# Patient Record
Sex: FEMALE | Race: WHITE | NOT HISPANIC OR LATINO | Employment: OTHER | URBAN - METROPOLITAN AREA
[De-identification: names, ages, dates, MRNs, and addresses within clinical notes are randomized per-mention and may not be internally consistent; named-entity substitution may affect disease eponyms.]

---

## 2017-01-06 DIAGNOSIS — I10 ESSENTIAL HYPERTENSION: ICD-10-CM

## 2017-01-06 RX ORDER — NEBIVOLOL HYDROCHLORIDE 5 MG/1
TABLET ORAL
Qty: 90 TABLET | Refills: 0 | Status: SHIPPED | OUTPATIENT
Start: 2017-01-06 | End: 2017-03-01 | Stop reason: SDUPTHER

## 2017-01-06 RX ORDER — ATORVASTATIN CALCIUM 20 MG/1
TABLET, FILM COATED ORAL
Qty: 90 TABLET | Refills: 0 | Status: SHIPPED | OUTPATIENT
Start: 2017-01-06 | End: 2017-05-03 | Stop reason: SDUPTHER

## 2017-01-16 RX ORDER — MELOXICAM 15 MG/1
TABLET ORAL
Qty: 30 TABLET | Refills: 0 | Status: CANCELLED | OUTPATIENT
Start: 2017-01-16

## 2017-01-17 DIAGNOSIS — M15.9 PRIMARY OSTEOARTHRITIS INVOLVING MULTIPLE JOINTS: Primary | ICD-10-CM

## 2017-01-17 RX ORDER — MELOXICAM 15 MG/1
15 TABLET ORAL DAILY
Qty: 30 TABLET | Refills: 1 | Status: SHIPPED | OUTPATIENT
Start: 2017-01-17 | End: 2017-08-26 | Stop reason: SDUPTHER

## 2017-01-19 RX ORDER — MELOXICAM 15 MG/1
TABLET ORAL
Qty: 30 TABLET | Refills: 0 | OUTPATIENT
Start: 2017-01-19

## 2017-02-02 ENCOUNTER — OFFICE VISIT (OUTPATIENT)
Dept: INTERNAL MEDICINE | Age: 77
End: 2017-02-02

## 2017-02-02 VITALS
HEIGHT: 56 IN | SYSTOLIC BLOOD PRESSURE: 132 MMHG | TEMPERATURE: 98.4 F | OXYGEN SATURATION: 98 % | DIASTOLIC BLOOD PRESSURE: 92 MMHG | HEART RATE: 72 BPM | WEIGHT: 115 LBS | BODY MASS INDEX: 25.87 KG/M2

## 2017-02-02 DIAGNOSIS — R31.9 URINARY TRACT INFECTION WITH HEMATURIA, SITE UNSPECIFIED: ICD-10-CM

## 2017-02-02 DIAGNOSIS — N39.0 URINARY TRACT INFECTION WITH HEMATURIA, SITE UNSPECIFIED: ICD-10-CM

## 2017-02-02 DIAGNOSIS — R35.0 URINARY FREQUENCY: Primary | ICD-10-CM

## 2017-02-02 LAB
BILIRUB BLD-MCNC: NEGATIVE MG/DL
CLARITY, POC: CLEAR
COLOR UR: ABNORMAL
GLUCOSE UR STRIP-MCNC: NEGATIVE MG/DL
KETONES UR QL: NEGATIVE
LEUKOCYTE EST, POC: ABNORMAL
NITRITE UR-MCNC: POSITIVE MG/ML
PH UR: 5 [PH] (ref 5–8)
PROT UR STRIP-MCNC: NEGATIVE MG/DL
RBC # UR STRIP: ABNORMAL /UL
SP GR UR: 1.03 (ref 1–1.03)
UROBILINOGEN UR QL: NORMAL

## 2017-02-02 PROCEDURE — 99214 OFFICE O/P EST MOD 30 MIN: CPT | Performed by: NURSE PRACTITIONER

## 2017-02-02 PROCEDURE — 81003 URINALYSIS AUTO W/O SCOPE: CPT | Performed by: NURSE PRACTITIONER

## 2017-02-02 RX ORDER — SULFAMETHOXAZOLE AND TRIMETHOPRIM 800; 160 MG/1; MG/1
1 TABLET ORAL 2 TIMES DAILY
Qty: 20 TABLET | Refills: 0 | Status: SHIPPED | OUTPATIENT
Start: 2017-02-02 | End: 2017-03-03

## 2017-02-02 NOTE — PROGRESS NOTES
Grace GONZALEZ Beauchamp / 76 y.o. / female  02/02/2017      CC:  Main reason(s) for today's visit: Urinary Frequency and Urinary Tract Infection (history of UTI's buring and pressure / chronic probs)      HPI:   Patient presents to the office with C/O urinary frequency, and pressure. States that she wakes up at night and has to urinate 4 times a night at times. Also noticed a odor with urine. States that she has a H/O frequent UTI's. Denies any blood in the urine, denies any bladder spasms. No fever reported.     The following portions of the patient's history were reviewed and updated as appropriate: past medical history and past surgical history.    ASSESSMENT & PLAN:    Problem List Items Addressed This Visit     None      Visit Diagnoses     Urinary frequency    -  Primary    Relevant Orders    POC Urinalysis Dipstick, Automated    Urinalysis With / Microscopic If Indicated        Orders Placed This Encounter   Procedures   • Urinalysis With / Microscopic If Indicated   • POC Urinalysis Dipstick, Automated       · Summary/Discussion:     Urinary frequency: Patient with history of frequent UTIs presents to the clinic with symptoms of urinary frequency, urinary pressure, and foul-smelling urine.  Due to these complaints urinalysis was ordered with results being positive for a UTI. Patient will be prescribed an antibiotic to be used as directed. She was advised to increase her oral fluids and contact the office if symptoms do not improve. Since this patient has chronic UTI's, we may consider referral to urology if symptoms do not improve despite antibiotics.   Follow-up: No Follow-up on file.     Future Appointments  Date Time Provider Department Center   4/13/2017 9:00 AM [facility] 3  [facility]   5/2/2017 1:00 PM Tres De Los Santos MD [facility] None     ____________________________________________________________________    REVIEW OF SYSTEMS    Review of Systems   Constitutional: Negative for activity change, appetite  "change, chills, diaphoresis, fatigue and fever.   HENT: Negative for congestion, dental problem, ear discharge, facial swelling, hearing loss, mouth sores, sore throat, trouble swallowing and voice change.    Eyes: Negative.    Respiratory: Negative for apnea, cough, choking, chest tightness, shortness of breath, wheezing and stridor.    Cardiovascular: Negative for chest pain, palpitations and leg swelling.   Gastrointestinal: Negative for abdominal distention, abdominal pain, anal bleeding, blood in stool, constipation, diarrhea, nausea, rectal pain and vomiting.   Endocrine: Negative for cold intolerance and heat intolerance.   Genitourinary: Positive for dysuria, frequency and urgency. Negative for decreased urine volume, difficulty urinating, enuresis, flank pain, genital sores and hematuria.        Pressure with urination and foul smelling urine.    Musculoskeletal: Negative for arthralgias, back pain, gait problem, joint swelling, myalgias, neck pain and neck stiffness.   Skin: Negative for color change, pallor, rash and wound.   Allergic/Immunologic: Negative for environmental allergies, food allergies and immunocompromised state.   Neurological: Negative for dizziness, tremors, seizures, syncope, facial asymmetry, speech difficulty, weakness, light-headedness, numbness and headaches.   Hematological: Negative for adenopathy. Does not bruise/bleed easily.   Psychiatric/Behavioral: Negative for agitation, confusion, decreased concentration, self-injury, sleep disturbance and suicidal ideas. The patient is not nervous/anxious and is not hyperactive.    All other systems reviewed and are negative.    Other: As noted per HPI      VITALS    Visit Vitals   • /92   • Pulse 72   • Temp 98.4 °F (36.9 °C)   • Ht 56\" (142.2 cm)   • Wt 115 lb (52.2 kg)   • SpO2 98%   • BMI 25.78 kg/m2     BP Readings from Last 3 Encounters:   02/02/17 132/92   11/01/16 120/78   10/26/16 122/80     Wt Readings from Last 3 " Encounters:   02/02/17 115 lb (52.2 kg)   11/01/16 109 lb (49.4 kg)   10/26/16 110 lb (49.9 kg)      Body mass index is 25.78 kg/(m^2).    PHYSICAL EXAMINATION    Physical Exam   Constitutional: She is oriented to person, place, and time. She appears well-developed and well-nourished.   HENT:   Head: Normocephalic.   Eyes: Conjunctivae are normal. Pupils are equal, round, and reactive to light.   Neck: Normal range of motion.   Cardiovascular: Normal rate and regular rhythm.    Pulmonary/Chest: Effort normal and breath sounds normal.   Musculoskeletal: Normal range of motion.   Neurological: She is alert and oriented to person, place, and time.   Skin: Skin is warm and dry.   Psychiatric: She has a normal mood and affect. Her behavior is normal.   Vitals reviewed.        REVIEWED DATA:    Labs:   Lab Results   Component Value Date     11/01/2016    K 4.2 11/01/2016    AST 13 06/28/2016    ALT 10 06/28/2016    BUN 15 11/01/2016    CREATININE 0.71 11/01/2016    CREATININE 0.90 09/30/2016    CREATININE 0.79 06/28/2016    EGFRIFNONA 83 11/01/2016    EGFRIFAFRI 96 11/01/2016       Lab Results   Component Value Date     (H) 11/01/2016    HGBA1C 6.9 (H) 12/08/2015       No results found for: LDL, HDL, TRIG, CHOLHDLRATIO    No results found for: TSH, FREET4       Lab Results   Component Value Date    WBC 9.97 06/28/2016    HGB 14.1 06/28/2016     06/28/2016        Imaging:        Medical Tests:        Summary of old records / correspondence / consultant report:        Request outside records:          ALLERGIES:    Ramipril; Morphine; and Morphine and related    MEDICATIONS:  Current Outpatient Prescriptions   Medication Sig Dispense Refill   • acetaminophen (TYLENOL) 500 MG tablet Take 1 tablet by mouth every 8 (eight) hours as needed.     • aspirin 81 MG EC tablet Take 81 mg by mouth daily.     • atorvastatin (LIPITOR) 20 MG tablet TAKE ONE TABLET BY MOUTH EVERY NIGHT AT BEDTIME 90 tablet 0   • Biotin  1000 MCG tablet Take 1,000 mcg by mouth daily. Take as directed     • BYSTOLIC 5 MG tablet TAKE ONE TABLET BY MOUTH DAILY 90 tablet 0   • Cholecalciferol (VITAMIN D) 2000 UNITS tablet Take 1 tablet by mouth nightly.     • cilostazol (PLETAL) 100 MG tablet Take 1 tablet by mouth 2 (two) times a day.     • diclofenac (VOLTAREN) 1 % gel gel Place on the skin. 4 GM of Gel to affected to area 4 times daily. Do not apply more than 16 Gm daily to any one affected joint     • meloxicam (MOBIC) 15 MG tablet Take 1 tablet by mouth Daily. 30 tablet 1   • pramipexole (MIRAPEX) 0.125 MG tablet Take 1 tablet by mouth every night. 90 tablet 1   • PROAIR  (90 BASE) MCG/ACT inhaler INHALE 2 PUFFS INTO LUNGS EVERY 6 HOURS AS NEEDED FOR WHEEZING 1 inhaler 4   • tiotropium (SPIRIVA) 18 MCG per inhalation capsule daily. Inhale contents of capsule once a day     • traMADol (ULTRAM) 50 MG tablet TAKE ONE TABLET BY MOUTH EVERY 6 HOURS AS NEEDED FOR MODERATE PAIN 90 tablet 1     No current facility-administered medications for this visit.        [facility]    The following portions of the patient's history were reviewed and updated as appropriate: Allergies / Current Medications / Past Medical History / Surgical History / Social History / Family History    PROBLEM LIST:    Patient Active Problem List   Diagnosis   • Aneurysm of thoracic aorta   • Malignant neoplasm of breast   • Malignant neoplasm of subglottis   • Stenosis of carotid artery   • Chronic obstructive pulmonary disease   • Closed fracture of multiple ribs   • Cognitive disorder   • Depression   • Erythromelalgia   • Hyperlipidemia   • Hypertension   • Osteoarthritis   • Osteoporosis   • Restless legs syndrome   • Cerebral aneurysm   • Temporary cerebral vascular dysfunction   • S/P CABG x 1   • Diabetes mellitus   • Sternal pain       PAST MEDICAL HX:    Past Medical History   Diagnosis Date   • Aneurysm      saccular   • Aneurysm of aorta      abdominal aorta and arch,  descending aorta   • Cancer      breast,subglottis,   • Cognitive disorder    • COPD (chronic obstructive pulmonary disease)    • Depression    • Diabetes mellitus    • Hyperlipidemia    • Hypertension    • Low back pain    • Osteoarthritis    • Osteoporosis    • Prediabetes    • RLS (restless legs syndrome)    • Stenosis of artery      carotid   • Stroke      Perioperatively with left hip replacement   • TIA (transient ischemic attack)    • Venous insufficiency        PAST SURGICAL HX:    Past Surgical History   Procedure Laterality Date   • Breast surgery       Rt mastecomy   • Cholecystectomy     • Hip surgery Left      open reduction internal fixation   • Other surgical history  02/23/2016     ascending aortic replacement with 24 mm graft  Dr Ontiveros   • Coronary artery bypass graft  02/23/2016     X 1 Dr Ontiveros   • Other surgical history  02/23/2016     Total Arch Replacement using a 24 mm Vascutek graft Dr Otniveros   • Cardiac surgery  02/2016       SOCIAL HX:    Social History     Social History   • Marital status: Unknown     Spouse name: N/A   • Number of children: N/A   • Years of education: N/A     Social History Main Topics   • Smoking status: Former Smoker   • Smokeless tobacco: Never Used   • Alcohol use No   • Drug use: No   • Sexual activity: Defer     Other Topics Concern   • None     Social History Narrative       FAMILY HX:    Family History   Problem Relation Age of Onset   • Alzheimer's disease Mother    • Cancer Maternal Aunt    • Heart disease Father    • Heart failure Father    • No Known Problems Sister    • Heart failure Brother    • No Known Problems Maternal Grandmother    • No Known Problems Maternal Grandfather    • No Known Problems Paternal Grandmother    • No Known Problems Paternal Grandfather    • Diabetes Brother    • Brain cancer Brother

## 2017-02-03 ENCOUNTER — TELEPHONE (OUTPATIENT)
Dept: INTERNAL MEDICINE | Age: 77
End: 2017-02-03

## 2017-02-03 DIAGNOSIS — N39.0 URINARY TRACT INFECTION WITHOUT HEMATURIA, SITE UNSPECIFIED: ICD-10-CM

## 2017-02-03 DIAGNOSIS — R19.5 YEAST IN STOOL: Primary | ICD-10-CM

## 2017-02-03 DIAGNOSIS — B37.31 VAGINAL YEAST INFECTION: ICD-10-CM

## 2017-02-03 RX ORDER — CIPROFLOXACIN 500 MG/1
500 TABLET, FILM COATED ORAL 2 TIMES DAILY
Qty: 6 TABLET | Refills: 0 | Status: SHIPPED | OUTPATIENT
Start: 2017-02-03 | End: 2017-03-03

## 2017-02-06 ENCOUNTER — TELEPHONE (OUTPATIENT)
Dept: INTERNAL MEDICINE | Age: 77
End: 2017-02-06

## 2017-02-06 NOTE — TELEPHONE ENCOUNTER
I spoke to Mrs. Beauchamp and advised her to continue taking nystatin oral swish as previously ordered. I also advised her to take benadryl and if she has any symptoms of tongue swelling, lip swelling or difficulty swallowing, go to the ER immediately. Patient verbalized understanding.

## 2017-03-01 DIAGNOSIS — M54.50 BILATERAL LOW BACK PAIN WITHOUT SCIATICA, UNSPECIFIED CHRONICITY: ICD-10-CM

## 2017-03-01 DIAGNOSIS — I10 ESSENTIAL HYPERTENSION: ICD-10-CM

## 2017-03-01 DIAGNOSIS — M54.50 BILATERAL LOW BACK PAIN WITHOUT SCIATICA, UNSPECIFIED CHRONICITY: Primary | ICD-10-CM

## 2017-03-01 RX ORDER — NEBIVOLOL 5 MG/1
5 TABLET ORAL DAILY
Qty: 90 TABLET | Refills: 0 | Status: SHIPPED | OUTPATIENT
Start: 2017-03-01 | End: 2017-05-03 | Stop reason: SDUPTHER

## 2017-03-02 RX ORDER — TRAMADOL HYDROCHLORIDE 50 MG/1
TABLET ORAL
Qty: 90 TABLET | Refills: 1 | OUTPATIENT
Start: 2017-03-02 | End: 2017-05-16 | Stop reason: SDUPTHER

## 2017-03-02 NOTE — TELEPHONE ENCOUNTER
Last OV 2/2/2017  Next OV 3/3/2017  Last RF 12/1/2016 #90 +1  Last KSP On File  UDS Not On File (orders are in chart, needs co-sign)  Contract Not On File    KD

## 2017-03-03 ENCOUNTER — TELEPHONE (OUTPATIENT)
Dept: CARDIAC SURGERY | Facility: CLINIC | Age: 77
End: 2017-03-03

## 2017-03-03 ENCOUNTER — OFFICE VISIT (OUTPATIENT)
Dept: INTERNAL MEDICINE | Age: 77
End: 2017-03-03

## 2017-03-03 VITALS
HEART RATE: 70 BPM | TEMPERATURE: 97.8 F | HEIGHT: 56 IN | BODY MASS INDEX: 25.64 KG/M2 | OXYGEN SATURATION: 97 % | DIASTOLIC BLOOD PRESSURE: 80 MMHG | SYSTOLIC BLOOD PRESSURE: 134 MMHG | WEIGHT: 114 LBS

## 2017-03-03 DIAGNOSIS — E78.2 MIXED HYPERLIPIDEMIA: Chronic | ICD-10-CM

## 2017-03-03 DIAGNOSIS — M15.9 PRIMARY OSTEOARTHRITIS INVOLVING MULTIPLE JOINTS: Chronic | ICD-10-CM

## 2017-03-03 DIAGNOSIS — J43.9 PULMONARY EMPHYSEMA, UNSPECIFIED EMPHYSEMA TYPE (HCC): Chronic | ICD-10-CM

## 2017-03-03 DIAGNOSIS — I10 ESSENTIAL HYPERTENSION: Primary | Chronic | ICD-10-CM

## 2017-03-03 DIAGNOSIS — F32.A DEPRESSION, UNSPECIFIED DEPRESSION TYPE: Chronic | ICD-10-CM

## 2017-03-03 DIAGNOSIS — E11.9 TYPE 2 DIABETES MELLITUS WITHOUT COMPLICATION, WITHOUT LONG-TERM CURRENT USE OF INSULIN (HCC): Chronic | ICD-10-CM

## 2017-03-03 PROCEDURE — 99214 OFFICE O/P EST MOD 30 MIN: CPT | Performed by: INTERNAL MEDICINE

## 2017-03-03 RX ORDER — ESCITALOPRAM OXALATE 10 MG/1
10 TABLET ORAL EVERY MORNING
Qty: 30 TABLET | Refills: 1 | Status: SHIPPED | OUTPATIENT
Start: 2017-03-03 | End: 2017-05-03 | Stop reason: SDUPTHER

## 2017-03-03 NOTE — TELEPHONE ENCOUNTER
Ms Beauchamp a patient of Dr Ontiveros's called today and had questions concerning an area directly beneath her surgical incision site. She has asked numerous physicians that she sees and has had several different opinions. The last of which was Dr Serrato who viewed it this week and feels it is a hernia. She would like Dr Ontiveros's opinion. I told her I would check with Dr Ontiveros and see if he wanted her to have any type of testing done before she comes in for an office visit. I will speak with Dr Ontiveros and call her back in the next few days. She does verbalize that this area is slightly uncomfortable but not painful

## 2017-03-03 NOTE — PROGRESS NOTES
"Grace GONZALEZ Beauchamp / 76 y.o. / female  03/03/2017    VITALS    Visit Vitals   • /80   • Pulse 70   • Temp 97.8 °F (36.6 °C)   • Ht 56\" (142.2 cm)   • Wt 114 lb (51.7 kg)   • SpO2 97%   • BMI 25.56 kg/m2     BP Readings from Last 3 Encounters:   03/03/17 134/80   02/02/17 132/92   11/01/16 120/78     Wt Readings from Last 3 Encounters:   03/03/17 114 lb (51.7 kg)   02/02/17 115 lb (52.2 kg)   11/01/16 109 lb (49.4 kg)      Body mass index is 25.56 kg/(m^2).    CC:  Main reason(s) for today's visit: bulge on abdomen (poss hernia) and Med Refill (discuss tramadol refill)      HPI:     Chronic type 2 diabetes     Home blood sugar levels: does not check  Current therapy: lifestyle modification without medication    Most recent relevant labs:   Lab Results   Component Value Date    HGBA1C 6.9 (H) 12/08/2015    CREATININE 0.71 11/01/2016     Chronic essential hypertension  Home BP readings: DOES NOT CHECK BP AT HOME. Compliant with medication(s).  Denies significant problems or side effects. Most recent in-office blood pressure readings:   BP Readings from Last 3 Encounters:   03/03/17 134/80   02/02/17 132/92   11/01/16 120/78     Chronic hyperlipidemia  Current therapy include atorvastatin.  Denies significant problems with medication(s).  Most recent labs: No results found for: LDL, HDL, TRIG, CHOLHDLRATIO    COPD    She has quit smoking several years ago.  She denies change in breathing symptoms.  Current therapy: Spiriva    S/p thoracic aortic aneurysm repair. She thinks she has a hernia.     C/o recurrent depression, w/o SI. Previously stopped medication on own.     Generalized OA on meloxicam and tramadol 1-2 x daily. Helps w/ pain. Denies problems w/ med.    ____________________________________________________________________    ASSESSMENT & PLAN:    Problem List Items Addressed This Visit        High    Hypertension - Primary (Chronic)    Current Assessment & Plan     Stable. Continue Bystolic 5 mg qd.       "    Relevant Medications    nebivolol (BYSTOLIC) 5 MG tablet       Medium    Depression (Chronic)    Current Assessment & Plan     H/o recurrent depression; start escitalopram; f/u in 2 months.          Relevant Medications    escitalopram (LEXAPRO) 10 MG tablet    Primary osteoarthritis involving multiple joints (Chronic)    Overview     Impression: 07/15/2014 - Hips, spine;          Current Assessment & Plan     Continue meloxicam and tramadol as needed.   Consent / agreement signed. Marquis requested. UDS today.          Relevant Medications    meloxicam (MOBIC) 15 MG tablet    Type 2 diabetes mellitus without complication, without long-term current use of insulin (Chronic)    Relevant Orders    Ambulatory Referral to Ophthalmology    Hemoglobin A1c    Microalbumin / Creatinine Urine Ratio       Low    Chronic obstructive pulmonary disease (Chronic)    Current Assessment & Plan     Continue Spiriva daily.          Relevant Medications    tiotropium (SPIRIVA) 18 MCG per inhalation capsule    PROAIR  (90 BASE) MCG/ACT inhaler    Hyperlipidemia (Chronic)    Current Assessment & Plan     Stable. Continue atorvastatin.          Relevant Medications    atorvastatin (LIPITOR) 20 MG tablet    Other Relevant Orders    Lipid Panel With / Chol / HDL Ratio        Orders Placed This Encounter   Procedures   • Hemoglobin A1c   • Lipid Panel With / Chol / HDL Ratio   • Microalbumin / Creatinine Urine Ratio   • Ambulatory Referral to Ophthalmology       Summary/Discussion:     ·       Return in about 2 months (around 5/3/2017) for Depression; AWV with Pernell in 2 weeks (40 min).    Future Appointments  Date Time Provider Department Center   3/17/2017 1:20 PM [facility] None   4/13/2017 9:00 AM YUNG CT 3 [facility]   5/2/2017 1:00 PM Tres De Los Santos MD [facility] None   5/3/2017 2:20 PM Miller Castañeda MD [facility] None        ____________________________________________________________________    REVIEW OF SYSTEMS    Review of Systems  As noted per HPI  Constitutional neg   Resp neg   CV neg for chest pain, driscoll, pnd/orthopnea  Gi no pain  Psych depressed    Other: As noted per HPI      PHYSICAL EXAMINATION    Physical Exam  Constitutional  No distress   Cardiovascular Rate  normal . Rhythm: regular . Heart sounds:  normal    Pulmonary/Chest  Effort normal. Breath sounds:  normal   Abdomen: hernia upper abdomen w/o incarceration or pain  Psychiatric  Alert. Judgment and thought content normal. Mood depressed; no SI    REVIEWED DATA:    Labs:   Lab Results   Component Value Date     11/01/2016    K 4.2 11/01/2016    AST 13 06/28/2016    ALT 10 06/28/2016    BUN 15 11/01/2016    CREATININE 0.71 11/01/2016    CREATININE 0.90 09/30/2016    CREATININE 0.79 06/28/2016    EGFRIFNONA 83 11/01/2016    EGFRIFAFRI 96 11/01/2016       Lab Results   Component Value Date     (H) 11/01/2016    HGBA1C 6.9 (H) 12/08/2015       No results found for: LDL, HDL, TRIG, CHOLHDLRATIO    No results found for: TSH, FREET4       Lab Results   Component Value Date    WBC 9.97 06/28/2016    HGB 14.1 06/28/2016    HGB 11.5 (L) 02/29/2016    HGB 10.5 (L) 02/26/2016     06/28/2016        Imaging:        Medical Tests:        Summary of old records / correspondence / consultant report:        Request outside records:          ALLERGIES:    Ramipril; Bactrim [sulfamethoxazole-trimethoprim]; Morphine; and Morphine and related    MEDICATIONS:  Outpatient Medications Prior to Visit   Medication Sig Dispense Refill   • acetaminophen (TYLENOL) 500 MG tablet Take 1 tablet by mouth every 8 (eight) hours as needed.     • aspirin 81 MG EC tablet Take 81 mg by mouth daily.     • atorvastatin (LIPITOR) 20 MG tablet TAKE ONE TABLET BY MOUTH EVERY NIGHT AT BEDTIME 90 tablet 0   • Biotin 1000 MCG tablet Take 1,000 mcg by mouth daily. Take as directed     •  Cholecalciferol (VITAMIN D) 2000 UNITS tablet Take 1 tablet by mouth nightly.     • cilostazol (PLETAL) 100 MG tablet Take 1 tablet by mouth 2 (two) times a day.     • diclofenac (VOLTAREN) 1 % gel gel Place on the skin. 4 GM of Gel to affected to area 4 times daily. Do not apply more than 16 Gm daily to any one affected joint     • meloxicam (MOBIC) 15 MG tablet Take 1 tablet by mouth Daily. 30 tablet 1   • nebivolol (BYSTOLIC) 5 MG tablet Take 1 tablet by mouth Daily. 90 tablet 0   • pramipexole (MIRAPEX) 0.125 MG tablet Take 1 tablet by mouth every night. 90 tablet 1   • PROAIR  (90 BASE) MCG/ACT inhaler INHALE 2 PUFFS INTO LUNGS EVERY 6 HOURS AS NEEDED FOR WHEEZING 1 inhaler 4   • tiotropium (SPIRIVA) 18 MCG per inhalation capsule daily. Inhale contents of capsule once a day     • traMADol (ULTRAM) 50 MG tablet TAKE ONE TABLET BY MOUTH EVERY 6 HOURS AS NEEDED FOR MODERATE PAIN 90 tablet 1   • ciprofloxacin (CIPRO) 500 MG tablet Take 1 tablet by mouth 2 (Two) Times a Day. 6 tablet 0   • sulfamethoxazole-trimethoprim (BACTRIM DS,SEPTRA DS) 800-160 MG per tablet Take 1 tablet by mouth 2 (Two) Times a Day. 20 tablet 0     No facility-administered medications prior to visit.        [facility]    The following portions of the patient's history were reviewed and updated as appropriate: Allergies / Current Medications / Past Medical History / Surgical History / Social History / Family History    PROBLEM LIST:    Patient Active Problem List   Diagnosis   • History of breast cancer   • Stenosis of carotid artery   • Chronic obstructive pulmonary disease   • Closed fracture of multiple ribs   • Cognitive disorder   • Depression   • Erythromelalgia   • Hyperlipidemia   • Hypertension   • Primary osteoarthritis involving multiple joints   • Osteoporosis   • Restless legs syndrome   • Cerebral aneurysm   • Temporary cerebral vascular dysfunction   • S/P CABG x 1   • Type 2 diabetes mellitus without complication, without  long-term current use of insulin   • Sternal pain       PAST MEDICAL HX:    Past Medical History   Diagnosis Date   • Aneurysm      saccular   • Aneurysm of aorta      abdominal aorta and arch, descending aorta   • Cancer      breast,subglottis,   • Cognitive disorder    • COPD (chronic obstructive pulmonary disease)    • Depression    • Diabetes mellitus    • Hyperlipidemia    • Hypertension    • Low back pain    • Osteoarthritis    • Osteoporosis    • Prediabetes    • RLS (restless legs syndrome)    • Stenosis of artery      carotid   • Stroke      Perioperatively with left hip replacement   • TIA (transient ischemic attack)    • Venous insufficiency        PAST SURGICAL HX:    Past Surgical History   Procedure Laterality Date   • Breast surgery       Rt mastecomy   • Cholecystectomy     • Hip surgery Left      open reduction internal fixation   • Other surgical history  02/23/2016     ascending aortic replacement with 24 mm graft  Dr Ontiveros   • Coronary artery bypass graft  02/23/2016     X 1 Dr Ontiveros   • Other surgical history  02/23/2016     Total Arch Replacement using a 24 mm Vascutek graft Dr Ontiveros   • Cardiac surgery  02/2016       SOCIAL HX:    Social History     Social History   • Marital status: Unknown     Spouse name: N/A   • Number of children: N/A   • Years of education: N/A     Social History Main Topics   • Smoking status: Former Smoker   • Smokeless tobacco: Never Used   • Alcohol use No   • Drug use: No   • Sexual activity: Defer     Other Topics Concern   • None     Social History Narrative       FAMILY HX:    Family History   Problem Relation Age of Onset   • Alzheimer's disease Mother    • Cancer Maternal Aunt    • Heart disease Father    • Heart failure Father    • No Known Problems Sister    • Heart failure Brother    • No Known Problems Maternal Grandmother    • No Known Problems Maternal Grandfather    • No Known Problems Paternal Grandmother    • No Known Problems Paternal Grandfather    •  Diabetes Brother    • Brain cancer Brother          **Tru Disclaimer:   Much of this encounter note is an electronic transcription/translation of spoken language to printed text. The electronic translation of spoken language may permit erroneous, or at times, nonsensical words or phrases to be inadvertently transcribed. Although I have reviewed the note for such errors, some may still exist.

## 2017-03-03 NOTE — ASSESSMENT & PLAN NOTE
Continue meloxicam and tramadol as needed.   Consent / agreement signed. Marquis requested. UDS today.

## 2017-03-09 ENCOUNTER — TELEPHONE (OUTPATIENT)
Dept: CARDIAC SURGERY | Facility: CLINIC | Age: 77
End: 2017-03-09

## 2017-03-09 LAB
ALBUMIN/CREAT UR: 25.2 MG/G CREAT (ref 0–30)
CHOLEST SERPL-MCNC: 235 MG/DL (ref 0–200)
CHOLEST/HDLC SERPL: 3.51 {RATIO}
CREAT UR-MCNC: 275 MG/DL
DRUGS UR: NORMAL
HBA1C MFR BLD: 6.29 % (ref 4.8–5.6)
HDLC SERPL-MCNC: 67 MG/DL (ref 40–60)
LDLC SERPL CALC-MCNC: 146 MG/DL (ref 0–100)
Lab: NORMAL
MICROALBUMIN UR-MCNC: 69.2 UG/ML
TRIGL SERPL-MCNC: 111 MG/DL (ref 0–150)
VLDLC SERPL CALC-MCNC: 22.2 MG/DL (ref 5–40)

## 2017-03-09 NOTE — TELEPHONE ENCOUNTER
Dr Ontiveros reviewed Ms Beauchamp's chart and asked that I call her and let her know he would like her to be seen and evaluated by Dr Robert Bolden. This was agreeable to her. We will schedule this appointment and call her back with this information as soon as possible

## 2017-03-10 ENCOUNTER — TELEPHONE (OUTPATIENT)
Dept: INTERNAL MEDICINE | Age: 77
End: 2017-03-10

## 2017-03-10 NOTE — TELEPHONE ENCOUNTER
----- Message from Miller Castañeda MD sent at 3/10/2017  7:46 AM EST -----  Call patient with her test result(s) and mail the results to her if MyChart is NOT active.    Cholesterol level is high. Taking atorvastatin consistently? If so, increase to 40 mg. (remember to keep association dx).  Will need to check fasting labs on next visit.

## 2017-03-10 NOTE — PROGRESS NOTES
Call patient with her test result(s) and mail the results to her if MyChart is NOT active.    Cholesterol level is high. Taking atorvastatin consistently? If so, increase to 40 mg. (remember to keep association dx).  Will need to check fasting labs on next visit.

## 2017-03-13 ENCOUNTER — TELEPHONE (OUTPATIENT)
Dept: INTERNAL MEDICINE | Age: 77
End: 2017-03-13

## 2017-03-13 NOTE — TELEPHONE ENCOUNTER
Pt was not taking her atorvastatin consistently. She states that it was causing her leg cramps so she had quit taking for a couple of weeks prior to her labs, but had since started back. Told her to Keep taking her  20 mg unless instructed otherwise or unless you wanted her to change to 40 mg.

## 2017-03-15 ENCOUNTER — OFFICE VISIT (OUTPATIENT)
Dept: SURGERY | Facility: CLINIC | Age: 77
End: 2017-03-15

## 2017-03-15 VITALS — WEIGHT: 112 LBS | OXYGEN SATURATION: 93 % | BODY MASS INDEX: 25.19 KG/M2 | HEIGHT: 56 IN | HEART RATE: 74 BPM

## 2017-03-15 DIAGNOSIS — K43.2 INCISIONAL HERNIA, WITHOUT OBSTRUCTION OR GANGRENE: Primary | ICD-10-CM

## 2017-03-15 PROCEDURE — 99204 OFFICE O/P NEW MOD 45 MIN: CPT | Performed by: SURGERY

## 2017-03-15 RX ORDER — OMEPRAZOLE 20 MG/1
20 CAPSULE, DELAYED RELEASE ORAL DAILY
Qty: 30 CAPSULE | Refills: 1 | Status: SHIPPED | OUTPATIENT
Start: 2017-03-15 | End: 2018-03-15

## 2017-03-15 NOTE — PROGRESS NOTES
Date: March 15, 2017   Patient Name: Grace Beauchamp   Medical Record #: [id number]   : 1940  Age: 76 y.o.  Sex: female      Referring MD:  No referring provider defined for this encounter.    Reason for Visit  Chief Complaint   Patient presents with   • Hernia     NP, Ventral Hernia, Mid Abdominal Area   • Nausea        History of Present Illness:     Grace Beauchamp is a 76 y.o. female who presents for evaluation of a with a painful abdominal mass.  The patient underwent ascending aortic replacement and coronary bypass on .  3 months after the surgery she started noticing some discomfort and bulging at the inferior aspect of her midline sternotomy over the upper abdomen. The mass has always been reducible.  It is not tender. The discomfort is  almost always centered in the area of the buldge.The patient has not had obstructive symptoms.  The buldge has been slowly growing over time.  This was associated with nausea they usually happens in the morning.  This gets usually better with time.  Reports poor appetite but no pain.  No acid reflux or heartburn.  Symptoms improve with TUMS.  She has been taking meloxicam for arthritis, has not been taking any antiacid for gastric protection.    Past Medical History    Patient Active Problem List   Diagnosis   • History of breast cancer   • Stenosis of carotid artery   • Chronic obstructive pulmonary disease   • Closed fracture of multiple ribs   • Cognitive disorder   • Depression   • Erythromelalgia   • Hyperlipidemia   • Hypertension   • Primary osteoarthritis involving multiple joints   • Osteoporosis   • Restless legs syndrome   • Cerebral aneurysm   • Temporary cerebral vascular dysfunction   • S/P CABG x 1   • Type 2 diabetes mellitus without complication, without long-term current use of insulin   • Sternal pain         Past Surgical History    Past Surgical History   Procedure Laterality Date   • Coronary artery bypass graft  02/23/2016     X 1   Rama   • Cardiac surgery  02/2016     Dr. Ontiveros/Dr. De Los Santos   • Aortic valve repair/replacement N/A 02/23/2016     ascending aortic replacement with 24 mm graft, Dr. Ontiveros   • Cyst removal Right 01/25/2013     right palm excision of retinacular cyst, Dr. Karla Fish   • Orif hip fracture Left 03/27/2005     w/ AMBI compression screw, Dr. Victor M Cabral   • Laparoscopic cholecystectomy N/A 08/04/2004     Dr. JOEY Sheth   • Cardiac catheterization N/A 01/06/2016     Dr. Tres De Los Santos   • Mastectomy Right 09/28/2012   • Appendectomy  1977   • Breast biopsy Right 09/16/2012     vacuum assisted core bx of the right breast   • Rectovaginal fistula repair  1965   • Tubal abdominal ligation     • Colonoscopy N/A 10/2002     Dr. Negro   • Cataract extraction Bilateral      [facility]       Allergies    Allergies   Allergen Reactions   • Ramipril Other (See Comments)     Ace I  cough   • Morphine Itching   • Morphine And Related Itching   • Bactrim [Sulfamethoxazole-Trimethoprim] Itching and Rash       Medications  Current Outpatient Prescriptions   Medication Sig Dispense Refill   • aspirin 81 MG EC tablet Take 81 mg by mouth daily.     • atorvastatin (LIPITOR) 20 MG tablet TAKE ONE TABLET BY MOUTH EVERY NIGHT AT BEDTIME 90 tablet 0   • Biotin 1000 MCG tablet Take 1,000 mcg by mouth daily. Take as directed     • Cholecalciferol (VITAMIN D) 2000 UNITS tablet Take 1 tablet by mouth nightly.     • cilostazol (PLETAL) 100 MG tablet Take 1 tablet by mouth 2 (two) times a day.     • escitalopram (LEXAPRO) 10 MG tablet Take 1 tablet by mouth Every Morning. 30 tablet 1   • meloxicam (MOBIC) 15 MG tablet Take 1 tablet by mouth Daily. 30 tablet 1   • nebivolol (BYSTOLIC) 5 MG tablet Take 1 tablet by mouth Daily. 90 tablet 0   • pramipexole (MIRAPEX) 0.125 MG tablet Take 1 tablet by mouth every night. 90 tablet 1   • PROAIR  (90 BASE) MCG/ACT inhaler INHALE 2 PUFFS INTO LUNGS EVERY 6 HOURS AS NEEDED FOR  WHEEZING 1 inhaler 4   • Probiotic Product (PROBIOTIC PO) Take 1 capsule by mouth Daily.     • tiotropium (SPIRIVA) 18 MCG per inhalation capsule daily. Inhale contents of capsule once a day     • traMADol (ULTRAM) 50 MG tablet TAKE ONE TABLET BY MOUTH EVERY 6 HOURS AS NEEDED FOR MODERATE PAIN 90 tablet 1   • Unable to find Take 1 each by mouth Daily As Needed. Med Name: Zirmed for allergies     • acetaminophen (TYLENOL) 500 MG tablet Take 1 tablet by mouth every 8 (eight) hours as needed.     • diclofenac (VOLTAREN) 1 % gel gel Place on the skin. 4 GM of Gel to affected to area 4 times daily. Do not apply more than 16 Gm daily to any one affected joint     • omeprazole (PRILOSEC) 20 MG capsule Take 1 capsule by mouth Daily. 30 capsule 1     No current facility-administered medications for this visit.          Social History  Social History     Social History   • Marital status: Unknown     Spouse name: N/A   • Number of children: N/A   • Years of education: N/A     Social History Main Topics   • Smoking status: Former Smoker     Quit date: 12/2012   • Smokeless tobacco: Never Used   • Alcohol use No   • Drug use: No   • Sexual activity: Defer     Other Topics Concern   • None     Social History Narrative       Family History  Family History   Problem Relation Age of Onset   • Alzheimer's disease Mother    • Cancer Maternal Aunt    • Heart disease Father    • Heart failure Father    • No Known Problems Sister    • Heart failure Brother    • No Known Problems Maternal Grandmother    • No Known Problems Maternal Grandfather    • No Known Problems Paternal Grandmother    • No Known Problems Paternal Grandfather    • Diabetes Brother    • Brain cancer Brother          Review of Systems      REVIEW OF SYSTEMS    CONSTITUTIONAL: Denies fevers, chills, unintentional weight loss or weight gain  RESPIRATORY: Denies chronic cough or sob.  HEENT: Ports postnasal drip, runny nose, sinus pressure and sneezing   CARDIAC: Denies  "chest pain, palpitations, edema  GI: Denies dyspepsia, reflux, heartburn, nausea, vomiting, diarrhea or constipation   : Denies dysuria or hematuria.  Reports urinary frequency and urgency  MUSCULOSKELETAL: Reports joint and back pain, denies muscle weakness  NEURO: Denies chronic headaches.  ENDOCRINE: Denies significant heat or cold intolerance or history of thyroid problems.   DERM: no rashes,lesions or discharge.  PSYCH: Reports decreased concentration, anxiety and depression     Physical Exam  Patient is a 76 y.o. female who appeared well, not in acute distress.   Visit Vitals   • Pulse 74   • Ht 56\" (142.2 cm)   • Wt 112 lb (50.8 kg)   • SpO2 93%   • BMI 25.11 kg/m2     Body mass index is 25.11 kg/(m^2).  General: AOx3, NAD  HEENT: normochephalic, atraumatic, no scleral icterus. Moist oral mucosa.   Lungs: clear to auscultation and percussion, no chest deformities noted. Midline sternotomy well healed.   Cardiovascular:  Regular rate and rhythm  Extremities: No clubbing cyanosis or edema  Skin: No rashes, moist and warm.   Neuro: alert and oriented, normal speech, cranial nerves 2-12 grossly intact, no focal deficits  Abdominal exam: soft, nontender, nondistended, no masses or organomegaly. At the inferior aspect of the midline sternotomy wound at the superior epigastric area there is a easy reducible incisional hernia that measures approximately 3 x 3 cm.  The superior aspect of the defect is at the level of the xiphoid process.  There is no muscle or fashion the superior aspect of it.  The hernia is slightly tender to touch.  Well-healed paraumbilical incision, no hernias    Medical Decision Making  Test Results:  Results for orders placed or performed in visit on 03/03/17   Hemoglobin A1c   Result Value Ref Range    Hemoglobin A1C 6.29 (H) 4.80 - 5.60 %   Lipid Panel With / Chol / HDL Ratio   Result Value Ref Range    Total Cholesterol 235 (H) 0 - 200 mg/dL    Triglycerides 111 0 - 150 mg/dL    HDL " Cholesterol 67 (H) 40 - 60 mg/dL    VLDL Cholesterol 22.2 5 - 40 mg/dL    LDL Cholesterol  146 (H) 0 - 100 mg/dL    Chol/HDL Ratio 3.51    Microalbumin / Creatinine Urine Ratio   Result Value Ref Range    Creatinine, Urine 275.0 Not Estab. mg/dL    Microalbumin, Urine 69.2 Not Estab. ug/mL    Microalbumin/Creatinine Ratio Urine 25.2 0.0 - 30.0 mg/g creat   Compliance Drug Analysis, Ur   Result Value Ref Range    Report Summary FINAL     PDF Image .        Impression:  This is a 76 y.o. female patient with a chief complaint of an abdominal bulge that was found to be an incisional hernia.  The hernia is easy reducible and there is no signs of incarceration or strangulation.  I did review the CT scan performed on September of last year and there is a small defect in the fascia at the level of the epigastric area but without any incarcerated contents. It is unlikely that she will have incarceration or strangulation of bowel at the location due to the falciform ligament.  It seems that the hernia has grown since then.    I do not think that her nausea is caused by a hernia but likely from gastric irritation from meloxicam and aspirin use without any gastric protective agent.    I discussed with her about treatment options. Risks and benefits of conservative, laparoscopic, an open approach have been discussed in length and at detail with the patient. After carefully considering those risks and benefits, being given an opportunity to further ask questions, and voicing understanding,  the patient has chosen to undergo an laparoscopic incisional hernia repair with mesh insertion. At this time, she wants to wait until she gets her next CT scan of the abdomen to follow-up for her abdominal aneurysm.  She is concerned she may need to have another aneurysm repair and does not want to go elective surgery before this decision is made by Dr. Smith.  She does not want to have open incisional hernia repair since she is afraid of  having a large incision.    Plan:  - Follow-up with Dr. Smith with CT scan of the abdomen that has been already ordered  - If no need for aneurysm repair she will get back to me and discuss about surgical planning for incisional hernia repair  - Start omeprazole 20 mg daily on am, should take while taking any NSAIDs    Risk of incarceration and strangulation of hernia contents in the meantime were explained to her and advised her to come to the emergency room he started having worsening pain at the area of the hernia with nausea and vomiting    Robert Bolden MD  General, Minimally Invasive and Endoscopic Surgery  [facility]    [address], [address]  [place], [place], [address]  P: [phone]  F: [phone]

## 2017-03-24 ENCOUNTER — OFFICE VISIT (OUTPATIENT)
Dept: INTERNAL MEDICINE | Age: 77
End: 2017-03-24

## 2017-03-24 VITALS
SYSTOLIC BLOOD PRESSURE: 100 MMHG | BODY MASS INDEX: 25.19 KG/M2 | OXYGEN SATURATION: 95 % | TEMPERATURE: 98 F | DIASTOLIC BLOOD PRESSURE: 64 MMHG | WEIGHT: 112 LBS | HEIGHT: 56 IN | HEART RATE: 70 BPM

## 2017-03-24 DIAGNOSIS — Z00.00 MEDICARE ANNUAL WELLNESS VISIT, INITIAL: Primary | ICD-10-CM

## 2017-03-24 PROCEDURE — G0438 PPPS, INITIAL VISIT: HCPCS | Performed by: NURSE PRACTITIONER

## 2017-03-24 RX ORDER — CETIRIZINE HYDROCHLORIDE 10 MG/1
10 TABLET ORAL AS NEEDED
Status: ON HOLD | COMMUNITY
End: 2018-12-02

## 2017-03-24 NOTE — PATIENT INSTRUCTIONS
Medicare Wellness  Personal Prevention Plan of Service     Date of Office Visit:  2017  Encounter Provider:  FREDRICK Cardona  Place of Service:  [facility]  Patient Name: Grace Beauchamp  :  1940    As part of the Medicare Wellness portion of your visit today, we are providing you with this personalized preventive plan of services (PPPS). This plan is based upon recommendations of the [place] Preventive Services Task Force (USPSTF) and the Advisory Committee on Immunization Practices (ACIP).    This lists the preventive care services that should be considered, and provides dates of when you are due. Items listed as completed are up-to-date and do not require any further intervention.    Health Maintenance   Topic Date Due   • COLONOSCOPY  2016   • DIABETIC FOOT EXAM  2016   • DIABETIC EYE EXAM  2016   • HEMOGLOBIN A1C  2017   • LIPID PANEL  2018   • URINE MICROALBUMIN  2018   • MEDICARE ANNUAL WELLNESS  2018   • DXA SCAN  07/15/2018   • MAMMOGRAM  2018   • TDAP/TD VACCINES (2 - Td) 2027   • INFLUENZA VACCINE  Completed   • PNEUMOCOCCAL VACCINES (65+ LOW/MEDIUM RISK)  Completed   • ZOSTER VACCINE  Completed

## 2017-03-24 NOTE — PROGRESS NOTES
"03/24/2017    MEDICARE ANNUAL WELLNESS VISIT    Grace Beauchamp is a 76 y.o. female who presents for INITIAL AWV    Patient's general assessment of her health since a year ago:     - Compared to one year ago, she feels her physical health is about the same without significant change. Arthritis is worse.     - Compared to one year ago, she feels her mental health is slightly better.    * The required components of Health Risk Assessment (HRA) that were completed by the patient and/or my staff are contained within this note and in the scanned documents titled \"Health Risk Assessment\" within the media section of the patient's chart in [facility].       HISTORY    Recent Hospitalizations:    Recent hospitalization? : Yes    If YES, location, date, and diagnoses:     · Location: [facility]  · Date: February 2016  · Principle Discharge Dx: Aneurysm of aorta, and CABG   · Secondary Dx: Aneurysm of aorta      Patient Care Team:    Patient Care Team:  Miller Castañeda MD as PCP - General (Internal Medicine)  Mart Ontiveros MD as Surgeon (Cardiothoracic Surgery)  Tres De Los Santos MD as Consulting Physician (Cardiology)  Graham Mcconnell MD as Consulting Physician (Hematology and Oncology)  Payam Byrnes MD as Consulting Physician (Otolaryngology)  Jonathan Smith MD, Vascular  Joselo Huang MD, OB/GYN  Preston Abrams MD, Gastroenterology  Samantha Steen    Allergies:    Ramipril; Morphine; Morphine and related; and Bactrim [sulfamethoxazole-trimethoprim]    Medications:    Outpatient Medications Prior to Visit   Medication Sig Dispense Refill   • aspirin 81 MG EC tablet Take 81 mg by mouth daily.     • atorvastatin (LIPITOR) 20 MG tablet TAKE ONE TABLET BY MOUTH EVERY NIGHT AT BEDTIME 90 tablet 0   • Biotin 1000 MCG tablet Take 1,000 mcg by mouth daily. Take as directed     • Cholecalciferol (VITAMIN D) 2000 UNITS tablet Take 1 tablet by mouth nightly.     • cilostazol (PLETAL) 100 MG tablet Take 1 " tablet by mouth 2 (two) times a day.     • diclofenac (VOLTAREN) 1 % gel gel Place on the skin. 4 GM of Gel to affected to area 4 times daily. Do not apply more than 16 Gm daily to any one affected joint     • escitalopram (LEXAPRO) 10 MG tablet Take 1 tablet by mouth Every Morning. 30 tablet 1   • meloxicam (MOBIC) 15 MG tablet Take 1 tablet by mouth Daily. 30 tablet 1   • nebivolol (BYSTOLIC) 5 MG tablet Take 1 tablet by mouth Daily. 90 tablet 0   • omeprazole (PRILOSEC) 20 MG capsule Take 1 capsule by mouth Daily. 30 capsule 1   • pramipexole (MIRAPEX) 0.125 MG tablet Take 1 tablet by mouth every night. 90 tablet 1   • PROAIR  (90 BASE) MCG/ACT inhaler INHALE 2 PUFFS INTO LUNGS EVERY 6 HOURS AS NEEDED FOR WHEEZING 1 inhaler 4   • Probiotic Product (PROBIOTIC PO) Take 1 capsule by mouth Daily.     • tiotropium (SPIRIVA) 18 MCG per inhalation capsule daily. Inhale contents of capsule once a day     • traMADol (ULTRAM) 50 MG tablet TAKE ONE TABLET BY MOUTH EVERY 6 HOURS AS NEEDED FOR MODERATE PAIN 90 tablet 1   • acetaminophen (TYLENOL) 500 MG tablet Take 1 tablet by mouth every 8 (eight) hours as needed.     • Unable to find Take 1 each by mouth Daily As Needed. Med Name: Zirmed for allergies       No facility-administered medications prior to visit.        PFSH:     The following portions of the patient's history were reviewed and updated as appropriate: Allergies / Current Medications / Past Medical History / Surgical History / Social History / Family History    Problem List:    Patient Active Problem List   Diagnosis   • History of breast cancer   • Stenosis of carotid artery   • Chronic obstructive pulmonary disease   • Closed fracture of multiple ribs   • Cognitive disorder   • Depression   • Erythromelalgia   • Hyperlipidemia   • Hypertension   • Primary osteoarthritis involving multiple joints   • Osteoporosis   • Restless legs syndrome   • Cerebral aneurysm   • Temporary cerebral vascular dysfunction    • S/P CABG x 1   • Type 2 diabetes mellitus without complication, without long-term current use of insulin   • Sternal pain       Past Medical History:    Past Medical History:   Diagnosis Date   • Aneurysm     saccular   • Aneurysm of aorta     abdominal aorta and arch, descending aorta   • Breast cancer     right breast iL3opPk (snm) pMX ER/MN pos, Her-2/neg, Ki-67, 31%, oncotype recurrence score 19, invasive lobular carcinoma   • Cognitive disorder    • COPD (chronic obstructive pulmonary disease)    • Depression    • Diabetes mellitus    • Hyperlipidemia    • Hypertension    • Low back pain    • Osteoarthritis    • Osteoporosis    • Prediabetes    • RLS (restless legs syndrome)    • Stenosis of artery     carotid   • Stroke     Perioperatively with left hip replacement   • TIA (transient ischemic attack)    • Venous insufficiency        Past Surgical History:    Past Surgical History:   Procedure Laterality Date   • AORTIC VALVE REPAIR/REPLACEMENT N/A 02/23/2016    ascending aortic replacement with 24 mm graft, Dr. Ontiveros   • APPENDECTOMY  1977   • BREAST BIOPSY Right 09/16/2012    vacuum assisted core bx of the right breast   • CARDIAC CATHETERIZATION N/A 01/06/2016    Dr. Tres De Los Santos   • CARDIAC SURGERY  02/2016    Dr. Ontiveros/Dr. De Los Santos   • CATARACT EXTRACTION Bilateral     [facility]   • COLONOSCOPY N/A 10/2002    Dr. Negro   • CORONARY ARTERY BYPASS GRAFT  02/23/2016    X 1 Dr Ontiveros   • CYST REMOVAL Right 01/25/2013    right palm excision of retinacular cyst, Dr. Karla Fish   • LAPAROSCOPIC CHOLECYSTECTOMY N/A 08/04/2004    Dr. JOEY Sheth   • MASTECTOMY Right 09/28/2012   • ORIF HIP FRACTURE Left 03/27/2005    w/ AMBI compression screw, Dr. Victor M Cabral   • RECTOVAGINAL FISTULA REPAIR  1965   • TUBAL ABDOMINAL LIGATION         Social History:    Social History     Social History   • Marital status: Unknown     Spouse name: N/A   • Number of children: N/A   • Years of education:  "N/A     Social History Main Topics   • Smoking status: Former Smoker     Quit date: 12/2012   • Smokeless tobacco: Never Used   • Alcohol use No   • Drug use: No   • Sexual activity: Defer     Other Topics Concern   • None     Social History Narrative       Family History:    Family History   Problem Relation Age of Onset   • Alzheimer's disease Mother    • Cancer Maternal Aunt    • Heart disease Father    • Heart failure Father    • No Known Problems Sister    • Heart failure Brother    • No Known Problems Maternal Grandmother    • No Known Problems Maternal Grandfather    • No Known Problems Paternal Grandmother    • No Known Problems Paternal Grandfather    • Diabetes Brother    • Brain cancer Brother          PATIENT ASSESSMENT    Vitals:  /64  Pulse 70  Temp 98 °F (36.7 °C)  Ht 56\" (142.2 cm)  Wt 112 lb (50.8 kg)  SpO2 95%  BMI 25.11 kg/m2  BP Readings from Last 3 Encounters:   03/24/17 100/64   03/03/17 134/80   02/02/17 132/92     Wt Readings from Last 3 Encounters:   03/24/17 112 lb (50.8 kg)   03/15/17 112 lb (50.8 kg)   03/03/17 114 lb (51.7 kg)      Body mass index is 25.11 kg/(m^2).    Pain Score    03/24/17 1316   PainSc: 8  Comment: lower back / hips   PainLoc: Back         Recent Lab Results:    Lab Results   Component Value Date     11/01/2016    K 4.2 11/01/2016    AST 13 06/28/2016    ALT 10 06/28/2016    BUN 15 11/01/2016    CREATININE 0.71 11/01/2016    CREATININE 0.90 09/30/2016    CREATININE 0.79 06/28/2016    EGFRIFNONA 83 11/01/2016    EGFRIFAFRI 96 11/01/2016       Lab Results   Component Value Date     (H) 11/01/2016    HGBA1C 6.29 (H) 03/03/2017    HGBA1C 6.9 (H) 12/08/2015       Lab Results   Component Value Date     (H) 03/03/2017    HDL 67 (H) 03/03/2017    TRIG 111 03/03/2017    CHOLHDLRATIO 3.51 03/03/2017       No results found for: TSH, FREET4       Lab Results   Component Value Date    WBC 9.97 06/28/2016    HGB 14.1 06/28/2016    HGB 11.5 (L) " 02/29/2016    HGB 10.5 (L) 02/26/2016     06/28/2016          Screening for Glaucoma:  Previous screening for glaucoma?: Yes      Hearing Loss Screen:  Finger Rub Hearing Test (right ear): passed  Finger Rub Hearing Test (left ear): passed      Urinary Incontinence Screen:  Episodes of urinary incontinence? : Yes      Depression Screen:  PHQ-9 Depression Screening 3/24/2017   Little interest or pleasure in doing things 1   Feeling down, depressed, or hopeless 1   PHQ-9 Total Score 2   If you checked off any problems, how difficult have these problems made it for you to do your work, take care of things at home, or get along with other people? Somewhat difficult        PHQ-2: 4 (Proceed to PHQ-9)    PHQ-9: 1-4 (Minimal Depression)       FUNCTIONAL, FALL RISK, & COGNITIVE SCREENING (Components below):    DATA:    Functional & Cognitive Status 3/24/2017   Do you have difficulty preparing food and eating? No   Do you have difficulty bathing yourself? No   Do you have difficulty getting dressed? No   Do you have difficulty using the toilet? No   Do you have difficulty moving around from place to place? No   In the past year have you fallen or experienced a near fall? No   Do you need help using the phone?  No   Are you deaf or do you have serious difficulty hearing?  No   Do you need help with transportation? No   Do you need help shopping? Yes   Do you need help preparing meals?  No   Do you need help with housework?  No   Do you need help with laundry? No   Do you need help taking your medications? No   Do you need help managing money? No   Do you have difficulty concentrating, remembering or making decisions? Yes         A) Assessment of Functional Ability:  (Assessment of ability to perform ADL's (showering/bathing, using toilet, dressing, feeding self, moving self around) and IADL's (use telephone, shop, prepare food, housekeep, do laundry, transport independently, take medications independently, and handle  finances)    Degree of functional impairment: MILD (based on assessment noted above)      B) Assessment of Fall Risk:     - Fall within the last year? : Yes   - Feel unsteady when standing or walking? : No   - Worry about falling? : No     Fall Risk Assessment  Fallen in past 6 months: 5--> Yes  Mental Status: 0--> no mental status change  Elimination: 2--> Frequent toileting  Mobility: 0--> No mobility issues  Medications: 1--> Narcotics  Nurses' Clinical Judgement: 4  Total Fall Risk Score: 14         Need for further evaluation of gait, strength, and balance? : Yes    Timed Up and Go (TUG): JK SL TIME SECONDS: 14 seconds  (>= 12 seconds indicates high risk for falling)    Observable abnormalities included: slow tentative pace       C. Assessment of Cognitive Function:    Mini-Cog Test:     1) Registration (3 objects): Yes   2) Clock Draw: Passed? : Yes   3) Number of objects recalled: 2    Further evaluation required? : Yes      COUNSELING    A. Identification of Health Risk Factors:    Risk factors include: cardiovascular risk factors, polypharmacy, increased fall risk, depression / other psychiatric problems and incontinence      B. Age-Appropriate Screening Schedule:  (Refer to the list below for future screening recommendations based on patient's age, sex and/or medical conditions. Orders for these recommended tests are listed in the plan section. The patient has been provided with a written plan)    Health Maintenance Topics  Health Maintenance   Topic Date Due   • COLONOSCOPY  01/29/2016   • DIABETIC FOOT EXAM  04/27/2016   • DIABETIC EYE EXAM  04/27/2016   • HEMOGLOBIN A1C  09/03/2017   • LIPID PANEL  03/03/2018   • URINE MICROALBUMIN  03/03/2018   • DXA SCAN  07/15/2018   • MAMMOGRAM  11/22/2018   • TDAP/TD VACCINES (2 - Td) 03/24/2027   • INFLUENZA VACCINE  Completed   • PNEUMOCOCCAL VACCINES (65+ LOW/MEDIUM RISK)  Completed   • ZOSTER VACCINE  Completed       Health Maintenance Topics Due or  Over-Due  Health Maintenance Due   Topic Date Due   • COLONOSCOPY  01/29/2016   • DIABETIC FOOT EXAM  04/27/2016   • DIABETIC EYE EXAM  04/27/2016         C. Advanced Care Planning:    power of  for healthcare AND advanced directive are BOTH on file      D. Patient Self-Management and Personalized Health Advice:    She has been provided with personalized counseling/information (including brochures/handouts) about:     -- improving exercise / conditioning, reducing risk for cardiovascular disease (heart, stroke, vascular), reducing risk for cardiac/vascular events with pre-existing disease, fall prevention, mental health concerns (depression/anxiety), managing depression/anxiety and polypharmacy concerns, side effects of medications    She has been recommended for the following preventative services which has been performed today, will be ordered today or ordered/performed on upcoming follow-up visit:     -- nutrition counseling provided, exercise counseling provided, counseling for cardiovascular disease risk reduction, fall risk assessment / plan of care completed, urinary incontinence assessment done, screening for breast cancer (mammogram ordered and annual clinical breast exam and monthly self exam recommended), vaccination for influenza administered, vaccination for Prevnar-13 administered, vaccination for Pneumovax administered, vaccination for Tdap administered, vaccination for Zostavax recommended at pharmacy      E. Miscellaneous Items:    -Aspirin use counseling: Taking ASA appropriately as indicated    -Discussed BMI with her. The BMI is above average; BMI management plan is completed (discussed plans for weight loss)    -Reviewed use of high risk medication in the elderly: YES    -Reviewed for potential of harmful drug interactions in the elderly: YES      IV. WRAP-UP    Assessment & Plan:    1. Medicare annual wellness visit, initial        No orders of the defined types were placed in this  encounter.        Medications as of TODAY:    Current Outpatient Prescriptions   Medication Sig Dispense Refill   • aspirin 81 MG EC tablet Take 81 mg by mouth daily.     • atorvastatin (LIPITOR) 20 MG tablet TAKE ONE TABLET BY MOUTH EVERY NIGHT AT BEDTIME 90 tablet 0   • Biotin 1000 MCG tablet Take 1,000 mcg by mouth daily. Take as directed     • cetirizine (zyrTEC) 10 MG tablet Take 10 mg by mouth Daily.     • Cholecalciferol (VITAMIN D) 2000 UNITS tablet Take 1 tablet by mouth nightly.     • cilostazol (PLETAL) 100 MG tablet Take 1 tablet by mouth 2 (two) times a day.     • diclofenac (VOLTAREN) 1 % gel gel Place on the skin. 4 GM of Gel to affected to area 4 times daily. Do not apply more than 16 Gm daily to any one affected joint     • escitalopram (LEXAPRO) 10 MG tablet Take 1 tablet by mouth Every Morning. 30 tablet 1   • meloxicam (MOBIC) 15 MG tablet Take 1 tablet by mouth Daily. 30 tablet 1   • nebivolol (BYSTOLIC) 5 MG tablet Take 1 tablet by mouth Daily. 90 tablet 0   • omeprazole (PRILOSEC) 20 MG capsule Take 1 capsule by mouth Daily. 30 capsule 1   • pramipexole (MIRAPEX) 0.125 MG tablet Take 1 tablet by mouth every night. 90 tablet 1   • PROAIR  (90 BASE) MCG/ACT inhaler INHALE 2 PUFFS INTO LUNGS EVERY 6 HOURS AS NEEDED FOR WHEEZING 1 inhaler 4   • Probiotic Product (PROBIOTIC PO) Take 1 capsule by mouth Daily.     • tiotropium (SPIRIVA) 18 MCG per inhalation capsule daily. Inhale contents of capsule once a day     • traMADol (ULTRAM) 50 MG tablet TAKE ONE TABLET BY MOUTH EVERY 6 HOURS AS NEEDED FOR MODERATE PAIN 90 tablet 1   • acetaminophen (TYLENOL) 500 MG tablet Take 1 tablet by mouth every 8 (eight) hours as needed.       No current facility-administered medications for this visit.          FOLLOW-UP:    No Follow-up on file.    Future Appointments  Date Time Provider Department Center   4/13/2017 9:00 AM [facility] CT 3  [facility]   5/2/2017 1:00 PM Tres De Los Santos MD [facility] None    5/3/2017 2:20 PM Miller Castañeda MD [facility] None         ______________________________________________________________________      A printed After Visit Summary (AVS) including the Personalized Prevention  Plan Services (PPPS) was given to the patient at check-out today.         **Dragon Disclaimer:   Much of this encounter note is an electronic transcription/translation of spoken language to printed text. The electronic translation of spoken language may permit erroneous, or at times, nonsensical words or phrases to be inadvertently transcribed. Although I have reviewed the note for such errors, some may still exist.

## 2017-04-13 ENCOUNTER — HOSPITAL ENCOUNTER (OUTPATIENT)
Dept: CT IMAGING | Facility: HOSPITAL | Age: 77
Discharge: HOME | End: 2017-04-13
Attending: SURGERY | Admitting: SURGERY

## 2017-04-13 DIAGNOSIS — I71.60 THORACOABDOMINAL AORTIC ANEURYSM, WITHOUT RUPTURE (HCC): ICD-10-CM

## 2017-04-13 PROCEDURE — 71250 CT THORAX DX C-: CPT

## 2017-04-13 PROCEDURE — 74150 CT ABDOMEN W/O CONTRAST: CPT

## 2017-04-18 ENCOUNTER — OFFICE VISIT (OUTPATIENT)
Dept: CARDIAC SURGERY | Facility: CLINIC | Age: 77
End: 2017-04-18

## 2017-04-18 VITALS
HEART RATE: 68 BPM | DIASTOLIC BLOOD PRESSURE: 78 MMHG | BODY MASS INDEX: 25.11 KG/M2 | SYSTOLIC BLOOD PRESSURE: 156 MMHG | TEMPERATURE: 98.1 F | WEIGHT: 112 LBS | RESPIRATION RATE: 16 BRPM | OXYGEN SATURATION: 93 %

## 2017-04-18 DIAGNOSIS — Z95.1 S/P CABG X 1: Primary | ICD-10-CM

## 2017-04-18 DIAGNOSIS — I10 ESSENTIAL HYPERTENSION: Chronic | ICD-10-CM

## 2017-04-18 DIAGNOSIS — I71.23 DESCENDING THORACIC AORTIC ANEURYSM (HCC): ICD-10-CM

## 2017-04-18 DIAGNOSIS — I67.1 CEREBRAL ANEURYSM: ICD-10-CM

## 2017-04-18 DIAGNOSIS — I71.20 THORACIC AORTIC ANEURYSM, WITHOUT RUPTURE: Primary | ICD-10-CM

## 2017-04-18 DIAGNOSIS — R07.89 STERNAL PAIN: ICD-10-CM

## 2017-04-18 DIAGNOSIS — Z86.79 S/P ASCENDING AORTIC ANEURYSM REPAIR: ICD-10-CM

## 2017-04-18 DIAGNOSIS — Z98.890 S/P ASCENDING AORTIC ANEURYSM REPAIR: ICD-10-CM

## 2017-04-18 DIAGNOSIS — I71.22 AORTIC ARCH ANEURYSM (HCC): ICD-10-CM

## 2017-04-18 DIAGNOSIS — E11.9 TYPE 2 DIABETES MELLITUS WITHOUT COMPLICATION, WITHOUT LONG-TERM CURRENT USE OF INSULIN (HCC): Chronic | ICD-10-CM

## 2017-04-18 DIAGNOSIS — J43.9 PULMONARY EMPHYSEMA, UNSPECIFIED EMPHYSEMA TYPE (HCC): Chronic | ICD-10-CM

## 2017-04-18 PROCEDURE — 99214 OFFICE O/P EST MOD 30 MIN: CPT | Performed by: THORACIC SURGERY (CARDIOTHORACIC VASCULAR SURGERY)

## 2017-04-20 ENCOUNTER — HOSPITAL ENCOUNTER (OUTPATIENT)
Dept: CT IMAGING | Facility: HOSPITAL | Age: 77
Discharge: HOME | End: 2017-04-20
Admitting: NURSE PRACTITIONER

## 2017-04-20 DIAGNOSIS — I71.20 THORACIC AORTIC ANEURYSM, WITHOUT RUPTURE: ICD-10-CM

## 2017-04-20 LAB — CREAT BLDA-MCNC: 0.8 MG/DL (ref 0.6–1.3)

## 2017-04-20 PROCEDURE — 71275 CT ANGIOGRAPHY CHEST: CPT

## 2017-04-20 PROCEDURE — 0 IOPAMIDOL PER 1 ML: Performed by: NURSE PRACTITIONER

## 2017-04-20 PROCEDURE — 82565 ASSAY OF CREATININE: CPT

## 2017-04-20 PROCEDURE — 74174 CTA ABD&PLVS W/CONTRAST: CPT

## 2017-04-20 RX ADMIN — IOPAMIDOL 100 ML: 755 INJECTION, SOLUTION INTRAVENOUS at 13:56

## 2017-04-22 PROBLEM — Z86.79 S/P ASCENDING AORTIC ANEURYSM REPAIR: Status: ACTIVE | Noted: 2017-04-22

## 2017-04-22 PROBLEM — I71.23 DESCENDING THORACIC AORTIC ANEURYSM (HCC): Status: ACTIVE | Noted: 2017-04-22

## 2017-04-22 PROBLEM — Z98.890 S/P ASCENDING AORTIC ANEURYSM REPAIR: Status: ACTIVE | Noted: 2017-04-22

## 2017-04-22 PROBLEM — I71.22 AORTIC ARCH ANEURYSM (HCC): Status: ACTIVE | Noted: 2017-04-22

## 2017-04-22 NOTE — PROGRESS NOTES
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 She is a 76 y.o. female with a history of a large aortic arch aneurysm who underwent extensive arch replacement and CABG by me in 2015. She did very well except for some hoarsness and dysphagia, much better now . She denies CP, flank pain, hematuria or LE embolic signs. Her last chest CT showed her descending and upper abdominal aortic aneurysm stable at 4.7- 4.8 cm. The arch repair looks stable. She is here for follow  Up. She also has a subxyphoid hernia.    Patient Active Problem List   Diagnosis   • History of breast cancer   • Stenosis of carotid artery   • Chronic obstructive pulmonary disease   • Closed fracture of multiple ribs   • Cognitive disorder   • Depression   • Erythromelalgia   • Hyperlipidemia   • Hypertension   • Primary osteoarthritis involving multiple joints   • Osteoporosis   • Restless legs syndrome   • Cerebral aneurysm   • Temporary cerebral vascular dysfunction   • S/P CABG x 1   • Type 2 diabetes mellitus without complication, without long-term current use of insulin   • Sternal pain   • S/P ascending aortic aneurysm repair   • Aortic arch aneurysm   • Descending thoracic aortic aneurysm       Past Medical History:   Diagnosis Date   • AAA (abdominal aortic aneurysm)    • Aneurysm     saccular   • Aneurysm of aorta     abdominal aorta and arch, descending aorta   • Aortic arch aneurysm    • Breast cancer     right breast [id number] (snm) pMX ER/AZ pos, Her-2/neg, Ki-67, 31%, oncotype recurrence score 19, invasive lobular carcinoma   • Cancer of subglottis    • Carotid artery stenosis    • Closed fracture of three ribs of right side    • Cognitive disorder    • COPD (chronic obstructive pulmonary disease)    • Depression    •  Descending aortic arch aneurysm    • Diabetes mellitus    • Erythermalgia    • Hyperlipidemia    • Hypertension    • Low back pain    • Osteoarthritis    • Osteoporosis    • Prediabetes    • RLS (restless legs syndrome)    • Saccular aneurysm    • Stenosis of artery     carotid   • Stroke     Perioperatively with left hip replacement   • TIA (transient ischemic attack)    • Venous insufficiency        Past Surgical History:   Procedure Laterality Date   • AORTIC VALVE REPAIR/REPLACEMENT N/A 02/23/2016    ascending aortic replacement with 24 mm graft, Dr. Ontiveros   • APPENDECTOMY  1977   • BREAST BIOPSY Right 09/16/2012    vacuum assisted core bx of the right breast   • CARDIAC CATHETERIZATION N/A 01/06/2016    Dr. Tres De Los Santos   • CARDIAC SURGERY  02/2016    Dr. Ontiveros/Dr. De Los Santos   • CATARACT EXTRACTION Bilateral     [facility]   • COLONOSCOPY N/A 10/2002    Dr. Negro   • CORONARY ARTERY BYPASS GRAFT  02/23/2016    X 1 Dr Ontiveros   • CYST REMOVAL Right 01/25/2013    right palm excision of retinacular cyst, Dr. Karla Fish   • LAPAROSCOPIC CHOLECYSTECTOMY N/A 08/04/2004    Dr. JOEY Sheth   • MASTECTOMY Right 09/28/2012   • ORIF HIP FRACTURE Left 03/27/2005    w/ AMBI compression screw, Dr. Victor M Cabral   • RECTOVAGINAL FISTULA REPAIR  1965   • TUBAL ABDOMINAL LIGATION         Allergies   Allergen Reactions   • Ramipril Other (See Comments)     Ace I  cough   • Morphine Itching   • Morphine And Related Itching   • Bactrim [Sulfamethoxazole-Trimethoprim] Itching and Rash         Current Outpatient Prescriptions:   •  acetaminophen (TYLENOL) 500 MG tablet, Take 1 tablet by mouth every 8 (eight) hours as needed., Disp: , Rfl:   •  aspirin 81 MG EC tablet, Take 81 mg by mouth daily., Disp: , Rfl:   •  atorvastatin (LIPITOR) 20 MG tablet, TAKE ONE TABLET BY MOUTH EVERY NIGHT AT BEDTIME, Disp: 90 tablet, Rfl: 0  •  Biotin 1000 MCG tablet, Take 1,000 mcg by mouth daily. Take as directed, Disp: , Rfl:    •  cetirizine (zyrTEC) 10 MG tablet, Take 10 mg by mouth Daily., Disp: , Rfl:   •  Cholecalciferol (VITAMIN D) 2000 UNITS tablet, Take 1 tablet by mouth nightly., Disp: , Rfl:   •  cilostazol (PLETAL) 100 MG tablet, Take 1 tablet by mouth 2 (two) times a day., Disp: , Rfl:   •  escitalopram (LEXAPRO) 10 MG tablet, Take 1 tablet by mouth Every Morning., Disp: 30 tablet, Rfl: 1  •  meloxicam (MOBIC) 15 MG tablet, Take 1 tablet by mouth Daily., Disp: 30 tablet, Rfl: 1  •  nebivolol (BYSTOLIC) 5 MG tablet, Take 1 tablet by mouth Daily., Disp: 90 tablet, Rfl: 0  •  omeprazole (PRILOSEC) 20 MG capsule, Take 1 capsule by mouth Daily., Disp: 30 capsule, Rfl: 1  •  pramipexole (MIRAPEX) 0.125 MG tablet, Take 1 tablet by mouth every night., Disp: 90 tablet, Rfl: 1  •  PROAIR  (90 BASE) MCG/ACT inhaler, INHALE 2 PUFFS INTO LUNGS EVERY 6 HOURS AS NEEDED FOR WHEEZING, Disp: 1 inhaler, Rfl: 4  •  Probiotic Product (PROBIOTIC PO), Take 1 capsule by mouth Daily., Disp: , Rfl:   •  tiotropium (SPIRIVA) 18 MCG per inhalation capsule, daily. Inhale contents of capsule once a day, Disp: , Rfl:   •  diclofenac (VOLTAREN) 1 % gel gel, Place on the skin. 4 GM of Gel to affected to area 4 times daily. Do not apply more than 16 Gm daily to any one affected joint, Disp: , Rfl:   •  traMADol (ULTRAM) 50 MG tablet, TAKE ONE TABLET BY MOUTH EVERY 6 HOURS AS NEEDED FOR MODERATE PAIN, Disp: 90 tablet, Rfl: 1    Social History     Social History   • Marital status:      Spouse name: N/A   • Number of children: N/A   • Years of education: N/A     Occupational History   • Not on file.     Social History Main Topics   • Smoking status: Former Smoker     Quit date: 12/2012   • Smokeless tobacco: Never Used      Comment: caffeine use   • Alcohol use No   • Drug use: No   • Sexual activity: Defer     Other Topics Concern   • Not on file     Social History Narrative       Family History   Problem Relation Age of Onset   • Alzheimer's  disease Mother    • Cancer Maternal Aunt    • Heart disease Father    • Heart failure Father    • No Known Problems Sister    • Heart failure Brother    • No Known Problems Maternal Grandmother    • No Known Problems Maternal Grandfather    • No Known Problems Paternal Grandmother    • No Known Problems Paternal Grandfather    • Diabetes Brother    • Brain cancer Brother          Review of Systems:  Review of Systems   Constitutional: Positive for fatigue.   Respiratory: Positive for shortness of breath. Negative for chest tightness.    Cardiovascular: Negative for chest pain and leg swelling.   Neurological: Negative for weakness.       Physical Exam:    Vital Signs:  Weight: 112 lb (50.8 kg)   Body mass index is 25.11 kg/(m^2).  Temp: 98.1 °F (36.7 °C)   Heart Rate: 68   BP: 156/78     Physical Exam   Constitutional: She is oriented to person, place, and time. She appears well-developed and well-nourished.   HENT:   Head: Normocephalic and atraumatic.   Nose: Nose normal.   Mouth/Throat: Oropharynx is clear and moist.   Eyes: Conjunctivae are normal. Pupils are equal, round, and reactive to light.   Neck: Normal range of motion. Neck supple. No JVD present. No thyromegaly present.   Cardiovascular: Normal rate, regular rhythm, normal heart sounds and intact distal pulses.  Exam reveals no gallop and no friction rub.    No murmur heard.  Pulmonary/Chest: Effort normal and breath sounds normal. She has no rales.   Abdominal: Soft. Bowel sounds are normal. She exhibits no distension and no mass. There is no tenderness.   Musculoskeletal: Normal range of motion. She exhibits no tenderness or deformity.   Lymphadenopathy:     She has no cervical adenopathy.   Neurological: She is alert and oriented to person, place, and time. She has normal reflexes.   Skin: Skin is warm and dry. No rash noted. No erythema.   Psychiatric: She has a normal mood and affect. Her behavior is normal.   palpable pulsatile mass in  epigastrium.  Distinct incisional subxyphoid hernia.  Sternal incision well healed.     Assessment:     Problem List Items Addressed This Visit        Cardiovascular and Mediastinum    Hypertension (Chronic)    Cerebral aneurysm    Aortic arch aneurysm    Descending thoracic aortic aneurysm       Respiratory    Chronic obstructive pulmonary disease (Chronic)       Endocrine    Type 2 diabetes mellitus without complication, without long-term current use of insulin (Chronic)       Nervous and Auditory    Sternal pain       Other    S/P CABG x 1 - Primary    S/P ascending aortic aneurysm repair        4.8-5 cm TAAA type III or IV. Asymptomatic  Ventral hernia    Recommendation/Plan:     I don't think she needs surgery at this point, unless symptoms develop.  I will favor a chest and abdomen CTA in one year and an office visit. Dr. Bolden's recommendation for the hernia to follow.    Thank you for allowing me to participate in her care.    Regards,    Mart Ontiveros M.D.

## 2017-04-27 ENCOUNTER — TELEPHONE (OUTPATIENT)
Dept: CARDIAC SURGERY | Facility: CLINIC | Age: 77
End: 2017-04-27

## 2017-05-02 ENCOUNTER — OFFICE VISIT (OUTPATIENT)
Dept: CARDIOLOGY | Facility: CLINIC | Age: 77
End: 2017-05-02

## 2017-05-02 VITALS
DIASTOLIC BLOOD PRESSURE: 112 MMHG | HEIGHT: 56 IN | HEART RATE: 72 BPM | WEIGHT: 109.2 LBS | BODY MASS INDEX: 24.56 KG/M2 | SYSTOLIC BLOOD PRESSURE: 178 MMHG

## 2017-05-02 DIAGNOSIS — I71.23 DESCENDING THORACIC AORTIC ANEURYSM (HCC): ICD-10-CM

## 2017-05-02 DIAGNOSIS — E11.9 TYPE 2 DIABETES MELLITUS WITHOUT COMPLICATION, WITHOUT LONG-TERM CURRENT USE OF INSULIN (HCC): Chronic | ICD-10-CM

## 2017-05-02 DIAGNOSIS — Z98.890 S/P ASCENDING AORTIC ANEURYSM REPAIR: ICD-10-CM

## 2017-05-02 DIAGNOSIS — Z95.1 S/P CABG X 1: ICD-10-CM

## 2017-05-02 DIAGNOSIS — J43.9 PULMONARY EMPHYSEMA, UNSPECIFIED EMPHYSEMA TYPE (HCC): Chronic | ICD-10-CM

## 2017-05-02 DIAGNOSIS — E78.49 OTHER HYPERLIPIDEMIA: Primary | Chronic | ICD-10-CM

## 2017-05-02 DIAGNOSIS — I71.22 AORTIC ARCH ANEURYSM (HCC): ICD-10-CM

## 2017-05-02 DIAGNOSIS — I10 ESSENTIAL HYPERTENSION: Chronic | ICD-10-CM

## 2017-05-02 DIAGNOSIS — Z86.79 S/P ASCENDING AORTIC ANEURYSM REPAIR: ICD-10-CM

## 2017-05-02 PROCEDURE — 99214 OFFICE O/P EST MOD 30 MIN: CPT | Performed by: INTERNAL MEDICINE

## 2017-05-02 PROCEDURE — 93000 ELECTROCARDIOGRAM COMPLETE: CPT | Performed by: INTERNAL MEDICINE

## 2017-05-03 ENCOUNTER — OFFICE VISIT (OUTPATIENT)
Dept: INTERNAL MEDICINE | Age: 77
End: 2017-05-03

## 2017-05-03 ENCOUNTER — TELEPHONE (OUTPATIENT)
Dept: CARDIAC SURGERY | Facility: CLINIC | Age: 77
End: 2017-05-03

## 2017-05-03 VITALS
TEMPERATURE: 96.9 F | WEIGHT: 110 LBS | BODY MASS INDEX: 24.75 KG/M2 | HEART RATE: 79 BPM | OXYGEN SATURATION: 93 % | SYSTOLIC BLOOD PRESSURE: 132 MMHG | HEIGHT: 56 IN | DIASTOLIC BLOOD PRESSURE: 82 MMHG

## 2017-05-03 DIAGNOSIS — I10 ESSENTIAL HYPERTENSION: ICD-10-CM

## 2017-05-03 DIAGNOSIS — F32.A DEPRESSION, UNSPECIFIED DEPRESSION TYPE: Primary | Chronic | ICD-10-CM

## 2017-05-03 DIAGNOSIS — E78.2 MIXED HYPERLIPIDEMIA: Chronic | ICD-10-CM

## 2017-05-03 DIAGNOSIS — J43.9 PULMONARY EMPHYSEMA, UNSPECIFIED EMPHYSEMA TYPE (HCC): Chronic | ICD-10-CM

## 2017-05-03 PROCEDURE — 99214 OFFICE O/P EST MOD 30 MIN: CPT | Performed by: INTERNAL MEDICINE

## 2017-05-03 RX ORDER — ATORVASTATIN CALCIUM 20 MG/1
20 TABLET, FILM COATED ORAL NIGHTLY
Qty: 90 TABLET | Refills: 0 | COMMUNITY
Start: 2017-05-03 | End: 2017-06-16 | Stop reason: SDUPTHER

## 2017-05-03 RX ORDER — ESCITALOPRAM OXALATE 10 MG/1
10 TABLET ORAL EVERY MORNING
Qty: 90 TABLET | Refills: 1 | Status: SHIPPED | OUTPATIENT
Start: 2017-05-03 | End: 2017-05-05 | Stop reason: SDUPTHER

## 2017-05-03 RX ORDER — NEBIVOLOL 10 MG/1
10 TABLET ORAL DAILY
Qty: 30 TABLET | Refills: 5 | Status: SHIPPED | OUTPATIENT
Start: 2017-05-03 | End: 2017-11-16 | Stop reason: SDUPTHER

## 2017-05-05 ENCOUNTER — TELEPHONE (OUTPATIENT)
Dept: INTERNAL MEDICINE | Age: 77
End: 2017-05-05

## 2017-05-05 DIAGNOSIS — F32.A DEPRESSION, UNSPECIFIED DEPRESSION TYPE: Chronic | ICD-10-CM

## 2017-05-05 RX ORDER — ESCITALOPRAM OXALATE 20 MG/1
20 TABLET ORAL EVERY MORNING
Qty: 90 TABLET | Refills: 1 | Status: SHIPPED | OUTPATIENT
Start: 2017-05-05 | End: 2017-11-22 | Stop reason: SDUPTHER

## 2017-05-16 DIAGNOSIS — M54.50 BILATERAL LOW BACK PAIN WITHOUT SCIATICA, UNSPECIFIED CHRONICITY: ICD-10-CM

## 2017-05-16 RX ORDER — TRAMADOL HYDROCHLORIDE 50 MG/1
50 TABLET ORAL EVERY 6 HOURS PRN
Qty: 90 TABLET | Refills: 2 | OUTPATIENT
Start: 2017-05-16 | End: 2017-10-31 | Stop reason: SDUPTHER

## 2017-05-18 ENCOUNTER — TELEPHONE (OUTPATIENT)
Dept: ORTHOPEDIC SURGERY | Facility: CLINIC | Age: 77
End: 2017-05-18

## 2017-05-31 ENCOUNTER — OFFICE VISIT (OUTPATIENT)
Dept: ORTHOPEDIC SURGERY | Facility: CLINIC | Age: 77
End: 2017-05-31

## 2017-05-31 VITALS — TEMPERATURE: 97.9 F | HEIGHT: 56 IN | BODY MASS INDEX: 23.62 KG/M2 | WEIGHT: 105 LBS

## 2017-05-31 DIAGNOSIS — M54.50 LUMBAR SPINE PAIN: Primary | ICD-10-CM

## 2017-05-31 DIAGNOSIS — M25.551 HIP PAIN, RIGHT: ICD-10-CM

## 2017-05-31 PROCEDURE — 73502 X-RAY EXAM HIP UNI 2-3 VIEWS: CPT | Performed by: ORTHOPAEDIC SURGERY

## 2017-05-31 PROCEDURE — 72100 X-RAY EXAM L-S SPINE 2/3 VWS: CPT | Performed by: ORTHOPAEDIC SURGERY

## 2017-05-31 PROCEDURE — 99204 OFFICE O/P NEW MOD 45 MIN: CPT | Performed by: ORTHOPAEDIC SURGERY

## 2017-06-05 RX ORDER — ATORVASTATIN CALCIUM 20 MG/1
TABLET, FILM COATED ORAL
Qty: 90 TABLET | Refills: 0 | Status: SHIPPED | OUTPATIENT
Start: 2017-06-05 | End: 2017-09-26 | Stop reason: SDUPTHER

## 2017-06-16 ENCOUNTER — ANESTHESIA EVENT (OUTPATIENT)
Dept: PAIN MEDICINE | Facility: HOSPITAL | Age: 77
End: 2017-06-16

## 2017-06-16 ENCOUNTER — HOSPITAL ENCOUNTER (OUTPATIENT)
Dept: GENERAL RADIOLOGY | Facility: HOSPITAL | Age: 77
Discharge: HOME | End: 2017-06-16

## 2017-06-16 ENCOUNTER — ANESTHESIA (OUTPATIENT)
Dept: PAIN MEDICINE | Facility: HOSPITAL | Age: 77
End: 2017-06-16

## 2017-06-16 ENCOUNTER — HOSPITAL ENCOUNTER (OUTPATIENT)
Dept: PAIN MEDICINE | Facility: HOSPITAL | Age: 77
Discharge: HOME | End: 2017-06-16
Attending: ORTHOPAEDIC SURGERY | Admitting: ORTHOPAEDIC SURGERY

## 2017-06-16 VITALS
SYSTOLIC BLOOD PRESSURE: 152 MMHG | HEART RATE: 66 BPM | OXYGEN SATURATION: 95 % | TEMPERATURE: 97.5 F | HEIGHT: 56 IN | BODY MASS INDEX: 23.62 KG/M2 | RESPIRATION RATE: 16 BRPM | WEIGHT: 105 LBS | DIASTOLIC BLOOD PRESSURE: 77 MMHG

## 2017-06-16 DIAGNOSIS — M54.50 LUMBAR SPINE PAIN: ICD-10-CM

## 2017-06-16 DIAGNOSIS — R52 PAIN: ICD-10-CM

## 2017-06-16 PROCEDURE — 25010000002 MIDAZOLAM PER 1 MG: Performed by: ANESTHESIOLOGY

## 2017-06-16 PROCEDURE — C1755 CATHETER, INTRASPINAL: HCPCS

## 2017-06-16 PROCEDURE — 77003 FLUOROGUIDE FOR SPINE INJECT: CPT

## 2017-06-16 PROCEDURE — 25010000002 METHYLPREDNISOLONE PER 80 MG: Performed by: ANESTHESIOLOGY

## 2017-06-16 PROCEDURE — 0 IOPAMIDOL 41 % SOLUTION: Performed by: ANESTHESIOLOGY

## 2017-06-16 RX ORDER — SODIUM CHLORIDE 0.9 % (FLUSH) 0.9 %
1-10 SYRINGE (ML) INJECTION AS NEEDED
Status: DISCONTINUED | OUTPATIENT
Start: 2017-06-16 | End: 2017-06-17 | Stop reason: HOSPADM

## 2017-06-16 RX ORDER — MIDAZOLAM HYDROCHLORIDE 1 MG/ML
1 INJECTION INTRAMUSCULAR; INTRAVENOUS
Status: DISCONTINUED | OUTPATIENT
Start: 2017-06-16 | End: 2017-06-17 | Stop reason: HOSPADM

## 2017-06-16 RX ORDER — LIDOCAINE HYDROCHLORIDE 10 MG/ML
0.5 INJECTION, SOLUTION INFILTRATION; PERINEURAL ONCE AS NEEDED
Status: CANCELLED | OUTPATIENT
Start: 2017-06-16

## 2017-06-16 RX ORDER — LIDOCAINE HYDROCHLORIDE 10 MG/ML
1 INJECTION, SOLUTION INFILTRATION; PERINEURAL ONCE
Status: DISCONTINUED | OUTPATIENT
Start: 2017-06-16 | End: 2017-06-17 | Stop reason: HOSPADM

## 2017-06-16 RX ORDER — FENTANYL CITRATE 50 UG/ML
50 INJECTION, SOLUTION INTRAMUSCULAR; INTRAVENOUS
Status: DISCONTINUED | OUTPATIENT
Start: 2017-06-16 | End: 2017-06-17 | Stop reason: HOSPADM

## 2017-06-16 RX ORDER — METHYLPREDNISOLONE ACETATE 80 MG/ML
80 INJECTION, SUSPENSION INTRA-ARTICULAR; INTRALESIONAL; INTRAMUSCULAR; SOFT TISSUE ONCE
Status: COMPLETED | OUTPATIENT
Start: 2017-06-16 | End: 2017-06-16

## 2017-06-16 RX ADMIN — MIDAZOLAM 1 MG: 1 INJECTION INTRAMUSCULAR; INTRAVENOUS at 12:24

## 2017-06-16 RX ADMIN — METHYLPREDNISOLONE ACETATE 80 MG: 80 INJECTION, SUSPENSION INTRA-ARTICULAR; INTRALESIONAL; INTRAMUSCULAR; SOFT TISSUE at 12:27

## 2017-06-16 RX ADMIN — IOPAMIDOL 10 ML: 408 INJECTION, SOLUTION INTRATHECAL at 12:27

## 2017-06-16 NOTE — ANESTHESIA PROCEDURE NOTES
PAIN Epidural block    Patient location during procedure: pain clinic  Start Time: 6/16/2017 12:21 PM  Stop Time: 6/16/2017 12:28 PM  Indication:procedure for pain  Performed By  Anesthesiologist: JAVON FERRARA  Preanesthetic Checklist  Completed: patient identified, site marked, surgical consent, pre-op evaluation, timeout performed, IV checked, risks and benefits discussed and monitors and equipment checked  Additional Notes  + fluoro  Dx: lumbago, lumbar degen disc dz  Epidural Block Prep:  Pt Position:prone  Sterile Tech:cap, gloves, mask and sterile barrier  Prep:chlorhexidine gluconate and isopropyl alcohol  Monitoring:blood pressure monitoring, continuous pulse oximetry and EKG  Epidural Block Procedure:  Approach:midline  Guidance: fluoroscopy  Location:lumbar  Level:5-6  Needle Type:Tuohy  Needle Gauge:20  Aspiration:negative  Medications:  Depomedrol:80  Preservative Free Saline:3mL  Isovue:3mL  Comments:B/l spread  Post Assessment:  Dressing:occlusive dressing applied  Pt Tolerance:patient tolerated the procedure well with no apparent complications  Complications:no

## 2017-06-16 NOTE — H&P
Psychiatric    History and Physical    Patient Name: Grace Beauchamp  :  1940  MRN:  [id number]  Date of Admission: 2017    Subjective     Patient is a 76 y.o. female presents with chief complaint of chronic, moderate low back and b/l groin  pain.  Onset of symptoms was gradual starting several years ago.  Symptoms are associated/aggravated by activity. Symptoms improve with nothing     From Dr. Cabral's note:   XRAY: Plain film x-rays of lumbar spine show multilevel spondylosis and slight scoliosis well-balanced. No real change from 2013 x-rays when she was last seen here. Hip films were actually taken of the right side which shows moderate DJD and an AP of the left shows a good position of the implants there may be a crack in the cement mantle that the overall component positions are good in minimal evidence of polyethylene wear. I have no comparison views of the hip. MRI scan from [facility] last year demonstrated multilevel foraminal stenosis of moderate extent. No significant central stenosis is noted.    The following portions of the patients history were reviewed and updated as appropriate: current medications, allergies, past medical history, past surgical history, past family history, past social history and problem list                Objective     Past Medical History:   Past Medical History:   Diagnosis Date   • AAA (abdominal aortic aneurysm)    • Aneurysm     saccular   • Aneurysm of aorta     abdominal aorta and arch, descending aorta   • Aortic arch aneurysm    • Arthritis    • Breast cancer     right breast [id number] (snm) pMX ER/OK pos, Her-2/neg, Ki-67, 31%, oncotype recurrence score 19, invasive lobular carcinoma   • Cancer of subglottis    • Carotid artery stenosis    • Closed fracture of three ribs of right side    • Cognitive disorder    • COPD (chronic obstructive pulmonary disease)    • Coronary artery disease    • Depression    • Descending aortic arch aneurysm    • Diabetes  mellitus    • Erythermalgia    • GERD (gastroesophageal reflux disease)    • Hyperlipidemia    • Hypertension    • Low back pain    • Osteoarthritis    • Osteoporosis    • Prediabetes    • RLS (restless legs syndrome)    • Saccular aneurysm    • Stenosis of artery     carotid   • Stroke     Perioperatively with left hip replacement   • TIA (transient ischemic attack)    • Venous insufficiency      Past Surgical History:   Past Surgical History:   Procedure Laterality Date   • AORTIC VALVE REPAIR/REPLACEMENT N/A 02/23/2016    ascending aortic replacement with 24 mm graft, Dr. Ontiveros   • APPENDECTOMY  1977   • BREAST BIOPSY Right 09/16/2012    vacuum assisted core bx of the right breast   • CARDIAC CATHETERIZATION N/A 01/06/2016    Dr. Tres De Los Santos   • CARDIAC SURGERY  02/2016    Dr. Ontiveros/Dr. De Los Santos   • CATARACT EXTRACTION Bilateral     [facility]   • COLONOSCOPY N/A 10/2002    Dr. Negro   • CORONARY ARTERY BYPASS GRAFT  02/23/2016    X 1 Dr Ontiveros   • CYST REMOVAL Right 01/25/2013    right palm excision of retinacular cyst, Dr. Karla Fish   • LAPAROSCOPIC CHOLECYSTECTOMY N/A 08/04/2004    Dr. JOEY Sheth   • MASTECTOMY Right 09/28/2012   • ORIF HIP FRACTURE Left 03/27/2005    w/ AMBI compression screw, Dr. Victor M Cabral   • RECTOVAGINAL FISTULA REPAIR  1965   • TUBAL ABDOMINAL LIGATION       Family History:   Family History   Problem Relation Age of Onset   • Alzheimer's disease Mother    • Cancer Maternal Aunt    • Heart disease Father    • Heart failure Father    • No Known Problems Sister    • Heart failure Brother    • No Known Problems Maternal Grandmother    • No Known Problems Maternal Grandfather    • No Known Problems Paternal Grandmother    • No Known Problems Paternal Grandfather    • Diabetes Brother    • Brain cancer Brother      Social History:   Social History   Substance Use Topics   • Smoking status: Former Smoker     Quit date: 12/2012   • Smokeless tobacco: Never Used       Comment: caffeine use   • Alcohol use No       Vital Signs Range for the last 24 hours  Temperature:     Temp Source:     BP:     Pulse:     Respirations:     SPO2:     O2 Amount (l/min):     O2 Devices     Weight:           --------------------------------------------------------------------------------    Current Outpatient Prescriptions   Medication Sig Dispense Refill   • atorvastatin (LIPITOR) 20 MG tablet TAKE ONE TABLET BY MOUTH EVERY NIGHT AT BEDTIME 90 tablet 0   • escitalopram (LEXAPRO) 20 MG tablet Take 1 tablet by mouth Every Morning. 90 tablet 1   • Glycopyrrolate-Formoterol (BEVESPI AEROSPHERE) 9-4.8 MCG/ACT aerosol Inhale 2 puffs 2 (Two) Times a Day. 1 inhaler 0   • meloxicam (MOBIC) 15 MG tablet Take 1 tablet by mouth Daily. 30 tablet 1   • nebivolol (BYSTOLIC) 10 MG tablet Take 1 tablet by mouth Daily. 30 tablet 5   • omeprazole (PRILOSEC) 20 MG capsule Take 1 capsule by mouth Daily. 30 capsule 1   • pramipexole (MIRAPEX) 0.125 MG tablet Take 1 tablet by mouth every night. 90 tablet 1   • PROAIR  (90 BASE) MCG/ACT inhaler INHALE 2 PUFFS INTO LUNGS EVERY 6 HOURS AS NEEDED FOR WHEEZING 1 inhaler 4   • Probiotic Product (PROBIOTIC PO) Take 1 capsule by mouth Daily.     • traMADol (ULTRAM) 50 MG tablet Take 1 tablet by mouth Every 6 (Six) Hours As Needed for Moderate Pain (4-6). 90 tablet 2   • aspirin 81 MG EC tablet Take 81 mg by mouth daily.     • Biotin 1000 MCG tablet Take 1,000 mcg by mouth daily. Take as directed     • cetirizine (zyrTEC) 10 MG tablet Take 10 mg by mouth Daily.     • Cholecalciferol (VITAMIN D) 2000 UNITS tablet Take 1 tablet by mouth nightly.     • cilostazol (PLETAL) 100 MG tablet Take 1 tablet by mouth 2 (two) times a day.     • diclofenac (VOLTAREN) 1 % gel gel Place on the skin. 4 GM of Gel to affected to area 4 times daily. Do not apply more than 16 Gm daily to any one affected joint       Current Facility-Administered Medications   Medication Dose Route Frequency  Provider Last Rate Last Dose   • sodium chloride 0.9 % flush 1-10 mL  1-10 mL Intravenous PRN Tiffanie Kwok MD           --------------------------------------------------------------------------------  Assessment/Plan    Dx:    Plan:  1. Lumbar Epidural with fluoroscopy +/- epidurogram (if needed)  2. Physical therapy exercises at home as prescribed by physical therapy or from the pain clinic handout (given to the patient). Continuation of these exercises every day, or multiple times per week, even when the patient has good pain relief, was stressed to the patient as a preventative measure to decrease the frequency and severity of future pain episodes.  3. Continue pain medicines as already prescribed. If patient not currently taking any, it is recommended to begin Acetaminophen 1000 mg po q 8 hours. If other medicines containing Acetaminophen are currently prescribed, maintain daily dose at 3000mg.   4. If they can tolerate NSAIDS, it is recommended to take Ibuprofen 600 mg po q 6 hours for 7 days during pain exacerbations. Alternatively, they may substitute an NSAID of their choice (e.g. Aleve)  5. Heat and ice to the affected area as tolerated for pain control. It was discussed that heating pads can cause burns.  6. Low impact exercise such as walking or water exercise was recommended to maintain overall health and aid in weight control.   7. Follow up as needed for subsequent injections.  8. Patient was counseled to abstain from tobacco products.          Anesthesia Evaluation     Patient summary reviewed and Nursing notes reviewed          Airway   Mallampati: II  TM distance: >3 FB  Dental - normal exam     Pulmonary - normal exam   (+) COPD,   Cardiovascular - normal exam    (+) hypertension, CAD, CABG, PVD, hyperlipidemia      Neuro/Psych- neuro exam normal  (+) TIA, CVA,    GI/Hepatic/Renal/Endo    (+)  GERD, diabetes mellitus,     Musculoskeletal (-) normal exam    (+) back pain,   Abdominal   - normal exam   Substance History - negative use     OB/GYN negative ob/gyn ROS         Other   (+) arthritis   history of cancer                               Diagnosis and Plan    Treatment Plan  ASA 3      Procedures: Lumbar Epidural Steroid Injection(LESI), With fluoroscopy,       Anesthetic plan and risks discussed with patient.          * No Diagnosis Codes entered *

## 2017-07-11 ENCOUNTER — TELEPHONE (OUTPATIENT)
Dept: CARDIAC SURGERY | Facility: CLINIC | Age: 77
End: 2017-07-11

## 2017-07-11 DIAGNOSIS — G25.81 RESTLESS LEGS SYNDROME (RLS): Primary | ICD-10-CM

## 2017-07-11 NOTE — TELEPHONE ENCOUNTER
I returned Mrs Beauchamp's call and she was concerned when her next visit with Dr Ontiveros needed to be scheduled. She was last seen in office in April and Dr Ontiveros wanted to see her back in office in 6 months. We will schedule her testing and an office visit in October which was agreeable to her.

## 2017-07-12 RX ORDER — PRAMIPEXOLE DIHYDROCHLORIDE 0.12 MG/1
TABLET ORAL
Qty: 90 TABLET | Refills: 0 | Status: SHIPPED | OUTPATIENT
Start: 2017-07-12 | End: 2017-10-19 | Stop reason: SDUPTHER

## 2017-07-19 ENCOUNTER — HOSPITAL ENCOUNTER (OUTPATIENT)
Dept: PAIN MEDICINE | Facility: HOSPITAL | Age: 77
Discharge: HOME | End: 2017-07-19
Admitting: ORTHOPAEDIC SURGERY

## 2017-07-19 ENCOUNTER — ANESTHESIA (OUTPATIENT)
Dept: PAIN MEDICINE | Facility: HOSPITAL | Age: 77
End: 2017-07-19

## 2017-07-19 ENCOUNTER — ANESTHESIA EVENT (OUTPATIENT)
Dept: PAIN MEDICINE | Facility: HOSPITAL | Age: 77
End: 2017-07-19

## 2017-07-19 ENCOUNTER — HOSPITAL ENCOUNTER (OUTPATIENT)
Dept: GENERAL RADIOLOGY | Facility: HOSPITAL | Age: 77
Discharge: HOME | End: 2017-07-19

## 2017-07-19 VITALS
OXYGEN SATURATION: 98 % | SYSTOLIC BLOOD PRESSURE: 138 MMHG | WEIGHT: 110 LBS | TEMPERATURE: 98 F | HEIGHT: 56 IN | BODY MASS INDEX: 24.75 KG/M2 | DIASTOLIC BLOOD PRESSURE: 84 MMHG | HEART RATE: 61 BPM | RESPIRATION RATE: 16 BRPM

## 2017-07-19 DIAGNOSIS — R52 PAIN: ICD-10-CM

## 2017-07-19 PROCEDURE — 77003 FLUOROGUIDE FOR SPINE INJECT: CPT

## 2017-07-19 PROCEDURE — 25010000002 METHYLPREDNISOLONE PER 80 MG: Performed by: ANESTHESIOLOGY

## 2017-07-19 PROCEDURE — C1755 CATHETER, INTRASPINAL: HCPCS

## 2017-07-19 PROCEDURE — 0 IOPAMIDOL 41 % SOLUTION: Performed by: ANESTHESIOLOGY

## 2017-07-19 RX ORDER — MIDAZOLAM HYDROCHLORIDE 1 MG/ML
1 INJECTION INTRAMUSCULAR; INTRAVENOUS
Status: DISCONTINUED | OUTPATIENT
Start: 2017-07-19 | End: 2017-07-20 | Stop reason: HOSPADM

## 2017-07-19 RX ORDER — METHYLPREDNISOLONE ACETATE 80 MG/ML
80 INJECTION, SUSPENSION INTRA-ARTICULAR; INTRALESIONAL; INTRAMUSCULAR; SOFT TISSUE ONCE
Status: COMPLETED | OUTPATIENT
Start: 2017-07-19 | End: 2017-07-19

## 2017-07-19 RX ORDER — LIDOCAINE HYDROCHLORIDE 10 MG/ML
1 INJECTION, SOLUTION INFILTRATION; PERINEURAL ONCE
Status: DISCONTINUED | OUTPATIENT
Start: 2017-07-19 | End: 2017-07-20 | Stop reason: HOSPADM

## 2017-07-19 RX ORDER — FENTANYL CITRATE 50 UG/ML
50 INJECTION, SOLUTION INTRAMUSCULAR; INTRAVENOUS
Status: DISCONTINUED | OUTPATIENT
Start: 2017-07-19 | End: 2017-07-20 | Stop reason: HOSPADM

## 2017-07-19 RX ORDER — SODIUM CHLORIDE 0.9 % (FLUSH) 0.9 %
1-10 SYRINGE (ML) INJECTION AS NEEDED
Status: DISCONTINUED | OUTPATIENT
Start: 2017-07-19 | End: 2017-07-20 | Stop reason: HOSPADM

## 2017-07-19 RX ADMIN — METHYLPREDNISOLONE ACETATE 80 MG: 80 INJECTION, SUSPENSION INTRA-ARTICULAR; INTRALESIONAL; INTRAMUSCULAR; SOFT TISSUE at 14:34

## 2017-07-19 RX ADMIN — IOPAMIDOL 10 ML: 408 INJECTION, SOLUTION INTRATHECAL at 14:33

## 2017-07-19 NOTE — ANESTHESIA PROCEDURE NOTES
PAIN Epidural block    Patient location during procedure: pain clinic  Indication:procedure for pain  Performed By  Anesthesiologist: MO GOLDEN  Preanesthetic Checklist  Completed: patient identified, surgical consent, pre-op evaluation, timeout performed, IV checked, risks and benefits discussed and monitors and equipment checked  Additional Notes  Diagnosis:  Post-Op Diagnosis Codes:     * Lumbar degenerative disc disease (M51.36)     * Lumbago (M54.5)      Prep:  Pt Position:prone  Sterile Tech:gloves, mask and sterile barrier  Prep:chlorhexidine gluconate and isopropyl alcohol  Monitoring:blood pressure monitoring, continuous pulse oximetry and EKG  Procedure:  Approach:midline  Guidance: fluoroscopy  Location:lumbar  Interspace: L5-S1.  Needle Type:Tuohy  Needle Gauge:20  Aspiration:negative  Test Dose:negative  Medications:  Depomedrol:80 mg  Preservative Free Saline:3mL  Isovue:3mL    Post Assessment:  Dressing:occlusive dressing applied  Pt Tolerance:patient tolerated the procedure well with no apparent complications  Complications:no

## 2017-07-19 NOTE — H&P
INTERVAL HISTORY:    The patient returns for another Lumbar epidural steroid injection today.  They have received 80% improvement since their last injection with a pain level of 5/10 at its worst recently.    Conservative measures tried in the interim     Exam:  Airway Mallampatti 2  Alert and oriented      Diagnosis:  Lumbar degenerative disc disease [M51.36]         Lumbago [M54.5]         Plan:  Lumbar epidural steroid injection under fluoroscopic guidance    I have encouraged them to continue:  1.  Physical therapy exercises at home as prescribed by physical therapy or from the pain clinic handout (given to the patient).  Continuation of these exercises every day, or multiple times per week, even when the patient has good pain relief, was stressed to the patient as a preventative measure to decrease the frequency and severity of future pain episodes.  2.  Continue pain medicines as already prescribed.  If patient not currently taking any, it is recommended to begin Acetaminophen 1000 mg po q 8 hours.  If other medicines containing Acetaminophen are currently prescribed, maintain daily dose at 3000mg.    3.  If they can tolerate NSAIDS, it is recommended to take Ibuprofen 600 mg po q 6 hours for 7 days during pain exacerbations.   Alternatively, they may substitute an NSAID of their choice (e.g. Aleve)  4.  Heat and ice to the affected area as tolerated for pain control.  It was discussed that heating pads can cause burns.  5.  Low impact exercise such as walking or water exercise was recommended to maintain overall health and aid in weight control.   6.  Follow up as needed for subsequent injections.  7.  Patient was counseled to abstain from tobacco products.

## 2017-07-21 ENCOUNTER — CONSULT (OUTPATIENT)
Dept: ORTHOPEDIC SURGERY | Facility: CLINIC | Age: 77
End: 2017-07-21

## 2017-07-21 VITALS — WEIGHT: 110 LBS | BODY MASS INDEX: 24.75 KG/M2 | TEMPERATURE: 98.4 F | HEIGHT: 56 IN

## 2017-07-21 DIAGNOSIS — Z96.642 HISTORY OF TOTAL HIP ARTHROPLASTY, LEFT: Primary | ICD-10-CM

## 2017-07-21 DIAGNOSIS — M25.551 HIP PAIN, RIGHT: ICD-10-CM

## 2017-07-21 PROCEDURE — 73521 X-RAY EXAM HIPS BI 2 VIEWS: CPT | Performed by: ORTHOPAEDIC SURGERY

## 2017-07-21 PROCEDURE — 20610 DRAIN/INJ JOINT/BURSA W/O US: CPT | Performed by: ORTHOPAEDIC SURGERY

## 2017-07-21 PROCEDURE — 99214 OFFICE O/P EST MOD 30 MIN: CPT | Performed by: ORTHOPAEDIC SURGERY

## 2017-07-21 RX ADMIN — LIDOCAINE HYDROCHLORIDE 2 ML: 10 INJECTION, SOLUTION INFILTRATION; PERINEURAL at 15:35

## 2017-07-21 RX ADMIN — BUPIVACAINE HYDROCHLORIDE 2 ML: 5 INJECTION, SOLUTION PERINEURAL at 15:35

## 2017-07-21 RX ADMIN — METHYLPREDNISOLONE ACETATE 80 MG: 80 INJECTION, SUSPENSION INTRA-ARTICULAR; INTRALESIONAL; INTRAMUSCULAR; SOFT TISSUE at 15:35

## 2017-07-21 NOTE — PROGRESS NOTES
Patient: Grace Beauchamp  YOB: 1940 76 y.o. female  Medical Record Number: [id number]    Chief Complaints:   Chief Complaint   Patient presents with   • Left Hip - Pain       History of Present Illness:Grace Beauchamp is a 76 y.o. female who presents with  C/o left hip aching - lateral. Worse with activity or laying on side. Also has some LB pain.     Allergies:   Allergies   Allergen Reactions   • Ramipril Other (See Comments)     Ace I  cough   • Morphine Itching   • Morphine And Related Itching   • Bactrim [Sulfamethoxazole-Trimethoprim] Itching and Rash       Medications:   Current Outpatient Prescriptions   Medication Sig Dispense Refill   • aspirin 81 MG EC tablet Take 81 mg by mouth daily.     • atorvastatin (LIPITOR) 20 MG tablet TAKE ONE TABLET BY MOUTH EVERY NIGHT AT BEDTIME 90 tablet 0   • Biotin 1000 MCG tablet Take 1,000 mcg by mouth daily. Take as directed     • cetirizine (zyrTEC) 10 MG tablet Take 10 mg by mouth Daily.     • Cholecalciferol (VITAMIN D) 2000 UNITS tablet Take 1 tablet by mouth nightly.     • cilostazol (PLETAL) 100 MG tablet Take 1 tablet by mouth 2 (two) times a day.     • diclofenac (VOLTAREN) 1 % gel gel Place on the skin. 4 GM of Gel to affected to area 4 times daily. Do not apply more than 16 Gm daily to any one affected joint     • escitalopram (LEXAPRO) 20 MG tablet Take 1 tablet by mouth Every Morning. 90 tablet 1   • Glycopyrrolate-Formoterol (BEVESPI AEROSPHERE) 9-4.8 MCG/ACT aerosol Inhale 2 puffs 2 (Two) Times a Day. 1 inhaler 0   • meloxicam (MOBIC) 15 MG tablet Take 1 tablet by mouth Daily. 30 tablet 1   • nebivolol (BYSTOLIC) 10 MG tablet Take 1 tablet by mouth Daily. 30 tablet 5   • omeprazole (PRILOSEC) 20 MG capsule Take 1 capsule by mouth Daily. 30 capsule 1   • pramipexole (MIRAPEX) 0.125 MG tablet Take 1 tablet by mouth every night. 90 tablet 1   • pramipexole (MIRAPEX) 0.125 MG tablet TAKE ONE TABLET BY MOUTH EVERY NIGHT AT BEDTIME 90 tablet  "0   • PROAIR  (90 BASE) MCG/ACT inhaler INHALE 2 PUFFS INTO LUNGS EVERY 6 HOURS AS NEEDED FOR WHEEZING 1 inhaler 4   • Probiotic Product (PROBIOTIC PO) Take 1 capsule by mouth Daily.     • traMADol (ULTRAM) 50 MG tablet Take 1 tablet by mouth Every 6 (Six) Hours As Needed for Moderate Pain (4-6). 90 tablet 2     No current facility-administered medications for this visit.          The following portions of the patient's history were reviewed and updated as appropriate: allergies, current medications, past family history, past medical history, past social history, past surgical history and problem list.    Review of Systems:   A 14 point review of systems was performed. All systems negative except pertinent positives/negative listed in HPI above    Physical Exam:   Vitals:    07/21/17 1514   Temp: 98.4 °F (36.9 °C)   Weight: 110 lb (49.9 kg)   Height: 56\" (142.2 cm)       General: A and O x 3, ASA, NAD    SCLERA:    Normal    DENTITION:   Normal  Hip:  left    LEG ALIGNMENT:     Neutral   ,    equal leg lengths    GAIT:     Nonantalgic    SKIN:     No abnormality    RANGE OF MOTION:      Full without joint irritability    STRENGTH:     5 / 5    hip flexion and abduction    DISTAL PULSES:    Paplable    DISTAL SENSATION :   Intact    LYMPHATICS:     No   lymphadenopathy    OTHER:          - Negative Stinchfeld test      - Negative log roll      +Tenderness to palpation trochanteric bursa        Radiology:  Xrays 2views both hips (AP bilateral hips, and lateral of the hip) ordered and reviewed for evaluation of hip pain  Demonstrating A well-positioned cemented left total hip replacement showing no signs of wear loosening or ostial lysis.  In comparison with previous films from May there unchanged.  The right hip shows advanced end-stage osteoarthritis and in comparison with previous films there unchanged    Assessment/Plan: LEft hip bursitis .  Injected as below. If not better may point to lumbar source  Large " Joint Arthrocentesis  Date/Time: 7/21/2017 3:35 PM  Consent given by: patient  Site marked: site marked  Timeout: Immediately prior to procedure a time out was called to verify the correct patient, procedure, equipment, support staff and site/side marked as required   Supporting Documentation  Indications: pain   Procedure Details  Location: hip - L greater trochanteric bursa  Needle size: 18 G  Approach: lateral  Medications administered: 2 mL lidocaine 1 %; 80 mg methylPREDNISolone acetate 80 MG/ML; 2 mL bupivacaine  Patient tolerance: patient tolerated the procedure well with no immediate complications            Marcus Ye MD  7/21/2017

## 2017-07-22 RX ORDER — LIDOCAINE HYDROCHLORIDE 10 MG/ML
2 INJECTION, SOLUTION INFILTRATION; PERINEURAL
Status: COMPLETED | OUTPATIENT
Start: 2017-07-21 | End: 2017-07-21

## 2017-07-22 RX ORDER — METHYLPREDNISOLONE ACETATE 80 MG/ML
80 INJECTION, SUSPENSION INTRA-ARTICULAR; INTRALESIONAL; INTRAMUSCULAR; SOFT TISSUE
Status: COMPLETED | OUTPATIENT
Start: 2017-07-21 | End: 2017-07-21

## 2017-07-22 RX ORDER — BUPIVACAINE HYDROCHLORIDE 5 MG/ML
2 INJECTION, SOLUTION PERINEURAL
Status: COMPLETED | OUTPATIENT
Start: 2017-07-21 | End: 2017-07-21

## 2017-08-22 ENCOUNTER — OFFICE VISIT (OUTPATIENT)
Dept: INTERNAL MEDICINE | Age: 77
End: 2017-08-22

## 2017-08-22 VITALS
TEMPERATURE: 97.9 F | HEART RATE: 81 BPM | OXYGEN SATURATION: 94 % | BODY MASS INDEX: 24.3 KG/M2 | DIASTOLIC BLOOD PRESSURE: 70 MMHG | SYSTOLIC BLOOD PRESSURE: 110 MMHG | HEIGHT: 56 IN | WEIGHT: 108 LBS

## 2017-08-22 DIAGNOSIS — M15.9 PRIMARY OSTEOARTHRITIS INVOLVING MULTIPLE JOINTS: Chronic | ICD-10-CM

## 2017-08-22 DIAGNOSIS — J43.9 PULMONARY EMPHYSEMA, UNSPECIFIED EMPHYSEMA TYPE (HCC): Chronic | ICD-10-CM

## 2017-08-22 DIAGNOSIS — I10 ESSENTIAL HYPERTENSION: Primary | Chronic | ICD-10-CM

## 2017-08-22 DIAGNOSIS — F32.A DEPRESSION, UNSPECIFIED DEPRESSION TYPE: Chronic | ICD-10-CM

## 2017-08-22 PROCEDURE — 99214 OFFICE O/P EST MOD 30 MIN: CPT | Performed by: INTERNAL MEDICINE

## 2017-08-22 NOTE — PROGRESS NOTES
"[facility]  ANETA COLLINS M.D.      Grace GONZALEZ Beauchamp / 77 y.o. / female  08/22/2017    VITALS:    /70  Pulse 81  Temp 97.9 °F (36.6 °C)  Ht 56\" (142.2 cm)  Wt 108 lb (49 kg)  SpO2 94%  BMI 24.21 kg/m2  BP Readings from Last 3 Encounters:   08/22/17 110/70   07/19/17 138/84   06/16/17 152/77     Wt Readings from Last 3 Encounters:   08/22/17 108 lb (49 kg)   07/21/17 110 lb (49.9 kg)   07/19/17 110 lb (49.9 kg)      Body mass index is 24.21 kg/(m^2).  ____________________________________________________________________    ASSESSMENT & PLAN:      Problem List Items Addressed This Visit     Hypertension - Primary (Chronic)    Overview     Stable. Continue Bystolic 10 mg qd.          Relevant Medications    nebivolol (BYSTOLIC) 10 MG tablet    Chronic obstructive pulmonary disease (Chronic)    Overview     Use Bevespi inhaler daily.          Relevant Medications    PROAIR  (90 BASE) MCG/ACT inhaler    cetirizine (zyrTEC) 10 MG tablet    Glycopyrrolate-Formoterol (BEVESPI AEROSPHERE) 9-4.8 MCG/ACT aerosol    Depression (Chronic)    Overview     Stable. Continue citalopram 20 mg qd.          Relevant Medications    escitalopram (LEXAPRO) 20 MG tablet    Primary osteoarthritis involving multiple joints (Chronic)    Overview     Lumbar spine, hips, knees.  Continue tramadol 50 mg as needed for moderate/severe pain.          Relevant Medications    meloxicam (MOBIC) 15 MG tablet        No orders of the defined types were placed in this encounter.      Summary/Discussion:     ·       Return in about 5 months (around 1/22/2018) for F/U chronic medical problems.    Future Appointments  Date Time Provider Department Center   9/21/2017 2:30 PM MD PERFECTO [facility] None   1/23/2018 3:00 PM MD [facility] None   5/2/2018 2:30 PM GREG Ocampo [facility] None       ____________________________________________________________________    CC:  Main reason(s) for today's visit: " Chronic medical      HPI:     Chronic essential hypertension:  Previously increased Bystolic to 10 mg. Home BP readings: compliant with medication(s), denies significant problems with medication(s) and does not check blood pressure at home.  Most recent in-office blood pressure readings:   BP Readings from Last 3 Encounters:   08/22/17 110/70   07/19/17 138/84   06/16/17 152/77     COPD:  She is a former smoker. She denies change in breathing symptoms. Current therapy: Bivespi but not using regularly    Depression:  Has been compliant with citalopram and this was increased last time to 20 mg and is doing better..    She is being treated for chronic pain involving multiple joints, both hip(s), both knee(s) and lower back due to DJD. Current medications taken for pain include: meloxicam and tramadol 50 mg taken two tablets a day as needed. Denies significant problems with medication(s) and takes prescriptions appropriately as instructed.      Patient Care Team:  Miller Castañeda MD as PCP - General (Internal Medicine)  Miller Castañeda MD as PCP - Claims Attributed  Mart Ontiveros MD as Surgeon (Cardiothoracic Surgery)  Tres De Los Santos MD as Consulting Physician (Cardiology)  Graham Mcconnell MD as Consulting Physician (Hematology and Oncology)  Payam Byrnes MD as Consulting Physician (Otolaryngology)    REVIEW OF SYSTEMS    Review of Systems  As noted per HPI  Constitutional neg   Resp neg   CV neg ; aortic aneurysm (no chest/abd pain)  Gi neg  Psych neg for SI  Other: As noted per HPI      PHYSICAL EXAMINATION    Physical Exam  Constitutional  No distress   Cardiovascular Rate  normal . Rhythm: regular . Heart sounds:  Normal.     Pulmonary/Chest  Effort normal. Breath sounds:  Normal.   Psychiatric  Alert. Judgment and thought content normal. Mood normal      REVIEWED DATA:    Labs:     Lab Results   Component Value Date     11/01/2016    K 4.2 11/01/2016    AST 13 06/28/2016    ALT 10 06/28/2016    BUN 15  11/01/2016    CREATININE 0.80 04/20/2017    CREATININE 0.71 11/01/2016    CREATININE 0.90 09/30/2016    EGFRIFNONA 83 11/01/2016    EGFRIFAFRI 96 11/01/2016       Lab Results   Component Value Date    HGBA1C 6.29 (H) 03/03/2017    HGBA1C 6.9 (H) 12/08/2015     (H) 11/01/2016    MICROALBUR 69.2 03/03/2017       Lab Results   Component Value Date     (H) 03/03/2017    HDL 67 (H) 03/03/2017    TRIG 111 03/03/2017    CHOLHDLRATIO 3.51 03/03/2017       No results found for: TSH, FREET4    Lab Results   Component Value Date    WBC 9.97 06/28/2016    HGB 14.1 06/28/2016    HGB 11.5 (L) 02/29/2016    HGB 10.5 (L) 02/26/2016     06/28/2016       Imaging:   CT ANGIOGRAM OF CHEST ABDOMEN AND PELVIS WITH CONTRAST (AORTIC PROTOCOL)      HISTORY: Female who is 76 years-old, with a history of thoracic and  abdominal aortic aneurysm, follow-up      PROTOCOL: Rapid sequence multislice imaging was obtained through the  chest, abdomen and pelvis during rapid IV injection of contrast  optimized for aortic opacification and reconstructed with 2 mm axial  images and supplemented with computer generated 3D angiographic  reconstructions. Cardiac gating was utilized for this examination.      COMPARISON: 4/13/2017, 9/30/2016 and 02/10/2016.      FINDINGS:  1. Aortic root measures 28 mm which is normal and stable.  2. 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  3. There is no evidence of cardiomegaly. There is coronary artery  calcification previous median sternotomy for CABG but the great vessels  off the arch are otherwise normal. (Diminutive right vertebral versus  dominant left vertebral arteries noted)  4. At the level of the diaphragm the thoracic abdominal aorta measures  up to 56 x 46 mm on image #112 previously measuring 46 x 43 mm image #80  02/10/2016 and up to 46 x 43 mm image #80.  5.  Prominent  luminal irregularity at the origin of the left renal  artery with aneurysmal dilatation at the origin, no significant change.  Occlusion of a secondary superior left renal artery image #53 coronal  plane, stable.  6. Abdominal aortic aneurysm measures 53 x 73 mm image #134 previously  measuring 50 x 37 mm image #45 on 09/30/2016.  7. No other significant change in appearance of the visualized arteries       NONVASCULAR FINDINGS:  CHEST:  1. Prominent emphysema scarring at the right lung base no active  pulmonary parenchymal process.  2. No mediastinal, hilar no other thoracic adenopathy, no significant  thyroid abnormality.  3. Degenerative changes of thoracic spine with stable compression  deformity of T11 and.  4. Multiple old left rib fractures.      ABDOMEN AND PELVIS:  1. Surgical absent gallbladder with mild biliary ductal dilatation  stable. The liver, biliary system, spleen and adrenal glands show no  change, no change in benign bilateral renal cysts.  2. There is no obstruction, free air nor dilatation of bowel. Prosthetic  left hip causes artifact in the pelvis but I see no free fluid nor  adnexal mass.  3. Degenerative changes lumbar spine with multilevel degenerative disc  disease and vacuum phenomena and and progressive compression deformity  of L1 since 09/30/2016.      IMPRESSION:  1. Progressive size of thoracic and abdominal aortic aneurysm.  2. Progressive irregularity at the origin of the left renal artery.  3. Progressive compression fracture L1.  4. Prominent pulmonary emphysema previous new sternotomy with CABG.      This report was finalized on 4/24/2017      Medical Tests:        Summary of old records / correspondence / consultant report:        Request outside records:        Allergies   Allergen Reactions   • Ramipril Other (See Comments)     Ace I  cough   • Morphine Itching   • Morphine And Related Itching   • Bactrim [Sulfamethoxazole-Trimethoprim] Itching and Rash        Current  Outpatient Prescriptions on File Prior to Visit   Medication Sig Dispense Refill   • aspirin 81 MG EC tablet Take 81 mg by mouth daily.     • atorvastatin (LIPITOR) 20 MG tablet TAKE ONE TABLET BY MOUTH EVERY NIGHT AT BEDTIME 90 tablet 0   • Biotin 1000 MCG tablet Take 1,000 mcg by mouth daily. Take as directed     • cetirizine (zyrTEC) 10 MG tablet Take 10 mg by mouth Daily.     • Cholecalciferol (VITAMIN D) 2000 UNITS tablet Take 1 tablet by mouth nightly.     • cilostazol (PLETAL) 100 MG tablet Take 1 tablet by mouth 2 (two) times a day.     • diclofenac (VOLTAREN) 1 % gel gel Place on the skin. 4 GM of Gel to affected to area 4 times daily. Do not apply more than 16 Gm daily to any one affected joint     • escitalopram (LEXAPRO) 20 MG tablet Take 1 tablet by mouth Every Morning. 90 tablet 1   • Glycopyrrolate-Formoterol (BEVESPI AEROSPHERE) 9-4.8 MCG/ACT aerosol Inhale 2 puffs 2 (Two) Times a Day. 1 inhaler 0   • meloxicam (MOBIC) 15 MG tablet Take 1 tablet by mouth Daily. 30 tablet 1   • nebivolol (BYSTOLIC) 10 MG tablet Take 1 tablet by mouth Daily. 30 tablet 5   • omeprazole (PRILOSEC) 20 MG capsule Take 1 capsule by mouth Daily. 30 capsule 1   • pramipexole (MIRAPEX) 0.125 MG tablet Take 1 tablet by mouth every night. 90 tablet 1   • PROAIR  (90 BASE) MCG/ACT inhaler INHALE 2 PUFFS INTO LUNGS EVERY 6 HOURS AS NEEDED FOR WHEEZING 1 inhaler 4   • Probiotic Product (PROBIOTIC PO) Take 1 capsule by mouth Daily.     • traMADol (ULTRAM) 50 MG tablet Take 1 tablet by mouth Every 6 (Six) Hours As Needed for Moderate Pain (4-6). 90 tablet 2   • pramipexole (MIRAPEX) 0.125 MG tablet TAKE ONE TABLET BY MOUTH EVERY NIGHT AT BEDTIME 90 tablet 0     No current facility-administered medications on file prior to visit.         PFSH:     The following portions of the patient's history were reviewed and updated as appropriate: Allergies / Current Medications / Past Medical History / Surgical History / Social History /  Family History    Patient Active Problem List   Diagnosis   • History of breast cancer   • Stenosis of carotid artery   • Chronic obstructive pulmonary disease   • Closed fracture of multiple ribs   • Cognitive disorder   • Depression   • Erythromelalgia   • Hyperlipidemia   • Hypertension   • Primary osteoarthritis involving multiple joints   • Osteoporosis   • Restless legs syndrome   • Cerebral aneurysm   • Temporary cerebral vascular dysfunction   • S/P CABG x 1   • Type 2 diabetes mellitus without complication, without long-term current use of insulin   • S/P ascending aortic aneurysm repair   • Aortic arch aneurysm   • Descending thoracic aortic aneurysm       Past Medical History:   Diagnosis Date   • AAA (abdominal aortic aneurysm)    • Aneurysm     saccular   • Aneurysm of aorta     abdominal aorta and arch, descending aorta   • Aortic arch aneurysm    • Arthritis    • Breast cancer     right breast [id number] (snm) pMX ER/MO pos, Her-2/neg, Ki-67, 31%, oncotype recurrence score 19, invasive lobular carcinoma   • Cancer of subglottis    • Carotid artery stenosis    • Closed fracture of three ribs of right side    • Cognitive disorder    • COPD (chronic obstructive pulmonary disease)    • Coronary artery disease    • Depression    • Descending aortic arch aneurysm    • Diabetes mellitus    • Erythermalgia    • GERD (gastroesophageal reflux disease)    • Hyperlipidemia    • Hypertension    • Low back pain    • Osteoarthritis    • Osteoporosis    • Prediabetes    • RLS (restless legs syndrome)    • Saccular aneurysm    • Stenosis of artery     carotid   • Stroke     Perioperatively with left hip replacement   • TIA (transient ischemic attack)    • Venous insufficiency        Past Surgical History:   Procedure Laterality Date   • AORTIC VALVE REPAIR/REPLACEMENT N/A 02/23/2016    ascending aortic replacement with 24 mm graft, Dr. Ontiveros   • APPENDECTOMY  1977   • BREAST BIOPSY Right 09/16/2012    vacuum assisted  core bx of the right breast   • CARDIAC CATHETERIZATION N/A 01/06/2016    Dr. Tres De Los Santos   • CARDIAC SURGERY  02/2016    Dr. Ontiveros/Dr. De Los Santos   • CATARACT EXTRACTION Bilateral     [facility]   • COLONOSCOPY N/A 10/2002    Dr. Negro   • CORONARY ARTERY BYPASS GRAFT  02/23/2016    X 1 Dr Ontiveros   • CYST REMOVAL Right 01/25/2013    right palm excision of retinacular cyst, Dr. Karla Fish   • EPIDURAL BLOCK     • LAPAROSCOPIC CHOLECYSTECTOMY N/A 08/04/2004    Dr. JOEY Sheht   • MASTECTOMY Right 09/28/2012   • ORIF HIP FRACTURE Left 03/27/2005    w/ AMBI compression screw, Dr. Victor M Cabral   • RECTOVAGINAL FISTULA REPAIR  1965   • TUBAL ABDOMINAL LIGATION         Social History     Social History   • Marital status:      Spouse name: N/A   • Number of children: N/A   • Years of education: N/A     Social History Main Topics   • Smoking status: Former Smoker     Quit date: 12/2012   • Smokeless tobacco: Never Used      Comment: caffeine use   • Alcohol use No   • Drug use: No   • Sexual activity: Defer     Other Topics Concern   • None     Social History Narrative       Family History   Problem Relation Age of Onset   • Alzheimer's disease Mother    • Cancer Maternal Aunt    • Heart disease Father    • Heart failure Father    • No Known Problems Sister    • Heart failure Brother    • No Known Problems Maternal Grandmother    • No Known Problems Maternal Grandfather    • No Known Problems Paternal Grandmother    • No Known Problems Paternal Grandfather    • Diabetes Brother    • Brain cancer Brother          **Dragon Disclaimer:   Much of this encounter note is an electronic transcription/translation of spoken language to printed text. The electronic translation of spoken language may permit erroneous, or at times, nonsensical words or phrases to be inadvertently transcribed. Although I have reviewed the note for such errors, some may still exist.

## 2017-08-26 DIAGNOSIS — M15.9 PRIMARY OSTEOARTHRITIS INVOLVING MULTIPLE JOINTS: ICD-10-CM

## 2017-08-28 RX ORDER — MELOXICAM 15 MG/1
TABLET ORAL
Qty: 30 TABLET | Refills: 0 | Status: SHIPPED | OUTPATIENT
Start: 2017-08-28 | End: 2018-12-13

## 2017-09-09 DIAGNOSIS — J44.9 CHRONIC OBSTRUCTIVE PULMONARY DISEASE, UNSPECIFIED COPD TYPE (HCC): ICD-10-CM

## 2017-09-21 ENCOUNTER — CONSULT (OUTPATIENT)
Dept: ORTHOPEDIC SURGERY | Facility: CLINIC | Age: 77
End: 2017-09-21

## 2017-09-21 VITALS — HEIGHT: 56 IN | BODY MASS INDEX: 24.3 KG/M2 | WEIGHT: 108 LBS | TEMPERATURE: 98.1 F

## 2017-09-21 DIAGNOSIS — Z96.642 S/P HIP REPLACEMENT, LEFT: Primary | ICD-10-CM

## 2017-09-21 PROCEDURE — 99213 OFFICE O/P EST LOW 20 MIN: CPT | Performed by: ORTHOPAEDIC SURGERY

## 2017-09-21 NOTE — PROGRESS NOTES
Patient: Grace Beauchamp  YOB: 1940 77 y.o. female  Medical Record Number: [id number]    Chief Complaint:   Chief Complaint   Patient presents with   • Left Hip - Follow-up, Pain       History of Present Illness:Grace Beauchamp is a 77 y.o. female who presents for follow-up of  Left hip clicking.  Not much pain.  She's had a hip replacement by Dr. Dylon washington which involve removal of the plate and screws for an intertrochanteric fracture.  She still having some clicking.  I injected her hip bursa and that really didn't help much.    Allergies:   Allergies   Allergen Reactions   • Ramipril Other (See Comments)     Ace I  cough   • Morphine Itching   • Morphine And Related Itching   • Bactrim [Sulfamethoxazole-Trimethoprim] Itching and Rash       Medications:   Current Outpatient Prescriptions   Medication Sig Dispense Refill   • aspirin 81 MG EC tablet Take 81 mg by mouth daily.     • atorvastatin (LIPITOR) 20 MG tablet TAKE ONE TABLET BY MOUTH EVERY NIGHT AT BEDTIME 90 tablet 0   • Biotin 1000 MCG tablet Take 1,000 mcg by mouth daily. Take as directed     • cetirizine (zyrTEC) 10 MG tablet Take 10 mg by mouth Daily.     • Cholecalciferol (VITAMIN D) 2000 UNITS tablet Take 1 tablet by mouth nightly.     • cilostazol (PLETAL) 100 MG tablet Take 1 tablet by mouth 2 (two) times a day.     • diclofenac (VOLTAREN) 1 % gel gel Place on the skin. 4 GM of Gel to affected to area 4 times daily. Do not apply more than 16 Gm daily to any one affected joint     • escitalopram (LEXAPRO) 20 MG tablet Take 1 tablet by mouth Every Morning. 90 tablet 1   • Glycopyrrolate-Formoterol (BEVESPI AEROSPHERE) 9-4.8 MCG/ACT aerosol Inhale 2 sprays 2 (Two) Times a Day. 1 inhaler 5   • meloxicam (MOBIC) 15 MG tablet TAKE ONE TABLET BY MOUTH DAILY 30 tablet 0   • nebivolol (BYSTOLIC) 10 MG tablet Take 1 tablet by mouth Daily. 30 tablet 5   • omeprazole (PRILOSEC) 20 MG capsule Take 1 capsule by mouth Daily. 30 capsule 1  "  • pramipexole (MIRAPEX) 0.125 MG tablet TAKE ONE TABLET BY MOUTH EVERY NIGHT AT BEDTIME 90 tablet 0   • PROAIR  (90 Base) MCG/ACT inhaler INHALE 2 PUFFS INTO THE LUNGS EVERY 6 HOURS AS NEEDED FOR WHEEZING 8.5 g 2   • Probiotic Product (PROBIOTIC PO) Take 1 capsule by mouth Daily.     • traMADol (ULTRAM) 50 MG tablet Take 1 tablet by mouth Every 6 (Six) Hours As Needed for Moderate Pain (4-6). 90 tablet 2   • pramipexole (MIRAPEX) 0.125 MG tablet Take 1 tablet by mouth every night. 90 tablet 1     No current facility-administered medications for this visit.          The following portions of the patient's history were reviewed and updated as appropriate: allergies, current medications, past family history, past medical history, past social history, past surgical history and problem list.    Review of Systems:   A 14 point review of systems was performed. All systems negative except pertinent positives/negative listed in HPI above    Physical Exam:   Vitals:    09/21/17 1502   Temp: 98.1 °F (36.7 °C)   TempSrc: Temporal Artery    Weight: 108 lb (49 kg)   Height: 56\" (142.2 cm)       General: A and O x 3, ASA, NAD    SCLERA:    Normal    DENTITION:   Normal  Hip:  left    LEG ALIGNMENT:     Neutral   ,    equal leg lengths    GAIT:     Nonantalgic    SKIN:     No abnormality    RANGE OF MOTION:      Full without joint irritability    STRENGTH:     5 / 5    hip flexion and abduction    DISTAL PULSES:    Paplable    DISTAL SENSATION :   Intact    LYMPHATICS:     No   lymphadenopathy    OTHER:          - Negative Stinchfeld test      - Negative log roll      - No Tenderness to palpation trochanteric bursa -  There is a palpable click with ROM      - Neg FADIR      - Neg TRAVIS      - No SI tenderness     Radiology:    Xrays 2views left hip (AP bilateral hips and lateral hip) taken previously demonstrating a well positioned cemented total hip without evidence of wear, loosening, or " osteoarthritis    Assessment/Plan:  Left total hip replacement appears soft tissue in nature were IT band is clicking I don't think there is a surgical solution that should be entertained.  I then she needs to continue with therapy I will see her back on a when necessary basis.      Marcus Ye MD  9/21/2017

## 2017-09-26 DIAGNOSIS — E78.5 HYPERLIPIDEMIA, UNSPECIFIED HYPERLIPIDEMIA TYPE: Primary | ICD-10-CM

## 2017-09-27 RX ORDER — ATORVASTATIN CALCIUM 20 MG/1
TABLET, FILM COATED ORAL
Qty: 90 TABLET | Refills: 0 | Status: SHIPPED | OUTPATIENT
Start: 2017-09-27 | End: 2018-01-26 | Stop reason: SDUPTHER

## 2017-10-05 DIAGNOSIS — I71.22 AORTIC ARCH ANEURYSM (HCC): Primary | ICD-10-CM

## 2017-10-19 DIAGNOSIS — G25.81 RESTLESS LEGS SYNDROME (RLS): ICD-10-CM

## 2017-10-20 RX ORDER — PRAMIPEXOLE DIHYDROCHLORIDE 0.12 MG/1
TABLET ORAL
Qty: 90 TABLET | Refills: 0 | Status: SHIPPED | OUTPATIENT
Start: 2017-10-20 | End: 2018-01-26 | Stop reason: SDUPTHER

## 2017-10-26 ENCOUNTER — TELEPHONE (OUTPATIENT)
Dept: CARDIAC SURGERY | Facility: CLINIC | Age: 77
End: 2017-10-26

## 2017-10-26 NOTE — TELEPHONE ENCOUNTER
After speaking with Dr Ontiveros I called and spoke with Ms Beauchamp to let her know he did not think more testing was needed at this time. He would prefer that she follow closer to one year from her last CTA. We will schedule her for more testing in April for a May office visit. This was agreeable to her

## 2017-10-31 ENCOUNTER — APPOINTMENT (OUTPATIENT)
Dept: CT IMAGING | Facility: HOSPITAL | Age: 77
End: 2017-10-31

## 2017-10-31 DIAGNOSIS — M54.50 BILATERAL LOW BACK PAIN WITHOUT SCIATICA, UNSPECIFIED CHRONICITY: ICD-10-CM

## 2017-10-31 RX ORDER — TRAMADOL HYDROCHLORIDE 50 MG/1
50 TABLET ORAL EVERY 6 HOURS PRN
Qty: 90 TABLET | Refills: 2 | OUTPATIENT
Start: 2017-10-31 | End: 2018-03-25 | Stop reason: SDUPTHER

## 2017-11-03 ENCOUNTER — APPOINTMENT (OUTPATIENT)
Dept: CT IMAGING | Facility: HOSPITAL | Age: 77
End: 2017-11-03

## 2017-11-16 DIAGNOSIS — I10 ESSENTIAL HYPERTENSION: ICD-10-CM

## 2017-11-16 RX ORDER — NEBIVOLOL HYDROCHLORIDE 10 MG/1
TABLET ORAL
Qty: 30 TABLET | Refills: 4 | Status: SHIPPED | OUTPATIENT
Start: 2017-11-16 | End: 2018-04-27 | Stop reason: SDUPTHER

## 2017-11-22 DIAGNOSIS — F32.A DEPRESSION, UNSPECIFIED DEPRESSION TYPE: Chronic | ICD-10-CM

## 2017-11-22 RX ORDER — ESCITALOPRAM OXALATE 20 MG/1
TABLET ORAL
Qty: 90 TABLET | Refills: 0 | Status: SHIPPED | OUTPATIENT
Start: 2017-11-22 | End: 2018-05-14

## 2018-01-23 ENCOUNTER — TELEPHONE (OUTPATIENT)
Dept: INTERNAL MEDICINE | Age: 78
End: 2018-01-23

## 2018-01-23 NOTE — TELEPHONE ENCOUNTER
Spoke w/ Dr. Castañeda. Dr. Castañeda advised that pt should be evaluated for symptoms, either in office or at [facility].     Spoke w/ pt re: symptoms. Pt was advised to either keep appt this afternoon or to be seen at [facility] for evaluation. Pt stated she just doesn't feel well, and she believes she will be better in a few days. Pt stated she would like to reschedule appt. Appt was rescheduled for next week.     Pt was advised to monitor fluid intake, and rest. Pt was also advised to monitor symptoms, and seek immediate medical assistance if symptoms worsen. Pt demonstrated understanding.    KD

## 2018-01-23 NOTE — TELEPHONE ENCOUNTER
Pt called complaining of diarrhea and nausea so can't make it in to her appt today @ 3p. Denied any fever. Pt also stated she is dealing with dizziness and loss of balance.  She wanted to reschedule her appt asap.   Wasn't sure what Dr Castañeda wants to do with her issues going on.  Pt's # [phone]  Thanks sp

## 2018-01-26 DIAGNOSIS — G25.81 RESTLESS LEGS SYNDROME (RLS): ICD-10-CM

## 2018-01-26 DIAGNOSIS — E78.5 HYPERLIPIDEMIA, UNSPECIFIED HYPERLIPIDEMIA TYPE: ICD-10-CM

## 2018-01-26 RX ORDER — ATORVASTATIN CALCIUM 20 MG/1
TABLET, FILM COATED ORAL
Qty: 90 TABLET | Refills: 0 | Status: SHIPPED | OUTPATIENT
Start: 2018-01-26 | End: 2018-05-08 | Stop reason: SINTOL

## 2018-01-26 RX ORDER — PRAMIPEXOLE DIHYDROCHLORIDE 0.12 MG/1
TABLET ORAL
Qty: 90 TABLET | Refills: 0 | Status: SHIPPED | OUTPATIENT
Start: 2018-01-26 | End: 2018-05-18 | Stop reason: SDUPTHER

## 2018-01-31 ENCOUNTER — OFFICE VISIT (OUTPATIENT)
Dept: INTERNAL MEDICINE | Age: 78
End: 2018-01-31

## 2018-01-31 VITALS
BODY MASS INDEX: 23.62 KG/M2 | SYSTOLIC BLOOD PRESSURE: 106 MMHG | HEART RATE: 64 BPM | HEIGHT: 56 IN | TEMPERATURE: 98.2 F | DIASTOLIC BLOOD PRESSURE: 68 MMHG | WEIGHT: 105 LBS | OXYGEN SATURATION: 95 %

## 2018-01-31 DIAGNOSIS — I10 ESSENTIAL HYPERTENSION: Chronic | ICD-10-CM

## 2018-01-31 DIAGNOSIS — J43.9 PULMONARY EMPHYSEMA, UNSPECIFIED EMPHYSEMA TYPE (HCC): Chronic | ICD-10-CM

## 2018-01-31 DIAGNOSIS — R30.0 DYSURIA: Primary | ICD-10-CM

## 2018-01-31 DIAGNOSIS — I73.9 CLAUDICATION OF BOTH LOWER EXTREMITIES (HCC): ICD-10-CM

## 2018-01-31 PROCEDURE — 99214 OFFICE O/P EST MOD 30 MIN: CPT | Performed by: INTERNAL MEDICINE

## 2018-01-31 NOTE — PROGRESS NOTES
"[facility] [place] [facility]  ANETA COLLINS M.D.      Grace GONZALEZ Beauchamp / 77 y.o. / female  01/31/2018      MEDICATIONS  Current Outpatient Prescriptions   Medication Sig Dispense Refill   • aspirin 81 MG EC tablet Take 81 mg by mouth daily.     • atorvastatin (LIPITOR) 20 MG tablet TAKE ONE TABLET BY MOUTH EVERY NIGHT AT BEDTIME 90 tablet 0   • Biotin 1000 MCG tablet Take 1,000 mcg by mouth daily. Take as directed     • BYSTOLIC 10 MG tablet TAKE ONE TABLET BY MOUTH DAILY 30 tablet 4   • cetirizine (zyrTEC) 10 MG tablet Take 10 mg by mouth Daily.     • Cholecalciferol (VITAMIN D) 2000 UNITS tablet Take 1 tablet by mouth nightly.     • cilostazol (PLETAL) 100 MG tablet Take 1 tablet by mouth 2 (two) times a day.     • escitalopram (LEXAPRO) 20 MG tablet TAKE ONE TABLET BY MOUTH EVERY MORNING 90 tablet 0   • Glycopyrrolate-Formoterol (BEVESPI AEROSPHERE) 9-4.8 MCG/ACT aerosol Inhale 2 sprays 2 (Two) Times a Day. 1 inhaler 5   • meloxicam (MOBIC) 15 MG tablet TAKE ONE TABLET BY MOUTH DAILY 30 tablet 0   • omeprazole (PRILOSEC) 20 MG capsule Take 1 capsule by mouth Daily. 30 capsule 1   • pramipexole (MIRAPEX) 0.125 MG tablet TAKE ONE TABLET BY MOUTH EVERY NIGHT AT BEDTIME 90 tablet 0   • PROAIR  (90 Base) MCG/ACT inhaler INHALE 2 PUFFS INTO THE LUNGS EVERY 6 HOURS AS NEEDED FOR WHEEZING 8.5 g 2   • Probiotic Product (PROBIOTIC PO) Take 1 capsule by mouth Daily.     • traMADol (ULTRAM) 50 MG tablet Take 1 tablet by mouth Every 6 (Six) Hours As Needed for Moderate Pain . 90 tablet 2   • diclofenac (VOLTAREN) 1 % gel gel Place on the skin. 4 GM of Gel to affected to area 4 times daily. Do not apply more than 16 Gm daily to any one affected joint       No current facility-administered medications for this visit.          VITALS    Visit Vitals   • /68   • Pulse 64   • Temp 98.2 °F (36.8 °C)   • Ht 142.2 cm (55.98\")   • Wt 47.6 kg (105 lb)   • SpO2 95%   • BMI 23.55 kg/m2       BP Readings from Last 3 Encounters: "   01/31/18 106/68   08/22/17 110/70   07/19/17 138/84     Wt Readings from Last 3 Encounters:   01/31/18 47.6 kg (105 lb)   09/21/17 49 kg (108 lb)   08/22/17 49 kg (108 lb)      Body mass index is 23.55 kg/(m^2).      CC:  Main reason(s) for today's visit: Hypertension and Hyperlipidemia      HPI:     C/o balance problems, even after having gone thru extensive PT.  Will be seeing Dr. Smith re: claudication pains of her legs. She also has spinal problems for which she has seen dr fuentes.     Chronic essential hypertension:  Since prior visit: compliant with medication(s), does not check blood pressure at home and denies significant problems with medication(s).  Most recent in-office blood pressure readings:   BP Readings from Last 3 Encounters:   01/31/18 106/68   08/22/17 110/70   07/19/17 138/84     COPD:  She is a former smoker. She denies change in breathing symptoms. Current therapy: Bevespi     Depression:  Has been compliant with escitalopram and without significant problems or side effects.     C/o dysuria, frequency, odor.     Patient Care Team:  Miller Castañeda MD as PCP - General (Internal Medicine)  Miller Castañeda MD as PCP - Claims Attributed  Mart Ontiveros MD as Surgeon (Cardiothoracic Surgery)  Tres De Los Santos MD as Consulting Physician (Cardiology)  Graham Mcconnell MD as Consulting Physician (Hematology and Oncology)  Payam Byrnes MD as Consulting Physician (Otolaryngology)  ____________________________________________________________________    ASSESSMENT & PLAN:    Problem List Items Addressed This Visit        High    Hypertension (Chronic)    Overview     Stable. Continue Bystolic 10 mg qd.          Relevant Medications    BYSTOLIC 10 MG tablet       Medium    Chronic obstructive pulmonary disease (Chronic)    Overview     Use Bevespi inhaler daily.          Relevant Medications    cetirizine (zyrTEC) 10 MG tablet    Glycopyrrolate-Formoterol (BEVESPI AEROSPHERE) 9-4.8 MCG/ACT aerosol     PROAIR  (90 Base) MCG/ACT inhaler    Claudication of both lower extremities    Current Assessment & Plan     To see Dr. Smith. She may also be experiencing spinal claudication sx's as well.            Other Visit Diagnoses     Dysuria    -  Primary    Relevant Orders    Urinalysis With / Culture If Indicated - Urine, Clean Catch           Summary/Discussion:  ·     Return in about 4 months (around 5/31/2018) for Reassess today's problem(s).    Future Appointments  Date Time Provider Department Center   5/2/2018 2:30 PM GREG Ocampo [facility] None   5/30/2018 2:00 PM Miller Castañeda MD [facility] None     ____________________________________________________________________    REVIEW OF SYSTEMS    Review of Systems  Constitutional neg  Resp neg for worsening cough or sob  CV neg for angina  Leg claudications  Balance difficulties    PHYSICAL EXAMINATION    Physical Exam  Constitutional  No distress  Cardiovascular Rate  normal . Rhythm: regular . Heart sounds:  normal  Pulmonary/Chest  Effort normal. Breath sounds:  Diminished BS w/o wheezing or rales  Psychiatric  Alert. Judgment and thought content normal. Mood normal   Gait slow, cautious, no shuffling    REVIEWED DATA:    Labs:       Imaging:        Medical Tests:        Summary of old records / correspondence / consultant report:        Request outside records:       ALLERGIES  Allergies   Allergen Reactions   • Ramipril Other (See Comments)     Ace I  cough   • Morphine Itching   • Morphine And Related Itching   • Bactrim [Sulfamethoxazole-Trimethoprim] Itching and Rash        PFSH:     The following portions of the patient's history were reviewed and updated as appropriate: Allergies / Current Medications / Past Medical History / Surgical History / Social History / Family History    PROBLEM LIST   Patient Active Problem List   Diagnosis   • History of breast cancer   • Stenosis of carotid artery   • Chronic obstructive pulmonary disease   • Closed  fracture of multiple ribs   • Cognitive disorder   • Depression   • Erythromelalgia   • Hyperlipidemia   • Hypertension   • Primary osteoarthritis involving multiple joints   • Osteoporosis   • Restless legs syndrome   • Cerebral aneurysm   • Temporary cerebral vascular dysfunction   • S/P CABG x 1   • Type 2 diabetes mellitus without complication, without long-term current use of insulin   • S/P ascending aortic aneurysm repair   • Aortic arch aneurysm   • Descending thoracic aortic aneurysm   • Claudication of both lower extremities       PAST MEDICAL HISTORY  Past Medical History:   Diagnosis Date   • AAA (abdominal aortic aneurysm)    • Aneurysm     saccular   • Aneurysm of aorta     abdominal aorta and arch, descending aorta   • Aortic arch aneurysm    • Arthritis    • Breast cancer     right breast [id number] (snm) pMX ER/WI pos, Her-2/neg, Ki-67, 31%, oncotype recurrence score 19, invasive lobular carcinoma   • Cancer of subglottis    • Carotid artery stenosis    • Closed fracture of three ribs of right side    • Cognitive disorder    • COPD (chronic obstructive pulmonary disease)    • Coronary artery disease    • Depression    • Descending aortic arch aneurysm    • Diabetes mellitus    • Erythermalgia    • GERD (gastroesophageal reflux disease)    • Hyperlipidemia    • Hypertension    • Low back pain    • Osteoarthritis    • Osteoporosis    • Prediabetes    • RLS (restless legs syndrome)    • Saccular aneurysm    • Stenosis of artery     carotid   • Stroke     Perioperatively with left hip replacement   • TIA (transient ischemic attack)    • Venous insufficiency        SURGICAL HISTORY  Past Surgical History:   Procedure Laterality Date   • AORTIC VALVE REPAIR/REPLACEMENT N/A 02/23/2016    ascending aortic replacement with 24 mm graft, Dr. Ontiveros   • APPENDECTOMY  1977   • BREAST BIOPSY Right 09/16/2012    vacuum assisted core bx of the right breast   • CARDIAC CATHETERIZATION N/A 01/06/2016    Dr. Joshua  Filiberto   • CARDIAC SURGERY  02/2016    Dr. Ontiveros/Dr. De Los Santos   • CATARACT EXTRACTION Bilateral     [facility] [place]   • COLONOSCOPY N/A 10/2002    Dr. Negro   • CORONARY ARTERY BYPASS GRAFT  02/23/2016    X 1 Dr Ontiveros   • CYST REMOVAL Right 01/25/2013    right palm excision of retinacular cyst, Dr. Karla Fish   • EPIDURAL BLOCK     • LAPAROSCOPIC CHOLECYSTECTOMY N/A 08/04/2004    Dr. JOEY Sheth   • MASTECTOMY Right 09/28/2012   • ORIF HIP FRACTURE Left 03/27/2005    w/ AMBI compression screw, Dr. Victor M Cabral   • RECTOVAGINAL FISTULA REPAIR  1965   • TUBAL ABDOMINAL LIGATION         SOCIAL HISTORY  Social History     Social History   • Marital status:      Spouse name: N/A   • Number of children: N/A   • Years of education: N/A     Social History Main Topics   • Smoking status: Former Smoker     Quit date: 12/2012   • Smokeless tobacco: Never Used      Comment: caffeine use   • Alcohol use No   • Drug use: No   • Sexual activity: Defer     Other Topics Concern   • None     Social History Narrative       FAMILY HISTORY  Family History   Problem Relation Age of Onset   • Alzheimer's disease Mother    • Cancer Maternal Aunt    • Heart disease Father    • Heart failure Father    • No Known Problems Sister    • Heart failure Brother    • No Known Problems Maternal Grandmother    • No Known Problems Maternal Grandfather    • No Known Problems Paternal Grandmother    • No Known Problems Paternal Grandfather    • Diabetes Brother    • Brain cancer Brother          **Tru Disclaimer:   Much of this encounter note is an electronic transcription/translation of spoken language to printed text. The electronic translation of spoken language may permit erroneous, or at times, nonsensical words or phrases to be inadvertently transcribed. Although I have reviewed the note for such errors, some may still exist.

## 2018-02-01 ENCOUNTER — APPOINTMENT (OUTPATIENT)
Dept: WOMENS IMAGING | Facility: HOSPITAL | Age: 78
End: 2018-02-01

## 2018-02-01 PROCEDURE — 77067 SCR MAMMO BI INCL CAD: CPT | Performed by: RADIOLOGY

## 2018-02-01 PROCEDURE — 77063 BREAST TOMOSYNTHESIS BI: CPT | Performed by: RADIOLOGY

## 2018-02-01 PROCEDURE — MDREVIEWSP: Performed by: RADIOLOGY

## 2018-02-02 ENCOUNTER — TELEPHONE (OUTPATIENT)
Dept: INTERNAL MEDICINE | Age: 78
End: 2018-02-02

## 2018-02-02 DIAGNOSIS — N30.01 ACUTE CYSTITIS WITH HEMATURIA: Primary | ICD-10-CM

## 2018-02-02 LAB
APPEARANCE UR: ABNORMAL
BACTERIA #/AREA URNS HPF: ABNORMAL /[HPF]
BACTERIA UR CULT: ABNORMAL
BACTERIA UR CULT: ABNORMAL
BILIRUB UR QL STRIP: NEGATIVE
CASTS URNS MICRO: ABNORMAL
CASTS URNS QL MICRO: PRESENT /LPF
COLOR UR: YELLOW
CRYSTALS URNS MICRO: ABNORMAL
EPI CELLS #/AREA URNS HPF: ABNORMAL /HPF
GLUCOSE UR QL: NEGATIVE
HGB UR QL STRIP: ABNORMAL
KETONES UR QL STRIP: ABNORMAL
LEUKOCYTE ESTERASE UR QL STRIP: ABNORMAL
MICRO URNS: ABNORMAL
MUCOUS THREADS URNS QL MICRO: PRESENT
NITRITE UR QL STRIP: NEGATIVE
OTHER ANTIBIOTIC SUSC ISLT: ABNORMAL
PH UR STRIP: 5 [PH] (ref 5–7.5)
PROT UR QL STRIP: ABNORMAL
RBC #/AREA URNS HPF: ABNORMAL /HPF
SP GR UR: >=1.03 (ref 1–1.03)
UNIDENT CRYS URNS QL MICRO: PRESENT
URINALYSIS REFLEX: ABNORMAL
UROBILINOGEN UR STRIP-MCNC: 1 MG/DL (ref 0.2–1)
WBC #/AREA URNS HPF: ABNORMAL /HPF
YEAST #/AREA URNS HPF: PRESENT /[HPF]

## 2018-02-02 RX ORDER — CEPHALEXIN 250 MG/1
250 TABLET ORAL 2 TIMES DAILY
Qty: 6 TABLET | Refills: 0 | Status: SHIPPED | OUTPATIENT
Start: 2018-02-02 | End: 2018-02-05

## 2018-02-02 NOTE — TELEPHONE ENCOUNTER
LMOM re: +urine cx. Start keflex 250 mg bid x 3 days. Instructed to call if not improving/worse. I sent the Rx to the [facility]

## 2018-02-06 ENCOUNTER — TELEPHONE (OUTPATIENT)
Dept: INTERNAL MEDICINE | Age: 78
End: 2018-02-06

## 2018-02-06 NOTE — TELEPHONE ENCOUNTER
Pt said she was able to pull her urine results on My Chart but doesn't understand the results and would like for you to call her to explain them to her.  Pt's # [phone]  Thanks SP

## 2018-03-06 ENCOUNTER — OFFICE VISIT (OUTPATIENT)
Dept: ORTHOPEDIC SURGERY | Facility: CLINIC | Age: 78
End: 2018-03-06

## 2018-03-06 VITALS — TEMPERATURE: 98 F | WEIGHT: 105 LBS | HEIGHT: 58 IN | BODY MASS INDEX: 22.04 KG/M2

## 2018-03-06 DIAGNOSIS — M48.061 SPINAL STENOSIS OF LUMBAR REGION, UNSPECIFIED WHETHER NEUROGENIC CLAUDICATION PRESENT: Primary | ICD-10-CM

## 2018-03-06 PROCEDURE — 99213 OFFICE O/P EST LOW 20 MIN: CPT | Performed by: ORTHOPAEDIC SURGERY

## 2018-03-06 NOTE — PROGRESS NOTES
She has a scattering of complaints including left iliac groin buttock and lower extremity and back pain.  He at least some of the pain I believe is intrinsic to the areas in question.  Her exam is consistent with weakness in the right  EHL and otherwise generalized but not focalized weakness in the legs bilaterally.  There may be a balance issue as well.  I reviewed the MRI from [facility] and multilevel foraminal stenosis but nothing in way of central stenosis.  She has a 4.6 cm aortic aneurysm for which she surgery is being contemplated, and for which repair of may be necessary before any elective procedures are performed.  That being the case him going to get her into see Mando Quintana for repeat epidural injections and discuss pain pump or spinal stimulator placement.  I'll see her back as needed

## 2018-03-25 DIAGNOSIS — M54.50 BILATERAL LOW BACK PAIN WITHOUT SCIATICA, UNSPECIFIED CHRONICITY: ICD-10-CM

## 2018-03-26 RX ORDER — TRAMADOL HYDROCHLORIDE 50 MG/1
TABLET ORAL
Qty: 90 TABLET | Refills: 2 | OUTPATIENT
Start: 2018-03-26 | End: 2018-07-04 | Stop reason: SDUPTHER

## 2018-03-26 NOTE — TELEPHONE ENCOUNTER
Last OV 1/31/18  Next OV 5/30/18  Last RF 10/31/17 #90 +2  Last KSP On File  UDS On File 3/3/17  Contract On File    KD

## 2018-04-26 ENCOUNTER — HOSPITAL ENCOUNTER (OUTPATIENT)
Dept: CT IMAGING | Facility: HOSPITAL | Age: 78
Discharge: HOME | End: 2018-04-26
Attending: THORACIC SURGERY (CARDIOTHORACIC VASCULAR SURGERY) | Admitting: THORACIC SURGERY (CARDIOTHORACIC VASCULAR SURGERY)

## 2018-04-26 DIAGNOSIS — I71.22 AORTIC ARCH ANEURYSM (HCC): ICD-10-CM

## 2018-04-26 DIAGNOSIS — I71.23 DESCENDING THORACIC AORTIC ANEURYSM (HCC): ICD-10-CM

## 2018-04-26 LAB — CREAT BLDA-MCNC: 0.8 MG/DL (ref 0.6–1.3)

## 2018-04-26 PROCEDURE — 82565 ASSAY OF CREATININE: CPT

## 2018-04-26 PROCEDURE — 0 IOPAMIDOL PER 1 ML: Performed by: THORACIC SURGERY (CARDIOTHORACIC VASCULAR SURGERY)

## 2018-04-26 PROCEDURE — 71275 CT ANGIOGRAPHY CHEST: CPT

## 2018-04-26 PROCEDURE — 74174 CTA ABD&PLVS W/CONTRAST: CPT

## 2018-04-26 RX ADMIN — IOPAMIDOL 95 ML: 755 INJECTION, SOLUTION INTRAVENOUS at 15:40

## 2018-04-27 DIAGNOSIS — I10 ESSENTIAL HYPERTENSION: ICD-10-CM

## 2018-04-27 RX ORDER — NEBIVOLOL HYDROCHLORIDE 10 MG/1
TABLET ORAL
Qty: 30 TABLET | Refills: 3 | Status: SHIPPED | OUTPATIENT
Start: 2018-04-27 | End: 2018-09-03 | Stop reason: SDUPTHER

## 2018-05-07 ENCOUNTER — TELEPHONE (OUTPATIENT)
Dept: ORTHOPEDIC SURGERY | Facility: CLINIC | Age: 78
End: 2018-05-07

## 2018-05-07 DIAGNOSIS — M48.061 SPINAL STENOSIS OF LUMBAR REGION, UNSPECIFIED WHETHER NEUROGENIC CLAUDICATION PRESENT: Primary | ICD-10-CM

## 2018-05-08 ENCOUNTER — OFFICE VISIT (OUTPATIENT)
Dept: CARDIAC SURGERY | Facility: CLINIC | Age: 78
End: 2018-05-08

## 2018-05-08 ENCOUNTER — TELEPHONE (OUTPATIENT)
Dept: INTERNAL MEDICINE | Age: 78
End: 2018-05-08

## 2018-05-08 ENCOUNTER — OFFICE VISIT (OUTPATIENT)
Dept: INTERNAL MEDICINE | Age: 78
End: 2018-05-08

## 2018-05-08 VITALS
RESPIRATION RATE: 20 BRPM | WEIGHT: 100 LBS | BODY MASS INDEX: 22.5 KG/M2 | OXYGEN SATURATION: 95 % | DIASTOLIC BLOOD PRESSURE: 84 MMHG | HEART RATE: 69 BPM | TEMPERATURE: 97.4 F | SYSTOLIC BLOOD PRESSURE: 132 MMHG | HEIGHT: 56 IN

## 2018-05-08 VITALS
HEIGHT: 58 IN | SYSTOLIC BLOOD PRESSURE: 132 MMHG | DIASTOLIC BLOOD PRESSURE: 76 MMHG | HEART RATE: 72 BPM | WEIGHT: 100.8 LBS | TEMPERATURE: 98.1 F | OXYGEN SATURATION: 95 % | BODY MASS INDEX: 21.16 KG/M2

## 2018-05-08 DIAGNOSIS — Z95.1 S/P CABG X 1: ICD-10-CM

## 2018-05-08 DIAGNOSIS — I71.23 DESCENDING THORACIC AORTIC ANEURYSM (HCC): Primary | ICD-10-CM

## 2018-05-08 DIAGNOSIS — I71.60 THORACOABDOMINAL AORTIC ANEURYSM (TAAA) WITHOUT RUPTURE (HCC): ICD-10-CM

## 2018-05-08 DIAGNOSIS — J43.9 PULMONARY EMPHYSEMA, UNSPECIFIED EMPHYSEMA TYPE (HCC): Chronic | ICD-10-CM

## 2018-05-08 DIAGNOSIS — I73.9 CLAUDICATION OF BOTH LOWER EXTREMITIES (HCC): ICD-10-CM

## 2018-05-08 DIAGNOSIS — I73.81 ERYTHROMELALGIA (HCC): ICD-10-CM

## 2018-05-08 DIAGNOSIS — I10 ESSENTIAL HYPERTENSION: Chronic | ICD-10-CM

## 2018-05-08 DIAGNOSIS — E78.2 MIXED HYPERLIPIDEMIA: Chronic | ICD-10-CM

## 2018-05-08 DIAGNOSIS — Z86.79 S/P ASCENDING AORTIC ANEURYSM REPAIR: ICD-10-CM

## 2018-05-08 DIAGNOSIS — Z98.890 S/P ASCENDING AORTIC ANEURYSM REPAIR: ICD-10-CM

## 2018-05-08 DIAGNOSIS — I71.22 AORTIC ARCH ANEURYSM (HCC): ICD-10-CM

## 2018-05-08 DIAGNOSIS — I10 ESSENTIAL HYPERTENSION: Primary | Chronic | ICD-10-CM

## 2018-05-08 PROCEDURE — 99214 OFFICE O/P EST MOD 30 MIN: CPT | Performed by: THORACIC SURGERY (CARDIOTHORACIC VASCULAR SURGERY)

## 2018-05-08 PROCEDURE — 99214 OFFICE O/P EST MOD 30 MIN: CPT | Performed by: INTERNAL MEDICINE

## 2018-05-08 NOTE — ASSESSMENT & PLAN NOTE
Due to urinary retention symptoms may try using Bevespi 1 puff BID. Let me know if this does not help.

## 2018-05-08 NOTE — ASSESSMENT & PLAN NOTE
Due to complaints of leg muscle pains, will change atorvastatin to Livalo 2 mg qHS. Recheck labs in 3 months. Call if problem persists/worsens.

## 2018-05-08 NOTE — PROGRESS NOTES
[facility] [place] [facility]  ANETA COLLINS M.D.      Grace GONZALEZ Beauchamp / 77 y.o. / female  05/08/2018      ASSESSMENT & PLAN:    Problem List Items Addressed This Visit        High    Hypertension - Primary (Chronic)    Overview     Stable. Continue Bystolic 10 mg qd.          Relevant Medications    BYSTOLIC 10 MG tablet       Medium    Chronic obstructive pulmonary disease (Chronic)    Overview     Use Bevespi inhaler daily.          Current Assessment & Plan     Due to urinary retention symptoms may try using Bevespi 1 puff BID. Let me know if this does not help.          Relevant Medications    cetirizine (zyrTEC) 10 MG tablet    Glycopyrrolate-Formoterol (BEVESPI AEROSPHERE) 9-4.8 MCG/ACT aerosol    PROAIR  (90 Base) MCG/ACT inhaler    Hyperlipidemia (Chronic)    Current Assessment & Plan     Due to complaints of leg muscle pains, will change atorvastatin to Livalo 2 mg qHS. Recheck labs in 3 months. Call if problem persists/worsens.          Relevant Medications    pitavastatin calcium (LIVALO) 2 MG tablet tablet      Other Visit Diagnoses    None.       No orders of the defined types were placed in this encounter.      Summary/Discussion:      Return for change next fu to 3 months for hyperlipidemia.    ____________________________________________________________________    MEDICATIONS  Current Outpatient Prescriptions   Medication Sig Dispense Refill   • aspirin 81 MG EC tablet Take 81 mg by mouth daily.     • Biotin 1000 MCG tablet Take 1,000 mcg by mouth daily. Take as directed     • BYSTOLIC 10 MG tablet TAKE ONE TABLET BY MOUTH DAILY 30 tablet 3   • cetirizine (zyrTEC) 10 MG tablet Take 10 mg by mouth Daily.     • cilostazol (PLETAL) 100 MG tablet Take 1 tablet by mouth 2 (two) times a day.     • diclofenac (VOLTAREN) 1 % gel gel Place on the skin. 4 GM of Gel to affected to area 4 times daily. Do not apply more than 16 Gm daily to any one affected joint     • Glycopyrrolate-Formoterol (BEVESPI  "AEROSPHERE) 9-4.8 MCG/ACT aerosol Inhale 2 sprays 2 (Two) Times a Day. 1 inhaler 5   • meloxicam (MOBIC) 15 MG tablet TAKE ONE TABLET BY MOUTH DAILY 30 tablet 0   • pramipexole (MIRAPEX) 0.125 MG tablet TAKE ONE TABLET BY MOUTH EVERY NIGHT AT BEDTIME 90 tablet 0   • PROAIR  (90 Base) MCG/ACT inhaler INHALE 2 PUFFS INTO THE LUNGS EVERY 6 HOURS AS NEEDED FOR WHEEZING 8.5 g 2   • Probiotic Product (PROBIOTIC PO) Take 1 capsule by mouth Daily.     • traMADol (ULTRAM) 50 MG tablet TAKE ONE TABLET BY MOUTH EVERY 6 HOURS AS NEEDED FOR MODERATE PAIN 90 tablet 2   • Cholecalciferol (VITAMIN D) 2000 UNITS tablet Take 1 tablet by mouth nightly.     • escitalopram (LEXAPRO) 20 MG tablet TAKE ONE TABLET BY MOUTH EVERY MORNING 90 tablet 0   Atorvastatin daily    VITALS:    Visit Vitals  /76   Pulse 72   Temp 98.1 °F (36.7 °C)   Ht 147.3 cm (57.99\")   Wt 45.7 kg (100 lb 12.8 oz)   SpO2 95%   BMI 21.07 kg/m²       BP Readings from Last 3 Encounters:   05/08/18 132/84   05/08/18 132/76   01/31/18 106/68     Wt Readings from Last 3 Encounters:   05/08/18 45.4 kg (100 lb)   05/08/18 45.7 kg (100 lb 12.8 oz)   03/06/18 47.6 kg (105 lb)      Body mass index is 21.07 kg/m².    CC:  Main reason(s) for today's visit: Hypertension (discuss medication); COPD (discuss meds); and fever blister (x 3 days)      HPI:     Chronic hyperlipidemia:  Current therapy include atorvastatin, c/o persistent leg pains. Previously held atorvastatin which did not make a difference..    Most recent labs:   Lab Results   Component Value Date     (H) 03/03/2017    HDL 67 (H) 03/03/2017    TRIG 111 03/03/2017    CHOLHDLRATIO 3.51 03/03/2017     COPD:  She is a former smoker, quit 2012. She complains of increased  no respiratory symptoms and shortness of breath. Current therapy: Bevespi but stopped taking due to increased urinating difficulty. Noticed change in breathing.     Chronic essential hypertension:  Since prior visit: compliant with " medication(s), denies significant problems with medication(s) and c/o cost of Bystolic.  Most recent in-office blood pressure readings:   BP Readings from Last 3 Encounters:   05/08/18 132/84   05/08/18 132/76   01/31/18 106/68        Patient Care Team:  Miller Castañeda MD as PCP - General (Internal Medicine)  Miller Castañeda MD as PCP - Claims Attributed  Mart Ontiveros MD as Surgeon (Cardiothoracic Surgery)  Tres De Los Santos MD as Consulting Physician (Cardiology)  Graham Mcconnell MD as Consulting Physician (Hematology and Oncology)  Payam Byrnes MD as Consulting Physician (Otolaryngology)    ____________________________________________________________________    REVIEW OF SYSTEMS    Review of Systems  Constitutional neg  Resp increased dyspnea since stopping Bevespi  CV neg  Leg muscle pains      PHYSICAL EXAMINATION    Physical Exam  Constitutional  No distress  Cardiovascular Rate  normal . Rhythm: regular . Heart sounds:  Normal  Pulmonary/Chest  Effort normal. Breath sounds:  Coarse BS with right side scant exp wheezing  Psychiatric  Alert. Judgment and thought content normal. Mood normal    REVIEWED DATA:    Labs:     Lab Results   Component Value Date     11/01/2016    K 4.2 11/01/2016    AST 13 06/28/2016    ALT 10 06/28/2016    BUN 15 11/01/2016    CREATININE 0.80 04/26/2018    CREATININE 0.80 04/20/2017    CREATININE 0.71 11/01/2016    EGFRIFNONA 83 11/01/2016    EGFRIFAFRI 96 11/01/2016       Lab Results   Component Value Date    HGBA1C 6.29 (H) 03/03/2017    HGBA1C 6.9 (H) 12/08/2015    GLUCOSE 154 (H) 06/28/2016    GLUCOSE 128 (H) 03/02/2016    GLUCOSE 133 (H) 03/01/2016    MICROALBUR 69.2 03/03/2017       Lab Results   Component Value Date     (H) 03/03/2017    HDL 67 (H) 03/03/2017    TRIG 111 03/03/2017    CHOLHDLRATIO 3.51 03/03/2017       No results found for: TSH, FREET4    Lab Results   Component Value Date    WBC 9.97 06/28/2016    HGB 14.1 06/28/2016    HGB 11.5 (L)  02/29/2016    HGB 10.5 (L) 02/26/2016     06/28/2016       Imaging:         Medical Tests:         Summary of old records / correspondence / consultant report:         Request outside records:         ALLERGIES  Allergies   Allergen Reactions   • Ramipril Other (See Comments)     Ace I  cough   • Morphine Itching   • Morphine And Related Itching   • Bactrim [Sulfamethoxazole-Trimethoprim] Itching and Rash        PFSH:     The following portions of the patient's history were reviewed and updated as appropriate: Allergies / Current Medications / Past Medical History / Surgical History / Social History / Family History    PROBLEM LIST   Patient Active Problem List   Diagnosis   • History of breast cancer   • Stenosis of carotid artery   • Chronic obstructive pulmonary disease   • Closed fracture of multiple ribs   • Cognitive disorder   • Depression   • Erythromelalgia   • Hyperlipidemia   • Hypertension   • Primary osteoarthritis involving multiple joints   • Osteoporosis   • Restless legs syndrome   • Cerebral aneurysm   • Temporary cerebral vascular dysfunction   • S/P CABG x 1   • Type 2 diabetes mellitus without complication, without long-term current use of insulin   • S/P ascending aortic aneurysm repair   • Aortic arch aneurysm   • Descending thoracic aortic aneurysm   • Claudication of both lower extremities       PAST MEDICAL HISTORY  Past Medical History:   Diagnosis Date   • AAA (abdominal aortic aneurysm)    • Aneurysm     saccular   • Aneurysm of aorta     abdominal aorta and arch, descending aorta   • Aortic arch aneurysm    • Arthritis    • Breast cancer     right breast [id number] (snm) pMX ER/GA pos, Her-2/neg, Ki-67, 31%, oncotype recurrence score 19, invasive lobular carcinoma   • Cancer of subglottis    • Carotid artery stenosis    • Closed fracture of three ribs of right side    • Cognitive disorder    • COPD (chronic obstructive pulmonary disease)    • Coronary artery disease    • Depression     • Descending aortic arch aneurysm    • Diabetes mellitus    • Erythermalgia    • GERD (gastroesophageal reflux disease)    • Hyperlipidemia    • Hypertension    • Low back pain    • Osteoarthritis    • Osteoporosis    • Prediabetes    • RLS (restless legs syndrome)    • Saccular aneurysm    • Stenosis of artery     carotid   • Stroke     Perioperatively with left hip replacement   • TIA (transient ischemic attack)    • Venous insufficiency        SURGICAL HISTORY  Past Surgical History:   Procedure Laterality Date   • AORTIC VALVE REPAIR/REPLACEMENT N/A 02/23/2016    ascending aortic replacement with 24 mm graft, Dr. Ontiveros   • APPENDECTOMY  1977   • BREAST BIOPSY Right 09/16/2012    vacuum assisted core bx of the right breast   • CARDIAC CATHETERIZATION N/A 01/06/2016    Dr. Tres De Los Santos   • CARDIAC SURGERY  02/2016    Dr. Ontiveros/Dr. De Los Santos   • CATARACT EXTRACTION Bilateral     [facility] [place]   • COLONOSCOPY N/A 10/2002    Dr. Negro   • CORONARY ARTERY BYPASS GRAFT  02/23/2016    X 1 Dr Ontiveros   • CYST REMOVAL Right 01/25/2013    right palm excision of retinacular cyst, Dr. Karla Fish   • EPIDURAL BLOCK     • LAPAROSCOPIC CHOLECYSTECTOMY N/A 08/04/2004    Dr. JOEY Sheth   • MASTECTOMY Right 09/28/2012   • ORIF HIP FRACTURE Left 03/27/2005    w/ AMBI compression screw, Dr. Victor M Cabral   • RECTOVAGINAL FISTULA REPAIR  1965   • TUBAL ABDOMINAL LIGATION         SOCIAL HISTORY  Social History     Social History   • Marital status:      Social History Main Topics   • Smoking status: Former Smoker     Quit date: 12/2012   • Smokeless tobacco: Never Used      Comment: caffeine use   • Alcohol use No   • Drug use: Unknown   • Sexual activity: Defer     Other Topics Concern   • Not on file       FAMILY HISTORY  Family History   Problem Relation Age of Onset   • Alzheimer's disease Mother    • Cancer Maternal Aunt    • Heart disease Father    • Heart failure Father    • No Known Problems  Sister    • Heart failure Brother    • No Known Problems Maternal Grandmother    • No Known Problems Maternal Grandfather    • No Known Problems Paternal Grandmother    • No Known Problems Paternal Grandfather    • Diabetes Brother    • Brain cancer Brother          **Tru Disclaimer:   Much of this encounter note is an electronic transcription/translation of spoken language to printed text. The electronic translation of spoken language may permit erroneous, or at times, nonsensical words or phrases to be inadvertently transcribed. Although I have reviewed the note for such errors, some may still exist.

## 2018-05-09 PROBLEM — K43.9 VENTRAL HERNIA WITHOUT OBSTRUCTION OR GANGRENE: Status: ACTIVE | Noted: 2018-05-09

## 2018-05-09 PROBLEM — I71.60 THORACOABDOMINAL AORTIC ANEURYSM: Status: ACTIVE | Noted: 2018-05-09

## 2018-05-10 NOTE — PROGRESS NOTES
5/9/2018    Seen on 5/8/18    Chief Complaint:     Follow up evaluation of TAAA    History of Present Illness:       Dear Dr. Castañeda and Colleagues,  It was nice to see Grace Beauchamp in follow up evaluation for her TAAA. She is a 77 y.o. 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 She denies chest pain, but has chronic back pain, syncope or LE edema. She has LE claudication and dyspnea on effort. Her last chest CT showed enlargement of the TAA from 4.8 cm to 5.1-5.3 cm in the para-visceral area and hiatus. There is no dissection, leak or areas concerning impending rupture. The report point on a 6 cm arch aneurysm, but it is really a razia graft hematoma, no extravasation.    Patient Active Problem List   Diagnosis   • History of breast cancer   • Stenosis of carotid artery   • Chronic obstructive pulmonary disease   • Closed fracture of multiple ribs   • Cognitive disorder   • Depression   • Erythromelalgia   • Hyperlipidemia   • Hypertension   • Primary osteoarthritis involving multiple joints   • Osteoporosis   • Restless legs syndrome   • Cerebral aneurysm   • Temporary cerebral vascular dysfunction   • S/P CABG x 1   • Type 2 diabetes mellitus without complication, without long-term current use of insulin   • S/P ascending aortic aneurysm repair   • Aortic arch aneurysm   • Descending thoracic aortic aneurysm   • Claudication of both lower extremities   • Thoracoabdominal aortic aneurysm   • Ventral hernia without obstruction or gangrene       Past Medical History:   Diagnosis Date   • AAA (abdominal aortic aneurysm)    • Aneurysm     saccular   • Aneurysm of aorta     abdominal aorta and arch, descending aorta   • Aortic arch aneurysm    • Arthritis    • Breast cancer     right breast [id number] (snm) pMX ER/KS pos, Her-2/neg, Ki-67, 31%, oncotype recurrence score 19, invasive lobular carcinoma    • Cancer of subglottis    • Carotid artery stenosis    • Closed fracture of three ribs of right side    • Cognitive disorder    • COPD (chronic obstructive pulmonary disease)    • Coronary artery disease    • Depression    • Descending aortic arch aneurysm    • Diabetes mellitus    • Erythermalgia    • GERD (gastroesophageal reflux disease)    • Hyperlipidemia    • Hypertension    • Low back pain    • Osteoarthritis    • Osteoporosis    • Prediabetes    • RLS (restless legs syndrome)    • Saccular aneurysm    • Stenosis of artery     carotid   • Stroke     Perioperatively with left hip replacement   • TIA (transient ischemic attack)    • Venous insufficiency        Past Surgical History:   Procedure Laterality Date   • AORTIC VALVE REPAIR/REPLACEMENT N/A 02/23/2016    ascending aortic replacement with 24 mm graft, Dr. Ontiveros   • APPENDECTOMY  1977   • BREAST BIOPSY Right 09/16/2012    vacuum assisted core bx of the right breast   • CARDIAC CATHETERIZATION N/A 01/06/2016    Dr. Tres De Los Santos   • CARDIAC SURGERY  02/2016    Dr. Ontiveros/Dr. De Los Santos   • CATARACT EXTRACTION Bilateral     [facility]   • COLONOSCOPY N/A 10/2002    Dr. Negro   • CORONARY ARTERY BYPASS GRAFT  02/23/2016    X 1 Dr Ontiveros   • CYST REMOVAL Right 01/25/2013    right palm excision of retinacular cyst, Dr. Karla Fish   • EPIDURAL BLOCK     • LAPAROSCOPIC CHOLECYSTECTOMY N/A 08/04/2004    Dr. JOEY Sheth   • MASTECTOMY Right 09/28/2012   • ORIF HIP FRACTURE Left 03/27/2005    w/ AMBI compression screw, Dr. Victor M Cabral   • RECTOVAGINAL FISTULA REPAIR  1965   • TUBAL ABDOMINAL LIGATION         Allergies   Allergen Reactions   • Ramipril Other (See Comments)     Ace I  cough   • Morphine Itching   • Morphine And Related Itching   • Bactrim [Sulfamethoxazole-Trimethoprim] Itching and Rash         Current Outpatient Prescriptions:   •  aspirin 81 MG EC tablet, Take 81 mg by mouth daily., Disp: , Rfl:   •  Biotin 1000 MCG tablet,  Take 1,000 mcg by mouth daily. Take as directed, Disp: , Rfl:   •  BYSTOLIC 10 MG tablet, TAKE ONE TABLET BY MOUTH DAILY, Disp: 30 tablet, Rfl: 3  •  cetirizine (zyrTEC) 10 MG tablet, Take 10 mg by mouth Daily., Disp: , Rfl:   •  Cholecalciferol (VITAMIN D) 2000 UNITS tablet, Take 1 tablet by mouth nightly., Disp: , Rfl:   •  cilostazol (PLETAL) 100 MG tablet, Take 1 tablet by mouth 2 (two) times a day., Disp: , Rfl:   •  diclofenac (VOLTAREN) 1 % gel gel, Place on the skin. 4 GM of Gel to affected to area 4 times daily. Do not apply more than 16 Gm daily to any one affected joint, Disp: , Rfl:   •  escitalopram (LEXAPRO) 20 MG tablet, TAKE ONE TABLET BY MOUTH EVERY MORNING, Disp: 90 tablet, Rfl: 0  •  Glycopyrrolate-Formoterol (BEVESPI AEROSPHERE) 9-4.8 MCG/ACT aerosol, Inhale 2 sprays 2 (Two) Times a Day., Disp: 1 inhaler, Rfl: 5  •  meloxicam (MOBIC) 15 MG tablet, TAKE ONE TABLET BY MOUTH DAILY, Disp: 30 tablet, Rfl: 0  •  pitavastatin calcium (LIVALO) 2 MG tablet tablet, Take 1 tablet by mouth Every Night., Disp: 30 tablet, Rfl: 3  •  pramipexole (MIRAPEX) 0.125 MG tablet, TAKE ONE TABLET BY MOUTH EVERY NIGHT AT BEDTIME, Disp: 90 tablet, Rfl: 0  •  PROAIR  (90 Base) MCG/ACT inhaler, INHALE 2 PUFFS INTO THE LUNGS EVERY 6 HOURS AS NEEDED FOR WHEEZING, Disp: 8.5 g, Rfl: 2  •  Probiotic Product (PROBIOTIC PO), Take 1 capsule by mouth Daily., Disp: , Rfl:   •  traMADol (ULTRAM) 50 MG tablet, TAKE ONE TABLET BY MOUTH EVERY 6 HOURS AS NEEDED FOR MODERATE PAIN, Disp: 90 tablet, Rfl: 2    Social History     Social History   • Marital status:      Spouse name: N/A   • Number of children: N/A   • Years of education: N/A     Occupational History   • Not on file.     Social History Main Topics   • Smoking status: Former Smoker     Quit date: 12/2012   • Smokeless tobacco: Never Used      Comment: caffeine use   • Alcohol use No   • Drug use: Unknown   • Sexual activity: Defer     Other Topics Concern   • Not  on file     Social History Narrative   • No narrative on file       Family History   Problem Relation Age of Onset   • Alzheimer's disease Mother    • Cancer Maternal Aunt    • Heart disease Father    • Heart failure Father    • No Known Problems Sister    • Heart failure Brother    • No Known Problems Maternal Grandmother    • No Known Problems Maternal Grandfather    • No Known Problems Paternal Grandmother    • No Known Problems Paternal Grandfather    • Diabetes Brother    • Brain cancer Brother      Review of Systems   Constitutional: Positive for fatigue.   Respiratory: Positive for shortness of breath and wheezing. Negative for chest tightness.    Cardiovascular: Negative for chest pain, palpitations and leg swelling.   Genitourinary: Positive for flank pain. Negative for dysuria.   Musculoskeletal: Positive for gait problem.   Neurological: Positive for weakness. Negative for dizziness, syncope and light-headedness.   All other systems reviewed and are negative.      Physical Exam:    Vital Signs:  Weight: 45.4 kg (100 lb)   Body mass index is 22.42 kg/m².  Temp: 97.4 °F (36.3 °C)   Heart Rate: 69   BP: 132/84     Physical Exam   Constitutional: She is oriented to person, place, and time. She appears well-developed and well-nourished.   HENT:   Head: Normocephalic and atraumatic.   Nose: Nose normal.   Mouth/Throat: Oropharynx is clear and moist.   Eyes: Conjunctivae are normal. Pupils are equal, round, and reactive to light.   Neck: Normal range of motion. Neck supple. No JVD present. No thyromegaly present.   Cardiovascular: Normal rate, regular rhythm, normal heart sounds and intact distal pulses.  PMI is not displaced.  Exam reveals no gallop and no friction rub.    No murmur heard.  Pulses:       Radial pulses are 2+ on the right side, and 2+ on the left side.        Femoral pulses are 1+ on the right side, and 1+ on the left side.       Dorsalis pedis pulses are 0 on the right side, and 0 on the left  side.        Posterior tibial pulses are 0 on the right side, and 0 on the left side.   Pulmonary/Chest: Effort normal and breath sounds normal. She has no rales.   Abdominal: Soft. Bowel sounds are normal. She exhibits no distension and no mass. There is no splenomegaly or hepatomegaly. There is no tenderness. A hernia is present. Hernia confirmed positive in the ventral area.       Musculoskeletal: Normal range of motion. She exhibits no tenderness or deformity.   Lymphadenopathy:     She has no cervical adenopathy.   Neurological: She is alert and oriented to person, place, and time. She has normal reflexes.   Skin: Skin is warm and dry. No rash noted. No erythema.   Psychiatric: She has a normal mood and affect. Her behavior is normal.   palpable pulsatile fullness in epigastrium     Assessment:     Problem List Items Addressed This Visit        Cardiovascular and Mediastinum    Hypertension (Chronic)    Erythromelalgia    Descending thoracic aortic aneurysm - Primary    Thoracoabdominal aortic aneurysm       Respiratory    Chronic obstructive pulmonary disease (Chronic)       Nervous and Auditory    Claudication of both lower extremities       Other    S/P CABG x 1    S/P ascending aortic aneurysm repair    Aortic arch aneurysm      Other Visit Diagnoses    None.       Enlarging type III TAA, 5.3 cm  COPD  S/P ascending and arch aneurysm repair  Frailty  Partial immobility due to balance issues  Aorto-iliac occlusive disease and claudication per report  Chronic spinal problems, unclear whether an operation can improve pain and gait    Recommendation/Plan:     I recommend a consideration for TAAA repair, although it will be a very high risk, and maybe prohibitive. I will discuss with dr. Cabral whether spine surgery is an option, because that will eventually improve her mobility and allow recovery subsequently in a major aortic operation. Pulmonary work up will be also determinant in terms of operability. I will  see her in office in 3 months and we will decide options.    Thank you for allowing me to participate in her care.    Regards,    Mart Ontiveros M.D.

## 2018-05-14 ENCOUNTER — OFFICE VISIT (OUTPATIENT)
Dept: CARDIOLOGY | Facility: CLINIC | Age: 78
End: 2018-05-14

## 2018-05-14 VITALS
DIASTOLIC BLOOD PRESSURE: 82 MMHG | HEIGHT: 56 IN | WEIGHT: 98 LBS | OXYGEN SATURATION: 99 % | HEART RATE: 69 BPM | SYSTOLIC BLOOD PRESSURE: 114 MMHG | BODY MASS INDEX: 22.04 KG/M2

## 2018-05-14 DIAGNOSIS — Z95.1 S/P CABG X 1: ICD-10-CM

## 2018-05-14 DIAGNOSIS — Z98.890 S/P ASCENDING AORTIC ANEURYSM REPAIR: Primary | ICD-10-CM

## 2018-05-14 DIAGNOSIS — Z86.79 S/P ASCENDING AORTIC ANEURYSM REPAIR: Primary | ICD-10-CM

## 2018-05-14 PROBLEM — R26.89 IMBALANCE: Status: ACTIVE | Noted: 2017-12-18

## 2018-05-14 PROBLEM — R53.1 RIGHT SIDED WEAKNESS: Status: ACTIVE | Noted: 2017-12-18

## 2018-05-14 PROCEDURE — 93000 ELECTROCARDIOGRAM COMPLETE: CPT | Performed by: PHYSICIAN ASSISTANT

## 2018-05-14 PROCEDURE — 99213 OFFICE O/P EST LOW 20 MIN: CPT | Performed by: PHYSICIAN ASSISTANT

## 2018-05-14 NOTE — PROGRESS NOTES
Date of Office Visit: 2018  Encounter Provider: GREG Ocampo  Place of Service: [facility] CARDIOLOGY  Patient Name: Grace Beauchamp  :1940    Chief Complaint   Patient presents with   • Coronary Artery Disease     1 year follow up   :     HPI: Grace Beauchamp is a 77 y.o. female who presents today for Follow-up.  Old records have been obtained and reviewed by me.  She is status post ascending aortic replacement with 24mm graft, total arch replacement with a 24mm Vascutek graft with reimplantation of all its vessels, CABG x 1 with SVG to lateral marginal branch and a chimney (end to side) 8 mm dacron graft to right subclavian artery.  She has residual aneurysms in her aorta that are being followed by both Dr. Ontiveros as well as Dr. Smith.  She was last in our office to see Dr. De Los Santos on 2017.  At that visit, she was doing well from a cardiac standpoint.  Dr. De Los Santos encouraged her to follow-up with Dr. Ontiveros about her residual aneurysms.  No changes were made to her medical regimen, and she's here today for yearly visit.  She did have a CT angiogram of the chest and abdomen on 2018.  This showed a stable aneurysms without evidence of dissection or changes since her prior CTA on 2017.  However, she did see Dr. Ontiveros on 2018 and according to his note he feels that her aneurysm has actually expanded from 4.8 cm to 5.1 cm.   Over the past year she's been doing well from a cardiac standpoint.  She has some back pain and some issues with ambulation because of that.  She does see Dr. Farley.  She denies any chest pain, palpitations, edema, dizziness, or syncope.  She does have some chronic shortness of breath, however she relates this to her COPD.  Her blood pressure is well-controlled.       Past Medical History:   Diagnosis Date   • AAA (abdominal aortic aneurysm)    • Aneurysm     saccular   • Aneurysm of aorta     abdominal aorta and arch,  descending aorta   • Aortic arch aneurysm    • Arthritis    • Breast cancer     right breast [id number] (snm) pMX ER/MD pos, Her-2/neg, Ki-67, 31%, oncotype recurrence score 19, invasive lobular carcinoma   • Cancer of subglottis    • Carotid artery stenosis    • Closed fracture of three ribs of right side    • Cognitive disorder    • COPD (chronic obstructive pulmonary disease)    • Coronary artery disease    • Depression    • Descending aortic arch aneurysm    • Diabetes mellitus    • Erythermalgia    • GERD (gastroesophageal reflux disease)    • Hyperlipidemia    • Hypertension    • Low back pain    • Osteoarthritis    • Osteoporosis    • Prediabetes    • RLS (restless legs syndrome)    • Saccular aneurysm    • Stenosis of artery     carotid   • Stroke     Perioperatively with left hip replacement   • TIA (transient ischemic attack)    • Venous insufficiency        Past Surgical History:   Procedure Laterality Date   • AORTIC VALVE REPAIR/REPLACEMENT N/A 02/23/2016    ascending aortic replacement with 24 mm graft, Dr. Ontiveros   • APPENDECTOMY  1977   • BREAST BIOPSY Right 09/16/2012    vacuum assisted core bx of the right breast   • CARDIAC CATHETERIZATION N/A 01/06/2016    Dr. Tres De Los Santos   • CARDIAC SURGERY  02/2016    Dr. Ontiveros/Dr. De Los Santos   • CATARACT EXTRACTION Bilateral     [facility]   • COLONOSCOPY N/A 10/2002    Dr. Negro   • CORONARY ARTERY BYPASS GRAFT  02/23/2016    X 1 Dr Ontiveros   • CYST REMOVAL Right 01/25/2013    right palm excision of retinacular cyst, Dr. Karla Fish   • EPIDURAL BLOCK     • LAPAROSCOPIC CHOLECYSTECTOMY N/A 08/04/2004    Dr. JOEY Sheth   • MASTECTOMY Right 09/28/2012   • ORIF HIP FRACTURE Left 03/27/2005    w/ AMBI compression screw, Dr. Victor M Cabral   • RECTOVAGINAL FISTULA REPAIR  1965   • TUBAL ABDOMINAL LIGATION         Social History     Social History   • Marital status:      Spouse name: N/A   • Number of children: N/A   • Years of education:  N/A     Occupational History   • Not on file.     Social History Main Topics   • Smoking status: Former Smoker     Quit date: 12/2012   • Smokeless tobacco: Never Used      Comment: caffeine use   • Alcohol use No   • Drug use: Unknown   • Sexual activity: Defer     Other Topics Concern   • Not on file     Social History Narrative   • No narrative on file       Family History   Problem Relation Age of Onset   • Alzheimer's disease Mother    • Cancer Maternal Aunt    • Heart disease Father    • Heart failure Father    • No Known Problems Sister    • Heart failure Brother    • No Known Problems Maternal Grandmother    • No Known Problems Maternal Grandfather    • No Known Problems Paternal Grandmother    • No Known Problems Paternal Grandfather    • Diabetes Brother    • Brain cancer Brother        Review of Systems   Constitution: Negative for chills, fever and malaise/fatigue.   Cardiovascular: Positive for dyspnea on exertion. Negative for chest pain, leg swelling, near-syncope, orthopnea, palpitations, paroxysmal nocturnal dyspnea and syncope.   Respiratory: Negative for cough and shortness of breath.    Musculoskeletal: Negative for joint pain, joint swelling and myalgias.   Gastrointestinal: Negative for abdominal pain, diarrhea, melena, nausea and vomiting.   Genitourinary: Negative for frequency and hematuria.   Neurological: Negative for light-headedness, numbness, paresthesias and seizures.   Allergic/Immunologic: Negative.    All other systems reviewed and are negative.      Allergies   Allergen Reactions   • Ramipril Other (See Comments)     Ace I  cough   • Morphine Itching   • Morphine And Related Itching   • Bactrim [Sulfamethoxazole-Trimethoprim] Itching and Rash         Current Outpatient Prescriptions:   •  aspirin 81 MG EC tablet, Take 81 mg by mouth daily., Disp: , Rfl:   •  Biotin 1000 MCG tablet, Take 1,000 mcg by mouth daily. Take as directed, Disp: , Rfl:   •  BYSTOLIC 10 MG tablet, TAKE ONE  "TABLET BY MOUTH DAILY, Disp: 30 tablet, Rfl: 3  •  cetirizine (zyrTEC) 10 MG tablet, Take 10 mg by mouth As Needed., Disp: , Rfl:   •  cilostazol (PLETAL) 100 MG tablet, Take 1 tablet by mouth 2 (two) times a day., Disp: , Rfl:   •  diclofenac (VOLTAREN) 1 % gel gel, Place on the skin. 4 GM of Gel to affected to area 4 times daily. Do not apply more than 16 Gm daily to any one affected joint, Disp: , Rfl:   •  Glycopyrrolate-Formoterol (BEVESPI AEROSPHERE) 9-4.8 MCG/ACT aerosol, Inhale 2 sprays 2 (Two) Times a Day., Disp: 1 inhaler, Rfl: 5  •  meloxicam (MOBIC) 15 MG tablet, TAKE ONE TABLET BY MOUTH DAILY, Disp: 30 tablet, Rfl: 0  •  pitavastatin calcium (LIVALO) 2 MG tablet tablet, Take 1 tablet by mouth Every Night., Disp: 30 tablet, Rfl: 3  •  pramipexole (MIRAPEX) 0.125 MG tablet, TAKE ONE TABLET BY MOUTH EVERY NIGHT AT BEDTIME, Disp: 90 tablet, Rfl: 0  •  PROAIR  (90 Base) MCG/ACT inhaler, INHALE 2 PUFFS INTO THE LUNGS EVERY 6 HOURS AS NEEDED FOR WHEEZING, Disp: 8.5 g, Rfl: 2  •  Probiotic Product (PROBIOTIC PO), Take 1 capsule by mouth Daily., Disp: , Rfl:   •  traMADol (ULTRAM) 50 MG tablet, TAKE ONE TABLET BY MOUTH EVERY 6 HOURS AS NEEDED FOR MODERATE PAIN, Disp: 90 tablet, Rfl: 2      Objective:     Vitals:    05/14/18 1431   BP: 114/82   BP Location: Left arm   Pulse: 69   SpO2: 99%   Weight: 44.5 kg (98 lb)   Height: 142.2 cm (56\")     Body mass index is 21.97 kg/m².    PHYSICAL EXAM:    Physical Exam   Constitutional: She is oriented to person, place, and time. She appears well-developed and well-nourished. No distress.   HENT:   Head: Normocephalic and atraumatic.   Eyes: Pupils are equal, round, and reactive to light.   Neck: No JVD present. No thyromegaly present.   Cardiovascular: Normal rate, regular rhythm, normal heart sounds and intact distal pulses.    No murmur heard.  Pulmonary/Chest: Effort normal and breath sounds normal. No respiratory distress.   Abdominal: Soft. Bowel sounds are " normal. She exhibits no distension. There is no splenomegaly or hepatomegaly. There is no tenderness.   Musculoskeletal: Normal range of motion. She exhibits no edema.   Neurological: She is alert and oriented to person, place, and time.   Skin: Skin is warm and dry. She is not diaphoretic. No erythema.   Psychiatric: She has a normal mood and affect. Her behavior is normal. Judgment normal.         ECG 12 Lead  Date/Time: 5/14/2018 2:48 PM  Performed by: VERÓNICA IRBY  Authorized by: VERÓNICA IRBY   Comparison: compared with previous ECG from 5/2/2017  Similar to previous ECG  Rhythm: sinus rhythm  BPM: 69  Q waves: V1 and V2  Clinical impression: abnormal ECG  Comments: Condition: Coronary disease, aortic aneurysm repair.              Assessment:       Diagnosis Plan   1. S/P ascending aortic aneurysm repair  ECG 12 Lead   2. S/P CABG x 1  ECG 12 Lead     Orders Placed This Encounter   Procedures   • ECG 12 Lead     This order was created via procedure documentation          Plan:       1.  Coronary Artery Disease  Assessment  • The patient has no angina    Plan  • Lifestyle modifications discussed include regular monitoring of cholesterol and blood pressure    Subjective - Objective  • There is a history of previous coronary artery bypass graft  • Current antiplatelet therapy includes aspirin 81 mg  • Overall she is doing well.  She has no complaint of angina.  Continue current medical regimen.    2.  Aortic aneurysm.  She is status post ascending aortic aneurysm repair, and has a thoracic as well as an abdominal aortic aneurysm.  According to Dr. Ontiveros, her thoracic aortic aneurysm is enlarging.  I did discuss this with Dr. Ontiveros today.  He would prefer that she be more ambulatory before he would consider taking her for an extensive thoracoabdominal repair.  He is going to discuss surgical possibilities for her back with Dr. Cabral.  She had a really tough time getting over her first surgery, and a  thoracoabdominal repair is a much bigger deal.  I would favor conservative management in this patient.  Her blood pressure is been very well-controlled.      I'm not going to make any changes to her medical regimen, and she will follow-up with Dr. De Los Santos in 1 year or sooner if needed.  As always, it has been a pleasure to participate in your patient's care.      Sincerely,         Veronika Flannery PA-C

## 2018-05-15 ENCOUNTER — TELEPHONE (OUTPATIENT)
Dept: CARDIOLOGY | Facility: CLINIC | Age: 78
End: 2018-05-15

## 2018-05-15 NOTE — TELEPHONE ENCOUNTER
I spoke with the patient and she said she was returning your call about your conversation about what you found out from her other Dr's. She can be reached at [phone].         Thanks  Wander

## 2018-05-18 DIAGNOSIS — G25.81 RESTLESS LEGS SYNDROME (RLS): ICD-10-CM

## 2018-05-18 RX ORDER — PRAMIPEXOLE DIHYDROCHLORIDE 0.12 MG/1
TABLET ORAL
Qty: 90 TABLET | Refills: 1 | Status: SHIPPED | OUTPATIENT
Start: 2018-05-18 | End: 2018-11-04 | Stop reason: SDUPTHER

## 2018-07-04 DIAGNOSIS — M54.50 BILATERAL LOW BACK PAIN WITHOUT SCIATICA, UNSPECIFIED CHRONICITY: ICD-10-CM

## 2018-07-05 NOTE — TELEPHONE ENCOUNTER
LOV 05.08.18  NOV 08.10.18  CONTRACT 03.03.18  UDS 03.03.18  GIULIA 07.05.18  LAST RF 03.26.18 #90 2 RF

## 2018-07-09 DIAGNOSIS — M54.50 BILATERAL LOW BACK PAIN WITHOUT SCIATICA, UNSPECIFIED CHRONICITY: ICD-10-CM

## 2018-07-09 RX ORDER — TRAMADOL HYDROCHLORIDE 50 MG/1
TABLET ORAL
Qty: 90 TABLET | Refills: 1 | OUTPATIENT
Start: 2018-07-09 | End: 2018-10-19 | Stop reason: SDUPTHER

## 2018-07-10 RX ORDER — TRAMADOL HYDROCHLORIDE 50 MG/1
TABLET ORAL
Qty: 90 TABLET | Refills: 1 | OUTPATIENT
Start: 2018-07-10

## 2018-08-07 DIAGNOSIS — E78.2 MIXED HYPERLIPIDEMIA: Chronic | ICD-10-CM

## 2018-08-07 RX ORDER — FLUTICASONE PROPIONATE 0.05 %
CREAM (GRAM) TOPICAL
Status: ON HOLD | COMMUNITY
Start: 2018-05-08 | End: 2018-12-02

## 2018-08-14 ENCOUNTER — OFFICE VISIT (OUTPATIENT)
Dept: CARDIAC SURGERY | Facility: CLINIC | Age: 78
End: 2018-08-14

## 2018-08-14 VITALS
DIASTOLIC BLOOD PRESSURE: 83 MMHG | HEART RATE: 66 BPM | TEMPERATURE: 98 F | RESPIRATION RATE: 20 BRPM | SYSTOLIC BLOOD PRESSURE: 137 MMHG | BODY MASS INDEX: 23.17 KG/M2 | WEIGHT: 103 LBS | HEIGHT: 56 IN | OXYGEN SATURATION: 94 %

## 2018-08-14 DIAGNOSIS — E11.9 TYPE 2 DIABETES MELLITUS WITHOUT COMPLICATION, WITHOUT LONG-TERM CURRENT USE OF INSULIN (HCC): Chronic | ICD-10-CM

## 2018-08-14 DIAGNOSIS — Z17.0 MALIGNANT NEOPLASM OF BREAST, STAGE 1, ESTROGEN RECEPTOR POSITIVE, UNSPECIFIED LATERALITY (HCC): ICD-10-CM

## 2018-08-14 DIAGNOSIS — Z98.890 S/P ASCENDING AORTIC ANEURYSM REPAIR: ICD-10-CM

## 2018-08-14 DIAGNOSIS — K43.9 VENTRAL HERNIA WITHOUT OBSTRUCTION OR GANGRENE: ICD-10-CM

## 2018-08-14 DIAGNOSIS — I71.60 THORACOABDOMINAL AORTIC ANEURYSM (TAAA) WITHOUT RUPTURE (HCC): Primary | ICD-10-CM

## 2018-08-14 DIAGNOSIS — C50.919 MALIGNANT NEOPLASM OF BREAST, STAGE 1, ESTROGEN RECEPTOR POSITIVE, UNSPECIFIED LATERALITY (HCC): ICD-10-CM

## 2018-08-14 DIAGNOSIS — J43.9 PULMONARY EMPHYSEMA, UNSPECIFIED EMPHYSEMA TYPE (HCC): Chronic | ICD-10-CM

## 2018-08-14 DIAGNOSIS — Z95.1 S/P CABG X 1: ICD-10-CM

## 2018-08-14 DIAGNOSIS — I65.29 STENOSIS OF CAROTID ARTERY, UNSPECIFIED LATERALITY: ICD-10-CM

## 2018-08-14 DIAGNOSIS — I67.82 TEMPORARY CEREBRAL VASCULAR DYSFUNCTION: ICD-10-CM

## 2018-08-14 DIAGNOSIS — I71.22 AORTIC ARCH ANEURYSM (HCC): ICD-10-CM

## 2018-08-14 DIAGNOSIS — I10 ESSENTIAL HYPERTENSION: Chronic | ICD-10-CM

## 2018-08-14 DIAGNOSIS — F09 COGNITIVE DISORDER: ICD-10-CM

## 2018-08-14 DIAGNOSIS — I73.9 CLAUDICATION OF BOTH LOWER EXTREMITIES (HCC): ICD-10-CM

## 2018-08-14 DIAGNOSIS — Z86.79 S/P ASCENDING AORTIC ANEURYSM REPAIR: ICD-10-CM

## 2018-08-14 PROCEDURE — 99214 OFFICE O/P EST MOD 30 MIN: CPT | Performed by: THORACIC SURGERY (CARDIOTHORACIC VASCULAR SURGERY)

## 2018-08-15 ENCOUNTER — OFFICE VISIT (OUTPATIENT)
Dept: INTERNAL MEDICINE | Age: 78
End: 2018-08-15

## 2018-08-15 VITALS
BODY MASS INDEX: 22.95 KG/M2 | SYSTOLIC BLOOD PRESSURE: 130 MMHG | DIASTOLIC BLOOD PRESSURE: 80 MMHG | OXYGEN SATURATION: 95 % | HEART RATE: 70 BPM | HEIGHT: 56 IN | TEMPERATURE: 97.8 F | WEIGHT: 102 LBS

## 2018-08-15 DIAGNOSIS — M15.9 PRIMARY OSTEOARTHRITIS INVOLVING MULTIPLE JOINTS: Chronic | ICD-10-CM

## 2018-08-15 DIAGNOSIS — E78.2 MIXED HYPERLIPIDEMIA: Chronic | ICD-10-CM

## 2018-08-15 DIAGNOSIS — F32.A DEPRESSION, UNSPECIFIED DEPRESSION TYPE: Chronic | ICD-10-CM

## 2018-08-15 DIAGNOSIS — Z51.81 THERAPEUTIC DRUG MONITORING: Primary | ICD-10-CM

## 2018-08-15 DIAGNOSIS — J43.9 PULMONARY EMPHYSEMA, UNSPECIFIED EMPHYSEMA TYPE (HCC): Chronic | ICD-10-CM

## 2018-08-15 DIAGNOSIS — I10 ESSENTIAL HYPERTENSION: Primary | Chronic | ICD-10-CM

## 2018-08-15 DIAGNOSIS — E11.9 TYPE 2 DIABETES MELLITUS WITHOUT COMPLICATION, WITHOUT LONG-TERM CURRENT USE OF INSULIN (HCC): Chronic | ICD-10-CM

## 2018-08-15 DIAGNOSIS — I71.23 DESCENDING THORACIC AORTIC ANEURYSM (HCC): Chronic | ICD-10-CM

## 2018-08-15 PROCEDURE — 99214 OFFICE O/P EST MOD 30 MIN: CPT | Performed by: INTERNAL MEDICINE

## 2018-08-15 NOTE — PROGRESS NOTES
[facility] – [place] INTERNAL MEDICINE  ANETA COLLINS M.D.      Grace GONZALEZ Beauchamp / 78 y.o. / female  08/15/2018      ASSESSMENT & PLAN:    Problem List Items Addressed This Visit        High    Hypertension - Primary (Chronic)    Overview     Stable. Continue Bystolic 10 mg qd.          Relevant Medications    BYSTOLIC 10 MG tablet    Descending thoracic aortic aneurysm (CMS/HCC) (Chronic)    Overview     *Margyni         Current Assessment & Plan     To repeat CT in 6 months.             Medium    Chronic obstructive pulmonary disease (CMS/HCC) (Chronic)    Overview     Use Bevespi inhaler daily.          Relevant Medications    cetirizine (zyrTEC) 10 MG tablet    Glycopyrrolate-Formoterol (BEVESPI AEROSPHERE) 9-4.8 MCG/ACT aerosol    PROAIR  (90 Base) MCG/ACT inhaler    Hyperlipidemia (Chronic)    Current Assessment & Plan     Tolerating Livalo. Check labs.          Relevant Medications    pitavastatin calcium (LIVALO) 2 MG tablet tablet    Other Relevant Orders    Lipid Panel With / Chol / HDL Ratio    Comprehensive Metabolic Panel    Primary osteoarthritis involving multiple joints (Chronic)    Overview     Lumbar spine, hips, knees.  Continue tramadol 50 mg as needed for moderate/severe pain.          Current Assessment & Plan     Renewed consent/agreement for tramadol.          Relevant Medications    meloxicam (MOBIC) 15 MG tablet    Type 2 diabetes mellitus without complication, without long-term current use of insulin (CMS/HCC) (Chronic)    Overview     Diet controlled.          Relevant Orders    Hemoglobin A1c       Unprioritized    RESOLVED: Depression (Chronic)    Overview     Patient stopped citalopram around April and is doing okay.              Orders Placed This Encounter   Procedures   • Lipid Panel With / Chol / HDL Ratio   • Comprehensive Metabolic Panel   • Hemoglobin A1c     No orders of the defined types were placed in this encounter.      Summary/Discussion:      Return in about 4 months (around  "12/15/2018) for Reassess today's problem(s), Schedule AWV with Laura within 1 month.    ____________________________________________________________________    MEDICATIONS  Current Outpatient Prescriptions   Medication Sig Dispense Refill   • aspirin 81 MG EC tablet Take 81 mg by mouth daily.     • Biotin 1000 MCG tablet Take 1,000 mcg by mouth daily. Take as directed     • BYSTOLIC 10 MG tablet TAKE ONE TABLET BY MOUTH DAILY 30 tablet 3   • cetirizine (zyrTEC) 10 MG tablet Take 10 mg by mouth As Needed.     • cilostazol (PLETAL) 100 MG tablet Take 1 tablet by mouth 2 (two) times a day.     • fluticasone (CUTIVATE) 0.05 % cream      • Glycopyrrolate-Formoterol (BEVESPI AEROSPHERE) 9-4.8 MCG/ACT aerosol Inhale 2 sprays 2 (Two) Times a Day. 1 inhaler 5   • meloxicam (MOBIC) 15 MG tablet TAKE ONE TABLET BY MOUTH DAILY 30 tablet 0   • pitavastatin calcium (LIVALO) 2 MG tablet tablet Take 1 tablet by mouth Every Night. 30 tablet 1   • pramipexole (MIRAPEX) 0.125 MG tablet TAKE ONE TABLET BY MOUTH EVERY NIGHT AT BEDTIME 90 tablet 1   • PROAIR  (90 Base) MCG/ACT inhaler INHALE 2 PUFFS INTO THE LUNGS EVERY 6 HOURS AS NEEDED FOR WHEEZING 8.5 g 2   • Probiotic Product (PROBIOTIC PO) Take 1 capsule by mouth Daily.     • traMADol (ULTRAM) 50 MG tablet TAKE ONE TABLET BY MOUTH EVERY 6 HOURS AS NEEDED FOR MODERATE PAIN 90 tablet 1     No current facility-administered medications for this visit.          VITALS:    Visit Vitals  /80   Pulse 70   Temp 97.8 °F (36.6 °C)   Ht 142.2 cm (55.98\")   Wt 46.3 kg (102 lb)   SpO2 95%   BMI 22.88 kg/m²       BP Readings from Last 3 Encounters:   08/15/18 130/80   08/14/18 137/83   05/14/18 114/82     Wt Readings from Last 3 Encounters:   08/15/18 46.3 kg (102 lb)   08/14/18 46.7 kg (103 lb)   05/14/18 44.5 kg (98 lb)      Body mass index is 22.88 kg/m².    CC:  Main reason(s) for today's visit: Hyperlipidemia      HPI:     Recent CT angiogram showed possible enlargement of the " descending thoracic aneurysm. To have repeat CT in 6 months. She has deferred surgery for now. Denies chest/back pain that is unusual.     She stopped the depression medication > 4 months ago. Not depressed but worried about her aneurysm.     Chronic essential hypertension:  Since prior visit: compliant with medication(s) and denies significant problems with medication(s).  Most recent in-office blood pressure readings:   BP Readings from Last 3 Encounters:   08/15/18 130/80   08/14/18 137/83   05/14/18 114/82     COPD:  She is a former smoker. She denies change in breathing symptoms. Current therapy: Bivespi    Chronic hyperlipidemia:  Current therapy include Livalo, denies problems with medication.    Most recent labs:   Lab Results   Component Value Date     (H) 03/03/2017    HDL 67 (H) 03/03/2017    TRIG 111 03/03/2017    CHOLHDLRATIO 3.51 03/03/2017      OA of spine, hips, knees on meloxicam and tramadol without problems.     DM 2 is diet controlled.   Lab Results   Component Value Date    HGBA1C 6.29 (H) 03/03/2017        Patient Care Team:  Miller Castañeda MD as PCP - General (Internal Medicine)  Miller Castañeda MD as PCP - Claims Attributed  Mart Ontiveros MD as Surgeon (Cardiothoracic Surgery)  Tres De Los Santos MD as Consulting Physician (Cardiology)  Graham Mcconnell MD as Consulting Physician (Hematology and Oncology)  Payam Byrnes MD as Consulting Physician (Otolaryngology)    ____________________________________________________________________    REVIEW OF SYSTEMS    Review of Systems  Constitutional neg  Resp neg  CV neg  GI neg  Psych neg for worsening mood    PHYSICAL EXAMINATION    Physical Exam  Constitutional  No distress  Cardiovascular Rate  normal . Rhythm: regular . Heart sounds:  Normal  Pulmonary/Chest  Effort normal. Breath sounds:  Normal  Psychiatric  Alert. Judgment and thought content normal. Mood normal    REVIEWED DATA:    Labs:     Lab Results   Component Value Date      11/01/2016    K 4.2 11/01/2016    AST 13 06/28/2016    ALT 10 06/28/2016    BUN 15 11/01/2016    CREATININE 0.80 04/26/2018    CREATININE 0.80 04/20/2017    CREATININE 0.71 11/01/2016    EGFRIFNONA 83 11/01/2016    EGFRIFAFRI 96 11/01/2016       Lab Results   Component Value Date    HGBA1C 6.29 (H) 03/03/2017    HGBA1C 6.9 (H) 12/08/2015    GLUCOSE 154 (H) 06/28/2016    GLUCOSE 128 (H) 03/02/2016    GLUCOSE 133 (H) 03/01/2016    MICROALBUR 69.2 03/03/2017       Lab Results   Component Value Date     (H) 03/03/2017    HDL 67 (H) 03/03/2017    TRIG 111 03/03/2017    CHOLHDLRATIO 3.51 03/03/2017       No results found for: TSH, FREET4    Lab Results   Component Value Date    WBC 9.97 06/28/2016    HGB 14.1 06/28/2016    HGB 11.5 (L) 02/29/2016    HGB 10.5 (L) 02/26/2016     06/28/2016       Imaging:     CT ANGIOGRAM CHEST W CONTRAST-, CT ANGIOGRAM ABDOMEN PELVIS W WO  CONTRAST-     CLINICAL HISTORY: Follow-up aneurysmal dilatation of the thoracic and  abdominal aorta.     TECHNIQUE: Spiral CT images were obtained through the chest abdomen and  pelvis during rapid IV injection of contrast and were reconstructed in 2  mm thick axial slices. Multiple coronal and sagittal and 3-D  reconstructions were obtained.     Radiation dose reduction techniques were utilized, including automated  exposure control and exposure modulation based on body size.     COMPARISON: CT of the chest abdomen and pelvis dated 4/20/2017.     FINDINGS: A short segment graft is in place within the ascending  thoracic aorta remains widely patent and normal in caliber. There is  normal branching of the great vessels from the aortic arch that show no  NASCET significant stenosis. The aortic root is not dilated. There is  saccular aneurysmal dilatation of the proximal aspect of the aortic arch  that appears unchanged. It measures approximately 6.0 x 4.2 cm in  diameter. A large amount of mural thrombus remains within the  aneurysm  without change. There is diffuse dilatation and tortuosity of the  descending thoracic aorta. The aorta is maximally dilated distally the  level of the aortic hiatus measures up to 5.1 cm in diameter. This is  unchanged. A large amount of mural thrombus is present within the  aneurysm at this level. It tapers to normal caliber more inferiorly  within the abdomen. This celiac and superior mesenteric arteries and  single bilateral main renal arteries remain widely patent. There is no  evidence of dissection. Calcified atherosclerotic plaque within the  iliac vessels results in no significant stenoses     There is no mediastinal or hilar or axillary lymphadenopathy. The  patient is status post right mastectomy without breast reconstruction.  The chest wall is otherwise unremarkable. Lung window images show mild  emphysematous changes in the upper lung zones, and are otherwise  unremarkable. There are no pleural effusions. The solid viscera were not  optimally evaluated due to the angiographic phase of postcontrast  imaging. The liver and spleen and pancreas are unremarkable. A small  simple cyst is noted in the right kidney. There is no bowel distention.  Artifact from a left hip arthroplasty is noted.     IMPRESSION:  Aneurysmal dilatation of the aortic arch and also the distal  descending thoracic aorta at the level of the aortic hiatus showing no  significant change since the preceding CTA dated 4/20/2017. There is no  evidence of dissection.         Medical Tests:         Summary of old records / correspondence / consultant report:         Request outside records:         ALLERGIES  Allergies   Allergen Reactions   • Ramipril Other (See Comments)     Ace I  cough   • Morphine Itching   • Morphine And Related Itching   • Bactrim [Sulfamethoxazole-Trimethoprim] Itching and Rash        PFSH:     The following portions of the patient's history were reviewed and updated as appropriate: Allergies / Current  Medications / Past Medical History / Surgical History / Social History / Family History    PROBLEM LIST   Patient Active Problem List   Diagnosis   • History of breast cancer   • Stenosis of carotid artery   • Chronic obstructive pulmonary disease (CMS/HCC)   • Closed fracture of multiple ribs   • Cognitive disorder   • Erythromelalgia (CMS/HCC)   • Hyperlipidemia   • Hypertension   • Primary osteoarthritis involving multiple joints   • Osteoporosis   • Restless legs syndrome   • Cerebral aneurysm   • Temporary cerebral vascular dysfunction   • S/P CABG x 1   • Type 2 diabetes mellitus without complication, without long-term current use of insulin (CMS/HCC)   • S/P ascending aortic aneurysm repair   • 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HCC)   • Descending thoracic aortic aneurysm (CMS/HCC)   • Claudication of both lower extremities (CMS/HCC)   • 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HCC)   • Ventral hernia without obstruction or gangrene   • Breast cancer, stage 1, estrogen receptor positive (CMS/HCC)   • Imbalance       PAST MEDICAL HISTORY  Past Medical History:   Diagnosis Date   • AAA (abdominal aortic aneurysm) (CMS/HCC)    • Aneurysm (CMS/HCC)     saccular   • Aneurysm of aorta (CMS/HCC)     abdominal aorta and arch, descending aorta   • 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HCC)    • Arthritis    • Breast cancer (CMS/HCC)     right breast nC6glNs (snm) pMX ER/TX pos, Her-2/neg, Ki-67, 31%, oncotype recurrence score 19, invasive lobular carcinoma   • 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HCC)    • Carotid artery stenosis    • Closed fracture of three ribs of right side    • Cognitive disorder    • COPD (chronic obstructive pulmonary disease) (CMS/HCC)    • Coronary artery disease    • Depression    • Descending aortic arch aneurysm (CMS/HCC)    • Diabetes mellitus (CMS/HCC)    • Erythermalgia (CMS/HCC)    • GERD (gastroesophageal reflux disease)    • Hyperlipidemia    • Hypertension    • Low back pain    • Osteoarthritis    • Osteoporosis     • Prediabetes    • RLS (restless legs syndrome)    • Saccular aneurysm    • Stenosis of artery (CMS/HCC)     carotid   • Stroke (CMS/HCC)     Perioperatively with left hip replacement   • TIA (transient ischemic attack)    • Venous insufficiency        SURGICAL HISTORY  Past Surgical History:   Procedure Laterality Date   • AORTIC VALVE REPAIR/REPLACEMENT N/A 02/23/2016    ascending aortic replacement with 24 mm graft, Dr. Ontiveros   • APPENDECTOMY  1977   • BREAST BIOPSY Right 09/16/2012    vacuum assisted core bx of the right breast   • CARDIAC CATHETERIZATION N/A 01/06/2016    Dr. Tres De Los Santos   • CARDIAC SURGERY  02/2016    Dr. Ontiveros/Dr. De Los Santos   • CATARACT EXTRACTION Bilateral     [facility]   • COLONOSCOPY N/A 10/2002    Dr. Negro   • CORONARY ARTERY BYPASS GRAFT  02/23/2016    X 1 Dr Ontiveros   • CYST REMOVAL Right 01/25/2013    right palm excision of retinacular cyst, Dr. Karla Fish   • EPIDURAL BLOCK     • LAPAROSCOPIC CHOLECYSTECTOMY N/A 08/04/2004    Dr. JOEY Sheth   • MASTECTOMY Right 09/28/2012   • ORIF HIP FRACTURE Left 03/27/2005    w/ AMBI compression screw, Dr. Victor M Cabral   • RECTOVAGINAL FISTULA REPAIR  1965   • TUBAL ABDOMINAL LIGATION         SOCIAL HISTORY  Social History     Social History   • Marital status:      Social History Main Topics   • Smoking status: Former Smoker     Quit date: 12/2012   • Smokeless tobacco: Never Used      Comment: caffeine use   • Alcohol use No   • Drug use: Unknown   • Sexual activity: Defer     Other Topics Concern   • Not on file       FAMILY HISTORY  Family History   Problem Relation Age of Onset   • Alzheimer's disease Mother    • Cancer Maternal Aunt    • Heart disease Father    • Heart failure Father    • No Known Problems Sister    • Heart failure Brother    • No Known Problems Maternal Grandmother    • No Known Problems Maternal Grandfather    • No Known Problems Paternal Grandmother    • No Known Problems Paternal  Grandfather    • Diabetes Brother    • Brain cancer Brother          **Tru Disclaimer:   Much of this encounter note is an electronic transcription/translation of spoken language to printed text. The electronic translation of spoken language may permit erroneous, or at times, nonsensical words or phrases to be inadvertently transcribed. Although I have reviewed the note for such errors, some may still exist.

## 2018-08-19 LAB
ALBUMIN SERPL-MCNC: 3.8 G/DL (ref 3.5–4.8)
ALBUMIN/GLOB SERPL: 1.5 {RATIO} (ref 1.2–2.2)
ALP SERPL-CCNC: 79 IU/L (ref 39–117)
ALT SERPL-CCNC: 15 IU/L (ref 0–32)
AST SERPL-CCNC: 23 IU/L (ref 0–40)
BILIRUB SERPL-MCNC: 0.9 MG/DL (ref 0–1.2)
BUN SERPL-MCNC: 22 MG/DL (ref 8–27)
BUN/CREAT SERPL: 26 (ref 12–28)
CALCIUM SERPL-MCNC: 9.4 MG/DL (ref 8.7–10.3)
CHLORIDE SERPL-SCNC: 101 MMOL/L (ref 96–106)
CHOLEST SERPL-MCNC: 144 MG/DL (ref 100–199)
CHOLEST/HDLC SERPL: 2.1 RATIO (ref 0–4.4)
CO2 SERPL-SCNC: 26 MMOL/L (ref 20–29)
CREAT SERPL-MCNC: 0.85 MG/DL (ref 0.57–1)
DRUGS UR: NORMAL
GLOBULIN SER CALC-MCNC: 2.6 G/DL (ref 1.5–4.5)
GLUCOSE SERPL-MCNC: 112 MG/DL (ref 65–99)
HBA1C MFR BLD: 6.8 % (ref 4.8–5.6)
HDLC SERPL-MCNC: 70 MG/DL
LDLC SERPL CALC-MCNC: 54 MG/DL (ref 0–99)
POTASSIUM SERPL-SCNC: 4.7 MMOL/L (ref 3.5–5.2)
PROT SERPL-MCNC: 6.4 G/DL (ref 6–8.5)
SODIUM SERPL-SCNC: 141 MMOL/L (ref 134–144)
TRIGL SERPL-MCNC: 99 MG/DL (ref 0–149)
VLDLC SERPL CALC-MCNC: 20 MG/DL (ref 5–40)

## 2018-08-19 NOTE — PROGRESS NOTES
8/19/2018    Seen on 8/14/18    Chief Complaint:     Follow up evaluation of TAAA aneurysm/flank discomfort    History of Present Illness:       Dear Dr. Castañeda and Colleagues,  It was nice to see Grace Beauchamp in follow up evaluation for her enlarging TAAA. She is a 78 y.o. 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 She denies new or worsening symptoms in the interval, but she is still has flank and epigastric discomfort and spinal stenosis related pain. Her last chest CT showed a 6 cm in greater diameter in the para visceral area and 4.5 cm in the mid DTA. It also showed a distal arch para graft hematoma , but called as an aneurysm in the report. Her ventral hernia gives her discomfort, but aneurysm related pain can not be excluded. She seems more active and able to ambulate better than in the last visit.    Patient Active Problem List   Diagnosis   • History of breast cancer   • Stenosis of carotid artery   • Chronic obstructive pulmonary disease (CMS/HCC)   • Closed fracture of multiple ribs   • Cognitive disorder   • Erythromelalgia (CMS/HCC)   • Hyperlipidemia   • Hypertension   • Primary osteoarthritis involving multiple joints   • Osteoporosis   • Restless legs syndrome   • Cerebral aneurysm   • Temporary cerebral vascular dysfunction   • S/P CABG x 1   • Type 2 diabetes mellitus without complication, without long-term current use of insulin (CMS/HCC)   • S/P ascending aortic aneurysm repair   • 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HCC)   • Descending thoracic aortic aneurysm (CMS/HCC)   • Claudication of both lower extremities (CMS/HCC)   • 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HCC)   • Ventral hernia without obstruction or gangrene   • Breast cancer, stage 1, estrogen receptor positive (CMS/HCC)   • Imbalance       Past Medical History:   Diagnosis Date   • AAA (abdominal  aortic aneurysm) (CMS/HCC)    • Aneurysm (CMS/HCC)     saccular   • Aneurysm of aorta (CMS/HCC)     abdominal aorta and arch, descending aorta   • 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HCC)    • Arthritis    • Breast cancer (CMS/HCC)     right breast [id number] (snm) pMX ER/WV pos, Her-2/neg, Ki-67, 31%, oncotype recurrence score 19, invasive lobular carcinoma   • 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HCC)    • Carotid artery stenosis    • Closed fracture of three ribs of right side    • Cognitive disorder    • COPD (chronic obstructive pulmonary disease) (CMS/HCC)    • Coronary artery disease    • Depression    • Descending aortic arch aneurysm (CMS/HCC)    • Diabetes mellitus (CMS/HCC)    • Erythermalgia (CMS/HCC)    • GERD (gastroesophageal reflux disease)    • Hyperlipidemia    • Hypertension    • Low back pain    • Osteoarthritis    • Osteoporosis    • Prediabetes    • RLS (restless legs syndrome)    • Saccular aneurysm    • Stenosis of artery (CMS/HCC)     carotid   • Stroke (CMS/HCC)     Perioperatively with left hip replacement   • TIA (transient ischemic attack)    • Venous insufficiency        Past Surgical History:   Procedure Laterality Date   • AORTIC VALVE REPAIR/REPLACEMENT N/A 02/23/2016    ascending aortic replacement with 24 mm graft, Dr. Ontiveros   • APPENDECTOMY  1977   • BREAST BIOPSY Right 09/16/2012    vacuum assisted core bx of the right breast   • CARDIAC CATHETERIZATION N/A 01/06/2016    Dr. Tres De Los Santos   • CARDIAC SURGERY  02/2016    Dr. Ontiveros/Dr. De Los Santos   • CATARACT EXTRACTION Bilateral     [facility]   • COLONOSCOPY N/A 10/2002    Dr. Negro   • CORONARY ARTERY BYPASS GRAFT  02/23/2016    X 1 Dr Ontiveros   • CYST REMOVAL Right 01/25/2013    right palm excision of retinacular cyst, Dr. Karla iFsh   • EPIDURAL BLOCK     • LAPAROSCOPIC CHOLECYSTECTOMY N/A 08/04/2004    Dr. JOEY Sheth   • MASTECTOMY Right 09/28/2012   • ORIF HIP FRACTURE Left 03/27/2005    w/ AMBI compression screw, Dr. Dow  Misha   • RECTOVAGINAL FISTULA REPAIR  1965   • TUBAL ABDOMINAL LIGATION         Allergies   Allergen Reactions   • Ramipril Other (See Comments)     Ace I  cough   • Morphine Itching   • Morphine And Related Itching   • Bactrim [Sulfamethoxazole-Trimethoprim] Itching and Rash         Current Outpatient Prescriptions:   •  aspirin 81 MG EC tablet, Take 81 mg by mouth daily., Disp: , Rfl:   •  Biotin 1000 MCG tablet, Take 1,000 mcg by mouth daily. Take as directed, Disp: , Rfl:   •  BYSTOLIC 10 MG tablet, TAKE ONE TABLET BY MOUTH DAILY, Disp: 30 tablet, Rfl: 3  •  cetirizine (zyrTEC) 10 MG tablet, Take 10 mg by mouth As Needed., Disp: , Rfl:   •  cilostazol (PLETAL) 100 MG tablet, Take 1 tablet by mouth 2 (two) times a day., Disp: , Rfl:   •  fluticasone (CUTIVATE) 0.05 % cream, , Disp: , Rfl:   •  Glycopyrrolate-Formoterol (BEVESPI AEROSPHERE) 9-4.8 MCG/ACT aerosol, Inhale 2 sprays 2 (Two) Times a Day., Disp: 1 inhaler, Rfl: 5  •  meloxicam (MOBIC) 15 MG tablet, TAKE ONE TABLET BY MOUTH DAILY, Disp: 30 tablet, Rfl: 0  •  pitavastatin calcium (LIVALO) 2 MG tablet tablet, Take 1 tablet by mouth Every Night., Disp: 30 tablet, Rfl: 1  •  pramipexole (MIRAPEX) 0.125 MG tablet, TAKE ONE TABLET BY MOUTH EVERY NIGHT AT BEDTIME, Disp: 90 tablet, Rfl: 1  •  PROAIR  (90 Base) MCG/ACT inhaler, INHALE 2 PUFFS INTO THE LUNGS EVERY 6 HOURS AS NEEDED FOR WHEEZING, Disp: 8.5 g, Rfl: 2  •  Probiotic Product (PROBIOTIC PO), Take 1 capsule by mouth Daily., Disp: , Rfl:   •  traMADol (ULTRAM) 50 MG tablet, TAKE ONE TABLET BY MOUTH EVERY 6 HOURS AS NEEDED FOR MODERATE PAIN, Disp: 90 tablet, Rfl: 1    Social History     Social History   • Marital status:      Spouse name: N/A   • Number of children: N/A   • Years of education: N/A     Occupational History   • Not on file.     Social History Main Topics   • Smoking status: Former Smoker     Quit date: 12/2012   • Smokeless tobacco: Never Used      Comment: caffeine use   •  Alcohol use No   • Drug use: Unknown   • Sexual activity: Defer     Other Topics Concern   • Not on file     Social History Narrative   • No narrative on file       Family History   Problem Relation Age of Onset   • Alzheimer's disease Mother    • Cancer Maternal Aunt    • Heart disease Father    • Heart failure Father    • No Known Problems Sister    • Heart failure Brother    • No Known Problems Maternal Grandmother    • No Known Problems Maternal Grandfather    • No Known Problems Paternal Grandmother    • No Known Problems Paternal Grandfather    • Diabetes Brother    • Brain cancer Brother      Review of Systems   Constitutional: Positive for fatigue.   Respiratory: Positive for shortness of breath.    Cardiovascular: Negative for chest pain and palpitations.   Genitourinary: Positive for flank pain. Negative for dysuria.   Musculoskeletal: Positive for back pain and joint swelling.   Neurological: Positive for weakness. Negative for dizziness, syncope and headaches.   All other systems reviewed and are negative.      Physical Exam:    Vital Signs:  Weight: 46.7 kg (103 lb)   Body mass index is 23.09 kg/m².  Temp: 98 °F (36.7 °C)   Heart Rate: 66   BP: 137/83     Physical Exam   Constitutional: She is oriented to person, place, and time. She appears well-developed and well-nourished.   HENT:   Head: Normocephalic and atraumatic.   Nose: Nose normal.   Mouth/Throat: Oropharynx is clear and moist.   Eyes: Pupils are equal, round, and reactive to light. Conjunctivae are normal.   Neck: Normal range of motion. Neck supple. No JVD present. No thyromegaly present.   Cardiovascular: Normal rate, regular rhythm, normal heart sounds and intact distal pulses.  PMI is displaced.  Exam reveals decreased pulses. Exam reveals no gallop and no friction rub.    No murmur heard.  Pulses:       Radial pulses are 2+ on the right side, and 2+ on the left side.        Femoral pulses are 2+ on the right side, and 2+ on the left  side.       Popliteal pulses are 1+ on the right side.        Posterior tibial pulses are 0 on the right side, and 0 on the left side.   Pulmonary/Chest: Effort normal and breath sounds normal. She has no rales.   Abdominal: Soft. Bowel sounds are normal. She exhibits mass. She exhibits no ascites. There is no tenderness.       Musculoskeletal: Normal range of motion. She exhibits no tenderness or deformity.   Lymphadenopathy:     She has no cervical adenopathy.   Neurological: She is alert and oriented to person, place, and time. She has normal reflexes.   Skin: Skin is warm and dry. No rash noted. No erythema.   Psychiatric: She has a normal mood and affect. Her behavior is normal.   Sternal incision well healed     Assessment:     Problem List Items Addressed This Visit        Cardiovascular and Mediastinum    Hypertension (Chronic)    Stenosis of carotid artery    Temporary cerebral vascular dysfunction    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HCC) - Primary       Respiratory    Chronic obstructive pulmonary disease (CMS/HCC) (Chronic)       Endocrine    Type 2 diabetes mellitus without complication, without long-term current use of insulin (CMS/HCC) (Chronic)       Nervous and Auditory    Cognitive disorder    Claudication of both lower extremities (CMS/HCC)       Other    S/P CABG x 1    S/P ascending aortic aneurysm repair    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HCC)    Ventral hernia without obstruction or gangrene    Breast cancer, stage 1, estrogen receptor positive (CMS/HCC)          1. Enlarging large TAAA aneurysm, flank discomfort most likely related  2. S/P proximal complex aortic replacement  3. Severe COPD  4. Spinal stenosis and chronic back pain  5. Ventral hernia, reducible  6. Frailty    Recommendation/Plan:     I discussed with the patient and daughter the concern about her symptoms and the enlarging aneurysm. The risk of rupture is not small and I am concerned about that. She had a previous aneurysm and  she has severe COPD and HTN. She gets around, but her mobility is compromised and she uses a cane for her balance issues. I recommended surgery, although it will be high risk from the pulmonary and neurologic standpoint. I offered TAAA repair with left heart bypass most likely. She is still reluctant, because she feels weak and hesitant to live with complications. She wishes to wait 6 months and repeat a chest and abdomen CTA. I dont see a point to repair her ventral hernia at this point. It can be repaired if TAAA surgery is decided in 6 months. If not, then Dr. Bolden can repair the hernia to avoid complications related.      Thank you for allowing me to participate in her care.    Regards,    Mart Ontiveros M.D.

## 2018-09-03 DIAGNOSIS — I10 ESSENTIAL HYPERTENSION: ICD-10-CM

## 2018-09-05 RX ORDER — NEBIVOLOL HYDROCHLORIDE 10 MG/1
TABLET ORAL
Qty: 30 TABLET | Refills: 5 | Status: SHIPPED | OUTPATIENT
Start: 2018-09-05 | End: 2019-03-30 | Stop reason: SDUPTHER

## 2018-09-19 ENCOUNTER — OFFICE VISIT (OUTPATIENT)
Dept: ORTHOPEDIC SURGERY | Facility: CLINIC | Age: 78
End: 2018-09-19

## 2018-09-19 VITALS — WEIGHT: 105.8 LBS | BODY MASS INDEX: 23.8 KG/M2 | TEMPERATURE: 99.7 F | HEIGHT: 56 IN

## 2018-09-19 DIAGNOSIS — M48.062 SPINAL STENOSIS OF LUMBAR REGION WITH NEUROGENIC CLAUDICATION: ICD-10-CM

## 2018-09-19 DIAGNOSIS — M16.11 PRIMARY OSTEOARTHRITIS OF RIGHT HIP: ICD-10-CM

## 2018-09-19 DIAGNOSIS — Z98.1 STATUS POST LAMINECTOMY WITH SPINAL FUSION: Primary | ICD-10-CM

## 2018-09-19 PROCEDURE — 72100 X-RAY EXAM L-S SPINE 2/3 VWS: CPT | Performed by: ORTHOPAEDIC SURGERY

## 2018-09-19 PROCEDURE — 99213 OFFICE O/P EST LOW 20 MIN: CPT | Performed by: ORTHOPAEDIC SURGERY

## 2018-09-19 NOTE — PROGRESS NOTES
She has primary complaint of leg pain and difficulty walking.  It's probably multifactorial but she definitely has multilevel foraminal stenosis.  She has some right hip DJD that's fairly significant, and some right knee pain as well.  She has had Synvisc injections in the knee with excellent relief.  She has a history of venous insufficiency in the lower extremities but doesn't have dramatic swelling.  Finally she has a 5 cm thoracic aneurysm upon which repair is being templated by Dr. Jadyn jacobs.  On exam she exhibits good strength in the lower extremity has some rubor bilaterally the feet are not cold however and pulses are good.  MRI shows multilevel disc degeneration and foraminal stenosis in the lumbar spine.  She has right hip DJD on x-ray and a left total hip replacement.  She might be a candidate for multilevel decompression and fusion but I am thinking that may be the last thing we want to do in order of the her problems.  She may be a candidate for a right hip replacement which might be the least complicated intervention, and perhaps a relatively easy rehabilitation and this could take care of some of her knee pain.  Almost be inclined to try this before the lumbar intervention.  She'll talk to Dr. Jadyn jacobs and decide if she wants to do anything at all.

## 2018-10-19 DIAGNOSIS — M54.50 BILATERAL LOW BACK PAIN WITHOUT SCIATICA, UNSPECIFIED CHRONICITY: ICD-10-CM

## 2018-10-22 RX ORDER — TRAMADOL HYDROCHLORIDE 50 MG/1
TABLET ORAL
Qty: 90 TABLET | Refills: 1 | OUTPATIENT
Start: 2018-10-22 | End: 2019-01-14 | Stop reason: SDUPTHER

## 2018-10-22 NOTE — TELEPHONE ENCOUNTER
LOV 08.15.18  NOV 12.19.18  CONTRACT 03.03.18  GIULIA 10.22.18  UDS 08.15.18  LAST RF 07.09.18 #90 1 RF

## 2018-11-04 DIAGNOSIS — G25.81 RESTLESS LEGS SYNDROME (RLS): ICD-10-CM

## 2018-11-04 DIAGNOSIS — J44.9 CHRONIC OBSTRUCTIVE PULMONARY DISEASE, UNSPECIFIED COPD TYPE (HCC): ICD-10-CM

## 2018-11-05 RX ORDER — PRAMIPEXOLE DIHYDROCHLORIDE 0.12 MG/1
TABLET ORAL
Qty: 90 TABLET | Refills: 0 | Status: SHIPPED | OUTPATIENT
Start: 2018-11-05 | End: 2019-02-25 | Stop reason: SDUPTHER

## 2018-11-06 ENCOUNTER — OFFICE VISIT (OUTPATIENT)
Dept: ORTHOPEDIC SURGERY | Facility: CLINIC | Age: 78
End: 2018-11-06

## 2018-11-06 VITALS — BODY MASS INDEX: 24.07 KG/M2 | WEIGHT: 107 LBS | HEIGHT: 56 IN | TEMPERATURE: 98.4 F

## 2018-11-06 DIAGNOSIS — M51.36 DEGENERATIVE DISC DISEASE, LUMBAR: ICD-10-CM

## 2018-11-06 DIAGNOSIS — M11.20 CHONDROCALCINOSIS: ICD-10-CM

## 2018-11-06 DIAGNOSIS — M17.11 ARTHRITIS OF RIGHT KNEE: ICD-10-CM

## 2018-11-06 DIAGNOSIS — M16.11 ARTHRITIS OF RIGHT HIP: ICD-10-CM

## 2018-11-06 DIAGNOSIS — G89.29 CHRONIC PAIN OF RIGHT KNEE: Primary | ICD-10-CM

## 2018-11-06 DIAGNOSIS — M25.561 CHRONIC PAIN OF RIGHT KNEE: Primary | ICD-10-CM

## 2018-11-06 PROCEDURE — 20610 DRAIN/INJ JOINT/BURSA W/O US: CPT | Performed by: ORTHOPAEDIC SURGERY

## 2018-11-06 PROCEDURE — 73562 X-RAY EXAM OF KNEE 3: CPT | Performed by: ORTHOPAEDIC SURGERY

## 2018-11-06 PROCEDURE — 99214 OFFICE O/P EST MOD 30 MIN: CPT | Performed by: ORTHOPAEDIC SURGERY

## 2018-11-06 RX ORDER — METHYLPREDNISOLONE ACETATE 80 MG/ML
80 INJECTION, SUSPENSION INTRA-ARTICULAR; INTRALESIONAL; INTRAMUSCULAR; SOFT TISSUE
Status: COMPLETED | OUTPATIENT
Start: 2018-11-06 | End: 2018-11-06

## 2018-11-06 RX ORDER — LIDOCAINE HYDROCHLORIDE 20 MG/ML
4 INJECTION, SOLUTION EPIDURAL; INFILTRATION; INTRACAUDAL; PERINEURAL
Status: COMPLETED | OUTPATIENT
Start: 2018-11-06 | End: 2018-11-06

## 2018-11-06 RX ADMIN — METHYLPREDNISOLONE ACETATE 80 MG: 80 INJECTION, SUSPENSION INTRA-ARTICULAR; INTRALESIONAL; INTRAMUSCULAR; SOFT TISSUE at 13:49

## 2018-11-06 RX ADMIN — LIDOCAINE HYDROCHLORIDE 4 ML: 20 INJECTION, SOLUTION EPIDURAL; INFILTRATION; INTRACAUDAL; PERINEURAL at 13:49

## 2018-11-06 NOTE — PROGRESS NOTES
Patient Name: Grace Beauchamp   YOB: 1940  Referring Primary Care Physician: Miller Castañeda MD  BMI: Body mass index is 23.99 kg/m².    Chief Complaint:    Chief Complaint   Patient presents with   • Right Knee - Establish Care, Pain    right hip. Low back pain    Subjective:    HPI:   Grace Beauchamp is a pleasant 78 y.o. year old who presents today for evaluation of   Chief Complaint   Patient presents with   • Right Knee - Establish Care, Pain    right knee and hip pain.  Leg is stiff.  No falls.  Had left hip fx with comp screw then minoo by dr MACARIO in the past.  It is getting harder to walk.  Had ascending aortic aneurysm and now has abdominal that has increased and is looking at surgery in Jan. Saw JGW who noted that she had severe hip disease and sent.  Previous visco inj in the knees that did well in the past    This problem is new to this examiner.     Medications:   Home Medications:  Current Outpatient Prescriptions on File Prior to Visit   Medication Sig   • aspirin 81 MG EC tablet Take 81 mg by mouth daily.   • Biotin 1000 MCG tablet Take 1,000 mcg by mouth daily. Take as directed   • BYSTOLIC 10 MG tablet TAKE ONE TABLET BY MOUTH DAILY   • cetirizine (zyrTEC) 10 MG tablet Take 10 mg by mouth As Needed.   • cilostazol (PLETAL) 100 MG tablet Take 1 tablet by mouth 2 (two) times a day.   • fluticasone (CUTIVATE) 0.05 % cream    • Glycopyrrolate-Formoterol (BEVESPI AEROSPHERE) 9-4.8 MCG/ACT aerosol Inhale 2 sprays 2 (Two) Times a Day.   • meloxicam (MOBIC) 15 MG tablet TAKE ONE TABLET BY MOUTH DAILY   • pitavastatin calcium (LIVALO) 2 MG tablet tablet Take 1 tablet by mouth Every Night.   • pramipexole (MIRAPEX) 0.125 MG tablet TAKE ONE TABLET BY MOUTH EVERY NIGHT AT BEDTIME   • PROAIR  (90 Base) MCG/ACT inhaler INHALE TWO PUFFS BY MOUTH EVERY 6 HOURS AS NEEDED FOR WHEEZING   • Probiotic Product (PROBIOTIC PO) Take 1 capsule by mouth Daily.   • traMADol (ULTRAM) 50 MG tablet TAKE ONE  TABLET BY MOUTH EVERY 6 HOURS AS NEEDED FOR MODERATE PAIN     No current facility-administered medications on file prior to visit.      Current Medications:  Scheduled Meds:  Continuous Infusions:  No current facility-administered medications for this visit.   PRN Meds:.    I have reviewed the patient's medical history in detail and updated the computerized patient record.  Review and summarization of old records includes:    Past Medical History:   Diagnosis Date   • AAA (abdominal aortic aneurysm) (CMS/HCC)    • Aneurysm (CMS/HCC)     saccular   • Aneurysm of aorta (CMS/HCC)     abdominal aorta and arch, descending aorta   • 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HCC)    • Arthritis    • Breast cancer (CMS/HCC)     right breast [id number] (snm) pMX ER/[place] pos, Her-2/neg, Ki-67, 31%, oncotype recurrence score 19, invasive lobular carcinoma   • 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HCC)    • Carotid artery stenosis    • Closed fracture of three ribs of right side    • Cognitive disorder    • COPD (chronic obstructive pulmonary disease) (CMS/HCC)    • Coronary artery disease    • Depression    • Descending aortic arch aneurysm (CMS/HCC)    • Diabetes mellitus (CMS/HCC)    • Erythermalgia (CMS/HCC)    • GERD (gastroesophageal reflux disease)    • Hyperlipidemia    • Hypertension    • Low back pain    • Osteoarthritis    • Osteoporosis    • Prediabetes    • RLS (restless legs syndrome)    • Saccular aneurysm    • Stenosis of artery (CMS/HCC)     carotid   • Stroke (CMS/HCC)     Perioperatively with left hip replacement   • TIA (transient ischemic attack)    • Venous insufficiency         Past Surgical History:   Procedure Laterality Date   • AORTIC VALVE REPAIR/REPLACEMENT N/A 02/23/2016    ascending aortic replacement with 24 mm graft, Dr. Ontiveros   • APPENDECTOMY  1977   • BREAST BIOPSY Right 09/16/2012    vacuum assisted core bx of the right breast   • CARDIAC CATHETERIZATION N/A 01/06/2016    Dr. Tres De Los Santos   • CARDIAC SURGERY  02/2016     Dr. Ontiveros/Dr. De Los Santos   • CATARACT EXTRACTION Bilateral     [facility]   • COLONOSCOPY N/A 10/2002    Dr. Negro   • CORONARY ARTERY BYPASS GRAFT  02/23/2016    X 1 Dr Ontiveros   • CYST REMOVAL Right 01/25/2013    right palm excision of retinacular cyst, Dr. Karla Fish   • EPIDURAL BLOCK     • LAPAROSCOPIC CHOLECYSTECTOMY N/A 08/04/2004    Dr. JOEY Sheth   • MASTECTOMY Right 09/28/2012   • ORIF HIP FRACTURE Left 03/27/2005    w/ AMBI compression screw, Dr. Victor M Cabral   • RECTOVAGINAL FISTULA REPAIR  1965   • TUBAL ABDOMINAL LIGATION          Social History     Occupational History   • Not on file.     Social History Main Topics   • Smoking status: Former Smoker     Quit date: 12/2012   • Smokeless tobacco: Never Used      Comment: caffeine use   • Alcohol use No   • Drug use: Unknown   • Sexual activity: Defer    Social History     Social History Narrative   • No narrative on file        Family History   Problem Relation Age of Onset   • Alzheimer's disease Mother    • Cancer Maternal Aunt    • Heart disease Father    • Heart failure Father    • Hypertension Father    • Diabetes Father    • Liver disease Sister    • Heart failure Brother    • Hypertension Brother    • Alcohol abuse Brother    • No Known Problems Maternal Grandmother    • No Known Problems Maternal Grandfather    • No Known Problems Paternal Grandmother    • No Known Problems Paternal Grandfather    • Diabetes Brother    • Brain cancer Brother    • Heart disease Brother        ROS: 14 point review of systems was performed and all other systems were reviewed and are negative except for documented findings in HPI and today's encounter.     Allergies:   Allergies   Allergen Reactions   • Ramipril Other (See Comments)     Ace I  cough   • Morphine Itching   • Morphine And Related Itching   • Bactrim [Sulfamethoxazole-Trimethoprim] Itching and Rash     Constitutional:  Denies fever, shaking or chills   Eyes:  Denies change in  "visual acuity   HENT:  Denies nasal congestion or sore throat   Respiratory:  Denies cough or shortness of breath   Cardiovascular:  Denies chest pain or severe LE edema   GI:  Denies abdominal pain, nausea, vomiting, bloody stools or diarrhea   Musculoskeletal:  Numbness, tingling, pain, or loss of motor function only as noted above in history of present illness.  : Denies painful urination or hematuria  Integument:  Denies rash, lesion or ulceration   Neurologic:  Denies headache or focal weakness  Endocrine:  Denies lymphadenopathy  Psych:  Denies confusion or change in mental status   Hem:  Denies active bleeding    Subjective     Objective:    Physical Exam: 78 y.o. female  Wt Readings from Last 3 Encounters:   11/06/18 48.5 kg (107 lb)   09/19/18 48 kg (105 lb 12.8 oz)   08/15/18 46.3 kg (102 lb)     Ht Readings from Last 3 Encounters:   11/06/18 142.2 cm (56\")   09/19/18 142.2 cm (56\")   08/15/18 142.2 cm (55.98\")     Body mass index is 23.99 kg/m².    Vitals:    11/06/18 1327   Temp: 98.4 °F (36.9 °C)       Vital signs reviewed.   General Appearance:    Alert, cooperative, in no acute distress                  Eyes: conjunctiva clear  ENT: external ears and nose atraumatic  CV: no peripheral edema  Resp: normal respiratory effort  Skin: no rashes or wounds; normal turgor  Psych: mood and affect appropriate  Lymph: no nodes appreciated  Neuro: gross sensation intact  Vascular:  Palpable peripheral pulse in noted extremity  Musculoskeletal Extremities: KNEE Exam: medial joint line tenderness with crepitation, synovitis, swelling, and joint effusion right knee. and HIP Exam: antalgic and stiff-legged gait with assistive device right hip, Stinchfield postitive, pain with internal rotation and IR contracture of about 10 degrees 2+ pedal pulses and brisk capillary refill Pedal edema 1+ diffuse low back tenderness and deformity      Radiology:   Imaging done today and discussed at length with the " patient:    Indication: pain related symptoms,  Views: 2V AP&LAT right knee(s)   Findings: advanced arthritis, with chondrocalcinosis  Comparison views: viewed last xray done in the office. , Additional Views: right hip(s) and lumbar spine Findings: severe end-stage arthritis (bone on bone, subchondral sclerosis/cysts, osteophytes), and degenerative lumbar changes Comparison views: viewed last xray done in the office.       Assessment:     ICD-10-CM ICD-9-CM   1. Chronic pain of right knee M25.561 719.46    G89.29 338.29   2. Arthritis of right hip M16.11 716.95   3. Arthritis of right knee M17.11 716.96   4. Chondrocalcinosis M11.20 275.49     712.30        Large Joint Arthrocentesis  Date/Time: 11/6/2018 1:49 PM  Consent given by: patient  Site marked: site marked  Timeout: Immediately prior to procedure a time out was called to verify the correct patient, procedure, equipment, support staff and site/side marked as required   Supporting Documentation  Indications: pain and joint swelling   Procedure Details  Location: knee - R knee  Preparation: Patient was prepped and draped in the usual sterile fashion  Needle size: 22 G  Approach: anterolateral  Medications administered: 80 mg methylPREDNISolone acetate 80 MG/ML; 4 mL lidocaine PF 2% 2 %  Patient tolerance: patient tolerated the procedure well with no immediate complications             Plan:  27 min spent face to face with patient 23 min spent counseling about natural history and expected course of assessed complaint and reviewed treatment options that have been tried and not tried and those currently available. Questions answered.    Biomechanics of pertinent body area discussed.  Risks, benefits, alternatives, comparisons, and complications of accepted medicines, injections, recommendations, surgical procedures, and therapies explained and education provided in laymen's terms. The patient was given the opportunity to ask questions and they were answerved to  "their satisfaction.   Natural history and expected course of this patient's diagnosis discussed along with evaluation of therapies. Questions answered.  Dx and thx injections in the right knee and then the right hip.  Went over the things to look for.  Appears that a lot of the \"knee pain\" is related to her hip disease.  Not a lot of numbing with the injection of the knee.  F/u when needed- upcoming aneurysm surgery.     11/6/2018  "

## 2018-12-02 ENCOUNTER — HOSPITAL ENCOUNTER (INPATIENT)
Facility: HOSPITAL | Age: 78
LOS: 2 days | Discharge: HOME | End: 2018-12-04
Attending: EMERGENCY MEDICINE | Admitting: INTERNAL MEDICINE

## 2018-12-02 ENCOUNTER — APPOINTMENT (OUTPATIENT)
Dept: GENERAL RADIOLOGY | Facility: HOSPITAL | Age: 78
End: 2018-12-02

## 2018-12-02 DIAGNOSIS — J43.9 PULMONARY EMPHYSEMA, UNSPECIFIED EMPHYSEMA TYPE (HCC): Chronic | ICD-10-CM

## 2018-12-02 DIAGNOSIS — J44.1 COPD EXACERBATION (HCC): Primary | ICD-10-CM

## 2018-12-02 LAB
ALBUMIN SERPL-MCNC: 4.4 G/DL (ref 3.5–5.2)
ALBUMIN/GLOB SERPL: 1.3 G/DL
ALP SERPL-CCNC: 89 U/L (ref 39–117)
ALT SERPL W P-5'-P-CCNC: 16 U/L (ref 1–33)
ANION GAP SERPL CALCULATED.3IONS-SCNC: 16.2 MMOL/L
AST SERPL-CCNC: 19 U/L (ref 1–32)
B PERT DNA SPEC QL NAA+PROBE: NOT DETECTED
BASOPHILS # BLD AUTO: 0.01 10*3/MM3 (ref 0–0.2)
BASOPHILS NFR BLD AUTO: 0.1 % (ref 0–1.5)
BILIRUB SERPL-MCNC: 0.6 MG/DL (ref 0.1–1.2)
BUN BLD-MCNC: 20 MG/DL (ref 8–23)
BUN/CREAT SERPL: 25.3 (ref 7–25)
C PNEUM DNA NPH QL NAA+NON-PROBE: NOT DETECTED
CALCIUM SPEC-SCNC: 9.9 MG/DL (ref 8.6–10.5)
CHLORIDE SERPL-SCNC: 99 MMOL/L (ref 98–107)
CO2 SERPL-SCNC: 26.8 MMOL/L (ref 22–29)
CREAT BLD-MCNC: 0.79 MG/DL (ref 0.57–1)
D-LACTATE SERPL-SCNC: 2.1 MMOL/L (ref 0.5–2)
D-LACTATE SERPL-SCNC: 2.6 MMOL/L (ref 0.5–2)
DEPRECATED RDW RBC AUTO: 44.1 FL (ref 37–54)
EOSINOPHIL # BLD AUTO: 0 10*3/MM3 (ref 0–0.7)
EOSINOPHIL NFR BLD AUTO: 0 % (ref 0.3–6.2)
ERYTHROCYTE [DISTWIDTH] IN BLOOD BY AUTOMATED COUNT: 12.7 % (ref 11.7–13)
FLUAV H1 2009 PAND RNA NPH QL NAA+PROBE: NOT DETECTED
FLUAV H1 HA GENE NPH QL NAA+PROBE: NOT DETECTED
FLUAV H3 RNA NPH QL NAA+PROBE: NOT DETECTED
FLUAV SUBTYP SPEC NAA+PROBE: NOT DETECTED
FLUBV RNA ISLT QL NAA+PROBE: NOT DETECTED
GFR SERPL CREATININE-BSD FRML MDRD: 70 ML/MIN/1.73
GLOBULIN UR ELPH-MCNC: 3.4 GM/DL
GLUCOSE BLD-MCNC: 155 MG/DL (ref 65–99)
HADV DNA SPEC NAA+PROBE: NOT DETECTED
HCOV 229E RNA SPEC QL NAA+PROBE: NOT DETECTED
HCOV HKU1 RNA SPEC QL NAA+PROBE: NOT DETECTED
HCOV NL63 RNA SPEC QL NAA+PROBE: NOT DETECTED
HCOV OC43 RNA SPEC QL NAA+PROBE: NOT DETECTED
HCT VFR BLD AUTO: 48.7 % (ref 35.6–45.5)
HGB BLD-MCNC: 16.5 G/DL (ref 11.9–15.5)
HMPV RNA NPH QL NAA+NON-PROBE: NOT DETECTED
HOLD SPECIMEN: NORMAL
HOLD SPECIMEN: NORMAL
HPIV1 RNA SPEC QL NAA+PROBE: NOT DETECTED
HPIV2 RNA SPEC QL NAA+PROBE: NOT DETECTED
HPIV3 RNA NPH QL NAA+PROBE: NOT DETECTED
HPIV4 P GENE NPH QL NAA+PROBE: NOT DETECTED
IMM GRANULOCYTES # BLD: 0.02 10*3/MM3 (ref 0–0.03)
IMM GRANULOCYTES NFR BLD: 0.2 % (ref 0–0.5)
LYMPHOCYTES # BLD AUTO: 0.6 10*3/MM3 (ref 0.9–4.8)
LYMPHOCYTES NFR BLD AUTO: 6.8 % (ref 19.6–45.3)
M PNEUMO IGG SER IA-ACNC: NOT DETECTED
MCH RBC QN AUTO: 32.4 PG (ref 26.9–32)
MCHC RBC AUTO-ENTMCNC: 33.9 G/DL (ref 32.4–36.3)
MCV RBC AUTO: 95.7 FL (ref 80.5–98.2)
MONOCYTES # BLD AUTO: 0.42 10*3/MM3 (ref 0.2–1.2)
MONOCYTES NFR BLD AUTO: 4.8 % (ref 5–12)
NEUTROPHILS # BLD AUTO: 7.79 10*3/MM3 (ref 1.9–8.1)
NEUTROPHILS NFR BLD AUTO: 88.3 % (ref 42.7–76)
NT-PROBNP SERPL-MCNC: 1085 PG/ML (ref 0–1800)
PLATELET # BLD AUTO: 280 10*3/MM3 (ref 140–500)
PMV BLD AUTO: 9.1 FL (ref 6–12)
POTASSIUM BLD-SCNC: 3.9 MMOL/L (ref 3.5–5.2)
PROCALCITONIN SERPL-MCNC: 0.06 NG/ML (ref 0.1–0.25)
PROT SERPL-MCNC: 7.8 G/DL (ref 6–8.5)
RBC # BLD AUTO: 5.09 10*6/MM3 (ref 3.9–5.2)
RHINOVIRUS RNA SPEC NAA+PROBE: NOT DETECTED
RSV RNA NPH QL NAA+NON-PROBE: DETECTED
SODIUM BLD-SCNC: 142 MMOL/L (ref 136–145)
TROPONIN T SERPL-MCNC: <0.01 NG/ML (ref 0–0.03)
WBC NRBC COR # BLD: 8.82 10*3/MM3 (ref 4.5–10.7)
WHOLE BLOOD HOLD SPECIMEN: NORMAL

## 2018-12-02 PROCEDURE — 87581 M.PNEUMON DNA AMP PROBE: CPT | Performed by: EMERGENCY MEDICINE

## 2018-12-02 PROCEDURE — 25010000002 METHYLPREDNISOLONE PER 40 MG: Performed by: INTERNAL MEDICINE

## 2018-12-02 PROCEDURE — 25010000002 METHYLPREDNISOLONE PER 125 MG: Performed by: EMERGENCY MEDICINE

## 2018-12-02 PROCEDURE — 87633 RESP VIRUS 12-25 TARGETS: CPT | Performed by: EMERGENCY MEDICINE

## 2018-12-02 PROCEDURE — 93005 ELECTROCARDIOGRAM TRACING: CPT | Performed by: EMERGENCY MEDICINE

## 2018-12-02 PROCEDURE — 87798 DETECT AGENT NOS DNA AMP: CPT | Performed by: EMERGENCY MEDICINE

## 2018-12-02 PROCEDURE — 93010 ELECTROCARDIOGRAM REPORT: CPT | Performed by: INTERNAL MEDICINE

## 2018-12-02 PROCEDURE — 85025 COMPLETE CBC W/AUTO DIFF WBC: CPT | Performed by: EMERGENCY MEDICINE

## 2018-12-02 PROCEDURE — 83605 ASSAY OF LACTIC ACID: CPT | Performed by: EMERGENCY MEDICINE

## 2018-12-02 PROCEDURE — 25010000002 ENOXAPARIN PER 10 MG: Performed by: INTERNAL MEDICINE

## 2018-12-02 PROCEDURE — 94799 UNLISTED PULMONARY SVC/PX: CPT

## 2018-12-02 PROCEDURE — 83880 ASSAY OF NATRIURETIC PEPTIDE: CPT | Performed by: EMERGENCY MEDICINE

## 2018-12-02 PROCEDURE — 80053 COMPREHEN METABOLIC PANEL: CPT | Performed by: EMERGENCY MEDICINE

## 2018-12-02 PROCEDURE — 94640 AIRWAY INHALATION TREATMENT: CPT

## 2018-12-02 PROCEDURE — 84145 PROCALCITONIN (PCT): CPT | Performed by: EMERGENCY MEDICINE

## 2018-12-02 PROCEDURE — 87040 BLOOD CULTURE FOR BACTERIA: CPT | Performed by: EMERGENCY MEDICINE

## 2018-12-02 PROCEDURE — 36415 COLL VENOUS BLD VENIPUNCTURE: CPT

## 2018-12-02 PROCEDURE — 99285 EMERGENCY DEPT VISIT HI MDM: CPT

## 2018-12-02 PROCEDURE — 87486 CHLMYD PNEUM DNA AMP PROBE: CPT | Performed by: EMERGENCY MEDICINE

## 2018-12-02 PROCEDURE — 71046 X-RAY EXAM CHEST 2 VIEWS: CPT

## 2018-12-02 PROCEDURE — 84484 ASSAY OF TROPONIN QUANT: CPT | Performed by: EMERGENCY MEDICINE

## 2018-12-02 RX ORDER — PRAMIPEXOLE DIHYDROCHLORIDE 0.25 MG/1
0.12 TABLET ORAL NIGHTLY
Status: DISCONTINUED | OUTPATIENT
Start: 2018-12-02 | End: 2018-12-04 | Stop reason: HOSPADM

## 2018-12-02 RX ORDER — SODIUM CHLORIDE 0.9 % (FLUSH) 0.9 %
3-10 SYRINGE (ML) INJECTION AS NEEDED
Status: DISCONTINUED | OUTPATIENT
Start: 2018-12-02 | End: 2018-12-04 | Stop reason: HOSPADM

## 2018-12-02 RX ORDER — ALBUTEROL SULFATE 2.5 MG/3ML
2.5 SOLUTION RESPIRATORY (INHALATION)
Status: COMPLETED | OUTPATIENT
Start: 2018-12-02 | End: 2018-12-02

## 2018-12-02 RX ORDER — ASPIRIN 81 MG/1
81 TABLET ORAL DAILY
Status: DISCONTINUED | OUTPATIENT
Start: 2018-12-02 | End: 2018-12-04 | Stop reason: HOSPADM

## 2018-12-02 RX ORDER — IPRATROPIUM BROMIDE AND ALBUTEROL SULFATE 2.5; .5 MG/3ML; MG/3ML
3 SOLUTION RESPIRATORY (INHALATION)
Status: DISCONTINUED | OUTPATIENT
Start: 2018-12-02 | End: 2018-12-03

## 2018-12-02 RX ORDER — ACETAMINOPHEN 500 MG
1000 TABLET ORAL ONCE
Status: COMPLETED | OUTPATIENT
Start: 2018-12-02 | End: 2018-12-02

## 2018-12-02 RX ORDER — CILOSTAZOL 100 MG/1
100 TABLET ORAL 2 TIMES DAILY
Status: DISCONTINUED | OUTPATIENT
Start: 2018-12-02 | End: 2018-12-04 | Stop reason: HOSPADM

## 2018-12-02 RX ORDER — NEBIVOLOL 10 MG/1
10 TABLET ORAL DAILY
Status: DISCONTINUED | OUTPATIENT
Start: 2018-12-02 | End: 2018-12-04 | Stop reason: HOSPADM

## 2018-12-02 RX ORDER — TRAMADOL HYDROCHLORIDE 50 MG/1
50 TABLET ORAL EVERY 6 HOURS PRN
Status: DISCONTINUED | OUTPATIENT
Start: 2018-12-02 | End: 2018-12-04 | Stop reason: HOSPADM

## 2018-12-02 RX ORDER — ACETAMINOPHEN 325 MG/1
650 TABLET ORAL EVERY 4 HOURS PRN
Status: DISCONTINUED | OUTPATIENT
Start: 2018-12-02 | End: 2018-12-04 | Stop reason: HOSPADM

## 2018-12-02 RX ORDER — CHOLECALCIFEROL (VITAMIN D3) 125 MCG
10 CAPSULE ORAL NIGHTLY PRN
Status: DISCONTINUED | OUTPATIENT
Start: 2018-12-02 | End: 2018-12-04 | Stop reason: HOSPADM

## 2018-12-02 RX ORDER — BISACODYL 10 MG
10 SUPPOSITORY, RECTAL RECTAL DAILY PRN
Status: DISCONTINUED | OUTPATIENT
Start: 2018-12-02 | End: 2018-12-04 | Stop reason: HOSPADM

## 2018-12-02 RX ORDER — BISACODYL 5 MG/1
5 TABLET, DELAYED RELEASE ORAL DAILY PRN
Status: DISCONTINUED | OUTPATIENT
Start: 2018-12-02 | End: 2018-12-04 | Stop reason: HOSPADM

## 2018-12-02 RX ORDER — METHYLPREDNISOLONE SODIUM SUCCINATE 40 MG/ML
40 INJECTION, POWDER, LYOPHILIZED, FOR SOLUTION INTRAMUSCULAR; INTRAVENOUS EVERY 8 HOURS
Status: DISCONTINUED | OUTPATIENT
Start: 2018-12-02 | End: 2018-12-03

## 2018-12-02 RX ORDER — SODIUM CHLORIDE 0.9 % (FLUSH) 0.9 %
3 SYRINGE (ML) INJECTION EVERY 12 HOURS SCHEDULED
Status: DISCONTINUED | OUTPATIENT
Start: 2018-12-02 | End: 2018-12-04 | Stop reason: HOSPADM

## 2018-12-02 RX ORDER — METHYLPREDNISOLONE SODIUM SUCCINATE 125 MG/2ML
125 INJECTION, POWDER, LYOPHILIZED, FOR SOLUTION INTRAMUSCULAR; INTRAVENOUS ONCE
Status: COMPLETED | OUTPATIENT
Start: 2018-12-02 | End: 2018-12-02

## 2018-12-02 RX ORDER — CETIRIZINE HYDROCHLORIDE 10 MG/1
10 TABLET ORAL AS NEEDED
Status: DISCONTINUED | OUTPATIENT
Start: 2018-12-02 | End: 2018-12-04 | Stop reason: HOSPADM

## 2018-12-02 RX ORDER — SODIUM CHLORIDE 0.9 % (FLUSH) 0.9 %
10 SYRINGE (ML) INJECTION AS NEEDED
Status: DISCONTINUED | OUTPATIENT
Start: 2018-12-02 | End: 2018-12-04 | Stop reason: HOSPADM

## 2018-12-02 RX ADMIN — ALBUTEROL SULFATE 2.5 MG: 2.5 SOLUTION RESPIRATORY (INHALATION) at 08:43

## 2018-12-02 RX ADMIN — PRAMIPEXOLE DIHYDROCHLORIDE 0.12 MG: 0.25 TABLET ORAL at 20:44

## 2018-12-02 RX ADMIN — SODIUM CHLORIDE, PRESERVATIVE FREE 3 ML: 5 INJECTION INTRAVENOUS at 13:28

## 2018-12-02 RX ADMIN — Medication 10 MG: at 23:24

## 2018-12-02 RX ADMIN — CILOSTAZOL 100 MG: 100 TABLET ORAL at 13:27

## 2018-12-02 RX ADMIN — METHYLPREDNISOLONE SODIUM SUCCINATE 40 MG: 40 INJECTION, POWDER, FOR SOLUTION INTRAMUSCULAR; INTRAVENOUS at 20:45

## 2018-12-02 RX ADMIN — IPRATROPIUM BROMIDE AND ALBUTEROL SULFATE 3 ML: 2.5; .5 SOLUTION RESPIRATORY (INHALATION) at 13:53

## 2018-12-02 RX ADMIN — ENOXAPARIN SODIUM 40 MG: 40 INJECTION SUBCUTANEOUS at 13:28

## 2018-12-02 RX ADMIN — METHYLPREDNISOLONE SODIUM SUCCINATE 40 MG: 40 INJECTION, POWDER, FOR SOLUTION INTRAMUSCULAR; INTRAVENOUS at 13:28

## 2018-12-02 RX ADMIN — IPRATROPIUM BROMIDE AND ALBUTEROL SULFATE 3 ML: 2.5; .5 SOLUTION RESPIRATORY (INHALATION) at 19:17

## 2018-12-02 RX ADMIN — METHYLPREDNISOLONE SODIUM SUCCINATE 125 MG: 125 INJECTION, POWDER, FOR SOLUTION INTRAMUSCULAR; INTRAVENOUS at 08:37

## 2018-12-02 RX ADMIN — CILOSTAZOL 100 MG: 100 TABLET ORAL at 20:45

## 2018-12-02 RX ADMIN — SODIUM CHLORIDE, PRESERVATIVE FREE 3 ML: 5 INJECTION INTRAVENOUS at 20:45

## 2018-12-02 RX ADMIN — ACETAMINOPHEN 1000 MG: 500 TABLET, FILM COATED ORAL at 09:39

## 2018-12-02 RX ADMIN — ALBUTEROL SULFATE 2.5 MG: 2.5 SOLUTION RESPIRATORY (INHALATION) at 09:15

## 2018-12-02 NOTE — ED NOTES
Nursing report ED to floor  Grace Beauchamp  78 y.o.  female    HPI (triage note):   Chief Complaint   Patient presents with   • Shortness of Breath       Admitting doctor:   Deny Ayala MD    Admitting diagnosis:   The encounter diagnosis was COPD exacerbation (CMS/[facility]).    Code status:   Current Code Status     This patient does not have a recorded code status. Please follow your organizational policy for patients in this situation.          Allergies:   Ramipril; Morphine; Morphine and related; and Bactrim [sulfamethoxazole-trimethoprim]    Weight:       12/02/18  0835   Weight: 47.2 kg (104 lb)       Most recent vitals:   Vitals:    12/02/18 0849 12/02/18 0915 12/02/18 0919 12/02/18 0941   BP:    128/62   Pulse: 86  93 91   Resp: 16 20 22 20   Temp:       SpO2: 96%  96% 92%   Weight:       Height:           Active LDAs/IV Access:   Lines, Drains & Airways    Active LDAs     Name:   Placement date:   Placement time:   Site:   Days:    Peripheral IV 12/02/18 0827 Left Antecubital   12/02/18 0827    Antecubital   less than 1                Labs (abnormal labs have a star):   Labs Reviewed   COMPREHENSIVE METABOLIC PANEL - Abnormal; Notable for the following components:       Result Value    Glucose 155 (*)     BUN/Creatinine Ratio 25.3 (*)     All other components within normal limits    Narrative:     The MDRD GFR formula is only valid for adults with stable renal function between ages 18 and 70.   LACTIC ACID, PLASMA - Abnormal; Notable for the following components:    Lactate 2.1 (*)     All other components within normal limits   PROCALCITONIN - Abnormal; Notable for the following components:    Procalcitonin 0.06 (*)     All other components within normal limits    Narrative:     As a Marker for Sepsis (Non-Neonates):   1. <0.5 ng/mL represents a low risk of severe sepsis and/or septic shock.  1. >2 ng/mL represents a high risk of severe sepsis and/or septic shock.    As a Marker for Lower  "Respiratory Tract Infections that require antibiotic therapy:  PCT on Admission     Antibiotic Therapy             6-12 Hrs later  > 0.5                Strongly Recommended            >0.25 - <0.5         Recommended  0.1 - 0.25           Discouraged                   Remeasure/reassess PCT  <0.1                 Strongly Discouraged          Remeasure/reassess PCT      As 28 day mortality risk marker: \"Change in Procalcitonin Result\" (> 80 % or <=80 %) if Day 0 (or Day 1) and Day 4 values are available. Refer to [email or web address]   Change in PCT <=80 %   A decrease of PCT levels below or equal to 80 % defines a positive change in PCT test result representing a higher risk for 28-day all-cause mortality of patients diagnosed with severe sepsis or septic shock.  Change in PCT > 80 %   A decrease of PCT levels of more than 80 % defines a negative change in PCT result representing a lower risk for 28-day all-cause mortality of patients diagnosed with severe sepsis or septic shock.               CBC WITH AUTO DIFFERENTIAL - Abnormal; Notable for the following components:    Hemoglobin 16.5 (*)     Hematocrit 48.7 (*)     MCH 32.4 (*)     Neutrophil % 88.3 (*)     Lymphocyte % 6.8 (*)     Monocyte % 4.8 (*)     Eosinophil % 0.0 (*)     Lymphocytes, Absolute 0.60 (*)     All other components within normal limits   BNP (IN-HOUSE) - Normal    Narrative:     Among patients with dyspnea, NT-proBNP is highly sensitive for the detection of acute congestive heart failure. In addition NT-proBNP of <300 pg/ml effectively rules out acute congestive heart failure with 99% negative predictive value.   TROPONIN (IN-HOUSE) - Normal    Narrative:     Troponin T Reference Ranges:  Less than 0.03 ng/mL:    Negative for AMI  0.03 to 0.09 ng/mL:      Indeterminant for AMI  Greater than 0.09 ng/mL: Positive for AMI   BLOOD CULTURE   BLOOD CULTURE   RESPIRATORY PANEL, PCR   RAINBOW DRAW    Narrative:     The following " orders were created for panel order [place] Draw.  Procedure                               Abnormality         Status                     ---------                               -----------         ------                     Light Blue Top[[id number]]                                   Final result               Gold \[facility] [place] [facility]\"" - [facility][[id number]]                                   Final result                 Please view results for these tests on the individual orders.   LACTIC ACID REFLEX TIMER   CBC AND DIFFERENTIAL    Narrative:     The following orders were created for panel order CBC & Differential.  Procedure                               Abnormality         Status                     ---------                               -----------         ------                     CBC Auto Differential[[id number]]        Abnormal            Final result                 Please view results for these tests on the individual orders.   LIGHT BLUE TOP   GOLD TOP - SST       EKG:   ECG 12 Lead             Meds given in ED:   Medications   sodium chloride 0.9 % flush 10 mL (not administered)   methylPREDNISolone sodium succinate (SOLU-Medrol) injection 125 mg (125 mg Intravenous Given 12/2/18 0837)   albuterol (PROVENTIL) nebulizer solution 0.083% 2.5 mg/3mL (2.5 mg Nebulization Given 12/2/18 0915)   acetaminophen (TYLENOL) tablet 1,000 mg (1,000 mg Oral Given 12/2/18 0939)       Imaging results:  No radiology results for the last day    Ambulatory status:   - up with assist    Social issues:   Social History     Socioeconomic History   • Marital status:      Spouse name: Not on file   • Number of children: Not on file   • Years of education: Not on file   • Highest education level: Not on file   Social Needs   • Financial resource strain: Not on file   • Food insecurity - worry: Not on file   • Food insecurity - inability: Not on file   • Transportation needs - medical: Not on file   • Transportation needs - non-medical: Not on  file   Occupational History   • Not on file   Tobacco Use   • Smoking status: Former Smoker     Last attempt to quit: 2012     Years since quittin.0   • Smokeless tobacco: Never Used   • Tobacco comment: caffeine use   Substance and Sexual Activity   • Alcohol use: No   • Drug use: Defer   • Sexual activity: Defer   Other Topics Concern   • Not on file   Social History Narrative   • Not on file          Agnes Funk RN  [phone]

## 2018-12-02 NOTE — PLAN OF CARE
Problem: Patient Care Overview  Goal: Plan of Care Review  Outcome: Ongoing (interventions implemented as appropriate)   12/02/18 5872   Coping/Psychosocial   Plan of Care Reviewed With patient   Plan of Care Review   Progress improving   OTHER   Outcome Summary pt admitted from ER with copd. RSV positve. will give steroids and cont to monitor.

## 2018-12-02 NOTE — H&P
[facility]    CC: shortness of breath    HPI:  Mrs. Beauchamp is a 77yo WF with a history of COPD.  She presented to the ER today with a 4 day history of gradual onset, increasing shortness of breath, moderate in nature.  She was worse with exertion, bert reliefe with rest and nebulizer.  She was started on a low dose of prednisone a few days ago, but has failed to improved.      She used to get frequent acute exacerbations back when she smoked but since she quit she really hasn't had many.  She did have a lot of contact with children over thanksgiving.      Past Medical History:   Diagnosis Date   • AAA (abdominal aortic aneurysm) (CMS/HCC)    • Aneurysm (CMS/HCC)     saccular   • Aneurysm of aorta (CMS/HCC)     abdominal aorta and arch, descending aorta   • 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HCC)    • Arthritis    • Breast cancer (CMS/HCC)     right breast [id number] (snm) pMX ER/MA pos, Her-2/neg, Ki-67, 31%, oncotype recurrence score 19, invasive lobular carcinoma   • 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HCC)    • Carotid artery stenosis    • Closed fracture of three ribs of right side    • Cognitive disorder    • COPD (chronic obstructive pulmonary disease) (CMS/HCC)    • Coronary artery disease    • Depression    • Descending aortic arch aneurysm (CMS/HCC)    • Diabetes mellitus (CMS/HCC)    • Erythermalgia (CMS/HCC)    • GERD (gastroesophageal reflux disease)    • Hyperlipidemia    • Hypertension    • Low back pain    • Osteoarthritis    • Osteoporosis    • Prediabetes    • RLS (restless legs syndrome)    • Saccular aneurysm    • Stenosis of artery (CMS/HCC)     carotid   • Stroke (CMS/HCC)     Perioperatively with left hip replacement   • TIA (transient ischemic attack)    • Venous insufficiency      Social History     Socioeconomic History   • Marital status:      Spouse name: Not on file   • Number of children: Not on file   • Years of education: Not on file   • Highest education level: Not on file   Tobacco  Use   • Smoking status: Former Smoker     Last attempt to quit: 2012     Years since quittin.0   • Smokeless tobacco: Never Used   • Tobacco comment: caffeine use   Substance and Sexual Activity   • Alcohol use: No   • Drug use: Defer   • Sexual activity: Defer     Family History   Problem Relation Age of Onset   • Alzheimer's disease Mother    • Cancer Maternal Aunt    • Heart disease Father    • Heart failure Father    • Hypertension Father    • Diabetes Father    • Liver disease Sister    • Heart failure Brother    • Hypertension Brother    • Alcohol abuse Brother    • No Known Problems Maternal Grandmother    • No Known Problems Maternal Grandfather    • No Known Problems Paternal Grandmother    • No Known Problems Paternal Grandfather    • Diabetes Brother    • Brain cancer Brother    • Heart disease Brother      Meds: reviewed  ALL: list of 4 as per chart  ROS:   Constitutional: Negative for fever.   HENT: Positive for congestion and rhinorrhea. Negative for sore throat.    Eyes: Negative.    Respiratory: Positive for cough (productive with yellow sputum) and shortness of breath.    Cardiovascular: Negative for chest pain.   Gastrointestinal: Negative for abdominal pain, diarrhea and vomiting.   Genitourinary: Negative for dysuria.   Musculoskeletal: Negative for neck pain.   Skin: Negative for rash.   Allergic/Immunologic: Negative.    Neurological: Negative for weakness, numbness and headaches.   Hematological: Negative.    Psychiatric/Behavioral: Negative.    All other systems reviewed and are negative.    Vital Sign Min/Max for last 24 hours  Temp  Min: 97.9 °F (36.6 °C)  Max: 98.6 °F (37 °C)   BP  Min: 117/64  Max: 200/98   Pulse  Min: 84  Max: 106   Resp  Min: 16  Max: 24   SpO2  Min: 92 %  Max: 98 %   No Data Recorded   Weight  Min: 46 kg (101 lb 6.4 oz)  Max: 47.2 kg (104 lb)     GEN:   appears ill, , AxOx3  HEENT: PERRL, EOMI, no icterus, mmm, no jvd, trachea midline, neck supple  CHEST:  decrased, coarse bilat, + wheezes, no crackles, no use of accessory muscles  CV: tachy, regualr, no m/g/r  ABD: soft, nt, nd +bs, no hepatosplenomegaly  EXT: no c/c/e  SKIN: no rashes, no xanthomas, nl turgor  LYMPH: no palpable cervical or supraclavicular lymphadenopathy  NEURO: CN 2-12 intact and symmetric bilaterally  PSYCH: nl affect, nl orientation, nl judgement, nl mood  MSK: no kyphoscoliosis, 5/5 strength ue and le bilateraly    Labs:  Lactate 2.6  rsv +  Cr 0.79  bnp 1085  procal 0.06  Wbc 8.82  hgb 16.5  plts 280    CXR: copd but no acute disease    A/P:  1. Copd with ae  2. rsv bronchitis  3. htn -- continue home meds  4. Sepsis -- 2/2 rsv, will give gentle hydration, no need for excessive fluids or  Continued lactic acid monitoring.

## 2018-12-02 NOTE — ED PROVIDER NOTES
EMERGENCY DEPARTMENT ENCOUNTER    CHIEF COMPLAINT  Chief Complaint: SOA  History given by: Pt  History limited by: Nothing  Room Number: 02/02  PMD: Miller Castañeda MD      HPI:  Pt is a 78 y.o. female who presents complaining of SOA for the last 4 days. She reports she initially had nasal drainage and congestion that has since worsened to chest congestion and SOA. Pt also c/o productive cough with yellow sputum. Daughter reports that the pt was seen at [facility] [place] 2 days ago and had a negative CXR and started on prednisone 10 mg bid. Pt reports she is not on oxygen at home, but has been using a nebulizer for the last few days. She has a hx of COPD, and a prior hx of smoking. Pt notes she had one breathing treatment en route by EMS.     Duration:  4 days  Onset: gradual  Timing: constant  Quality: SOA  Intensity/Severity: moderate  Progression: worsening  Associated Symptoms: nasal drainage, congestion, productive cough  Aggravating Factors: none  Alleviating Factors: none  Previous Episodes: Pt has a hx of COPD and a prior hx of smoking  Treatment before arrival: Pt reports she was seen at [facility] [place] two days ago and had a negative CXR and started on prednisone 10 mg bid    PAST MEDICAL HISTORY  Active Ambulatory Problems     Diagnosis Date Noted   • History of breast cancer 01/29/2016   • Stenosis of carotid artery 01/29/2016   • Chronic obstructive pulmonary disease (CMS/[facility]) 01/29/2016   • Closed fracture of multiple ribs 01/29/2016   • Cognitive disorder 01/29/2016   • Erythromelalgia (CMS/[facility]) 01/29/2016   • Hyperlipidemia 01/29/2016   • Hypertension 01/29/2016   • Primary osteoarthritis involving multiple joints 01/29/2016   • Osteoporosis 01/29/2016   • Restless legs syndrome 01/29/2016   • Cerebral aneurysm 01/29/2016   • Temporary cerebral vascular dysfunction 01/29/2016   • S/P CABG x 1 04/22/2016   • Type 2 diabetes mellitus without complication, without long-term current use of insulin (CMS/[facility]) 04/27/2016   • S/P  ascending aortic aneurysm repair 04/22/2017   • 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   • 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   • Claudication of both lower extremities (CMS/[facility]) 01/31/2018   • 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   • Ventral hernia without obstruction or gangrene 05/09/2018   • Breast cancer, stage 1, estrogen receptor positive (CMS/[facility]) 10/17/2012   • Imbalance 12/18/2017   • Arthritis of right hip 11/06/2018   • Arthritis of right knee 11/06/2018   • Chondrocalcinosis 11/06/2018   • Chronic pain of right knee 11/06/2018   • Degenerative disc disease, lumbar 11/06/2018     Resolved Ambulatory Problems     Diagnosis Date Noted   • Abdominal aortic aneurysm (CMS/[facility]) 01/29/2016   • Aneurysm of thoracic aorta (CMS/[facility]) 01/29/2016   • Depression 01/29/2016   • Urinary tract infection 01/29/2016     Past Medical History:   Diagnosis Date   • AAA (abdominal aortic aneurysm) (CMS/[facility])    • Aneurysm (CMS/[facility])    • Aneurysm of aorta (CMS/[facility])    • 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[facility])    • Arthritis    • Breast cancer (CMS/[facility])    • 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[facility])    • Carotid artery stenosis    • Closed fracture of three ribs of right side    • Cognitive disorder    • COPD (chronic obstructive pulmonary disease) (CMS/[facility])    • Coronary artery disease    • Depression    • Descending aortic arch aneurysm (CMS/[facility])    • Diabetes mellitus (CMS/[facility])    • Erythermalgia (CMS/[facility])    • GERD (gastroesophageal reflux disease)    • Hyperlipidemia    • Hypertension    • Low back pain    • Osteoarthritis    • Osteoporosis    • Prediabetes    • RLS (restless legs syndrome)    • Saccular aneurysm    • Stenosis of artery (CMS/[facility])    • Stroke (CMS/[facility])    • TIA (transient ischemic attack)    • Venous insufficiency        PAST SURGICAL HISTORY  Past Surgical History:   Procedure Laterality Date   • AORTIC VALVE REPAIR/REPLACEMENT N/A 02/23/2016    ascending aortic replacement with 24 mm  graft, Dr. Ontiveros   • APPENDECTOMY  1977   • BREAST BIOPSY Right 09/16/2012    vacuum assisted core bx of the right breast   • CARDIAC CATHETERIZATION N/A 01/06/2016    Dr. Tres De Los Santos   • CARDIAC SURGERY  02/2016    Dr. Ontiveros/Dr. De Los Santos   • CATARACT EXTRACTION Bilateral     [facility]   • COLONOSCOPY N/A 10/2002    Dr. Negro   • CORONARY ARTERY BYPASS GRAFT  02/23/2016    X 1 Dr Ontiveros   • CYST REMOVAL Right 01/25/2013    right palm excision of retinacular cyst, Dr. Karla Fish   • EPIDURAL BLOCK     • LAPAROSCOPIC CHOLECYSTECTOMY N/A 08/04/2004    Dr. JOEY Sheth   • MASTECTOMY Right 09/28/2012   • ORIF HIP FRACTURE Left 03/27/2005    w/ AMBI compression screw, Dr. Victor M Cabral   • RECTOVAGINAL FISTULA REPAIR  1965   • TUBAL ABDOMINAL LIGATION         FAMILY HISTORY  Family History   Problem Relation Age of Onset   • Alzheimer's disease Mother    • Cancer Maternal Aunt    • Heart disease Father    • Heart failure Father    • Hypertension Father    • Diabetes Father    • Liver disease Sister    • Heart failure Brother    • Hypertension Brother    • Alcohol abuse Brother    • No Known Problems Maternal Grandmother    • No Known Problems Maternal Grandfather    • No Known Problems Paternal Grandmother    • No Known Problems Paternal Grandfather    • Diabetes Brother    • Brain cancer Brother    • Heart disease Brother        SOCIAL HISTORY  Social History     Socioeconomic History   • Marital status:      Spouse name: Not on file   • Number of children: Not on file   • Years of education: Not on file   • Highest education level: Not on file   Social Needs   • Financial resource strain: Not on file   • Food insecurity - worry: Not on file   • Food insecurity - inability: Not on file   • Transportation needs - medical: Not on file   • Transportation needs - non-medical: Not on file   Occupational History   • Not on file   Tobacco Use   • Smoking status: Former Smoker     Last attempt  to quit: 2012     Years since quittin.0   • Smokeless tobacco: Never Used   • Tobacco comment: caffeine use   Substance and Sexual Activity   • Alcohol use: No   • Drug use: Defer   • Sexual activity: Defer   Other Topics Concern   • Not on file   Social History Narrative   • Not on file       ALLERGIES  Ramipril; Morphine; Morphine and related; and Bactrim [sulfamethoxazole-trimethoprim]    REVIEW OF SYSTEMS  Review of Systems   Constitutional: Negative for fever.   HENT: Positive for congestion and rhinorrhea. Negative for sore throat.    Eyes: Negative.    Respiratory: Positive for cough (productive with yellow sputum) and shortness of breath.    Cardiovascular: Negative for chest pain.   Gastrointestinal: Negative for abdominal pain, diarrhea and vomiting.   Genitourinary: Negative for dysuria.   Musculoskeletal: Negative for neck pain.   Skin: Negative for rash.   Allergic/Immunologic: Negative.    Neurological: Negative for weakness, numbness and headaches.   Hematological: Negative.    Psychiatric/Behavioral: Negative.    All other systems reviewed and are negative.      PHYSICAL EXAM  ED Triage Vitals [18 0809]   Temp Heart Rate Resp BP SpO2   98.6 °F (37 °C) 106 19 (!) 200/98 93 %      Temp src Heart Rate Source Patient Position BP Location FiO2 (%)   -- -- -- -- --       Physical Exam   Constitutional: She is oriented to person, place, and time. No distress.   HENT:   Head: Normocephalic and atraumatic.   Eyes: EOM are normal. Pupils are equal, round, and reactive to light.   Neck: Normal range of motion. Neck supple.   Cardiovascular: Normal rate, regular rhythm and normal heart sounds.   Pulmonary/Chest: Effort normal. No respiratory distress. She has decreased breath sounds. She has wheezes (occasional). She has rhonchi (diffuse). She has no rales.   Abdominal: Soft. There is no tenderness. There is no rebound and no guarding.   Musculoskeletal: Normal range of motion. She exhibits no  edema.   Neurological: She is alert and oriented to person, place, and time. She has normal sensation and normal strength.   Skin: Skin is warm and dry. No rash noted.   Psychiatric: Mood and affect normal.   Nursing note and vitals reviewed.      LAB RESULTS  Lab Results (last 24 hours)     Procedure Component Value Units Date/Time    CBC & Differential [[id number]] Collected:  12/02/18 0836    Specimen:  Blood Updated:  12/02/18 0853    Narrative:       The following orders were created for panel order CBC & Differential.  Procedure                               Abnormality         Status                     ---------                               -----------         ------                     CBC Auto Differential[[id number]]        Abnormal            Final result                 Please view results for these tests on the individual orders.    Comprehensive Metabolic Panel [[id number]]  (Abnormal) Collected:  12/02/18 0836    Specimen:  Blood Updated:  12/02/18 0914     Glucose 155 mg/dL      BUN 20 mg/dL      Creatinine 0.79 mg/dL      Sodium 142 mmol/L      Potassium 3.9 mmol/L      Chloride 99 mmol/L      CO2 26.8 mmol/L      Calcium 9.9 mg/dL      Total Protein 7.8 g/dL      Albumin 4.40 g/dL      ALT (SGPT) 16 U/L      AST (SGOT) 19 U/L      Alkaline Phosphatase 89 U/L      Total Bilirubin 0.6 mg/dL      eGFR Non African Amer 70 mL/min/1.73      Globulin 3.4 gm/dL      A/G Ratio 1.3 g/dL      BUN/Creatinine Ratio 25.3     Anion Gap 16.2 mmol/L     Narrative:       The MDRD GFR formula is only valid for adults with stable renal function between ages 18 and 70.    BNP [[id number]]  (Normal) Collected:  12/02/18 0836    Specimen:  Blood Updated:  12/02/18 0913     proBNP 1,085.0 pg/mL     Narrative:       Among patients with dyspnea, NT-proBNP is highly sensitive for the detection of acute congestive heart failure. In addition NT-proBNP of <300 pg/ml effectively rules out acute congestive heart failure with 99%  "negative predictive value.    Troponin [[id number]]  (Normal) Collected:  12/02/18 0836    Specimen:  Blood Updated:  12/02/18 0914     Troponin T <0.010 ng/mL     Narrative:       Troponin T Reference Ranges:  Less than 0.03 ng/mL:    Negative for AMI  0.03 to 0.09 ng/mL:      Indeterminant for AMI  Greater than 0.09 ng/mL: Positive for AMI    Blood Culture - Blood, Arm, Left [[id number]] Collected:  12/02/18 0836    Specimen:  Blood from Arm, Left Updated:  12/02/18 0844    Lactic Acid, Plasma [[id number]]  (Abnormal) Collected:  12/02/18 0836    Specimen:  Blood Updated:  12/02/18 0905     Lactate 2.1 mmol/L     Procalcitonin [[id number]]  (Abnormal) Collected:  12/02/18 0836    Specimen:  Blood Updated:  12/02/18 0920     Procalcitonin 0.06 ng/mL     Narrative:       As a Marker for Sepsis (Non-Neonates):   1. <0.5 ng/mL represents a low risk of severe sepsis and/or septic shock.  1. >2 ng/mL represents a high risk of severe sepsis and/or septic shock.    As a Marker for Lower Respiratory Tract Infections that require antibiotic therapy:  PCT on Admission     Antibiotic Therapy             6-12 Hrs later  > 0.5                Strongly Recommended            >0.25 - <0.5         Recommended  0.1 - 0.25           Discouraged                   Remeasure/reassess PCT  <0.1                 Strongly Discouraged          Remeasure/reassess PCT      As 28 day mortality risk marker: \"Change in Procalcitonin Result\" (> 80 % or <=80 %) if Day 0 (or Day 1) and Day 4 values are available. Refer to http://www.[employer]-pct-calculator.com/   Change in PCT <=80 %   A decrease of PCT levels below or equal to 80 % defines a positive change in PCT test result representing a higher risk for 28-day all-cause mortality of patients diagnosed with severe sepsis or septic shock.  Change in PCT > 80 %   A decrease of PCT levels of more than 80 % defines a negative change in PCT result representing a lower risk for 28-day all-cause mortality of " patients diagnosed with severe sepsis or septic shock.                CBC Auto Differential [[id number]]  (Abnormal) Collected:  12/02/18 0836    Specimen:  Blood Updated:  12/02/18 0853     WBC 8.82 10*3/mm3      RBC 5.09 10*6/mm3      Hemoglobin 16.5 g/dL      Hematocrit 48.7 %      MCV 95.7 fL      MCH 32.4 pg      MCHC 33.9 g/dL      RDW 12.7 %      RDW-SD 44.1 fl      MPV 9.1 fL      Platelets 280 10*3/mm3      Neutrophil % 88.3 %      Lymphocyte % 6.8 %      Monocyte % 4.8 %      Eosinophil % 0.0 %      Basophil % 0.1 %      Immature Grans % 0.2 %      Neutrophils, Absolute 7.79 10*3/mm3      Lymphocytes, Absolute 0.60 10*3/mm3      Monocytes, Absolute 0.42 10*3/mm3      Eosinophils, Absolute 0.00 10*3/mm3      Basophils, Absolute 0.01 10*3/mm3      Immature Grans, Absolute 0.02 10*3/mm3     Lactic Acid, Reflex Timer (This will reflex a repeat order 3-3:15 hours after ordered.) [[id number]] Collected:  12/02/18 0836    Specimen:  Blood Updated:  12/02/18 0905    Respiratory Panel, PCR - Swab, Nasopharynx [[id number]] Collected:  12/02/18 0941    Specimen:  Swab from Nasopharynx Updated:  12/02/18 0944    Blood Culture - Blood, Arm, Left [[id number]] Collected:  12/02/18 0946    Specimen:  Blood from Arm, Left Updated:  12/02/18 0951          I ordered the above labs and reviewed the results    RADIOLOGY  XR Chest 2 View      FINDINGS: There is severe COPD with marked hyperinflation and there is  some fibrotic scarring at the bases unchanged from 06/24/2016. The heart  remains mildly enlarged with sternal wires from previous cardiac  surgery. No acute abnormality is seen.        I ordered the above noted radiological studies. Interpreted by radiologist. Reviewed by me in PACS.       PROCEDURES  Procedures    EKG          EKG time: 0831  Rhythm/Rate: nsr, 90  P waves and OR: LAE  QRS, axis: normal   ST and T waves: nonspecific ST and T wave changes     Interpreted Contemporaneously by me, independently  viewed  unchanged compared to prior 3/2/16      PROGRESS AND CONSULTS     0825 - Solu-medrol and albuterol ordered. Lab work, CXR, and EKG ordered for further evaluation.     0927 - Tylenol ordered.     0943 - Rechecked pt. HR 94, O2 sats 92% on RA. Informed pt of the results of her lab work and her CXR which shows COPD changes. Informed pt of the need for admission. Pt understands and agrees with plan. All questions answered.     0946 - Call placed with pulmonology.     1000 - Discussed pt care with Dr. Ayala (Pulmonology) who agrees to admit the pt.       MEDICAL DECISION MAKING  Results were reviewed/discussed with the patient and they were also made aware of online access. Pt also made aware that some labs, such as cultures, will not be resulted during ER visit and follow up with PMD is necessary.     MDM  Number of Diagnoses or Management Options  COPD exacerbation (CMS/HCC):      Amount and/or Complexity of Data Reviewed  Clinical lab tests: ordered and reviewed (Lactate - 2.1  Troponin - negative  WBC - 8.82)  Tests in the radiology section of CPT®: ordered and reviewed (CXR - COPD, otherwise negative acute)  Tests in the medicine section of CPT®: reviewed and ordered (See EKG procedure note.)  Decide to obtain previous medical records or to obtain history from someone other than the patient: yes  Discuss the patient with other providers: yes (Dr. Ayala (Pulmonology))           DIAGNOSIS  Final diagnoses:   COPD exacerbation (CMS/HCC)       DISPOSITION  ADMISSION    Discussed treatment plan and reason for admission with pt/family and admitting physician.  Pt/family voiced understanding of the plan for admission for further testing/treatment as needed.     Latest Documented Vital Signs:  As of 10:00 AM  BP- 128/62 HR- 91 Temp- 98.6 °F (37 °C) O2 sat- 92%    --  Documentation assistance provided by shanel Ye for Dr. Bullock.  Information recorded by the shanel was done at my direction and has  been verified and validated by me.     Jhon Ye  12/02/18 1001       Carlos Enrique Bullock MD  12/02/18 5735

## 2018-12-02 NOTE — ED NOTES
Pt c/o SOA x several days. Pt reports she was seen at PCP 1 week ago, and diagnosed with URI and congestion. Pt c/o increasing weakness since then.      Kristin Diaz, RN  12/02/18 0650

## 2018-12-03 PROCEDURE — 25010000002 ENOXAPARIN PER 10 MG: Performed by: INTERNAL MEDICINE

## 2018-12-03 PROCEDURE — 94799 UNLISTED PULMONARY SVC/PX: CPT

## 2018-12-03 PROCEDURE — 25010000002 METHYLPREDNISOLONE PER 40 MG: Performed by: INTERNAL MEDICINE

## 2018-12-03 RX ORDER — IPRATROPIUM BROMIDE AND ALBUTEROL SULFATE 2.5; .5 MG/3ML; MG/3ML
3 SOLUTION RESPIRATORY (INHALATION)
Status: DISCONTINUED | OUTPATIENT
Start: 2018-12-03 | End: 2018-12-04 | Stop reason: HOSPADM

## 2018-12-03 RX ORDER — ECHINACEA PURPUREA EXTRACT 125 MG
2 TABLET ORAL AS NEEDED
Status: DISCONTINUED | OUTPATIENT
Start: 2018-12-03 | End: 2018-12-04 | Stop reason: HOSPADM

## 2018-12-03 RX ORDER — GLYCOPYRROLATE AND FORMOTEROL FUMARATE 9; 4.8 UG/1; UG/1
AEROSOL, METERED RESPIRATORY (INHALATION)
Qty: 10.7 G | Refills: 3 | OUTPATIENT
Start: 2018-12-03 | End: 2018-12-04 | Stop reason: HOSPADM

## 2018-12-03 RX ORDER — PREDNISONE 20 MG/1
40 TABLET ORAL
Status: DISCONTINUED | OUTPATIENT
Start: 2018-12-04 | End: 2018-12-04 | Stop reason: HOSPADM

## 2018-12-03 RX ADMIN — NEBIVOLOL HYDROCHLORIDE 10 MG: 10 TABLET ORAL at 09:44

## 2018-12-03 RX ADMIN — ASPIRIN 81 MG: 81 TABLET, DELAYED RELEASE ORAL at 09:44

## 2018-12-03 RX ADMIN — IPRATROPIUM BROMIDE AND ALBUTEROL SULFATE 3 ML: 2.5; .5 SOLUTION RESPIRATORY (INHALATION) at 07:36

## 2018-12-03 RX ADMIN — ENOXAPARIN SODIUM 40 MG: 40 INJECTION SUBCUTANEOUS at 11:26

## 2018-12-03 RX ADMIN — IPRATROPIUM BROMIDE AND ALBUTEROL SULFATE 3 ML: 2.5; .5 SOLUTION RESPIRATORY (INHALATION) at 21:15

## 2018-12-03 RX ADMIN — METHYLPREDNISOLONE SODIUM SUCCINATE 40 MG: 40 INJECTION, POWDER, FOR SOLUTION INTRAMUSCULAR; INTRAVENOUS at 11:27

## 2018-12-03 RX ADMIN — SODIUM CHLORIDE, PRESERVATIVE FREE 3 ML: 5 INJECTION INTRAVENOUS at 09:45

## 2018-12-03 RX ADMIN — CETIRIZINE HYDROCHLORIDE 10 MG: 10 TABLET, FILM COATED ORAL at 09:58

## 2018-12-03 RX ADMIN — TRAMADOL HYDROCHLORIDE 50 MG: 50 TABLET, FILM COATED ORAL at 09:44

## 2018-12-03 RX ADMIN — Medication 10 MG: at 23:11

## 2018-12-03 RX ADMIN — CILOSTAZOL 100 MG: 100 TABLET ORAL at 09:44

## 2018-12-03 RX ADMIN — IPRATROPIUM BROMIDE AND ALBUTEROL SULFATE 3 ML: 2.5; .5 SOLUTION RESPIRATORY (INHALATION) at 11:12

## 2018-12-03 RX ADMIN — SODIUM CHLORIDE, PRESERVATIVE FREE 3 ML: 5 INJECTION INTRAVENOUS at 23:12

## 2018-12-03 RX ADMIN — CILOSTAZOL 100 MG: 100 TABLET ORAL at 20:39

## 2018-12-03 RX ADMIN — PRAMIPEXOLE DIHYDROCHLORIDE 0.12 MG: 0.25 TABLET ORAL at 20:39

## 2018-12-03 RX ADMIN — METHYLPREDNISOLONE SODIUM SUCCINATE 40 MG: 40 INJECTION, POWDER, FOR SOLUTION INTRAMUSCULAR; INTRAVENOUS at 02:31

## 2018-12-03 RX ADMIN — METHYLPREDNISOLONE SODIUM SUCCINATE 40 MG: 40 INJECTION, POWDER, FOR SOLUTION INTRAMUSCULAR; INTRAVENOUS at 19:19

## 2018-12-03 NOTE — PLAN OF CARE
Problem: Patient Care Overview  Goal: Plan of Care Review   12/03/18 0103   OTHER   Outcome Summary Pt has no complaints of pain. Pt positive for RSV and contact isolation started. Steroids and breathing tx started. VSS. Continue to monitor. Safety maintained.

## 2018-12-03 NOTE — PROGRESS NOTES
Discharge Planning Assessment  [facility]     Patient Name: Grace Beauchamp  MRN: [id number]  Today's Date: 12/3/2018    Admit Date: 12/2/2018    Discharge Needs Assessment     Row Name 12/03/18 1150       Living Environment    Lives With  alone    Current Living Arrangements  home/apartment/condo    Primary Care Provided by  self;child(evelio)    Provides Primary Care For  no one    Family Caregiver if Needed  child(evelio), adult    Quality of Family Relationships  supportive       Resource/Environmental Concerns    Resource/Environmental Concerns  none       Transition Planning    Patient/Family Anticipates Transition to  home    Patient/Family Anticipated Services at Transition  none    Transportation Anticipated  family or friend will provide       Discharge Needs Assessment    Equipment Currently Used at Home  cane, quad;walker, rolling    Anticipated Changes Related to Illness  none        Discharge Plan     Row Name 12/03/18 1159       Plan    Plan  Home with no needs    Patient/Family in Agreement with Plan  yes    Plan Comments  IMM  12/03  CCP spoke to patient at bedside.    CCP role explained and discharge planning discussed.  IMM  noted.  Face sheet verified.  Pt's emergency contact is her daughter Maddy, [phone]. Her PCP is Dr Miller Castañeda.  Pt lives in a one story condominium alone.  She is independent with ADL's.  She uses a cane or rolling walker to ambulate.   Pt  obtains her medication [facility] [employer] pharmacy on [address] [facility] [facility].  Pt has no history of Home Health .  She has  been to rehab. Plan is home   No other needs voiced.  CCP following         Destination      No service coordination in this encounter.      Durable Medical Equipment      No service coordination in this encounter.      Dialysis/Infusion      No service coordination in this encounter.      Home Medical Care      No service coordination in this encounter.      Community Resources      No service coordination in this encounter.           Demographic Summary    No documentation.       Functional Status     Row Name 12/03/18 1150       Mental Status    General Appearance WDL  WDL       Mental Status Summary    Recent Changes in Mental Status/Cognitive Functioning  no changes       Employment/    Employment Status  retired        Psychosocial    No documentation.       Abuse/Neglect    No documentation.       Legal    No documentation.       Substance Abuse    No documentation.       Patient Forms    No documentation.           Samantha Mary RN

## 2018-12-03 NOTE — PLAN OF CARE
Problem: Patient Care Overview  Goal: Plan of Care Review  Outcome: Ongoing (interventions implemented as appropriate)      Problem: Chronic Obstructive Pulmonary Disease (Adult)  Goal: Signs and Symptoms of Listed Potential Problems Will be Absent, Minimized or Managed (Chronic Obstructive Pulmonary Disease)  Outcome: Ongoing (interventions implemented as appropriate)

## 2018-12-04 VITALS
TEMPERATURE: 96.5 F | WEIGHT: 101.4 LBS | DIASTOLIC BLOOD PRESSURE: 98 MMHG | BODY MASS INDEX: 22.81 KG/M2 | RESPIRATION RATE: 16 BRPM | HEART RATE: 82 BPM | HEIGHT: 56 IN | OXYGEN SATURATION: 94 % | SYSTOLIC BLOOD PRESSURE: 161 MMHG

## 2018-12-04 PROBLEM — J44.1 COPD EXACERBATION (HCC): Status: RESOLVED | Noted: 2018-12-02 | Resolved: 2018-12-04

## 2018-12-04 PROCEDURE — 25010000002 ENOXAPARIN PER 10 MG: Performed by: INTERNAL MEDICINE

## 2018-12-04 PROCEDURE — 94799 UNLISTED PULMONARY SVC/PX: CPT

## 2018-12-04 PROCEDURE — 63710000001 PREDNISONE PER 1 MG: Performed by: INTERNAL MEDICINE

## 2018-12-04 RX ORDER — CETIRIZINE HYDROCHLORIDE 10 MG/1
10 TABLET ORAL AS NEEDED
Qty: 30 TABLET | Refills: 0 | Status: SHIPPED | OUTPATIENT
Start: 2018-12-04

## 2018-12-04 RX ORDER — ALBUTEROL SULFATE 2.5 MG/3ML
2.5 SOLUTION RESPIRATORY (INHALATION) 4 TIMES DAILY PRN
Qty: 125 VIAL | Refills: 11 | Status: SHIPPED | OUTPATIENT
Start: 2018-12-04 | End: 2019-04-22 | Stop reason: HOSPADM

## 2018-12-04 RX ORDER — CHOLECALCIFEROL (VITAMIN D3) 125 MCG
10 CAPSULE ORAL NIGHTLY PRN
Qty: 30 TABLET | Refills: 0 | Status: SHIPPED | OUTPATIENT
Start: 2018-12-04 | End: 2020-10-09 | Stop reason: HOSPADM

## 2018-12-04 RX ORDER — PREDNISONE 20 MG/1
20 TABLET ORAL
Qty: 3 TABLET | Refills: 0 | Status: SHIPPED | OUTPATIENT
Start: 2018-12-05 | End: 2018-12-13

## 2018-12-04 RX ADMIN — CILOSTAZOL 100 MG: 100 TABLET ORAL at 09:43

## 2018-12-04 RX ADMIN — NEBIVOLOL HYDROCHLORIDE 10 MG: 10 TABLET ORAL at 09:42

## 2018-12-04 RX ADMIN — IPRATROPIUM BROMIDE AND ALBUTEROL SULFATE 3 ML: 2.5; .5 SOLUTION RESPIRATORY (INHALATION) at 07:53

## 2018-12-04 RX ADMIN — ASPIRIN 81 MG: 81 TABLET, DELAYED RELEASE ORAL at 09:43

## 2018-12-04 RX ADMIN — ENOXAPARIN SODIUM 40 MG: 40 INJECTION SUBCUTANEOUS at 09:57

## 2018-12-04 RX ADMIN — SODIUM CHLORIDE, PRESERVATIVE FREE 3 ML: 5 INJECTION INTRAVENOUS at 09:44

## 2018-12-04 RX ADMIN — PREDNISONE 40 MG: 20 TABLET ORAL at 09:43

## 2018-12-04 NOTE — DISCHARGE SUMMARY
[facility]    Admit date: 12/2/2018  Discharge date: 12/4/2018    Admission/discharge diagnosis:  1. Copd with ae  2. RSV bronchitis  3. HTN  4. Sepsis 2/2 viral syndrome  5. Upper respiratory infection  6. Sinus congestion  7. rhinorhea    HPI:  Mrs. Beauchamp is a 79yo WF with a history of COPD.  She presented to the ER today with a 4 day history of gradual onset, increasing shortness of breath, moderate in nature.  She was worse with exertion, bert reliefe with rest and nebulizer.  She was started on a low dose of prednisone a few days ago, but has failed to improved.       She used to get frequent acute exacerbations back when she smoked but since she quit she really hasn't had many.  She did have a lot of contact with children over thanksgiving.         Hospital course:  Mrs. Beauchamp improved uneventfully    discharge medications:  Please see med rec    Activity:  Pre admission    Diet:  Pre admission    Follow up:  With me in office 2 weeks with pft's    Dispo:  Home    Greater than 30 mins spent in discharging patient

## 2018-12-04 NOTE — PLAN OF CARE
Problem: Patient Care Overview  Goal: Plan of Care Review  Outcome: Ongoing (interventions implemented as appropriate)   12/03/18 4465   Coping/Psychosocial   Plan of Care Reviewed With patient   Plan of Care Review   Progress improving   OTHER   Outcome Summary pt states pain is better this evening- zyrtec and ultram helped, breathing easier, sats in 90's on room air, vss, safety maintained, continue to monitor

## 2018-12-04 NOTE — PROGRESS NOTES
[facility]     Mar/chart reviewed  F/u copd with ae.  Still with some cough    Vital Sign Min/Max for last 24 hours  Temp  Min: 96.5 °F (35.8 °C)  Max: 98.1 °F (36.7 °C)   BP  Min: 116/64  Max: 139/78   Pulse  Min: 90  Max: 112   Resp  Min: 18  Max: 18   SpO2  Min: 91 %  Max: 99 %   No Data Recorded   No Data Recorded     Nad, axox3,   perrl, eomi, no icterus,  mmm, no jvd, trachea midline, neck supple,  chest decreased bilaterally, no crackles, + wheezes,   tachy  soft, nt, nd +bs,  no c/c/ e    Labs:  Nothing new    A/P:  1. Copd with ae -- steroid,s bronchotdilators  2. rsv bronchitis --pulm toilet  3. htn -- continue home meds  4. Sepsis -- 2/2 rsv, will give gentle hydration, no need for excessive fluids or  Continued lactic acid monitoring    Probably can go home tomorrow.

## 2018-12-05 ENCOUNTER — READMISSION MANAGEMENT (OUTPATIENT)
Dept: CALL CENTER | Facility: HOSPITAL | Age: 78
End: 2018-12-05

## 2018-12-05 NOTE — PROGRESS NOTES
Case Management Discharge Note    Final Note: Home no needs    Destination      No service has been selected for the patient.      Durable Medical Equipment      No service has been selected for the patient.      Dialysis/Infusion      No service has been selected for the patient.      Home Medical Care      No service has been selected for the patient.      Community Resources      No service has been selected for the patient.             Final Discharge Disposition Code: 01 - home or self-care

## 2018-12-06 ENCOUNTER — READMISSION MANAGEMENT (OUTPATIENT)
Dept: CALL CENTER | Facility: HOSPITAL | Age: 78
End: 2018-12-06

## 2018-12-06 NOTE — OUTREACH NOTE
Prep Survey      Responses   Facility patient discharged from?  [facility]   Is patient eligible?  Yes   Discharge diagnosis  Copd with ae   Does the patient have one of the following disease processes/diagnoses(primary or secondary)?  COPD/Pneumonia   Does the patient have Home health ordered?  No   Is there a DME ordered?  No   Prep survey completed?  Yes          Nalini Iglesias RN

## 2018-12-06 NOTE — OUTREACH NOTE
COPD/PN Week 1 Survey      Responses   Facility patient discharged from?  [facility]   Does the patient have one of the following disease processes/diagnoses(primary or secondary)?  COPD/Pneumonia   Is there a successful TCM telephone encounter documented?  No   Was the primary reason for admission:  COPD exacerbation   Week 1 attempt successful?  Yes   Call start time  1457   Call end time  1502   Discharge diagnosis  Copd with ae   Meds reviewed with patient/caregiver?  Yes   Is the patient having any side effects they believe may be caused by any medication additions or changes?  No   Does the patient have all medications ordered at discharge?  Yes   Is the patient taking all medications as directed (includes completed medication regime)?  Yes   Does the patient have a primary care provider?   Yes   Does the patient have an appointment with their PCP or pulmonologist within 7 days of discharge?  Yes   Has the patient kept scheduled appointments due by today?  N/A   Has home health visited the patient within 72 hours of discharge?  N/A   Psychosocial issues?  No   Did the patient receive a copy of their discharge instructions?  Yes   Nursing interventions  Reviewed instructions with patient, Educated on MyChart   What is the patient's perception of their health status since discharge?  Improving   Nursing Interventions  Nurse provided patient education   Is the patient/caregiver able to teach back the hierarchy of who to call/visit for symptoms/problems? PCP, Specialist, Home health nurse, Urgent Care, ED, 911  Yes   Is the patient able to teach back COPD zones?  Yes   Patient reports what zone on this call?  Green Zone   Week 1 call completed?  Yes          Va [place], BASILIO

## 2018-12-07 LAB
BACTERIA SPEC AEROBE CULT: NORMAL
BACTERIA SPEC AEROBE CULT: NORMAL

## 2018-12-13 ENCOUNTER — READMISSION MANAGEMENT (OUTPATIENT)
Dept: CALL CENTER | Facility: HOSPITAL | Age: 78
End: 2018-12-13

## 2018-12-13 ENCOUNTER — OFFICE VISIT (OUTPATIENT)
Dept: INTERNAL MEDICINE | Age: 78
End: 2018-12-13

## 2018-12-13 VITALS
DIASTOLIC BLOOD PRESSURE: 72 MMHG | SYSTOLIC BLOOD PRESSURE: 132 MMHG | HEART RATE: 86 BPM | OXYGEN SATURATION: 92 % | HEIGHT: 56 IN | BODY MASS INDEX: 23.71 KG/M2 | TEMPERATURE: 98.1 F | WEIGHT: 105.4 LBS

## 2018-12-13 DIAGNOSIS — J43.9 PULMONARY EMPHYSEMA, UNSPECIFIED EMPHYSEMA TYPE (HCC): Chronic | ICD-10-CM

## 2018-12-13 DIAGNOSIS — Z00.00 MEDICARE ANNUAL WELLNESS VISIT, SUBSEQUENT: Primary | ICD-10-CM

## 2018-12-13 DIAGNOSIS — M81.0 OSTEOPOROSIS, UNSPECIFIED OSTEOPOROSIS TYPE, UNSPECIFIED PATHOLOGICAL FRACTURE PRESENCE: ICD-10-CM

## 2018-12-13 PROCEDURE — G0439 PPPS, SUBSEQ VISIT: HCPCS | Performed by: NURSE PRACTITIONER

## 2018-12-13 NOTE — OUTREACH NOTE
COPD/PN Week 2 Survey      Responses   Facility patient discharged from?  [facility]   Does the patient have one of the following disease processes/diagnoses(primary or secondary)?  COPD/Pneumonia   Was the primary reason for admission:  COPD exacerbation   Week 2 attempt successful?  Yes   Call start time  1700   Call end time  1705   Discharge diagnosis  Copd with ae   Meds reviewed with patient/caregiver?  Yes   Is the patient having any side effects they believe may be caused by any medication additions or changes?  No   Does the patient have all medications ordered at discharge?  Yes   Is the patient taking all medications as directed (includes completed medication regime)?  Yes   Does the patient have a primary care provider?   Yes   Does the patient have an appointment with their PCP or pulmonologist within 7 days of discharge?  Yes   Has the patient kept scheduled appointments due by today?  Yes   Has home health visited the patient within 72 hours of discharge?  N/A   Psychosocial issues?  No   Did the patient receive a copy of their discharge instructions?  Yes   Nursing interventions  Reviewed instructions with patient   What is the patient's perception of their health status since discharge?  Improving   Nursing Interventions  Nurse provided patient education   Is the patient/caregiver able to teach back the hierarchy of who to call/visit for symptoms/problems? PCP, Specialist, Home health nurse, Urgent Care, ED, 911  Yes   Is the patient able to teach back COPD zones?  Yes   Nursing interventions  Education provided on various zones   Patient reports what zone on this call?  Green Zone   Green Zone  Reports doing well, Breathing without shortness of breath, Usual activity and exercise level, Usual amount of phlegm/mucus without difficulty coughing up, Sleeping well   Green Zone interventions:  Take daily medications, Continue regular exercise/diet plan, Do not smoke, Avoid indoor/outdoor triggers   Week  2 call completed?  Yes          Ladonna Vasquez RN

## 2018-12-13 NOTE — PATIENT INSTRUCTIONS
Medicare Wellness  Personal Prevention Plan of Service     Date of Office Visit:  2018  Encounter Provider:  FREDRICK Calixto  Place of Service:  [facility]  Patient Name: Grace Beauchamp  :  1940    As part of the Medicare Wellness portion of your visit today, we are providing you with this personalized preventive plan of services (PPPS). This plan is based upon recommendations of the [place] Preventive Services Task Force (USPSTF) and the Advisory Committee on Immunization Practices (ACIP).    This lists the preventive care services that should be considered, and provides dates of when you are due. Items listed as completed are up-to-date and do not require any further intervention.      Age-Appropriate Screening Schedule:  (Refer to the list below for future screening recommendations based on patient's age, sex and/or medical conditions. Orders for these recommended tests are listed in the plan section. The patient has been provided with a written plan)    Health Maintenance Topics  Health Maintenance   Topic Date Due   • HEPATITIS A VACCINE ADULT (1 of 2) 1958   • COLONOSCOPY  2016   • URINE MICROALBUMIN  2018   • DXA SCAN  07/15/2018   • HEMOGLOBIN A1C  02/15/2019   • LIPID PANEL  08/15/2019   • MEDICARE ANNUAL WELLNESS  2019   • MAMMOGRAM  2020   • TDAP/TD VACCINES (2 - Td) 2027   • INFLUENZA VACCINE  Completed   • PNEUMOCOCCAL VACCINES (65+ LOW/MEDIUM RISK)  Completed   • ZOSTER VACCINE  Completed       Health Maintenance Topics Due or Over-Due  Health Maintenance Due   Topic Date Due   • HEPATITIS A VACCINE ADULT (1 of 2) 1958   • COLONOSCOPY  2016   • URINE MICROALBUMIN  2018   • DXA SCAN  07/15/2018         ADDITIONAL RESOURCES:    For excellent information on senior health and wellness refer to the [employer] web-site at:    WWW.NIHSENIORHEALTH.GOV

## 2018-12-17 ENCOUNTER — TELEPHONE (OUTPATIENT)
Dept: INTERNAL MEDICINE | Age: 78
End: 2018-12-17

## 2018-12-17 NOTE — TELEPHONE ENCOUNTER
If you look at the referral, not the communication tab, it was sent to  [facility],. They will call her to schedule.

## 2018-12-17 NOTE — TELEPHONE ENCOUNTER
"The patient called in about the referral for bone density test that Laura Jimenez  requested. There is a message in Communication in the referral stating \"This was sent to Dr. Donaldson who is a breast surgeon, you may want to rework this to got to the department who will schedule this.\" Needs to be sent to appropriate facility.    Thank you  "

## 2018-12-19 ENCOUNTER — TELEPHONE (OUTPATIENT)
Dept: INTERNAL MEDICINE | Age: 78
End: 2018-12-19

## 2018-12-19 NOTE — TELEPHONE ENCOUNTER
We can discuss these issues on her follow-up visit.  For now we can place referral to urology for urinary incontinence.  Schedule follow-up for her with me.

## 2018-12-19 NOTE — TELEPHONE ENCOUNTER
Patient came in for appt but was 15 mins late. Ptient has some concerns:    1. Her incontinence is getting worse and she is up 4-5 times a night using the restroom. She wants to know if Dr. Castañeda will her in something for a 30 day prescription? Also, wants to know if she can get a referral to Urology to get to the bottom of what is causing her incontinence?     2. She has not had her Prolia shot in over a year and is starting to fall more often. Patient's daughter came with her today and stated they know the person that is the Prolia rep and Prolia rep spoke to Dr. Castañeda about this patient. 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   The bone density is not needed for her insurance to get the proilia shot since she has had broken bones in the past according the rep.     3. Patient wants to know if she can come in later this week or the week after new years?

## 2018-12-20 ENCOUNTER — READMISSION MANAGEMENT (OUTPATIENT)
Dept: CALL CENTER | Facility: HOSPITAL | Age: 78
End: 2018-12-20

## 2018-12-20 DIAGNOSIS — R32 URINARY INCONTINENCE, UNSPECIFIED TYPE: Primary | ICD-10-CM

## 2018-12-20 NOTE — OUTREACH NOTE
COPD/PN Week 3 Survey      Responses   Facility patient discharged from?  [facility]   Does the patient have one of the following disease processes/diagnoses(primary or secondary)?  COPD/Pneumonia   Was the primary reason for admission:  COPD exacerbation   Week 3 attempt successful?  Yes   Call start time  1131   Call end time  1133   Discharge diagnosis  Copd with ae   Meds reviewed with patient/caregiver?  Yes   Is the patient having any side effects they believe may be caused by any medication additions or changes?  No   Does the patient have all medications ordered at discharge?  Yes   Is the patient taking all medications as directed (includes completed medication regime)?  Yes   Does the patient have a primary care provider?   Yes   Does the patient have an appointment with their PCP or pulmonologist within 7 days of discharge?  Yes   Has the patient kept scheduled appointments due by today?  Yes   Psychosocial issues?  No   Did the patient receive a copy of their discharge instructions?  Yes   Nursing interventions  Reviewed instructions with patient, Educated on MyChart   What is the patient's perception of their health status since discharge?  Improving   Nursing Interventions  Nurse provided patient education   Is the patient/caregiver able to teach back the hierarchy of who to call/visit for symptoms/problems? PCP, Specialist, Home health nurse, Urgent Care, ED, 911  Yes   Is the patient able to teach back COPD zones?  Yes   Nursing interventions  Education provided on various zones   Patient reports what zone on this call?  Green Zone   Green Zone  Reports doing well, Breathing without shortness of breath, Usual activity and exercise level, Usual amount of phlegm/mucus without difficulty coughing up, Sleeping well, Appetite is good   Green Zone interventions:  Take daily medications, Continue regular exercise/diet plan, Do not smoke, Avoid indoor/outdoor triggers   Week 3 call completed?  Yes           Edwina Eubanks RN

## 2018-12-28 ENCOUNTER — READMISSION MANAGEMENT (OUTPATIENT)
Dept: CALL CENTER | Facility: HOSPITAL | Age: 78
End: 2018-12-28

## 2018-12-28 NOTE — OUTREACH NOTE
COPD/PN Week 4 Survey      Responses   Facility patient discharged from?  [facility]   Does the patient have one of the following disease processes/diagnoses(primary or secondary)?  COPD/Pneumonia   Week 4 attempt successful?  Yes   Call start time  1509   Call end time  1512   Meds reviewed with patient/caregiver?  Yes   Is the patient taking all medications as directed (includes completed medication regime)?  Yes   Has the patient kept scheduled appointments due by today?  Yes   Is the patient still receiving Home Health Services?  No   What is the patient's perception of their health status since discharge?  Improving   Is the patient able to teach back COPD zones?  Yes   Patient reports what zone on this call?  Green Zone   Week 4 call completed?  Yes   Would the patient like one additional call?  No   Graduated  Yes   Did the patient feel the follow up calls were helpful during their recovery period?  Yes   Was the number of calls appropriate?  Yes   Wrap up additional comments  feeling so much better          Valeria Ng, RN

## 2018-12-31 ENCOUNTER — PATIENT OUTREACH (OUTPATIENT)
Dept: CASE MANAGEMENT | Facility: OTHER | Age: 78
End: 2018-12-31

## 2018-12-31 ENCOUNTER — EPISODE CHANGES (OUTPATIENT)
Dept: CASE MANAGEMENT | Facility: OTHER | Age: 78
End: 2018-12-31

## 2019-01-08 ENCOUNTER — APPOINTMENT (OUTPATIENT)
Dept: BONE DENSITY | Facility: HOSPITAL | Age: 79
End: 2019-01-08

## 2019-01-14 DIAGNOSIS — M54.50 BILATERAL LOW BACK PAIN WITHOUT SCIATICA, UNSPECIFIED CHRONICITY: ICD-10-CM

## 2019-01-15 ENCOUNTER — HOSPITAL ENCOUNTER (OUTPATIENT)
Dept: BONE DENSITY | Facility: HOSPITAL | Age: 79
Discharge: HOME | End: 2019-01-15
Admitting: NURSE PRACTITIONER

## 2019-01-15 PROCEDURE — 77080 DXA BONE DENSITY AXIAL: CPT

## 2019-01-15 RX ORDER — TRAMADOL HYDROCHLORIDE 50 MG/1
TABLET ORAL
Qty: 90 TABLET | Refills: 1 | OUTPATIENT
Start: 2019-01-15 | End: 2019-04-15 | Stop reason: SDUPTHER

## 2019-01-15 NOTE — TELEPHONE ENCOUNTER
LOV 12.13.18   NOV 01.18.19  CONTRACT 08.15.18  GIULIA 01.15.19  UDS 8.15.18  LAST RF 10.22.18 #90 1 RF

## 2019-01-18 ENCOUNTER — OFFICE VISIT (OUTPATIENT)
Dept: INTERNAL MEDICINE | Age: 79
End: 2019-01-18

## 2019-01-18 VITALS
BODY MASS INDEX: 23.84 KG/M2 | TEMPERATURE: 98.4 F | WEIGHT: 106 LBS | DIASTOLIC BLOOD PRESSURE: 64 MMHG | HEIGHT: 56 IN | SYSTOLIC BLOOD PRESSURE: 110 MMHG | OXYGEN SATURATION: 95 % | HEART RATE: 72 BPM

## 2019-01-18 DIAGNOSIS — H91.93 BILATERAL HEARING LOSS, UNSPECIFIED HEARING LOSS TYPE: ICD-10-CM

## 2019-01-18 DIAGNOSIS — M81.0 AGE RELATED OSTEOPOROSIS, UNSPECIFIED PATHOLOGICAL FRACTURE PRESENCE: Primary | Chronic | ICD-10-CM

## 2019-01-18 DIAGNOSIS — K21.9 GASTROESOPHAGEAL REFLUX DISEASE, ESOPHAGITIS PRESENCE NOT SPECIFIED: ICD-10-CM

## 2019-01-18 PROCEDURE — 99214 OFFICE O/P EST MOD 30 MIN: CPT | Performed by: INTERNAL MEDICINE

## 2019-01-18 RX ORDER — OMEPRAZOLE 20 MG/1
20 CAPSULE, DELAYED RELEASE ORAL DAILY
Qty: 90 CAPSULE | Refills: 2 | Status: SHIPPED | OUTPATIENT
Start: 2019-01-18 | End: 2020-03-23 | Stop reason: SDUPTHER

## 2019-01-21 DIAGNOSIS — M81.0 AGE RELATED OSTEOPOROSIS, UNSPECIFIED PATHOLOGICAL FRACTURE PRESENCE: Primary | Chronic | ICD-10-CM

## 2019-01-23 ENCOUNTER — TELEPHONE (OUTPATIENT)
Dept: INTERNAL MEDICINE | Age: 79
End: 2019-01-23

## 2019-01-23 DIAGNOSIS — E83.52 HYPERCALCEMIA: Primary | ICD-10-CM

## 2019-01-31 ENCOUNTER — PATIENT OUTREACH (OUTPATIENT)
Dept: CASE MANAGEMENT | Facility: OTHER | Age: 79
End: 2019-01-31

## 2019-01-31 NOTE — OUTREACH NOTE
Spoke with patient briefly, Explained role of Care Advisor and contact information given to patient, patient declined further calls.

## 2019-02-02 LAB
BUN SERPL-MCNC: 18 MG/DL (ref 8–23)
BUN/CREAT SERPL: 20.9 (ref 7–25)
CALCIUM SERPL-MCNC: 10.4 MG/DL (ref 8.6–10.5)
CHLORIDE SERPL-SCNC: 102 MMOL/L (ref 98–107)
CO2 SERPL-SCNC: 29.3 MMOL/L (ref 22–29)
CREAT SERPL-MCNC: 0.86 MG/DL (ref 0.57–1)
GLUCOSE SERPL-MCNC: 106 MG/DL (ref 65–99)
POTASSIUM SERPL-SCNC: 4.6 MMOL/L (ref 3.5–5.2)
SODIUM SERPL-SCNC: 143 MMOL/L (ref 136–145)

## 2019-02-04 ENCOUNTER — EPISODE CHANGES (OUTPATIENT)
Dept: CASE MANAGEMENT | Facility: OTHER | Age: 79
End: 2019-02-04

## 2019-02-06 ENCOUNTER — APPOINTMENT (OUTPATIENT)
Dept: ONCOLOGY | Facility: HOSPITAL | Age: 79
End: 2019-02-06

## 2019-02-20 ENCOUNTER — INFUSION (OUTPATIENT)
Dept: ONCOLOGY | Facility: HOSPITAL | Age: 79
End: 2019-02-20

## 2019-02-20 VITALS
OXYGEN SATURATION: 96 % | WEIGHT: 108.8 LBS | DIASTOLIC BLOOD PRESSURE: 83 MMHG | HEART RATE: 70 BPM | SYSTOLIC BLOOD PRESSURE: 123 MMHG | TEMPERATURE: 97.9 F | RESPIRATION RATE: 12 BRPM | BODY MASS INDEX: 24.41 KG/M2

## 2019-02-20 DIAGNOSIS — M81.0 AGE RELATED OSTEOPOROSIS, UNSPECIFIED PATHOLOGICAL FRACTURE PRESENCE: Primary | ICD-10-CM

## 2019-02-20 PROCEDURE — 25010000002 DENOSUMAB 60 MG/ML SOLUTION: Performed by: INTERNAL MEDICINE

## 2019-02-20 PROCEDURE — 96372 THER/PROPH/DIAG INJ SC/IM: CPT | Performed by: NURSE PRACTITIONER

## 2019-02-20 RX ADMIN — DENOSUMAB 60 MG: 60 INJECTION SUBCUTANEOUS at 14:10

## 2019-02-25 DIAGNOSIS — G25.81 RESTLESS LEGS SYNDROME (RLS): ICD-10-CM

## 2019-02-25 RX ORDER — PRAMIPEXOLE DIHYDROCHLORIDE 0.12 MG/1
TABLET ORAL
Qty: 90 TABLET | Refills: 0 | Status: SHIPPED | OUTPATIENT
Start: 2019-02-25 | End: 2019-05-21 | Stop reason: SDUPTHER

## 2019-03-14 ENCOUNTER — HOSPITAL ENCOUNTER (OUTPATIENT)
Dept: CT IMAGING | Facility: HOSPITAL | Age: 79
Discharge: HOME | End: 2019-03-14
Admitting: THORACIC SURGERY (CARDIOTHORACIC VASCULAR SURGERY)

## 2019-03-14 DIAGNOSIS — I71.60 THORACOABDOMINAL AORTIC ANEURYSM (TAAA) WITHOUT RUPTURE (HCC): ICD-10-CM

## 2019-03-14 DIAGNOSIS — Z86.79 S/P ASCENDING AORTIC ANEURYSM REPAIR: ICD-10-CM

## 2019-03-14 DIAGNOSIS — Z98.890 S/P ASCENDING AORTIC ANEURYSM REPAIR: ICD-10-CM

## 2019-03-14 LAB — CREAT BLDA-MCNC: 0.9 MG/DL (ref 0.6–1.3)

## 2019-03-14 PROCEDURE — 0 IOPAMIDOL PER 1 ML: Performed by: THORACIC SURGERY (CARDIOTHORACIC VASCULAR SURGERY)

## 2019-03-14 PROCEDURE — 82565 ASSAY OF CREATININE: CPT

## 2019-03-14 PROCEDURE — 71275 CT ANGIOGRAPHY CHEST: CPT

## 2019-03-14 PROCEDURE — 74174 CTA ABD&PLVS W/CONTRAST: CPT

## 2019-03-14 RX ADMIN — IOPAMIDOL 95 ML: 755 INJECTION, SOLUTION INTRAVENOUS at 16:10

## 2019-03-25 ENCOUNTER — TELEPHONE (OUTPATIENT)
Dept: CARDIAC SURGERY | Facility: CLINIC | Age: 79
End: 2019-03-25

## 2019-03-25 NOTE — TELEPHONE ENCOUNTER
Patient calls with questions concerning surgery with Dr Ontiveros. She has a bad tooth that sounds infected. She has recently had a Prolia injection and was told she needed to wait two months before having any dental work completed. She is taking an antibiotic for her dental issue. Mrs Beauchamp would like to discuss setting a surgery date with Dr Ontiveros. I let her know I will ask him to review her recent testing and call her back with his recommendations concerning timing for her surgery

## 2019-03-30 DIAGNOSIS — I10 ESSENTIAL HYPERTENSION: ICD-10-CM

## 2019-04-01 RX ORDER — NEBIVOLOL HYDROCHLORIDE 10 MG/1
TABLET ORAL
Qty: 30 TABLET | Refills: 4 | Status: SHIPPED | OUTPATIENT
Start: 2019-04-01 | End: 2019-09-11 | Stop reason: SDUPTHER

## 2019-04-15 ENCOUNTER — APPOINTMENT (OUTPATIENT)
Dept: WOMENS IMAGING | Facility: HOSPITAL | Age: 79
End: 2019-04-15

## 2019-04-15 ENCOUNTER — OFFICE VISIT (OUTPATIENT)
Dept: CARDIAC SURGERY | Facility: CLINIC | Age: 79
End: 2019-04-15

## 2019-04-15 VITALS
WEIGHT: 108 LBS | HEIGHT: 58 IN | OXYGEN SATURATION: 96 % | SYSTOLIC BLOOD PRESSURE: 127 MMHG | BODY MASS INDEX: 22.67 KG/M2 | HEART RATE: 68 BPM | DIASTOLIC BLOOD PRESSURE: 75 MMHG

## 2019-04-15 DIAGNOSIS — K21.9 GASTROESOPHAGEAL REFLUX DISEASE, ESOPHAGITIS PRESENCE NOT SPECIFIED: Chronic | ICD-10-CM

## 2019-04-15 DIAGNOSIS — Z98.890 S/P ASCENDING AORTIC ANEURYSM REPAIR: ICD-10-CM

## 2019-04-15 DIAGNOSIS — I71.60 THORACOABDOMINAL AORTIC ANEURYSM (TAAA) WITHOUT RUPTURE (HCC): ICD-10-CM

## 2019-04-15 DIAGNOSIS — I71.21 ASCENDING AORTIC ANEURYSM (HCC): ICD-10-CM

## 2019-04-15 DIAGNOSIS — I65.29 STENOSIS OF CAROTID ARTERY, UNSPECIFIED LATERALITY: ICD-10-CM

## 2019-04-15 DIAGNOSIS — R53.1 WEAKNESS: ICD-10-CM

## 2019-04-15 DIAGNOSIS — Z86.73 HISTORY OF CVA (CEREBROVASCULAR ACCIDENT): Primary | ICD-10-CM

## 2019-04-15 DIAGNOSIS — J43.9 PULMONARY EMPHYSEMA, UNSPECIFIED EMPHYSEMA TYPE (HCC): Chronic | ICD-10-CM

## 2019-04-15 DIAGNOSIS — I71.20 THORACIC AORTIC ANEURYSM WITHOUT RUPTURE (HCC): Primary | ICD-10-CM

## 2019-04-15 DIAGNOSIS — Z17.0 MALIGNANT NEOPLASM OF BREAST, STAGE 1, ESTROGEN RECEPTOR POSITIVE, UNSPECIFIED LATERALITY (HCC): ICD-10-CM

## 2019-04-15 DIAGNOSIS — I71.20 THORACIC AORTIC ANEURYSM WITHOUT RUPTURE (HCC): ICD-10-CM

## 2019-04-15 DIAGNOSIS — I73.9 CLAUDICATION OF BOTH LOWER EXTREMITIES (HCC): ICD-10-CM

## 2019-04-15 DIAGNOSIS — E11.9 TYPE 2 DIABETES MELLITUS WITHOUT COMPLICATION, WITHOUT LONG-TERM CURRENT USE OF INSULIN (HCC): Chronic | ICD-10-CM

## 2019-04-15 DIAGNOSIS — Z86.79 S/P ASCENDING AORTIC ANEURYSM REPAIR: ICD-10-CM

## 2019-04-15 DIAGNOSIS — G25.81 RESTLESS LEGS SYNDROME: ICD-10-CM

## 2019-04-15 DIAGNOSIS — K43.9 VENTRAL HERNIA WITHOUT OBSTRUCTION OR GANGRENE: ICD-10-CM

## 2019-04-15 DIAGNOSIS — M15.9 PRIMARY OSTEOARTHRITIS INVOLVING MULTIPLE JOINTS: Chronic | ICD-10-CM

## 2019-04-15 DIAGNOSIS — M54.50 BILATERAL LOW BACK PAIN WITHOUT SCIATICA, UNSPECIFIED CHRONICITY: ICD-10-CM

## 2019-04-15 DIAGNOSIS — Z95.1 S/P CABG X 1: ICD-10-CM

## 2019-04-15 DIAGNOSIS — C50.919 MALIGNANT NEOPLASM OF BREAST, STAGE 1, ESTROGEN RECEPTOR POSITIVE, UNSPECIFIED LATERALITY (HCC): ICD-10-CM

## 2019-04-15 DIAGNOSIS — Z85.3 HISTORY OF BREAST CANCER: Chronic | ICD-10-CM

## 2019-04-15 PROCEDURE — 99214 OFFICE O/P EST MOD 30 MIN: CPT | Performed by: THORACIC SURGERY (CARDIOTHORACIC VASCULAR SURGERY)

## 2019-04-15 PROCEDURE — 77063 BREAST TOMOSYNTHESIS BI: CPT | Performed by: RADIOLOGY

## 2019-04-15 PROCEDURE — 77067 SCR MAMMO BI INCL CAD: CPT | Performed by: RADIOLOGY

## 2019-04-15 RX ORDER — MIRABEGRON 50 MG/1
50 TABLET, FILM COATED, EXTENDED RELEASE ORAL DAILY
COMMUNITY
Start: 2019-03-05 | End: 2020-05-29 | Stop reason: ALTCHOICE

## 2019-04-16 PROBLEM — I71.20 THORACIC AORTIC ANEURYSM WITHOUT RUPTURE: Status: ACTIVE | Noted: 2019-04-16

## 2019-04-16 RX ORDER — TRAMADOL HYDROCHLORIDE 50 MG/1
TABLET ORAL
Qty: 90 TABLET | Refills: 0 | OUTPATIENT
Start: 2019-04-16 | End: 2019-05-21 | Stop reason: SDUPTHER

## 2019-04-16 NOTE — TELEPHONE ENCOUNTER
LOV 1/14/2019  NOV  7/24/2019  LRF 1/15/2019 1 tab q 6 hrs # 90 + 1 phone in  [facility] 4/16/2019  UDS 8/15/2018 will inform pt this is due  Contract 8/15/2018

## 2019-04-17 ENCOUNTER — TELEPHONE (OUTPATIENT)
Dept: INTERNAL MEDICINE | Age: 79
End: 2019-04-17

## 2019-04-19 ENCOUNTER — LAB (OUTPATIENT)
Dept: LAB | Facility: HOSPITAL | Age: 79
End: 2019-04-19

## 2019-04-19 ENCOUNTER — TRANSCRIBE ORDERS (OUTPATIENT)
Dept: ADMINISTRATIVE | Facility: HOSPITAL | Age: 79
End: 2019-04-19

## 2019-04-19 ENCOUNTER — HOSPITAL ENCOUNTER (OUTPATIENT)
Dept: CARDIOLOGY | Facility: HOSPITAL | Age: 79
Discharge: HOME | End: 2019-04-19
Admitting: THORACIC SURGERY (CARDIOTHORACIC VASCULAR SURGERY)

## 2019-04-19 VITALS
HEART RATE: 68 BPM | HEIGHT: 56 IN | BODY MASS INDEX: 24.75 KG/M2 | DIASTOLIC BLOOD PRESSURE: 78 MMHG | SYSTOLIC BLOOD PRESSURE: 122 MMHG | WEIGHT: 110 LBS

## 2019-04-19 DIAGNOSIS — R53.1 WEAKNESS: ICD-10-CM

## 2019-04-19 DIAGNOSIS — I71.21 ASCENDING AORTIC ANEURYSM (HCC): ICD-10-CM

## 2019-04-19 DIAGNOSIS — Z86.73 HISTORY OF CVA (CEREBROVASCULAR ACCIDENT): ICD-10-CM

## 2019-04-19 DIAGNOSIS — Z01.810 PRE-OPERATIVE CARDIOVASCULAR EXAMINATION: ICD-10-CM

## 2019-04-19 DIAGNOSIS — Z01.810 PRE-OPERATIVE CARDIOVASCULAR EXAMINATION: Primary | ICD-10-CM

## 2019-04-19 DIAGNOSIS — Z13.6 SCREENING FOR ISCHEMIC HEART DISEASE: ICD-10-CM

## 2019-04-19 LAB
ANION GAP SERPL CALCULATED.3IONS-SCNC: 12.7 MMOL/L
ASCENDING AORTA: 3.2 CM
AV HCM GRAD VALS: 22 MMHG
BASOPHILS # BLD AUTO: 0.02 10*3/MM3 (ref 0–0.2)
BASOPHILS NFR BLD AUTO: 0.3 % (ref 0–1.5)
BH CV ECHO MEAS - ACS: 1.5 CM
BH CV ECHO MEAS - AO MAX PG (FULL): 1 MMHG
BH CV ECHO MEAS - AO MAX PG: 5.3 MMHG
BH CV ECHO MEAS - AO MEAN PG (FULL): 0.8 MMHG
BH CV ECHO MEAS - AO MEAN PG: 3.1 MMHG
BH CV ECHO MEAS - AO ROOT AREA (BSA CORRECTED): 2
BH CV ECHO MEAS - AO ROOT AREA: 5.8 CM^2
BH CV ECHO MEAS - AO ROOT DIAM: 2.7 CM
BH CV ECHO MEAS - AO V2 MAX: 114.6 CM/SEC
BH CV ECHO MEAS - AO V2 MEAN: 81.4 CM/SEC
BH CV ECHO MEAS - AO V2 VTI: 26.7 CM
BH CV ECHO MEAS - ASC AORTA: 3.2 CM
BH CV ECHO MEAS - AVA(I,A): 2.3 CM^2
BH CV ECHO MEAS - AVA(I,D): 2.3 CM^2
BH CV ECHO MEAS - AVA(V,A): 2.2 CM^2
BH CV ECHO MEAS - AVA(V,D): 2.2 CM^2
BH CV ECHO MEAS - BSA(HAYCOCK): 1.4 M^2
BH CV ECHO MEAS - BSA: 1.4 M^2
BH CV ECHO MEAS - BZI_BMI: 24.7 KILOGRAMS/M^2
BH CV ECHO MEAS - BZI_METRIC_HEIGHT: 142.2 CM
BH CV ECHO MEAS - BZI_METRIC_WEIGHT: 49.9 KG
BH CV ECHO MEAS - EDV(MOD-SP2): 44 ML
BH CV ECHO MEAS - EDV(MOD-SP4): 39 ML
BH CV ECHO MEAS - EDV(TEICH): 70.4 ML
BH CV ECHO MEAS - EF(CUBED): 83 %
BH CV ECHO MEAS - EF(MOD-BP): 66 %
BH CV ECHO MEAS - EF(MOD-SP2): 68.2 %
BH CV ECHO MEAS - EF(MOD-SP4): 64.1 %
BH CV ECHO MEAS - EF(TEICH): 76.5 %
BH CV ECHO MEAS - ESV(MOD-SP2): 14 ML
BH CV ECHO MEAS - ESV(MOD-SP4): 14 ML
BH CV ECHO MEAS - ESV(TEICH): 16.6 ML
BH CV ECHO MEAS - FS: 44.6 %
BH CV ECHO MEAS - IVS/LVPW: 1
BH CV ECHO MEAS - IVSD: 0.86 CM
BH CV ECHO MEAS - LAT PEAK E' VEL: 8 CM/SEC
BH CV ECHO MEAS - LV DIASTOLIC VOL/BSA (35-75): 28.3 ML/M^2
BH CV ECHO MEAS - LV MASS(C)D: 103.3 GRAMS
BH CV ECHO MEAS - LV MASS(C)DI: 75 GRAMS/M^2
BH CV ECHO MEAS - LV MAX PG: 4.2 MMHG
BH CV ECHO MEAS - LV MEAN PG: 2.3 MMHG
BH CV ECHO MEAS - LV SYSTOLIC VOL/BSA (12-30): 10.2 ML/M^2
BH CV ECHO MEAS - LV V1 MAX: 102.8 CM/SEC
BH CV ECHO MEAS - LV V1 MEAN: 69.6 CM/SEC
BH CV ECHO MEAS - LV V1 VTI: 25.3 CM
BH CV ECHO MEAS - LVIDD: 4 CM
BH CV ECHO MEAS - LVIDS: 2.2 CM
BH CV ECHO MEAS - LVLD AP2: 6.3 CM
BH CV ECHO MEAS - LVLD AP4: 6 CM
BH CV ECHO MEAS - LVLS AP2: 4.4 CM
BH CV ECHO MEAS - LVLS AP4: 5 CM
BH CV ECHO MEAS - LVOT AREA (M): 2.5 CM^2
BH CV ECHO MEAS - LVOT AREA: 2.5 CM^2
BH CV ECHO MEAS - LVOT DIAM: 1.8 CM
BH CV ECHO MEAS - LVPWD: 0.86 CM
BH CV ECHO MEAS - MED PEAK E' VEL: 6 CM/SEC
BH CV ECHO MEAS - MR MAX PG: 57.8 MMHG
BH CV ECHO MEAS - MR MAX VEL: 380.1 CM/SEC
BH CV ECHO MEAS - MV A DUR: 0.12 SEC
BH CV ECHO MEAS - MV A MAX VEL: 94.1 CM/SEC
BH CV ECHO MEAS - MV DEC SLOPE: 314.9 CM/SEC^2
BH CV ECHO MEAS - MV DEC TIME: 0.2 SEC
BH CV ECHO MEAS - MV E MAX VEL: 65.5 CM/SEC
BH CV ECHO MEAS - MV E/A: 0.7
BH CV ECHO MEAS - MV MAX PG: 4.7 MMHG
BH CV ECHO MEAS - MV MEAN PG: 1.8 MMHG
BH CV ECHO MEAS - MV P1/2T MAX VEL: 65.5 CM/SEC
BH CV ECHO MEAS - MV P1/2T: 60.9 MSEC
BH CV ECHO MEAS - MV V2 MAX: 108.6 CM/SEC
BH CV ECHO MEAS - MV V2 MEAN: 61.9 CM/SEC
BH CV ECHO MEAS - MV V2 VTI: 27.7 CM
BH CV ECHO MEAS - MVA P1/2T LCG: 3.4 CM^2
BH CV ECHO MEAS - MVA(P1/2T): 3.6 CM^2
BH CV ECHO MEAS - MVA(VTI): 2.2 CM^2
BH CV ECHO MEAS - PA MAX PG (FULL): 1.5 MMHG
BH CV ECHO MEAS - PA MAX PG: 2.6 MMHG
BH CV ECHO MEAS - PA V2 MAX: 80.4 CM/SEC
BH CV ECHO MEAS - PULM A REVS DUR: 0.1 SEC
BH CV ECHO MEAS - PULM A REVS VEL: 28.1 CM/SEC
BH CV ECHO MEAS - PULM DIAS VEL: 24.7 CM/SEC
BH CV ECHO MEAS - PULM S/D: 1.9
BH CV ECHO MEAS - PULM SYS VEL: 46.6 CM/SEC
BH CV ECHO MEAS - PVA(V,A): 1.5 CM^2
BH CV ECHO MEAS - PVA(V,D): 1.5 CM^2
BH CV ECHO MEAS - QP/QS: 0.45
BH CV ECHO MEAS - RV MAX PG: 1.1 MMHG
BH CV ECHO MEAS - RV MEAN PG: 0.52 MMHG
BH CV ECHO MEAS - RV V1 MAX: 52.4 CM/SEC
BH CV ECHO MEAS - RV V1 MEAN: 32.9 CM/SEC
BH CV ECHO MEAS - RV V1 VTI: 11.8 CM
BH CV ECHO MEAS - RVOT AREA: 2.4 CM^2
BH CV ECHO MEAS - RVOT DIAM: 1.7 CM
BH CV ECHO MEAS - RVSP: 48 MMHG
BH CV ECHO MEAS - SI(AO): 112.6 ML/M^2
BH CV ECHO MEAS - SI(CUBED): 38.8 ML/M^2
BH CV ECHO MEAS - SI(LVOT): 45 ML/M^2
BH CV ECHO MEAS - SI(MOD-SP2): 21.8 ML/M^2
BH CV ECHO MEAS - SI(MOD-SP4): 18.2 ML/M^2
BH CV ECHO MEAS - SI(TEICH): 39.1 ML/M^2
BH CV ECHO MEAS - SV(AO): 155.1 ML
BH CV ECHO MEAS - SV(CUBED): 53.5 ML
BH CV ECHO MEAS - SV(LVOT): 61.9 ML
BH CV ECHO MEAS - SV(MOD-SP2): 30 ML
BH CV ECHO MEAS - SV(MOD-SP4): 25 ML
BH CV ECHO MEAS - SV(RVOT): 27.9 ML
BH CV ECHO MEAS - SV(TEICH): 53.8 ML
BH CV ECHO MEAS - TAPSE (>1.6): 2.2 CM2
BH CV ECHO MEAS - TR MAX VEL: 312.2 CM/SEC
BH CV ECHO MEASUREMENTS AVERAGE E/E' RATIO: 9.36
BH CV XLRA - RV BASE: 3.2 CM
BH CV XLRA - TDI S': 12 CM/SEC
BUN BLD-MCNC: 18 MG/DL (ref 8–23)
BUN/CREAT SERPL: 23.4 (ref 7–25)
CALCIUM SPEC-SCNC: 9.5 MG/DL (ref 8.6–10.5)
CHLORIDE SERPL-SCNC: 97 MMOL/L (ref 98–107)
CO2 SERPL-SCNC: 27.3 MMOL/L (ref 22–29)
CREAT BLD-MCNC: 0.77 MG/DL (ref 0.57–1)
DEPRECATED RDW RBC AUTO: 45.4 FL (ref 37–54)
EOSINOPHIL # BLD AUTO: 0.11 10*3/MM3 (ref 0–0.4)
EOSINOPHIL NFR BLD AUTO: 1.9 % (ref 0.3–6.2)
ERYTHROCYTE [DISTWIDTH] IN BLOOD BY AUTOMATED COUNT: 12.6 % (ref 12.3–15.4)
GFR SERPL CREATININE-BSD FRML MDRD: 72 ML/MIN/1.73
GLUCOSE BLD-MCNC: 115 MG/DL (ref 65–99)
HCT VFR BLD AUTO: 44.8 % (ref 34–46.6)
HGB BLD-MCNC: 14 G/DL (ref 12–15.9)
IMM GRANULOCYTES # BLD AUTO: 0.03 10*3/MM3 (ref 0–0.05)
IMM GRANULOCYTES NFR BLD AUTO: 0.5 % (ref 0–0.5)
LEFT ATRIUM VOLUME INDEX: 29 ML/M2
LYMPHOCYTES # BLD AUTO: 1.06 10*3/MM3 (ref 0.7–3.1)
LYMPHOCYTES NFR BLD AUTO: 18.5 % (ref 19.6–45.3)
MCH RBC QN AUTO: 30.3 PG (ref 26.6–33)
MCHC RBC AUTO-ENTMCNC: 31.3 G/DL (ref 31.5–35.7)
MCV RBC AUTO: 97 FL (ref 79–97)
MONOCYTES # BLD AUTO: 0.45 10*3/MM3 (ref 0.1–0.9)
MONOCYTES NFR BLD AUTO: 7.8 % (ref 5–12)
NEUTROPHILS # BLD AUTO: 4.07 10*3/MM3 (ref 1.4–7)
NEUTROPHILS NFR BLD AUTO: 71 % (ref 42.7–76)
NRBC BLD AUTO-RTO: 0 /100 WBC (ref 0–0)
PLATELET # BLD AUTO: 333 10*3/MM3 (ref 140–450)
PMV BLD AUTO: 8.9 FL (ref 6–12)
POTASSIUM BLD-SCNC: 4.1 MMOL/L (ref 3.5–5.2)
RBC # BLD AUTO: 4.62 10*6/MM3 (ref 3.77–5.28)
SINUS: 3.3 CM
SODIUM BLD-SCNC: 137 MMOL/L (ref 136–145)
STJ: 2.8 CM
WBC NRBC COR # BLD: 5.74 10*3/MM3 (ref 3.4–10.8)

## 2019-04-19 PROCEDURE — 80048 BASIC METABOLIC PNL TOTAL CA: CPT

## 2019-04-19 PROCEDURE — 93306 TTE W/DOPPLER COMPLETE: CPT

## 2019-04-19 PROCEDURE — 85025 COMPLETE CBC W/AUTO DIFF WBC: CPT

## 2019-04-19 PROCEDURE — 93306 TTE W/DOPPLER COMPLETE: CPT | Performed by: INTERNAL MEDICINE

## 2019-04-19 PROCEDURE — 36415 COLL VENOUS BLD VENIPUNCTURE: CPT

## 2019-04-22 ENCOUNTER — HOSPITAL ENCOUNTER (OUTPATIENT)
Facility: HOSPITAL | Age: 79
Setting detail: HOSPITAL OUTPATIENT SURGERY
Discharge: HOME | End: 2019-04-22
Attending: INTERNAL MEDICINE | Admitting: INTERNAL MEDICINE

## 2019-04-22 VITALS
HEIGHT: 56 IN | WEIGHT: 110 LBS | TEMPERATURE: 98.6 F | OXYGEN SATURATION: 95 % | DIASTOLIC BLOOD PRESSURE: 86 MMHG | SYSTOLIC BLOOD PRESSURE: 151 MMHG | RESPIRATION RATE: 16 BRPM | BODY MASS INDEX: 24.75 KG/M2 | HEART RATE: 60 BPM

## 2019-04-22 DIAGNOSIS — I71.20 THORACIC AORTIC ANEURYSM WITHOUT RUPTURE (HCC): ICD-10-CM

## 2019-04-22 PROCEDURE — C1769 GUIDE WIRE: HCPCS | Performed by: INTERNAL MEDICINE

## 2019-04-22 PROCEDURE — S0260 H&P FOR SURGERY: HCPCS | Performed by: INTERNAL MEDICINE

## 2019-04-22 PROCEDURE — 25010000002 MIDAZOLAM PER 1 MG: Performed by: INTERNAL MEDICINE

## 2019-04-22 PROCEDURE — 25010000002 HEPARIN (PORCINE) PER 1000 UNITS: Performed by: INTERNAL MEDICINE

## 2019-04-22 PROCEDURE — C1894 INTRO/SHEATH, NON-LASER: HCPCS | Performed by: INTERNAL MEDICINE

## 2019-04-22 PROCEDURE — 93455 CORONARY ART/GRFT ANGIO S&I: CPT | Performed by: INTERNAL MEDICINE

## 2019-04-22 PROCEDURE — 25010000002 FENTANYL CITRATE (PF) 100 MCG/2ML SOLUTION: Performed by: INTERNAL MEDICINE

## 2019-04-22 PROCEDURE — 0 IOPAMIDOL PER 1 ML: Performed by: INTERNAL MEDICINE

## 2019-04-22 RX ORDER — LIDOCAINE HYDROCHLORIDE 20 MG/ML
INJECTION, SOLUTION INFILTRATION; PERINEURAL AS NEEDED
Status: DISCONTINUED | OUTPATIENT
Start: 2019-04-22 | End: 2019-04-22 | Stop reason: HOSPADM

## 2019-04-22 RX ORDER — SODIUM CHLORIDE 9 MG/ML
75 INJECTION, SOLUTION INTRAVENOUS CONTINUOUS
Status: DISCONTINUED | OUTPATIENT
Start: 2019-04-22 | End: 2019-04-22 | Stop reason: HOSPADM

## 2019-04-22 RX ORDER — SODIUM CHLORIDE 0.9 % (FLUSH) 0.9 %
3 SYRINGE (ML) INJECTION EVERY 12 HOURS SCHEDULED
Status: DISCONTINUED | OUTPATIENT
Start: 2019-04-22 | End: 2019-04-22 | Stop reason: HOSPADM

## 2019-04-22 RX ORDER — MIDAZOLAM HYDROCHLORIDE 1 MG/ML
INJECTION INTRAMUSCULAR; INTRAVENOUS AS NEEDED
Status: DISCONTINUED | OUTPATIENT
Start: 2019-04-22 | End: 2019-04-22 | Stop reason: HOSPADM

## 2019-04-22 RX ORDER — FENTANYL CITRATE 50 UG/ML
INJECTION, SOLUTION INTRAMUSCULAR; INTRAVENOUS AS NEEDED
Status: DISCONTINUED | OUTPATIENT
Start: 2019-04-22 | End: 2019-04-22 | Stop reason: HOSPADM

## 2019-04-22 RX ORDER — SODIUM CHLORIDE 9 MG/ML
100 INJECTION, SOLUTION INTRAVENOUS CONTINUOUS
Status: DISCONTINUED | OUTPATIENT
Start: 2019-04-22 | End: 2019-04-22 | Stop reason: HOSPADM

## 2019-04-22 RX ORDER — LIDOCAINE HYDROCHLORIDE 10 MG/ML
0.1 INJECTION, SOLUTION EPIDURAL; INFILTRATION; INTRACAUDAL; PERINEURAL ONCE AS NEEDED
Status: DISCONTINUED | OUTPATIENT
Start: 2019-04-22 | End: 2019-04-22 | Stop reason: HOSPADM

## 2019-04-22 RX ORDER — SODIUM CHLORIDE 0.9 % (FLUSH) 0.9 %
3-10 SYRINGE (ML) INJECTION AS NEEDED
Status: DISCONTINUED | OUTPATIENT
Start: 2019-04-22 | End: 2019-04-22 | Stop reason: HOSPADM

## 2019-04-22 RX ADMIN — SODIUM CHLORIDE 75 ML/HR: 9 INJECTION, SOLUTION INTRAVENOUS at 07:45

## 2019-04-22 NOTE — DISCHARGE INSTRUCTIONS
[facility]  [address] [place] [address]    Coronary Angiogram (Radial/Ulnar Approach) After Care    Refer to this sheet in the next few weeks. These instructions provide you with information on caring for yourself after your procedure. Your caregiver may also give you more specific instructions. Your treatment has been planned according to current medical practices, but problems sometimes occur. Call your caregiver if you have any problems or questions after your procedure.    Home Care Instructions:  · You may shower the day after the procedure. Remove the bandage (dressing) and gently wash the site with plain soap and water. Gently pat the site dry. You may apply a band aid daily for 2 days if desired.    · Do not apply powder or lotion to the site.  · Do not submerge the affected site in water for 3 to 5 days or until the site is completely healed.   · Do not lift, push or pull anything over 10 pounds for 2 days after your procedure.  · Inspect the site at least twice daily. You may notice some bruising at the site and it may be tender for 1 to 2 weeks.     · Increase your fluid intake for the next 2 days.    · Keep arm elevated for 24 hours. For the remainder of the day, keep your arm in “Pledge of Allegiance” position when up and about.     · You may drive 24 hours after the procedure unless otherwise instructed by your caregiver.  · Do not operate machinery or power tools for 24 hours.  · A responsible adult should be with you for the first 24 hours after you arrive home. Do not make any important legal decisions or sign legal papers for 24 hours.  Do not drink alcohol for 24 hours.    · Metformin or any medications containing Metformin should not be taken for 48 hours after your procedure.      Call Your Doctor if:   · You have unusual pain at the radial/ulnar (wrist) site.  · You have redness, warmth, swelling, or pain at the radial/ulnar (wrist) site.  · You have drainage (other  than a small amount of blood on the dressing).  · You have chills or a fever > 101.  · Your arm becomes pale or dark, cool, tingly, or numb.  · You have heavy bleeding from the site, hold pressure on the site for 20 minutes.  If the bleeding stops, apply a fresh bandage and call your cardiologist.  However, if you continue to have bleeding, call 911.

## 2019-04-22 NOTE — H&P
"Date of Hospital Visit: 19  Encounter Provider: Tres De Los Santos MD  Place of Service: [facility] CARDIOLOGY  Patient Name: Grace Beauchamp  :1940  [id number]    Chief complaint: Preop evaluation of coronaries    History of Present Illness:A 78-year-old lady, former smoker, who had an aortic valve replacement and root repair of her aorta in the past. 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 She has a 1-vessel CABG. She has not had chest pain. Breathing has not changed. There is some element of COPD, probably. She stopped smoking 5 years ago. Her main complaint is an imbalance and difficulty walking. She is set up to be seen by Neurology evidently. She has not had chest pain, shortness of breath, PND, orthopnea, edema. No syncope or palpitations. No bleeding difficulty.        Past Medical History:   Diagnosis Date   • AAA (abdominal aortic aneurysm) (CMS/[facility])    • Acute bronchitis 2018   • 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HCC)    • Arthritis    • Bilateral carotid artery disease (CMS/HCC)    • Bilateral cataracts     S/p Extractions   • Breast cancer (CMS/HCC)     right breast [id number] (snm) pMX ER/CA pos, Her-2/neg, Ki-67, 31%, oncotype recurrence score 19, invasive lobular carcinoma   • CAD (coronary artery disease)     S/p CABG on 16 by Dr. 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HCC)    • Closed fracture of three ribs of right side    • Cognitive disorder    • COPD (chronic obstructive pulmonary disease) (CMS/HCC)    • Depression    • Descending aortic arch aneurysm (CMS/HCC)    • Diabetes mellitus (CMS/HCC)     \"BORDERLINE\"   • Erythermalgia (CMS/HCC)    • GERD (gastroesophageal reflux disease)    • Hyperlipidemia     Controlled w/Meds   • Hypertension     Controlled w/Meds   • Low back pain    • Moderate aortic valve insufficiency  "    S/p AVR on 02/23/16 by Dr. Ontiveros   • Nocturia    • Osteoarthritis    • Osteoporosis    • Otitis media     R Ear   • Prediabetes    • RLS (restless legs syndrome)    • Saccular aneurysm    • Stroke (CMS/HCC)     Perioperatively w/L Hip Repl   • Thoracic ascending aortic aneurysm (CMS/HCC)     S/p Repair on 02/23/16 by Dr. Ontiveros   • TIA (transient ischemic attack)    • Urgency incontinence    • Venous insufficiency    • Ventral hernia        Past Surgical History:   Procedure Laterality Date   • AORTIC VALVE REPAIR/REPLACEMENT N/A 02/23/2016-[facility]    Ascending Replacement Using a 24MM Graft--Dr. Ontiveros   • APPENDECTOMY  1977   • BREAST BIOPSY Right 09/16/2012    Vacuum Assisted Core Bx of R Breast   • CARDIAC CATHETERIZATION N/A 01/06/2016    Dr. Tres De Los Santos   • CARDIAC SURGERY  02/2016    Dr. Ontiveros/Dr. De Los Santos   • CATARACT EXTRACTION Bilateral     [facility]   • COLONOSCOPY N/A 10/2002    Dr. Negro   • CORONARY ARTERY BYPASS GRAFT  02/23/2016-[facility]    x1 w/L Vein Grafting--Dr. Ontiveros   • CYST REMOVAL Right 01/25/2013    R Palm Excision of Retinacular Cyst--Dr. Karla Fish   • EPIDURAL BLOCK     • LAPAROSCOPIC CHOLECYSTECTOMY N/A 08/04/2004    Dr. JOEY Sheth   • MASTECTOMY Right 09/28/2012   • ORIF HIP FRACTURE Left 03/27/2005    w/ AMBI compression screw, Dr. Victor M Cabral   • RECTOVAGINAL FISTULA REPAIR  1965   • TUBAL ABDOMINAL LIGATION         Medications Prior to Admission   Medication Sig Dispense Refill Last Dose   • ANORO ELLIPTA 62.5-25 MCG/INH aerosol powder  inhaler Inhale 1 puff Daily.   4/21/2019 at Unknown time   • aspirin 81 MG EC tablet Take 81 mg by mouth daily.   4/22/2019 at Unknown time   • Biotin 1000 MCG tablet Take 1,000 mcg by mouth daily. Take as directed   4/21/2019 at Unknown time   • BYSTOLIC 10 MG tablet TAKE ONE TABLET BY MOUTH DAILY 30 tablet 4 4/22/2019 at Unknown time   • cetirizine (zyrTEC) 10 MG tablet Take 1 tablet by mouth As Needed for Allergies. 30 tablet  0 Past Week at Unknown time   • cilostazol (PLETAL) 100 MG tablet Take 1 tablet by mouth 2 (two) times a day.   4/21/2019 at Unknown time   • DICLOFENAC PO Take 1 tablet by mouth As Needed.   Past Week at Unknown time   • melatonin 5 MG tablet tablet Take 2 tablets by mouth At Night As Needed (sleep). 30 tablet 0 4/21/2019 at Unknown time   • MYRBETRIQ 50 MG tablet sustained-release 24 hour 24 hr tablet Take 50 mg by mouth Daily.   4/21/2019 at Unknown time   • omeprazole (priLOSEC) 20 MG capsule Take 1 capsule by mouth Daily. 90 capsule 2 4/21/2019 at Unknown time   • pitavastatin calcium (LIVALO) 2 MG tablet tablet Take 1 tablet by mouth Every Night. 30 tablet 1 4/21/2019 at Unknown time   • pramipexole (MIRAPEX) 0.125 MG tablet TAKE ONE TABLET BY MOUTH EVERY NIGHT AT BEDTIME 90 tablet 0 4/21/2019 at Unknown time   • PROAIR  (90 Base) MCG/ACT inhaler INHALE TWO PUFFS BY MOUTH EVERY 6 HOURS AS NEEDED FOR WHEEZING 1 inhaler 3 Past Week at Unknown time   • Probiotic Product (PROBIOTIC PO) Take 1 capsule by mouth Daily.   4/21/2019 at Unknown time   • traMADol (ULTRAM) 50 MG tablet TAKE ONE TABLET BY MOUTH EVERY 6 HOURS AS NEEDED FOR PAIN 90 tablet 0 4/21/2019 at Unknown time   • albuterol (PROVENTIL) (2.5 MG/3ML) 0.083% nebulizer solution Take 2.5 mg by nebulization 4 (Four) Times a Day As Needed for Wheezing. 125 vial 11 Taking       Current Meds  No current facility-administered medications on file prior to encounter.      Current Outpatient Medications on File Prior to Encounter   Medication Sig Dispense Refill   • ANORO ELLIPTA 62.5-25 MCG/INH aerosol powder  inhaler Inhale 1 puff Daily.     • aspirin 81 MG EC tablet Take 81 mg by mouth daily.     • Biotin 1000 MCG tablet Take 1,000 mcg by mouth daily. Take as directed     • BYSTOLIC 10 MG tablet TAKE ONE TABLET BY MOUTH DAILY 30 tablet 4   • cetirizine (zyrTEC) 10 MG tablet Take 1 tablet by mouth As Needed for Allergies. 30 tablet 0   • cilostazol  (PLETAL) 100 MG tablet Take 1 tablet by mouth 2 (two) times a day.     • DICLOFENAC PO Take 1 tablet by mouth As Needed.     • melatonin 5 MG tablet tablet Take 2 tablets by mouth At Night As Needed (sleep). 30 tablet 0   • MYRBETRIQ 50 MG tablet sustained-release 24 hour 24 hr tablet Take 50 mg by mouth Daily.     • omeprazole (priLOSEC) 20 MG capsule Take 1 capsule by mouth Daily. 90 capsule 2   • pitavastatin calcium (LIVALO) 2 MG tablet tablet Take 1 tablet by mouth Every Night. 30 tablet 1   • pramipexole (MIRAPEX) 0.125 MG tablet TAKE ONE TABLET BY MOUTH EVERY NIGHT AT BEDTIME 90 tablet 0   • PROAIR  (90 Base) MCG/ACT inhaler INHALE TWO PUFFS BY MOUTH EVERY 6 HOURS AS NEEDED FOR WHEEZING 1 inhaler 3   • Probiotic Product (PROBIOTIC PO) Take 1 capsule by mouth Daily.     • traMADol (ULTRAM) 50 MG tablet TAKE ONE TABLET BY MOUTH EVERY 6 HOURS AS NEEDED FOR PAIN 90 tablet 0   • albuterol (PROVENTIL) (2.5 MG/3ML) 0.083% nebulizer solution Take 2.5 mg by nebulization 4 (Four) Times a Day As Needed for Wheezing. 125 vial 11       Social History     Socioeconomic History   • Marital status:      Spouse name: Not on file   • Number of children: Not on file   • Years of education: Not on file   • Highest education level: Not on file   Tobacco Use   • Smoking status: Former Smoker     Types: Cigarettes     Last attempt to quit: 2012     Years since quittin.3   • Smokeless tobacco: Never Used   Substance and Sexual Activity   • Alcohol use: No     Comment: Caffeine Use   • Drug use: Defer   • Sexual activity: Defer     Birth control/protection: Tubal ligation       Family Hx: Non-contributory    REVIEW OF SYSTEMS:   ROS was performed and is negative except as outlined in HPI     REVIEW OF SYSTEMS:   CONSTITUTIONAL: No weight loss, fever, chills, weakness or fatigue.   HEENT: Eyes: No visual loss, blurred vision, double vision or yellow sclerae. Ears, Nose, Throat: No hearing loss, sneezing,  "congestion, runny nose or sore throat.   SKIN: No rash or itching.     RESPIRATORY: No shortness of breath, hemoptysis, cough or sputum.   GASTROINTESTINAL: No anorexia, nausea, vomiting or diarrhea. No abdominal pain, bright red blood per rectum or melena.  NEUROLOGICAL: No headache, dizziness, syncope, paralysis, numbness or tingling in the extremities.  MUSCULOSKELETAL: No muscle, back pain, joint pain or stiffness.   HEMATOLOGIC: No anemia, bleeding or bruising.   LYMPHATICS: No enlarged nodes.  PSYCHIATRIC: No history of depression, anxiety, hallucinations.   ENDOCRINOLOGIC: No reports of sweating, cold or heat intolerance. No polyuria or polydipsia.        Objective:     Vitals:    04/22/19 0724 04/22/19 0742   BP:  137/86   BP Location:  Left arm   Patient Position:  Lying   Pulse:  63   Resp:  18   Temp:  98.6 °F (37 °C)   TempSrc:  Temporal   SpO2:  94%   Weight: 49.9 kg (110 lb)    Height: 142.2 cm (56\")      Body mass index is 24.66 kg/m².  Flowsheet Rows      First Filed Value   Admission Height  142.2 cm (56\") Documented at 04/22/2019 0724   Admission Weight  49.9 kg (110 lb) Documented at 04/22/2019 0724          General Appearance:    Alert, oriented x 3, in no acute distress, thin   Head:    Normocephalic, without obvious abnormality, atraumatic   Ears:    Ears appear intact with no abnormalities noted   Throat:   No oral lesions, dentition good   Neck:   No adenopathy, supple, trachea midline, no thyromegaly, no carotid bruit, no JVD   Lungs:    Breath sounds are equal and  clear to auscultation    Heart:   Normal S1 and S2, RRR, no murmur/gallop or rub   Abdomen:    Normal bowel sounds, obese, soft non-tender, non-distended, no organomegaly, no guarding   Extremities:   Moves all extremities well, no edema, no cyanosis, no redness   Pulses:   Pulses palpable and equal bilaterally. Normal radial pulses   Skin:   No bleeding, bruising or rash   Lymph nodes:   No palpable adenopathy     I personally " viewed and interpreted the patient's EKG/Telemetry data    Assessment:  Active Hospital Problems    Diagnosis  POA   • **Thoracic aortic aneurysm 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) [I71.2]  Unknown     Priority: High      Resolved Hospital Problems   No resolved problems to display.       Plan:This is a lady with extensive aneurysmal disease and in reviewing the scans, there is a fair amount of thrombus which is not uncommon with aneurysm. We have been asked to presurgically cath. We will look at her coronaries as well as her vein graft for OM1. She certainly is a little bit of increased risk because of her aneurysms. We are going to try and go in her left arm, but we had trouble doing it that way before but it has a great pulse and I think we can probably do it. If not, will switch over and go from her right leg. We definitely want to be as meticulous as usual with our technique to try and minimize any embolic events. I have discussed this with her and her daughter and they agree and understand.    H&P reviewed. The patient was examined and there are no changes to the H&P. I have explained the risks and benefits of the procedure to the patient.  The patient understands and agrees to proceed    Tres De Los Santos MD  04/22/19  8:16 AM

## 2019-04-24 ENCOUNTER — TELEPHONE (OUTPATIENT)
Dept: INTERNAL MEDICINE | Age: 79
End: 2019-04-24

## 2019-04-24 NOTE — PROGRESS NOTES
4/24/2019    Seen on 4/15/19    Chief Complaint:     Follow-up evaluation of thoracoabdominal aortic aneurysm    History of Present Illness:       Dear Dr. Castañeda and Colleagues,  It was nice to see Grace Beauchamp in follow up evaluation for her enlarging thoracoabdominal aortic aneurysm. She is a 78 y.o. female with a history of COPD, hypertension, GERD, TIA, ventral hernia, and breast cancer who had an ascending and arch aortic aneurysm that I operated on in the past.  She did very well from the standpoint and I have been following her thoracoabdominal aortic component that has been slowly enlarging for the last few years.  She has flank and back discomfort however is unclear whether there is aneurysm related or secondary to her back issues. She denies chest pain, orthopnea, PND, stroke or embolic signs to the lower extremities.  She has COPD and she has some fatigue and dyspnea with exertion and also some lower extremity claudication which was believed to be spinal in origin. Her recent chest CT showed the largest abdominal component at 6 cm with normalization of the aorta 1 inch below the takeoff of the renal arteries.  The largest distal thoracic component has a diameter of around 4.5 cm and the aorta in the midportion in the chest tapers to 2.6-2.8 cm.    Patient Active Problem List   Diagnosis   • History of breast cancer   • Stenosis of carotid artery   • Chronic obstructive pulmonary disease (CMS/HCC)   • Closed fracture of multiple ribs   • Cognitive disorder   • Erythromelalgia (CMS/HCC)   • Hyperlipidemia   • Hypertension   • Primary osteoarthritis involving multiple joints   • Osteoporosis   • Restless legs syndrome   • Cerebral aneurysm   • Temporary cerebral vascular dysfunction   • S/P CABG x 1   • Type 2 diabetes mellitus without complication, without long-term current use of insulin (CMS/HCC)   • S/P ascending aortic aneurysm repair   • 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HCC)   • Descending thoracic aortic  "aneurysm (CMS/[facility])   • Claudication of both lower extremities (CMS/[facility])   • 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[facility])   • Ventral hernia without obstruction or gangrene   • Breast cancer, stage 1, estrogen receptor positive (CMS/[facility])   • Arthritis of right hip   • Arthritis of right knee   • Chondrocalcinosis   • Chronic pain of right knee   • Degenerative disc disease, lumbar   • Gastroesophageal reflux disease   • Bilateral hearing loss   • Thoracic aortic aneurysm without rupture (CMS/[facility])       Past Medical History:   Diagnosis Date   • AAA (abdominal aortic aneurysm) (CMS/[facility])    • Acute bronchitis 12/2018   • 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[facility])    • Arthritis    • Bilateral carotid artery disease (CMS/[facility])    • Bilateral cataracts     S/p Extractions   • Breast cancer (CMS/[facility])     right breast [id number] (snm) pMX ER/CA pos, Her-2/neg, Ki-67, 31%, oncotype recurrence score 19, invasive lobular carcinoma   • CAD (coronary artery disease)     S/p CABG on 2/23/16 by Dr. 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[facility])    • Closed fracture of three ribs of right side    • Cognitive disorder    • COPD (chronic obstructive pulmonary disease) (CMS/[facility])    • Depression    • Descending aortic arch aneurysm (CMS/[facility])    • Diabetes mellitus (CMS/[facility])     \"BORDERLINE\"   • Erythermalgia (CMS/[facility])    • GERD (gastroesophageal reflux disease)    • Hyperlipidemia     Controlled w/Meds   • Hypertension     Controlled w/Meds   • Low back pain    • Moderate aortic valve insufficiency     S/p AVR on 02/23/16 by Dr. Ontiveros   • Nocturia    • Osteoarthritis    • Osteoporosis    • Otitis media     R Ear   • Prediabetes    • RLS (restless legs syndrome)    • Saccular aneurysm    • Stroke (CMS/[facility])     Perioperatively w/L Hip Repl   • Thoracic ascending aortic aneurysm (CMS/[facility])     S/p Repair on 02/23/16 by Dr. Ontiveros   • TIA (transient ischemic attack)    • Urgency incontinence    • Venous insufficiency    • Ventral hernia        Past Surgical " History:   Procedure Laterality Date   • AORTIC VALVE REPAIR/REPLACEMENT N/A 02/23/2016-[facility]    Ascending Replacement Using a 24MM Graft--Dr. Ontiveros   • APPENDECTOMY  1977   • BREAST BIOPSY Right 09/16/2012    Vacuum Assisted Core Bx of R Breast   • CARDIAC CATHETERIZATION N/A 01/06/2016    Dr. Tres De Los Santos   • CARDIAC CATHETERIZATION N/A 4/22/2019    Procedure: Left Heart Cath;  Surgeon: Tres De Los Santos MD;  Location:  [facility];  Service: Cardiology   • CARDIAC CATHETERIZATION N/A 4/22/2019    Procedure: Coronary angiography;  Surgeon: Tres De Los Santos MD;  Location:  [facility];  Service: Cardiology   • CARDIAC SURGERY  02/2016    Dr. Ontiveros/Dr. De Los Santos   • CATARACT EXTRACTION Bilateral     [facility]   • COLONOSCOPY N/A 10/2002    Dr. Negro   • CORONARY ARTERY BYPASS GRAFT  02/23/2016-[facility]    x1 w/L Vein Grafting--Dr. Ontiveros   • CYST REMOVAL Right 01/25/2013    R Palm Excision of Retinacular Cyst--Dr. Karla Fish   • EPIDURAL BLOCK     • LAPAROSCOPIC CHOLECYSTECTOMY N/A 08/04/2004    Dr. JOEY Sheth   • MASTECTOMY Right 09/28/2012   • ORIF HIP FRACTURE Left 03/27/2005    w/ AMBI compression screw, Dr. Victor M Cabral   • RECTOVAGINAL FISTULA REPAIR  1965   • TUBAL ABDOMINAL LIGATION         Allergies   Allergen Reactions   • Ramipril Other (See Comments)     Ace I  cough   • Morphine Itching   • Morphine And Related Itching   • Bactrim [Sulfamethoxazole-Trimethoprim] Itching and Rash         Current Outpatient Medications:   •  ANORO ELLIPTA 62.5-25 MCG/INH aerosol powder  inhaler, Inhale 1 puff Daily., Disp: , Rfl:   •  aspirin 81 MG EC tablet, Take 81 mg by mouth daily., Disp: , Rfl:   •  Biotin 1000 MCG tablet, Take 1,000 mcg by mouth daily. Take as directed, Disp: , Rfl:   •  BYSTOLIC 10 MG tablet, TAKE ONE TABLET BY MOUTH DAILY, Disp: 30 tablet, Rfl: 4  •  cetirizine (zyrTEC) 10 MG tablet, Take 1 tablet by mouth As Needed for Allergies., Disp: 30  tablet, Rfl: 0  •  cilostazol (PLETAL) 100 MG tablet, Take 1 tablet by mouth 2 (two) times a day., Disp: , Rfl:   •  melatonin 5 MG tablet tablet, Take 2 tablets by mouth At Night As Needed (sleep)., Disp: 30 tablet, Rfl: 0  •  MYRBETRIQ 50 MG tablet sustained-release 24 hour 24 hr tablet, Take 50 mg by mouth Daily., Disp: , Rfl:   •  omeprazole (priLOSEC) 20 MG capsule, Take 1 capsule by mouth Daily., Disp: 90 capsule, Rfl: 2  •  pitavastatin calcium (LIVALO) 2 MG tablet tablet, Take 1 tablet by mouth Every Night., Disp: 30 tablet, Rfl: 1  •  pramipexole (MIRAPEX) 0.125 MG tablet, TAKE ONE TABLET BY MOUTH EVERY NIGHT AT BEDTIME, Disp: 90 tablet, Rfl: 0  •  PROAIR  (90 Base) MCG/ACT inhaler, INHALE TWO PUFFS BY MOUTH EVERY 6 HOURS AS NEEDED FOR WHEEZING, Disp: 1 inhaler, Rfl: 3  •  Probiotic Product (PROBIOTIC PO), Take 1 capsule by mouth Daily., Disp: , Rfl:   •  DICLOFENAC PO, Take 1 tablet by mouth As Needed., Disp: , Rfl:   •  traMADol (ULTRAM) 50 MG tablet, TAKE ONE TABLET BY MOUTH EVERY 6 HOURS AS NEEDED FOR PAIN, Disp: 90 tablet, Rfl: 0    Social History     Socioeconomic History   • Marital status:      Spouse name: Not on file   • Number of children: Not on file   • Years of education: Not on file   • Highest education level: Not on file   Tobacco Use   • Smoking status: Former Smoker     Types: Cigarettes     Last attempt to quit: 2012     Years since quittin.3   • Smokeless tobacco: Never Used   Substance and Sexual Activity   • Alcohol use: No     Comment: Caffeine Use   • Drug use: Defer   • Sexual activity: Defer     Birth control/protection: Tubal ligation       Family History   Problem Relation Age of Onset   • Alzheimer's disease Mother    • Hypertension Mother    • Cancer Maternal Aunt    • Heart disease Father    • Heart failure Father    • Hypertension Father    • Diabetes Father    • Liver disease Sister    • Heart failure Brother    • Hypertension Brother    • Alcohol  abuse Brother    • No Known Problems Maternal Grandmother    • No Known Problems Maternal Grandfather    • No Known Problems Paternal Grandmother    • No Known Problems Paternal Grandfather    • Diabetes Brother    • Brain cancer Brother    • Heart disease Brother      Review of Systems   Constitutional: Positive for fatigue.   Respiratory: Positive for shortness of breath and wheezing.    Cardiovascular: Negative for chest pain, palpitations and leg swelling.   Genitourinary: Positive for flank pain. Negative for hematuria.   Neurological: Positive for weakness. Negative for dizziness and light-headedness.   All other systems reviewed and are negative.      Physical Exam:    Vital Signs:  Weight: 49 kg (108 lb)   Body mass index is 22.57 kg/m².      Heart Rate: 68   BP: 127/75     Physical Exam   Constitutional: She is oriented to person, place, and time. She appears well-developed and well-nourished.   HENT:   Head: Normocephalic and atraumatic.   Nose: Nose normal.   Mouth/Throat: Oropharynx is clear and moist.   Eyes: Conjunctivae are normal. Pupils are equal, round, and reactive to light.   Neck: Normal range of motion. Neck supple. No JVD present. No thyromegaly present.   Cardiovascular: Normal rate, regular rhythm, S1 normal, S2 normal and intact distal pulses. PMI is not displaced. Exam reveals decreased pulses. Exam reveals no gallop and no friction rub.   Murmur heard.  Pulses:       Radial pulses are 2+ on the right side, and 2+ on the left side.        Popliteal pulses are 1+ on the right side, and 1+ on the left side.   Soft systolic murmur in aortic area   Pulmonary/Chest: Effort normal and breath sounds normal. She has no rales.   Abdominal: Soft. Bowel sounds are normal. She exhibits abdominal bruit and mass. She exhibits no fluid wave and no ascites. There is no hepatosplenomegaly. There is tenderness in the epigastric area. There is no CVA tenderness. A hernia is present. Hernia confirmed positive  in the ventral area. Hernia confirmed negative in the right inguinal area and confirmed negative in the left inguinal area.       Musculoskeletal: Normal range of motion. She exhibits no tenderness or deformity.   Lymphadenopathy:     She has no cervical adenopathy.   Neurological: She is alert and oriented to person, place, and time. She has normal reflexes.   Skin: Skin is warm and dry. No rash noted. No erythema.   Psychiatric: She has a normal mood and affect. Her behavior is normal.        Assessment:     Problem List Items Addressed This Visit        Cardiovascular and Mediastinum    Stenosis of carotid artery    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HCC)    Thoracic aortic aneurysm 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)       Respiratory    Chronic obstructive pulmonary disease (CMS/HCC) (Chronic)       Digestive    Gastroesophageal reflux disease (Chronic)       Endocrine    Type 2 diabetes mellitus without complication, without long-term current use of insulin (CMS/HCC) (Chronic)       Nervous and Auditory    Claudication of both lower extremities (CMS/HCC)       Musculoskeletal and Integument    Primary osteoarthritis involving multiple joints (Chronic)       Other    History of breast cancer (Chronic)    Restless legs syndrome    S/P CABG x 1    S/P ascending aortic aneurysm repair    Ventral hernia without obstruction or gangrene    Breast cancer, stage 1, estrogen receptor positive (CMS/HCC)      Other Visit Diagnoses     History of CVA (cerebrovascular accident)    -  Primary    Relevant Orders    Ambulatory Referral to Neurology    Adult Transthoracic Echo Complete W/ Cont if Necessary Per Protocol (Completed)    Weakness        Relevant Orders    Ambulatory Referral to Neurology    Adult Transthoracic Echo Complete W/ Cont if Necessary Per Protocol (Completed)    Ascending aortic aneurysm (CMS/HCC)        Relevant Orders    Pulmonary Function Test    Adult Transthoracic Echo Complete W/ Cont if Necessary Per  Protocol (Completed)          1. Type III thoracoabdominal aortic aneurysm with discomfort in the epigastrium and left flank, same size or slightly increase since last CT scan 1 year ago  2. Small size subxiphoid ventral hernia without incarceration  3. Severe COPD  4. Status post ascending and arch aortic replacement as well as a CABG  5. A decrease in mobility status due to leg weakness  6. History of stroke in the past with cognitive disorder, seems to be much improved    Recommendation/Plan:     I have discussed with the patient and his daughters the options at this point.  I am concerned that the aneurysm is going of rupture due to the presence of symptoms on size.  Although she is a high risk for an operation due to the multiple comorbidities she is not prohibitive.  I have recommended a thoracoabdominal aortic repair with a graft and visceral artery reattachment using hypothermic circulatory arrest.  She will need a cardiac cath to assess coronary anatomy, and echocardiogram as well as PFTs.  I would favor placement of a spinal catheter today before surgery due to a higher risk of paraplegia.  I have discussed with the patient the risk and benefits and alternatives of surgery and she wishes to proceed.    Thank you for allowing me to participate in her care.    Regards,    Mart Ontiveros M.D.

## 2019-04-25 ENCOUNTER — HOSPITAL ENCOUNTER (OUTPATIENT)
Dept: RESPIRATORY THERAPY | Facility: HOSPITAL | Age: 79
Discharge: HOME | End: 2019-04-25
Admitting: THORACIC SURGERY (CARDIOTHORACIC VASCULAR SURGERY)

## 2019-04-25 DIAGNOSIS — I71.21 ASCENDING AORTIC ANEURYSM (HCC): ICD-10-CM

## 2019-04-25 LAB
ARTERIAL PATENCY WRIST A: POSITIVE
ATMOSPHERIC PRESS: 744.6 MMHG
BASE EXCESS BLDA CALC-SCNC: 2.1 MMOL/L (ref 0–2)
BDY SITE: ABNORMAL
HCO3 BLDA-SCNC: 26.5 MMOL/L (ref 22–28)
MODALITY: ABNORMAL
PCO2 BLDA: 39.7 MM HG (ref 35–45)
PH BLDA: 7.43 PH UNITS (ref 7.35–7.45)
PO2 BLDA: 66.3 MM HG (ref 80–100)
SAO2 % BLDCOA: 93.4 % (ref 92–99)
TOTAL RATE: 22 BREATHS/MINUTE

## 2019-04-25 PROCEDURE — 94060 EVALUATION OF WHEEZING: CPT

## 2019-04-25 PROCEDURE — 36600 WITHDRAWAL OF ARTERIAL BLOOD: CPT

## 2019-04-25 PROCEDURE — 82803 BLOOD GASES ANY COMBINATION: CPT

## 2019-04-25 PROCEDURE — 94729 DIFFUSING CAPACITY: CPT

## 2019-04-25 PROCEDURE — 94726 PLETHYSMOGRAPHY LUNG VOLUMES: CPT

## 2019-04-25 RX ORDER — ALBUTEROL SULFATE 2.5 MG/3ML
2.5 SOLUTION RESPIRATORY (INHALATION) ONCE AS NEEDED
Status: COMPLETED | OUTPATIENT
Start: 2019-04-25 | End: 2019-04-25

## 2019-04-25 RX ADMIN — ALBUTEROL SULFATE 2.5 MG: 2.5 SOLUTION RESPIRATORY (INHALATION) at 14:14

## 2019-05-09 ENCOUNTER — OFFICE VISIT (OUTPATIENT)
Dept: NEUROLOGY | Facility: CLINIC | Age: 79
End: 2019-05-09

## 2019-05-09 VITALS
OXYGEN SATURATION: 94 % | SYSTOLIC BLOOD PRESSURE: 122 MMHG | DIASTOLIC BLOOD PRESSURE: 74 MMHG | WEIGHT: 108 LBS | BODY MASS INDEX: 24.3 KG/M2 | HEIGHT: 56 IN | HEART RATE: 69 BPM

## 2019-05-09 DIAGNOSIS — G82.20 PARAPLEGIA (HCC): Primary | ICD-10-CM

## 2019-05-09 DIAGNOSIS — R29.898 WEAKNESS OF BOTH LEGS: ICD-10-CM

## 2019-05-09 PROCEDURE — 99203 OFFICE O/P NEW LOW 30 MIN: CPT | Performed by: PSYCHIATRY & NEUROLOGY

## 2019-05-09 NOTE — PROGRESS NOTES
"Grace Beauchamp is a 78 y.o. female presents for   Chief Complaint   Patient presents with   • Difficulty Walking                CHIEF COMPLAINT:  Neurology consultation from Dr Mart Ontiveros in Cardiovascular surgery for weakness in legs  History of Present Illness  A 78 HENRIETTA, right-handed, comes accompanied with her daughter  History started in 2013 when she had TIA post-opertive hip replacement: full recovery: daughter says they never saw any signs of strokes. Apparently she was seen by stroke neurologist at [place] and was told she had small things on brigitte MRI 'everyone has them\".  Leg weakness:  Onset following hip replacement where she was in rehab for three weeks : no improvement. Daughter says she was not motivated.  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  Chronic LBP for which she was seen by spine doctors and was sent to Pain Management including epidurals.  Cannot walk well: started gradually and the worst part was last year \"she is almost immobile\"  No numbness no tingling  Bladder function: on medications for incontinence \"just age we assumed\". She had \"a lot of  UTIs\". \"Medication is working wonderful\"  Bowel function no problem  No radicular pains  Comprehensive neurological review is else negative        The following portions of the patient's history were reviewed and updated as appropriate: allergies, current medications, past family history, past medical history, past social history, past surgical history and problem list.I completed ROS, PH, SH, FH, medications  RSV last year hospitalized and had hearing loss that finally cleared up  PT tried to help her but finally said there was nothing they could do for her.    Review of Systems   Constitutional: Negative for chills, fatigue and fever. "   HENT: Positive for postnasal drip. Negative for tinnitus and trouble swallowing.    Eyes: Negative for pain, redness and itching.   Respiratory: Negative for cough, shortness of breath and wheezing.    Cardiovascular: Negative for chest pain, palpitations and leg swelling.   Gastrointestinal: Negative for constipation, diarrhea, nausea and vomiting.   Endocrine: Negative for cold intolerance, heat intolerance and polydipsia.   Genitourinary: Negative for difficulty urinating, frequency and urgency.   Musculoskeletal: Positive for back pain and gait problem (feet ). Negative for neck pain and neck stiffness.   Skin: Positive for color change. Negative for rash and wound.   Allergic/Immunologic: Positive for environmental allergies. Negative for food allergies and immunocompromised state.   Neurological: Negative for dizziness, tremors, seizures, syncope, facial asymmetry, speech difficulty, weakness, light-headedness, numbness and headaches.   Hematological: Negative for adenopathy. Does not bruise/bleed easily.   Psychiatric/Behavioral: Negative for agitation, behavioral problems, confusion, decreased concentration, dysphoric mood, hallucinations, self-injury, sleep disturbance and suicidal ideas. The patient is nervous/anxious. The patient is not hyperactive.          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[facility])    • Closed fracture of three ribs of right side    • Cognitive disorder    • COPD (chronic obstructive pulmonary disease) (CMS/[facility])    •  "Depression    • Descending aortic arch aneurysm (CMS/HCC)    • Diabetes mellitus (CMS/HCC)     \"BORDERLINE\"   • Erythermalgia (CMS/HCC)    • GERD (gastroesophageal reflux disease)    • Hyperlipidemia     Controlled w/Meds   • Hypertension     Controlled w/Meds   • Low back pain    • Moderate aortic valve insufficiency     S/p AVR on 16 by Dr. Ontiveros   • Nocturia    • Osteoarthritis    • Osteoporosis    • Otitis media     R Ear   • Prediabetes    • RLS (restless legs syndrome)    • Saccular aneurysm    • Stroke (CMS/[facility])     Perioperatively w/L Hip Repl   • Thoracic ascending aortic aneurysm (CMS/[facility])     S/p Repair on 16 by Dr. Ontiveros   • TIA (transient ischemic attack)    • Urgency incontinence    • Venous insufficiency    • Ventral hernia          Social History     Socioeconomic History   • Marital status:      Spouse name: Not on file   • Number of children: Not on file   • Years of education: Not on file   • Highest education level: Not on file   Tobacco Use   • Smoking status: Former Smoker     Types: Cigarettes     Last attempt to quit: 2012     Years since quittin.4   • Smokeless tobacco: Never Used   Substance and Sexual Activity   • Alcohol use: No     Comment: Caffeine Use   • Drug use: Defer   • Sexual activity: Defer     Birth control/protection: Tubal ligation          Family History   Problem Relation Age of Onset   • Alzheimer's disease Mother    • Hypertension Mother    • Cancer Maternal Aunt    • Heart disease Father    • Heart failure Father    • Hypertension Father    • Diabetes Father    • Liver disease Sister    • Heart failure Brother    • Hypertension Brother    • Alcohol abuse Brother    • No Known Problems Maternal Grandmother    • No Known Problems Maternal Grandfather    • No Known Problems Paternal Grandmother    • No Known Problems Paternal Grandfather    • Diabetes Brother    • Brain cancer Brother    • Heart disease Brother            Current Outpatient " Medications:   •  aspirin 81 MG EC tablet, Take 81 mg by mouth daily., Disp: , Rfl:   •  Biotin 1000 MCG tablet, Take 1,000 mcg by mouth daily. Take as directed, Disp: , Rfl:   •  BYSTOLIC 10 MG tablet, TAKE ONE TABLET BY MOUTH DAILY, Disp: 30 tablet, Rfl: 4  •  cilostazol (PLETAL) 100 MG tablet, Take 1 tablet by mouth 2 (two) times a day., Disp: , Rfl:   •  MYRBETRIQ 50 MG tablet sustained-release 24 hour 24 hr tablet, Take 50 mg by mouth Daily., Disp: , Rfl:   •  omeprazole (priLOSEC) 20 MG capsule, Take 1 capsule by mouth Daily., Disp: 90 capsule, Rfl: 2  •  pitavastatin calcium (LIVALO) 2 MG tablet tablet, Take 1 tablet by mouth Every Night., Disp: 30 tablet, Rfl: 1  •  pramipexole (MIRAPEX) 0.125 MG tablet, TAKE ONE TABLET BY MOUTH EVERY NIGHT AT BEDTIME, Disp: 90 tablet, Rfl: 0  •  PROAIR  (90 Base) MCG/ACT inhaler, INHALE TWO PUFFS BY MOUTH EVERY 6 HOURS AS NEEDED FOR WHEEZING, Disp: 1 inhaler, Rfl: 3  •  Probiotic Product (PROBIOTIC PO), Take 1 capsule by mouth Daily., Disp: , Rfl:   •  ANORO ELLIPTA 62.5-25 MCG/INH aerosol powder  inhaler, Inhale 1 puff Daily., Disp: , Rfl:   •  cetirizine (zyrTEC) 10 MG tablet, Take 1 tablet by mouth As Needed for Allergies., Disp: 30 tablet, Rfl: 0  •  DICLOFENAC PO, Take 1 tablet by mouth As Needed., Disp: , Rfl:   •  melatonin 5 MG tablet tablet, Take 2 tablets by mouth At Night As Needed (sleep)., Disp: 30 tablet, Rfl: 0  •  traMADol (ULTRAM) 50 MG tablet, TAKE ONE TABLET BY MOUTH EVERY 6 HOURS AS NEEDED FOR PAIN, Disp: 90 tablet, Rfl: 0      Vitals:    05/09/19 1246   BP: 122/74   Pulse: 69   SpO2: 94%         Physical Exam   Constitutional: She is oriented to person, place, and time. She appears well-developed and well-nourished. No distress.   Eyes: EOM are normal. Pupils are equal, round, and reactive to light.   Neurological: She is oriented to person, place, and time. She has a normal Finger-Nose-Finger Test. Positive Romberg's test: Coordiantion: uses  walker else ,oses her balance.   Reflex Scores:       Tricep reflexes are 1+ on the right side and 1+ on the left side.       Bicep reflexes are 1+ on the right side and 1+ on the left side.       Brachioradialis reflexes are 1+ on the right side and 1+ on the left side.       Patellar reflexes are 1+ on the right side and 1+ on the left side.       Achilles reflexes are 0 on the right side and 0 on the left side.  Skin: She is not diaphoretic.   Psychiatric: She has a normal mood and affect. Her speech is normal and behavior is normal. Judgment and thought content normal.         Neurologic Exam     Mental Status   Oriented to person, place, and time.   Registration: recalls 3 of 3 objects. Recall at 5 minutes: recalls 3 of 3 objects.   Attention: normal. Concentration: normal.   Speech: speech is normal   Level of consciousness: alert  Knowledge: consistent with education.   Able to name object. Able to read. Able to repeat. Able to write. Normal comprehension.     Cranial Nerves     CN II   Visual fields full to confrontation.     CN III, IV, VI   Pupils are equal, round, and reactive to light.  Extraocular motions are normal.   Right pupil: Size: 4 mm. Shape: regular. Reactivity: brisk. Consensual response: intact. Accommodation: intact.   Left pupil: Size: 4 mm. Shape: regular. Reactivity: brisk. Consensual response: intact. Accommodation: intact.   CN III: no CN III palsy  CN VI: no CN VI palsy  Nystagmus: none   Diplopia: none  Ophthalmoparesis: none  Upgaze: normal  Downgaze: normal  Conjugate gaze: present    CN V   Facial sensation intact.     CN VII   Facial expression full, symmetric.     CN VIII   CN VIII normal.     CN IX, X   CN IX normal.     Motor Exam     Strength   Right neck flexion: 5/5  Left neck flexion: 5/5  Right neck extension: 5/5  Right deltoid: 5/5  Left deltoid: 5/5  Right biceps: 5/5  Left biceps: 5/5  Right wrist flexion: 5/5  Left wrist flexion: 5/5  Right wrist extension: 5/5  Left  wrist extension: 5/5  Right interossei: 5/5  Left interossei: 5/5  Right iliopsoas: 3/5  Left iliopsoas: 4/5  Right quadriceps: 3/5  Left quadriceps: 4/5  Right hamstring: 3/5  Left hamstrin/5  Right anterior tibial: 3/5  Left anterior tibial: 5/5  Right posterior tibial: 4/5  Left posterior tibial: 5/5  Right peroneal: 3/5  Left peroneal: 4/5  Right gastroc: 3/5  Left gastroc: 4/5Motro function of lower extremities is limited: patient is sitting in a chair and her legs elevated and resting on her walker.  I did best  I could with her LE motor  Examination:    Feet are elevated  Feet are cold to tocuh  No signs of vernous pooling as she ahs been elevating he rlegs for soemtime  I could feel pulses in left and right DP and right PT but not left PT     Sensory Exam   Light touch normal.   Right leg vibration: decreased from ankle  Left leg vibration: decreased from ankle  Proprioception normal.   Pinprick normal.   Graphesthesia: normal  Stereognosis: normal    Gait, Coordination, and Reflexes     Gait  Gait: (Not testable: she cannot walk w/o her walker)    Coordination   Positive Romberg's test: Coordiantion: uses walker else ,oses her balance.  Finger to nose coordination: normal    Tremor   Resting tremor: absent  Intention tremor: absent  Action tremor: absent    Reflexes   Right brachioradialis: 1+  Left brachioradialis: 1+  Right biceps: 1+  Left biceps: 1+  Right triceps: 1+  Left triceps: 1+  Right patellar: 1+  Left patellar: 1+  Right achilles: 0  Left achilles: 0  Left : 0  Right plantar: normal  Left plantar: normal  Right Peña: absent  Left Peña: absent  Right ankle clonus: absent  Left ankle clonus: absent  Right pendular knee jerk: absent  Left pendular knee jerk: absent          Hospital Outpatient Visit on 2019   Component Date Value Ref Range Status   • Site 2019 Arterial: left radial   Final   • Sujit's Test 2019 Positive   Final   • pH, Arterial 2019 7.434   7.350 - 7.450 pH units Final   • pCO2, Arterial 04/25/2019 39.7  35.0 - 45.0 mm Hg Final   • pO2, Arterial 04/25/2019 66.3* 80.0 - 100.0 mm Hg Final   • HCO3, Arterial 04/25/2019 26.5  22.0 - 28.0 mmol/L Final   • Base Excess, Arterial 04/25/2019 2.1* 0.0 - 2.0 mmol/L Final   • O2 Saturation Calculated 04/25/2019 93.4  92.0 - 99.0 % Final   • Barometric Pressure for Blood Gas 04/25/2019 744.6  mmHg Final   • Modality 04/25/2019 Room Air   Final   • Rate 04/25/2019 22  Breaths/minute Final   Hospital Outpatient Visit on 04/19/2019   Component Date Value Ref Range Status   • TDI S' 04/19/2019 12.00  cm/sec Final   • Sinus 04/19/2019 3.30  cm Final   • STJ 04/19/2019 2.80  cm Final   • Ascending aorta 04/19/2019 3.20  cm Final   • LA Volume Index 04/19/2019 29.0  mL/m2 Final   • EF(MOD-bp) 04/19/2019 66  % Final   • Lat Peak E' Diego 04/19/2019 8.0  cm/sec Final   • Med Peak E' Diego 04/19/2019 6.00  cm/sec Final   • BSA 04/19/2019 1.4  m^2 Final   • IVSd 04/19/2019 0.86  cm Final   • LVIDd 04/19/2019 4.0  cm Final   • LVIDs 04/19/2019 2.2  cm Final   • LVPWd 04/19/2019 0.86  cm Final   • IVS/LVPW 04/19/2019 1.0   Final   • FS 04/19/2019 44.6  % Final   • EDV(Teich) 04/19/2019 70.4  ml Final   • ESV(Teich) 04/19/2019 16.6  ml Final   • EF(Teich) 04/19/2019 76.5  % Final   • EF(cubed) 04/19/2019 83.0  % Final   • LV mass(C)d 04/19/2019 103.3  grams Final   • LV mass(C)dI 04/19/2019 75.0  grams/m^2 Final   • SV(Teich) 04/19/2019 53.8  ml Final   • SI(Teich) 04/19/2019 39.1  ml/m^2 Final   • SV(cubed) 04/19/2019 53.5  ml Final   • SI(cubed) 04/19/2019 38.8  ml/m^2 Final   • Ao root diam 04/19/2019 2.7  cm Final   • Ao root area 04/19/2019 5.8  cm^2 Final   • ACS 04/19/2019 1.5  cm Final   • asc Aorta Diam 04/19/2019 3.2  cm Final   • LVOT diam 04/19/2019 1.8  cm Final   • LVOT area 04/19/2019 2.5  cm^2 Final   • LVOT area(traced) 04/19/2019 2.5  cm^2 Final   • RVOT diam 04/19/2019 1.7  cm Final   • RVOT area 04/19/2019 2.4   cm^2 Final   • LVLd ap4 04/19/2019 6.0  cm Final   • EDV(MOD-sp4) 04/19/2019 39.0  ml Final   • LVLs ap4 04/19/2019 5.0  cm Final   • ESV(MOD-sp4) 04/19/2019 14.0  ml Final   • EF(MOD-sp4) 04/19/2019 64.1  % Final   • LVLd ap2 04/19/2019 6.3  cm Final   • EDV(MOD-sp2) 04/19/2019 44.0  ml Final   • LVLs ap2 04/19/2019 4.4  cm Final   • ESV(MOD-sp2) 04/19/2019 14.0  ml Final   • EF(MOD-sp2) 04/19/2019 68.2  % Final   • SV(MOD-sp4) 04/19/2019 25.0  ml Final   • SI(MOD-sp4) 04/19/2019 18.2  ml/m^2 Final   • SV(MOD-sp2) 04/19/2019 30.0  ml Final   • SI(MOD-sp2) 04/19/2019 21.8  ml/m^2 Final   • Ao root area (BSA corrected) 04/19/2019 2.0   Final   • LV Richter Vol (BSA corrected) 04/19/2019 28.3  ml/m^2 Final   • LV Sys Vol (BSA corrected) 04/19/2019 10.2  ml/m^2 Final   • MV A dur 04/19/2019 0.12  sec Final   • MV E max sobia 04/19/2019 65.5  cm/sec Final   • MV A max sobia 04/19/2019 94.1  cm/sec Final   • MV E/A 04/19/2019 0.7   Final   • MV V2 max 04/19/2019 108.6  cm/sec Final   • MV max PG 04/19/2019 4.7  mmHg Final   • MV V2 mean 04/19/2019 61.9  cm/sec Final   • MV mean PG 04/19/2019 1.8  mmHg Final   • MV V2 VTI 04/19/2019 27.7  cm Final   • MVA(VTI) 04/19/2019 2.2  cm^2 Final   • MV P1/2t max sobia 04/19/2019 65.5  cm/sec Final   • MV P1/2t 04/19/2019 60.9  msec Final   • MVA(P1/2t) 04/19/2019 3.6  cm^2 Final   • MV dec slope 04/19/2019 314.9  cm/sec^2 Final   • MV dec time 04/19/2019 0.2  sec Final   • Ao pk sobia 04/19/2019 114.6  cm/sec Final   • Ao max PG 04/19/2019 5.3  mmHg Final   • Ao max PG (full) 04/19/2019 1.0  mmHg Final   • Ao V2 mean 04/19/2019 81.4  cm/sec Final   • Ao mean PG 04/19/2019 3.1  mmHg Final   • Ao mean PG (full) 04/19/2019 0.8  mmHg Final   • Ao V2 VTI 04/19/2019 26.7  cm Final   • DANNY(I,A) 04/19/2019 2.3  cm^2 Final   • DANNY(I,D) 04/19/2019 2.3  cm^2 Final   • DANNY(V,A) 04/19/2019 2.2  cm^2 Final   • DANNY(V,D) 04/19/2019 2.2  cm^2 Final   • LV V1 max PG 04/19/2019 4.2  mmHg Final   • LV V1 mean  PG 04/19/2019 2.3  mmHg Final   • LV V1 max 04/19/2019 102.8  cm/sec Final   • LV V1 mean 04/19/2019 69.6  cm/sec Final   • LV V1 VTI 04/19/2019 25.3  cm Final   • MR max diego 04/19/2019 380.1  cm/sec Final   • MR max PG 04/19/2019 57.8  mmHg Final   • SV(Ao) 04/19/2019 155.1  ml Final   • SI(Ao) 04/19/2019 112.6  ml/m^2 Final   • SV(LVOT) 04/19/2019 61.9  ml Final   • SV(RVOT) 04/19/2019 27.9  ml Final   • SI(LVOT) 04/19/2019 45.0  ml/m^2 Final   • PA V2 max 04/19/2019 80.4  cm/sec Final   • PA max PG 04/19/2019 2.6  mmHg Final   • PA max PG (full) 04/19/2019 1.5  mmHg Final   • BH CV ECHO SHERICE - PVA(V,A) 04/19/2019 1.5  cm^2 Final   • BH CV ECHO SHERICE - PVA(V,D) 04/19/2019 1.5  cm^2 Final   • RV V1 max PG 04/19/2019 1.1  mmHg Final   • RV V1 mean PG 04/19/2019 0.52  mmHg Final   • RV V1 max 04/19/2019 52.4  cm/sec Final   • RV V1 mean 04/19/2019 32.9  cm/sec Final   • RV V1 VTI 04/19/2019 11.8  cm Final   • TR max diego 04/19/2019 312.2  cm/sec Final   • Pulm Sys Diego 04/19/2019 46.6  cm/sec Final   • Pulm Richter Diego 04/19/2019 24.7  cm/sec Final   • Pulm S/D 04/19/2019 1.9   Final   • Qp/Qs 04/19/2019 0.45   Final   • Pulm A Revs Dur 04/19/2019 0.1  sec Final   • Pulm A Revs Diego 04/19/2019 28.1  cm/sec Final   • MVA P1/2T LCG 04/19/2019 3.4  cm^2 Final   • BH CV ECHO SHERICE - BZI_BMI 04/19/2019 24.7  kilograms/m^2 Final   • BH CV ECHO SHERICE - BSA(HAYCOCK) 04/19/2019 1.4  m^2 Final   • BH CV ECHO SHERICE - BZI_METRIC_WEIGHT 04/19/2019 49.9  kg Final   • BH CV ECHO SHERICE - BZI_METRIC_HEIGHT 04/19/2019 142.2  cm Final   • Avg E/e' ratio 04/19/2019 9.36   Final   • RV Base 04/19/2019 3.20  cm Final   • LVOT peak grad valsalva 04/19/2019 22  mmHg Final   • TAPSE (>1.6) 04/19/2019 2.20  cm2 Final   • RVSP(TR) 04/19/2019 48  mmHg Final   Lab on 04/19/2019   Component Date Value Ref Range Status   • Glucose 04/19/2019 115* 65 - 99 mg/dL Final   • BUN 04/19/2019 18  8 - 23 mg/dL Final   • Creatinine 04/19/2019 0.77  0.57 - 1.00 mg/dL  Final   • Sodium 04/19/2019 137  136 - 145 mmol/L Final   • Potassium 04/19/2019 4.1  3.5 - 5.2 mmol/L Final   • Chloride 04/19/2019 97* 98 - 107 mmol/L Final   • CO2 04/19/2019 27.3  22.0 - 29.0 mmol/L Final   • Calcium 04/19/2019 9.5  8.6 - 10.5 mg/dL Final   • eGFR Non African Amer 04/19/2019 72  >60 mL/min/1.73 Final   • BUN/Creatinine Ratio 04/19/2019 23.4  7.0 - 25.0 Final   • Anion Gap 04/19/2019 12.7  mmol/L Final   • WBC 04/19/2019 5.74  3.40 - 10.80 10*3/mm3 Final   • RBC 04/19/2019 4.62  3.77 - 5.28 10*6/mm3 Final   • Hemoglobin 04/19/2019 14.0  12.0 - 15.9 g/dL Final   • Hematocrit 04/19/2019 44.8  34.0 - 46.6 % Final   • MCV 04/19/2019 97.0  79.0 - 97.0 fL Final   • MCH 04/19/2019 30.3  26.6 - 33.0 pg Final   • MCHC 04/19/2019 31.3* 31.5 - 35.7 g/dL Final   • RDW 04/19/2019 12.6  12.3 - 15.4 % Final   • RDW-SD 04/19/2019 45.4  37.0 - 54.0 fl Final   • MPV 04/19/2019 8.9  6.0 - 12.0 fL Final   • Platelets 04/19/2019 333  140 - 450 10*3/mm3 Final   • Neutrophil % 04/19/2019 71.0  42.7 - 76.0 % Final   • Lymphocyte % 04/19/2019 18.5* 19.6 - 45.3 % Final   • Monocyte % 04/19/2019 7.8  5.0 - 12.0 % Final   • Eosinophil % 04/19/2019 1.9  0.3 - 6.2 % Final   • Basophil % 04/19/2019 0.3  0.0 - 1.5 % Final   • Immature Grans % 04/19/2019 0.5  0.0 - 0.5 % Final   • Neutrophils, Absolute 04/19/2019 4.07  1.40 - 7.00 10*3/mm3 Final   • Lymphocytes, Absolute 04/19/2019 1.06  0.70 - 3.10 10*3/mm3 Final   • Monocytes, Absolute 04/19/2019 0.45  0.10 - 0.90 10*3/mm3 Final   • Eosinophils, Absolute 04/19/2019 0.11  0.00 - 0.40 10*3/mm3 Final   • Basophils, Absolute 04/19/2019 0.02  0.00 - 0.20 10*3/mm3 Final   • Immature Grans, Absolute 04/19/2019 0.03  0.00 - 0.05 10*3/mm3 Final   • nRBC 04/19/2019 0.0  0.0 - 0.0 /100 WBC Final   Hospital Outpatient Visit on 03/14/2019   Component Date Value Ref Range Status   • Creatinine 03/14/2019 0.90  0.60 - 1.30 mg/dL Final    Serial Number: [id number]:  [id number]             REVIEW OF STUDIES:      DIAGNOSIS, IMPRESSION AND PLAN:  A complex history combined with poor documentation over time of weakness in legs with preserved sensory in legs and also preserved cranial nerves and UEs.  Om examination she ahs no UMN signs and no parkinsonian features: therefore I am deferring doing brain imaging  PLAN: ambulatory referral to Dr broderick Velazquez for EMG    Following the EMG I will decide on next step or Dx.  Her preserved sensory in legs rules out central cord lesion

## 2019-05-14 ENCOUNTER — TELEPHONE (OUTPATIENT)
Dept: CARDIAC SURGERY | Facility: CLINIC | Age: 79
End: 2019-05-14

## 2019-05-14 ENCOUNTER — PROCEDURE VISIT (OUTPATIENT)
Dept: NEUROLOGY | Facility: CLINIC | Age: 79
End: 2019-05-14

## 2019-05-14 VITALS — HEIGHT: 56 IN | BODY MASS INDEX: 24.3 KG/M2 | WEIGHT: 108 LBS

## 2019-05-14 DIAGNOSIS — R29.898 WEAKNESS OF BOTH LEGS: ICD-10-CM

## 2019-05-14 DIAGNOSIS — G60.9 IDIOPATHIC PERIPHERAL NEUROPATHY: Primary | ICD-10-CM

## 2019-05-14 PROCEDURE — 95886 MUSC TEST DONE W/N TEST COMP: CPT | Performed by: PSYCHIATRY & NEUROLOGY

## 2019-05-14 PROCEDURE — 95910 NRV CNDJ TEST 7-8 STUDIES: CPT | Performed by: PSYCHIATRY & NEUROLOGY

## 2019-05-14 NOTE — PROGRESS NOTES
EMG and Nerve Conduction Studies    Please see data sheets for details on methods, temperatures and lab standards. EMG muscles tested for upper extremity studies include the deltoid, biceps, triceps, pronator teres, extensor digitorum communis, first dorsal interosseous and abductor pollicis brevis.  EMG muscles tested for lower extremity studies include the vastus lateralis, tibialis anterior, peroneus longus, medial gastrocnemius and extensor digitorum brevis.  Additional muscles tested as needed.  Paraspinal muscles tested as needed.  The complete report includes the data sheets.    Indication: Bilateral lower extremity weakness without numbness  History: 78-year-old woman with prediabetes who has bilateral lower extremity weakness without notable numbness.  She has some venous insufficiency and her feet turn blue when they are dependent.      Ht: 142.2 cm  Wt: 49 kg; BMI 24.2  HbA1C:   Lab Results   Component Value Date    HGBA1C 6.8 (H) 08/15/2018     TSH: No results found for: TSH    Technical summary:  Nerve conduction studies were obtained in the left leg and some in the left arm.  The feet were extremely cold and difficult to warm.  Temperature correction was used.  The left hand was cold and also difficult to warm.  Temperature correction was used where indicated.  Needle examination was obtained on selected muscles of both legs.    Results:  1.  Normal left sural sensory latency with low amplitude of 4 µV.  2.  Absent left superficial peroneal sensory potential.  3.  Absent left peroneal motor potentials from proximal and distal stimulation sites recorded over the extensor digitorum brevis.  One recorded over the tibialis anterior there was a normal conduction velocity across the fibular head but amplitude was low at 1.6 mV.  4.  Slow left tibial motor velocity of 26.6 m/s.  The distal latency was prolonged with temperature correction at 7.0 ms.  The amplitude was very low at 0.117 mV.  5.  Prolonged left  radial sensory latency at 2.9 ms with low amplitude of 11.6 µV.  6.  Prolonged left median motor latency at 5.7 ms with slow conduction velocity in the forearm at 41.3 m/s.  Normal amplitudes.  7.  Slow left ulnar motor conduction velocity below the elbow at 33.9 m/s with normal velocity in the short segment across the elbow.  Normal distal latency and amplitudes.  8.  Needle examination of selected muscles of the left arm and leg showed multiple abnormalities.  There were irritative changes in the tibialis anterior, medial gastrocnemius and peroneus longus muscles bilaterally.  There was an increased number of large motor units in these muscles with increased firing rates and reduced interference patterns.  The extensor digitorum brevis muscles showed no clear insertional activities or motor units.  The vastus lateralis muscles showed normal insertional activities, motor units and recruitment.  Lumbar paraspinals at L5 showed positive sharp waves bilaterally.    Impression:  Severely abnormal study showing severe peripheral neuropathy.  There were needle exam changes in all muscles below the knees which could be simply due to the peripheral neuropathy however paraspinals were abnormal on both sides consistent with root level involvement.  I cannot entirely exclude superimposed independent bilateral L5 and S1 radiculopathies.  No evidence of a myopathy.  Clinical correlation is suggested.  Study results were discussed with the patient and her daughter.    Akin Velazquez M.D.              Dictated utilizing Dragon dictation.

## 2019-05-21 ENCOUNTER — OFFICE VISIT (OUTPATIENT)
Dept: NEUROLOGY | Facility: CLINIC | Age: 79
End: 2019-05-21

## 2019-05-21 ENCOUNTER — LAB (OUTPATIENT)
Dept: LAB | Facility: HOSPITAL | Age: 79
End: 2019-05-21

## 2019-05-21 VITALS
DIASTOLIC BLOOD PRESSURE: 72 MMHG | HEIGHT: 56 IN | BODY MASS INDEX: 24.45 KG/M2 | WEIGHT: 108.7 LBS | OXYGEN SATURATION: 95 % | SYSTOLIC BLOOD PRESSURE: 124 MMHG | HEART RATE: 65 BPM

## 2019-05-21 DIAGNOSIS — G62.9 POLYNEUROPATHY: ICD-10-CM

## 2019-05-21 DIAGNOSIS — G95.9 MYELOPATHY (HCC): Primary | ICD-10-CM

## 2019-05-21 DIAGNOSIS — M54.50 BILATERAL LOW BACK PAIN WITHOUT SCIATICA, UNSPECIFIED CHRONICITY: ICD-10-CM

## 2019-05-21 DIAGNOSIS — E78.2 MIXED HYPERLIPIDEMIA: Chronic | ICD-10-CM

## 2019-05-21 DIAGNOSIS — G25.81 RESTLESS LEGS SYNDROME (RLS): ICD-10-CM

## 2019-05-21 DIAGNOSIS — G95.9 MYELOPATHY (HCC): ICD-10-CM

## 2019-05-21 LAB
FOLATE SERPL-MCNC: >20 NG/ML (ref 4.78–24.2)
VIT B12 BLD-MCNC: 687 PG/ML (ref 211–946)

## 2019-05-21 PROCEDURE — 36415 COLL VENOUS BLD VENIPUNCTURE: CPT

## 2019-05-21 PROCEDURE — 82746 ASSAY OF FOLIC ACID SERUM: CPT

## 2019-05-21 PROCEDURE — 82607 VITAMIN B-12: CPT

## 2019-05-21 PROCEDURE — 99213 OFFICE O/P EST LOW 20 MIN: CPT | Performed by: PSYCHIATRY & NEUROLOGY

## 2019-05-21 RX ORDER — TRAMADOL HYDROCHLORIDE 50 MG/1
TABLET ORAL
Qty: 90 TABLET | Refills: 2 | OUTPATIENT
Start: 2019-05-21 | End: 2019-08-02 | Stop reason: HOSPADM

## 2019-05-21 RX ORDER — PITAVASTATIN CALCIUM 2.09 MG/1
TABLET, FILM COATED ORAL
Qty: 90 TABLET | Refills: 0 | Status: SHIPPED | OUTPATIENT
Start: 2019-05-21 | End: 2019-05-28

## 2019-05-21 RX ORDER — FLUCONAZOLE 100 MG/1
TABLET ORAL
COMMUNITY
Start: 2019-05-09 | End: 2019-07-09 | Stop reason: HOSPADM

## 2019-05-21 RX ORDER — PRAMIPEXOLE DIHYDROCHLORIDE 0.12 MG/1
TABLET ORAL
Qty: 90 TABLET | Refills: 0 | Status: SHIPPED | OUTPATIENT
Start: 2019-05-21 | End: 2019-09-11 | Stop reason: SDUPTHER

## 2019-05-21 NOTE — TELEPHONE ENCOUNTER
LOV 1/18/2019  NOV 7/24/2019  LRF 4/16/2019 # 90 1 TID PRN pain phone in  [facility] 5/21/2019  UDS 8/15/2018  Contract 8/14/2018

## 2019-05-21 NOTE — PROGRESS NOTES
Follow Up Visit:Weakness and numbness  Comes accompanied by her daughter    My initial consultation raised question about neuropathy but that alone would not explain all her findings    She has randy weak since her Aortic arch repair surgery 3 years ago: this has been slowly progressive.  She was sent to PT but was not motivated.Her inability to walk has been a gradual decline and especially so alst one year.    On today's examination I made sure she was siting in a chair instead of lying down as she was at initial consultation.  Her feet are literally ice cold and purplish in color and the discoloration goes above the ankles    DTR: symmetrical active at the knees and barely present  to absent at the ankles. Normal DTRs in UE  She has an upgoing  toe on the right and questionable on the left  No UMN signs in UEs  She has loss vibration above ankles but has normal position sense in the toes  She has profound weakness sin all musculat the ankles ; Inversion, eversion, dorsiflexion and planar flexion.  She cannot stand on toes or heels despite maximum help    EMG/NCV by Dr Akin Velazquez: I discussed with Dr Velazquez and reveiwed with them  Impression:  Severely abnormal study showing severe peripheral neuropathy.  There were needle exam changes in all muscles below the knees which could be simply due to the peripheral neuropathy however paraspinals were abnormal on both sides consistent with root level involvement.  I cannot entirely exclude superimposed independent bilateral L5 and S1 radiculopathies.  No evidence of a myopathy.  Clinical correlation is suggested.  Study results were discussed with the patient and her daughter            Review of Systems   Constitutional: Negative for chills, fatigue and fever.   HENT: Negative for hearing loss, tinnitus and trouble swallowing.    Eyes: Negative for pain, redness and visual disturbance.   Respiratory: Negative for cough, shortness of breath and wheezing.   "  Cardiovascular: Negative for chest pain, palpitations and leg swelling.   Gastrointestinal: Negative for constipation, diarrhea, nausea and vomiting.   Endocrine: Negative for cold intolerance, heat intolerance and polydipsia.   Genitourinary: Negative for decreased urine volume, difficulty urinating and urgency.   Musculoskeletal: Positive for back pain and gait problem. Negative for joint swelling.   Skin: Negative for color change, rash and wound.   Allergic/Immunologic: Positive for environmental allergies. Negative for food allergies and immunocompromised state.   Neurological: Negative for dizziness, tremors, seizures, syncope, facial asymmetry, speech difficulty, weakness, light-headedness, numbness and headaches.   Hematological: Negative for adenopathy. Does not bruise/bleed easily.   Psychiatric/Behavioral: Negative for agitation, behavioral problems, confusion, decreased concentration, dysphoric mood, hallucinations, self-injury, sleep disturbance and suicidal ideas. The patient is nervous/anxious. The patient is not hyperactive.            Vitals:    05/21/19 0804   BP: 124/72   Pulse: 65   SpO2: 95%   Weight: 49.3 kg (108 lb 11.2 oz)   Height: 142.2 cm (56\")               Review Results-Workup/Diagnoses/Plan  Profound weakness dominantly bilateral lower extremities more distally than proximally with upgoing toes and active knee redflexes suspicious for cord injury during her Aortic Aneurysm syrgery by history  I need to rule out toehr causes.    PLAN:  Ambulatory referral to MRI spine thoracic and lumbar  B12, folate, TSH  Will consider peripheral neuropathy workup depending upon the above    "

## 2019-05-24 DIAGNOSIS — G95.19: Primary | ICD-10-CM

## 2019-05-28 ENCOUNTER — OFFICE VISIT (OUTPATIENT)
Dept: CARDIOLOGY | Facility: CLINIC | Age: 79
End: 2019-05-28

## 2019-05-28 VITALS
BODY MASS INDEX: 24.39 KG/M2 | HEIGHT: 56 IN | SYSTOLIC BLOOD PRESSURE: 110 MMHG | DIASTOLIC BLOOD PRESSURE: 70 MMHG | HEART RATE: 64 BPM | WEIGHT: 108.4 LBS

## 2019-05-28 DIAGNOSIS — I71.60 THORACOABDOMINAL AORTIC ANEURYSM (TAAA) WITHOUT RUPTURE (HCC): Primary | ICD-10-CM

## 2019-05-28 DIAGNOSIS — Z95.1 S/P CABG X 1: ICD-10-CM

## 2019-05-28 PROCEDURE — 93000 ELECTROCARDIOGRAM COMPLETE: CPT | Performed by: INTERNAL MEDICINE

## 2019-05-28 PROCEDURE — 99213 OFFICE O/P EST LOW 20 MIN: CPT | Performed by: INTERNAL MEDICINE

## 2019-05-28 RX ORDER — AMOXICILLIN 500 MG/1
500 CAPSULE ORAL 3 TIMES DAILY
COMMUNITY
Start: 2019-05-22 | End: 2019-06-20

## 2019-05-28 NOTE — PROGRESS NOTES
"Date of Office Visit: 2019  Encounter Provider: Tres De Los Santos MD  Place of Service: [facility] CARDIOLOGY  Patient Name: Grace Beauchamp  :1940  [id number]    Chief Complaint   Patient presents with   • Hyperlipidemia   :     HPI: Grace Beauchamp is a 78 y.o. female  This is a patient with a history of thoracic aneurysm repair, a catheterization, which showed one-vessel disease in her obtuse marginal.  She ended up having a bypass at the time she had her aneurysm repaired.  She has done very well from the cardiac standpoint.  She evidently has worsening aneurysm and needs to get her further surgery.  We catheterized her last month and everything looked good and her bypass looked good.        Past Medical History:   Diagnosis Date   • AAA (abdominal aortic aneurysm) (CMS/[facility])    • Acute bronchitis 2018   • 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HCC)    • Arthritis    • Bilateral carotid artery disease (CMS/HCC)    • Bilateral cataracts     S/p Extractions   • Breast cancer (CMS/[facility])     right breast [id number] (snm) pMX ER/FL pos, Her-2/neg, Ki-67, 31%, oncotype recurrence score 19, invasive lobular carcinoma   • CAD (coronary artery disease)     S/p CABG on 16 by Dr. 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[facility])    • Closed fracture of three ribs of right side    • Cognitive disorder    • COPD (chronic obstructive pulmonary disease) (CMS/[facility])    • Depression    • Descending aortic arch aneurysm (CMS/HCC)    • Diabetes mellitus (CMS/HCC)     \"BORDERLINE\"   • Erythermalgia (CMS/[facility])    • GERD (gastroesophageal reflux disease)    • Hyperlipidemia     Controlled w/Meds   • Hypertension     Controlled w/Meds   • Low back pain    • Moderate aortic valve insufficiency     S/p AVR on 16 by Dr. Ontiveros   • Nocturia    • Osteoarthritis    • Osteoporosis    • Otitis media     R Ear   • Prediabetes    • RLS (restless legs syndrome)    • Saccular aneurysm    • Stroke (CMS/[facility])     " Perioperatively w/L Hip Repl   • Thoracic ascending aortic aneurysm (CMS/HCC)     S/p Repair on 16 by Dr. Ontiveros   • TIA (transient ischemic attack)    • Urgency incontinence    • Venous insufficiency    • Ventral hernia        Past Surgical History:   Procedure Laterality Date   • AORTIC VALVE REPAIR/REPLACEMENT N/A 2016-[facility]    Ascending Replacement Using a 24MM Graft--Dr. Ontiveros   • APPENDECTOMY     • BREAST BIOPSY Right 2012    Vacuum Assisted Core Bx of R Breast   • CARDIAC CATHETERIZATION N/A 2016    Dr. Tres De Los Santos   • CARDIAC CATHETERIZATION N/A 2019    Procedure: Left Heart Cath;  Surgeon: Tres De Los Santos MD;  Location:  [facility];  Service: Cardiology   • CARDIAC CATHETERIZATION N/A 2019    Procedure: Coronary angiography;  Surgeon: Tres De Los Santos MD;  Location:  [facility];  Service: Cardiology   • CARDIAC SURGERY  2016    Dr. Ontiveros/Dr. De Los Santos   • CATARACT EXTRACTION Bilateral     [facility]   • COLONOSCOPY N/A 10/2002    Dr. Negro   • CORONARY ARTERY BYPASS GRAFT  2016-[facility]    x1 w/L Vein Grafting--Dr. Ontiveros   • CYST REMOVAL Right 2013    R Palm Excision of Retinacular Cyst--Dr. Karla Fish   • EPIDURAL BLOCK     • LAPAROSCOPIC CHOLECYSTECTOMY N/A 2004    Dr. JOEY Sheth   • MASTECTOMY Right 2012   • ORIF HIP FRACTURE Left 2005    w/ AMBI compression screw, Dr. Victor M Cabral   • RECTOVAGINAL FISTULA REPAIR  1965   • TUBAL ABDOMINAL LIGATION         Social History     Socioeconomic History   • Marital status:      Spouse name: Not on file   • Number of children: Not on file   • Years of education: Not on file   • Highest education level: Not on file   Tobacco Use   • Smoking status: Former Smoker     Types: Cigarettes     Last attempt to quit: 2012     Years since quittin.4   • Smokeless tobacco: Never Used   Substance and Sexual Activity   • Alcohol use: No      Comment: Daily Caffeine Use   • Drug use: No   • Sexual activity: Defer     Birth control/protection: Tubal ligation       Family History   Problem Relation Age of Onset   • Alzheimer's disease Mother    • Hypertension Mother    • Cancer Maternal Aunt    • Heart disease Father    • Heart failure Father    • Hypertension Father    • Diabetes Father    • Liver disease Sister    • Heart failure Brother    • Hypertension Brother    • Alcohol abuse Brother    • No Known Problems Maternal Grandmother    • No Known Problems Maternal Grandfather    • No Known Problems Paternal Grandmother    • No Known Problems Paternal Grandfather    • Diabetes Brother    • Brain cancer Brother    • Heart disease Brother        Review of Systems   Constitution: Negative for decreased appetite, fever, malaise/fatigue and weight loss.   HENT: Negative for nosebleeds.    Eyes: Negative for double vision.   Cardiovascular: Negative for chest pain, claudication, cyanosis, dyspnea on exertion, irregular heartbeat, leg swelling, near-syncope, orthopnea, palpitations, paroxysmal nocturnal dyspnea and syncope.   Respiratory: Negative for cough, hemoptysis and shortness of breath.    Hematologic/Lymphatic: Negative for bleeding problem.   Skin: Negative for rash.   Musculoskeletal: Negative for falls and myalgias.   Gastrointestinal: Negative for hematochezia, jaundice, melena, nausea and vomiting.   Genitourinary: Negative for hematuria.   Neurological: Negative for dizziness and seizures.   Psychiatric/Behavioral: Negative for altered mental status and memory loss.       Allergies   Allergen Reactions   • Ramipril Other (See Comments)     Ace I  cough   • Morphine Itching   • Morphine And Related Itching   • Bactrim [Sulfamethoxazole-Trimethoprim] Itching and Rash         Current Outpatient Medications:   •  amoxicillin (AMOXIL) 500 MG capsule, Take 500 mg by mouth 3 (Three) Times a Day., Disp: , Rfl:   •  ANORO ELLIPTA 62.5-25 MCG/INH aerosol  "powder  inhaler, Inhale 1 puff Daily., Disp: , Rfl:   •  aspirin 81 MG EC tablet, Take 81 mg by mouth daily., Disp: , Rfl:   •  Biotin 1000 MCG tablet, Take 1,000 mcg by mouth daily. Take as directed, Disp: , Rfl:   •  BYSTOLIC 10 MG tablet, TAKE ONE TABLET BY MOUTH DAILY, Disp: 30 tablet, Rfl: 4  •  cetirizine (zyrTEC) 10 MG tablet, Take 1 tablet by mouth As Needed for Allergies., Disp: 30 tablet, Rfl: 0  •  cilostazol (PLETAL) 100 MG tablet, Take 1 tablet by mouth 2 (two) times a day., Disp: , Rfl:   •  DICLOFENAC PO, Take 1 tablet by mouth 2 (Two) Times a Day., Disp: , Rfl:   •  fluconazole (DIFLUCAN) 100 MG tablet, , Disp: , Rfl:   •  melatonin 5 MG tablet tablet, Take 2 tablets by mouth At Night As Needed (sleep)., Disp: 30 tablet, Rfl: 0  •  MYRBETRIQ 50 MG tablet sustained-release 24 hour 24 hr tablet, Take 50 mg by mouth Daily., Disp: , Rfl:   •  omeprazole (priLOSEC) 20 MG capsule, Take 1 capsule by mouth Daily., Disp: 90 capsule, Rfl: 2  •  pitavastatin calcium (LIVALO) 2 MG tablet tablet, Take 2 mg by mouth Every Night., Disp: , Rfl:   •  pramipexole (MIRAPEX) 0.125 MG tablet, TAKE ONE TABLET BY MOUTH AT BEDTIME, Disp: 90 tablet, Rfl: 0  •  PROAIR  (90 Base) MCG/ACT inhaler, INHALE TWO PUFFS BY MOUTH EVERY 6 HOURS AS NEEDED FOR WHEEZING, Disp: 1 inhaler, Rfl: 3  •  Probiotic Product (PROBIOTIC PO), Take 1 capsule by mouth Daily., Disp: , Rfl:   •  traMADol (ULTRAM) 50 MG tablet, TAKE ONE TABLET BY MOUTH EVERY 6 HOURS AS NEEDED FOR PAIN, Disp: 90 tablet, Rfl: 2      Objective:     Vitals:    05/28/19 1314   BP: 110/70   Pulse: 64   Weight: 49.2 kg (108 lb 6.4 oz)   Height: 142.2 cm (56\")     Body mass index is 24.3 kg/m².    Physical Exam   Pulmonary/Chest:             ECG 12 Lead  Date/Time: 5/28/2019 1:31 PM  Performed by: Tres De Los Santos MD  Authorized by: Tres De Los Santos MD   Comparison: compared with previous ECG   Similar to previous ECG  Rhythm: sinus rhythm    Clinical impression: normal " ECG             Assessment:       Diagnosis Plan   1. 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)     2. S/P CABG x 1            Plan:       She is a small lady with prior bypass is doing well she evidently needs to have more surgery on her aorta which is a big deal for her she could get her teeth cleaned up before hand from March standpoint she is ready to go from a cardiac standpoint she has few what to expect and I think that is better answered by Dr. Ontiveros    As always, it has been a pleasure to participate in your patient's care.      Sincerely,       Tres De Los Santos MD

## 2019-05-31 ENCOUNTER — TELEPHONE (OUTPATIENT)
Dept: CARDIAC SURGERY | Facility: CLINIC | Age: 79
End: 2019-05-31

## 2019-05-31 NOTE — TELEPHONE ENCOUNTER
I returned patients call. She has followed with neurology and has seen Dr De Los Santos. She is planning on having her teeth extracted next week in preparation for surgery. She has experienced a dental infection that she has been prescribed antibiotics for. Her dentist states she  will be cleared for surgery in about three weeks. I let her know Trace from our office will call in the next few days to schedule or at least hold a day for surgery with Dr Ontiveros. She was agreeable to this.

## 2019-06-05 ENCOUNTER — PREP FOR SURGERY (OUTPATIENT)
Dept: OTHER | Facility: HOSPITAL | Age: 79
End: 2019-06-05

## 2019-06-05 ENCOUNTER — OFFICE VISIT (OUTPATIENT)
Dept: NEUROLOGY | Facility: CLINIC | Age: 79
End: 2019-06-05

## 2019-06-05 VITALS
DIASTOLIC BLOOD PRESSURE: 72 MMHG | HEART RATE: 64 BPM | OXYGEN SATURATION: 98 % | SYSTOLIC BLOOD PRESSURE: 118 MMHG | BODY MASS INDEX: 24.3 KG/M2 | HEIGHT: 56 IN

## 2019-06-05 DIAGNOSIS — R79.9 ABNORMAL FINDING OF BLOOD CHEMISTRY: ICD-10-CM

## 2019-06-05 DIAGNOSIS — G82.20 PARAPARESIS (HCC): Primary | ICD-10-CM

## 2019-06-05 DIAGNOSIS — G82.21 COMPLETE PARAPLEGIA (HCC): ICD-10-CM

## 2019-06-05 DIAGNOSIS — R79.1 ABNORMAL COAGULATION PROFILE: ICD-10-CM

## 2019-06-05 DIAGNOSIS — I71.60 THORACOABDOMINAL AORTIC ANEURYSM, WITHOUT RUPTURE (HCC): Primary | ICD-10-CM

## 2019-06-05 DIAGNOSIS — I13.0 HYPERTENSIVE HEART AND CHRONIC KIDNEY DISEASE WITH HEART FAILURE AND STAGE 1 THROUGH STAGE 4 CHRONIC KIDNEY DISEASE, OR CHRONIC KIDNEY DISEASE (HCC): ICD-10-CM

## 2019-06-05 PROCEDURE — 99215 OFFICE O/P EST HI 40 MIN: CPT | Performed by: PSYCHIATRY & NEUROLOGY

## 2019-06-05 RX ORDER — CHLORHEXIDINE GLUCONATE 0.12 MG/ML
15 RINSE ORAL ONCE
Status: CANCELLED | OUTPATIENT
Start: 2019-06-05 | End: 2019-06-05

## 2019-06-05 RX ORDER — CHLORHEXIDINE GLUCONATE 500 MG/1
1 CLOTH TOPICAL EVERY 12 HOURS PRN
Status: CANCELLED | OUTPATIENT
Start: 2019-06-05

## 2019-06-05 RX ORDER — CEFAZOLIN SODIUM 2 G/100ML
2 INJECTION, SOLUTION INTRAVENOUS
Status: CANCELLED | OUTPATIENT
Start: 2019-06-06 | End: 2019-06-07

## 2019-06-05 RX ORDER — CHLORHEXIDINE GLUCONATE 0.12 MG/ML
15 RINSE ORAL EVERY 12 HOURS
Status: CANCELLED | OUTPATIENT
Start: 2019-06-05 | End: 2019-06-06

## 2019-06-05 NOTE — PROGRESS NOTES
"Follow Up Visit: Paraplegic weakness  1:35 - 2: 30      This 78 HENRIETTA comes for follow up accomapnied by her 2 daughters  I initially saw her with about 3 year hisdtory when she last had normal strength in lower extremities  Her exam was dominantly proximmal LE weakness with preserved position sense and some soft UMN signs. I was worried about the remote possibility of myopathic process but was conceredn that the onset of this problem had started after she ahs had ascending thorac aortic aneurysm repair back in February 2016.  Dr Velazquez did the EMG and he did not find any sings for myopathy but there was evidence for some paraspinal irritation. WE decided to rule oput the possibility of vascual;r myelopathy. She ahd relativelt intact UEs and I felt we did not need to worry about the cervical cord. Of note sfter gurwinder suregry she had aslo developed severe hoarseness and dysphagia.  She come snow and her MRI thoracic spine at [facility] done w/o and w/ contrast shows no sign of myeloapthy of any etiology.    I felt ti was time, after reviewing the results of the MRI with them, to retrace her hsitory. I spent considerable amount of time doing so going far back to 2016 and there eis not a single neurological examination that had addressed her weakness. I spent more than 50% of the 55 minutes with them trying to piece together the history    Of note when she alter had to undergo hip surgery she tells me ehr vascualr surgeon wrote to anethesia warnign them about ehr AAA and to be evry careful with her BP.  Of note following that suregry she was evaluated at [facility] for a \"stroke\".  The story from he fernando gets muddy as to when it was the last time she walked and functioned independently with ehr LEs: maybe 2-3 years of gradual slow progression? Maybe  I read in her Epic of her beign home on home PT because of her weakness and her swalolowing as well.    From [facility]  10/20/2016 she is seen by the Spine Outaptient noted her " "strength was 5 out of 5. This was her last visit there with the Spine servcie  I do not see notes from the Stroke Service  Review of Systems   Constitutional: Negative for chills, fatigue and fever.   HENT: Negative for hearing loss, tinnitus and trouble swallowing.    Eyes: Negative for pain, redness and itching.   Respiratory: Negative for cough, shortness of breath and wheezing.    Cardiovascular: Negative for chest pain, palpitations and leg swelling.   Gastrointestinal: Negative for constipation, diarrhea, nausea and vomiting.   Endocrine: Negative for cold intolerance, heat intolerance and polydipsia.   Genitourinary: Negative for decreased urine volume, difficulty urinating and urgency.   Musculoskeletal: Positive for back pain and gait problem.   Skin: Negative for color change, rash and wound.   Allergic/Immunologic: Positive for environmental allergies. Negative for food allergies and immunocompromised state.   Neurological: Negative for dizziness, tremors, seizures, syncope, facial asymmetry, speech difficulty, weakness, light-headedness, numbness and headaches.   Hematological: Negative for adenopathy. Does not bruise/bleed easily.   Psychiatric/Behavioral: Negative for agitation, behavioral problems, confusion, decreased concentration, dysphoric mood, hallucinations, self-injury, sleep disturbance and suicidal ideas. The patient is nervous/anxious. The patient is not hyperactive.            Vitals:    06/05/19 1338   BP: 118/72   Pulse: 64   SpO2: 98%   Height: 142.2 cm (56\")         Physical/Neurological Examination  She is alert oriented and cooperative   Speech is normal  Strength in UEs bilaterally normal  Lower extremities: feet are purple and cold  She has normal position sensation in the toes  She has much better strength (although not normal) distally and symmetrically in LEs than she has proximally where the hip flexors are quite weak  Today I do not elicit Babinski signs      Review " Results-Workup/Diagnoses/Plan  I have the results from the recent MRI thoraci spine. That was reviewed with them by betyt from [facility]. That driscoll snot give us an etiology    I noted as I was scanning through her huge volume of record that she has had  MRI lumbar-spine at [facility] on 09/23/2016. That study had been ordered by GREG Guillaume that the infrarenal aortic artery aneurysm measuring 5 cms had enlarged and was partially thrombosed.      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  I will see her afterwards  More than 50% of the 45 minutes was spent in a loing dsiucssion about the etiology as I went through her volume of e-record with DDx of DDx and prognosis.    Paraparesis of chronic nature unknown exact date of onset, most likely related to vascular injury with unknown definite  etiology

## 2019-06-07 ENCOUNTER — APPOINTMENT (OUTPATIENT)
Dept: CARDIOLOGY | Facility: HOSPITAL | Age: 79
End: 2019-06-07

## 2019-06-07 ENCOUNTER — HOSPITAL ENCOUNTER (EMERGENCY)
Facility: HOSPITAL | Age: 79
Discharge: HOME | End: 2019-06-07
Attending: EMERGENCY MEDICINE | Admitting: EMERGENCY MEDICINE

## 2019-06-07 VITALS
SYSTOLIC BLOOD PRESSURE: 196 MMHG | OXYGEN SATURATION: 98 % | WEIGHT: 108 LBS | HEIGHT: 58 IN | BODY MASS INDEX: 22.67 KG/M2 | DIASTOLIC BLOOD PRESSURE: 102 MMHG | HEART RATE: 75 BPM | RESPIRATION RATE: 16 BRPM | TEMPERATURE: 99.3 F

## 2019-06-07 DIAGNOSIS — Z98.890 HISTORY OF THORACIC AORTIC ANEURYSM REPAIR: ICD-10-CM

## 2019-06-07 DIAGNOSIS — Z86.79 HISTORY OF THORACIC AORTIC ANEURYSM REPAIR: ICD-10-CM

## 2019-06-07 DIAGNOSIS — M25.476 SWELLING OF FOOT JOINT, UNSPECIFIED LATERALITY: Primary | ICD-10-CM

## 2019-06-07 DIAGNOSIS — Z86.39 HISTORY OF DIABETES MELLITUS: ICD-10-CM

## 2019-06-07 DIAGNOSIS — Z86.79 HISTORY OF THORACOABDOMINAL AORTIC ANEURYSM (TAAA): ICD-10-CM

## 2019-06-07 DIAGNOSIS — I87.2 VENOUS INSUFFICIENCY OF BOTH LOWER EXTREMITIES: ICD-10-CM

## 2019-06-07 PROBLEM — I73.81 ERYTHROMELALGIA: Status: ACTIVE | Noted: 2019-06-07

## 2019-06-07 PROCEDURE — 99283 EMERGENCY DEPT VISIT LOW MDM: CPT

## 2019-06-07 PROCEDURE — 93922 UPR/L XTREMITY ART 2 LEVELS: CPT

## 2019-06-07 NOTE — ED PROVIDER NOTES
EMERGENCY DEPARTMENT ENCOUNTER    Room number:  39/39  Date Seen:  6/7/2019  Time of transfer: 1715  PCP:  Miller Castañeda MD      LABORATORY RESULTS:  Recent Results (from the past 24 hour(s))   Doppler Ankle Brachial Index Single Level CAR    Collection Time: 06/07/19  8:18 PM   Result Value Ref Range    RIGHT DORSALIS PEDIS SYS  mmHg    RIGHT POST TIBIAL SYS  mmHg    RIGHT GREAT TOE SYS  mmHg    RIGHT 3RD DIGIT SYS  mmHg    RIGHT TAMRA RATIO 1.36     RIGHT TBI RATIO 0.99     LEFT DORSALIS PEDIS SYS  mmHg    LEFT POST TIBIAL SYS  mmHg    LEFT GREAT TOE SYS  mmHg    LEFT 3RD DIGIT SYS  mmHg    LEFT TAMRA RATIO 1.36     LEFT TBI RATIO 0.74     Upper arterial right arm brachial sys max 148 mmHg    Upper arterial left arm brachial sys max 148 mmHg     I reviewed the above results.     RADIOLOGY:  No orders to display     I reviewed the above results    MEDICATIONS ORDERED IN THE ED:  Medications - No data to display    PROGRESS AND CONSULT NOTES:    1715  Patient care transferred from Dr. Arreaga pending vascular surgery consult.     1811  Pt has chronic PVD and was sent to the ER by Dr. Smith's office (Vascular) for foot swelling. Dr. Smith has seen pt in the ER and he feels this is a chronic issue, but he will order vascular studies and will call me with the results.     2030  Sierra with vascular sent a preliminary report for the pt and placed a call to Dr. Smith who has not called her back yet.     2105  Spoke with Dr. Smith (Vascular) who recommends outpatient follow up with him next week.     2110  Rechecked pt. Pt is resting comfortably. Notified pt of my discussion with Dr. Smith. Advised pt to follow Dr. Smith next week. Discussed the plan to discharge. Pt understands and agrees with the plan, all questions answered.      DIAGNOSIS:  Final diagnoses:   Swelling of foot joint, unspecified laterality   Venous insufficiency of both lower extremities   History of  thoracoabdominal aortic aneurysm (TAAA)   History of thoracic aortic aneurysm repair   History of diabetes mellitus       DISPOSITION:  DISCHARGE    Patient discharged in stable condition.    Reviewed implications of results, diagnosis, meds, responsibility to follow up, warning signs and symptoms of possible worsening, potential complications and reasons to return to ER.    Patient/Family voiced understanding of above instructions.    Discussed plan for discharge, as there is no emergent indication for admission. Patient referred to primary care provider for BP management due to today's BP. Pt/family is agreeable and understands need for follow up and repeat testing.  Pt is aware that discharge does not mean that nothing is wrong but it indicates no emergency is present that requires admission and they must continue care with follow-up as given below or physician of their choice.     FOLLOW-UP  Jonathan Smith MD  [facility] [address]  [phone]    In 1 week  If symptoms worsen         Medication List      No changes were made to your prescriptions during this visit.           Provider attestation:  I personally reviewed the past medical history, past surgical history, social history, family history, current medications, and allergies as they appear in the chart.    Documentation assistance provided by shanel Jean for Dr. Doan.  Information recorded by the scribe was done at my direction and has been verified and validated by me.            Sherrell Jean  06/07/19 0153       Simba Doan MD  06/07/19 [id number]

## 2019-06-07 NOTE — CONSULTS
Patient Name: Grace Beauchamp Account #: [id number]    MRN: [id number] Admission Date: 6/7/2019      Consulting Service: Vascular Surgery Date of Evaluation: June 7, 2019    Requesting Provider: Akin Doan MD    CHIEF COMPLAINT: Erythromelalgia   HPI: Grace Beauchamp is a 78 y.o. female is being seen for a consultation and evaluation/management of blue and red toes with moderate discomfort.  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  Raynaud's disease in the differential as well.  At this point time there is really no evidence of ischemia to the digits that would require intervention.  She has palpable pulses on the left dorsalis pedis and the right posterior tibial.  She has been dealing with this for some time.  She has some known venous insufficiency in addition.  She will continue to use her medical grade compression stockings at times but at this juncture I think it may be exacerbating her feet and I recommend diabetic socks instead.  After discussion with her I believe that she is not embolizing to the digits.  We will order a lower extremity arterial digital pressure to confirm that and hopefully get and get her discharged home per her request.    PAST MEDICAL HISTORY:   Past Medical History:   Diagnosis Date   • AAA (abdominal aortic aneurysm) (CMS/[facility])    • Acute bronchitis 12/2018   • 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[facility])    • Arthritis    • Bilateral carotid artery disease (CMS/[facility])    • Bilateral cataracts     S/p Extractions   • Breast cancer (CMS/[facility])     right breast mA3ucDc (snm) pMX ER/GA pos, Her-2/neg, Ki-67, 31%, oncotype recurrence score 19, invasive lobular carcinoma   • CAD (coronary artery disease)     S/p CABG on 2/23/16 by Dr. 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[facility])    • Closed fracture of three ribs of right side    • Cognitive disorder    • COPD (chronic obstructive pulmonary disease) (CMS/[facility])    • Depression    • Descending aortic arch aneurysm (CMS/[facility])    • Diabetes mellitus (CMS/[facility])   "   \"BORDERLINE\"   • Erythermalgia (CMS/[facility])    • GERD (gastroesophageal reflux disease)    • Hyperlipidemia     Controlled w/Meds   • Hypertension     Controlled w/Meds   • Low back pain    • Moderate aortic valve insufficiency     S/p AVR on 02/23/16 by Dr. Ontiveros   • Nocturia    • Osteoarthritis    • Osteoporosis    • Otitis media     R Ear   • Prediabetes    • RLS (restless legs syndrome)    • Saccular aneurysm    • Stroke (CMS/[facility])     Perioperatively w/L Hip Repl   • Thoracic ascending aortic aneurysm (CMS/[facility])     S/p Repair on 02/23/16 by Dr. Ontiveros   • TIA (transient ischemic attack)    • Urgency incontinence    • Venous insufficiency    • Ventral hernia       PAST SURGICAL HISTORY:   Past Surgical History:   Procedure Laterality Date   • AORTIC VALVE REPAIR/REPLACEMENT N/A 02/23/2016-[facility]    Ascending Replacement Using a 24MM Graft--Dr. Ontiveros   • APPENDECTOMY  1977   • BREAST BIOPSY Right 09/16/2012    Vacuum Assisted Core Bx of R Breast   • CARDIAC CATHETERIZATION N/A 01/06/2016    Dr. Tres De Los Santos   • CARDIAC CATHETERIZATION N/A 4/22/2019    Procedure: Left Heart Cath;  Surgeon: Tres De Los Santos MD;  Location:  [facility];  Service: Cardiology   • CARDIAC CATHETERIZATION N/A 4/22/2019    Procedure: Coronary angiography;  Surgeon: Tres De Los Santos MD;  Location:  [facility];  Service: Cardiology   • CARDIAC SURGERY  02/2016    Dr. Ontiveros/Dr. De Los Santos   • CATARACT EXTRACTION Bilateral     [facility]   • COLONOSCOPY N/A 10/2002    Dr. Negro   • CORONARY ARTERY BYPASS GRAFT  02/23/2016-[facility]    x1 w/L Vein Grafting--Dr. Ontiveros   • CYST REMOVAL Right 01/25/2013    R Palm Excision of Retinacular Cyst--Dr. Karla Fish   • EPIDURAL BLOCK     • LAPAROSCOPIC CHOLECYSTECTOMY N/A 08/04/2004    Dr. JOEY Sheth   • MASTECTOMY Right 09/28/2012   • ORIF HIP FRACTURE Left 03/27/2005    w/ AMBI compression screw, Dr. Victor M Cabral   • RECTOVAGINAL FISTULA REPAIR  1965 "   • TUBAL ABDOMINAL LIGATION        FAMILY HISTORY:   Family History   Problem Relation Age of Onset   • Alzheimer's disease Mother    • Hypertension Mother    • Cancer Maternal Aunt    • Heart disease Father    • Heart failure Father    • Hypertension Father    • Diabetes Father    • Liver disease Sister    • Heart failure Brother    • Hypertension Brother    • Alcohol abuse Brother    • No Known Problems Maternal Grandmother    • No Known Problems Maternal Grandfather    • No Known Problems Paternal Grandmother    • No Known Problems Paternal Grandfather    • Diabetes Brother    • Brain cancer Brother    • Heart disease Brother       SOCIAL HISTORY:   Social History     Tobacco Use   • Smoking status: Former Smoker     Types: Cigarettes     Last attempt to quit: 2012     Years since quittin.5   • Smokeless tobacco: Never Used   Substance Use Topics   • Alcohol use: No     Comment: Daily Caffeine Use   • Drug use: No      MEDICATIONS:   No current facility-administered medications on file prior to encounter.      Current Outpatient Medications on File Prior to Encounter   Medication Sig Dispense Refill   • amoxicillin (AMOXIL) 500 MG capsule Take 500 mg by mouth 3 (Three) Times a Day.     • ANORO ELLIPTA 62.5-25 MCG/INH aerosol powder  inhaler Inhale 1 puff Daily.     • aspirin 81 MG EC tablet Take 81 mg by mouth daily.     • Biotin 1000 MCG tablet Take 1,000 mcg by mouth daily. Take as directed     • BYSTOLIC 10 MG tablet TAKE ONE TABLET BY MOUTH DAILY 30 tablet 4   • cetirizine (zyrTEC) 10 MG tablet Take 1 tablet by mouth As Needed for Allergies. 30 tablet 0   • cilostazol (PLETAL) 100 MG tablet Take 1 tablet by mouth 2 (two) times a day.     • DICLOFENAC PO Take 1 tablet by mouth 2 (Two) Times a Day.     • fluconazole (DIFLUCAN) 100 MG tablet      • melatonin 5 MG tablet tablet Take 2 tablets by mouth At Night As Needed (sleep). 30 tablet 0   • MYRBETRIQ 50 MG tablet sustained-release 24 hour 24 hr tablet  "Take 50 mg by mouth Daily.     • omeprazole (priLOSEC) 20 MG capsule Take 1 capsule by mouth Daily. 90 capsule 2   • pitavastatin calcium (LIVALO) 2 MG tablet tablet Take 2 mg by mouth Every Night.     • pramipexole (MIRAPEX) 0.125 MG tablet TAKE ONE TABLET BY MOUTH AT BEDTIME 90 tablet 0   • PROAIR  (90 Base) MCG/ACT inhaler INHALE TWO PUFFS BY MOUTH EVERY 6 HOURS AS NEEDED FOR WHEEZING 1 inhaler 3   • Probiotic Product (PROBIOTIC PO) Take 1 capsule by mouth Daily.     • traMADol (ULTRAM) 50 MG tablet TAKE ONE TABLET BY MOUTH EVERY 6 HOURS AS NEEDED FOR PAIN 90 tablet 2             ALLERGIES: Ramipril; Morphine; Morphine and related; and Bactrim [sulfamethoxazole-trimethoprim]   COMPLETE REVIEW OF SYSTEMS:     ENT ROS: negative  Cardiovascular ROS: no chest pain or dyspnea on exertion  Respiratory ROS: no cough, shortness of breath, or wheezing  Gastrointestinal ROS: no abdominal pain, change in bowel habits, or black or bloody stools  Neurological ROS: no TIA or stroke symptoms  Genito-Urinary ROS: no dysuria, trouble voiding, or hematuria  Musculoskeletal ROS: negative  Dermatological ROS: negative  Psychological ROS: negative      PHYSICAL EXAM:   Patient Vitals for the past 24 hrs:   BP Temp Temp src Pulse Resp SpO2 Height Weight   06/07/19 1830 145/82 -- -- 75 16 95 % -- --   06/07/19 1617 131/69 -- -- -- 17 -- -- 49 kg (108 lb)   06/07/19 1555 -- 99.3 °F (37.4 °C) Tympanic 77 -- 94 % 147.3 cm (58\") --        General appearance: alert, well appearing, and in no distress.  Neurological exam reveals alert, oriented, normal speech, no focal findings or movement disorder noted.  ENT exam reveals - ENT exam normal, no neck nodes or sinus tenderness.  CVS exam: normal rate, regular rhythm, normal S1, S2, no murmurs, rubs, clicks or gallops.  Chest: clear to auscultation, no wheezes, rales or rhonchi, symmetric air entry.  Abdominal exam: soft, nontender, nondistended, no masses or organomegaly.  Examination " of the feet reveals blanching with elevation noted, dependent rubor present and her toes transitioning from white to blue to red times.  She has acrocyanosis throughout the majority of her digits.  Her great toes are actually somewhat ruborous and red in nature.  She has some staining on her feet that are due to venous  Ectasia.  She has palpable left dorsalis pedis and right posterior tibial pulses        LABS:      Results Review:       I reviewed the patient's new clinical results.      CrCl cannot be calculated (Patient's most recent lab result is older than the maximum 30 days allowed.).          The following radiologic or non-invasive studies have been reviewed by me: ANDREW    Active Hospital Problems    Diagnosis  POA   • Erythromelalgia (CMS/[facility]) [I73.81]  Unknown      Resolved Hospital Problems   No resolved problems to display.         ASSESSMENT/PLAN: 78 y.o. female with history of cold cyanotic feet that turned colors from white to blue to red suspicious for ray nodes.  She carries a diagnosis of erythromelalgia.  I believe that this is a chronic problem for her that is not related to macrovascular disease.  I do not think she is throwing clots from her thoracic aneurysms.  At this point time her feet have been this way for years but she is a little worse now and came to the ER because she is concerned.  She is also set up in [place] July for a thoracoabdominal repair with Dr. Ware.  She had a lot of questions about that as well as her feet.  We discussed these all in detail and at length.  There is not really any thing we have to recommend for her feet other than avoiding stressors.  We will order a lower extremity arterial test and ankle pressures and first and third toes to make sure things have not changed since her last study and hopefully she can go home tonight.      I discussed the plan with the patient and she and her daughter are  agreeable to the plan of care at this point. Thank you for this  consult.     Jonathan Smith MD   06/07/19

## 2019-06-07 NOTE — ED PROVIDER NOTES
EMERGENCY DEPARTMENT ENCOUNTER    CHIEF COMPLAINT  Chief Complaint: foot swelling  History given by: patient  History limited by: none  Room Number: 39/39  PMD: Miller Castañeda MD      HPI:  Pt is a 78 y.o. female with Hx of AAA, who presents complaining of bilateral feet swelling with associated redness and coolness for the past few weeks, worsening over the past few days. Pt has Hx of veinous insufficiency and is followed by Dr. Smith (Vascular). She reports that these Sx are usually chronic for her and are worsened after walking around for awhile. Recently, her Sx have begun to worsen and come more frequently. Denies pain, but states that she does have an unpleasant sensation while ambulating for an extended period. Today, she contacted Dr. Smith's office, and was advised to come to the ED for further evaluation. Denies CP, abd pain, and SOA. There are no other complaints.     PAST MEDICAL HISTORY  Active Ambulatory Problems     Diagnosis Date Noted   • History of breast cancer 01/29/2016   • Stenosis of carotid artery 01/29/2016   • Chronic obstructive pulmonary disease (CMS/[facility]) 01/29/2016   • Closed fracture of multiple ribs 01/29/2016   • Cognitive disorder 01/29/2016   • Erythromelalgia (CMS/[facility]) 01/29/2016   • Hyperlipidemia 01/29/2016   • Hypertension 01/29/2016   • Primary osteoarthritis involving multiple joints 01/29/2016   • Osteoporosis 01/29/2016   • Restless legs syndrome 01/29/2016   • Cerebral aneurysm 01/29/2016   • Temporary cerebral vascular dysfunction 01/29/2016   • S/P CABG x 1 04/22/2016   • Type 2 diabetes mellitus without complication, without long-term current use of insulin (CMS/[facility]) 04/27/2016   • S/P ascending aortic aneurysm repair 04/22/2017   • 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[facility]) 04/22/2017   • Descending thoracic aortic aneurysm (CMS/[facility]) 04/22/2017   • Claudication of both lower extremities (CMS/[facility]) 01/31/2018   • 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[facility]) 05/09/2018   • Ventral hernia  without obstruction or gangrene 05/09/2018   • Breast cancer, stage 1, estrogen receptor positive (CMS/[facility]) 10/17/2012   • Arthritis of right hip 11/06/2018   • Arthritis of right knee 11/06/2018   • Chondrocalcinosis 11/06/2018   • Chronic pain of right knee 11/06/2018   • Degenerative disc disease, lumbar 11/06/2018   • Gastroesophageal reflux disease 01/18/2019   • Bilateral hearing loss 01/18/2019   • Thoracic aortic aneurysm without rupture (CMS/[facility]) 04/16/2019     Resolved Ambulatory Problems     Diagnosis Date Noted   • Abdominal aortic aneurysm (CMS/[facility]) 01/29/2016   • Aneurysm of thoracic aorta (CMS/[facility]) 01/29/2016   • Depression 01/29/2016   • Urinary tract infection 01/29/2016   • COPD exacerbation (CMS/[facility]) 12/02/2018     Past Medical History:   Diagnosis Date   • AAA (abdominal aortic aneurysm) (CMS/[facility])    • Acute bronchitis 12/2018   • 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[facility])    • Arthritis    • Bilateral carotid artery disease (CMS/[facility])    • Bilateral cataracts    • Breast cancer (CMS/[facility])    • CAD (coronary artery disease)    • 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[facility])    • Closed fracture of three ribs of right side    • Cognitive disorder    • COPD (chronic obstructive pulmonary disease) (CMS/[facility])    • Depression    • Descending aortic arch aneurysm (CMS/[facility])    • Diabetes mellitus (CMS/[facility])    • Erythermalgia (CMS/[facility])    • GERD (gastroesophageal reflux disease)    • Hyperlipidemia    • Hypertension    • Low back pain    • Moderate aortic valve insufficiency    • Nocturia    • Osteoarthritis    • Osteoporosis    • Otitis media    • Prediabetes    • RLS (restless legs syndrome)    • Saccular aneurysm    • Stroke (CMS/[facility])    • Thoracic ascending aortic aneurysm (CMS/[facility])    • TIA (transient ischemic attack)    • Urgency incontinence    • Venous insufficiency    • Ventral hernia        PAST SURGICAL HISTORY  Past Surgical History:   Procedure Laterality Date   • AORTIC VALVE REPAIR/REPLACEMENT N/A 02/23/2016-[facility]     Ascending Replacement Using a 24MM Graft--Dr. Ontiveros   • APPENDECTOMY  1977   • BREAST BIOPSY Right 09/16/2012    Vacuum Assisted Core Bx of R Breast   • CARDIAC CATHETERIZATION N/A 01/06/2016    Dr. Tres De Los Santos   • CARDIAC CATHETERIZATION N/A 4/22/2019    Procedure: Left Heart Cath;  Surgeon: Tres De Los Santos MD;  Location:  [facility];  Service: Cardiology   • CARDIAC CATHETERIZATION N/A 4/22/2019    Procedure: Coronary angiography;  Surgeon: Tres De Los Santos MD;  Location:  [facility];  Service: Cardiology   • CARDIAC SURGERY  02/2016    Dr. Ontiveros/Dr. De Los Santos   • CATARACT EXTRACTION Bilateral     [facility]   • COLONOSCOPY N/A 10/2002    Dr. Negro   • CORONARY ARTERY BYPASS GRAFT  02/23/2016-[facility]    x1 w/L Vein Grafting--Dr. Ontiveros   • CYST REMOVAL Right 01/25/2013    R Palm Excision of Retinacular Cyst--Dr. Karla Fish   • EPIDURAL BLOCK     • LAPAROSCOPIC CHOLECYSTECTOMY N/A 08/04/2004    Dr. JOEY Sheth   • MASTECTOMY Right 09/28/2012   • ORIF HIP FRACTURE Left 03/27/2005    w/ AMBI compression screw, Dr. Victor M Cabral   • RECTOVAGINAL FISTULA REPAIR  1965   • TUBAL ABDOMINAL LIGATION         FAMILY HISTORY  Family History   Problem Relation Age of Onset   • Alzheimer's disease Mother    • Hypertension Mother    • Cancer Maternal Aunt    • Heart disease Father    • Heart failure Father    • Hypertension Father    • Diabetes Father    • Liver disease Sister    • Heart failure Brother    • Hypertension Brother    • Alcohol abuse Brother    • No Known Problems Maternal Grandmother    • No Known Problems Maternal Grandfather    • No Known Problems Paternal Grandmother    • No Known Problems Paternal Grandfather    • Diabetes Brother    • Brain cancer Brother    • Heart disease Brother        SOCIAL HISTORY  Social History     Socioeconomic History   • Marital status:      Spouse name: Not on file   • Number of children: Not on file   • Years of  education: Not on file   • Highest education level: Not on file   Tobacco Use   • Smoking status: Former Smoker     Types: Cigarettes     Last attempt to quit: 2012     Years since quittin.5   • Smokeless tobacco: Never Used   Substance and Sexual Activity   • Alcohol use: No     Comment: Daily Caffeine Use   • Drug use: No   • Sexual activity: Defer     Birth control/protection: Tubal ligation       ALLERGIES  Ramipril; Morphine; Morphine and related; and Bactrim [sulfamethoxazole-trimethoprim]    REVIEW OF SYSTEMS  Review of Systems   Constitutional: Negative for fever.   HENT: Negative for sore throat.    Eyes: Negative.    Respiratory: Negative for cough and shortness of breath.    Cardiovascular: Negative for chest pain.   Gastrointestinal: Negative for abdominal pain, diarrhea and vomiting.   Genitourinary: Negative for dysuria.   Musculoskeletal: Negative for neck pain.        (+) bilateral foot swelling, redness, and coolness   Skin: Negative for rash.   Allergic/Immunologic: Negative.    Neurological: Negative for weakness, numbness and headaches.   Hematological: Negative.    Psychiatric/Behavioral: Negative.    All other systems reviewed and are negative.      PHYSICAL EXAM  ED Triage Vitals [19 1555]   Temp Heart Rate Resp BP SpO2   99.3 °F (37.4 °C) 77 -- -- 94 %      Temp src Heart Rate Source Patient Position BP Location FiO2 (%)   Tympanic Monitor -- -- --       Physical Exam   Constitutional: She is oriented to person, place, and time and well-developed, well-nourished, and in no distress. No distress.   HENT:   Head: Normocephalic and atraumatic.   Mouth/Throat: Mucous membranes are normal.   Eyes: EOM are normal.   Neck: Normal range of motion.   Cardiovascular: Normal rate, regular rhythm and intact distal pulses. Exam reveals no gallop and no friction rub.   No murmur heard.  Easily dopplerable left DP. Positional erythema and mottling to the feet bilaterally, slightly cool to touch  bilaterally. Improvement when laying flat.   Pulmonary/Chest: Effort normal. No respiratory distress. She has no wheezes. She has no rhonchi. She has no rales.   Breath sounds are symmetric.   Abdominal: Soft. She exhibits no distension. There is no tenderness. There is no guarding.   Musculoskeletal: Normal range of motion. She exhibits no deformity.   Neurological: She is alert and oriented to person, place, and time.   moving all extremities, no focal deficits   Skin: Skin is warm and dry.   Psychiatric:   Calm, cooperative       LAB RESULTS  Lab Results (last 24 hours)     ** No results found for the last 24 hours. **          I ordered the above labs and reviewed the results    RADIOLOGY  No orders to display            PROCEDURES  Procedures      PROGRESS AND CONSULTS     1715 - Discussed with patient that Vascular Surgery will discuss case with Dr. Doan. They may evaluate patient at bedside in the Emergency Department.       MEDICAL DECISION MAKING  Results were reviewed/discussed with the patient and they were also made aware of online access. Pt also made aware that some labs, such as cultures, will not be resulted during ER visit and follow up with PMD is necessary.     MDM  Number of Diagnoses or Management Options  History of diabetes mellitus:   History of thoracic aortic aneurysm repair:   History of thoracoabdominal aortic aneurysm (TAAA):   Swelling of foot joint, unspecified laterality:   Venous insufficiency of both lower extremities:   Diagnosis management comments: And has what sounds like chronic stable venous insufficiency of the lower extremities likely with some peripheral vascular disease, but has been thoroughly worked up by the vascular service with no acute abnormalities on testing per the patient.  Has had some increased episodes of swelling and discomfort when walking around, but states that when at rest symptoms seem to significantly improve. I believe there is likely element of  venous and arterial insufficiency, but not acutely concerned for an acute occlusive crisis. Currently, awaiting vascular surgery to weigh in, with disposition/imaging per their recommendations. Dr. Doan to finalize patient care after Vascular Surgery discussion and/or evaluation.        Amount and/or Complexity of Data Reviewed  Decide to obtain previous medical records or to obtain history from someone other than the patient: yes  Discuss the patient with other providers: yes (Paged Vascular Surgery)    Patient Progress  Patient progress: stable         DIAGNOSIS  Final diagnoses:   Swelling of foot joint, unspecified laterality   Venous insufficiency of both lower extremities   History of thoracoabdominal aortic aneurysm (TAAA)   History of thoracic aortic aneurysm repair   History of diabetes mellitus       DISPOSITION  Pending Vascular Surgery recommendations.    Latest Documented Vital Signs:  As of 5:27 PM  BP- 131/69 HR- 77 Temp- 99.3 °F (37.4 °C) (Tympanic) O2 sat- 94%    --  Documentation assistance provided by shanel Williamson for Dr. Gibson MD.  Information recorded by the scribe was done at my direction and has been verified and validated by me.       Chauncey Williamson  06/07/19 1700       David Arreaga MD  06/07/19 1733       David Arreaga MD  06/07/19 [id number]

## 2019-06-07 NOTE — ED TRIAGE NOTES
Pt sees dr salcido for venoius insufficiency. Pt reports feels are cold, red, purple with swelling today.

## 2019-06-08 LAB
BH CV LOWER ARTERIAL LEFT 3RD DIGIT SYS MAX: 116 MMHG
BH CV LOWER ARTERIAL LEFT ABI RATIO: 1.36
BH CV LOWER ARTERIAL LEFT DORSALIS PEDIS SYS MAX: 203 MMHG
BH CV LOWER ARTERIAL LEFT GREAT TOE SYS MAX: 110 MMHG
BH CV LOWER ARTERIAL LEFT POST TIBIAL SYS MAX: 197 MMHG
BH CV LOWER ARTERIAL LEFT TBI RATIO: 0.74
BH CV LOWER ARTERIAL RIGHT 3RD DIGIT SYS MAX: 125 MMHG
BH CV LOWER ARTERIAL RIGHT ABI RATIO: 1.36
BH CV LOWER ARTERIAL RIGHT DORSALIS PEDIS SYS MAX: 196 MMHG
BH CV LOWER ARTERIAL RIGHT GREAT TOE SYS MAX: 146 MMHG
BH CV LOWER ARTERIAL RIGHT POST TIBIAL SYS MAX: 202 MMHG
BH CV LOWER ARTERIAL RIGHT TBI RATIO: 0.99
UPPER ARTERIAL LEFT ARM BRACHIAL SYS MAX: 148 MMHG
UPPER ARTERIAL RIGHT ARM BRACHIAL SYS MAX: 148 MMHG

## 2019-06-08 NOTE — PROGRESS NOTES
2015 TAMRA and digit pressures completed, preliminary report placed with ticket to ride sheet. Dr. Smith called and voicemail left

## 2019-06-10 ENCOUNTER — EPISODE CHANGES (OUTPATIENT)
Dept: CASE MANAGEMENT | Facility: OTHER | Age: 79
End: 2019-06-10

## 2019-06-13 ENCOUNTER — EPISODE CHANGES (OUTPATIENT)
Dept: CASE MANAGEMENT | Facility: OTHER | Age: 79
End: 2019-06-13

## 2019-06-20 ENCOUNTER — OFFICE VISIT (OUTPATIENT)
Dept: NEUROLOGY | Facility: CLINIC | Age: 79
End: 2019-06-20

## 2019-06-20 VITALS
OXYGEN SATURATION: 97 % | DIASTOLIC BLOOD PRESSURE: 70 MMHG | TEMPERATURE: 99 F | SYSTOLIC BLOOD PRESSURE: 128 MMHG | BODY MASS INDEX: 22.57 KG/M2 | HEART RATE: 63 BPM | HEIGHT: 58 IN

## 2019-06-20 DIAGNOSIS — G82.20 PARAPARESIS (HCC): Primary | ICD-10-CM

## 2019-06-20 PROCEDURE — 99212 OFFICE O/P EST SF 10 MIN: CPT | Performed by: PSYCHIATRY & NEUROLOGY

## 2019-06-20 RX ORDER — CHLORHEXIDINE GLUCONATE 0.12 MG/ML
RINSE ORAL
COMMUNITY
Start: 2019-06-18 | End: 2019-07-09 | Stop reason: HOSPADM

## 2019-06-20 NOTE — PROGRESS NOTES
"Follow Up Visit: paraparesalex Edwards comes for follow up  No new issues  She has been scheduled for second surgery this time for the distal aortic aneurysm  There has been no new neurological changes    Review of Systems   Constitutional: Positive for fatigue. Negative for chills and fever.   HENT: Positive for dental problem (had teeth pulled). Negative for tinnitus and trouble swallowing.    Eyes: Negative for pain, redness and itching.   Respiratory: Negative for cough, shortness of breath and wheezing.    Cardiovascular: Negative for chest pain, palpitations and leg swelling.   Gastrointestinal: Negative for diarrhea, nausea and vomiting.   Endocrine: Negative for cold intolerance, heat intolerance and polydipsia.   Genitourinary: Negative for decreased urine volume, difficulty urinating and urgency.   Musculoskeletal: Positive for gait problem. Negative for neck pain and neck stiffness.   Allergic/Immunologic: Negative for environmental allergies, food allergies and immunocompromised state.   Neurological: Positive for weakness and numbness. Negative for dizziness, tremors, seizures, syncope, facial asymmetry, speech difficulty, light-headedness and headaches.   Hematological: Negative for adenopathy. Bruises/bleeds easily.   Psychiatric/Behavioral: Negative for agitation, behavioral problems, confusion, decreased concentration, dysphoric mood, hallucinations, self-injury, sleep disturbance and suicidal ideas. The patient is not nervous/anxious and is not hyperactive.              Vitals:    06/20/19 1151   BP: 128/70   Pulse: 63   Temp: 99 °F (37.2 °C)   SpO2: 97%   Height: 147.3 cm (58\")             Review Results-Workup/Diagnoses/Plan  She ahd her spinal MRI at [facility]: I had called them before and called them after the study to discuss with them  I had amde it distinctly clear to follow on the their prior report of thromosed aortic aneurysm  I was surprised when the report came back and no mention " of that follow up. I called [facility] and spoke with the reading raiologist and his impression was that the aptient should have CTA to better outline that.  Her L1 vertebral body had collapsed further but he said ti did not affect the Lower cord.    I have relayed that to patient and ehr family today  She is on the book for Surgery by Lizette. I am happy to see afterwards and see what e can do in further neurology workup.

## 2019-06-25 ENCOUNTER — OFFICE VISIT (OUTPATIENT)
Dept: CARDIAC SURGERY | Facility: CLINIC | Age: 79
End: 2019-06-25

## 2019-06-25 VITALS
DIASTOLIC BLOOD PRESSURE: 65 MMHG | RESPIRATION RATE: 20 BRPM | HEIGHT: 56 IN | TEMPERATURE: 97.8 F | OXYGEN SATURATION: 95 % | HEART RATE: 67 BPM | SYSTOLIC BLOOD PRESSURE: 108 MMHG | WEIGHT: 108 LBS | BODY MASS INDEX: 24.3 KG/M2

## 2019-06-25 DIAGNOSIS — I67.1 CEREBRAL ANEURYSM: ICD-10-CM

## 2019-06-25 DIAGNOSIS — I71.20 THORACIC AORTIC ANEURYSM WITHOUT RUPTURE (HCC): ICD-10-CM

## 2019-06-25 DIAGNOSIS — I71.60 THORACOABDOMINAL AORTIC ANEURYSM (TAAA) WITHOUT RUPTURE (HCC): ICD-10-CM

## 2019-06-25 DIAGNOSIS — Z86.79 S/P ASCENDING AORTIC ANEURYSM REPAIR: ICD-10-CM

## 2019-06-25 DIAGNOSIS — G82.20 PARAPARESIS (HCC): ICD-10-CM

## 2019-06-25 DIAGNOSIS — F09 COGNITIVE DISORDER: ICD-10-CM

## 2019-06-25 DIAGNOSIS — I65.29 STENOSIS OF CAROTID ARTERY, UNSPECIFIED LATERALITY: ICD-10-CM

## 2019-06-25 DIAGNOSIS — I73.9 CLAUDICATION OF BOTH LOWER EXTREMITIES (HCC): ICD-10-CM

## 2019-06-25 DIAGNOSIS — I10 ESSENTIAL HYPERTENSION: Chronic | ICD-10-CM

## 2019-06-25 DIAGNOSIS — E11.9 TYPE 2 DIABETES MELLITUS WITHOUT COMPLICATION, WITHOUT LONG-TERM CURRENT USE OF INSULIN (HCC): Chronic | ICD-10-CM

## 2019-06-25 DIAGNOSIS — E78.2 MIXED HYPERLIPIDEMIA: Primary | Chronic | ICD-10-CM

## 2019-06-25 DIAGNOSIS — M51.36 DEGENERATIVE DISC DISEASE, LUMBAR: ICD-10-CM

## 2019-06-25 DIAGNOSIS — C50.919 MALIGNANT NEOPLASM OF BREAST, STAGE 1, ESTROGEN RECEPTOR POSITIVE, UNSPECIFIED LATERALITY (HCC): ICD-10-CM

## 2019-06-25 DIAGNOSIS — I67.82 TEMPORARY CEREBRAL VASCULAR DYSFUNCTION: ICD-10-CM

## 2019-06-25 DIAGNOSIS — K21.9 GASTROESOPHAGEAL REFLUX DISEASE, ESOPHAGITIS PRESENCE NOT SPECIFIED: Chronic | ICD-10-CM

## 2019-06-25 DIAGNOSIS — I71.23 DESCENDING THORACIC AORTIC ANEURYSM (HCC): Chronic | ICD-10-CM

## 2019-06-25 DIAGNOSIS — Z95.1 S/P CABG X 1: ICD-10-CM

## 2019-06-25 DIAGNOSIS — J43.9 PULMONARY EMPHYSEMA, UNSPECIFIED EMPHYSEMA TYPE (HCC): Chronic | ICD-10-CM

## 2019-06-25 DIAGNOSIS — I71.22 AORTIC ARCH ANEURYSM (HCC): ICD-10-CM

## 2019-06-25 DIAGNOSIS — K43.9 VENTRAL HERNIA WITHOUT OBSTRUCTION OR GANGRENE: ICD-10-CM

## 2019-06-25 DIAGNOSIS — Z17.0 MALIGNANT NEOPLASM OF BREAST, STAGE 1, ESTROGEN RECEPTOR POSITIVE, UNSPECIFIED LATERALITY (HCC): ICD-10-CM

## 2019-06-25 DIAGNOSIS — Z98.890 S/P ASCENDING AORTIC ANEURYSM REPAIR: ICD-10-CM

## 2019-06-25 PROCEDURE — 99214 OFFICE O/P EST MOD 30 MIN: CPT | Performed by: THORACIC SURGERY (CARDIOTHORACIC VASCULAR SURGERY)

## 2019-06-26 ENCOUNTER — PREP FOR SURGERY (OUTPATIENT)
Dept: OTHER | Facility: HOSPITAL | Age: 79
End: 2019-06-26

## 2019-06-26 DIAGNOSIS — I71.60 THORACOABDOMINAL AORTIC ANEURYSM (TAAA) WITHOUT RUPTURE (HCC): Primary | ICD-10-CM

## 2019-06-28 ENCOUNTER — TELEPHONE (OUTPATIENT)
Dept: CARDIAC SURGERY | Facility: CLINIC | Age: 79
End: 2019-06-28

## 2019-06-28 NOTE — TELEPHONE ENCOUNTER
Mrs. Thompson called and I gave her the details of her admission for Tuesday, July 9 with surgery being on 7-. She is to call [phone] around 0800 on 7-9-2019 and they will let her know when bed is available. She is to have a clear liquid diet on 7-8-2019. She is to take her last dose of PLETAL on 7-5-2019 and DICLOFENAC on 7-3-2019. She expressed a verbal understanding of this. She was instructed to call the office with any further questions.

## 2019-06-29 NOTE — PROGRESS NOTES
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 Castañeda and Colleagues,  It was nice to see Grace Beauchamp in follow up evaluation for her TAAA. She is a 78 y.o. 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 She denies chest or upper back pain but he continues to have low level flank discomfort that could be related either to the aneurysm or to her spine disease.  She has completed her work-up and hip preoperative evaluation and she is ready for surgery. Her last chest CT showed descending thoracic aorta at the hiatus is 5.5 cm in diameter similar to before.  The aneurysm starts in the mid thoracic area and goes to the takeoff of the renal arteries.  She has a Williamson type III TAAA.  She is here to complete the evaluation and have a final discussion for her surgery    Patient Active Problem List   Diagnosis   • History of breast cancer   • Stenosis of carotid artery   • Chronic obstructive pulmonary disease (CMS/HCC)   • Closed fracture of multiple ribs   • Cognitive disorder   • Erythromelalgia (CMS/HCC)   • Hyperlipidemia   • Hypertension   • Primary osteoarthritis involving multiple joints   • Osteoporosis   • Restless legs syndrome   • Cerebral aneurysm   • Temporary cerebral vascular dysfunction   • S/P CABG x 1   • Type 2 diabetes mellitus without complication, without long-term current use of insulin (CMS/HCC)   • S/P ascending aortic aneurysm repair   • 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HCC)   • Descending thoracic aortic aneurysm (CMS/HCC)   • Claudication of both lower extremities (CMS/HCC)   • 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HCC)  "  • Ventral hernia without obstruction or gangrene   • Breast cancer, stage 1, estrogen receptor positive (CMS/HCC)   • Arthritis of right hip   • Arthritis of right knee   • Chondrocalcinosis   • Chronic pain of right knee   • Degenerative disc disease, lumbar   • Gastroesophageal reflux disease   • Bilateral hearing loss   • Thoracic aortic aneurysm 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)   • Erythromelalgia (CMS/[facility])   • 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CMS/[facility])   • Thoracoabdominal aortic aneurysm, without rupture (CMS/[facility])       Past Medical History:   Diagnosis Date   • AAA (abdominal aortic aneurysm) (CMS/[facility])    • Acute bronchitis 12/2018   • 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[facility])    • Arthritis    • Bilateral carotid artery disease (CMS/[facility])    • Bilateral cataracts     S/p Extractions   • Breast cancer (CMS/[facility])     right breast [id number] (snm) pMX ER/OH pos, Her-2/neg, Ki-67, 31%, oncotype recurrence score 19, invasive lobular carcinoma   • CAD (coronary artery disease)     S/p CABG on 2/23/16 by Dr. 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[facility])    • Closed fracture of three ribs of right side    • Cognitive disorder    • COPD (chronic obstructive pulmonary disease) (CMS/[facility])    • Depression    • Descending aortic arch aneurysm (CMS/[facility])    • Diabetes mellitus (CMS/[facility])     \"BORDERLINE\"   • Erythermalgia (CMS/[facility])    • GERD (gastroesophageal reflux disease)    • Hyperlipidemia     Controlled w/Meds   • Hypertension     Controlled w/Meds   • Low back pain    • Moderate aortic valve insufficiency     S/p AVR on 02/23/16 by Dr. Ontiveros   • Nocturia    • Osteoarthritis    • Osteoporosis    • Otitis media     R Ear   • Prediabetes    • RLS (restless legs syndrome)    • Saccular aneurysm    • Stroke (CMS/[facility])     Perioperatively w/L Hip Repl   • Thoracic ascending aortic aneurysm (CMS/[facility])     S/p Repair on 02/23/16 by Dr. Ontiveros   • TIA (transient ischemic attack)    • Urgency incontinence    • Venous insufficiency    • Ventral hernia        Past Surgical " History:   Procedure Laterality Date   • AORTIC VALVE REPAIR/REPLACEMENT N/A 02/23/2016-[facility]    Ascending Replacement Using a 24MM Graft--Dr. Ontiveros   • APPENDECTOMY  1977   • BREAST BIOPSY Right 09/16/2012    Vacuum Assisted Core Bx of R Breast   • CARDIAC CATHETERIZATION N/A 01/06/2016    Dr. Tres De Los Santos   • CARDIAC CATHETERIZATION N/A 4/22/2019    Procedure: Left Heart Cath;  Surgeon: Tres De Los Santos MD;  Location:  [facility];  Service: Cardiology   • CARDIAC CATHETERIZATION N/A 4/22/2019    Procedure: Coronary angiography;  Surgeon: Tres De Los Santos MD;  Location:  [facility];  Service: Cardiology   • CARDIAC SURGERY  02/2016    Dr. Ontiveros/Dr. De Los Santos   • CATARACT EXTRACTION Bilateral     [facility]   • COLONOSCOPY N/A 10/2002    Dr. Negro   • CORONARY ARTERY BYPASS GRAFT  02/23/2016-[facility]    x1 w/L Vein Grafting--Dr. Ontiveros   • CYST REMOVAL Right 01/25/2013    R Palm Excision of Retinacular Cyst--Dr. Karla Fish   • EPIDURAL BLOCK     • LAPAROSCOPIC CHOLECYSTECTOMY N/A 08/04/2004    Dr. JOEY Sheth   • MASTECTOMY Right 09/28/2012   • ORIF HIP FRACTURE Left 03/27/2005    w/ AMBI compression screw, Dr. Victor M Cabral   • RECTOVAGINAL FISTULA REPAIR  1965   • TUBAL ABDOMINAL LIGATION         Allergies   Allergen Reactions   • Ramipril Other (See Comments)     Ace I  cough   • Morphine Itching   • Morphine And Related Itching   • Bactrim [Sulfamethoxazole-Trimethoprim] Itching and Rash         Current Outpatient Medications:   •  ANORO ELLIPTA 62.5-25 MCG/INH aerosol powder  inhaler, Inhale 1 puff Daily., Disp: , Rfl:   •  aspirin 81 MG EC tablet, Take 81 mg by mouth daily., Disp: , Rfl:   •  Biotin 1000 MCG tablet, Take 1,000 mcg by mouth daily. Take as directed, Disp: , Rfl:   •  BYSTOLIC 10 MG tablet, TAKE ONE TABLET BY MOUTH DAILY, Disp: 30 tablet, Rfl: 4  •  cetirizine (zyrTEC) 10 MG tablet, Take 1 tablet by mouth As Needed for Allergies., Disp: 30  tablet, Rfl: 0  •  chlorhexidine (PERIDEX) 0.12 % solution, , Disp: , Rfl:   •  cilostazol (PLETAL) 100 MG tablet, Take 1 tablet by mouth 2 (two) times a day., Disp: , Rfl:   •  DICLOFENAC PO, Take 1 tablet by mouth 2 (Two) Times a Day., Disp: , Rfl:   •  fluconazole (DIFLUCAN) 100 MG tablet, , Disp: , Rfl:   •  melatonin 5 MG tablet tablet, Take 2 tablets by mouth At Night As Needed (sleep)., Disp: 30 tablet, Rfl: 0  •  MYRBETRIQ 50 MG tablet sustained-release 24 hour 24 hr tablet, Take 50 mg by mouth Daily., Disp: , Rfl:   •  omeprazole (priLOSEC) 20 MG capsule, Take 1 capsule by mouth Daily., Disp: 90 capsule, Rfl: 2  •  pitavastatin calcium (LIVALO) 2 MG tablet tablet, Take 2 mg by mouth Every Night., Disp: , Rfl:   •  pramipexole (MIRAPEX) 0.125 MG tablet, TAKE ONE TABLET BY MOUTH AT BEDTIME, Disp: 90 tablet, Rfl: 0  •  PROAIR  (90 Base) MCG/ACT inhaler, INHALE TWO PUFFS BY MOUTH EVERY 6 HOURS AS NEEDED FOR WHEEZING, Disp: 1 inhaler, Rfl: 3  •  Probiotic Product (PROBIOTIC PO), Take 1 capsule by mouth Daily., Disp: , Rfl:   •  traMADol (ULTRAM) 50 MG tablet, TAKE ONE TABLET BY MOUTH EVERY 6 HOURS AS NEEDED FOR PAIN, Disp: 90 tablet, Rfl: 2    Social History     Socioeconomic History   • Marital status:      Spouse name: Not on file   • Number of children: Not on file   • Years of education: Not on file   • Highest education level: Not on file   Tobacco Use   • Smoking status: Former Smoker     Types: Cigarettes     Last attempt to quit: 2012     Years since quittin.5   • Smokeless tobacco: Never Used   Substance and Sexual Activity   • Alcohol use: No     Comment: Daily Caffeine Use   • Drug use: No   • Sexual activity: Defer     Birth control/protection: Tubal ligation       Family History   Problem Relation Age of Onset   • Alzheimer's disease Mother    • Hypertension Mother    • Cancer Maternal Aunt    • Heart disease Father    • Heart failure Father    • Hypertension Father    •  Diabetes Father    • Liver disease Sister    • Heart failure Brother    • Hypertension Brother    • Alcohol abuse Brother    • No Known Problems Maternal Grandmother    • No Known Problems Maternal Grandfather    • No Known Problems Paternal Grandmother    • No Known Problems Paternal Grandfather    • Diabetes Brother    • Brain cancer Brother    • Heart disease Brother      Review of Systems  Unchanged from last visit.  Still discomfort in the left flank.    Physical Exam:    Vital Signs:  Weight: 49 kg (108 lb)   Body mass index is 24.21 kg/m².  Temp: 97.8 °F (36.6 °C)   Heart Rate: 67   BP: 108/65     Physical Exam   Constitutional: She is oriented to person, place, and time. She appears well-developed and well-nourished.   HENT:   Head: Normocephalic and atraumatic.   Nose: Nose normal.   Mouth/Throat: Oropharynx is clear and moist.   Eyes: Conjunctivae are normal. Pupils are equal, round, and reactive to light.   Neck: Normal range of motion. Neck supple. No JVD present. No thyromegaly present.   Cardiovascular: Normal rate, regular rhythm, normal heart sounds and intact distal pulses. Exam reveals no gallop and no friction rub.   No murmur heard.  Pulmonary/Chest: Effort normal and breath sounds normal. She has no rales.   Abdominal: Soft. Bowel sounds are normal. She exhibits no distension and no mass. There is no tenderness.   Reducible ventral hernia in the subxiphoid area   Musculoskeletal: Normal range of motion. She exhibits no tenderness or deformity.   Weakness in both lower extremity 3 out of 5, able to walk with a cane or a walker   Lymphadenopathy:     She has no cervical adenopathy.   Neurological: She is alert and oriented to person, place, and time. She has normal reflexes.   Skin: Skin is warm and dry. No rash noted. No erythema.   Psychiatric: She has a normal mood and affect. Her behavior is normal.        Assessment:     Problem List Items Addressed This Visit        Cardiovascular and  Mediastinum    Hyperlipidemia - Primary (Chronic)    Hypertension (Chronic)    Descending thoracic aortic aneurysm (CMS/HCC) (Chronic)    Stenosis of carotid artery    Cerebral aneurysm    Temporary cerebral vascular dysfunction    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HCC)    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HCC)    Thoracic aortic aneurysm 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)       Respiratory    Chronic obstructive pulmonary disease (CMS/HCC) (Chronic)       Digestive    Gastroesophageal reflux disease (Chronic)       Endocrine    Type 2 diabetes mellitus without complication, without long-term current use of insulin (CMS/HCC) (Chronic)       Nervous and Auditory    Cognitive disorder    Claudication of both lower extremities (CMS/HCC)    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CMS/HCC)       Musculoskeletal and Integument    Degenerative disc disease, lumbar       Other    S/P CABG x 1    S/P ascending aortic aneurysm repair    Ventral hernia without obstruction or gangrene    Breast cancer, stage 1, estrogen receptor positive (CMS/HCC)          1. Type III thoracoabdominal aortic aneurysm, questionable whether symptoms are related to the aneurysm  2. Status post proximal thoracic aortic aneurysm repair and CABG  3. Paraparesis of unknown etiology.  Presumably it may be an ischemic component due to thrombosis of some of intercostal vessels or a decrease in flow  4. Moderate frailty  5. COPD  6. Hypertension well-controlled    Recommendation/Plan:     1. She has all the studies needed for the preoperative evaluation and again I discussed with him the risk and benefits of surgery and the alternatives of the procedure and recovery.  I quoted an operative mortality of around 10% and the risk of complete paraplegia of around 10 to 20%.  Respiratory failure and renal failure, stroke infection and prolonged course with tracheostomy and long ICU care possible with a risk of around 30 to 40%.  She is very concerned about the risk of rupture and so do I.  She wishes  to proceed.  I will discuss with Dr. Bolden whether he is going to be involved in repairing the ventral hernia at the end of my procedure or I may be doing it if he is not available.  2.  She will need a bowel prep and routine preop day before surgery    Thank you for allowing me to participate in her care.    Regards,    Mart Ontiveros M.D.

## 2019-07-01 ENCOUNTER — TELEPHONE (OUTPATIENT)
Dept: CARDIAC SURGERY | Facility: CLINIC | Age: 79
End: 2019-07-01

## 2019-07-01 NOTE — TELEPHONE ENCOUNTER
I returned patients call and we discussed a clear liquid diet and what she could and could not have 7/8/19. She verbalized understanding

## 2019-07-02 ENCOUNTER — ANESTHESIA EVENT (OUTPATIENT)
Dept: PERIOP | Facility: HOSPITAL | Age: 79
End: 2019-07-02

## 2019-07-08 ENCOUNTER — EPISODE CHANGES (OUTPATIENT)
Dept: CASE MANAGEMENT | Facility: OTHER | Age: 79
End: 2019-07-08

## 2019-07-09 ENCOUNTER — APPOINTMENT (OUTPATIENT)
Dept: GENERAL RADIOLOGY | Facility: HOSPITAL | Age: 79
End: 2019-07-09

## 2019-07-09 ENCOUNTER — HOSPITAL ENCOUNTER (OUTPATIENT)
Facility: HOSPITAL | Age: 79
Discharge: HOME | End: 2019-07-09
Attending: THORACIC SURGERY (CARDIOTHORACIC VASCULAR SURGERY) | Admitting: NURSE PRACTITIONER

## 2019-07-09 ENCOUNTER — ANESTHESIA EVENT (OUTPATIENT)
Dept: PERIOP | Facility: HOSPITAL | Age: 79
End: 2019-07-09

## 2019-07-09 ENCOUNTER — APPOINTMENT (OUTPATIENT)
Dept: RESPIRATORY THERAPY | Facility: HOSPITAL | Age: 79
End: 2019-07-09

## 2019-07-09 VITALS
OXYGEN SATURATION: 95 % | RESPIRATION RATE: 16 BRPM | HEART RATE: 62 BPM | TEMPERATURE: 97.3 F | BODY MASS INDEX: 24.3 KG/M2 | SYSTOLIC BLOOD PRESSURE: 150 MMHG | HEIGHT: 56 IN | DIASTOLIC BLOOD PRESSURE: 86 MMHG | WEIGHT: 108 LBS

## 2019-07-09 DIAGNOSIS — I71.60 THORACOABDOMINAL AORTIC ANEURYSM, WITHOUT RUPTURE (HCC): ICD-10-CM

## 2019-07-09 LAB
ABO GROUP BLD: NORMAL
ABO GROUP BLD: NORMAL
ALBUMIN SERPL-MCNC: 3.9 G/DL (ref 3.5–5.2)
ALBUMIN/GLOB SERPL: 1.4 G/DL
ALP SERPL-CCNC: 67 U/L (ref 39–117)
ALT SERPL W P-5'-P-CCNC: 16 U/L (ref 1–33)
ANION GAP SERPL CALCULATED.3IONS-SCNC: 12.5 MMOL/L (ref 5–15)
APTT PPP: 28.5 SECONDS (ref 22.7–35.4)
ARTERIAL PATENCY WRIST A: POSITIVE
AST SERPL-CCNC: 21 U/L (ref 1–32)
ATMOSPHERIC PRESS: 752.3 MMHG
BACTERIA UR QL AUTO: ABNORMAL /HPF
BASE EXCESS BLDA CALC-SCNC: -1 MMOL/L (ref 0–2)
BASOPHILS # BLD AUTO: 0.02 10*3/MM3 (ref 0–0.2)
BASOPHILS NFR BLD AUTO: 0.4 % (ref 0–1.5)
BDY SITE: ABNORMAL
BILIRUB SERPL-MCNC: 1.4 MG/DL (ref 0.2–1.2)
BILIRUB UR QL STRIP: NEGATIVE
BLD GP AB SCN SERPL QL: NEGATIVE
BUN BLD-MCNC: 19 MG/DL (ref 8–23)
BUN/CREAT SERPL: 25 (ref 7–25)
CALCIUM SPEC-SCNC: 8.5 MG/DL (ref 8.6–10.5)
CHLORIDE SERPL-SCNC: 104 MMOL/L (ref 98–107)
CHOLEST SERPL-MCNC: 133 MG/DL (ref 0–200)
CLARITY UR: ABNORMAL
CLOSE TME COLL+ADP + EPINEP PNL BLD: 98 % (ref 86–100)
CO2 SERPL-SCNC: 26.5 MMOL/L (ref 22–29)
COLOR UR: YELLOW
CREAT BLD-MCNC: 0.76 MG/DL (ref 0.57–1)
DEPRECATED RDW RBC AUTO: 45.2 FL (ref 37–54)
EOSINOPHIL # BLD AUTO: 0.05 10*3/MM3 (ref 0–0.4)
EOSINOPHIL NFR BLD AUTO: 0.9 % (ref 0.3–6.2)
ERYTHROCYTE [DISTWIDTH] IN BLOOD BY AUTOMATED COUNT: 12.8 % (ref 12.3–15.4)
GFR SERPL CREATININE-BSD FRML MDRD: 74 ML/MIN/1.73
GLOBULIN UR ELPH-MCNC: 2.7 GM/DL
GLUCOSE BLD-MCNC: 86 MG/DL (ref 65–99)
GLUCOSE BLDC GLUCOMTR-MCNC: 142 MG/DL (ref 70–130)
GLUCOSE UR STRIP-MCNC: NEGATIVE MG/DL
HBA1C MFR BLD: 6.4 % (ref 4.8–5.6)
HCO3 BLDA-SCNC: 23.1 MMOL/L (ref 22–28)
HCT VFR BLD AUTO: 42.8 % (ref 34–46.6)
HDLC SERPL-MCNC: 69 MG/DL (ref 40–60)
HGB BLD-MCNC: 14 G/DL (ref 12–15.9)
HGB UR QL STRIP.AUTO: ABNORMAL
HOROWITZ INDEX BLD+IHG-RTO: 21 %
HYALINE CASTS UR QL AUTO: ABNORMAL /LPF
IMM GRANULOCYTES # BLD AUTO: 0.01 10*3/MM3 (ref 0–0.05)
IMM GRANULOCYTES NFR BLD AUTO: 0.2 % (ref 0–0.5)
INR PPP: 1.08 (ref 0.9–1.1)
KETONES UR QL STRIP: ABNORMAL
LDLC SERPL CALC-MCNC: 53 MG/DL (ref 0–100)
LDLC/HDLC SERPL: 0.77 {RATIO}
LEUKOCYTE ESTERASE UR QL STRIP.AUTO: ABNORMAL
LYMPHOCYTES # BLD AUTO: 0.96 10*3/MM3 (ref 0.7–3.1)
LYMPHOCYTES NFR BLD AUTO: 18.2 % (ref 19.6–45.3)
MAGNESIUM SERPL-MCNC: 2.2 MG/DL (ref 1.6–2.4)
MCH RBC QN AUTO: 31.1 PG (ref 26.6–33)
MCHC RBC AUTO-ENTMCNC: 32.7 G/DL (ref 31.5–35.7)
MCV RBC AUTO: 95.1 FL (ref 79–97)
MODALITY: ABNORMAL
MONOCYTES # BLD AUTO: 0.34 10*3/MM3 (ref 0.1–0.9)
MONOCYTES NFR BLD AUTO: 6.4 % (ref 5–12)
NEUTROPHILS # BLD AUTO: 3.9 10*3/MM3 (ref 1.7–7)
NEUTROPHILS NFR BLD AUTO: 73.9 % (ref 42.7–76)
NITRITE UR QL STRIP: POSITIVE
NRBC BLD AUTO-RTO: 0 /100 WBC (ref 0–0.2)
NT-PROBNP SERPL-MCNC: 814.7 PG/ML (ref 5–1800)
O2 A-A PPRESDIFF RESPIRATORY: 0.6 MMHG
PCO2 BLDA: 35.8 MM HG (ref 35–45)
PH BLDA: 7.42 PH UNITS (ref 7.35–7.45)
PH UR STRIP.AUTO: 6 [PH] (ref 5–8)
PLATELET # BLD AUTO: 250 10*3/MM3 (ref 140–450)
PMV BLD AUTO: 8.7 FL (ref 6–12)
PO2 BLDA: 72.2 MM HG (ref 80–100)
POTASSIUM BLD-SCNC: 4.3 MMOL/L (ref 3.5–5.2)
PROT SERPL-MCNC: 6.6 G/DL (ref 6–8.5)
PROT UR QL STRIP: ABNORMAL
PROTHROMBIN TIME: 13.7 SECONDS (ref 11.7–14.2)
RBC # BLD AUTO: 4.5 10*6/MM3 (ref 3.77–5.28)
RBC # UR: ABNORMAL /HPF
REF LAB TEST METHOD: ABNORMAL
RH BLD: POSITIVE
RH BLD: POSITIVE
SAO2 % BLDCOA: 94.7 % (ref 92–99)
SODIUM BLD-SCNC: 143 MMOL/L (ref 136–145)
SP GR UR STRIP: 1.02 (ref 1–1.03)
SQUAMOUS #/AREA URNS HPF: ABNORMAL /HPF
T&S EXPIRATION DATE: NORMAL
TOTAL RATE: 16 BREATHS/MINUTE
TRIGL SERPL-MCNC: 56 MG/DL (ref 0–150)
UROBILINOGEN UR QL STRIP: ABNORMAL
VLDLC SERPL-MCNC: 11.2 MG/DL (ref 5–40)
WBC NRBC COR # BLD: 5.28 10*3/MM3 (ref 3.4–10.8)
WBC UR QL AUTO: ABNORMAL /HPF

## 2019-07-09 PROCEDURE — 93005 ELECTROCARDIOGRAM TRACING: CPT | Performed by: INTERNAL MEDICINE

## 2019-07-09 PROCEDURE — 86900 BLOOD TYPING SEROLOGIC ABO: CPT | Performed by: THORACIC SURGERY (CARDIOTHORACIC VASCULAR SURGERY)

## 2019-07-09 PROCEDURE — 83036 HEMOGLOBIN GLYCOSYLATED A1C: CPT | Performed by: THORACIC SURGERY (CARDIOTHORACIC VASCULAR SURGERY)

## 2019-07-09 PROCEDURE — 82803 BLOOD GASES ANY COMBINATION: CPT

## 2019-07-09 PROCEDURE — 85610 PROTHROMBIN TIME: CPT | Performed by: THORACIC SURGERY (CARDIOTHORACIC VASCULAR SURGERY)

## 2019-07-09 PROCEDURE — 87077 CULTURE AEROBIC IDENTIFY: CPT | Performed by: THORACIC SURGERY (CARDIOTHORACIC VASCULAR SURGERY)

## 2019-07-09 PROCEDURE — 83735 ASSAY OF MAGNESIUM: CPT | Performed by: THORACIC SURGERY (CARDIOTHORACIC VASCULAR SURGERY)

## 2019-07-09 PROCEDURE — A9270 NON-COVERED ITEM OR SERVICE: HCPCS | Performed by: THORACIC SURGERY (CARDIOTHORACIC VASCULAR SURGERY)

## 2019-07-09 PROCEDURE — 87086 URINE CULTURE/COLONY COUNT: CPT | Performed by: THORACIC SURGERY (CARDIOTHORACIC VASCULAR SURGERY)

## 2019-07-09 PROCEDURE — A9270 NON-COVERED ITEM OR SERVICE: HCPCS | Performed by: NURSE PRACTITIONER

## 2019-07-09 PROCEDURE — 82962 GLUCOSE BLOOD TEST: CPT

## 2019-07-09 PROCEDURE — 81001 URINALYSIS AUTO W/SCOPE: CPT | Performed by: THORACIC SURGERY (CARDIOTHORACIC VASCULAR SURGERY)

## 2019-07-09 PROCEDURE — 85025 COMPLETE CBC W/AUTO DIFF WBC: CPT | Performed by: THORACIC SURGERY (CARDIOTHORACIC VASCULAR SURGERY)

## 2019-07-09 PROCEDURE — 36600 WITHDRAWAL OF ARTERIAL BLOOD: CPT

## 2019-07-09 PROCEDURE — 63710000001: Performed by: NURSE PRACTITIONER

## 2019-07-09 PROCEDURE — 63710000001 POLYETHYLENE GLYCOL 240 G RECONSTITUTED SOLUTION: Performed by: NURSE PRACTITIONER

## 2019-07-09 PROCEDURE — 86901 BLOOD TYPING SEROLOGIC RH(D): CPT

## 2019-07-09 PROCEDURE — 86923 COMPATIBILITY TEST ELECTRIC: CPT

## 2019-07-09 PROCEDURE — 93010 ELECTROCARDIOGRAM REPORT: CPT | Performed by: INTERNAL MEDICINE

## 2019-07-09 PROCEDURE — 85730 THROMBOPLASTIN TIME PARTIAL: CPT | Performed by: THORACIC SURGERY (CARDIOTHORACIC VASCULAR SURGERY)

## 2019-07-09 PROCEDURE — 86850 RBC ANTIBODY SCREEN: CPT | Performed by: THORACIC SURGERY (CARDIOTHORACIC VASCULAR SURGERY)

## 2019-07-09 PROCEDURE — 63710000001 TRAMADOL 50 MG TABLET: Performed by: THORACIC SURGERY (CARDIOTHORACIC VASCULAR SURGERY)

## 2019-07-09 PROCEDURE — 83880 ASSAY OF NATRIURETIC PEPTIDE: CPT | Performed by: THORACIC SURGERY (CARDIOTHORACIC VASCULAR SURGERY)

## 2019-07-09 PROCEDURE — 94060 EVALUATION OF WHEEZING: CPT

## 2019-07-09 PROCEDURE — 71046 X-RAY EXAM CHEST 2 VIEWS: CPT

## 2019-07-09 PROCEDURE — 80053 COMPREHEN METABOLIC PANEL: CPT | Performed by: THORACIC SURGERY (CARDIOTHORACIC VASCULAR SURGERY)

## 2019-07-09 PROCEDURE — 85576 BLOOD PLATELET AGGREGATION: CPT | Performed by: THORACIC SURGERY (CARDIOTHORACIC VASCULAR SURGERY)

## 2019-07-09 PROCEDURE — 80061 LIPID PANEL: CPT | Performed by: THORACIC SURGERY (CARDIOTHORACIC VASCULAR SURGERY)

## 2019-07-09 PROCEDURE — 86901 BLOOD TYPING SEROLOGIC RH(D): CPT | Performed by: THORACIC SURGERY (CARDIOTHORACIC VASCULAR SURGERY)

## 2019-07-09 PROCEDURE — 87186 SC STD MICRODIL/AGAR DIL: CPT | Performed by: THORACIC SURGERY (CARDIOTHORACIC VASCULAR SURGERY)

## 2019-07-09 PROCEDURE — 86900 BLOOD TYPING SEROLOGIC ABO: CPT

## 2019-07-09 PROCEDURE — 63710000001 ERYTHROMYCIN BASE 250 MG TABLET: Performed by: NURSE PRACTITIONER

## 2019-07-09 RX ORDER — ALPRAZOLAM 0.25 MG/1
0.25 TABLET ORAL EVERY 8 HOURS PRN
Status: DISCONTINUED | OUTPATIENT
Start: 2019-07-09 | End: 2019-07-09 | Stop reason: HOSPADM

## 2019-07-09 RX ORDER — NEBIVOLOL 10 MG/1
10 TABLET ORAL DAILY
Status: DISCONTINUED | OUTPATIENT
Start: 2019-07-09 | End: 2019-07-09 | Stop reason: HOSPADM

## 2019-07-09 RX ORDER — NEOMYCIN SULFATE 500 MG/1
1000 TABLET ORAL 3 TIMES DAILY
Status: DISCONTINUED | OUTPATIENT
Start: 2019-07-09 | End: 2019-07-09 | Stop reason: HOSPADM

## 2019-07-09 RX ORDER — CHLORHEXIDINE GLUCONATE 500 MG/1
1 CLOTH TOPICAL EVERY 12 HOURS
Status: DISCONTINUED | OUTPATIENT
Start: 2019-07-09 | End: 2019-07-09 | Stop reason: HOSPADM

## 2019-07-09 RX ORDER — CHLORHEXIDINE GLUCONATE 0.12 MG/ML
15 RINSE ORAL ONCE
Status: DISCONTINUED | OUTPATIENT
Start: 2019-07-09 | End: 2019-07-09 | Stop reason: HOSPADM

## 2019-07-09 RX ORDER — GABAPENTIN 300 MG/1
300 CAPSULE ORAL ONCE
Status: CANCELLED | OUTPATIENT
Start: 2019-07-10

## 2019-07-09 RX ORDER — CHLORHEXIDINE GLUCONATE 0.12 MG/ML
15 RINSE ORAL EVERY 12 HOURS
Status: DISCONTINUED | OUTPATIENT
Start: 2019-07-09 | End: 2019-07-09 | Stop reason: SDUPTHER

## 2019-07-09 RX ORDER — TEMAZEPAM 7.5 MG/1
7.5 CAPSULE ORAL NIGHTLY PRN
Status: DISCONTINUED | OUTPATIENT
Start: 2019-07-09 | End: 2019-07-09 | Stop reason: HOSPADM

## 2019-07-09 RX ORDER — ALBUTEROL SULFATE 2.5 MG/3ML
2.5 SOLUTION RESPIRATORY (INHALATION) ONCE AS NEEDED
Status: COMPLETED | OUTPATIENT
Start: 2019-07-09 | End: 2019-07-09

## 2019-07-09 RX ORDER — HYDRALAZINE HYDROCHLORIDE 20 MG/ML
20 INJECTION INTRAMUSCULAR; INTRAVENOUS EVERY 6 HOURS PRN
Status: DISCONTINUED | OUTPATIENT
Start: 2019-07-09 | End: 2019-07-09 | Stop reason: HOSPADM

## 2019-07-09 RX ORDER — CHLORHEXIDINE GLUCONATE 500 MG/1
1 CLOTH TOPICAL EVERY 12 HOURS PRN
Status: DISCONTINUED | OUTPATIENT
Start: 2019-07-09 | End: 2019-07-09 | Stop reason: HOSPADM

## 2019-07-09 RX ORDER — ERYTHROMYCIN 250 MG/1
1000 TABLET, COATED ORAL 3 TIMES DAILY
Status: DISCONTINUED | OUTPATIENT
Start: 2019-07-09 | End: 2019-07-09 | Stop reason: HOSPADM

## 2019-07-09 RX ORDER — LEVOFLOXACIN 500 MG/1
500 TABLET, FILM COATED ORAL DAILY
Qty: 5 TABLET | Refills: 0 | Status: SHIPPED | OUTPATIENT
Start: 2019-07-09 | End: 2019-07-14

## 2019-07-09 RX ORDER — TRAMADOL HYDROCHLORIDE 50 MG/1
50 TABLET ORAL EVERY 6 HOURS PRN
Status: DISCONTINUED | OUTPATIENT
Start: 2019-07-09 | End: 2019-07-09 | Stop reason: HOSPADM

## 2019-07-09 RX ORDER — POLYETHYLENE GLYCOL 3350, SODIUM CHLORIDE, POTASSIUM CHLORIDE, SODIUM BICARBONATE, AND SODIUM SULFATE 240; 5.84; 2.98; 6.72; 22.72 G/4L; G/4L; G/4L; G/4L; G/4L
2000 POWDER, FOR SOLUTION ORAL ONCE
Status: COMPLETED | OUTPATIENT
Start: 2019-07-09 | End: 2019-07-09

## 2019-07-09 RX ORDER — ACETAMINOPHEN 325 MG/1
650 TABLET ORAL EVERY 4 HOURS PRN
Status: DISCONTINUED | OUTPATIENT
Start: 2019-07-09 | End: 2019-07-09 | Stop reason: HOSPADM

## 2019-07-09 RX ORDER — LORAZEPAM 1 MG/1
1 TABLET ORAL
Status: CANCELLED | OUTPATIENT
Start: 2019-07-10 | End: 2019-07-20

## 2019-07-09 RX ORDER — CEFAZOLIN SODIUM 2 G/100ML
2 INJECTION, SOLUTION INTRAVENOUS
Status: DISCONTINUED | OUTPATIENT
Start: 2019-07-10 | End: 2019-07-09 | Stop reason: HOSPADM

## 2019-07-09 RX ORDER — FAMOTIDINE 10 MG/ML
20 INJECTION, SOLUTION INTRAVENOUS ONCE
Status: CANCELLED | OUTPATIENT
Start: 2019-07-10

## 2019-07-09 RX ORDER — ACETAMINOPHEN 500 MG
1000 TABLET ORAL ONCE
Status: CANCELLED | OUTPATIENT
Start: 2019-07-10

## 2019-07-09 RX ORDER — ASPIRIN 81 MG/1
81 TABLET ORAL DAILY
Status: DISCONTINUED | OUTPATIENT
Start: 2019-07-09 | End: 2019-07-09 | Stop reason: HOSPADM

## 2019-07-09 RX ORDER — DIPHENHYDRAMINE HCL 25 MG
25 CAPSULE ORAL NIGHTLY PRN
Status: DISCONTINUED | OUTPATIENT
Start: 2019-07-09 | End: 2019-07-09 | Stop reason: HOSPADM

## 2019-07-09 RX ORDER — CHLORHEXIDINE GLUCONATE 0.12 MG/ML
15 RINSE ORAL EVERY 12 HOURS SCHEDULED
Status: DISCONTINUED | OUTPATIENT
Start: 2019-07-09 | End: 2019-07-09 | Stop reason: HOSPADM

## 2019-07-09 RX ORDER — CEFAZOLIN SODIUM 2 G/100ML
2 INJECTION, SOLUTION INTRAVENOUS
Status: DISCONTINUED | OUTPATIENT
Start: 2019-07-10 | End: 2019-07-09 | Stop reason: SDUPTHER

## 2019-07-09 RX ADMIN — POLYETHYLENE GLYCOL-3350 AND ELECTROLYTES WITH FLAVOR PACK 2000 ML: 240; 5.84; 2.98; 6.72; 22.72 POWDER, FOR SOLUTION ORAL at 16:16

## 2019-07-09 RX ADMIN — ERYTHROMYCIN 1000 MG: 250 TABLET, FILM COATED ORAL at 17:29

## 2019-07-09 RX ADMIN — TRAMADOL HYDROCHLORIDE 50 MG: 50 TABLET, FILM COATED ORAL at 17:29

## 2019-07-09 RX ADMIN — ALBUTEROL SULFATE 2.5 MG: 2.5 SOLUTION RESPIRATORY (INHALATION) at 16:46

## 2019-07-09 RX ADMIN — NEOMYCIN SULFATE 1000 MG: 500 TABLET ORAL at 15:21

## 2019-07-09 NOTE — ANESTHESIA PREPROCEDURE EVALUATION
Anesthesia Evaluation                  Airway   Mallampati: II  TM distance: >3 FB  Neck ROM: full  Dental    (+) upper dentures and lower dentures    Pulmonary - normal exam   (+) a smoker Former Abstained day of surgery, COPD,   Cardiovascular - normal exam    (+) hypertension, CAD, CABG >6 Months, PVD, hyperlipidemia,  carotid artery disease left carotid    ROS comment: Mild left carotid stenosis    Neuro/Psych  (+) TIA, psychiatric history Depression,     GI/Hepatic/Renal/Endo    (+)  GERD,  diabetes mellitus,     Musculoskeletal     Abdominal    Substance History      OB/GYN          Other   (+) arthritis                   Anesthesia Plan    ASA 4     general   (Art, central line, PA Cath, ROSE, double lumen tube, postop intubation/ventilation discussed.)  Anesthetic plan, all risks, benefits, and alternatives have been provided, discussed and informed consent has been obtained with: patient.

## 2019-07-09 NOTE — NURSING NOTE
Notified Marjorie Rodrigues pt BP is 166/91 and that pt is a bit anxious about procedure tomorrow. Marjorie states pt has PRN xanax ordered and that she will put in an order for PRN BP meds.

## 2019-07-10 ENCOUNTER — ANESTHESIA (OUTPATIENT)
Dept: PERIOP | Facility: HOSPITAL | Age: 79
End: 2019-07-10

## 2019-07-10 LAB
ABO + RH BLD: NORMAL
BH BB BLOOD EXPIRATION DATE: NORMAL
BH BB BLOOD TYPE BARCODE: 6200
BH BB DISPENSE STATUS: NORMAL
BH BB PRODUCT CODE: NORMAL
BH BB UNIT NUMBER: NORMAL
UNIT  ABO: NORMAL
UNIT  RH: NORMAL

## 2019-07-10 NOTE — DISCHARGE SUMMARY
Date of Admission:   Date of Discharge:  7/10/2019    Discharge Diagnosis:     1. Type III thoracoabdominal aortic aneurysm, questionable whether symptoms are related to the aneurysm  2. Status post proximal thoracic aortic aneurysm repair and CABG  3. Paraparesis of unknown etiology.  Presumably it may be an ischemic component due to thrombosis of some of intercostal vessels or a decrease in flow  4. Moderate frailty  5. COPD  6. Hypertension well-controlled        Presenting Problem/History of Present Illness  Thoracoabdominal aortic aneurysm, 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) [I71.6]  Thoracoabdominal aortic aneurysm, 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) [I71.6]     Hospital Course  Patient is a 78 y.o. female presented for prep op bowel prep and spinal drain placement for TAAA during workup it was noted she had a urinary tract infection.  Dr. Ontiveros recommends delaying surgery.  Treating UTI and rechecking urine and scheduling as an out patient.  We will contact her with further instructions.    Procedures Performed         Consults:   Consults     No orders found for last 30 day(s).          Pertinent Test Results:    Lab Results   Component Value Date    WBC 5.28 07/09/2019    HGB 14.0 07/09/2019    HCT 42.8 07/09/2019    MCV 95.1 07/09/2019     07/09/2019      Lab Results   Component Value Date    GLUCOSE 86 07/09/2019    CALCIUM 8.5 (L) 07/09/2019     07/09/2019    K 4.3 07/09/2019    CO2 26.5 07/09/2019     07/09/2019    BUN 19 07/09/2019    CREATININE 0.76 07/09/2019    EGFRIFAFRI 77 02/01/2019    EGFRIFNONA 74 07/09/2019    BCR 25.0 07/09/2019    ANIONGAP 12.5 07/09/2019     Lab Results   Component Value Date    INR 1.08 07/09/2019    PROTIME 13.7 07/09/2019         Condition on Discharge:  stable    Vital Signs  Temp:  [97.3 °F (36.3 °C)] 97.3 °F (36.3 °C)  Heart Rate:  [57-62] 62  Resp:  [16] 16  BP: (150-166)/(78-91) 150/86      Discharge Disposition  Home or Self Care    Discharge Medications      Discharge Medications      New Medications      Instructions Start Date   levoFLOXacin 500 MG tablet  Commonly known as:  LEVAQUIN   500 mg, Oral, Daily         Continue These Medications      Instructions Start Date   ANORO ELLIPTA 62.5-25 MCG/INH aerosol powder  inhaler  Generic drug:  umeclidinium-vilanterol  Notes to patient:  Resume home regimen.   1 puff, Inhalation, Daily - RT      aspirin 81 MG EC tablet  Notes to patient:  Resume home regimen.   81 mg, Oral, Daily      Biotin 1000 MCG tablet  Notes to patient:  Resume home regimen.   1,000 mcg, Oral, Daily, Take as directed      BYSTOLIC 10 MG tablet  Generic drug:  nebivolol  Notes to patient:  Resume home regimen.   TAKE ONE TABLET BY MOUTH DAILY      cetirizine 10 MG tablet  Commonly known as:  zyrTEC   10 mg, Oral, As Needed      LIVALO 2 MG tablet tablet  Generic drug:  pitavastatin calcium  Notes to patient:  Resume home regimen.   2 mg, Oral, Nightly      melatonin 5 MG tablet tablet   10 mg, Oral, Nightly PRN      MYRBETRIQ 50 MG tablet sustained-release 24 hour 24 hr tablet  Generic drug:  Mirabegron ER  Notes to patient:  Resume home regimen.   50 mg, Oral, Daily      omeprazole 20 MG capsule  Commonly known as:  priLOSEC  Notes to patient:  Resume home regimen.   20 mg, Oral, Daily      pramipexole 0.125 MG tablet  Commonly known as:  MIRAPEX  Notes to patient:  Resume home regimen.   TAKE ONE TABLET BY MOUTH AT BEDTIME      PROAIR  (90 Base) MCG/ACT inhaler  Generic drug:  albuterol sulfate HFA   INHALE TWO PUFFS BY MOUTH EVERY 6 HOURS AS NEEDED FOR WHEEZING      PROBIOTIC PO  Notes to patient:  Resume home regimen.   1 capsule, Oral, Daily      traMADol 50 MG tablet  Commonly known as:  ULTRAM   TAKE ONE TABLET BY MOUTH EVERY 6 HOURS AS NEEDED FOR PAIN         Stop These Medications    chlorhexidine 0.12 % solution  Commonly known as:  PERIDEX     cilostazol 100 MG tablet  Commonly known as:  PLETAL     DICLOFENAC PO     fluconazole  100 MG tablet  Commonly known as:  DIFLUCAN            Discharge Diet:     Activity at Discharge:     Follow-up Appointments  Future Appointments   Date Time Provider Department [place]   7/24/2019  2:00 PM Miller Castañeda MD [facility] None   5/29/2020  1:30 PM Veronika Villarreal PA [facility] None     Additional Instructions for the Follow-ups that You Need to Schedule     Call MD With Problems / Concerns   As directed      Instructions:  Call office at [phone] for any drainage, increased redness, or fever over 100.5    Order Comments:  Instructions:  Call office at [phone] for any drainage, increased redness, or fever over 100.5          Discharge Follow-up with PCP   As directed       Currently Documented PCP:    Miller Castañeda MD    PCP Phone Number:    [phone]     Follow Up Details:  in 1 week         Discharge Follow-up with Specified Provider: Cardiologist; 1 Month   As directed      To:  Cardiologist    Follow Up:  1 Month    Follow Up Details:  call for appointment, bring all medication bottles to appointment         Discharge Follow-up with Specified Provider: Dr. Ontiveros   As directed      To:  Dr. Ontiveros    Follow Up Details:  will call with plans for rescheduling               Test Results Pending at Discharge   Order Current Status    Urine Culture - Urine, Urine, Clean Catch In process           FREDRICK Harris  07/10/19  2:01 PM

## 2019-07-11 LAB — BACTERIA SPEC AEROBE CULT: ABNORMAL

## 2019-07-16 DIAGNOSIS — N39.0 URINARY TRACT INFECTION WITHOUT HEMATURIA, SITE UNSPECIFIED: Primary | ICD-10-CM

## 2019-07-17 ENCOUNTER — EPISODE CHANGES (OUTPATIENT)
Dept: CASE MANAGEMENT | Facility: OTHER | Age: 79
End: 2019-07-17

## 2019-07-17 ENCOUNTER — LAB (OUTPATIENT)
Dept: LAB | Facility: HOSPITAL | Age: 79
End: 2019-07-17

## 2019-07-17 DIAGNOSIS — N39.0 URINARY TRACT INFECTION WITHOUT HEMATURIA, SITE UNSPECIFIED: ICD-10-CM

## 2019-07-17 LAB
BACTERIA UR QL AUTO: ABNORMAL /HPF
BILIRUB UR QL STRIP: NEGATIVE
CLARITY UR: CLEAR
COLOR UR: YELLOW
GLUCOSE UR STRIP-MCNC: NEGATIVE MG/DL
HGB UR QL STRIP.AUTO: ABNORMAL
HYALINE CASTS UR QL AUTO: ABNORMAL /LPF
KETONES UR QL STRIP: NEGATIVE
LEUKOCYTE ESTERASE UR QL STRIP.AUTO: NEGATIVE
NITRITE UR QL STRIP: NEGATIVE
PH UR STRIP.AUTO: <=5 [PH] (ref 5–8)
PROT UR QL STRIP: NEGATIVE
RBC # UR: ABNORMAL /HPF
REF LAB TEST METHOD: ABNORMAL
SP GR UR STRIP: 1.02 (ref 1–1.03)
SQUAMOUS #/AREA URNS HPF: ABNORMAL /HPF
UROBILINOGEN UR QL STRIP: ABNORMAL
WBC UR QL AUTO: ABNORMAL /HPF

## 2019-07-17 PROCEDURE — 81001 URINALYSIS AUTO W/SCOPE: CPT

## 2019-07-19 ENCOUNTER — TELEPHONE (OUTPATIENT)
Dept: CARDIAC SURGERY | Facility: CLINIC | Age: 79
End: 2019-07-19

## 2019-07-19 ENCOUNTER — HOSPITAL ENCOUNTER (OUTPATIENT)
Facility: HOSPITAL | Age: 79
Setting detail: SURGERY ADMIT
End: 2019-07-19
Attending: THORACIC SURGERY (CARDIOTHORACIC VASCULAR SURGERY) | Admitting: THORACIC SURGERY (CARDIOTHORACIC VASCULAR SURGERY)

## 2019-07-19 DIAGNOSIS — I71.9 AORTIC ANEURYSM, DESCENDING (HCC): Primary | ICD-10-CM

## 2019-07-19 NOTE — TELEPHONE ENCOUNTER
Spoke to Mrs. Beauchamp with her arrangements for her admission on 7-. She is to start her clear liquid diet on Sunday, 7-. Admit on 7-.  Surgery 7-.  She expressed a verbal understanding of this.

## 2019-07-22 ENCOUNTER — APPOINTMENT (OUTPATIENT)
Dept: CARDIOLOGY | Facility: HOSPITAL | Age: 79
End: 2019-07-22

## 2019-07-22 ENCOUNTER — HOSPITAL ENCOUNTER (INPATIENT)
Facility: HOSPITAL | Age: 79
LOS: 11 days | Discharge: NURSING, REHAB OR LONG-TERM CARE | End: 2019-08-02
Attending: THORACIC SURGERY (CARDIOTHORACIC VASCULAR SURGERY) | Admitting: THORACIC SURGERY (CARDIOTHORACIC VASCULAR SURGERY)

## 2019-07-22 DIAGNOSIS — Z74.09 IMPAIRED FUNCTIONAL MOBILITY AND ACTIVITY TOLERANCE: Primary | ICD-10-CM

## 2019-07-22 DIAGNOSIS — Z98.890 S/P ASCENDING AORTIC ANEURYSM REPAIR: ICD-10-CM

## 2019-07-22 DIAGNOSIS — I71.9 AORTIC ANEURYSM, DESCENDING (HCC): ICD-10-CM

## 2019-07-22 DIAGNOSIS — Z86.79 S/P ASCENDING AORTIC ANEURYSM REPAIR: ICD-10-CM

## 2019-07-22 LAB
ABO GROUP BLD: NORMAL
ALBUMIN SERPL-MCNC: 4 G/DL (ref 3.5–5.2)
ALBUMIN/GLOB SERPL: 1.2 G/DL
ALP SERPL-CCNC: 114 U/L (ref 39–117)
ALT SERPL W P-5'-P-CCNC: 41 U/L (ref 1–33)
ANION GAP SERPL CALCULATED.3IONS-SCNC: 14.3 MMOL/L (ref 5–15)
APTT PPP: 30.3 SECONDS (ref 22.7–35.4)
AST SERPL-CCNC: 81 U/L (ref 1–32)
BACTERIA UR QL AUTO: ABNORMAL /HPF
BASOPHILS # BLD AUTO: 0.02 10*3/MM3 (ref 0–0.2)
BASOPHILS NFR BLD AUTO: 0.4 % (ref 0–1.5)
BH CV XLRA MEAS LEFT CCA RATIO VEL: 54.2 CM/SEC
BH CV XLRA MEAS LEFT DIST CCA EDV: 18.1 CM/SEC
BH CV XLRA MEAS LEFT DIST CCA PSV: 54.2 CM/SEC
BH CV XLRA MEAS LEFT DIST ICA EDV: 39.3 CM/SEC
BH CV XLRA MEAS LEFT DIST ICA PSV: 121 CM/SEC
BH CV XLRA MEAS LEFT ICA RATIO VEL: -83.3 CM/SEC
BH CV XLRA MEAS LEFT ICA/CCA RATIO: -1.5
BH CV XLRA MEAS LEFT MID ICA EDV: -30.6 CM/SEC
BH CV XLRA MEAS LEFT MID ICA PSV: -82.2 CM/SEC
BH CV XLRA MEAS LEFT PROX CCA EDV: 20.4 CM/SEC
BH CV XLRA MEAS LEFT PROX CCA PSV: 61.3 CM/SEC
BH CV XLRA MEAS LEFT PROX ECA EDV: -18.8 CM/SEC
BH CV XLRA MEAS LEFT PROX ECA PSV: -80.3 CM/SEC
BH CV XLRA MEAS LEFT PROX ICA EDV: -28.3 CM/SEC
BH CV XLRA MEAS LEFT PROX ICA PSV: -83.3 CM/SEC
BH CV XLRA MEAS LEFT PROX SCLA PSV: 130 CM/SEC
BH CV XLRA MEAS LEFT VERTEBRAL A EDV: -13.4 CM/SEC
BH CV XLRA MEAS LEFT VERTEBRAL A PSV: -49.9 CM/SEC
BH CV XLRA MEAS RIGHT CCA RATIO VEL: -57.4 CM/SEC
BH CV XLRA MEAS RIGHT DIST CCA EDV: -17.7 CM/SEC
BH CV XLRA MEAS RIGHT DIST CCA PSV: -57.4 CM/SEC
BH CV XLRA MEAS RIGHT DIST ICA EDV: -31.1 CM/SEC
BH CV XLRA MEAS RIGHT DIST ICA PSV: -95 CM/SEC
BH CV XLRA MEAS RIGHT ICA RATIO VEL: -95 CM/SEC
BH CV XLRA MEAS RIGHT ICA/CCA RATIO: 1.7
BH CV XLRA MEAS RIGHT MID ICA EDV: -27.4 CM/SEC
BH CV XLRA MEAS RIGHT MID ICA PSV: -74.8 CM/SEC
BH CV XLRA MEAS RIGHT PROX CCA EDV: 23.5 CM/SEC
BH CV XLRA MEAS RIGHT PROX CCA PSV: 79.2 CM/SEC
BH CV XLRA MEAS RIGHT PROX ECA EDV: -14.1 CM/SEC
BH CV XLRA MEAS RIGHT PROX ECA PSV: -92 CM/SEC
BH CV XLRA MEAS RIGHT PROX ICA EDV: -29.9 CM/SEC
BH CV XLRA MEAS RIGHT PROX ICA PSV: -70.4 CM/SEC
BH CV XLRA MEAS RIGHT PROX SCLA PSV: 129 CM/SEC
BH CV XLRA MEAS RIGHT VERTEBRAL A EDV: -8.6 CM/SEC
BH CV XLRA MEAS RIGHT VERTEBRAL A PSV: -34.3 CM/SEC
BILIRUB SERPL-MCNC: 1.7 MG/DL (ref 0.2–1.2)
BILIRUB UR QL STRIP: NEGATIVE
BLD GP AB SCN SERPL QL: NEGATIVE
BUN BLD-MCNC: 20 MG/DL (ref 8–23)
BUN/CREAT SERPL: 27.8 (ref 7–25)
CALCIUM SPEC-SCNC: 8.8 MG/DL (ref 8.6–10.5)
CHLORIDE SERPL-SCNC: 98 MMOL/L (ref 98–107)
CLARITY UR: CLEAR
CLOSE TME COLL+ADP + EPINEP PNL BLD: 97 %
CO2 SERPL-SCNC: 25.7 MMOL/L (ref 22–29)
COLOR UR: YELLOW
CREAT BLD-MCNC: 0.72 MG/DL (ref 0.57–1)
DEPRECATED RDW RBC AUTO: 45.1 FL (ref 37–54)
EOSINOPHIL # BLD AUTO: 0.07 10*3/MM3 (ref 0–0.4)
EOSINOPHIL NFR BLD AUTO: 1.4 % (ref 0.3–6.2)
ERYTHROCYTE [DISTWIDTH] IN BLOOD BY AUTOMATED COUNT: 12.7 % (ref 12.3–15.4)
GFR SERPL CREATININE-BSD FRML MDRD: 78 ML/MIN/1.73
GLOBULIN UR ELPH-MCNC: 3.3 GM/DL
GLUCOSE BLD-MCNC: 75 MG/DL (ref 65–99)
GLUCOSE UR STRIP-MCNC: NEGATIVE MG/DL
HCT VFR BLD AUTO: 44.3 % (ref 34–46.6)
HGB BLD-MCNC: 14.2 G/DL (ref 12–15.9)
HGB UR QL STRIP.AUTO: NEGATIVE
HYALINE CASTS UR QL AUTO: ABNORMAL /LPF
IMM GRANULOCYTES # BLD AUTO: 0.01 10*3/MM3 (ref 0–0.05)
IMM GRANULOCYTES NFR BLD AUTO: 0.2 % (ref 0–0.5)
INR PPP: 1.04 (ref 0.9–1.1)
KETONES UR QL STRIP: ABNORMAL
LEFT ARM BP: NORMAL MMHG
LEUKOCYTE ESTERASE UR QL STRIP.AUTO: ABNORMAL
LYMPHOCYTES # BLD AUTO: 1.34 10*3/MM3 (ref 0.7–3.1)
LYMPHOCYTES NFR BLD AUTO: 26.3 % (ref 19.6–45.3)
MAGNESIUM SERPL-MCNC: 2.3 MG/DL (ref 1.6–2.4)
MCH RBC QN AUTO: 30.7 PG (ref 26.6–33)
MCHC RBC AUTO-ENTMCNC: 32.1 G/DL (ref 31.5–35.7)
MCV RBC AUTO: 95.7 FL (ref 79–97)
MONOCYTES # BLD AUTO: 0.4 10*3/MM3 (ref 0.1–0.9)
MONOCYTES NFR BLD AUTO: 7.9 % (ref 5–12)
NEUTROPHILS # BLD AUTO: 3.25 10*3/MM3 (ref 1.7–7)
NEUTROPHILS NFR BLD AUTO: 63.8 % (ref 42.7–76)
NITRITE UR QL STRIP: NEGATIVE
NRBC BLD AUTO-RTO: 0 /100 WBC (ref 0–0.2)
NT-PROBNP SERPL-MCNC: 663.9 PG/ML (ref 5–1800)
PH UR STRIP.AUTO: <=5 [PH] (ref 5–8)
PLATELET # BLD AUTO: 286 10*3/MM3 (ref 140–450)
PMV BLD AUTO: 9.1 FL (ref 6–12)
POTASSIUM BLD-SCNC: 3.9 MMOL/L (ref 3.5–5.2)
PROT SERPL-MCNC: 7.3 G/DL (ref 6–8.5)
PROT UR QL STRIP: NEGATIVE
PROTHROMBIN TIME: 13.3 SECONDS (ref 11.7–14.2)
RBC # BLD AUTO: 4.63 10*6/MM3 (ref 3.77–5.28)
RBC # UR: ABNORMAL /HPF
REF LAB TEST METHOD: ABNORMAL
RH BLD: POSITIVE
RIGHT ARM BP: NORMAL MMHG
SODIUM BLD-SCNC: 138 MMOL/L (ref 136–145)
SP GR UR STRIP: 1.02 (ref 1–1.03)
SQUAMOUS #/AREA URNS HPF: ABNORMAL /HPF
T&S EXPIRATION DATE: NORMAL
UROBILINOGEN UR QL STRIP: ABNORMAL
WBC NRBC COR # BLD: 5.09 10*3/MM3 (ref 3.4–10.8)
WBC UR QL AUTO: ABNORMAL /HPF

## 2019-07-22 PROCEDURE — 85576 BLOOD PLATELET AGGREGATION: CPT | Performed by: THORACIC SURGERY (CARDIOTHORACIC VASCULAR SURGERY)

## 2019-07-22 PROCEDURE — 86923 COMPATIBILITY TEST ELECTRIC: CPT

## 2019-07-22 PROCEDURE — 86850 RBC ANTIBODY SCREEN: CPT | Performed by: THORACIC SURGERY (CARDIOTHORACIC VASCULAR SURGERY)

## 2019-07-22 PROCEDURE — 25010000002 ONDANSETRON PER 1 MG: Performed by: THORACIC SURGERY (CARDIOTHORACIC VASCULAR SURGERY)

## 2019-07-22 PROCEDURE — 85610 PROTHROMBIN TIME: CPT | Performed by: THORACIC SURGERY (CARDIOTHORACIC VASCULAR SURGERY)

## 2019-07-22 PROCEDURE — 86900 BLOOD TYPING SEROLOGIC ABO: CPT | Performed by: THORACIC SURGERY (CARDIOTHORACIC VASCULAR SURGERY)

## 2019-07-22 PROCEDURE — 93880 EXTRACRANIAL BILAT STUDY: CPT

## 2019-07-22 PROCEDURE — 85025 COMPLETE CBC W/AUTO DIFF WBC: CPT | Performed by: THORACIC SURGERY (CARDIOTHORACIC VASCULAR SURGERY)

## 2019-07-22 PROCEDURE — 85730 THROMBOPLASTIN TIME PARTIAL: CPT | Performed by: THORACIC SURGERY (CARDIOTHORACIC VASCULAR SURGERY)

## 2019-07-22 PROCEDURE — 80053 COMPREHEN METABOLIC PANEL: CPT | Performed by: THORACIC SURGERY (CARDIOTHORACIC VASCULAR SURGERY)

## 2019-07-22 PROCEDURE — 81001 URINALYSIS AUTO W/SCOPE: CPT | Performed by: THORACIC SURGERY (CARDIOTHORACIC VASCULAR SURGERY)

## 2019-07-22 PROCEDURE — 99222 1ST HOSP IP/OBS MODERATE 55: CPT | Performed by: INTERNAL MEDICINE

## 2019-07-22 PROCEDURE — 86901 BLOOD TYPING SEROLOGIC RH(D): CPT | Performed by: THORACIC SURGERY (CARDIOTHORACIC VASCULAR SURGERY)

## 2019-07-22 PROCEDURE — 83735 ASSAY OF MAGNESIUM: CPT | Performed by: THORACIC SURGERY (CARDIOTHORACIC VASCULAR SURGERY)

## 2019-07-22 PROCEDURE — 83880 ASSAY OF NATRIURETIC PEPTIDE: CPT | Performed by: THORACIC SURGERY (CARDIOTHORACIC VASCULAR SURGERY)

## 2019-07-22 RX ORDER — CHOLECALCIFEROL (VITAMIN D3) 125 MCG
10 CAPSULE ORAL NIGHTLY PRN
Status: DISCONTINUED | OUTPATIENT
Start: 2019-07-22 | End: 2019-07-23

## 2019-07-22 RX ORDER — ERYTHROMYCIN 250 MG/1
1000 TABLET, COATED ORAL ONCE
Status: DISCONTINUED | OUTPATIENT
Start: 2019-07-22 | End: 2019-07-22 | Stop reason: SDUPTHER

## 2019-07-22 RX ORDER — ALBUTEROL SULFATE 2.5 MG/3ML
2.5 SOLUTION RESPIRATORY (INHALATION) EVERY 6 HOURS PRN
Status: DISCONTINUED | OUTPATIENT
Start: 2019-07-22 | End: 2019-07-23

## 2019-07-22 RX ORDER — TRAMADOL HYDROCHLORIDE 50 MG/1
50 TABLET ORAL EVERY 6 HOURS PRN
Status: DISCONTINUED | OUTPATIENT
Start: 2019-07-22 | End: 2019-07-23

## 2019-07-22 RX ORDER — ACETAMINOPHEN 325 MG/1
650 TABLET ORAL EVERY 4 HOURS PRN
Status: DISCONTINUED | OUTPATIENT
Start: 2019-07-22 | End: 2019-07-23

## 2019-07-22 RX ORDER — SODIUM CHLORIDE 0.9 % (FLUSH) 0.9 %
3-10 SYRINGE (ML) INJECTION AS NEEDED
Status: DISCONTINUED | OUTPATIENT
Start: 2019-07-22 | End: 2019-07-23

## 2019-07-22 RX ORDER — CETIRIZINE HYDROCHLORIDE 10 MG/1
5 TABLET ORAL DAILY
Status: DISCONTINUED | OUTPATIENT
Start: 2019-07-22 | End: 2019-07-23

## 2019-07-22 RX ORDER — OXYBUTYNIN CHLORIDE 10 MG/1
10 TABLET, EXTENDED RELEASE ORAL DAILY
Status: DISCONTINUED | OUTPATIENT
Start: 2019-07-22 | End: 2019-07-23

## 2019-07-22 RX ORDER — SODIUM CHLORIDE 0.9 % (FLUSH) 0.9 %
3 SYRINGE (ML) INJECTION EVERY 12 HOURS SCHEDULED
Status: DISCONTINUED | OUTPATIENT
Start: 2019-07-22 | End: 2019-07-23

## 2019-07-22 RX ORDER — ALPRAZOLAM 0.25 MG/1
0.25 TABLET ORAL EVERY 8 HOURS PRN
Status: DISCONTINUED | OUTPATIENT
Start: 2019-07-22 | End: 2019-07-23

## 2019-07-22 RX ORDER — PRAMIPEXOLE DIHYDROCHLORIDE 0.25 MG/1
0.12 TABLET ORAL NIGHTLY
Status: DISCONTINUED | OUTPATIENT
Start: 2019-07-22 | End: 2019-07-23

## 2019-07-22 RX ORDER — ASPIRIN 81 MG/1
81 TABLET ORAL DAILY
Status: DISCONTINUED | OUTPATIENT
Start: 2019-07-22 | End: 2019-07-23

## 2019-07-22 RX ORDER — TEMAZEPAM 7.5 MG/1
7.5 CAPSULE ORAL NIGHTLY PRN
Status: DISCONTINUED | OUTPATIENT
Start: 2019-07-22 | End: 2019-07-23

## 2019-07-22 RX ORDER — ERYTHROMYCIN 250 MG/1
1000 TABLET, COATED ORAL ONCE
Status: COMPLETED | OUTPATIENT
Start: 2019-07-22 | End: 2019-07-22

## 2019-07-22 RX ORDER — NITROGLYCERIN 0.4 MG/1
0.4 TABLET SUBLINGUAL
Status: DISCONTINUED | OUTPATIENT
Start: 2019-07-22 | End: 2019-07-23

## 2019-07-22 RX ORDER — NEOMYCIN SULFATE 500 MG/1
1000 TABLET ORAL ONCE
Status: COMPLETED | OUTPATIENT
Start: 2019-07-22 | End: 2019-07-22

## 2019-07-22 RX ORDER — NEBIVOLOL 10 MG/1
10 TABLET ORAL DAILY
Status: DISCONTINUED | OUTPATIENT
Start: 2019-07-23 | End: 2019-07-23

## 2019-07-22 RX ORDER — CEFAZOLIN SODIUM 2 G/100ML
2 INJECTION, SOLUTION INTRAVENOUS
Status: COMPLETED | OUTPATIENT
Start: 2019-07-23 | End: 2019-07-23

## 2019-07-22 RX ORDER — ONDANSETRON 2 MG/ML
4 INJECTION INTRAMUSCULAR; INTRAVENOUS EVERY 6 HOURS PRN
Status: DISCONTINUED | OUTPATIENT
Start: 2019-07-22 | End: 2019-07-23

## 2019-07-22 RX ORDER — IPRATROPIUM BROMIDE AND ALBUTEROL SULFATE 2.5; .5 MG/3ML; MG/3ML
3 SOLUTION RESPIRATORY (INHALATION)
Status: DISCONTINUED | OUTPATIENT
Start: 2019-07-22 | End: 2019-07-23

## 2019-07-22 RX ORDER — DIPHENHYDRAMINE HCL 25 MG
25 CAPSULE ORAL NIGHTLY PRN
Status: DISCONTINUED | OUTPATIENT
Start: 2019-07-22 | End: 2019-07-23

## 2019-07-22 RX ORDER — POLYETHYLENE GLYCOL 3350, SODIUM CHLORIDE, POTASSIUM CHLORIDE, SODIUM BICARBONATE, AND SODIUM SULFATE 240; 5.84; 2.98; 6.72; 22.72 G/4L; G/4L; G/4L; G/4L; G/4L
2000 POWDER, FOR SOLUTION ORAL ONCE
Status: COMPLETED | OUTPATIENT
Start: 2019-07-22 | End: 2019-07-22

## 2019-07-22 RX ORDER — CHLORHEXIDINE GLUCONATE 500 MG/1
1 CLOTH TOPICAL EVERY 12 HOURS
Status: COMPLETED | OUTPATIENT
Start: 2019-07-22 | End: 2019-07-23

## 2019-07-22 RX ORDER — CHLORHEXIDINE GLUCONATE 0.12 MG/ML
15 RINSE ORAL EVERY 12 HOURS SCHEDULED
Status: COMPLETED | OUTPATIENT
Start: 2019-07-22 | End: 2019-07-23

## 2019-07-22 RX ORDER — ATORVASTATIN CALCIUM 20 MG/1
20 TABLET, FILM COATED ORAL DAILY
Status: DISCONTINUED | OUTPATIENT
Start: 2019-07-22 | End: 2019-07-23

## 2019-07-22 RX ORDER — PANTOPRAZOLE SODIUM 40 MG/1
40 TABLET, DELAYED RELEASE ORAL EVERY MORNING
Status: DISCONTINUED | OUTPATIENT
Start: 2019-07-23 | End: 2019-07-23

## 2019-07-22 RX ADMIN — ERYTHROMYCIN 1000 MG: 250 TABLET, FILM COATED ORAL at 15:38

## 2019-07-22 RX ADMIN — OXYBUTYNIN CHLORIDE 10 MG: 10 TABLET, EXTENDED RELEASE ORAL at 17:50

## 2019-07-22 RX ADMIN — ERYTHROMYCIN 1000 MG: 250 TABLET, FILM COATED ORAL at 17:51

## 2019-07-22 RX ADMIN — SODIUM CHLORIDE, PRESERVATIVE FREE 3 ML: 5 INJECTION INTRAVENOUS at 21:52

## 2019-07-22 RX ADMIN — POLYETHYLENE GLYCOL-3350 AND ELECTROLYTES WITH FLAVOR PACK 2000 ML: 240; 5.84; 2.98; 6.72; 22.72 POWDER, FOR SOLUTION ORAL at 17:51

## 2019-07-22 RX ADMIN — CHLORHEXIDINE GLUCONATE 15 ML: 1.2 RINSE ORAL at 17:51

## 2019-07-22 RX ADMIN — PRAMIPEXOLE DIHYDROCHLORIDE 0.12 MG: 0.25 TABLET ORAL at 21:48

## 2019-07-22 RX ADMIN — MUPIROCIN 1 APPLICATION: 20 OINTMENT TOPICAL at 17:51

## 2019-07-22 RX ADMIN — ONDANSETRON 4 MG: 2 INJECTION INTRAMUSCULAR; INTRAVENOUS at 18:33

## 2019-07-22 RX ADMIN — ATORVASTATIN CALCIUM 20 MG: 20 TABLET, FILM COATED ORAL at 17:49

## 2019-07-22 RX ADMIN — ERYTHROMYCIN 1000 MG: 250 TABLET, FILM COATED ORAL at 23:11

## 2019-07-22 RX ADMIN — NEOMYCIN SULFATE 1000 MG: 500 TABLET ORAL at 23:11

## 2019-07-22 RX ADMIN — ASPIRIN 81 MG: 81 TABLET, COATED ORAL at 17:49

## 2019-07-22 RX ADMIN — NEOMYCIN SULFATE 1000 MG: 500 TABLET ORAL at 15:38

## 2019-07-22 RX ADMIN — NEOMYCIN SULFATE 1000 MG: 500 TABLET ORAL at 17:50

## 2019-07-22 RX ADMIN — CHLORHEXIDINE GLUCONATE 1 APPLICATION: 500 CLOTH TOPICAL at 23:45

## 2019-07-23 ENCOUNTER — APPOINTMENT (OUTPATIENT)
Dept: GENERAL RADIOLOGY | Facility: HOSPITAL | Age: 79
End: 2019-07-23

## 2019-07-23 ENCOUNTER — ANESTHESIA (OUTPATIENT)
Dept: PERIOP | Facility: HOSPITAL | Age: 79
End: 2019-07-23

## 2019-07-23 ENCOUNTER — ANESTHESIA EVENT (OUTPATIENT)
Dept: PERIOP | Facility: HOSPITAL | Age: 79
End: 2019-07-23

## 2019-07-23 ENCOUNTER — ANCILLARY PROCEDURE (OUTPATIENT)
Dept: PERIOP | Facility: HOSPITAL | Age: 79
End: 2019-07-23

## 2019-07-23 LAB
ACT BLD: 114 SECONDS (ref 82–152)
ACT BLD: 120 SECONDS (ref 82–152)
ACT BLD: 312 SECONDS (ref 82–152)
ACT BLD: 340 SECONDS (ref 82–152)
ACT BLD: 400 SECONDS (ref 82–152)
ACT BLD: 439 SECONDS (ref 82–152)
ACT BLD: 439 SECONDS (ref 82–152)
ACT BLD: 455 SECONDS (ref 82–152)
ALBUMIN SERPL-MCNC: 4.4 G/DL (ref 3.5–5.2)
ALBUMIN SERPL-MCNC: 4.9 G/DL (ref 3.5–5.2)
ANION GAP SERPL CALCULATED.3IONS-SCNC: 17.2 MMOL/L (ref 5–15)
ANION GAP SERPL CALCULATED.3IONS-SCNC: 18.7 MMOL/L (ref 5–15)
ANION GAP SERPL CALCULATED.3IONS-SCNC: 22.7 MMOL/L (ref 5–15)
APTT PPP: 31.1 SECONDS (ref 22.7–35.4)
APTT PPP: 42.2 SECONDS (ref 22.7–35.4)
ARTERIAL PATENCY WRIST A: ABNORMAL
ARTERIAL PATENCY WRIST A: ABNORMAL
ARTERIAL PATENCY WRIST A: POSITIVE
ATMOSPHERIC PRESS: 749.6 MMHG
ATMOSPHERIC PRESS: 750.3 MMHG
ATMOSPHERIC PRESS: 750.4 MMHG
BASE EXCESS BLDA CALC-SCNC: -1.6 MMOL/L (ref 0–2)
BASE EXCESS BLDA CALC-SCNC: -4 MMOL/L (ref -5–5)
BASE EXCESS BLDA CALC-SCNC: -4.7 MMOL/L (ref 0–2)
BASE EXCESS BLDA CALC-SCNC: -5 MMOL/L (ref -5–5)
BASE EXCESS BLDA CALC-SCNC: -7 MMOL/L (ref -5–5)
BASE EXCESS BLDA CALC-SCNC: -8 MMOL/L (ref -5–5)
BASE EXCESS BLDA CALC-SCNC: 1 MMOL/L (ref -5–5)
BASE EXCESS BLDA CALC-SCNC: 1 MMOL/L (ref -5–5)
BASE EXCESS BLDA CALC-SCNC: 1.9 MMOL/L (ref 0–2)
BASE EXCESS BLDA CALC-SCNC: 2 MMOL/L (ref -5–5)
BASE EXCESS BLDA CALC-SCNC: 6 MMOL/L (ref -5–5)
BASOPHILS # BLD AUTO: 0.01 10*3/MM3 (ref 0–0.2)
BASOPHILS # BLD AUTO: 0.02 10*3/MM3 (ref 0–0.2)
BASOPHILS NFR BLD AUTO: 0.1 % (ref 0–1.5)
BASOPHILS NFR BLD AUTO: 0.2 % (ref 0–1.5)
BDY SITE: ABNORMAL
BUN BLD-MCNC: 19 MG/DL (ref 8–23)
BUN BLD-MCNC: 21 MG/DL (ref 8–23)
BUN BLD-MCNC: 23 MG/DL (ref 8–23)
BUN/CREAT SERPL: 17.9 (ref 7–25)
BUN/CREAT SERPL: 21 (ref 7–25)
BUN/CREAT SERPL: 28.4 (ref 7–25)
CA-I BLD-MCNC: 3.7 MG/DL (ref 4.6–5.4)
CA-I SERPL ISE-MCNC: 0.93 MMOL/L (ref 1.15–1.35)
CALCIUM SPEC-SCNC: 6.6 MG/DL (ref 8.6–10.5)
CALCIUM SPEC-SCNC: 6.9 MG/DL (ref 8.6–10.5)
CALCIUM SPEC-SCNC: 8.2 MG/DL (ref 8.6–10.5)
CHLORIDE SERPL-SCNC: 97 MMOL/L (ref 98–107)
CHLORIDE SERPL-SCNC: 98 MMOL/L (ref 98–107)
CHLORIDE SERPL-SCNC: 99 MMOL/L (ref 98–107)
CO2 BLDA-SCNC: 19 MMOL/L (ref 24–29)
CO2 BLDA-SCNC: 21 MMOL/L (ref 24–29)
CO2 BLDA-SCNC: 21 MMOL/L (ref 24–29)
CO2 BLDA-SCNC: 23 MMOL/L (ref 24–29)
CO2 BLDA-SCNC: 28 MMOL/L (ref 24–29)
CO2 BLDA-SCNC: 29 MMOL/L (ref 24–29)
CO2 BLDA-SCNC: 30 MMOL/L (ref 24–29)
CO2 BLDA-SCNC: 31 MMOL/L (ref 24–29)
CO2 SERPL-SCNC: 21.8 MMOL/L (ref 22–29)
CO2 SERPL-SCNC: 22.3 MMOL/L (ref 22–29)
CO2 SERPL-SCNC: 25.3 MMOL/L (ref 22–29)
CREAT BLD-MCNC: 0.81 MG/DL (ref 0.57–1)
CREAT BLD-MCNC: 1 MG/DL (ref 0.57–1)
CREAT BLD-MCNC: 1.06 MG/DL (ref 0.57–1)
DEPRECATED RDW RBC AUTO: 46.4 FL (ref 37–54)
DEPRECATED RDW RBC AUTO: 49.5 FL (ref 37–54)
DEPRECATED RDW RBC AUTO: 50.3 FL (ref 37–54)
DEPRECATED RDW RBC AUTO: 51.8 FL (ref 37–54)
EOSINOPHIL # BLD AUTO: 0.02 10*3/MM3 (ref 0–0.4)
EOSINOPHIL # BLD AUTO: 0.04 10*3/MM3 (ref 0–0.4)
EOSINOPHIL NFR BLD AUTO: 0.2 % (ref 0.3–6.2)
EOSINOPHIL NFR BLD AUTO: 0.4 % (ref 0.3–6.2)
ERYTHROCYTE [DISTWIDTH] IN BLOOD BY AUTOMATED COUNT: 12.7 % (ref 12.3–15.4)
ERYTHROCYTE [DISTWIDTH] IN BLOOD BY AUTOMATED COUNT: 13.3 % (ref 12.3–15.4)
ERYTHROCYTE [DISTWIDTH] IN BLOOD BY AUTOMATED COUNT: 14.6 % (ref 12.3–15.4)
ERYTHROCYTE [DISTWIDTH] IN BLOOD BY AUTOMATED COUNT: 15.3 % (ref 12.3–15.4)
FIBRINOGEN PPP-MCNC: 250 MG/DL (ref 219–464)
FIBRINOGEN PPP-MCNC: 351 MG/DL (ref 219–464)
GFR SERPL CREATININE-BSD FRML MDRD: 50 ML/MIN/1.73
GFR SERPL CREATININE-BSD FRML MDRD: 54 ML/MIN/1.73
GFR SERPL CREATININE-BSD FRML MDRD: 68 ML/MIN/1.73
GLUCOSE BLD-MCNC: 191 MG/DL (ref 65–99)
GLUCOSE BLD-MCNC: 194 MG/DL (ref 65–99)
GLUCOSE BLD-MCNC: 88 MG/DL (ref 65–99)
GLUCOSE BLDC GLUCOMTR-MCNC: 102 MG/DL (ref 70–130)
GLUCOSE BLDC GLUCOMTR-MCNC: 105 MG/DL (ref 70–130)
GLUCOSE BLDC GLUCOMTR-MCNC: 107 MG/DL (ref 70–130)
GLUCOSE BLDC GLUCOMTR-MCNC: 115 MG/DL (ref 70–130)
GLUCOSE BLDC GLUCOMTR-MCNC: 126 MG/DL (ref 70–130)
GLUCOSE BLDC GLUCOMTR-MCNC: 133 MG/DL (ref 70–130)
GLUCOSE BLDC GLUCOMTR-MCNC: 150 MG/DL (ref 70–130)
GLUCOSE BLDC GLUCOMTR-MCNC: 160 MG/DL (ref 70–130)
GLUCOSE BLDC GLUCOMTR-MCNC: 183 MG/DL (ref 70–130)
GLUCOSE BLDC GLUCOMTR-MCNC: 183 MG/DL (ref 70–130)
GLUCOSE BLDC GLUCOMTR-MCNC: 185 MG/DL (ref 70–130)
GLUCOSE BLDC GLUCOMTR-MCNC: 186 MG/DL (ref 70–130)
GLUCOSE BLDC GLUCOMTR-MCNC: 191 MG/DL (ref 70–130)
GLUCOSE BLDC GLUCOMTR-MCNC: 200 MG/DL (ref 70–130)
GLUCOSE BLDC GLUCOMTR-MCNC: 77 MG/DL (ref 70–130)
GLUCOSE BLDC GLUCOMTR-MCNC: 84 MG/DL (ref 70–130)
GLUCOSE BLDC GLUCOMTR-MCNC: 96 MG/DL (ref 70–130)
HCO3 BLDA-SCNC: 18.3 MMOL/L (ref 22–26)
HCO3 BLDA-SCNC: 19.8 MMOL/L (ref 22–26)
HCO3 BLDA-SCNC: 20 MMOL/L (ref 22–26)
HCO3 BLDA-SCNC: 21.5 MMOL/L (ref 22–28)
HCO3 BLDA-SCNC: 21.8 MMOL/L (ref 22–26)
HCO3 BLDA-SCNC: 24.3 MMOL/L (ref 22–28)
HCO3 BLDA-SCNC: 26.8 MMOL/L (ref 22–26)
HCO3 BLDA-SCNC: 26.8 MMOL/L (ref 22–28)
HCO3 BLDA-SCNC: 27.3 MMOL/L (ref 22–26)
HCO3 BLDA-SCNC: 28 MMOL/L (ref 22–26)
HCO3 BLDA-SCNC: 29.9 MMOL/L (ref 22–26)
HCT VFR BLD AUTO: 20.5 % (ref 34–46.6)
HCT VFR BLD AUTO: 21.3 % (ref 34–46.6)
HCT VFR BLD AUTO: 34.9 % (ref 34–46.6)
HCT VFR BLD AUTO: 39.1 % (ref 34–46.6)
HCT VFR BLDA CALC: 19 % (ref 38–51)
HCT VFR BLDA CALC: 20 % (ref 38–51)
HCT VFR BLDA CALC: 21 % (ref 38–51)
HCT VFR BLDA CALC: 21 % (ref 38–51)
HCT VFR BLDA CALC: 23 % (ref 38–51)
HCT VFR BLDA CALC: 23 % (ref 38–51)
HCT VFR BLDA CALC: 26 % (ref 38–51)
HCT VFR BLDA CALC: 38 % (ref 38–51)
HGB BLD-MCNC: 11.3 G/DL (ref 12–15.9)
HGB BLD-MCNC: 12.9 G/DL (ref 12–15.9)
HGB BLD-MCNC: 6.6 G/DL (ref 12–15.9)
HGB BLD-MCNC: 6.7 G/DL (ref 12–15.9)
HGB BLDA-MCNC: 12.9 G/DL (ref 12–17)
HGB BLDA-MCNC: 6.5 G/DL (ref 12–17)
HGB BLDA-MCNC: 6.8 G/DL (ref 12–17)
HGB BLDA-MCNC: 7.1 G/DL (ref 12–17)
HGB BLDA-MCNC: 7.1 G/DL (ref 12–17)
HGB BLDA-MCNC: 7.8 G/DL (ref 12–17)
HGB BLDA-MCNC: 7.8 G/DL (ref 12–17)
HGB BLDA-MCNC: 8.8 G/DL (ref 12–17)
HOROWITZ INDEX BLD+IHG-RTO: 100 %
HOROWITZ INDEX BLD+IHG-RTO: 40 %
IMM GRANULOCYTES # BLD AUTO: 0.05 10*3/MM3 (ref 0–0.05)
IMM GRANULOCYTES # BLD AUTO: 0.07 10*3/MM3 (ref 0–0.05)
IMM GRANULOCYTES NFR BLD AUTO: 0.4 % (ref 0–0.5)
IMM GRANULOCYTES NFR BLD AUTO: 0.6 % (ref 0–0.5)
INR PPP: 1.61 (ref 0.9–1.1)
INR PPP: 2.07 (ref 0.9–1.1)
LYMPHOCYTES # BLD AUTO: 0.9 10*3/MM3 (ref 0.7–3.1)
LYMPHOCYTES # BLD AUTO: 1.66 10*3/MM3 (ref 0.7–3.1)
LYMPHOCYTES NFR BLD AUTO: 14.7 % (ref 19.6–45.3)
LYMPHOCYTES NFR BLD AUTO: 7.9 % (ref 19.6–45.3)
MAGNESIUM SERPL-MCNC: 2.7 MG/DL (ref 1.6–2.4)
MAGNESIUM SERPL-MCNC: 3.3 MG/DL (ref 1.6–2.4)
MCH RBC QN AUTO: 29.8 PG (ref 26.6–33)
MCH RBC QN AUTO: 30.8 PG (ref 26.6–33)
MCH RBC QN AUTO: 32.7 PG (ref 26.6–33)
MCH RBC QN AUTO: 32.7 PG (ref 26.6–33)
MCHC RBC AUTO-ENTMCNC: 31.5 G/DL (ref 31.5–35.7)
MCHC RBC AUTO-ENTMCNC: 32.2 G/DL (ref 31.5–35.7)
MCHC RBC AUTO-ENTMCNC: 32.4 G/DL (ref 31.5–35.7)
MCHC RBC AUTO-ENTMCNC: 33 G/DL (ref 31.5–35.7)
MCV RBC AUTO: 101.5 FL (ref 79–97)
MCV RBC AUTO: 103.9 FL (ref 79–97)
MCV RBC AUTO: 92.1 FL (ref 79–97)
MCV RBC AUTO: 93.3 FL (ref 79–97)
MODALITY: ABNORMAL
MONOCYTES # BLD AUTO: 0.23 10*3/MM3 (ref 0.1–0.9)
MONOCYTES # BLD AUTO: 0.29 10*3/MM3 (ref 0.1–0.9)
MONOCYTES NFR BLD AUTO: 2 % (ref 5–12)
MONOCYTES NFR BLD AUTO: 2.5 % (ref 5–12)
NEUTROPHILS # BLD AUTO: 10.11 10*3/MM3 (ref 1.7–7)
NEUTROPHILS # BLD AUTO: 9.29 10*3/MM3 (ref 1.7–7)
NEUTROPHILS NFR BLD AUTO: 82.2 % (ref 42.7–76)
NEUTROPHILS NFR BLD AUTO: 88.8 % (ref 42.7–76)
NRBC BLD AUTO-RTO: 0 /100 WBC (ref 0–0.2)
NRBC BLD AUTO-RTO: 0.1 /100 WBC (ref 0–0.2)
O2 A-A PPRESDIFF RESPIRATORY: 0.5 MMHG
O2 A-A PPRESDIFF RESPIRATORY: 0.7 MMHG
PCO2 BLDA: 28.9 MM HG (ref 35–45)
PCO2 BLDA: 37.4 MM HG (ref 35–45)
PCO2 BLDA: 42.1 MM HG (ref 35–45)
PCO2 BLDA: 42.4 MM HG (ref 35–45)
PCO2 BLDA: 43.5 MM HG (ref 35–45)
PCO2 BLDA: 44.5 MM HG (ref 35–45)
PCO2 BLDA: 44.8 MM HG (ref 35–45)
PCO2 BLDA: 46.6 MM HG (ref 35–45)
PCO2 BLDA: 47.6 MM HG (ref 35–45)
PCO2 BLDA: 54 MM HG (ref 35–45)
PCO2 BLDA: 54.7 MM HG (ref 35–45)
PEEP RESPIRATORY: 5 CM[H2O]
PEEP RESPIRATORY: 5 CM[H2O]
PH BLDA: 7.25 PH UNITS (ref 7.35–7.6)
PH BLDA: 7.28 PH UNITS (ref 7.35–7.6)
PH BLDA: 7.3 PH UNITS (ref 7.35–7.6)
PH BLDA: 7.31 PH UNITS (ref 7.35–7.45)
PH BLDA: 7.31 PH UNITS (ref 7.35–7.6)
PH BLDA: 7.32 PH UNITS (ref 7.35–7.6)
PH BLDA: 7.34 PH UNITS (ref 7.35–7.45)
PH BLDA: 7.36 PH UNITS (ref 7.35–7.6)
PH BLDA: 7.41 PH UNITS (ref 7.35–7.45)
PH BLDA: 7.44 PH UNITS (ref 7.35–7.6)
PH BLDA: 7.45 PH UNITS (ref 7.35–7.6)
PHOSPHATE SERPL-MCNC: 0.9 MG/DL (ref 2.5–4.5)
PHOSPHATE SERPL-MCNC: 2.2 MG/DL (ref 2.5–4.5)
PLATELET # BLD AUTO: 267 10*3/MM3 (ref 140–450)
PLATELET # BLD AUTO: 309 10*3/MM3 (ref 140–450)
PLATELET # BLD AUTO: 313 10*3/MM3 (ref 140–450)
PLATELET # BLD AUTO: 327 10*3/MM3 (ref 140–450)
PMV BLD AUTO: 10.1 FL (ref 6–12)
PMV BLD AUTO: 9.5 FL (ref 6–12)
PMV BLD AUTO: 9.6 FL (ref 6–12)
PMV BLD AUTO: 9.7 FL (ref 6–12)
PO2 BLDA: 122.6 MM HG (ref 80–100)
PO2 BLDA: 265 MMHG (ref 80–105)
PO2 BLDA: 383 MMHG (ref 80–105)
PO2 BLDA: 393 MMHG (ref 80–105)
PO2 BLDA: 428 MMHG (ref 80–105)
PO2 BLDA: 462.3 MM HG (ref 80–100)
PO2 BLDA: 489 MMHG (ref 80–105)
PO2 BLDA: 506 MMHG (ref 80–105)
PO2 BLDA: 574 MMHG (ref 80–105)
PO2 BLDA: 60 MMHG (ref 80–105)
PO2 BLDA: 70.7 MM HG (ref 80–100)
POTASSIUM BLD-SCNC: 2.8 MMOL/L (ref 3.5–5.2)
POTASSIUM BLD-SCNC: 3.5 MMOL/L (ref 3.5–5.2)
POTASSIUM BLD-SCNC: 4 MMOL/L (ref 3.5–5.2)
POTASSIUM BLDA-SCNC: 3.1 MMOL/L (ref 3.5–4.9)
POTASSIUM BLDA-SCNC: 3.5 MMOL/L (ref 3.5–4.9)
POTASSIUM BLDA-SCNC: 3.7 MMOL/L (ref 3.5–4.9)
POTASSIUM BLDA-SCNC: 3.8 MMOL/L (ref 3.5–4.9)
POTASSIUM BLDA-SCNC: 4.2 MMOL/L (ref 3.5–4.9)
POTASSIUM BLDA-SCNC: 4.2 MMOL/L (ref 3.5–4.9)
POTASSIUM BLDA-SCNC: 4.4 MMOL/L (ref 3.5–4.9)
POTASSIUM BLDA-SCNC: 4.6 MMOL/L (ref 3.5–4.9)
PROTHROMBIN TIME: 18.9 SECONDS (ref 11.7–14.2)
PROTHROMBIN TIME: 23 SECONDS (ref 11.7–14.2)
RBC # BLD AUTO: 2.02 10*6/MM3 (ref 3.77–5.28)
RBC # BLD AUTO: 2.05 10*6/MM3 (ref 3.77–5.28)
RBC # BLD AUTO: 3.79 10*6/MM3 (ref 3.77–5.28)
RBC # BLD AUTO: 4.19 10*6/MM3 (ref 3.77–5.28)
SAO2 % BLDA: 100 % (ref 95–98)
SAO2 % BLDA: 86 % (ref 95–98)
SAO2 % BLDCOA: 100 % (ref 92–99)
SAO2 % BLDCOA: 92.3 % (ref 92–99)
SAO2 % BLDCOA: 98.8 % (ref 92–99)
SET MECH RESP RATE: 18
SET MECH RESP RATE: 20
SET MECH RESP RATE: 20
SODIUM BLD-SCNC: 137 MMOL/L (ref 136–145)
SODIUM BLD-SCNC: 141 MMOL/L (ref 136–145)
SODIUM BLD-SCNC: 144 MMOL/L (ref 136–145)
TOTAL RATE: 18 BREATHS/MINUTE
TOTAL RATE: 20 BREATHS/MINUTE
TOTAL RATE: 20 BREATHS/MINUTE
VENTILATOR MODE: ABNORMAL
VENTILATOR MODE: AC
VT ON VENT VENT: 380 ML
VT ON VENT VENT: 380 ML
WBC NRBC COR # BLD: 11.01 10*3/MM3 (ref 3.4–10.8)
WBC NRBC COR # BLD: 11.3 10*3/MM3 (ref 3.4–10.8)
WBC NRBC COR # BLD: 11.39 10*3/MM3 (ref 3.4–10.8)
WBC NRBC COR # BLD: 11.46 10*3/MM3 (ref 3.4–10.8)

## 2019-07-23 PROCEDURE — 25010000002 MAGNESIUM SULFATE IN D5W 1G/100ML (PREMIX) 1-5 GM/100ML-% SOLUTION: Performed by: THORACIC SURGERY (CARDIOTHORACIC VASCULAR SURGERY)

## 2019-07-23 PROCEDURE — 33877 THORACOABDOMINAL GRAFT: CPT | Performed by: PHYSICIAN ASSISTANT

## 2019-07-23 PROCEDURE — 25010000002 METHYLPREDNISOLONE PER 125 MG: Performed by: ANESTHESIOLOGY

## 2019-07-23 PROCEDURE — C1751 CATH, INF, PER/CENT/MIDLINE: HCPCS | Performed by: ANESTHESIOLOGY

## 2019-07-23 PROCEDURE — 82803 BLOOD GASES ANY COMBINATION: CPT

## 2019-07-23 PROCEDURE — 04R00JZ REPLACEMENT OF ABDOMINAL AORTA WITH SYNTHETIC SUBSTITUTE, OPEN APPROACH: ICD-10-PCS | Performed by: THORACIC SURGERY (CARDIOTHORACIC VASCULAR SURGERY)

## 2019-07-23 PROCEDURE — P9041 ALBUMIN (HUMAN),5%, 50ML: HCPCS | Performed by: THORACIC SURGERY (CARDIOTHORACIC VASCULAR SURGERY)

## 2019-07-23 PROCEDURE — 85025 COMPLETE CBC W/AUTO DIFF WBC: CPT | Performed by: THORACIC SURGERY (CARDIOTHORACIC VASCULAR SURGERY)

## 2019-07-23 PROCEDURE — P9047 ALBUMIN (HUMAN), 25%, 50ML: HCPCS

## 2019-07-23 PROCEDURE — 25010000002 PHENYLEPHRINE PER 1 ML: Performed by: ANESTHESIOLOGY

## 2019-07-23 PROCEDURE — 85384 FIBRINOGEN ACTIVITY: CPT | Performed by: THORACIC SURGERY (CARDIOTHORACIC VASCULAR SURGERY)

## 2019-07-23 PROCEDURE — 85018 HEMOGLOBIN: CPT

## 2019-07-23 PROCEDURE — 25010000002 METOCLOPRAMIDE PER 10 MG: Performed by: THORACIC SURGERY (CARDIOTHORACIC VASCULAR SURGERY)

## 2019-07-23 PROCEDURE — 93005 ELECTROCARDIOGRAM TRACING: CPT | Performed by: THORACIC SURGERY (CARDIOTHORACIC VASCULAR SURGERY)

## 2019-07-23 PROCEDURE — 25010000002 ALBUMIN HUMAN 25% PER 50 ML

## 2019-07-23 PROCEDURE — P9012 CRYOPRECIPITATE EACH UNIT: HCPCS

## 2019-07-23 PROCEDURE — 85027 COMPLETE CBC AUTOMATED: CPT | Performed by: THORACIC SURGERY (CARDIOTHORACIC VASCULAR SURGERY)

## 2019-07-23 PROCEDURE — 25010000002 ONDANSETRON PER 1 MG: Performed by: ANESTHESIOLOGY

## 2019-07-23 PROCEDURE — 4A11X4G MONITORING OF PERIPHERAL NERVOUS ELECTRICAL ACTIVITY, INTRAOPERATIVE, EXTERNAL APPROACH: ICD-10-PCS | Performed by: THORACIC SURGERY (CARDIOTHORACIC VASCULAR SURGERY)

## 2019-07-23 PROCEDURE — 94002 VENT MGMT INPAT INIT DAY: CPT

## 2019-07-23 PROCEDURE — 5A1221Z PERFORMANCE OF CARDIAC OUTPUT, CONTINUOUS: ICD-10-PCS | Performed by: THORACIC SURGERY (CARDIOTHORACIC VASCULAR SURGERY)

## 2019-07-23 PROCEDURE — 36430 TRANSFUSION BLD/BLD COMPNT: CPT

## 2019-07-23 PROCEDURE — 86927 PLASMA FRESH FROZEN: CPT

## 2019-07-23 PROCEDURE — 94799 UNLISTED PULMONARY SVC/PX: CPT

## 2019-07-23 PROCEDURE — 25010000002 SUCCINYLCHOLINE PER 20 MG: Performed by: ANESTHESIOLOGY

## 2019-07-23 PROCEDURE — 25010000002 HEPARIN (PORCINE) PER 1000 UNITS: Performed by: ANESTHESIOLOGY

## 2019-07-23 PROCEDURE — 25010000002 FOSPHENYTOIN 500 MG PE/10ML SOLUTION 10 ML VIAL: Performed by: ANESTHESIOLOGY

## 2019-07-23 PROCEDURE — 25010000002 FENTANYL CITRATE (PF) 100 MCG/2ML SOLUTION: Performed by: ANESTHESIOLOGY

## 2019-07-23 PROCEDURE — 25010000003 POTASSIUM CHLORIDE PER 2 MEQ: Performed by: THORACIC SURGERY (CARDIOTHORACIC VASCULAR SURGERY)

## 2019-07-23 PROCEDURE — 82947 ASSAY GLUCOSE BLOOD QUANT: CPT

## 2019-07-23 PROCEDURE — 25010000002 FENTANYL CITRATE (PF) 100 MCG/2ML SOLUTION: Performed by: THORACIC SURGERY (CARDIOTHORACIC VASCULAR SURGERY)

## 2019-07-23 PROCEDURE — 25010000002 EPINEPHRINE PER 0.1 MG: Performed by: ANESTHESIOLOGY

## 2019-07-23 PROCEDURE — 82962 GLUCOSE BLOOD TEST: CPT

## 2019-07-23 PROCEDURE — 49560 PR REPAIR INCISIONAL HERNIA,REDUCIBLE: CPT | Performed by: THORACIC SURGERY (CARDIOTHORACIC VASCULAR SURGERY)

## 2019-07-23 PROCEDURE — 02RW0JZ REPLACEMENT OF THORACIC AORTA, DESCENDING WITH SYNTHETIC SUBSTITUTE, OPEN APPROACH: ICD-10-PCS | Performed by: THORACIC SURGERY (CARDIOTHORACIC VASCULAR SURGERY)

## 2019-07-23 PROCEDURE — 85347 COAGULATION TIME ACTIVATED: CPT

## 2019-07-23 PROCEDURE — C1768 GRAFT, VASCULAR: HCPCS | Performed by: THORACIC SURGERY (CARDIOTHORACIC VASCULAR SURGERY)

## 2019-07-23 PROCEDURE — 83735 ASSAY OF MAGNESIUM: CPT | Performed by: THORACIC SURGERY (CARDIOTHORACIC VASCULAR SURGERY)

## 2019-07-23 PROCEDURE — 25010000002 PROPOFOL 1000 MG/ML EMULSION: Performed by: THORACIC SURGERY (CARDIOTHORACIC VASCULAR SURGERY)

## 2019-07-23 PROCEDURE — 49568 PR IMPLANT MESH HERNIA REPAIR/DEBRIDEMENT CLOSURE: CPT | Performed by: PHYSICIAN ASSISTANT

## 2019-07-23 PROCEDURE — 25010000002 PROTAMINE SULFATE PER 10 MG: Performed by: ANESTHESIOLOGY

## 2019-07-23 PROCEDURE — 63710000001 INSULIN REGULAR HUMAN PER 5 UNITS: Performed by: ANESTHESIOLOGY

## 2019-07-23 PROCEDURE — 85730 THROMBOPLASTIN TIME PARTIAL: CPT | Performed by: THORACIC SURGERY (CARDIOTHORACIC VASCULAR SURGERY)

## 2019-07-23 PROCEDURE — C1729 CATH, DRAINAGE: HCPCS | Performed by: THORACIC SURGERY (CARDIOTHORACIC VASCULAR SURGERY)

## 2019-07-23 PROCEDURE — 93010 ELECTROCARDIOGRAM REPORT: CPT | Performed by: INTERNAL MEDICINE

## 2019-07-23 PROCEDURE — 25010000002 NEOSTIGMINE 0.5 MG/ML SOLUTION: Performed by: ANESTHESIOLOGY

## 2019-07-23 PROCEDURE — 36600 WITHDRAWAL OF ARTERIAL BLOOD: CPT

## 2019-07-23 PROCEDURE — 25010000002 FUROSEMIDE PER 20 MG

## 2019-07-23 PROCEDURE — 25010000002 HEPARIN (PORCINE) PER 1000 UNITS

## 2019-07-23 PROCEDURE — 80069 RENAL FUNCTION PANEL: CPT | Performed by: THORACIC SURGERY (CARDIOTHORACIC VASCULAR SURGERY)

## 2019-07-23 PROCEDURE — 25010000002 ALBUMIN HUMAN 5% PER 50 ML: Performed by: THORACIC SURGERY (CARDIOTHORACIC VASCULAR SURGERY)

## 2019-07-23 PROCEDURE — 85610 PROTHROMBIN TIME: CPT | Performed by: THORACIC SURGERY (CARDIOTHORACIC VASCULAR SURGERY)

## 2019-07-23 PROCEDURE — 80048 BASIC METABOLIC PNL TOTAL CA: CPT | Performed by: THORACIC SURGERY (CARDIOTHORACIC VASCULAR SURGERY)

## 2019-07-23 PROCEDURE — P9035 PLATELET PHERES LEUKOREDUCED: HCPCS

## 2019-07-23 PROCEDURE — 33877 THORACOABDOMINAL GRAFT: CPT | Performed by: THORACIC SURGERY (CARDIOTHORACIC VASCULAR SURGERY)

## 2019-07-23 PROCEDURE — 93005 ELECTROCARDIOGRAM TRACING: CPT | Performed by: INTERNAL MEDICINE

## 2019-07-23 PROCEDURE — 25010000002 ONDANSETRON PER 1 MG: Performed by: THORACIC SURGERY (CARDIOTHORACIC VASCULAR SURGERY)

## 2019-07-23 PROCEDURE — C1781 MESH (IMPLANTABLE): HCPCS | Performed by: THORACIC SURGERY (CARDIOTHORACIC VASCULAR SURGERY)

## 2019-07-23 PROCEDURE — 25010000002 PROPOFOL 10 MG/ML EMULSION: Performed by: ANESTHESIOLOGY

## 2019-07-23 PROCEDURE — 49560 PR REPAIR INCISIONAL HERNIA,REDUCIBLE: CPT | Performed by: PHYSICIAN ASSISTANT

## 2019-07-23 PROCEDURE — 25010000002 VANCOMYCIN 1 G RECONSTITUTED SOLUTION: Performed by: THORACIC SURGERY (CARDIOTHORACIC VASCULAR SURGERY)

## 2019-07-23 PROCEDURE — 25010000002 MIDAZOLAM PER 1 MG: Performed by: ANESTHESIOLOGY

## 2019-07-23 PROCEDURE — 93318 ECHO TRANSESOPHAGEAL INTRAOP: CPT

## 2019-07-23 PROCEDURE — P9016 RBC LEUKOCYTES REDUCED: HCPCS

## 2019-07-23 PROCEDURE — 88304 TISSUE EXAM BY PATHOLOGIST: CPT | Performed by: THORACIC SURGERY (CARDIOTHORACIC VASCULAR SURGERY)

## 2019-07-23 PROCEDURE — 49568 PR IMPLANT MESH HERNIA REPAIR/DEBRIDEMENT CLOSURE: CPT | Performed by: THORACIC SURGERY (CARDIOTHORACIC VASCULAR SURGERY)

## 2019-07-23 PROCEDURE — 25010000002 MAGNESIUM SULFATE PER 500 MG OF MAGNESIUM: Performed by: ANESTHESIOLOGY

## 2019-07-23 PROCEDURE — 25010000003 POTASSIUM CHLORIDE PER 2 MEQ: Performed by: ANESTHESIOLOGY

## 2019-07-23 PROCEDURE — 25010000003 CEFAZOLIN IN DEXTROSE 2-4 GM/100ML-% SOLUTION: Performed by: THORACIC SURGERY (CARDIOTHORACIC VASCULAR SURGERY)

## 2019-07-23 PROCEDURE — 82330 ASSAY OF CALCIUM: CPT | Performed by: THORACIC SURGERY (CARDIOTHORACIC VASCULAR SURGERY)

## 2019-07-23 PROCEDURE — 71045 X-RAY EXAM CHEST 1 VIEW: CPT

## 2019-07-23 PROCEDURE — 85014 HEMATOCRIT: CPT

## 2019-07-23 PROCEDURE — 86900 BLOOD TYPING SEROLOGIC ABO: CPT

## 2019-07-23 DEVICE — GELWEAVE GELATIN IMPREGNATED WOVEN VASCULAR PROSTHESIS STRAIGHT
Type: IMPLANTABLE DEVICE | Site: THORACIC | Status: FUNCTIONAL
Brand: GELWEAVE™

## 2019-07-23 DEVICE — CLIP LIGAT VASC HORIZON TI LG ORNG 6CT: Type: IMPLANTABLE DEVICE | Site: THORACIC | Status: FUNCTIONAL

## 2019-07-23 DEVICE — IMPLANTABLE DEVICE: Type: IMPLANTABLE DEVICE | Site: ABDOMEN | Status: FUNCTIONAL

## 2019-07-23 RX ORDER — MAGNESIUM HYDROXIDE 1200 MG/15ML
LIQUID ORAL AS NEEDED
Status: DISCONTINUED | OUTPATIENT
Start: 2019-07-23 | End: 2019-07-23 | Stop reason: HOSPADM

## 2019-07-23 RX ORDER — ACETAMINOPHEN 650 MG/1
650 SUPPOSITORY RECTAL EVERY 4 HOURS
Status: ACTIVE | OUTPATIENT
Start: 2019-07-23 | End: 2019-07-24

## 2019-07-23 RX ORDER — ONDANSETRON 2 MG/ML
4 INJECTION INTRAMUSCULAR; INTRAVENOUS EVERY 6 HOURS PRN
Status: DISCONTINUED | OUTPATIENT
Start: 2019-07-23 | End: 2019-08-02 | Stop reason: HOSPADM

## 2019-07-23 RX ORDER — VANCOMYCIN HYDROCHLORIDE 1 G/20ML
INJECTION, POWDER, LYOPHILIZED, FOR SOLUTION INTRAVENOUS AS NEEDED
Status: DISCONTINUED | OUTPATIENT
Start: 2019-07-23 | End: 2019-07-23 | Stop reason: HOSPADM

## 2019-07-23 RX ORDER — ACETAMINOPHEN 325 MG/1
650 TABLET ORAL EVERY 4 HOURS
Status: ACTIVE | OUTPATIENT
Start: 2019-07-23 | End: 2019-07-24

## 2019-07-23 RX ORDER — SODIUM CHLORIDE 0.9 % (FLUSH) 0.9 %
30 SYRINGE (ML) INJECTION ONCE AS NEEDED
Status: DISCONTINUED | OUTPATIENT
Start: 2019-07-23 | End: 2019-08-02 | Stop reason: HOSPADM

## 2019-07-23 RX ORDER — ROCURONIUM BROMIDE 10 MG/ML
INJECTION, SOLUTION INTRAVENOUS AS NEEDED
Status: DISCONTINUED | OUTPATIENT
Start: 2019-07-23 | End: 2019-07-23 | Stop reason: SURG

## 2019-07-23 RX ORDER — CHLORHEXIDINE GLUCONATE 0.12 MG/ML
15 RINSE ORAL EVERY 12 HOURS
Status: DISCONTINUED | OUTPATIENT
Start: 2019-07-23 | End: 2019-07-29

## 2019-07-23 RX ORDER — POTASSIUM CHLORIDE 1.5 G/1.77G
40 POWDER, FOR SOLUTION ORAL AS NEEDED
Status: DISCONTINUED | OUTPATIENT
Start: 2019-07-23 | End: 2019-07-25

## 2019-07-23 RX ORDER — ONDANSETRON 2 MG/ML
INJECTION INTRAMUSCULAR; INTRAVENOUS AS NEEDED
Status: DISCONTINUED | OUTPATIENT
Start: 2019-07-23 | End: 2019-07-23 | Stop reason: SURG

## 2019-07-23 RX ORDER — OXYCODONE HYDROCHLORIDE 5 MG/1
10 TABLET ORAL EVERY 4 HOURS PRN
Status: DISCONTINUED | OUTPATIENT
Start: 2019-07-23 | End: 2019-07-25

## 2019-07-23 RX ORDER — PROMETHAZINE HYDROCHLORIDE 25 MG/1
12.5 TABLET ORAL EVERY 6 HOURS PRN
Status: DISCONTINUED | OUTPATIENT
Start: 2019-07-23 | End: 2019-07-25

## 2019-07-23 RX ORDER — SUCCINYLCHOLINE CHLORIDE 20 MG/ML
INJECTION INTRAMUSCULAR; INTRAVENOUS AS NEEDED
Status: DISCONTINUED | OUTPATIENT
Start: 2019-07-23 | End: 2019-07-23 | Stop reason: SURG

## 2019-07-23 RX ORDER — ALPRAZOLAM 0.25 MG/1
0.25 TABLET ORAL EVERY 8 HOURS PRN
Status: DISCONTINUED | OUTPATIENT
Start: 2019-07-23 | End: 2019-07-25

## 2019-07-23 RX ORDER — GLYCOPYRROLATE 0.2 MG/ML
INJECTION INTRAMUSCULAR; INTRAVENOUS AS NEEDED
Status: DISCONTINUED | OUTPATIENT
Start: 2019-07-23 | End: 2019-07-23 | Stop reason: SURG

## 2019-07-23 RX ORDER — NALOXONE HCL 0.4 MG/ML
0.4 VIAL (ML) INJECTION
Status: DISCONTINUED | OUTPATIENT
Start: 2019-07-23 | End: 2019-08-02 | Stop reason: HOSPADM

## 2019-07-23 RX ORDER — BISACODYL 10 MG
10 SUPPOSITORY, RECTAL RECTAL DAILY PRN
Status: DISCONTINUED | OUTPATIENT
Start: 2019-07-24 | End: 2019-08-02 | Stop reason: HOSPADM

## 2019-07-23 RX ORDER — SODIUM CHLORIDE 9 MG/ML
30 INJECTION, SOLUTION INTRAVENOUS CONTINUOUS PRN
Status: DISCONTINUED | OUTPATIENT
Start: 2019-07-23 | End: 2019-07-25

## 2019-07-23 RX ORDER — PROMETHAZINE HYDROCHLORIDE 25 MG/ML
12.5 INJECTION, SOLUTION INTRAMUSCULAR; INTRAVENOUS EVERY 6 HOURS PRN
Status: DISCONTINUED | OUTPATIENT
Start: 2019-07-23 | End: 2019-07-25

## 2019-07-23 RX ORDER — NOREPINEPHRINE BIT/0.9 % NACL 8 MG/250ML
.02-.3 INFUSION BOTTLE (ML) INTRAVENOUS CONTINUOUS PRN
Status: DISCONTINUED | OUTPATIENT
Start: 2019-07-23 | End: 2019-07-25

## 2019-07-23 RX ORDER — POTASSIUM CHLORIDE 7.45 MG/ML
10 INJECTION INTRAVENOUS
Status: DISCONTINUED | OUTPATIENT
Start: 2019-07-23 | End: 2019-07-25

## 2019-07-23 RX ORDER — ACETAMINOPHEN 160 MG/5ML
650 SOLUTION ORAL EVERY 4 HOURS
Status: DISPENSED | OUTPATIENT
Start: 2019-07-23 | End: 2019-07-24

## 2019-07-23 RX ORDER — ALBUMIN, HUMAN INJ 5% 5 %
1500 SOLUTION INTRAVENOUS AS NEEDED
Status: DISPENSED | OUTPATIENT
Start: 2019-07-23 | End: 2019-07-24

## 2019-07-23 RX ORDER — FENTANYL CITRATE 50 UG/ML
INJECTION, SOLUTION INTRAMUSCULAR; INTRAVENOUS AS NEEDED
Status: DISCONTINUED | OUTPATIENT
Start: 2019-07-23 | End: 2019-07-23 | Stop reason: SURG

## 2019-07-23 RX ORDER — POTASSIUM CHLORIDE 29.8 MG/ML
20 INJECTION INTRAVENOUS
Status: COMPLETED | OUTPATIENT
Start: 2019-07-23 | End: 2019-07-23

## 2019-07-23 RX ORDER — PROTAMINE SULFATE 10 MG/ML
INJECTION, SOLUTION INTRAVENOUS AS NEEDED
Status: DISCONTINUED | OUTPATIENT
Start: 2019-07-23 | End: 2019-07-23 | Stop reason: SURG

## 2019-07-23 RX ORDER — AMINOCAPROIC ACID 250 MG/ML
INJECTION, SOLUTION INTRAVENOUS AS NEEDED
Status: DISCONTINUED | OUTPATIENT
Start: 2019-07-23 | End: 2019-07-23 | Stop reason: SURG

## 2019-07-23 RX ORDER — POTASSIUM CHLORIDE 29.8 MG/ML
INJECTION INTRAVENOUS CONTINUOUS PRN
Status: DISCONTINUED | OUTPATIENT
Start: 2019-07-23 | End: 2019-07-23 | Stop reason: SURG

## 2019-07-23 RX ORDER — CEFAZOLIN SODIUM 2 G/100ML
2 INJECTION, SOLUTION INTRAVENOUS EVERY 8 HOURS
Status: COMPLETED | OUTPATIENT
Start: 2019-07-23 | End: 2019-07-25

## 2019-07-23 RX ORDER — MAGNESIUM SULFATE HEPTAHYDRATE 500 MG/ML
INJECTION, SOLUTION INTRAMUSCULAR; INTRAVENOUS AS NEEDED
Status: DISCONTINUED | OUTPATIENT
Start: 2019-07-23 | End: 2019-07-23 | Stop reason: SURG

## 2019-07-23 RX ORDER — CYCLOBENZAPRINE HCL 10 MG
10 TABLET ORAL EVERY 8 HOURS PRN
Status: DISCONTINUED | OUTPATIENT
Start: 2019-07-24 | End: 2019-07-25

## 2019-07-23 RX ORDER — SODIUM CHLORIDE 9 MG/ML
30 INJECTION, SOLUTION INTRAVENOUS CONTINUOUS
Status: DISCONTINUED | OUTPATIENT
Start: 2019-07-23 | End: 2019-07-25

## 2019-07-23 RX ORDER — POTASSIUM CHLORIDE 750 MG/1
40 CAPSULE, EXTENDED RELEASE ORAL AS NEEDED
Status: DISCONTINUED | OUTPATIENT
Start: 2019-07-23 | End: 2019-07-25

## 2019-07-23 RX ORDER — METOCLOPRAMIDE HYDROCHLORIDE 5 MG/ML
10 INJECTION INTRAMUSCULAR; INTRAVENOUS EVERY 6 HOURS
Status: COMPLETED | OUTPATIENT
Start: 2019-07-23 | End: 2019-07-24

## 2019-07-23 RX ORDER — PROTAMINE SULFATE 10 MG/ML
INJECTION, SOLUTION INTRAVENOUS AS NEEDED
Status: DISCONTINUED | OUTPATIENT
Start: 2019-07-23 | End: 2019-07-23

## 2019-07-23 RX ORDER — HEPARIN SODIUM 1000 [USP'U]/ML
INJECTION, SOLUTION INTRAVENOUS; SUBCUTANEOUS AS NEEDED
Status: DISCONTINUED | OUTPATIENT
Start: 2019-07-23 | End: 2019-07-23 | Stop reason: SURG

## 2019-07-23 RX ORDER — PROPOFOL 10 MG/ML
VIAL (ML) INTRAVENOUS AS NEEDED
Status: DISCONTINUED | OUTPATIENT
Start: 2019-07-23 | End: 2019-07-23 | Stop reason: SURG

## 2019-07-23 RX ORDER — ASPIRIN 81 MG/1
81 TABLET ORAL DAILY
Status: DISCONTINUED | OUTPATIENT
Start: 2019-07-24 | End: 2019-07-25

## 2019-07-23 RX ORDER — MIDAZOLAM HYDROCHLORIDE 1 MG/ML
INJECTION INTRAMUSCULAR; INTRAVENOUS AS NEEDED
Status: DISCONTINUED | OUTPATIENT
Start: 2019-07-23 | End: 2019-07-23 | Stop reason: SURG

## 2019-07-23 RX ORDER — FENTANYL CITRATE 50 UG/ML
50 INJECTION, SOLUTION INTRAMUSCULAR; INTRAVENOUS
Status: DISCONTINUED | OUTPATIENT
Start: 2019-07-23 | End: 2019-07-25

## 2019-07-23 RX ORDER — MIDAZOLAM HYDROCHLORIDE 1 MG/ML
2 INJECTION INTRAMUSCULAR; INTRAVENOUS
Status: DISCONTINUED | OUTPATIENT
Start: 2019-07-23 | End: 2019-07-25

## 2019-07-23 RX ORDER — FENTANYL CITRATE 50 UG/ML
25 INJECTION, SOLUTION INTRAMUSCULAR; INTRAVENOUS
Status: DISCONTINUED | OUTPATIENT
Start: 2019-07-23 | End: 2019-07-25

## 2019-07-23 RX ORDER — POTASSIUM CHLORIDE 29.8 MG/ML
20 INJECTION INTRAVENOUS
Status: DISCONTINUED | OUTPATIENT
Start: 2019-07-23 | End: 2019-07-25

## 2019-07-23 RX ORDER — NICARDIPINE HYDROCHLORIDE 2.5 MG/ML
INJECTION INTRAVENOUS AS NEEDED
Status: DISCONTINUED | OUTPATIENT
Start: 2019-07-23 | End: 2019-07-23 | Stop reason: SURG

## 2019-07-23 RX ORDER — ACETAMINOPHEN 325 MG/1
650 TABLET ORAL EVERY 4 HOURS PRN
Status: DISCONTINUED | OUTPATIENT
Start: 2019-07-24 | End: 2019-07-27

## 2019-07-23 RX ORDER — METHYLPREDNISOLONE SODIUM SUCCINATE 125 MG/2ML
INJECTION, POWDER, LYOPHILIZED, FOR SOLUTION INTRAMUSCULAR; INTRAVENOUS AS NEEDED
Status: DISCONTINUED | OUTPATIENT
Start: 2019-07-23 | End: 2019-07-23 | Stop reason: SURG

## 2019-07-23 RX ORDER — KETAMINE HYDROCHLORIDE 10 MG/ML
INJECTION INTRAMUSCULAR; INTRAVENOUS AS NEEDED
Status: DISCONTINUED | OUTPATIENT
Start: 2019-07-23 | End: 2019-07-23 | Stop reason: SURG

## 2019-07-23 RX ORDER — FUROSEMIDE 10 MG/ML
40 INJECTION INTRAMUSCULAR; INTRAVENOUS EVERY 6 HOURS PRN
Status: DISCONTINUED | OUTPATIENT
Start: 2019-07-23 | End: 2019-07-25

## 2019-07-23 RX ORDER — MAGNESIUM SULFATE 1 G/100ML
1 INJECTION INTRAVENOUS EVERY 8 HOURS
Status: DISPENSED | OUTPATIENT
Start: 2019-07-23 | End: 2019-07-24

## 2019-07-23 RX ORDER — ATORVASTATIN CALCIUM 20 MG/1
40 TABLET, FILM COATED ORAL NIGHTLY
Status: DISCONTINUED | OUTPATIENT
Start: 2019-07-23 | End: 2019-07-25

## 2019-07-23 RX ORDER — NOREPINEPHRINE BITARTRATE 1 MG/ML
INJECTION, SOLUTION INTRAVENOUS CONTINUOUS PRN
Status: DISCONTINUED | OUTPATIENT
Start: 2019-07-23 | End: 2019-07-23 | Stop reason: SURG

## 2019-07-23 RX ORDER — ACETAMINOPHEN 160 MG/5ML
650 SOLUTION ORAL EVERY 4 HOURS PRN
Status: DISCONTINUED | OUTPATIENT
Start: 2019-07-24 | End: 2019-07-27

## 2019-07-23 RX ORDER — MILRINONE LACTATE 0.2 MG/ML
.25-.75 INJECTION, SOLUTION INTRAVENOUS CONTINUOUS PRN
Status: DISCONTINUED | OUTPATIENT
Start: 2019-07-23 | End: 2019-07-25

## 2019-07-23 RX ORDER — BISACODYL 5 MG/1
10 TABLET, DELAYED RELEASE ORAL DAILY PRN
Status: DISCONTINUED | OUTPATIENT
Start: 2019-07-23 | End: 2019-07-25

## 2019-07-23 RX ORDER — ACETAMINOPHEN 650 MG/1
650 SUPPOSITORY RECTAL EVERY 4 HOURS PRN
Status: DISCONTINUED | OUTPATIENT
Start: 2019-07-24 | End: 2019-08-02 | Stop reason: HOSPADM

## 2019-07-23 RX ORDER — SODIUM CHLORIDE 9 MG/ML
INJECTION, SOLUTION INTRAVENOUS CONTINUOUS PRN
Status: DISCONTINUED | OUTPATIENT
Start: 2019-07-23 | End: 2019-07-23 | Stop reason: SURG

## 2019-07-23 RX ORDER — HYDROCODONE BITARTRATE AND ACETAMINOPHEN 5; 325 MG/1; MG/1
2 TABLET ORAL EVERY 4 HOURS PRN
Status: DISCONTINUED | OUTPATIENT
Start: 2019-07-23 | End: 2019-07-25

## 2019-07-23 RX ORDER — MEPERIDINE HYDROCHLORIDE 25 MG/ML
25 INJECTION INTRAMUSCULAR; INTRAVENOUS; SUBCUTANEOUS EVERY 4 HOURS PRN
Status: ACTIVE | OUTPATIENT
Start: 2019-07-23 | End: 2019-07-24

## 2019-07-23 RX ORDER — NITROGLYCERIN 20 MG/100ML
5-200 INJECTION INTRAVENOUS
Status: DISCONTINUED | OUTPATIENT
Start: 2019-07-23 | End: 2019-07-25

## 2019-07-23 RX ORDER — PANTOPRAZOLE SODIUM 40 MG/1
40 TABLET, DELAYED RELEASE ORAL DAILY
Status: DISCONTINUED | OUTPATIENT
Start: 2019-07-24 | End: 2019-07-24

## 2019-07-23 RX ORDER — PROPOFOL 10 MG/ML
VIAL (ML) INTRAVENOUS CONTINUOUS PRN
Status: DISCONTINUED | OUTPATIENT
Start: 2019-07-23 | End: 2019-07-23 | Stop reason: SURG

## 2019-07-23 RX ORDER — LIDOCAINE HYDROCHLORIDE 20 MG/ML
INJECTION, SOLUTION INFILTRATION; PERINEURAL AS NEEDED
Status: DISCONTINUED | OUTPATIENT
Start: 2019-07-23 | End: 2019-07-23 | Stop reason: SURG

## 2019-07-23 RX ORDER — DOPAMINE HYDROCHLORIDE 160 MG/100ML
2-20 INJECTION, SOLUTION INTRAVENOUS CONTINUOUS PRN
Status: DISCONTINUED | OUTPATIENT
Start: 2019-07-23 | End: 2019-07-25

## 2019-07-23 RX ORDER — SENNA AND DOCUSATE SODIUM 50; 8.6 MG/1; MG/1
2 TABLET, FILM COATED ORAL NIGHTLY
Status: DISCONTINUED | OUTPATIENT
Start: 2019-07-24 | End: 2019-07-25

## 2019-07-23 RX ADMIN — KETAMINE HYDROCHLORIDE 10 MG: 10 INJECTION INTRAMUSCULAR; INTRAVENOUS at 11:56

## 2019-07-23 RX ADMIN — METHYLPREDNISOLONE SODIUM SUCCINATE 250 MG: 125 INJECTION, POWDER, FOR SOLUTION INTRAMUSCULAR; INTRAVENOUS at 08:17

## 2019-07-23 RX ADMIN — FENTANYL CITRATE 50 MCG: 50 INJECTION, SOLUTION INTRAMUSCULAR; INTRAVENOUS at 07:15

## 2019-07-23 RX ADMIN — ALBUMIN HUMAN 500 ML: 0.05 INJECTION, SOLUTION INTRAVENOUS at 17:00

## 2019-07-23 RX ADMIN — PHENYLEPHRINE HYDROCHLORIDE 0.5 MCG/KG/MIN: 10 INJECTION, SOLUTION INTRAMUSCULAR; INTRAVENOUS; SUBCUTANEOUS at 10:18

## 2019-07-23 RX ADMIN — FENTANYL CITRATE 100 MCG: 50 INJECTION, SOLUTION INTRAMUSCULAR; INTRAVENOUS at 11:56

## 2019-07-23 RX ADMIN — PROPOFOL 50 MCG/KG/MIN: 10 INJECTION, EMULSION INTRAVENOUS at 07:52

## 2019-07-23 RX ADMIN — POTASSIUM CHLORIDE 20 MEQ: 29.8 INJECTION, SOLUTION INTRAVENOUS at 21:08

## 2019-07-23 RX ADMIN — FOSPHENYTOIN SODIUM 500 MG PE: 50 INJECTION, SOLUTION INTRAMUSCULAR; INTRAVENOUS at 08:34

## 2019-07-23 RX ADMIN — SODIUM CHLORIDE: 0.9 INJECTION, SOLUTION INTRAVENOUS at 07:30

## 2019-07-23 RX ADMIN — MUPIROCIN: 20 OINTMENT TOPICAL at 20:28

## 2019-07-23 RX ADMIN — ONDANSETRON 4 MG: 2 INJECTION INTRAMUSCULAR; INTRAVENOUS at 00:48

## 2019-07-23 RX ADMIN — METOCLOPRAMIDE 10 MG: 5 INJECTION, SOLUTION INTRAMUSCULAR; INTRAVENOUS at 17:20

## 2019-07-23 RX ADMIN — FENTANYL CITRATE 100 MCG: 50 INJECTION, SOLUTION INTRAMUSCULAR; INTRAVENOUS at 07:26

## 2019-07-23 RX ADMIN — SUCCINYLCHOLINE CHLORIDE 100 MG: 20 INJECTION, SOLUTION INTRAMUSCULAR; INTRAVENOUS; PARENTERAL at 07:16

## 2019-07-23 RX ADMIN — CHLORHEXIDINE GLUCONATE 15 ML: 1.2 RINSE ORAL at 17:21

## 2019-07-23 RX ADMIN — CEFAZOLIN SODIUM 2 G: 2 INJECTION, SOLUTION INTRAVENOUS at 20:27

## 2019-07-23 RX ADMIN — SODIUM CHLORIDE 1 MCG/KG/HR: 900 INJECTION, SOLUTION INTRAVENOUS at 07:52

## 2019-07-23 RX ADMIN — CHLORHEXIDINE GLUCONATE 1 APPLICATION: 500 CLOTH TOPICAL at 06:26

## 2019-07-23 RX ADMIN — Medication 2 MG: at 07:06

## 2019-07-23 RX ADMIN — EPINEPHRINE 0.02 MCG/KG/MIN: 1 INJECTION, SOLUTION, CONCENTRATE INTRAVENOUS at 12:36

## 2019-07-23 RX ADMIN — AMINOCAPROIC ACID 10 G: 250 INJECTION, SOLUTION INTRAVENOUS at 13:14

## 2019-07-23 RX ADMIN — POTASSIUM CHLORIDE: 29.8 INJECTION, SOLUTION INTRAVENOUS at 14:14

## 2019-07-23 RX ADMIN — METOPROLOL TARTRATE 12.5 MG: 25 TABLET ORAL at 06:22

## 2019-07-23 RX ADMIN — FENTANYL CITRATE 25 MCG: 50 INJECTION INTRAMUSCULAR; INTRAVENOUS at 22:35

## 2019-07-23 RX ADMIN — AMINOCAPROIC ACID 10 G: 250 INJECTION, SOLUTION INTRAVENOUS at 08:30

## 2019-07-23 RX ADMIN — CHLORHEXIDINE GLUCONATE 15 ML: 1.2 RINSE ORAL at 06:24

## 2019-07-23 RX ADMIN — CEFAZOLIN SODIUM 2 G: 2 INJECTION, SOLUTION INTRAVENOUS at 07:47

## 2019-07-23 RX ADMIN — NOREPINEPHRINE BITARTRATE 0.01 MCG/KG/MIN: 1 INJECTION, SOLUTION, CONCENTRATE INTRAVENOUS at 09:10

## 2019-07-23 RX ADMIN — LIDOCAINE HYDROCHLORIDE 100 MG: 20 INJECTION, SOLUTION INFILTRATION; PERINEURAL at 07:15

## 2019-07-23 RX ADMIN — ONDANSETRON 4 MG: 2 INJECTION INTRAMUSCULAR; INTRAVENOUS at 14:33

## 2019-07-23 RX ADMIN — POTASSIUM CHLORIDE 20 MEQ: 29.8 INJECTION, SOLUTION INTRAVENOUS at 16:50

## 2019-07-23 RX ADMIN — VASOPRESSIN 0.02 UNITS/MIN: 20 INJECTION INTRAMUSCULAR; SUBCUTANEOUS at 12:41

## 2019-07-23 RX ADMIN — KETAMINE HYDROCHLORIDE 10 MG: 10 INJECTION INTRAMUSCULAR; INTRAVENOUS at 07:53

## 2019-07-23 RX ADMIN — PROPOFOL 130 MG: 10 INJECTION, EMULSION INTRAVENOUS at 07:16

## 2019-07-23 RX ADMIN — METOCLOPRAMIDE 10 MG: 5 INJECTION, SOLUTION INTRAMUSCULAR; INTRAVENOUS at 21:42

## 2019-07-23 RX ADMIN — ROCURONIUM BROMIDE 20 MG: 10 INJECTION INTRAVENOUS at 08:40

## 2019-07-23 RX ADMIN — HEPARIN SODIUM 15000 UNITS: 1000 INJECTION, SOLUTION INTRAVENOUS; SUBCUTANEOUS at 09:18

## 2019-07-23 RX ADMIN — KETAMINE HYDROCHLORIDE 10 MG: 10 INJECTION INTRAMUSCULAR; INTRAVENOUS at 14:17

## 2019-07-23 RX ADMIN — CEFAZOLIN SODIUM 2 G: 2 INJECTION, SOLUTION INTRAVENOUS at 13:23

## 2019-07-23 RX ADMIN — KETAMINE HYDROCHLORIDE 10 MG: 10 INJECTION INTRAMUSCULAR; INTRAVENOUS at 08:32

## 2019-07-23 RX ADMIN — MAGNESIUM SULFATE HEPTAHYDRATE 1 G: 1 INJECTION, SOLUTION INTRAVENOUS at 23:36

## 2019-07-23 RX ADMIN — SODIUM CHLORIDE 2.8 UNITS/HR: 900 INJECTION, SOLUTION INTRAVENOUS at 14:05

## 2019-07-23 RX ADMIN — HEPARIN SODIUM 5000 UNITS: 1000 INJECTION, SOLUTION INTRAVENOUS; SUBCUTANEOUS at 09:41

## 2019-07-23 RX ADMIN — FENTANYL CITRATE 100 MCG: 50 INJECTION, SOLUTION INTRAMUSCULAR; INTRAVENOUS at 13:49

## 2019-07-23 RX ADMIN — PROPOFOL 25 MCG/KG/MIN: 10 INJECTION, EMULSION INTRAVENOUS at 23:12

## 2019-07-23 RX ADMIN — PANTOPRAZOLE SODIUM 40 MG: 40 TABLET, DELAYED RELEASE ORAL at 06:23

## 2019-07-23 RX ADMIN — NEOSTIGMINE METHYLSULFATE 3 MG: 5 INJECTION, SOLUTION INTRAMUSCULAR; INTRAVENOUS; SUBCUTANEOUS at 13:35

## 2019-07-23 RX ADMIN — NICARDIPINE HYDROCHLORIDE 0.3 MG: 25 INJECTION INTRAVENOUS at 08:22

## 2019-07-23 RX ADMIN — FENTANYL CITRATE 100 MCG: 50 INJECTION, SOLUTION INTRAMUSCULAR; INTRAVENOUS at 08:22

## 2019-07-23 RX ADMIN — NICARDIPINE HYDROCHLORIDE 0.4 MG: 25 INJECTION INTRAVENOUS at 08:21

## 2019-07-23 RX ADMIN — SODIUM CHLORIDE: 9 INJECTION, SOLUTION INTRAVENOUS at 06:48

## 2019-07-23 RX ADMIN — KETAMINE HYDROCHLORIDE 10 MG: 10 INJECTION INTRAMUSCULAR; INTRAVENOUS at 13:49

## 2019-07-23 RX ADMIN — POTASSIUM PHOSPHATE, MONOBASIC AND POTASSIUM PHOSPHATE, DIBASIC 45 MMOL: 224; 236 INJECTION, SOLUTION, CONCENTRATE INTRAVENOUS at 21:42

## 2019-07-23 RX ADMIN — FENTANYL CITRATE 50 MCG: 50 INJECTION, SOLUTION INTRAMUSCULAR; INTRAVENOUS at 14:17

## 2019-07-23 RX ADMIN — MAGNESIUM SULFATE HEPTAHYDRATE 2 G: 500 INJECTION, SOLUTION INTRAMUSCULAR; INTRAVENOUS at 11:15

## 2019-07-23 RX ADMIN — POTASSIUM CHLORIDE 20 MEQ: 29.8 INJECTION, SOLUTION INTRAVENOUS at 20:00

## 2019-07-23 RX ADMIN — GLYCOPYRROLATE 0.6 MG: 0.2 INJECTION INTRAMUSCULAR; INTRAVENOUS at 13:35

## 2019-07-23 RX ADMIN — NICARDIPINE HYDROCHLORIDE 0.2 MG: 25 INJECTION INTRAVENOUS at 13:50

## 2019-07-23 RX ADMIN — MUPIROCIN 1 APPLICATION: 20 OINTMENT TOPICAL at 06:24

## 2019-07-23 RX ADMIN — PROTAMINE SULFATE 250 MG: 10 INJECTION, SOLUTION INTRAVENOUS at 12:47

## 2019-07-23 RX ADMIN — NICARDIPINE HYDROCHLORIDE 0.2 MG: 25 INJECTION INTRAVENOUS at 13:59

## 2019-07-23 NOTE — ANESTHESIA PREPROCEDURE EVALUATION
Anesthesia Evaluation     Patient summary reviewed   no history of anesthetic complications:  NPO Solid Status: > 8 hours  NPO Liquid Status: > 2 hours           Airway   Mallampati: II  TM distance: >3 FB  Neck ROM: full  No difficulty expected  Dental    (+) upper dentures and lower dentures    Pulmonary    (+) a smoker Former Abstained day of surgery, COPD,   (-) rhonchi, decreased breath sounds, wheezes  Cardiovascular     Rhythm: regular  Rate: normal    (+) hypertension, CAD, CABG >6 Months, hyperlipidemia,  carotid artery disease left carotid  (-) murmur      Neuro/Psych  (+) TIA, CVA, psychiatric history,     GI/Hepatic/Renal/Endo    (+)  GERD,  diabetes mellitus,   (-) liver disease, hypothyroidism, hyperthyroidism    Musculoskeletal     Abdominal     Abdomen: soft.   Substance History      OB/GYN          Other                        Anesthesia Plan    ASA 4     general   (ROSE, central line art line [place] )  intravenous induction   Anesthetic plan, all risks, benefits, and alternatives have been provided, discussed and informed consent has been obtained with: patient.

## 2019-07-23 NOTE — ANESTHESIA POSTPROCEDURE EVALUATION
"Patient: Grace Beauchamp    Procedure Summary     Date:  07/23/19 Room / Location:  [facility] OR  / [facility] MAIN OR    Anesthesia Start:  0648 Anesthesia Stop:  1532    Procedure:  ROSE, THORACOABDOMINAL AORTIC ANEURYSM REPAIR, VISCERAL VESSEL REPAIR, LARGE VENTRAL HERNIA REPAIR WITH MESH, CIRCULATORY ARREST, PRP (N/A Chest) Diagnosis:       Aortic aneurysm, descending (CMS/HCC)      (Aortic aneurysm, descending (CMS/HCC) [I71.9])    Surgeon:  Mart Ontiveros MD Provider:  Winston Ortega MD    Anesthesia Type:  general ASA Status:  4          Anesthesia Type: general  Last vitals  BP   142/78 (07/23/19 0622)   Temp   36.8 °C (98.3 °F) (07/22/19 2347)   Pulse   69 (07/23/19 1515)   Resp   16 (07/23/19 1515)     SpO2   99 % (07/23/19 1515)     Post Anesthesia Care and Evaluation    Patient location during evaluation: bedside  Patient participation: complete - patient cannot participate  Level of consciousness: obtunded/minimal responses  Pain management: adequate  Airway patency: patent  Anesthetic complications: No anesthetic complications  PONV Status: none  Cardiovascular status: acceptable  Respiratory status: acceptable  Hydration status: acceptable    Comments: /78   Pulse 69   Temp 36.8 °C (98.3 °F) (Oral)   Resp 16   Ht 142.2 cm (56\")   Wt 48.4 kg (106 lb 12.8 oz)   LMP  (LMP Unknown)   SpO2 99%   BMI 23.94 kg/m²         "

## 2019-07-23 NOTE — ANESTHESIA PROCEDURE NOTES
Airway  Urgency: elective    Date/Time: 7/23/2019 7:19 AM  End Time:7/23/2019 7:19 AM  Airway not difficult    General Information and Staff    Patient location during procedure: OR  Anesthesiologist: Winston Ortega MD    Indications and Patient Condition  Indications for airway management: airway protection    Preoxygenated: yes  Mask difficulty assessment: 1 - vent by mask    Final Airway Details  Final airway type: endotracheal airway      Successful airway: ETT - double lumen left  Cuffed: yes   Successful intubation technique: direct laryngoscopy  Endotracheal tube insertion site: oral  Blade: Alvin  Blade size: 4  ETT DL size (fr): 35  Cormack-Lehane Classification: grade I - full view of glottis  Placement verified by: chest auscultation   Measured from: lips  ETT to lips (cm): 27  Number of attempts at approach: 1

## 2019-07-23 NOTE — ANESTHESIA PROCEDURE NOTES
Central Line      Patient reassessed immediately prior to procedure    Patient location during procedure: OR  Start time: 7/23/2019 7:23 AM  Stop Time:7/23/2019 7:34 AM  Indications: vascular access  Staff  Anesthesiologist: Winston Ortega MD  Preanesthetic Checklist  Completed: patient identified, site marked, surgical consent, pre-op evaluation, timeout performed, IV checked, risks and benefits discussed and monitors and equipment checked  Central Line Prep  Sterile Tech:cap, gloves, gown, mask and sterile barriers  Prep: chloraprep  Patient monitoring: continuous pulse oximetry, blood pressure monitoring and EKG  Central Line Procedure  Laterality:right  Location:internal jugular  Catheter Type:Cordis and double lumen  Catheter Size:9 Fr  Guidance:ultrasound guided  PROCEDURE NOTE/ULTRASOUND INTERPRETATION.  Using ultrasound guidance the potential vascular sites for insertion of the catheter were visualized to determine the patency of the vessel to be used for vascular access.  After selecting the appropriate site for insertion, the needle was visualized under ultrasound being inserted into the internal jugular vein, followed by ultrasound confirmation of wire and catheter placement. There were no abnormalities seen on ultrasound; an image was taken; and the patient tolerated the procedure with no complications.   Assessment  Post procedure:biopatch applied, line sutured and occlusive dressing applied  Assessement:blood return through all ports, free fluid flow, placement verified by x-ray, Thiago Test, no pneumothorax on x-ray and chest x-ray ordered  Complications:no  Patient Tolerance:patient tolerated the procedure well with no apparent complications

## 2019-07-23 NOTE — ANESTHESIA PROCEDURE NOTES
Arterial Line      Patient reassessed immediately prior to procedure    Patient location during procedure: OR  Start time: 7/23/2019 7:08 AM  Stop Time:7/23/2019 7:12 AM       Line placed for hemodynamic monitoring, ABGs/Labs/ISTAT and MD/Surgeon request.  Performed By   Anesthesiologist: Winston Ortega MD  Preanesthetic Checklist  Completed: patient identified, site marked, surgical consent, pre-op evaluation, timeout performed, IV checked, risks and benefits discussed and monitors and equipment checked  Arterial Line Prep   Sterile Tech: cap, gloves, mask and sterile barriers  Prep: ChloraPrep  Patient monitoring: blood pressure monitoring, continuous pulse oximetry and EKG  Arterial Line Procedure   Laterality:right  Location:  radial artery  Catheter size: 20 G   Guidance: ultrasound guided  PROCEDURE NOTE/ULTRASOUND INTERPRETATION.  Using ultrasound guidance the potential vascular sites for insertion of the catheter were visualized to determine the patency of the vessel to be used for vascular access.  After selecting the appropriate site for insertion, the needle was visualized under ultrasound being inserted into the radial artery, followed by ultrasound confirmation of wire and catheter placement. There were no abnormalities seen on ultrasound; an image was taken; and the patient tolerated the procedure with no complications.   Number of attempts: 1  Successful placement: yes  Post Assessment   Dressing Type: wrist guard applied, occlusive dressing applied and secured with tape.   Complications no  Circ/Move/Sens Assessment: normal and unchanged.   Patient Tolerance: patient tolerated the procedure well with no apparent complications

## 2019-07-23 NOTE — ANESTHESIA PROCEDURE NOTES
Procedure Performed: Emergent/Open-Heart Anesthesia ROSE       Start Time:  7/23/2019 7:47 PM       End Time:   7/23/2019 8:00 PM    Preanesthesia Checklist:  Patient identified, IV assessed, risks and benefits discussed, monitors and equipment assessed, procedure being performed at surgeon's request and anesthesia consent obtained.    General Procedure Information  Diagnostic Indications for Echo:  assessment of ascending aorta, assessment of surgical repair and hemodynamic monitoring  Physician Requesting Echo: Mart Ontiveros MD  CPT Code:  Atherosclerosis of aorta  Location performed:  OR  Intubated  Bite block placed  Heart visualized  Probe Insertion:  Easy  Probe Type:  Multiplane  Modalities:  Color flow mapping, pulse wave Doppler and continuous wave Doppler    Echocardiographic and Doppler Measurements    Ventricles    Right Ventricle:  Cavity size normal.  Hypertrophy not present.  Thrombus not present.  Global function normal.    Left Ventricle:  Cavity size normal.  Hypertrophy not present.  Thrombus not present.  Global Function normal.  Ejection Fraction 65%.          Valves    Aortic Valve:  Annulus normal and bioprosthetic.  Stenosis not present.  Mean Gradient: 4 mmHg.  Regurgitation absent.  Leaflets normal.  Leaflet motions normal.      Mitral Valve:  Annulus normal.  Stenosis not present.  Regurgitation absent.  Leaflets normal.  Leaflet motions normal.      Tricuspid Valve:  Annulus normal.  Stenosis not present.  Regurgitation mild.  Leaflets normal.  Leaflet motions normal.          Aorta    Ascending Aorta:  Size normal.  Diameter 3.3 cm.  Dissection not present.  Plaque thickness less than 3 mm.  Mobile plaque not present.    Aortic Arch:  Size normal.  Diameter 2.5 cm.  Dissection not present.  Plaque thickness less than 3 mm.  Mobile plaque not present.    Descending Aorta:  Size dilated.  Diameter 3.3 cm.  Dissection not present.  Plaque thickness greater than 3 mm.  Mobile plaque not  present.          Atria    Right Atrium:  Size normal.  Spontaneous echo contrast not present.  Thrombus not present.  Tumor not present.  Device not present.      Left Atrium:  Size normal.  Spontaneous echo contrast not present.  Thrombus not present.  Tumor not present.  Device not present.    Left atrial appendage normal.      Septa    Atrial Septum:  Intra-atrial septal morphology normal.          Diastolic Function Measurements:  Diastolic Dysfunction Grade= I  E= ms  A= ms  E/A Ratio= .8  DT= ms  S/D=  1.5  IVRT=    Other Findings  Pericardium:  normal  Pleural Effusion:  none  Pulmonary Venous Flow:  normal    Anesthesia Information  Performed Personally  Anesthesiologist:  Winston Ortega MD      Echocardiogram Comments:       Postbypass results:  LVEF retained at 65% no RWMAs

## 2019-07-23 NOTE — ADDENDUM NOTE
Addendum  created 07/23/19 0602 by Winston Ortega MD    Review and Sign - Ready for Procedure, Review and Sign - Signed, Sign clinical note

## 2019-07-23 NOTE — ANESTHESIA PROCEDURE NOTES
Central Line      Patient reassessed immediately prior to procedure    Patient location during procedure: OR  Start time: 7/23/2019 7:34 AM  Stop Time:7/23/2019 7:44 AM  Indications: central pressure monitoring  Staff  Anesthesiologist: Winston Ortega MD  Preanesthetic Checklist  Completed: patient identified, site marked, surgical consent, pre-op evaluation, timeout performed, IV checked, risks and benefits discussed and monitors and equipment checked  Central Line Prep  Sterile Tech:cap, gloves, gown, mask and sterile barriers  Prep: chloraprep  Patient monitoring: blood pressure monitoring, continuous pulse oximetry and EKG  Central Line Procedure  Laterality:right  Location:internal jugular  Catheter Type:Dahlgren-Jay  Assessment  Post procedure:line sutured, biopatch applied and occlusive dressing applied  Assessement:chest x-ray ordered, no pneumothorax on x-ray and free fluid flow  Complications:no  Patient Tolerance:patient tolerated the procedure well with no apparent complications

## 2019-07-24 ENCOUNTER — APPOINTMENT (OUTPATIENT)
Dept: GENERAL RADIOLOGY | Facility: HOSPITAL | Age: 79
End: 2019-07-24

## 2019-07-24 LAB
ABO + RH BLD: NORMAL
ALBUMIN SERPL-MCNC: 4.1 G/DL (ref 3.5–5.2)
ALBUMIN SERPL-MCNC: 4.6 G/DL (ref 3.5–5.2)
ANION GAP SERPL CALCULATED.3IONS-SCNC: 12 MMOL/L (ref 5–15)
ANION GAP SERPL CALCULATED.3IONS-SCNC: 12.4 MMOL/L (ref 5–15)
ARTERIAL PATENCY WRIST A: ABNORMAL
ARTERIAL PATENCY WRIST A: ABNORMAL
ATMOSPHERIC PRESS: 751.6 MMHG
ATMOSPHERIC PRESS: 752.7 MMHG
BASE EXCESS BLDA CALC-SCNC: -0.4 MMOL/L (ref 0–2)
BASE EXCESS BLDA CALC-SCNC: -1.6 MMOL/L (ref 0–2)
BASOPHILS # BLD AUTO: 0.01 10*3/MM3 (ref 0–0.2)
BASOPHILS NFR BLD AUTO: 0.1 % (ref 0–1.5)
BDY SITE: ABNORMAL
BDY SITE: ABNORMAL
BH BB BLOOD EXPIRATION DATE: NORMAL
BH BB BLOOD TYPE BARCODE: 5100
BH BB BLOOD TYPE BARCODE: 5100
BH BB BLOOD TYPE BARCODE: 6200
BH BB BLOOD TYPE BARCODE: 6200
BH BB DISPENSE STATUS: NORMAL
BH BB PRODUCT CODE: NORMAL
BH BB UNIT NUMBER: NORMAL
BUN BLD-MCNC: 17 MG/DL (ref 8–23)
BUN BLD-MCNC: 17 MG/DL (ref 8–23)
BUN/CREAT SERPL: 19.5 (ref 7–25)
BUN/CREAT SERPL: 20 (ref 7–25)
CALCIUM SPEC-SCNC: 7.1 MG/DL (ref 8.6–10.5)
CALCIUM SPEC-SCNC: 8 MG/DL (ref 8.6–10.5)
CHLORIDE SERPL-SCNC: 108 MMOL/L (ref 98–107)
CHLORIDE SERPL-SCNC: 111 MMOL/L (ref 98–107)
CO2 SERPL-SCNC: 25 MMOL/L (ref 22–29)
CO2 SERPL-SCNC: 26.6 MMOL/L (ref 22–29)
CREAT BLD-MCNC: 0.85 MG/DL (ref 0.57–1)
CREAT BLD-MCNC: 0.87 MG/DL (ref 0.57–1)
DEPRECATED RDW RBC AUTO: 53 FL (ref 37–54)
EOSINOPHIL # BLD AUTO: 0 10*3/MM3 (ref 0–0.4)
EOSINOPHIL NFR BLD AUTO: 0 % (ref 0.3–6.2)
ERYTHROCYTE [DISTWIDTH] IN BLOOD BY AUTOMATED COUNT: 15.6 % (ref 12.3–15.4)
GFR SERPL CREATININE-BSD FRML MDRD: 63 ML/MIN/1.73
GFR SERPL CREATININE-BSD FRML MDRD: 65 ML/MIN/1.73
GLUCOSE BLD-MCNC: 150 MG/DL (ref 65–99)
GLUCOSE BLD-MCNC: 96 MG/DL (ref 65–99)
GLUCOSE BLDC GLUCOMTR-MCNC: 101 MG/DL (ref 70–130)
GLUCOSE BLDC GLUCOMTR-MCNC: 107 MG/DL (ref 70–130)
GLUCOSE BLDC GLUCOMTR-MCNC: 114 MG/DL (ref 70–130)
GLUCOSE BLDC GLUCOMTR-MCNC: 118 MG/DL (ref 70–130)
GLUCOSE BLDC GLUCOMTR-MCNC: 118 MG/DL (ref 70–130)
GLUCOSE BLDC GLUCOMTR-MCNC: 122 MG/DL (ref 70–130)
GLUCOSE BLDC GLUCOMTR-MCNC: 127 MG/DL (ref 70–130)
GLUCOSE BLDC GLUCOMTR-MCNC: 132 MG/DL (ref 70–130)
GLUCOSE BLDC GLUCOMTR-MCNC: 135 MG/DL (ref 70–130)
GLUCOSE BLDC GLUCOMTR-MCNC: 136 MG/DL (ref 70–130)
GLUCOSE BLDC GLUCOMTR-MCNC: 141 MG/DL (ref 70–130)
GLUCOSE BLDC GLUCOMTR-MCNC: 148 MG/DL (ref 70–130)
GLUCOSE BLDC GLUCOMTR-MCNC: 160 MG/DL (ref 70–130)
GLUCOSE BLDC GLUCOMTR-MCNC: 205 MG/DL (ref 70–130)
GLUCOSE BLDC GLUCOMTR-MCNC: 89 MG/DL (ref 70–130)
HCO3 BLDA-SCNC: 23.1 MMOL/L (ref 22–28)
HCO3 BLDA-SCNC: 24 MMOL/L (ref 22–28)
HCT VFR BLD AUTO: 32.3 % (ref 34–46.6)
HGB BLD-MCNC: 10.5 G/DL (ref 12–15.9)
HOROWITZ INDEX BLD+IHG-RTO: 30 %
HOROWITZ INDEX BLD+IHG-RTO: 40 %
IMM GRANULOCYTES # BLD AUTO: 0.03 10*3/MM3 (ref 0–0.05)
IMM GRANULOCYTES NFR BLD AUTO: 0.3 % (ref 0–0.5)
INR PPP: 1.45 (ref 0.9–1.1)
LYMPHOCYTES # BLD AUTO: 0.33 10*3/MM3 (ref 0.7–3.1)
LYMPHOCYTES NFR BLD AUTO: 3.1 % (ref 19.6–45.3)
MAGNESIUM SERPL-MCNC: 3 MG/DL (ref 1.6–2.4)
MCH RBC QN AUTO: 29.9 PG (ref 26.6–33)
MCHC RBC AUTO-ENTMCNC: 32.5 G/DL (ref 31.5–35.7)
MCV RBC AUTO: 92 FL (ref 79–97)
MODALITY: ABNORMAL
MODALITY: ABNORMAL
MONOCYTES # BLD AUTO: 0.71 10*3/MM3 (ref 0.1–0.9)
MONOCYTES NFR BLD AUTO: 6.7 % (ref 5–12)
NEUTROPHILS # BLD AUTO: 9.5 10*3/MM3 (ref 1.7–7)
NEUTROPHILS NFR BLD AUTO: 89.8 % (ref 42.7–76)
NRBC BLD AUTO-RTO: 0 /100 WBC (ref 0–0.2)
O2 A-A PPRESDIFF RESPIRATORY: 0.5 MMHG
O2 A-A PPRESDIFF RESPIRATORY: 0.6 MMHG
PCO2 BLDA: 37.6 MM HG (ref 35–45)
PCO2 BLDA: 38.2 MM HG (ref 35–45)
PEEP RESPIRATORY: 5 CM[H2O]
PEEP RESPIRATORY: 5 CM[H2O]
PH BLDA: 7.39 PH UNITS (ref 7.35–7.45)
PH BLDA: 7.41 PH UNITS (ref 7.35–7.45)
PHOSPHATE SERPL-MCNC: 3.9 MG/DL (ref 2.5–4.5)
PHOSPHATE SERPL-MCNC: 4 MG/DL (ref 2.5–4.5)
PLATELET # BLD AUTO: 197 10*3/MM3 (ref 140–450)
PMV BLD AUTO: 9.6 FL (ref 6–12)
PO2 BLDA: 110.6 MM HG (ref 80–100)
PO2 BLDA: 135.8 MM HG (ref 80–100)
POTASSIUM BLD-SCNC: 4.6 MMOL/L (ref 3.5–5.2)
POTASSIUM BLD-SCNC: 4.6 MMOL/L (ref 3.5–5.2)
PROTHROMBIN TIME: 17.3 SECONDS (ref 11.7–14.2)
PSV: 8 CMH2O
RBC # BLD AUTO: 3.51 10*6/MM3 (ref 3.77–5.28)
SAO2 % BLDCOA: 98.4 % (ref 92–99)
SAO2 % BLDCOA: 99.1 % (ref 92–99)
SET MECH RESP RATE: 20
SODIUM BLD-SCNC: 147 MMOL/L (ref 136–145)
SODIUM BLD-SCNC: 148 MMOL/L (ref 136–145)
TOTAL RATE: 19 BREATHS/MINUTE
TOTAL RATE: 20 BREATHS/MINUTE
UNIT  ABO: NORMAL
UNIT  RH: NORMAL
VENTILATOR MODE: ABNORMAL
VENTILATOR MODE: AC
VT ON VENT VENT: 378 ML
VT ON VENT VENT: 380 ML
WBC NRBC COR # BLD: 10.58 10*3/MM3 (ref 3.4–10.8)

## 2019-07-24 PROCEDURE — 94799 UNLISTED PULMONARY SVC/PX: CPT

## 2019-07-24 PROCEDURE — 93005 ELECTROCARDIOGRAM TRACING: CPT | Performed by: THORACIC SURGERY (CARDIOTHORACIC VASCULAR SURGERY)

## 2019-07-24 PROCEDURE — 25010000002 METOCLOPRAMIDE PER 10 MG: Performed by: THORACIC SURGERY (CARDIOTHORACIC VASCULAR SURGERY)

## 2019-07-24 PROCEDURE — 25010000003 CEFAZOLIN IN DEXTROSE 2-4 GM/100ML-% SOLUTION: Performed by: THORACIC SURGERY (CARDIOTHORACIC VASCULAR SURGERY)

## 2019-07-24 PROCEDURE — 93010 ELECTROCARDIOGRAM REPORT: CPT | Performed by: INTERNAL MEDICINE

## 2019-07-24 PROCEDURE — 25010000002 FENTANYL CITRATE (PF) 100 MCG/2ML SOLUTION: Performed by: THORACIC SURGERY (CARDIOTHORACIC VASCULAR SURGERY)

## 2019-07-24 PROCEDURE — 80069 RENAL FUNCTION PANEL: CPT | Performed by: THORACIC SURGERY (CARDIOTHORACIC VASCULAR SURGERY)

## 2019-07-24 PROCEDURE — 25010000002 ENOXAPARIN PER 10 MG: Performed by: THORACIC SURGERY (CARDIOTHORACIC VASCULAR SURGERY)

## 2019-07-24 PROCEDURE — 85610 PROTHROMBIN TIME: CPT | Performed by: THORACIC SURGERY (CARDIOTHORACIC VASCULAR SURGERY)

## 2019-07-24 PROCEDURE — 82962 GLUCOSE BLOOD TEST: CPT

## 2019-07-24 PROCEDURE — 25010000002 ALBUMIN HUMAN 5% PER 50 ML: Performed by: THORACIC SURGERY (CARDIOTHORACIC VASCULAR SURGERY)

## 2019-07-24 PROCEDURE — P9041 ALBUMIN (HUMAN),5%, 50ML: HCPCS | Performed by: THORACIC SURGERY (CARDIOTHORACIC VASCULAR SURGERY)

## 2019-07-24 PROCEDURE — 25010000002 CALCIUM GLUCONATE PER 10 ML: Performed by: THORACIC SURGERY (CARDIOTHORACIC VASCULAR SURGERY)

## 2019-07-24 PROCEDURE — 83735 ASSAY OF MAGNESIUM: CPT | Performed by: THORACIC SURGERY (CARDIOTHORACIC VASCULAR SURGERY)

## 2019-07-24 PROCEDURE — 99232 SBSQ HOSP IP/OBS MODERATE 35: CPT | Performed by: INTERNAL MEDICINE

## 2019-07-24 PROCEDURE — 94003 VENT MGMT INPAT SUBQ DAY: CPT

## 2019-07-24 PROCEDURE — 82803 BLOOD GASES ANY COMBINATION: CPT

## 2019-07-24 PROCEDURE — 85025 COMPLETE CBC W/AUTO DIFF WBC: CPT | Performed by: THORACIC SURGERY (CARDIOTHORACIC VASCULAR SURGERY)

## 2019-07-24 PROCEDURE — 71045 X-RAY EXAM CHEST 1 VIEW: CPT

## 2019-07-24 RX ORDER — DEXTROSE, SODIUM CHLORIDE, AND POTASSIUM CHLORIDE 5; .45; .15 G/100ML; G/100ML; G/100ML
40 INJECTION INTRAVENOUS CONTINUOUS
Status: DISCONTINUED | OUTPATIENT
Start: 2019-07-24 | End: 2019-07-26

## 2019-07-24 RX ORDER — PANTOPRAZOLE SODIUM 40 MG/10ML
40 INJECTION, POWDER, LYOPHILIZED, FOR SOLUTION INTRAVENOUS
Status: DISCONTINUED | OUTPATIENT
Start: 2019-07-24 | End: 2019-07-29

## 2019-07-24 RX ADMIN — FENTANYL CITRATE 50 MCG: 50 INJECTION INTRAMUSCULAR; INTRAVENOUS at 08:43

## 2019-07-24 RX ADMIN — CEFAZOLIN SODIUM 2 G: 2 INJECTION, SOLUTION INTRAVENOUS at 22:11

## 2019-07-24 RX ADMIN — CHLORHEXIDINE GLUCONATE 15 ML: 1.2 RINSE ORAL at 16:27

## 2019-07-24 RX ADMIN — PANTOPRAZOLE SODIUM 40 MG: 40 INJECTION, POWDER, FOR SOLUTION INTRAVENOUS at 11:21

## 2019-07-24 RX ADMIN — CALCIUM GLUCONATE 1 G: 98 INJECTION, SOLUTION INTRAVENOUS at 00:40

## 2019-07-24 RX ADMIN — METOCLOPRAMIDE 10 MG: 5 INJECTION, SOLUTION INTRAMUSCULAR; INTRAVENOUS at 04:21

## 2019-07-24 RX ADMIN — SODIUM CHLORIDE 5 MG/HR: 9 INJECTION, SOLUTION INTRAVENOUS at 07:48

## 2019-07-24 RX ADMIN — CHLORHEXIDINE GLUCONATE 15 ML: 1.2 RINSE ORAL at 04:21

## 2019-07-24 RX ADMIN — FENTANYL CITRATE 25 MCG: 50 INJECTION INTRAMUSCULAR; INTRAVENOUS at 11:13

## 2019-07-24 RX ADMIN — POTASSIUM CHLORIDE, DEXTROSE MONOHYDRATE AND SODIUM CHLORIDE 75 ML/HR: 150; 5; 450 INJECTION, SOLUTION INTRAVENOUS at 09:13

## 2019-07-24 RX ADMIN — FENTANYL CITRATE 25 MCG: 50 INJECTION INTRAMUSCULAR; INTRAVENOUS at 16:32

## 2019-07-24 RX ADMIN — DEXMEDETOMIDINE HYDROCHLORIDE 0.6 MCG/KG/HR: 100 INJECTION, SOLUTION, CONCENTRATE INTRAVENOUS at 14:31

## 2019-07-24 RX ADMIN — FENTANYL CITRATE 25 MCG: 50 INJECTION INTRAMUSCULAR; INTRAVENOUS at 19:55

## 2019-07-24 RX ADMIN — CEFAZOLIN SODIUM 2 G: 2 INJECTION, SOLUTION INTRAVENOUS at 14:20

## 2019-07-24 RX ADMIN — CEFAZOLIN SODIUM 2 G: 2 INJECTION, SOLUTION INTRAVENOUS at 04:21

## 2019-07-24 RX ADMIN — MUPIROCIN 1 APPLICATION: 20 OINTMENT TOPICAL at 22:11

## 2019-07-24 RX ADMIN — ENOXAPARIN SODIUM 40 MG: 40 INJECTION SUBCUTANEOUS at 17:29

## 2019-07-24 RX ADMIN — ALBUMIN HUMAN 250 ML: 0.05 INJECTION, SOLUTION INTRAVENOUS at 00:42

## 2019-07-24 RX ADMIN — SODIUM CHLORIDE 2.8 UNITS/HR: 9 INJECTION, SOLUTION INTRAVENOUS at 08:37

## 2019-07-24 RX ADMIN — FENTANYL CITRATE 25 MCG: 50 INJECTION INTRAMUSCULAR; INTRAVENOUS at 13:33

## 2019-07-24 RX ADMIN — METOCLOPRAMIDE 10 MG: 5 INJECTION, SOLUTION INTRAMUSCULAR; INTRAVENOUS at 10:17

## 2019-07-24 RX ADMIN — MUPIROCIN 1 APPLICATION: 20 OINTMENT TOPICAL at 10:17

## 2019-07-24 RX ADMIN — POTASSIUM CHLORIDE, DEXTROSE MONOHYDRATE AND SODIUM CHLORIDE 75 ML/HR: 150; 5; 450 INJECTION, SOLUTION INTRAVENOUS at 23:08

## 2019-07-24 RX ADMIN — CALCIUM GLUCONATE 6 G: 98 INJECTION, SOLUTION INTRAVENOUS at 01:49

## 2019-07-25 ENCOUNTER — APPOINTMENT (OUTPATIENT)
Dept: GENERAL RADIOLOGY | Facility: HOSPITAL | Age: 79
End: 2019-07-25

## 2019-07-25 LAB
ABO + RH BLD: NORMAL
ANION GAP SERPL CALCULATED.3IONS-SCNC: 8.4 MMOL/L (ref 5–15)
BH BB BLOOD EXPIRATION DATE: NORMAL
BH BB BLOOD TYPE BARCODE: 6200
BH BB DISPENSE STATUS: NORMAL
BH BB PRODUCT CODE: NORMAL
BH BB UNIT NUMBER: NORMAL
BUN BLD-MCNC: 14 MG/DL (ref 8–23)
BUN/CREAT SERPL: 24.6 (ref 7–25)
CALCIUM SPEC-SCNC: 7.4 MG/DL (ref 8.6–10.5)
CHLORIDE SERPL-SCNC: 114 MMOL/L (ref 98–107)
CO2 SERPL-SCNC: 24.6 MMOL/L (ref 22–29)
CREAT BLD-MCNC: 0.57 MG/DL (ref 0.57–1)
DEPRECATED RDW RBC AUTO: 56 FL (ref 37–54)
ERYTHROCYTE [DISTWIDTH] IN BLOOD BY AUTOMATED COUNT: 15.9 % (ref 12.3–15.4)
GFR SERPL CREATININE-BSD FRML MDRD: 103 ML/MIN/1.73
GLUCOSE BLD-MCNC: 144 MG/DL (ref 65–99)
GLUCOSE BLDC GLUCOMTR-MCNC: 110 MG/DL (ref 70–130)
GLUCOSE BLDC GLUCOMTR-MCNC: 128 MG/DL (ref 70–130)
GLUCOSE BLDC GLUCOMTR-MCNC: 155 MG/DL (ref 70–130)
GLUCOSE BLDC GLUCOMTR-MCNC: 161 MG/DL (ref 70–130)
HCT VFR BLD AUTO: 30.9 % (ref 34–46.6)
HGB BLD-MCNC: 9.9 G/DL (ref 12–15.9)
MCH RBC QN AUTO: 30.7 PG (ref 26.6–33)
MCHC RBC AUTO-ENTMCNC: 32 G/DL (ref 31.5–35.7)
MCV RBC AUTO: 95.7 FL (ref 79–97)
PLATELET # BLD AUTO: 153 10*3/MM3 (ref 140–450)
PMV BLD AUTO: 10.5 FL (ref 6–12)
POTASSIUM BLD-SCNC: 4.6 MMOL/L (ref 3.5–5.2)
RBC # BLD AUTO: 3.23 10*6/MM3 (ref 3.77–5.28)
SODIUM BLD-SCNC: 147 MMOL/L (ref 136–145)
UNIT  ABO: NORMAL
UNIT  RH: NORMAL
WBC NRBC COR # BLD: 11.98 10*3/MM3 (ref 3.4–10.8)

## 2019-07-25 PROCEDURE — 99024 POSTOP FOLLOW-UP VISIT: CPT | Performed by: NURSE PRACTITIONER

## 2019-07-25 PROCEDURE — 80048 BASIC METABOLIC PNL TOTAL CA: CPT | Performed by: THORACIC SURGERY (CARDIOTHORACIC VASCULAR SURGERY)

## 2019-07-25 PROCEDURE — 93010 ELECTROCARDIOGRAM REPORT: CPT | Performed by: INTERNAL MEDICINE

## 2019-07-25 PROCEDURE — 93005 ELECTROCARDIOGRAM TRACING: CPT | Performed by: THORACIC SURGERY (CARDIOTHORACIC VASCULAR SURGERY)

## 2019-07-25 PROCEDURE — 25010000002 FUROSEMIDE PER 20 MG: Performed by: THORACIC SURGERY (CARDIOTHORACIC VASCULAR SURGERY)

## 2019-07-25 PROCEDURE — 97166 OT EVAL MOD COMPLEX 45 MIN: CPT | Performed by: OCCUPATIONAL THERAPIST

## 2019-07-25 PROCEDURE — 97110 THERAPEUTIC EXERCISES: CPT

## 2019-07-25 PROCEDURE — 99232 SBSQ HOSP IP/OBS MODERATE 35: CPT | Performed by: INTERNAL MEDICINE

## 2019-07-25 PROCEDURE — 82962 GLUCOSE BLOOD TEST: CPT

## 2019-07-25 PROCEDURE — 25010000002 HYDRALAZINE PER 20 MG: Performed by: NURSE PRACTITIONER

## 2019-07-25 PROCEDURE — 85027 COMPLETE CBC AUTOMATED: CPT | Performed by: THORACIC SURGERY (CARDIOTHORACIC VASCULAR SURGERY)

## 2019-07-25 PROCEDURE — 97163 PT EVAL HIGH COMPLEX 45 MIN: CPT

## 2019-07-25 PROCEDURE — 71045 X-RAY EXAM CHEST 1 VIEW: CPT

## 2019-07-25 PROCEDURE — 25010000002 HYDROMORPHONE PER 4 MG: Performed by: NURSE PRACTITIONER

## 2019-07-25 PROCEDURE — 25010000003 CEFAZOLIN IN DEXTROSE 2-4 GM/100ML-% SOLUTION: Performed by: THORACIC SURGERY (CARDIOTHORACIC VASCULAR SURGERY)

## 2019-07-25 PROCEDURE — 25010000002 FENTANYL CITRATE (PF) 100 MCG/2ML SOLUTION: Performed by: THORACIC SURGERY (CARDIOTHORACIC VASCULAR SURGERY)

## 2019-07-25 PROCEDURE — 97110 THERAPEUTIC EXERCISES: CPT | Performed by: OCCUPATIONAL THERAPIST

## 2019-07-25 PROCEDURE — 25010000002 ENOXAPARIN PER 10 MG: Performed by: NURSE PRACTITIONER

## 2019-07-25 RX ORDER — HYDROMORPHONE HYDROCHLORIDE 1 MG/ML
0.25 INJECTION, SOLUTION INTRAMUSCULAR; INTRAVENOUS; SUBCUTANEOUS
Status: DISCONTINUED | OUTPATIENT
Start: 2019-07-25 | End: 2019-08-02 | Stop reason: HOSPADM

## 2019-07-25 RX ORDER — FUROSEMIDE 10 MG/ML
20 INJECTION INTRAMUSCULAR; INTRAVENOUS ONCE
Status: COMPLETED | OUTPATIENT
Start: 2019-07-25 | End: 2019-07-25

## 2019-07-25 RX ORDER — ASPIRIN 600 MG
300 SUPPOSITORY, RECTAL RECTAL DAILY
Status: DISCONTINUED | OUTPATIENT
Start: 2019-07-25 | End: 2019-07-28

## 2019-07-25 RX ORDER — HYDRALAZINE HYDROCHLORIDE 20 MG/ML
10 INJECTION INTRAMUSCULAR; INTRAVENOUS EVERY 4 HOURS PRN
Status: DISCONTINUED | OUTPATIENT
Start: 2019-07-25 | End: 2019-08-02 | Stop reason: HOSPADM

## 2019-07-25 RX ADMIN — METOPROLOL TARTRATE 2.5 MG: 5 INJECTION, SOLUTION INTRAVENOUS at 22:20

## 2019-07-25 RX ADMIN — METOPROLOL TARTRATE 2.5 MG: 5 INJECTION, SOLUTION INTRAVENOUS at 09:47

## 2019-07-25 RX ADMIN — CHLORHEXIDINE GLUCONATE 15 ML: 1.2 RINSE ORAL at 15:08

## 2019-07-25 RX ADMIN — HYDROMORPHONE HYDROCHLORIDE 0.25 MG: 1 INJECTION, SOLUTION INTRAMUSCULAR; INTRAVENOUS; SUBCUTANEOUS at 12:33

## 2019-07-25 RX ADMIN — FENTANYL CITRATE 25 MCG: 50 INJECTION INTRAMUSCULAR; INTRAVENOUS at 04:57

## 2019-07-25 RX ADMIN — METOPROLOL TARTRATE 2.5 MG: 5 INJECTION, SOLUTION INTRAVENOUS at 15:31

## 2019-07-25 RX ADMIN — PHENOL 1 SPRAY: 1.5 LIQUID ORAL at 20:29

## 2019-07-25 RX ADMIN — PHENOL 1 SPRAY: 1.5 LIQUID ORAL at 11:45

## 2019-07-25 RX ADMIN — HYDRALAZINE HYDROCHLORIDE 10 MG: 20 INJECTION INTRAMUSCULAR; INTRAVENOUS at 10:31

## 2019-07-25 RX ADMIN — POTASSIUM CHLORIDE, DEXTROSE MONOHYDRATE AND SODIUM CHLORIDE 40 ML/HR: 150; 5; 450 INJECTION, SOLUTION INTRAVENOUS at 14:23

## 2019-07-25 RX ADMIN — FENTANYL CITRATE 25 MCG: 50 INJECTION INTRAMUSCULAR; INTRAVENOUS at 00:05

## 2019-07-25 RX ADMIN — HYDROMORPHONE HYDROCHLORIDE 0.25 MG: 1 INJECTION, SOLUTION INTRAMUSCULAR; INTRAVENOUS; SUBCUTANEOUS at 22:40

## 2019-07-25 RX ADMIN — DEXMEDETOMIDINE HYDROCHLORIDE 0.5 MCG/KG/HR: 100 INJECTION, SOLUTION, CONCENTRATE INTRAVENOUS at 03:37

## 2019-07-25 RX ADMIN — MUPIROCIN 1 APPLICATION: 20 OINTMENT TOPICAL at 09:33

## 2019-07-25 RX ADMIN — ACETAMINOPHEN 650 MG: 650 SUPPOSITORY RECTAL at 17:35

## 2019-07-25 RX ADMIN — HYDROMORPHONE HYDROCHLORIDE 0.25 MG: 1 INJECTION, SOLUTION INTRAMUSCULAR; INTRAVENOUS; SUBCUTANEOUS at 09:46

## 2019-07-25 RX ADMIN — ENOXAPARIN SODIUM 40 MG: 40 INJECTION SUBCUTANEOUS at 20:17

## 2019-07-25 RX ADMIN — FUROSEMIDE 20 MG: 10 INJECTION, SOLUTION INTRAMUSCULAR; INTRAVENOUS at 17:58

## 2019-07-25 RX ADMIN — MUPIROCIN 1 APPLICATION: 20 OINTMENT TOPICAL at 20:17

## 2019-07-25 RX ADMIN — HYDROMORPHONE HYDROCHLORIDE 0.25 MG: 1 INJECTION, SOLUTION INTRAMUSCULAR; INTRAVENOUS; SUBCUTANEOUS at 15:31

## 2019-07-25 RX ADMIN — ASPIRIN 300 MG: 600 SUPPOSITORY RECTAL at 12:32

## 2019-07-25 RX ADMIN — FENTANYL CITRATE 25 MCG: 50 INJECTION INTRAMUSCULAR; INTRAVENOUS at 07:48

## 2019-07-25 RX ADMIN — CHLORHEXIDINE GLUCONATE 15 ML: 1.2 RINSE ORAL at 03:37

## 2019-07-25 RX ADMIN — CEFAZOLIN SODIUM 2 G: 2 INJECTION, SOLUTION INTRAVENOUS at 04:30

## 2019-07-25 RX ADMIN — HYDROMORPHONE HYDROCHLORIDE 0.25 MG: 1 INJECTION, SOLUTION INTRAMUSCULAR; INTRAVENOUS; SUBCUTANEOUS at 20:19

## 2019-07-26 ENCOUNTER — APPOINTMENT (OUTPATIENT)
Dept: GENERAL RADIOLOGY | Facility: HOSPITAL | Age: 79
End: 2019-07-26

## 2019-07-26 LAB
ABO + RH BLD: NORMAL
ANION GAP SERPL CALCULATED.3IONS-SCNC: 8.2 MMOL/L (ref 5–15)
BH BB BLOOD EXPIRATION DATE: NORMAL
BH BB BLOOD TYPE BARCODE: 6200
BH BB DISPENSE STATUS: NORMAL
BH BB PRODUCT CODE: NORMAL
BH BB UNIT NUMBER: NORMAL
BUN BLD-MCNC: 16 MG/DL (ref 8–23)
BUN/CREAT SERPL: 27.6 (ref 7–25)
CALCIUM SPEC-SCNC: 7.1 MG/DL (ref 8.6–10.5)
CHLORIDE SERPL-SCNC: 108 MMOL/L (ref 98–107)
CO2 SERPL-SCNC: 26.8 MMOL/L (ref 22–29)
CREAT BLD-MCNC: 0.58 MG/DL (ref 0.57–1)
DEPRECATED RDW RBC AUTO: 57.6 FL (ref 37–54)
ERYTHROCYTE [DISTWIDTH] IN BLOOD BY AUTOMATED COUNT: 15.8 % (ref 12.3–15.4)
GFR SERPL CREATININE-BSD FRML MDRD: 101 ML/MIN/1.73
GLUCOSE BLD-MCNC: 128 MG/DL (ref 65–99)
GLUCOSE BLDC GLUCOMTR-MCNC: 119 MG/DL (ref 70–130)
GLUCOSE BLDC GLUCOMTR-MCNC: 126 MG/DL (ref 70–130)
GLUCOSE BLDC GLUCOMTR-MCNC: 127 MG/DL (ref 70–130)
GLUCOSE BLDC GLUCOMTR-MCNC: 84 MG/DL (ref 70–130)
HCT VFR BLD AUTO: 34.5 % (ref 34–46.6)
HGB BLD-MCNC: 10.5 G/DL (ref 12–15.9)
MCH RBC QN AUTO: 30.3 PG (ref 26.6–33)
MCHC RBC AUTO-ENTMCNC: 30.4 G/DL (ref 31.5–35.7)
MCV RBC AUTO: 99.4 FL (ref 79–97)
PLATELET # BLD AUTO: 162 10*3/MM3 (ref 140–450)
PMV BLD AUTO: 10 FL (ref 6–12)
POTASSIUM BLD-SCNC: 4.3 MMOL/L (ref 3.5–5.2)
RBC # BLD AUTO: 3.47 10*6/MM3 (ref 3.77–5.28)
SODIUM BLD-SCNC: 143 MMOL/L (ref 136–145)
UNIT  ABO: NORMAL
UNIT  RH: NORMAL
WBC NRBC COR # BLD: 9.97 10*3/MM3 (ref 3.4–10.8)

## 2019-07-26 PROCEDURE — 97110 THERAPEUTIC EXERCISES: CPT

## 2019-07-26 PROCEDURE — 85027 COMPLETE CBC AUTOMATED: CPT | Performed by: THORACIC SURGERY (CARDIOTHORACIC VASCULAR SURGERY)

## 2019-07-26 PROCEDURE — 25010000002 ONDANSETRON PER 1 MG: Performed by: THORACIC SURGERY (CARDIOTHORACIC VASCULAR SURGERY)

## 2019-07-26 PROCEDURE — 25010000002 HYDRALAZINE PER 20 MG: Performed by: NURSE PRACTITIONER

## 2019-07-26 PROCEDURE — 25010000002 HYDROMORPHONE PER 4 MG: Performed by: NURSE PRACTITIONER

## 2019-07-26 PROCEDURE — 99024 POSTOP FOLLOW-UP VISIT: CPT | Performed by: NURSE PRACTITIONER

## 2019-07-26 PROCEDURE — 82962 GLUCOSE BLOOD TEST: CPT

## 2019-07-26 PROCEDURE — 80048 BASIC METABOLIC PNL TOTAL CA: CPT | Performed by: THORACIC SURGERY (CARDIOTHORACIC VASCULAR SURGERY)

## 2019-07-26 PROCEDURE — 25010000002 ENOXAPARIN PER 10 MG: Performed by: NURSE PRACTITIONER

## 2019-07-26 PROCEDURE — 71045 X-RAY EXAM CHEST 1 VIEW: CPT

## 2019-07-26 PROCEDURE — 99232 SBSQ HOSP IP/OBS MODERATE 35: CPT | Performed by: INTERNAL MEDICINE

## 2019-07-26 RX ORDER — DEXTROSE, SODIUM CHLORIDE, SODIUM LACTATE, POTASSIUM CHLORIDE, AND CALCIUM CHLORIDE 5; .6; .31; .03; .02 G/100ML; G/100ML; G/100ML; G/100ML; G/100ML
30 INJECTION, SOLUTION INTRAVENOUS CONTINUOUS
Status: DISCONTINUED | OUTPATIENT
Start: 2019-07-26 | End: 2019-07-29

## 2019-07-26 RX ORDER — IPRATROPIUM BROMIDE AND ALBUTEROL SULFATE 2.5; .5 MG/3ML; MG/3ML
3 SOLUTION RESPIRATORY (INHALATION) EVERY 6 HOURS PRN
Status: DISCONTINUED | OUTPATIENT
Start: 2019-07-26 | End: 2019-08-02 | Stop reason: HOSPADM

## 2019-07-26 RX ADMIN — HYDROMORPHONE HYDROCHLORIDE 0.25 MG: 1 INJECTION, SOLUTION INTRAMUSCULAR; INTRAVENOUS; SUBCUTANEOUS at 18:04

## 2019-07-26 RX ADMIN — ENOXAPARIN SODIUM 40 MG: 40 INJECTION SUBCUTANEOUS at 20:28

## 2019-07-26 RX ADMIN — MUPIROCIN 1 APPLICATION: 20 OINTMENT TOPICAL at 09:18

## 2019-07-26 RX ADMIN — METOPROLOL TARTRATE 2.5 MG: 5 INJECTION, SOLUTION INTRAVENOUS at 18:37

## 2019-07-26 RX ADMIN — MUPIROCIN 1 APPLICATION: 20 OINTMENT TOPICAL at 20:29

## 2019-07-26 RX ADMIN — HYDROMORPHONE HYDROCHLORIDE 0.25 MG: 1 INJECTION, SOLUTION INTRAMUSCULAR; INTRAVENOUS; SUBCUTANEOUS at 04:09

## 2019-07-26 RX ADMIN — PANTOPRAZOLE SODIUM 40 MG: 40 INJECTION, POWDER, FOR SOLUTION INTRAVENOUS at 06:41

## 2019-07-26 RX ADMIN — HYDROMORPHONE HYDROCHLORIDE 0.25 MG: 1 INJECTION, SOLUTION INTRAMUSCULAR; INTRAVENOUS; SUBCUTANEOUS at 09:19

## 2019-07-26 RX ADMIN — HYDROMORPHONE HYDROCHLORIDE 0.25 MG: 1 INJECTION, SOLUTION INTRAMUSCULAR; INTRAVENOUS; SUBCUTANEOUS at 15:37

## 2019-07-26 RX ADMIN — ONDANSETRON 4 MG: 2 INJECTION INTRAMUSCULAR; INTRAVENOUS at 07:19

## 2019-07-26 RX ADMIN — METOPROLOL TARTRATE 2.5 MG: 5 INJECTION, SOLUTION INTRAVENOUS at 10:43

## 2019-07-26 RX ADMIN — CHLORHEXIDINE GLUCONATE 15 ML: 1.2 RINSE ORAL at 04:09

## 2019-07-26 RX ADMIN — ASPIRIN 150 MG: 600 SUPPOSITORY RECTAL at 10:43

## 2019-07-26 RX ADMIN — HYDROMORPHONE HYDROCHLORIDE 0.25 MG: 1 INJECTION, SOLUTION INTRAMUSCULAR; INTRAVENOUS; SUBCUTANEOUS at 11:22

## 2019-07-26 RX ADMIN — METOPROLOL TARTRATE 2.5 MG: 5 INJECTION, SOLUTION INTRAVENOUS at 04:09

## 2019-07-26 RX ADMIN — HYDRALAZINE HYDROCHLORIDE 10 MG: 20 INJECTION INTRAMUSCULAR; INTRAVENOUS at 11:57

## 2019-07-26 RX ADMIN — HYDROMORPHONE HYDROCHLORIDE 0.25 MG: 1 INJECTION, SOLUTION INTRAMUSCULAR; INTRAVENOUS; SUBCUTANEOUS at 13:36

## 2019-07-26 RX ADMIN — HYDROMORPHONE HYDROCHLORIDE 0.25 MG: 1 INJECTION, SOLUTION INTRAMUSCULAR; INTRAVENOUS; SUBCUTANEOUS at 07:19

## 2019-07-26 RX ADMIN — SODIUM CHLORIDE, SODIUM LACTATE, POTASSIUM CHLORIDE, CALCIUM CHLORIDE AND DEXTROSE MONOHYDRATE 30 ML/HR: 5; 600; 310; 30; 20 INJECTION, SOLUTION INTRAVENOUS at 12:50

## 2019-07-26 RX ADMIN — CHLORHEXIDINE GLUCONATE 15 ML: 1.2 RINSE ORAL at 16:35

## 2019-07-27 LAB
ANION GAP SERPL CALCULATED.3IONS-SCNC: 13.7 MMOL/L (ref 5–15)
BUN BLD-MCNC: 16 MG/DL (ref 8–23)
BUN/CREAT SERPL: 39 (ref 7–25)
CALCIUM SPEC-SCNC: 7.9 MG/DL (ref 8.6–10.5)
CHLORIDE SERPL-SCNC: 106 MMOL/L (ref 98–107)
CO2 SERPL-SCNC: 20.3 MMOL/L (ref 22–29)
CREAT BLD-MCNC: 0.41 MG/DL (ref 0.57–1)
DEPRECATED RDW RBC AUTO: 56.1 FL (ref 37–54)
ERYTHROCYTE [DISTWIDTH] IN BLOOD BY AUTOMATED COUNT: 15.1 % (ref 12.3–15.4)
GFR SERPL CREATININE-BSD FRML MDRD: 150 ML/MIN/1.73
GLUCOSE BLD-MCNC: 123 MG/DL (ref 65–99)
GLUCOSE BLDC GLUCOMTR-MCNC: 104 MG/DL (ref 70–130)
GLUCOSE BLDC GLUCOMTR-MCNC: 109 MG/DL (ref 70–130)
GLUCOSE BLDC GLUCOMTR-MCNC: 115 MG/DL (ref 70–130)
GLUCOSE BLDC GLUCOMTR-MCNC: 94 MG/DL (ref 70–130)
HCT VFR BLD AUTO: 39.5 % (ref 34–46.6)
HGB BLD-MCNC: 11.7 G/DL (ref 12–15.9)
MCH RBC QN AUTO: 29.5 PG (ref 26.6–33)
MCHC RBC AUTO-ENTMCNC: 29.6 G/DL (ref 31.5–35.7)
MCV RBC AUTO: 99.7 FL (ref 79–97)
PLATELET # BLD AUTO: 185 10*3/MM3 (ref 140–450)
PMV BLD AUTO: 10.3 FL (ref 6–12)
POTASSIUM BLD-SCNC: 4.3 MMOL/L (ref 3.5–5.2)
RBC # BLD AUTO: 3.96 10*6/MM3 (ref 3.77–5.28)
SODIUM BLD-SCNC: 140 MMOL/L (ref 136–145)
WBC NRBC COR # BLD: 9.51 10*3/MM3 (ref 3.4–10.8)

## 2019-07-27 PROCEDURE — 25010000002 ENOXAPARIN PER 10 MG: Performed by: NURSE PRACTITIONER

## 2019-07-27 PROCEDURE — 80048 BASIC METABOLIC PNL TOTAL CA: CPT | Performed by: THORACIC SURGERY (CARDIOTHORACIC VASCULAR SURGERY)

## 2019-07-27 PROCEDURE — 82962 GLUCOSE BLOOD TEST: CPT

## 2019-07-27 PROCEDURE — 94799 UNLISTED PULMONARY SVC/PX: CPT

## 2019-07-27 PROCEDURE — 25010000002 HYDRALAZINE PER 20 MG: Performed by: NURSE PRACTITIONER

## 2019-07-27 PROCEDURE — 25010000002 HYDROMORPHONE PER 4 MG: Performed by: NURSE PRACTITIONER

## 2019-07-27 PROCEDURE — 97110 THERAPEUTIC EXERCISES: CPT

## 2019-07-27 PROCEDURE — 85027 COMPLETE CBC AUTOMATED: CPT | Performed by: THORACIC SURGERY (CARDIOTHORACIC VASCULAR SURGERY)

## 2019-07-27 RX ADMIN — HYDROMORPHONE HYDROCHLORIDE 0.25 MG: 1 INJECTION, SOLUTION INTRAMUSCULAR; INTRAVENOUS; SUBCUTANEOUS at 17:02

## 2019-07-27 RX ADMIN — ASPIRIN 300 MG: 600 SUPPOSITORY RECTAL at 12:15

## 2019-07-27 RX ADMIN — HYDROMORPHONE HYDROCHLORIDE 0.25 MG: 1 INJECTION, SOLUTION INTRAMUSCULAR; INTRAVENOUS; SUBCUTANEOUS at 21:18

## 2019-07-27 RX ADMIN — HYDRALAZINE HYDROCHLORIDE 10 MG: 20 INJECTION INTRAMUSCULAR; INTRAVENOUS at 18:27

## 2019-07-27 RX ADMIN — HYDROMORPHONE HYDROCHLORIDE 0.25 MG: 1 INJECTION, SOLUTION INTRAMUSCULAR; INTRAVENOUS; SUBCUTANEOUS at 03:45

## 2019-07-27 RX ADMIN — HYDROMORPHONE HYDROCHLORIDE 0.25 MG: 1 INJECTION, SOLUTION INTRAMUSCULAR; INTRAVENOUS; SUBCUTANEOUS at 11:38

## 2019-07-27 RX ADMIN — HYDROMORPHONE HYDROCHLORIDE 0.25 MG: 1 INJECTION, SOLUTION INTRAMUSCULAR; INTRAVENOUS; SUBCUTANEOUS at 07:00

## 2019-07-27 RX ADMIN — HYDROMORPHONE HYDROCHLORIDE 0.25 MG: 1 INJECTION, SOLUTION INTRAMUSCULAR; INTRAVENOUS; SUBCUTANEOUS at 13:54

## 2019-07-27 RX ADMIN — METOPROLOL TARTRATE 2.5 MG: 5 INJECTION, SOLUTION INTRAVENOUS at 18:25

## 2019-07-27 RX ADMIN — METOPROLOL TARTRATE 2.5 MG: 5 INJECTION, SOLUTION INTRAVENOUS at 12:35

## 2019-07-27 RX ADMIN — MUPIROCIN 1 APPLICATION: 20 OINTMENT TOPICAL at 12:15

## 2019-07-27 RX ADMIN — METOPROLOL TARTRATE 2.5 MG: 5 INJECTION, SOLUTION INTRAVENOUS at 00:50

## 2019-07-27 RX ADMIN — MUPIROCIN 1 APPLICATION: 20 OINTMENT TOPICAL at 21:10

## 2019-07-27 RX ADMIN — CHLORHEXIDINE GLUCONATE 15 ML: 1.2 RINSE ORAL at 04:33

## 2019-07-27 RX ADMIN — ENOXAPARIN SODIUM 40 MG: 40 INJECTION SUBCUTANEOUS at 21:10

## 2019-07-27 RX ADMIN — METOPROLOL TARTRATE 2.5 MG: 5 INJECTION, SOLUTION INTRAVENOUS at 05:53

## 2019-07-27 RX ADMIN — HYDRALAZINE HYDROCHLORIDE 10 MG: 20 INJECTION INTRAMUSCULAR; INTRAVENOUS at 06:37

## 2019-07-27 RX ADMIN — PANTOPRAZOLE SODIUM 40 MG: 40 INJECTION, POWDER, FOR SOLUTION INTRAVENOUS at 05:53

## 2019-07-28 ENCOUNTER — APPOINTMENT (OUTPATIENT)
Dept: CARDIOLOGY | Facility: HOSPITAL | Age: 79
End: 2019-07-28

## 2019-07-28 LAB
ANION GAP SERPL CALCULATED.3IONS-SCNC: 14.4 MMOL/L (ref 5–15)
BH CV UPPER VENOUS LEFT AXILLARY AUGMENT: NORMAL
BH CV UPPER VENOUS LEFT AXILLARY COMPETENT: NORMAL
BH CV UPPER VENOUS LEFT AXILLARY COMPRESS: NORMAL
BH CV UPPER VENOUS LEFT AXILLARY PHASIC: NORMAL
BH CV UPPER VENOUS LEFT AXILLARY SPONT: NORMAL
BH CV UPPER VENOUS LEFT BASILIC FOREARM COLOR: 1
BH CV UPPER VENOUS LEFT BASILIC FOREARM COMPRESS: NORMAL
BH CV UPPER VENOUS LEFT BASILIC FOREARM THROMBUS: NORMAL
BH CV UPPER VENOUS LEFT BASILIC UPPER COLOR: 1
BH CV UPPER VENOUS LEFT BASILIC UPPER COMPRESS: NORMAL
BH CV UPPER VENOUS LEFT BASILIC UPPER THROMBUS: NORMAL
BH CV UPPER VENOUS LEFT BRACHIAL COMPRESS: NORMAL
BH CV UPPER VENOUS LEFT CEPHALIC FOREARM COLOR: 1
BH CV UPPER VENOUS LEFT CEPHALIC FOREARM COMPRESS: NORMAL
BH CV UPPER VENOUS LEFT CEPHALIC FOREARM THROMBUS: NORMAL
BH CV UPPER VENOUS LEFT CEPHALIC UPPER COLOR: 1
BH CV UPPER VENOUS LEFT CEPHALIC UPPER COMPRESS: NORMAL
BH CV UPPER VENOUS LEFT CEPHALIC UPPER THROMBUS: NORMAL
BH CV UPPER VENOUS LEFT INTERNAL JUGULAR AUGMENT: NORMAL
BH CV UPPER VENOUS LEFT INTERNAL JUGULAR COMPETENT: NORMAL
BH CV UPPER VENOUS LEFT INTERNAL JUGULAR COMPRESS: NORMAL
BH CV UPPER VENOUS LEFT INTERNAL JUGULAR PHASIC: NORMAL
BH CV UPPER VENOUS LEFT INTERNAL JUGULAR SPONT: NORMAL
BH CV UPPER VENOUS LEFT RADIAL COMPRESS: NORMAL
BH CV UPPER VENOUS LEFT SUBCLAVIAN AUGMENT: NORMAL
BH CV UPPER VENOUS LEFT SUBCLAVIAN COMPETENT: NORMAL
BH CV UPPER VENOUS LEFT SUBCLAVIAN COMPRESS: NORMAL
BH CV UPPER VENOUS LEFT SUBCLAVIAN PHASIC: NORMAL
BH CV UPPER VENOUS LEFT SUBCLAVIAN SPONT: NORMAL
BH CV UPPER VENOUS LEFT ULNAR COMPRESS: NORMAL
BH CV UPPER VENOUS RIGHT SUBCLAVIAN AUGMENT: NORMAL
BH CV UPPER VENOUS RIGHT SUBCLAVIAN COMPETENT: NORMAL
BH CV UPPER VENOUS RIGHT SUBCLAVIAN COMPRESS: NORMAL
BH CV UPPER VENOUS RIGHT SUBCLAVIAN PHASIC: NORMAL
BH CV UPPER VENOUS RIGHT SUBCLAVIAN SPONT: NORMAL
BUN BLD-MCNC: 15 MG/DL (ref 8–23)
BUN/CREAT SERPL: 34.9 (ref 7–25)
CALCIUM SPEC-SCNC: 8.2 MG/DL (ref 8.6–10.5)
CHLORIDE SERPL-SCNC: 109 MMOL/L (ref 98–107)
CO2 SERPL-SCNC: 20.6 MMOL/L (ref 22–29)
CREAT BLD-MCNC: 0.43 MG/DL (ref 0.57–1)
GFR SERPL CREATININE-BSD FRML MDRD: 142 ML/MIN/1.73
GLUCOSE BLD-MCNC: 119 MG/DL (ref 65–99)
GLUCOSE BLDC GLUCOMTR-MCNC: 106 MG/DL (ref 70–130)
GLUCOSE BLDC GLUCOMTR-MCNC: 109 MG/DL (ref 70–130)
GLUCOSE BLDC GLUCOMTR-MCNC: 127 MG/DL (ref 70–130)
GLUCOSE BLDC GLUCOMTR-MCNC: 127 MG/DL (ref 70–130)
POTASSIUM BLD-SCNC: 3.9 MMOL/L (ref 3.5–5.2)
SODIUM BLD-SCNC: 144 MMOL/L (ref 136–145)

## 2019-07-28 PROCEDURE — 93971 EXTREMITY STUDY: CPT

## 2019-07-28 PROCEDURE — 82962 GLUCOSE BLOOD TEST: CPT

## 2019-07-28 PROCEDURE — 25010000002 ENOXAPARIN PER 10 MG: Performed by: NURSE PRACTITIONER

## 2019-07-28 PROCEDURE — 80048 BASIC METABOLIC PNL TOTAL CA: CPT | Performed by: THORACIC SURGERY (CARDIOTHORACIC VASCULAR SURGERY)

## 2019-07-28 PROCEDURE — 25010000002 HYDROMORPHONE PER 4 MG: Performed by: NURSE PRACTITIONER

## 2019-07-28 PROCEDURE — 97110 THERAPEUTIC EXERCISES: CPT

## 2019-07-28 PROCEDURE — 25010000002 HYDRALAZINE PER 20 MG: Performed by: NURSE PRACTITIONER

## 2019-07-28 RX ORDER — ASPIRIN 81 MG/1
81 TABLET ORAL DAILY
Status: DISCONTINUED | OUTPATIENT
Start: 2019-07-28 | End: 2019-08-02 | Stop reason: HOSPADM

## 2019-07-28 RX ADMIN — PANTOPRAZOLE SODIUM 40 MG: 40 INJECTION, POWDER, FOR SOLUTION INTRAVENOUS at 05:38

## 2019-07-28 RX ADMIN — HYDROMORPHONE HYDROCHLORIDE 0.25 MG: 1 INJECTION, SOLUTION INTRAMUSCULAR; INTRAVENOUS; SUBCUTANEOUS at 06:16

## 2019-07-28 RX ADMIN — ASPIRIN 81 MG: 81 TABLET, COATED ORAL at 11:03

## 2019-07-28 RX ADMIN — ENOXAPARIN SODIUM 40 MG: 40 INJECTION SUBCUTANEOUS at 20:40

## 2019-07-28 RX ADMIN — HYDROMORPHONE HYDROCHLORIDE 0.25 MG: 1 INJECTION, SOLUTION INTRAMUSCULAR; INTRAVENOUS; SUBCUTANEOUS at 14:56

## 2019-07-28 RX ADMIN — METOPROLOL TARTRATE 2.5 MG: 5 INJECTION, SOLUTION INTRAVENOUS at 05:38

## 2019-07-28 RX ADMIN — HYDROMORPHONE HYDROCHLORIDE 0.25 MG: 1 INJECTION, SOLUTION INTRAMUSCULAR; INTRAVENOUS; SUBCUTANEOUS at 03:15

## 2019-07-28 RX ADMIN — METOPROLOL TARTRATE 25 MG: 25 TABLET ORAL at 11:03

## 2019-07-28 RX ADMIN — MUPIROCIN 1 APPLICATION: 20 OINTMENT TOPICAL at 09:13

## 2019-07-28 RX ADMIN — HYDROMORPHONE HYDROCHLORIDE 0.25 MG: 1 INJECTION, SOLUTION INTRAMUSCULAR; INTRAVENOUS; SUBCUTANEOUS at 18:15

## 2019-07-28 RX ADMIN — METOPROLOL TARTRATE 25 MG: 25 TABLET ORAL at 20:41

## 2019-07-28 RX ADMIN — METOPROLOL TARTRATE 2.5 MG: 5 INJECTION, SOLUTION INTRAVENOUS at 00:16

## 2019-07-28 RX ADMIN — HYDROMORPHONE HYDROCHLORIDE 0.25 MG: 1 INJECTION, SOLUTION INTRAMUSCULAR; INTRAVENOUS; SUBCUTANEOUS at 20:41

## 2019-07-28 RX ADMIN — HYDROMORPHONE HYDROCHLORIDE 0.25 MG: 1 INJECTION, SOLUTION INTRAMUSCULAR; INTRAVENOUS; SUBCUTANEOUS at 09:13

## 2019-07-28 RX ADMIN — HYDROMORPHONE HYDROCHLORIDE 0.25 MG: 1 INJECTION, SOLUTION INTRAMUSCULAR; INTRAVENOUS; SUBCUTANEOUS at 00:32

## 2019-07-28 RX ADMIN — HYDRALAZINE HYDROCHLORIDE 10 MG: 20 INJECTION INTRAMUSCULAR; INTRAVENOUS at 03:18

## 2019-07-29 ENCOUNTER — APPOINTMENT (OUTPATIENT)
Dept: GENERAL RADIOLOGY | Facility: HOSPITAL | Age: 79
End: 2019-07-29

## 2019-07-29 LAB
ANION GAP SERPL CALCULATED.3IONS-SCNC: 15.7 MMOL/L (ref 5–15)
BUN BLD-MCNC: 13 MG/DL (ref 8–23)
BUN/CREAT SERPL: 37.1 (ref 7–25)
CALCIUM SPEC-SCNC: 8 MG/DL (ref 8.6–10.5)
CHLORIDE SERPL-SCNC: 99 MMOL/L (ref 98–107)
CO2 SERPL-SCNC: 22.3 MMOL/L (ref 22–29)
CREAT BLD-MCNC: 0.35 MG/DL (ref 0.57–1)
DEPRECATED RDW RBC AUTO: 48.3 FL (ref 37–54)
ERYTHROCYTE [DISTWIDTH] IN BLOOD BY AUTOMATED COUNT: 14.1 % (ref 12.3–15.4)
GFR SERPL CREATININE-BSD FRML MDRD: >150 ML/MIN/1.73
GLUCOSE BLD-MCNC: 129 MG/DL (ref 65–99)
GLUCOSE BLDC GLUCOMTR-MCNC: 125 MG/DL (ref 70–130)
HCT VFR BLD AUTO: 35.8 % (ref 34–46.6)
HGB BLD-MCNC: 11.7 G/DL (ref 12–15.9)
LAB AP CASE REPORT: NORMAL
MCH RBC QN AUTO: 30.5 PG (ref 26.6–33)
MCHC RBC AUTO-ENTMCNC: 32.7 G/DL (ref 31.5–35.7)
MCV RBC AUTO: 93.5 FL (ref 79–97)
PATH REPORT.FINAL DX SPEC: NORMAL
PATH REPORT.GROSS SPEC: NORMAL
PLATELET # BLD AUTO: 256 10*3/MM3 (ref 140–450)
PMV BLD AUTO: 9.7 FL (ref 6–12)
POTASSIUM BLD-SCNC: 3.3 MMOL/L (ref 3.5–5.2)
RBC # BLD AUTO: 3.83 10*6/MM3 (ref 3.77–5.28)
SODIUM BLD-SCNC: 137 MMOL/L (ref 136–145)
WBC NRBC COR # BLD: 7.66 10*3/MM3 (ref 3.4–10.8)

## 2019-07-29 PROCEDURE — 80048 BASIC METABOLIC PNL TOTAL CA: CPT | Performed by: THORACIC SURGERY (CARDIOTHORACIC VASCULAR SURGERY)

## 2019-07-29 PROCEDURE — 25010000002 HYDRALAZINE PER 20 MG: Performed by: NURSE PRACTITIONER

## 2019-07-29 PROCEDURE — 94799 UNLISTED PULMONARY SVC/PX: CPT

## 2019-07-29 PROCEDURE — 71045 X-RAY EXAM CHEST 1 VIEW: CPT

## 2019-07-29 PROCEDURE — 82962 GLUCOSE BLOOD TEST: CPT

## 2019-07-29 PROCEDURE — 99024 POSTOP FOLLOW-UP VISIT: CPT | Performed by: NURSE PRACTITIONER

## 2019-07-29 PROCEDURE — 25010000002 ENOXAPARIN PER 10 MG: Performed by: NURSE PRACTITIONER

## 2019-07-29 PROCEDURE — 25010000002 HYDROMORPHONE PER 4 MG: Performed by: NURSE PRACTITIONER

## 2019-07-29 PROCEDURE — 85027 COMPLETE CBC AUTOMATED: CPT | Performed by: THORACIC SURGERY (CARDIOTHORACIC VASCULAR SURGERY)

## 2019-07-29 PROCEDURE — 99232 SBSQ HOSP IP/OBS MODERATE 35: CPT | Performed by: INTERNAL MEDICINE

## 2019-07-29 PROCEDURE — 97110 THERAPEUTIC EXERCISES: CPT | Performed by: PHYSICAL THERAPIST

## 2019-07-29 PROCEDURE — 94640 AIRWAY INHALATION TREATMENT: CPT

## 2019-07-29 RX ORDER — HYDROCODONE BITARTRATE AND ACETAMINOPHEN 5; 325 MG/1; MG/1
1 TABLET ORAL EVERY 4 HOURS PRN
Status: DISCONTINUED | OUTPATIENT
Start: 2019-07-29 | End: 2019-08-02 | Stop reason: HOSPADM

## 2019-07-29 RX ORDER — OXYBUTYNIN CHLORIDE 10 MG/1
10 TABLET, EXTENDED RELEASE ORAL DAILY
Status: DISCONTINUED | OUTPATIENT
Start: 2019-07-29 | End: 2019-08-02 | Stop reason: HOSPADM

## 2019-07-29 RX ORDER — NEBIVOLOL 10 MG/1
10 TABLET ORAL
Status: DISCONTINUED | OUTPATIENT
Start: 2019-07-29 | End: 2019-08-01

## 2019-07-29 RX ORDER — PANTOPRAZOLE SODIUM 40 MG/1
40 TABLET, DELAYED RELEASE ORAL
Status: DISCONTINUED | OUTPATIENT
Start: 2019-07-30 | End: 2019-08-02 | Stop reason: HOSPADM

## 2019-07-29 RX ORDER — TRAMADOL HYDROCHLORIDE 50 MG/1
50 TABLET ORAL EVERY 6 HOURS PRN
Status: DISCONTINUED | OUTPATIENT
Start: 2019-07-29 | End: 2019-08-02 | Stop reason: HOSPADM

## 2019-07-29 RX ADMIN — HYDROMORPHONE HYDROCHLORIDE 0.25 MG: 1 INJECTION, SOLUTION INTRAMUSCULAR; INTRAVENOUS; SUBCUTANEOUS at 06:13

## 2019-07-29 RX ADMIN — OXYBUTYNIN CHLORIDE 10 MG: 10 TABLET, EXTENDED RELEASE ORAL at 13:44

## 2019-07-29 RX ADMIN — IPRATROPIUM BROMIDE AND ALBUTEROL SULFATE 3 ML: 2.5; .5 SOLUTION RESPIRATORY (INHALATION) at 09:08

## 2019-07-29 RX ADMIN — ASPIRIN 81 MG: 81 TABLET, COATED ORAL at 08:33

## 2019-07-29 RX ADMIN — ENOXAPARIN SODIUM 40 MG: 40 INJECTION SUBCUTANEOUS at 21:44

## 2019-07-29 RX ADMIN — HYDROCODONE BITARTRATE AND ACETAMINOPHEN 1 TABLET: 5; 325 TABLET ORAL at 18:30

## 2019-07-29 RX ADMIN — HYDRALAZINE HYDROCHLORIDE 10 MG: 20 INJECTION INTRAMUSCULAR; INTRAVENOUS at 13:44

## 2019-07-29 RX ADMIN — METOPROLOL TARTRATE 25 MG: 25 TABLET ORAL at 08:33

## 2019-07-29 RX ADMIN — HYDRALAZINE HYDROCHLORIDE 10 MG: 20 INJECTION INTRAMUSCULAR; INTRAVENOUS at 23:12

## 2019-07-29 RX ADMIN — HYDRALAZINE HYDROCHLORIDE 10 MG: 20 INJECTION INTRAMUSCULAR; INTRAVENOUS at 00:56

## 2019-07-29 RX ADMIN — HYDROCODONE BITARTRATE AND ACETAMINOPHEN 1 TABLET: 5; 325 TABLET ORAL at 12:29

## 2019-07-29 RX ADMIN — MUPIROCIN 1 APPLICATION: 20 OINTMENT TOPICAL at 08:33

## 2019-07-29 RX ADMIN — PANTOPRAZOLE SODIUM 40 MG: 40 INJECTION, POWDER, FOR SOLUTION INTRAVENOUS at 06:13

## 2019-07-29 RX ADMIN — HYDROCODONE BITARTRATE AND ACETAMINOPHEN 1 TABLET: 5; 325 TABLET ORAL at 22:31

## 2019-07-29 RX ADMIN — HYDROMORPHONE HYDROCHLORIDE 0.25 MG: 1 INJECTION, SOLUTION INTRAMUSCULAR; INTRAVENOUS; SUBCUTANEOUS at 08:48

## 2019-07-29 RX ADMIN — NEBIVOLOL HYDROCHLORIDE 10 MG: 10 TABLET ORAL at 13:44

## 2019-07-29 RX ADMIN — HYDROMORPHONE HYDROCHLORIDE 0.25 MG: 1 INJECTION, SOLUTION INTRAMUSCULAR; INTRAVENOUS; SUBCUTANEOUS at 00:52

## 2019-07-30 ENCOUNTER — APPOINTMENT (OUTPATIENT)
Dept: GENERAL RADIOLOGY | Facility: HOSPITAL | Age: 79
End: 2019-07-30

## 2019-07-30 LAB
ANION GAP SERPL CALCULATED.3IONS-SCNC: 13.7 MMOL/L (ref 5–15)
BUN BLD-MCNC: 12 MG/DL (ref 8–23)
BUN/CREAT SERPL: 26.7 (ref 7–25)
CALCIUM SPEC-SCNC: 8.3 MG/DL (ref 8.6–10.5)
CHLORIDE SERPL-SCNC: 99 MMOL/L (ref 98–107)
CO2 SERPL-SCNC: 24.3 MMOL/L (ref 22–29)
CREAT BLD-MCNC: 0.45 MG/DL (ref 0.57–1)
GFR SERPL CREATININE-BSD FRML MDRD: 134 ML/MIN/1.73
GLUCOSE BLD-MCNC: 129 MG/DL (ref 65–99)
POTASSIUM BLD-SCNC: 3.2 MMOL/L (ref 3.5–5.2)
SODIUM BLD-SCNC: 137 MMOL/L (ref 136–145)

## 2019-07-30 PROCEDURE — 25010000002 ONDANSETRON PER 1 MG: Performed by: THORACIC SURGERY (CARDIOTHORACIC VASCULAR SURGERY)

## 2019-07-30 PROCEDURE — 25010000002 ENOXAPARIN PER 10 MG: Performed by: NURSE PRACTITIONER

## 2019-07-30 PROCEDURE — 99024 POSTOP FOLLOW-UP VISIT: CPT | Performed by: NURSE PRACTITIONER

## 2019-07-30 PROCEDURE — 25010000003 POTASSIUM CHLORIDE 10 MEQ/100ML SOLUTION: Performed by: THORACIC SURGERY (CARDIOTHORACIC VASCULAR SURGERY)

## 2019-07-30 PROCEDURE — 25010000002 HYDROMORPHONE PER 4 MG: Performed by: NURSE PRACTITIONER

## 2019-07-30 PROCEDURE — 80048 BASIC METABOLIC PNL TOTAL CA: CPT | Performed by: THORACIC SURGERY (CARDIOTHORACIC VASCULAR SURGERY)

## 2019-07-30 PROCEDURE — 71045 X-RAY EXAM CHEST 1 VIEW: CPT

## 2019-07-30 PROCEDURE — 97110 THERAPEUTIC EXERCISES: CPT

## 2019-07-30 RX ORDER — POTASSIUM CHLORIDE 1.5 G/1.77G
40 POWDER, FOR SOLUTION ORAL AS NEEDED
Status: DISCONTINUED | OUTPATIENT
Start: 2019-07-30 | End: 2019-08-02 | Stop reason: HOSPADM

## 2019-07-30 RX ORDER — POTASSIUM CHLORIDE 7.45 MG/ML
10 INJECTION INTRAVENOUS
Status: DISCONTINUED | OUTPATIENT
Start: 2019-07-30 | End: 2019-08-02 | Stop reason: HOSPADM

## 2019-07-30 RX ORDER — POTASSIUM CHLORIDE 750 MG/1
40 CAPSULE, EXTENDED RELEASE ORAL AS NEEDED
Status: DISCONTINUED | OUTPATIENT
Start: 2019-07-30 | End: 2019-08-02 | Stop reason: HOSPADM

## 2019-07-30 RX ADMIN — POTASSIUM CHLORIDE 10 MEQ: 7.46 INJECTION, SOLUTION INTRAVENOUS at 16:39

## 2019-07-30 RX ADMIN — ENOXAPARIN SODIUM 40 MG: 40 INJECTION SUBCUTANEOUS at 20:48

## 2019-07-30 RX ADMIN — MUPIROCIN 1 APPLICATION: 20 OINTMENT TOPICAL at 20:47

## 2019-07-30 RX ADMIN — HYDROCODONE BITARTRATE AND ACETAMINOPHEN 1 TABLET: 5; 325 TABLET ORAL at 23:29

## 2019-07-30 RX ADMIN — POTASSIUM CHLORIDE 10 MEQ: 7.46 INJECTION, SOLUTION INTRAVENOUS at 10:22

## 2019-07-30 RX ADMIN — HYDROMORPHONE HYDROCHLORIDE 0.25 MG: 1 INJECTION, SOLUTION INTRAMUSCULAR; INTRAVENOUS; SUBCUTANEOUS at 12:39

## 2019-07-30 RX ADMIN — ASPIRIN 81 MG: 81 TABLET, COATED ORAL at 09:52

## 2019-07-30 RX ADMIN — HYDROCODONE BITARTRATE AND ACETAMINOPHEN 1 TABLET: 5; 325 TABLET ORAL at 14:37

## 2019-07-30 RX ADMIN — HYDROCODONE BITARTRATE AND ACETAMINOPHEN 1 TABLET: 5; 325 TABLET ORAL at 09:52

## 2019-07-30 RX ADMIN — PANTOPRAZOLE SODIUM 40 MG: 40 TABLET, DELAYED RELEASE ORAL at 06:15

## 2019-07-30 RX ADMIN — MUPIROCIN 1 APPLICATION: 20 OINTMENT TOPICAL at 09:53

## 2019-07-30 RX ADMIN — NEBIVOLOL HYDROCHLORIDE 10 MG: 10 TABLET ORAL at 09:52

## 2019-07-30 RX ADMIN — POTASSIUM CHLORIDE 10 MEQ: 7.46 INJECTION, SOLUTION INTRAVENOUS at 17:55

## 2019-07-30 RX ADMIN — OXYBUTYNIN CHLORIDE 10 MG: 10 TABLET, EXTENDED RELEASE ORAL at 09:52

## 2019-07-30 RX ADMIN — ONDANSETRON 4 MG: 2 INJECTION INTRAMUSCULAR; INTRAVENOUS at 10:20

## 2019-07-30 RX ADMIN — HYDROCODONE BITARTRATE AND ACETAMINOPHEN 1 TABLET: 5; 325 TABLET ORAL at 18:57

## 2019-07-30 RX ADMIN — POTASSIUM CHLORIDE 10 MEQ: 7.46 INJECTION, SOLUTION INTRAVENOUS at 12:39

## 2019-07-31 LAB
ANION GAP SERPL CALCULATED.3IONS-SCNC: 11.3 MMOL/L (ref 5–15)
BUN BLD-MCNC: 12 MG/DL (ref 8–23)
BUN/CREAT SERPL: 26.1 (ref 7–25)
CALCIUM SPEC-SCNC: 8.1 MG/DL (ref 8.6–10.5)
CHLORIDE SERPL-SCNC: 104 MMOL/L (ref 98–107)
CO2 SERPL-SCNC: 24.7 MMOL/L (ref 22–29)
CREAT BLD-MCNC: 0.46 MG/DL (ref 0.57–1)
DEPRECATED RDW RBC AUTO: 50 FL (ref 37–54)
ERYTHROCYTE [DISTWIDTH] IN BLOOD BY AUTOMATED COUNT: 14.2 % (ref 12.3–15.4)
GFR SERPL CREATININE-BSD FRML MDRD: 131 ML/MIN/1.73
GLUCOSE BLD-MCNC: 110 MG/DL (ref 65–99)
HCT VFR BLD AUTO: 37.2 % (ref 34–46.6)
HGB BLD-MCNC: 11.7 G/DL (ref 12–15.9)
MCH RBC QN AUTO: 30.2 PG (ref 26.6–33)
MCHC RBC AUTO-ENTMCNC: 31.5 G/DL (ref 31.5–35.7)
MCV RBC AUTO: 95.9 FL (ref 79–97)
PLATELET # BLD AUTO: 351 10*3/MM3 (ref 140–450)
PMV BLD AUTO: 9.4 FL (ref 6–12)
POTASSIUM BLD-SCNC: 3.8 MMOL/L (ref 3.5–5.2)
RBC # BLD AUTO: 3.88 10*6/MM3 (ref 3.77–5.28)
SODIUM BLD-SCNC: 140 MMOL/L (ref 136–145)
WBC NRBC COR # BLD: 6.34 10*3/MM3 (ref 3.4–10.8)

## 2019-07-31 PROCEDURE — 99024 POSTOP FOLLOW-UP VISIT: CPT | Performed by: THORACIC SURGERY (CARDIOTHORACIC VASCULAR SURGERY)

## 2019-07-31 PROCEDURE — 97110 THERAPEUTIC EXERCISES: CPT

## 2019-07-31 PROCEDURE — 80048 BASIC METABOLIC PNL TOTAL CA: CPT | Performed by: THORACIC SURGERY (CARDIOTHORACIC VASCULAR SURGERY)

## 2019-07-31 PROCEDURE — 85027 COMPLETE CBC AUTOMATED: CPT | Performed by: THORACIC SURGERY (CARDIOTHORACIC VASCULAR SURGERY)

## 2019-07-31 PROCEDURE — 25010000002 HYDRALAZINE PER 20 MG: Performed by: NURSE PRACTITIONER

## 2019-07-31 PROCEDURE — 97535 SELF CARE MNGMENT TRAINING: CPT

## 2019-07-31 PROCEDURE — 25010000002 ENOXAPARIN PER 10 MG: Performed by: NURSE PRACTITIONER

## 2019-07-31 RX ORDER — AMLODIPINE BESYLATE 5 MG/1
5 TABLET ORAL
Status: DISCONTINUED | OUTPATIENT
Start: 2019-07-31 | End: 2019-07-31

## 2019-07-31 RX ORDER — CALCIUM CARBONATE 200(500)MG
2 TABLET,CHEWABLE ORAL 3 TIMES DAILY PRN
Status: DISCONTINUED | OUTPATIENT
Start: 2019-07-31 | End: 2019-08-02 | Stop reason: HOSPADM

## 2019-07-31 RX ORDER — AMLODIPINE BESYLATE 2.5 MG/1
2.5 TABLET ORAL
Status: DISCONTINUED | OUTPATIENT
Start: 2019-07-31 | End: 2019-08-02

## 2019-07-31 RX ORDER — HYDRALAZINE HYDROCHLORIDE 10 MG/1
10 TABLET, FILM COATED ORAL EVERY 8 HOURS SCHEDULED
Status: DISCONTINUED | OUTPATIENT
Start: 2019-07-31 | End: 2019-07-31

## 2019-07-31 RX ADMIN — HYDROCODONE BITARTRATE AND ACETAMINOPHEN 1 TABLET: 5; 325 TABLET ORAL at 03:37

## 2019-07-31 RX ADMIN — Medication 2 TABLET: at 15:33

## 2019-07-31 RX ADMIN — ENOXAPARIN SODIUM 40 MG: 40 INJECTION SUBCUTANEOUS at 20:55

## 2019-07-31 RX ADMIN — HYDRALAZINE HYDROCHLORIDE 10 MG: 20 INJECTION INTRAMUSCULAR; INTRAVENOUS at 05:17

## 2019-07-31 RX ADMIN — ASPIRIN 81 MG: 81 TABLET, COATED ORAL at 09:06

## 2019-07-31 RX ADMIN — MUPIROCIN 1 APPLICATION: 20 OINTMENT TOPICAL at 09:06

## 2019-07-31 RX ADMIN — NEBIVOLOL HYDROCHLORIDE 10 MG: 10 TABLET ORAL at 09:06

## 2019-07-31 RX ADMIN — PANTOPRAZOLE SODIUM 40 MG: 40 TABLET, DELAYED RELEASE ORAL at 05:18

## 2019-07-31 RX ADMIN — MUPIROCIN 1 APPLICATION: 20 OINTMENT TOPICAL at 20:56

## 2019-07-31 RX ADMIN — HYDROCODONE BITARTRATE AND ACETAMINOPHEN 1 TABLET: 5; 325 TABLET ORAL at 20:55

## 2019-07-31 RX ADMIN — OXYBUTYNIN CHLORIDE 10 MG: 10 TABLET, EXTENDED RELEASE ORAL at 09:06

## 2019-07-31 RX ADMIN — HYDROCODONE BITARTRATE AND ACETAMINOPHEN 1 TABLET: 5; 325 TABLET ORAL at 09:06

## 2019-07-31 RX ADMIN — AMLODIPINE BESYLATE 2.5 MG: 2.5 TABLET ORAL at 20:55

## 2019-08-01 LAB
ANION GAP SERPL CALCULATED.3IONS-SCNC: 12.3 MMOL/L (ref 5–15)
BUN BLD-MCNC: 11 MG/DL (ref 8–23)
BUN/CREAT SERPL: 20 (ref 7–25)
CALCIUM SPEC-SCNC: 8.4 MG/DL (ref 8.6–10.5)
CHLORIDE SERPL-SCNC: 97 MMOL/L (ref 98–107)
CO2 SERPL-SCNC: 24.7 MMOL/L (ref 22–29)
CREAT BLD-MCNC: 0.55 MG/DL (ref 0.57–1)
DEPRECATED RDW RBC AUTO: 49.2 FL (ref 37–54)
ERYTHROCYTE [DISTWIDTH] IN BLOOD BY AUTOMATED COUNT: 14.2 % (ref 12.3–15.4)
GFR SERPL CREATININE-BSD FRML MDRD: 107 ML/MIN/1.73
GLUCOSE BLD-MCNC: 110 MG/DL (ref 65–99)
GLUCOSE BLDC GLUCOMTR-MCNC: 99 MG/DL (ref 70–130)
HCT VFR BLD AUTO: 38.6 % (ref 34–46.6)
HGB BLD-MCNC: 12.2 G/DL (ref 12–15.9)
MCH RBC QN AUTO: 30 PG (ref 26.6–33)
MCHC RBC AUTO-ENTMCNC: 31.6 G/DL (ref 31.5–35.7)
MCV RBC AUTO: 95.1 FL (ref 79–97)
PLATELET # BLD AUTO: 369 10*3/MM3 (ref 140–450)
PMV BLD AUTO: 9.2 FL (ref 6–12)
POTASSIUM BLD-SCNC: 3.7 MMOL/L (ref 3.5–5.2)
RBC # BLD AUTO: 4.06 10*6/MM3 (ref 3.77–5.28)
SODIUM BLD-SCNC: 134 MMOL/L (ref 136–145)
WBC NRBC COR # BLD: 6.38 10*3/MM3 (ref 3.4–10.8)

## 2019-08-01 PROCEDURE — 97112 NEUROMUSCULAR REEDUCATION: CPT

## 2019-08-01 PROCEDURE — 80048 BASIC METABOLIC PNL TOTAL CA: CPT | Performed by: THORACIC SURGERY (CARDIOTHORACIC VASCULAR SURGERY)

## 2019-08-01 PROCEDURE — 97110 THERAPEUTIC EXERCISES: CPT

## 2019-08-01 PROCEDURE — 82962 GLUCOSE BLOOD TEST: CPT

## 2019-08-01 PROCEDURE — 25010000002 ENOXAPARIN PER 10 MG: Performed by: NURSE PRACTITIONER

## 2019-08-01 PROCEDURE — 85027 COMPLETE CBC AUTOMATED: CPT | Performed by: THORACIC SURGERY (CARDIOTHORACIC VASCULAR SURGERY)

## 2019-08-01 PROCEDURE — 25010000002 HYDRALAZINE PER 20 MG: Performed by: NURSE PRACTITIONER

## 2019-08-01 PROCEDURE — 99024 POSTOP FOLLOW-UP VISIT: CPT | Performed by: THORACIC SURGERY (CARDIOTHORACIC VASCULAR SURGERY)

## 2019-08-01 RX ORDER — NEBIVOLOL 10 MG/1
10 TABLET ORAL
Status: DISCONTINUED | OUTPATIENT
Start: 2019-08-01 | End: 2019-08-02 | Stop reason: HOSPADM

## 2019-08-01 RX ORDER — NEBIVOLOL 10 MG/1
20 TABLET ORAL
Status: DISCONTINUED | OUTPATIENT
Start: 2019-08-01 | End: 2019-08-01

## 2019-08-01 RX ORDER — HYDROCODONE BITARTRATE AND ACETAMINOPHEN 5; 325 MG/1; MG/1
1 TABLET ORAL EVERY 4 HOURS PRN
Qty: 50 TABLET | Refills: 0 | Status: SHIPPED | OUTPATIENT
Start: 2019-08-01 | End: 2019-10-21

## 2019-08-01 RX ORDER — CHOLECALCIFEROL (VITAMIN D3) 125 MCG
5 CAPSULE ORAL NIGHTLY PRN
Status: DISCONTINUED | OUTPATIENT
Start: 2019-08-01 | End: 2019-08-02 | Stop reason: HOSPADM

## 2019-08-01 RX ADMIN — HYDROCODONE BITARTRATE AND ACETAMINOPHEN 1 TABLET: 5; 325 TABLET ORAL at 23:49

## 2019-08-01 RX ADMIN — PANTOPRAZOLE SODIUM 40 MG: 40 TABLET, DELAYED RELEASE ORAL at 05:31

## 2019-08-01 RX ADMIN — HYDRALAZINE HYDROCHLORIDE 10 MG: 20 INJECTION INTRAMUSCULAR; INTRAVENOUS at 05:31

## 2019-08-01 RX ADMIN — MUPIROCIN 1 APPLICATION: 20 OINTMENT TOPICAL at 08:53

## 2019-08-01 RX ADMIN — HYDROCODONE BITARTRATE AND ACETAMINOPHEN 1 TABLET: 5; 325 TABLET ORAL at 08:53

## 2019-08-01 RX ADMIN — ASPIRIN 81 MG: 81 TABLET, COATED ORAL at 08:53

## 2019-08-01 RX ADMIN — HYDROCODONE BITARTRATE AND ACETAMINOPHEN 1 TABLET: 5; 325 TABLET ORAL at 14:22

## 2019-08-01 RX ADMIN — ENOXAPARIN SODIUM 40 MG: 40 INJECTION SUBCUTANEOUS at 21:24

## 2019-08-01 RX ADMIN — NEBIVOLOL HYDROCHLORIDE 10 MG: 10 TABLET ORAL at 12:10

## 2019-08-01 RX ADMIN — OXYBUTYNIN CHLORIDE 10 MG: 10 TABLET, EXTENDED RELEASE ORAL at 08:53

## 2019-08-01 RX ADMIN — MUPIROCIN: 20 OINTMENT TOPICAL at 21:27

## 2019-08-01 RX ADMIN — Medication 5 MG: at 22:51

## 2019-08-01 RX ADMIN — HYDROCODONE BITARTRATE AND ACETAMINOPHEN 1 TABLET: 5; 325 TABLET ORAL at 18:53

## 2019-08-01 NOTE — PLAN OF CARE
Problem: Patient Care Overview  Goal: Plan of Care Review  Outcome: Ongoing (interventions implemented as appropriate)   08/01/19 1041   Coping/Psychosocial   Plan of Care Reviewed With patient   OTHER   Outcome Summary patient c/o fatigue and weakness this am, agreeable to PT but states she is just not sleeping well at night. Patient able to ambulate 75 feet with rwx, 2 standing rest breaks.

## 2019-08-01 NOTE — THERAPY TREATMENT NOTE
Acute Care - Occupational Therapy Treatment Note  [facility]     Patient Name: Grace Beauchamp  : 1940  MRN: [id number]  Today's Date: 2019  Onset of Illness/Injury or Date of Surgery: 19  Date of Referral to OT: 19  Referring Physician: Amina    Admit Date: 2019       ICD-10-CM ICD-9-CM   1. Impaired functional mobility and activity tolerance Z74.09 V49.89   2. Aortic aneurysm, descending (CMS/HCC) I71.9 441.9     Patient Active Problem List   Diagnosis   • History of breast cancer   • Stenosis of carotid artery   • Chronic obstructive pulmonary disease (CMS/HCC)   • Closed fracture of multiple ribs   • Cognitive disorder   • Erythromelalgia (CMS/HCC)   • Hyperlipidemia   • Hypertension   • Primary osteoarthritis involving multiple joints   • Osteoporosis   • Restless legs syndrome   • Cerebral aneurysm   • Temporary cerebral vascular dysfunction   • S/P CABG x 1   • Type 2 diabetes mellitus without complication, without long-term current use of insulin (CMS/HCC)   • S/P ascending aortic aneurysm repair   • 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HCC)   • Descending thoracic aortic aneurysm (CMS/HCC)   • Claudication of both lower extremities (CMS/HCC)   • 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HCC)   • Ventral hernia without obstruction or gangrene   • Breast cancer, stage 1, estrogen receptor positive (CMS/HCC)   • Arthritis of right hip   • Arthritis of right knee   • Chondrocalcinosis   • Chronic pain of right knee   • Degenerative disc disease, lumbar   • Gastroesophageal reflux disease   • Bilateral hearing loss   • Thoracic aortic aneurysm 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)   • Erythromelalgia (CMS/HCC)   • 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CMS/HCC)   • Thoracoabdominal aortic aneurysm, 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)   • 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HCC)    • Closed fracture of three ribs of right side    • Cognitive disorder    • COPD (chronic obstructive pulmonary disease) (CMS/HCC)    • Depression    • Descending aortic arch aneurysm (CMS/HCC)    • Diabetes mellitus (CMS/HCC)     \"BORDERLINE\"   • Erythermalgia (CMS/HCC)    • GERD (gastroesophageal reflux disease)    • Hyperlipidemia     Controlled w/Meds   • Hypertension     Controlled w/Meds   • Low back pain    • Moderate aortic valve insufficiency     S/p AVR on 02/23/16 by Dr. Ontiveros   • Nocturia    • Osteoarthritis    • Osteoporosis    • Otitis media     R Ear   • Prediabetes    • RLS (restless legs syndrome)    • Saccular aneurysm    • Stroke (CMS/[facility])     Perioperatively w/L Hip Repl   • Thoracic ascending aortic aneurysm (CMS/[facility])     S/p Repair on 02/23/16 by Dr. Ontiveros   • TIA (transient ischemic attack)    • Urgency incontinence    • Venous insufficiency    • Ventral hernia      Past Surgical History:   Procedure Laterality Date   • AORTIC VALVE REPAIR/REPLACEMENT N/A 02/23/2016-[facility]    Ascending Replacement Using a 24MM Graft--Dr. Ontiveros   • APPENDECTOMY  1977   • BREAST BIOPSY Right 09/16/2012    Vacuum Assisted Core Bx of R Breast   • CARDIAC CATHETERIZATION N/A 01/06/2016    Dr. Tres De Los Santos   • CARDIAC CATHETERIZATION N/A 4/22/2019    Procedure: Left Heart Cath;  Surgeon: Tres De Los Santos MD;  Location: [facility] CATH INVASIVE LOCATION;  Service: Cardiology   • CARDIAC CATHETERIZATION N/A 4/22/2019    Procedure: Coronary angiography;  " "Surgeon: Tres De Los Santos MD;  Location: [facility] CATH INVASIVE LOCATION;  Service: Cardiology   • CARDIAC SURGERY  02/2016    Dr. Ontiveros/Dr. De Los Santos   • CATARACT EXTRACTION Bilateral     [facility]   • COLONOSCOPY N/A 10/2002    Dr. Negro   • CORONARY ARTERY BYPASS GRAFT  02/23/2016-[facility]    x1 w/L Vein Grafting--Dr. Ontiveros   • CYST REMOVAL Right 01/25/2013    R Palm Excision of Retinacular Cyst--Dr. Karla Fish   • EPIDURAL BLOCK     • LAPAROSCOPIC CHOLECYSTECTOMY N/A 08/04/2004    Dr. JOEY Sheth   • MASTECTOMY Right 09/28/2012   • ORIF HIP FRACTURE Left 03/27/2005    w/ AMBI compression screw, Dr. Victor M Cabral   • RECTOVAGINAL FISTULA REPAIR  1965   • THORACIC AORTIC ANEURYSM REPAIR N/A 7/23/2019    Procedure: ROSE, THORACOABDOMINAL AORTIC ANEURYSM REPAIR, VISCERAL VESSEL REPAIR, LARGE VENTRAL HERNIA REPAIR WITH MESH, CIRCULATORY ARREST, PRP;  Surgeon: Mart Ontiveros MD;  Location: [facility] OR;  Service: Cardiothoracic   • TUBAL ABDOMINAL LIGATION         Therapy Treatment    Rehabilitation Treatment Summary     Row Name 08/01/19 1115 08/01/19 1036          Treatment Time/Intention    Discipline  occupational therapist  -VS  physical therapist  -EM     Document Type  therapy note (daily note)  -VS  therapy note (daily note)  -EM     Subjective Information  complains of;weakness;fatigue  -VS  complains of;fatigue;weakness  -EM     Mode of Treatment  occupational therapy  -VS  physical therapy  -EM     Patient/Family Observations  Pt. was supine in bed   -VS  patient in supine, drowsy but awake   -EM     Care Plan Review  care plan/treatment goals reviewed  -VS  --     Patient Effort  fair  -VS  good  -EM     Existing Precautions/Restrictions  cardiac;fall  -VS  cardiac;fall  -EM     Patient Response to Treatment  \"I jusy did not sleep last night I am so tired today\" pt. stated  -VS  --     Recorded by [VS] Tiffanie Salomon, OTR 08/01/19 1424 [EM] Viridiana Santana, PT 08/01/19 1040     " Row Name 08/01/19 1036             Vital Signs    Pre Systolic BP Rehab  104  -EM      Pre Treatment Diastolic BP  52  -EM      Pretreatment Heart Rate (beats/min)  84  -EM      Intratreatment Heart Rate (beats/min)  106  -EM      Posttreatment Heart Rate (beats/min)  92  -EM      Pre SpO2 (%)  95  -EM      O2 Delivery Pre Treatment  room air  -EM      Post SpO2 (%)  97  -EM      O2 Delivery Post Treatment  room air  -EM      Recorded by [EM] Viridiana Santana, PT 08/01/19 1040      Row Name 08/01/19 1115             Cognitive Assessment/Intervention- PT/OT    Orientation Status (Cognition)  oriented x 4  -VS      Follows Commands (Cognition)  WFL  -VS      Safety Deficit (Cognitive)  insight into deficits/self awareness  -VS      Personal Safety Interventions  fall prevention program maintained;gait belt;nonskid shoes/slippers when out of bed  -VS      Recorded by [VS] Tiffanie Salomon, LYLER 08/01/19 1424      Row Name 08/01/19 1115 08/01/19 1036          Bed Mobility Assessment/Treatment    Bed Mobility Assessment/Treatment  rolling right;supine-sit  -VS  --     Rolling Right [place] (Bed Mobility)  minimum assist (75% patient effort)  -VS  --     Supine-Sit [place] (Bed Mobility)  minimum assist (75% patient effort)  -VS  minimum assist (75% patient effort)  -EM     Sit-Supine [place] (Bed Mobility)  --  minimum assist (75% patient effort)  -EM     Recorded by [VS] Tiffanie Salomon, OTR 08/01/19 1424 [EM] Viridiana Santana, PT 08/01/19 1040     Row Name 08/01/19 1115             Transfer Assessment/Treatment    Comment (Transfers)  Pt. refused to get up to the chair due to cahir increasing back pain   -VS      Recorded by [VS] Tiffanie Salmoon OTR 08/01/19 1424      Row Name 08/01/19 1036             Sit-Stand Transfer    Sit-Stand [place] (Transfers)  minimum assist (75% patient effort)  -EM      Assistive Device (Sit-Stand Transfers)  walker, front-wheeled  -EM      Recorded by [EM]  Viridiana Santana, PT 08/01/19 1040      Row Name 08/01/19 1036             Stand-Sit Transfer    Stand-Sit [place] (Transfers)  contact guard;verbal cues  -EM      Assistive Device (Stand-Sit Transfers)  walker, front-wheeled  -EM      Recorded by [EM] Viridiana Santana, PT 08/01/19 1040      Row Name 08/01/19 1036             Gait/Stairs Assessment/Training    [place] Level (Gait)  contact guard  -EM      Assistive Device (Gait)  walker, front-wheeled  -EM      Distance in Feet (Gait)  75  -EM      Deviations/Abnormal Patterns (Gait)  stride length decreased;gait speed decreased  -EM      Comment (Gait/Stairs)  2 standing rest breaks   -EM      Recorded by [EM] Viridiana Santana, PT 08/01/19 1040      Row Name 08/01/19 1115             ADL Assessment/Intervention    BADL Assessment/Intervention  feeding  -VS      Recorded by [VS] Tiffanie Salomon OTR 08/01/19 1424      Row Name 08/01/19 1115             Self-Feeding Assessment/Training    [place] Level (Feeding)  feeding skills;prepare tray/open items;set up  -VS      Position (Self-Feeding)  edge of bed sitting  -VS      Recorded by [VS] Tiffanie Salomon OTR 08/01/19 1424      Row Name 08/01/19 1115             Upper Extremity Supine Therapeutic Exercise    Comment, Supine Upper Extremity (Therapeutic Exercise)  Pt. refused UE exercises today   -VS      Recorded by [VS] Tiffanie Salomon OTR 08/01/19 1424      Row Name 08/01/19 1036             Therapeutic Exercise    Comment (Therapeutic Exercise)  5 reps cardiac protocol  -EM      Recorded by [EM] Viridiana Santana, PT 08/01/19 1040      Row Name 08/01/19 1115             Positioning and Restraints    Pre-Treatment Position  in bed  -VS      In Bed  notified nsg;sitting EOB;call light within reach;encouraged to call for assist eating lunch   -VS      Recorded by [VS] Tiffanie Salomon, OTR 08/01/19 1424      Row Name 08/01/19 1115             Pain Assessment    Additional  Documentation  Pain Scale: Numbers Pre/Post-Treatment (Group)  -VS      Recorded by [VS] Tiffanie Salomon, OTR 08/01/19 1424      Row Name 08/01/19 1115 08/01/19 1036          Pain Scale: Numbers Pre/Post-Treatment    Pain Scale: Numbers, Pretreatment  3/10  -VS  3/10  -EM     Pain Scale: Numbers, Post-Treatment  3/10  -VS  --     Pain Location - Side  other (see comments)  -VS  --     Pain Location - Orientation  incisional  -VS  --     Pain Location  --  back  -EM     Pain Intervention(s)  Repositioned  -VS  Medication (See MAR)  -EM     Recorded by [VS] Tiffanie Salomon, OTR 08/01/19 1424 [EM] Viridiana Santana, PT 08/01/19 1040     Row Name                Wound 07/23/19 1420 Left abdomen incision    Wound - Properties Group Date first assessed: 07/23/19 [JT] Time first assessed: 1420 [JT] Side: Left [JT] Location: abdomen [JT] Type: incision [JT] Recorded by:  [JT] Winston Tao RN 07/23/19 1420    Row Name                Wound 07/23/19 1420 Right groin incision    Wound - Properties Group Date first assessed: 07/23/19 [JT] Time first assessed: 1420 [JT] Side: Right [JT] Location: groin [JT] Type: incision [JT] Recorded by:  [JT] Winston Tao RN 07/23/19 1420    Row Name                Wound 07/23/19 2000 Left groin incision    Wound - Properties Group Date first assessed: 07/23/19 [CE] Time first assessed: 2000 [CE] Side: Left [CE] Location: groin [CE] Type: incision [CE] Recorded by:  [CE] Nic Anand RN 07/23/19 2054    Row Name 08/01/19 1036             Outcome Summary/Treatment Plan (PT)    Anticipated Discharge Disposition (PT)  skilled nursing facility  -EM      Recorded by [EM] Viridiana Santana, PT 08/01/19 1040        User Key  (r) = Recorded By, (t) = Taken By, (c) = Cosigned By    Initials Name Effective Dates Discipline    VS Tiffanie Salomon, OTR 06/08/18 -  OT    Viridiana Langston, PT 04/03/18 -  PT    Winston Johnson, RN 02/13/17 -  Nurse    Nic Tejeda, BASILIO 08/03/17 -   Nurse        Wound 07/23/19 1420 Left abdomen incision (Active)   Dressing Appearance open to air 8/1/2019  8:53 AM   Closure Open to air;[place] 8/1/2019  8:53 AM   Base clean;dry;pink 8/1/2019  8:53 AM   Periwound intact;dry;edematous 8/1/2019  8:53 AM   Periwound Temperature warm 8/1/2019  8:53 AM   Drainage Amount none 8/1/2019  8:53 AM   Dressing Care, Wound open to air 8/1/2019  8:53 AM       Wound 07/23/19 1420 Right groin incision (Active)   Dressing Appearance open to air 8/1/2019  8:53 AM   Closure Open to air 8/1/2019  8:53 AM   Base clean;dry 8/1/2019  8:53 AM   Periwound intact;dry 8/1/2019  8:53 AM   Periwound Temperature warm 8/1/2019  8:53 AM   Drainage Amount none 8/1/2019  8:53 AM   Dressing Care, Wound open to air 8/1/2019  8:53 AM       Wound 07/23/19 2000 Left groin incision (Active)   Dressing Appearance open to air 8/1/2019  8:53 AM   Closure Open to air 8/1/2019  8:53 AM   Base clean;dry 8/1/2019  8:53 AM   Periwound intact;dry 8/1/2019  8:53 AM   Periwound Temperature warm 8/1/2019  8:53 AM   Drainage Amount none 8/1/2019  8:53 AM   Dressing Care, Wound open to air 8/1/2019  3:01 AM       Occupational Therapy Education     Title: PT OT SLP Therapies (In Progress)     Topic: Occupational Therapy (Done)     Point: ADL training (Done)     Description: Instruct learner(s) on proper safety adaptation and remediation techniques during self care or transfers.   Instruct in proper use of assistive devices.    Learning Progress Summary           Patient Acceptance, E,TB,D, VU,NR by VS at 7/31/2019  3:05 PM    Comment:  Safety with ADLS and using AE (shower seat grab bars for safety)    Acceptance, E,TB, VU,NR by SG at 7/25/2019  3:49 PM                   Point: Home exercise program (Done)     Description: Instruct learner(s) on appropriate technique for monitoring, assisting and/or progressing therapeutic exercises/activities.    Learning Progress Summary           Patient Acceptance, E,TB,D, VU,NR  by VS at 7/31/2019  3:05 PM    Comment:  Safety with ADLS and using AE (shower seat grab bars for safety)                   Point: Precautions (Done)     Description: Instruct learner(s) on prescribed precautions during self-care and functional transfers.    Learning Progress Summary           Patient Acceptance, E,TB,D, VU,NR by VS at 7/31/2019  3:05 PM    Comment:  Safety with ADLS and using AE (shower seat grab bars for safety)                   Point: Body mechanics (Done)     Description: Instruct learner(s) on proper positioning and spine alignment during self-care, functional mobility activities and/or exercises.    Learning Progress Summary           Patient Acceptance, E,TB,D, VU,NR by VS at 7/31/2019  3:05 PM    Comment:  Safety with ADLS and using AE (shower seat grab bars for safety)                               User Key     Initials Effective Dates Name Provider Type Discipline     12/26/18 -  Shikha Orta OTR Occupational Therapist OT    VS 06/08/18 -  Tiffanie Salomon OTR Occupational Therapist OT                OT Recommendation and Plan     Outcome Summary: OT:  Pt. complained of fatique and pain this am.  Pt.agreed to complete bed mobility and sit on the EOB but refused UE exercises.  Pt. continues to benefit from skilled OT to address gaols and POC.   Outcome Measures     Row Name 08/01/19 1115 08/01/19 1000 07/31/19 1230       How much help from another person do you currently need...    Turning from your back to your side while in flat bed without using bedrails?  --  3  -EM  --    Moving from lying on back to sitting on the side of a flat bed without bedrails?  --  3  -EM  --    Moving to and from a bed to a chair (including a wheelchair)?  --  3  -EM  --    Standing up from a chair using your arms (e.g., wheelchair, bedside chair)?  --  3  -EM  --    Climbing 3-5 steps with a railing?  --  2  -EM  --    To walk in hospital room?  --  3  -EM  --    AM-PAC 6 Clicks Score (PT)  --  17   -EM  --       How much help from another is currently needed...    Putting on and taking off regular lower body clothing?  2  -VS  --  2  -VS    Bathing (including washing, rinsing, and drying)  2  -VS  --  2  -VS    Toileting (which includes using toilet bed pan or urinal)  2  -VS  --  2  -VS    Putting on and taking off regular upper body clothing  3  -VS  --  3  -VS    Taking care of personal grooming (such as brushing teeth)  3  -VS  --  3  -VS    Eating meals  3  -VS  --  4  -VS    AM-PAC 6 Clicks Score (OT)  15  -VS  --  16  -VS       Functional Assessment    Outcome Measure Options  AM-PAC 6 Clicks Daily Activity (OT)  -VS  --  AM-PAC 6 Clicks Daily Activity (OT)  -VS    Row Name 07/31/19 1000 07/30/19 1100          How much help from another person do you currently need...    Turning from your back to your side while in flat bed without using bedrails?  3  -EM  3  -PC     Moving from lying on back to sitting on the side of a flat bed without bedrails?  3  -EM  3  -PC     Moving to and from a bed to a chair (including a wheelchair)?  3  -EM  3  -PC     Standing up from a chair using your arms (e.g., wheelchair, bedside chair)?  3  -EM  3  -PC     Climbing 3-5 steps with a railing?  2  -EM  2  -PC     To walk in hospital room?  3  -EM  3  -PC     AM-PAC 6 Clicks Score (PT)  17  -EM  17  -PC       User Key  (r) = Recorded By, (t) = Taken By, (c) = Cosigned By    Initials Name Provider Type    VS Tiffanie Salomon OTR Occupational Therapist    PC Edith Nolan, PT Physical Therapist    EM Viridiana Santana, PT Physical Therapist           Time Calculation:   Time Calculation- OT     Row Name 08/01/19 1428             Time Calculation- OT    OT Start Time  1102  -VS      OT Stop Time  1112  -VS      OT Time Calculation (min)  10 min  -VS      Total Timed Code Minutes- OT  10 minute(s)  -VS      OT Received On  08/01/19  -VS        User Key  (r) = Recorded By, (t) = Taken By, (c) = Cosigned By    Initials  Name Provider Type    VS Tiffanie Salomon OTR Occupational Therapist        Therapy Charges for Today     Code Description Service Date Service Provider Modifiers Qty    50109549233 HC OT SELF CARE/MGMT/TRAIN EA 15 MIN 7/31/2019 Tiffanie Salomon OTR GO 4    [id number]  OT NEUROMUSC RE EDUCATION EA 15 MIN 8/1/2019 Tiffanie Salomon OTR GO 1               ABIGAIL Ferris  8/1/2019

## 2019-08-01 NOTE — PLAN OF CARE
Problem: Patient Care Overview  Goal: Plan of Care Review  Outcome: Ongoing (interventions implemented as appropriate)   08/01/19 0541   Coping/Psychosocial   Plan of Care Reviewed With patient   Plan of Care Review   Progress improving   OTHER   Outcome Summary SBP 170s this AM, IV hydralazine given x1. No falls. Pt c/o pain in incision, medicated per MAR. Possible d/c tp rehab this am. Resting comfortably. Monitoring closely.        Problem: Fall Risk (Adult)  Goal: Absence of Fall  Outcome: Ongoing (interventions implemented as appropriate)   08/01/19 0541   Fall Risk (Adult)   Absence of Fall making progress toward outcome       Problem: Skin Injury Risk (Adult)  Goal: Skin Health and Integrity  Outcome: Ongoing (interventions implemented as appropriate)   08/01/19 0541   Skin Injury Risk (Adult)   Skin Health and Integrity making progress toward outcome       Problem: Cardiac Surgery (Adult)  Goal: Signs and Symptoms of Listed Potential Problems Will be Absent, Minimized or Managed (Cardiac Surgery)  Outcome: Ongoing (interventions implemented as appropriate)   08/01/19 0541   Goal/Outcome Evaluation   Problems Assessed (Cardiac Surgery) all   Problems Present (Cardiac Surgery) pain;situational response       Problem: Pain, Acute (Adult)  Goal: Acceptable Pain Control/Comfort Level  Outcome: Ongoing (interventions implemented as appropriate)   08/01/19 0541   Pain, Acute (Adult)   Acceptable Pain Control/Comfort Level making progress toward outcome

## 2019-08-01 NOTE — PROGRESS NOTES
LOS: 10 days   Patient Care Team:  Miller Castañeda MD as PCP - General (Internal Medicine)  Rikki Uribe MD as PCP - Claims Attributed  Mart Ontiveros MD as Surgeon (Cardiothoracic Surgery)  Tres De Los Santos MD as Consulting Physician (Cardiology)  Graham Mcconnell MD as Consulting Physician (Hematology and Oncology)  Payam Byrnes MD as Consulting Physician (Otolaryngology)  Jonathan Smith MD as Consulting Physician (Vascular Surgery)  Victor M Cabral MD as Surgeon (Orthopedic Surgery)  Yaya Burks MD as Consulting Physician (Pulmonary Disease)    Chief Complaint: post op  Subjective      Vital Signs  Temp:  [97.6 °F (36.4 °C)-98 °F (36.7 °C)] 97.6 °F (36.4 °C)  Heart Rate:  [74-92] 92  Resp:  [16] 16  BP: ()/(50-88) 85/50  Body mass index is 23.43 kg/m².    Intake/Output Summary (Last 24 hours) at 8/1/2019 1155  Last data filed at 8/1/2019 0800  Gross per 24 hour   Intake 360 ml   Output --   Net 360 ml     I/O this shift:  In: 240 [P.O.:240]  Out: -             07/30/19  0300 07/31/19  0300 08/01/19  0600   Weight: 47.6 kg (105 lb) 47.9 kg (105 lb 9.6 oz) 47.4 kg (104 lb 8 oz)         Objective    Results Review:        WBC WBC   Date Value Ref Range Status   08/01/2019 6.38 3.40 - 10.80 10*3/mm3 Final   07/31/2019 6.34 3.40 - 10.80 10*3/mm3 Final      HGB Hemoglobin   Date Value Ref Range Status   08/01/2019 12.2 12.0 - 15.9 g/dL Final   07/31/2019 11.7 (L) 12.0 - 15.9 g/dL Final      HCT Hematocrit   Date Value Ref Range Status   08/01/2019 38.6 34.0 - 46.6 % Final   07/31/2019 37.2 34.0 - 46.6 % Final      Platelets Platelets   Date Value Ref Range Status   08/01/2019 369 140 - 450 10*3/mm3 Final   07/31/2019 351 140 - 450 10*3/mm3 Final        PT/INR:  No results found for: PROTIME/No results found for: INR    Sodium Sodium   Date Value Ref Range Status   08/01/2019 134 (L) 136 - 145 mmol/L Final   07/31/2019 140 136 - 145 mmol/L Final   07/30/2019 137 136 - 145 mmol/L Final       Potassium Potassium   Date Value Ref Range Status   08/01/2019 3.7 3.5 - 5.2 mmol/L Final   07/31/2019 3.8 3.5 - 5.2 mmol/L Final   07/30/2019 3.2 (L) 3.5 - 5.2 mmol/L Final      Chloride Chloride   Date Value Ref Range Status   08/01/2019 97 (L) 98 - 107 mmol/L Final   07/31/2019 104 98 - 107 mmol/L Final   07/30/2019 99 98 - 107 mmol/L Final      Bicarbonate CO2   Date Value Ref Range Status   08/01/2019 24.7 22.0 - 29.0 mmol/L Final   07/31/2019 24.7 22.0 - 29.0 mmol/L Final   07/30/2019 24.3 22.0 - 29.0 mmol/L Final      BUN BUN   Date Value Ref Range Status   08/01/2019 11 8 - 23 mg/dL Final   07/31/2019 12 8 - 23 mg/dL Final   07/30/2019 12 8 - 23 mg/dL Final      Creatinine Creatinine   Date Value Ref Range Status   08/01/2019 0.55 (L) 0.57 - 1.00 mg/dL Final   07/31/2019 0.46 (L) 0.57 - 1.00 mg/dL Final   07/30/2019 0.45 (L) 0.57 - 1.00 mg/dL Final      Calcium Calcium   Date Value Ref Range Status   08/01/2019 8.4 (L) 8.6 - 10.5 mg/dL Final   07/31/2019 8.1 (L) 8.6 - 10.5 mg/dL Final   07/30/2019 8.3 (L) 8.6 - 10.5 mg/dL Final      Magnesium No results found for: MG         amLODIPine 2.5 mg Oral Q24H   aspirin 81 mg Oral Daily   enoxaparin 40 mg Subcutaneous Nightly   mupirocin  Each Nare BID   nebivolol 10 mg Oral Q24H   oxybutynin XL 10 mg Oral Daily   pantoprazole 40 mg Oral Q AM              Patient Active Problem List   Diagnosis Code   • History of breast cancer Z85.3   • Stenosis of carotid artery I65.29   • Chronic obstructive pulmonary disease (CMS/HCC) J44.9   • Closed fracture of multiple ribs S22.49XA   • Cognitive disorder F09   • Erythromelalgia (CMS/HCC) I73.81   • Hyperlipidemia E78.5   • Hypertension I10   • Primary osteoarthritis involving multiple joints M15.0   • Osteoporosis M81.0   • Restless legs syndrome G25.81   • Cerebral aneurysm I67.1   • Temporary cerebral vascular dysfunction G93.9   • S/P CABG x 1 Z95.1   • Type 2 diabetes mellitus without complication, without long-term  current use of insulin (CMS/[facility]) E11.9   • S/P ascending aortic aneurysm repair Z98.890, Z86.79   • 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   • Descending thoracic aortic aneurysm (CMS/[facility]) I71.2   • Claudication of both lower extremities (CMS/[facility]) I73.9   • 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[facility]) I71.6   • Ventral hernia without obstruction or gangrene K43.9   • Breast cancer, stage 1, estrogen receptor positive (CMS/[facility]) C50.919, Z17.0   • Arthritis of right hip M16.11   • Arthritis of right knee M17.11   • Chondrocalcinosis M11.20   • Chronic pain of right knee M25.561, G89.29   • Degenerative disc disease, lumbar M51.36   • Gastroesophageal reflux disease K21.9   • Bilateral hearing loss H91.93   • Thoracic aortic aneurysm without rupture (CMS/[facility]) I71.2   • Erythromelalgia (CMS/[facility]) I73.81   • 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CMS/[facility]) G82.20   • Thoracoabdominal aortic aneurysm, without rupture (CMS/[facility]) I71.6   • 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/[facility]) I71.6   • Aortic aneurysm, descending (CMS/[facility]) I71.9   • Aortic aneurysm without rupture (CMS/[facility]) I71.9       Assessment & Plan         -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  Presumably it may be an ischemic component due to thrombosis of some of intercostal vessels or a decrease in flow  -Moderate frailty----utilizes walker at home  -PAD  -COPD  -HTN---mild elevation this am  -Post op anemia- expected acute blood loss  -left superficial venous thrombosis     Tolerating advance in diet.    Making great progress.   Complaints of feeling weak because she is not sleeping well.  Will order melatonin.  Will watch one more day, should be ready for rehab  tomorrow.     FREDRICK Harris  08/01/19  11:55 AM     Observe for 24 hours; probable transfer to rehab tomorrow.

## 2019-08-01 NOTE — PLAN OF CARE
Problem: Patient Care Overview  Goal: Plan of Care Review   08/01/19 1426   OTHER   Outcome Summary OT: Pt. complained of fatique and pain this am. Pt.agreed to complete bed mobility and sit on the EOB but refused UE exercises. Pt. continues to benefit from skilled OT to address gaols and POC.

## 2019-08-01 NOTE — THERAPY TREATMENT NOTE
Acute Care - Physical Therapy Treatment Note  [facility]     Patient Name: Grace Beauchamp  : 1940  MRN: [id number]  Today's Date: 2019  Onset of Illness/Injury or Date of Surgery: 19  Date of Referral to PT: 19  Referring Physician: Amina    Admit Date: 2019    Visit Dx:    ICD-10-CM ICD-9-CM   1. Impaired functional mobility and activity tolerance Z74.09 V49.89   2. Aortic aneurysm, descending (CMS/HCC) I71.9 441.9     Patient Active Problem List   Diagnosis   • History of breast cancer   • Stenosis of carotid artery   • Chronic obstructive pulmonary disease (CMS/HCC)   • Closed fracture of multiple ribs   • Cognitive disorder   • Erythromelalgia (CMS/HCC)   • Hyperlipidemia   • Hypertension   • Primary osteoarthritis involving multiple joints   • Osteoporosis   • Restless legs syndrome   • Cerebral aneurysm   • Temporary cerebral vascular dysfunction   • S/P CABG x 1   • Type 2 diabetes mellitus without complication, without long-term current use of insulin (CMS/HCC)   • S/P ascending aortic aneurysm repair   • 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HCC)   • Descending thoracic aortic aneurysm (CMS/HCC)   • Claudication of both lower extremities (CMS/HCC)   • 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HCC)   • Ventral hernia without obstruction or gangrene   • Breast cancer, stage 1, estrogen receptor positive (CMS/HCC)   • Arthritis of right hip   • Arthritis of right knee   • Chondrocalcinosis   • Chronic pain of right knee   • Degenerative disc disease, lumbar   • Gastroesophageal reflux disease   • Bilateral hearing loss   • Thoracic aortic aneurysm 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)   • Erythromelalgia (CMS/HCC)   • 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CMS/HCC)   • Thoracoabdominal aortic aneurysm, 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)   • 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)   • Aortic aneurysm, descending (CMS/HCC)   • Aortic aneurysm 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)       Therapy Treatment    Rehabilitation  Treatment Summary     Row Name 08/01/19 1036             Treatment Time/Intention    Discipline  physical therapist  -EM      Document Type  therapy note (daily note)  -EM      Subjective Information  complains of;fatigue;weakness  -EM      Mode of Treatment  physical therapy  -EM      Patient/Family Observations  patient in supine, drowsy but awake   -EM      Patient Effort  good  -EM      Existing Precautions/Restrictions  cardiac;fall  -EM      Recorded by [EM] Viridiana Santana, PT 08/01/19 1040      Row Name 08/01/19 1036             Vital Signs    Pre Systolic BP Rehab  104  -EM      Pre Treatment Diastolic BP  52  -EM      Pretreatment Heart Rate (beats/min)  84  -EM      Intratreatment Heart Rate (beats/min)  106  -EM      Posttreatment Heart Rate (beats/min)  92  -EM      Pre SpO2 (%)  95  -EM      O2 Delivery Pre Treatment  room air  -EM      Post SpO2 (%)  97  -EM      O2 Delivery Post Treatment  room air  -EM      Recorded by [EM] Viridiana Santana, PT 08/01/19 1040      Row Name 08/01/19 1036             Bed Mobility Assessment/Treatment    Supine-Sit Nicholson (Bed Mobility)  minimum assist (75% patient effort)  -EM      Sit-Supine Nicholson (Bed Mobility)  minimum assist (75% patient effort)  -EM      Recorded by [EM] Viridiana Santana, PT 08/01/19 1040      Row Name 08/01/19 1036             Sit-Stand Transfer    Sit-Stand Nicholson (Transfers)  minimum assist (75% patient effort)  -EM      Assistive Device (Sit-Stand Transfers)  walker, front-wheeled  -EM      Recorded by [EM] Viridiana Santana, PT 08/01/19 1040      Row Name 08/01/19 1036             Stand-Sit Transfer    Stand-Sit Nicholson (Transfers)  contact guard;verbal cues  -EM      Assistive Device (Stand-Sit Transfers)  walker, front-wheeled  -EM      Recorded by [EM] Viridiana Santana, PT 08/01/19 1040      Row Name 08/01/19 1036             Gait/Stairs Assessment/Training    Nicholson Level (Gait)  contact  guard  -EM      Assistive Device (Gait)  walker, front-wheeled  -EM      Distance in Feet (Gait)  75  -EM      Deviations/Abnormal Patterns (Gait)  stride length decreased;gait speed decreased  -EM      Comment (Gait/Stairs)  2 standing rest breaks   -EM      Recorded by [EM] Viridiana Santana, PT 08/01/19 1040      Row Name 08/01/19 1036             Therapeutic Exercise    Comment (Therapeutic Exercise)  5 reps cardiac protocol  -EM      Recorded by [EM] Viridiana Santana, PT 08/01/19 1040      Row Name 08/01/19 1036             Pain Scale: Numbers Pre/Post-Treatment    Pain Scale: Numbers, Pretreatment  3/10  -EM      Pain Location  back  -EM      Pain Intervention(s)  Medication (See MAR)  -EM      Recorded by [EM] Viridiana Santana, PT 08/01/19 1040      Row Name                Wound 07/23/19 1420 Left abdomen incision    Wound - Properties Group Date first assessed: 07/23/19 [JT] Time first assessed: 1420 [JT] Side: Left [JT] Location: abdomen [JT] Type: incision [JT] Recorded by:  [JT] Winston Tao RN 07/23/19 1420    Row Name                Wound 07/23/19 1420 Right groin incision    Wound - Properties Group Date first assessed: 07/23/19 [JT] Time first assessed: 1420 [JT] Side: Right [JT] Location: groin [JT] Type: incision [JT] Recorded by:  [JT] Winston Tao RN 07/23/19 1420    Row Name                Wound 07/23/19 2000 Left groin incision    Wound - Properties Group Date first assessed: 07/23/19 [CE] Time first assessed: 2000 [CE] Side: Left [CE] Location: groin [CE] Type: incision [CE] Recorded by:  [CE] Nic Anand RN 07/23/19 2054    Row Name 08/01/19 1036             Outcome Summary/Treatment Plan (PT)    Anticipated Discharge Disposition (PT)  skilled nursing facility  -EM      Recorded by [EM] Viridiana Santana, PT 08/01/19 1040        User Key  (r) = Recorded By, (t) = Taken By, (c) = Cosigned By    Initials Name Effective Dates Discipline    EM Viridiana Santana, PT 04/03/18  -  PT    Winston Johnson RN 02/13/17 -  Nurse    CE Nic Anand RN 08/03/17 -  Nurse          Wound 07/23/19 1420 Left abdomen incision (Active)   Dressing Appearance open to air 8/1/2019  8:53 AM   Closure Open to air;Staples 8/1/2019  8:53 AM   Base clean;dry;pink 8/1/2019  8:53 AM   Periwound intact;dry;edematous 8/1/2019  8:53 AM   Periwound Temperature warm 8/1/2019  8:53 AM   Drainage Amount none 8/1/2019  8:53 AM   Dressing Care, Wound open to air 8/1/2019  8:53 AM       Wound 07/23/19 1420 Right groin incision (Active)   Dressing Appearance open to air 8/1/2019  8:53 AM   Closure Open to air 8/1/2019  8:53 AM   Base clean;dry 8/1/2019  8:53 AM   Periwound intact;dry 8/1/2019  8:53 AM   Periwound Temperature warm 8/1/2019  8:53 AM   Drainage Amount none 8/1/2019  8:53 AM   Dressing Care, Wound open to air 8/1/2019  8:53 AM       Wound 07/23/19 2000 Left groin incision (Active)   Dressing Appearance open to air 8/1/2019  8:53 AM   Closure Open to air 8/1/2019  8:53 AM   Base clean;dry 8/1/2019  8:53 AM   Periwound intact;dry 8/1/2019  8:53 AM   Periwound Temperature warm 8/1/2019  8:53 AM   Drainage Amount none 8/1/2019  8:53 AM   Dressing Care, Wound open to air 8/1/2019  3:01 AM           Physical Therapy Education     Title: PT OT SLP Therapies (In Progress)     Topic: Physical Therapy (In Progress)     Point: Mobility training (In Progress)     Learning Progress Summary           Patient Acceptance, E, NR by EM at 8/1/2019 10:41 AM    Acceptance, E, NR by EM at 7/31/2019 10:12 AM    Acceptance, E,D, NR by PC at 7/30/2019 11:06 AM    Acceptance, E,TB, VU by KH at 7/29/2019  9:14 AM    Acceptance, E,D, VU,NR by MS at 7/28/2019 10:03 AM    Acceptance, E,D, VU,NR by MS at 7/27/2019  9:42 AM    AcceptanceELIZABETH NR by AR at 7/26/2019  4:29 PM    AcceptanceELIZABETH D NR by PC at 7/25/2019 10:18 AM                   Point: Home exercise program (In Progress)     Learning Progress Summary           Patient  Acceptance, E, NR by EM at 8/1/2019 10:41 AM    Acceptance, E, NR by EM at 7/31/2019 10:12 AM    Acceptance, E,D, NR by PC at 7/30/2019 11:06 AM    Acceptance, E,TB, VU by KH at 7/29/2019  9:14 AM    Acceptance, E,D, VU,NR by MS at 7/28/2019 10:03 AM    Acceptance, E,D, VU,NR by MS at 7/27/2019  9:42 AM    Acceptance, E, NR by AR at 7/26/2019  4:29 PM    Acceptance, E,D, NR by PC at 7/25/2019 10:18 AM                   Point: Body mechanics (In Progress)     Learning Progress Summary           Patient Acceptance, E,D, NR by PC at 7/30/2019 11:06 AM    Acceptance, E,TB, VU by KH at 7/29/2019  9:14 AM    Acceptance, E,D, VU,NR by MS at 7/28/2019 10:03 AM    Acceptance, E,D, VU,NR by MS at 7/27/2019  9:42 AM    Acceptance, E, NR by AR at 7/26/2019  4:29 PM    Acceptance, E,D, NR by PC at 7/25/2019 10:18 AM                   Point: Precautions (In Progress)     Learning Progress Summary           Patient Acceptance, E,D, NR by PC at 7/30/2019 11:06 AM    Acceptance, E,TB, VU by KH at 7/29/2019  9:14 AM    Acceptance, E,D, VU,NR by MS at 7/28/2019 10:03 AM    Acceptance, E,D, VU,NR by MS at 7/27/2019  9:42 AM    Acceptance, E, NR by AR at 7/26/2019  4:29 PM    Acceptance, E,D, NR by PC at 7/25/2019 10:18 AM                               User Key     Initials Effective Dates Name Provider Type Discipline    PC 04/03/18 -  Edith Nolan, PT Physical Therapist PT    EM 04/03/18 -  Viridiana Santana, PT Physical Therapist PT    MS 04/03/18 -  Jonathan Hilton, PT Physical Therapist PT    AR 04/03/18 -  Cathie Hinojosa, PT Physical Therapist PT     06/22/16 -  Spring Santos, PT Physical Therapist PT                PT Recommendation and Plan  Anticipated Discharge Disposition (PT): skilled nursing facility  Outcome Summary/Treatment Plan (PT)  Anticipated Discharge Disposition (PT): skilled nursing facility  Plan of Care Reviewed With: patient  Progress: improving  Outcome Summary: patient c/o fatigue and weakness this  am, agreeable to PT but states she is just not sleeping well at night. Patient able to ambulate 75 feet with rwx, 2 standing rest breaks.   Outcome Measures     Row Name 08/01/19 1000 07/31/19 1230 07/31/19 1000       How much help from another person do you currently need...    Turning from your back to your side while in flat bed without using bedrails?  3  -EM  --  3  -EM    Moving from lying on back to sitting on the side of a flat bed without bedrails?  3  -EM  --  3  -EM    Moving to and from a bed to a chair (including a wheelchair)?  3  -EM  --  3  -EM    Standing up from a chair using your arms (e.g., wheelchair, bedside chair)?  3  -EM  --  3  -EM    Climbing 3-5 steps with a railing?  2  -EM  --  2  -EM    To walk in hospital room?  3  -EM  --  3  -EM    AM-PAC 6 Clicks Score (PT)  17  -EM  --  17  -EM       How much help from another is currently needed...    Putting on and taking off regular lower body clothing?  --  2  -VS  --    Bathing (including washing, rinsing, and drying)  --  2  -VS  --    Toileting (which includes using toilet bed pan or urinal)  --  2  -VS  --    Putting on and taking off regular upper body clothing  --  3  -VS  --    Taking care of personal grooming (such as brushing teeth)  --  3  -VS  --    Eating meals  --  4  -VS  --    AM-PAC 6 Clicks Score (OT)  --  16  -VS  --       Functional Assessment    Outcome Measure Options  --  AM-PAC 6 Clicks Daily Activity (OT)  -VS  --    Row Name 07/30/19 1100             How much help from another person do you currently need...    Turning from your back to your side while in flat bed without using bedrails?  3  -PC      Moving from lying on back to sitting on the side of a flat bed without bedrails?  3  -PC      Moving to and from a bed to a chair (including a wheelchair)?  3  -PC      Standing up from a chair using your arms (e.g., wheelchair, bedside chair)?  3  -PC      Climbing 3-5 steps with a railing?  2  -PC      To walk in  hospital room?  3  -PC      AM-PAC 6 Clicks Score (PT)  17  -PC        User Key  (r) = Recorded By, (t) = Taken By, (c) = Cosigned By    Initials Name Provider Type    VS Tiffanie Salomon OTR Occupational Therapist    PC Edith Nolan, PT Physical Therapist    EM Viridiana Santana PT Physical Therapist         Time Calculation:   PT Charges     Row Name 08/01/19 1043             Time Calculation    Start Time  1014  -EM      Stop Time  1032  -EM      Time Calculation (min)  18 min  -EM      PT Received On  08/01/19  -EM      PT - Next Appointment  08/02/19  -EM         Time Calculation- PT    Total Timed Code Minutes- PT  18 minute(s)  -EM        User Key  (r) = Recorded By, (t) = Taken By, (c) = Cosigned By    Initials Name Provider Type    EM Viridiana Santana, PT Physical Therapist        Therapy Charges for Today     Code Description Service Date Service Provider Modifiers Qty    12399369382 HC PT THER PROC EA 15 MIN 7/31/2019 Viridiana Santana, PT GP 1    10894871249 HC PT THER PROC EA 15 MIN 8/1/2019 Viridiana Santana, PT GP 1          PT G-Codes  Outcome Measure Options: AM-PAC 6 Clicks Daily Activity (OT)  AM-PAC 6 Clicks Score (PT): 17  AM-PAC 6 Clicks Score (OT): 16    Viridiana Santana, PT  8/1/2019

## 2019-08-02 VITALS
OXYGEN SATURATION: 97 % | TEMPERATURE: 98 F | HEART RATE: 81 BPM | SYSTOLIC BLOOD PRESSURE: 146 MMHG | DIASTOLIC BLOOD PRESSURE: 91 MMHG | WEIGHT: 104.3 LBS | HEIGHT: 56 IN | RESPIRATION RATE: 16 BRPM | BODY MASS INDEX: 23.46 KG/M2

## 2019-08-02 LAB
ANION GAP SERPL CALCULATED.3IONS-SCNC: 11.3 MMOL/L (ref 5–15)
BUN BLD-MCNC: 11 MG/DL (ref 8–23)
BUN/CREAT SERPL: 23.4 (ref 7–25)
CALCIUM SPEC-SCNC: 7.2 MG/DL (ref 8.6–10.5)
CHLORIDE SERPL-SCNC: 103 MMOL/L (ref 98–107)
CO2 SERPL-SCNC: 25.7 MMOL/L (ref 22–29)
CREAT BLD-MCNC: 0.47 MG/DL (ref 0.57–1)
GFR SERPL CREATININE-BSD FRML MDRD: 128 ML/MIN/1.73
GLUCOSE BLD-MCNC: 121 MG/DL (ref 65–99)
POTASSIUM BLD-SCNC: 3.4 MMOL/L (ref 3.5–5.2)
POTASSIUM BLD-SCNC: 4.1 MMOL/L (ref 3.5–5.2)
SODIUM BLD-SCNC: 140 MMOL/L (ref 136–145)

## 2019-08-02 PROCEDURE — 80048 BASIC METABOLIC PNL TOTAL CA: CPT | Performed by: THORACIC SURGERY (CARDIOTHORACIC VASCULAR SURGERY)

## 2019-08-02 PROCEDURE — 84132 ASSAY OF SERUM POTASSIUM: CPT | Performed by: THORACIC SURGERY (CARDIOTHORACIC VASCULAR SURGERY)

## 2019-08-02 RX ADMIN — HYDROCODONE BITARTRATE AND ACETAMINOPHEN 1 TABLET: 5; 325 TABLET ORAL at 05:29

## 2019-08-02 RX ADMIN — ASPIRIN 81 MG: 81 TABLET, COATED ORAL at 08:55

## 2019-08-02 RX ADMIN — PANTOPRAZOLE SODIUM 40 MG: 40 TABLET, DELAYED RELEASE ORAL at 05:29

## 2019-08-02 RX ADMIN — HYDROCODONE BITARTRATE AND ACETAMINOPHEN 1 TABLET: 5; 325 TABLET ORAL at 09:48

## 2019-08-02 RX ADMIN — OXYBUTYNIN CHLORIDE 10 MG: 10 TABLET, EXTENDED RELEASE ORAL at 08:55

## 2019-08-02 RX ADMIN — POTASSIUM CHLORIDE 40 MEQ: 750 CAPSULE, EXTENDED RELEASE ORAL at 05:29

## 2019-08-02 RX ADMIN — NEBIVOLOL HYDROCHLORIDE 10 MG: 10 TABLET ORAL at 11:43

## 2019-08-02 RX ADMIN — MUPIROCIN 1 APPLICATION: 20 OINTMENT TOPICAL at 08:55

## 2019-08-02 NOTE — PROGRESS NOTES
Case Management Discharge Note    Final Note: Pt d/c to [facility] via private auto.     Destination - Selection Complete      Service Provider Request Status Selected Services Address Phone Number Fax Number    [facility] [address] [address] [phone]       Rosalee Williamson RN 7/28/2019 1214    1st choice                 Durable Medical Equipment      No service has been selected for the patient.      Dialysis/Infusion      No service has been selected for the patient.      Home Medical Care      No service has been selected for the patient.      Therapy      No service has been selected for the patient.      Community Resources      No service has been selected for the patient.        Transportation Services  Private: Car    Final Discharge Disposition Code: 03 - skilled nursing facility (SNF)

## 2019-08-02 NOTE — PLAN OF CARE
Problem: Patient Care Overview  Goal: Plan of Care Review  Outcome: Ongoing (interventions implemented as appropriate)   08/02/19 6135   Coping/Psychosocial   Plan of Care Reviewed With patient   Plan of Care Review   Progress improving   OTHER   Outcome Summary VSS. SR. RA. Pain tolerated with PO medication. Up with 1. Still with staples to incision. Possible d/c today. Will continue to monitor.        Problem: Fall Risk (Adult)  Goal: Absence of Fall  Outcome: Ongoing (interventions implemented as appropriate)      Problem: Skin Injury Risk (Adult)  Goal: Skin Health and Integrity  Outcome: Ongoing (interventions implemented as appropriate)      Problem: Cardiac Surgery (Adult)  Goal: Signs and Symptoms of Listed Potential Problems Will be Absent, Minimized or Managed (Cardiac Surgery)  Outcome: Ongoing (interventions implemented as appropriate)

## 2019-08-02 NOTE — DISCHARGE INSTR - APPOINTMENTS
PLEASE CALL TO MAKE A FOLLOW-UP APPOINTMENT FROM 4 WEEKS FROM TODAY WITH:    [facility]    [phone]

## 2019-08-02 NOTE — DISCHARGE INSTRUCTIONS
Continue to use your incentive spirometer for an additional 2 weeks.  Continue to wear your SOPHY hose for an additional 2 weeks. You may remove them at night.  Walk 10 minutes at a time at least 3 times a day.  Do not drive for 2 weeks and no longer taking narcotics. Ride in the backseat for 2 weeks.  Do not lift, push or pull greater than 10 pounds for 6 weeks.  You may shower, but do not submerge your incisions until your surgeon approves (i.e., no baths, pools, hot tubs, etc.).  Clean your incision daily in the shower with Dial or Ivory soap. Do not put any additional lotions, creams, or any other substance on your incision without your surgeon's approval.  Call your surgeon’s office at [phone] for any drainage, increased redness, or fever over 100.5.  Weigh yourself daily and report a weight gain of 2-3+ pounds in 24 hours or 5 lbs in 1 week to your cardiologist. Record your daily weights.  Take your temperature blood pressure daily. Keep a record of your readings along with your daily weights to report to your cardiologist at your next follow-up appointment. Take all of your medication bottles to each appointment

## 2019-08-02 NOTE — DISCHARGE SUMMARY
Date of Admission:   Date of Discharge:  8/2/2019    Discharge Diagnosis:  -Type III thoracoabdominal aortic aneurysm- s/p open repair of thoracoabdominal aortic aneurysm with a 22 mm dacryon interposition graft and visceral vessel reattachment; bilateral groin cutdown; circ arrest; ventral hernia repair with mesh  -Hx proximal thoracic aortic aneurysm repair and CABG 2016  -Paraparesis of unknown etiology.  Presumably it may be an ischemic component due to thrombosis of some of intercostal vessels or a decrease in flow  -Moderate frailty----utilizes walker at home  -PAD  -COPD  -HTN   -Post op anemia- expected acute blood loss  -left superficial venous thrombosis        Presenting Problem/History of Present Illness  Aortic aneurysm without rupture (CMS/[facility]) [I71.9]     Hospital Course  Patient is a 79 y.o. female presented for Open repair of thoracoabdominal aortic aneurysm with a 22 mm dacryon interposition graft and visceral vessel reattachment, Deep hypothermic circulatory arrest with upper body selective perfusion, Bilateral groin cutdown for femoral access for bypass, and Ventral hernia repair with mesh by Dr. Ontiveros on 7/23/19.  Post operatively she did well. Tolerated advance in diet. She was frail preoperatively and expected weakness post operatively. PT recommendations for discharge to rehab for strengthening. She had adequate PO intake.  Urinating and defecating without issue.  Staples removed from incision without issue.  She will need to follow up with PCP in 1 week.  With me in the office in 4 weeks.  She has been instructed to call with any and all questions and concerns.   On post op day number 10 she was deemed ready for discharge to rehab.          Procedures Performed  Procedure(s):  ROSE, THORACOABDOMINAL AORTIC ANEURYSM REPAIR, VISCERAL VESSEL REPAIR, LARGE VENTRAL HERNIA REPAIR WITH MESH, CIRCULATORY ARREST, PRP       Consults:   Consults     Date and Time Order Name Status Description     7/22/2019 1402 Inpatient Cardiology Consult Completed           Pertinent Test Results:    Lab Results   Component Value Date    WBC 6.38 08/01/2019    HGB 12.2 08/01/2019    HCT 38.6 08/01/2019    MCV 95.1 08/01/2019     08/01/2019      Lab Results   Component Value Date    GLUCOSE 121 (H) 08/02/2019    CALCIUM 7.2 (L) 08/02/2019     08/02/2019    K 3.4 (L) 08/02/2019    CO2 25.7 08/02/2019     08/02/2019    BUN 11 08/02/2019    CREATININE 0.47 (L) 08/02/2019    EGFRIFAFRI 77 02/01/2019    EGFRIFNONA 128 08/02/2019    BCR 23.4 08/02/2019    ANIONGAP 11.3 08/02/2019     Lab Results   Component Value Date    INR 1.45 (H) 07/24/2019    PROTIME 17.3 (H) 07/24/2019         Condition on Discharge:  stable    Vital Signs  Temp:  [97.4 °F (36.3 °C)-98.3 °F (36.8 °C)] 98.2 °F (36.8 °C)  Heart Rate:  [68-87] 69  Resp:  [16-18] 16  BP: (104-154)/(44-78) 104/44      Discharge Disposition  Rehab Facility or Unit (DC - External)    Discharge Medications     Discharge Medications      New Medications      Instructions Start Date   HYDROcodone-acetaminophen 5-325 MG per tablet  Commonly known as:  NORCO   1 tablet, Oral, Every 4 Hours PRN         Continue These Medications      Instructions Start Date   ANORO ELLIPTA 62.5-25 MCG/INH aerosol powder  inhaler  Generic drug:  umeclidinium-vilanterol   1 puff, Inhalation, Daily - RT      aspirin 81 MG EC tablet   81 mg, Oral, Daily      Biotin 1000 MCG tablet   1,000 mcg, Oral, Daily, Take as directed      BYSTOLIC 10 MG tablet  Generic drug:  nebivolol   TAKE ONE TABLET BY MOUTH DAILY      cetirizine 10 MG tablet  Commonly known as:  zyrTEC   10 mg, Oral, As Needed      LIVALO 2 MG tablet tablet  Generic drug:  pitavastatin calcium   2 mg, Oral, Nightly      melatonin 5 MG tablet tablet   10 mg, Oral, Nightly PRN      MYRBETRIQ 50 MG tablet sustained-release 24 hour 24 hr tablet  Generic drug:  Mirabegron ER   50 mg, Oral, Daily      omeprazole 20 MG  capsule  Commonly known as:  priLOSEC   20 mg, Oral, Daily      pramipexole 0.125 MG tablet  Commonly known as:  MIRAPEX   TAKE ONE TABLET BY MOUTH AT BEDTIME      PROAIR  (90 Base) MCG/ACT inhaler  Generic drug:  albuterol sulfate HFA   INHALE TWO PUFFS BY MOUTH EVERY 6 HOURS AS NEEDED FOR WHEEZING      PROBIOTIC PO   1 capsule, Oral, Daily         Stop These Medications    traMADol 50 MG tablet  Commonly known as:  ULTRAM            Discharge Diet:     Activity at Discharge:     Follow-up Appointments  Future Appointments   Date Time Provider Department Center   5/29/2020  1:30 PM Veronika Villarreal PA [facility] None     Additional Instructions for the Follow-ups that You Need to Schedule     Call MD With Problems / Concerns   As directed      Instructions:  Call office at [phone] for any drainage, increased redness, or fever over 100.5    Order Comments:  Instructions:  Call office at [phone] for any drainage, increased redness, or fever over 100.5          Discharge Follow-up with PCP   As directed       Currently Documented PCP:    Miller Castañeda MD    PCP Phone Number:    [phone]     Follow Up Details:  in 1 week         Discharge Follow-up with Specialty: Cardiologist FREDRICK/PA; 1 Week   As directed      Specialty:  Cardiologist APRN/PA    Follow Up:  1 Week    Follow Up Details:  bring all prescription bottles to appointment, call for appointment         Discharge Follow-up with Specified Provider: Cardiologist; 1 Month   As directed      To:  Cardiologist    Follow Up:  1 Month    Follow Up Details:  call for appointment, bring all medication bottles to appointment         Discharge Follow-up with Specified Provider: Marjorie ALCALA Cardiac Surgery   As directed      To:  Marjorie ALCALA Cardiac Surgery    Follow Up Details:  4-6 weeks, bring all current medications to appointment         Referral to Home Health   As directed      Face to Face Visit Date:  8/2/2019    Follow-up  Provider for Plan of Care?:  I will be treating the patient on an ongoing basis.  Please send me the Plan of Care for signature.    Follow-up Provider:  FORTINO SANDERS [[id number]]    Reason/Clinical Findings:  post op open heart    Describe mobility limitations that make leaving home difficult:  weakness    Nursing/Therapeutic Services Requested:  Skilled Nursing    Skilled nursing orders:  Post CABG care    Frequency:  1 Week 1               Test Results Pending at Discharge       FREDRICK Harris  08/02/19  9:42 AM

## 2019-08-02 NOTE — DISCHARGE INSTR - LAB
Continue to use your incentive spirometer for an additional 2 weeks.    Continue to wear your SOPHY hose for an additional 2 weeks. You may remove them at night.    Walk 10 minutes at a time at least 3 times a day.    Do not drive for 2 weeks and no longer taking narcotics. Ride in the backseat for 2 weeks.    Do not lift, push or pull greater than 10 pounds for 6 weeks.    You may shower, but do not submerge your incisions until your surgeon approves (i.e., no baths, pools, hot tubs, etc.).  Clean your incision daily in the shower with Dial or Ivory soap. Do not put any additional lotions, creams, or any other substance on your incision without your surgeon's approval.  Call your surgeon’s office at [phone] for any drainage, increased redness, or fever over 100.5.  Weigh yourself daily and report a weight gain of 2-3+ pounds in 24 hours or 5 lbs in 1 week to your cardiologist. Record your daily weights.  Take your temperature blood pressure daily. Keep a record of your readings along with your daily weights to report to your cardiologist at your next follow-up appointment.

## 2019-08-21 ENCOUNTER — OFFICE VISIT (OUTPATIENT)
Dept: INTERNAL MEDICINE | Age: 79
End: 2019-08-21

## 2019-08-21 VITALS
WEIGHT: 102 LBS | BODY MASS INDEX: 22.95 KG/M2 | DIASTOLIC BLOOD PRESSURE: 62 MMHG | SYSTOLIC BLOOD PRESSURE: 124 MMHG | OXYGEN SATURATION: 96 % | TEMPERATURE: 97 F | HEART RATE: 64 BPM | HEIGHT: 56 IN

## 2019-08-21 DIAGNOSIS — R42 DIZZINESS: ICD-10-CM

## 2019-08-21 DIAGNOSIS — Z09 HOSPITAL DISCHARGE FOLLOW-UP: Primary | ICD-10-CM

## 2019-08-21 DIAGNOSIS — N30.00 ACUTE CYSTITIS WITHOUT HEMATURIA: ICD-10-CM

## 2019-08-21 PROCEDURE — 99213 OFFICE O/P EST LOW 20 MIN: CPT | Performed by: NURSE PRACTITIONER

## 2019-08-21 RX ORDER — MECLIZINE HYDROCHLORIDE 25 MG/1
25 TABLET ORAL 3 TIMES DAILY PRN
Qty: 30 TABLET | Refills: 0 | Status: SHIPPED | OUTPATIENT
Start: 2019-08-21 | End: 2020-05-13 | Stop reason: SDUPTHER

## 2019-08-21 NOTE — PROGRESS NOTES
"  Grace Beauchamp / 79 y.o. / female  Encounter Date: 08/21/2019    VITALS    Visit Vitals  /62   Pulse 64   Temp 97 °F (36.1 °C) (Temporal)   Ht 142.4 cm (56.06\")   Wt 46.3 kg (102 lb)   LMP  (LMP Unknown)   SpO2 96%   BMI 22.82 kg/m²       BP Readings from Last 3 Encounters:   08/21/19 124/62   08/02/19 146/91   07/09/19 150/86     Wt Readings from Last 3 Encounters:   08/21/19 46.3 kg (102 lb)   08/02/19 47.3 kg (104 lb 4.8 oz)   07/09/19 49 kg (108 lb)      Body mass index is 22.82 kg/m².    CC:  Main reason(s) for today's visit: Post-op Follow-up      HPI:     Date of emergency department encounter: 7/22/19 - 8/2/19  Emergency department: [facility]  Principle Dx: Aortic Aneurysm, descending  Secondary Dx:   Discharge Diagnosis:  -Type III thoracoabdominal aortic aneurysm- s/p open repair of thoracoabdominal aortic aneurysm with a 22 mm dacryon interposition graft and visceral vessel reattachment; bilateral groin cutdown; circ arrest; ventral hernia repair with mesh  -Hx proximal thoracic aortic aneurysm repair and CABG 2016  -Paraparesis of unknown etiology.  Presumably it may be an ischemic component due to thrombosis of some of intercostal vessels or a decrease in flow  -Moderate frailty----utilizes walker at home  -PAD  -COPD  -HTN   -Post op anemia- expected acute blood loss  -left superficial venous thrombosis    History prior to ED visit: History of previous aortic aneurysm, repair approximately 3 years previous.  She has a issue with recurrent UTIs as well, this current surgical procedure was delayed by 2 weeks for treatment of bladder infection.    Evaluation/Treatment:   From Dr. Leigh d/c summary:  \"Patient is a 79 y.o. female presented for Open repair of thoracoabdominal aortic aneurysm with a 22 mm dacryon interposition graft and visceral vessel reattachment, Deep hypothermic circulatory arrest with upper body selective perfusion, Bilateral groin cutdown for femoral access " "for bypass, and Ventral hernia repair with mesh by Dr. Ontiveros on 7/23/19.  Post operatively she did well. Tolerated advance in diet. She was frail preoperatively and expected weakness post operatively. PT recommendations for discharge to rehab for strengthening. She had adequate PO intake.  Urinating and defecating without issue.  Staples removed from incision without issue.  She will need to follow up with PCP in 1 week.  With me in the office in 4 weeks.  She has been instructed to call with any and all questions and concerns.   On post op day number 10 she was deemed ready for discharge to rehab\"    Course: Currently she reports that she is feeling much improved, is still using home health and outpatient therapies to regain strength.  She does report she still feels overall weaker than normal.  She is living with her daughter currently, and eating 3 nutritious meals daily.  She is not having significant pain at this time.  Surgical scar and surgical wound areas are healing appropriately, and well.  She denies fever, chills, swelling, diaphoresis, shortness of air, abnormal vital signs.  Blood pressure stable today.  She does report that she feels like she has a another bladder infection since leaving the hospital and rehab due to catheter and frequent antibiotic use..  She would like to be tested for urine and culture today.  She is also experiencing some mild vertigo when she first wakes up in the morning, not every day.  She has a history of vertigo and has been treated with physical therapy in the past with good success.  She does not feel she is at the level where she needs physical therapy treatment at this time.  I have advised her that we can provide her with an antivertigo medication but that does not always work for everybody.  She will monitor her response, and let me know if she needs physical therapy evaluation for persistent vertigo.  She does not have nausea or vomiting with her vertigo.    Patient " Care Team:  Miller Castañeda MD as PCP - General (Internal Medicine)  Rikki Uribe MD as PCP - Claims Attributed  Mart Ontiveros MD as Surgeon (Cardiothoracic Surgery)  Tres De Los Santos MD as Consulting Physician (Cardiology)  Graham Mcconnell MD as Consulting Physician (Hematology and Oncology)  Payam Byrnes MD as Consulting Physician (Otolaryngology)  Jonathan Smith MD as Consulting Physician (Vascular Surgery)  Victor M Cabral MD as Surgeon (Orthopedic Surgery)  Yaya Burks MD as Consulting Physician (Pulmonary Disease)  ____________________________________________________________________    ASSESSMENT & PLAN:    1. Hospital discharge follow-up  -Continue medications as prescribed and hospital discharge.  Continue home health and therapies as prescribed.  2. Dizziness  - meclizine (ANTIVERT) 25 MG tablet; Take 1 tablet by mouth 3 (Three) Times a Day As Needed for dizziness.  Dispense: 30 tablet; Refill: 0  3. Acute cystitis without hematuria  - Urinalysis With Culture If Indicated - Urine, Clean Catch  -Advised her that we will send for culture if indicated, and only treat based on culture result.  She prefers this method since she has a frequency of bladder infections in her history.  She does not tolerate Bactrim, however she does not think she has any issues with Macrobid, and she has previously tolerated Cipro but prefers not to be on a fluoroquinolone if possible.     Orders Placed This Encounter   Procedures   • Urinalysis With Culture If Indicated - Urine, Clean Catch     New Medications Ordered This Visit   Medications   • meclizine (ANTIVERT) 25 MG tablet     Sig: Take 1 tablet by mouth 3 (Three) Times a Day As Needed for dizziness.     Dispense:  30 tablet     Refill:  0       Summary/Discussion:  1.  See orders for instructions.  2.  We will follow-up regarding urinalysis with culture.  3.  Continue home health and therapies as prescribed.  4.  Advised to follow-up with Dr. Castañeda in  approximately 3 months for regular scheduled follow-up.  Her most recent A1c and lipid panel were completed 7/9/19 and sufficient, do not need repeating or any changes today.  A1c was borderline, patient is aware.  She has lost weight since surgery, and is eating a more nutritious diet at this time.  Anticipate improvement in A1c at next follow-up.      Return in about 3 months (around 11/21/2019) for Follow up with Dr. Castañeda, PCP.    Future Appointments   Date Time Provider Department Center   9/5/2019  1:15 PM Marjorie Rodrigues APRN [facility] None   11/21/2019 11:15 AM Miller Castañeda MD [facility] None   5/29/2020  1:30 PM Carlie Hilton APRN [facility] None     ____________________________________________________________________    REVIEW OF SYSTEMS    Review of Systems   Constitutional: Negative for appetite change (good appetite), chills, diaphoresis, fatigue and fever.   HENT: Positive for voice change (voice sounds dry/hoarse).    Respiratory: Negative.  Negative for cough and shortness of breath.    Cardiovascular: Negative.  Negative for chest pain, palpitations and leg swelling.   Gastrointestinal: Negative.    Skin: Positive for wound (surgical wound L chest to L scapula).   Neurological: Negative.    Psychiatric/Behavioral: Negative.      PHYSICAL EXAMINATION    Physical Exam   Constitutional: She is oriented to person, place, and time. She appears well-developed and well-nourished. No distress.   Neck: Normal range of motion. Neck supple.   Pulmonary/Chest: Effort normal and breath sounds normal. She has no wheezes.   Abdominal: Soft. Bowel sounds are normal.   Musculoskeletal: Normal range of motion.   Lymphadenopathy:     She has no cervical adenopathy.   Neurological: She is alert and oriented to person, place, and time. No sensory deficit. Coordination normal.   Skin: She is not diaphoretic.        Surgical scar located L chest-L back, well approximated and healing. Scab over L back tube  insertion site. No drainage or openings. No pain or swelling.    Psychiatric: She has a normal mood and affect.   Vitals reviewed.    REVIEWED DATA:    Labs:   No visits with results within 14 Day(s) from this visit.   Latest known visit with results is:   Admission on 07/22/2019, Discharged on 08/02/2019   No results displayed because visit has over 200 results.            Imaging:   Xr Chest 1 View    Result Date: 7/30/2019  Narrative: ONE VIEW PORTABLE CHEST AT 4:19 PM  HISTORY: Recent left chest surgery and left chest tube removal. Follow-up evaluation.  FINDINGS: The lungs are somewhat hyperinflated with some minimal residual atelectasis and possible tiny amount of fluid at the left base showing no change from yesterday. There remains a horizontal band of scarring at the right base that is unchanged. The heart remains slightly enlarged. No definite pneumothorax is seen. A right jugular sheath ends in the SVC.  This report was finalized on 7/30/2019 4:37 PM by Dr. Janusz Gilbert M.D.      Xr Chest 1 View    Result Date: 7/29/2019  Narrative: XR CHEST 1 VW-  HISTORY: Female who is 79 years-old,  tube removal  TECHNIQUE: Frontal view of the chest  COMPARISON: 7/26/2019  FINDINGS: Interval removal of left chest tubes, NG tube. Right IJ sheath remains, may be kinked at the skin surface. Skin staples over the left hemithorax. Heart size is normal. Aorta is tortuous. Pulmonary vasculature is unremarkable. Minimal, 2 mm left apical pneumothorax. No focal pulmonary consolidation, pleural effusion, or right pneumothorax. Skinfold apparent on the right. No acute osseous process.      Impression: Left chest tube removal. Minimal, 2 mm left apical pneumothorax, continued follow-up recommended.  This report was finalized on 7/29/2019 1:00 PM by Dr. Jeison Salomon M.D.      Xr Chest 1 View    Result Date: 7/27/2019  Narrative: PORTABLE CHEST X-RAY  CLINICAL HISTORY: post op open heart  COMPARISON: 07/25/2019.  FINDINGS:  Portable AP view of the chest was obtained with overlying monitor leads in place. Life support lines are unchanged. Right lung base atelectasis and small effusion are stable. Left lung clear. Heart size and pulmonary vascularity are normal.        Impression:  Stable appearance of the chest by portable radiography.   This report was finalized on 7/27/2019 3:02 AM by Jonathan Gamez M.D.      Xr Chest 1 View    Result Date: 7/27/2019  Narrative: PORTABLE CHEST X-RAY  CLINICAL HISTORY: Post-Op Heart Surgery; I71.9-Aortic aneurysm of unspecified site, without rupture  COMPARISON: 07/24/2019.  FINDINGS: Portable AP view of the chest was obtained with overlying monitor leads in place. ET tube and [place] catheter have been removed. Other lines are unchanged. Stable tiny left apical pneumothorax. Significant improvement in right lung base atelectasis and effusion. Left lung clear. Heart size and pulmonary vascularity are normal.        Impression: Significant improvement in right lung base atelectasis and effusions.   This report was finalized on 7/27/2019 3:01 AM by Jonathan Gamez M.D.      Xr Chest 1 View    Result Date: 7/27/2019  Narrative: PORTABLE CHEST X-RAY  CLINICAL HISTORY: Post-Op Heart Surgery; I71.9-Aortic aneurysm of unspecified site, without rupture  COMPARISON: 07/23/2019.  FINDINGS: Portable AP view of the chest was obtained with overlying monitor leads in place. Life support lines are unchanged. There is a stable tiny left apical pneumothorax. Right lung base atelectasis and effusion again noted. There is no significant left effusion. Heart size and vascularity are normal.        Impression:  Stable appearance of the chest by portable radiography.   This report was finalized on 7/27/2019 2:47 AM by Jonathan Gamez M.D.      Xr Chest 1 View    Result Date: 7/23/2019  Narrative: Portable chest radiograph  HISTORY:Postop  TECHNIQUE: Single AP portable radiograph of the chest  COMPARISON:Chest radiograph  07/09/2019      Impression: FINDINGS AND IMPRESSION: Postsurgical changes from recent open heart surgery are present. Right internal jugular pulmonary artery catheter is present with tip terminating near the pulmonary artery bifurcation. 2 thoracostomy tubes overlie the mediastinum. A questionable third tube overlies the left upper quadrant. The endotracheal tube terminates approximately 1.4 cm above the marilynn. There are median sternotomy wires. An orogastric tube courses into the stomach and the tip is collimated outside the field-of-view.  A tiny left apical pneumothorax is present without a 0.3 cm of pleural parenchyma separation. Asymmetric elevation right hemidiaphragm is present. There is no pulmonary consolidation. No pleural effusion is visualized. The heart is normal in size.  This report was finalized on 7/23/2019 4:41 PM by Dr. Caleb Norman M.D.           Medical Tests:         Summary of old records / correspondence / consultant report:   DC summary re: issues addressed on HPI    Request outside records:       ALLERGIES  Allergies   Allergen Reactions   • Ramipril Other (See Comments)     Ace I  cough   • Morphine Itching   • Morphine And Related Itching   • Bactrim [Sulfamethoxazole-Trimethoprim] Itching and Rash        MEDICATIONS  Current Outpatient Medications on File Prior to Visit   Medication Sig   • ANORO ELLIPTA 62.5-25 MCG/INH aerosol powder  inhaler Inhale 1 puff Daily.   • aspirin 81 MG EC tablet Take 81 mg by mouth daily.   • Biotin 1000 MCG tablet Take 1,000 mcg by mouth daily. Take as directed   • BYSTOLIC 10 MG tablet TAKE ONE TABLET BY MOUTH DAILY   • cetirizine (zyrTEC) 10 MG tablet Take 1 tablet by mouth As Needed for Allergies.   • HYDROcodone-acetaminophen (NORCO) 5-325 MG per tablet Take 1 tablet by mouth Every 4 (Four) Hours As Needed for Moderate Pain  for up to 50 doses.   • melatonin 5 MG tablet tablet Take 2 tablets by mouth At Night As Needed (sleep).   • MYRBETRIQ 50 MG  tablet sustained-release 24 hour 24 hr tablet Take 50 mg by mouth Daily.   • omeprazole (priLOSEC) 20 MG capsule Take 1 capsule by mouth Daily.   • pitavastatin calcium (LIVALO) 2 MG tablet tablet Take 2 mg by mouth Every Night.   • pramipexole (MIRAPEX) 0.125 MG tablet TAKE ONE TABLET BY MOUTH AT BEDTIME   • PROAIR  (90 Base) MCG/ACT inhaler INHALE TWO PUFFS BY MOUTH EVERY 6 HOURS AS NEEDED FOR WHEEZING   • Probiotic Product (PROBIOTIC PO) Take 1 capsule by mouth Daily.     No current facility-administered medications on file prior to visit.        PFSH:     The following portions of the patient's history were reviewed and updated as appropriate: Allergies / Current Medications / Past Medical History / Surgical History / Social History / Family History    PROBLEM LIST   Patient Active Problem List   Diagnosis   • History of breast cancer   • Stenosis of carotid artery   • Chronic obstructive pulmonary disease (CMS/HCC)   • Closed fracture of multiple ribs   • Cognitive disorder   • Erythromelalgia (CMS/HCC)   • Hyperlipidemia   • Hypertension   • Primary osteoarthritis involving multiple joints   • Osteoporosis   • Restless legs syndrome   • Cerebral aneurysm   • Temporary cerebral vascular dysfunction   • S/P CABG x 1   • Type 2 diabetes mellitus without complication, without long-term current use of insulin (CMS/HCC)   • S/P ascending aortic aneurysm repair   • 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HCC)   • Descending thoracic aortic aneurysm (CMS/HCC)   • Claudication of both lower extremities (CMS/HCC)   • 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HCC)   • Ventral hernia without obstruction or gangrene   • Breast cancer, stage 1, estrogen receptor positive (CMS/HCC)   • Arthritis of right hip   • Arthritis of right knee   • Chondrocalcinosis   • Chronic pain of right knee   • Degenerative disc disease, lumbar   • Gastroesophageal reflux disease   • Bilateral hearing loss   • Thoracic aortic aneurysm without rupture  "(CMS/HCC)   • Erythromelalgia (CMS/HCC)   • 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CMS/HCC)   • Thoracoabdominal aortic aneurysm, 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)   • 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)   • Aortic aneurysm, descending (CMS/HCC)   • Aortic aneurysm 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)       PAST MEDICAL HISTORY  Past Medical History:   Diagnosis Date   • AAA (abdominal aortic aneurysm) (CMS/HCC)    • Acute bronchitis 12/2018   • 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HCC)    • Arthritis    • Bilateral carotid artery disease (CMS/HCC)    • Bilateral cataracts     S/p Extractions   • Breast cancer (CMS/HCC)     right breast [id number] (snm) pMX ER/OK pos, Her-2/neg, Ki-67, 31%, oncotype recurrence score 19, invasive lobular carcinoma   • CAD (coronary artery disease)     S/p CABG on 2/23/16 by Dr. 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HCC)    • Closed fracture of three ribs of right side    • Cognitive disorder    • COPD (chronic obstructive pulmonary disease) (CMS/HCC)    • Depression    • Descending aortic arch aneurysm (CMS/HCC)    • Diabetes mellitus (CMS/HCC)     \"BORDERLINE\"   • Erythermalgia (CMS/HCC)    • GERD (gastroesophageal reflux disease)    • Hyperlipidemia     Controlled w/Meds   • Hypertension     Controlled w/Meds   • Low back pain    • Moderate aortic valve insufficiency     S/p AVR on 02/23/16 by Dr. Ontiveros   • Nocturia    • Osteoarthritis    • Osteoporosis    • Otitis media     R Ear   • Prediabetes    • RLS (restless legs syndrome)    • Saccular aneurysm    • Stroke (CMS/[facility])     Perioperatively w/L Hip Repl   • Thoracic ascending aortic aneurysm (CMS/[facility])     S/p Repair on 02/23/16 by Dr. Ontiveros   • TIA (transient ischemic attack)    • Urgency incontinence    • Venous insufficiency    • Ventral hernia        SURGICAL HISTORY  Past Surgical History:   Procedure Laterality Date   • AORTIC VALVE REPAIR/REPLACEMENT N/A 02/23/2016-[facility]    Ascending Replacement Using a 24MM Graft--Dr. Ontiveros   • APPENDECTOMY  1977   • " BREAST BIOPSY Right 2012    Vacuum Assisted Core Bx of R Breast   • CARDIAC CATHETERIZATION N/A 2016    Dr. Tres De Los Santos   • CARDIAC CATHETERIZATION N/A 2019    Procedure: Left Heart Cath;  Surgeon: Tres De Los Santos MD;  Location:  [facility] CATH [facility]VAS[facility];  Service: Cardiology   • CARDIAC CATHETERIZATION N/A 2019    Procedure: Coronary angiography;  Surgeon: Ters De Los Santos MD;  Location:  [facility] CATH [facility]VAS[facility]ATION;  Service: Cardiology   • CARDIAC SURGERY  2016    Dr. Ontiveros/Dr. De Los Santos   • CATARACT EXTRACTION Bilateral     [facility]   • COLONOSCOPY N/A 10/2002    Dr. Negro   • CORONARY ARTERY BYPASS GRAFT  2016-[facility]    x1 w/L Vein Grafting--Dr. Ontiveros   • CYST REMOVAL Right 2013    R Palm Excision of Retinacular Cyst--Dr. Karla Fish   • EPIDURAL BLOCK     • LAPAROSCOPIC CHOLECYSTECTOMY N/A 2004    Dr. JOEY Sheth   • MASTECTOMY Right 2012   • ORIF HIP FRACTURE Left 2005    w/ AMBI compression screw, Dr. Victor M Cabral   • RECTOVAGINAL FISTULA REPAIR     • THORACIC AORTIC ANEURYSM REPAIR N/A 2019    Procedure: ROSE, THORACOABDOMINAL AORTIC ANEURYSM REPAIR, VISCERAL VESSEL REPAIR, LARGE VENTRAL HERNIA REPAIR WITH MESH, CIRCULATORY ARREST, PRP;  Surgeon: Mart Ontiveros MD;  Location: [facility] OR;  Service: Cardiothoracic   • TUBAL ABDOMINAL LIGATION         SOCIAL HISTORY  Social History     Socioeconomic History   • Marital status:      Spouse name: Not on file   • Number of children: Not on file   • Years of education: Not on file   • Highest education level: Not on file   Tobacco Use   • Smoking status: Former Smoker     Types: Cigarettes     Last attempt to quit: 2012     Years since quittin.7   • Smokeless tobacco: Never Used   Substance and Sexual Activity   • Alcohol use: No     Comment: Daily Caffeine Use   • Drug use: No   • Sexual activity: Defer     Birth control/protection: Tubal ligation        FAMILY HISTORY  Family History   Problem Relation Age of Onset   • Alzheimer's disease Mother    • Hypertension Mother    • Cancer Maternal Aunt    • Heart disease Father    • Heart failure Father    • Hypertension Father    • Diabetes Father    • Liver disease Sister    • Heart failure Brother    • Hypertension Brother    • Alcohol abuse Brother    • No Known Problems Maternal Grandmother    • No Known Problems Maternal Grandfather    • No Known Problems Paternal Grandmother    • No Known Problems Paternal Grandfather    • Diabetes Brother    • Brain cancer Brother    • Heart disease Brother

## 2019-08-22 ENCOUNTER — TELEPHONE (OUTPATIENT)
Dept: INTERNAL MEDICINE | Age: 79
End: 2019-08-22

## 2019-08-22 NOTE — TELEPHONE ENCOUNTER
She may want to go to an [facility] for evaluation today to make sure she did not hurt herself.

## 2019-08-22 NOTE — TELEPHONE ENCOUNTER
SHAD Downs w/[facility] is reporting pt was seen yesterday-Wednesday, 8/21/19 by Carlie; however pt fell on the sidewalk walking up to the house after her appt. Pt is generally sore all over. Also pt complains of not feeling well, weakness & unsure if the melatonin is the cause.    Per [facility] PT the skilled nurse is to come for a home visit today as well.    Pls advise.    [facility] PT can be reached #[phone] or pt can be reached #[phone].

## 2019-08-22 NOTE — TELEPHONE ENCOUNTER
S/w pt directly, and she said she is fine, no injuries/pain. Pt stated she would go if needed, but she is feeling okay.    KD

## 2019-08-24 LAB
APPEARANCE UR: CLEAR
BACTERIA #/AREA URNS HPF: ABNORMAL /HPF
BACTERIA UR CULT: ABNORMAL
BACTERIA UR CULT: ABNORMAL
BILIRUB UR QL STRIP: NEGATIVE
CASTS URNS MICRO: ABNORMAL
CASTS URNS QL MICRO: PRESENT /LPF
COLOR UR: YELLOW
EPI CELLS #/AREA URNS HPF: ABNORMAL /HPF
GLUCOSE UR QL: NEGATIVE
HGB UR QL STRIP: NEGATIVE
KETONES UR QL STRIP: NEGATIVE
LEUKOCYTE ESTERASE UR QL STRIP: ABNORMAL
MICRO URNS: ABNORMAL
MUCOUS THREADS URNS QL MICRO: PRESENT /HPF
NITRITE UR QL STRIP: NEGATIVE
OTHER ANTIBIOTIC SUSC ISLT: ABNORMAL
PH UR STRIP: 6 [PH] (ref 5–7.5)
PROT UR QL STRIP: ABNORMAL
RBC #/AREA URNS HPF: ABNORMAL /HPF
SP GR UR: 1.02 (ref 1–1.03)
URINALYSIS REFLEX: ABNORMAL
UROBILINOGEN UR STRIP-MCNC: 1 MG/DL (ref 0.2–1)
WBC #/AREA URNS HPF: ABNORMAL /HPF

## 2019-08-26 ENCOUNTER — LAB REQUISITION (OUTPATIENT)
Dept: LAB | Facility: HOSPITAL | Age: 79
End: 2019-08-26

## 2019-08-26 DIAGNOSIS — E11.51 TYPE 2 DIABETES MELLITUS WITH DIABETIC PERIPHERAL ANGIOPATHY WITHOUT GANGRENE (HCC): ICD-10-CM

## 2019-08-26 DIAGNOSIS — J44.9 CHRONIC OBSTRUCTIVE PULMONARY DISEASE (HCC): ICD-10-CM

## 2019-08-26 DIAGNOSIS — N30.00 ACUTE CYSTITIS WITHOUT HEMATURIA: Primary | ICD-10-CM

## 2019-08-26 DIAGNOSIS — I10 ESSENTIAL (PRIMARY) HYPERTENSION: ICD-10-CM

## 2019-08-26 LAB
BASOPHILS # BLD AUTO: 0.01 10*3/MM3 (ref 0–0.2)
BASOPHILS NFR BLD AUTO: 0.2 % (ref 0–1.5)
DEPRECATED RDW RBC AUTO: 51.8 FL (ref 37–54)
EOSINOPHIL # BLD AUTO: 0.08 10*3/MM3 (ref 0–0.4)
EOSINOPHIL NFR BLD AUTO: 1.4 % (ref 0.3–6.2)
ERYTHROCYTE [DISTWIDTH] IN BLOOD BY AUTOMATED COUNT: 14.6 % (ref 12.3–15.4)
HCT VFR BLD AUTO: 42.6 % (ref 34–46.6)
HGB BLD-MCNC: 13.4 G/DL (ref 12–15.9)
IMM GRANULOCYTES # BLD AUTO: 0.02 10*3/MM3 (ref 0–0.05)
IMM GRANULOCYTES NFR BLD AUTO: 0.3 % (ref 0–0.5)
LYMPHOCYTES # BLD AUTO: 0.87 10*3/MM3 (ref 0.7–3.1)
LYMPHOCYTES NFR BLD AUTO: 14.9 % (ref 19.6–45.3)
MCH RBC QN AUTO: 30.5 PG (ref 26.6–33)
MCHC RBC AUTO-ENTMCNC: 31.5 G/DL (ref 31.5–35.7)
MCV RBC AUTO: 96.8 FL (ref 79–97)
MONOCYTES # BLD AUTO: 0.36 10*3/MM3 (ref 0.1–0.9)
MONOCYTES NFR BLD AUTO: 6.2 % (ref 5–12)
NEUTROPHILS # BLD AUTO: 4.49 10*3/MM3 (ref 1.7–7)
NEUTROPHILS NFR BLD AUTO: 77 % (ref 42.7–76)
NRBC BLD AUTO-RTO: 0 /100 WBC (ref 0–0.2)
PLATELET # BLD AUTO: 367 10*3/MM3 (ref 140–450)
PMV BLD AUTO: 9.5 FL (ref 6–12)
RBC # BLD AUTO: 4.4 10*6/MM3 (ref 3.77–5.28)
WBC NRBC COR # BLD: 5.83 10*3/MM3 (ref 3.4–10.8)

## 2019-08-26 PROCEDURE — 85025 COMPLETE CBC W/AUTO DIFF WBC: CPT | Performed by: INTERNAL MEDICINE

## 2019-08-26 RX ORDER — CIPROFLOXACIN 500 MG/1
500 TABLET, FILM COATED ORAL 2 TIMES DAILY
Qty: 10 TABLET | Refills: 0 | Status: SHIPPED | OUTPATIENT
Start: 2019-08-26 | End: 2019-08-31

## 2019-08-29 ENCOUNTER — TELEPHONE (OUTPATIENT)
Dept: CARDIAC SURGERY | Facility: CLINIC | Age: 79
End: 2019-08-29

## 2019-08-29 DIAGNOSIS — Z86.79 S/P ANEURYSM REPAIR: Primary | ICD-10-CM

## 2019-08-29 DIAGNOSIS — Z98.890 S/P ANEURYSM REPAIR: Primary | ICD-10-CM

## 2019-08-29 NOTE — TELEPHONE ENCOUNTER
Patient has been d/c and is requesting orders for [facility] outpatient physical therapy. Please fax order to [phone] if it is okay with surgeon.

## 2019-09-05 ENCOUNTER — OFFICE VISIT (OUTPATIENT)
Dept: CARDIAC SURGERY | Facility: CLINIC | Age: 79
End: 2019-09-05

## 2019-09-05 VITALS
HEART RATE: 86 BPM | TEMPERATURE: 98 F | BODY MASS INDEX: 22.85 KG/M2 | DIASTOLIC BLOOD PRESSURE: 95 MMHG | OXYGEN SATURATION: 98 % | HEIGHT: 56 IN | SYSTOLIC BLOOD PRESSURE: 164 MMHG | WEIGHT: 101.6 LBS | RESPIRATION RATE: 20 BRPM

## 2019-09-05 DIAGNOSIS — I71.60 THORACOABDOMINAL AORTIC ANEURYSM, WITHOUT RUPTURE (HCC): ICD-10-CM

## 2019-09-05 PROCEDURE — 99024 POSTOP FOLLOW-UP VISIT: CPT | Performed by: NURSE PRACTITIONER

## 2019-09-05 NOTE — PROGRESS NOTES
"CARDIOVASCULAR SURGERY FOLLOW-UP PROGRESS NOTE  Chief Complaint: post op        HPI:   Dear Miller Reynolds MD and colleagues:    It was nice to see Grace Beauchamp in follow up 6 weeks after surgery.  As you know, she is a 79 y.o. female with enlarging de la o mole type III thoracoabdominal aortic aneurysm, s/p ascending arch repair, COPD who underwent Thoracoabdominal open aneurysm repair. She did well postoperatively and continues to do well. She comes in today complaining of incisional pain which is improving.  Her activity level has been good.   She states she starts outpatient rehab on Monday.  She states she is getting stronger and her balance has improved some.  Her blood pressure is elevated today.  She states her normal BP is around 130-140.    Physical Exam:         /95 (BP Location: Left arm, Patient Position: Sitting)   Pulse 86   Temp 98 °F (36.7 °C) (Oral)   Resp 20   Ht 142.2 cm (56\")   Wt 46.1 kg (101 lb 9.6 oz)   LMP  (LMP Unknown)   SpO2 98%   BMI 22.78 kg/m²   Heart:  regular rate and rhythm  Lungs:  clear to auscultation bilaterally  Extremities:  no edema  Incision(s):  healed    Assessment/Plan:     S/P thoracoabdominal aortic aneurysm repair. Overall, she is doing well.    Slow post-op rehabilitation progress    OK to begin cardiac rehab  Follow-up as scheduled with cardiology  Follow-up as scheduled with PCP  I will check with Dr. Ontiveros about when/if he will want to see her and if she will need a repeat CT scan at that time.      Thank you for allowing me to participate in the care of your   patient.  Regards,  FREDRICK Harris      "

## 2019-09-06 ENCOUNTER — TELEPHONE (OUTPATIENT)
Dept: INTERNAL MEDICINE | Age: 79
End: 2019-09-06

## 2019-09-06 DIAGNOSIS — I71.60 THORACOABDOMINAL AORTIC ANEURYSM (TAAA) WITHOUT RUPTURE (HCC): Primary | ICD-10-CM

## 2019-09-11 DIAGNOSIS — G25.81 RESTLESS LEGS SYNDROME (RLS): ICD-10-CM

## 2019-09-11 DIAGNOSIS — I10 ESSENTIAL HYPERTENSION: ICD-10-CM

## 2019-09-12 NOTE — TELEPHONE ENCOUNTER
[facility] marv [facility] outpt PT, w/ verbal orders for R AFO foot drop. They were advised to fax anything that needs a signature to [phone].    KD

## 2019-09-13 ENCOUNTER — TELEPHONE (OUTPATIENT)
Dept: INTERNAL MEDICINE | Age: 79
End: 2019-09-13

## 2019-09-13 RX ORDER — PRAMIPEXOLE DIHYDROCHLORIDE 0.12 MG/1
TABLET ORAL
Qty: 90 TABLET | Refills: 0 | Status: SHIPPED | OUTPATIENT
Start: 2019-09-13 | End: 2019-12-19

## 2019-09-13 RX ORDER — NEBIVOLOL HYDROCHLORIDE 10 MG/1
TABLET ORAL
Qty: 30 TABLET | Refills: 0 | Status: SHIPPED | OUTPATIENT
Start: 2019-09-13 | End: 2019-10-22 | Stop reason: SDUPTHER

## 2019-09-13 NOTE — TELEPHONE ENCOUNTER
Daina called from [facility] requesting a prescription for a right AFO for foot drop fax #[phone]

## 2019-09-19 ENCOUNTER — TELEPHONE (OUTPATIENT)
Dept: INTERNAL MEDICINE | Age: 79
End: 2019-09-19

## 2019-09-19 NOTE — TELEPHONE ENCOUNTER
Pt called and wants to talk to you about something but she wouldn't tell me, She said you will find out when you call her.    PT#[phone]

## 2019-09-20 ENCOUNTER — OFFICE VISIT (OUTPATIENT)
Dept: INTERNAL MEDICINE | Age: 79
End: 2019-09-20

## 2019-09-20 VITALS
HEART RATE: 69 BPM | OXYGEN SATURATION: 95 % | SYSTOLIC BLOOD PRESSURE: 128 MMHG | BODY MASS INDEX: 22.04 KG/M2 | DIASTOLIC BLOOD PRESSURE: 82 MMHG | HEIGHT: 56 IN | TEMPERATURE: 96.6 F | WEIGHT: 98 LBS

## 2019-09-20 DIAGNOSIS — M21.371 RIGHT FOOT DROP: Primary | ICD-10-CM

## 2019-09-20 PROCEDURE — 99213 OFFICE O/P EST LOW 20 MIN: CPT | Performed by: INTERNAL MEDICINE

## 2019-09-20 RX ORDER — PYRIDOXINE HCL (VITAMIN B6) 100 MG
TABLET ORAL 2 TIMES DAILY
COMMUNITY
End: 2020-05-29

## 2019-09-20 NOTE — PROGRESS NOTES
[facility] [place]  ANETA CASTAÑEDA M.D.      Grace Beauchamp / 79 y.o. / female  09/20/2019      CC:  Main reason(s) for today's visit: Foot Problem (dignosed by rehab)      HPI:     Reviewed chief complaint and details of complaint as documented by staff.     Chronic right foot drop noted for many years. No acute change since recent vascular surgery. Would benefit from using an AFO brace.       Patient Care Team:  Miller Castañeda MD as PCP - General (Internal Medicine)  Rikki Uribe MD as PCP - Claims Attributed  Mart Ontiveros MD as Surgeon (Cardiothoracic Surgery)  Tres De Los Santos MD as Consulting Physician (Cardiology)  Graham Mcconnell MD as Consulting Physician (Hematology and Oncology)  Payam Byrnes MD as Consulting Physician (Otolaryngology)  Jonathan Smith MD as Consulting Physician (Vascular Surgery)  Victor M Cabral MD as Surgeon (Orthopedic Surgery)  Yaya Burks MD as Consulting Physician (Pulmonary Disease)    ASSESSMENT & PLAN:    Foot drop (chronic)    Would benefit from using AFO foot/ankle brace.     Summary/Discussion:        Next Appointment with me: 11/21/2019    No Follow-up on file.    ____________________________________________________________________    MEDICATIONS  Current Outpatient Medications   Medication Sig Dispense Refill   • ANORO ELLIPTA 62.5-25 MCG/INH aerosol powder  inhaler Inhale 1 puff Daily.     • aspirin 81 MG EC tablet Take 81 mg by mouth daily.     • Biotin 1000 MCG tablet Take 1,000 mcg by mouth daily. Take as directed     • BYSTOLIC 10 MG tablet TAKE ONE TABLET BY MOUTH DAILY 30 tablet 0   • cetirizine (zyrTEC) 10 MG tablet Take 1 tablet by mouth As Needed for Allergies. 30 tablet 0   • Cranberry 500 MG capsule Take  by mouth 2 (Two) Times a Day.     • meclizine (ANTIVERT) 25 MG tablet Take 1 tablet by mouth 3 (Three) Times a Day As Needed for dizziness. 30 tablet 0   • melatonin 5 MG tablet tablet Take 2 tablets by mouth At Night As Needed (sleep).  "30 tablet 0   • omeprazole (priLOSEC) 20 MG capsule Take 1 capsule by mouth Daily. 90 capsule 2   • pramipexole (MIRAPEX) 0.125 MG tablet TAKE ONE TABLET BY MOUTH EVERY NIGHT AT BEDTIME 90 tablet 0   • PROAIR  (90 Base) MCG/ACT inhaler INHALE TWO PUFFS BY MOUTH EVERY 6 HOURS AS NEEDED FOR WHEEZING 1 inhaler 3   • Probiotic Product (PROBIOTIC PO) Take 1 capsule by mouth Daily.     • HYDROcodone-acetaminophen (NORCO) 5-325 MG per tablet Take 1 tablet by mouth Every 4 (Four) Hours As Needed for Moderate Pain  for up to 50 doses. 50 tablet 0   • MYRBETRIQ 50 MG tablet sustained-release 24 hour 24 hr tablet Take 50 mg by mouth Daily.     • pitavastatin calcium (LIVALO) 2 MG tablet tablet Take 2 mg by mouth Every Night.     • Unable to find Right Breast Prosthesis () & Surgical Bras #6 () Dx Breast Cancer C50.911 1 each 0     No current facility-administered medications for this visit.           ____________________________________________________________________      REVIEW OF SYSTEMS    Review of Systems  Right foot drop     VITALS    Visit Vitals  /82   Pulse 69   Temp 96.6 °F (35.9 °C)   Ht 142.2 cm (56\")   Wt 44.5 kg (98 lb)   LMP  (LMP Unknown)   SpO2 95%   BMI 21.97 kg/m²       BP Readings from Last 3 Encounters:   09/20/19 128/82   09/05/19 164/95   08/21/19 124/62     Wt Readings from Last 3 Encounters:   09/20/19 44.5 kg (98 lb)   09/05/19 46.1 kg (101 lb 9.6 oz)   08/21/19 46.3 kg (102 lb)      Body mass index is 21.97 kg/m².    PHYSICAL EXAMINATION    Physical Exam  Right foot drop on walking       REVIEWED DATA:    Labs:       Imaging:        Medical Tests:       Summary of old records / correspondence / consultant report:        Request outside records:       ALLERGIES  Allergies   Allergen Reactions   • Ramipril Other (See Comments)     Ace I  cough   • Morphine Itching   • Morphine And Related Itching   • Bactrim [Sulfamethoxazole-Trimethoprim] Itching and Rash        PFS:     The " following portions of the patient's history were reviewed and updated as appropriate: Allergies / Current Medications / Past Medical History / Surgical History / Social History / Family History    PROBLEM LIST   Patient Active Problem List   Diagnosis   • History of breast cancer   • Stenosis of carotid artery   • Chronic obstructive pulmonary disease (CMS/HCC)   • Closed fracture of multiple ribs   • Cognitive disorder   • Erythromelalgia (CMS/HCC)   • Hyperlipidemia   • Hypertension   • Primary osteoarthritis involving multiple joints   • Osteoporosis   • Restless legs syndrome   • Cerebral aneurysm   • Temporary cerebral vascular dysfunction   • S/P CABG x 1   • Type 2 diabetes mellitus without complication, without long-term current use of insulin (CMS/HCC)   • S/P ascending aortic aneurysm repair   • 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HCC)   • Descending thoracic aortic aneurysm (CMS/HCC)   • Claudication of both lower extremities (CMS/[facility])   • 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HCC)   • Ventral hernia without obstruction or gangrene   • Breast cancer, stage 1, estrogen receptor positive (CMS/[facility])   • Arthritis of right hip   • Arthritis of right knee   • Chondrocalcinosis   • Chronic pain of right knee   • Degenerative disc disease, lumbar   • Gastroesophageal reflux disease   • Bilateral hearing loss   • Thoracic aortic aneurysm 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)   • Erythromelalgia (CMS/[facility])   • 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CMS/[facility])   • Thoracoabdominal aortic aneurysm, 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)   • 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)   • Aortic aneurysm, descending (CMS/HCC)   • Aortic aneurysm 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)       PAST MEDICAL HISTORY  Past Medical History:   Diagnosis Date   • AAA (abdominal aortic aneurysm) (CMS/[facility])    • Acute bronchitis 12/2018   • 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HCC)    • Arthritis    • Bilateral carotid artery disease (CMS/HCC)    • Bilateral cataracts     S/p Extractions   •  "Breast cancer (CMS/HCC)     right breast xS5klGl (snm) pMX ER/MA pos, Her-2/neg, Ki-67, 31%, oncotype recurrence score 19, invasive lobular carcinoma   • CAD (coronary artery disease)     S/p CABG on 2/23/16 by Dr. 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HCC)    • Closed fracture of three ribs of right side    • Cognitive disorder    • COPD (chronic obstructive pulmonary disease) (CMS/HCC)    • Depression    • Descending aortic arch aneurysm (CMS/HCC)    • Diabetes mellitus (CMS/HCC)     \"BORDERLINE\"   • Erythermalgia (CMS/HCC)    • GERD (gastroesophageal reflux disease)    • Hyperlipidemia     Controlled w/Meds   • Hypertension     Controlled w/Meds   • Low back pain    • Moderate aortic valve insufficiency     S/p AVR on 02/23/16 by Dr. Ontiveros   • Nocturia    • Osteoarthritis    • Osteoporosis    • Otitis media     R Ear   • Prediabetes    • RLS (restless legs syndrome)    • Saccular aneurysm    • Stroke (CMS/HCC)     Perioperatively w/L Hip Repl   • Thoracic ascending aortic aneurysm (CMS/HCC)     S/p Repair on 02/23/16 by Dr. Ontiveros   • TIA (transient ischemic attack)    • Urgency incontinence    • Venous insufficiency    • Ventral hernia        SURGICAL HISTORY  Past Surgical History:   Procedure Laterality Date   • AORTIC VALVE REPAIR/REPLACEMENT N/A 02/23/2016-[facility]    Ascending Replacement Using a 24MM Graft--Dr. Ontiveros   • APPENDECTOMY  1977   • BREAST BIOPSY Right 09/16/2012    Vacuum Assisted Core Bx of R Breast   • CARDIAC CATHETERIZATION N/A 01/06/2016    Dr. Tres De Los Santos   • CARDIAC CATHETERIZATION N/A 4/22/2019    Procedure: Left Heart Cath;  Surgeon: Tres De Los Santos MD;  Location:  [facility];  Service: Cardiology   • CARDIAC CATHETERIZATION N/A 4/22/2019    Procedure: Coronary angiography;  Surgeon: Tres De Los Santos MD;  Location:  [facility];  Service: Cardiology   • CARDIAC SURGERY  02/2016    Dr. Ontiveros/Dr. De Los Santos   • CATARACT EXTRACTION Bilateral     [facility]   • COLONOSCOPY N/A 10/2002    Dr. Negro   • CORONARY ARTERY BYPASS GRAFT  2016-[facility]    x1 w/L Vein Grafting--Dr. Ontiveros   • CYST REMOVAL Right 2013    R Palm Excision of Retinacular Cyst--Dr. Karla Fish   • EPIDURAL BLOCK     • LAPAROSCOPIC CHOLECYSTECTOMY N/A 2004    Dr. JOEY Sheth   • MASTECTOMY Right 2012   • ORIF HIP FRACTURE Left 2005    w/ AMBI compression screw, Dr. Victor M Cabral   • RECTOVAGINAL FISTULA REPAIR  1965   • THORACIC AORTIC ANEURYSM REPAIR N/A 2019    Procedure: ROSE, THORACOABDOMINAL AORTIC ANEURYSM REPAIR, VISCERAL VESSEL REPAIR, LARGE VENTRAL HERNIA REPAIR WITH MESH, CIRCULATORY ARREST, PRP;  Surgeon: Mart Ontiveros MD;  Location: [facility];  Service: Cardiothoracic   • TUBAL ABDOMINAL LIGATION         SOCIAL HISTORY  Social History     Socioeconomic History   • Marital status:      Spouse name: Not on file   • Number of children: Not on file   • Years of education: Not on file   • Highest education level: Not on file   Tobacco Use   • Smoking status: Former Smoker     Types: Cigarettes     Last attempt to quit: 2012     Years since quittin.8   • Smokeless tobacco: Never Used   Substance and Sexual Activity   • Alcohol use: No     Comment: Daily Caffeine Use   • Drug use: No   • Sexual activity: Defer     Birth control/protection: Tubal ligation       FAMILY HISTORY  Family History   Problem Relation Age of Onset   • Alzheimer's disease Mother    • Hypertension Mother    • Cancer Maternal Aunt    • Heart disease Father    • Heart failure Father    • Hypertension Father    • Diabetes Father    • Liver disease Sister    • Heart failure Brother    • Hypertension Brother    • Alcohol abuse Brother    • No Known Problems Maternal Grandmother    • No Known Problems Maternal Grandfather    • No Known Problems Paternal Grandmother    • No Known Problems Paternal Grandfather    • Diabetes Brother    • Brain cancer Brother     • Heart disease Brother          **Tru Disclaimer:   Much of this encounter note is an electronic transcription/translation of spoken language to printed text. The electronic translation of spoken language may permit erroneous, or at times, nonsensical words or phrases to be inadvertently transcribed. Although I have reviewed the note for such errors, some may still exist.       Template created by Bebo Castañeda MD

## 2019-09-20 NOTE — TELEPHONE ENCOUNTER
Pt was Rx'd AFO for R drop foot per outpatient therapy.     She is planning on seeing Ortho today for fitting, however they need a note from Dr. Castañeda confirming Dx of R drop foot.     Does pt need to be evaluated? Please advise.    KD

## 2019-10-17 NOTE — TELEPHONE ENCOUNTER
LOV  09.20.19 NOV 11.21.19  CONTRACT - NONE LISTED   GIULIA - 07.09.19   PAULINOS - 07.09.19    *Medication was discontinued by Dr. Mancini (Cardiologist) on 07.29.19

## 2019-10-18 ENCOUNTER — TELEPHONE (OUTPATIENT)
Dept: INTERNAL MEDICINE | Age: 79
End: 2019-10-18

## 2019-10-18 RX ORDER — TRAMADOL HYDROCHLORIDE 50 MG/1
TABLET ORAL
Qty: 90 TABLET | Refills: 2 | OUTPATIENT
Start: 2019-10-18

## 2019-10-18 NOTE — TELEPHONE ENCOUNTER
Pt informed expressed understanding . Will contact dr ocasio office . If did not work will call us to schedule harrison

## 2019-10-18 NOTE — TELEPHONE ENCOUNTER
"Pt states \"she does not understand why Dr. Castañeda can't just call it in\". HE gave it to her years ago. I then explained it is a narcotic and we would have to update her UDS and Contract. And she said tell him to not give her a run around she's never had to do that. She will just call her cardiologist pt stated.   "

## 2019-10-18 NOTE — TELEPHONE ENCOUNTER
She was prescribed hydrocodone by Dr. Ontiveros and tramadol was discontinued.   She needs to call Dr. Ontiveros's office about her pain medication.   If she needs to be restarted on tramadol, she will need to be assessed by me first.

## 2019-10-18 NOTE — TELEPHONE ENCOUNTER
You left the pt a voicemail and the pt did not understand the message. She request you call her back when you can.

## 2019-10-21 ENCOUNTER — TELEPHONE (OUTPATIENT)
Dept: INTERNAL MEDICINE | Age: 79
End: 2019-10-21

## 2019-10-21 DIAGNOSIS — M15.9 PRIMARY OSTEOARTHRITIS INVOLVING MULTIPLE JOINTS: Primary | Chronic | ICD-10-CM

## 2019-10-21 RX ORDER — TRAMADOL HYDROCHLORIDE 50 MG/1
50 TABLET ORAL EVERY 8 HOURS PRN
Qty: 90 TABLET | Refills: 0 | Status: SHIPPED | OUTPATIENT
Start: 2019-10-21 | End: 2019-12-03 | Stop reason: SDUPTHER

## 2019-10-21 NOTE — TELEPHONE ENCOUNTER
Scheduled pt for Wednesday . 1:45  Unable to come today and tomorrow . Her daughter out of town today and has procedure tomorrow

## 2019-10-21 NOTE — ASSESSMENT & PLAN NOTE
Tramadol 50 mg q8 PRN moderate/severe pain electronically sent to pharmacy.   I discussed with patient. Hydrocodone has previously been completed. She is resuming tramadol for chronic arthritis pain.   GIULIA reviewed today.

## 2019-10-21 NOTE — TELEPHONE ENCOUNTER
Pt wants to know if she can have some pain pills till she gets into Dr. Castañeda?    Pls advise,    PT#[phone]

## 2019-10-21 NOTE — TELEPHONE ENCOUNTER
Pt called and was very angry bc nobody will fill her tramadol and she really needs that for her back pain. She wants to know if Dr. Castañeda can fit her in to see her before November the 1st?    Pls advise,    PT#[phone]

## 2019-10-22 DIAGNOSIS — I10 ESSENTIAL HYPERTENSION: ICD-10-CM

## 2019-10-22 RX ORDER — NEBIVOLOL HYDROCHLORIDE 10 MG/1
TABLET ORAL
Qty: 30 TABLET | Refills: 11 | Status: SHIPPED | OUTPATIENT
Start: 2019-10-22 | End: 2020-03-20 | Stop reason: SDUPTHER

## 2019-10-22 NOTE — TELEPHONE ENCOUNTER
FYI:   Pt cancelled appt for Wednesday, 10/23/19 at 1:45p with  via the automated phone system. Per pt, Dr. Castañeda spoke with the pt Monday 10/21/19 evening & refilled tramadol rx. MO

## 2019-11-21 DIAGNOSIS — M15.9 PRIMARY OSTEOARTHRITIS INVOLVING MULTIPLE JOINTS: Chronic | ICD-10-CM

## 2019-12-02 DIAGNOSIS — M15.9 PRIMARY OSTEOARTHRITIS INVOLVING MULTIPLE JOINTS: Chronic | ICD-10-CM

## 2019-12-02 RX ORDER — TRAMADOL HYDROCHLORIDE 50 MG/1
50 TABLET ORAL EVERY 8 HOURS PRN
Qty: 90 TABLET | Refills: 0 | Status: CANCELLED | OUTPATIENT
Start: 2019-12-02

## 2019-12-02 RX ORDER — TRAMADOL HYDROCHLORIDE 50 MG/1
TABLET ORAL
Qty: 90 TABLET | Refills: 0 | OUTPATIENT
Start: 2019-12-02

## 2019-12-03 ENCOUNTER — OFFICE VISIT (OUTPATIENT)
Dept: INTERNAL MEDICINE | Age: 79
End: 2019-12-03

## 2019-12-03 VITALS
OXYGEN SATURATION: 97 % | HEIGHT: 56 IN | WEIGHT: 98 LBS | BODY MASS INDEX: 22.04 KG/M2 | HEART RATE: 74 BPM | SYSTOLIC BLOOD PRESSURE: 122 MMHG | TEMPERATURE: 96.7 F | DIASTOLIC BLOOD PRESSURE: 82 MMHG

## 2019-12-03 DIAGNOSIS — Z51.81 THERAPEUTIC DRUG MONITORING: Primary | ICD-10-CM

## 2019-12-03 DIAGNOSIS — M15.9 PRIMARY OSTEOARTHRITIS INVOLVING MULTIPLE JOINTS: Chronic | ICD-10-CM

## 2019-12-03 PROCEDURE — 99213 OFFICE O/P EST LOW 20 MIN: CPT | Performed by: INTERNAL MEDICINE

## 2019-12-03 RX ORDER — CILOSTAZOL 100 MG/1
100 TABLET ORAL 2 TIMES DAILY
COMMUNITY
Start: 2019-10-28 | End: 2020-10-23 | Stop reason: SDUPTHER

## 2019-12-03 RX ORDER — FESOTERODINE FUMARATE 4 MG/1
4 TABLET, FILM COATED, EXTENDED RELEASE ORAL
COMMUNITY
Start: 2019-11-11

## 2019-12-03 RX ORDER — TRAMADOL HYDROCHLORIDE 50 MG/1
50 TABLET ORAL EVERY 8 HOURS PRN
Qty: 60 TABLET | Refills: 2 | Status: SHIPPED | OUTPATIENT
Start: 2019-12-03 | End: 2020-02-20

## 2019-12-03 NOTE — PROGRESS NOTES
[facility] [facility]  ANETA COLLINS M.D.      Grace Beauchamp / 79 y.o. / female  12/03/2019      CHIEF COMPLAINT     Arthritis (tramadol refill. need UDS ,CONTRACT)      ASSESSMENT & PLAN     Problem List Items Addressed This Visit        Medium    Primary osteoarthritis involving multiple joints (Chronic)    Overview     Lumbar spine, hips, knees.    **Approved for electronic prescription for tramadol on 10/21/19**          Current Assessment & Plan     Reviewed latest GIULIA . Reviewed most recent UDS or ordered UDS. Patient is having medication refilled at expected intervals. Using medication appropriately without evidence of unusual increased use, abuse, or diverting.     Changed the qty of tramadol to 60 / 30 days with 2 refills.   Rx sent to pharmacy electronically.          Relevant Medications    traMADol (ULTRAM) 50 MG tablet    Other Relevant Orders    Compliance Drug Analysis, Ur - Urine, Clean Catch      Other Visit Diagnoses     Therapeutic drug monitoring    -  Primary    Relevant Orders    Compliance Drug Analysis, Ur - Urine, Clean Catch        Orders Placed This Encounter   Procedures   • Compliance Drug Analysis, Ur - Urine, Clean Catch     New Medications Ordered This Visit   Medications   • traMADol (ULTRAM) 50 MG tablet     Sig: Take 1 tablet by mouth Every 8 (Eight) Hours As Needed for Moderate Pain  or Severe Pain .     Dispense:  60 tablet     Refill:  2       Summary/Discussion:  ·       Next Appointment with me: Visit date not found    Return in about 3 months (around 3/3/2020) for Reassess chronic medical problems.      MEDICATIONS     Current Outpatient Medications   Medication Sig Dispense Refill   • ANORO ELLIPTA 62.5-25 MCG/INH aerosol powder  inhaler Inhale 1 puff Daily.     • aspirin 81 MG EC tablet Take 81 mg by mouth daily.     • Biotin 1000 MCG tablet Take 1,000 mcg by mouth daily. Take as directed     • BYSTOLIC 10 MG tablet TAKE ONE TABLET BY MOUTH DAILY 30 tablet 11   •  "cetirizine (zyrTEC) 10 MG tablet Take 1 tablet by mouth As Needed for Allergies. 30 tablet 0   • Cranberry 500 MG capsule Take  by mouth 2 (Two) Times a Day.     • meclizine (ANTIVERT) 25 MG tablet Take 1 tablet by mouth 3 (Three) Times a Day As Needed for dizziness. 30 tablet 0   • melatonin 5 MG tablet tablet Take 2 tablets by mouth At Night As Needed (sleep). 30 tablet 0   • MYRBETRIQ 50 MG tablet sustained-release 24 hour 24 hr tablet Take 50 mg by mouth Daily.     • omeprazole (priLOSEC) 20 MG capsule Take 1 capsule by mouth Daily. 90 capsule 2   • pitavastatin calcium (LIVALO) 2 MG tablet tablet Take 2 mg by mouth Every Night.     • pramipexole (MIRAPEX) 0.125 MG tablet TAKE ONE TABLET BY MOUTH EVERY NIGHT AT BEDTIME 90 tablet 0   • PROAIR  (90 Base) MCG/ACT inhaler INHALE TWO PUFFS BY MOUTH EVERY 6 HOURS AS NEEDED FOR WHEEZING 1 inhaler 3   • Probiotic Product (PROBIOTIC PO) Take 1 capsule by mouth Daily.     • traMADol (ULTRAM) 50 MG tablet Take 1 tablet by mouth Every 8 (Eight) Hours As Needed for Moderate Pain  or Severe Pain . 60 tablet 2   • cilostazol (PLETAL) 100 MG tablet      • Menthol, Topical Analgesic, 10 % liquid Apply  topically to the appropriate area as directed.     • Sennosides 17.2 MG tablet Take  by mouth.     • TOVIAZ 4 MG tablet sustained-release 24 hour tablet          VITAL SIGNS     Visit Vitals  /82   Pulse 74   Temp 96.7 °F (35.9 °C)   Ht 142.2 cm (56\")   Wt 44.5 kg (98 lb)   LMP  (LMP Unknown)   SpO2 97%   BMI 21.97 kg/m²       BP Readings from Last 3 Encounters:   12/03/19 122/82   09/20/19 128/82   09/05/19 164/95     Wt Readings from Last 3 Encounters:   12/03/19 44.5 kg (98 lb)   09/20/19 44.5 kg (98 lb)   09/05/19 46.1 kg (101 lb 9.6 oz)      Body mass index is 21.97 kg/m².      HISTORY OF PRESENT ILLNESS     OA of multiple sites including spine, hips, knees. Takes tramadol 50 mg approximately 2 a day for moderate/severe pain and it helps her significantly without " problems. Has been receiving 30/30 days but runs short. Previously was taking up to 90/30 days. Does not take any hydrocodone currently.       Patient Care Team:  Miller Castañeda MD as PCP - General (Internal Medicine)  Mart Ontiveros MD as PCP - Claims Attributed  Mart Ontiveros MD as Surgeon (Cardiothoracic Surgery)  Tres De Los Santos MD as Consulting Physician (Cardiology)  Graham Mcconnell MD as Consulting Physician (Hematology and Oncology)  Payam Byrnes MD as Consulting Physician (Otolaryngology)  Jonathan Smith MD as Consulting Physician (Vascular Surgery)  Victor M Cabral MD as Surgeon (Orthopedic Surgery)  Yaya Burks MD as Consulting Physician (Pulmonary Disease)      REVIEW OF SYSTEMS     Review of Systems  Constitutional neg  GI neg  MuSk back / hip / knee pain   Psych neg       PHYSICAL EXAMINATION     Physical Exam  Constitutional  No distress  Psychiatric  Alert. Judgment intact. Thought content normal. Mood normal      REVIEWED DATA     Labs:     Lab Results   Component Value Date     08/02/2019    K 4.1 08/02/2019    CALCIUM 7.2 (L) 08/02/2019    AST 81 (H) 07/22/2019    ALT 41 (H) 07/22/2019    BUN 11 08/02/2019    CREATININE 0.47 (L) 08/02/2019    CREATININE 0.55 (L) 08/01/2019    CREATININE 0.46 (L) 07/31/2019    EGFRIFNONA 128 08/02/2019    EGFRIFAFRI 77 02/01/2019       Lab Results   Component Value Date    HGBA1C 6.40 (H) 07/09/2019    HGBA1C 6.8 (H) 08/15/2018    HGBA1C 6.29 (H) 03/03/2017     (H) 02/01/2019     (H) 08/15/2018     (H) 11/01/2016    MICROALBUR 69.2 03/03/2017       Lab Results   Component Value Date    LDL 53 07/09/2019    LDL 54 08/15/2018     (H) 03/03/2017    HDL 69 (H) 07/09/2019    HDL 70 08/15/2018    HDL 67 (H) 03/03/2017    TRIG 56 07/09/2019    TRIG 99 08/15/2018    TRIG 111 03/03/2017    CHOLHDLRATIO 2.1 08/15/2018    CHOLHDLRATIO 3.51 03/03/2017       No results found for: TSH, FREET4    Lab Results    Component Value Date    WBC 5.83 08/26/2019    HGB 13.4 08/26/2019    HGB 12.2 08/01/2019    HGB 11.7 (L) 07/31/2019     08/26/2019       Lab Results   Component Value Date    PROTEIN Trace 08/21/2019    GLUCOSEU Negative 08/21/2019    BLOODU Negative 08/21/2019    NITRITEU Negative 08/21/2019    LEUKOCYTESUR 1+ (A) 08/21/2019       Imaging:         Medical Tests:         Summary of old records / correspondence / consultant report:         Request outside records:         ALLERGIES  Allergies   Allergen Reactions   • Ramipril Other (See Comments)     Ace I  cough   • Morphine Itching   • Morphine And Related Itching   • Bactrim [Sulfamethoxazole-Trimethoprim] Itching and Rash        PFSH:     The following portions of the patient's history were reviewed and updated as appropriate: Allergies / Current Medications / Past Medical History / Surgical History / Social History / Family History    PROBLEM LIST   Patient Active Problem List   Diagnosis   • History of breast cancer   • Stenosis of carotid artery   • Chronic obstructive pulmonary disease (CMS/[facility])   • Closed fracture of multiple ribs   • Cognitive disorder   • Erythromelalgia (CMS/[facility])   • Hyperlipidemia   • Hypertension   • Primary osteoarthritis involving multiple joints   • Osteoporosis   • Restless legs syndrome   • Cerebral aneurysm   • Temporary cerebral vascular dysfunction   • S/P CABG x 1   • Type 2 diabetes mellitus without complication, without long-term current use of insulin (CMS/[facility])   • S/P ascending aortic aneurysm repair   • 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[facility])   • Descending thoracic aortic aneurysm (CMS/[facility])   • Claudication of both lower extremities (CMS/[facility])   • 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[facility])   • Ventral hernia without obstruction or gangrene   • Breast cancer, stage 1, estrogen receptor positive (CMS/[facility])   • Arthritis of right hip   • Arthritis of right knee   • Chondrocalcinosis   • Chronic pain of right knee   •  "Degenerative disc disease, lumbar   • Gastroesophageal reflux disease   • Bilateral hearing loss   • Thoracic aortic aneurysm 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)   • Erythromelalgia (CMS/HCC)   • 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CMS/[facility])   • Thoracoabdominal aortic aneurysm, 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)   • 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)   • Aortic aneurysm, descending (CMS/HCC)   • Aortic aneurysm 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)       PAST MEDICAL HISTORY  Past Medical History:   Diagnosis Date   • AAA (abdominal aortic aneurysm) (CMS/[facility])    • Acute bronchitis 12/2018   • 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HCC)    • Arthritis    • Bilateral carotid artery disease (CMS/[facility])    • Bilateral cataracts     S/p Extractions   • Breast cancer (CMS/[facility])     right breast cH3ydDv (snm) pMX ER/MA pos, Her-2/neg, Ki-67, 31%, oncotype recurrence score 19, invasive lobular carcinoma   • CAD (coronary artery disease)     S/p CABG on 2/23/16 by Dr. 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[facility])    • Closed fracture of three ribs of right side    • Cognitive disorder    • COPD (chronic obstructive pulmonary disease) (CMS/[facility])    • Depression    • Descending aortic arch aneurysm (CMS/[facility])    • Diabetes mellitus (CMS/[facility])     \"BORDERLINE\"   • Erythermalgia (CMS/[facility])    • GERD (gastroesophageal reflux disease)    • Hyperlipidemia     Controlled w/Meds   • Hypertension     Controlled w/Meds   • Low back pain    • Moderate aortic valve insufficiency     S/p AVR on 02/23/16 by Dr. Ontiveros   • Nocturia    • Osteoarthritis    • Osteoporosis    • Otitis media     R Ear   • Prediabetes    • RLS (restless legs syndrome)    • Saccular aneurysm    • Stroke (CMS/[facility])     Perioperatively w/L Hip Repl   • Thoracic ascending aortic aneurysm (CMS/[facility])     S/p Repair on 02/23/16 by Dr. Ontiveros   • TIA (transient ischemic attack)    • Urgency incontinence    • Venous insufficiency    • Ventral hernia        SURGICAL HISTORY  Past Surgical History:   Procedure Laterality " Date   • AORTIC VALVE REPAIR/REPLACEMENT N/A 2016-[facility]    Ascending Replacement Using a 24MM Graft--Dr. Ontiveros   • APPENDECTOMY     • BREAST BIOPSY Right 2012    Vacuum Assisted Core Bx of R Breast   • CARDIAC CATHETERIZATION N/A 2016    Dr. Tres De Los Santos   • CARDIAC CATHETERIZATION N/A 2019    Procedure: Left Heart Cath;  Surgeon: Tres De Los Santos MD;  Location: [facility] CATH INVASIVE LOCATION;  Service: Cardiology   • CARDIAC CATHETERIZATION N/A 2019    Procedure: Coronary angiography;  Surgeon: Tres De Los Santos MD;  Location:  [facility] CATH INVASIVE LOCATION;  Service: Cardiology   • CARDIAC SURGERY  2016    Dr. Ontiveros/Dr. De Los Santos   • CATARACT EXTRACTION Bilateral     [facility]   • COLONOSCOPY N/A 10/2002    Dr. Negro   • CORONARY ARTERY BYPASS GRAFT  2016-[facility]    x1 w/L Vein Grafting--Dr. Ontiveros   • CYST REMOVAL Right 2013    R Palm Excision of Retinacular Cyst--Dr. Karla Fish   • EPIDURAL BLOCK     • LAPAROSCOPIC CHOLECYSTECTOMY N/A 2004    Dr. JOEY Sheth   • MASTECTOMY Right 2012   • ORIF HIP FRACTURE Left 2005    w/ AMBI compression screw, Dr. Victor M Cabral   • RECTOVAGINAL FISTULA REPAIR     • THORACIC AORTIC ANEURYSM REPAIR N/A 2019    Procedure: ROSE, THORACOABDOMINAL AORTIC ANEURYSM REPAIR, VISCERAL VESSEL REPAIR, LARGE VENTRAL HERNIA REPAIR WITH MESH, CIRCULATORY ARREST, PRP;  Surgeon: Mart Ontiveros MD;  Location: [facility];  Service: Cardiothoracic   • TUBAL ABDOMINAL LIGATION         SOCIAL HISTORY  Social History     Socioeconomic History   • Marital status:      Spouse name: Not on file   • Number of children: Not on file   • Years of education: Not on file   • Highest education level: Not on file   Tobacco Use   • Smoking status: Former Smoker     Types: Cigarettes     Last attempt to quit: 2012     Years since quittin.0   • Smokeless tobacco: Never Used   Substance and Sexual Activity    • Alcohol use: No     Comment: Daily Caffeine Use   • Drug use: No   • Sexual activity: Defer     Birth control/protection: Tubal ligation       FAMILY HISTORY  Family History   Problem Relation Age of Onset   • Alzheimer's disease Mother    • Hypertension Mother    • Cancer Maternal Aunt    • Heart disease Father    • Heart failure Father    • Hypertension Father    • Diabetes Father    • Liver disease Sister    • Heart failure Brother    • Hypertension Brother    • Alcohol abuse Brother    • No Known Problems Maternal Grandmother    • No Known Problems Maternal Grandfather    • No Known Problems Paternal Grandmother    • No Known Problems Paternal Grandfather    • Diabetes Brother    • Brain cancer Brother    • Heart disease Brother          **Dragon Disclaimer:   Much of this encounter note is an electronic transcription/translation of spoken language to printed text. The electronic translation of spoken language may permit erroneous, or at times, nonsensical words or phrases to be inadvertently transcribed. Although I have reviewed the note for such errors, some may still exist.       Template created by Bebo Castañeda MD

## 2019-12-03 NOTE — ASSESSMENT & PLAN NOTE
Reviewed latest GIULIA . Reviewed most recent UDS or ordered UDS. Patient is having medication refilled at expected intervals. Using medication appropriately without evidence of unusual increased use, abuse, or diverting.     Changed the qty of tramadol to 60 / 30 days with 2 refills.   Rx sent to pharmacy electronically.

## 2019-12-04 RX ORDER — TRAMADOL HYDROCHLORIDE 50 MG/1
TABLET ORAL
Qty: 90 TABLET | Refills: 0 | OUTPATIENT
Start: 2019-12-04

## 2019-12-06 LAB — DRUGS UR: NORMAL

## 2019-12-18 DIAGNOSIS — G25.81 RESTLESS LEGS SYNDROME (RLS): ICD-10-CM

## 2019-12-19 RX ORDER — PRAMIPEXOLE DIHYDROCHLORIDE 0.12 MG/1
TABLET ORAL
Qty: 90 TABLET | Refills: 1 | Status: SHIPPED | OUTPATIENT
Start: 2019-12-19 | End: 2020-03-23 | Stop reason: SDUPTHER

## 2020-02-10 ENCOUNTER — TELEPHONE (OUTPATIENT)
Dept: INTERNAL MEDICINE | Age: 80
End: 2020-02-10

## 2020-02-10 NOTE — TELEPHONE ENCOUNTER
Patient called and said that she has been on Anora since she was in the hospital.   She ran out, but had samples of the Trilology  Elipta and started taking that. Patient said that this has been wonderful  And would like a 90 day supply sent to [facility], if you are ok with her making that switch plus it will be cheaper for the patient

## 2020-02-11 ENCOUNTER — TELEPHONE (OUTPATIENT)
Dept: INTERNAL MEDICINE | Age: 80
End: 2020-02-11

## 2020-02-11 NOTE — TELEPHONE ENCOUNTER
Pt has a cat and is moving into an apartment in the next couple of weeks. She wanted to know if Dr. Castañeda would write her a note stating she needs the cat for her wellbeing so she does not vet charged a monthly pet fee.

## 2020-02-11 NOTE — TELEPHONE ENCOUNTER
Not sure we can do that unless there is a medical or psychological basis for that.   For what medical reason does she think she needs to have a cat?

## 2020-02-20 DIAGNOSIS — M15.9 PRIMARY OSTEOARTHRITIS INVOLVING MULTIPLE JOINTS: Chronic | ICD-10-CM

## 2020-02-20 NOTE — TELEPHONE ENCOUNTER
lov 12/3/19  Nov 3/3/20  Last refill 12/3/19 with 2 refills  douglas on your desk  uds 12/3/19  Contract 12/3/19

## 2020-02-21 RX ORDER — TRAMADOL HYDROCHLORIDE 50 MG/1
TABLET ORAL
Qty: 60 TABLET | Refills: 2 | Status: SHIPPED | OUTPATIENT
Start: 2020-02-21 | End: 2020-03-23 | Stop reason: SDUPTHER

## 2020-03-09 ENCOUNTER — TELEPHONE (OUTPATIENT)
Dept: ORTHOPEDIC SURGERY | Facility: CLINIC | Age: 80
End: 2020-03-09

## 2020-03-09 NOTE — TELEPHONE ENCOUNTER
Just FYI: Patient scheduled with [facility] on 04/01/2020 for OPSE / RIGHT Knee / Discuss GEL INJ (previous Synvisc > 6 mo & 1 day ago) - previous ADRIEN INJ 11/06/18. Patient can be reached at [phone]. Message sent to [facility]. Thanks /srh

## 2020-03-20 ENCOUNTER — TELEPHONE (OUTPATIENT)
Dept: INTERNAL MEDICINE | Age: 80
End: 2020-03-20

## 2020-03-20 DIAGNOSIS — I10 ESSENTIAL HYPERTENSION: ICD-10-CM

## 2020-03-20 RX ORDER — NEBIVOLOL 10 MG/1
10 TABLET ORAL DAILY
Qty: 30 TABLET | Refills: 11 | Status: SHIPPED | OUTPATIENT
Start: 2020-03-20 | End: 2020-07-30 | Stop reason: HOSPADM

## 2020-03-23 DIAGNOSIS — J44.9 CHRONIC OBSTRUCTIVE PULMONARY DISEASE, UNSPECIFIED COPD TYPE (HCC): ICD-10-CM

## 2020-03-23 DIAGNOSIS — G25.81 RESTLESS LEGS SYNDROME (RLS): ICD-10-CM

## 2020-03-23 DIAGNOSIS — K21.9 GASTROESOPHAGEAL REFLUX DISEASE, ESOPHAGITIS PRESENCE NOT SPECIFIED: ICD-10-CM

## 2020-03-23 DIAGNOSIS — M15.9 PRIMARY OSTEOARTHRITIS INVOLVING MULTIPLE JOINTS: Chronic | ICD-10-CM

## 2020-03-23 RX ORDER — PRAMIPEXOLE DIHYDROCHLORIDE 0.12 MG/1
0.12 TABLET ORAL
Qty: 90 TABLET | Refills: 1 | Status: SHIPPED | OUTPATIENT
Start: 2020-03-23 | End: 2020-07-09

## 2020-03-23 RX ORDER — ALBUTEROL SULFATE 90 UG/1
2 AEROSOL, METERED RESPIRATORY (INHALATION) EVERY 6 HOURS PRN
Qty: 3 INHALER | Refills: 11 | Status: SHIPPED | OUTPATIENT
Start: 2020-03-23 | End: 2021-03-05 | Stop reason: SDUPTHER

## 2020-03-23 RX ORDER — TRAMADOL HYDROCHLORIDE 50 MG/1
50 TABLET ORAL 2 TIMES DAILY PRN
Qty: 60 TABLET | Refills: 2 | OUTPATIENT
Start: 2020-03-23 | End: 2020-06-21

## 2020-03-23 RX ORDER — OMEPRAZOLE 20 MG/1
20 CAPSULE, DELAYED RELEASE ORAL DAILY
Qty: 90 CAPSULE | Refills: 3 | Status: SHIPPED | OUTPATIENT
Start: 2020-03-23 | End: 2020-06-10 | Stop reason: SDUPTHER

## 2020-03-23 RX ORDER — TRAMADOL HYDROCHLORIDE 50 MG/1
50 TABLET ORAL 2 TIMES DAILY PRN
Qty: 60 TABLET | Refills: 2 | Status: SHIPPED | OUTPATIENT
Start: 2020-03-23 | End: 2020-04-10 | Stop reason: SDUPTHER

## 2020-03-23 NOTE — TELEPHONE ENCOUNTER
PT CALLED AND REQUESTED REFILL FOR omeprazole (priLOSEC) 20 MG capsule, pramipexole (MIRAPEX) 0.125 MG tablet, traMADol (ULTRAM) 50 MG tablet, Fluticasone-Umeclidin-Vilant (TRELEGY ELLIPTA) 100-62.5-25 MCG/INH aerosol powder AND PROAIR  (90 Base) MCG/ACT inhaler    PT CALL BACK   [phone]  [facility] CONFIRMED

## 2020-04-10 ENCOUNTER — TELEPHONE (OUTPATIENT)
Dept: INTERNAL MEDICINE | Age: 80
End: 2020-04-10

## 2020-04-10 NOTE — TELEPHONE ENCOUNTER
PT CALLED WANTING TO KNOW IF DR COLLINS COULD PRESCRIBE A PROBIOTIC TO PT.    [facility]     PT CALL BACK   [phone]

## 2020-04-14 ENCOUNTER — TELEPHONE (OUTPATIENT)
Dept: INTERNAL MEDICINE | Age: 80
End: 2020-04-14

## 2020-04-14 NOTE — TELEPHONE ENCOUNTER
She will need labs prior to orders for Prolia (BMP and vitamin D levels).  Labs will need to wait until COVID-19 restrictions have been lifted.   Place lab orders and schedule lab appointment for about 1 month (diagnosis: osteoporosis)

## 2020-04-14 NOTE — TELEPHONE ENCOUNTER
PT CALLED STATING SHE IS WANTING TO GET THE PROLIA INJECTIONS. PT JUST NEEDS ORDER PUT IN.     PLEASE ADVISE     PT CALL BACK   [phone]

## 2020-04-15 DIAGNOSIS — M81.0 OSTEOPOROSIS, UNSPECIFIED OSTEOPOROSIS TYPE, UNSPECIFIED PATHOLOGICAL FRACTURE PRESENCE: Primary | ICD-10-CM

## 2020-04-20 ENCOUNTER — RESULTS ENCOUNTER (OUTPATIENT)
Dept: INTERNAL MEDICINE | Age: 80
End: 2020-04-20

## 2020-04-20 DIAGNOSIS — M81.0 OSTEOPOROSIS, UNSPECIFIED OSTEOPOROSIS TYPE, UNSPECIFIED PATHOLOGICAL FRACTURE PRESENCE: ICD-10-CM

## 2020-05-04 ENCOUNTER — TELEPHONE (OUTPATIENT)
Dept: INTERNAL MEDICINE | Age: 80
End: 2020-05-04

## 2020-05-04 RX ORDER — CIPROFLOXACIN 250 MG/1
250 TABLET, FILM COATED ORAL 2 TIMES DAILY
Qty: 10 TABLET | Refills: 0 | Status: SHIPPED | OUTPATIENT
Start: 2020-05-04 | End: 2020-05-09

## 2020-05-04 NOTE — TELEPHONE ENCOUNTER
Will treat with Cipro 250 mg BID x 5 days.   If she is not feeling better by Wednesday she should come in for office visit/urine culture.

## 2020-05-04 NOTE — TELEPHONE ENCOUNTER
Patient called in and stated she finished the antibiotic prescribed and she's still having pressure and frequency.    Patient states that her urologist has given her Ciprosloxapin in the past and that worked for her.    *Patient said her dizziness has started back, she would like to speak with the nurse.    [facility] [id number] [address] confirmed    Patient call back [phone]

## 2020-05-13 ENCOUNTER — TELEMEDICINE (OUTPATIENT)
Dept: INTERNAL MEDICINE | Age: 80
End: 2020-05-13

## 2020-05-13 DIAGNOSIS — R42 DIZZINESS: ICD-10-CM

## 2020-05-13 DIAGNOSIS — I10 ESSENTIAL HYPERTENSION: ICD-10-CM

## 2020-05-13 DIAGNOSIS — B37.31 CANDIDA VAGINITIS: Primary | ICD-10-CM

## 2020-05-13 PROCEDURE — 99442 PR PHYS/QHP TELEPHONE EVALUATION 11-20 MIN: CPT | Performed by: INTERNAL MEDICINE

## 2020-05-13 RX ORDER — FLUCONAZOLE 150 MG/1
150 TABLET ORAL DAILY
Qty: 2 TABLET | Refills: 0 | Status: SHIPPED | OUTPATIENT
Start: 2020-05-13 | End: 2020-05-29

## 2020-05-13 RX ORDER — MECLIZINE HCL 12.5 MG/1
12.5 TABLET ORAL 3 TIMES DAILY PRN
Qty: 21 TABLET | Refills: 0 | Status: SHIPPED | OUTPATIENT
Start: 2020-05-13

## 2020-05-13 RX ORDER — MECLIZINE HCL 12.5 MG/1
25 TABLET ORAL 3 TIMES DAILY PRN
Qty: 21 TABLET | Refills: 0 | Status: SHIPPED | OUTPATIENT
Start: 2020-05-13 | End: 2020-05-13

## 2020-05-13 NOTE — PROGRESS NOTES
"[facility] INTERNAL MEDICINE  ANETA CASTAÑEDA M.D.      Grace Beauchamp / 79 y.o. / female  05/13/2020      CC:  Main reason(s) for today's visit: TELEHEALTH ENCOUNTER: Vaginitis and Dizziness      HPI:     THIS WAS A TELEHEALTH ENCOUNTER NECESSITATED BY CURRENT COVID-19 CRISIS.  DUE TO TECHNICAL PROBLEMS PATIENT WAS NOT ABLE TO GET ON THE VIDEO FOR MYCHART THEREFORE SWITCHED TO TELEPHONE MODE.     You have chosen to receive care through a telephone visit. Do you consent to use a telephone visit for your medical care today? Yes      Treated with Cipro for 5 days for symptoms of acute cystitis. Symptoms resolved however she now complains of vaginal yeast infection with discharge and pruritus.     Complains of \"dizziness\" and seems to be positionally related when she picks her head up quickly or when she stands. Denies any other focal neuro symptoms. Somewhat similar to prior positional vertigo. She has taken few left over meclizine tablets which seem to help.     On medication for hypertension but does not monitor.       Patient Care Team:  Miller Castañeda MD as PCP - General (Internal Medicine)  Mariposa Mcdaniel MD as PCP - Claims Attributed  Mart Ontiveros MD as Surgeon (Cardiothoracic Surgery)  Tres De Los Santos MD as Consulting Physician (Cardiology)  Graham Mcconnell MD as Consulting Physician (Hematology and Oncology)  Payam Byrnes MD as Consulting Physician (Otolaryngology)  Jonathan Smith MD as Consulting Physician (Vascular Surgery)  Victor M Cabral MD as Surgeon (Orthopedic Surgery)  Yaya Burks MD as Consulting Physician (Pulmonary Disease)    ASSESSMENT & PLAN:    1. Candida vaginitis    2. Dizziness    3. Essential hypertension      No orders of the defined types were placed in this encounter.    New Medications Ordered This Visit   Medications   • meclizine (ANTIVERT) 12.5 MG tablet     Sig: Take 2 tablets by mouth 3 (Three) Times a Day As Needed for Dizziness.     Dispense:  21 tablet     " Refill:  0   • fluconazole (Diflucan) 150 MG tablet     Sig: Take 1 tablet by mouth Daily.     Dispense:  2 tablet     Refill:  0        Summary/Discussion:  • Monitor home blood pressure   • Meclizine 12.5 mg TID PRN   • If not improved by next week she was told to see me in office       Time spent on phone: 20 minutes    Next Appointment with me: Visit date not found    No follow-ups on file.    ____________________________________________________________________    MEDICATIONS  Current Outpatient Medications   Medication Sig Dispense Refill   • albuterol sulfate HFA (ProAir HFA) 108 (90 Base) MCG/ACT inhaler Inhale 2 puffs Every 6 (Six) Hours As Needed for Wheezing or Shortness of Air. 3 inhaler 11   • aspirin 81 MG EC tablet Take 81 mg by mouth daily.     • Biotin 1000 MCG tablet Take 1,000 mcg by mouth daily. Take as directed     • cetirizine (zyrTEC) 10 MG tablet Take 1 tablet by mouth As Needed for Allergies. 30 tablet 0   • cilostazol (PLETAL) 100 MG tablet      • Cranberry 500 MG capsule Take  by mouth 2 (Two) Times a Day.     • Fluticasone-Umeclidin-Vilant (Trelegy Ellipta) 100-62.5-25 MCG/INH aerosol powder  Inhale 1 puff Daily. 90 each 3   • meclizine (ANTIVERT) 25 MG tablet PRN 21 tablet 0   • melatonin 5 MG tablet tablet Take 2 tablets by mouth At Night As Needed (sleep). 30 tablet 0   • Menthol, Topical Analgesic, 10 % liquid Apply  topically to the appropriate area as directed.     • MYRBETRIQ 50 MG tablet sustained-release 24 hour 24 hr tablet Take 50 mg by mouth Daily.     • nebivolol (Bystolic) 10 MG tablet Take 1 tablet by mouth Daily. 30 tablet 11   • omeprazole (priLOSEC) 20 MG capsule Take 1 capsule by mouth Daily. 90 capsule 3   • pitavastatin calcium (LIVALO) 2 MG tablet tablet Take 2 mg by mouth Every Night.     • pramipexole (MIRAPEX) 0.125 MG tablet Take 1 tablet by mouth every night at bedtime. 90 tablet 1   • Probiotic Product (PROBIOTIC PO) Take 1 capsule by mouth Daily.     •  Sennosides 17.2 MG tablet Take  by mouth.     • TOVIAZ 4 MG tablet sustained-release 24 hour tablet      • traMADol (ULTRAM) 50 MG tablet Take 1 tablet by mouth Every 8 (Eight) Hours As Needed for Moderate Pain  or Severe Pain . 90 tablet 2       ____________________________________________________________________      REVIEW OF SYSTEMS    Review of Systems  ROS negative for fever, chest pain, palpitations, vision change, nausea/vomiting, hearing loss, tinnitus, dysuria    PHYSICAL EXAMINATION  LMP  (LMP Unknown)    Alert with normal thought and judgment.       REVIEWED DATA:    Labs:         Imaging:         Medical Tests:         Summary of old records / correspondence / consultant report:          Request outside records:         ALLERGIES  Allergies   Allergen Reactions   • Ramipril Other (See Comments)     Ace I  cough   • Morphine Itching   • Morphine And Related Itching   • Bactrim [Sulfamethoxazole-Trimethoprim] Itching and Rash        PFSH:     The following portions of the patient's history were reviewed and updated as appropriate: Allergies / Current Medications / Past Medical History / Surgical History / Social History / Family History    PROBLEM LIST   Patient Active Problem List   Diagnosis   • History of breast cancer   • Stenosis of carotid artery   • Chronic obstructive pulmonary disease (CMS/HCC)   • Closed fracture of multiple ribs   • Cognitive disorder   • Erythromelalgia (CMS/[facility])   • Hyperlipidemia   • Hypertension   • Primary osteoarthritis involving multiple joints   • Osteoporosis   • Restless legs syndrome   • Cerebral aneurysm   • Temporary cerebral vascular dysfunction   • S/P CABG x 1   • Type 2 diabetes mellitus without complication, without long-term current use of insulin (CMS/[facility])   • S/P ascending aortic aneurysm repair   • 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HCC)   • Descending thoracic aortic aneurysm (CMS/HCC)   • Claudication of both lower extremities (CMS/HCC)   • Thoracoabdominal  "aortic aneurysm (CMS/HCC)   • Ventral hernia without obstruction or gangrene   • Breast cancer, stage 1, estrogen receptor positive (CMS/HCC)   • Arthritis of right hip   • Arthritis of right knee   • Chondrocalcinosis   • Chronic pain of right knee   • Degenerative disc disease, lumbar   • Gastroesophageal reflux disease   • Bilateral hearing loss   • Thoracic aortic aneurysm 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)   • Erythromelalgia (CMS/HCC)   • 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CMS/HCC)   • Thoracoabdominal aortic aneurysm, 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)   • 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)   • Aortic aneurysm, descending (CMS/HCC)   • Aortic aneurysm 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)       PAST MEDICAL HISTORY  Past Medical History:   Diagnosis Date   • AAA (abdominal aortic aneurysm) (CMS/HCC)    • Acute bronchitis 12/2018   • 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HCC)    • Arthritis    • Bilateral carotid artery disease (CMS/HCC)    • Bilateral cataracts     S/p Extractions   • Breast cancer (CMS/[facility])     right breast [id number] (snm) pMX ER/IL pos, Her-2/neg, Ki-67, 31%, oncotype recurrence score 19, invasive lobular carcinoma   • CAD (coronary artery disease)     S/p CABG on 2/23/16 by Dr. 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 Ontiveros   • Nocturia    • Osteoarthritis    • Osteoporosis    • Otitis media     R Ear   • Prediabetes    • RLS (restless legs syndrome)    • Saccular aneurysm    • Stroke (CMS/[facility])     Perioperatively w/L Hip Repl   • Thoracic " ascending aortic aneurysm (CMS/HCC)     S/p Repair on 02/23/16 by Dr. Ontiveros   • TIA (transient ischemic attack)    • Urgency incontinence    • Venous insufficiency    • Ventral hernia        SURGICAL HISTORY  Past Surgical History:   Procedure Laterality Date   • AORTIC VALVE REPAIR/REPLACEMENT N/A 02/23/2016-[facility]    Ascending Replacement Using a 24MM Graft--Dr. Ontiveros   • APPENDECTOMY  1977   • BREAST BIOPSY Right 09/16/2012    Vacuum Assisted Core Bx of R Breast   • CARDIAC CATHETERIZATION N/A 01/06/2016    Dr. Tres De Los Santos   • CARDIAC CATHETERIZATION N/A 4/22/2019    Procedure: Left Heart Cath;  Surgeon: Tres De Los Santos MD;  Location:  [facility] CATH INVASIVE LOCATION;  Service: Cardiology   • CARDIAC CATHETERIZATION N/A 4/22/2019    Procedure: Coronary angiography;  Surgeon: Tres De Los Santos MD;  Location:  [facility] CATH INVASIVE LOCATION;  Service: Cardiology   • CARDIAC SURGERY  02/2016    Dr. Ontiveros/Dr. De Los Santos   • CATARACT EXTRACTION Bilateral     [facility]   • COLONOSCOPY N/A 10/2002    Dr. Negro   • CORONARY ARTERY BYPASS GRAFT  02/23/2016-[facility]    x1 w/L Vein Grafting--Dr. Ontiverso   • CYST REMOVAL Right 01/25/2013    R Palm Excision of Retinacular Cyst--Dr. Karla Fish   • EPIDURAL BLOCK     • LAPAROSCOPIC CHOLECYSTECTOMY N/A 08/04/2004    Dr. JOEY Sheth   • MASTECTOMY Right 09/28/2012   • ORIF HIP FRACTURE Left 03/27/2005    w/ AMBI compression screw, Dr. Victor M Cabral   • RECTOVAGINAL FISTULA REPAIR  1965   • THORACIC AORTIC ANEURYSM REPAIR N/A 7/23/2019    Procedure: ROSE, THORACOABDOMINAL AORTIC ANEURYSM REPAIR, VISCERAL VESSEL REPAIR, LARGE VENTRAL HERNIA REPAIR WITH MESH, CIRCULATORY ARREST, PRP;  Surgeon: Mart Ontiveros MD;  Location: [facility] MAIN OR;  Service: Cardiothoracic   • TUBAL ABDOMINAL LIGATION         SOCIAL HISTORY  Social History     Socioeconomic History   • Marital status:      Spouse name: Not on file   • Number of children: Not on file   • Years of education:  Not on file   • Highest education level: Not on file   Tobacco Use   • Smoking status: Former Smoker     Types: Cigarettes     Last attempt to quit: 2012     Years since quittin.4   • Smokeless tobacco: Never Used   Substance and Sexual Activity   • Alcohol use: No     Comment: Daily Caffeine Use   • Drug use: No   • Sexual activity: Defer     Birth control/protection: Tubal ligation           **Dragon Disclaimer:   Much of this encounter note is an electronic transcription/translation of spoken language to printed text. The electronic translation of spoken language may permit erroneous, or at times, nonsensical words or phrases to be inadvertently transcribed. Although I have reviewed the note for such errors, some may still exist.     Template created by Bebo Castañeda MD

## 2020-05-14 ENCOUNTER — TELEPHONE (OUTPATIENT)
Dept: INTERNAL MEDICINE | Age: 80
End: 2020-05-14

## 2020-05-14 NOTE — TELEPHONE ENCOUNTER
PATIENT CALLED AND STATES SHE PICKED UP HER MEDICATION YESTERDAY AFTER TELEPHONE VISIT AND THE BOTTLES ARE FOR THE SAME MEDICATION    meclizine (ANTIVERT) 12.5 MG tablet    ONE BOTTLE STATES TO TAKE 2 TABLETS 3 TIMES A DAY   AND   THE OTHER BOTTLE STATES TAKE 1 TABLET 3 TIMES A DAY.     WHICH IS CORRECT?    MARGIE [address] [address] [facility] AT [facility] [address] - [phone]  - [phone] FX  [phone]     PLEASE CALL PATIENT  BACK -[phone]

## 2020-05-29 ENCOUNTER — OFFICE VISIT (OUTPATIENT)
Dept: CARDIOLOGY | Facility: CLINIC | Age: 80
End: 2020-05-29

## 2020-05-29 VITALS — BODY MASS INDEX: 19.57 KG/M2 | WEIGHT: 87 LBS | HEIGHT: 56 IN

## 2020-05-29 DIAGNOSIS — I71.60 THORACOABDOMINAL AORTIC ANEURYSM (TAAA) WITHOUT RUPTURE (HCC): ICD-10-CM

## 2020-05-29 DIAGNOSIS — Z95.1 S/P CABG X 1: ICD-10-CM

## 2020-05-29 DIAGNOSIS — I25.10 CORONARY ARTERY DISEASE INVOLVING NATIVE CORONARY ARTERY OF NATIVE HEART WITHOUT ANGINA PECTORIS: Primary | ICD-10-CM

## 2020-05-29 DIAGNOSIS — Z98.890 S/P ASCENDING AORTIC ANEURYSM REPAIR: ICD-10-CM

## 2020-05-29 DIAGNOSIS — Z86.79 S/P ASCENDING AORTIC ANEURYSM REPAIR: ICD-10-CM

## 2020-05-29 PROCEDURE — 99442 PR PHYS/QHP TELEPHONE EVALUATION 11-20 MIN: CPT | Performed by: NURSE PRACTITIONER

## 2020-05-29 NOTE — PROGRESS NOTES
Date of Office Visit: 2020  Encounter Provider: FREDRICK Springer  Place of Service: [facility] CARDIOLOGY  Patient Name: Grace Beauchamp  :1940    Chief Complaint   Patient presents with   • Coronary Artery Disease   :     HPI: Grace Beauchamp is a 79 y.o. female who is new to me.  Old records have been obtained and reviewed by me.  She is a patient of Dr. De Los Santos's with a past cardiac history significant for coronary artery disease and ascending aortic aneurysm.  In , she underwent ascending aortic replacement, total arch replacement, and CABG x1 with an SVG to the lateral marginal branch.  Her last cardiac catheterization was in 2019 showing mild nonobstructive coronary disease in the proximal LAD and proximal RCA as well as a patent SVG.     She was last seen in the office by Dr. De Los Santos on 2019 at which time she was doing well with no complaints of angina or heart failure.  She was preparing for an upcoming aneurysm surgery with Dr. Ontiveros.  In 2019, she underwent open repair of a thoracoabdominal aortic aneurysm.  She has agreed to a telehealth visit for her scheduled follow-up.   Over the last year, she has been doing well from a cardiac standpoint.  She denies any chest pain, palpitations, edema, dizziness, or syncope.  She has occasional and mild shortness of breath with heavy exertion.  She has recovered nicely from her thoracoabdominal aneurysm surgery.  She did say it took her about 6 months to recover during which she lived with her daughter.  She moved into her own apartment in February and has been doing well.  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  This has limited her activity level.  She has been undergoing therapy.  She went out yesterday with her family to shop and run errands and also get her hair done.  This was the first time she left the house in about 3 months.      Past  "Medical History:   Diagnosis Date   • AAA (abdominal aortic aneurysm) (CMS/HCC)    • Acute bronchitis 12/2018   • 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HCC)    • Arthritis    • Bilateral carotid artery disease (CMS/HCC)    • Bilateral cataracts     S/p Extractions   • Breast cancer (CMS/HCC)     right breast [id number] (snm) pMX ER/ME pos, Her-2/neg, Ki-67, 31%, oncotype recurrence score 19, invasive lobular carcinoma   • CAD (coronary artery disease)     S/p CABG on 2/23/16 by Dr. 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HCC)    • Closed fracture of three ribs of right side    • Cognitive disorder    • COPD (chronic obstructive pulmonary disease) (CMS/HCC)    • Depression    • Descending aortic arch aneurysm (CMS/HCC)    • Diabetes mellitus (CMS/HCC)     \"BORDERLINE\"   • Erythermalgia (CMS/HCC)    • GERD (gastroesophageal reflux disease)    • Hyperlipidemia     Controlled w/Meds   • Hypertension     Controlled w/Meds   • Low back pain    • Moderate aortic valve insufficiency     S/p AVR on 02/23/16 by Dr. Ontiveros   • Nocturia    • Osteoarthritis    • Osteoporosis    • Otitis media     R Ear   • Prediabetes    • RLS (restless legs syndrome)    • Saccular aneurysm    • Stroke (CMS/[facility])     Perioperatively w/L Hip Repl   • Thoracic ascending aortic aneurysm (CMS/HCC)     S/p Repair on 02/23/16 by Dr. Ontiveros   • TIA (transient ischemic attack)    • Urgency incontinence    • Venous insufficiency    • Ventral hernia        Past Surgical History:   Procedure Laterality Date   • AORTIC VALVE REPAIR/REPLACEMENT N/A 02/23/2016-[facility]    Ascending Replacement Using a 24MM Graft--Dr. Ontiveros   • APPENDECTOMY  1977   • BREAST BIOPSY Right 09/16/2012    Vacuum Assisted Core Bx of R Breast   • CARDIAC CATHETERIZATION N/A 01/06/2016    Dr. Tres De Los Santos   • CARDIAC CATHETERIZATION N/A 4/22/2019    Procedure: Left Heart Cath;  Surgeon: Tres De Los Santos MD;  Location: [facility] CATH INVASIVE LOCATION;  Service: Cardiology   • CARDIAC CATHETERIZATION N/A " 2019    Procedure: Coronary angiography;  Surgeon: Tres De Los Santos MD;  Location: [facility] CATH INVASIVE LOCATION;  Service: Cardiology   • CARDIAC SURGERY  2016    Dr. Ontiveros/Dr. De Los Santos   • CATARACT EXTRACTION Bilateral     [facility]   • COLONOSCOPY N/A 10/2002    Dr. Negro   • CORONARY ARTERY BYPASS GRAFT  2016-[facility]    x1 w/L Vein Grafting--Dr. Ontiveros   • CYST REMOVAL Right 2013    R Palm Excision of Retinacular Cyst--Dr. Karla Fish   • EPIDURAL BLOCK     • LAPAROSCOPIC CHOLECYSTECTOMY N/A 2004    Dr. JOEY Sheth   • MASTECTOMY Right 2012   • ORIF HIP FRACTURE Left 2005    w/ AMBI compression screw, Dr. Victor M Cabral   • RECTOVAGINAL FISTULA REPAIR     • THORACIC AORTIC ANEURYSM REPAIR N/A 2019    Procedure: ROSE, THORACOABDOMINAL AORTIC ANEURYSM REPAIR, VISCERAL VESSEL REPAIR, LARGE VENTRAL HERNIA REPAIR WITH MESH, CIRCULATORY ARREST, PRP;  Surgeon: Mart Ontiveros MD;  Location: [facility] MAIN OR;  Service: Cardiothoracic   • TUBAL ABDOMINAL LIGATION         Social History     Socioeconomic History   • Marital status:      Spouse name: Not on file   • Number of children: Not on file   • Years of education: Not on file   • Highest education level: Not on file   Tobacco Use   • Smoking status: Former Smoker     Types: Cigarettes     Last attempt to quit: 2012     Years since quittin.4   • Smokeless tobacco: Never Used   Substance and Sexual Activity   • Alcohol use: No     Comment: Daily Caffeine Use   • Drug use: No   • Sexual activity: Defer     Birth control/protection: Tubal ligation       Family History   Problem Relation Age of Onset   • Alzheimer's disease Mother    • Hypertension Mother    • Cancer Maternal Aunt    • Heart disease Father    • Heart failure Father    • Hypertension Father    • Diabetes Father    • Liver disease Sister    • Heart failure Brother    • Hypertension Brother    • Alcohol abuse Brother    • No Known  Problems Maternal Grandmother    • No Known Problems Maternal Grandfather    • No Known Problems Paternal Grandmother    • No Known Problems Paternal Grandfather    • Diabetes Brother    • Brain cancer Brother    • Heart disease Brother        Review of Systems   Constitution: Negative for chills, fever and malaise/fatigue.   Cardiovascular: Positive for dyspnea on exertion (Heavy exertion). Negative for chest pain, leg swelling, near-syncope, orthopnea, palpitations, paroxysmal nocturnal dyspnea and syncope.   Respiratory: Negative for cough and shortness of breath.    Musculoskeletal: Positive for arthritis and joint pain (knee). Negative for joint swelling and myalgias.   Gastrointestinal: Negative for abdominal pain, diarrhea, melena, nausea and vomiting.   Genitourinary: Negative for frequency and hematuria.   Neurological: Positive for disturbances in coordination. Negative for light-headedness, numbness, paresthesias and seizures.   Allergic/Immunologic: Negative.    All other systems reviewed and are negative.      Allergies   Allergen Reactions   • Ramipril Other (See Comments)     Ace I  cough   • Morphine Itching   • Morphine And Related Itching   • Bactrim [Sulfamethoxazole-Trimethoprim] Itching and Rash         Current Outpatient Medications:   •  albuterol sulfate HFA (ProAir HFA) 108 (90 Base) MCG/ACT inhaler, Inhale 2 puffs Every 6 (Six) Hours As Needed for Wheezing or Shortness of Air., Disp: 3 inhaler, Rfl: 11  •  aspirin 81 MG EC tablet, Take 81 mg by mouth daily., Disp: , Rfl:   •  Biotin 1000 MCG tablet, Take 1,000 mcg by mouth daily. Take as directed, Disp: , Rfl:   •  cetirizine (zyrTEC) 10 MG tablet, Take 1 tablet by mouth As Needed for Allergies., Disp: 30 tablet, Rfl: 0  •  cilostazol (PLETAL) 100 MG tablet, Take 100 mg by mouth 2 (Two) Times a Day., Disp: , Rfl:   •  Fluticasone-Umeclidin-Vilant (Trelegy Ellipta) 100-62.5-25 MCG/INH aerosol powder , Inhale 1 puff Daily., Disp: 90 each,  "Rfl: 3  •  meclizine (ANTIVERT) 12.5 MG tablet, Take 1 tablet by mouth 3 (Three) Times a Day As Needed for Dizziness., Disp: 21 tablet, Rfl: 0  •  melatonin 5 MG tablet tablet, Take 2 tablets by mouth At Night As Needed (sleep)., Disp: 30 tablet, Rfl: 0  •  Menthol, Topical Analgesic, 10 % liquid, Apply  topically to the appropriate area as directed., Disp: , Rfl:   •  nebivolol (Bystolic) 10 MG tablet, Take 1 tablet by mouth Daily., Disp: 30 tablet, Rfl: 11  •  omeprazole (priLOSEC) 20 MG capsule, Take 1 capsule by mouth Daily., Disp: 90 capsule, Rfl: 3  •  pramipexole (MIRAPEX) 0.125 MG tablet, Take 1 tablet by mouth every night at bedtime., Disp: 90 tablet, Rfl: 1  •  TOVIAZ 4 MG tablet sustained-release 24 hour tablet, Take 4 mg by mouth Daily., Disp: , Rfl:   •  traMADol (ULTRAM) 50 MG tablet, Take 1 tablet by mouth Every 8 (Eight) Hours As Needed for Moderate Pain  or Severe Pain ., Disp: 90 tablet, Rfl: 2  •  pitavastatin calcium (LIVALO) 2 MG tablet tablet, Take 2 mg by mouth Every Night., Disp: , Rfl:       Objective:     Vitals:    05/29/20 1112   Weight: 39.5 kg (87 lb)   Height: 142.2 cm (56\")     Body mass index is 19.51 kg/m².        Assessment:       Diagnosis Plan   1. Coronary artery disease involving native coronary artery of native heart without angina pectoris     2. S/P CABG x 1     3. 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/[facility])     4. S/P ascending aortic aneurysm repair       No orders of the defined types were placed in this encounter.         Plan:       1.  Coronary artery disease.  History of CABG x 1.  She denies any symptoms of angina.  She is on good medical therapy.       2.  Aortic aneurysms.  Status post ascending aortic aneurysm repair as well as a thoracoabdominal aneurysm repair.  She was last seen by cardiothoracic surgery in September 2019.  She will need to check with Dr. Ontiveros and I have asked her to contact his office.      Overall I think she is stable " and doing well from a cardiac standpoint.  She denies any symptoms of angina or heart failure.  I am not going to make any changes, and she will follow-up with Dr. De Los Santos in 1 year or sooner if needed.      This patient has consented to a telehealth visit via telephone. The visit was scheduled as a telephone visit to comply with patient safety concerns in accordance with CDC recommendations.  All vitals recorded within this visit are reported by the patient.  I spent 25 minutes in total including but not limited to the 14 minutes spent in direct conversation with this patient.       As always, it has been a pleasure to participate in your patient's care.      Sincerely,         FREDRICK Bryant

## 2020-06-04 ENCOUNTER — OFFICE VISIT (OUTPATIENT)
Dept: ORTHOPEDIC SURGERY | Facility: CLINIC | Age: 80
End: 2020-06-04

## 2020-06-04 VITALS — BODY MASS INDEX: 20.47 KG/M2 | WEIGHT: 91 LBS | HEIGHT: 56 IN | TEMPERATURE: 98.4 F

## 2020-06-04 DIAGNOSIS — M16.11 ARTHRITIS OF RIGHT HIP: ICD-10-CM

## 2020-06-04 DIAGNOSIS — M25.561 CHRONIC PAIN OF RIGHT KNEE: Primary | ICD-10-CM

## 2020-06-04 DIAGNOSIS — G89.29 CHRONIC PAIN OF RIGHT KNEE: Primary | ICD-10-CM

## 2020-06-04 DIAGNOSIS — M17.11 ARTHRITIS OF RIGHT KNEE: ICD-10-CM

## 2020-06-04 PROCEDURE — 20610 DRAIN/INJ JOINT/BURSA W/O US: CPT | Performed by: ORTHOPAEDIC SURGERY

## 2020-06-04 PROCEDURE — 99213 OFFICE O/P EST LOW 20 MIN: CPT | Performed by: ORTHOPAEDIC SURGERY

## 2020-06-04 PROCEDURE — 73562 X-RAY EXAM OF KNEE 3: CPT | Performed by: ORTHOPAEDIC SURGERY

## 2020-06-04 NOTE — PROGRESS NOTES
Patient Name: Grace Beauchamp   YOB: 1940  Referring Primary Care Physician: Miller Castañeda MD  BMI: Body mass index is 20.4 kg/m².    Chief Complaint:    Chief Complaint   Patient presents with   • Right Knee - Establish Care        HPI:     Grace Beauchamp is a 79 y.o. female who presents today for evaluation of   Chief Complaint   Patient presents with   • Right Knee - Establish Care   .  Follow-up today complaining of right knee pain that is acute on chronic and intermittent.  She had aneurysm surgery for thoracic aneurysm and has a foot drop for which she wears a brace from time to time.  The knee is helped a viscosupplementation.  I went over the current thinking on this intervention and also discussed other options.  She is frankly not interested in any kind of surgery.  She had previous hip screw in the past with a total hip on the left by Dr. MACARIO    This problem is not new to this examiner.     Subjective   Medications:   Home Medications:  Current Outpatient Medications on File Prior to Visit   Medication Sig   • albuterol sulfate HFA (ProAir HFA) 108 (90 Base) MCG/ACT inhaler Inhale 2 puffs Every 6 (Six) Hours As Needed for Wheezing or Shortness of Air.   • aspirin 81 MG EC tablet Take 81 mg by mouth daily.   • Biotin 1000 MCG tablet Take 1,000 mcg by mouth daily. Take as directed   • cetirizine (zyrTEC) 10 MG tablet Take 1 tablet by mouth As Needed for Allergies.   • cilostazol (PLETAL) 100 MG tablet Take 100 mg by mouth 2 (Two) Times a Day.   • Fluticasone-Umeclidin-Vilant (Trelegy Ellipta) 100-62.5-25 MCG/INH aerosol powder  Inhale 1 puff Daily.   • meclizine (ANTIVERT) 12.5 MG tablet Take 1 tablet by mouth 3 (Three) Times a Day As Needed for Dizziness.   • melatonin 5 MG tablet tablet Take 2 tablets by mouth At Night As Needed (sleep).   • Menthol, Topical Analgesic, 10 % liquid Apply  topically to the appropriate area as directed.   • nebivolol (Bystolic) 10 MG tablet Take 1 tablet by  "mouth Daily.   • omeprazole (priLOSEC) 20 MG capsule Take 1 capsule by mouth Daily.   • pitavastatin calcium (LIVALO) 2 MG tablet tablet Take 2 mg by mouth Every Night.   • pramipexole (MIRAPEX) 0.125 MG tablet Take 1 tablet by mouth every night at bedtime.   • TOVIAZ 4 MG tablet sustained-release 24 hour tablet Take 4 mg by mouth Daily.   • traMADol (ULTRAM) 50 MG tablet Take 1 tablet by mouth Every 8 (Eight) Hours As Needed for Moderate Pain  or Severe Pain .     No current facility-administered medications on file prior to visit.      Current Medications:  Scheduled Meds:  Continuous Infusions:  No current facility-administered medications for this visit.   PRN Meds:.    I have reviewed the patient's medical history in detail and updated the computerized patient record.  Review and summarization of old records includes:    Past Medical History:   Diagnosis Date   • AAA (abdominal aortic aneurysm) (CMS/Formerly [facility])    • Acute bronchitis 12/2018   • 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Formerly [facility])    • Arthritis    • Bilateral carotid artery disease (CMS/[facility]ly [facility])    • Bilateral cataracts     S/p Extractions   • Breast cancer (CMS/Formerly [facility])     right breast [id number] (snm) pMX ER/CA pos, Her-2/neg, Ki-67, 31%, oncotype recurrence score 19, invasive lobular carcinoma   • CAD (coronary artery disease)     S/p CABG on 2/23/16 by Dr. 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Formerly [facility])    • Closed fracture of three ribs of right side    • Cognitive disorder    • COPD (chronic obstructive pulmonary disease) (CMS/Formerly [facility])    • Depression    • Descending aortic arch aneurysm (CMS/[facility]ly [facility])    • Diabetes mellitus (CMS/[facility]ly [facility])     \"BORDERLINE\"   • Erythermalgia (CMS/Formerly [facility])    • GERD (gastroesophageal reflux disease)    • Hyperlipidemia     Controlled w/Meds   • Hypertension     Controlled w/Meds   • Low back pain    • Moderate aortic valve insufficiency     S/p AVR on 02/23/16 by Dr. Ontiveros   • Nocturia    • Osteoarthritis    • Osteoporosis    • Otitis media     R Ear   • Prediabetes  "   • RLS (restless legs syndrome)    • Saccular aneurysm    • Stroke (CMS/HCC)     Perioperatively w/L Hip Repl   • Thoracic ascending aortic aneurysm (CMS/HCC)     S/p Repair on 02/23/16 by Dr. Ontiveros   • TIA (transient ischemic attack)    • Urgency incontinence    • Venous insufficiency    • Ventral hernia         Past Surgical History:   Procedure Laterality Date   • AORTIC VALVE REPAIR/REPLACEMENT N/A 02/23/2016-[facility]    Ascending Replacement Using a 24MM Graft--Dr. Ontiveros   • APPENDECTOMY  1977   • BREAST BIOPSY Right 09/16/2012    Vacuum Assisted Core Bx of R Breast   • CARDIAC CATHETERIZATION N/A 01/06/2016    Dr. Tres De Los Santos   • CARDIAC CATHETERIZATION N/A 4/22/2019    Procedure: Left Heart Cath;  Surgeon: Tres De Los Santos MD;  Location:  [facility];  Service: Cardiology   • CARDIAC CATHETERIZATION N/A 4/22/2019    Procedure: Coronary angiography;  Surgeon: Tres De Los Santos MD;  Location:  [facility];  Service: Cardiology   • CARDIAC SURGERY  02/2016    Dr. Ontiveros/Dr. De Los Santos   • CATARACT EXTRACTION Bilateral     [facility]   • COLONOSCOPY N/A 10/2002    Dr. Negro   • CORONARY ARTERY BYPASS GRAFT  02/23/2016-[facility]    x1 w/L Vein Grafting--Dr. Ontiveros   • CYST REMOVAL Right 01/25/2013    R Palm Excision of Retinacular Cyst--Dr. Karla Fish   • EPIDURAL BLOCK     • LAPAROSCOPIC CHOLECYSTECTOMY N/A 08/04/2004    Dr. JOEY Sheth   • MASTECTOMY Right 09/28/2012   • ORIF HIP FRACTURE Left 03/27/2005    w/ AMBI compression screw, Dr. Victor M Cabral   • RECTOVAGINAL FISTULA REPAIR  1965   • THORACIC AORTIC ANEURYSM REPAIR N/A 7/23/2019    Procedure: ROSE, THORACOABDOMINAL AORTIC ANEURYSM REPAIR, VISCERAL VESSEL REPAIR, LARGE VENTRAL HERNIA REPAIR WITH MESH, CIRCULATORY ARREST, PRP;  Surgeon: Mart Ontiveros MD;  Location: [facility] MAIN OR;  Service: Cardiothoracic   • TUBAL ABDOMINAL LIGATION          Social History     Occupational History   • Not on file   Tobacco  Use   • Smoking status: Former Smoker     Types: Cigarettes     Last attempt to quit: 2012     Years since quittin.5   • Smokeless tobacco: Never Used   Substance and Sexual Activity   • Alcohol use: No     Comment: Daily Caffeine Use   • Drug use: No   • Sexual activity: Defer     Birth control/protection: Tubal ligation      Social History     Social History Narrative   • Not on file        Family History   Problem Relation Age of Onset   • Alzheimer's disease Mother    • Hypertension Mother    • Cancer Maternal Aunt    • Heart disease Father    • Heart failure Father    • Hypertension Father    • Diabetes Father    • Liver disease Sister    • Heart failure Brother    • Hypertension Brother    • Alcohol abuse Brother    • No Known Problems Maternal Grandmother    • No Known Problems Maternal Grandfather    • No Known Problems Paternal Grandmother    • No Known Problems Paternal Grandfather    • Diabetes Brother    • Brain cancer Brother    • Heart disease Brother        ROS: 14 point review of systems was performed and all other systems were reviewed and are negative except for documented findings in HPI and today's encounter.     Allergies:   Allergies   Allergen Reactions   • Ramipril Other (See Comments)     Ace I  cough   • Morphine Itching   • Morphine And Related Itching   • Bactrim [Sulfamethoxazole-Trimethoprim] Itching and Rash     Constitutional:  Denies fever, shaking or chills   Eyes:  Denies change in visual acuity   HENT:  Denies nasal congestion or sore throat   Respiratory:  Denies cough or shortness of breath   Cardiovascular:  Denies chest pain or severe LE edema   GI:  Denies abdominal pain, nausea, vomiting, bloody stools or diarrhea   Musculoskeletal:  Numbness, tingling, pain, or loss of motor function only as noted above in history of present illness.  : Denies painful urination or hematuria  Integument:  Denies rash, lesion or ulceration   Neurologic:  Denies headache or focal  "weakness  Endocrine:  Denies lymphadenopathy  Psych:  Denies confusion or change in mental status   Hem:  Denies active bleeding    OBJECTIVE:  Physical Exam: 79 y.o. female  Wt Readings from Last 3 Encounters:   06/04/20 41.3 kg (91 lb)   05/29/20 39.5 kg (87 lb)   12/03/19 44.5 kg (98 lb)     Ht Readings from Last 1 Encounters:   06/04/20 142.2 cm (56\")     Body mass index is 20.4 kg/m².  Vitals:    06/04/20 0939   Temp: 98.4 °F (36.9 °C)     Vital signs reviewed.     General Appearance:    Alert, cooperative, in no acute distress                  Eyes: conjunctiva clear  ENT: external ears and nose atraumatic  CV: no peripheral edema  Resp: normal respiratory effort  Skin: no rashes or wounds; normal turgor  Psych: mood and affect appropriate  Lymph: no nodes appreciated  Neuro: gross sensation intact  Vascular:  Palpable peripheral pulse in noted extremity  Musculoskeletal Extremities: Crepitation synovitis swelling joint line tenderness in her right knee with some limp    Radiology:   AP lateral right knee with 40 agree PA in comparison views show severe arthritis.  She also has right hip pain and arthritis in that area as well but she feels like the current pain is coming from her knee    Assessment:     ICD-10-CM ICD-9-CM   1. Chronic pain of right knee M25.561 719.46    G89.29 338.29   2. Arthritis of right knee M17.11 716.96   3. Arthritis of right hip M16.11 716.95        Large Joint Arthrocentesis: R knee  Date/Time: 6/4/2020 9:25 AM  Consent given by: patient  Site marked: site marked  Timeout: Immediately prior to procedure a time out was called to verify the correct patient, procedure, equipment, support staff and site/side marked as required   Supporting Documentation  Indications: pain   Procedure Details  Location: knee - R knee  Preparation: Patient was prepped and draped in the usual sterile fashion  Needle gauge: 21.  Approach: anterolateral  Medications administered: 48 mg hylan 48 MG/6ML; 4 mL " lidocaine (cardiac)  Patient tolerance: patient tolerated the procedure well with no immediate complications             Plan: RICE: Rest, ice, compression, and elevation therapy, Cryotherapy/brachy therapy, and or OTC linaments as indicated with instructions.   Cortisone Injection. See procedure note.  We went over the different treatments.  6/4/2020    Much of this encounter note is an electronic transcription/translation of spoken language to printed text. The electronic translation of spoken language may permit erroneous, or at times, nonsensical words or phrases to be inadvertently transcribed; Although I have reviewed the note for such errors, some may still exist

## 2020-06-09 ENCOUNTER — TELEPHONE (OUTPATIENT)
Dept: CARDIAC SURGERY | Facility: CLINIC | Age: 80
End: 2020-06-09

## 2020-06-09 NOTE — TELEPHONE ENCOUNTER
Patient is on recall list for Sept. He rsurgery was last July and she is asking to see Dr Ontiveros sooner. I have changed the recall list to reflect testing in July and an office visit with Dr Ontiveros which is agreeable to patient

## 2020-06-10 DIAGNOSIS — K21.9 GASTROESOPHAGEAL REFLUX DISEASE, ESOPHAGITIS PRESENCE NOT SPECIFIED: ICD-10-CM

## 2020-06-10 RX ORDER — OMEPRAZOLE 20 MG/1
20 CAPSULE, DELAYED RELEASE ORAL DAILY
Qty: 90 CAPSULE | Refills: 3 | Status: ON HOLD | OUTPATIENT
Start: 2020-06-10 | End: 2020-10-06

## 2020-06-10 NOTE — TELEPHONE ENCOUNTER
PATIENT CALLING TO GET FOLLOWING PRESCRIPTIONS REFILLED:    omeprazole (priLOSEC) 20 MG capsule 90 DAY SUPPLY    PHARMACY: [facility] [address] AT [facility] & [facility] - [phone]  - [phone] FX  [phone]    PLEASE CALL TO ADVISE: [phone]

## 2020-06-18 ENCOUNTER — TELEPHONE (OUTPATIENT)
Dept: INTERNAL MEDICINE | Age: 80
End: 2020-06-18

## 2020-06-25 ENCOUNTER — APPOINTMENT (OUTPATIENT)
Dept: WOMENS IMAGING | Facility: HOSPITAL | Age: 80
End: 2020-06-25

## 2020-06-25 LAB
25(OH)D3+25(OH)D2 SERPL-MCNC: 28.1 NG/ML (ref 30–100)
BUN SERPL-MCNC: 23 MG/DL (ref 8–23)
BUN/CREAT SERPL: 25.8 (ref 7–25)
CALCIUM SERPL-MCNC: 10.1 MG/DL (ref 8.6–10.5)
CHLORIDE SERPL-SCNC: 99 MMOL/L (ref 98–107)
CO2 SERPL-SCNC: 29.8 MMOL/L (ref 22–29)
CREAT SERPL-MCNC: 0.89 MG/DL (ref 0.57–1)
GLUCOSE SERPL-MCNC: 91 MG/DL (ref 65–99)
POTASSIUM SERPL-SCNC: 5.2 MMOL/L (ref 3.5–5.2)
SODIUM SERPL-SCNC: 139 MMOL/L (ref 136–145)

## 2020-06-25 PROCEDURE — 77063 BREAST TOMOSYNTHESIS BI: CPT | Performed by: RADIOLOGY

## 2020-06-25 PROCEDURE — 77080 DXA BONE DENSITY AXIAL: CPT | Performed by: RADIOLOGY

## 2020-06-25 PROCEDURE — 77067 SCR MAMMO BI INCL CAD: CPT | Performed by: RADIOLOGY

## 2020-07-09 DIAGNOSIS — G25.81 RESTLESS LEGS SYNDROME (RLS): ICD-10-CM

## 2020-07-09 RX ORDER — PRAMIPEXOLE DIHYDROCHLORIDE 0.12 MG/1
TABLET ORAL
Qty: 90 TABLET | Refills: 0 | Status: SHIPPED | OUTPATIENT
Start: 2020-07-09 | End: 2021-05-24 | Stop reason: SDUPTHER

## 2020-07-22 ENCOUNTER — APPOINTMENT (OUTPATIENT)
Dept: GENERAL RADIOLOGY | Facility: HOSPITAL | Age: 80
End: 2020-07-22

## 2020-07-22 ENCOUNTER — APPOINTMENT (OUTPATIENT)
Dept: CARDIOLOGY | Facility: HOSPITAL | Age: 80
End: 2020-07-22

## 2020-07-22 ENCOUNTER — HOSPITAL ENCOUNTER (INPATIENT)
Facility: HOSPITAL | Age: 80
LOS: 8 days | Discharge: NURSING, REHAB OR LONG-TERM CARE | End: 2020-07-30
Attending: EMERGENCY MEDICINE | Admitting: THORACIC SURGERY (CARDIOTHORACIC VASCULAR SURGERY)

## 2020-07-22 ENCOUNTER — ANESTHESIA EVENT (OUTPATIENT)
Dept: PERIOP | Facility: HOSPITAL | Age: 80
End: 2020-07-22

## 2020-07-22 ENCOUNTER — ANESTHESIA (OUTPATIENT)
Dept: PERIOP | Facility: HOSPITAL | Age: 80
End: 2020-07-22

## 2020-07-22 ENCOUNTER — ANCILLARY PROCEDURE (OUTPATIENT)
Dept: PERIOP | Facility: HOSPITAL | Age: 80
End: 2020-07-22

## 2020-07-22 ENCOUNTER — APPOINTMENT (OUTPATIENT)
Dept: CT IMAGING | Facility: HOSPITAL | Age: 80
End: 2020-07-22

## 2020-07-22 DIAGNOSIS — Z98.890: ICD-10-CM

## 2020-07-22 DIAGNOSIS — I25.3: ICD-10-CM

## 2020-07-22 DIAGNOSIS — I31.2 HEMOPERICARDIUM: ICD-10-CM

## 2020-07-22 DIAGNOSIS — I21.4 NSTEMI (NON-ST ELEVATED MYOCARDIAL INFARCTION) (HCC): Primary | ICD-10-CM

## 2020-07-22 DIAGNOSIS — R07.9 CHEST PAIN, UNSPECIFIED TYPE: ICD-10-CM

## 2020-07-22 LAB
ABO GROUP BLD: NORMAL
ACT BLD: 125 SECONDS (ref 82–152)
ACT BLD: 158 SECONDS (ref 82–152)
ACT BLD: 351 SECONDS (ref 82–152)
ACT BLD: 362 SECONDS (ref 82–152)
ACT BLD: 439 SECONDS (ref 82–152)
ACT BLD: 566 SECONDS (ref 82–152)
ACT BLD: <50 SECONDS (ref 82–152)
ALBUMIN SERPL-MCNC: 3.2 G/DL (ref 3.5–5.2)
ALBUMIN SERPL-MCNC: 3.4 G/DL (ref 3.5–5.2)
ALBUMIN/GLOB SERPL: 1 G/DL
ALP SERPL-CCNC: 65 U/L (ref 39–117)
ALT SERPL W P-5'-P-CCNC: 21 U/L (ref 1–33)
ANION GAP SERPL CALCULATED.3IONS-SCNC: 15.7 MMOL/L (ref 5–15)
ANION GAP SERPL CALCULATED.3IONS-SCNC: 8.3 MMOL/L (ref 5–15)
AORTIC DIMENSIONLESS INDEX: 0.9 (DI)
APTT PPP: 45.7 SECONDS (ref 22.7–35.4)
APTT PPP: 49.8 SECONDS (ref 22.7–35.4)
ARTERIAL PATENCY WRIST A: ABNORMAL
AST SERPL-CCNC: 51 U/L (ref 1–32)
ATMOSPHERIC PRESS: 752.2 MMHG
B PARAPERT DNA SPEC QL NAA+PROBE: NOT DETECTED
B PERT DNA SPEC QL NAA+PROBE: NOT DETECTED
BASE EXCESS BLDA CALC-SCNC: -1 MMOL/L (ref -5–5)
BASE EXCESS BLDA CALC-SCNC: -18 MMOL/L (ref -5–5)
BASE EXCESS BLDA CALC-SCNC: -3 MMOL/L (ref -5–5)
BASE EXCESS BLDA CALC-SCNC: -3 MMOL/L (ref -5–5)
BASE EXCESS BLDA CALC-SCNC: 0 MMOL/L (ref -5–5)
BASE EXCESS BLDA CALC-SCNC: 1.9 MMOL/L (ref 0–2)
BASE EXCESS BLDA CALC-SCNC: 19 MMOL/L (ref -5–5)
BASE EXCESS BLDA CALC-SCNC: 3 MMOL/L (ref -5–5)
BASOPHILS # BLD AUTO: 0.02 10*3/MM3 (ref 0–0.2)
BASOPHILS # BLD AUTO: 0.03 10*3/MM3 (ref 0–0.2)
BASOPHILS NFR BLD AUTO: 0.2 % (ref 0–1.5)
BASOPHILS NFR BLD AUTO: 0.3 % (ref 0–1.5)
BDY SITE: ABNORMAL
BH CV ECHO MEAS - AO MAX PG (FULL): 5.6 MMHG
BH CV ECHO MEAS - AO MAX PG: 9.6 MMHG
BH CV ECHO MEAS - AO MEAN PG (FULL): 2 MMHG
BH CV ECHO MEAS - AO MEAN PG: 4 MMHG
BH CV ECHO MEAS - AO V2 MAX: 155 CM/SEC
BH CV ECHO MEAS - AO V2 MEAN: 95.4 CM/SEC
BH CV ECHO MEAS - AO V2 VTI: 29.4 CM
BH CV ECHO MEAS - ASC AORTA: 2.8 CM
BH CV ECHO MEAS - BSA(HAYCOCK): 1.3 M^2
BH CV ECHO MEAS - BSA: 1.3 M^2
BH CV ECHO MEAS - BZI_BMI: 20.3 KILOGRAMS/M^2
BH CV ECHO MEAS - BZI_METRIC_HEIGHT: 142 CM
BH CV ECHO MEAS - BZI_METRIC_WEIGHT: 41 KG
BH CV ECHO MEAS - EDV(CUBED): 64 ML
BH CV ECHO MEAS - EDV(MOD-SP2): 21 ML
BH CV ECHO MEAS - EDV(MOD-SP4): 25 ML
BH CV ECHO MEAS - EDV(TEICH): 70 ML
BH CV ECHO MEAS - EF(CUBED): 72.5 %
BH CV ECHO MEAS - EF(MOD-BP): 63 %
BH CV ECHO MEAS - EF(MOD-SP2): 66.7 %
BH CV ECHO MEAS - EF(MOD-SP4): 64 %
BH CV ECHO MEAS - EF(TEICH): 64.8 %
BH CV ECHO MEAS - ESV(CUBED): 17.6 ML
BH CV ECHO MEAS - ESV(MOD-SP2): 7 ML
BH CV ECHO MEAS - ESV(MOD-SP4): 9 ML
BH CV ECHO MEAS - ESV(TEICH): 24.6 ML
BH CV ECHO MEAS - FS: 35 %
BH CV ECHO MEAS - IVS/LVPW: 1.1
BH CV ECHO MEAS - IVSD: 1 CM
BH CV ECHO MEAS - LAT PEAK E' VEL: 5.3 CM/SEC
BH CV ECHO MEAS - LV DIASTOLIC VOL/BSA (35-75): 19.8 ML/M^2
BH CV ECHO MEAS - LV MASS(C)D: 118.2 GRAMS
BH CV ECHO MEAS - LV MASS(C)DI: 93.4 GRAMS/M^2
BH CV ECHO MEAS - LV MAX PG: 4 MMHG
BH CV ECHO MEAS - LV MEAN PG: 2 MMHG
BH CV ECHO MEAS - LV SYSTOLIC VOL/BSA (12-30): 7.1 ML/M^2
BH CV ECHO MEAS - LV V1 MAX: 100 CM/SEC
BH CV ECHO MEAS - LV V1 MEAN: 70.6 CM/SEC
BH CV ECHO MEAS - LV V1 VTI: 25.1 CM
BH CV ECHO MEAS - LVIDD: 4 CM
BH CV ECHO MEAS - LVIDS: 2.6 CM
BH CV ECHO MEAS - LVLD AP2: 4.5 CM
BH CV ECHO MEAS - LVLD AP4: 5.7 CM
BH CV ECHO MEAS - LVLS AP2: 3.4 CM
BH CV ECHO MEAS - LVLS AP4: 4.5 CM
BH CV ECHO MEAS - LVPWD: 0.9 CM
BH CV ECHO MEAS - MED PEAK E' VEL: 4.1 CM/SEC
BH CV ECHO MEAS - MV A DUR: 132 SEC
BH CV ECHO MEAS - MV A MAX VEL: 85.4 CM/SEC
BH CV ECHO MEAS - MV DEC SLOPE: 606 CM/SEC^2
BH CV ECHO MEAS - MV DEC TIME: 114 SEC
BH CV ECHO MEAS - MV E MAX VEL: 67.4 CM/SEC
BH CV ECHO MEAS - MV E/A: 0.79
BH CV ECHO MEAS - MV MAX PG: 5 MMHG
BH CV ECHO MEAS - MV MEAN PG: 2 MMHG
BH CV ECHO MEAS - MV P1/2T MAX VEL: 115 CM/SEC
BH CV ECHO MEAS - MV P1/2T: 55.6 MSEC
BH CV ECHO MEAS - MV V2 MAX: 112 CM/SEC
BH CV ECHO MEAS - MV V2 MEAN: 65.5 CM/SEC
BH CV ECHO MEAS - MV V2 VTI: 29.4 CM
BH CV ECHO MEAS - MVA P1/2T LCG: 1.9 CM^2
BH CV ECHO MEAS - MVA(P1/2T): 4 CM^2
BH CV ECHO MEAS - PA MAX PG: 5.2 MMHG
BH CV ECHO MEAS - PA V2 MAX: 114 CM/SEC
BH CV ECHO MEAS - SI(CUBED): 36.7 ML/M^2
BH CV ECHO MEAS - SI(MOD-SP2): 11.1 ML/M^2
BH CV ECHO MEAS - SI(MOD-SP4): 12.6 ML/M^2
BH CV ECHO MEAS - SI(TEICH): 35.9 ML/M^2
BH CV ECHO MEAS - SV(CUBED): 46.4 ML
BH CV ECHO MEAS - SV(MOD-SP2): 14 ML
BH CV ECHO MEAS - SV(MOD-SP4): 16 ML
BH CV ECHO MEAS - SV(TEICH): 45.4 ML
BH CV ECHO MEAS - TAPSE (>1.6): 2 CM
BH CV ECHO MEAS - TR MAX VEL: 280 CM/SEC
BH CV ECHO MEASUREMENTS AVERAGE E/E' RATIO: 14.34
BH CV XLRA - TDI S': 12.3 CM/SEC
BILIRUB SERPL-MCNC: 1 MG/DL (ref 0–1.2)
BLD GP AB SCN SERPL QL: NEGATIVE
BUN SERPL-MCNC: 15 MG/DL (ref 8–23)
BUN SERPL-MCNC: 19 MG/DL (ref 8–23)
BUN/CREAT SERPL: 18.3 (ref 7–25)
BUN/CREAT SERPL: 24.1 (ref 7–25)
C PNEUM DNA NPH QL NAA+NON-PROBE: NOT DETECTED
CA-I BLD-MCNC: 3.9 MG/DL (ref 4.6–5.4)
CA-I SERPL ISE-MCNC: 0.97 MMOL/L (ref 1.15–1.35)
CALCIUM SPEC-SCNC: 8 MG/DL (ref 8.6–10.5)
CALCIUM SPEC-SCNC: 8.9 MG/DL (ref 8.6–10.5)
CHLORIDE SERPL-SCNC: 102 MMOL/L (ref 98–107)
CHLORIDE SERPL-SCNC: 99 MMOL/L (ref 98–107)
CO2 BLDA-SCNC: 12 MMOL/L (ref 24–29)
CO2 BLDA-SCNC: 23 MMOL/L (ref 24–29)
CO2 BLDA-SCNC: 24 MMOL/L (ref 24–29)
CO2 BLDA-SCNC: 24 MMOL/L (ref 24–29)
CO2 BLDA-SCNC: 27 MMOL/L (ref 24–29)
CO2 BLDA-SCNC: 28 MMOL/L (ref 24–29)
CO2 BLDA-SCNC: 43 MMOL/L (ref 24–29)
CO2 SERPL-SCNC: 25.3 MMOL/L (ref 22–29)
CO2 SERPL-SCNC: 26.7 MMOL/L (ref 22–29)
CREAT SERPL-MCNC: 0.79 MG/DL (ref 0.57–1)
CREAT SERPL-MCNC: 0.82 MG/DL (ref 0.57–1)
DEPRECATED RDW RBC AUTO: 44 FL (ref 37–54)
DEPRECATED RDW RBC AUTO: 46.4 FL (ref 37–54)
DEPRECATED RDW RBC AUTO: 48.6 FL (ref 37–54)
EOSINOPHIL # BLD AUTO: 0.02 10*3/MM3 (ref 0–0.4)
EOSINOPHIL # BLD AUTO: 0.04 10*3/MM3 (ref 0–0.4)
EOSINOPHIL NFR BLD AUTO: 0.2 % (ref 0.3–6.2)
EOSINOPHIL NFR BLD AUTO: 0.4 % (ref 0.3–6.2)
ERYTHROCYTE [DISTWIDTH] IN BLOOD BY AUTOMATED COUNT: 12.4 % (ref 12.3–15.4)
ERYTHROCYTE [DISTWIDTH] IN BLOOD BY AUTOMATED COUNT: 14.1 % (ref 12.3–15.4)
ERYTHROCYTE [DISTWIDTH] IN BLOOD BY AUTOMATED COUNT: 14.5 % (ref 12.3–15.4)
FIBRINOGEN PPP-MCNC: 293 MG/DL (ref 219–464)
FIBRINOGEN PPP-MCNC: 299 MG/DL (ref 219–464)
FLUAV H1 2009 PAND RNA NPH QL NAA+PROBE: NOT DETECTED
FLUAV H1 HA GENE NPH QL NAA+PROBE: NOT DETECTED
FLUAV H3 RNA NPH QL NAA+PROBE: NOT DETECTED
FLUAV SUBTYP SPEC NAA+PROBE: NOT DETECTED
FLUBV RNA ISLT QL NAA+PROBE: NOT DETECTED
GFR SERPL CREATININE-BSD FRML MDRD: 67 ML/MIN/1.73
GFR SERPL CREATININE-BSD FRML MDRD: 70 ML/MIN/1.73
GLOBULIN UR ELPH-MCNC: 3.2 GM/DL
GLUCOSE BLDC GLUCOMTR-MCNC: 157 MG/DL (ref 70–130)
GLUCOSE BLDC GLUCOMTR-MCNC: 178 MG/DL (ref 70–130)
GLUCOSE BLDC GLUCOMTR-MCNC: 200 MG/DL (ref 70–130)
GLUCOSE BLDC GLUCOMTR-MCNC: 250 MG/DL (ref 70–130)
GLUCOSE BLDC GLUCOMTR-MCNC: 252 MG/DL (ref 70–130)
GLUCOSE BLDC GLUCOMTR-MCNC: 268 MG/DL (ref 70–130)
GLUCOSE BLDC GLUCOMTR-MCNC: 83 MG/DL (ref 70–130)
GLUCOSE SERPL-MCNC: 150 MG/DL (ref 65–99)
GLUCOSE SERPL-MCNC: 260 MG/DL (ref 65–99)
HADV DNA SPEC NAA+PROBE: NOT DETECTED
HCO3 BLDA-SCNC: 10.7 MMOL/L (ref 22–26)
HCO3 BLDA-SCNC: 21.9 MMOL/L (ref 22–26)
HCO3 BLDA-SCNC: 23.1 MMOL/L (ref 22–26)
HCO3 BLDA-SCNC: 23.2 MMOL/L (ref 22–26)
HCO3 BLDA-SCNC: 26 MMOL/L (ref 22–26)
HCO3 BLDA-SCNC: 26.6 MMOL/L (ref 22–26)
HCO3 BLDA-SCNC: 28.3 MMOL/L (ref 22–28)
HCO3 BLDA-SCNC: 41.7 MMOL/L (ref 22–26)
HCOV 229E RNA SPEC QL NAA+PROBE: NOT DETECTED
HCOV HKU1 RNA SPEC QL NAA+PROBE: NOT DETECTED
HCOV NL63 RNA SPEC QL NAA+PROBE: NOT DETECTED
HCOV OC43 RNA SPEC QL NAA+PROBE: NOT DETECTED
HCT VFR BLD AUTO: 16.4 % (ref 34–46.6)
HCT VFR BLD AUTO: 22.8 % (ref 34–46.6)
HCT VFR BLD AUTO: 41.9 % (ref 34–46.6)
HCT VFR BLDA CALC: 17 % (ref 38–51)
HCT VFR BLDA CALC: 21 % (ref 38–51)
HCT VFR BLDA CALC: 24 % (ref 38–51)
HCT VFR BLDA CALC: 24 % (ref 38–51)
HCT VFR BLDA CALC: 26 % (ref 38–51)
HCT VFR BLDA CALC: 27 % (ref 38–51)
HCT VFR BLDA CALC: 30 % (ref 38–51)
HGB BLD-MCNC: 13.5 G/DL (ref 12–15.9)
HGB BLD-MCNC: 5.5 G/DL (ref 12–15.9)
HGB BLD-MCNC: 7.8 G/DL (ref 12–15.9)
HGB BLDA-MCNC: 10.2 G/DL (ref 12–17)
HGB BLDA-MCNC: 5.8 G/DL (ref 12–17)
HGB BLDA-MCNC: 7.1 G/DL (ref 12–17)
HGB BLDA-MCNC: 8.2 G/DL (ref 12–17)
HGB BLDA-MCNC: 8.2 G/DL (ref 12–17)
HGB BLDA-MCNC: 8.8 G/DL (ref 12–17)
HGB BLDA-MCNC: 9.2 G/DL (ref 12–17)
HMPV RNA NPH QL NAA+NON-PROBE: NOT DETECTED
HPIV1 RNA SPEC QL NAA+PROBE: NOT DETECTED
HPIV2 RNA SPEC QL NAA+PROBE: NOT DETECTED
HPIV3 RNA NPH QL NAA+PROBE: NOT DETECTED
HPIV4 P GENE NPH QL NAA+PROBE: NOT DETECTED
IMM GRANULOCYTES # BLD AUTO: 0.04 10*3/MM3 (ref 0–0.05)
IMM GRANULOCYTES # BLD AUTO: 0.18 10*3/MM3 (ref 0–0.05)
IMM GRANULOCYTES NFR BLD AUTO: 0.4 % (ref 0–0.5)
IMM GRANULOCYTES NFR BLD AUTO: 2 % (ref 0–0.5)
INHALED O2 CONCENTRATION: 100 %
INR PPP: 1.38 (ref 0.9–1.1)
INR PPP: 1.7 (ref 0.8–1.2)
INR PPP: 1.93 (ref 0.9–1.1)
INR PPP: 4.3 (ref 0.8–1.2)
LEFT ATRIUM VOLUME INDEX: 24 ML/M2
LYMPHOCYTES # BLD AUTO: 0.62 10*3/MM3 (ref 0.7–3.1)
LYMPHOCYTES # BLD AUTO: 1.21 10*3/MM3 (ref 0.7–3.1)
LYMPHOCYTES NFR BLD AUTO: 11.7 % (ref 19.6–45.3)
LYMPHOCYTES NFR BLD AUTO: 7 % (ref 19.6–45.3)
M PNEUMO IGG SER IA-ACNC: NOT DETECTED
MAGNESIUM SERPL-MCNC: 2.1 MG/DL (ref 1.6–2.4)
MCH RBC QN AUTO: 30.6 PG (ref 26.6–33)
MCH RBC QN AUTO: 30.8 PG (ref 26.6–33)
MCH RBC QN AUTO: 31.1 PG (ref 26.6–33)
MCHC RBC AUTO-ENTMCNC: 32.2 G/DL (ref 31.5–35.7)
MCHC RBC AUTO-ENTMCNC: 33.5 G/DL (ref 31.5–35.7)
MCHC RBC AUTO-ENTMCNC: 34.2 G/DL (ref 31.5–35.7)
MCV RBC AUTO: 90.8 FL (ref 79–97)
MCV RBC AUTO: 91.1 FL (ref 79–97)
MCV RBC AUTO: 95.7 FL (ref 79–97)
MODALITY: ABNORMAL
MONOCYTES # BLD AUTO: 0.51 10*3/MM3 (ref 0.1–0.9)
MONOCYTES # BLD AUTO: 0.7 10*3/MM3 (ref 0.1–0.9)
MONOCYTES NFR BLD AUTO: 5.7 % (ref 5–12)
MONOCYTES NFR BLD AUTO: 6.8 % (ref 5–12)
NEUTROPHILS NFR BLD AUTO: 7.52 10*3/MM3 (ref 1.7–7)
NEUTROPHILS NFR BLD AUTO: 8.29 10*3/MM3 (ref 1.7–7)
NEUTROPHILS NFR BLD AUTO: 80.4 % (ref 42.7–76)
NEUTROPHILS NFR BLD AUTO: 84.9 % (ref 42.7–76)
NRBC BLD AUTO-RTO: 0 /100 WBC (ref 0–0.2)
NRBC BLD AUTO-RTO: 0 /100 WBC (ref 0–0.2)
NT-PROBNP SERPL-MCNC: 1644 PG/ML (ref 0–1800)
O2 A-A PPRESDIFF RESPIRATORY: 0.7 MMHG
PCO2 BLDA: 29.6 MM HG (ref 35–45)
PCO2 BLDA: 33.8 MM HG (ref 35–45)
PCO2 BLDA: 34.1 MM HG (ref 35–45)
PCO2 BLDA: 36.1 MM HG (ref 35–45)
PCO2 BLDA: 45.9 MM HG (ref 35–45)
PCO2 BLDA: 47.7 MM HG (ref 35–45)
PCO2 BLDA: 54.2 MM HG (ref 35–45)
PCO2 BLDA: 54.5 MM HG (ref 35–45)
PEEP RESPIRATORY: 5 CM[H2O]
PH BLDA: 7.16 PH UNITS (ref 7.35–7.6)
PH BLDA: 7.3 PH UNITS (ref 7.35–7.6)
PH BLDA: 7.31 PH UNITS (ref 7.35–7.6)
PH BLDA: 7.33 PH UNITS (ref 7.35–7.45)
PH BLDA: 7.39 PH UNITS (ref 7.35–7.6)
PH BLDA: 7.44 PH UNITS (ref 7.35–7.6)
PH BLDA: 7.5 PH UNITS (ref 7.35–7.6)
PH BLDA: 7.55 PH UNITS (ref 7.35–7.6)
PHOSPHATE SERPL-MCNC: 2.8 MG/DL (ref 2.5–4.5)
PLATELET # BLD AUTO: 116 10*3/MM3 (ref 140–450)
PLATELET # BLD AUTO: 121 10*3/MM3 (ref 140–450)
PLATELET # BLD AUTO: 214 10*3/MM3 (ref 140–450)
PMV BLD AUTO: 8.7 FL (ref 6–12)
PMV BLD AUTO: 8.8 FL (ref 6–12)
PMV BLD AUTO: 8.9 FL (ref 6–12)
PO2 BLDA: 25 MMHG (ref 80–105)
PO2 BLDA: 348 MMHG (ref 80–105)
PO2 BLDA: 406 MMHG (ref 80–105)
PO2 BLDA: 422 MMHG (ref 80–105)
PO2 BLDA: 44 MMHG (ref 80–105)
PO2 BLDA: 445.8 MM HG (ref 80–100)
PO2 BLDA: 473 MMHG (ref 80–105)
PO2 BLDA: 494 MMHG (ref 80–105)
POTASSIUM BLDA-SCNC: 2.3 MMOL/L (ref 3.5–4.9)
POTASSIUM BLDA-SCNC: 2.8 MMOL/L (ref 3.5–4.9)
POTASSIUM BLDA-SCNC: 3 MMOL/L (ref 3.5–4.9)
POTASSIUM BLDA-SCNC: 3.2 MMOL/L (ref 3.5–4.9)
POTASSIUM BLDA-SCNC: 3.3 MMOL/L (ref 3.5–4.9)
POTASSIUM BLDA-SCNC: 3.7 MMOL/L (ref 3.5–4.9)
POTASSIUM BLDA-SCNC: 3.8 MMOL/L (ref 3.5–4.9)
POTASSIUM SERPL-SCNC: 3.1 MMOL/L (ref 3.5–5.2)
POTASSIUM SERPL-SCNC: 4.9 MMOL/L (ref 3.5–5.2)
PROT SERPL-MCNC: 6.4 G/DL (ref 6–8.5)
PROTHROMBIN TIME: 16.8 SECONDS (ref 11.7–14.2)
PROTHROMBIN TIME: 20.1 SECONDS (ref 12.8–15.2)
PROTHROMBIN TIME: 21.5 SECONDS (ref 11.7–14.2)
PROTHROMBIN TIME: 48.4 SECONDS (ref 12.8–15.2)
RBC # BLD AUTO: 1.8 10*6/MM3 (ref 3.77–5.28)
RBC # BLD AUTO: 2.51 10*6/MM3 (ref 3.77–5.28)
RBC # BLD AUTO: 4.38 10*6/MM3 (ref 3.77–5.28)
RH BLD: POSITIVE
RHINOVIRUS RNA SPEC NAA+PROBE: NOT DETECTED
RSV RNA NPH QL NAA+NON-PROBE: NOT DETECTED
SAO2 % BLDA: 100 % (ref 95–98)
SAO2 % BLDA: 33 % (ref 95–98)
SAO2 % BLDA: 74 % (ref 95–98)
SAO2 % BLDCOA: 100 % (ref 92–99)
SARS-COV-2 RNA NPH QL NAA+NON-PROBE: NOT DETECTED
SET MECH RESP RATE: 12
SODIUM SERPL-SCNC: 134 MMOL/L (ref 136–145)
SODIUM SERPL-SCNC: 143 MMOL/L (ref 136–145)
T&S EXPIRATION DATE: NORMAL
TOTAL RATE: 12 BREATHS/MINUTE
TROPONIN T SERPL-MCNC: 1.06 NG/ML (ref 0–0.03)
TROPONIN T SERPL-MCNC: 1.7 NG/ML (ref 0–0.03)
VENTILATOR MODE: AC
VT ON VENT VENT: 400 ML
WBC # BLD AUTO: 10.24 10*3/MM3 (ref 3.4–10.8)
WBC # BLD AUTO: 10.31 10*3/MM3 (ref 3.4–10.8)
WBC # BLD AUTO: 8.87 10*3/MM3 (ref 3.4–10.8)

## 2020-07-22 PROCEDURE — 86901 BLOOD TYPING SEROLOGIC RH(D): CPT | Performed by: THORACIC SURGERY (CARDIOTHORACIC VASCULAR SURGERY)

## 2020-07-22 PROCEDURE — 93010 ELECTROCARDIOGRAM REPORT: CPT | Performed by: INTERNAL MEDICINE

## 2020-07-22 PROCEDURE — 25010000002 MORPHINE PER 10 MG

## 2020-07-22 PROCEDURE — 25010000002 HEPARIN (PORCINE) PER 1000 UNITS

## 2020-07-22 PROCEDURE — 86900 BLOOD TYPING SEROLOGIC ABO: CPT

## 2020-07-22 PROCEDURE — 93459 L HRT ART/GRFT ANGIO: CPT | Performed by: INTERNAL MEDICINE

## 2020-07-22 PROCEDURE — 25010000002 PROPOFOL 10 MG/ML EMULSION: Performed by: ANESTHESIOLOGY

## 2020-07-22 PROCEDURE — 93306 TTE W/DOPPLER COMPLETE: CPT | Performed by: INTERNAL MEDICINE

## 2020-07-22 PROCEDURE — S0260 H&P FOR SURGERY: HCPCS | Performed by: NURSE PRACTITIONER

## 2020-07-22 PROCEDURE — C1713 ANCHOR/SCREW BN/BN,TIS/BN: HCPCS | Performed by: THORACIC SURGERY (CARDIOTHORACIC VASCULAR SURGERY)

## 2020-07-22 PROCEDURE — C1751 CATH, INF, PER/CENT/MIDLINE: HCPCS | Performed by: ANESTHESIOLOGY

## 2020-07-22 PROCEDURE — 25010000002 PROPOFOL 10 MG/ML EMULSION: Performed by: THORACIC SURGERY (CARDIOTHORACIC VASCULAR SURGERY)

## 2020-07-22 PROCEDURE — 25010000002 MIDAZOLAM PER 1 MG: Performed by: INTERNAL MEDICINE

## 2020-07-22 PROCEDURE — 25010000002 METHYLPREDNISOLONE PER 125 MG: Performed by: ANESTHESIOLOGY

## 2020-07-22 PROCEDURE — 25010000003 MILRINONE LACTATE 10 MG/10ML SOLUTION 10 ML VIAL: Performed by: ANESTHESIOLOGY

## 2020-07-22 PROCEDURE — 25010000002 HYDROMORPHONE PER 4 MG: Performed by: EMERGENCY MEDICINE

## 2020-07-22 PROCEDURE — P9047 ALBUMIN (HUMAN), 25%, 50ML: HCPCS

## 2020-07-22 PROCEDURE — C1729 CATH, DRAINAGE: HCPCS | Performed by: THORACIC SURGERY (CARDIOTHORACIC VASCULAR SURGERY)

## 2020-07-22 PROCEDURE — 99153 MOD SED SAME PHYS/QHP EA: CPT | Performed by: INTERNAL MEDICINE

## 2020-07-22 PROCEDURE — 25010000002 HEPARIN (PORCINE) PER 1000 UNITS: Performed by: ANESTHESIOLOGY

## 2020-07-22 PROCEDURE — 71045 X-RAY EXAM CHEST 1 VIEW: CPT

## 2020-07-22 PROCEDURE — P9012 CRYOPRECIPITATE EACH UNIT: HCPCS

## 2020-07-22 PROCEDURE — 93318 ECHO TRANSESOPHAGEAL INTRAOP: CPT | Performed by: ANESTHESIOLOGY

## 2020-07-22 PROCEDURE — 93005 ELECTROCARDIOGRAM TRACING: CPT | Performed by: THORACIC SURGERY (CARDIOTHORACIC VASCULAR SURGERY)

## 2020-07-22 PROCEDURE — P9017 PLASMA 1 DONOR FRZ W/IN 8 HR: HCPCS

## 2020-07-22 PROCEDURE — P9016 RBC LEUKOCYTES REDUCED: HCPCS

## 2020-07-22 PROCEDURE — 25010000002 EPINEPHRINE PER 0.1 MG: Performed by: ANESTHESIOLOGY

## 2020-07-22 PROCEDURE — 25010000002 ALBUMIN HUMAN 25% PER 50 ML

## 2020-07-22 PROCEDURE — 85018 HEMOGLOBIN: CPT

## 2020-07-22 PROCEDURE — 80069 RENAL FUNCTION PANEL: CPT | Performed by: THORACIC SURGERY (CARDIOTHORACIC VASCULAR SURGERY)

## 2020-07-22 PROCEDURE — 85347 COAGULATION TIME ACTIVATED: CPT

## 2020-07-22 PROCEDURE — 85384 FIBRINOGEN ACTIVITY: CPT | Performed by: THORACIC SURGERY (CARDIOTHORACIC VASCULAR SURGERY)

## 2020-07-22 PROCEDURE — 0 IOPAMIDOL PER 1 ML: Performed by: INTERNAL MEDICINE

## 2020-07-22 PROCEDURE — 99223 1ST HOSP IP/OBS HIGH 75: CPT | Performed by: NURSE PRACTITIONER

## 2020-07-22 PROCEDURE — 5A1221Z PERFORMANCE OF CARDIAC OUTPUT, CONTINUOUS: ICD-10-PCS | Performed by: THORACIC SURGERY (CARDIOTHORACIC VASCULAR SURGERY)

## 2020-07-22 PROCEDURE — 25010000002 ONDANSETRON PER 1 MG: Performed by: EMERGENCY MEDICINE

## 2020-07-22 PROCEDURE — B2131ZZ FLUOROSCOPY OF MULTIPLE CORONARY ARTERY BYPASS GRAFTS USING LOW OSMOLAR CONTRAST: ICD-10-PCS | Performed by: INTERNAL MEDICINE

## 2020-07-22 PROCEDURE — 0 IOPAMIDOL PER 1 ML: Performed by: EMERGENCY MEDICINE

## 2020-07-22 PROCEDURE — 25010000002 SUCCINYLCHOLINE PER 20 MG: Performed by: ANESTHESIOLOGY

## 2020-07-22 PROCEDURE — 82947 ASSAY GLUCOSE BLOOD QUANT: CPT

## 2020-07-22 PROCEDURE — 86850 RBC ANTIBODY SCREEN: CPT | Performed by: THORACIC SURGERY (CARDIOTHORACIC VASCULAR SURGERY)

## 2020-07-22 PROCEDURE — 25010000003 ATROPINE SULFATE: Performed by: ANESTHESIOLOGY

## 2020-07-22 PROCEDURE — 25010000002 PROTAMINE SULFATE PER 10 MG: Performed by: ANESTHESIOLOGY

## 2020-07-22 PROCEDURE — 85027 COMPLETE CBC AUTOMATED: CPT | Performed by: THORACIC SURGERY (CARDIOTHORACIC VASCULAR SURGERY)

## 2020-07-22 PROCEDURE — 25010000002 PHENYLEPHRINE PER 1 ML: Performed by: ANESTHESIOLOGY

## 2020-07-22 PROCEDURE — 25010000002 METOCLOPRAMIDE PER 10 MG: Performed by: THORACIC SURGERY (CARDIOTHORACIC VASCULAR SURGERY)

## 2020-07-22 PROCEDURE — 94002 VENT MGMT INPAT INIT DAY: CPT

## 2020-07-22 PROCEDURE — 94799 UNLISTED PULMONARY SVC/PX: CPT

## 2020-07-22 PROCEDURE — 99152 MOD SED SAME PHYS/QHP 5/>YRS: CPT | Performed by: INTERNAL MEDICINE

## 2020-07-22 PROCEDURE — 93306 TTE W/DOPPLER COMPLETE: CPT

## 2020-07-22 PROCEDURE — 25010000002 FOSPHENYTOIN 500 MG PE/10ML SOLUTION 10 ML VIAL: Performed by: ANESTHESIOLOGY

## 2020-07-22 PROCEDURE — 71275 CT ANGIOGRAPHY CHEST: CPT

## 2020-07-22 PROCEDURE — P9035 PLATELET PHERES LEUKOREDUCED: HCPCS

## 2020-07-22 PROCEDURE — C1894 INTRO/SHEATH, NON-LASER: HCPCS | Performed by: INTERNAL MEDICINE

## 2020-07-22 PROCEDURE — 74174 CTA ABD&PLVS W/CONTRAST: CPT

## 2020-07-22 PROCEDURE — 86927 PLASMA FRESH FROZEN: CPT

## 2020-07-22 PROCEDURE — 96374 THER/PROPH/DIAG INJ IV PUSH: CPT

## 2020-07-22 PROCEDURE — 85610 PROTHROMBIN TIME: CPT

## 2020-07-22 PROCEDURE — 25010000003 POTASSIUM CHLORIDE PER 2 MEQ: Performed by: THORACIC SURGERY (CARDIOTHORACIC VASCULAR SURGERY)

## 2020-07-22 PROCEDURE — 85025 COMPLETE CBC W/AUTO DIFF WBC: CPT | Performed by: EMERGENCY MEDICINE

## 2020-07-22 PROCEDURE — 82803 BLOOD GASES ANY COMBINATION: CPT

## 2020-07-22 PROCEDURE — 25010000002 FENTANYL CITRATE (PF) 100 MCG/2ML SOLUTION: Performed by: INTERNAL MEDICINE

## 2020-07-22 PROCEDURE — 84484 ASSAY OF TROPONIN QUANT: CPT | Performed by: EMERGENCY MEDICINE

## 2020-07-22 PROCEDURE — 85025 COMPLETE CBC W/AUTO DIFF WBC: CPT | Performed by: THORACIC SURGERY (CARDIOTHORACIC VASCULAR SURGERY)

## 2020-07-22 PROCEDURE — 85730 THROMBOPLASTIN TIME PARTIAL: CPT | Performed by: THORACIC SURGERY (CARDIOTHORACIC VASCULAR SURGERY)

## 2020-07-22 PROCEDURE — 25010000002 FENTANYL CITRATE (PF) 100 MCG/2ML SOLUTION: Performed by: EMERGENCY MEDICINE

## 2020-07-22 PROCEDURE — C1769 GUIDE WIRE: HCPCS | Performed by: THORACIC SURGERY (CARDIOTHORACIC VASCULAR SURGERY)

## 2020-07-22 PROCEDURE — 82330 ASSAY OF CALCIUM: CPT | Performed by: THORACIC SURGERY (CARDIOTHORACIC VASCULAR SURGERY)

## 2020-07-22 PROCEDURE — 25010000003 CEFAZOLIN IN DEXTROSE 2-4 GM/100ML-% SOLUTION: Performed by: ANESTHESIOLOGY

## 2020-07-22 PROCEDURE — 63710000001 INSULIN REGULAR HUMAN PER 5 UNITS: Performed by: ANESTHESIOLOGY

## 2020-07-22 PROCEDURE — 80053 COMPREHEN METABOLIC PANEL: CPT | Performed by: EMERGENCY MEDICINE

## 2020-07-22 PROCEDURE — 0202U NFCT DS 22 TRGT SARS-COV-2: CPT | Performed by: NURSE PRACTITIONER

## 2020-07-22 PROCEDURE — B246ZZ4 ULTRASONOGRAPHY OF RIGHT AND LEFT HEART, TRANSESOPHAGEAL: ICD-10-PCS | Performed by: THORACIC SURGERY (CARDIOTHORACIC VASCULAR SURGERY)

## 2020-07-22 PROCEDURE — 83735 ASSAY OF MAGNESIUM: CPT | Performed by: THORACIC SURGERY (CARDIOTHORACIC VASCULAR SURGERY)

## 2020-07-22 PROCEDURE — B2151ZZ FLUOROSCOPY OF LEFT HEART USING LOW OSMOLAR CONTRAST: ICD-10-PCS | Performed by: INTERNAL MEDICINE

## 2020-07-22 PROCEDURE — 83880 ASSAY OF NATRIURETIC PEPTIDE: CPT | Performed by: EMERGENCY MEDICINE

## 2020-07-22 PROCEDURE — 86923 COMPATIBILITY TEST ELECTRIC: CPT

## 2020-07-22 PROCEDURE — C1769 GUIDE WIRE: HCPCS | Performed by: INTERNAL MEDICINE

## 2020-07-22 PROCEDURE — 93005 ELECTROCARDIOGRAM TRACING: CPT | Performed by: EMERGENCY MEDICINE

## 2020-07-22 PROCEDURE — 25010000002 MAGNESIUM SULFATE IN D5W 1G/100ML (PREMIX) 1-5 GM/100ML-% SOLUTION: Performed by: THORACIC SURGERY (CARDIOTHORACIC VASCULAR SURGERY)

## 2020-07-22 PROCEDURE — B2111ZZ FLUOROSCOPY OF MULTIPLE CORONARY ARTERIES USING LOW OSMOLAR CONTRAST: ICD-10-PCS | Performed by: INTERNAL MEDICINE

## 2020-07-22 PROCEDURE — 85014 HEMATOCRIT: CPT

## 2020-07-22 PROCEDURE — 84484 ASSAY OF TROPONIN QUANT: CPT | Performed by: PHYSICIAN ASSISTANT

## 2020-07-22 PROCEDURE — 4A023N7 MEASUREMENT OF CARDIAC SAMPLING AND PRESSURE, LEFT HEART, PERCUTANEOUS APPROACH: ICD-10-PCS | Performed by: INTERNAL MEDICINE

## 2020-07-22 PROCEDURE — 36430 TRANSFUSION BLD/BLD COMPNT: CPT

## 2020-07-22 PROCEDURE — 33305 REPAIR OF HEART WOUND: CPT | Performed by: THORACIC SURGERY (CARDIOTHORACIC VASCULAR SURGERY)

## 2020-07-22 PROCEDURE — C1768 GRAFT, VASCULAR: HCPCS | Performed by: THORACIC SURGERY (CARDIOTHORACIC VASCULAR SURGERY)

## 2020-07-22 PROCEDURE — 85610 PROTHROMBIN TIME: CPT | Performed by: THORACIC SURGERY (CARDIOTHORACIC VASCULAR SURGERY)

## 2020-07-22 PROCEDURE — 25010000002 FENTANYL CITRATE (PF) 100 MCG/2ML SOLUTION: Performed by: ANESTHESIOLOGY

## 2020-07-22 PROCEDURE — 96375 TX/PRO/DX INJ NEW DRUG ADDON: CPT

## 2020-07-22 PROCEDURE — 86900 BLOOD TYPING SEROLOGIC ABO: CPT | Performed by: THORACIC SURGERY (CARDIOTHORACIC VASCULAR SURGERY)

## 2020-07-22 PROCEDURE — 99285 EMERGENCY DEPT VISIT HI MDM: CPT

## 2020-07-22 PROCEDURE — 02QL0ZZ REPAIR LEFT VENTRICLE, OPEN APPROACH: ICD-10-PCS | Performed by: THORACIC SURGERY (CARDIOTHORACIC VASCULAR SURGERY)

## 2020-07-22 PROCEDURE — 99222 1ST HOSP IP/OBS MODERATE 55: CPT | Performed by: INTERNAL MEDICINE

## 2020-07-22 DEVICE — SS SUTURE, 3 PER SLEEVE
Type: IMPLANTABLE DEVICE | Site: STERNUM | Status: FUNCTIONAL
Brand: MYO/WIRE II

## 2020-07-22 DEVICE — ABSORBABLE HEMOSTAT (OXIDIZED REGENERATED CELLULOSE, U.S.P.)
Type: IMPLANTABLE DEVICE | Site: HEART | Status: FUNCTIONAL
Brand: SURGICEL

## 2020-07-22 DEVICE — CLIP LIGAT VASC HORIZON TI LG ORNG 6CT: Type: IMPLANTABLE DEVICE | Site: HEART | Status: FUNCTIONAL

## 2020-07-22 DEVICE — ADHS SURG BIOGLUE PREF STD/TP 10ML: Type: IMPLANTABLE DEVICE | Site: HEART | Status: FUNCTIONAL

## 2020-07-22 DEVICE — ABSORBABLE HEMOSTAT (OXIDIZED REGENERATED CELLULOSE)
Type: IMPLANTABLE DEVICE | Site: HEART | Status: FUNCTIONAL
Brand: SURGICEL NU-KNIT

## 2020-07-22 DEVICE — PTCH VASC XENOSURE PLS BIO 2X9CM: Type: IMPLANTABLE DEVICE | Site: HEART | Status: FUNCTIONAL

## 2020-07-22 DEVICE — SS SUTURE, 4 PER SLEEVE
Type: IMPLANTABLE DEVICE | Site: STERNUM | Status: FUNCTIONAL
Brand: MYO/WIRE II

## 2020-07-22 RX ORDER — POTASSIUM CHLORIDE 750 MG/1
40 CAPSULE, EXTENDED RELEASE ORAL AS NEEDED
Status: DISCONTINUED | OUTPATIENT
Start: 2020-07-22 | End: 2020-07-30 | Stop reason: HOSPADM

## 2020-07-22 RX ORDER — CEFAZOLIN SODIUM 2 G/100ML
2 INJECTION, SOLUTION INTRAVENOUS EVERY 8 HOURS
Status: COMPLETED | OUTPATIENT
Start: 2020-07-23 | End: 2020-07-24

## 2020-07-22 RX ORDER — AMOXICILLIN 250 MG
2 CAPSULE ORAL 2 TIMES DAILY
Status: DISCONTINUED | OUTPATIENT
Start: 2020-07-22 | End: 2020-07-30 | Stop reason: HOSPADM

## 2020-07-22 RX ORDER — DEXMEDETOMIDINE HYDROCHLORIDE 4 UG/ML
.2-1.5 INJECTION, SOLUTION INTRAVENOUS
Status: DISCONTINUED | OUTPATIENT
Start: 2020-07-22 | End: 2020-07-24

## 2020-07-22 RX ORDER — CYCLOBENZAPRINE HCL 10 MG
5 TABLET ORAL EVERY 8 HOURS PRN
Status: DISCONTINUED | OUTPATIENT
Start: 2020-07-23 | End: 2020-07-30 | Stop reason: HOSPADM

## 2020-07-22 RX ORDER — POTASSIUM CHLORIDE 29.8 MG/ML
20 INJECTION INTRAVENOUS
Status: DISCONTINUED | OUTPATIENT
Start: 2020-07-22 | End: 2020-07-30 | Stop reason: HOSPADM

## 2020-07-22 RX ORDER — NALOXONE HCL 0.4 MG/ML
0.4 VIAL (ML) INJECTION
Status: DISCONTINUED | OUTPATIENT
Start: 2020-07-22 | End: 2020-07-24

## 2020-07-22 RX ORDER — NITROGLYCERIN 20 MG/100ML
10-50 INJECTION INTRAVENOUS
Status: DISCONTINUED | OUTPATIENT
Start: 2020-07-22 | End: 2020-07-24

## 2020-07-22 RX ORDER — MORPHINE SULFATE 2 MG/ML
INJECTION, SOLUTION INTRAMUSCULAR; INTRAVENOUS
Status: DISPENSED
Start: 2020-07-22 | End: 2020-07-23

## 2020-07-22 RX ORDER — POTASSIUM CHLORIDE 29.8 MG/ML
20 INJECTION INTRAVENOUS
Status: COMPLETED | OUTPATIENT
Start: 2020-07-22 | End: 2020-07-23

## 2020-07-22 RX ORDER — BISACODYL 10 MG
10 SUPPOSITORY, RECTAL RECTAL DAILY PRN
Status: DISCONTINUED | OUTPATIENT
Start: 2020-07-23 | End: 2020-07-30 | Stop reason: HOSPADM

## 2020-07-22 RX ORDER — FENTANYL CITRATE 50 UG/ML
50 INJECTION, SOLUTION INTRAMUSCULAR; INTRAVENOUS ONCE
Status: COMPLETED | OUTPATIENT
Start: 2020-07-22 | End: 2020-07-22

## 2020-07-22 RX ORDER — ACETAMINOPHEN 325 MG/1
650 TABLET ORAL EVERY 4 HOURS PRN
Status: DISCONTINUED | OUTPATIENT
Start: 2020-07-23 | End: 2020-07-30 | Stop reason: HOSPADM

## 2020-07-22 RX ORDER — FENTANYL CITRATE 50 UG/ML
INJECTION, SOLUTION INTRAMUSCULAR; INTRAVENOUS AS NEEDED
Status: DISCONTINUED | OUTPATIENT
Start: 2020-07-22 | End: 2020-07-22 | Stop reason: HOSPADM

## 2020-07-22 RX ORDER — EPINEPHRINE 1 MG/ML
INJECTION, SOLUTION, CONCENTRATE INTRAVENOUS AS NEEDED
Status: DISCONTINUED | OUTPATIENT
Start: 2020-07-22 | End: 2020-07-22 | Stop reason: SURG

## 2020-07-22 RX ORDER — SODIUM CHLORIDE 9 MG/ML
30 INJECTION, SOLUTION INTRAVENOUS CONTINUOUS PRN
Status: DISCONTINUED | OUTPATIENT
Start: 2020-07-22 | End: 2020-07-27

## 2020-07-22 RX ORDER — NOREPINEPHRINE BITARTRATE 1 MG/ML
INJECTION, SOLUTION INTRAVENOUS CONTINUOUS PRN
Status: DISCONTINUED | OUTPATIENT
Start: 2020-07-22 | End: 2020-07-22 | Stop reason: SURG

## 2020-07-22 RX ORDER — ROCURONIUM BROMIDE 10 MG/ML
INJECTION, SOLUTION INTRAVENOUS AS NEEDED
Status: DISCONTINUED | OUTPATIENT
Start: 2020-07-22 | End: 2020-07-22 | Stop reason: SURG

## 2020-07-22 RX ORDER — POTASSIUM CHLORIDE 7.45 MG/ML
10 INJECTION INTRAVENOUS
Status: DISCONTINUED | OUTPATIENT
Start: 2020-07-22 | End: 2020-07-30 | Stop reason: HOSPADM

## 2020-07-22 RX ORDER — MIDAZOLAM HYDROCHLORIDE 1 MG/ML
INJECTION INTRAMUSCULAR; INTRAVENOUS AS NEEDED
Status: DISCONTINUED | OUTPATIENT
Start: 2020-07-22 | End: 2020-07-22 | Stop reason: HOSPADM

## 2020-07-22 RX ORDER — SODIUM CHLORIDE 9 MG/ML
INJECTION, SOLUTION INTRAVENOUS CONTINUOUS PRN
Status: DISCONTINUED | OUTPATIENT
Start: 2020-07-22 | End: 2020-07-22 | Stop reason: SURG

## 2020-07-22 RX ORDER — MAGNESIUM SULFATE 1 G/100ML
1 INJECTION INTRAVENOUS EVERY 8 HOURS
Status: DISPENSED | OUTPATIENT
Start: 2020-07-22 | End: 2020-07-23

## 2020-07-22 RX ORDER — METOCLOPRAMIDE HYDROCHLORIDE 5 MG/ML
10 INJECTION INTRAMUSCULAR; INTRAVENOUS EVERY 6 HOURS
Status: DISPENSED | OUTPATIENT
Start: 2020-07-22 | End: 2020-07-23

## 2020-07-22 RX ORDER — ASPIRIN 81 MG/1
81 TABLET ORAL DAILY
Status: DISCONTINUED | OUTPATIENT
Start: 2020-07-23 | End: 2020-07-30 | Stop reason: HOSPADM

## 2020-07-22 RX ORDER — MAGNESIUM SULFATE HEPTAHYDRATE 40 MG/ML
2 INJECTION, SOLUTION INTRAVENOUS AS NEEDED
Status: DISCONTINUED | OUTPATIENT
Start: 2020-07-22 | End: 2020-07-24

## 2020-07-22 RX ORDER — BISACODYL 5 MG/1
10 TABLET, DELAYED RELEASE ORAL DAILY PRN
Status: DISCONTINUED | OUTPATIENT
Start: 2020-07-22 | End: 2020-07-30 | Stop reason: HOSPADM

## 2020-07-22 RX ORDER — PANTOPRAZOLE SODIUM 40 MG/1
40 TABLET, DELAYED RELEASE ORAL EVERY MORNING
Status: DISPENSED | OUTPATIENT
Start: 2020-07-23 | End: 2020-07-26

## 2020-07-22 RX ORDER — ACETAMINOPHEN 325 MG/1
650 TABLET ORAL EVERY 4 HOURS
Status: DISPENSED | OUTPATIENT
Start: 2020-07-22 | End: 2020-07-23

## 2020-07-22 RX ORDER — ALPRAZOLAM 0.25 MG/1
0.25 TABLET ORAL EVERY 8 HOURS PRN
Status: DISCONTINUED | OUTPATIENT
Start: 2020-07-22 | End: 2020-07-24

## 2020-07-22 RX ORDER — CEFAZOLIN SODIUM 2 G/100ML
INJECTION, SOLUTION INTRAVENOUS AS NEEDED
Status: DISCONTINUED | OUTPATIENT
Start: 2020-07-22 | End: 2020-07-22 | Stop reason: SURG

## 2020-07-22 RX ORDER — LIDOCAINE HYDROCHLORIDE 20 MG/ML
INJECTION, SOLUTION INFILTRATION; PERINEURAL AS NEEDED
Status: DISCONTINUED | OUTPATIENT
Start: 2020-07-22 | End: 2020-07-22 | Stop reason: HOSPADM

## 2020-07-22 RX ORDER — ACETAMINOPHEN 160 MG/5ML
650 SOLUTION ORAL EVERY 4 HOURS
Status: DISPENSED | OUTPATIENT
Start: 2020-07-22 | End: 2020-07-23

## 2020-07-22 RX ORDER — OXYCODONE HYDROCHLORIDE 5 MG/1
10 TABLET ORAL EVERY 4 HOURS PRN
Status: DISCONTINUED | OUTPATIENT
Start: 2020-07-22 | End: 2020-07-24

## 2020-07-22 RX ORDER — SODIUM CHLORIDE 0.9 % (FLUSH) 0.9 %
30 SYRINGE (ML) INJECTION ONCE AS NEEDED
Status: DISCONTINUED | OUTPATIENT
Start: 2020-07-22 | End: 2020-07-27

## 2020-07-22 RX ORDER — ALBUMIN, HUMAN INJ 5% 5 %
1500 SOLUTION INTRAVENOUS AS NEEDED
Status: DISPENSED | OUTPATIENT
Start: 2020-07-22 | End: 2020-07-23

## 2020-07-22 RX ORDER — DEXTROSE MONOHYDRATE 25 G/50ML
25-50 INJECTION, SOLUTION INTRAVENOUS
Status: DISCONTINUED | OUTPATIENT
Start: 2020-07-22 | End: 2020-07-22 | Stop reason: HOSPADM

## 2020-07-22 RX ORDER — ACETAMINOPHEN 160 MG/5ML
650 SOLUTION ORAL EVERY 4 HOURS PRN
Status: DISCONTINUED | OUTPATIENT
Start: 2020-07-23 | End: 2020-07-30 | Stop reason: HOSPADM

## 2020-07-22 RX ORDER — NOREPINEPHRINE BIT/0.9 % NACL 8 MG/250ML
.02-.3 INFUSION BOTTLE (ML) INTRAVENOUS CONTINUOUS PRN
Status: DISCONTINUED | OUTPATIENT
Start: 2020-07-22 | End: 2020-07-24

## 2020-07-22 RX ORDER — MAGNESIUM SULFATE HEPTAHYDRATE 40 MG/ML
4 INJECTION, SOLUTION INTRAVENOUS AS NEEDED
Status: DISCONTINUED | OUTPATIENT
Start: 2020-07-22 | End: 2020-07-24

## 2020-07-22 RX ORDER — MIDAZOLAM HYDROCHLORIDE 1 MG/ML
2 INJECTION INTRAMUSCULAR; INTRAVENOUS
Status: DISCONTINUED | OUTPATIENT
Start: 2020-07-22 | End: 2020-07-24

## 2020-07-22 RX ORDER — HEPARIN SODIUM 1000 [USP'U]/ML
INJECTION, SOLUTION INTRAVENOUS; SUBCUTANEOUS AS NEEDED
Status: DISCONTINUED | OUTPATIENT
Start: 2020-07-22 | End: 2020-07-22 | Stop reason: SURG

## 2020-07-22 RX ORDER — CHLORHEXIDINE GLUCONATE 0.12 MG/ML
15 RINSE ORAL EVERY 12 HOURS
Status: DISCONTINUED | OUTPATIENT
Start: 2020-07-22 | End: 2020-07-25

## 2020-07-22 RX ORDER — PROTAMINE SULFATE 10 MG/ML
INJECTION, SOLUTION INTRAVENOUS AS NEEDED
Status: DISCONTINUED | OUTPATIENT
Start: 2020-07-22 | End: 2020-07-22 | Stop reason: SURG

## 2020-07-22 RX ORDER — ACETAMINOPHEN 650 MG/1
650 SUPPOSITORY RECTAL EVERY 4 HOURS
Status: DISPENSED | OUTPATIENT
Start: 2020-07-22 | End: 2020-07-23

## 2020-07-22 RX ORDER — AMINOCAPROIC ACID 250 MG/ML
INJECTION, SOLUTION INTRAVENOUS AS NEEDED
Status: DISCONTINUED | OUTPATIENT
Start: 2020-07-22 | End: 2020-07-22 | Stop reason: SURG

## 2020-07-22 RX ORDER — POLYETHYLENE GLYCOL 3350 17 G/17G
17 POWDER, FOR SOLUTION ORAL DAILY PRN
Status: DISCONTINUED | OUTPATIENT
Start: 2020-07-22 | End: 2020-07-30 | Stop reason: HOSPADM

## 2020-07-22 RX ORDER — SODIUM CHLORIDE, SODIUM LACTATE, POTASSIUM CHLORIDE, CALCIUM CHLORIDE 600; 310; 30; 20 MG/100ML; MG/100ML; MG/100ML; MG/100ML
INJECTION, SOLUTION INTRAVENOUS CONTINUOUS PRN
Status: DISCONTINUED | OUTPATIENT
Start: 2020-07-22 | End: 2020-07-22 | Stop reason: SURG

## 2020-07-22 RX ORDER — ATORVASTATIN CALCIUM 20 MG/1
40 TABLET, FILM COATED ORAL NIGHTLY
Status: DISCONTINUED | OUTPATIENT
Start: 2020-07-22 | End: 2020-07-30 | Stop reason: HOSPADM

## 2020-07-22 RX ORDER — DOPAMINE HYDROCHLORIDE 160 MG/100ML
2-20 INJECTION, SOLUTION INTRAVENOUS CONTINUOUS PRN
Status: DISCONTINUED | OUTPATIENT
Start: 2020-07-22 | End: 2020-07-24

## 2020-07-22 RX ORDER — HYDROMORPHONE HYDROCHLORIDE 1 MG/ML
0.25 INJECTION, SOLUTION INTRAMUSCULAR; INTRAVENOUS; SUBCUTANEOUS
Status: DISCONTINUED | OUTPATIENT
Start: 2020-07-22 | End: 2020-07-24

## 2020-07-22 RX ORDER — PROPOFOL 10 MG/ML
VIAL (ML) INTRAVENOUS CONTINUOUS PRN
Status: DISCONTINUED | OUTPATIENT
Start: 2020-07-22 | End: 2020-07-22 | Stop reason: SURG

## 2020-07-22 RX ORDER — METHYLPREDNISOLONE SODIUM SUCCINATE 125 MG/2ML
INJECTION, POWDER, LYOPHILIZED, FOR SOLUTION INTRAMUSCULAR; INTRAVENOUS AS NEEDED
Status: DISCONTINUED | OUTPATIENT
Start: 2020-07-22 | End: 2020-07-22 | Stop reason: SURG

## 2020-07-22 RX ORDER — ONDANSETRON 2 MG/ML
4 INJECTION INTRAMUSCULAR; INTRAVENOUS ONCE
Status: COMPLETED | OUTPATIENT
Start: 2020-07-22 | End: 2020-07-22

## 2020-07-22 RX ORDER — MILRINONE LACTATE 0.2 MG/ML
.25-.75 INJECTION, SOLUTION INTRAVENOUS CONTINUOUS PRN
Status: DISCONTINUED | OUTPATIENT
Start: 2020-07-22 | End: 2020-07-24

## 2020-07-22 RX ORDER — PROMETHAZINE HYDROCHLORIDE 25 MG/1
12.5 TABLET ORAL EVERY 6 HOURS PRN
Status: DISCONTINUED | OUTPATIENT
Start: 2020-07-22 | End: 2020-07-30 | Stop reason: HOSPADM

## 2020-07-22 RX ORDER — MEPERIDINE HYDROCHLORIDE 25 MG/ML
25 INJECTION INTRAMUSCULAR; INTRAVENOUS; SUBCUTANEOUS EVERY 4 HOURS PRN
Status: ACTIVE | OUTPATIENT
Start: 2020-07-22 | End: 2020-07-23

## 2020-07-22 RX ORDER — ONDANSETRON 2 MG/ML
4 INJECTION INTRAMUSCULAR; INTRAVENOUS EVERY 6 HOURS PRN
Status: DISCONTINUED | OUTPATIENT
Start: 2020-07-22 | End: 2020-07-30 | Stop reason: HOSPADM

## 2020-07-22 RX ORDER — POTASSIUM CHLORIDE 1.5 G/1.77G
40 POWDER, FOR SOLUTION ORAL AS NEEDED
Status: DISCONTINUED | OUTPATIENT
Start: 2020-07-22 | End: 2020-07-30 | Stop reason: HOSPADM

## 2020-07-22 RX ORDER — SODIUM CHLORIDE 9 MG/ML
INJECTION, SOLUTION INTRAVENOUS CONTINUOUS PRN
Status: COMPLETED | OUTPATIENT
Start: 2020-07-22 | End: 2020-07-22

## 2020-07-22 RX ORDER — DEXMEDETOMIDINE HYDROCHLORIDE 4 UG/ML
INJECTION INTRAVENOUS CONTINUOUS PRN
Status: DISCONTINUED | OUTPATIENT
Start: 2020-07-22 | End: 2020-07-22 | Stop reason: SURG

## 2020-07-22 RX ORDER — FENTANYL CITRATE 50 UG/ML
INJECTION, SOLUTION INTRAMUSCULAR; INTRAVENOUS AS NEEDED
Status: DISCONTINUED | OUTPATIENT
Start: 2020-07-22 | End: 2020-07-22 | Stop reason: SURG

## 2020-07-22 RX ORDER — HYDROCODONE BITARTRATE AND ACETAMINOPHEN 5; 325 MG/1; MG/1
2 TABLET ORAL EVERY 4 HOURS PRN
Status: DISCONTINUED | OUTPATIENT
Start: 2020-07-22 | End: 2020-07-30 | Stop reason: HOSPADM

## 2020-07-22 RX ORDER — SUCCINYLCHOLINE CHLORIDE 20 MG/ML
INJECTION INTRAMUSCULAR; INTRAVENOUS AS NEEDED
Status: DISCONTINUED | OUTPATIENT
Start: 2020-07-22 | End: 2020-07-22 | Stop reason: SURG

## 2020-07-22 RX ORDER — SODIUM CHLORIDE 9 MG/ML
30 INJECTION, SOLUTION INTRAVENOUS CONTINUOUS
Status: DISCONTINUED | OUTPATIENT
Start: 2020-07-22 | End: 2020-07-24

## 2020-07-22 RX ORDER — HYDROMORPHONE HYDROCHLORIDE 1 MG/ML
0.25 INJECTION, SOLUTION INTRAMUSCULAR; INTRAVENOUS; SUBCUTANEOUS ONCE
Status: COMPLETED | OUTPATIENT
Start: 2020-07-22 | End: 2020-07-22

## 2020-07-22 RX ORDER — FUROSEMIDE 10 MG/ML
40 INJECTION INTRAMUSCULAR; INTRAVENOUS EVERY 6 HOURS PRN
Status: COMPLETED | OUTPATIENT
Start: 2020-07-22 | End: 2020-07-24

## 2020-07-22 RX ORDER — SODIUM CHLORIDE 0.9 % (FLUSH) 0.9 %
10 SYRINGE (ML) INJECTION AS NEEDED
Status: DISCONTINUED | OUTPATIENT
Start: 2020-07-22 | End: 2020-07-22

## 2020-07-22 RX ORDER — ACETAMINOPHEN 650 MG/1
650 SUPPOSITORY RECTAL EVERY 4 HOURS PRN
Status: DISCONTINUED | OUTPATIENT
Start: 2020-07-23 | End: 2020-07-30 | Stop reason: HOSPADM

## 2020-07-22 RX ORDER — PROPOFOL 10 MG/ML
VIAL (ML) INTRAVENOUS AS NEEDED
Status: DISCONTINUED | OUTPATIENT
Start: 2020-07-22 | End: 2020-07-22 | Stop reason: SURG

## 2020-07-22 RX ORDER — PANTOPRAZOLE SODIUM 40 MG/10ML
40 INJECTION, POWDER, LYOPHILIZED, FOR SOLUTION INTRAVENOUS ONCE
Status: COMPLETED | OUTPATIENT
Start: 2020-07-22 | End: 2020-07-23

## 2020-07-22 RX ORDER — PROMETHAZINE HYDROCHLORIDE 25 MG/ML
12.5 INJECTION, SOLUTION INTRAMUSCULAR; INTRAVENOUS EVERY 6 HOURS PRN
Status: DISCONTINUED | OUTPATIENT
Start: 2020-07-22 | End: 2020-07-30 | Stop reason: HOSPADM

## 2020-07-22 RX ADMIN — SODIUM CHLORIDE 1000 ML: 9 INJECTION, SOLUTION INTRAVENOUS at 14:20

## 2020-07-22 RX ADMIN — CHLORHEXIDINE GLUCONATE 15 ML: 1.2 RINSE ORAL at 23:24

## 2020-07-22 RX ADMIN — SUCCINYLCHOLINE CHLORIDE 120 MG: 20 INJECTION, SOLUTION INTRAMUSCULAR; INTRAVENOUS; PARENTERAL at 16:36

## 2020-07-22 RX ADMIN — PHENYLEPHRINE HYDROCHLORIDE 200 MCG: 10 INJECTION INTRAVENOUS at 16:59

## 2020-07-22 RX ADMIN — PHENYLEPHRINE HYDROCHLORIDE 100 MCG: 10 INJECTION INTRAVENOUS at 16:47

## 2020-07-22 RX ADMIN — CEFAZOLIN SODIUM 2 G: 2 INJECTION, SOLUTION INTRAVENOUS at 17:11

## 2020-07-22 RX ADMIN — ONDANSETRON 4 MG: 2 INJECTION INTRAMUSCULAR; INTRAVENOUS at 12:09

## 2020-07-22 RX ADMIN — SODIUM CHLORIDE 0.25 MCG/KG/MIN: 0.9 INJECTION, SOLUTION INTRAVENOUS at 18:33

## 2020-07-22 RX ADMIN — ROCURONIUM BROMIDE 50 MG: 10 INJECTION INTRAVENOUS at 17:34

## 2020-07-22 RX ADMIN — FENTANYL CITRATE 50 MCG: 50 INJECTION, SOLUTION INTRAMUSCULAR; INTRAVENOUS at 12:09

## 2020-07-22 RX ADMIN — PHENYLEPHRINE HYDROCHLORIDE 100 MCG: 10 INJECTION INTRAVENOUS at 17:10

## 2020-07-22 RX ADMIN — PHENYLEPHRINE HYDROCHLORIDE 100 MCG: 10 INJECTION INTRAVENOUS at 17:13

## 2020-07-22 RX ADMIN — EPINEPHRINE 1 MG: 1 INJECTION, SOLUTION INTRAMUSCULAR; SUBCUTANEOUS at 17:23

## 2020-07-22 RX ADMIN — PHENYLEPHRINE HYDROCHLORIDE 200 MCG: 10 INJECTION INTRAVENOUS at 16:56

## 2020-07-22 RX ADMIN — AMINOCAPROIC ACID 10 G: 250 INJECTION, SOLUTION INTRAVENOUS at 19:48

## 2020-07-22 RX ADMIN — SODIUM CHLORIDE: 9 INJECTION, SOLUTION INTRAVENOUS at 16:29

## 2020-07-22 RX ADMIN — NOREPINEPHRINE BITARTRATE 0.03 MCG/KG/MIN: 1 INJECTION, SOLUTION, CONCENTRATE INTRAVENOUS at 17:08

## 2020-07-22 RX ADMIN — METHYLPREDNISOLONE SODIUM SUCCINATE 250 MG: 125 INJECTION, POWDER, FOR SOLUTION INTRAMUSCULAR; INTRAVENOUS at 17:33

## 2020-07-22 RX ADMIN — EPINEPHRINE 0.2 MG: 1 INJECTION, SOLUTION INTRAMUSCULAR; SUBCUTANEOUS at 17:14

## 2020-07-22 RX ADMIN — FOSPHENYTOIN SODIUM 500 MG PE: 50 INJECTION, SOLUTION INTRAMUSCULAR; INTRAVENOUS at 20:15

## 2020-07-22 RX ADMIN — EPINEPHRINE 0.1 MCG/KG/MIN: 1 INJECTION, SOLUTION, CONCENTRATE INTRAVENOUS at 17:14

## 2020-07-22 RX ADMIN — ROCURONIUM BROMIDE 50 MG: 10 INJECTION INTRAVENOUS at 16:46

## 2020-07-22 RX ADMIN — METOCLOPRAMIDE HYDROCHLORIDE 10 MG: 5 INJECTION INTRAMUSCULAR; INTRAVENOUS at 22:45

## 2020-07-22 RX ADMIN — FENTANYL CITRATE 50 MCG: 50 INJECTION INTRAMUSCULAR; INTRAVENOUS at 16:35

## 2020-07-22 RX ADMIN — EPINEPHRINE 0.4 MG: 1 INJECTION, SOLUTION INTRAMUSCULAR; SUBCUTANEOUS at 17:19

## 2020-07-22 RX ADMIN — SODIUM CHLORIDE 8 UNITS: 900 INJECTION, SOLUTION INTRAVENOUS at 18:41

## 2020-07-22 RX ADMIN — PROPOFOL 50 MG: 10 INJECTION, EMULSION INTRAVENOUS at 16:35

## 2020-07-22 RX ADMIN — HEPARIN SODIUM 13000 UNITS: 1000 INJECTION, SOLUTION INTRAVENOUS; SUBCUTANEOUS at 17:22

## 2020-07-22 RX ADMIN — IOPAMIDOL 100 ML: 755 INJECTION, SOLUTION INTRAVENOUS at 13:15

## 2020-07-22 RX ADMIN — MAGNESIUM SULFATE 1 G: 1 INJECTION INTRAVENOUS at 23:25

## 2020-07-22 RX ADMIN — EPINEPHRINE 0.2 MG: 1 INJECTION, SOLUTION INTRAMUSCULAR; SUBCUTANEOUS at 17:16

## 2020-07-22 RX ADMIN — PROTAMINE SULFATE 250 MG: 10 INJECTION, SOLUTION INTRAVENOUS at 19:18

## 2020-07-22 RX ADMIN — PHENYLEPHRINE HYDROCHLORIDE 200 MCG: 10 INJECTION INTRAVENOUS at 17:06

## 2020-07-22 RX ADMIN — FENTANYL CITRATE 200 MCG: 50 INJECTION INTRAMUSCULAR; INTRAVENOUS at 17:53

## 2020-07-22 RX ADMIN — SODIUM CHLORIDE, POTASSIUM CHLORIDE, SODIUM LACTATE AND CALCIUM CHLORIDE: 600; 310; 30; 20 INJECTION, SOLUTION INTRAVENOUS at 17:06

## 2020-07-22 RX ADMIN — POTASSIUM CHLORIDE 20 MEQ: 29.8 INJECTION, SOLUTION INTRAVENOUS at 23:16

## 2020-07-22 RX ADMIN — EPINEPHRINE 1 MG: 1 INJECTION, SOLUTION INTRAMUSCULAR; SUBCUTANEOUS at 17:26

## 2020-07-22 RX ADMIN — HYDROMORPHONE HYDROCHLORIDE 0.25 MG: 1 INJECTION, SOLUTION INTRAMUSCULAR; INTRAVENOUS; SUBCUTANEOUS at 14:33

## 2020-07-22 RX ADMIN — ATROPINE SULFATE 1 MG: 0.1 INJECTION, SOLUTION INTRAVENOUS at 17:16

## 2020-07-22 RX ADMIN — PHENYLEPHRINE HYDROCHLORIDE 100 MCG: 10 INJECTION INTRAVENOUS at 16:52

## 2020-07-22 RX ADMIN — PHENYLEPHRINE HYDROCHLORIDE 200 MCG: 10 INJECTION INTRAVENOUS at 17:03

## 2020-07-22 RX ADMIN — DEXMEDETOMIDINE HYDROCHLORIDE 1 MCG/KG/HR: 4 INJECTION INTRAVENOUS at 17:07

## 2020-07-22 RX ADMIN — PROPOFOL 40 MCG/KG/MIN: 10 INJECTION, EMULSION INTRAVENOUS at 23:17

## 2020-07-22 RX ADMIN — SODIUM CHLORIDE: 9 INJECTION, SOLUTION INTRAVENOUS at 17:07

## 2020-07-22 RX ADMIN — PROPOFOL 15 MCG/KG/MIN: 10 INJECTION, EMULSION INTRAVENOUS at 17:30

## 2020-07-22 RX ADMIN — PHENYLEPHRINE HYDROCHLORIDE 0.5 MCG/KG/MIN: 10 INJECTION, SOLUTION INTRAMUSCULAR; INTRAVENOUS; SUBCUTANEOUS at 17:41

## 2020-07-22 RX ADMIN — MUPIROCIN 1 APPLICATION: 20 OINTMENT TOPICAL at 23:24

## 2020-07-22 NOTE — ANESTHESIA PROCEDURE NOTES
Procedure Performed: Emergent/Open-Heart Anesthesia ROSE     Start Time:        End Time:          Echocardiographic and Doppler Measurements                            Other Findings  Pericardium:  tamponade      Anesthesia Information      Echocardiogram Comments:       Extremely limited exam done due to limited time because of instability of the patient for this emergency procedure.  Essentially, a large amount of blood was seen in the pericardium, compressing the left atrium and left ventricle, consistent with cardiac tamponade.  Diagnosis: cardiac tamponade.  Post-cpb, there was mild to moderate mitral regurgitation noted, good LV function.

## 2020-07-22 NOTE — ANESTHESIA PROCEDURE NOTES
Central Line      Patient reassessed immediately prior to procedure    Patient location during procedure: OR  Indications: vascular access and central pressure monitoring  Staff  Anesthesiologist: Maycol Andrew MD  Preanesthetic Checklist  Completed: patient identified and risks and benefits discussed  Central Line Prep  Sterile Tech:cap, gloves, gown, mask and sterile barriers  Prep: chloraprep  Patient monitoring: blood pressure monitoring, continuous pulse oximetry and EKG  Central Line Procedure  Laterality:right  Location:internal jugular  Catheter Type:Cordis and double lumen  Catheter Size:9 Fr  Guidance:ultrasound guided  PROCEDURE NOTE/ULTRASOUND INTERPRETATION.  Using ultrasound guidance the potential vascular sites for insertion of the catheter were visualized to determine the patency of the vessel to be used for vascular access.  After selecting the appropriate site for insertion, the needle was visualized under ultrasound being inserted into the internal jugular vein, followed by ultrasound confirmation of wire and catheter placement. There were no abnormalities seen on ultrasound; an image was taken; and the patient tolerated the procedure with no complications. Images: still images not obtained  Assessment  Post procedure:biopatch applied, line sutured, occlusive dressing applied and secured with tape  Assessement:blood return through all ports  Complications:no  Patient Tolerance:patient tolerated the procedure well with no apparent complications  Additional Notes  Ultrasound Interpretation:  Using ultrasound guidance the potential vascular sites for insertion of the catheter were visualized to determine the patency of the vessel to be used for vascular access.  After selecting the appropriate site for insertion, the needle was visualized under ultrasound being inserted into the vessel, followed by ultrasound confirmation of wire and catheter placement.  There were no abnormalities seen on  ultrasound; an image was taken/ and the patient tolerated the procedure with no complications.

## 2020-07-22 NOTE — ANESTHESIA PREPROCEDURE EVALUATION
Anesthesia Evaluation     NPO Solid Status: Waived due to emergency  NPO Liquid Status: Waived due to emergency           Airway   Mallampati: I  TM distance: >3 FB  Neck ROM: full  Dental    (+) edentulous    Pulmonary - normal exam   (+) COPD,   Cardiovascular     Rhythm: regular  Rate: abnormal    (+) hypertension, CABG >6 Months, hyperlipidemia,       Neuro/Psych  (+) TIA,     GI/Hepatic/Renal/Endo    (+)   diabetes mellitus,     Musculoskeletal     Abdominal    Substance History      OB/GYN          Other                        Anesthesia Plan    ASA 5 - emergent     Postoperative Plan: Expected vent after surgery  Anesthetic plan, all risks, benefits, and alternatives have been provided, discussed and informed consent has been obtained with: patient.

## 2020-07-22 NOTE — ANESTHESIA PROCEDURE NOTES
Airway  Date/Time: 7/22/2020 4:40 PM  Airway not difficult    General Information and Staff    Patient location during procedure: OR  Anesthesiologist: Maycol Andrew MD    Indications and Patient Condition    Preoxygenated: yes  Mask difficulty assessment: 0 - not attempted    Final Airway Details  Final airway type: endotracheal airway      Successful airway: ETT  Cuffed: yes   Successful intubation technique: video laryngoscopy  Facilitating devices/methods: intubating stylet  Endotracheal tube insertion site: oral  Blade: CMAC  Blade size: D  ETT size (mm): 7.5  Cormack-Lehane Classification: grade I - full view of glottis  Placement verified by: capnometry   Measured from: teeth  ETT/EBT  to teeth (cm): 20  Number of attempts at approach: 1  Assessment: lips, teeth, and gum same as pre-op and atraumatic intubation

## 2020-07-23 ENCOUNTER — APPOINTMENT (OUTPATIENT)
Dept: GENERAL RADIOLOGY | Facility: HOSPITAL | Age: 80
End: 2020-07-23

## 2020-07-23 PROBLEM — Z98.890: Status: ACTIVE | Noted: 2020-07-23

## 2020-07-23 PROBLEM — I21.4 NSTEMI (NON-ST ELEVATED MYOCARDIAL INFARCTION) (HCC): Status: ACTIVE | Noted: 2020-07-23

## 2020-07-23 LAB
ALBUMIN SERPL-MCNC: 3.6 G/DL (ref 3.5–5.2)
ALBUMIN SERPL-MCNC: 3.9 G/DL (ref 3.5–5.2)
ALBUMIN/GLOB SERPL: 1.9 G/DL
ALP SERPL-CCNC: 50 U/L (ref 39–117)
ALT SERPL W P-5'-P-CCNC: 99 U/L (ref 1–33)
ANION GAP SERPL CALCULATED.3IONS-SCNC: 10.9 MMOL/L (ref 5–15)
ANION GAP SERPL CALCULATED.3IONS-SCNC: 12.2 MMOL/L (ref 5–15)
ARTERIAL PATENCY WRIST A: ABNORMAL
ARTERIAL PATENCY WRIST A: ABNORMAL
AST SERPL-CCNC: 204 U/L (ref 1–32)
ATMOSPHERIC PRESS: 752.3 MMHG
ATMOSPHERIC PRESS: 754.1 MMHG
BASE EXCESS BLDA CALC-SCNC: 1.4 MMOL/L (ref 0–2)
BASE EXCESS BLDA CALC-SCNC: 2.5 MMOL/L (ref 0–2)
BASOPHILS # BLD AUTO: 0 10*3/MM3 (ref 0–0.2)
BASOPHILS NFR BLD AUTO: 0 % (ref 0–1.5)
BDY SITE: ABNORMAL
BDY SITE: ABNORMAL
BH BB BLOOD EXPIRATION DATE: NORMAL
BH BB BLOOD TYPE BARCODE: 600
BH BB BLOOD TYPE BARCODE: 6200
BH BB DISPENSE STATUS: NORMAL
BH BB PRODUCT CODE: NORMAL
BH BB UNIT NUMBER: NORMAL
BILIRUB SERPL-MCNC: 1.7 MG/DL (ref 0–1.2)
BUN SERPL-MCNC: 15 MG/DL (ref 8–23)
BUN SERPL-MCNC: 16 MG/DL (ref 8–23)
BUN/CREAT SERPL: 19 (ref 7–25)
BUN/CREAT SERPL: 19.5 (ref 7–25)
CA-I BLD-MCNC: 4.4 MG/DL (ref 4.6–5.4)
CA-I BLD-MCNC: 5 MG/DL (ref 4.6–5.4)
CA-I SERPL ISE-MCNC: 1.09 MMOL/L (ref 1.15–1.35)
CA-I SERPL ISE-MCNC: 1.26 MMOL/L (ref 1.15–1.35)
CALCIUM SPEC-SCNC: 7.9 MG/DL (ref 8.6–10.5)
CALCIUM SPEC-SCNC: 8.8 MG/DL (ref 8.6–10.5)
CHLORIDE SERPL-SCNC: 107 MMOL/L (ref 98–107)
CHLORIDE SERPL-SCNC: 111 MMOL/L (ref 98–107)
CO2 SERPL-SCNC: 23.8 MMOL/L (ref 22–29)
CO2 SERPL-SCNC: 25.1 MMOL/L (ref 22–29)
CREAT SERPL-MCNC: 0.77 MG/DL (ref 0.57–1)
CREAT SERPL-MCNC: 0.84 MG/DL (ref 0.57–1)
CROSSMATCH INTERPRETATION: NORMAL
DEPRECATED RDW RBC AUTO: 46 FL (ref 37–54)
DEPRECATED RDW RBC AUTO: 49 FL (ref 37–54)
EOSINOPHIL # BLD AUTO: 0 10*3/MM3 (ref 0–0.4)
EOSINOPHIL NFR BLD AUTO: 0 % (ref 0.3–6.2)
ERYTHROCYTE [DISTWIDTH] IN BLOOD BY AUTOMATED COUNT: 14.4 % (ref 12.3–15.4)
ERYTHROCYTE [DISTWIDTH] IN BLOOD BY AUTOMATED COUNT: 14.5 % (ref 12.3–15.4)
GFR SERPL CREATININE-BSD FRML MDRD: 65 ML/MIN/1.73
GFR SERPL CREATININE-BSD FRML MDRD: 72 ML/MIN/1.73
GLOBULIN UR ELPH-MCNC: 2.1 GM/DL
GLUCOSE BLDC GLUCOMTR-MCNC: 120 MG/DL (ref 70–130)
GLUCOSE BLDC GLUCOMTR-MCNC: 128 MG/DL (ref 70–130)
GLUCOSE BLDC GLUCOMTR-MCNC: 157 MG/DL (ref 70–130)
GLUCOSE BLDC GLUCOMTR-MCNC: 158 MG/DL (ref 70–130)
GLUCOSE BLDC GLUCOMTR-MCNC: 223 MG/DL (ref 70–130)
GLUCOSE BLDC GLUCOMTR-MCNC: 237 MG/DL (ref 70–130)
GLUCOSE BLDC GLUCOMTR-MCNC: 247 MG/DL (ref 70–130)
GLUCOSE BLDC GLUCOMTR-MCNC: 317 MG/DL (ref 70–130)
GLUCOSE BLDC GLUCOMTR-MCNC: 335 MG/DL (ref 70–130)
GLUCOSE BLDC GLUCOMTR-MCNC: 348 MG/DL (ref 70–130)
GLUCOSE SERPL-MCNC: 110 MG/DL (ref 65–99)
GLUCOSE SERPL-MCNC: 299 MG/DL (ref 65–99)
HCO3 BLDA-SCNC: 27 MMOL/L (ref 22–28)
HCO3 BLDA-SCNC: 28.1 MMOL/L (ref 22–28)
HCT VFR BLD AUTO: 27.2 % (ref 34–46.6)
HCT VFR BLD AUTO: 29.5 % (ref 34–46.6)
HGB BLD-MCNC: 9.1 G/DL (ref 12–15.9)
HGB BLD-MCNC: 9.7 G/DL (ref 12–15.9)
IMM GRANULOCYTES # BLD AUTO: 0.07 10*3/MM3 (ref 0–0.05)
IMM GRANULOCYTES NFR BLD AUTO: 1 % (ref 0–0.5)
INHALED O2 CONCENTRATION: 40 %
INHALED O2 CONCENTRATION: 40 %
INR PPP: 1.21 (ref 0.9–1.1)
LYMPHOCYTES # BLD AUTO: 0.22 10*3/MM3 (ref 0.7–3.1)
LYMPHOCYTES NFR BLD AUTO: 3 % (ref 19.6–45.3)
MAGNESIUM SERPL-MCNC: 2.4 MG/DL (ref 1.6–2.4)
MAGNESIUM SERPL-MCNC: 2.6 MG/DL (ref 1.6–2.4)
MCH RBC QN AUTO: 29.5 PG (ref 26.6–33)
MCH RBC QN AUTO: 29.9 PG (ref 26.6–33)
MCHC RBC AUTO-ENTMCNC: 32.9 G/DL (ref 31.5–35.7)
MCHC RBC AUTO-ENTMCNC: 33.5 G/DL (ref 31.5–35.7)
MCV RBC AUTO: 88.3 FL (ref 79–97)
MCV RBC AUTO: 91 FL (ref 79–97)
MODALITY: ABNORMAL
MODALITY: ABNORMAL
MONOCYTES # BLD AUTO: 0.22 10*3/MM3 (ref 0.1–0.9)
MONOCYTES NFR BLD AUTO: 3 % (ref 5–12)
NEUTROPHILS NFR BLD AUTO: 6.82 10*3/MM3 (ref 1.7–7)
NEUTROPHILS NFR BLD AUTO: 93 % (ref 42.7–76)
NRBC BLD AUTO-RTO: 0 /100 WBC (ref 0–0.2)
O2 A-A PPRESDIFF RESPIRATORY: 0.5 MMHG
O2 A-A PPRESDIFF RESPIRATORY: 0.7 MMHG
PCO2 BLDA: 46.7 MM HG (ref 35–45)
PCO2 BLDA: 47.3 MM HG (ref 35–45)
PEEP RESPIRATORY: 5 CM[H2O]
PEEP RESPIRATORY: 5 CM[H2O]
PH BLDA: 7.37 PH UNITS (ref 7.35–7.45)
PH BLDA: 7.38 PH UNITS (ref 7.35–7.45)
PHOSPHATE SERPL-MCNC: 1.9 MG/DL (ref 2.5–4.5)
PLATELET # BLD AUTO: 100 10*3/MM3 (ref 140–450)
PLATELET # BLD AUTO: 111 10*3/MM3 (ref 140–450)
PMV BLD AUTO: 9.2 FL (ref 6–12)
PMV BLD AUTO: 9.5 FL (ref 6–12)
PO2 BLDA: 118 MM HG (ref 80–100)
PO2 BLDA: 164.9 MM HG (ref 80–100)
POTASSIUM SERPL-SCNC: 3.9 MMOL/L (ref 3.5–5.2)
POTASSIUM SERPL-SCNC: 4.4 MMOL/L (ref 3.5–5.2)
PROT SERPL-MCNC: 6 G/DL (ref 6–8.5)
PROTHROMBIN TIME: 15.1 SECONDS (ref 11.7–14.2)
PSV: 8 CMH2O
RBC # BLD AUTO: 3.08 10*6/MM3 (ref 3.77–5.28)
RBC # BLD AUTO: 3.24 10*6/MM3 (ref 3.77–5.28)
SAO2 % BLDCOA: 98.5 % (ref 92–99)
SAO2 % BLDCOA: 99.4 % (ref 92–99)
SET MECH RESP RATE: 16
SET MECH RESP RATE: 22
SODIUM SERPL-SCNC: 143 MMOL/L (ref 136–145)
SODIUM SERPL-SCNC: 147 MMOL/L (ref 136–145)
TOTAL RATE: 16 BREATHS/MINUTE
TROPONIN T SERPL-MCNC: 1.86 NG/ML (ref 0–0.03)
UNIT  ABO: NORMAL
UNIT  RH: NORMAL
VENTILATOR MODE: ABNORMAL
VENTILATOR MODE: AC
VT ON VENT VENT: 400 ML
VT ON VENT VENT: 400 ML
WBC # BLD AUTO: 5.72 10*3/MM3 (ref 3.4–10.8)
WBC # BLD AUTO: 7.33 10*3/MM3 (ref 3.4–10.8)

## 2020-07-23 PROCEDURE — 82803 BLOOD GASES ANY COMBINATION: CPT

## 2020-07-23 PROCEDURE — 84484 ASSAY OF TROPONIN QUANT: CPT | Performed by: EMERGENCY MEDICINE

## 2020-07-23 PROCEDURE — 85610 PROTHROMBIN TIME: CPT | Performed by: THORACIC SURGERY (CARDIOTHORACIC VASCULAR SURGERY)

## 2020-07-23 PROCEDURE — 25010000002 METOCLOPRAMIDE PER 10 MG: Performed by: THORACIC SURGERY (CARDIOTHORACIC VASCULAR SURGERY)

## 2020-07-23 PROCEDURE — 82962 GLUCOSE BLOOD TEST: CPT

## 2020-07-23 PROCEDURE — 82330 ASSAY OF CALCIUM: CPT | Performed by: NURSE PRACTITIONER

## 2020-07-23 PROCEDURE — 25010000002 ALBUMIN HUMAN 5% PER 50 ML: Performed by: THORACIC SURGERY (CARDIOTHORACIC VASCULAR SURGERY)

## 2020-07-23 PROCEDURE — 94799 UNLISTED PULMONARY SVC/PX: CPT

## 2020-07-23 PROCEDURE — P9041 ALBUMIN (HUMAN),5%, 50ML: HCPCS | Performed by: THORACIC SURGERY (CARDIOTHORACIC VASCULAR SURGERY)

## 2020-07-23 PROCEDURE — 25010000002 HYDROMORPHONE PER 4 MG: Performed by: THORACIC SURGERY (CARDIOTHORACIC VASCULAR SURGERY)

## 2020-07-23 PROCEDURE — 25010000003 INSULIN REGULAR HUMAN PER 5 UNITS: Performed by: THORACIC SURGERY (CARDIOTHORACIC VASCULAR SURGERY)

## 2020-07-23 PROCEDURE — 63710000001 INSULIN LISPRO (HUMAN) PER 5 UNITS: Performed by: NURSE PRACTITIONER

## 2020-07-23 PROCEDURE — 25010000003 CEFAZOLIN IN DEXTROSE 2-4 GM/100ML-% SOLUTION: Performed by: THORACIC SURGERY (CARDIOTHORACIC VASCULAR SURGERY)

## 2020-07-23 PROCEDURE — 82330 ASSAY OF CALCIUM: CPT | Performed by: THORACIC SURGERY (CARDIOTHORACIC VASCULAR SURGERY)

## 2020-07-23 PROCEDURE — 85027 COMPLETE CBC AUTOMATED: CPT | Performed by: THORACIC SURGERY (CARDIOTHORACIC VASCULAR SURGERY)

## 2020-07-23 PROCEDURE — 93010 ELECTROCARDIOGRAM REPORT: CPT | Performed by: INTERNAL MEDICINE

## 2020-07-23 PROCEDURE — 80069 RENAL FUNCTION PANEL: CPT | Performed by: THORACIC SURGERY (CARDIOTHORACIC VASCULAR SURGERY)

## 2020-07-23 PROCEDURE — 99232 SBSQ HOSP IP/OBS MODERATE 35: CPT | Performed by: INTERNAL MEDICINE

## 2020-07-23 PROCEDURE — 83735 ASSAY OF MAGNESIUM: CPT | Performed by: NURSE PRACTITIONER

## 2020-07-23 PROCEDURE — 25010000003 MILRINONE LACTATE IN DEXTROSE 20-5 MG/100ML-% SOLUTION: Performed by: THORACIC SURGERY (CARDIOTHORACIC VASCULAR SURGERY)

## 2020-07-23 PROCEDURE — 93005 ELECTROCARDIOGRAM TRACING: CPT | Performed by: THORACIC SURGERY (CARDIOTHORACIC VASCULAR SURGERY)

## 2020-07-23 PROCEDURE — 80053 COMPREHEN METABOLIC PANEL: CPT | Performed by: NURSE PRACTITIONER

## 2020-07-23 PROCEDURE — 25010000003 POTASSIUM CHLORIDE PER 2 MEQ: Performed by: THORACIC SURGERY (CARDIOTHORACIC VASCULAR SURGERY)

## 2020-07-23 PROCEDURE — 85025 COMPLETE CBC W/AUTO DIFF WBC: CPT | Performed by: THORACIC SURGERY (CARDIOTHORACIC VASCULAR SURGERY)

## 2020-07-23 PROCEDURE — 99024 POSTOP FOLLOW-UP VISIT: CPT | Performed by: NURSE PRACTITIONER

## 2020-07-23 PROCEDURE — 71045 X-RAY EXAM CHEST 1 VIEW: CPT

## 2020-07-23 PROCEDURE — 99024 POSTOP FOLLOW-UP VISIT: CPT | Performed by: THORACIC SURGERY (CARDIOTHORACIC VASCULAR SURGERY)

## 2020-07-23 PROCEDURE — 25010000002 ONDANSETRON PER 1 MG: Performed by: THORACIC SURGERY (CARDIOTHORACIC VASCULAR SURGERY)

## 2020-07-23 PROCEDURE — 25010000002 MAGNESIUM SULFATE IN D5W 1G/100ML (PREMIX) 1-5 GM/100ML-% SOLUTION: Performed by: THORACIC SURGERY (CARDIOTHORACIC VASCULAR SURGERY)

## 2020-07-23 PROCEDURE — 83735 ASSAY OF MAGNESIUM: CPT | Performed by: THORACIC SURGERY (CARDIOTHORACIC VASCULAR SURGERY)

## 2020-07-23 RX ORDER — NICOTINE POLACRILEX 4 MG
15 LOZENGE BUCCAL
Status: DISCONTINUED | OUTPATIENT
Start: 2020-07-23 | End: 2020-07-29

## 2020-07-23 RX ORDER — DEXTROSE MONOHYDRATE 25 G/50ML
25 INJECTION, SOLUTION INTRAVENOUS
Status: DISCONTINUED | OUTPATIENT
Start: 2020-07-23 | End: 2020-07-29

## 2020-07-23 RX ADMIN — ONDANSETRON 4 MG: 2 INJECTION INTRAMUSCULAR; INTRAVENOUS at 10:47

## 2020-07-23 RX ADMIN — DOCUSATE SODIUM 50MG AND SENNOSIDES 8.6MG 2 TABLET: 8.6; 5 TABLET, FILM COATED ORAL at 20:09

## 2020-07-23 RX ADMIN — POTASSIUM CHLORIDE 20 MEQ: 29.8 INJECTION, SOLUTION INTRAVENOUS at 04:19

## 2020-07-23 RX ADMIN — PANTOPRAZOLE SODIUM 40 MG: 40 INJECTION, POWDER, FOR SOLUTION INTRAVENOUS at 00:10

## 2020-07-23 RX ADMIN — CHLORHEXIDINE GLUCONATE 15 ML: 1.2 RINSE ORAL at 10:00

## 2020-07-23 RX ADMIN — OXYCODONE 10 MG: 5 TABLET ORAL at 15:02

## 2020-07-23 RX ADMIN — HYDROMORPHONE HYDROCHLORIDE 0.25 MG: 1 INJECTION, SOLUTION INTRAMUSCULAR; INTRAVENOUS; SUBCUTANEOUS at 07:16

## 2020-07-23 RX ADMIN — MILRINONE LACTATE IN DEXTROSE 0.38 MCG/KG/MIN: 200 INJECTION, SOLUTION INTRAVENOUS at 07:16

## 2020-07-23 RX ADMIN — OXYCODONE 10 MG: 5 TABLET ORAL at 10:00

## 2020-07-23 RX ADMIN — POTASSIUM CHLORIDE 20 MEQ: 29.8 INJECTION, SOLUTION INTRAVENOUS at 00:12

## 2020-07-23 RX ADMIN — ALBUMIN HUMAN 250 ML: 0.05 INJECTION, SOLUTION INTRAVENOUS at 05:40

## 2020-07-23 RX ADMIN — OXYCODONE 10 MG: 5 TABLET ORAL at 20:09

## 2020-07-23 RX ADMIN — ACETAMINOPHEN 650 MG: 325 TABLET, FILM COATED ORAL at 20:08

## 2020-07-23 RX ADMIN — MUPIROCIN 1 APPLICATION: 20 OINTMENT TOPICAL at 09:54

## 2020-07-23 RX ADMIN — ALBUMIN HUMAN 250 ML: 0.05 INJECTION, SOLUTION INTRAVENOUS at 02:05

## 2020-07-23 RX ADMIN — ALBUMIN HUMAN 250 ML: 0.05 INJECTION, SOLUTION INTRAVENOUS at 03:28

## 2020-07-23 RX ADMIN — ACETAMINOPHEN 650 MG: 325 TABLET, FILM COATED ORAL at 10:00

## 2020-07-23 RX ADMIN — INSULIN LISPRO 2 UNITS: 100 INJECTION, SOLUTION INTRAVENOUS; SUBCUTANEOUS at 17:59

## 2020-07-23 RX ADMIN — ACETAMINOPHEN 650 MG: 325 TABLET, FILM COATED ORAL at 15:01

## 2020-07-23 RX ADMIN — SODIUM CHLORIDE 15 ML/HR: 9 INJECTION, SOLUTION INTRAVENOUS at 00:33

## 2020-07-23 RX ADMIN — HYDROMORPHONE HYDROCHLORIDE 0.25 MG: 1 INJECTION, SOLUTION INTRAMUSCULAR; INTRAVENOUS; SUBCUTANEOUS at 09:32

## 2020-07-23 RX ADMIN — ATORVASTATIN CALCIUM 40 MG: 20 TABLET, FILM COATED ORAL at 20:09

## 2020-07-23 RX ADMIN — POTASSIUM PHOSPHATE, MONOBASIC AND POTASSIUM PHOSPHATE, DIBASIC 18 MMOL: 224; 236 INJECTION, SOLUTION, CONCENTRATE INTRAVENOUS at 04:59

## 2020-07-23 RX ADMIN — SODIUM CHLORIDE 6 UNITS/HR: 9 INJECTION, SOLUTION INTRAVENOUS at 00:42

## 2020-07-23 RX ADMIN — DOCUSATE SODIUM 50MG AND SENNOSIDES 8.6MG 2 TABLET: 8.6; 5 TABLET, FILM COATED ORAL at 10:00

## 2020-07-23 RX ADMIN — CEFAZOLIN SODIUM 2 G: 2 INJECTION, SOLUTION INTRAVENOUS at 09:55

## 2020-07-23 RX ADMIN — METOCLOPRAMIDE HYDROCHLORIDE 10 MG: 5 INJECTION INTRAMUSCULAR; INTRAVENOUS at 11:35

## 2020-07-23 RX ADMIN — MILRINONE LACTATE IN DEXTROSE 0.25 MCG/KG/MIN: 200 INJECTION, SOLUTION INTRAVENOUS at 22:00

## 2020-07-23 RX ADMIN — CEFAZOLIN SODIUM 2 G: 2 INJECTION, SOLUTION INTRAVENOUS at 18:00

## 2020-07-23 RX ADMIN — ASPIRIN 81 MG: 81 TABLET, COATED ORAL at 09:55

## 2020-07-23 RX ADMIN — CEFAZOLIN SODIUM 2 G: 2 INJECTION, SOLUTION INTRAVENOUS at 02:13

## 2020-07-23 RX ADMIN — MUPIROCIN 1 APPLICATION: 20 OINTMENT TOPICAL at 20:10

## 2020-07-23 RX ADMIN — MAGNESIUM SULFATE 1 G: 1 INJECTION INTRAVENOUS at 06:13

## 2020-07-24 ENCOUNTER — APPOINTMENT (OUTPATIENT)
Dept: GENERAL RADIOLOGY | Facility: HOSPITAL | Age: 80
End: 2020-07-24

## 2020-07-24 LAB
ANION GAP SERPL CALCULATED.3IONS-SCNC: 11.4 MMOL/L (ref 5–15)
BUN SERPL-MCNC: 19 MG/DL (ref 8–23)
BUN/CREAT SERPL: 19 (ref 7–25)
CA-I BLD-MCNC: 4.7 MG/DL (ref 4.6–5.4)
CA-I SERPL ISE-MCNC: 1.18 MMOL/L (ref 1.15–1.35)
CALCIUM SPEC-SCNC: 8.2 MG/DL (ref 8.6–10.5)
CHLORIDE SERPL-SCNC: 106 MMOL/L (ref 98–107)
CO2 SERPL-SCNC: 24.6 MMOL/L (ref 22–29)
CREAT SERPL-MCNC: 1 MG/DL (ref 0.57–1)
DEPRECATED RDW RBC AUTO: 49.7 FL (ref 37–54)
ERYTHROCYTE [DISTWIDTH] IN BLOOD BY AUTOMATED COUNT: 14.5 % (ref 12.3–15.4)
GFR SERPL CREATININE-BSD FRML MDRD: 53 ML/MIN/1.73
GLUCOSE BLDC GLUCOMTR-MCNC: 110 MG/DL (ref 70–130)
GLUCOSE BLDC GLUCOMTR-MCNC: 120 MG/DL (ref 70–130)
GLUCOSE BLDC GLUCOMTR-MCNC: 155 MG/DL (ref 70–130)
GLUCOSE BLDC GLUCOMTR-MCNC: 166 MG/DL (ref 70–130)
GLUCOSE BLDC GLUCOMTR-MCNC: 88 MG/DL (ref 70–130)
GLUCOSE SERPL-MCNC: 150 MG/DL (ref 65–99)
HCT VFR BLD AUTO: 27.6 % (ref 34–46.6)
HGB BLD-MCNC: 9.1 G/DL (ref 12–15.9)
MCH RBC QN AUTO: 30.3 PG (ref 26.6–33)
MCHC RBC AUTO-ENTMCNC: 33 G/DL (ref 31.5–35.7)
MCV RBC AUTO: 92 FL (ref 79–97)
PLATELET # BLD AUTO: 140 10*3/MM3 (ref 140–450)
PMV BLD AUTO: 10.4 FL (ref 6–12)
POTASSIUM SERPL-SCNC: 4.1 MMOL/L (ref 3.5–5.2)
RBC # BLD AUTO: 3 10*6/MM3 (ref 3.77–5.28)
SODIUM SERPL-SCNC: 142 MMOL/L (ref 136–145)
WBC # BLD AUTO: 9.43 10*3/MM3 (ref 3.4–10.8)

## 2020-07-24 PROCEDURE — 25010000002 FUROSEMIDE PER 20 MG: Performed by: THORACIC SURGERY (CARDIOTHORACIC VASCULAR SURGERY)

## 2020-07-24 PROCEDURE — 71045 X-RAY EXAM CHEST 1 VIEW: CPT

## 2020-07-24 PROCEDURE — 82962 GLUCOSE BLOOD TEST: CPT

## 2020-07-24 PROCEDURE — 93010 ELECTROCARDIOGRAM REPORT: CPT | Performed by: INTERNAL MEDICINE

## 2020-07-24 PROCEDURE — 25010000003 CEFAZOLIN IN DEXTROSE 2-4 GM/100ML-% SOLUTION: Performed by: THORACIC SURGERY (CARDIOTHORACIC VASCULAR SURGERY)

## 2020-07-24 PROCEDURE — 99024 POSTOP FOLLOW-UP VISIT: CPT | Performed by: THORACIC SURGERY (CARDIOTHORACIC VASCULAR SURGERY)

## 2020-07-24 PROCEDURE — 99232 SBSQ HOSP IP/OBS MODERATE 35: CPT | Performed by: INTERNAL MEDICINE

## 2020-07-24 PROCEDURE — 97162 PT EVAL MOD COMPLEX 30 MIN: CPT

## 2020-07-24 PROCEDURE — 85027 COMPLETE CBC AUTOMATED: CPT | Performed by: THORACIC SURGERY (CARDIOTHORACIC VASCULAR SURGERY)

## 2020-07-24 PROCEDURE — 25010000002 CALCIUM GLUCONATE PER 10 ML: Performed by: NURSE PRACTITIONER

## 2020-07-24 PROCEDURE — 80048 BASIC METABOLIC PNL TOTAL CA: CPT | Performed by: THORACIC SURGERY (CARDIOTHORACIC VASCULAR SURGERY)

## 2020-07-24 PROCEDURE — 25010000002 ENOXAPARIN PER 10 MG: Performed by: THORACIC SURGERY (CARDIOTHORACIC VASCULAR SURGERY)

## 2020-07-24 PROCEDURE — 93005 ELECTROCARDIOGRAM TRACING: CPT | Performed by: THORACIC SURGERY (CARDIOTHORACIC VASCULAR SURGERY)

## 2020-07-24 PROCEDURE — 97110 THERAPEUTIC EXERCISES: CPT

## 2020-07-24 PROCEDURE — 99024 POSTOP FOLLOW-UP VISIT: CPT | Performed by: NURSE PRACTITIONER

## 2020-07-24 PROCEDURE — 82330 ASSAY OF CALCIUM: CPT | Performed by: NURSE PRACTITIONER

## 2020-07-24 RX ORDER — PANTOPRAZOLE SODIUM 40 MG/1
40 TABLET, DELAYED RELEASE ORAL EVERY MORNING
Status: DISCONTINUED | OUTPATIENT
Start: 2020-07-25 | End: 2020-07-30 | Stop reason: HOSPADM

## 2020-07-24 RX ORDER — CHOLECALCIFEROL (VITAMIN D3) 125 MCG
10 CAPSULE ORAL NIGHTLY PRN
Status: DISCONTINUED | OUTPATIENT
Start: 2020-07-24 | End: 2020-07-30 | Stop reason: HOSPADM

## 2020-07-24 RX ORDER — MECLIZINE HCL 12.5 MG/1
12.5 TABLET ORAL 3 TIMES DAILY PRN
Status: DISCONTINUED | OUTPATIENT
Start: 2020-07-24 | End: 2020-07-30 | Stop reason: HOSPADM

## 2020-07-24 RX ORDER — OXYBUTYNIN CHLORIDE 5 MG/1
5 TABLET, EXTENDED RELEASE ORAL DAILY
Status: DISCONTINUED | OUTPATIENT
Start: 2020-07-24 | End: 2020-07-30 | Stop reason: HOSPADM

## 2020-07-24 RX ORDER — ALBUTEROL SULFATE 2.5 MG/3ML
2.5 SOLUTION RESPIRATORY (INHALATION) EVERY 6 HOURS PRN
Status: DISCONTINUED | OUTPATIENT
Start: 2020-07-24 | End: 2020-07-30 | Stop reason: HOSPADM

## 2020-07-24 RX ORDER — PRAMIPEXOLE DIHYDROCHLORIDE 0.25 MG/1
0.12 TABLET ORAL NIGHTLY
Status: DISCONTINUED | OUTPATIENT
Start: 2020-07-24 | End: 2020-07-30 | Stop reason: HOSPADM

## 2020-07-24 RX ADMIN — ACETAMINOPHEN 650 MG: 325 TABLET, FILM COATED ORAL at 01:04

## 2020-07-24 RX ADMIN — OXYCODONE 10 MG: 5 TABLET ORAL at 01:04

## 2020-07-24 RX ADMIN — HYDROCODONE BITARTRATE AND ACETAMINOPHEN 1 TABLET: 5; 325 TABLET ORAL at 16:18

## 2020-07-24 RX ADMIN — ASPIRIN 81 MG: 81 TABLET, COATED ORAL at 08:22

## 2020-07-24 RX ADMIN — CEFAZOLIN SODIUM 2 G: 2 INJECTION, SOLUTION INTRAVENOUS at 00:40

## 2020-07-24 RX ADMIN — PANTOPRAZOLE SODIUM 40 MG: 40 TABLET, DELAYED RELEASE ORAL at 06:51

## 2020-07-24 RX ADMIN — DOCUSATE SODIUM 50MG AND SENNOSIDES 8.6MG 2 TABLET: 8.6; 5 TABLET, FILM COATED ORAL at 08:22

## 2020-07-24 RX ADMIN — ACETAMINOPHEN 650 MG: 325 TABLET, FILM COATED ORAL at 08:22

## 2020-07-24 RX ADMIN — OXYCODONE 10 MG: 5 TABLET ORAL at 08:22

## 2020-07-24 RX ADMIN — Medication 10 MG: at 21:41

## 2020-07-24 RX ADMIN — MUPIROCIN 1 APPLICATION: 20 OINTMENT TOPICAL at 08:22

## 2020-07-24 RX ADMIN — CALCIUM GLUCONATE 1 G: 98 INJECTION, SOLUTION INTRAVENOUS at 11:17

## 2020-07-24 RX ADMIN — CHLORHEXIDINE GLUCONATE 15 ML: 1.2 RINSE ORAL at 09:46

## 2020-07-24 RX ADMIN — ENOXAPARIN SODIUM 30 MG: 30 INJECTION SUBCUTANEOUS at 08:21

## 2020-07-24 RX ADMIN — ATORVASTATIN CALCIUM 40 MG: 20 TABLET, FILM COATED ORAL at 20:41

## 2020-07-24 RX ADMIN — MUPIROCIN 1 APPLICATION: 20 OINTMENT TOPICAL at 20:41

## 2020-07-24 RX ADMIN — FUROSEMIDE 20 MG: 10 INJECTION, SOLUTION INTRAMUSCULAR; INTRAVENOUS at 07:25

## 2020-07-24 RX ADMIN — CEFAZOLIN SODIUM 2 G: 2 INJECTION, SOLUTION INTRAVENOUS at 10:05

## 2020-07-24 RX ADMIN — HYDROCODONE BITARTRATE AND ACETAMINOPHEN 1 TABLET: 5; 325 TABLET ORAL at 17:12

## 2020-07-24 RX ADMIN — ACETAMINOPHEN 650 MG: 325 TABLET, FILM COATED ORAL at 20:45

## 2020-07-24 RX ADMIN — DOCUSATE SODIUM 50MG AND SENNOSIDES 8.6MG 2 TABLET: 8.6; 5 TABLET, FILM COATED ORAL at 20:41

## 2020-07-24 NOTE — ANESTHESIA POSTPROCEDURE EVALUATION
Patient: Grace Beauchamp    Procedure Summary     Date:  07/22/20 Room / Location:  [facility] OR [address] [place] [address] MAIN OR    Anesthesia Start:  1629 Anesthesia Stop:  2142    Procedure:  EMERGENT REOP STERNOTOMY, REPAIR OF LV RUPTURE, PRP, INTRAOP ROSE (N/A Chest) Diagnosis:      Surgeon:  Mart Ontiveros MD Provider:  Maycol Andrew MD    Anesthesia Type:  general ASA Status:  5 - Emergent          Anesthesia Type: general    Vitals  Vitals Value Taken Time   /68 7/24/2020  2:48 PM   Temp 37.3 °C (99.1 °F) 7/24/2020 11:45 AM   Pulse 85 7/24/2020  3:08 PM   Resp 16 7/24/2020 11:45 AM   SpO2 97 % 7/24/2020  3:10 PM   Vitals shown include unvalidated device data.        Post Anesthesia Care and Evaluation    Patient location during evaluation: bedside  Pain management: adequate  Airway patency: patent  Anesthetic complications: No anesthetic complications    Cardiovascular status: acceptable  Respiratory status: acceptable  Hydration status: acceptable

## 2020-07-25 ENCOUNTER — APPOINTMENT (OUTPATIENT)
Dept: GENERAL RADIOLOGY | Facility: HOSPITAL | Age: 80
End: 2020-07-25

## 2020-07-25 ENCOUNTER — APPOINTMENT (OUTPATIENT)
Dept: CARDIOLOGY | Facility: HOSPITAL | Age: 80
End: 2020-07-25

## 2020-07-25 LAB
ANION GAP SERPL CALCULATED.3IONS-SCNC: 12.2 MMOL/L (ref 5–15)
BH CV ECHO MEAS - ACS: 1.4 CM
BH CV ECHO MEAS - BSA(HAYCOCK): 1.3 M^2
BH CV ECHO MEAS - BSA: 1.3 M^2
BH CV ECHO MEAS - BZI_BMI: 20.4 KILOGRAMS/M^2
BH CV ECHO MEAS - BZI_METRIC_HEIGHT: 142.2 CM
BH CV ECHO MEAS - BZI_METRIC_WEIGHT: 41.3 KG
BH CV ECHO MEAS - EDV(MOD-SP2): 34 ML
BH CV ECHO MEAS - EDV(MOD-SP4): 42 ML
BH CV ECHO MEAS - EF(MOD-BP): 67.3 %
BH CV ECHO MEAS - EF(MOD-SP2): 64.7 %
BH CV ECHO MEAS - EF(MOD-SP4): 66.7 %
BH CV ECHO MEAS - ESV(MOD-SP2): 12 ML
BH CV ECHO MEAS - ESV(MOD-SP4): 14 ML
BH CV ECHO MEAS - LV DIASTOLIC VOL/BSA (35-75): 33.1 ML/M^2
BH CV ECHO MEAS - LV SYSTOLIC VOL/BSA (12-30): 11 ML/M^2
BH CV ECHO MEAS - LVLD AP2: 5.6 CM
BH CV ECHO MEAS - LVLD AP4: 6.2 CM
BH CV ECHO MEAS - LVLS AP2: 4.7 CM
BH CV ECHO MEAS - LVLS AP4: 4.7 CM
BH CV ECHO MEAS - RAP SYSTOLE: 8 MMHG
BH CV ECHO MEAS - RVSP: 29 MMHG
BH CV ECHO MEAS - SI(MOD-SP2): 17.3 ML/M^2
BH CV ECHO MEAS - SI(MOD-SP4): 22 ML/M^2
BH CV ECHO MEAS - SV(MOD-SP2): 22 ML
BH CV ECHO MEAS - SV(MOD-SP4): 28 ML
BH CV ECHO MEAS - TR MAX VEL: 276 CM/SEC
BH CV VAS BP RIGHT ARM: NORMAL MMHG
BUN SERPL-MCNC: 24 MG/DL (ref 8–23)
BUN/CREAT SERPL: 28.2 (ref 7–25)
CALCIUM SPEC-SCNC: 8.8 MG/DL (ref 8.6–10.5)
CHLORIDE SERPL-SCNC: 106 MMOL/L (ref 98–107)
CO2 SERPL-SCNC: 21.8 MMOL/L (ref 22–29)
CREAT SERPL-MCNC: 0.85 MG/DL (ref 0.57–1)
DEPRECATED RDW RBC AUTO: 47.1 FL (ref 37–54)
ERYTHROCYTE [DISTWIDTH] IN BLOOD BY AUTOMATED COUNT: 14 % (ref 12.3–15.4)
GFR SERPL CREATININE-BSD FRML MDRD: 65 ML/MIN/1.73
GLUCOSE BLDC GLUCOMTR-MCNC: 114 MG/DL (ref 70–130)
GLUCOSE BLDC GLUCOMTR-MCNC: 133 MG/DL (ref 70–130)
GLUCOSE BLDC GLUCOMTR-MCNC: 135 MG/DL (ref 70–130)
GLUCOSE SERPL-MCNC: 111 MG/DL (ref 65–99)
HCT VFR BLD AUTO: 36.2 % (ref 34–46.6)
HGB BLD-MCNC: 11.8 G/DL (ref 12–15.9)
LV EF 2D ECHO EST: 45 %
MAXIMAL PREDICTED HEART RATE: 141 BPM
MCH RBC QN AUTO: 30.2 PG (ref 26.6–33)
MCHC RBC AUTO-ENTMCNC: 32.6 G/DL (ref 31.5–35.7)
MCV RBC AUTO: 92.6 FL (ref 79–97)
PLATELET # BLD AUTO: 189 10*3/MM3 (ref 140–450)
PMV BLD AUTO: 10.2 FL (ref 6–12)
POTASSIUM SERPL-SCNC: 4 MMOL/L (ref 3.5–5.2)
RBC # BLD AUTO: 3.91 10*6/MM3 (ref 3.77–5.28)
SODIUM SERPL-SCNC: 140 MMOL/L (ref 136–145)
STRESS TARGET HR: 120 BPM
WBC # BLD AUTO: 8.68 10*3/MM3 (ref 3.4–10.8)

## 2020-07-25 PROCEDURE — 97110 THERAPEUTIC EXERCISES: CPT

## 2020-07-25 PROCEDURE — 76376 3D RENDER W/INTRP POSTPROCES: CPT | Performed by: INTERNAL MEDICINE

## 2020-07-25 PROCEDURE — 93325 DOPPLER ECHO COLOR FLOW MAPG: CPT

## 2020-07-25 PROCEDURE — 80048 BASIC METABOLIC PNL TOTAL CA: CPT | Performed by: NURSE PRACTITIONER

## 2020-07-25 PROCEDURE — 76376 3D RENDER W/INTRP POSTPROCES: CPT

## 2020-07-25 PROCEDURE — 93321 DOPPLER ECHO F-UP/LMTD STD: CPT | Performed by: INTERNAL MEDICINE

## 2020-07-25 PROCEDURE — 93325 DOPPLER ECHO COLOR FLOW MAPG: CPT | Performed by: INTERNAL MEDICINE

## 2020-07-25 PROCEDURE — 94799 UNLISTED PULMONARY SVC/PX: CPT

## 2020-07-25 PROCEDURE — 93308 TTE F-UP OR LMTD: CPT

## 2020-07-25 PROCEDURE — 93308 TTE F-UP OR LMTD: CPT | Performed by: INTERNAL MEDICINE

## 2020-07-25 PROCEDURE — 71046 X-RAY EXAM CHEST 2 VIEWS: CPT

## 2020-07-25 PROCEDURE — 97116 GAIT TRAINING THERAPY: CPT

## 2020-07-25 PROCEDURE — 93321 DOPPLER ECHO F-UP/LMTD STD: CPT

## 2020-07-25 PROCEDURE — 71045 X-RAY EXAM CHEST 1 VIEW: CPT

## 2020-07-25 PROCEDURE — 99232 SBSQ HOSP IP/OBS MODERATE 35: CPT | Performed by: NURSE PRACTITIONER

## 2020-07-25 PROCEDURE — 82962 GLUCOSE BLOOD TEST: CPT

## 2020-07-25 PROCEDURE — 99024 POSTOP FOLLOW-UP VISIT: CPT | Performed by: THORACIC SURGERY (CARDIOTHORACIC VASCULAR SURGERY)

## 2020-07-25 PROCEDURE — 25010000002 ENOXAPARIN PER 10 MG: Performed by: NURSE PRACTITIONER

## 2020-07-25 PROCEDURE — 94640 AIRWAY INHALATION TREATMENT: CPT

## 2020-07-25 PROCEDURE — 85027 COMPLETE CBC AUTOMATED: CPT | Performed by: NURSE PRACTITIONER

## 2020-07-25 RX ADMIN — ASPIRIN 81 MG: 81 TABLET, COATED ORAL at 09:07

## 2020-07-25 RX ADMIN — DOCUSATE SODIUM 50MG AND SENNOSIDES 8.6MG 2 TABLET: 8.6; 5 TABLET, FILM COATED ORAL at 20:08

## 2020-07-25 RX ADMIN — DOCUSATE SODIUM 50MG AND SENNOSIDES 8.6MG 2 TABLET: 8.6; 5 TABLET, FILM COATED ORAL at 09:07

## 2020-07-25 RX ADMIN — CHLORHEXIDINE GLUCONATE 15 ML: 1.2 RINSE ORAL at 09:06

## 2020-07-25 RX ADMIN — MUPIROCIN 1 APPLICATION: 20 OINTMENT TOPICAL at 20:10

## 2020-07-25 RX ADMIN — HYDROCODONE BITARTRATE AND ACETAMINOPHEN 2 TABLET: 5; 325 TABLET ORAL at 20:22

## 2020-07-25 RX ADMIN — PANTOPRAZOLE SODIUM 40 MG: 40 TABLET, DELAYED RELEASE ORAL at 06:11

## 2020-07-25 RX ADMIN — MUPIROCIN 1 APPLICATION: 20 OINTMENT TOPICAL at 09:07

## 2020-07-25 RX ADMIN — OXYBUTYNIN CHLORIDE 5 MG: 5 TABLET, EXTENDED RELEASE ORAL at 09:07

## 2020-07-25 RX ADMIN — HYDROCODONE BITARTRATE AND ACETAMINOPHEN 2 TABLET: 5; 325 TABLET ORAL at 06:36

## 2020-07-25 RX ADMIN — ATORVASTATIN CALCIUM 40 MG: 20 TABLET, FILM COATED ORAL at 20:08

## 2020-07-25 RX ADMIN — ALBUTEROL SULFATE 2.5 MG: 2.5 SOLUTION RESPIRATORY (INHALATION) at 03:20

## 2020-07-25 RX ADMIN — HYDROCODONE BITARTRATE AND ACETAMINOPHEN 2 TABLET: 5; 325 TABLET ORAL at 10:57

## 2020-07-25 RX ADMIN — CYCLOBENZAPRINE HYDROCHLORIDE 5 MG: 10 TABLET, FILM COATED ORAL at 09:30

## 2020-07-25 RX ADMIN — PRAMIPEXOLE DIHYDROCHLORIDE 0.12 MG: 0.25 TABLET ORAL at 20:09

## 2020-07-25 RX ADMIN — HYDROCODONE BITARTRATE AND ACETAMINOPHEN 2 TABLET: 5; 325 TABLET ORAL at 16:22

## 2020-07-25 RX ADMIN — OXYBUTYNIN CHLORIDE 5 MG: 5 TABLET, EXTENDED RELEASE ORAL at 03:46

## 2020-07-25 RX ADMIN — ENOXAPARIN SODIUM 30 MG: 30 INJECTION SUBCUTANEOUS at 09:06

## 2020-07-26 ENCOUNTER — APPOINTMENT (OUTPATIENT)
Dept: GENERAL RADIOLOGY | Facility: HOSPITAL | Age: 80
End: 2020-07-26

## 2020-07-26 LAB
ANION GAP SERPL CALCULATED.3IONS-SCNC: 6 MMOL/L (ref 5–15)
BACTERIA UR QL AUTO: ABNORMAL /HPF
BH BB BLOOD EXPIRATION DATE: NORMAL
BH BB BLOOD TYPE BARCODE: 6200
BH BB DISPENSE STATUS: NORMAL
BH BB PRODUCT CODE: NORMAL
BH BB UNIT NUMBER: NORMAL
BUN SERPL-MCNC: 21 MG/DL (ref 8–23)
BUN/CREAT SERPL: 38.9 (ref 7–25)
CALCIUM SPEC-SCNC: 8.2 MG/DL (ref 8.6–10.5)
CHLORIDE SERPL-SCNC: 108 MMOL/L (ref 98–107)
CO2 SERPL-SCNC: 27 MMOL/L (ref 22–29)
CREAT SERPL-MCNC: 0.54 MG/DL (ref 0.57–1)
CROSSMATCH INTERPRETATION: NORMAL
DEPRECATED RDW RBC AUTO: 44.2 FL (ref 37–54)
ERYTHROCYTE [DISTWIDTH] IN BLOOD BY AUTOMATED COUNT: 13.5 % (ref 12.3–15.4)
GFR SERPL CREATININE-BSD FRML MDRD: 109 ML/MIN/1.73
GLUCOSE BLDC GLUCOMTR-MCNC: 105 MG/DL (ref 70–130)
GLUCOSE BLDC GLUCOMTR-MCNC: 114 MG/DL (ref 70–130)
GLUCOSE BLDC GLUCOMTR-MCNC: 139 MG/DL (ref 70–130)
GLUCOSE SERPL-MCNC: 102 MG/DL (ref 65–99)
HCT VFR BLD AUTO: 32.5 % (ref 34–46.6)
HGB BLD-MCNC: 11.1 G/DL (ref 12–15.9)
HYALINE CASTS UR QL AUTO: ABNORMAL /LPF
MCH RBC QN AUTO: 30.5 PG (ref 26.6–33)
MCHC RBC AUTO-ENTMCNC: 34.2 G/DL (ref 31.5–35.7)
MCV RBC AUTO: 89.3 FL (ref 79–97)
PLATELET # BLD AUTO: 202 10*3/MM3 (ref 140–450)
PMV BLD AUTO: 9.4 FL (ref 6–12)
POTASSIUM SERPL-SCNC: 3.6 MMOL/L (ref 3.5–5.2)
RBC # BLD AUTO: 3.64 10*6/MM3 (ref 3.77–5.28)
RBC # UR: ABNORMAL /HPF
REF LAB TEST METHOD: ABNORMAL
SODIUM SERPL-SCNC: 141 MMOL/L (ref 136–145)
SQUAMOUS #/AREA URNS HPF: ABNORMAL /HPF
UNIT  ABO: NORMAL
UNIT  RH: NORMAL
WBC # BLD AUTO: 6.54 10*3/MM3 (ref 3.4–10.8)
WBC UR QL AUTO: ABNORMAL /HPF

## 2020-07-26 PROCEDURE — 94799 UNLISTED PULMONARY SVC/PX: CPT

## 2020-07-26 PROCEDURE — 25010000002 ENOXAPARIN PER 10 MG: Performed by: NURSE PRACTITIONER

## 2020-07-26 PROCEDURE — 80048 BASIC METABOLIC PNL TOTAL CA: CPT | Performed by: NURSE PRACTITIONER

## 2020-07-26 PROCEDURE — 87088 URINE BACTERIA CULTURE: CPT | Performed by: THORACIC SURGERY (CARDIOTHORACIC VASCULAR SURGERY)

## 2020-07-26 PROCEDURE — 87186 SC STD MICRODIL/AGAR DIL: CPT | Performed by: THORACIC SURGERY (CARDIOTHORACIC VASCULAR SURGERY)

## 2020-07-26 PROCEDURE — 99232 SBSQ HOSP IP/OBS MODERATE 35: CPT | Performed by: NURSE PRACTITIONER

## 2020-07-26 PROCEDURE — 81001 URINALYSIS AUTO W/SCOPE: CPT | Performed by: THORACIC SURGERY (CARDIOTHORACIC VASCULAR SURGERY)

## 2020-07-26 PROCEDURE — 87077 CULTURE AEROBIC IDENTIFY: CPT | Performed by: THORACIC SURGERY (CARDIOTHORACIC VASCULAR SURGERY)

## 2020-07-26 PROCEDURE — 99024 POSTOP FOLLOW-UP VISIT: CPT | Performed by: THORACIC SURGERY (CARDIOTHORACIC VASCULAR SURGERY)

## 2020-07-26 PROCEDURE — 85027 COMPLETE CBC AUTOMATED: CPT | Performed by: NURSE PRACTITIONER

## 2020-07-26 PROCEDURE — 25010000002 FUROSEMIDE PER 20 MG: Performed by: THORACIC SURGERY (CARDIOTHORACIC VASCULAR SURGERY)

## 2020-07-26 PROCEDURE — 82962 GLUCOSE BLOOD TEST: CPT

## 2020-07-26 PROCEDURE — 87086 URINE CULTURE/COLONY COUNT: CPT | Performed by: THORACIC SURGERY (CARDIOTHORACIC VASCULAR SURGERY)

## 2020-07-26 PROCEDURE — 71045 X-RAY EXAM CHEST 1 VIEW: CPT

## 2020-07-26 PROCEDURE — 97116 GAIT TRAINING THERAPY: CPT

## 2020-07-26 RX ORDER — FUROSEMIDE 10 MG/ML
20 INJECTION INTRAMUSCULAR; INTRAVENOUS ONCE
Status: COMPLETED | OUTPATIENT
Start: 2020-07-26 | End: 2020-07-26

## 2020-07-26 RX ADMIN — ENOXAPARIN SODIUM 30 MG: 30 INJECTION SUBCUTANEOUS at 09:39

## 2020-07-26 RX ADMIN — BISACODYL 10 MG: 10 SUPPOSITORY RECTAL at 08:15

## 2020-07-26 RX ADMIN — OXYBUTYNIN CHLORIDE 5 MG: 5 TABLET, EXTENDED RELEASE ORAL at 09:40

## 2020-07-26 RX ADMIN — HYDROCODONE BITARTRATE AND ACETAMINOPHEN 2 TABLET: 5; 325 TABLET ORAL at 09:40

## 2020-07-26 RX ADMIN — FUROSEMIDE 20 MG: 10 INJECTION, SOLUTION INTRAMUSCULAR; INTRAVENOUS at 08:15

## 2020-07-26 RX ADMIN — HYDROCODONE BITARTRATE AND ACETAMINOPHEN 2 TABLET: 5; 325 TABLET ORAL at 04:00

## 2020-07-26 RX ADMIN — PRAMIPEXOLE DIHYDROCHLORIDE 0.12 MG: 0.25 TABLET ORAL at 20:21

## 2020-07-26 RX ADMIN — DOCUSATE SODIUM 50MG AND SENNOSIDES 8.6MG 2 TABLET: 8.6; 5 TABLET, FILM COATED ORAL at 09:40

## 2020-07-26 RX ADMIN — ATORVASTATIN CALCIUM 40 MG: 20 TABLET, FILM COATED ORAL at 20:21

## 2020-07-26 RX ADMIN — POTASSIUM CHLORIDE 40 MEQ: 10 CAPSULE, COATED, EXTENDED RELEASE ORAL at 06:13

## 2020-07-26 RX ADMIN — MUPIROCIN 1 APPLICATION: 20 OINTMENT TOPICAL at 20:22

## 2020-07-26 RX ADMIN — PANTOPRAZOLE SODIUM 40 MG: 40 TABLET, DELAYED RELEASE ORAL at 06:13

## 2020-07-26 RX ADMIN — HYDROCODONE BITARTRATE AND ACETAMINOPHEN 2 TABLET: 5; 325 TABLET ORAL at 23:54

## 2020-07-26 RX ADMIN — ALBUTEROL SULFATE 2.5 MG: 2.5 SOLUTION RESPIRATORY (INHALATION) at 10:48

## 2020-07-26 RX ADMIN — MUPIROCIN 1 APPLICATION: 20 OINTMENT TOPICAL at 09:39

## 2020-07-26 RX ADMIN — ASPIRIN 81 MG: 81 TABLET, COATED ORAL at 09:40

## 2020-07-26 RX ADMIN — DOCUSATE SODIUM 50MG AND SENNOSIDES 8.6MG 2 TABLET: 8.6; 5 TABLET, FILM COATED ORAL at 20:22

## 2020-07-27 ENCOUNTER — APPOINTMENT (OUTPATIENT)
Dept: GENERAL RADIOLOGY | Facility: HOSPITAL | Age: 80
End: 2020-07-27

## 2020-07-27 LAB
ANION GAP SERPL CALCULATED.3IONS-SCNC: 6.2 MMOL/L (ref 5–15)
BILIRUB UR QL STRIP: NEGATIVE
BUN SERPL-MCNC: 17 MG/DL (ref 8–23)
BUN/CREAT SERPL: 34.7 (ref 7–25)
CALCIUM SPEC-SCNC: 8 MG/DL (ref 8.6–10.5)
CHLORIDE SERPL-SCNC: 107 MMOL/L (ref 98–107)
CLARITY UR: CLEAR
CO2 SERPL-SCNC: 28.8 MMOL/L (ref 22–29)
COLOR UR: YELLOW
CREAT SERPL-MCNC: 0.49 MG/DL (ref 0.57–1)
DEPRECATED RDW RBC AUTO: 46.6 FL (ref 37–54)
ERYTHROCYTE [DISTWIDTH] IN BLOOD BY AUTOMATED COUNT: 13.6 % (ref 12.3–15.4)
GFR SERPL CREATININE-BSD FRML MDRD: 122 ML/MIN/1.73
GLUCOSE BLDC GLUCOMTR-MCNC: 107 MG/DL (ref 70–130)
GLUCOSE BLDC GLUCOMTR-MCNC: 123 MG/DL (ref 70–130)
GLUCOSE SERPL-MCNC: 104 MG/DL (ref 65–99)
GLUCOSE UR STRIP-MCNC: NEGATIVE MG/DL
HCT VFR BLD AUTO: 38.6 % (ref 34–46.6)
HGB BLD-MCNC: 12.2 G/DL (ref 12–15.9)
HGB UR QL STRIP.AUTO: ABNORMAL
KETONES UR QL STRIP: ABNORMAL
LEUKOCYTE ESTERASE UR QL STRIP.AUTO: ABNORMAL
MCH RBC QN AUTO: 29.7 PG (ref 26.6–33)
MCHC RBC AUTO-ENTMCNC: 31.6 G/DL (ref 31.5–35.7)
MCV RBC AUTO: 93.9 FL (ref 79–97)
NITRITE UR QL STRIP: NEGATIVE
PH UR STRIP.AUTO: 5.5 [PH] (ref 5–8)
PLATELET # BLD AUTO: 321 10*3/MM3 (ref 140–450)
PMV BLD AUTO: 9.3 FL (ref 6–12)
POTASSIUM SERPL-SCNC: 3.7 MMOL/L (ref 3.5–5.2)
PROT UR QL STRIP: ABNORMAL
RBC # BLD AUTO: 4.11 10*6/MM3 (ref 3.77–5.28)
SODIUM SERPL-SCNC: 142 MMOL/L (ref 136–145)
SP GR UR STRIP: >=1.03 (ref 1–1.03)
UROBILINOGEN UR QL STRIP: ABNORMAL
WBC # BLD AUTO: 7.55 10*3/MM3 (ref 3.4–10.8)

## 2020-07-27 PROCEDURE — 71045 X-RAY EXAM CHEST 1 VIEW: CPT

## 2020-07-27 PROCEDURE — 94799 UNLISTED PULMONARY SVC/PX: CPT

## 2020-07-27 PROCEDURE — 99024 POSTOP FOLLOW-UP VISIT: CPT | Performed by: NURSE PRACTITIONER

## 2020-07-27 PROCEDURE — 25010000002 ENOXAPARIN PER 10 MG: Performed by: NURSE PRACTITIONER

## 2020-07-27 PROCEDURE — 80048 BASIC METABOLIC PNL TOTAL CA: CPT | Performed by: NURSE PRACTITIONER

## 2020-07-27 PROCEDURE — 99232 SBSQ HOSP IP/OBS MODERATE 35: CPT | Performed by: INTERNAL MEDICINE

## 2020-07-27 PROCEDURE — 82962 GLUCOSE BLOOD TEST: CPT

## 2020-07-27 PROCEDURE — 97110 THERAPEUTIC EXERCISES: CPT

## 2020-07-27 PROCEDURE — 85027 COMPLETE CBC AUTOMATED: CPT | Performed by: NURSE PRACTITIONER

## 2020-07-27 RX ORDER — POTASSIUM CHLORIDE 750 MG/1
20 CAPSULE, EXTENDED RELEASE ORAL DAILY
Status: DISCONTINUED | OUTPATIENT
Start: 2020-07-27 | End: 2020-07-28

## 2020-07-27 RX ORDER — ESTRADIOL 0.1 MG/G
1 CREAM VAGINAL NIGHTLY
Status: DISCONTINUED | OUTPATIENT
Start: 2020-07-27 | End: 2020-07-30 | Stop reason: HOSPADM

## 2020-07-27 RX ORDER — FLUCONAZOLE 150 MG/1
150 TABLET ORAL ONCE
Status: COMPLETED | OUTPATIENT
Start: 2020-07-27 | End: 2020-07-27

## 2020-07-27 RX ORDER — ALUMINA, MAGNESIA, AND SIMETHICONE 2400; 2400; 240 MG/30ML; MG/30ML; MG/30ML
15 SUSPENSION ORAL EVERY 6 HOURS PRN
Status: DISCONTINUED | OUTPATIENT
Start: 2020-07-27 | End: 2020-07-30 | Stop reason: HOSPADM

## 2020-07-27 RX ORDER — FUROSEMIDE 20 MG/1
20 TABLET ORAL DAILY
Status: DISCONTINUED | OUTPATIENT
Start: 2020-07-27 | End: 2020-07-30 | Stop reason: HOSPADM

## 2020-07-27 RX ADMIN — PRAMIPEXOLE DIHYDROCHLORIDE 0.12 MG: 0.25 TABLET ORAL at 23:00

## 2020-07-27 RX ADMIN — PANTOPRAZOLE SODIUM 40 MG: 40 TABLET, DELAYED RELEASE ORAL at 06:30

## 2020-07-27 RX ADMIN — ALBUTEROL SULFATE 2.5 MG: 2.5 SOLUTION RESPIRATORY (INHALATION) at 19:55

## 2020-07-27 RX ADMIN — METOPROLOL TARTRATE 12.5 MG: 25 TABLET, FILM COATED ORAL at 11:19

## 2020-07-27 RX ADMIN — ASPIRIN 81 MG: 81 TABLET, COATED ORAL at 08:29

## 2020-07-27 RX ADMIN — OXYBUTYNIN CHLORIDE 5 MG: 5 TABLET, EXTENDED RELEASE ORAL at 08:29

## 2020-07-27 RX ADMIN — ALUMINUM HYDROXIDE, MAGNESIUM HYDROXIDE, AND DIMETHICONE 15 ML: 400; 400; 40 SUSPENSION ORAL at 11:19

## 2020-07-27 RX ADMIN — METOPROLOL TARTRATE 12.5 MG: 25 TABLET, FILM COATED ORAL at 21:02

## 2020-07-27 RX ADMIN — ESTRADIOL 1 G: 0.1 CREAM VAGINAL at 21:02

## 2020-07-27 RX ADMIN — HYDROCODONE BITARTRATE AND ACETAMINOPHEN 2 TABLET: 5; 325 TABLET ORAL at 18:46

## 2020-07-27 RX ADMIN — HYDROCODONE BITARTRATE AND ACETAMINOPHEN 2 TABLET: 5; 325 TABLET ORAL at 08:29

## 2020-07-27 RX ADMIN — FUROSEMIDE 20 MG: 20 TABLET ORAL at 11:20

## 2020-07-27 RX ADMIN — MUPIROCIN 1 APPLICATION: 20 OINTMENT TOPICAL at 08:29

## 2020-07-27 RX ADMIN — POTASSIUM CHLORIDE 20 MEQ: 10 CAPSULE, COATED, EXTENDED RELEASE ORAL at 11:19

## 2020-07-27 RX ADMIN — DOCUSATE SODIUM 50MG AND SENNOSIDES 8.6MG 2 TABLET: 8.6; 5 TABLET, FILM COATED ORAL at 08:29

## 2020-07-27 RX ADMIN — DOCUSATE SODIUM 50MG AND SENNOSIDES 8.6MG 2 TABLET: 8.6; 5 TABLET, FILM COATED ORAL at 21:02

## 2020-07-27 RX ADMIN — ENOXAPARIN SODIUM 30 MG: 30 INJECTION SUBCUTANEOUS at 08:28

## 2020-07-27 RX ADMIN — MUPIROCIN 1 APPLICATION: 20 OINTMENT TOPICAL at 21:02

## 2020-07-27 RX ADMIN — FLUCONAZOLE 150 MG: 150 TABLET ORAL at 11:20

## 2020-07-27 RX ADMIN — ATORVASTATIN CALCIUM 40 MG: 20 TABLET, FILM COATED ORAL at 21:02

## 2020-07-28 ENCOUNTER — APPOINTMENT (OUTPATIENT)
Dept: GENERAL RADIOLOGY | Facility: HOSPITAL | Age: 80
End: 2020-07-28

## 2020-07-28 LAB
ANION GAP SERPL CALCULATED.3IONS-SCNC: 10.9 MMOL/L (ref 5–15)
ANION GAP SERPL CALCULATED.3IONS-SCNC: 8.1 MMOL/L (ref 5–15)
BUN SERPL-MCNC: 13 MG/DL (ref 8–23)
BUN SERPL-MCNC: 14 MG/DL (ref 8–23)
BUN/CREAT SERPL: 23.6 (ref 7–25)
BUN/CREAT SERPL: 26.9 (ref 7–25)
CALCIUM SPEC-SCNC: 8.1 MG/DL (ref 8.6–10.5)
CALCIUM SPEC-SCNC: 8.5 MG/DL (ref 8.6–10.5)
CHLORIDE SERPL-SCNC: 102 MMOL/L (ref 98–107)
CHLORIDE SERPL-SCNC: 105 MMOL/L (ref 98–107)
CO2 SERPL-SCNC: 26.1 MMOL/L (ref 22–29)
CO2 SERPL-SCNC: 28.9 MMOL/L (ref 22–29)
CREAT SERPL-MCNC: 0.52 MG/DL (ref 0.57–1)
CREAT SERPL-MCNC: 0.55 MG/DL (ref 0.57–1)
GFR SERPL CREATININE-BSD FRML MDRD: 107 ML/MIN/1.73
GFR SERPL CREATININE-BSD FRML MDRD: 114 ML/MIN/1.73
GLUCOSE BLDC GLUCOMTR-MCNC: 100 MG/DL (ref 70–130)
GLUCOSE BLDC GLUCOMTR-MCNC: 112 MG/DL (ref 70–130)
GLUCOSE BLDC GLUCOMTR-MCNC: 129 MG/DL (ref 70–130)
GLUCOSE BLDC GLUCOMTR-MCNC: 135 MG/DL (ref 70–130)
GLUCOSE SERPL-MCNC: 105 MG/DL (ref 65–99)
GLUCOSE SERPL-MCNC: 172 MG/DL (ref 65–99)
MAGNESIUM SERPL-MCNC: 1.8 MG/DL (ref 1.6–2.4)
POTASSIUM SERPL-SCNC: 3.5 MMOL/L (ref 3.5–5.2)
POTASSIUM SERPL-SCNC: 3.7 MMOL/L (ref 3.5–5.2)
SODIUM SERPL-SCNC: 139 MMOL/L (ref 136–145)
SODIUM SERPL-SCNC: 142 MMOL/L (ref 136–145)

## 2020-07-28 PROCEDURE — 83735 ASSAY OF MAGNESIUM: CPT | Performed by: THORACIC SURGERY (CARDIOTHORACIC VASCULAR SURGERY)

## 2020-07-28 PROCEDURE — 25010000002 MAGNESIUM SULFATE IN D5W 1G/100ML (PREMIX) 1-5 GM/100ML-% SOLUTION: Performed by: NURSE PRACTITIONER

## 2020-07-28 PROCEDURE — 97110 THERAPEUTIC EXERCISES: CPT

## 2020-07-28 PROCEDURE — 82962 GLUCOSE BLOOD TEST: CPT

## 2020-07-28 PROCEDURE — 94799 UNLISTED PULMONARY SVC/PX: CPT

## 2020-07-28 PROCEDURE — 71045 X-RAY EXAM CHEST 1 VIEW: CPT

## 2020-07-28 PROCEDURE — 80048 BASIC METABOLIC PNL TOTAL CA: CPT | Performed by: THORACIC SURGERY (CARDIOTHORACIC VASCULAR SURGERY)

## 2020-07-28 PROCEDURE — 80048 BASIC METABOLIC PNL TOTAL CA: CPT | Performed by: NURSE PRACTITIONER

## 2020-07-28 PROCEDURE — 25010000002 ENOXAPARIN PER 10 MG: Performed by: NURSE PRACTITIONER

## 2020-07-28 PROCEDURE — 99232 SBSQ HOSP IP/OBS MODERATE 35: CPT | Performed by: INTERNAL MEDICINE

## 2020-07-28 PROCEDURE — 99024 POSTOP FOLLOW-UP VISIT: CPT | Performed by: NURSE PRACTITIONER

## 2020-07-28 PROCEDURE — 94640 AIRWAY INHALATION TREATMENT: CPT

## 2020-07-28 PROCEDURE — 92610 EVALUATE SWALLOWING FUNCTION: CPT

## 2020-07-28 RX ORDER — POTASSIUM CHLORIDE 750 MG/1
40 CAPSULE, EXTENDED RELEASE ORAL ONCE
Status: COMPLETED | OUTPATIENT
Start: 2020-07-28 | End: 2020-07-28

## 2020-07-28 RX ORDER — POTASSIUM CHLORIDE 750 MG/1
20 CAPSULE, EXTENDED RELEASE ORAL DAILY
Status: DISCONTINUED | OUTPATIENT
Start: 2020-07-29 | End: 2020-07-29

## 2020-07-28 RX ORDER — CARVEDILOL 6.25 MG/1
6.25 TABLET ORAL EVERY 12 HOURS
Status: DISCONTINUED | OUTPATIENT
Start: 2020-07-28 | End: 2020-07-30 | Stop reason: HOSPADM

## 2020-07-28 RX ORDER — MAGNESIUM SULFATE 1 G/100ML
1 INJECTION INTRAVENOUS ONCE
Status: COMPLETED | OUTPATIENT
Start: 2020-07-28 | End: 2020-07-28

## 2020-07-28 RX ORDER — POTASSIUM CHLORIDE 1.5 G/1.77G
40 POWDER, FOR SOLUTION ORAL ONCE
Status: DISCONTINUED | OUTPATIENT
Start: 2020-07-28 | End: 2020-07-28

## 2020-07-28 RX ORDER — POTASSIUM CHLORIDE 1.5 G/1.77G
20 POWDER, FOR SOLUTION ORAL DAILY
Status: DISCONTINUED | OUTPATIENT
Start: 2020-07-29 | End: 2020-07-28

## 2020-07-28 RX ORDER — LOSARTAN POTASSIUM 25 MG/1
25 TABLET ORAL DAILY
Status: DISCONTINUED | OUTPATIENT
Start: 2020-07-28 | End: 2020-07-29

## 2020-07-28 RX ORDER — CARVEDILOL 3.12 MG/1
3.12 TABLET ORAL EVERY 12 HOURS
Status: DISCONTINUED | OUTPATIENT
Start: 2020-07-28 | End: 2020-07-28

## 2020-07-28 RX ADMIN — MUPIROCIN 1 APPLICATION: 20 OINTMENT TOPICAL at 08:08

## 2020-07-28 RX ADMIN — ESTRADIOL 1 G: 0.1 CREAM VAGINAL at 21:06

## 2020-07-28 RX ADMIN — ALUMINUM HYDROXIDE, MAGNESIUM HYDROXIDE, AND DIMETHICONE 15 ML: 400; 400; 40 SUSPENSION ORAL at 14:41

## 2020-07-28 RX ADMIN — MUPIROCIN 1 APPLICATION: 20 OINTMENT TOPICAL at 21:06

## 2020-07-28 RX ADMIN — OXYBUTYNIN CHLORIDE 5 MG: 5 TABLET, EXTENDED RELEASE ORAL at 08:07

## 2020-07-28 RX ADMIN — POTASSIUM CHLORIDE 20 MEQ: 10 CAPSULE, COATED, EXTENDED RELEASE ORAL at 08:07

## 2020-07-28 RX ADMIN — ACETAMINOPHEN 650 MG: 325 TABLET, FILM COATED ORAL at 00:34

## 2020-07-28 RX ADMIN — FLUTICASONE FUROATE, UMECLIDINIUM BROMIDE AND VILANTEROL TRIFENATATE 1 PUFF: 100; 62.5; 25 POWDER RESPIRATORY (INHALATION) at 10:58

## 2020-07-28 RX ADMIN — PRAMIPEXOLE DIHYDROCHLORIDE 0.12 MG: 0.25 TABLET ORAL at 21:06

## 2020-07-28 RX ADMIN — ATORVASTATIN CALCIUM 40 MG: 20 TABLET, FILM COATED ORAL at 21:06

## 2020-07-28 RX ADMIN — ALBUTEROL SULFATE 2.5 MG: 2.5 SOLUTION RESPIRATORY (INHALATION) at 10:46

## 2020-07-28 RX ADMIN — CARVEDILOL 6.25 MG: 6.25 TABLET, FILM COATED ORAL at 21:06

## 2020-07-28 RX ADMIN — CARVEDILOL 3.12 MG: 3.12 TABLET, FILM COATED ORAL at 08:07

## 2020-07-28 RX ADMIN — FUROSEMIDE 20 MG: 20 TABLET ORAL at 08:07

## 2020-07-28 RX ADMIN — HYDROCODONE BITARTRATE AND ACETAMINOPHEN 2 TABLET: 5; 325 TABLET ORAL at 19:58

## 2020-07-28 RX ADMIN — ASPIRIN 81 MG: 81 TABLET, COATED ORAL at 08:07

## 2020-07-28 RX ADMIN — POTASSIUM CHLORIDE 40 MEQ: 10 CAPSULE, COATED, EXTENDED RELEASE ORAL at 15:49

## 2020-07-28 RX ADMIN — LOSARTAN POTASSIUM 25 MG: 25 TABLET, FILM COATED ORAL at 14:31

## 2020-07-28 RX ADMIN — MAGNESIUM SULFATE 1 G: 1 INJECTION INTRAVENOUS at 14:31

## 2020-07-28 RX ADMIN — DOCUSATE SODIUM 50MG AND SENNOSIDES 8.6MG 2 TABLET: 8.6; 5 TABLET, FILM COATED ORAL at 08:07

## 2020-07-28 RX ADMIN — ENOXAPARIN SODIUM 30 MG: 30 INJECTION SUBCUTANEOUS at 08:07

## 2020-07-28 RX ADMIN — PANTOPRAZOLE SODIUM 40 MG: 40 TABLET, DELAYED RELEASE ORAL at 06:40

## 2020-07-29 ENCOUNTER — APPOINTMENT (OUTPATIENT)
Dept: GENERAL RADIOLOGY | Facility: HOSPITAL | Age: 80
End: 2020-07-29

## 2020-07-29 LAB
ANION GAP SERPL CALCULATED.3IONS-SCNC: 8.7 MMOL/L (ref 5–15)
B PARAPERT DNA SPEC QL NAA+PROBE: NOT DETECTED
B PERT DNA SPEC QL NAA+PROBE: NOT DETECTED
BUN SERPL-MCNC: 10 MG/DL (ref 8–23)
BUN/CREAT SERPL: 18.9 (ref 7–25)
C PNEUM DNA NPH QL NAA+NON-PROBE: NOT DETECTED
CALCIUM SPEC-SCNC: 8.4 MG/DL (ref 8.6–10.5)
CHLORIDE SERPL-SCNC: 103 MMOL/L (ref 98–107)
CO2 SERPL-SCNC: 28.3 MMOL/L (ref 22–29)
CREAT SERPL-MCNC: 0.53 MG/DL (ref 0.57–1)
DEPRECATED RDW RBC AUTO: 49.4 FL (ref 37–54)
ERYTHROCYTE [DISTWIDTH] IN BLOOD BY AUTOMATED COUNT: 13.9 % (ref 12.3–15.4)
FLUAV H1 2009 PAND RNA NPH QL NAA+PROBE: NOT DETECTED
FLUAV H1 HA GENE NPH QL NAA+PROBE: NOT DETECTED
FLUAV H3 RNA NPH QL NAA+PROBE: NOT DETECTED
FLUAV SUBTYP SPEC NAA+PROBE: NOT DETECTED
FLUBV RNA ISLT QL NAA+PROBE: NOT DETECTED
GFR SERPL CREATININE-BSD FRML MDRD: 111 ML/MIN/1.73
GLUCOSE BLDC GLUCOMTR-MCNC: 129 MG/DL (ref 70–130)
GLUCOSE SERPL-MCNC: 127 MG/DL (ref 65–99)
HADV DNA SPEC NAA+PROBE: NOT DETECTED
HCOV 229E RNA SPEC QL NAA+PROBE: NOT DETECTED
HCOV HKU1 RNA SPEC QL NAA+PROBE: NOT DETECTED
HCOV NL63 RNA SPEC QL NAA+PROBE: NOT DETECTED
HCOV OC43 RNA SPEC QL NAA+PROBE: NOT DETECTED
HCT VFR BLD AUTO: 35.6 % (ref 34–46.6)
HGB BLD-MCNC: 11.3 G/DL (ref 12–15.9)
HMPV RNA NPH QL NAA+NON-PROBE: NOT DETECTED
HPIV1 RNA SPEC QL NAA+PROBE: NOT DETECTED
HPIV2 RNA SPEC QL NAA+PROBE: NOT DETECTED
HPIV3 RNA NPH QL NAA+PROBE: NOT DETECTED
HPIV4 P GENE NPH QL NAA+PROBE: NOT DETECTED
M PNEUMO IGG SER IA-ACNC: NOT DETECTED
MAGNESIUM SERPL-MCNC: 2.1 MG/DL (ref 1.6–2.4)
MCH RBC QN AUTO: 30.5 PG (ref 26.6–33)
MCHC RBC AUTO-ENTMCNC: 31.7 G/DL (ref 31.5–35.7)
MCV RBC AUTO: 96 FL (ref 79–97)
PLATELET # BLD AUTO: 348 10*3/MM3 (ref 140–450)
PMV BLD AUTO: 9.2 FL (ref 6–12)
POTASSIUM SERPL-SCNC: 4.1 MMOL/L (ref 3.5–5.2)
RBC # BLD AUTO: 3.71 10*6/MM3 (ref 3.77–5.28)
RHINOVIRUS RNA SPEC NAA+PROBE: NOT DETECTED
RSV RNA NPH QL NAA+NON-PROBE: NOT DETECTED
SARS-COV-2 RNA NPH QL NAA+NON-PROBE: NOT DETECTED
SODIUM SERPL-SCNC: 140 MMOL/L (ref 136–145)
WBC # BLD AUTO: 6.35 10*3/MM3 (ref 3.4–10.8)

## 2020-07-29 PROCEDURE — 97110 THERAPEUTIC EXERCISES: CPT

## 2020-07-29 PROCEDURE — 83735 ASSAY OF MAGNESIUM: CPT | Performed by: NURSE PRACTITIONER

## 2020-07-29 PROCEDURE — 74230 X-RAY XM SWLNG FUNCJ C+: CPT

## 2020-07-29 PROCEDURE — 99232 SBSQ HOSP IP/OBS MODERATE 35: CPT | Performed by: INTERNAL MEDICINE

## 2020-07-29 PROCEDURE — 82962 GLUCOSE BLOOD TEST: CPT

## 2020-07-29 PROCEDURE — 0202U NFCT DS 22 TRGT SARS-COV-2: CPT | Performed by: NURSE PRACTITIONER

## 2020-07-29 PROCEDURE — 80048 BASIC METABOLIC PNL TOTAL CA: CPT | Performed by: NURSE PRACTITIONER

## 2020-07-29 PROCEDURE — 94799 UNLISTED PULMONARY SVC/PX: CPT

## 2020-07-29 PROCEDURE — 85027 COMPLETE CBC AUTOMATED: CPT | Performed by: NURSE PRACTITIONER

## 2020-07-29 PROCEDURE — 25010000002 ENOXAPARIN PER 10 MG: Performed by: NURSE PRACTITIONER

## 2020-07-29 PROCEDURE — 99024 POSTOP FOLLOW-UP VISIT: CPT | Performed by: NURSE PRACTITIONER

## 2020-07-29 PROCEDURE — 92611 MOTION FLUOROSCOPY/SWALLOW: CPT

## 2020-07-29 RX ORDER — POTASSIUM CHLORIDE 750 MG/1
10 CAPSULE, EXTENDED RELEASE ORAL DAILY
Status: DISCONTINUED | OUTPATIENT
Start: 2020-07-29 | End: 2020-07-30 | Stop reason: HOSPADM

## 2020-07-29 RX ORDER — LOSARTAN POTASSIUM 50 MG/1
50 TABLET ORAL DAILY
Status: DISCONTINUED | OUTPATIENT
Start: 2020-07-29 | End: 2020-07-30 | Stop reason: HOSPADM

## 2020-07-29 RX ORDER — ALPRAZOLAM 0.25 MG/1
0.25 TABLET ORAL EVERY 8 HOURS PRN
Status: DISCONTINUED | OUTPATIENT
Start: 2020-07-29 | End: 2020-07-30 | Stop reason: HOSPADM

## 2020-07-29 RX ADMIN — CARVEDILOL 6.25 MG: 6.25 TABLET, FILM COATED ORAL at 08:30

## 2020-07-29 RX ADMIN — ESTRADIOL 1 G: 0.1 CREAM VAGINAL at 20:01

## 2020-07-29 RX ADMIN — HYDROCODONE BITARTRATE AND ACETAMINOPHEN 2 TABLET: 5; 325 TABLET ORAL at 14:09

## 2020-07-29 RX ADMIN — OXYBUTYNIN CHLORIDE 5 MG: 5 TABLET, EXTENDED RELEASE ORAL at 08:29

## 2020-07-29 RX ADMIN — POTASSIUM CHLORIDE 10 MEQ: 750 CAPSULE, EXTENDED RELEASE ORAL at 08:29

## 2020-07-29 RX ADMIN — ALPRAZOLAM 0.25 MG: 0.25 TABLET ORAL at 19:53

## 2020-07-29 RX ADMIN — ATORVASTATIN CALCIUM 40 MG: 20 TABLET, FILM COATED ORAL at 20:00

## 2020-07-29 RX ADMIN — PRAMIPEXOLE DIHYDROCHLORIDE 0.12 MG: 0.25 TABLET ORAL at 20:00

## 2020-07-29 RX ADMIN — ALBUTEROL SULFATE 2.5 MG: 2.5 SOLUTION RESPIRATORY (INHALATION) at 02:19

## 2020-07-29 RX ADMIN — LOSARTAN POTASSIUM 50 MG: 50 TABLET, FILM COATED ORAL at 08:29

## 2020-07-29 RX ADMIN — ASPIRIN 81 MG: 81 TABLET, COATED ORAL at 08:30

## 2020-07-29 RX ADMIN — BARIUM SULFATE 4 ML: 980 POWDER, FOR SUSPENSION ORAL at 09:18

## 2020-07-29 RX ADMIN — PANTOPRAZOLE SODIUM 40 MG: 40 TABLET, DELAYED RELEASE ORAL at 06:42

## 2020-07-29 RX ADMIN — ALUMINUM HYDROXIDE, MAGNESIUM HYDROXIDE, AND DIMETHICONE 15 ML: 400; 400; 40 SUSPENSION ORAL at 08:41

## 2020-07-29 RX ADMIN — FUROSEMIDE 20 MG: 20 TABLET ORAL at 08:29

## 2020-07-29 RX ADMIN — FLUTICASONE FUROATE, UMECLIDINIUM BROMIDE AND VILANTEROL TRIFENATATE 1 PUFF: 100; 62.5; 25 POWDER RESPIRATORY (INHALATION) at 09:05

## 2020-07-29 RX ADMIN — HYDROCODONE BITARTRATE AND ACETAMINOPHEN 2 TABLET: 5; 325 TABLET ORAL at 09:36

## 2020-07-29 RX ADMIN — HYDROCODONE BITARTRATE AND ACETAMINOPHEN 2 TABLET: 5; 325 TABLET ORAL at 02:31

## 2020-07-29 RX ADMIN — BARIUM SULFATE 55 ML: 0.81 POWDER, FOR SUSPENSION ORAL at 09:18

## 2020-07-29 RX ADMIN — CARVEDILOL 6.25 MG: 6.25 TABLET, FILM COATED ORAL at 20:01

## 2020-07-29 RX ADMIN — BARIUM SULFATE 50 ML: 400 SUSPENSION ORAL at 09:18

## 2020-07-29 RX ADMIN — ENOXAPARIN SODIUM 30 MG: 30 INJECTION SUBCUTANEOUS at 08:31

## 2020-07-30 ENCOUNTER — HOSPITAL ENCOUNTER (OUTPATIENT)
Dept: CT IMAGING | Facility: HOSPITAL | Age: 80
End: 2020-07-30

## 2020-07-30 VITALS
HEIGHT: 56 IN | DIASTOLIC BLOOD PRESSURE: 80 MMHG | RESPIRATION RATE: 16 BRPM | WEIGHT: 94.6 LBS | SYSTOLIC BLOOD PRESSURE: 128 MMHG | OXYGEN SATURATION: 94 % | TEMPERATURE: 96.9 F | BODY MASS INDEX: 21.28 KG/M2 | HEART RATE: 98 BPM

## 2020-07-30 LAB
ANION GAP SERPL CALCULATED.3IONS-SCNC: 3.9 MMOL/L (ref 5–15)
BACTERIA SPEC AEROBE CULT: ABNORMAL
BUN SERPL-MCNC: 13 MG/DL (ref 8–23)
BUN/CREAT SERPL: 21.3 (ref 7–25)
CALCIUM SPEC-SCNC: 8.5 MG/DL (ref 8.6–10.5)
CHLORIDE SERPL-SCNC: 101 MMOL/L (ref 98–107)
CO2 SERPL-SCNC: 31.1 MMOL/L (ref 22–29)
CREAT SERPL-MCNC: 0.61 MG/DL (ref 0.57–1)
GFR SERPL CREATININE-BSD FRML MDRD: 94 ML/MIN/1.73
GLUCOSE SERPL-MCNC: 123 MG/DL (ref 65–99)
POTASSIUM SERPL-SCNC: 4 MMOL/L (ref 3.5–5.2)
SODIUM SERPL-SCNC: 136 MMOL/L (ref 136–145)

## 2020-07-30 PROCEDURE — 99024 POSTOP FOLLOW-UP VISIT: CPT | Performed by: NURSE PRACTITIONER

## 2020-07-30 PROCEDURE — 99232 SBSQ HOSP IP/OBS MODERATE 35: CPT | Performed by: NURSE PRACTITIONER

## 2020-07-30 PROCEDURE — 25010000002 ENOXAPARIN PER 10 MG: Performed by: NURSE PRACTITIONER

## 2020-07-30 PROCEDURE — 94799 UNLISTED PULMONARY SVC/PX: CPT

## 2020-07-30 PROCEDURE — 80048 BASIC METABOLIC PNL TOTAL CA: CPT | Performed by: NURSE PRACTITIONER

## 2020-07-30 RX ORDER — ACETAMINOPHEN 325 MG/1
650 TABLET ORAL EVERY 4 HOURS PRN
Status: ON HOLD
Start: 2020-07-30 | End: 2020-09-25

## 2020-07-30 RX ORDER — POTASSIUM CHLORIDE 750 MG/1
10 CAPSULE, EXTENDED RELEASE ORAL DAILY
Qty: 14 CAPSULE | Refills: 0 | Status: SHIPPED | OUTPATIENT
Start: 2020-07-31 | End: 2020-08-14

## 2020-07-30 RX ORDER — CILOSTAZOL 100 MG/1
100 TABLET ORAL 2 TIMES DAILY
Status: DISCONTINUED | OUTPATIENT
Start: 2020-07-30 | End: 2020-07-30 | Stop reason: HOSPADM

## 2020-07-30 RX ORDER — ALUMINA, MAGNESIA, AND SIMETHICONE 2400; 2400; 240 MG/30ML; MG/30ML; MG/30ML
15 SUSPENSION ORAL EVERY 6 HOURS PRN
Start: 2020-07-30 | End: 2020-09-14

## 2020-07-30 RX ORDER — CLOPIDOGREL BISULFATE 75 MG/1
75 TABLET ORAL DAILY
Qty: 30 TABLET | Refills: 11 | Status: SHIPPED | OUTPATIENT
Start: 2020-07-30 | End: 2020-10-23 | Stop reason: SDUPTHER

## 2020-07-30 RX ORDER — HYDROCODONE BITARTRATE AND ACETAMINOPHEN 5; 325 MG/1; MG/1
1 TABLET ORAL EVERY 4 HOURS PRN
Qty: 42 TABLET | Refills: 0 | Status: SHIPPED | OUTPATIENT
Start: 2020-07-30 | End: 2020-08-06

## 2020-07-30 RX ORDER — FUROSEMIDE 20 MG/1
20 TABLET ORAL DAILY
Qty: 14 TABLET | Refills: 0 | Status: SHIPPED | OUTPATIENT
Start: 2020-07-31 | End: 2020-08-14

## 2020-07-30 RX ORDER — CARVEDILOL 6.25 MG/1
6.25 TABLET ORAL EVERY 12 HOURS
Qty: 60 TABLET | Refills: 2 | Status: ON HOLD | OUTPATIENT
Start: 2020-07-30 | End: 2020-09-25

## 2020-07-30 RX ORDER — LOSARTAN POTASSIUM 50 MG/1
50 TABLET ORAL DAILY
Qty: 30 TABLET | Refills: 2 | Status: SHIPPED | OUTPATIENT
Start: 2020-07-31 | End: 2020-09-08

## 2020-07-30 RX ORDER — AMOXICILLIN 250 MG
2 CAPSULE ORAL DAILY PRN
Start: 2020-07-30

## 2020-07-30 RX ORDER — ESTRADIOL 0.1 MG/G
1 CREAM VAGINAL NIGHTLY
Qty: 42.5 G | Refills: 0 | Status: SHIPPED | OUTPATIENT
Start: 2020-07-30 | End: 2020-08-03

## 2020-07-30 RX ADMIN — ENOXAPARIN SODIUM 30 MG: 30 INJECTION SUBCUTANEOUS at 10:07

## 2020-07-30 RX ADMIN — CARVEDILOL 6.25 MG: 6.25 TABLET, FILM COATED ORAL at 10:06

## 2020-07-30 RX ADMIN — POTASSIUM CHLORIDE 10 MEQ: 750 CAPSULE, EXTENDED RELEASE ORAL at 10:01

## 2020-07-30 RX ADMIN — HYDROCODONE BITARTRATE AND ACETAMINOPHEN 2 TABLET: 5; 325 TABLET ORAL at 06:22

## 2020-07-30 RX ADMIN — LOSARTAN POTASSIUM 50 MG: 50 TABLET, FILM COATED ORAL at 10:01

## 2020-07-30 RX ADMIN — MUPIROCIN 1 APPLICATION: 20 OINTMENT TOPICAL at 10:07

## 2020-07-30 RX ADMIN — FLUTICASONE FUROATE, UMECLIDINIUM BROMIDE AND VILANTEROL TRIFENATATE 1 PUFF: 100; 62.5; 25 POWDER RESPIRATORY (INHALATION) at 09:11

## 2020-07-30 RX ADMIN — FUROSEMIDE 20 MG: 20 TABLET ORAL at 10:07

## 2020-07-30 RX ADMIN — PANTOPRAZOLE SODIUM 40 MG: 40 TABLET, DELAYED RELEASE ORAL at 06:22

## 2020-07-30 RX ADMIN — OXYBUTYNIN CHLORIDE 5 MG: 5 TABLET, EXTENDED RELEASE ORAL at 10:07

## 2020-07-30 RX ADMIN — ASPIRIN 81 MG: 81 TABLET, COATED ORAL at 10:06

## 2020-07-30 RX ADMIN — HYDROCODONE BITARTRATE AND ACETAMINOPHEN 2 TABLET: 5; 325 TABLET ORAL at 00:16

## 2020-07-30 RX ADMIN — ALUMINUM HYDROXIDE, MAGNESIUM HYDROXIDE, AND DIMETHICONE 15 ML: 400; 400; 40 SUSPENSION ORAL at 14:00

## 2020-07-31 ENCOUNTER — EPISODE CHANGES (OUTPATIENT)
Dept: CASE MANAGEMENT | Facility: OTHER | Age: 80
End: 2020-07-31

## 2020-07-31 PROCEDURE — 99024 POSTOP FOLLOW-UP VISIT: CPT | Performed by: THORACIC SURGERY (CARDIOTHORACIC VASCULAR SURGERY)

## 2020-08-04 ENCOUNTER — TELEPHONE (OUTPATIENT)
Dept: CARDIOLOGY | Facility: CLINIC | Age: 80
End: 2020-08-04

## 2020-08-04 ENCOUNTER — TELEPHONE (OUTPATIENT)
Dept: CARDIAC SURGERY | Facility: CLINIC | Age: 80
End: 2020-08-04

## 2020-08-04 NOTE — TELEPHONE ENCOUNTER
I do not think this is my patient.  That being said the note and there says Plavix without aspirin.  She should discuss her dental implants with her primary cardiologist on follow-up

## 2020-08-04 NOTE — TELEPHONE ENCOUNTER
Pts daughter Maddy called stating that pt was discharged on Plavix   She would like to know for how long she will be on this.  Also pt was planning on having dental implants at the end of August and she wanted to know if she would be able to have this done

## 2020-08-04 NOTE — TELEPHONE ENCOUNTER
Gina from [facility] calls to clarify patients medication. I spoke with Kendra ALCALA and patient is to be taking Pletal and Plavix, no aspirin

## 2020-08-07 ENCOUNTER — OFFICE VISIT (OUTPATIENT)
Dept: CARDIOLOGY | Facility: CLINIC | Age: 80
End: 2020-08-07

## 2020-08-07 VITALS
SYSTOLIC BLOOD PRESSURE: 116 MMHG | DIASTOLIC BLOOD PRESSURE: 68 MMHG | HEART RATE: 90 BPM | OXYGEN SATURATION: 94 % | WEIGHT: 90.4 LBS | HEIGHT: 56 IN | RESPIRATION RATE: 16 BRPM | BODY MASS INDEX: 20.33 KG/M2

## 2020-08-07 DIAGNOSIS — Z95.1 S/P CABG X 1: ICD-10-CM

## 2020-08-07 DIAGNOSIS — I21.4 NSTEMI (NON-ST ELEVATED MYOCARDIAL INFARCTION) (HCC): Primary | ICD-10-CM

## 2020-08-07 DIAGNOSIS — Z98.890 S/P ASCENDING AORTIC ANEURYSM REPAIR: ICD-10-CM

## 2020-08-07 DIAGNOSIS — I25.10 CORONARY ARTERY DISEASE INVOLVING NATIVE CORONARY ARTERY OF NATIVE HEART WITHOUT ANGINA PECTORIS: ICD-10-CM

## 2020-08-07 DIAGNOSIS — Z86.79 S/P ASCENDING AORTIC ANEURYSM REPAIR: ICD-10-CM

## 2020-08-07 PROCEDURE — 99214 OFFICE O/P EST MOD 30 MIN: CPT | Performed by: NURSE PRACTITIONER

## 2020-08-07 PROCEDURE — 93000 ELECTROCARDIOGRAM COMPLETE: CPT | Performed by: NURSE PRACTITIONER

## 2020-08-07 RX ORDER — TRAMADOL HYDROCHLORIDE 50 MG/1
50 TABLET ORAL EVERY 6 HOURS PRN
COMMUNITY
End: 2020-10-26

## 2020-08-07 RX ORDER — PANTOPRAZOLE SODIUM 40 MG/1
40 TABLET, DELAYED RELEASE ORAL DAILY
COMMUNITY
End: 2020-09-14

## 2020-08-07 NOTE — PROGRESS NOTES
Date of Office Visit: 2020  Encounter Provider: FREDRICK Springer  Place of Service: [facility] CARDIOLOGY  Patient Name: Grace Beauchamp  :1940    Chief Complaint   Patient presents with   • Coronary Artery Disease     1 [facility] HOSP FOLLOW UP   :     HPI: Grace Beauchamp is a 80 y.o. female, known to me, who presents today for follow-up.  Old records been obtained and reviewed by me.  She is a patient of Dr. De Los Santos's with a past cardiac history significant for coronary artery disease and ascending aortic aneurysm.  In , she underwent a sending aortic replacement, total arch replacement, and CABG x1 (SVG to the lateral marginal branch).  In May of this year, I had a telehealth visit with her.  She was overall doing well from a cardiac standpoint.  She had not followed up with Dr. Ontiveros since 2019 and I recommended she do so.  Otherwise she was recommended to follow-up with Dr. De Los Santos in 1 year.   On 2020, she presented to the ED with acute onset of cough, dyspnea, and smothering feeling.  EKG showed 1 mm ST depressions laterally and her troponin was 1.  There was concern for LV rupture and ultimately she was taken urgently to the cath lab.  This revealed a ruptured lateral wall of the LV likely related to an acute occlusion of the vein graft to the OM.  She was taken urgently to the OR where she underwent a redo sternotomy with extensive lysis of adhesions and primary repair of the lateral wall of the left ventricle false aneurysm with rupture.  Postoperatively she did remarkably well.  Due to her NSTEMI, she was started on Plavix.  On 2020, she was stable for discharge.  I am seeing her today for follow-up.   Overall she is doing well.  She is getting stronger every day.  She has especially noticed an improvement the last 2 days in her overall strength.  She still has a little midsternal soreness.  She has occasional shooting pains across her  "chest that are fleeting in nature.  She is still little short of breath on exertion but denies any PND or orthopnea.  She is still at rehab and has plans to go home to her daughter's following discharge.  She denies any palpitations, edema, dizziness, syncope, bleeding difficulties or melena.  She is wondering how long she will be on the Plavix as she needs to have dental implants.      Past Medical History:   Diagnosis Date   • AAA (abdominal aortic aneurysm) (CMS/[facility])    • Acute bronchitis 12/2018   • 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HCC)    • Arthritis    • Bilateral carotid artery disease (CMS/HCC)    • Bilateral cataracts     S/p Extractions   • Breast cancer (CMS/[facility])     right breast uR0tcQc (snm) pMX ER/PA pos, Her-2/neg, Ki-67, 31%, oncotype recurrence score 19, invasive lobular carcinoma   • CAD (coronary artery disease)     S/p CABG on 2/23/16 by Dr. 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[facility])    • Closed fracture of three ribs of right side    • Cognitive disorder    • COPD (chronic obstructive pulmonary disease) (CMS/[facility])    • Depression    • Descending aortic arch aneurysm (CMS/[facility])    • Diabetes mellitus (CMS/[facility])     \"BORDERLINE\"   • Erythermalgia (CMS/[facility])    • GERD (gastroesophageal reflux disease)    • Hyperlipidemia     Controlled w/Meds   • Hypertension     Controlled w/Meds   • Low back pain    • Moderate aortic valve insufficiency     S/p AVR on 02/23/16 by Dr. Ontiveros   • Nocturia    • Osteoarthritis    • Osteoporosis    • Otitis media     R Ear   • Prediabetes    • RLS (restless legs syndrome)    • Saccular aneurysm    • Stroke (CMS/[facility])     Perioperatively w/L Hip Repl   • Thoracic ascending aortic aneurysm (CMS/[facility])     S/p Repair on 02/23/16 by Dr. Ontiveros   • TIA (transient ischemic attack)    • Urgency incontinence    • Venous insufficiency    • Ventral hernia        Past Surgical History:   Procedure Laterality Date   • AORTIC VALVE REPAIR/REPLACEMENT N/A 02/23/2016-[facility]    Ascending Replacement " Using a 24MM Graft--Dr. Ontiveros   • APPENDECTOMY  1977   • BREAST BIOPSY Right 09/16/2012    Vacuum Assisted Core Bx of R Breast   • CARDIAC CATHETERIZATION N/A 01/06/2016    Dr. Tres De Los Santos   • CARDIAC CATHETERIZATION N/A 4/22/2019    Procedure: Left Heart Cath;  Surgeon: Tres De Los Santos MD;  Location:  [facility];  Service: Cardiology   • CARDIAC CATHETERIZATION N/A 4/22/2019    Procedure: Coronary angiography;  Surgeon: Tres De Los Santos MD;  Location:  [facility];  Service: Cardiology   • CARDIAC CATHETERIZATION N/A 7/22/2020    Procedure: LEFT HEART CATH;  Surgeon: Antonia Scott MD;  Location:  [facility];  Service: Cardiovascular;  Laterality: N/A;   • CARDIAC CATHETERIZATION N/A 7/22/2020    Procedure: CORONARY ANGIOGRAPHY;  Surgeon: Antonia Scott MD;  Location:  [facility];  Service: Cardiovascular;  Laterality: N/A;   • CARDIAC CATHETERIZATION N/A 7/22/2020    Procedure: Left ventriculography;  Surgeon: Antonia Scott MD;  Location:  [facility];  Service: Cardiovascular;  Laterality: N/A;   • CARDIAC CATHETERIZATION N/A 7/22/2020    Procedure: Saphenous Vein Graft;  Surgeon: Antonia Scott MD;  Location:  [facility];  Service: Cardiovascular;  Laterality: N/A;   • CARDIAC SURGERY  02/2016    Dr. Ontiveros/Dr. De Los Santos   • CATARACT EXTRACTION Bilateral     [facility]   • COLONOSCOPY N/A 10/2002    Dr. Negro   • CORONARY ARTERY BYPASS GRAFT  02/23/2016-[facility]    x1 w/L Vein Grafting--Dr. Ontiveros   • CYST REMOVAL Right 01/25/2013    R Palm Excision of Retinacular Cyst--Dr. Karla Fish   • EPIDURAL BLOCK     • LAPAROSCOPIC CHOLECYSTECTOMY N/A 08/04/2004    Dr. JOEY Sheth   • MASTECTOMY Right 09/28/2012   • ORIF HIP FRACTURE Left 03/27/2005    w/ AMBI compression screw, Dr. Victor M Cabral   • RECTOVAGINAL FISTULA REPAIR  1965   • THORACIC AORTIC ANEURYSM REPAIR N/A 7/23/2019    Procedure:  ROSE, THORACOABDOMINAL AORTIC ANEURYSM REPAIR, VISCERAL VESSEL REPAIR, LARGE VENTRAL HERNIA REPAIR WITH MESH, CIRCULATORY ARREST, PRP;  Surgeon: Mart Ontiveros MD;  Location: [facility];  Service: Cardiothoracic   • TUBAL ABDOMINAL LIGATION     • VENTRICULAR ANEURYSM REPAIR N/A 2020    Procedure: EMERGENT REOP STERNOTOMY, REPAIR OF LV RUPTURE, PRP, INTRAOP ROSE;  Surgeon: Mart Ontiveros MD;  Location: [facility];  Service: Cardiothoracic;  Laterality: N/A;       Social History     Socioeconomic History   • Marital status:      Spouse name: Not on file   • Number of children: Not on file   • Years of education: Not on file   • Highest education level: Not on file   Tobacco Use   • Smoking status: Former Smoker     Types: Cigarettes     Last attempt to quit: 2012     Years since quittin.6   • Smokeless tobacco: Never Used   • Tobacco comment: CAFFEINE USE:2 CUPS COFFEE/ COKE DAILY   Substance and Sexual Activity   • Alcohol use: No   • Drug use: No   • Sexual activity: Defer     Birth control/protection: Tubal ligation       Family History   Problem Relation Age of Onset   • Alzheimer's disease Mother    • Hypertension Mother    • Cancer Maternal Aunt    • Heart disease Father    • Heart failure Father    • Hypertension Father    • Diabetes Father    • Liver disease Sister    • Heart failure Brother    • Hypertension Brother    • Alcohol abuse Brother    • No Known Problems Maternal Grandmother    • No Known Problems Maternal Grandfather    • No Known Problems Paternal Grandmother    • No Known Problems Paternal Grandfather    • Diabetes Brother    • Brain cancer Brother    • Heart disease Brother        Review of Systems   Constitution: Positive for malaise/fatigue. Negative for chills and fever.   Cardiovascular: Positive for dyspnea on exertion. Negative for chest pain (Occasional shooting pains), leg swelling, near-syncope, orthopnea, palpitations, paroxysmal nocturnal dyspnea and  syncope.   Respiratory: Negative for cough and shortness of breath.    Musculoskeletal: Negative for joint pain, joint swelling and myalgias.   Gastrointestinal: Negative for abdominal pain, diarrhea, melena, nausea and vomiting.   Genitourinary: Negative for frequency and hematuria.   Neurological: Negative for light-headedness, numbness, paresthesias and seizures.   Allergic/Immunologic: Negative.    All other systems reviewed and are negative.      Allergies   Allergen Reactions   • Ramipril Other (See Comments)     Ace I  cough   • Morphine Itching   • Morphine And Related Itching   • Bactrim [Sulfamethoxazole-Trimethoprim] Itching and Rash         Current Outpatient Medications:   •  acetaminophen (TYLENOL) 325 MG tablet, Take 2 tablets by mouth Every 4 (Four) Hours As Needed for Mild Pain ., Disp: , Rfl:   •  albuterol sulfate HFA (ProAir HFA) 108 (90 Base) MCG/ACT inhaler, Inhale 2 puffs Every 6 (Six) Hours As Needed for Wheezing or Shortness of Air., Disp: 3 inhaler, Rfl: 11  •  aluminum-magnesium hydroxide-simethicone (MAALOX MAX) 400-400-40 MG/5ML suspension, Take 15 mL by mouth Every 6 (Six) Hours As Needed for Indigestion., Disp: , Rfl:   •  Biotin 1000 MCG tablet, Take 1,000 mcg by mouth daily. Take as directed, Disp: , Rfl:   •  carvedilol (COREG) 6.25 MG tablet, Take 1 tablet by mouth Every 12 (Twelve) Hours for 90 days., Disp: 60 tablet, Rfl: 2  •  cetirizine (zyrTEC) 10 MG tablet, Take 1 tablet by mouth As Needed for Allergies., Disp: 30 tablet, Rfl: 0  •  cilostazol (PLETAL) 100 MG tablet, Take 100 mg by mouth 2 (Two) Times a Day., Disp: , Rfl:   •  clopidogrel (PLAVIX) 75 MG tablet, Take 1 tablet by mouth Daily., Disp: 30 tablet, Rfl: 11  •  Fluticasone-Umeclidin-Vilant (Trelegy Ellipta) 100-62.5-25 MCG/INH aerosol powder , Inhale 1 puff Daily., Disp: 90 each, Rfl: 3  •  furosemide (LASIX) 20 MG tablet, Take 1 tablet by mouth Daily for 14 days., Disp: 14 tablet, Rfl: 0  •  losartan (COZAAR) 50 MG  "tablet, Take 1 tablet by mouth Daily for 90 days., Disp: 30 tablet, Rfl: 2  •  meclizine (ANTIVERT) 12.5 MG tablet, Take 1 tablet by mouth 3 (Three) Times a Day As Needed for Dizziness., Disp: 21 tablet, Rfl: 0  •  melatonin 5 MG tablet tablet, Take 2 tablets by mouth At Night As Needed (sleep)., Disp: 30 tablet, Rfl: 0  •  Menthol, Topical Analgesic, 10 % liquid, Apply  topically to the appropriate area as directed., Disp: , Rfl:   •  omeprazole (priLOSEC) 20 MG capsule, Take 1 capsule by mouth Daily., Disp: 90 capsule, Rfl: 3  •  pantoprazole (PROTONIX) 40 MG EC tablet, Take 40 mg by mouth Daily., Disp: , Rfl:   •  phenol (CHLORASEPTIC) 1.4 % liquid liquid, Apply 2 sprays to the mouth or throat Every 2 (Two) Hours As Needed (sore throat)., Disp: , Rfl:   •  pitavastatin calcium (LIVALO) 2 MG tablet tablet, Take 2 mg by mouth Every Night., Disp: , Rfl:   •  potassium chloride (MICRO-K) 10 MEQ CR capsule, Take 1 capsule by mouth Daily for 14 days., Disp: 14 capsule, Rfl: 0  •  pramipexole (MIRAPEX) 0.125 MG tablet, TAKE ONE TABLET BY MOUTH EVERY NIGHT AT BEDTIME, Disp: 90 tablet, Rfl: 0  •  sennosides-docusate (PERICOLACE) 8.6-50 MG per tablet, Take 2 tablets by mouth Daily As Needed for Constipation., Disp: , Rfl:   •  TOVIAZ 4 MG tablet sustained-release 24 hour tablet, Take 4 mg by mouth Daily., Disp: , Rfl:   •  traMADol (ULTRAM) 50 MG tablet, Take 50 mg by mouth Every 6 (Six) Hours As Needed for Moderate Pain ., Disp: , Rfl:       Objective:     Vitals:    08/07/20 1452   BP: 116/68   BP Location: Left arm   Patient Position: Sitting   Pulse: 90   Resp: 16   SpO2: 94%   Weight: 41 kg (90 lb 6.4 oz)   Height: 142.2 cm (56\")     Body mass index is 20.27 kg/m².    PHYSICAL EXAM:    Physical Exam   Constitutional: She is oriented to person, place, and time. She appears well-developed and well-nourished. No distress.   HENT:   Head: Normocephalic and atraumatic.   Eyes: Pupils are equal, round, and reactive to " light.   Neck: No JVD present. No thyromegaly present.   Cardiovascular: Normal rate, regular rhythm, normal heart sounds and intact distal pulses.   No murmur heard.  Pulmonary/Chest: Effort normal and breath sounds normal. No respiratory distress.   Abdominal: Soft. Bowel sounds are normal. She exhibits no distension. There is no splenomegaly or hepatomegaly. There is no tenderness.   Musculoskeletal: Normal range of motion. She exhibits no edema.   Neurological: She is alert and oriented to person, place, and time.   Skin: Skin is warm and dry. She is not diaphoretic. No erythema.   Midsternal incision well-healed. No erythema or edema.    Psychiatric: She has a normal mood and affect. Her behavior is normal. Judgment normal.         ECG 12 Lead  Date/Time: 8/7/2020 3:22 PM  Performed by: Carlie Hilton APRN  Authorized by: Carlie Hilton APRN   Comparison: compared with previous ECG from 7/23/2020  Similar to previous ECG  Rhythm: sinus rhythm  Rate: normal  BPM: 89  Conduction: non-specific intraventricular conduction delay  T flattening: aVL, aVF and III    Clinical impression: abnormal EKG  Comments: Indication: CAD              Assessment:       Diagnosis Plan   1. NSTEMI (non-ST elevated myocardial infarction) (CMS/[facility])     2. Coronary artery disease involving native coronary artery of native heart without angina pectoris  ECG 12 Lead   3. S/P CABG x 1     4. S/P ascending aortic aneurysm repair       Orders Placed This Encounter   Procedures   • ECG 12 Lead     This order was created via procedure documentation          Plan:       1.  Coronary artery disease and history of CABG x1/NSTEMI/LV rupture.  Secondary to acutely occluded OM.  She is status post LV repair.  She actually looks really well considering what she has been through.  She denies any angina.  Her EKG is stable and unchanged.  She is on Plavix, statin, and a beta-blocker.  Per Dr. De Los Santos, she may stop the Plavix for a week to  "have her implants.        2.  History of AAA status post thoracoabdominal aortic aneurysm repair in July 2019.  Further recommendations per Dr. Ontiveros.      Overall I think she is stable and doing well.  She denies any symptoms of angina or heart failure.  She is an extremely amy lady.  I\"m not going to make any changes today.  She will follow-up with Dr. De Los Santos on 9/8/2020 or sooner if needed.      As always, it has been a pleasure to participate in your patient's care.      Sincerely,         FREDRICK Bryant  "

## 2020-08-10 ENCOUNTER — TELEPHONE (OUTPATIENT)
Dept: CARDIAC REHAB | Facility: HOSPITAL | Age: 80
End: 2020-08-10

## 2020-08-10 NOTE — TELEPHONE ENCOUNTER
"Called pt about attending cardiac rehab program.  Pt has been through cardiac rehab in the past.  She's not sure she wants to attend again.  She will be staying at her daughter's for the next 3-4 weeks and her daughter lives in [place]. Pt says her daughter works and would not be able to bring her to program 3 times a week for 6-8 weeks.  She will \"think it over\".  I told her to call us back if she decides she wants to attend.   "

## 2020-08-14 NOTE — PROGRESS NOTES
I dont believe it was an acute occlusion. Nobody knows. Probably happened over a few weeks before decompensation

## 2020-08-20 ENCOUNTER — OFFICE VISIT (OUTPATIENT)
Dept: CARDIAC SURGERY | Facility: CLINIC | Age: 80
End: 2020-08-20

## 2020-08-20 VITALS
OXYGEN SATURATION: 98 % | HEIGHT: 56 IN | HEART RATE: 95 BPM | DIASTOLIC BLOOD PRESSURE: 80 MMHG | SYSTOLIC BLOOD PRESSURE: 133 MMHG | WEIGHT: 88 LBS | BODY MASS INDEX: 19.8 KG/M2

## 2020-08-20 DIAGNOSIS — I71.60 THORACOABDOMINAL AORTIC ANEURYSM, WITHOUT RUPTURE (HCC): Primary | ICD-10-CM

## 2020-08-20 DIAGNOSIS — I71.9 AORTIC ANEURYSM, DESCENDING (HCC): ICD-10-CM

## 2020-08-20 PROCEDURE — 99024 POSTOP FOLLOW-UP VISIT: CPT | Performed by: NURSE PRACTITIONER

## 2020-08-20 NOTE — PROGRESS NOTES
"CARDIOVASCULAR SURGERY FOLLOW-UP PROGRESS NOTE  Chief Complaint: Postop follow-up        HPI:   Dear Miller Reynolds MD and colleagues:    It was nice to see Grace Beauchamp in follow up 1 month after surgery.  As you know, she is a 80 y.o. female with history of CABG and total arch replacement, subsequent thoracoabdominal aortic aneurysm repair, that presented with single-vessel coronary disease related to OM graft closure causing a free rupture of LV false aneurysm with tamponade and cardiac arrest in the operating room who underwent emergent redo sternotomy and primary repair of lateral wall of the left ventricle on 7/22/2020 with Dr. Ontiveros. She did well postoperatively and continues to do well. She comes in today complaining of chest pain and weakness.  Her activity level has been fair, she is living at her daughter's and she has had 2 home physical therapy visits at this point.  From a surgical standpoint, the sternal incision is well approximated without erythema, edema, or drainage.  The sternum is stable to palpation, and the patient denies any popping or clicking with deep inspiration or coughing.  She does have significant pain with palpation to the costochondrai.    Physical Exam:         /80 (BP Location: Right arm, Patient Position: Sitting, Cuff Size: Small Adult)   Pulse 95   Ht 142.2 cm (56\")   Wt 39.9 kg (88 lb)   LMP  (LMP Unknown)   SpO2 98%   BMI 19.73 kg/m²   Heart:  regular rate and rhythm, S1, S2 normal, no murmur, click, rub or gallop  Lungs:  clear to auscultation bilaterally  Extremities:  no edema  Incision(s):  mid chest healing well, sternum stable    Assessment/Plan:     S/P LV rupture repair.  Costochondritis, can place heat or cold to chest wall intermittently.  Overall, she is doing well.    Slow post-op rehabilitation progress    No heavy lifting > 10 pounds for 2 more weeks  Follow-up as scheduled with cardiology  Return to clinic in 1 year(s) with CTA of chest " abdomen pelvis with follow-up with Dr. Ontiveros    Continue lifting restriction of 10 lbs until 6 weeks and 50 lbs until 12 weeks from the date of surgery, no excessive jarring motions or twisting motions until 12 weeks from the date of surgery    Return to clinic if any signs or symptoms of infection or sternal instability develop       Thank you for allowing me to participate in the care of your patient.    Regards,  FREDRICK España

## 2020-08-21 ENCOUNTER — EPISODE CHANGES (OUTPATIENT)
Dept: CASE MANAGEMENT | Facility: OTHER | Age: 80
End: 2020-08-21

## 2020-08-21 ENCOUNTER — PATIENT OUTREACH (OUTPATIENT)
Dept: CASE MANAGEMENT | Facility: OTHER | Age: 80
End: 2020-08-21

## 2020-08-21 NOTE — OUTREACH NOTE
SNF Follow-up Note      Responses   Acute Facility Discharged From  [facility]   Acute Discharge Date  07/30/20   Name of the Skilled Nursing Facility?  [facility]   Purpose of SNF Admission  PT, OT, SN, SP   Who is the insurance provider or payor of patient stay?  Medicare   Progression of Patient?  discharged   Skilled Nursing Discharge Date?  08/11/20   Where was the patient discharged to?  Home   Home Health Agency at discharge?  Yes [PT, OT, Speech, SN]   DME Needed at Discharge?  No [Patient states she had all the supplies from a previous surgery. No needs. ]   Are there any medication concerns at Discharge  No   Does the patient have a follow-up appointment?  Yes   Comments Regarding F/U Appt. ?  Cardiology follow up 8/19 PCP follow up 10/13      Spoke with patient regarding her transition home from [facility] post CABG x1 revision. Patient states she has all the DME needed. She had it from her previous surgery. She is staying with her daughter in [place]. Patient's daughter works from home and is available for support as needed. Patient is receiving PT, OT, SN, speech therapy with Home Health. No questions at this time and no needs. Patient had a follow up with the Cardiac nurse on 8/19 and was reassured it will take more time than 4 weeks to fully regain her strength. Patient open to further ACM outreach. Follow up call scheduled.      Praveena Mayorga RN  Ambulatory     8/21/2020, 16:48

## 2020-09-08 ENCOUNTER — OFFICE VISIT (OUTPATIENT)
Dept: CARDIOLOGY | Facility: CLINIC | Age: 80
End: 2020-09-08

## 2020-09-08 VITALS
HEART RATE: 91 BPM | WEIGHT: 83 LBS | HEIGHT: 56 IN | SYSTOLIC BLOOD PRESSURE: 100 MMHG | BODY MASS INDEX: 18.67 KG/M2 | DIASTOLIC BLOOD PRESSURE: 60 MMHG

## 2020-09-08 DIAGNOSIS — Z95.1 S/P CABG X 1: ICD-10-CM

## 2020-09-08 DIAGNOSIS — I21.4 NSTEMI (NON-ST ELEVATED MYOCARDIAL INFARCTION) (HCC): ICD-10-CM

## 2020-09-08 DIAGNOSIS — I71.20 THORACIC AORTIC ANEURYSM WITHOUT RUPTURE (HCC): Primary | ICD-10-CM

## 2020-09-08 DIAGNOSIS — I10 ESSENTIAL HYPERTENSION: Chronic | ICD-10-CM

## 2020-09-08 DIAGNOSIS — I71.60 THORACOABDOMINAL AORTIC ANEURYSM (TAAA) WITHOUT RUPTURE (HCC): ICD-10-CM

## 2020-09-08 PROCEDURE — 99214 OFFICE O/P EST MOD 30 MIN: CPT | Performed by: INTERNAL MEDICINE

## 2020-09-08 PROCEDURE — 93000 ELECTROCARDIOGRAM COMPLETE: CPT | Performed by: INTERNAL MEDICINE

## 2020-09-08 RX ORDER — LOSARTAN POTASSIUM 50 MG/1
25 TABLET ORAL DAILY
Qty: 45 TABLET | Refills: 2 | Status: ON HOLD | OUTPATIENT
Start: 2020-09-08 | End: 2020-09-25

## 2020-09-08 NOTE — PROGRESS NOTES
Date of Office Visit: 20  Encounter Provider: Tres De Los Santos MD  Place of Service: [facility] CARDIOLOGY  Patient Name: Grace Beauchamp  :1940  [id number]    Chief Complaint   Patient presents with   • NSTEMI (non-ST elevated myocardial infarction) (CMS/HCC)   • Coronary artery disease involving native coronary artery of    • Follow-up   :     HPI: Grace Beauchamp is a 80 y.o. female  This is a patient with a history of thoracic aneurysm repair, a catheterization, which showed one-vessel disease in her obtuse marginal.  She ended up having a bypass at the time she had her aneurysm repaired.  In 2020 she had been feeling tired for a few days then had kind of a brief episode of chest pain but felt badly with a came to the hospital was found to have an LV rupture that was contained.  Cath showed that her vein graft to the OM1 had occluded.  She had to go in for an operative repair with Dr. Ontiveros.  She is here for follow-up she is doing okay but she has a lot of generalized complaints she has some tightness in her chest that is getting better over time she is a little bit anorexic her weight is down to 83 pounds.  She has chronic shortness of breath does not seem that she has PND orthopnea or edema.        Past Medical History:   Diagnosis Date   • AAA (abdominal aortic aneurysm) (CMS/HCC)    • Acute bronchitis 2018   • 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HCC)    • Arthritis    • Bilateral carotid artery disease (CMS/HCC)    • Bilateral cataracts     S/p Extractions   • Breast cancer (CMS/HCC)     right breast [id number] (snm) pMX ER/HI pos, Her-2/neg, Ki-67, 31%, oncotype recurrence score 19, invasive lobular carcinoma   • CAD (coronary artery disease)     S/p CABG on 16 by Dr. 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HCC)    • Closed fracture of three ribs of right side    • Cognitive disorder    • COPD (chronic obstructive pulmonary disease) (CMS/HCC)    • Depression    •  "Descending aortic arch aneurysm (CMS/HCC)    • Diabetes mellitus (CMS/HCC)     \"BORDERLINE\"   • Erythermalgia (CMS/HCC)    • GERD (gastroesophageal reflux disease)    • Hyperlipidemia     Controlled w/Meds   • Hypertension     Controlled w/Meds   • Low back pain    • Moderate aortic valve insufficiency     S/p AVR on 02/23/16 by Dr. Ontiveros   • Nocturia    • Osteoarthritis    • Osteoporosis    • Otitis media     R Ear   • Prediabetes    • RLS (restless legs syndrome)    • Saccular aneurysm    • Stroke (CMS/[facility])     Perioperatively w/L Hip Repl   • Thoracic ascending aortic aneurysm (CMS/HCC)     S/p Repair on 02/23/16 by Dr. Ontiveros   • TIA (transient ischemic attack)    • Urgency incontinence    • Venous insufficiency    • Ventral hernia        Past Surgical History:   Procedure Laterality Date   • AORTIC VALVE REPAIR/REPLACEMENT N/A 02/23/2016-[facility]    Ascending Replacement Using a 24MM Graft--Dr. Ontiveros   • APPENDECTOMY  1977   • BREAST BIOPSY Right 09/16/2012    Vacuum Assisted Core Bx of R Breast   • CARDIAC CATHETERIZATION N/A 01/06/2016    Dr. Tres De Los Santos   • CARDIAC CATHETERIZATION N/A 4/22/2019    Procedure: Left Heart Cath;  Surgeon: Tres De Los Santos MD;  Location:  [facility] CATH INVASIVE LOCATION;  Service: Cardiology   • CARDIAC CATHETERIZATION N/A 4/22/2019    Procedure: Coronary angiography;  Surgeon: Tres De Los Santos MD;  Location:  [facility] CATH INVASIVE LOCATION;  Service: Cardiology   • CARDIAC CATHETERIZATION N/A 7/22/2020    Procedure: LEFT HEART CATH;  Surgeon: Antonia Scott MD;  Location:  [facility] CATH INVASIVE LOCATION;  Service: Cardiovascular;  Laterality: N/A;   • CARDIAC CATHETERIZATION N/A 7/22/2020    Procedure: CORONARY ANGIOGRAPHY;  Surgeon: Antonia Scott MD;  Location:  [facility] CATH INVASIVE LOCATION;  Service: Cardiovascular;  Laterality: N/A;   • CARDIAC CATHETERIZATION N/A 7/22/2020    Procedure: Left ventriculography;  Surgeon: Antonia Scott MD;  [facility];  " Service: Cardiovascular;  Laterality: N/A;   • CARDIAC CATHETERIZATION N/A 2020    Procedure: Saphenous Vein Graft;  Surgeon: Antonia Scott MD;  Location: [facility] INVASIVE LOCATION;  Service: Cardiovascular;  Laterality: N/A;   • CARDIAC SURGERY  2016    Dr. Ontiveros/Dr. De Los Santos   • CATARACT EXTRACTION Bilateral     [facility]   • COLONOSCOPY N/A 10/2002    Dr. Negro   • CORONARY ARTERY BYPASS GRAFT  2016-[facility]    x1 w/L Vein Grafting--Dr. Ontiveros   • CYST REMOVAL Right 2013    R Palm Excision of Retinacular Cyst--Dr. Karla Fish   • EPIDURAL BLOCK     • LAPAROSCOPIC CHOLECYSTECTOMY N/A 2004    Dr. JOEY Sheth   • MASTECTOMY Right 2012   • ORIF HIP FRACTURE Left 2005    w/ AMBI compression screw, Dr. Victor M Cabral   • RECTOVAGINAL FISTULA REPAIR  1965   • THORACIC AORTIC ANEURYSM REPAIR N/A 2019    Procedure: ROSE, THORACOABDOMINAL AORTIC ANEURYSM REPAIR, VISCERAL VESSEL REPAIR, LARGE VENTRAL HERNIA REPAIR WITH MESH, CIRCULATORY ARREST, PRP;  Surgeon: Mart Ontiveros MD;  Location: [facility] OR;  Service: Cardiothoracic   • TUBAL ABDOMINAL LIGATION     • VENTRICULAR ANEURYSM REPAIR N/A 2020    Procedure: EMERGENT REOP STERNOTOMY, REPAIR OF LV RUPTURE, PRP, INTRAOP ROSE;  Surgeon: Mart Ontiveros MD;  Location: [facility] OR;  Service: Cardiothoracic;  Laterality: N/A;       Social History     Socioeconomic History   • Marital status:      Spouse name: Not on file   • Number of children: Not on file   • Years of education: Not on file   • Highest education level: Not on file   Tobacco Use   • Smoking status: Former Smoker     Types: Cigarettes     Last attempt to quit: 2012     Years since quittin.7   • Smokeless tobacco: Never Used   • Tobacco comment: CAFFEINE USE:2 CUPS COFFEE/ COKE DAILY   Substance and Sexual Activity   • Alcohol use: No   • Drug use: No   • Sexual activity: Defer     Birth control/protection: Tubal ligation        Family History   Problem Relation Age of Onset   • Alzheimer's disease Mother    • Cancer Maternal Aunt    • Heart disease Father    • Heart failure Father    • Hypertension Father    • Diabetes Father    • Liver disease Sister    • Heart failure Brother    • Hypertension Brother    • Alcohol abuse Brother    • Diabetes Brother    • No Known Problems Maternal Grandmother    • No Known Problems Maternal Grandfather    • No Known Problems Paternal Grandmother    • No Known Problems Paternal Grandfather    • Diabetes Brother    • Brain cancer Brother    • Heart disease Brother    • Diabetes Brother        Review of Systems   Constitution: Negative for decreased appetite, fever, malaise/fatigue and weight loss.   HENT: Negative for nosebleeds.    Eyes: Negative for double vision.   Cardiovascular: Negative for chest pain, claudication, cyanosis, dyspnea on exertion, irregular heartbeat, leg swelling, near-syncope, orthopnea, palpitations, paroxysmal nocturnal dyspnea and syncope.   Respiratory: Negative for cough, hemoptysis and shortness of breath.    Hematologic/Lymphatic: Negative for bleeding problem.   Skin: Negative for rash.   Musculoskeletal: Negative for falls and myalgias.   Gastrointestinal: Negative for hematochezia, jaundice, melena, nausea and vomiting.   Genitourinary: Negative for hematuria.   Neurological: Negative for dizziness and seizures.   Psychiatric/Behavioral: Negative for altered mental status and memory loss.       Allergies   Allergen Reactions   • Ramipril Other (See Comments)     Ace I  cough   • Morphine Itching   • Morphine And Related Itching   • Bactrim [Sulfamethoxazole-Trimethoprim] Itching and Rash         Current Outpatient Medications:   •  acetaminophen (TYLENOL) 325 MG tablet, Take 2 tablets by mouth Every 4 (Four) Hours As Needed for Mild Pain ., Disp: , Rfl:   •  albuterol sulfate HFA (ProAir HFA) 108 (90 Base) MCG/ACT inhaler, Inhale 2 puffs Every 6 (Six) Hours As Needed  for Wheezing or Shortness of Air., Disp: 3 inhaler, Rfl: 11  •  aluminum-magnesium hydroxide-simethicone (MAALOX MAX) 400-400-40 MG/5ML suspension, Take 15 mL by mouth Every 6 (Six) Hours As Needed for Indigestion., Disp: , Rfl:   •  Biotin 1000 MCG tablet, Take 1,000 mcg by mouth daily. Take as directed, Disp: , Rfl:   •  carvedilol (COREG) 6.25 MG tablet, Take 1 tablet by mouth Every 12 (Twelve) Hours for 90 days., Disp: 60 tablet, Rfl: 2  •  cetirizine (zyrTEC) 10 MG tablet, Take 1 tablet by mouth As Needed for Allergies., Disp: 30 tablet, Rfl: 0  •  cilostazol (PLETAL) 100 MG tablet, Take 100 mg by mouth 2 (Two) Times a Day., Disp: , Rfl:   •  clopidogrel (PLAVIX) 75 MG tablet, Take 1 tablet by mouth Daily., Disp: 30 tablet, Rfl: 11  •  Fluticasone-Umeclidin-Vilant (Trelegy Ellipta) 100-62.5-25 MCG/INH aerosol powder , Inhale 1 puff Daily., Disp: 90 each, Rfl: 3  •  losartan (COZAAR) 50 MG tablet, Take 0.5 tablets by mouth Daily for 90 days., Disp: 45 tablet, Rfl: 2  •  meclizine (ANTIVERT) 12.5 MG tablet, Take 1 tablet by mouth 3 (Three) Times a Day As Needed for Dizziness., Disp: 21 tablet, Rfl: 0  •  melatonin 5 MG tablet tablet, Take 2 tablets by mouth At Night As Needed (sleep)., Disp: 30 tablet, Rfl: 0  •  Menthol, Topical Analgesic, 10 % liquid, Apply  topically to the appropriate area as directed., Disp: , Rfl:   •  omeprazole (priLOSEC) 20 MG capsule, Take 1 capsule by mouth Daily., Disp: 90 capsule, Rfl: 3  •  pramipexole (MIRAPEX) 0.125 MG tablet, TAKE ONE TABLET BY MOUTH EVERY NIGHT AT BEDTIME, Disp: 90 tablet, Rfl: 0  •  sennosides-docusate (PERICOLACE) 8.6-50 MG per tablet, Take 2 tablets by mouth Daily As Needed for Constipation., Disp: , Rfl:   •  TOVIAZ 4 MG tablet sustained-release 24 hour tablet, Take 4 mg by mouth Daily., Disp: , Rfl:   •  traMADol (ULTRAM) 50 MG tablet, Take 50 mg by mouth Every 6 (Six) Hours As Needed for Moderate Pain ., Disp: , Rfl:   •  VITAMIN D PO, Take  by mouth  "Daily., Disp: , Rfl:   •  pantoprazole (PROTONIX) 40 MG EC tablet, Take 40 mg by mouth Daily., Disp: , Rfl:   •  pitavastatin calcium (LIVALO) 2 MG tablet tablet, Take 2 mg by mouth Every Night., Disp: , Rfl:       Objective:     Vitals:    09/08/20 1453   BP: 100/60   BP Location: Left arm   Patient Position: Sitting   Pulse: 91   Weight: 37.6 kg (83 lb)   Height: 142.2 cm (56\")     Body mass index is 18.61 kg/m².    Physical Exam   Constitutional: She is oriented to person, place, and time. She appears well-developed and well-nourished.   HENT:   Head: Normocephalic.   Eyes: No scleral icterus.   Neck: No JVD present. No thyromegaly present.   Cardiovascular: Normal rate, regular rhythm and normal heart sounds. Exam reveals no gallop and no friction rub.   No murmur heard.  Pulmonary/Chest: Effort normal and breath sounds normal. She has no wheezes. She has no rales.       Abdominal: Soft. There is no hepatosplenomegaly. There is no tenderness.   Musculoskeletal: Normal range of motion. She exhibits no edema.   Lymphadenopathy:     She has no cervical adenopathy.   Neurological: She is alert and oriented to person, place, and time.   Skin: Skin is warm and dry. No rash noted.   Psychiatric: She has a normal mood and affect.         ECG 12 Lead  Date/Time: 9/8/2020 3:47 PM  Performed by: Tres De Los Santos MD  Authorized by: Tres De Los Santos MD   Comparison: compared with previous ECG   Similar to previous ECG  Rhythm: sinus rhythm  Other findings: non-specific ST-T wave changes    Clinical impression: abnormal EKG             Assessment:       Diagnosis Plan   1. Thoracic aortic aneurysm 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)     2. 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)     3. NSTEMI (non-ST elevated myocardial infarction) (CMS/HCC)     4. Essential hypertension     5. S/P CABG x 1            Plan:       She has had extensive prior thoracic aortic aneurysm surgery thin developed an occlusion of the " vein graft to OM1 that were resulted in a contained LV rupture that was repaired operatively so she has had I believe 3 open heart surgeries.  She is very small and frail does not have a great appetite we encouraged her to try and eat what ever she wants her blood pressures a little low today and when to decrease her losartan to 25 a day she I think got a continue on with outpatient rehab as opposed to cardiac rehab she is got a little bit of a dropfoot on the right side that can make cardiac rehab a little tough for her.  I have her come back and see Carlie or Dulce in 3 months and me in 6 months if she does not get better with her appetite we could consider trying cyproheptadine    As always, it has been a pleasure to participate in your patient's care.      Sincerely,       Tres De Los Santos MD

## 2020-09-14 ENCOUNTER — TELEPHONE (OUTPATIENT)
Dept: CARDIOLOGY | Facility: CLINIC | Age: 80
End: 2020-09-14

## 2020-09-14 ENCOUNTER — HOSPITAL ENCOUNTER (INPATIENT)
Facility: HOSPITAL | Age: 80
LOS: 10 days | Discharge: NURSING, REHAB OR LONG-TERM CARE | End: 2020-09-25
Attending: EMERGENCY MEDICINE | Admitting: HOSPITALIST

## 2020-09-14 ENCOUNTER — APPOINTMENT (OUTPATIENT)
Dept: CARDIOLOGY | Facility: HOSPITAL | Age: 80
End: 2020-09-14

## 2020-09-14 ENCOUNTER — TELEPHONE (OUTPATIENT)
Dept: INTERNAL MEDICINE | Age: 80
End: 2020-09-14

## 2020-09-14 ENCOUNTER — APPOINTMENT (OUTPATIENT)
Dept: CT IMAGING | Facility: HOSPITAL | Age: 80
End: 2020-09-14

## 2020-09-14 DIAGNOSIS — R79.89 ELEVATED BRAIN NATRIURETIC PEPTIDE (BNP) LEVEL: ICD-10-CM

## 2020-09-14 DIAGNOSIS — I25.3 VENTRICULAR ANEURYSM: ICD-10-CM

## 2020-09-14 DIAGNOSIS — R06.00 DYSPNEA, UNSPECIFIED TYPE: ICD-10-CM

## 2020-09-14 DIAGNOSIS — R77.8 ELEVATED TROPONIN: ICD-10-CM

## 2020-09-14 DIAGNOSIS — I31.2 PERICARDIAL HEMATOMA: Primary | ICD-10-CM

## 2020-09-14 PROBLEM — I50.33 ACUTE ON CHRONIC DIASTOLIC CHF (CONGESTIVE HEART FAILURE) (HCC): Status: ACTIVE | Noted: 2020-09-14

## 2020-09-14 PROBLEM — I25.2 HISTORY OF ST ELEVATION MYOCARDIAL INFARCTION (STEMI): Status: ACTIVE | Noted: 2020-09-14

## 2020-09-14 LAB
ALBUMIN SERPL-MCNC: 4 G/DL (ref 3.5–5.2)
ALBUMIN/GLOB SERPL: 1.2 G/DL
ALP SERPL-CCNC: 96 U/L (ref 39–117)
ALT SERPL W P-5'-P-CCNC: 13 U/L (ref 1–33)
ANION GAP SERPL CALCULATED.3IONS-SCNC: 9.8 MMOL/L (ref 5–15)
AORTIC DIMENSIONLESS INDEX: 0.5 (DI)
APTT PPP: 30.5 SECONDS (ref 22.7–35.4)
AST SERPL-CCNC: 18 U/L (ref 1–32)
BASOPHILS # BLD AUTO: 0.02 10*3/MM3 (ref 0–0.2)
BASOPHILS NFR BLD AUTO: 0.3 % (ref 0–1.5)
BH CV ECHO MEAS - ACS: 1.6 CM
BH CV ECHO MEAS - AO MAX PG (FULL): 6.9 MMHG
BH CV ECHO MEAS - AO MAX PG: 9.7 MMHG
BH CV ECHO MEAS - AO MEAN PG (FULL): 4 MMHG
BH CV ECHO MEAS - AO MEAN PG: 5 MMHG
BH CV ECHO MEAS - AO ROOT AREA (BSA CORRECTED): 2
BH CV ECHO MEAS - AO ROOT AREA: 4.9 CM^2
BH CV ECHO MEAS - AO ROOT DIAM: 2.5 CM
BH CV ECHO MEAS - AO V2 MAX: 156 CM/SEC
BH CV ECHO MEAS - AO V2 MEAN: 102 CM/SEC
BH CV ECHO MEAS - AO V2 VTI: 23.7 CM
BH CV ECHO MEAS - AVA(I,A): 1.4 CM^2
BH CV ECHO MEAS - AVA(I,D): 1.4 CM^2
BH CV ECHO MEAS - AVA(V,A): 1.4 CM^2
BH CV ECHO MEAS - AVA(V,D): 1.4 CM^2
BH CV ECHO MEAS - BSA(HAYCOCK): 1.2 M^2
BH CV ECHO MEAS - BSA: 1.2 M^2
BH CV ECHO MEAS - BZI_BMI: 18.6 KILOGRAMS/M^2
BH CV ECHO MEAS - BZI_METRIC_HEIGHT: 142.2 CM
BH CV ECHO MEAS - BZI_METRIC_WEIGHT: 37.6 KG
BH CV ECHO MEAS - CONTRAST EF 4CH: 42.6 CM2
BH CV ECHO MEAS - EDV(CUBED): 97.3 ML
BH CV ECHO MEAS - EDV(MOD-SP2): 105 ML
BH CV ECHO MEAS - EDV(MOD-SP4): 74 ML
BH CV ECHO MEAS - EDV(TEICH): 97.3 ML
BH CV ECHO MEAS - EF(CUBED): 43.6 %
BH CV ECHO MEAS - EF(MOD-SP2): 41.9 %
BH CV ECHO MEAS - EF(MOD-SP4): 43.2 %
BH CV ECHO MEAS - EF(TEICH): 36.4 %
BH CV ECHO MEAS - ESV(CUBED): 54.9 ML
BH CV ECHO MEAS - ESV(MOD-SP2): 61 ML
BH CV ECHO MEAS - ESV(MOD-SP4): 42 ML
BH CV ECHO MEAS - ESV(TEICH): 62 ML
BH CV ECHO MEAS - FS: 17.4 %
BH CV ECHO MEAS - IVS/LVPW: 1.5
BH CV ECHO MEAS - IVSD: 0.9 CM
BH CV ECHO MEAS - LAT PEAK E' VEL: 3.5 CM/SEC
BH CV ECHO MEAS - LV DIASTOLIC VOL/BSA (35-75): 60.6 ML/M^2
BH CV ECHO MEAS - LV MASS(C)D: 108.5 GRAMS
BH CV ECHO MEAS - LV MASS(C)DI: 88.8 GRAMS/M^2
BH CV ECHO MEAS - LV MAX PG: 2.9 MMHG
BH CV ECHO MEAS - LV MEAN PG: 1 MMHG
BH CV ECHO MEAS - LV SYSTOLIC VOL/BSA (12-30): 34.4 ML/M^2
BH CV ECHO MEAS - LV V1 MAX: 84.9 CM/SEC
BH CV ECHO MEAS - LV V1 MEAN: 54.3 CM/SEC
BH CV ECHO MEAS - LV V1 VTI: 12.9 CM
BH CV ECHO MEAS - LVIDD: 4.6 CM
BH CV ECHO MEAS - LVIDS: 3.8 CM
BH CV ECHO MEAS - LVLD AP2: 7 CM
BH CV ECHO MEAS - LVLD AP4: 7.1 CM
BH CV ECHO MEAS - LVLS AP2: 6.1 CM
BH CV ECHO MEAS - LVLS AP4: 6 CM
BH CV ECHO MEAS - LVOT AREA (M): 2.5 CM^2
BH CV ECHO MEAS - LVOT AREA: 2.5 CM^2
BH CV ECHO MEAS - LVOT DIAM: 1.8 CM
BH CV ECHO MEAS - LVPWD: 0.6 CM
BH CV ECHO MEAS - MED PEAK E' VEL: 5.4 CM/SEC
BH CV ECHO MEAS - MV A DUR: 0.13 SEC
BH CV ECHO MEAS - MV A MAX VEL: 162 CM/SEC
BH CV ECHO MEAS - MV DEC SLOPE: 769 CM/SEC^2
BH CV ECHO MEAS - MV DEC TIME: 172 SEC
BH CV ECHO MEAS - MV E MAX VEL: 58.1 CM/SEC
BH CV ECHO MEAS - MV E/A: 0.36
BH CV ECHO MEAS - MV MAX PG: 11 MMHG
BH CV ECHO MEAS - MV MEAN PG: 3 MMHG
BH CV ECHO MEAS - MV P1/2T MAX VEL: 88.2 CM/SEC
BH CV ECHO MEAS - MV P1/2T: 33.6 MSEC
BH CV ECHO MEAS - MV V2 MAX: 166 CM/SEC
BH CV ECHO MEAS - MV V2 MEAN: 75.7 CM/SEC
BH CV ECHO MEAS - MV V2 VTI: 26.1 CM
BH CV ECHO MEAS - MVA P1/2T LCG: 2.5 CM^2
BH CV ECHO MEAS - MVA(P1/2T): 6.5 CM^2
BH CV ECHO MEAS - MVA(VTI): 1.3 CM^2
BH CV ECHO MEAS - PA ACC TIME: 0.11 SEC
BH CV ECHO MEAS - PA MAX PG (FULL): 0.92 MMHG
BH CV ECHO MEAS - PA MAX PG: 3.4 MMHG
BH CV ECHO MEAS - PA PR(ACCEL): 30.4 MMHG
BH CV ECHO MEAS - PA V2 MAX: 92.1 CM/SEC
BH CV ECHO MEAS - PULM DIAS VEL: 99.6 CM/SEC
BH CV ECHO MEAS - PULM S/D: 0.56
BH CV ECHO MEAS - PULM SYS VEL: 55.7 CM/SEC
BH CV ECHO MEAS - PVA(V,A): 2.2 CM^2
BH CV ECHO MEAS - PVA(V,D): 2.2 CM^2
BH CV ECHO MEAS - QP/QS: 0.81
BH CV ECHO MEAS - RAP SYSTOLE: 15 MMHG
BH CV ECHO MEAS - RV MAX PG: 2.5 MMHG
BH CV ECHO MEAS - RV MEAN PG: 1 MMHG
BH CV ECHO MEAS - RV V1 MAX: 78.6 CM/SEC
BH CV ECHO MEAS - RV V1 MEAN: 51.9 CM/SEC
BH CV ECHO MEAS - RV V1 VTI: 10.4 CM
BH CV ECHO MEAS - RVOT AREA: 2.5 CM^2
BH CV ECHO MEAS - RVOT DIAM: 1.8 CM
BH CV ECHO MEAS - RVSP: 41.6 MMHG
BH CV ECHO MEAS - SI(AO): 95.2 ML/M^2
BH CV ECHO MEAS - SI(CUBED): 34.8 ML/M^2
BH CV ECHO MEAS - SI(LVOT): 26.9 ML/M^2
BH CV ECHO MEAS - SI(MOD-SP2): 36 ML/M^2
BH CV ECHO MEAS - SI(MOD-SP4): 26.2 ML/M^2
BH CV ECHO MEAS - SI(TEICH): 29 ML/M^2
BH CV ECHO MEAS - SV(AO): 116.3 ML
BH CV ECHO MEAS - SV(CUBED): 42.5 ML
BH CV ECHO MEAS - SV(LVOT): 32.8 ML
BH CV ECHO MEAS - SV(MOD-SP2): 44 ML
BH CV ECHO MEAS - SV(MOD-SP4): 32 ML
BH CV ECHO MEAS - SV(RVOT): 26.5 ML
BH CV ECHO MEAS - SV(TEICH): 35.4 ML
BH CV ECHO MEAS - TAPSE (>1.6): 1.3 CM
BH CV ECHO MEAS - TR MAX VEL: 258 CM/SEC
BH CV ECHO MEASUREMENTS AVERAGE E/E' RATIO: 13.06
BH CV VAS BP RIGHT ARM: NORMAL MMHG
BH CV XLRA - RV BASE: 3.3 CM
BH CV XLRA - RV LENGTH: 6.4 CM
BH CV XLRA - RV MID: 2.7 CM
BH CV XLRA - TDI S': 10.1 CM/SEC
BILIRUB SERPL-MCNC: 0.5 MG/DL (ref 0–1.2)
BUN SERPL-MCNC: 15 MG/DL (ref 8–23)
BUN/CREAT SERPL: 19.7 (ref 7–25)
CALCIUM SPEC-SCNC: 9.6 MG/DL (ref 8.6–10.5)
CHLORIDE SERPL-SCNC: 102 MMOL/L (ref 98–107)
CLOSE TME COLL+ADP + EPINEP PNL BLD: 35 %
CO2 SERPL-SCNC: 28.2 MMOL/L (ref 22–29)
CREAT SERPL-MCNC: 0.76 MG/DL (ref 0.57–1)
DEPRECATED RDW RBC AUTO: 43.1 FL (ref 37–54)
EOSINOPHIL # BLD AUTO: 0.09 10*3/MM3 (ref 0–0.4)
EOSINOPHIL NFR BLD AUTO: 1.3 % (ref 0.3–6.2)
ERYTHROCYTE [DISTWIDTH] IN BLOOD BY AUTOMATED COUNT: 13.2 % (ref 12.3–15.4)
GFR SERPL CREATININE-BSD FRML MDRD: 73 ML/MIN/1.73
GLOBULIN UR ELPH-MCNC: 3.4 GM/DL
GLUCOSE SERPL-MCNC: 134 MG/DL (ref 65–99)
HCT VFR BLD AUTO: 39.6 % (ref 34–46.6)
HGB BLD-MCNC: 12.9 G/DL (ref 12–15.9)
IMM GRANULOCYTES # BLD AUTO: 0.02 10*3/MM3 (ref 0–0.05)
IMM GRANULOCYTES NFR BLD AUTO: 0.3 % (ref 0–0.5)
INR PPP: 1.08 (ref 0.9–1.1)
LEFT ATRIUM VOLUME INDEX: 49 ML/M2
LYMPHOCYTES # BLD AUTO: 0.87 10*3/MM3 (ref 0.7–3.1)
LYMPHOCYTES NFR BLD AUTO: 12.8 % (ref 19.6–45.3)
MAGNESIUM SERPL-MCNC: 2 MG/DL (ref 1.6–2.4)
MAXIMAL PREDICTED HEART RATE: 140 BPM
MCH RBC QN AUTO: 29.1 PG (ref 26.6–33)
MCHC RBC AUTO-ENTMCNC: 32.6 G/DL (ref 31.5–35.7)
MCV RBC AUTO: 89.4 FL (ref 79–97)
MONOCYTES # BLD AUTO: 0.34 10*3/MM3 (ref 0.1–0.9)
MONOCYTES NFR BLD AUTO: 5 % (ref 5–12)
NEUTROPHILS NFR BLD AUTO: 5.44 10*3/MM3 (ref 1.7–7)
NEUTROPHILS NFR BLD AUTO: 80.3 % (ref 42.7–76)
NRBC BLD AUTO-RTO: 0 /100 WBC (ref 0–0.2)
NT-PROBNP SERPL-MCNC: ABNORMAL PG/ML (ref 0–1800)
PLATELET # BLD AUTO: 367 10*3/MM3 (ref 140–450)
PMV BLD AUTO: 8.5 FL (ref 6–12)
POTASSIUM SERPL-SCNC: 4 MMOL/L (ref 3.5–5.2)
PROT SERPL-MCNC: 7.4 G/DL (ref 6–8.5)
PROTHROMBIN TIME: 13.8 SECONDS (ref 11.7–14.2)
RBC # BLD AUTO: 4.43 10*6/MM3 (ref 3.77–5.28)
SODIUM SERPL-SCNC: 140 MMOL/L (ref 136–145)
STRESS TARGET HR: 119 BPM
TROPONIN T SERPL-MCNC: 0.08 NG/ML (ref 0–0.03)
WBC # BLD AUTO: 6.78 10*3/MM3 (ref 3.4–10.8)

## 2020-09-14 PROCEDURE — 25010000002 PERFLUTREN (DEFINITY) 8.476 MG IN SODIUM CHLORIDE 0.9 % 10 ML INJECTION: Performed by: NURSE PRACTITIONER

## 2020-09-14 PROCEDURE — 83880 ASSAY OF NATRIURETIC PEPTIDE: CPT | Performed by: PHYSICIAN ASSISTANT

## 2020-09-14 PROCEDURE — G0378 HOSPITAL OBSERVATION PER HR: HCPCS

## 2020-09-14 PROCEDURE — 36415 COLL VENOUS BLD VENIPUNCTURE: CPT | Performed by: NURSE PRACTITIONER

## 2020-09-14 PROCEDURE — 85025 COMPLETE CBC W/AUTO DIFF WBC: CPT | Performed by: PHYSICIAN ASSISTANT

## 2020-09-14 PROCEDURE — 71275 CT ANGIOGRAPHY CHEST: CPT

## 2020-09-14 PROCEDURE — 83735 ASSAY OF MAGNESIUM: CPT | Performed by: PHYSICIAN ASSISTANT

## 2020-09-14 PROCEDURE — U0004 COV-19 TEST NON-CDC HGH THRU: HCPCS | Performed by: PHYSICIAN ASSISTANT

## 2020-09-14 PROCEDURE — 93306 TTE W/DOPPLER COMPLETE: CPT

## 2020-09-14 PROCEDURE — 99232 SBSQ HOSP IP/OBS MODERATE 35: CPT | Performed by: NURSE PRACTITIONER

## 2020-09-14 PROCEDURE — 99284 EMERGENCY DEPT VISIT MOD MDM: CPT

## 2020-09-14 PROCEDURE — 85730 THROMBOPLASTIN TIME PARTIAL: CPT | Performed by: PHYSICIAN ASSISTANT

## 2020-09-14 PROCEDURE — 85610 PROTHROMBIN TIME: CPT | Performed by: PHYSICIAN ASSISTANT

## 2020-09-14 PROCEDURE — 84484 ASSAY OF TROPONIN QUANT: CPT | Performed by: PHYSICIAN ASSISTANT

## 2020-09-14 PROCEDURE — 80053 COMPREHEN METABOLIC PANEL: CPT | Performed by: PHYSICIAN ASSISTANT

## 2020-09-14 PROCEDURE — 93005 ELECTROCARDIOGRAM TRACING: CPT | Performed by: EMERGENCY MEDICINE

## 2020-09-14 PROCEDURE — 93010 ELECTROCARDIOGRAM REPORT: CPT | Performed by: INTERNAL MEDICINE

## 2020-09-14 PROCEDURE — 85576 BLOOD PLATELET AGGREGATION: CPT | Performed by: NURSE PRACTITIONER

## 2020-09-14 PROCEDURE — 93306 TTE W/DOPPLER COMPLETE: CPT | Performed by: INTERNAL MEDICINE

## 2020-09-14 PROCEDURE — 0 IOPAMIDOL PER 1 ML: Performed by: EMERGENCY MEDICINE

## 2020-09-14 RX ORDER — CARVEDILOL 6.25 MG/1
6.25 TABLET ORAL EVERY 12 HOURS
Status: DISCONTINUED | OUTPATIENT
Start: 2020-09-14 | End: 2020-09-17

## 2020-09-14 RX ORDER — ACETAMINOPHEN 160 MG/5ML
650 SOLUTION ORAL EVERY 4 HOURS PRN
Status: DISCONTINUED | OUTPATIENT
Start: 2020-09-14 | End: 2020-09-17

## 2020-09-14 RX ORDER — ACETAMINOPHEN 325 MG/1
650 TABLET ORAL EVERY 4 HOURS PRN
Status: DISCONTINUED | OUTPATIENT
Start: 2020-09-14 | End: 2020-09-17

## 2020-09-14 RX ORDER — ONDANSETRON 2 MG/ML
4 INJECTION INTRAMUSCULAR; INTRAVENOUS EVERY 6 HOURS PRN
Status: DISCONTINUED | OUTPATIENT
Start: 2020-09-14 | End: 2020-09-17

## 2020-09-14 RX ORDER — TRAMADOL HYDROCHLORIDE 50 MG/1
25 TABLET ORAL EVERY 8 HOURS PRN
Status: DISCONTINUED | OUTPATIENT
Start: 2020-09-14 | End: 2020-09-17

## 2020-09-14 RX ORDER — SODIUM CHLORIDE 0.9 % (FLUSH) 0.9 %
10 SYRINGE (ML) INJECTION EVERY 12 HOURS SCHEDULED
Status: DISCONTINUED | OUTPATIENT
Start: 2020-09-14 | End: 2020-09-17

## 2020-09-14 RX ORDER — CHOLECALCIFEROL (VITAMIN D3) 125 MCG
10 CAPSULE ORAL NIGHTLY PRN
Status: DISCONTINUED | OUTPATIENT
Start: 2020-09-14 | End: 2020-09-17

## 2020-09-14 RX ORDER — ACETAMINOPHEN 650 MG/1
650 SUPPOSITORY RECTAL EVERY 4 HOURS PRN
Status: DISCONTINUED | OUTPATIENT
Start: 2020-09-14 | End: 2020-09-17

## 2020-09-14 RX ORDER — LOSARTAN POTASSIUM 25 MG/1
25 TABLET ORAL ONCE
Status: COMPLETED | OUTPATIENT
Start: 2020-09-14 | End: 2020-09-14

## 2020-09-14 RX ORDER — SODIUM CHLORIDE 0.9 % (FLUSH) 0.9 %
10 SYRINGE (ML) INJECTION AS NEEDED
Status: DISCONTINUED | OUTPATIENT
Start: 2020-09-14 | End: 2020-09-17

## 2020-09-14 RX ORDER — NITROGLYCERIN 0.4 MG/1
0.4 TABLET SUBLINGUAL
Status: DISCONTINUED | OUTPATIENT
Start: 2020-09-14 | End: 2020-09-17

## 2020-09-14 RX ORDER — PRAMIPEXOLE DIHYDROCHLORIDE 0.25 MG/1
0.12 TABLET ORAL NIGHTLY
Status: DISCONTINUED | OUTPATIENT
Start: 2020-09-14 | End: 2020-09-17

## 2020-09-14 RX ORDER — ALPRAZOLAM 0.25 MG/1
0.25 TABLET ORAL EVERY 8 HOURS PRN
Status: DISCONTINUED | OUTPATIENT
Start: 2020-09-14 | End: 2020-09-17

## 2020-09-14 RX ADMIN — ALPRAZOLAM 0.25 MG: 0.25 TABLET ORAL at 18:49

## 2020-09-14 RX ADMIN — LOSARTAN POTASSIUM 25 MG: 25 TABLET, FILM COATED ORAL at 18:02

## 2020-09-14 RX ADMIN — SODIUM CHLORIDE, PRESERVATIVE FREE 10 ML: 5 INJECTION INTRAVENOUS at 23:54

## 2020-09-14 RX ADMIN — TRAMADOL HYDROCHLORIDE 25 MG: 50 TABLET, FILM COATED ORAL at 18:58

## 2020-09-14 RX ADMIN — PRAMIPEXOLE DIHYDROCHLORIDE 0.12 MG: 0.25 TABLET ORAL at 23:04

## 2020-09-14 RX ADMIN — IOPAMIDOL 95 ML: 755 INJECTION, SOLUTION INTRAVENOUS at 10:29

## 2020-09-14 RX ADMIN — CARVEDILOL 6.25 MG: 6.25 TABLET, FILM COATED ORAL at 23:04

## 2020-09-14 RX ADMIN — PERFLUTREN 1 ML: 6.52 INJECTION, SUSPENSION INTRAVENOUS at 12:30

## 2020-09-14 NOTE — TELEPHONE ENCOUNTER
Daughter called and said she dropped her mother off in the ER for SOB  She couldn't stay with her  Wanted you to know this.    [phone]-Maddy

## 2020-09-14 NOTE — TELEPHONE ENCOUNTER
PTS DAUGHTER CALLED TO SCHEDULE APPOINTMENT WITH PCP, DAUGHTER ON BH VERBAL. WHEN ASKED WHAT PATIENT IS NEEDING TO BE SEEN FOR DAUGHTER STATED THAT PT IS EXPERIECING A LOT OF SHORTNESS OF BREATH. DID NOT MENTION ANY OTHER SYMPTOMS. DAUGHTER STATED THAT PT SAW HEART DOCTOR AND THEY DID NOT BELEIVE IT TO BE HEART RELATED. AVISED DAUGHTER TO TAKE PT TO ER DUE TO SYMTPOM, DAUGHTER VERBALIZED UNDERSTANDING.     PLEASE ADVISE

## 2020-09-15 LAB
ANION GAP SERPL CALCULATED.3IONS-SCNC: 11.9 MMOL/L (ref 5–15)
BACTERIA UR QL AUTO: ABNORMAL /HPF
BASOPHILS # BLD AUTO: 0.02 10*3/MM3 (ref 0–0.2)
BASOPHILS NFR BLD AUTO: 0.4 % (ref 0–1.5)
BILIRUB UR QL STRIP: NEGATIVE
BUN SERPL-MCNC: 17 MG/DL (ref 8–23)
BUN/CREAT SERPL: 23.3 (ref 7–25)
CALCIUM SPEC-SCNC: 9 MG/DL (ref 8.6–10.5)
CHLORIDE SERPL-SCNC: 106 MMOL/L (ref 98–107)
CLARITY UR: ABNORMAL
CLOSE TME COLL+ADP + EPINEP PNL BLD: 43 %
CO2 SERPL-SCNC: 24.1 MMOL/L (ref 22–29)
COLOR UR: YELLOW
CREAT SERPL-MCNC: 0.73 MG/DL (ref 0.57–1)
DEPRECATED RDW RBC AUTO: 45.6 FL (ref 37–54)
EOSINOPHIL # BLD AUTO: 0.09 10*3/MM3 (ref 0–0.4)
EOSINOPHIL NFR BLD AUTO: 1.9 % (ref 0.3–6.2)
ERYTHROCYTE [DISTWIDTH] IN BLOOD BY AUTOMATED COUNT: 13.4 % (ref 12.3–15.4)
GFR SERPL CREATININE-BSD FRML MDRD: 77 ML/MIN/1.73
GLUCOSE SERPL-MCNC: 109 MG/DL (ref 65–99)
GLUCOSE UR STRIP-MCNC: NEGATIVE MG/DL
HCT VFR BLD AUTO: 36.7 % (ref 34–46.6)
HGB BLD-MCNC: 11.9 G/DL (ref 12–15.9)
HGB UR QL STRIP.AUTO: ABNORMAL
HYALINE CASTS UR QL AUTO: ABNORMAL /LPF
IMM GRANULOCYTES # BLD AUTO: 0.02 10*3/MM3 (ref 0–0.05)
IMM GRANULOCYTES NFR BLD AUTO: 0.4 % (ref 0–0.5)
KETONES UR QL STRIP: ABNORMAL
LEUKOCYTE ESTERASE UR QL STRIP.AUTO: ABNORMAL
LYMPHOCYTES # BLD AUTO: 1.11 10*3/MM3 (ref 0.7–3.1)
LYMPHOCYTES NFR BLD AUTO: 23.8 % (ref 19.6–45.3)
MCH RBC QN AUTO: 29.6 PG (ref 26.6–33)
MCHC RBC AUTO-ENTMCNC: 32.4 G/DL (ref 31.5–35.7)
MCV RBC AUTO: 91.3 FL (ref 79–97)
MONOCYTES # BLD AUTO: 0.38 10*3/MM3 (ref 0.1–0.9)
MONOCYTES NFR BLD AUTO: 8.1 % (ref 5–12)
NEUTROPHILS NFR BLD AUTO: 3.05 10*3/MM3 (ref 1.7–7)
NEUTROPHILS NFR BLD AUTO: 65.4 % (ref 42.7–76)
NITRITE UR QL STRIP: NEGATIVE
NRBC BLD AUTO-RTO: 0 /100 WBC (ref 0–0.2)
PH UR STRIP.AUTO: <=5 [PH] (ref 5–8)
PLATELET # BLD AUTO: 312 10*3/MM3 (ref 140–450)
PMV BLD AUTO: 8.7 FL (ref 6–12)
POTASSIUM SERPL-SCNC: 3.7 MMOL/L (ref 3.5–5.2)
PROT UR QL STRIP: ABNORMAL
RBC # BLD AUTO: 4.02 10*6/MM3 (ref 3.77–5.28)
RBC # UR: ABNORMAL /HPF
REF LAB TEST METHOD: ABNORMAL
SARS-COV-2 RNA RESP QL NAA+PROBE: NOT DETECTED
SODIUM SERPL-SCNC: 142 MMOL/L (ref 136–145)
SP GR UR STRIP: >=1.03 (ref 1–1.03)
SQUAMOUS #/AREA URNS HPF: ABNORMAL /HPF
UROBILINOGEN UR QL STRIP: ABNORMAL
WBC # BLD AUTO: 4.67 10*3/MM3 (ref 3.4–10.8)
WBC UR QL AUTO: ABNORMAL /HPF

## 2020-09-15 PROCEDURE — 25010000002 CEFTRIAXONE PER 250 MG: Performed by: NURSE PRACTITIONER

## 2020-09-15 PROCEDURE — 99232 SBSQ HOSP IP/OBS MODERATE 35: CPT | Performed by: PHYSICIAN ASSISTANT

## 2020-09-15 PROCEDURE — 81001 URINALYSIS AUTO W/SCOPE: CPT | Performed by: NURSE PRACTITIONER

## 2020-09-15 PROCEDURE — 99222 1ST HOSP IP/OBS MODERATE 55: CPT | Performed by: INTERNAL MEDICINE

## 2020-09-15 PROCEDURE — 87186 SC STD MICRODIL/AGAR DIL: CPT | Performed by: NURSE PRACTITIONER

## 2020-09-15 PROCEDURE — 80048 BASIC METABOLIC PNL TOTAL CA: CPT | Performed by: NURSE PRACTITIONER

## 2020-09-15 PROCEDURE — 87086 URINE CULTURE/COLONY COUNT: CPT | Performed by: NURSE PRACTITIONER

## 2020-09-15 PROCEDURE — 87077 CULTURE AEROBIC IDENTIFY: CPT | Performed by: NURSE PRACTITIONER

## 2020-09-15 PROCEDURE — 85025 COMPLETE CBC W/AUTO DIFF WBC: CPT | Performed by: NURSE PRACTITIONER

## 2020-09-15 PROCEDURE — 85576 BLOOD PLATELET AGGREGATION: CPT | Performed by: NURSE PRACTITIONER

## 2020-09-15 RX ORDER — CEFTRIAXONE SODIUM 1 G/50ML
1 INJECTION, SOLUTION INTRAVENOUS EVERY 24 HOURS
Status: DISCONTINUED | OUTPATIENT
Start: 2020-09-15 | End: 2020-09-17

## 2020-09-15 RX ORDER — LOSARTAN POTASSIUM 25 MG/1
25 TABLET ORAL DAILY
Status: DISCONTINUED | OUTPATIENT
Start: 2020-09-15 | End: 2020-09-16

## 2020-09-15 RX ADMIN — SODIUM CHLORIDE, PRESERVATIVE FREE 10 ML: 5 INJECTION INTRAVENOUS at 20:52

## 2020-09-15 RX ADMIN — TRAMADOL HYDROCHLORIDE 25 MG: 50 TABLET, FILM COATED ORAL at 20:52

## 2020-09-15 RX ADMIN — CARVEDILOL 6.25 MG: 6.25 TABLET, FILM COATED ORAL at 20:53

## 2020-09-15 RX ADMIN — PRAMIPEXOLE DIHYDROCHLORIDE 0.12 MG: 0.25 TABLET ORAL at 20:52

## 2020-09-15 RX ADMIN — CARVEDILOL 6.25 MG: 6.25 TABLET, FILM COATED ORAL at 08:46

## 2020-09-15 RX ADMIN — CEFTRIAXONE SODIUM 1 G: 1 INJECTION, SOLUTION INTRAVENOUS at 12:07

## 2020-09-15 RX ADMIN — LOSARTAN POTASSIUM 25 MG: 25 TABLET, FILM COATED ORAL at 09:57

## 2020-09-15 RX ADMIN — TRAMADOL HYDROCHLORIDE 25 MG: 50 TABLET, FILM COATED ORAL at 10:00

## 2020-09-15 RX ADMIN — ALPRAZOLAM 0.25 MG: 0.25 TABLET ORAL at 10:00

## 2020-09-15 RX ADMIN — SODIUM CHLORIDE, PRESERVATIVE FREE 10 ML: 5 INJECTION INTRAVENOUS at 08:49

## 2020-09-15 RX ADMIN — ALPRAZOLAM 0.25 MG: 0.25 TABLET ORAL at 20:52

## 2020-09-16 ENCOUNTER — APPOINTMENT (OUTPATIENT)
Dept: RESPIRATORY THERAPY | Facility: HOSPITAL | Age: 80
End: 2020-09-16

## 2020-09-16 ENCOUNTER — ANESTHESIA EVENT (OUTPATIENT)
Dept: PERIOP | Facility: HOSPITAL | Age: 80
End: 2020-09-16

## 2020-09-16 ENCOUNTER — APPOINTMENT (OUTPATIENT)
Dept: GENERAL RADIOLOGY | Facility: HOSPITAL | Age: 80
End: 2020-09-16

## 2020-09-16 PROBLEM — I25.3 VENTRICULAR ANEURYSM: Status: ACTIVE | Noted: 2020-09-14

## 2020-09-16 LAB
ABO GROUP BLD: NORMAL
ANION GAP SERPL CALCULATED.3IONS-SCNC: 6.9 MMOL/L (ref 5–15)
APTT PPP: 30.8 SECONDS (ref 22.7–35.4)
ARTERIAL PATENCY WRIST A: ABNORMAL
ATMOSPHERIC PRESS: 751.9 MMHG
BASE EXCESS BLDA CALC-SCNC: 2.5 MMOL/L (ref 0–2)
BASOPHILS # BLD AUTO: 0.02 10*3/MM3 (ref 0–0.2)
BASOPHILS NFR BLD AUTO: 0.4 % (ref 0–1.5)
BDY SITE: ABNORMAL
BLD GP AB SCN SERPL QL: NEGATIVE
BUN SERPL-MCNC: 21 MG/DL (ref 8–23)
BUN/CREAT SERPL: 33.3 (ref 7–25)
CALCIUM SPEC-SCNC: 8.7 MG/DL (ref 8.6–10.5)
CHLORIDE SERPL-SCNC: 104 MMOL/L (ref 98–107)
CHOLEST SERPL-MCNC: 189 MG/DL (ref 0–200)
CLOSE TME COLL+ADP + EPINEP PNL BLD: 66 %
CO2 SERPL-SCNC: 27.1 MMOL/L (ref 22–29)
CREAT SERPL-MCNC: 0.63 MG/DL (ref 0.57–1)
DEPRECATED RDW RBC AUTO: 42.8 FL (ref 37–54)
EOSINOPHIL # BLD AUTO: 0.11 10*3/MM3 (ref 0–0.4)
EOSINOPHIL NFR BLD AUTO: 2.2 % (ref 0.3–6.2)
ERYTHROCYTE [DISTWIDTH] IN BLOOD BY AUTOMATED COUNT: 13.1 % (ref 12.3–15.4)
GFR SERPL CREATININE-BSD FRML MDRD: 91 ML/MIN/1.73
GLUCOSE BLDC GLUCOMTR-MCNC: 116 MG/DL (ref 70–130)
GLUCOSE BLDC GLUCOMTR-MCNC: 137 MG/DL (ref 70–130)
GLUCOSE SERPL-MCNC: 124 MG/DL (ref 65–99)
HBA1C MFR BLD: 5.92 % (ref 4.8–5.6)
HCO3 BLDA-SCNC: 26.9 MMOL/L (ref 22–28)
HCT VFR BLD AUTO: 36.6 % (ref 34–46.6)
HDLC SERPL-MCNC: 48 MG/DL (ref 40–60)
HGB BLD-MCNC: 11.8 G/DL (ref 12–15.9)
IMM GRANULOCYTES # BLD AUTO: 0.01 10*3/MM3 (ref 0–0.05)
IMM GRANULOCYTES NFR BLD AUTO: 0.2 % (ref 0–0.5)
INR PPP: 1.09 (ref 0.9–1.1)
LDLC SERPL CALC-MCNC: 110 MG/DL (ref 0–100)
LDLC/HDLC SERPL: 2.3 {RATIO}
LYMPHOCYTES # BLD AUTO: 1.25 10*3/MM3 (ref 0.7–3.1)
LYMPHOCYTES NFR BLD AUTO: 24.9 % (ref 19.6–45.3)
MAGNESIUM SERPL-MCNC: 2 MG/DL (ref 1.6–2.4)
MCH RBC QN AUTO: 28.9 PG (ref 26.6–33)
MCHC RBC AUTO-ENTMCNC: 32.2 G/DL (ref 31.5–35.7)
MCV RBC AUTO: 89.7 FL (ref 79–97)
MODALITY: ABNORMAL
MONOCYTES # BLD AUTO: 0.41 10*3/MM3 (ref 0.1–0.9)
MONOCYTES NFR BLD AUTO: 8.2 % (ref 5–12)
NEUTROPHILS NFR BLD AUTO: 3.22 10*3/MM3 (ref 1.7–7)
NEUTROPHILS NFR BLD AUTO: 64.1 % (ref 42.7–76)
NRBC BLD AUTO-RTO: 0 /100 WBC (ref 0–0.2)
NT-PROBNP SERPL-MCNC: ABNORMAL PG/ML (ref 0–1800)
PCO2 BLDA: 40.1 MM HG (ref 35–45)
PH BLDA: 7.43 PH UNITS (ref 7.35–7.45)
PLATELET # BLD AUTO: 302 10*3/MM3 (ref 140–450)
PMV BLD AUTO: 8.7 FL (ref 6–12)
PO2 BLDA: 79.8 MM HG (ref 80–100)
POTASSIUM SERPL-SCNC: 3.7 MMOL/L (ref 3.5–5.2)
PROTHROMBIN TIME: 13.9 SECONDS (ref 11.7–14.2)
RBC # BLD AUTO: 4.08 10*6/MM3 (ref 3.77–5.28)
RH BLD: POSITIVE
SAO2 % BLDCOA: 96.1 % (ref 92–99)
SET MECH RESP RATE: 20
SODIUM SERPL-SCNC: 138 MMOL/L (ref 136–145)
T&S EXPIRATION DATE: NORMAL
TRIGL SERPL-MCNC: 154 MG/DL (ref 0–150)
VLDLC SERPL-MCNC: 30.8 MG/DL
WBC # BLD AUTO: 5.02 10*3/MM3 (ref 3.4–10.8)

## 2020-09-16 PROCEDURE — 85730 THROMBOPLASTIN TIME PARTIAL: CPT | Performed by: NURSE PRACTITIONER

## 2020-09-16 PROCEDURE — 94640 AIRWAY INHALATION TREATMENT: CPT

## 2020-09-16 PROCEDURE — 83880 ASSAY OF NATRIURETIC PEPTIDE: CPT | Performed by: NURSE PRACTITIONER

## 2020-09-16 PROCEDURE — 86900 BLOOD TYPING SEROLOGIC ABO: CPT | Performed by: NURSE PRACTITIONER

## 2020-09-16 PROCEDURE — 86901 BLOOD TYPING SEROLOGIC RH(D): CPT | Performed by: NURSE PRACTITIONER

## 2020-09-16 PROCEDURE — 85025 COMPLETE CBC W/AUTO DIFF WBC: CPT | Performed by: NURSE PRACTITIONER

## 2020-09-16 PROCEDURE — 86923 COMPATIBILITY TEST ELECTRIC: CPT

## 2020-09-16 PROCEDURE — 94060 EVALUATION OF WHEEZING: CPT

## 2020-09-16 PROCEDURE — 25010000002 CEFTRIAXONE PER 250 MG: Performed by: NURSE PRACTITIONER

## 2020-09-16 PROCEDURE — 36600 WITHDRAWAL OF ARTERIAL BLOOD: CPT

## 2020-09-16 PROCEDURE — 99233 SBSQ HOSP IP/OBS HIGH 50: CPT | Performed by: INTERNAL MEDICINE

## 2020-09-16 PROCEDURE — 85576 BLOOD PLATELET AGGREGATION: CPT | Performed by: NURSE PRACTITIONER

## 2020-09-16 PROCEDURE — 99231 SBSQ HOSP IP/OBS SF/LOW 25: CPT | Performed by: NURSE PRACTITIONER

## 2020-09-16 PROCEDURE — 83036 HEMOGLOBIN GLYCOSYLATED A1C: CPT | Performed by: NURSE PRACTITIONER

## 2020-09-16 PROCEDURE — 71046 X-RAY EXAM CHEST 2 VIEWS: CPT

## 2020-09-16 PROCEDURE — 36415 COLL VENOUS BLD VENIPUNCTURE: CPT | Performed by: NURSE PRACTITIONER

## 2020-09-16 PROCEDURE — 80061 LIPID PANEL: CPT | Performed by: NURSE PRACTITIONER

## 2020-09-16 PROCEDURE — 80048 BASIC METABOLIC PNL TOTAL CA: CPT | Performed by: NURSE PRACTITIONER

## 2020-09-16 PROCEDURE — 82803 BLOOD GASES ANY COMBINATION: CPT

## 2020-09-16 PROCEDURE — 85610 PROTHROMBIN TIME: CPT | Performed by: NURSE PRACTITIONER

## 2020-09-16 PROCEDURE — 83735 ASSAY OF MAGNESIUM: CPT | Performed by: NURSE PRACTITIONER

## 2020-09-16 PROCEDURE — 86850 RBC ANTIBODY SCREEN: CPT | Performed by: NURSE PRACTITIONER

## 2020-09-16 PROCEDURE — 82962 GLUCOSE BLOOD TEST: CPT

## 2020-09-16 RX ORDER — CARVEDILOL 3.12 MG/1
3.12 TABLET ORAL
Status: COMPLETED | OUTPATIENT
Start: 2020-09-17 | End: 2020-09-17

## 2020-09-16 RX ORDER — CEFAZOLIN SODIUM 2 G/100ML
2 INJECTION, SOLUTION INTRAVENOUS
Status: COMPLETED | OUTPATIENT
Start: 2020-09-17 | End: 2020-09-17

## 2020-09-16 RX ORDER — CHLORHEXIDINE GLUCONATE 0.12 MG/ML
15 RINSE ORAL EVERY 12 HOURS SCHEDULED
Status: DISCONTINUED | OUTPATIENT
Start: 2020-09-16 | End: 2020-09-17

## 2020-09-16 RX ORDER — FAMOTIDINE 10 MG/ML
20 INJECTION, SOLUTION INTRAVENOUS ONCE
Status: COMPLETED | OUTPATIENT
Start: 2020-09-16 | End: 2020-09-17

## 2020-09-16 RX ORDER — CHLORHEXIDINE GLUCONATE 500 MG/1
1 CLOTH TOPICAL EVERY 12 HOURS
Status: DISCONTINUED | OUTPATIENT
Start: 2020-09-16 | End: 2020-09-17

## 2020-09-16 RX ORDER — LOSARTAN POTASSIUM 25 MG/1
25 TABLET ORAL ONCE
Status: COMPLETED | OUTPATIENT
Start: 2020-09-16 | End: 2020-09-16

## 2020-09-16 RX ORDER — LOSARTAN POTASSIUM 50 MG/1
50 TABLET ORAL DAILY
Status: DISCONTINUED | OUTPATIENT
Start: 2020-09-17 | End: 2020-09-17

## 2020-09-16 RX ORDER — ALBUTEROL SULFATE 2.5 MG/3ML
2.5 SOLUTION RESPIRATORY (INHALATION) EVERY 6 HOURS PRN
Status: DISCONTINUED | OUTPATIENT
Start: 2020-09-16 | End: 2020-09-18

## 2020-09-16 RX ADMIN — TRAMADOL HYDROCHLORIDE 25 MG: 50 TABLET, FILM COATED ORAL at 20:55

## 2020-09-16 RX ADMIN — PRAMIPEXOLE DIHYDROCHLORIDE 0.12 MG: 0.25 TABLET ORAL at 21:05

## 2020-09-16 RX ADMIN — CHLORHEXIDINE GLUCONATE 1 APPLICATION: 500 CLOTH TOPICAL at 21:30

## 2020-09-16 RX ADMIN — MUPIROCIN 1 APPLICATION: 20 OINTMENT TOPICAL at 20:56

## 2020-09-16 RX ADMIN — ALPRAZOLAM 0.25 MG: 0.25 TABLET ORAL at 20:55

## 2020-09-16 RX ADMIN — ALBUTEROL SULFATE 2.5 MG: 2.5 SOLUTION RESPIRATORY (INHALATION) at 16:01

## 2020-09-16 RX ADMIN — ALPRAZOLAM 0.25 MG: 0.25 TABLET ORAL at 10:28

## 2020-09-16 RX ADMIN — CARVEDILOL 6.25 MG: 6.25 TABLET, FILM COATED ORAL at 21:05

## 2020-09-16 RX ADMIN — CHLORHEXIDINE GLUCONATE 15 ML: 1.2 RINSE ORAL at 20:55

## 2020-09-16 RX ADMIN — CEFTRIAXONE SODIUM 1 G: 1 INJECTION, SOLUTION INTRAVENOUS at 10:28

## 2020-09-16 RX ADMIN — LOSARTAN POTASSIUM 25 MG: 25 TABLET, FILM COATED ORAL at 08:19

## 2020-09-16 RX ADMIN — CARVEDILOL 6.25 MG: 6.25 TABLET, FILM COATED ORAL at 08:21

## 2020-09-16 RX ADMIN — LOSARTAN POTASSIUM 25 MG: 25 TABLET, FILM COATED ORAL at 11:13

## 2020-09-16 RX ADMIN — SODIUM CHLORIDE, PRESERVATIVE FREE 10 ML: 5 INJECTION INTRAVENOUS at 08:24

## 2020-09-16 RX ADMIN — SODIUM CHLORIDE, PRESERVATIVE FREE 10 ML: 5 INJECTION INTRAVENOUS at 20:56

## 2020-09-16 NOTE — ANESTHESIA PREPROCEDURE EVALUATION
Anesthesia Evaluation     Patient summary reviewed and Nursing notes reviewed     NPO Liquid Status: > 2 hours           Airway   Mallampati: I  TM distance: >3 FB  Neck ROM: full  No difficulty expected  Dental - normal exam   (+) lower dentures and upper dentures    Pulmonary - normal exam   (+) COPD mild,   Cardiovascular - normal exam  Exercise tolerance: poor (<4 METS)    ECG reviewed  PT is on anticoagulation therapy  Patient on routine beta blocker    (+) hypertension, past MI , CAD, CABG >6 Months, CHF Diastolic >=55%, hyperlipidemia,       Neuro/Psych  (+) TIA, CVA,     GI/Hepatic/Renal/Endo    (+)  GERD,  diabetes mellitus type 2 well controlled using insulin,     Musculoskeletal     Abdominal  - normal exam    Bowel sounds: normal.   Substance History - negative use     OB/GYN negative ob/gyn ROS         Other   arthritis,    history of cancer remission    ROS/Med Hx Other: S/P CABG, AVR, Thoraco-abdominal aneurysm repair, ventricular rupture repair, now presenting with LV pseudoaneurysm.                 Anesthesia Plan    ASA 4     general   (A line, Central line and ROSE)  intravenous induction   Postoperative Plan: Expected vent after surgery  Anesthetic plan, all risks, benefits, and alternatives have been provided, discussed and informed consent has been obtained with: patient.  Use of blood products discussed with patient  Consented to blood products.

## 2020-09-17 ENCOUNTER — ANESTHESIA (OUTPATIENT)
Dept: PERIOP | Facility: HOSPITAL | Age: 80
End: 2020-09-17

## 2020-09-17 ENCOUNTER — APPOINTMENT (OUTPATIENT)
Dept: GENERAL RADIOLOGY | Facility: HOSPITAL | Age: 80
End: 2020-09-17

## 2020-09-17 ENCOUNTER — ANCILLARY PROCEDURE (OUTPATIENT)
Dept: PERIOP | Facility: HOSPITAL | Age: 80
End: 2020-09-17

## 2020-09-17 LAB
ACT BLD: 120 SECONDS (ref 82–152)
ACT BLD: 131 SECONDS (ref 82–152)
ACT BLD: 164 SECONDS (ref 82–152)
ACT BLD: 301 SECONDS (ref 82–152)
ACT BLD: 307 SECONDS (ref 82–152)
ACT BLD: 312 SECONDS (ref 82–152)
ACT BLD: 334 SECONDS (ref 82–152)
ACT BLD: 362 SECONDS (ref 82–152)
ACT BLD: 400 SECONDS (ref 82–152)
ACT BLD: 433 SECONDS (ref 82–152)
ACT BLD: 472 SECONDS (ref 82–152)
ACT BLD: 510 SECONDS (ref 82–152)
ALBUMIN SERPL-MCNC: 3 G/DL (ref 3.5–5.2)
ALBUMIN SERPL-MCNC: 3.3 G/DL (ref 3.5–5.2)
ALBUMIN SERPL-MCNC: 3.4 G/DL (ref 3.5–5.2)
ALBUMIN SERPL-MCNC: 3.8 G/DL (ref 3.5–5.2)
ALBUMIN/GLOB SERPL: 1.3 G/DL
ALP SERPL-CCNC: 82 U/L (ref 39–117)
ALT SERPL W P-5'-P-CCNC: 11 U/L (ref 1–33)
ANION GAP SERPL CALCULATED.3IONS-SCNC: 10 MMOL/L (ref 5–15)
ANION GAP SERPL CALCULATED.3IONS-SCNC: 15.4 MMOL/L (ref 5–15)
ANION GAP SERPL CALCULATED.3IONS-SCNC: 16.2 MMOL/L (ref 5–15)
ANION GAP SERPL CALCULATED.3IONS-SCNC: 8.6 MMOL/L (ref 5–15)
APTT PPP: 43.6 SECONDS (ref 22.7–35.4)
APTT PPP: 50.6 SECONDS (ref 22.7–35.4)
APTT PPP: 55.4 SECONDS (ref 22.7–35.4)
APTT PPP: 76.2 SECONDS (ref 22.7–35.4)
ARTERIAL PATENCY WRIST A: ABNORMAL
AST SERPL-CCNC: 17 U/L (ref 1–32)
ATMOSPHERIC PRESS: 748.2 MMHG
ATMOSPHERIC PRESS: 748.2 MMHG
ATMOSPHERIC PRESS: 748.4 MMHG
ATMOSPHERIC PRESS: 749.1 MMHG
ATMOSPHERIC PRESS: 749.3 MMHG
ATMOSPHERIC PRESS: 749.6 MMHG
BACTERIA SPEC AEROBE CULT: ABNORMAL
BASE EXCESS BLDA CALC-SCNC: -1 MMOL/L (ref -5–5)
BASE EXCESS BLDA CALC-SCNC: -2 MMOL/L (ref -5–5)
BASE EXCESS BLDA CALC-SCNC: -4 MMOL/L (ref -5–5)
BASE EXCESS BLDA CALC-SCNC: -5 MMOL/L (ref -5–5)
BASE EXCESS BLDA CALC-SCNC: 1 MMOL/L (ref -5–5)
BASE EXCESS BLDA CALC-SCNC: 1 MMOL/L (ref 0–2)
BASE EXCESS BLDA CALC-SCNC: 10 MMOL/L (ref -5–5)
BASE EXCESS BLDA CALC-SCNC: 12 MMOL/L (ref -5–5)
BASE EXCESS BLDA CALC-SCNC: 2 MMOL/L (ref -5–5)
BASE EXCESS BLDA CALC-SCNC: 4 MMOL/L (ref -5–5)
BASE EXCESS BLDA CALC-SCNC: 4.1 MMOL/L (ref 0–2)
BASE EXCESS BLDA CALC-SCNC: 4.5 MMOL/L (ref 0–2)
BASE EXCESS BLDA CALC-SCNC: 5 MMOL/L (ref -5–5)
BASE EXCESS BLDV CALC-SCNC: 0.1 MMOL/L
BASE EXCESS BLDV CALC-SCNC: 2.6 MMOL/L
BASE EXCESS BLDV CALC-SCNC: 4.4 MMOL/L
BASOPHILS # BLD AUTO: 0.01 10*3/MM3 (ref 0–0.2)
BASOPHILS # BLD AUTO: 0.01 10*3/MM3 (ref 0–0.2)
BASOPHILS # BLD AUTO: 0.02 10*3/MM3 (ref 0–0.2)
BASOPHILS NFR BLD AUTO: 0.1 % (ref 0–1.5)
BASOPHILS NFR BLD AUTO: 0.1 % (ref 0–1.5)
BASOPHILS NFR BLD AUTO: 0.4 % (ref 0–1.5)
BDY SITE: ABNORMAL
BH BB BLOOD EXPIRATION DATE: NORMAL
BH BB BLOOD TYPE BARCODE: 5100
BH BB BLOOD TYPE BARCODE: 6200
BH BB BLOOD TYPE BARCODE: 6200
BH BB DISPENSE STATUS: NORMAL
BH BB PRODUCT CODE: NORMAL
BH BB UNIT NUMBER: NORMAL
BILIRUB SERPL-MCNC: 0.4 MG/DL (ref 0–1.2)
BUN SERPL-MCNC: 17 MG/DL (ref 8–23)
BUN SERPL-MCNC: 19 MG/DL (ref 8–23)
BUN SERPL-MCNC: 20 MG/DL (ref 8–23)
BUN SERPL-MCNC: 25 MG/DL (ref 8–23)
BUN/CREAT SERPL: 16.5 (ref 7–25)
BUN/CREAT SERPL: 18 (ref 7–25)
BUN/CREAT SERPL: 22.1 (ref 7–25)
BUN/CREAT SERPL: 28.8 (ref 7–25)
CA-I BLD-MCNC: 4.6 MG/DL (ref 4.6–5.4)
CA-I BLD-MCNC: 4.7 MG/DL (ref 4.6–5.4)
CA-I BLD-MCNC: 4.8 MG/DL (ref 4.6–5.4)
CA-I BLDA-SCNC: ABNORMAL MMOL/L
CA-I SERPL ISE-MCNC: 1.15 MMOL/L (ref 1.15–1.35)
CA-I SERPL ISE-MCNC: 1.17 MMOL/L (ref 1.15–1.35)
CA-I SERPL ISE-MCNC: 1.19 MMOL/L (ref 1.15–1.35)
CALCIUM SPEC-SCNC: 8.7 MG/DL (ref 8.6–10.5)
CALCIUM SPEC-SCNC: 9.1 MG/DL (ref 8.6–10.5)
CALCIUM SPEC-SCNC: 9.6 MG/DL (ref 8.6–10.5)
CALCIUM SPEC-SCNC: 9.8 MG/DL (ref 8.6–10.5)
CHLORIDE SERPL-SCNC: 102 MMOL/L (ref 98–107)
CHLORIDE SERPL-SCNC: 108 MMOL/L (ref 98–107)
CHLORIDE SERPL-SCNC: 108 MMOL/L (ref 98–107)
CHLORIDE SERPL-SCNC: 110 MMOL/L (ref 98–107)
CLOSE TME COLL+ADP + EPINEP PNL BLD: 77 % (ref 86–100)
CLOSE TME COLL+ADP + EPINEP PNL BLD: 85 %
CO2 BLDA-SCNC: 23 MMOL/L (ref 24–29)
CO2 BLDA-SCNC: 24 MMOL/L (ref 24–29)
CO2 BLDA-SCNC: 26 MMOL/L (ref 24–29)
CO2 BLDA-SCNC: 26 MMOL/L (ref 24–29)
CO2 BLDA-SCNC: 27 MMOL/L (ref 24–29)
CO2 BLDA-SCNC: 29 MMOL/L (ref 24–29)
CO2 BLDA-SCNC: 29 MMOL/L (ref 24–29)
CO2 BLDA-SCNC: 30 MMOL/L (ref 24–29)
CO2 BLDA-SCNC: 34 MMOL/L (ref 24–29)
CO2 BLDA-SCNC: 37 MMOL/L (ref 24–29)
CO2 SERPL-SCNC: 24.6 MMOL/L (ref 22–29)
CO2 SERPL-SCNC: 24.8 MMOL/L (ref 22–29)
CO2 SERPL-SCNC: 29 MMOL/L (ref 22–29)
CO2 SERPL-SCNC: 30.4 MMOL/L (ref 22–29)
CREAT SERPL-MCNC: 0.66 MG/DL (ref 0.57–1)
CREAT SERPL-MCNC: 1.03 MG/DL (ref 0.57–1)
CREAT SERPL-MCNC: 1.11 MG/DL (ref 0.57–1)
CREAT SERPL-MCNC: 1.13 MG/DL (ref 0.57–1)
DEPRECATED RDW RBC AUTO: 45.3 FL (ref 37–54)
DEPRECATED RDW RBC AUTO: 45.7 FL (ref 37–54)
DEPRECATED RDW RBC AUTO: 52.1 FL (ref 37–54)
DEPRECATED RDW RBC AUTO: 54 FL (ref 37–54)
DEPRECATED RDW RBC AUTO: 54.5 FL (ref 37–54)
DEPRECATED RDW RBC AUTO: 56.2 FL (ref 37–54)
EOSINOPHIL # BLD AUTO: 0 10*3/MM3 (ref 0–0.4)
EOSINOPHIL # BLD AUTO: 0.04 10*3/MM3 (ref 0–0.4)
EOSINOPHIL # BLD AUTO: 0.09 10*3/MM3 (ref 0–0.4)
EOSINOPHIL NFR BLD AUTO: 0 % (ref 0.3–6.2)
EOSINOPHIL NFR BLD AUTO: 0.4 % (ref 0.3–6.2)
EOSINOPHIL NFR BLD AUTO: 1.8 % (ref 0.3–6.2)
ERYTHROCYTE [DISTWIDTH] IN BLOOD BY AUTOMATED COUNT: 13.2 % (ref 12.3–15.4)
ERYTHROCYTE [DISTWIDTH] IN BLOOD BY AUTOMATED COUNT: 13.4 % (ref 12.3–15.4)
ERYTHROCYTE [DISTWIDTH] IN BLOOD BY AUTOMATED COUNT: 16 % (ref 12.3–15.4)
ERYTHROCYTE [DISTWIDTH] IN BLOOD BY AUTOMATED COUNT: 16.3 % (ref 12.3–15.4)
ERYTHROCYTE [DISTWIDTH] IN BLOOD BY AUTOMATED COUNT: 16.3 % (ref 12.3–15.4)
ERYTHROCYTE [DISTWIDTH] IN BLOOD BY AUTOMATED COUNT: 16.7 % (ref 12.3–15.4)
FIBRINOGEN PPP-MCNC: 163 MG/DL (ref 219–464)
FIBRINOGEN PPP-MCNC: 168 MG/DL (ref 219–464)
FIBRINOGEN PPP-MCNC: 178 MG/DL (ref 219–464)
FIBRINOGEN PPP-MCNC: 212 MG/DL (ref 219–464)
GFR SERPL CREATININE-BSD FRML MDRD: 46 ML/MIN/1.73
GFR SERPL CREATININE-BSD FRML MDRD: 47 ML/MIN/1.73
GFR SERPL CREATININE-BSD FRML MDRD: 52 ML/MIN/1.73
GFR SERPL CREATININE-BSD FRML MDRD: 86 ML/MIN/1.73
GLOBULIN UR ELPH-MCNC: 3 GM/DL
GLUCOSE BLDC GLUCOMTR-MCNC: 114 MG/DL (ref 70–130)
GLUCOSE BLDC GLUCOMTR-MCNC: 115 MG/DL (ref 70–130)
GLUCOSE BLDC GLUCOMTR-MCNC: 129 MG/DL (ref 70–130)
GLUCOSE BLDC GLUCOMTR-MCNC: 136 MG/DL (ref 70–130)
GLUCOSE BLDC GLUCOMTR-MCNC: 148 MG/DL (ref 70–130)
GLUCOSE BLDC GLUCOMTR-MCNC: 152 MG/DL (ref 70–130)
GLUCOSE BLDC GLUCOMTR-MCNC: 154 MG/DL (ref 70–130)
GLUCOSE BLDC GLUCOMTR-MCNC: 169 MG/DL (ref 70–130)
GLUCOSE BLDC GLUCOMTR-MCNC: 172 MG/DL (ref 70–130)
GLUCOSE BLDC GLUCOMTR-MCNC: 188 MG/DL (ref 70–130)
GLUCOSE BLDC GLUCOMTR-MCNC: 192 MG/DL (ref 70–130)
GLUCOSE BLDC GLUCOMTR-MCNC: 209 MG/DL (ref 70–130)
GLUCOSE BLDC GLUCOMTR-MCNC: 210 MG/DL (ref 70–130)
GLUCOSE BLDC GLUCOMTR-MCNC: 214 MG/DL (ref 70–130)
GLUCOSE BLDC GLUCOMTR-MCNC: 215 MG/DL (ref 70–130)
GLUCOSE BLDC GLUCOMTR-MCNC: 240 MG/DL (ref 70–130)
GLUCOSE BLDC GLUCOMTR-MCNC: 94 MG/DL (ref 70–130)
GLUCOSE SERPL-MCNC: 123 MG/DL (ref 65–99)
GLUCOSE SERPL-MCNC: 138 MG/DL (ref 65–99)
GLUCOSE SERPL-MCNC: 207 MG/DL (ref 65–99)
GLUCOSE SERPL-MCNC: 93 MG/DL (ref 65–99)
HCO3 BLDA-SCNC: 21.9 MMOL/L (ref 22–26)
HCO3 BLDA-SCNC: 22.5 MMOL/L (ref 22–26)
HCO3 BLDA-SCNC: 24.3 MMOL/L (ref 22–26)
HCO3 BLDA-SCNC: 24.5 MMOL/L (ref 22–26)
HCO3 BLDA-SCNC: 26.2 MMOL/L (ref 22–26)
HCO3 BLDA-SCNC: 26.2 MMOL/L (ref 22–28)
HCO3 BLDA-SCNC: 27.5 MMOL/L (ref 22–26)
HCO3 BLDA-SCNC: 27.8 MMOL/L (ref 22–26)
HCO3 BLDA-SCNC: 28.1 MMOL/L (ref 22–28)
HCO3 BLDA-SCNC: 28.4 MMOL/L (ref 22–26)
HCO3 BLDA-SCNC: 29.9 MMOL/L (ref 22–28)
HCO3 BLDA-SCNC: 32.5 MMOL/L (ref 22–26)
HCO3 BLDA-SCNC: 35.3 MMOL/L (ref 22–26)
HCO3 BLDV-SCNC: 26.5 MMOL/L (ref 22–28)
HCO3 BLDV-SCNC: 28.5 MMOL/L (ref 22–28)
HCO3 BLDV-SCNC: 31.1 MMOL/L (ref 22–28)
HCT VFR BLD AUTO: 14.4 % (ref 34–46.6)
HCT VFR BLD AUTO: 18.7 % (ref 34–46.6)
HCT VFR BLD AUTO: 21.8 % (ref 34–46.6)
HCT VFR BLD AUTO: 29.2 % (ref 34–46.6)
HCT VFR BLD AUTO: 29.5 % (ref 34–46.6)
HCT VFR BLD AUTO: 40.6 % (ref 34–46.6)
HCT VFR BLDA CALC: 18 % (ref 38–51)
HCT VFR BLDA CALC: 19 % (ref 38–51)
HCT VFR BLDA CALC: 20 % (ref 38–51)
HCT VFR BLDA CALC: 21 % (ref 38–51)
HCT VFR BLDA CALC: 22 % (ref 38–51)
HCT VFR BLDA CALC: 23 % (ref 38–51)
HCT VFR BLDA CALC: 24 % (ref 38–51)
HCT VFR BLDA CALC: 25 % (ref 38–51)
HCT VFR BLDA CALC: 26 % (ref 38–51)
HCT VFR BLDA CALC: 29 % (ref 38–51)
HGB BLD-MCNC: 12.8 G/DL (ref 12–15.9)
HGB BLD-MCNC: 4.7 G/DL (ref 12–15.9)
HGB BLD-MCNC: 6.1 G/DL (ref 12–15.9)
HGB BLD-MCNC: 7 G/DL (ref 12–15.9)
HGB BLD-MCNC: 9.1 G/DL (ref 12–15.9)
HGB BLD-MCNC: 9.4 G/DL (ref 12–15.9)
HGB BLDA-MCNC: 6.1 G/DL (ref 12–17)
HGB BLDA-MCNC: 6.5 G/DL (ref 12–17)
HGB BLDA-MCNC: 6.8 G/DL (ref 12–17)
HGB BLDA-MCNC: 7.1 G/DL (ref 12–17)
HGB BLDA-MCNC: 7.5 G/DL (ref 12–17)
HGB BLDA-MCNC: 7.8 G/DL (ref 12–17)
HGB BLDA-MCNC: 8.2 G/DL (ref 12–17)
HGB BLDA-MCNC: 8.5 G/DL (ref 12–17)
HGB BLDA-MCNC: 8.8 G/DL (ref 12–17)
HGB BLDA-MCNC: 9.9 G/DL (ref 12–17)
IMM GRANULOCYTES # BLD AUTO: 0.01 10*3/MM3 (ref 0–0.05)
IMM GRANULOCYTES # BLD AUTO: 0.04 10*3/MM3 (ref 0–0.05)
IMM GRANULOCYTES # BLD AUTO: 0.09 10*3/MM3 (ref 0–0.05)
IMM GRANULOCYTES NFR BLD AUTO: 0.2 % (ref 0–0.5)
IMM GRANULOCYTES NFR BLD AUTO: 0.5 % (ref 0–0.5)
IMM GRANULOCYTES NFR BLD AUTO: 0.9 % (ref 0–0.5)
INHALED O2 CONCENTRATION: 100 %
INHALED O2 CONCENTRATION: 100 %
INHALED O2 CONCENTRATION: 30 %
INHALED O2 CONCENTRATION: 30 %
INHALED O2 CONCENTRATION: 40 %
INHALED O2 CONCENTRATION: 40 %
INR PPP: 1.5 (ref 0.8–1.2)
INR PPP: 1.5 (ref 0.9–1.1)
INR PPP: 1.52 (ref 0.9–1.1)
INR PPP: 1.85 (ref 0.9–1.1)
INR PPP: 1.9 (ref 0.9–1.1)
INR PPP: 2.05 (ref 0.9–1.1)
LYMPHOCYTES # BLD AUTO: 0.46 10*3/MM3 (ref 0.7–3.1)
LYMPHOCYTES # BLD AUTO: 0.89 10*3/MM3 (ref 0.7–3.1)
LYMPHOCYTES # BLD AUTO: 1.55 10*3/MM3 (ref 0.7–3.1)
LYMPHOCYTES NFR BLD AUTO: 30.5 % (ref 19.6–45.3)
LYMPHOCYTES NFR BLD AUTO: 5.6 % (ref 19.6–45.3)
LYMPHOCYTES NFR BLD AUTO: 9.3 % (ref 19.6–45.3)
MAGNESIUM SERPL-MCNC: 2.5 MG/DL (ref 1.6–2.4)
MAGNESIUM SERPL-MCNC: 2.5 MG/DL (ref 1.6–2.4)
MAGNESIUM SERPL-MCNC: 2.6 MG/DL (ref 1.6–2.4)
MCH RBC QN AUTO: 27.9 PG (ref 26.6–33)
MCH RBC QN AUTO: 28.7 PG (ref 26.6–33)
MCH RBC QN AUTO: 28.8 PG (ref 26.6–33)
MCH RBC QN AUTO: 29 PG (ref 26.6–33)
MCH RBC QN AUTO: 29.4 PG (ref 26.6–33)
MCH RBC QN AUTO: 30.1 PG (ref 26.6–33)
MCHC RBC AUTO-ENTMCNC: 30.8 G/DL (ref 31.5–35.7)
MCHC RBC AUTO-ENTMCNC: 31.5 G/DL (ref 31.5–35.7)
MCHC RBC AUTO-ENTMCNC: 32.1 G/DL (ref 31.5–35.7)
MCHC RBC AUTO-ENTMCNC: 32.2 G/DL (ref 31.5–35.7)
MCHC RBC AUTO-ENTMCNC: 32.6 G/DL (ref 31.5–35.7)
MCHC RBC AUTO-ENTMCNC: 32.6 G/DL (ref 31.5–35.7)
MCV RBC AUTO: 88.2 FL (ref 79–97)
MCV RBC AUTO: 89 FL (ref 79–97)
MCV RBC AUTO: 90.5 FL (ref 79–97)
MCV RBC AUTO: 90.5 FL (ref 79–97)
MCV RBC AUTO: 92.3 FL (ref 79–97)
MCV RBC AUTO: 93.3 FL (ref 79–97)
MODALITY: ABNORMAL
MONOCYTES # BLD AUTO: 0.32 10*3/MM3 (ref 0.1–0.9)
MONOCYTES # BLD AUTO: 0.36 10*3/MM3 (ref 0.1–0.9)
MONOCYTES # BLD AUTO: 0.37 10*3/MM3 (ref 0.1–0.9)
MONOCYTES NFR BLD AUTO: 3.4 % (ref 5–12)
MONOCYTES NFR BLD AUTO: 4.4 % (ref 5–12)
MONOCYTES NFR BLD AUTO: 7.3 % (ref 5–12)
NEUTROPHILS NFR BLD AUTO: 3.05 10*3/MM3 (ref 1.7–7)
NEUTROPHILS NFR BLD AUTO: 59.8 % (ref 42.7–76)
NEUTROPHILS NFR BLD AUTO: 7.4 10*3/MM3 (ref 1.7–7)
NEUTROPHILS NFR BLD AUTO: 8.17 10*3/MM3 (ref 1.7–7)
NEUTROPHILS NFR BLD AUTO: 85.9 % (ref 42.7–76)
NEUTROPHILS NFR BLD AUTO: 89.4 % (ref 42.7–76)
NRBC BLD AUTO-RTO: 0 /100 WBC (ref 0–0.2)
NRBC BLD AUTO-RTO: 0 /100 WBC (ref 0–0.2)
NRBC BLD AUTO-RTO: 0.1 /100 WBC (ref 0–0.2)
O2 A-A PPRESDIFF RESPIRATORY: 0.6 MMHG
O2 A-A PPRESDIFF RESPIRATORY: 0.7 MMHG
O2 A-A PPRESDIFF RESPIRATORY: 0.8 MMHG
PCO2 BLDA: 36.8 MM HG (ref 35–45)
PCO2 BLDA: 38.2 MM HG (ref 35–45)
PCO2 BLDA: 38.3 MM HG (ref 35–45)
PCO2 BLDA: 40.3 MM HG (ref 35–45)
PCO2 BLDA: 41 MM HG (ref 35–45)
PCO2 BLDA: 43.5 MM HG (ref 35–45)
PCO2 BLDA: 43.7 MM HG (ref 35–45)
PCO2 BLDA: 43.9 MM HG (ref 35–45)
PCO2 BLDA: 46.2 MM HG (ref 35–45)
PCO2 BLDA: 47.2 MM HG (ref 35–45)
PCO2 BLDA: 48 MM HG (ref 35–45)
PCO2 BLDA: 50.9 MM HG (ref 35–45)
PCO2 BLDA: 55 MM HG (ref 35–45)
PCO2 BLDV: 49.4 MM HG (ref 41–51)
PCO2 BLDV: 53.1 MM HG (ref 41–51)
PCO2 BLDV: 58.2 MM HG (ref 41–51)
PEEP RESPIRATORY: 5 CM[H2O]
PEEP RESPIRATORY: 7.5 CM[H2O]
PEEP RESPIRATORY: 7.5 CM[H2O]
PH BLDA: 7.28 PH UNITS (ref 7.35–7.6)
PH BLDA: 7.29 PH UNITS (ref 7.35–7.6)
PH BLDA: 7.31 PH UNITS (ref 7.35–7.6)
PH BLDA: 7.31 PH UNITS (ref 7.35–7.6)
PH BLDA: 7.36 PH UNITS (ref 7.35–7.6)
PH BLDA: 7.38 PH UNITS (ref 7.35–7.45)
PH BLDA: 7.39 PH UNITS (ref 7.35–7.45)
PH BLDA: 7.41 PH UNITS (ref 7.35–7.6)
PH BLDA: 7.47 PH UNITS (ref 7.35–7.6)
PH BLDA: 7.48 PH UNITS (ref 7.35–7.6)
PH BLDA: 7.49 PH UNITS (ref 7.35–7.45)
PH BLDA: 7.52 PH UNITS (ref 7.35–7.6)
PH BLDA: 7.52 PH UNITS (ref 7.35–7.6)
PH BLDV: 7.31 PH UNITS (ref 7.31–7.41)
PH BLDV: 7.33 PH UNITS (ref 7.31–7.41)
PH BLDV: 7.37 PH UNITS (ref 7.31–7.41)
PHOSPHATE SERPL-MCNC: 1.4 MG/DL (ref 2.5–4.5)
PHOSPHATE SERPL-MCNC: 1.7 MG/DL (ref 2.5–4.5)
PHOSPHATE SERPL-MCNC: 3.4 MG/DL (ref 2.5–4.5)
PLATELET # BLD AUTO: 104 10*3/MM3 (ref 140–450)
PLATELET # BLD AUTO: 126 10*3/MM3 (ref 140–450)
PLATELET # BLD AUTO: 144 10*3/MM3 (ref 140–450)
PLATELET # BLD AUTO: 183 10*3/MM3 (ref 140–450)
PLATELET # BLD AUTO: 222 10*3/MM3 (ref 140–450)
PLATELET # BLD AUTO: 290 10*3/MM3 (ref 140–450)
PMV BLD AUTO: 8.6 FL (ref 6–12)
PMV BLD AUTO: 8.6 FL (ref 6–12)
PMV BLD AUTO: 8.7 FL (ref 6–12)
PMV BLD AUTO: 8.8 FL (ref 6–12)
PMV BLD AUTO: 9 FL (ref 6–12)
PMV BLD AUTO: 9.5 FL (ref 6–12)
PO2 BLDA: 196.8 MM HG (ref 80–100)
PO2 BLDA: 416 MMHG (ref 80–105)
PO2 BLDA: 428 MMHG (ref 80–105)
PO2 BLDA: 433 MMHG (ref 80–105)
PO2 BLDA: 44 MMHG (ref 80–105)
PO2 BLDA: 441 MMHG (ref 80–105)
PO2 BLDA: 476 MMHG (ref 80–105)
PO2 BLDA: 496.5 MM HG (ref 80–100)
PO2 BLDA: 498 MMHG (ref 80–105)
PO2 BLDA: 515 MMHG (ref 80–105)
PO2 BLDA: 516 MMHG (ref 80–105)
PO2 BLDA: 542 MMHG (ref 80–105)
PO2 BLDA: 98.8 MM HG (ref 80–100)
PO2 BLDV: 31.9 MM HG (ref 35–45)
PO2 BLDV: 37.9 MM HG (ref 35–45)
PO2 BLDV: 38.8 MM HG (ref 35–45)
POTASSIUM BLDA-SCNC: 2.9 MMOL/L (ref 3.5–4.9)
POTASSIUM BLDA-SCNC: 3.4 MMOL/L (ref 3.5–4.9)
POTASSIUM BLDA-SCNC: 3.6 MMOL/L (ref 3.5–4.9)
POTASSIUM BLDA-SCNC: 3.7 MMOL/L (ref 3.5–4.9)
POTASSIUM BLDA-SCNC: 3.7 MMOL/L (ref 3.5–4.9)
POTASSIUM BLDA-SCNC: 3.9 MMOL/L (ref 3.5–4.9)
POTASSIUM BLDA-SCNC: 4.2 MMOL/L (ref 3.5–4.9)
POTASSIUM BLDA-SCNC: 4.4 MMOL/L (ref 3.5–4.9)
POTASSIUM BLDA-SCNC: 4.6 MMOL/L (ref 3.5–4.9)
POTASSIUM BLDA-SCNC: 4.8 MMOL/L (ref 3.5–4.9)
POTASSIUM SERPL-SCNC: 3.5 MMOL/L (ref 3.5–5.2)
POTASSIUM SERPL-SCNC: 4 MMOL/L (ref 3.5–5.2)
POTASSIUM SERPL-SCNC: 4 MMOL/L (ref 3.5–5.2)
POTASSIUM SERPL-SCNC: 4.7 MMOL/L (ref 3.5–5.2)
PROT SERPL-MCNC: 6.8 G/DL (ref 6–8.5)
PROTHROMBIN TIME: 17.8 SECONDS (ref 11.7–14.2)
PROTHROMBIN TIME: 18 SECONDS (ref 11.7–14.2)
PROTHROMBIN TIME: 18 SECONDS (ref 12.8–15.2)
PROTHROMBIN TIME: 20.9 SECONDS (ref 11.7–14.2)
PROTHROMBIN TIME: 21.3 SECONDS (ref 11.7–14.2)
PROTHROMBIN TIME: 22.6 SECONDS (ref 11.7–14.2)
RBC # BLD AUTO: 1.56 10*6/MM3 (ref 3.77–5.28)
RBC # BLD AUTO: 2.12 10*6/MM3 (ref 3.77–5.28)
RBC # BLD AUTO: 2.41 10*6/MM3 (ref 3.77–5.28)
RBC # BLD AUTO: 3.26 10*6/MM3 (ref 3.77–5.28)
RBC # BLD AUTO: 3.28 10*6/MM3 (ref 3.77–5.28)
RBC # BLD AUTO: 4.35 10*6/MM3 (ref 3.77–5.28)
SAO2 % BLDA: 100 % (ref 95–98)
SAO2 % BLDA: 74 % (ref 95–98)
SAO2 % BLDCOA: 100 % (ref 92–99)
SAO2 % BLDCOA: 58.4 % (ref 92–99)
SAO2 % BLDCOA: 66.7 % (ref 92–99)
SAO2 % BLDCOA: 67 % (ref 92–99)
SAO2 % BLDCOA: 97.4 % (ref 92–99)
SAO2 % BLDCOA: 99.8 % (ref 92–99)
SET MECH RESP RATE: 12
SET MECH RESP RATE: 12
SET MECH RESP RATE: 16
SODIUM SERPL-SCNC: 141 MMOL/L (ref 136–145)
SODIUM SERPL-SCNC: 148 MMOL/L (ref 136–145)
SODIUM SERPL-SCNC: 149 MMOL/L (ref 136–145)
SODIUM SERPL-SCNC: 149 MMOL/L (ref 136–145)
TOTAL RATE: 13 BREATHS/MINUTE
TOTAL RATE: 13 BREATHS/MINUTE
TOTAL RATE: 16 BREATHS/MINUTE
UNIT  ABO: NORMAL
UNIT  RH: NORMAL
VENTILATOR MODE: ABNORMAL
VT ON VENT VENT: 450 ML
WBC # BLD AUTO: 5.09 10*3/MM3 (ref 3.4–10.8)
WBC # BLD AUTO: 7.91 10*3/MM3 (ref 3.4–10.8)
WBC # BLD AUTO: 8.27 10*3/MM3 (ref 3.4–10.8)
WBC # BLD AUTO: 8.84 10*3/MM3 (ref 3.4–10.8)
WBC # BLD AUTO: 9.43 10*3/MM3 (ref 3.4–10.8)
WBC # BLD AUTO: 9.52 10*3/MM3 (ref 3.4–10.8)

## 2020-09-17 PROCEDURE — 25010000003 MILRINONE LACTATE 10 MG/10ML SOLUTION 10 ML VIAL: Performed by: ANESTHESIOLOGY

## 2020-09-17 PROCEDURE — 25010000002 PROPOFOL 10 MG/ML EMULSION: Performed by: ANESTHESIOLOGY

## 2020-09-17 PROCEDURE — 33548 RESTORE/REMODEL VENTRICLE: CPT | Performed by: PHYSICIAN ASSISTANT

## 2020-09-17 PROCEDURE — 5A02210 ASSISTANCE WITH CARDIAC OUTPUT USING BALLOON PUMP, CONTINUOUS: ICD-10-PCS | Performed by: THORACIC SURGERY (CARDIOTHORACIC VASCULAR SURGERY)

## 2020-09-17 PROCEDURE — 25010000002 LORAZEPAM PER 2 MG: Performed by: ANESTHESIOLOGY

## 2020-09-17 PROCEDURE — 25010000002 PROTAMINE SULFATE PER 10 MG: Performed by: ANESTHESIOLOGY

## 2020-09-17 PROCEDURE — 82947 ASSAY GLUCOSE BLOOD QUANT: CPT

## 2020-09-17 PROCEDURE — 85730 THROMBOPLASTIN TIME PARTIAL: CPT | Performed by: THORACIC SURGERY (CARDIOTHORACIC VASCULAR SURGERY)

## 2020-09-17 PROCEDURE — 85347 COAGULATION TIME ACTIVATED: CPT

## 2020-09-17 PROCEDURE — 85027 COMPLETE CBC AUTOMATED: CPT | Performed by: THORACIC SURGERY (CARDIOTHORACIC VASCULAR SURGERY)

## 2020-09-17 PROCEDURE — 25010000003 CEFAZOLIN IN DEXTROSE 2-4 GM/100ML-% SOLUTION: Performed by: NURSE PRACTITIONER

## 2020-09-17 PROCEDURE — P9017 PLASMA 1 DONOR FRZ W/IN 8 HR: HCPCS

## 2020-09-17 PROCEDURE — C1751 CATH, INF, PER/CENT/MIDLINE: HCPCS | Performed by: ANESTHESIOLOGY

## 2020-09-17 PROCEDURE — 85576 BLOOD PLATELET AGGREGATION: CPT | Performed by: NURSE PRACTITIONER

## 2020-09-17 PROCEDURE — 25010000002 FENTANYL CITRATE (PF) 100 MCG/2ML SOLUTION: Performed by: ANESTHESIOLOGY

## 2020-09-17 PROCEDURE — 02NN0ZZ RELEASE PERICARDIUM, OPEN APPROACH: ICD-10-PCS | Performed by: THORACIC SURGERY (CARDIOTHORACIC VASCULAR SURGERY)

## 2020-09-17 PROCEDURE — 80069 RENAL FUNCTION PANEL: CPT | Performed by: NURSE PRACTITIONER

## 2020-09-17 PROCEDURE — 85018 HEMOGLOBIN: CPT

## 2020-09-17 PROCEDURE — B246ZZ4 ULTRASONOGRAPHY OF RIGHT AND LEFT HEART, TRANSESOPHAGEAL: ICD-10-PCS | Performed by: ANESTHESIOLOGY

## 2020-09-17 PROCEDURE — 94799 UNLISTED PULMONARY SVC/PX: CPT

## 2020-09-17 PROCEDURE — 85384 FIBRINOGEN ACTIVITY: CPT | Performed by: THORACIC SURGERY (CARDIOTHORACIC VASCULAR SURGERY)

## 2020-09-17 PROCEDURE — 02UL08Z SUPPLEMENT LEFT VENTRICLE WITH ZOOPLASTIC TISSUE, OPEN APPROACH: ICD-10-PCS | Performed by: THORACIC SURGERY (CARDIOTHORACIC VASCULAR SURGERY)

## 2020-09-17 PROCEDURE — 63710000001 INSULIN REGULAR HUMAN PER 5 UNITS: Performed by: ANESTHESIOLOGY

## 2020-09-17 PROCEDURE — 82330 ASSAY OF CALCIUM: CPT

## 2020-09-17 PROCEDURE — 85610 PROTHROMBIN TIME: CPT

## 2020-09-17 PROCEDURE — 85025 COMPLETE CBC W/AUTO DIFF WBC: CPT | Performed by: NURSE PRACTITIONER

## 2020-09-17 PROCEDURE — 25010000002 EPINEPHRINE 1 MG/10ML SOLUTION PREFILLED SYRINGE: Performed by: ANESTHESIOLOGY

## 2020-09-17 PROCEDURE — 82962 GLUCOSE BLOOD TEST: CPT

## 2020-09-17 PROCEDURE — 82330 ASSAY OF CALCIUM: CPT | Performed by: NURSE PRACTITIONER

## 2020-09-17 PROCEDURE — 71045 X-RAY EXAM CHEST 1 VIEW: CPT

## 2020-09-17 PROCEDURE — 85610 PROTHROMBIN TIME: CPT | Performed by: THORACIC SURGERY (CARDIOTHORACIC VASCULAR SURGERY)

## 2020-09-17 PROCEDURE — C1769 GUIDE WIRE: HCPCS | Performed by: THORACIC SURGERY (CARDIOTHORACIC VASCULAR SURGERY)

## 2020-09-17 PROCEDURE — 25010000002 EPINEPHRINE PER 0.1 MG: Performed by: ANESTHESIOLOGY

## 2020-09-17 PROCEDURE — 36415 COLL VENOUS BLD VENIPUNCTURE: CPT | Performed by: NURSE PRACTITIONER

## 2020-09-17 PROCEDURE — P9041 ALBUMIN (HUMAN),5%, 50ML: HCPCS | Performed by: NURSE PRACTITIONER

## 2020-09-17 PROCEDURE — 25010000003 MILRINONE LACTATE 10 MG/10ML SOLUTION 10 ML VIAL: Performed by: NURSE PRACTITIONER

## 2020-09-17 PROCEDURE — 82803 BLOOD GASES ANY COMBINATION: CPT

## 2020-09-17 PROCEDURE — 25010000002 PROPOFOL 10 MG/ML EMULSION: Performed by: NURSE PRACTITIONER

## 2020-09-17 PROCEDURE — 85610 PROTHROMBIN TIME: CPT | Performed by: NURSE PRACTITIONER

## 2020-09-17 PROCEDURE — 85014 HEMATOCRIT: CPT

## 2020-09-17 PROCEDURE — 33548 RESTORE/REMODEL VENTRICLE: CPT | Performed by: THORACIC SURGERY (CARDIOTHORACIC VASCULAR SURGERY)

## 2020-09-17 PROCEDURE — 86900 BLOOD TYPING SEROLOGIC ABO: CPT

## 2020-09-17 PROCEDURE — 85027 COMPLETE CBC AUTOMATED: CPT | Performed by: NURSE PRACTITIONER

## 2020-09-17 PROCEDURE — P9035 PLATELET PHERES LEUKOREDUCED: HCPCS

## 2020-09-17 PROCEDURE — 25010000002 VANCOMYCIN PER 500 MG: Performed by: THORACIC SURGERY (CARDIOTHORACIC VASCULAR SURGERY)

## 2020-09-17 PROCEDURE — 85730 THROMBOPLASTIN TIME PARTIAL: CPT | Performed by: NURSE PRACTITIONER

## 2020-09-17 PROCEDURE — 33970 AORTIC CIRCULATION ASSIST: CPT | Performed by: PHYSICIAN ASSISTANT

## 2020-09-17 PROCEDURE — 82330 ASSAY OF CALCIUM: CPT | Performed by: THORACIC SURGERY (CARDIOTHORACIC VASCULAR SURGERY)

## 2020-09-17 PROCEDURE — 85384 FIBRINOGEN ACTIVITY: CPT | Performed by: NURSE PRACTITIONER

## 2020-09-17 PROCEDURE — 33970 AORTIC CIRCULATION ASSIST: CPT | Performed by: THORACIC SURGERY (CARDIOTHORACIC VASCULAR SURGERY)

## 2020-09-17 PROCEDURE — C1729 CATH, DRAINAGE: HCPCS | Performed by: THORACIC SURGERY (CARDIOTHORACIC VASCULAR SURGERY)

## 2020-09-17 PROCEDURE — P9012 CRYOPRECIPITATE EACH UNIT: HCPCS

## 2020-09-17 PROCEDURE — 83735 ASSAY OF MAGNESIUM: CPT | Performed by: NURSE PRACTITIONER

## 2020-09-17 PROCEDURE — 25010000002 ALBUMIN HUMAN 5% PER 50 ML: Performed by: NURSE PRACTITIONER

## 2020-09-17 PROCEDURE — 80053 COMPREHEN METABOLIC PANEL: CPT | Performed by: NURSE PRACTITIONER

## 2020-09-17 PROCEDURE — 25010000002 HEPARIN (PORCINE) PER 1000 UNITS: Performed by: ANESTHESIOLOGY

## 2020-09-17 PROCEDURE — 25010000002 LIDOCAINE PER 10 MG: Performed by: ANESTHESIOLOGY

## 2020-09-17 PROCEDURE — 25010000002 HYDROCORTISONE SODIUM SUCCINATE 100 MG RECONSTITUTED SOLUTION: Performed by: ANESTHESIOLOGY

## 2020-09-17 PROCEDURE — P9016 RBC LEUKOCYTES REDUCED: HCPCS

## 2020-09-17 PROCEDURE — 85576 BLOOD PLATELET AGGREGATION: CPT | Performed by: THORACIC SURGERY (CARDIOTHORACIC VASCULAR SURGERY)

## 2020-09-17 PROCEDURE — 93318 ECHO TRANSESOPHAGEAL INTRAOP: CPT | Performed by: ANESTHESIOLOGY

## 2020-09-17 PROCEDURE — 25010000002 MAGNESIUM SULFATE PER 500 MG OF MAGNESIUM: Performed by: ANESTHESIOLOGY

## 2020-09-17 PROCEDURE — 25010000002 EPINEPHRINE 1 MG/ML SOLUTION 30 ML VIAL: Performed by: NURSE PRACTITIONER

## 2020-09-17 PROCEDURE — 25010000003 MILRINONE LACTATE IN DEXTROSE 20-5 MG/100ML-% SOLUTION: Performed by: NURSE PRACTITIONER

## 2020-09-17 PROCEDURE — 86927 PLASMA FRESH FROZEN: CPT

## 2020-09-17 PROCEDURE — 5A1221Z PERFORMANCE OF CARDIAC OUTPUT, CONTINUOUS: ICD-10-PCS | Performed by: THORACIC SURGERY (CARDIOTHORACIC VASCULAR SURGERY)

## 2020-09-17 PROCEDURE — 36430 TRANSFUSION BLD/BLD COMPNT: CPT

## 2020-09-17 PROCEDURE — 25010000002 VANCOMYCIN 1 G RECONSTITUTED SOLUTION 1 EACH VIAL: Performed by: THORACIC SURGERY (CARDIOTHORACIC VASCULAR SURGERY)

## 2020-09-17 PROCEDURE — 25010000003 POTASSIUM CHLORIDE PER 2 MEQ: Performed by: NURSE PRACTITIONER

## 2020-09-17 PROCEDURE — 25010000003 POTASSIUM CHLORIDE PER 2 MEQ: Performed by: ANESTHESIOLOGY

## 2020-09-17 PROCEDURE — 88304 TISSUE EXAM BY PATHOLOGIST: CPT | Performed by: THORACIC SURGERY (CARDIOTHORACIC VASCULAR SURGERY)

## 2020-09-17 PROCEDURE — 25010000002 PHENYLEPHRINE PER 1 ML: Performed by: ANESTHESIOLOGY

## 2020-09-17 PROCEDURE — 25010000002 FUROSEMIDE PER 20 MG: Performed by: ANESTHESIOLOGY

## 2020-09-17 PROCEDURE — 94002 VENT MGMT INPAT INIT DAY: CPT

## 2020-09-17 PROCEDURE — 25010000002 MIDAZOLAM PER 1 MG: Performed by: ANESTHESIOLOGY

## 2020-09-17 PROCEDURE — 25010000002 MAGNESIUM SULFATE IN D5W 1G/100ML (PREMIX) 1-5 GM/100ML-% SOLUTION: Performed by: NURSE PRACTITIONER

## 2020-09-17 DEVICE — ABSORBABLE HEMOSTAT (OXIDIZED REGENERATED CELLULOSE, U.S.P.)
Type: IMPLANTABLE DEVICE | Site: HEART | Status: FUNCTIONAL
Brand: SURGICEL

## 2020-09-17 DEVICE — PERI-GUARD REPAIR PATCH (PERI-GUARD) IS A BIOLOGIC TISSUE PREPARED FROM BOVINE PERICARDIUM CROSS-LINKED WITH GLUTARALDEHYDE. THE PERICARDIUM IS PROCURED FROM CATTLE ORIGINATING IN THE UNITED STATES. PERI-GUARD IS CHEMICALLY STERILIZED USING ETHANOL AND PROPYLENE OXIDE. PERI-GUARD IS TREATED WITH 1 MOLAR SODIUM HYDROXIDE FOR 60-75 MINUTES AT 20-25C.
Type: IMPLANTABLE DEVICE | Site: HEART | Status: FUNCTIONAL
Brand: PERI-GUARD

## 2020-09-17 DEVICE — LIGACLIP MCA MULTIPLE CLIP APPLIERS, 20 MEDIUM CLIPS
Type: IMPLANTABLE DEVICE | Site: HEART | Status: FUNCTIONAL
Brand: LIGACLIP

## 2020-09-17 DEVICE — ABSORBABLE HEMOSTAT (OXIDIZED REGENERATED CELLULOSE)
Type: IMPLANTABLE DEVICE | Site: HEART | Status: FUNCTIONAL
Brand: SURGICEL NU-KNIT

## 2020-09-17 DEVICE — ADHS SURG BIOGLUE PREF STD/TP 10ML: Type: IMPLANTABLE DEVICE | Site: HEART | Status: FUNCTIONAL

## 2020-09-17 RX ORDER — NITROGLYCERIN 20 MG/100ML
5-200 INJECTION INTRAVENOUS
Status: DISCONTINUED | OUTPATIENT
Start: 2020-09-17 | End: 2020-09-18

## 2020-09-17 RX ORDER — POTASSIUM CHLORIDE 750 MG/1
40 CAPSULE, EXTENDED RELEASE ORAL AS NEEDED
Status: DISCONTINUED | OUTPATIENT
Start: 2020-09-17 | End: 2020-09-18

## 2020-09-17 RX ORDER — MILRINONE LACTATE 0.2 MG/ML
.25-.375 INJECTION, SOLUTION INTRAVENOUS CONTINUOUS PRN
Status: DISCONTINUED | OUTPATIENT
Start: 2020-09-17 | End: 2020-09-18

## 2020-09-17 RX ORDER — LIDOCAINE HYDROCHLORIDE ANHYDROUS AND DEXTROSE MONOHYDRATE 5; 400 G/100ML; MG/100ML
INJECTION, SOLUTION INTRAVENOUS CONTINUOUS PRN
Status: DISCONTINUED | OUTPATIENT
Start: 2020-09-17 | End: 2020-09-17 | Stop reason: SURG

## 2020-09-17 RX ORDER — POTASSIUM CHLORIDE 29.8 MG/ML
INJECTION INTRAVENOUS CONTINUOUS PRN
Status: DISCONTINUED | OUTPATIENT
Start: 2020-09-17 | End: 2020-09-17 | Stop reason: SURG

## 2020-09-17 RX ORDER — MEPERIDINE HYDROCHLORIDE 25 MG/ML
25 INJECTION INTRAMUSCULAR; INTRAVENOUS; SUBCUTANEOUS EVERY 4 HOURS PRN
Status: DISCONTINUED | OUTPATIENT
Start: 2020-09-17 | End: 2020-09-18

## 2020-09-17 RX ORDER — PROPOFOL 10 MG/ML
VIAL (ML) INTRAVENOUS CONTINUOUS PRN
Status: DISCONTINUED | OUTPATIENT
Start: 2020-09-17 | End: 2020-09-17 | Stop reason: SURG

## 2020-09-17 RX ORDER — MILRINONE LACTATE 1 MG/ML
5 VIAL (ML) INTRAVENOUS
Status: DISCONTINUED | OUTPATIENT
Start: 2020-09-17 | End: 2020-09-18

## 2020-09-17 RX ORDER — AMOXICILLIN 250 MG
2 CAPSULE ORAL NIGHTLY
Status: DISCONTINUED | OUTPATIENT
Start: 2020-09-18 | End: 2020-09-18

## 2020-09-17 RX ORDER — MAGNESIUM SULFATE HEPTAHYDRATE 500 MG/ML
INJECTION, SOLUTION INTRAMUSCULAR; INTRAVENOUS AS NEEDED
Status: DISCONTINUED | OUTPATIENT
Start: 2020-09-17 | End: 2020-09-17 | Stop reason: SURG

## 2020-09-17 RX ORDER — SODIUM CHLORIDE 9 MG/ML
30 INJECTION, SOLUTION INTRAVENOUS CONTINUOUS
Status: DISCONTINUED | OUTPATIENT
Start: 2020-09-17 | End: 2020-09-18

## 2020-09-17 RX ORDER — PANTOPRAZOLE SODIUM 40 MG/10ML
40 INJECTION, POWDER, LYOPHILIZED, FOR SOLUTION INTRAVENOUS ONCE
Status: COMPLETED | OUTPATIENT
Start: 2020-09-17 | End: 2020-09-17

## 2020-09-17 RX ORDER — POTASSIUM CHLORIDE 29.8 MG/ML
20 INJECTION INTRAVENOUS
Status: DISCONTINUED | OUTPATIENT
Start: 2020-09-17 | End: 2020-09-18

## 2020-09-17 RX ORDER — ONDANSETRON 2 MG/ML
4 INJECTION INTRAMUSCULAR; INTRAVENOUS EVERY 6 HOURS PRN
Status: DISCONTINUED | OUTPATIENT
Start: 2020-09-17 | End: 2020-09-18

## 2020-09-17 RX ORDER — EPINEPHRINE 0.1 MG/ML
SYRINGE (ML) INJECTION AS NEEDED
Status: DISCONTINUED | OUTPATIENT
Start: 2020-09-17 | End: 2020-09-17 | Stop reason: SURG

## 2020-09-17 RX ORDER — POTASSIUM CHLORIDE 1.5 G/1.77G
40 POWDER, FOR SOLUTION ORAL AS NEEDED
Status: DISCONTINUED | OUTPATIENT
Start: 2020-09-17 | End: 2020-09-18

## 2020-09-17 RX ORDER — MAGNESIUM SULFATE HEPTAHYDRATE 500 MG/ML
INJECTION, SOLUTION INTRAMUSCULAR; INTRAVENOUS AS NEEDED
Status: DISCONTINUED | OUTPATIENT
Start: 2020-09-17 | End: 2020-09-17

## 2020-09-17 RX ORDER — PANTOPRAZOLE SODIUM 40 MG/1
40 TABLET, DELAYED RELEASE ORAL EVERY MORNING
Status: DISCONTINUED | OUTPATIENT
Start: 2020-09-18 | End: 2020-09-18

## 2020-09-17 RX ORDER — FUROSEMIDE 10 MG/ML
40 INJECTION INTRAMUSCULAR; INTRAVENOUS EVERY 6 HOURS PRN
Status: DISCONTINUED | OUTPATIENT
Start: 2020-09-17 | End: 2020-09-18

## 2020-09-17 RX ORDER — ACETAMINOPHEN 650 MG/1
650 SUPPOSITORY RECTAL EVERY 4 HOURS
Status: DISCONTINUED | OUTPATIENT
Start: 2020-09-17 | End: 2020-09-18

## 2020-09-17 RX ORDER — VASOPRESSIN 20 U/ML
INJECTION PARENTERAL AS NEEDED
Status: DISCONTINUED | OUTPATIENT
Start: 2020-09-17 | End: 2020-09-17 | Stop reason: SURG

## 2020-09-17 RX ORDER — POTASSIUM CHLORIDE 7.45 MG/ML
10 INJECTION INTRAVENOUS
Status: DISCONTINUED | OUTPATIENT
Start: 2020-09-17 | End: 2020-09-18

## 2020-09-17 RX ORDER — DOPAMINE HYDROCHLORIDE 160 MG/100ML
2-20 INJECTION, SOLUTION INTRAVENOUS CONTINUOUS PRN
Status: DISCONTINUED | OUTPATIENT
Start: 2020-09-17 | End: 2020-09-18

## 2020-09-17 RX ORDER — BACITRACIN ZINC 500 [USP'U]/G
OINTMENT TOPICAL AS NEEDED
Status: DISCONTINUED | OUTPATIENT
Start: 2020-09-17 | End: 2020-09-17 | Stop reason: HOSPADM

## 2020-09-17 RX ORDER — TRAMADOL HYDROCHLORIDE 50 MG/1
50 TABLET ORAL EVERY 6 HOURS PRN
Status: DISCONTINUED | OUTPATIENT
Start: 2020-09-17 | End: 2020-09-18

## 2020-09-17 RX ORDER — HEPARIN SODIUM 1000 [USP'U]/ML
INJECTION, SOLUTION INTRAVENOUS; SUBCUTANEOUS AS NEEDED
Status: DISCONTINUED | OUTPATIENT
Start: 2020-09-17 | End: 2020-09-17 | Stop reason: SURG

## 2020-09-17 RX ORDER — SODIUM CHLORIDE 9 MG/ML
30 INJECTION, SOLUTION INTRAVENOUS CONTINUOUS PRN
Status: DISCONTINUED | OUTPATIENT
Start: 2020-09-17 | End: 2020-09-18

## 2020-09-17 RX ORDER — SODIUM CHLORIDE 9 MG/ML
INJECTION, SOLUTION INTRAVENOUS CONTINUOUS PRN
Status: DISCONTINUED | OUTPATIENT
Start: 2020-09-17 | End: 2020-09-17 | Stop reason: SURG

## 2020-09-17 RX ORDER — ACETAMINOPHEN 650 MG/1
650 SUPPOSITORY RECTAL EVERY 4 HOURS PRN
Status: DISCONTINUED | OUTPATIENT
Start: 2020-09-18 | End: 2020-09-18

## 2020-09-17 RX ORDER — FENTANYL CITRATE 50 UG/ML
50 INJECTION, SOLUTION INTRAMUSCULAR; INTRAVENOUS
Status: DISCONTINUED | OUTPATIENT
Start: 2020-09-17 | End: 2020-09-18

## 2020-09-17 RX ORDER — FENTANYL CITRATE 50 UG/ML
25 INJECTION, SOLUTION INTRAMUSCULAR; INTRAVENOUS
Status: DISCONTINUED | OUTPATIENT
Start: 2020-09-17 | End: 2020-09-18

## 2020-09-17 RX ORDER — MIDAZOLAM HYDROCHLORIDE 1 MG/ML
INJECTION INTRAMUSCULAR; INTRAVENOUS AS NEEDED
Status: DISCONTINUED | OUTPATIENT
Start: 2020-09-17 | End: 2020-09-17 | Stop reason: SURG

## 2020-09-17 RX ORDER — ASPIRIN 81 MG/1
81 TABLET ORAL DAILY
Status: DISCONTINUED | OUTPATIENT
Start: 2020-09-18 | End: 2020-09-18

## 2020-09-17 RX ORDER — ACETAMINOPHEN 325 MG/1
650 TABLET ORAL EVERY 4 HOURS
Status: DISCONTINUED | OUTPATIENT
Start: 2020-09-17 | End: 2020-09-18

## 2020-09-17 RX ORDER — BISACODYL 10 MG
10 SUPPOSITORY, RECTAL RECTAL DAILY PRN
Status: DISCONTINUED | OUTPATIENT
Start: 2020-09-18 | End: 2020-09-18

## 2020-09-17 RX ORDER — ACETAMINOPHEN 160 MG/5ML
650 SOLUTION ORAL EVERY 4 HOURS
Status: DISCONTINUED | OUTPATIENT
Start: 2020-09-17 | End: 2020-09-18

## 2020-09-17 RX ORDER — ACETAMINOPHEN 500 MG
1000 TABLET ORAL ONCE
Status: COMPLETED | OUTPATIENT
Start: 2020-09-17 | End: 2020-09-17

## 2020-09-17 RX ORDER — LORAZEPAM 2 MG/ML
1 INJECTION INTRAMUSCULAR ONCE
Status: COMPLETED | OUTPATIENT
Start: 2020-09-17 | End: 2020-09-17

## 2020-09-17 RX ORDER — FUROSEMIDE 10 MG/ML
INJECTION INTRAMUSCULAR; INTRAVENOUS AS NEEDED
Status: DISCONTINUED | OUTPATIENT
Start: 2020-09-17 | End: 2020-09-17 | Stop reason: SURG

## 2020-09-17 RX ORDER — MAGNESIUM SULFATE 1 G/100ML
1 INJECTION INTRAVENOUS EVERY 8 HOURS
Status: DISCONTINUED | OUTPATIENT
Start: 2020-09-17 | End: 2020-09-18

## 2020-09-17 RX ORDER — ACETAMINOPHEN 160 MG/5ML
650 SOLUTION ORAL EVERY 4 HOURS PRN
Status: DISCONTINUED | OUTPATIENT
Start: 2020-09-18 | End: 2020-09-18

## 2020-09-17 RX ORDER — ATORVASTATIN CALCIUM 20 MG/1
40 TABLET, FILM COATED ORAL NIGHTLY
Status: DISCONTINUED | OUTPATIENT
Start: 2020-09-17 | End: 2020-09-18

## 2020-09-17 RX ORDER — ALBUMIN, HUMAN INJ 5% 5 %
1000 SOLUTION INTRAVENOUS AS NEEDED
Status: DISCONTINUED | OUTPATIENT
Start: 2020-09-17 | End: 2020-09-18

## 2020-09-17 RX ORDER — ROCURONIUM BROMIDE 10 MG/ML
INJECTION, SOLUTION INTRAVENOUS AS NEEDED
Status: DISCONTINUED | OUTPATIENT
Start: 2020-09-17 | End: 2020-09-17 | Stop reason: SURG

## 2020-09-17 RX ORDER — SODIUM CHLORIDE 0.9 % (FLUSH) 0.9 %
3 SYRINGE (ML) INJECTION EVERY 12 HOURS SCHEDULED
Status: DISCONTINUED | OUTPATIENT
Start: 2020-09-17 | End: 2020-09-17

## 2020-09-17 RX ORDER — BISACODYL 5 MG/1
10 TABLET, DELAYED RELEASE ORAL DAILY PRN
Status: DISCONTINUED | OUTPATIENT
Start: 2020-09-17 | End: 2020-09-18

## 2020-09-17 RX ORDER — MAGNESIUM HYDROXIDE 1200 MG/15ML
LIQUID ORAL AS NEEDED
Status: DISCONTINUED | OUTPATIENT
Start: 2020-09-17 | End: 2020-09-17 | Stop reason: HOSPADM

## 2020-09-17 RX ORDER — SODIUM CHLORIDE 0.9 % (FLUSH) 0.9 %
10 SYRINGE (ML) INJECTION AS NEEDED
Status: DISCONTINUED | OUTPATIENT
Start: 2020-09-17 | End: 2020-09-17

## 2020-09-17 RX ORDER — CHLORHEXIDINE GLUCONATE 0.12 MG/ML
15 RINSE ORAL EVERY 12 HOURS
Status: DISCONTINUED | OUTPATIENT
Start: 2020-09-17 | End: 2020-09-18

## 2020-09-17 RX ORDER — PROTAMINE SULFATE 10 MG/ML
INJECTION, SOLUTION INTRAVENOUS AS NEEDED
Status: DISCONTINUED | OUTPATIENT
Start: 2020-09-17 | End: 2020-09-17 | Stop reason: SURG

## 2020-09-17 RX ORDER — FENTANYL CITRATE 50 UG/ML
INJECTION, SOLUTION INTRAMUSCULAR; INTRAVENOUS AS NEEDED
Status: DISCONTINUED | OUTPATIENT
Start: 2020-09-17 | End: 2020-09-17 | Stop reason: SURG

## 2020-09-17 RX ORDER — MIDAZOLAM HYDROCHLORIDE 1 MG/ML
2 INJECTION INTRAMUSCULAR; INTRAVENOUS
Status: DISCONTINUED | OUTPATIENT
Start: 2020-09-17 | End: 2020-09-18

## 2020-09-17 RX ORDER — DEXMEDETOMIDINE HYDROCHLORIDE 4 UG/ML
INJECTION INTRAVENOUS CONTINUOUS PRN
Status: DISCONTINUED | OUTPATIENT
Start: 2020-09-17 | End: 2020-09-17 | Stop reason: SURG

## 2020-09-17 RX ORDER — KETAMINE HYDROCHLORIDE 10 MG/ML
INJECTION INTRAMUSCULAR; INTRAVENOUS AS NEEDED
Status: DISCONTINUED | OUTPATIENT
Start: 2020-09-17 | End: 2020-09-17 | Stop reason: SURG

## 2020-09-17 RX ORDER — POLYETHYLENE GLYCOL 3350 17 G/17G
17 POWDER, FOR SOLUTION ORAL DAILY
Status: DISCONTINUED | OUTPATIENT
Start: 2020-09-19 | End: 2020-09-18

## 2020-09-17 RX ORDER — SODIUM CHLORIDE 0.9 % (FLUSH) 0.9 %
30 SYRINGE (ML) INJECTION ONCE AS NEEDED
Status: DISCONTINUED | OUTPATIENT
Start: 2020-09-17 | End: 2020-09-18

## 2020-09-17 RX ORDER — METOCLOPRAMIDE HYDROCHLORIDE 5 MG/ML
10 INJECTION INTRAMUSCULAR; INTRAVENOUS EVERY 6 HOURS
Status: DISCONTINUED | OUTPATIENT
Start: 2020-09-17 | End: 2020-09-18

## 2020-09-17 RX ORDER — NOREPINEPHRINE BIT/0.9 % NACL 8 MG/250ML
.02-.1 INFUSION BOTTLE (ML) INTRAVENOUS CONTINUOUS PRN
Status: DISCONTINUED | OUTPATIENT
Start: 2020-09-17 | End: 2020-09-18

## 2020-09-17 RX ORDER — NALOXONE HCL 0.4 MG/ML
0.4 VIAL (ML) INJECTION
Status: DISCONTINUED | OUTPATIENT
Start: 2020-09-17 | End: 2020-09-18

## 2020-09-17 RX ORDER — MILRINONE LACTATE 1 MG/ML
5 VIAL (ML) INTRAVENOUS
Status: DISCONTINUED | OUTPATIENT
Start: 2020-09-17 | End: 2020-09-17 | Stop reason: HOSPADM

## 2020-09-17 RX ORDER — ACETAMINOPHEN 325 MG/1
650 TABLET ORAL EVERY 4 HOURS PRN
Status: DISCONTINUED | OUTPATIENT
Start: 2020-09-18 | End: 2020-09-18

## 2020-09-17 RX ORDER — CALCIUM CHLORIDE 100 MG/ML
INJECTION INTRAVENOUS; INTRAVENTRICULAR AS NEEDED
Status: DISCONTINUED | OUTPATIENT
Start: 2020-09-17 | End: 2020-09-17 | Stop reason: SURG

## 2020-09-17 RX ORDER — NICARDIPINE HYDROCHLORIDE 2.5 MG/ML
INJECTION INTRAVENOUS AS NEEDED
Status: DISCONTINUED | OUTPATIENT
Start: 2020-09-17 | End: 2020-09-17 | Stop reason: SURG

## 2020-09-17 RX ORDER — NOREPINEPHRINE BITARTRATE 1 MG/ML
INJECTION, SOLUTION INTRAVENOUS CONTINUOUS PRN
Status: DISCONTINUED | OUTPATIENT
Start: 2020-09-17 | End: 2020-09-17 | Stop reason: SURG

## 2020-09-17 RX ORDER — LIDOCAINE HYDROCHLORIDE 20 MG/ML
INJECTION, SOLUTION INFILTRATION; PERINEURAL AS NEEDED
Status: DISCONTINUED | OUTPATIENT
Start: 2020-09-17 | End: 2020-09-17 | Stop reason: SURG

## 2020-09-17 RX ORDER — PROPOFOL 10 MG/ML
VIAL (ML) INTRAVENOUS AS NEEDED
Status: DISCONTINUED | OUTPATIENT
Start: 2020-09-17 | End: 2020-09-17 | Stop reason: SURG

## 2020-09-17 RX ORDER — AMINOCAPROIC ACID 250 MG/ML
INJECTION, SOLUTION INTRAVENOUS AS NEEDED
Status: DISCONTINUED | OUTPATIENT
Start: 2020-09-17 | End: 2020-09-17 | Stop reason: SURG

## 2020-09-17 RX ADMIN — NOREPINEPHRINE BITARTRATE 0.03 MCG/KG/MIN: 1 INJECTION, SOLUTION, CONCENTRATE INTRAVENOUS at 08:22

## 2020-09-17 RX ADMIN — NICARDIPINE HYDROCHLORIDE 0.5 MG: 25 INJECTION INTRAVENOUS at 08:46

## 2020-09-17 RX ADMIN — PHENYLEPHRINE HYDROCHLORIDE 100 MCG: 10 INJECTION INTRAVENOUS at 08:36

## 2020-09-17 RX ADMIN — MILRINONE LACTATE IN DEXTROSE 0.5 MCG/KG/MIN: 200 INJECTION, SOLUTION INTRAVENOUS at 23:42

## 2020-09-17 RX ADMIN — POTASSIUM PHOSPHATE, MONOBASIC AND POTASSIUM PHOSPHATE, DIBASIC 15 MMOL: 224; 236 INJECTION, SOLUTION, CONCENTRATE INTRAVENOUS at 17:14

## 2020-09-17 RX ADMIN — LORAZEPAM 1 MG: 2 INJECTION INTRAMUSCULAR; INTRAVENOUS at 06:17

## 2020-09-17 RX ADMIN — NOREPINEPHRINE BITARTRATE 0.02 MCG/KG/MIN: 1 INJECTION, SOLUTION, CONCENTRATE INTRAVENOUS at 07:35

## 2020-09-17 RX ADMIN — VASOPRESSIN 2 UNITS: 20 INJECTION INTRAVENOUS at 11:55

## 2020-09-17 RX ADMIN — PHENYLEPHRINE HYDROCHLORIDE 200 MCG: 10 INJECTION INTRAVENOUS at 07:35

## 2020-09-17 RX ADMIN — LIDOCAINE HYDROCHLORIDE ANHYDROUS AND DEXTROSE MONOHYDRATE 2 MG/MIN: .4; 5 INJECTION, SOLUTION INTRAVENOUS at 10:13

## 2020-09-17 RX ADMIN — EPINEPHRINE 0.1 MG: 0.1 INJECTION, SOLUTION ENDOTRACHEAL; INTRACARDIAC; INTRAVENOUS at 11:52

## 2020-09-17 RX ADMIN — KETAMINE HYDROCHLORIDE 20 MG: 10 INJECTION INTRAMUSCULAR; INTRAVENOUS at 07:59

## 2020-09-17 RX ADMIN — PHENYLEPHRINE HYDROCHLORIDE 100 MCG: 10 INJECTION INTRAVENOUS at 08:06

## 2020-09-17 RX ADMIN — ALBUMIN HUMAN 250 ML: 0.05 INJECTION, SOLUTION INTRAVENOUS at 22:40

## 2020-09-17 RX ADMIN — VASOPRESSIN 3 UNITS: 20 INJECTION INTRAVENOUS at 11:53

## 2020-09-17 RX ADMIN — SODIUM CHLORIDE: 900 INJECTION, SOLUTION INTRAVENOUS at 07:34

## 2020-09-17 RX ADMIN — EPINEPHRINE 0.1 MG: 0.1 INJECTION, SOLUTION ENDOTRACHEAL; INTRACARDIAC; INTRAVENOUS at 11:55

## 2020-09-17 RX ADMIN — SODIUM CHLORIDE 5 MG: 9 INJECTION, SOLUTION INTRAVENOUS at 11:30

## 2020-09-17 RX ADMIN — MUPIROCIN: 20 OINTMENT TOPICAL at 21:00

## 2020-09-17 RX ADMIN — PROPOFOL 40 MCG/KG/MIN: 10 INJECTION, EMULSION INTRAVENOUS at 23:42

## 2020-09-17 RX ADMIN — AMINOCAPROIC ACID 10 G: 250 INJECTION, SOLUTION INTRAVENOUS at 11:14

## 2020-09-17 RX ADMIN — SODIUM CHLORIDE 0.25 MCG/KG/MIN: 0.9 INJECTION, SOLUTION INTRAVENOUS at 09:50

## 2020-09-17 RX ADMIN — PROPOFOL 20 MG: 10 INJECTION, EMULSION INTRAVENOUS at 07:39

## 2020-09-17 RX ADMIN — NICARDIPINE HYDROCHLORIDE 0.2 MG: 25 INJECTION INTRAVENOUS at 08:47

## 2020-09-17 RX ADMIN — SODIUM CHLORIDE 2.8 UNITS/HR: 900 INJECTION, SOLUTION INTRAVENOUS at 09:45

## 2020-09-17 RX ADMIN — KETAMINE HYDROCHLORIDE 10 MG: 10 INJECTION INTRAMUSCULAR; INTRAVENOUS at 09:56

## 2020-09-17 RX ADMIN — POTASSIUM CHLORIDE: 29.8 INJECTION, SOLUTION INTRAVENOUS at 13:32

## 2020-09-17 RX ADMIN — MIDAZOLAM 0.5 MG: 1 INJECTION INTRAMUSCULAR; INTRAVENOUS at 07:18

## 2020-09-17 RX ADMIN — AMINOCAPROIC ACID 10 G: 250 INJECTION, SOLUTION INTRAVENOUS at 08:02

## 2020-09-17 RX ADMIN — PROPOFOL 50 MCG/KG/MIN: 10 INJECTION, EMULSION INTRAVENOUS at 07:37

## 2020-09-17 RX ADMIN — PROPOFOL 10 MG: 10 INJECTION, EMULSION INTRAVENOUS at 07:33

## 2020-09-17 RX ADMIN — EPINEPHRINE 0.02 MCG/KG/MIN: 1 INJECTION, SOLUTION, CONCENTRATE INTRAVENOUS at 07:34

## 2020-09-17 RX ADMIN — Medication 10 MG: at 00:31

## 2020-09-17 RX ADMIN — CEFAZOLIN SODIUM 2 G: 2 INJECTION, SOLUTION INTRAVENOUS at 07:26

## 2020-09-17 RX ADMIN — LIDOCAINE HYDROCHLORIDE 100 MG: 20 INJECTION, SOLUTION INFILTRATION; PERINEURAL at 07:37

## 2020-09-17 RX ADMIN — VASOPRESSIN 3 UNITS: 20 INJECTION INTRAVENOUS at 11:50

## 2020-09-17 RX ADMIN — FUROSEMIDE 20 MG: 10 INJECTION, SOLUTION INTRAMUSCULAR; INTRAVENOUS at 12:47

## 2020-09-17 RX ADMIN — ROCURONIUM BROMIDE 50 MG: 10 INJECTION INTRAVENOUS at 07:38

## 2020-09-17 RX ADMIN — SODIUM CHLORIDE 5 MG: 9 INJECTION, SOLUTION INTRAVENOUS at 10:55

## 2020-09-17 RX ADMIN — SODIUM CHLORIDE: 900 INJECTION, SOLUTION INTRAVENOUS at 12:06

## 2020-09-17 RX ADMIN — MAGNESIUM SULFATE HEPTAHYDRATE 1 G: 1 INJECTION, SOLUTION INTRAVENOUS at 18:38

## 2020-09-17 RX ADMIN — SODIUM CHLORIDE: 9 INJECTION, SOLUTION INTRAVENOUS at 06:43

## 2020-09-17 RX ADMIN — CALCIUM CHLORIDE 250 MG: 100 INJECTION, SOLUTION INTRAVENOUS at 13:34

## 2020-09-17 RX ADMIN — CARVEDILOL 3.12 MG: 6.25 TABLET, FILM COATED ORAL at 06:15

## 2020-09-17 RX ADMIN — FAMOTIDINE 20 MG: 10 INJECTION INTRAVENOUS at 06:17

## 2020-09-17 RX ADMIN — MIDAZOLAM 0.5 MG: 1 INJECTION INTRAMUSCULAR; INTRAVENOUS at 07:10

## 2020-09-17 RX ADMIN — MIDAZOLAM 0.5 MG: 1 INJECTION INTRAMUSCULAR; INTRAVENOUS at 07:15

## 2020-09-17 RX ADMIN — PHENYLEPHRINE HYDROCHLORIDE 100 MCG: 10 INJECTION INTRAVENOUS at 07:59

## 2020-09-17 RX ADMIN — CALCIUM CHLORIDE 250 MG: 100 INJECTION, SOLUTION INTRAVENOUS at 12:47

## 2020-09-17 RX ADMIN — VASOPRESSIN 3 UNITS: 20 INJECTION INTRAVENOUS at 11:54

## 2020-09-17 RX ADMIN — FENTANYL CITRATE 50 MCG: 50 INJECTION INTRAMUSCULAR; INTRAVENOUS at 07:38

## 2020-09-17 RX ADMIN — VASOPRESSIN 2 UNITS: 20 INJECTION INTRAVENOUS at 11:51

## 2020-09-17 RX ADMIN — MAGNESIUM SULFATE HEPTAHYDRATE 2 G: 500 INJECTION, SOLUTION INTRAMUSCULAR; INTRAVENOUS at 10:07

## 2020-09-17 RX ADMIN — SODIUM CHLORIDE 30 ML/HR: 9 INJECTION, SOLUTION INTRAVENOUS at 15:01

## 2020-09-17 RX ADMIN — CALCIUM CHLORIDE 250 MG: 100 INJECTION, SOLUTION INTRAVENOUS at 13:08

## 2020-09-17 RX ADMIN — NOREPINEPHRINE BITARTRATE 0.04 MCG/KG/MIN: 1 INJECTION, SOLUTION, CONCENTRATE INTRAVENOUS at 08:32

## 2020-09-17 RX ADMIN — SODIUM CHLORIDE 5 MG: 9 INJECTION, SOLUTION INTRAVENOUS at 15:27

## 2020-09-17 RX ADMIN — DEXMEDETOMIDINE HYDROCHLORIDE 1 MCG/KG/HR: 4 INJECTION INTRAVENOUS at 07:37

## 2020-09-17 RX ADMIN — NICARDIPINE HYDROCHLORIDE 0.5 MG: 25 INJECTION INTRAVENOUS at 08:45

## 2020-09-17 RX ADMIN — ALBUMIN HUMAN 250 ML: 0.05 INJECTION, SOLUTION INTRAVENOUS at 21:42

## 2020-09-17 RX ADMIN — PHENYLEPHRINE HYDROCHLORIDE 200 MCG: 10 INJECTION INTRAVENOUS at 07:37

## 2020-09-17 RX ADMIN — NOREPINEPHRINE BITARTRATE 0.02 MCG/KG/MIN: 1 INJECTION, SOLUTION, CONCENTRATE INTRAVENOUS at 07:44

## 2020-09-17 RX ADMIN — POTASSIUM CHLORIDE: 29.8 INJECTION, SOLUTION INTRAVENOUS at 13:27

## 2020-09-17 RX ADMIN — ALBUMIN HUMAN 250 ML: 0.05 INJECTION, SOLUTION INTRAVENOUS at 20:45

## 2020-09-17 RX ADMIN — EPINEPHRINE 0.05 MCG/KG/MIN: 1 INJECTION INTRAMUSCULAR; INTRAVENOUS; SUBCUTANEOUS at 23:51

## 2020-09-17 RX ADMIN — SODIUM CHLORIDE 5 MG: 9 INJECTION, SOLUTION INTRAVENOUS at 19:16

## 2020-09-17 RX ADMIN — HYDROCORTISONE SODIUM SUCCINATE 100 MG: 100 INJECTION, POWDER, FOR SOLUTION INTRAMUSCULAR; INTRAVENOUS at 12:04

## 2020-09-17 RX ADMIN — VASOPRESSIN 3 UNITS: 20 INJECTION INTRAVENOUS at 11:52

## 2020-09-17 RX ADMIN — EPINEPHRINE 0.1 MG: 0.1 INJECTION, SOLUTION ENDOTRACHEAL; INTRACARDIAC; INTRAVENOUS at 11:53

## 2020-09-17 RX ADMIN — CHLORHEXIDINE GLUCONATE 15 ML: 1.2 RINSE ORAL at 21:44

## 2020-09-17 RX ADMIN — VASOPRESSIN 0.03 UNITS/MIN: 20 INJECTION INTRAVENOUS at 12:15

## 2020-09-17 RX ADMIN — HEPARIN SODIUM 12000 UNITS: 1000 INJECTION, SOLUTION INTRAVENOUS; SUBCUTANEOUS at 08:42

## 2020-09-17 RX ADMIN — KETAMINE HYDROCHLORIDE 10 MG: 10 INJECTION INTRAMUSCULAR; INTRAVENOUS at 13:15

## 2020-09-17 RX ADMIN — FENTANYL CITRATE 100 MCG: 50 INJECTION INTRAMUSCULAR; INTRAVENOUS at 09:56

## 2020-09-17 RX ADMIN — VASOPRESSIN 3 UNITS: 20 INJECTION INTRAVENOUS at 11:49

## 2020-09-17 RX ADMIN — VANCOMYCIN HYDROCHLORIDE 500 MG: 500 INJECTION, POWDER, LYOPHILIZED, FOR SOLUTION INTRAVENOUS at 21:00

## 2020-09-17 RX ADMIN — KETAMINE HYDROCHLORIDE 10 MG: 10 INJECTION INTRAMUSCULAR; INTRAVENOUS at 08:59

## 2020-09-17 RX ADMIN — PROPOFOL 50 MG: 10 INJECTION, EMULSION INTRAVENOUS at 07:38

## 2020-09-17 RX ADMIN — PROTAMINE SULFATE 150 MG: 10 INJECTION, SOLUTION INTRAVENOUS at 11:08

## 2020-09-17 RX ADMIN — CEFAZOLIN SODIUM 2 G: 2 INJECTION, SOLUTION INTRAVENOUS at 11:13

## 2020-09-17 RX ADMIN — ALBUMIN HUMAN 250 ML: 0.05 INJECTION, SOLUTION INTRAVENOUS at 23:53

## 2020-09-17 RX ADMIN — FENTANYL CITRATE 50 MCG: 50 INJECTION INTRAMUSCULAR; INTRAVENOUS at 07:57

## 2020-09-17 RX ADMIN — POTASSIUM CHLORIDE 20 MEQ: 29.8 INJECTION, SOLUTION INTRAVENOUS at 19:32

## 2020-09-17 RX ADMIN — ACETAMINOPHEN 1000 MG: 500 TABLET ORAL at 06:15

## 2020-09-17 RX ADMIN — PROPOFOL 20 MG: 10 INJECTION, EMULSION INTRAVENOUS at 07:34

## 2020-09-17 RX ADMIN — PANTOPRAZOLE SODIUM 40 MG: 40 INJECTION, POWDER, FOR SOLUTION INTRAVENOUS at 16:52

## 2020-09-17 RX ADMIN — FENTANYL CITRATE 50 MCG: 50 INJECTION INTRAMUSCULAR; INTRAVENOUS at 07:13

## 2020-09-17 RX ADMIN — MIDAZOLAM 0.5 MG: 1 INJECTION INTRAMUSCULAR; INTRAVENOUS at 07:12

## 2020-09-17 RX ADMIN — PHENYLEPHRINE HYDROCHLORIDE 200 MCG: 10 INJECTION INTRAVENOUS at 07:34

## 2020-09-17 RX ADMIN — CALCIUM CHLORIDE 250 MG: 100 INJECTION, SOLUTION INTRAVENOUS at 13:19

## 2020-09-17 RX ADMIN — PHENYLEPHRINE HYDROCHLORIDE 200 MCG: 10 INJECTION INTRAVENOUS at 07:36

## 2020-09-17 NOTE — ANESTHESIA PROCEDURE NOTES
Central Line      Patient reassessed immediately prior to procedure    Patient location during procedure: OR  Start time: 9/17/2020 7:24 AM  Stop Time:9/17/2020 7:28 AM  Indications: central pressure monitoring  Staff  Anesthesiologist: Winston Ortega MD  Preanesthetic Checklist  Completed: patient identified, site marked, surgical consent, pre-op evaluation, timeout performed, IV checked, risks and benefits discussed and monitors and equipment checked  Central Line Prep  Sterile Tech:cap, gloves, gown, mask and sterile barriers  Prep: chloraprep  Patient monitoring: blood pressure monitoring, continuous pulse oximetry and EKG  Central Line Procedure  Laterality:right  Location:internal jugular  Catheter Type:Lynn-Jay  Assessment  Post procedure:line sutured, biopatch applied and occlusive dressing applied  Assessement:chest x-ray ordered, no pneumothorax on x-ray and free fluid flow  Complications:no  Patient Tolerance:patient tolerated the procedure well with no apparent complications

## 2020-09-17 NOTE — ANESTHESIA PROCEDURE NOTES
Airway  Urgency: elective    Date/Time: 9/17/2020 7:41 AM  Airway not difficult    General Information and Staff    Patient location during procedure: OR  Anesthesiologist: Winston Ortega MD    Indications and Patient Condition  Indications for airway management: airway protection    Preoxygenated: yes  Mask difficulty assessment: 1 - vent by mask    Final Airway Details  Final airway type: endotracheal airway      Successful airway: ETT  Cuffed: yes   Successful intubation technique: direct laryngoscopy  Endotracheal tube insertion site: oral  Blade: Alvin  Blade size: 4  ETT size (mm): 7.5  Cormack-Lehane Classification: grade IIa - partial view of glottis  Placement verified by: chest auscultation   Measured from: lips  ETT/EBT  to lips (cm): 19  Number of attempts at approach: 1  Assessment: lips, teeth, and gum same as pre-op and atraumatic intubation

## 2020-09-17 NOTE — ANESTHESIA PROCEDURE NOTES
Central Line      Patient reassessed immediately prior to procedure    Patient location during procedure: OR  Start time: 9/17/2020 7:14 AM  Stop Time:9/17/2020 7:24 AM  Indications: vascular access  Staff  Anesthesiologist: Winston Ortega MD  Preanesthetic Checklist  Completed: patient identified, site marked, surgical consent, pre-op evaluation, timeout performed, IV checked, risks and benefits discussed and monitors and equipment checked  Central Line Prep  Sterile Tech:cap, gloves, gown, mask and sterile barriers  Prep: chloraprep  Patient monitoring: continuous pulse oximetry, blood pressure monitoring and EKG  Central Line Procedure  Laterality:right  Location:internal jugular  Catheter Type:Cordis and double lumen  Catheter Size:9 Fr  Guidance:ultrasound guided  PROCEDURE NOTE/ULTRASOUND INTERPRETATION.  Using ultrasound guidance the potential vascular sites for insertion of the catheter were visualized to determine the patency of the vessel to be used for vascular access.  After selecting the appropriate site for insertion, the needle was visualized under ultrasound being inserted into the internal jugular vein, followed by ultrasound confirmation of wire and catheter placement. There were no abnormalities seen on ultrasound; an image was taken; and the patient tolerated the procedure with no complications. Images: still images obtained, printed/placed on chart  Assessment  Post procedure:biopatch applied, line sutured and occlusive dressing applied  Assessement:blood return through all ports, free fluid flow, placement verified by x-ray, Thiago Test, no pneumothorax on x-ray and chest x-ray ordered  Complications:no  Patient Tolerance:patient tolerated the procedure well with no apparent complications

## 2020-09-17 NOTE — ANESTHESIA PROCEDURE NOTES
Procedure Performed: Emergent/Open-Heart Anesthesia ROSE       Start Time:  9/17/2020 7:47 AM       End Time:   9/17/2020 7:59 AM    Preanesthesia Checklist:  Patient identified, IV assessed, risks and benefits discussed, monitors and equipment assessed, procedure being performed at surgeon's request and anesthesia consent obtained.    General Procedure Information  Diagnostic Indications for Echo:  assessment of ascending aorta, assessment of surgical repair and hemodynamic monitoring  Physician Requesting Echo: Mart Ontiveros MD  Location performed:  OR  Intubated  Bite block placed  Heart visualized  Probe Insertion:  Easy  Probe Type:  Multiplane  Modalities:  Color flow mapping, pulse wave Doppler and continuous wave Doppler    Echocardiographic and Doppler Measurements    Ventricles    Right Ventricle:  Hypertrophy not present.  Thrombus not present.    Left Ventricle:  Cavity size normal.  Hypertrophy not present.  Thrombus not present.  Global Function severely impaired.  Ejection Fraction 30%.      Ventricular Regional Function:    Wall Motion Comments:       Large pseudoaneurysm on lateral basal wall 7.8x5.8cm communication with LV approx 1x1cm papillary muscle insertion approx 1 cm from entrance of aneurysm    Valves    Aortic Valve:  Annulus normal.  Stenosis not present.  Regurgitation absent.  Leaflets normal and calcified.      Mitral Valve:  Stenosis not present.  Mean Gradient: 2 mmHg.  Regurgitation mild.      Tricuspid Valve:  Stenosis not present.  Regurgitation mild.  Leaflets normal.          Aorta    Ascending Aorta:  Dissection not present.  Plaque thickness less than 3 mm.  Mobile plaque not present.    Aortic Arch:  Dissection not present.  Plaque thickness less than 3 mm.  Mobile plaque not present.    Descending Aorta:  Dissection not present.  Plaque thickness greater than 3 mm.  Mobile plaque not present.    Other Aortic Findings:       Graft in ascending aorta and  arch    Atria    Right Atrium:  Spontaneous echo contrast present.  Thrombus not present.  Tumor not present.  Device not present.      Left Atrium:  Spontaneous echo contrast present.  Thrombus not present.  Tumor not present.  Device not present.    Left atrial appendage normal.      Septa    Atrial Septum:  Intra-atrial septal morphology normal.              Other Findings  Pericardium:  normal      Anesthesia Information  Performed Personally  Anesthesiologist:  Winston Ortega MD      Echocardiogram Comments:       Postbypass results:  S/P removal of aneurysm with patch repair   Severely reduced LVEF 15% on epi and primacor  Severely reduced RHF  Mild MR   mild TR  Balloon pump in place in distal arch of aorta

## 2020-09-17 NOTE — ANESTHESIA PROCEDURE NOTES
Arterial Line      Patient reassessed immediately prior to procedure    Patient location during procedure: OR  Start time: 9/17/2020 7:05 AM  Stop Time:9/17/2020 7:12 AM       Line placed for hemodynamic monitoring, ABGs/Labs/ISTAT and MD/Surgeon request.  Performed By   Anesthesiologist: Winston Ortega MD  Preanesthetic Checklist  Completed: patient identified, site marked, surgical consent, pre-op evaluation, timeout performed, IV checked, risks and benefits discussed and monitors and equipment checked  Arterial Line Prep   Sterile Tech: cap, gloves, mask and sterile barriers  Prep: ChloraPrep  Patient monitoring: blood pressure monitoring, continuous pulse oximetry and EKG  Arterial Line Procedure   Laterality:right  Location:  radial artery  Catheter size: 20 G   Guidance: ultrasound guided  PROCEDURE NOTE/ULTRASOUND INTERPRETATION.  Using ultrasound guidance the potential vascular sites for insertion of the catheter were visualized to determine the patency of the vessel to be used for vascular access.  After selecting the appropriate site for insertion, the needle was visualized under ultrasound being inserted into the radial artery, followed by ultrasound confirmation of wire and catheter placement. There were no abnormalities seen on ultrasound; an image was taken; and the patient tolerated the procedure with no complications.   Number of attempts: 1  Successful placement: yes  Post Assessment   Dressing Type: wrist guard applied, occlusive dressing applied and secured with tape.   Complications no  Circ/Move/Sens Assessment: normal and unchanged.   Patient Tolerance: patient tolerated the procedure well with no apparent complications  Additional Notes  Attempted left side but radial artery appears to be clotted with u/s inspection therefore went to the right

## 2020-09-18 ENCOUNTER — APPOINTMENT (OUTPATIENT)
Dept: GENERAL RADIOLOGY | Facility: HOSPITAL | Age: 80
End: 2020-09-18

## 2020-09-18 ENCOUNTER — ANESTHESIA EVENT (OUTPATIENT)
Dept: PERIOP | Facility: HOSPITAL | Age: 80
End: 2020-09-18

## 2020-09-18 ENCOUNTER — ANESTHESIA (OUTPATIENT)
Dept: PERIOP | Facility: HOSPITAL | Age: 80
End: 2020-09-18

## 2020-09-18 ENCOUNTER — ANCILLARY PROCEDURE (OUTPATIENT)
Dept: PERIOP | Facility: HOSPITAL | Age: 80
End: 2020-09-18

## 2020-09-18 LAB
ACT BLD: 125 SECONDS (ref 82–152)
ALBUMIN SERPL-MCNC: 3.9 G/DL (ref 3.5–5.2)
ALBUMIN SERPL-MCNC: 4 G/DL (ref 3.5–5.2)
ALBUMIN SERPL-MCNC: 4.2 G/DL (ref 3.5–5.2)
ALBUMIN SERPL-MCNC: 4.2 G/DL (ref 3.5–5.2)
ANION GAP SERPL CALCULATED.3IONS-SCNC: 11.1 MMOL/L (ref 5–15)
ANION GAP SERPL CALCULATED.3IONS-SCNC: 15.3 MMOL/L (ref 5–15)
ANION GAP SERPL CALCULATED.3IONS-SCNC: 15.5 MMOL/L (ref 5–15)
ANION GAP SERPL CALCULATED.3IONS-SCNC: 16.6 MMOL/L (ref 5–15)
APTT PPP: 33.3 SECONDS (ref 22.7–35.4)
APTT PPP: 34 SECONDS (ref 22.7–35.4)
ARTERIAL PATENCY WRIST A: ABNORMAL
ATMOSPHERIC PRESS: 751.2 MMHG
ATMOSPHERIC PRESS: 751.5 MMHG
ATMOSPHERIC PRESS: 753.9 MMHG
ATMOSPHERIC PRESS: 754.5 MMHG
ATMOSPHERIC PRESS: 754.6 MMHG
ATMOSPHERIC PRESS: 755.4 MMHG
ATMOSPHERIC PRESS: 755.9 MMHG
ATMOSPHERIC PRESS: 756.6 MMHG
BASE EXCESS BLDA CALC-SCNC: -1.5 MMOL/L (ref 0–2)
BASE EXCESS BLDA CALC-SCNC: 0.2 MMOL/L (ref 0–2)
BASE EXCESS BLDA CALC-SCNC: 1 MMOL/L (ref -5–5)
BASE EXCESS BLDA CALC-SCNC: 1.9 MMOL/L (ref 0–2)
BASE EXCESS BLDA CALC-SCNC: 4 MMOL/L (ref 0–2)
BASE EXCESS BLDV CALC-SCNC: -0.5 MMOL/L
BASE EXCESS BLDV CALC-SCNC: -2.1 MMOL/L
BASE EXCESS BLDV CALC-SCNC: 1.4 MMOL/L
BASE EXCESS BLDV CALC-SCNC: 3.1 MMOL/L
BASOPHILS # BLD AUTO: 0.01 10*3/MM3 (ref 0–0.2)
BASOPHILS # BLD AUTO: 0.01 10*3/MM3 (ref 0–0.2)
BASOPHILS # BLD AUTO: 0.02 10*3/MM3 (ref 0–0.2)
BASOPHILS NFR BLD AUTO: 0.1 % (ref 0–1.5)
BASOPHILS NFR BLD AUTO: 0.1 % (ref 0–1.5)
BASOPHILS NFR BLD AUTO: 0.2 % (ref 0–1.5)
BDY SITE: ABNORMAL
BH BB BLOOD EXPIRATION DATE: NORMAL
BH BB BLOOD TYPE BARCODE: 5100
BH BB BLOOD TYPE BARCODE: 5100
BH BB BLOOD TYPE BARCODE: 600
BH BB BLOOD TYPE BARCODE: 6200
BH BB DISPENSE STATUS: NORMAL
BH BB PRODUCT CODE: NORMAL
BH BB UNIT NUMBER: NORMAL
BUN SERPL-MCNC: 28 MG/DL (ref 8–23)
BUN SERPL-MCNC: 31 MG/DL (ref 8–23)
BUN SERPL-MCNC: 33 MG/DL (ref 8–23)
BUN SERPL-MCNC: 34 MG/DL (ref 8–23)
BUN/CREAT SERPL: 19.3 (ref 7–25)
BUN/CREAT SERPL: 20 (ref 7–25)
BUN/CREAT SERPL: 20.2 (ref 7–25)
BUN/CREAT SERPL: 21.9 (ref 7–25)
CA-I BLD-MCNC: 4.7 MG/DL (ref 4.6–5.4)
CA-I BLD-MCNC: 4.8 MG/DL (ref 4.6–5.4)
CA-I BLD-MCNC: 4.8 MG/DL (ref 4.6–5.4)
CA-I BLD-MCNC: 5 MG/DL (ref 4.6–5.4)
CA-I BLDA-SCNC: ABNORMAL MMOL/L
CA-I SERPL ISE-MCNC: 1.17 MMOL/L (ref 1.15–1.35)
CA-I SERPL ISE-MCNC: 1.19 MMOL/L (ref 1.15–1.35)
CA-I SERPL ISE-MCNC: 1.2 MMOL/L (ref 1.15–1.35)
CA-I SERPL ISE-MCNC: 1.24 MMOL/L (ref 1.15–1.35)
CALCIUM SPEC-SCNC: 8.4 MG/DL (ref 8.6–10.5)
CALCIUM SPEC-SCNC: 8.4 MG/DL (ref 8.6–10.5)
CALCIUM SPEC-SCNC: 8.6 MG/DL (ref 8.6–10.5)
CALCIUM SPEC-SCNC: 8.8 MG/DL (ref 8.6–10.5)
CHLORIDE SERPL-SCNC: 106 MMOL/L (ref 98–107)
CHLORIDE SERPL-SCNC: 110 MMOL/L (ref 98–107)
CHLORIDE SERPL-SCNC: 111 MMOL/L (ref 98–107)
CHLORIDE SERPL-SCNC: 111 MMOL/L (ref 98–107)
CO2 BLDA-SCNC: 28 MMOL/L (ref 24–29)
CO2 SERPL-SCNC: 23.7 MMOL/L (ref 22–29)
CO2 SERPL-SCNC: 24.5 MMOL/L (ref 22–29)
CO2 SERPL-SCNC: 25.4 MMOL/L (ref 22–29)
CO2 SERPL-SCNC: 28.9 MMOL/L (ref 22–29)
CREAT SERPL-MCNC: 1.28 MG/DL (ref 0.57–1)
CREAT SERPL-MCNC: 1.61 MG/DL (ref 0.57–1)
CREAT SERPL-MCNC: 1.65 MG/DL (ref 0.57–1)
CREAT SERPL-MCNC: 1.68 MG/DL (ref 0.57–1)
CROSSMATCH INTERPRETATION: NORMAL
CROSSMATCH INTERPRETATION: NORMAL
DEPRECATED RDW RBC AUTO: 50.4 FL (ref 37–54)
DEPRECATED RDW RBC AUTO: 51 FL (ref 37–54)
DEPRECATED RDW RBC AUTO: 51.6 FL (ref 37–54)
DEPRECATED RDW RBC AUTO: 51.9 FL (ref 37–54)
EOSINOPHIL # BLD AUTO: 0 10*3/MM3 (ref 0–0.4)
EOSINOPHIL NFR BLD AUTO: 0 % (ref 0.3–6.2)
ERYTHROCYTE [DISTWIDTH] IN BLOOD BY AUTOMATED COUNT: 16.1 % (ref 12.3–15.4)
ERYTHROCYTE [DISTWIDTH] IN BLOOD BY AUTOMATED COUNT: 16.2 % (ref 12.3–15.4)
ERYTHROCYTE [DISTWIDTH] IN BLOOD BY AUTOMATED COUNT: 16.2 % (ref 12.3–15.4)
ERYTHROCYTE [DISTWIDTH] IN BLOOD BY AUTOMATED COUNT: 16.4 % (ref 12.3–15.4)
FIBRINOGEN PPP-MCNC: 244 MG/DL (ref 219–464)
FIBRINOGEN PPP-MCNC: 245 MG/DL (ref 219–464)
GFR SERPL CREATININE-BSD FRML MDRD: 29 ML/MIN/1.73
GFR SERPL CREATININE-BSD FRML MDRD: 30 ML/MIN/1.73
GFR SERPL CREATININE-BSD FRML MDRD: 31 ML/MIN/1.73
GFR SERPL CREATININE-BSD FRML MDRD: 40 ML/MIN/1.73
GLUCOSE BLDC GLUCOMTR-MCNC: 100 MG/DL (ref 70–130)
GLUCOSE BLDC GLUCOMTR-MCNC: 102 MG/DL (ref 70–130)
GLUCOSE BLDC GLUCOMTR-MCNC: 114 MG/DL (ref 70–130)
GLUCOSE BLDC GLUCOMTR-MCNC: 118 MG/DL (ref 70–130)
GLUCOSE BLDC GLUCOMTR-MCNC: 120 MG/DL (ref 70–130)
GLUCOSE BLDC GLUCOMTR-MCNC: 123 MG/DL (ref 70–130)
GLUCOSE BLDC GLUCOMTR-MCNC: 139 MG/DL (ref 70–130)
GLUCOSE BLDC GLUCOMTR-MCNC: 152 MG/DL (ref 70–130)
GLUCOSE BLDC GLUCOMTR-MCNC: 157 MG/DL (ref 70–130)
GLUCOSE BLDC GLUCOMTR-MCNC: 158 MG/DL (ref 70–130)
GLUCOSE BLDC GLUCOMTR-MCNC: 161 MG/DL (ref 70–130)
GLUCOSE BLDC GLUCOMTR-MCNC: 171 MG/DL (ref 70–130)
GLUCOSE BLDC GLUCOMTR-MCNC: 174 MG/DL (ref 70–130)
GLUCOSE BLDC GLUCOMTR-MCNC: 87 MG/DL (ref 70–130)
GLUCOSE SERPL-MCNC: 113 MG/DL (ref 65–99)
GLUCOSE SERPL-MCNC: 152 MG/DL (ref 65–99)
GLUCOSE SERPL-MCNC: 161 MG/DL (ref 65–99)
GLUCOSE SERPL-MCNC: 164 MG/DL (ref 65–99)
HCO3 BLDA-SCNC: 24.6 MMOL/L (ref 22–28)
HCO3 BLDA-SCNC: 26 MMOL/L (ref 22–28)
HCO3 BLDA-SCNC: 26.7 MMOL/L (ref 22–26)
HCO3 BLDA-SCNC: 27.2 MMOL/L (ref 22–28)
HCO3 BLDA-SCNC: 29 MMOL/L (ref 22–28)
HCO3 BLDV-SCNC: 24.5 MMOL/L (ref 22–28)
HCO3 BLDV-SCNC: 25.3 MMOL/L (ref 22–28)
HCO3 BLDV-SCNC: 27 MMOL/L (ref 22–28)
HCO3 BLDV-SCNC: 28.5 MMOL/L (ref 22–28)
HCT VFR BLD AUTO: 23 % (ref 34–46.6)
HCT VFR BLD AUTO: 25.7 % (ref 34–46.6)
HCT VFR BLD AUTO: 27 % (ref 34–46.6)
HCT VFR BLD AUTO: 27.2 % (ref 34–46.6)
HCT VFR BLDA CALC: 25 % (ref 38–51)
HGB BLD-MCNC: 7.4 G/DL (ref 12–15.9)
HGB BLD-MCNC: 8.6 G/DL (ref 12–15.9)
HGB BLD-MCNC: 8.8 G/DL (ref 12–15.9)
HGB BLD-MCNC: 9 G/DL (ref 12–15.9)
HGB BLDA-MCNC: 8.5 G/DL (ref 12–17)
IMM GRANULOCYTES # BLD AUTO: 0.02 10*3/MM3 (ref 0–0.05)
IMM GRANULOCYTES # BLD AUTO: 0.04 10*3/MM3 (ref 0–0.05)
IMM GRANULOCYTES # BLD AUTO: 0.04 10*3/MM3 (ref 0–0.05)
IMM GRANULOCYTES NFR BLD AUTO: 0.2 % (ref 0–0.5)
IMM GRANULOCYTES NFR BLD AUTO: 0.5 % (ref 0–0.5)
IMM GRANULOCYTES NFR BLD AUTO: 0.5 % (ref 0–0.5)
INHALED O2 CONCENTRATION: 100 %
INHALED O2 CONCENTRATION: 30 %
INHALED O2 CONCENTRATION: 30 %
INHALED O2 CONCENTRATION: 40 %
INR PPP: 1.42 (ref 0.9–1.1)
INR PPP: 1.44 (ref 0.9–1.1)
INR PPP: 1.49 (ref 0.9–1.1)
INR PPP: 1.56 (ref 0.9–1.1)
LAB AP CASE REPORT: NORMAL
LAB AP DIAGNOSIS COMMENT: NORMAL
LYMPHOCYTES # BLD AUTO: 0.42 10*3/MM3 (ref 0.7–3.1)
LYMPHOCYTES # BLD AUTO: 0.49 10*3/MM3 (ref 0.7–3.1)
LYMPHOCYTES # BLD AUTO: 0.73 10*3/MM3 (ref 0.7–3.1)
LYMPHOCYTES NFR BLD AUTO: 4.7 % (ref 19.6–45.3)
LYMPHOCYTES NFR BLD AUTO: 5.9 % (ref 19.6–45.3)
LYMPHOCYTES NFR BLD AUTO: 9.8 % (ref 19.6–45.3)
MAGNESIUM SERPL-MCNC: 2.7 MG/DL (ref 1.6–2.4)
MAGNESIUM SERPL-MCNC: 2.7 MG/DL (ref 1.6–2.4)
MAGNESIUM SERPL-MCNC: 2.8 MG/DL (ref 1.6–2.4)
MAGNESIUM SERPL-MCNC: 2.9 MG/DL (ref 1.6–2.4)
MCH RBC QN AUTO: 27.9 PG (ref 26.6–33)
MCH RBC QN AUTO: 28.5 PG (ref 26.6–33)
MCH RBC QN AUTO: 28.5 PG (ref 26.6–33)
MCH RBC QN AUTO: 28.8 PG (ref 26.6–33)
MCHC RBC AUTO-ENTMCNC: 32.2 G/DL (ref 31.5–35.7)
MCHC RBC AUTO-ENTMCNC: 32.6 G/DL (ref 31.5–35.7)
MCHC RBC AUTO-ENTMCNC: 33.1 G/DL (ref 31.5–35.7)
MCHC RBC AUTO-ENTMCNC: 33.5 G/DL (ref 31.5–35.7)
MCV RBC AUTO: 85.1 FL (ref 79–97)
MCV RBC AUTO: 86.8 FL (ref 79–97)
MCV RBC AUTO: 86.9 FL (ref 79–97)
MCV RBC AUTO: 87.4 FL (ref 79–97)
MODALITY: ABNORMAL
MONOCYTES # BLD AUTO: 0.32 10*3/MM3 (ref 0.1–0.9)
MONOCYTES # BLD AUTO: 0.36 10*3/MM3 (ref 0.1–0.9)
MONOCYTES # BLD AUTO: 0.5 10*3/MM3 (ref 0.1–0.9)
MONOCYTES NFR BLD AUTO: 3.6 % (ref 5–12)
MONOCYTES NFR BLD AUTO: 4.3 % (ref 5–12)
MONOCYTES NFR BLD AUTO: 6.7 % (ref 5–12)
NEUTROPHILS NFR BLD AUTO: 6.17 10*3/MM3 (ref 1.7–7)
NEUTROPHILS NFR BLD AUTO: 7.41 10*3/MM3 (ref 1.7–7)
NEUTROPHILS NFR BLD AUTO: 8.14 10*3/MM3 (ref 1.7–7)
NEUTROPHILS NFR BLD AUTO: 82.9 % (ref 42.7–76)
NEUTROPHILS NFR BLD AUTO: 89.2 % (ref 42.7–76)
NEUTROPHILS NFR BLD AUTO: 91.3 % (ref 42.7–76)
NRBC BLD AUTO-RTO: 0 /100 WBC (ref 0–0.2)
O2 A-A PPRESDIFF RESPIRATORY: 0.5 MMHG
O2 A-A PPRESDIFF RESPIRATORY: 0.6 MMHG
O2 A-A PPRESDIFF RESPIRATORY: 0.8 MMHG
PATH REPORT.FINAL DX SPEC: NORMAL
PATH REPORT.GROSS SPEC: NORMAL
PCO2 BLDA: 44.8 MM HG (ref 35–45)
PCO2 BLDA: 45.3 MM HG (ref 35–45)
PCO2 BLDA: 46.9 MM HG (ref 35–45)
PCO2 BLDA: 47.2 MM HG (ref 35–45)
PCO2 BLDA: 47.8 MM HG (ref 35–45)
PCO2 BLDV: 47 MM HG (ref 41–51)
PCO2 BLDV: 47.4 MM HG (ref 41–51)
PCO2 BLDV: 47.8 MM HG (ref 41–51)
PCO2 BLDV: 51 MM HG (ref 41–51)
PEEP RESPIRATORY: 5 CM[H2O]
PH BLDA: 7.33 PH UNITS (ref 7.35–7.45)
PH BLDA: 7.35 PH UNITS (ref 7.35–7.45)
PH BLDA: 7.35 PH UNITS (ref 7.35–7.6)
PH BLDA: 7.39 PH UNITS (ref 7.35–7.45)
PH BLDA: 7.41 PH UNITS (ref 7.35–7.45)
PH BLDV: 7.29 PH UNITS (ref 7.31–7.41)
PH BLDV: 7.34 PH UNITS (ref 7.31–7.41)
PH BLDV: 7.36 PH UNITS (ref 7.31–7.41)
PH BLDV: 7.38 PH UNITS (ref 7.31–7.41)
PHOSPHATE SERPL-MCNC: 4.8 MG/DL (ref 2.5–4.5)
PHOSPHATE SERPL-MCNC: 5.9 MG/DL (ref 2.5–4.5)
PHOSPHATE SERPL-MCNC: 6.8 MG/DL (ref 2.5–4.5)
PHOSPHATE SERPL-MCNC: 7.2 MG/DL (ref 2.5–4.5)
PLATELET # BLD AUTO: 100 10*3/MM3 (ref 140–450)
PLATELET # BLD AUTO: 111 10*3/MM3 (ref 140–450)
PLATELET # BLD AUTO: 86 10*3/MM3 (ref 140–450)
PLATELET # BLD AUTO: 86 10*3/MM3 (ref 140–450)
PMV BLD AUTO: 10 FL (ref 6–12)
PMV BLD AUTO: 9.7 FL (ref 6–12)
PMV BLD AUTO: 9.8 FL (ref 6–12)
PMV BLD AUTO: 9.8 FL (ref 6–12)
PO2 BLDA: 101.5 MM HG (ref 80–100)
PO2 BLDA: 149.9 MM HG (ref 80–100)
PO2 BLDA: 449 MMHG (ref 80–105)
PO2 BLDA: 541.8 MM HG (ref 80–100)
PO2 BLDA: 88.9 MM HG (ref 80–100)
PO2 BLDV: 34.7 MM HG (ref 35–45)
PO2 BLDV: 41.7 MM HG (ref 35–45)
PO2 BLDV: 42.1 MM HG (ref 35–45)
PO2 BLDV: 50.9 MM HG (ref 35–45)
POTASSIUM BLDA-SCNC: 4.2 MMOL/L (ref 3.5–4.9)
POTASSIUM SERPL-SCNC: 3.8 MMOL/L (ref 3.5–5.2)
POTASSIUM SERPL-SCNC: 4.3 MMOL/L (ref 3.5–5.2)
POTASSIUM SERPL-SCNC: 4.4 MMOL/L (ref 3.5–5.2)
POTASSIUM SERPL-SCNC: 4.5 MMOL/L (ref 3.5–5.2)
PROTHROMBIN TIME: 17.1 SECONDS (ref 11.7–14.2)
PROTHROMBIN TIME: 17.3 SECONDS (ref 11.7–14.2)
PROTHROMBIN TIME: 17.7 SECONDS (ref 11.7–14.2)
PROTHROMBIN TIME: 18.4 SECONDS (ref 11.7–14.2)
RBC # BLD AUTO: 2.65 10*6/MM3 (ref 3.77–5.28)
RBC # BLD AUTO: 3.02 10*6/MM3 (ref 3.77–5.28)
RBC # BLD AUTO: 3.09 10*6/MM3 (ref 3.77–5.28)
RBC # BLD AUTO: 3.13 10*6/MM3 (ref 3.77–5.28)
SAO2 % BLDA: 100 % (ref 95–98)
SAO2 % BLDCOA: 100 % (ref 92–99)
SAO2 % BLDCOA: 64.9 % (ref 92–99)
SAO2 % BLDCOA: 73.6 % (ref 92–99)
SAO2 % BLDCOA: 75.2 % (ref 92–99)
SAO2 % BLDCOA: 80.9 % (ref 92–99)
SAO2 % BLDCOA: 96.9 % (ref 92–99)
SAO2 % BLDCOA: 97.7 % (ref 92–99)
SAO2 % BLDCOA: 99.2 % (ref 92–99)
SET MECH RESP RATE: 16
SODIUM SERPL-SCNC: 148 MMOL/L (ref 136–145)
SODIUM SERPL-SCNC: 150 MMOL/L (ref 136–145)
SODIUM SERPL-SCNC: 150 MMOL/L (ref 136–145)
SODIUM SERPL-SCNC: 151 MMOL/L (ref 136–145)
TOTAL RATE: 16 BREATHS/MINUTE
UNIT  ABO: NORMAL
UNIT  RH: NORMAL
VANCOMYCIN SERPL-MCNC: 8.9 MCG/ML (ref 5–40)
VENTILATOR MODE: ABNORMAL
VENTILATOR MODE: AC
VT ON VENT VENT: 450 ML
VT ON VENT VENT: 452 ML
VT ON VENT VENT: 452 ML
VT ON VENT VENT: 457 ML
VT ON VENT VENT: 461 ML
WBC # BLD AUTO: 7.45 10*3/MM3 (ref 3.4–10.8)
WBC # BLD AUTO: 8.08 10*3/MM3 (ref 3.4–10.8)
WBC # BLD AUTO: 8.31 10*3/MM3 (ref 3.4–10.8)
WBC # BLD AUTO: 8.92 10*3/MM3 (ref 3.4–10.8)

## 2020-09-18 PROCEDURE — 82962 GLUCOSE BLOOD TEST: CPT

## 2020-09-18 PROCEDURE — 85025 COMPLETE CBC W/AUTO DIFF WBC: CPT | Performed by: THORACIC SURGERY (CARDIOTHORACIC VASCULAR SURGERY)

## 2020-09-18 PROCEDURE — 86900 BLOOD TYPING SEROLOGIC ABO: CPT

## 2020-09-18 PROCEDURE — 93005 ELECTROCARDIOGRAM TRACING: CPT | Performed by: THORACIC SURGERY (CARDIOTHORACIC VASCULAR SURGERY)

## 2020-09-18 PROCEDURE — 25010000002 FENTANYL CITRATE (PF) 100 MCG/2ML SOLUTION: Performed by: THORACIC SURGERY (CARDIOTHORACIC VASCULAR SURGERY)

## 2020-09-18 PROCEDURE — 85384 FIBRINOGEN ACTIVITY: CPT | Performed by: THORACIC SURGERY (CARDIOTHORACIC VASCULAR SURGERY)

## 2020-09-18 PROCEDURE — 85018 HEMOGLOBIN: CPT

## 2020-09-18 PROCEDURE — C1713 ANCHOR/SCREW BN/BN,TIS/BN: HCPCS | Performed by: THORACIC SURGERY (CARDIOTHORACIC VASCULAR SURGERY)

## 2020-09-18 PROCEDURE — 93010 ELECTROCARDIOGRAM REPORT: CPT | Performed by: INTERNAL MEDICINE

## 2020-09-18 PROCEDURE — 33971 AORTIC CIRCULATION ASSIST: CPT | Performed by: THORACIC SURGERY (CARDIOTHORACIC VASCULAR SURGERY)

## 2020-09-18 PROCEDURE — 25010000002 MAGNESIUM SULFATE IN D5W 1G/100ML (PREMIX) 1-5 GM/100ML-% SOLUTION: Performed by: NURSE PRACTITIONER

## 2020-09-18 PROCEDURE — 85610 PROTHROMBIN TIME: CPT | Performed by: THORACIC SURGERY (CARDIOTHORACIC VASCULAR SURGERY)

## 2020-09-18 PROCEDURE — 25010000002 FUROSEMIDE PER 20 MG: Performed by: THORACIC SURGERY (CARDIOTHORACIC VASCULAR SURGERY)

## 2020-09-18 PROCEDURE — 82803 BLOOD GASES ANY COMBINATION: CPT

## 2020-09-18 PROCEDURE — 82330 ASSAY OF CALCIUM: CPT | Performed by: THORACIC SURGERY (CARDIOTHORACIC VASCULAR SURGERY)

## 2020-09-18 PROCEDURE — 85730 THROMBOPLASTIN TIME PARTIAL: CPT | Performed by: THORACIC SURGERY (CARDIOTHORACIC VASCULAR SURGERY)

## 2020-09-18 PROCEDURE — 93005 ELECTROCARDIOGRAM TRACING: CPT | Performed by: NURSE PRACTITIONER

## 2020-09-18 PROCEDURE — P9037 PLATE PHERES LEUKOREDU IRRAD: HCPCS

## 2020-09-18 PROCEDURE — C1729 CATH, DRAINAGE: HCPCS | Performed by: THORACIC SURGERY (CARDIOTHORACIC VASCULAR SURGERY)

## 2020-09-18 PROCEDURE — 83735 ASSAY OF MAGNESIUM: CPT | Performed by: THORACIC SURGERY (CARDIOTHORACIC VASCULAR SURGERY)

## 2020-09-18 PROCEDURE — 94003 VENT MGMT INPAT SUBQ DAY: CPT

## 2020-09-18 PROCEDURE — 71045 X-RAY EXAM CHEST 1 VIEW: CPT

## 2020-09-18 PROCEDURE — P9041 ALBUMIN (HUMAN),5%, 50ML: HCPCS | Performed by: THORACIC SURGERY (CARDIOTHORACIC VASCULAR SURGERY)

## 2020-09-18 PROCEDURE — 25010000002 METOCLOPRAMIDE PER 10 MG: Performed by: THORACIC SURGERY (CARDIOTHORACIC VASCULAR SURGERY)

## 2020-09-18 PROCEDURE — 25010000002 ALBUMIN HUMAN 5% PER 50 ML: Performed by: THORACIC SURGERY (CARDIOTHORACIC VASCULAR SURGERY)

## 2020-09-18 PROCEDURE — 82947 ASSAY GLUCOSE BLOOD QUANT: CPT

## 2020-09-18 PROCEDURE — 25010000002 VANCOMYCIN 1 G RECONSTITUTED SOLUTION 1 EACH VIAL: Performed by: THORACIC SURGERY (CARDIOTHORACIC VASCULAR SURGERY)

## 2020-09-18 PROCEDURE — 99024 POSTOP FOLLOW-UP VISIT: CPT | Performed by: THORACIC SURGERY (CARDIOTHORACIC VASCULAR SURGERY)

## 2020-09-18 PROCEDURE — 25010000002 FENTANYL CITRATE (PF) 100 MCG/2ML SOLUTION: Performed by: NURSE PRACTITIONER

## 2020-09-18 PROCEDURE — 36430 TRANSFUSION BLD/BLD COMPNT: CPT

## 2020-09-18 PROCEDURE — 93318 ECHO TRANSESOPHAGEAL INTRAOP: CPT | Performed by: ANESTHESIOLOGY

## 2020-09-18 PROCEDURE — 94799 UNLISTED PULMONARY SVC/PX: CPT

## 2020-09-18 PROCEDURE — P9035 PLATELET PHERES LEUKOREDUCED: HCPCS

## 2020-09-18 PROCEDURE — P9016 RBC LEUKOCYTES REDUCED: HCPCS

## 2020-09-18 PROCEDURE — 25010000002 CALCIUM GLUCONATE PER 10 ML: Performed by: NURSE PRACTITIONER

## 2020-09-18 PROCEDURE — 93318 ECHO TRANSESOPHAGEAL INTRAOP: CPT

## 2020-09-18 PROCEDURE — 25010000003 MILRINONE LACTATE 10 MG/10ML SOLUTION 10 ML VIAL: Performed by: ANESTHESIOLOGY

## 2020-09-18 PROCEDURE — 25010000002 PROPOFOL 10 MG/ML EMULSION: Performed by: ANESTHESIOLOGY

## 2020-09-18 PROCEDURE — 25010000002 PROPOFOL 10 MG/ML EMULSION: Performed by: NURSE PRACTITIONER

## 2020-09-18 PROCEDURE — 25010000002 EPINEPHRINE PER 0.1 MG: Performed by: ANESTHESIOLOGY

## 2020-09-18 PROCEDURE — 21750 REPAIR OF STERNUM SEPARATION: CPT | Performed by: THORACIC SURGERY (CARDIOTHORACIC VASCULAR SURGERY)

## 2020-09-18 PROCEDURE — 0PQ00ZZ REPAIR STERNUM, OPEN APPROACH: ICD-10-PCS | Performed by: THORACIC SURGERY (CARDIOTHORACIC VASCULAR SURGERY)

## 2020-09-18 PROCEDURE — 25010000002 VANCOMYCIN PER 500 MG: Performed by: ANESTHESIOLOGY

## 2020-09-18 PROCEDURE — 85027 COMPLETE CBC AUTOMATED: CPT | Performed by: THORACIC SURGERY (CARDIOTHORACIC VASCULAR SURGERY)

## 2020-09-18 PROCEDURE — 85014 HEMATOCRIT: CPT

## 2020-09-18 PROCEDURE — 85347 COAGULATION TIME ACTIVATED: CPT

## 2020-09-18 PROCEDURE — A4648 IMPLANTABLE TISSUE MARKER: HCPCS | Performed by: THORACIC SURGERY (CARDIOTHORACIC VASCULAR SURGERY)

## 2020-09-18 PROCEDURE — 99232 SBSQ HOSP IP/OBS MODERATE 35: CPT | Performed by: INTERNAL MEDICINE

## 2020-09-18 PROCEDURE — 80202 ASSAY OF VANCOMYCIN: CPT | Performed by: THORACIC SURGERY (CARDIOTHORACIC VASCULAR SURGERY)

## 2020-09-18 PROCEDURE — 80069 RENAL FUNCTION PANEL: CPT | Performed by: THORACIC SURGERY (CARDIOTHORACIC VASCULAR SURGERY)

## 2020-09-18 PROCEDURE — 82330 ASSAY OF CALCIUM: CPT

## 2020-09-18 PROCEDURE — 25010000002 PROPOFOL 10 MG/ML EMULSION: Performed by: THORACIC SURGERY (CARDIOTHORACIC VASCULAR SURGERY)

## 2020-09-18 DEVICE — SS SUTURE, 4 PER SLEEVE
Type: IMPLANTABLE DEVICE | Site: STERNUM | Status: FUNCTIONAL
Brand: MYO/WIRE II

## 2020-09-18 DEVICE — SS SUTURE, 3 PER SLEEVE
Type: IMPLANTABLE DEVICE | Site: STERNUM | Status: FUNCTIONAL
Brand: MYO/WIRE II

## 2020-09-18 RX ORDER — SODIUM CHLORIDE 0.9 % (FLUSH) 0.9 %
30 SYRINGE (ML) INJECTION ONCE AS NEEDED
Status: DISCONTINUED | OUTPATIENT
Start: 2020-09-18 | End: 2020-09-20

## 2020-09-18 RX ORDER — ATORVASTATIN CALCIUM 20 MG/1
40 TABLET, FILM COATED ORAL NIGHTLY
Status: DISCONTINUED | OUTPATIENT
Start: 2020-09-18 | End: 2020-09-25 | Stop reason: HOSPADM

## 2020-09-18 RX ORDER — FENTANYL CITRATE 50 UG/ML
25 INJECTION, SOLUTION INTRAMUSCULAR; INTRAVENOUS
Status: DISCONTINUED | OUTPATIENT
Start: 2020-09-18 | End: 2020-09-20

## 2020-09-18 RX ORDER — MILRINONE LACTATE 0.2 MG/ML
.25-.375 INJECTION, SOLUTION INTRAVENOUS CONTINUOUS PRN
Status: DISCONTINUED | OUTPATIENT
Start: 2020-09-18 | End: 2020-09-22

## 2020-09-18 RX ORDER — FUROSEMIDE 10 MG/ML
20 INJECTION INTRAMUSCULAR; INTRAVENOUS ONCE
Status: COMPLETED | OUTPATIENT
Start: 2020-09-18 | End: 2020-09-18

## 2020-09-18 RX ORDER — POTASSIUM CHLORIDE 1.5 G/1.77G
40 POWDER, FOR SOLUTION ORAL AS NEEDED
Status: DISCONTINUED | OUTPATIENT
Start: 2020-09-18 | End: 2020-09-25 | Stop reason: HOSPADM

## 2020-09-18 RX ORDER — POLYETHYLENE GLYCOL 3350 17 G/17G
17 POWDER, FOR SOLUTION ORAL DAILY
Status: DISCONTINUED | OUTPATIENT
Start: 2020-09-20 | End: 2020-09-25 | Stop reason: HOSPADM

## 2020-09-18 RX ORDER — POTASSIUM CHLORIDE 750 MG/1
40 CAPSULE, EXTENDED RELEASE ORAL AS NEEDED
Status: DISCONTINUED | OUTPATIENT
Start: 2020-09-18 | End: 2020-09-25 | Stop reason: HOSPADM

## 2020-09-18 RX ORDER — POTASSIUM CHLORIDE 7.45 MG/ML
10 INJECTION INTRAVENOUS
Status: DISCONTINUED | OUTPATIENT
Start: 2020-09-18 | End: 2020-09-25 | Stop reason: HOSPADM

## 2020-09-18 RX ORDER — METOCLOPRAMIDE HYDROCHLORIDE 5 MG/ML
10 INJECTION INTRAMUSCULAR; INTRAVENOUS EVERY 6 HOURS
Status: DISPENSED | OUTPATIENT
Start: 2020-09-18 | End: 2020-09-19

## 2020-09-18 RX ORDER — PANTOPRAZOLE SODIUM 40 MG/10ML
40 INJECTION, POWDER, LYOPHILIZED, FOR SOLUTION INTRAVENOUS ONCE
Status: COMPLETED | OUTPATIENT
Start: 2020-09-18 | End: 2020-09-18

## 2020-09-18 RX ORDER — VANCOMYCIN HYDROCHLORIDE 500 MG/10ML
INJECTION, POWDER, LYOPHILIZED, FOR SOLUTION INTRAVENOUS AS NEEDED
Status: DISCONTINUED | OUTPATIENT
Start: 2020-09-18 | End: 2020-09-18 | Stop reason: SURG

## 2020-09-18 RX ORDER — DOPAMINE HYDROCHLORIDE 160 MG/100ML
2-20 INJECTION, SOLUTION INTRAVENOUS CONTINUOUS PRN
Status: DISCONTINUED | OUTPATIENT
Start: 2020-09-18 | End: 2020-09-20

## 2020-09-18 RX ORDER — ALBUMIN, HUMAN INJ 5% 5 %
500 SOLUTION INTRAVENOUS AS NEEDED
Status: DISCONTINUED | OUTPATIENT
Start: 2020-09-18 | End: 2020-09-18

## 2020-09-18 RX ORDER — MAGNESIUM SULFATE 1 G/100ML
1 INJECTION INTRAVENOUS EVERY 8 HOURS
Status: ACTIVE | OUTPATIENT
Start: 2020-09-18 | End: 2020-09-19

## 2020-09-18 RX ORDER — BISACODYL 5 MG/1
10 TABLET, DELAYED RELEASE ORAL DAILY PRN
Status: DISCONTINUED | OUTPATIENT
Start: 2020-09-18 | End: 2020-09-25 | Stop reason: HOSPADM

## 2020-09-18 RX ORDER — ACETAMINOPHEN 160 MG/5ML
650 SOLUTION ORAL EVERY 4 HOURS PRN
Status: DISCONTINUED | OUTPATIENT
Start: 2020-09-19 | End: 2020-09-25 | Stop reason: HOSPADM

## 2020-09-18 RX ORDER — ACETAMINOPHEN 650 MG/1
650 SUPPOSITORY RECTAL EVERY 4 HOURS
Status: DISPENSED | OUTPATIENT
Start: 2020-09-18 | End: 2020-09-19

## 2020-09-18 RX ORDER — PANTOPRAZOLE SODIUM 40 MG/1
40 TABLET, DELAYED RELEASE ORAL EVERY MORNING
Status: DISCONTINUED | OUTPATIENT
Start: 2020-09-19 | End: 2020-09-20

## 2020-09-18 RX ORDER — ACETAMINOPHEN 650 MG/1
650 SUPPOSITORY RECTAL EVERY 4 HOURS PRN
Status: DISCONTINUED | OUTPATIENT
Start: 2020-09-19 | End: 2020-09-25 | Stop reason: HOSPADM

## 2020-09-18 RX ORDER — PROPOFOL 10 MG/ML
VIAL (ML) INTRAVENOUS CONTINUOUS PRN
Status: DISCONTINUED | OUTPATIENT
Start: 2020-09-18 | End: 2020-09-18 | Stop reason: SURG

## 2020-09-18 RX ORDER — ACETAMINOPHEN 325 MG/1
650 TABLET ORAL EVERY 4 HOURS PRN
Status: DISCONTINUED | OUTPATIENT
Start: 2020-09-19 | End: 2020-09-25 | Stop reason: HOSPADM

## 2020-09-18 RX ORDER — POTASSIUM CHLORIDE 29.8 MG/ML
20 INJECTION INTRAVENOUS
Status: DISCONTINUED | OUTPATIENT
Start: 2020-09-18 | End: 2020-09-25 | Stop reason: HOSPADM

## 2020-09-18 RX ORDER — ALBUMIN, HUMAN INJ 5% 5 %
1500 SOLUTION INTRAVENOUS AS NEEDED
Status: DISPENSED | OUTPATIENT
Start: 2020-09-18 | End: 2020-09-19

## 2020-09-18 RX ORDER — CHLORHEXIDINE GLUCONATE 0.12 MG/ML
15 RINSE ORAL EVERY 12 HOURS
Status: DISCONTINUED | OUTPATIENT
Start: 2020-09-18 | End: 2020-09-22

## 2020-09-18 RX ORDER — FUROSEMIDE 10 MG/ML
40 INJECTION INTRAMUSCULAR; INTRAVENOUS EVERY 6 HOURS PRN
Status: DISCONTINUED | OUTPATIENT
Start: 2020-09-18 | End: 2020-09-20

## 2020-09-18 RX ORDER — ACETAMINOPHEN 160 MG/5ML
650 SOLUTION ORAL EVERY 4 HOURS
Status: DISPENSED | OUTPATIENT
Start: 2020-09-18 | End: 2020-09-19

## 2020-09-18 RX ORDER — ROCURONIUM BROMIDE 10 MG/ML
INJECTION, SOLUTION INTRAVENOUS AS NEEDED
Status: DISCONTINUED | OUTPATIENT
Start: 2020-09-18 | End: 2020-09-18 | Stop reason: SURG

## 2020-09-18 RX ORDER — MEPERIDINE HYDROCHLORIDE 25 MG/ML
25 INJECTION INTRAMUSCULAR; INTRAVENOUS; SUBCUTANEOUS EVERY 4 HOURS PRN
Status: DISPENSED | OUTPATIENT
Start: 2020-09-18 | End: 2020-09-19

## 2020-09-18 RX ORDER — NALOXONE HCL 0.4 MG/ML
0.4 VIAL (ML) INJECTION
Status: DISCONTINUED | OUTPATIENT
Start: 2020-09-18 | End: 2020-09-25 | Stop reason: HOSPADM

## 2020-09-18 RX ORDER — PANTOPRAZOLE SODIUM 40 MG/10ML
40 INJECTION, POWDER, LYOPHILIZED, FOR SOLUTION INTRAVENOUS DAILY
Status: DISCONTINUED | OUTPATIENT
Start: 2020-09-19 | End: 2020-09-18

## 2020-09-18 RX ORDER — HYDROCODONE BITARTRATE AND ACETAMINOPHEN 5; 325 MG/1; MG/1
2 TABLET ORAL EVERY 4 HOURS PRN
Status: DISCONTINUED | OUTPATIENT
Start: 2020-09-18 | End: 2020-09-25 | Stop reason: HOSPADM

## 2020-09-18 RX ORDER — ONDANSETRON 2 MG/ML
4 INJECTION INTRAMUSCULAR; INTRAVENOUS EVERY 6 HOURS PRN
Status: DISCONTINUED | OUTPATIENT
Start: 2020-09-18 | End: 2020-09-25 | Stop reason: HOSPADM

## 2020-09-18 RX ORDER — SODIUM CHLORIDE 9 MG/ML
30 INJECTION, SOLUTION INTRAVENOUS CONTINUOUS
Status: DISCONTINUED | OUTPATIENT
Start: 2020-09-18 | End: 2020-09-20

## 2020-09-18 RX ORDER — SODIUM CHLORIDE 9 MG/ML
30 INJECTION, SOLUTION INTRAVENOUS CONTINUOUS PRN
Status: DISCONTINUED | OUTPATIENT
Start: 2020-09-18 | End: 2020-09-25 | Stop reason: HOSPADM

## 2020-09-18 RX ORDER — NOREPINEPHRINE BIT/0.9 % NACL 8 MG/250ML
.02-.1 INFUSION BOTTLE (ML) INTRAVENOUS CONTINUOUS PRN
Status: DISCONTINUED | OUTPATIENT
Start: 2020-09-18 | End: 2020-09-20

## 2020-09-18 RX ORDER — ACETAMINOPHEN 325 MG/1
650 TABLET ORAL EVERY 4 HOURS
Status: ACTIVE | OUTPATIENT
Start: 2020-09-18 | End: 2020-09-19

## 2020-09-18 RX ORDER — MIDAZOLAM HYDROCHLORIDE 1 MG/ML
2 INJECTION INTRAMUSCULAR; INTRAVENOUS
Status: DISCONTINUED | OUTPATIENT
Start: 2020-09-18 | End: 2020-09-20

## 2020-09-18 RX ORDER — BISACODYL 10 MG
10 SUPPOSITORY, RECTAL RECTAL DAILY PRN
Status: DISCONTINUED | OUTPATIENT
Start: 2020-09-19 | End: 2020-09-25 | Stop reason: HOSPADM

## 2020-09-18 RX ORDER — AMOXICILLIN 250 MG
2 CAPSULE ORAL NIGHTLY
Status: DISCONTINUED | OUTPATIENT
Start: 2020-09-19 | End: 2020-09-25 | Stop reason: HOSPADM

## 2020-09-18 RX ORDER — FENTANYL CITRATE 50 UG/ML
50 INJECTION, SOLUTION INTRAMUSCULAR; INTRAVENOUS
Status: DISCONTINUED | OUTPATIENT
Start: 2020-09-18 | End: 2020-09-20

## 2020-09-18 RX ORDER — ASPIRIN 81 MG/1
81 TABLET ORAL DAILY
Status: DISCONTINUED | OUTPATIENT
Start: 2020-09-19 | End: 2020-09-20

## 2020-09-18 RX ORDER — NITROGLYCERIN 20 MG/100ML
5-200 INJECTION INTRAVENOUS
Status: DISCONTINUED | OUTPATIENT
Start: 2020-09-18 | End: 2020-09-20

## 2020-09-18 RX ORDER — POTASSIUM CHLORIDE 29.8 MG/ML
20 INJECTION INTRAVENOUS
Status: COMPLETED | OUTPATIENT
Start: 2020-09-18 | End: 2020-09-19

## 2020-09-18 RX ADMIN — FENTANYL CITRATE 25 MCG: 50 INJECTION, SOLUTION INTRAMUSCULAR; INTRAVENOUS at 13:25

## 2020-09-18 RX ADMIN — ROCURONIUM BROMIDE 50 MG: 10 INJECTION INTRAVENOUS at 17:27

## 2020-09-18 RX ADMIN — FUROSEMIDE 20 MG: 20 INJECTION, SOLUTION INTRAMUSCULAR; INTRAVENOUS at 09:23

## 2020-09-18 RX ADMIN — PROPOFOL 50 MCG/KG/MIN: 10 INJECTION, EMULSION INTRAVENOUS at 17:26

## 2020-09-18 RX ADMIN — PANTOPRAZOLE SODIUM 40 MG: 40 INJECTION, POWDER, FOR SOLUTION INTRAVENOUS at 21:28

## 2020-09-18 RX ADMIN — PROPOFOL 50 MCG/KG/MIN: 10 INJECTION, EMULSION INTRAVENOUS at 16:26

## 2020-09-18 RX ADMIN — ALBUMIN HUMAN 250 ML: 0.05 INJECTION, SOLUTION INTRAVENOUS at 03:03

## 2020-09-18 RX ADMIN — CALCIUM GLUCONATE 2 G: 98 INJECTION, SOLUTION INTRAVENOUS at 08:53

## 2020-09-18 RX ADMIN — NITROGLYCERIN 5 MCG/MIN: 20 INJECTION INTRAVENOUS at 17:26

## 2020-09-18 RX ADMIN — CHLORHEXIDINE GLUCONATE 15 ML: 1.2 RINSE ORAL at 21:28

## 2020-09-18 RX ADMIN — FENTANYL CITRATE 50 MCG: 50 INJECTION, SOLUTION INTRAMUSCULAR; INTRAVENOUS at 00:01

## 2020-09-18 RX ADMIN — FENTANYL CITRATE 25 MCG: 50 INJECTION, SOLUTION INTRAMUSCULAR; INTRAVENOUS at 04:52

## 2020-09-18 RX ADMIN — PROPOFOL 50 MCG/KG/MIN: 10 INJECTION, EMULSION INTRAVENOUS at 22:30

## 2020-09-18 RX ADMIN — ALBUMIN HUMAN 250 ML: 0.05 INJECTION, SOLUTION INTRAVENOUS at 23:31

## 2020-09-18 RX ADMIN — PANTOPRAZOLE SODIUM 40 MG: 40 INJECTION, POWDER, FOR SOLUTION INTRAVENOUS at 09:58

## 2020-09-18 RX ADMIN — SODIUM CHLORIDE 2 MG/HR: 9 INJECTION, SOLUTION INTRAVENOUS at 19:55

## 2020-09-18 RX ADMIN — MAGNESIUM SULFATE HEPTAHYDRATE 1 G: 1 INJECTION, SOLUTION INTRAVENOUS at 00:44

## 2020-09-18 RX ADMIN — FENTANYL CITRATE 25 MCG: 50 INJECTION, SOLUTION INTRAMUSCULAR; INTRAVENOUS at 16:43

## 2020-09-18 RX ADMIN — SODIUM CHLORIDE 0.25 MCG/KG/MIN: 0.9 INJECTION, SOLUTION INTRAVENOUS at 17:26

## 2020-09-18 RX ADMIN — ALBUMIN HUMAN 250 ML: 0.05 INJECTION, SOLUTION INTRAVENOUS at 05:42

## 2020-09-18 RX ADMIN — NITROGLYCERIN 5 MCG/MIN: 20 INJECTION INTRAVENOUS at 16:18

## 2020-09-18 RX ADMIN — MUPIROCIN 1 APPLICATION: 20 OINTMENT TOPICAL at 21:29

## 2020-09-18 RX ADMIN — FENTANYL CITRATE 25 MCG: 50 INJECTION, SOLUTION INTRAMUSCULAR; INTRAVENOUS at 08:48

## 2020-09-18 RX ADMIN — METOCLOPRAMIDE HYDROCHLORIDE 10 MG: 5 INJECTION INTRAMUSCULAR; INTRAVENOUS at 21:28

## 2020-09-18 RX ADMIN — EPINEPHRINE 0.01 MCG/KG/MIN: 1 INJECTION, SOLUTION, CONCENTRATE INTRAVENOUS at 17:26

## 2020-09-18 RX ADMIN — CHLORHEXIDINE GLUCONATE 15 ML: 1.2 RINSE ORAL at 10:10

## 2020-09-18 RX ADMIN — FENTANYL CITRATE 50 MCG: 50 INJECTION, SOLUTION INTRAMUSCULAR; INTRAVENOUS at 21:19

## 2020-09-18 RX ADMIN — ALBUMIN HUMAN 250 ML: 0.05 INJECTION, SOLUTION INTRAVENOUS at 21:54

## 2020-09-18 RX ADMIN — MUPIROCIN: 20 OINTMENT TOPICAL at 11:31

## 2020-09-18 RX ADMIN — VANCOMYCIN HYDROCHLORIDE 500 MG: 500 INJECTION, POWDER, LYOPHILIZED, FOR SOLUTION INTRAVENOUS at 17:52

## 2020-09-18 RX ADMIN — PROPOFOL 50 MCG/KG/MIN: 10 INJECTION, EMULSION INTRAVENOUS at 08:31

## 2020-09-18 NOTE — ANESTHESIA POSTPROCEDURE EVALUATION
"Patient: Grace Beauchamp    Procedure Summary     Date: 09/17/20 Room / Location: [facility] OR  / [facility] MAIN OR    Anesthesia Start: 0643 Anesthesia Stop: 1433    Procedure: ROSE, MIDLINE STERNOTOMY REOP  LEFT VENTRICULAR ANEURYSM REPAIR, IABP INSERTION, PRP (N/A Chest) Diagnosis:       Ventricular aneurysm      (Ventricular aneurysm [I25.3])    Surgeon: Mart Ontiveros MD Provider: Winston Ortega MD    Anesthesia Type: general ASA Status: 4          Anesthesia Type: general    Vitals  Vitals Value Taken Time   BP 81/63 09/17/20 2103   Temp 35.3 °C (95.5 °F) 09/17/20 1828   Pulse 84 09/17/20 2107   Resp 16 09/17/20 1916   SpO2 100 % 09/17/20 2107   Vitals shown include unvalidated device data.        Post Anesthesia Care and Evaluation    Patient location during evaluation: bedside  Patient participation: complete - patient cannot participate  Level of consciousness: obtunded/minimal responses  Pain management: adequate  Airway patency: patent  Anesthetic complications: No anesthetic complications  PONV Status: none  Cardiovascular status: acceptable  Respiratory status: intubated  Hydration status: acceptable    Comments: BP 93/63 (BP Location: Left arm, Patient Position: Lying)   Pulse 90   Temp 35.3 °C (95.5 °F)   Resp 16   Ht 142.2 cm (56\")   Wt 39.3 kg (86 lb 9.6 oz)   LMP  (LMP Unknown)   SpO2 100%   BMI 19.42 kg/m²         "

## 2020-09-18 NOTE — ANESTHESIA PROCEDURE NOTES
Procedure Performed: Emergent/Open-Heart Anesthesia ROSE     Start Time:        End Time:        General Procedure Information  ROSE Placed for monitoring purposes only -- This is not a diagnostic ROSE  Physician Requesting Echo: Mart Ontiveros MD  Location performed:  OR  Intubated  Bite block not placed  Heart visualized  Probe Insertion:  Easy  Modalities:  2D only        Anesthesia Information  Performed Personally      Echocardiogram Comments:       Improved LV and RV function on much lower doses of inotropes

## 2020-09-18 NOTE — ANESTHESIA PREPROCEDURE EVALUATION
Anesthesia Evaluation     NPO Solid Status: > 8 hours             Airway   Dental      Pulmonary - normal exam   (+) COPD,   Cardiovascular - normal exam    (+) CABG >6 Months, CHF , PVD,       Neuro/Psych  (+) TIA,     GI/Hepatic/Renal/Endo    (+)  GERD,      Musculoskeletal     Abdominal    Substance History      OB/GYN          Other                        Anesthesia Plan    ASA 4     general     Postoperative Plan: Expected vent after surgery  Anesthetic plan, all risks, benefits, and alternatives have been provided, discussed and informed consent has been obtained with: patient.

## 2020-09-19 ENCOUNTER — APPOINTMENT (OUTPATIENT)
Dept: GENERAL RADIOLOGY | Facility: HOSPITAL | Age: 80
End: 2020-09-19

## 2020-09-19 LAB
ALBUMIN SERPL-MCNC: 4.2 G/DL (ref 3.5–5.2)
ANION GAP SERPL CALCULATED.3IONS-SCNC: 16.9 MMOL/L (ref 5–15)
ANION GAP SERPL CALCULATED.3IONS-SCNC: 16.9 MMOL/L (ref 5–15)
ARTERIAL PATENCY WRIST A: ABNORMAL
ARTERIAL PATENCY WRIST A: ABNORMAL
ATMOSPHERIC PRESS: 756.8 MMHG
ATMOSPHERIC PRESS: 756.9 MMHG
ATMOSPHERIC PRESS: 757.7 MMHG
BASE EXCESS BLDA CALC-SCNC: -0.3 MMOL/L (ref 0–2)
BASE EXCESS BLDA CALC-SCNC: 1 MMOL/L (ref 0–2)
BASE EXCESS BLDV CALC-SCNC: -2.6 MMOL/L
BASOPHILS # BLD AUTO: 0.01 10*3/MM3 (ref 0–0.2)
BASOPHILS NFR BLD AUTO: 0.1 % (ref 0–1.5)
BDY SITE: ABNORMAL
BH BB BLOOD EXPIRATION DATE: NORMAL
BH BB BLOOD TYPE BARCODE: 6200
BH BB DISPENSE STATUS: NORMAL
BH BB PRODUCT CODE: NORMAL
BH BB UNIT NUMBER: NORMAL
BUN SERPL-MCNC: 33 MG/DL (ref 8–23)
BUN SERPL-MCNC: 33 MG/DL (ref 8–23)
BUN/CREAT SERPL: 18.2 (ref 7–25)
BUN/CREAT SERPL: 20.4 (ref 7–25)
CA-I BLD-MCNC: 4.6 MG/DL (ref 4.6–5.4)
CA-I SERPL ISE-MCNC: 1.16 MMOL/L (ref 1.15–1.35)
CALCIUM SPEC-SCNC: 8.3 MG/DL (ref 8.6–10.5)
CALCIUM SPEC-SCNC: 8.7 MG/DL (ref 8.6–10.5)
CHLORIDE SERPL-SCNC: 113 MMOL/L (ref 98–107)
CHLORIDE SERPL-SCNC: 114 MMOL/L (ref 98–107)
CO2 SERPL-SCNC: 22.1 MMOL/L (ref 22–29)
CO2 SERPL-SCNC: 23.1 MMOL/L (ref 22–29)
CREAT SERPL-MCNC: 1.62 MG/DL (ref 0.57–1)
CREAT SERPL-MCNC: 1.81 MG/DL (ref 0.57–1)
DEPRECATED RDW RBC AUTO: 54.4 FL (ref 37–54)
EOSINOPHIL # BLD AUTO: 0 10*3/MM3 (ref 0–0.4)
EOSINOPHIL NFR BLD AUTO: 0 % (ref 0.3–6.2)
ERYTHROCYTE [DISTWIDTH] IN BLOOD BY AUTOMATED COUNT: 16.4 % (ref 12.3–15.4)
GFR SERPL CREATININE-BSD FRML MDRD: 27 ML/MIN/1.73
GFR SERPL CREATININE-BSD FRML MDRD: 31 ML/MIN/1.73
GLUCOSE BLDC GLUCOMTR-MCNC: 109 MG/DL (ref 70–130)
GLUCOSE BLDC GLUCOMTR-MCNC: 114 MG/DL (ref 70–130)
GLUCOSE BLDC GLUCOMTR-MCNC: 117 MG/DL (ref 70–130)
GLUCOSE BLDC GLUCOMTR-MCNC: 127 MG/DL (ref 70–130)
GLUCOSE BLDC GLUCOMTR-MCNC: 137 MG/DL (ref 70–130)
GLUCOSE BLDC GLUCOMTR-MCNC: 148 MG/DL (ref 70–130)
GLUCOSE BLDC GLUCOMTR-MCNC: 98 MG/DL (ref 70–130)
GLUCOSE BLDC GLUCOMTR-MCNC: 99 MG/DL (ref 70–130)
GLUCOSE SERPL-MCNC: 103 MG/DL (ref 65–99)
GLUCOSE SERPL-MCNC: 116 MG/DL (ref 65–99)
HCO3 BLDA-SCNC: 25.3 MMOL/L (ref 22–28)
HCO3 BLDA-SCNC: 27 MMOL/L (ref 22–28)
HCO3 BLDV-SCNC: 23 MMOL/L (ref 22–28)
HCT VFR BLD AUTO: 25.1 % (ref 34–46.6)
HGB BLD-MCNC: 8 G/DL (ref 12–15.9)
IMM GRANULOCYTES # BLD AUTO: 0.02 10*3/MM3 (ref 0–0.05)
IMM GRANULOCYTES NFR BLD AUTO: 0.3 % (ref 0–0.5)
INHALED O2 CONCENTRATION: 40 %
INR PPP: 1.5 (ref 0.9–1.1)
LYMPHOCYTES # BLD AUTO: 0.52 10*3/MM3 (ref 0.7–3.1)
LYMPHOCYTES NFR BLD AUTO: 6.6 % (ref 19.6–45.3)
MAGNESIUM SERPL-MCNC: 2.6 MG/DL (ref 1.6–2.4)
MAGNESIUM SERPL-MCNC: 2.6 MG/DL (ref 1.6–2.4)
MCH RBC QN AUTO: 28.3 PG (ref 26.6–33)
MCHC RBC AUTO-ENTMCNC: 31.9 G/DL (ref 31.5–35.7)
MCV RBC AUTO: 88.7 FL (ref 79–97)
MODALITY: ABNORMAL
MONOCYTES # BLD AUTO: 0.31 10*3/MM3 (ref 0.1–0.9)
MONOCYTES NFR BLD AUTO: 3.9 % (ref 5–12)
NEUTROPHILS NFR BLD AUTO: 6.99 10*3/MM3 (ref 1.7–7)
NEUTROPHILS NFR BLD AUTO: 89.1 % (ref 42.7–76)
NRBC BLD AUTO-RTO: 0 /100 WBC (ref 0–0.2)
O2 A-A PPRESDIFF RESPIRATORY: 0.5 MMHG
O2 A-A PPRESDIFF RESPIRATORY: 0.7 MMHG
PCO2 BLDA: 45 MM HG (ref 35–45)
PCO2 BLDA: 48.6 MM HG (ref 35–45)
PCO2 BLDV: 42.6 MM HG (ref 41–51)
PEEP RESPIRATORY: 5 CM[H2O]
PH BLDA: 7.35 PH UNITS (ref 7.35–7.45)
PH BLDA: 7.36 PH UNITS (ref 7.35–7.45)
PH BLDV: 7.34 PH UNITS (ref 7.31–7.41)
PHOSPHATE SERPL-MCNC: 6.4 MG/DL (ref 2.5–4.5)
PLATELET # BLD AUTO: 85 10*3/MM3 (ref 140–450)
PMV BLD AUTO: 9.7 FL (ref 6–12)
PO2 BLDA: 115.8 MM HG (ref 80–100)
PO2 BLDA: 166.9 MM HG (ref 80–100)
PO2 BLDV: 45.4 MM HG (ref 35–45)
POTASSIUM SERPL-SCNC: 3.8 MMOL/L (ref 3.5–5.2)
POTASSIUM SERPL-SCNC: 4 MMOL/L (ref 3.5–5.2)
POTASSIUM SERPL-SCNC: 4 MMOL/L (ref 3.5–5.2)
PROTHROMBIN TIME: 17.8 SECONDS (ref 11.7–14.2)
PSV: 8 CMH2O
RBC # BLD AUTO: 2.83 10*6/MM3 (ref 3.77–5.28)
SAO2 % BLDCOA: 78.4 % (ref 92–99)
SAO2 % BLDCOA: 98.3 % (ref 92–99)
SAO2 % BLDCOA: 99.4 % (ref 92–99)
SET MECH RESP RATE: 16
SET MECH RESP RATE: 16
SODIUM SERPL-SCNC: 152 MMOL/L (ref 136–145)
SODIUM SERPL-SCNC: 154 MMOL/L (ref 136–145)
TOTAL RATE: 16 BREATHS/MINUTE
TOTAL RATE: 16 BREATHS/MINUTE
TOTAL RATE: 27 BREATHS/MINUTE
UNIT  ABO: NORMAL
UNIT  RH: NORMAL
VENTILATOR MODE: AC
VT ON VENT VENT: 452 ML
VT ON VENT VENT: 455 ML
WBC # BLD AUTO: 7.85 10*3/MM3 (ref 3.4–10.8)

## 2020-09-19 PROCEDURE — 84132 ASSAY OF SERUM POTASSIUM: CPT | Performed by: THORACIC SURGERY (CARDIOTHORACIC VASCULAR SURGERY)

## 2020-09-19 PROCEDURE — 25010000003 MEPERIDINE PER 100 MG: Performed by: THORACIC SURGERY (CARDIOTHORACIC VASCULAR SURGERY)

## 2020-09-19 PROCEDURE — 80048 BASIC METABOLIC PNL TOTAL CA: CPT | Performed by: THORACIC SURGERY (CARDIOTHORACIC VASCULAR SURGERY)

## 2020-09-19 PROCEDURE — 85610 PROTHROMBIN TIME: CPT | Performed by: THORACIC SURGERY (CARDIOTHORACIC VASCULAR SURGERY)

## 2020-09-19 PROCEDURE — 99024 POSTOP FOLLOW-UP VISIT: CPT | Performed by: THORACIC SURGERY (CARDIOTHORACIC VASCULAR SURGERY)

## 2020-09-19 PROCEDURE — 74018 RADEX ABDOMEN 1 VIEW: CPT

## 2020-09-19 PROCEDURE — P9041 ALBUMIN (HUMAN),5%, 50ML: HCPCS | Performed by: THORACIC SURGERY (CARDIOTHORACIC VASCULAR SURGERY)

## 2020-09-19 PROCEDURE — 99232 SBSQ HOSP IP/OBS MODERATE 35: CPT | Performed by: INTERNAL MEDICINE

## 2020-09-19 PROCEDURE — 25010000002 ALBUMIN HUMAN 5% PER 50 ML: Performed by: THORACIC SURGERY (CARDIOTHORACIC VASCULAR SURGERY)

## 2020-09-19 PROCEDURE — 25010000002 FENTANYL CITRATE (PF) 100 MCG/2ML SOLUTION: Performed by: THORACIC SURGERY (CARDIOTHORACIC VASCULAR SURGERY)

## 2020-09-19 PROCEDURE — 94799 UNLISTED PULMONARY SVC/PX: CPT

## 2020-09-19 PROCEDURE — 92610 EVALUATE SWALLOWING FUNCTION: CPT

## 2020-09-19 PROCEDURE — 85025 COMPLETE CBC W/AUTO DIFF WBC: CPT | Performed by: THORACIC SURGERY (CARDIOTHORACIC VASCULAR SURGERY)

## 2020-09-19 PROCEDURE — 25010000003 MILRINONE LACTATE IN DEXTROSE 20-5 MG/100ML-% SOLUTION: Performed by: THORACIC SURGERY (CARDIOTHORACIC VASCULAR SURGERY)

## 2020-09-19 PROCEDURE — 93005 ELECTROCARDIOGRAM TRACING: CPT | Performed by: THORACIC SURGERY (CARDIOTHORACIC VASCULAR SURGERY)

## 2020-09-19 PROCEDURE — 80069 RENAL FUNCTION PANEL: CPT | Performed by: THORACIC SURGERY (CARDIOTHORACIC VASCULAR SURGERY)

## 2020-09-19 PROCEDURE — 82330 ASSAY OF CALCIUM: CPT | Performed by: THORACIC SURGERY (CARDIOTHORACIC VASCULAR SURGERY)

## 2020-09-19 PROCEDURE — 25010000003 POTASSIUM CHLORIDE PER 2 MEQ: Performed by: THORACIC SURGERY (CARDIOTHORACIC VASCULAR SURGERY)

## 2020-09-19 PROCEDURE — 93010 ELECTROCARDIOGRAM REPORT: CPT | Performed by: INTERNAL MEDICINE

## 2020-09-19 PROCEDURE — 3E0G76Z INTRODUCTION OF NUTRITIONAL SUBSTANCE INTO UPPER GI, VIA NATURAL OR ARTIFICIAL OPENING: ICD-10-PCS | Performed by: THORACIC SURGERY (CARDIOTHORACIC VASCULAR SURGERY)

## 2020-09-19 PROCEDURE — 83735 ASSAY OF MAGNESIUM: CPT | Performed by: THORACIC SURGERY (CARDIOTHORACIC VASCULAR SURGERY)

## 2020-09-19 PROCEDURE — 0DH67UZ INSERTION OF FEEDING DEVICE INTO STOMACH, VIA NATURAL OR ARTIFICIAL OPENING: ICD-10-PCS | Performed by: THORACIC SURGERY (CARDIOTHORACIC VASCULAR SURGERY)

## 2020-09-19 PROCEDURE — 25010000002 METOCLOPRAMIDE PER 10 MG: Performed by: THORACIC SURGERY (CARDIOTHORACIC VASCULAR SURGERY)

## 2020-09-19 PROCEDURE — 82962 GLUCOSE BLOOD TEST: CPT

## 2020-09-19 PROCEDURE — 71045 X-RAY EXAM CHEST 1 VIEW: CPT

## 2020-09-19 PROCEDURE — 82803 BLOOD GASES ANY COMBINATION: CPT

## 2020-09-19 RX ADMIN — SODIUM CHLORIDE 2 MG/HR: 9 INJECTION, SOLUTION INTRAVENOUS at 19:26

## 2020-09-19 RX ADMIN — METOPROLOL TARTRATE 12.5 MG: 25 TABLET, FILM COATED ORAL at 20:31

## 2020-09-19 RX ADMIN — CHLORHEXIDINE GLUCONATE 15 ML: 1.2 RINSE ORAL at 20:31

## 2020-09-19 RX ADMIN — ALBUMIN HUMAN 250 ML: 0.05 INJECTION, SOLUTION INTRAVENOUS at 02:40

## 2020-09-19 RX ADMIN — FENTANYL CITRATE 50 MCG: 50 INJECTION, SOLUTION INTRAMUSCULAR; INTRAVENOUS at 23:42

## 2020-09-19 RX ADMIN — CHLORHEXIDINE GLUCONATE 15 ML: 1.2 RINSE ORAL at 08:28

## 2020-09-19 RX ADMIN — MILRINONE LACTATE 0.25 MCG/KG/MIN: 200 INJECTION, SOLUTION INTRAVENOUS at 03:31

## 2020-09-19 RX ADMIN — FENTANYL CITRATE 50 MCG: 50 INJECTION, SOLUTION INTRAMUSCULAR; INTRAVENOUS at 00:47

## 2020-09-19 RX ADMIN — POTASSIUM CHLORIDE 20 MEQ: 29.8 INJECTION, SOLUTION INTRAVENOUS at 14:20

## 2020-09-19 RX ADMIN — MUPIROCIN 1 APPLICATION: 20 OINTMENT TOPICAL at 20:31

## 2020-09-19 RX ADMIN — METOCLOPRAMIDE HYDROCHLORIDE 10 MG: 5 INJECTION INTRAMUSCULAR; INTRAVENOUS at 08:28

## 2020-09-19 RX ADMIN — METOCLOPRAMIDE HYDROCHLORIDE 10 MG: 5 INJECTION INTRAMUSCULAR; INTRAVENOUS at 15:17

## 2020-09-19 RX ADMIN — FENTANYL CITRATE 50 MCG: 50 INJECTION, SOLUTION INTRAMUSCULAR; INTRAVENOUS at 05:58

## 2020-09-19 RX ADMIN — DEXMEDETOMIDINE HYDROCHLORIDE 0.2 MCG/KG/HR: 100 INJECTION, SOLUTION, CONCENTRATE INTRAVENOUS at 08:30

## 2020-09-19 RX ADMIN — ATORVASTATIN CALCIUM 40 MG: 20 TABLET, FILM COATED ORAL at 20:31

## 2020-09-19 RX ADMIN — DOCUSATE SODIUM 50MG AND SENNOSIDES 8.6MG 2 TABLET: 8.6; 5 TABLET, FILM COATED ORAL at 20:31

## 2020-09-19 RX ADMIN — MUPIROCIN 1 APPLICATION: 20 OINTMENT TOPICAL at 08:47

## 2020-09-19 RX ADMIN — MEPERIDINE HYDROCHLORIDE 25 MG: 25 INJECTION INTRAMUSCULAR; INTRAVENOUS; SUBCUTANEOUS at 02:27

## 2020-09-20 ENCOUNTER — APPOINTMENT (OUTPATIENT)
Dept: CARDIOLOGY | Facility: HOSPITAL | Age: 80
End: 2020-09-20

## 2020-09-20 ENCOUNTER — APPOINTMENT (OUTPATIENT)
Dept: GENERAL RADIOLOGY | Facility: HOSPITAL | Age: 80
End: 2020-09-20

## 2020-09-20 LAB
ABO GROUP BLD: NORMAL
ANION GAP SERPL CALCULATED.3IONS-SCNC: 12 MMOL/L (ref 5–15)
BH BB BLOOD EXPIRATION DATE: NORMAL
BH BB BLOOD TYPE BARCODE: 6200
BH BB DISPENSE STATUS: NORMAL
BH BB PRODUCT CODE: NORMAL
BH BB UNIT NUMBER: NORMAL
BLD GP AB SCN SERPL QL: NEGATIVE
BUN SERPL-MCNC: 35 MG/DL (ref 8–23)
BUN/CREAT SERPL: 24.6 (ref 7–25)
CALCIUM SPEC-SCNC: 8.6 MG/DL (ref 8.6–10.5)
CHLORIDE SERPL-SCNC: 116 MMOL/L (ref 98–107)
CHLORIDE UR-SCNC: 64 MMOL/L
CO2 SERPL-SCNC: 24 MMOL/L (ref 22–29)
CREAT SERPL-MCNC: 1.42 MG/DL (ref 0.57–1)
CREAT UR-MCNC: 61.8 MG/DL
CROSSMATCH INTERPRETATION: NORMAL
DEPRECATED RDW RBC AUTO: 52.8 FL (ref 37–54)
ERYTHROCYTE [DISTWIDTH] IN BLOOD BY AUTOMATED COUNT: 16.1 % (ref 12.3–15.4)
GFR SERPL CREATININE-BSD FRML MDRD: 36 ML/MIN/1.73
GLUCOSE BLDC GLUCOMTR-MCNC: 136 MG/DL (ref 70–130)
GLUCOSE BLDC GLUCOMTR-MCNC: 144 MG/DL (ref 70–130)
GLUCOSE BLDC GLUCOMTR-MCNC: 155 MG/DL (ref 70–130)
GLUCOSE BLDC GLUCOMTR-MCNC: 156 MG/DL (ref 70–130)
GLUCOSE SERPL-MCNC: 136 MG/DL (ref 65–99)
HCT VFR BLD AUTO: 26.6 % (ref 34–46.6)
HGB BLD-MCNC: 8.7 G/DL (ref 12–15.9)
MCH RBC QN AUTO: 28.8 PG (ref 26.6–33)
MCHC RBC AUTO-ENTMCNC: 32.7 G/DL (ref 31.5–35.7)
MCV RBC AUTO: 88.1 FL (ref 79–97)
OSMOLALITY UR: 589 MOSM/KG
PLATELET # BLD AUTO: 77 10*3/MM3 (ref 140–450)
PMV BLD AUTO: 10.8 FL (ref 6–12)
POTASSIUM SERPL-SCNC: 4.1 MMOL/L (ref 3.5–5.2)
PROT UR-MCNC: 119 MG/DL
PROT/CREAT UR: 1925.6 MG/G CREA (ref 0–200)
RBC # BLD AUTO: 3.02 10*6/MM3 (ref 3.77–5.28)
RH BLD: POSITIVE
SODIUM SERPL-SCNC: 152 MMOL/L (ref 136–145)
SODIUM UR-SCNC: 95 MMOL/L
T&S EXPIRATION DATE: NORMAL
UNIT  ABO: NORMAL
UNIT  RH: NORMAL
WBC # BLD AUTO: 7.81 10*3/MM3 (ref 3.4–10.8)

## 2020-09-20 PROCEDURE — 93308 TTE F-UP OR LMTD: CPT | Performed by: INTERNAL MEDICINE

## 2020-09-20 PROCEDURE — 97110 THERAPEUTIC EXERCISES: CPT

## 2020-09-20 PROCEDURE — 80048 BASIC METABOLIC PNL TOTAL CA: CPT | Performed by: THORACIC SURGERY (CARDIOTHORACIC VASCULAR SURGERY)

## 2020-09-20 PROCEDURE — 25010000002 VANCOMYCIN PER 500 MG: Performed by: THORACIC SURGERY (CARDIOTHORACIC VASCULAR SURGERY)

## 2020-09-20 PROCEDURE — 93321 DOPPLER ECHO F-UP/LMTD STD: CPT | Performed by: INTERNAL MEDICINE

## 2020-09-20 PROCEDURE — 93321 DOPPLER ECHO F-UP/LMTD STD: CPT

## 2020-09-20 PROCEDURE — 93010 ELECTROCARDIOGRAM REPORT: CPT | Performed by: INTERNAL MEDICINE

## 2020-09-20 PROCEDURE — 82436 ASSAY OF URINE CHLORIDE: CPT | Performed by: INTERNAL MEDICINE

## 2020-09-20 PROCEDURE — 25010000002 FENTANYL CITRATE (PF) 100 MCG/2ML SOLUTION: Performed by: THORACIC SURGERY (CARDIOTHORACIC VASCULAR SURGERY)

## 2020-09-20 PROCEDURE — 99232 SBSQ HOSP IP/OBS MODERATE 35: CPT | Performed by: INTERNAL MEDICINE

## 2020-09-20 PROCEDURE — 86901 BLOOD TYPING SEROLOGIC RH(D): CPT | Performed by: THORACIC SURGERY (CARDIOTHORACIC VASCULAR SURGERY)

## 2020-09-20 PROCEDURE — 82962 GLUCOSE BLOOD TEST: CPT

## 2020-09-20 PROCEDURE — 93308 TTE F-UP OR LMTD: CPT

## 2020-09-20 PROCEDURE — 86850 RBC ANTIBODY SCREEN: CPT | Performed by: THORACIC SURGERY (CARDIOTHORACIC VASCULAR SURGERY)

## 2020-09-20 PROCEDURE — 86900 BLOOD TYPING SEROLOGIC ABO: CPT | Performed by: THORACIC SURGERY (CARDIOTHORACIC VASCULAR SURGERY)

## 2020-09-20 PROCEDURE — 84300 ASSAY OF URINE SODIUM: CPT | Performed by: INTERNAL MEDICINE

## 2020-09-20 PROCEDURE — 71045 X-RAY EXAM CHEST 1 VIEW: CPT

## 2020-09-20 PROCEDURE — 85027 COMPLETE CBC AUTOMATED: CPT | Performed by: THORACIC SURGERY (CARDIOTHORACIC VASCULAR SURGERY)

## 2020-09-20 PROCEDURE — 63710000001 INSULIN REGULAR HUMAN PER 5 UNITS: Performed by: NURSE PRACTITIONER

## 2020-09-20 PROCEDURE — 84156 ASSAY OF PROTEIN URINE: CPT | Performed by: INTERNAL MEDICINE

## 2020-09-20 PROCEDURE — 93005 ELECTROCARDIOGRAM TRACING: CPT | Performed by: THORACIC SURGERY (CARDIOTHORACIC VASCULAR SURGERY)

## 2020-09-20 PROCEDURE — 93325 DOPPLER ECHO COLOR FLOW MAPG: CPT | Performed by: INTERNAL MEDICINE

## 2020-09-20 PROCEDURE — 83935 ASSAY OF URINE OSMOLALITY: CPT | Performed by: INTERNAL MEDICINE

## 2020-09-20 PROCEDURE — 93325 DOPPLER ECHO COLOR FLOW MAPG: CPT

## 2020-09-20 PROCEDURE — 82570 ASSAY OF URINE CREATININE: CPT | Performed by: INTERNAL MEDICINE

## 2020-09-20 PROCEDURE — 97162 PT EVAL MOD COMPLEX 30 MIN: CPT

## 2020-09-20 RX ORDER — DEXTROSE MONOHYDRATE 50 MG/ML
100 INJECTION, SOLUTION INTRAVENOUS CONTINUOUS
Status: DISCONTINUED | OUTPATIENT
Start: 2020-09-20 | End: 2020-09-21

## 2020-09-20 RX ORDER — VANCOMYCIN HYDROCHLORIDE 1 G/200ML
1000 INJECTION, SOLUTION INTRAVENOUS ONCE
Status: COMPLETED | OUTPATIENT
Start: 2020-09-20 | End: 2020-09-20

## 2020-09-20 RX ORDER — ASPIRIN 81 MG/1
81 TABLET, CHEWABLE ORAL DAILY
Status: DISCONTINUED | OUTPATIENT
Start: 2020-09-20 | End: 2020-09-25 | Stop reason: HOSPADM

## 2020-09-20 RX ORDER — DEXTROSE MONOHYDRATE 25 G/50ML
25 INJECTION, SOLUTION INTRAVENOUS
Status: DISCONTINUED | OUTPATIENT
Start: 2020-09-20 | End: 2020-09-25 | Stop reason: HOSPADM

## 2020-09-20 RX ORDER — PANTOPRAZOLE SODIUM 40 MG/10ML
40 INJECTION, POWDER, LYOPHILIZED, FOR SOLUTION INTRAVENOUS
Status: DISCONTINUED | OUTPATIENT
Start: 2020-09-20 | End: 2020-09-25 | Stop reason: HOSPADM

## 2020-09-20 RX ORDER — NICOTINE POLACRILEX 4 MG
15 LOZENGE BUCCAL
Status: DISCONTINUED | OUTPATIENT
Start: 2020-09-20 | End: 2020-09-25 | Stop reason: HOSPADM

## 2020-09-20 RX ADMIN — METOPROLOL TARTRATE 12.5 MG: 25 TABLET, FILM COATED ORAL at 20:07

## 2020-09-20 RX ADMIN — FENTANYL CITRATE 25 MCG: 50 INJECTION, SOLUTION INTRAMUSCULAR; INTRAVENOUS at 02:00

## 2020-09-20 RX ADMIN — ATORVASTATIN CALCIUM 40 MG: 20 TABLET, FILM COATED ORAL at 20:05

## 2020-09-20 RX ADMIN — CHLORHEXIDINE GLUCONATE 15 ML: 1.2 RINSE ORAL at 08:46

## 2020-09-20 RX ADMIN — FENTANYL CITRATE 25 MCG: 50 INJECTION, SOLUTION INTRAMUSCULAR; INTRAVENOUS at 00:35

## 2020-09-20 RX ADMIN — VANCOMYCIN HYDROCHLORIDE 1000 MG: 1 INJECTION, SOLUTION INTRAVENOUS at 08:59

## 2020-09-20 RX ADMIN — HYDROCODONE BITARTRATE AND ACETAMINOPHEN 1 TABLET: 5; 325 TABLET ORAL at 18:46

## 2020-09-20 RX ADMIN — POLYETHYLENE GLYCOL 3350 17 G: 17 POWDER, FOR SOLUTION ORAL at 08:46

## 2020-09-20 RX ADMIN — FENTANYL CITRATE 25 MCG: 50 INJECTION, SOLUTION INTRAMUSCULAR; INTRAVENOUS at 04:50

## 2020-09-20 RX ADMIN — DEXTROSE MONOHYDRATE 100 ML/HR: 50 INJECTION, SOLUTION INTRAVENOUS at 23:24

## 2020-09-20 RX ADMIN — MUPIROCIN 1 APPLICATION: 20 OINTMENT TOPICAL at 20:06

## 2020-09-20 RX ADMIN — CHLORHEXIDINE GLUCONATE 15 ML: 1.2 RINSE ORAL at 20:06

## 2020-09-20 RX ADMIN — HYDROCODONE BITARTRATE AND ACETAMINOPHEN 2 TABLET: 5; 325 TABLET ORAL at 23:33

## 2020-09-20 RX ADMIN — MUPIROCIN 1 APPLICATION: 20 OINTMENT TOPICAL at 08:46

## 2020-09-20 RX ADMIN — HYDROCODONE BITARTRATE AND ACETAMINOPHEN 2 TABLET: 5; 325 TABLET ORAL at 08:48

## 2020-09-20 RX ADMIN — HYDROCODONE BITARTRATE AND ACETAMINOPHEN 1 TABLET: 5; 325 TABLET ORAL at 13:39

## 2020-09-20 RX ADMIN — DEXTROSE MONOHYDRATE 100 ML/HR: 50 INJECTION, SOLUTION INTRAVENOUS at 11:29

## 2020-09-20 RX ADMIN — ACETAMINOPHEN 325 MG: 325 TABLET, FILM COATED ORAL at 20:04

## 2020-09-20 RX ADMIN — INSULIN HUMAN 2 UNITS: 100 INJECTION, SOLUTION PARENTERAL at 11:32

## 2020-09-20 RX ADMIN — DOCUSATE SODIUM 50MG AND SENNOSIDES 8.6MG 2 TABLET: 8.6; 5 TABLET, FILM COATED ORAL at 20:05

## 2020-09-20 RX ADMIN — ASPIRIN 81 MG: 81 TABLET, CHEWABLE ORAL at 09:01

## 2020-09-20 RX ADMIN — FENTANYL CITRATE 25 MCG: 50 INJECTION, SOLUTION INTRAMUSCULAR; INTRAVENOUS at 06:54

## 2020-09-20 RX ADMIN — PANTOPRAZOLE SODIUM 40 MG: 40 INJECTION, POWDER, FOR SOLUTION INTRAVENOUS at 10:33

## 2020-09-20 RX ADMIN — METOPROLOL TARTRATE 12.5 MG: 25 TABLET, FILM COATED ORAL at 08:45

## 2020-09-21 ENCOUNTER — APPOINTMENT (OUTPATIENT)
Dept: GENERAL RADIOLOGY | Facility: HOSPITAL | Age: 80
End: 2020-09-21

## 2020-09-21 LAB
ALBUMIN SERPL-MCNC: 3.4 G/DL (ref 3.5–5.2)
ANION GAP SERPL CALCULATED.3IONS-SCNC: 4.5 MMOL/L (ref 5–15)
BUN SERPL-MCNC: 32 MG/DL (ref 8–23)
BUN/CREAT SERPL: 36.4 (ref 7–25)
CALCIUM SPEC-SCNC: 8 MG/DL (ref 8.6–10.5)
CHLORIDE SERPL-SCNC: 107 MMOL/L (ref 98–107)
CO2 SERPL-SCNC: 27.5 MMOL/L (ref 22–29)
CREAT SERPL-MCNC: 0.88 MG/DL (ref 0.57–1)
DEPRECATED RDW RBC AUTO: 48.4 FL (ref 37–54)
ERYTHROCYTE [DISTWIDTH] IN BLOOD BY AUTOMATED COUNT: 15.1 % (ref 12.3–15.4)
GFR SERPL CREATININE-BSD FRML MDRD: 62 ML/MIN/1.73
GLUCOSE BLDC GLUCOMTR-MCNC: 129 MG/DL (ref 70–130)
GLUCOSE BLDC GLUCOMTR-MCNC: 149 MG/DL (ref 70–130)
GLUCOSE BLDC GLUCOMTR-MCNC: 158 MG/DL (ref 70–130)
GLUCOSE BLDC GLUCOMTR-MCNC: 163 MG/DL (ref 70–130)
GLUCOSE SERPL-MCNC: 179 MG/DL (ref 65–99)
HCT VFR BLD AUTO: 25.7 % (ref 34–46.6)
HGB BLD-MCNC: 8.2 G/DL (ref 12–15.9)
MAGNESIUM SERPL-MCNC: 2.2 MG/DL (ref 1.6–2.4)
MCH RBC QN AUTO: 28.1 PG (ref 26.6–33)
MCHC RBC AUTO-ENTMCNC: 31.9 G/DL (ref 31.5–35.7)
MCV RBC AUTO: 88 FL (ref 79–97)
PHOSPHATE SERPL-MCNC: 1.8 MG/DL (ref 2.5–4.5)
PLATELET # BLD AUTO: 65 10*3/MM3 (ref 140–450)
PMV BLD AUTO: 11.4 FL (ref 6–12)
POTASSIUM SERPL-SCNC: 3.8 MMOL/L (ref 3.5–5.2)
RBC # BLD AUTO: 2.92 10*6/MM3 (ref 3.77–5.28)
SODIUM SERPL-SCNC: 139 MMOL/L (ref 136–145)
URATE SERPL-MCNC: 3.4 MG/DL (ref 2.4–5.7)
VANCOMYCIN SERPL-MCNC: 12.5 MCG/ML (ref 5–40)
WBC # BLD AUTO: 6.73 10*3/MM3 (ref 3.4–10.8)

## 2020-09-21 PROCEDURE — 25010000002 VANCOMYCIN PER 500 MG: Performed by: THORACIC SURGERY (CARDIOTHORACIC VASCULAR SURGERY)

## 2020-09-21 PROCEDURE — 25010000003 MILRINONE LACTATE IN DEXTROSE 20-5 MG/100ML-% SOLUTION: Performed by: NURSE PRACTITIONER

## 2020-09-21 PROCEDURE — 99232 SBSQ HOSP IP/OBS MODERATE 35: CPT | Performed by: NURSE PRACTITIONER

## 2020-09-21 PROCEDURE — 71045 X-RAY EXAM CHEST 1 VIEW: CPT

## 2020-09-21 PROCEDURE — 80202 ASSAY OF VANCOMYCIN: CPT | Performed by: THORACIC SURGERY (CARDIOTHORACIC VASCULAR SURGERY)

## 2020-09-21 PROCEDURE — 84550 ASSAY OF BLOOD/URIC ACID: CPT | Performed by: INTERNAL MEDICINE

## 2020-09-21 PROCEDURE — 83735 ASSAY OF MAGNESIUM: CPT | Performed by: INTERNAL MEDICINE

## 2020-09-21 PROCEDURE — 92610 EVALUATE SWALLOWING FUNCTION: CPT | Performed by: SPEECH-LANGUAGE PATHOLOGIST

## 2020-09-21 PROCEDURE — 82962 GLUCOSE BLOOD TEST: CPT

## 2020-09-21 PROCEDURE — 85027 COMPLETE CBC AUTOMATED: CPT | Performed by: NURSE PRACTITIONER

## 2020-09-21 PROCEDURE — 94799 UNLISTED PULMONARY SVC/PX: CPT

## 2020-09-21 PROCEDURE — 97110 THERAPEUTIC EXERCISES: CPT

## 2020-09-21 PROCEDURE — 80069 RENAL FUNCTION PANEL: CPT | Performed by: INTERNAL MEDICINE

## 2020-09-21 PROCEDURE — 63710000001 INSULIN REGULAR HUMAN PER 5 UNITS: Performed by: NURSE PRACTITIONER

## 2020-09-21 PROCEDURE — 25010000002 ENOXAPARIN PER 10 MG: Performed by: NURSE PRACTITIONER

## 2020-09-21 RX ORDER — VANCOMYCIN HYDROCHLORIDE 1 G/200ML
1000 INJECTION, SOLUTION INTRAVENOUS ONCE
Status: COMPLETED | OUTPATIENT
Start: 2020-09-21 | End: 2020-09-21

## 2020-09-21 RX ORDER — METOPROLOL SUCCINATE 25 MG/1
25 TABLET, EXTENDED RELEASE ORAL
Status: DISCONTINUED | OUTPATIENT
Start: 2020-09-21 | End: 2020-09-21

## 2020-09-21 RX ORDER — ACETYLCYSTEINE 200 MG/ML
3 SOLUTION ORAL; RESPIRATORY (INHALATION)
Status: DISPENSED | OUTPATIENT
Start: 2020-09-21 | End: 2020-09-24

## 2020-09-21 RX ORDER — IPRATROPIUM BROMIDE AND ALBUTEROL SULFATE 2.5; .5 MG/3ML; MG/3ML
3 SOLUTION RESPIRATORY (INHALATION) EVERY 4 HOURS PRN
Status: DISCONTINUED | OUTPATIENT
Start: 2020-09-21 | End: 2020-09-25 | Stop reason: HOSPADM

## 2020-09-21 RX ORDER — ROPINIROLE 0.5 MG/1
0.25 TABLET, FILM COATED ORAL NIGHTLY
Status: DISCONTINUED | OUTPATIENT
Start: 2020-09-21 | End: 2020-09-22

## 2020-09-21 RX ADMIN — PANTOPRAZOLE SODIUM 40 MG: 40 INJECTION, POWDER, FOR SOLUTION INTRAVENOUS at 06:49

## 2020-09-21 RX ADMIN — ENOXAPARIN SODIUM 30 MG: 30 INJECTION SUBCUTANEOUS at 08:31

## 2020-09-21 RX ADMIN — HYDROCODONE BITARTRATE AND ACETAMINOPHEN 2 TABLET: 5; 325 TABLET ORAL at 18:33

## 2020-09-21 RX ADMIN — ROPINIROLE HYDROCHLORIDE 0.25 MG: 0.5 TABLET, FILM COATED ORAL at 20:25

## 2020-09-21 RX ADMIN — HYDROCODONE BITARTRATE AND ACETAMINOPHEN 2 TABLET: 5; 325 TABLET ORAL at 12:05

## 2020-09-21 RX ADMIN — IPRATROPIUM BROMIDE AND ALBUTEROL SULFATE 3 ML: 2.5; .5 SOLUTION RESPIRATORY (INHALATION) at 11:02

## 2020-09-21 RX ADMIN — METOPROLOL TARTRATE 12.5 MG: 25 TABLET, FILM COATED ORAL at 08:41

## 2020-09-21 RX ADMIN — ATORVASTATIN CALCIUM 40 MG: 20 TABLET, FILM COATED ORAL at 20:25

## 2020-09-21 RX ADMIN — MILRINONE LACTATE 0.12 MCG/KG/MIN: 200 INJECTION, SOLUTION INTRAVENOUS at 06:50

## 2020-09-21 RX ADMIN — POTASSIUM & SODIUM PHOSPHATES POWDER PACK 280-160-250 MG 1 PACKET: 280-160-250 PACK at 12:05

## 2020-09-21 RX ADMIN — CHLORHEXIDINE GLUCONATE 15 ML: 1.2 RINSE ORAL at 20:24

## 2020-09-21 RX ADMIN — INSULIN HUMAN 2 UNITS: 100 INJECTION, SOLUTION PARENTERAL at 18:30

## 2020-09-21 RX ADMIN — POTASSIUM & SODIUM PHOSPHATES POWDER PACK 280-160-250 MG 1 PACKET: 280-160-250 PACK at 20:25

## 2020-09-21 RX ADMIN — ASPIRIN 81 MG: 81 TABLET, CHEWABLE ORAL at 08:31

## 2020-09-21 RX ADMIN — METOPROLOL TARTRATE 25 MG: 25 TABLET, FILM COATED ORAL at 16:54

## 2020-09-21 RX ADMIN — ACETYLCYSTEINE 3 ML: 200 SOLUTION ORAL; RESPIRATORY (INHALATION) at 19:47

## 2020-09-21 RX ADMIN — ACETYLCYSTEINE 3 ML: 200 SOLUTION ORAL; RESPIRATORY (INHALATION) at 11:02

## 2020-09-21 RX ADMIN — MUPIROCIN 1 APPLICATION: 20 OINTMENT TOPICAL at 08:31

## 2020-09-21 RX ADMIN — VANCOMYCIN HYDROCHLORIDE 1000 MG: 1 INJECTION, SOLUTION INTRAVENOUS at 08:31

## 2020-09-21 RX ADMIN — DOCUSATE SODIUM 50MG AND SENNOSIDES 8.6MG 2 TABLET: 8.6; 5 TABLET, FILM COATED ORAL at 20:25

## 2020-09-21 RX ADMIN — IPRATROPIUM BROMIDE AND ALBUTEROL SULFATE 3 ML: 2.5; .5 SOLUTION RESPIRATORY (INHALATION) at 19:48

## 2020-09-21 RX ADMIN — POTASSIUM & SODIUM PHOSPHATES POWDER PACK 280-160-250 MG 1 PACKET: 280-160-250 PACK at 16:54

## 2020-09-21 RX ADMIN — MUPIROCIN 1 APPLICATION: 20 OINTMENT TOPICAL at 20:25

## 2020-09-21 RX ADMIN — INSULIN HUMAN 2 UNITS: 100 INJECTION, SOLUTION PARENTERAL at 00:06

## 2020-09-21 RX ADMIN — INSULIN HUMAN 2 UNITS: 100 INJECTION, SOLUTION PARENTERAL at 06:49

## 2020-09-21 RX ADMIN — POLYETHYLENE GLYCOL 3350 17 G: 17 POWDER, FOR SOLUTION ORAL at 08:40

## 2020-09-21 NOTE — ANESTHESIA POSTPROCEDURE EVALUATION
Patient: Grace Beauchamp    Procedure Summary     Date: 09/18/20 Room / Location: [facility] OR  / [facility] MAIN OR    Anesthesia Start: 1726 Anesthesia Stop: 1919    Procedures:       STERNAL EXPLORATION WITH CLOSURE AND WASHOUT, REMOVAL OF IABP, PRP (N/A Chest)      TRANSESOPHAGEAL ECHOCARDIOGRAM WITH ANESTHESIA (N/A Chest) Diagnosis:       Ventricular aneurysm      (Ventricular aneurysm [I25.3])    Surgeon: Mart Ontiveros MD Provider: Maycol Andrew MD    Anesthesia Type: general ASA Status: 4          Anesthesia Type: general    Vitals  Vitals Value Taken Time   /69 09/21/20 0715   Temp 36.3 °C (97.3 °F) 09/21/20 0715   Pulse 85 09/21/20 0937   Resp 16 09/21/20 0715   SpO2 100 % 09/21/20 0937   Vitals shown include unvalidated device data.        Post Anesthesia Care and Evaluation    Patient location during evaluation: floor  Level of consciousness: awake  Pain management: adequate  Airway patency: patent  Anesthetic complications: No anesthetic complications  PONV Status: controlled  Cardiovascular status: acceptable  Respiratory status: acceptable  Hydration status: acceptable

## 2020-09-22 ENCOUNTER — APPOINTMENT (OUTPATIENT)
Dept: GENERAL RADIOLOGY | Facility: HOSPITAL | Age: 80
End: 2020-09-22

## 2020-09-22 LAB
ALBUMIN SERPL-MCNC: 3.4 G/DL (ref 3.5–5.2)
ANION GAP SERPL CALCULATED.3IONS-SCNC: 5.5 MMOL/L (ref 5–15)
ANION GAP SERPL CALCULATED.3IONS-SCNC: 5.5 MMOL/L (ref 5–15)
BUN SERPL-MCNC: 30 MG/DL (ref 8–23)
BUN SERPL-MCNC: 30 MG/DL (ref 8–23)
BUN/CREAT SERPL: 40 (ref 7–25)
BUN/CREAT SERPL: 40 (ref 7–25)
CALCIUM SPEC-SCNC: 8 MG/DL (ref 8.6–10.5)
CALCIUM SPEC-SCNC: 8 MG/DL (ref 8.6–10.5)
CHLORIDE SERPL-SCNC: 107 MMOL/L (ref 98–107)
CHLORIDE SERPL-SCNC: 107 MMOL/L (ref 98–107)
CO2 SERPL-SCNC: 28.5 MMOL/L (ref 22–29)
CO2 SERPL-SCNC: 28.5 MMOL/L (ref 22–29)
CREAT SERPL-MCNC: 0.75 MG/DL (ref 0.57–1)
CREAT SERPL-MCNC: 0.75 MG/DL (ref 0.57–1)
DEPRECATED RDW RBC AUTO: 50.3 FL (ref 37–54)
ERYTHROCYTE [DISTWIDTH] IN BLOOD BY AUTOMATED COUNT: 15.1 % (ref 12.3–15.4)
GFR SERPL CREATININE-BSD FRML MDRD: 74 ML/MIN/1.73
GFR SERPL CREATININE-BSD FRML MDRD: 74 ML/MIN/1.73
GLUCOSE BLDC GLUCOMTR-MCNC: 117 MG/DL (ref 70–130)
GLUCOSE BLDC GLUCOMTR-MCNC: 122 MG/DL (ref 70–130)
GLUCOSE BLDC GLUCOMTR-MCNC: 160 MG/DL (ref 70–130)
GLUCOSE BLDC GLUCOMTR-MCNC: 161 MG/DL (ref 70–130)
GLUCOSE SERPL-MCNC: 159 MG/DL (ref 65–99)
GLUCOSE SERPL-MCNC: 159 MG/DL (ref 65–99)
HCT VFR BLD AUTO: 27.5 % (ref 34–46.6)
HGB BLD-MCNC: 8.7 G/DL (ref 12–15.9)
MAGNESIUM SERPL-MCNC: 2 MG/DL (ref 1.6–2.4)
MCH RBC QN AUTO: 28.7 PG (ref 26.6–33)
MCHC RBC AUTO-ENTMCNC: 31.6 G/DL (ref 31.5–35.7)
MCV RBC AUTO: 90.8 FL (ref 79–97)
PHOSPHATE SERPL-MCNC: 2.2 MG/DL (ref 2.5–4.5)
PLATELET # BLD AUTO: 94 10*3/MM3 (ref 140–450)
PMV BLD AUTO: 10.4 FL (ref 6–12)
POTASSIUM SERPL-SCNC: 4.1 MMOL/L (ref 3.5–5.2)
POTASSIUM SERPL-SCNC: 4.1 MMOL/L (ref 3.5–5.2)
RBC # BLD AUTO: 3.03 10*6/MM3 (ref 3.77–5.28)
SODIUM SERPL-SCNC: 141 MMOL/L (ref 136–145)
SODIUM SERPL-SCNC: 141 MMOL/L (ref 136–145)
URATE SERPL-MCNC: 3 MG/DL (ref 2.4–5.7)
WBC # BLD AUTO: 6.63 10*3/MM3 (ref 3.4–10.8)

## 2020-09-22 PROCEDURE — 99024 POSTOP FOLLOW-UP VISIT: CPT | Performed by: THORACIC SURGERY (CARDIOTHORACIC VASCULAR SURGERY)

## 2020-09-22 PROCEDURE — 84550 ASSAY OF BLOOD/URIC ACID: CPT | Performed by: INTERNAL MEDICINE

## 2020-09-22 PROCEDURE — 80069 RENAL FUNCTION PANEL: CPT | Performed by: INTERNAL MEDICINE

## 2020-09-22 PROCEDURE — 74018 RADEX ABDOMEN 1 VIEW: CPT

## 2020-09-22 PROCEDURE — 83735 ASSAY OF MAGNESIUM: CPT | Performed by: INTERNAL MEDICINE

## 2020-09-22 PROCEDURE — 99232 SBSQ HOSP IP/OBS MODERATE 35: CPT | Performed by: INTERNAL MEDICINE

## 2020-09-22 PROCEDURE — 99024 POSTOP FOLLOW-UP VISIT: CPT | Performed by: NURSE PRACTITIONER

## 2020-09-22 PROCEDURE — 94799 UNLISTED PULMONARY SVC/PX: CPT

## 2020-09-22 PROCEDURE — 74230 X-RAY XM SWLNG FUNCJ C+: CPT

## 2020-09-22 PROCEDURE — 82962 GLUCOSE BLOOD TEST: CPT

## 2020-09-22 PROCEDURE — 63710000001 INSULIN REGULAR HUMAN PER 5 UNITS: Performed by: NURSE PRACTITIONER

## 2020-09-22 PROCEDURE — 92611 MOTION FLUOROSCOPY/SWALLOW: CPT

## 2020-09-22 PROCEDURE — 71045 X-RAY EXAM CHEST 1 VIEW: CPT

## 2020-09-22 PROCEDURE — 80048 BASIC METABOLIC PNL TOTAL CA: CPT | Performed by: NURSE PRACTITIONER

## 2020-09-22 PROCEDURE — 85027 COMPLETE CBC AUTOMATED: CPT | Performed by: NURSE PRACTITIONER

## 2020-09-22 PROCEDURE — 25010000002 FUROSEMIDE PER 20 MG: Performed by: INTERNAL MEDICINE

## 2020-09-22 PROCEDURE — 97110 THERAPEUTIC EXERCISES: CPT

## 2020-09-22 RX ORDER — ROPINIROLE 0.5 MG/1
0.5 TABLET, FILM COATED ORAL DAILY
Status: DISCONTINUED | OUTPATIENT
Start: 2020-09-22 | End: 2020-09-22

## 2020-09-22 RX ORDER — LOSARTAN POTASSIUM 25 MG/1
25 TABLET ORAL DAILY
Status: DISCONTINUED | OUTPATIENT
Start: 2020-09-22 | End: 2020-09-25

## 2020-09-22 RX ORDER — FUROSEMIDE 10 MG/ML
40 INJECTION INTRAMUSCULAR; INTRAVENOUS ONCE
Status: COMPLETED | OUTPATIENT
Start: 2020-09-22 | End: 2020-09-22

## 2020-09-22 RX ORDER — PRAMIPEXOLE DIHYDROCHLORIDE 0.25 MG/1
0.12 TABLET ORAL NIGHTLY
Status: DISCONTINUED | OUTPATIENT
Start: 2020-09-22 | End: 2020-09-25 | Stop reason: HOSPADM

## 2020-09-22 RX ORDER — METOPROLOL TARTRATE 50 MG/1
50 TABLET, FILM COATED ORAL EVERY 12 HOURS SCHEDULED
Status: DISCONTINUED | OUTPATIENT
Start: 2020-09-22 | End: 2020-09-23

## 2020-09-22 RX ORDER — TRAMADOL HYDROCHLORIDE 50 MG/1
50 TABLET ORAL EVERY 6 HOURS PRN
Status: DISCONTINUED | OUTPATIENT
Start: 2020-09-22 | End: 2020-09-25 | Stop reason: HOSPADM

## 2020-09-22 RX ADMIN — GUAIFENESIN 400 MG: 100 SOLUTION ORAL at 20:50

## 2020-09-22 RX ADMIN — METOPROLOL TARTRATE 50 MG: 50 TABLET, FILM COATED ORAL at 20:46

## 2020-09-22 RX ADMIN — INSULIN HUMAN 2 UNITS: 100 INJECTION, SOLUTION PARENTERAL at 18:44

## 2020-09-22 RX ADMIN — MUPIROCIN 1 APPLICATION: 20 OINTMENT TOPICAL at 08:15

## 2020-09-22 RX ADMIN — PANTOPRAZOLE SODIUM 40 MG: 40 INJECTION, POWDER, FOR SOLUTION INTRAVENOUS at 07:40

## 2020-09-22 RX ADMIN — HYDROCODONE BITARTRATE AND ACETAMINOPHEN 2 TABLET: 5; 325 TABLET ORAL at 07:40

## 2020-09-22 RX ADMIN — ATORVASTATIN CALCIUM 40 MG: 20 TABLET, FILM COATED ORAL at 20:46

## 2020-09-22 RX ADMIN — BARIUM SULFATE 50 ML: 400 SUSPENSION ORAL at 09:17

## 2020-09-22 RX ADMIN — GUAIFENESIN 400 MG: 100 SOLUTION ORAL at 08:15

## 2020-09-22 RX ADMIN — IPRATROPIUM BROMIDE AND ALBUTEROL SULFATE 3 ML: 2.5; .5 SOLUTION RESPIRATORY (INHALATION) at 07:15

## 2020-09-22 RX ADMIN — HYDROCODONE BITARTRATE AND ACETAMINOPHEN 2 TABLET: 5; 325 TABLET ORAL at 12:32

## 2020-09-22 RX ADMIN — FUROSEMIDE 40 MG: 10 INJECTION, SOLUTION INTRAMUSCULAR; INTRAVENOUS at 12:32

## 2020-09-22 RX ADMIN — HYDROCODONE BITARTRATE AND ACETAMINOPHEN 2 TABLET: 5; 325 TABLET ORAL at 20:46

## 2020-09-22 RX ADMIN — MUPIROCIN: 20 OINTMENT TOPICAL at 20:50

## 2020-09-22 RX ADMIN — HYDROCODONE BITARTRATE AND ACETAMINOPHEN 2 TABLET: 5; 325 TABLET ORAL at 01:48

## 2020-09-22 RX ADMIN — ACETYLCYSTEINE 3 ML: 200 SOLUTION ORAL; RESPIRATORY (INHALATION) at 20:45

## 2020-09-22 RX ADMIN — BARIUM SULFATE 55 ML: 0.81 POWDER, FOR SUSPENSION ORAL at 09:16

## 2020-09-22 RX ADMIN — PRAMIPEXOLE DIHYDROCHLORIDE 0.12 MG: 0.25 TABLET ORAL at 20:47

## 2020-09-22 RX ADMIN — DOCUSATE SODIUM 50MG AND SENNOSIDES 8.6MG 2 TABLET: 8.6; 5 TABLET, FILM COATED ORAL at 20:46

## 2020-09-22 RX ADMIN — INSULIN HUMAN 2 UNITS: 100 INJECTION, SOLUTION PARENTERAL at 12:32

## 2020-09-22 RX ADMIN — ASPIRIN 81 MG: 81 TABLET, CHEWABLE ORAL at 08:14

## 2020-09-22 RX ADMIN — POTASSIUM & SODIUM PHOSPHATES POWDER PACK 280-160-250 MG 1 PACKET: 280-160-250 PACK at 07:02

## 2020-09-22 RX ADMIN — METOPROLOL TARTRATE 50 MG: 50 TABLET, FILM COATED ORAL at 08:14

## 2020-09-22 RX ADMIN — ACETYLCYSTEINE 3 ML: 200 SOLUTION ORAL; RESPIRATORY (INHALATION) at 07:16

## 2020-09-22 RX ADMIN — IPRATROPIUM BROMIDE AND ALBUTEROL SULFATE 3 ML: 2.5; .5 SOLUTION RESPIRATORY (INHALATION) at 20:45

## 2020-09-23 LAB
ALBUMIN SERPL-MCNC: 3.3 G/DL (ref 3.5–5.2)
ANION GAP SERPL CALCULATED.3IONS-SCNC: 11.3 MMOL/L (ref 5–15)
BACTERIA UR QL AUTO: ABNORMAL /HPF
BILIRUB UR QL STRIP: NEGATIVE
BUN SERPL-MCNC: 27 MG/DL (ref 8–23)
BUN/CREAT SERPL: 39.1 (ref 7–25)
CALCIUM SPEC-SCNC: 8.6 MG/DL (ref 8.6–10.5)
CHLORIDE SERPL-SCNC: 103 MMOL/L (ref 98–107)
CLARITY UR: CLEAR
CO2 SERPL-SCNC: 27.7 MMOL/L (ref 22–29)
COLOR UR: YELLOW
CREAT SERPL-MCNC: 0.69 MG/DL (ref 0.57–1)
GFR SERPL CREATININE-BSD FRML MDRD: 82 ML/MIN/1.73
GLUCOSE BLDC GLUCOMTR-MCNC: 108 MG/DL (ref 70–130)
GLUCOSE BLDC GLUCOMTR-MCNC: 118 MG/DL (ref 70–130)
GLUCOSE BLDC GLUCOMTR-MCNC: 130 MG/DL (ref 70–130)
GLUCOSE SERPL-MCNC: 110 MG/DL (ref 65–99)
GLUCOSE UR STRIP-MCNC: NEGATIVE MG/DL
HGB UR QL STRIP.AUTO: NEGATIVE
HYALINE CASTS UR QL AUTO: ABNORMAL /LPF
KETONES UR QL STRIP: NEGATIVE
LEUKOCYTE ESTERASE UR QL STRIP.AUTO: ABNORMAL
NITRITE UR QL STRIP: NEGATIVE
PH UR STRIP.AUTO: 8.5 [PH] (ref 5–8)
PHOSPHATE SERPL-MCNC: 2.2 MG/DL (ref 2.5–4.5)
POTASSIUM SERPL-SCNC: 3.6 MMOL/L (ref 3.5–5.2)
PROT UR QL STRIP: ABNORMAL
RBC # UR: ABNORMAL /HPF
REF LAB TEST METHOD: ABNORMAL
SODIUM SERPL-SCNC: 142 MMOL/L (ref 136–145)
SP GR UR STRIP: 1.02 (ref 1–1.03)
SQUAMOUS #/AREA URNS HPF: ABNORMAL /HPF
UROBILINOGEN UR QL STRIP: ABNORMAL
WBC UR QL AUTO: ABNORMAL /HPF

## 2020-09-23 PROCEDURE — 97110 THERAPEUTIC EXERCISES: CPT

## 2020-09-23 PROCEDURE — 94799 UNLISTED PULMONARY SVC/PX: CPT

## 2020-09-23 PROCEDURE — 99024 POSTOP FOLLOW-UP VISIT: CPT | Performed by: THORACIC SURGERY (CARDIOTHORACIC VASCULAR SURGERY)

## 2020-09-23 PROCEDURE — 87086 URINE CULTURE/COLONY COUNT: CPT | Performed by: NURSE PRACTITIONER

## 2020-09-23 PROCEDURE — 80069 RENAL FUNCTION PANEL: CPT | Performed by: INTERNAL MEDICINE

## 2020-09-23 PROCEDURE — 99232 SBSQ HOSP IP/OBS MODERATE 35: CPT | Performed by: NURSE PRACTITIONER

## 2020-09-23 PROCEDURE — 81001 URINALYSIS AUTO W/SCOPE: CPT | Performed by: NURSE PRACTITIONER

## 2020-09-23 PROCEDURE — 82962 GLUCOSE BLOOD TEST: CPT

## 2020-09-23 RX ORDER — METOPROLOL SUCCINATE 50 MG/1
50 TABLET, EXTENDED RELEASE ORAL
Status: DISCONTINUED | OUTPATIENT
Start: 2020-09-23 | End: 2020-09-24

## 2020-09-23 RX ADMIN — LOSARTAN POTASSIUM 25 MG: 25 TABLET, FILM COATED ORAL at 10:03

## 2020-09-23 RX ADMIN — GUAIFENESIN 400 MG: 100 SOLUTION ORAL at 20:50

## 2020-09-23 RX ADMIN — ATORVASTATIN CALCIUM 40 MG: 20 TABLET, FILM COATED ORAL at 20:50

## 2020-09-23 RX ADMIN — POTASSIUM CHLORIDE 40 MEQ: 10 CAPSULE, COATED, EXTENDED RELEASE ORAL at 17:42

## 2020-09-23 RX ADMIN — IPRATROPIUM BROMIDE AND ALBUTEROL SULFATE 3 ML: 2.5; .5 SOLUTION RESPIRATORY (INHALATION) at 07:24

## 2020-09-23 RX ADMIN — PANTOPRAZOLE SODIUM 40 MG: 40 INJECTION, POWDER, FOR SOLUTION INTRAVENOUS at 06:16

## 2020-09-23 RX ADMIN — HYDROCODONE BITARTRATE AND ACETAMINOPHEN 2 TABLET: 5; 325 TABLET ORAL at 10:03

## 2020-09-23 RX ADMIN — IPRATROPIUM BROMIDE AND ALBUTEROL SULFATE 3 ML: 2.5; .5 SOLUTION RESPIRATORY (INHALATION) at 20:23

## 2020-09-23 RX ADMIN — HYDROCODONE BITARTRATE AND ACETAMINOPHEN 2 TABLET: 5; 325 TABLET ORAL at 20:56

## 2020-09-23 RX ADMIN — MUPIROCIN 1 APPLICATION: 20 OINTMENT TOPICAL at 10:04

## 2020-09-23 RX ADMIN — MUPIROCIN 1 APPLICATION: 20 OINTMENT TOPICAL at 20:50

## 2020-09-23 RX ADMIN — GUAIFENESIN 400 MG: 100 SOLUTION ORAL at 10:03

## 2020-09-23 RX ADMIN — DOCUSATE SODIUM 50MG AND SENNOSIDES 8.6MG 2 TABLET: 8.6; 5 TABLET, FILM COATED ORAL at 20:50

## 2020-09-23 RX ADMIN — POTASSIUM CHLORIDE 40 MEQ: 1.5 FOR SOLUTION ORAL at 06:25

## 2020-09-23 RX ADMIN — PRAMIPEXOLE DIHYDROCHLORIDE 0.12 MG: 0.25 TABLET ORAL at 20:50

## 2020-09-23 RX ADMIN — METOPROLOL SUCCINATE 50 MG: 50 TABLET, EXTENDED RELEASE ORAL at 12:08

## 2020-09-23 RX ADMIN — ACETYLCYSTEINE 3 ML: 200 SOLUTION ORAL; RESPIRATORY (INHALATION) at 20:23

## 2020-09-23 RX ADMIN — ASPIRIN 81 MG: 81 TABLET, CHEWABLE ORAL at 10:03

## 2020-09-24 ENCOUNTER — APPOINTMENT (OUTPATIENT)
Dept: GENERAL RADIOLOGY | Facility: HOSPITAL | Age: 80
End: 2020-09-24

## 2020-09-24 LAB
ALBUMIN SERPL-MCNC: 3.5 G/DL (ref 3.5–5.2)
ANION GAP SERPL CALCULATED.3IONS-SCNC: 10.9 MMOL/L (ref 5–15)
BUN SERPL-MCNC: 27 MG/DL (ref 8–23)
BUN/CREAT SERPL: 35.1 (ref 7–25)
CALCIUM SPEC-SCNC: 9 MG/DL (ref 8.6–10.5)
CHLORIDE SERPL-SCNC: 106 MMOL/L (ref 98–107)
CO2 SERPL-SCNC: 27.1 MMOL/L (ref 22–29)
CREAT SERPL-MCNC: 0.77 MG/DL (ref 0.57–1)
GFR SERPL CREATININE-BSD FRML MDRD: 72 ML/MIN/1.73
GLUCOSE BLDC GLUCOMTR-MCNC: 102 MG/DL (ref 70–130)
GLUCOSE BLDC GLUCOMTR-MCNC: 107 MG/DL (ref 70–130)
GLUCOSE BLDC GLUCOMTR-MCNC: 107 MG/DL (ref 70–130)
GLUCOSE BLDC GLUCOMTR-MCNC: 120 MG/DL (ref 70–130)
GLUCOSE SERPL-MCNC: 102 MG/DL (ref 65–99)
PHOSPHATE SERPL-MCNC: 2.4 MG/DL (ref 2.5–4.5)
POTASSIUM SERPL-SCNC: 4 MMOL/L (ref 3.5–5.2)
SODIUM SERPL-SCNC: 144 MMOL/L (ref 136–145)

## 2020-09-24 PROCEDURE — 97166 OT EVAL MOD COMPLEX 45 MIN: CPT

## 2020-09-24 PROCEDURE — 80069 RENAL FUNCTION PANEL: CPT | Performed by: INTERNAL MEDICINE

## 2020-09-24 PROCEDURE — 99232 SBSQ HOSP IP/OBS MODERATE 35: CPT | Performed by: NURSE PRACTITIONER

## 2020-09-24 PROCEDURE — 92526 ORAL FUNCTION THERAPY: CPT

## 2020-09-24 PROCEDURE — 99024 POSTOP FOLLOW-UP VISIT: CPT | Performed by: THORACIC SURGERY (CARDIOTHORACIC VASCULAR SURGERY)

## 2020-09-24 PROCEDURE — 82962 GLUCOSE BLOOD TEST: CPT

## 2020-09-24 PROCEDURE — 71045 X-RAY EXAM CHEST 1 VIEW: CPT

## 2020-09-24 PROCEDURE — 97535 SELF CARE MNGMENT TRAINING: CPT

## 2020-09-24 PROCEDURE — 97110 THERAPEUTIC EXERCISES: CPT

## 2020-09-24 RX ORDER — POTASSIUM CHLORIDE 750 MG/1
10 CAPSULE, EXTENDED RELEASE ORAL EVERY OTHER DAY
Status: DISCONTINUED | OUTPATIENT
Start: 2020-09-26 | End: 2020-09-25 | Stop reason: HOSPADM

## 2020-09-24 RX ORDER — FUROSEMIDE 20 MG/1
20 TABLET ORAL DAILY
Status: DISCONTINUED | OUTPATIENT
Start: 2020-09-24 | End: 2020-09-24

## 2020-09-24 RX ORDER — POTASSIUM CHLORIDE 750 MG/1
10 CAPSULE, EXTENDED RELEASE ORAL DAILY
Status: DISCONTINUED | OUTPATIENT
Start: 2020-09-24 | End: 2020-09-24

## 2020-09-24 RX ORDER — METOPROLOL SUCCINATE 50 MG/1
50 TABLET, EXTENDED RELEASE ORAL EVERY 12 HOURS SCHEDULED
Status: DISCONTINUED | OUTPATIENT
Start: 2020-09-24 | End: 2020-09-25 | Stop reason: HOSPADM

## 2020-09-24 RX ORDER — FUROSEMIDE 20 MG/1
20 TABLET ORAL EVERY OTHER DAY
Status: DISCONTINUED | OUTPATIENT
Start: 2020-09-26 | End: 2020-09-25 | Stop reason: HOSPADM

## 2020-09-24 RX ADMIN — ASPIRIN 81 MG: 81 TABLET, CHEWABLE ORAL at 08:05

## 2020-09-24 RX ADMIN — HYDROCODONE BITARTRATE AND ACETAMINOPHEN 2 TABLET: 5; 325 TABLET ORAL at 21:58

## 2020-09-24 RX ADMIN — PRAMIPEXOLE DIHYDROCHLORIDE 0.12 MG: 0.25 TABLET ORAL at 21:55

## 2020-09-24 RX ADMIN — FUROSEMIDE 20 MG: 20 TABLET ORAL at 10:40

## 2020-09-24 RX ADMIN — DOCUSATE SODIUM 50MG AND SENNOSIDES 8.6MG 1 TABLET: 8.6; 5 TABLET, FILM COATED ORAL at 21:54

## 2020-09-24 RX ADMIN — GUAIFENESIN 200 MG: 100 SOLUTION ORAL at 21:58

## 2020-09-24 RX ADMIN — HYDROCODONE BITARTRATE AND ACETAMINOPHEN 2 TABLET: 5; 325 TABLET ORAL at 16:08

## 2020-09-24 RX ADMIN — ATORVASTATIN CALCIUM 40 MG: 20 TABLET, FILM COATED ORAL at 21:54

## 2020-09-24 RX ADMIN — PANTOPRAZOLE SODIUM 40 MG: 40 INJECTION, POWDER, FOR SOLUTION INTRAVENOUS at 06:06

## 2020-09-24 RX ADMIN — LOSARTAN POTASSIUM 25 MG: 25 TABLET, FILM COATED ORAL at 08:05

## 2020-09-24 RX ADMIN — MUPIROCIN 1 APPLICATION: 20 OINTMENT TOPICAL at 08:05

## 2020-09-24 RX ADMIN — METOPROLOL SUCCINATE 50 MG: 50 TABLET, EXTENDED RELEASE ORAL at 21:55

## 2020-09-24 RX ADMIN — METOPROLOL SUCCINATE 50 MG: 50 TABLET, EXTENDED RELEASE ORAL at 08:05

## 2020-09-24 RX ADMIN — MUPIROCIN 1 APPLICATION: 20 OINTMENT TOPICAL at 21:55

## 2020-09-24 RX ADMIN — HYDROCODONE BITARTRATE AND ACETAMINOPHEN 2 TABLET: 5; 325 TABLET ORAL at 08:05

## 2020-09-24 RX ADMIN — POTASSIUM CHLORIDE 10 MEQ: 10 CAPSULE, COATED, EXTENDED RELEASE ORAL at 10:40

## 2020-09-25 ENCOUNTER — HOSPITAL ENCOUNTER (INPATIENT)
Facility: HOSPITAL | Age: 80
LOS: 14 days | Discharge: HOME | End: 2020-10-09
Attending: PHYSICAL MEDICINE & REHABILITATION | Admitting: PHYSICAL MEDICINE & REHABILITATION

## 2020-09-25 VITALS
RESPIRATION RATE: 18 BRPM | TEMPERATURE: 97.7 F | WEIGHT: 89.8 LBS | HEART RATE: 86 BPM | HEIGHT: 56 IN | OXYGEN SATURATION: 96 % | BODY MASS INDEX: 20.2 KG/M2 | SYSTOLIC BLOOD PRESSURE: 150 MMHG | DIASTOLIC BLOOD PRESSURE: 84 MMHG

## 2020-09-25 DIAGNOSIS — R26.9 GAIT ABNORMALITY: ICD-10-CM

## 2020-09-25 DIAGNOSIS — I25.3 VENTRICULAR ANEURYSM: ICD-10-CM

## 2020-09-25 DIAGNOSIS — I31.2 PERICARDIAL HEMATOMA: Primary | ICD-10-CM

## 2020-09-25 PROBLEM — M62.3 IMMOBILITY SYNDROME: Status: ACTIVE | Noted: 2020-09-25

## 2020-09-25 LAB
ALBUMIN SERPL-MCNC: 3.6 G/DL (ref 3.5–5.2)
ANION GAP SERPL CALCULATED.3IONS-SCNC: 7.7 MMOL/L (ref 5–15)
BACTERIA SPEC AEROBE CULT: NO GROWTH
BUN SERPL-MCNC: 29 MG/DL (ref 8–23)
BUN/CREAT SERPL: 39.2 (ref 7–25)
CALCIUM SPEC-SCNC: 8.8 MG/DL (ref 8.6–10.5)
CHLORIDE SERPL-SCNC: 104 MMOL/L (ref 98–107)
CO2 SERPL-SCNC: 32.3 MMOL/L (ref 22–29)
CREAT SERPL-MCNC: 0.74 MG/DL (ref 0.57–1)
DEPRECATED RDW RBC AUTO: 50.9 FL (ref 37–54)
ERYTHROCYTE [DISTWIDTH] IN BLOOD BY AUTOMATED COUNT: 15.9 % (ref 12.3–15.4)
GFR SERPL CREATININE-BSD FRML MDRD: 76 ML/MIN/1.73
GLUCOSE SERPL-MCNC: 108 MG/DL (ref 65–99)
HCT VFR BLD AUTO: 28.8 % (ref 34–46.6)
HGB BLD-MCNC: 9.4 G/DL (ref 12–15.9)
MCH RBC QN AUTO: 29.3 PG (ref 26.6–33)
MCHC RBC AUTO-ENTMCNC: 32.6 G/DL (ref 31.5–35.7)
MCV RBC AUTO: 89.7 FL (ref 79–97)
PHOSPHATE SERPL-MCNC: 3.5 MG/DL (ref 2.5–4.5)
PLATELET # BLD AUTO: 282 10*3/MM3 (ref 140–450)
PMV BLD AUTO: 9.4 FL (ref 6–12)
POTASSIUM SERPL-SCNC: 3.5 MMOL/L (ref 3.5–5.2)
RBC # BLD AUTO: 3.21 10*6/MM3 (ref 3.77–5.28)
SODIUM SERPL-SCNC: 144 MMOL/L (ref 136–145)
WBC # BLD AUTO: 5.56 10*3/MM3 (ref 3.4–10.8)

## 2020-09-25 PROCEDURE — U0004 COV-19 TEST NON-CDC HGH THRU: HCPCS | Performed by: PHYSICAL MEDICINE & REHABILITATION

## 2020-09-25 PROCEDURE — 99024 POSTOP FOLLOW-UP VISIT: CPT | Performed by: NURSE PRACTITIONER

## 2020-09-25 PROCEDURE — 80069 RENAL FUNCTION PANEL: CPT | Performed by: INTERNAL MEDICINE

## 2020-09-25 PROCEDURE — 99232 SBSQ HOSP IP/OBS MODERATE 35: CPT | Performed by: INTERNAL MEDICINE

## 2020-09-25 PROCEDURE — 85027 COMPLETE CBC AUTOMATED: CPT | Performed by: NURSE PRACTITIONER

## 2020-09-25 RX ORDER — SODIUM CHLORIDE 0.9 % (FLUSH) 0.9 %
10 SYRINGE (ML) INJECTION AS NEEDED
Status: DISCONTINUED | OUTPATIENT
Start: 2020-09-25 | End: 2020-09-29

## 2020-09-25 RX ORDER — ACETAMINOPHEN 325 MG/1
650 TABLET ORAL EVERY 4 HOURS PRN
Status: CANCELLED | OUTPATIENT
Start: 2020-09-25

## 2020-09-25 RX ORDER — AMOXICILLIN 250 MG
2 CAPSULE ORAL DAILY PRN
Status: DISCONTINUED | OUTPATIENT
Start: 2020-09-25 | End: 2020-10-09

## 2020-09-25 RX ORDER — METOPROLOL SUCCINATE 50 MG/1
50 TABLET, EXTENDED RELEASE ORAL EVERY 12 HOURS SCHEDULED
Status: DISCONTINUED | OUTPATIENT
Start: 2020-09-25 | End: 2020-10-09 | Stop reason: HOSPADM

## 2020-09-25 RX ORDER — ASPIRIN 81 MG/1
81 TABLET, CHEWABLE ORAL DAILY
Status: DISCONTINUED | OUTPATIENT
Start: 2020-09-26 | End: 2020-10-06

## 2020-09-25 RX ORDER — ACETAMINOPHEN 650 MG/1
650 SUPPOSITORY RECTAL EVERY 4 HOURS PRN
Status: DISCONTINUED | OUTPATIENT
Start: 2020-09-25 | End: 2020-09-25

## 2020-09-25 RX ORDER — OXYBUTYNIN CHLORIDE 5 MG/1
5 TABLET, EXTENDED RELEASE ORAL DAILY
Status: DISCONTINUED | OUTPATIENT
Start: 2020-09-25 | End: 2020-10-09 | Stop reason: HOSPADM

## 2020-09-25 RX ORDER — LOSARTAN POTASSIUM 50 MG/1
50 TABLET ORAL DAILY
Status: CANCELLED | OUTPATIENT
Start: 2020-09-26

## 2020-09-25 RX ORDER — NICOTINE POLACRILEX 4 MG
15 LOZENGE BUCCAL
Status: CANCELLED | OUTPATIENT
Start: 2020-09-25

## 2020-09-25 RX ORDER — ACETAMINOPHEN 325 MG/1
650 TABLET ORAL EVERY 4 HOURS PRN
Status: DISCONTINUED | OUTPATIENT
Start: 2020-09-25 | End: 2020-10-09

## 2020-09-25 RX ORDER — SODIUM CHLORIDE 0.9 % (FLUSH) 0.9 %
10 SYRINGE (ML) INJECTION EVERY 12 HOURS SCHEDULED
Status: DISCONTINUED | OUTPATIENT
Start: 2020-09-25 | End: 2020-09-28

## 2020-09-25 RX ORDER — BISACODYL 5 MG/1
10 TABLET, DELAYED RELEASE ORAL DAILY PRN
Status: CANCELLED | OUTPATIENT
Start: 2020-09-25

## 2020-09-25 RX ORDER — ONDANSETRON 2 MG/ML
4 INJECTION INTRAMUSCULAR; INTRAVENOUS EVERY 6 HOURS PRN
Status: DISCONTINUED | OUTPATIENT
Start: 2020-09-25 | End: 2020-09-30

## 2020-09-25 RX ORDER — CARVEDILOL 6.25 MG/1
6.25 TABLET ORAL 2 TIMES DAILY WITH MEALS
COMMUNITY
End: 2020-10-09 | Stop reason: HOSPADM

## 2020-09-25 RX ORDER — AMOXICILLIN 250 MG
2 CAPSULE ORAL NIGHTLY
Status: CANCELLED | OUTPATIENT
Start: 2020-09-25

## 2020-09-25 RX ORDER — PRAMIPEXOLE DIHYDROCHLORIDE 0.25 MG/1
0.12 TABLET ORAL NIGHTLY
Status: CANCELLED | OUTPATIENT
Start: 2020-09-25

## 2020-09-25 RX ORDER — DEXTROSE MONOHYDRATE 25 G/50ML
25 INJECTION, SOLUTION INTRAVENOUS
Status: DISCONTINUED | OUTPATIENT
Start: 2020-09-25 | End: 2020-10-09

## 2020-09-25 RX ORDER — ACETAMINOPHEN 650 MG/1
650 SUPPOSITORY RECTAL EVERY 4 HOURS PRN
Status: CANCELLED | OUTPATIENT
Start: 2020-09-25

## 2020-09-25 RX ORDER — NICOTINE POLACRILEX 4 MG
15 LOZENGE BUCCAL
Status: DISCONTINUED | OUTPATIENT
Start: 2020-09-25 | End: 2020-10-09

## 2020-09-25 RX ORDER — MECLIZINE HCL 12.5 MG/1
12.5 TABLET ORAL 3 TIMES DAILY PRN
Status: CANCELLED | OUTPATIENT
Start: 2020-09-25

## 2020-09-25 RX ORDER — ACETAMINOPHEN 325 MG/1
650 TABLET ORAL EVERY 4 HOURS PRN
Status: DISCONTINUED | OUTPATIENT
Start: 2020-09-25 | End: 2020-09-25

## 2020-09-25 RX ORDER — ASPIRIN 81 MG/1
81 TABLET, CHEWABLE ORAL DAILY
Status: CANCELLED | OUTPATIENT
Start: 2020-09-26

## 2020-09-25 RX ORDER — CHOLECALCIFEROL (VITAMIN D3) 125 MCG
10 CAPSULE ORAL NIGHTLY PRN
Status: DISCONTINUED | OUTPATIENT
Start: 2020-09-25 | End: 2020-10-09

## 2020-09-25 RX ORDER — MECLIZINE HCL 12.5 MG/1
12.5 TABLET ORAL 3 TIMES DAILY PRN
Status: DISCONTINUED | OUTPATIENT
Start: 2020-09-25 | End: 2020-10-09

## 2020-09-25 RX ORDER — SODIUM CHLORIDE 0.9 % (FLUSH) 0.9 %
10 SYRINGE (ML) INJECTION EVERY 12 HOURS SCHEDULED
Status: CANCELLED | OUTPATIENT
Start: 2020-09-25

## 2020-09-25 RX ORDER — ATORVASTATIN CALCIUM 20 MG/1
40 TABLET, FILM COATED ORAL NIGHTLY
Status: DISCONTINUED | OUTPATIENT
Start: 2020-09-25 | End: 2020-10-09 | Stop reason: HOSPADM

## 2020-09-25 RX ORDER — LOSARTAN POTASSIUM 50 MG/1
50 TABLET ORAL DAILY
Status: DISCONTINUED | OUTPATIENT
Start: 2020-09-26 | End: 2020-10-09 | Stop reason: HOSPADM

## 2020-09-25 RX ORDER — FUROSEMIDE 20 MG/1
20 TABLET ORAL EVERY OTHER DAY
Status: DISCONTINUED | OUTPATIENT
Start: 2020-09-26 | End: 2020-10-06

## 2020-09-25 RX ORDER — LOSARTAN POTASSIUM 50 MG/1
50 TABLET ORAL DAILY
Status: DISCONTINUED | OUTPATIENT
Start: 2020-09-25 | End: 2020-09-25 | Stop reason: HOSPADM

## 2020-09-25 RX ORDER — IPRATROPIUM BROMIDE AND ALBUTEROL SULFATE 2.5; .5 MG/3ML; MG/3ML
3 SOLUTION RESPIRATORY (INHALATION) EVERY 4 HOURS PRN
Status: DISCONTINUED | OUTPATIENT
Start: 2020-09-25 | End: 2020-10-09

## 2020-09-25 RX ORDER — CETIRIZINE HYDROCHLORIDE 10 MG/1
10 TABLET ORAL DAILY PRN
Status: DISCONTINUED | OUTPATIENT
Start: 2020-09-25 | End: 2020-10-09

## 2020-09-25 RX ORDER — SODIUM CHLORIDE 0.9 % (FLUSH) 0.9 %
10 SYRINGE (ML) INJECTION AS NEEDED
Status: CANCELLED | OUTPATIENT
Start: 2020-09-25

## 2020-09-25 RX ORDER — BISACODYL 10 MG
10 SUPPOSITORY, RECTAL RECTAL DAILY PRN
Status: CANCELLED | OUTPATIENT
Start: 2020-09-25

## 2020-09-25 RX ORDER — PANTOPRAZOLE SODIUM 40 MG/10ML
40 INJECTION, POWDER, LYOPHILIZED, FOR SOLUTION INTRAVENOUS
Status: CANCELLED | OUTPATIENT
Start: 2020-09-26

## 2020-09-25 RX ORDER — NALOXONE HCL 0.4 MG/ML
0.4 VIAL (ML) INJECTION
Status: DISCONTINUED | OUTPATIENT
Start: 2020-09-25 | End: 2020-10-09

## 2020-09-25 RX ORDER — HYDROCODONE BITARTRATE AND ACETAMINOPHEN 5; 325 MG/1; MG/1
2 TABLET ORAL EVERY 4 HOURS PRN
Status: CANCELLED | OUTPATIENT
Start: 2020-09-25 | End: 2020-09-28

## 2020-09-25 RX ORDER — PANTOPRAZOLE SODIUM 40 MG/1
40 TABLET, DELAYED RELEASE ORAL EVERY MORNING
Status: CANCELLED | OUTPATIENT
Start: 2020-09-25

## 2020-09-25 RX ORDER — AMOXICILLIN 250 MG
2 CAPSULE ORAL DAILY PRN
Status: CANCELLED | OUTPATIENT
Start: 2020-09-25

## 2020-09-25 RX ORDER — ATORVASTATIN CALCIUM 20 MG/1
40 TABLET, FILM COATED ORAL NIGHTLY
Status: CANCELLED | OUTPATIENT
Start: 2020-09-25

## 2020-09-25 RX ORDER — PANTOPRAZOLE SODIUM 40 MG/1
40 TABLET, DELAYED RELEASE ORAL EVERY MORNING
Status: DISCONTINUED | OUTPATIENT
Start: 2020-09-26 | End: 2020-10-09 | Stop reason: HOSPADM

## 2020-09-25 RX ORDER — CETIRIZINE HYDROCHLORIDE 10 MG/1
10 TABLET ORAL AS NEEDED
Status: CANCELLED | OUTPATIENT
Start: 2020-09-25

## 2020-09-25 RX ORDER — OXYBUTYNIN CHLORIDE 5 MG/1
5 TABLET, EXTENDED RELEASE ORAL DAILY
Status: CANCELLED | OUTPATIENT
Start: 2020-09-25

## 2020-09-25 RX ORDER — TRAMADOL HYDROCHLORIDE 50 MG/1
50 TABLET ORAL EVERY 6 HOURS PRN
Status: DISCONTINUED | OUTPATIENT
Start: 2020-09-25 | End: 2020-10-09 | Stop reason: HOSPADM

## 2020-09-25 RX ORDER — HYDROCODONE BITARTRATE AND ACETAMINOPHEN 5; 325 MG/1; MG/1
2 TABLET ORAL EVERY 4 HOURS PRN
Status: DISCONTINUED | OUTPATIENT
Start: 2020-09-25 | End: 2020-09-25

## 2020-09-25 RX ORDER — LOSARTAN POTASSIUM 25 MG/1
25 TABLET ORAL DAILY
Status: ON HOLD | COMMUNITY
End: 2020-10-09 | Stop reason: SDUPTHER

## 2020-09-25 RX ORDER — CLOPIDOGREL BISULFATE 75 MG/1
75 TABLET ORAL DAILY
Status: CANCELLED | OUTPATIENT
Start: 2020-09-25

## 2020-09-25 RX ORDER — TRAMADOL HYDROCHLORIDE 50 MG/1
50 TABLET ORAL EVERY 6 HOURS PRN
Status: CANCELLED | OUTPATIENT
Start: 2020-09-25

## 2020-09-25 RX ORDER — POTASSIUM CHLORIDE 750 MG/1
10 CAPSULE, EXTENDED RELEASE ORAL EVERY OTHER DAY
Status: CANCELLED | OUTPATIENT
Start: 2020-09-26

## 2020-09-25 RX ORDER — ONDANSETRON 2 MG/ML
4 INJECTION INTRAMUSCULAR; INTRAVENOUS EVERY 6 HOURS PRN
Status: CANCELLED | OUTPATIENT
Start: 2020-09-25

## 2020-09-25 RX ORDER — ACETAMINOPHEN 160 MG/5ML
650 SOLUTION ORAL EVERY 4 HOURS PRN
Status: CANCELLED | OUTPATIENT
Start: 2020-09-25

## 2020-09-25 RX ORDER — CILOSTAZOL 100 MG/1
100 TABLET ORAL 2 TIMES DAILY
Status: CANCELLED | OUTPATIENT
Start: 2020-09-25

## 2020-09-25 RX ORDER — FUROSEMIDE 20 MG/1
20 TABLET ORAL DAILY
COMMUNITY
End: 2020-10-09 | Stop reason: HOSPADM

## 2020-09-25 RX ORDER — DEXTROSE MONOHYDRATE 25 G/50ML
25 INJECTION, SOLUTION INTRAVENOUS
Status: CANCELLED | OUTPATIENT
Start: 2020-09-25

## 2020-09-25 RX ORDER — PRAMIPEXOLE DIHYDROCHLORIDE 0.25 MG/1
0.12 TABLET ORAL NIGHTLY
Status: DISCONTINUED | OUTPATIENT
Start: 2020-09-25 | End: 2020-10-09 | Stop reason: HOSPADM

## 2020-09-25 RX ORDER — ACETAMINOPHEN 160 MG/5ML
650 SOLUTION ORAL EVERY 4 HOURS PRN
Status: DISCONTINUED | OUTPATIENT
Start: 2020-09-25 | End: 2020-09-25

## 2020-09-25 RX ORDER — AMOXICILLIN 250 MG
2 CAPSULE ORAL NIGHTLY
Status: DISCONTINUED | OUTPATIENT
Start: 2020-09-25 | End: 2020-09-29

## 2020-09-25 RX ORDER — CHOLECALCIFEROL (VITAMIN D3) 125 MCG
10 CAPSULE ORAL NIGHTLY PRN
Status: CANCELLED | OUTPATIENT
Start: 2020-09-25

## 2020-09-25 RX ORDER — CLOPIDOGREL BISULFATE 75 MG/1
75 TABLET ORAL DAILY
Status: DISCONTINUED | OUTPATIENT
Start: 2020-09-25 | End: 2020-10-09 | Stop reason: HOSPADM

## 2020-09-25 RX ORDER — CILOSTAZOL 100 MG/1
100 TABLET ORAL 2 TIMES DAILY
Status: DISCONTINUED | OUTPATIENT
Start: 2020-09-25 | End: 2020-10-09 | Stop reason: HOSPADM

## 2020-09-25 RX ORDER — ALPRAZOLAM 0.25 MG/1
0.25 TABLET ORAL 2 TIMES DAILY PRN
Status: DISCONTINUED | OUTPATIENT
Start: 2020-09-25 | End: 2020-10-08

## 2020-09-25 RX ORDER — ALBUTEROL SULFATE 90 UG/1
1 AEROSOL, METERED RESPIRATORY (INHALATION) EVERY 4 HOURS PRN
Status: DISCONTINUED | OUTPATIENT
Start: 2020-09-25 | End: 2020-09-26

## 2020-09-25 RX ORDER — FUROSEMIDE 20 MG/1
20 TABLET ORAL EVERY OTHER DAY
Status: CANCELLED | OUTPATIENT
Start: 2020-09-26

## 2020-09-25 RX ORDER — IPRATROPIUM BROMIDE AND ALBUTEROL SULFATE 2.5; .5 MG/3ML; MG/3ML
3 SOLUTION RESPIRATORY (INHALATION) EVERY 4 HOURS PRN
Status: CANCELLED | OUTPATIENT
Start: 2020-09-25

## 2020-09-25 RX ORDER — METOPROLOL SUCCINATE 50 MG/1
50 TABLET, EXTENDED RELEASE ORAL EVERY 12 HOURS SCHEDULED
Status: CANCELLED | OUTPATIENT
Start: 2020-09-25

## 2020-09-25 RX ORDER — NALOXONE HCL 0.4 MG/ML
0.4 VIAL (ML) INJECTION
Status: CANCELLED | OUTPATIENT
Start: 2020-09-25

## 2020-09-25 RX ORDER — POTASSIUM CHLORIDE 750 MG/1
10 CAPSULE, EXTENDED RELEASE ORAL EVERY OTHER DAY
Status: DISCONTINUED | OUTPATIENT
Start: 2020-09-26 | End: 2020-10-05

## 2020-09-25 RX ADMIN — DOCUSATE SODIUM 50MG AND SENNOSIDES 8.6MG 2 TABLET: 8.6; 5 TABLET, FILM COATED ORAL at 23:29

## 2020-09-25 RX ADMIN — POTASSIUM CHLORIDE 20 MEQ: 1.5 FOR SOLUTION ORAL at 07:24

## 2020-09-25 RX ADMIN — ASPIRIN 81 MG: 81 TABLET, CHEWABLE ORAL at 08:33

## 2020-09-25 RX ADMIN — PRAMIPEXOLE DIHYDROCHLORIDE 0.12 MG: 0.25 TABLET ORAL at 21:48

## 2020-09-25 RX ADMIN — ATORVASTATIN CALCIUM 40 MG: 20 TABLET, FILM COATED ORAL at 21:48

## 2020-09-25 RX ADMIN — MUPIROCIN 1 APPLICATION: 20 OINTMENT TOPICAL at 08:33

## 2020-09-25 RX ADMIN — OXYBUTYNIN CHLORIDE 5 MG: 5 TABLET, EXTENDED RELEASE ORAL at 18:20

## 2020-09-25 RX ADMIN — PANTOPRAZOLE SODIUM 40 MG: 40 INJECTION, POWDER, FOR SOLUTION INTRAVENOUS at 07:17

## 2020-09-25 RX ADMIN — TRAMADOL HYDROCHLORIDE 50 MG: 50 TABLET, FILM COATED ORAL at 23:29

## 2020-09-25 RX ADMIN — METOPROLOL SUCCINATE 50 MG: 50 TABLET, EXTENDED RELEASE ORAL at 08:34

## 2020-09-25 RX ADMIN — CLOPIDOGREL 75 MG: 75 TABLET, FILM COATED ORAL at 18:20

## 2020-09-25 RX ADMIN — ALPRAZOLAM 0.25 MG: 0.25 TABLET ORAL at 21:48

## 2020-09-25 RX ADMIN — METOPROLOL SUCCINATE 50 MG: 50 TABLET, EXTENDED RELEASE ORAL at 23:29

## 2020-09-25 RX ADMIN — FLUTICASONE FUROATE, UMECLIDINIUM BROMIDE AND VILANTEROL TRIFENATATE 1 PUFF: 100; 62.5; 25 POWDER RESPIRATORY (INHALATION) at 18:20

## 2020-09-25 RX ADMIN — LOSARTAN POTASSIUM 50 MG: 50 TABLET, FILM COATED ORAL at 08:36

## 2020-09-25 RX ADMIN — CILOSTAZOL 100 MG: 100 TABLET ORAL at 23:29

## 2020-09-25 NOTE — ADDENDUM NOTE
Addendum  created 09/25/20 0855 by Maycol Andrew MD    Clinical Note Signed, Intraprocedure Blocks edited

## 2020-09-26 LAB — SARS-COV-2 RNA RESP QL NAA+PROBE: NOT DETECTED

## 2020-09-26 PROCEDURE — 97116 GAIT TRAINING THERAPY: CPT | Performed by: PHYSICAL THERAPIST

## 2020-09-26 PROCEDURE — 97162 PT EVAL MOD COMPLEX 30 MIN: CPT | Performed by: PHYSICAL THERAPIST

## 2020-09-26 PROCEDURE — 92610 EVALUATE SWALLOWING FUNCTION: CPT

## 2020-09-26 PROCEDURE — 97530 THERAPEUTIC ACTIVITIES: CPT | Performed by: PHYSICAL THERAPIST

## 2020-09-26 PROCEDURE — 97535 SELF CARE MNGMENT TRAINING: CPT

## 2020-09-26 PROCEDURE — 97166 OT EVAL MOD COMPLEX 45 MIN: CPT

## 2020-09-26 RX ORDER — ALBUTEROL SULFATE 90 UG/1
1 AEROSOL, METERED RESPIRATORY (INHALATION) EVERY 4 HOURS PRN
Status: DISCONTINUED | OUTPATIENT
Start: 2020-09-26 | End: 2020-09-27

## 2020-09-26 RX ADMIN — OXYBUTYNIN CHLORIDE 5 MG: 5 TABLET, EXTENDED RELEASE ORAL at 08:23

## 2020-09-26 RX ADMIN — CILOSTAZOL 100 MG: 100 TABLET ORAL at 19:58

## 2020-09-26 RX ADMIN — ALPRAZOLAM 0.25 MG: 0.25 TABLET ORAL at 20:03

## 2020-09-26 RX ADMIN — ASPIRIN 81 MG: 81 TABLET, CHEWABLE ORAL at 08:24

## 2020-09-26 RX ADMIN — ALPRAZOLAM 0.25 MG: 0.25 TABLET ORAL at 07:30

## 2020-09-26 RX ADMIN — POTASSIUM CHLORIDE 10 MEQ: 10 CAPSULE, COATED, EXTENDED RELEASE ORAL at 08:29

## 2020-09-26 RX ADMIN — FUROSEMIDE 20 MG: 20 TABLET ORAL at 08:24

## 2020-09-26 RX ADMIN — PRAMIPEXOLE DIHYDROCHLORIDE 0.12 MG: 0.25 TABLET ORAL at 22:08

## 2020-09-26 RX ADMIN — DOCUSATE SODIUM 50MG AND SENNOSIDES 8.6MG 2 TABLET: 8.6; 5 TABLET, FILM COATED ORAL at 22:09

## 2020-09-26 RX ADMIN — METOPROLOL SUCCINATE 50 MG: 50 TABLET, EXTENDED RELEASE ORAL at 19:58

## 2020-09-26 RX ADMIN — CLOPIDOGREL 75 MG: 75 TABLET, FILM COATED ORAL at 08:24

## 2020-09-26 RX ADMIN — LOSARTAN POTASSIUM 50 MG: 50 TABLET, FILM COATED ORAL at 08:23

## 2020-09-26 RX ADMIN — PANTOPRAZOLE SODIUM 40 MG: 40 TABLET, DELAYED RELEASE ORAL at 06:00

## 2020-09-26 RX ADMIN — TRAMADOL HYDROCHLORIDE 50 MG: 50 TABLET, FILM COATED ORAL at 07:30

## 2020-09-26 RX ADMIN — FLUTICASONE FUROATE, UMECLIDINIUM BROMIDE AND VILANTEROL TRIFENATATE 1 PUFF: 100; 62.5; 25 POWDER RESPIRATORY (INHALATION) at 08:25

## 2020-09-26 RX ADMIN — ATORVASTATIN CALCIUM 40 MG: 20 TABLET, FILM COATED ORAL at 19:58

## 2020-09-26 RX ADMIN — GUAIFENESIN 400 MG: 200 SOLUTION ORAL at 08:24

## 2020-09-26 RX ADMIN — CILOSTAZOL 100 MG: 100 TABLET ORAL at 08:23

## 2020-09-26 RX ADMIN — METOPROLOL SUCCINATE 50 MG: 50 TABLET, EXTENDED RELEASE ORAL at 08:24

## 2020-09-27 ENCOUNTER — APPOINTMENT (OUTPATIENT)
Dept: GENERAL RADIOLOGY | Facility: HOSPITAL | Age: 80
End: 2020-09-27

## 2020-09-27 PROCEDURE — 71046 X-RAY EXAM CHEST 2 VIEWS: CPT

## 2020-09-27 RX ORDER — ALBUTEROL SULFATE 90 UG/1
1 AEROSOL, METERED RESPIRATORY (INHALATION) EVERY 4 HOURS PRN
Status: DISCONTINUED | OUTPATIENT
Start: 2020-09-27 | End: 2020-10-09 | Stop reason: HOSPADM

## 2020-09-27 RX ADMIN — PANTOPRAZOLE SODIUM 40 MG: 40 TABLET, DELAYED RELEASE ORAL at 05:36

## 2020-09-27 RX ADMIN — GUAIFENESIN 400 MG: 200 SOLUTION ORAL at 09:20

## 2020-09-27 RX ADMIN — ASPIRIN 81 MG: 81 TABLET, CHEWABLE ORAL at 09:22

## 2020-09-27 RX ADMIN — ALPRAZOLAM 0.25 MG: 0.25 TABLET ORAL at 21:34

## 2020-09-27 RX ADMIN — CILOSTAZOL 100 MG: 100 TABLET ORAL at 21:34

## 2020-09-27 RX ADMIN — METOPROLOL SUCCINATE 50 MG: 50 TABLET, EXTENDED RELEASE ORAL at 21:34

## 2020-09-27 RX ADMIN — CLOPIDOGREL 75 MG: 75 TABLET, FILM COATED ORAL at 09:22

## 2020-09-27 RX ADMIN — TRAMADOL HYDROCHLORIDE 50 MG: 50 TABLET, FILM COATED ORAL at 21:35

## 2020-09-27 RX ADMIN — METOPROLOL SUCCINATE 50 MG: 50 TABLET, EXTENDED RELEASE ORAL at 09:22

## 2020-09-27 RX ADMIN — ATORVASTATIN CALCIUM 40 MG: 20 TABLET, FILM COATED ORAL at 21:35

## 2020-09-27 RX ADMIN — LOSARTAN POTASSIUM 50 MG: 50 TABLET, FILM COATED ORAL at 09:21

## 2020-09-27 RX ADMIN — OXYBUTYNIN CHLORIDE 5 MG: 5 TABLET, EXTENDED RELEASE ORAL at 09:22

## 2020-09-27 RX ADMIN — CILOSTAZOL 100 MG: 100 TABLET ORAL at 09:22

## 2020-09-27 RX ADMIN — PRAMIPEXOLE DIHYDROCHLORIDE 0.12 MG: 0.25 TABLET ORAL at 21:35

## 2020-09-28 LAB
ANION GAP SERPL CALCULATED.3IONS-SCNC: 10.1 MMOL/L (ref 5–15)
BASOPHILS # BLD AUTO: 0.03 10*3/MM3 (ref 0–0.2)
BASOPHILS NFR BLD AUTO: 0.5 % (ref 0–1.5)
BUN SERPL-MCNC: 21 MG/DL (ref 8–23)
BUN/CREAT SERPL: 31.3 (ref 7–25)
CALCIUM SPEC-SCNC: 8.7 MG/DL (ref 8.6–10.5)
CHLORIDE SERPL-SCNC: 106 MMOL/L (ref 98–107)
CO2 SERPL-SCNC: 26.9 MMOL/L (ref 22–29)
CREAT SERPL-MCNC: 0.67 MG/DL (ref 0.57–1)
DEPRECATED RDW RBC AUTO: 53.6 FL (ref 37–54)
EOSINOPHIL # BLD AUTO: 0.14 10*3/MM3 (ref 0–0.4)
EOSINOPHIL NFR BLD AUTO: 2.4 % (ref 0.3–6.2)
ERYTHROCYTE [DISTWIDTH] IN BLOOD BY AUTOMATED COUNT: 15.9 % (ref 12.3–15.4)
GFR SERPL CREATININE-BSD FRML MDRD: 85 ML/MIN/1.73
GLUCOSE SERPL-MCNC: 102 MG/DL (ref 65–99)
HCT VFR BLD AUTO: 30.4 % (ref 34–46.6)
HGB BLD-MCNC: 9.6 G/DL (ref 12–15.9)
IMM GRANULOCYTES # BLD AUTO: 0.02 10*3/MM3 (ref 0–0.05)
IMM GRANULOCYTES NFR BLD AUTO: 0.3 % (ref 0–0.5)
LYMPHOCYTES # BLD AUTO: 0.88 10*3/MM3 (ref 0.7–3.1)
LYMPHOCYTES NFR BLD AUTO: 14.9 % (ref 19.6–45.3)
MCH RBC QN AUTO: 29.5 PG (ref 26.6–33)
MCHC RBC AUTO-ENTMCNC: 31.6 G/DL (ref 31.5–35.7)
MCV RBC AUTO: 93.5 FL (ref 79–97)
MONOCYTES # BLD AUTO: 0.4 10*3/MM3 (ref 0.1–0.9)
MONOCYTES NFR BLD AUTO: 6.8 % (ref 5–12)
NEUTROPHILS NFR BLD AUTO: 4.42 10*3/MM3 (ref 1.7–7)
NEUTROPHILS NFR BLD AUTO: 75.1 % (ref 42.7–76)
NRBC BLD AUTO-RTO: 0 /100 WBC (ref 0–0.2)
PLATELET # BLD AUTO: 432 10*3/MM3 (ref 140–450)
PMV BLD AUTO: 9.3 FL (ref 6–12)
POTASSIUM SERPL-SCNC: 3.6 MMOL/L (ref 3.5–5.2)
RBC # BLD AUTO: 3.25 10*6/MM3 (ref 3.77–5.28)
SODIUM SERPL-SCNC: 143 MMOL/L (ref 136–145)
WBC # BLD AUTO: 5.89 10*3/MM3 (ref 3.4–10.8)

## 2020-09-28 PROCEDURE — 97110 THERAPEUTIC EXERCISES: CPT

## 2020-09-28 PROCEDURE — 92526 ORAL FUNCTION THERAPY: CPT

## 2020-09-28 PROCEDURE — 85025 COMPLETE CBC W/AUTO DIFF WBC: CPT | Performed by: PHYSICAL MEDICINE & REHABILITATION

## 2020-09-28 PROCEDURE — 80048 BASIC METABOLIC PNL TOTAL CA: CPT | Performed by: PHYSICAL MEDICINE & REHABILITATION

## 2020-09-28 PROCEDURE — 97535 SELF CARE MNGMENT TRAINING: CPT

## 2020-09-28 PROCEDURE — 97116 GAIT TRAINING THERAPY: CPT

## 2020-09-28 PROCEDURE — 97530 THERAPEUTIC ACTIVITIES: CPT

## 2020-09-28 RX ADMIN — CLOPIDOGREL 75 MG: 75 TABLET, FILM COATED ORAL at 08:02

## 2020-09-28 RX ADMIN — METOPROLOL SUCCINATE 50 MG: 50 TABLET, EXTENDED RELEASE ORAL at 20:02

## 2020-09-28 RX ADMIN — TRAMADOL HYDROCHLORIDE 50 MG: 50 TABLET, FILM COATED ORAL at 20:04

## 2020-09-28 RX ADMIN — ASPIRIN 81 MG: 81 TABLET, CHEWABLE ORAL at 08:02

## 2020-09-28 RX ADMIN — TRAMADOL HYDROCHLORIDE 50 MG: 50 TABLET, FILM COATED ORAL at 05:37

## 2020-09-28 RX ADMIN — METOPROLOL SUCCINATE 50 MG: 50 TABLET, EXTENDED RELEASE ORAL at 08:01

## 2020-09-28 RX ADMIN — OXYBUTYNIN CHLORIDE 5 MG: 5 TABLET, EXTENDED RELEASE ORAL at 08:09

## 2020-09-28 RX ADMIN — LOSARTAN POTASSIUM 50 MG: 50 TABLET, FILM COATED ORAL at 08:01

## 2020-09-28 RX ADMIN — FLUTICASONE FUROATE, UMECLIDINIUM BROMIDE AND VILANTEROL TRIFENATATE 1 PUFF: 100; 62.5; 25 POWDER RESPIRATORY (INHALATION) at 08:03

## 2020-09-28 RX ADMIN — ATORVASTATIN CALCIUM 40 MG: 20 TABLET, FILM COATED ORAL at 20:03

## 2020-09-28 RX ADMIN — PRAMIPEXOLE DIHYDROCHLORIDE 0.12 MG: 0.25 TABLET ORAL at 20:03

## 2020-09-28 RX ADMIN — FUROSEMIDE 20 MG: 20 TABLET ORAL at 08:02

## 2020-09-28 RX ADMIN — PANTOPRAZOLE SODIUM 40 MG: 40 TABLET, DELAYED RELEASE ORAL at 05:38

## 2020-09-28 RX ADMIN — POTASSIUM CHLORIDE 10 MEQ: 10 CAPSULE, COATED, EXTENDED RELEASE ORAL at 08:01

## 2020-09-28 RX ADMIN — GUAIFENESIN 400 MG: 200 SOLUTION ORAL at 08:01

## 2020-09-28 RX ADMIN — ALPRAZOLAM 0.25 MG: 0.25 TABLET ORAL at 20:04

## 2020-09-28 RX ADMIN — CILOSTAZOL 100 MG: 100 TABLET ORAL at 20:02

## 2020-09-28 RX ADMIN — CILOSTAZOL 100 MG: 100 TABLET ORAL at 08:02

## 2020-09-29 PROCEDURE — 92526 ORAL FUNCTION THERAPY: CPT

## 2020-09-29 PROCEDURE — 97116 GAIT TRAINING THERAPY: CPT

## 2020-09-29 PROCEDURE — 97110 THERAPEUTIC EXERCISES: CPT

## 2020-09-29 PROCEDURE — 97535 SELF CARE MNGMENT TRAINING: CPT

## 2020-09-29 RX ADMIN — LOSARTAN POTASSIUM 50 MG: 50 TABLET, FILM COATED ORAL at 08:52

## 2020-09-29 RX ADMIN — CLOPIDOGREL 75 MG: 75 TABLET, FILM COATED ORAL at 08:51

## 2020-09-29 RX ADMIN — TRAMADOL HYDROCHLORIDE 50 MG: 50 TABLET, FILM COATED ORAL at 17:25

## 2020-09-29 RX ADMIN — METOPROLOL SUCCINATE 50 MG: 50 TABLET, EXTENDED RELEASE ORAL at 21:00

## 2020-09-29 RX ADMIN — OXYBUTYNIN CHLORIDE 5 MG: 5 TABLET, EXTENDED RELEASE ORAL at 08:52

## 2020-09-29 RX ADMIN — ALPRAZOLAM 0.25 MG: 0.25 TABLET ORAL at 20:59

## 2020-09-29 RX ADMIN — ATORVASTATIN CALCIUM 40 MG: 20 TABLET, FILM COATED ORAL at 20:59

## 2020-09-29 RX ADMIN — PRAMIPEXOLE DIHYDROCHLORIDE 0.12 MG: 0.25 TABLET ORAL at 20:59

## 2020-09-29 RX ADMIN — FLUTICASONE FUROATE, UMECLIDINIUM BROMIDE AND VILANTEROL TRIFENATATE 1 PUFF: 100; 62.5; 25 POWDER RESPIRATORY (INHALATION) at 08:52

## 2020-09-29 RX ADMIN — CILOSTAZOL 100 MG: 100 TABLET ORAL at 08:51

## 2020-09-29 RX ADMIN — PANTOPRAZOLE SODIUM 40 MG: 40 TABLET, DELAYED RELEASE ORAL at 05:29

## 2020-09-29 RX ADMIN — CILOSTAZOL 100 MG: 100 TABLET ORAL at 20:59

## 2020-09-29 RX ADMIN — ASPIRIN 81 MG: 81 TABLET, CHEWABLE ORAL at 08:51

## 2020-09-29 RX ADMIN — TRAMADOL HYDROCHLORIDE 50 MG: 50 TABLET, FILM COATED ORAL at 23:31

## 2020-09-29 RX ADMIN — METOPROLOL SUCCINATE 50 MG: 50 TABLET, EXTENDED RELEASE ORAL at 08:52

## 2020-09-30 PROCEDURE — 97535 SELF CARE MNGMENT TRAINING: CPT

## 2020-09-30 PROCEDURE — 63710000001 ONDANSETRON PER 8 MG: Performed by: STUDENT IN AN ORGANIZED HEALTH CARE EDUCATION/TRAINING PROGRAM

## 2020-09-30 PROCEDURE — 94799 UNLISTED PULMONARY SVC/PX: CPT

## 2020-09-30 PROCEDURE — 97530 THERAPEUTIC ACTIVITIES: CPT

## 2020-09-30 PROCEDURE — 97110 THERAPEUTIC EXERCISES: CPT

## 2020-09-30 PROCEDURE — 97116 GAIT TRAINING THERAPY: CPT

## 2020-09-30 PROCEDURE — 92526 ORAL FUNCTION THERAPY: CPT

## 2020-09-30 RX ORDER — ONDANSETRON 4 MG/1
4 TABLET, FILM COATED ORAL EVERY 6 HOURS PRN
Status: DISCONTINUED | OUTPATIENT
Start: 2020-09-30 | End: 2020-10-09

## 2020-09-30 RX ADMIN — CILOSTAZOL 100 MG: 100 TABLET ORAL at 21:43

## 2020-09-30 RX ADMIN — METOPROLOL SUCCINATE 50 MG: 50 TABLET, EXTENDED RELEASE ORAL at 21:43

## 2020-09-30 RX ADMIN — POTASSIUM CHLORIDE 10 MEQ: 10 CAPSULE, COATED, EXTENDED RELEASE ORAL at 09:19

## 2020-09-30 RX ADMIN — ONDANSETRON HYDROCHLORIDE 4 MG: 4 TABLET, FILM COATED ORAL at 09:19

## 2020-09-30 RX ADMIN — ASPIRIN 81 MG: 81 TABLET, CHEWABLE ORAL at 08:15

## 2020-09-30 RX ADMIN — CLOPIDOGREL 75 MG: 75 TABLET, FILM COATED ORAL at 08:15

## 2020-09-30 RX ADMIN — TRAMADOL HYDROCHLORIDE 50 MG: 50 TABLET, FILM COATED ORAL at 21:43

## 2020-09-30 RX ADMIN — TRAMADOL HYDROCHLORIDE 50 MG: 50 TABLET, FILM COATED ORAL at 09:19

## 2020-09-30 RX ADMIN — CILOSTAZOL 100 MG: 100 TABLET ORAL at 09:19

## 2020-09-30 RX ADMIN — PANTOPRAZOLE SODIUM 40 MG: 40 TABLET, DELAYED RELEASE ORAL at 05:10

## 2020-09-30 RX ADMIN — PRAMIPEXOLE DIHYDROCHLORIDE 0.12 MG: 0.25 TABLET ORAL at 21:44

## 2020-09-30 RX ADMIN — ATORVASTATIN CALCIUM 40 MG: 20 TABLET, FILM COATED ORAL at 21:43

## 2020-09-30 RX ADMIN — OXYBUTYNIN CHLORIDE 5 MG: 5 TABLET, EXTENDED RELEASE ORAL at 09:19

## 2020-09-30 RX ADMIN — LOSARTAN POTASSIUM 50 MG: 50 TABLET, FILM COATED ORAL at 08:15

## 2020-09-30 RX ADMIN — FUROSEMIDE 20 MG: 20 TABLET ORAL at 09:19

## 2020-09-30 RX ADMIN — FLUTICASONE FUROATE, UMECLIDINIUM BROMIDE AND VILANTEROL TRIFENATATE 1 PUFF: 100; 62.5; 25 POWDER RESPIRATORY (INHALATION) at 10:08

## 2020-09-30 RX ADMIN — METOPROLOL SUCCINATE 50 MG: 50 TABLET, EXTENDED RELEASE ORAL at 08:15

## 2020-10-01 LAB
ANION GAP SERPL CALCULATED.3IONS-SCNC: 7.7 MMOL/L (ref 5–15)
BASOPHILS # BLD AUTO: 0.03 10*3/MM3 (ref 0–0.2)
BASOPHILS NFR BLD AUTO: 0.6 % (ref 0–1.5)
BUN SERPL-MCNC: 18 MG/DL (ref 8–23)
BUN/CREAT SERPL: 23.4 (ref 7–25)
CALCIUM SPEC-SCNC: 8.6 MG/DL (ref 8.6–10.5)
CHLORIDE SERPL-SCNC: 105 MMOL/L (ref 98–107)
CO2 SERPL-SCNC: 28.3 MMOL/L (ref 22–29)
CREAT SERPL-MCNC: 0.77 MG/DL (ref 0.57–1)
DEPRECATED RDW RBC AUTO: 53.3 FL (ref 37–54)
EOSINOPHIL # BLD AUTO: 0.14 10*3/MM3 (ref 0–0.4)
EOSINOPHIL NFR BLD AUTO: 3 % (ref 0.3–6.2)
ERYTHROCYTE [DISTWIDTH] IN BLOOD BY AUTOMATED COUNT: 15.7 % (ref 12.3–15.4)
GFR SERPL CREATININE-BSD FRML MDRD: 72 ML/MIN/1.73
GLUCOSE SERPL-MCNC: 89 MG/DL (ref 65–99)
HCT VFR BLD AUTO: 27.9 % (ref 34–46.6)
HGB BLD-MCNC: 8.8 G/DL (ref 12–15.9)
IMM GRANULOCYTES # BLD AUTO: 0.02 10*3/MM3 (ref 0–0.05)
IMM GRANULOCYTES NFR BLD AUTO: 0.4 % (ref 0–0.5)
LYMPHOCYTES # BLD AUTO: 0.9 10*3/MM3 (ref 0.7–3.1)
LYMPHOCYTES NFR BLD AUTO: 19 % (ref 19.6–45.3)
MCH RBC QN AUTO: 29.4 PG (ref 26.6–33)
MCHC RBC AUTO-ENTMCNC: 31.5 G/DL (ref 31.5–35.7)
MCV RBC AUTO: 93.3 FL (ref 79–97)
MONOCYTES # BLD AUTO: 0.32 10*3/MM3 (ref 0.1–0.9)
MONOCYTES NFR BLD AUTO: 6.8 % (ref 5–12)
NEUTROPHILS NFR BLD AUTO: 3.33 10*3/MM3 (ref 1.7–7)
NEUTROPHILS NFR BLD AUTO: 70.2 % (ref 42.7–76)
NRBC BLD AUTO-RTO: 0 /100 WBC (ref 0–0.2)
PLATELET # BLD AUTO: 470 10*3/MM3 (ref 140–450)
PMV BLD AUTO: 8.9 FL (ref 6–12)
POTASSIUM SERPL-SCNC: 3.9 MMOL/L (ref 3.5–5.2)
RBC # BLD AUTO: 2.99 10*6/MM3 (ref 3.77–5.28)
SODIUM SERPL-SCNC: 141 MMOL/L (ref 136–145)
WBC # BLD AUTO: 4.74 10*3/MM3 (ref 3.4–10.8)

## 2020-10-01 PROCEDURE — 97530 THERAPEUTIC ACTIVITIES: CPT

## 2020-10-01 PROCEDURE — 80048 BASIC METABOLIC PNL TOTAL CA: CPT | Performed by: PHYSICAL MEDICINE & REHABILITATION

## 2020-10-01 PROCEDURE — 94799 UNLISTED PULMONARY SVC/PX: CPT

## 2020-10-01 PROCEDURE — 85025 COMPLETE CBC W/AUTO DIFF WBC: CPT | Performed by: PHYSICAL MEDICINE & REHABILITATION

## 2020-10-01 PROCEDURE — 97116 GAIT TRAINING THERAPY: CPT

## 2020-10-01 PROCEDURE — 97535 SELF CARE MNGMENT TRAINING: CPT

## 2020-10-01 PROCEDURE — 97110 THERAPEUTIC EXERCISES: CPT

## 2020-10-01 PROCEDURE — 99232 SBSQ HOSP IP/OBS MODERATE 35: CPT | Performed by: NURSE PRACTITIONER

## 2020-10-01 PROCEDURE — 92526 ORAL FUNCTION THERAPY: CPT

## 2020-10-01 RX ORDER — FERROUS SULFATE 325(65) MG
325 TABLET ORAL
Status: DISCONTINUED | OUTPATIENT
Start: 2020-10-02 | End: 2020-10-09 | Stop reason: HOSPADM

## 2020-10-01 RX ORDER — DIPHENOXYLATE HYDROCHLORIDE AND ATROPINE SULFATE 2.5; .025 MG/1; MG/1
1 TABLET ORAL DAILY
Status: DISCONTINUED | OUTPATIENT
Start: 2020-10-02 | End: 2020-10-09 | Stop reason: HOSPADM

## 2020-10-01 RX ADMIN — PRAMIPEXOLE DIHYDROCHLORIDE 0.12 MG: 0.25 TABLET ORAL at 20:39

## 2020-10-01 RX ADMIN — TRAMADOL HYDROCHLORIDE 50 MG: 50 TABLET, FILM COATED ORAL at 18:17

## 2020-10-01 RX ADMIN — ATORVASTATIN CALCIUM 40 MG: 20 TABLET, FILM COATED ORAL at 20:38

## 2020-10-01 RX ADMIN — CLOPIDOGREL 75 MG: 75 TABLET, FILM COATED ORAL at 08:44

## 2020-10-01 RX ADMIN — METOPROLOL SUCCINATE 50 MG: 50 TABLET, EXTENDED RELEASE ORAL at 08:44

## 2020-10-01 RX ADMIN — ALPRAZOLAM 0.25 MG: 0.25 TABLET ORAL at 20:38

## 2020-10-01 RX ADMIN — PANTOPRAZOLE SODIUM 40 MG: 40 TABLET, DELAYED RELEASE ORAL at 05:45

## 2020-10-01 RX ADMIN — OXYBUTYNIN CHLORIDE 5 MG: 5 TABLET, EXTENDED RELEASE ORAL at 08:44

## 2020-10-01 RX ADMIN — LOSARTAN POTASSIUM 50 MG: 50 TABLET, FILM COATED ORAL at 08:44

## 2020-10-01 RX ADMIN — METOPROLOL SUCCINATE 50 MG: 50 TABLET, EXTENDED RELEASE ORAL at 20:39

## 2020-10-01 RX ADMIN — CILOSTAZOL 100 MG: 100 TABLET ORAL at 08:44

## 2020-10-01 RX ADMIN — FLUTICASONE FUROATE, UMECLIDINIUM BROMIDE AND VILANTEROL TRIFENATATE 1 PUFF: 100; 62.5; 25 POWDER RESPIRATORY (INHALATION) at 07:36

## 2020-10-01 RX ADMIN — ASPIRIN 81 MG: 81 TABLET, CHEWABLE ORAL at 08:44

## 2020-10-01 RX ADMIN — CILOSTAZOL 100 MG: 100 TABLET ORAL at 20:39

## 2020-10-01 NOTE — PROGRESS NOTES
"   LOS: 6 days   Patient Care Team:  Miller Castañeda MD as PCP - General (Internal Medicine)  Miller Castañeda MD as PCP - Claims Attributed  Mart Ontiveros MD as Surgeon (Cardiothoracic Surgery)  Tres De Los Santos MD as Consulting Physician (Cardiology)  Graham Mcconnell MD as Consulting Physician (Hematology and Oncology)  Payam Byrnes MD as Consulting Physician (Otolaryngology)  Jonathan Smith MD as Consulting Physician (Vascular Surgery)  Victor M Cabral MD as Surgeon (Orthopedic Surgery)  Yaya Burks MD as Consulting Physician (Pulmonary Disease)    Chief Complaint:   immobility syndrome    Subjective     History of Present Illness    Subjective     Patient complains of increased shortness of air with staff today.  Sats have continued to be 96-97.  She was more short of air today with ambulating to the bathroom with PT. Difficulty doing her swallowing exercises due to shortness of air.  She was assessed by pulmonology on September 27 for shortness of air.  Chest x-ray without any new concerns then and continued on Trelegy inhaler as well as PRN nebulizers/PRN rescue inhaler.  She does not describe any productive sputum.  No new chest pain.  She has had some sternotomy discomfort.  No edema on her legs.  Discussed will ask cardiology to see for follow-up assessment    History taken from: patient    Objective     Vital Signs  Temp:  [97 °F (36.1 °C)-98.7 °F (37.1 °C)] 97 °F (36.1 °C)  Heart Rate:  [75-82] 77  Resp:  [18-20] 18  BP: ()/(55-60) 117/55    Objective:  Vital signs: (most recent): Blood pressure 117/55, pulse 77, temperature 97 °F (36.1 °C), temperature source Oral, resp. rate 18, height 142.2 cm (56\"), weight 39.9 kg (88 lb), SpO2 100 %.            Physical Exam     Gen:    calm; pleasant  HEENT: NCAT, EOMI; MMM,  Neck:   Supple, gauze on R neck  CV:      RRR, no m/r/g appreciated  Lungs: diminished breath sounds in the bases;    Mild crackles at the right base  Abd:     (+) BS, " soft, non-tender, non-distended  Ext:      no new edema  Skin:   cardiac incision healing well  Psych: appropriate mood and affect  Neuro:  Mental Status: AOx4, Speech: fluent without dysarthria or scanning, No gross cognitive deficits  Strength: R > L foot drop         Results Review:     I reviewed the patient's new clinical results.  Results from last 7 days   Lab Units 10/01/20  0559 09/28/20  0738 09/25/20  0414   WBC 10*3/mm3 4.74 5.89 5.56   HEMOGLOBIN g/dL 8.8* 9.6* 9.4*   HEMATOCRIT % 27.9* 30.4* 28.8*   PLATELETS 10*3/mm3 470* 432 282     Results from last 7 days   Lab Units 10/01/20  0559 09/28/20  0738 09/25/20  0414   SODIUM mmol/L 141 143 144   POTASSIUM mmol/L 3.9 3.6 3.5   CHLORIDE mmol/L 105 106 104   CO2 mmol/L 28.3 26.9 32.3*   BUN mg/dL 18 21 29*   CREATININE mg/dL 0.77 0.67 0.74   CALCIUM mg/dL 8.6 8.7 8.8   GLUCOSE mg/dL 89 102* 108*     TWO-VIEW CHEST- Sept 28, 2020     HISTORY: Shortness of breath.     FINDINGS: There is prominent cardiomegaly with sternal wires from  previous cardiac surgery and this shows no change from 3 days ago. The  lungs remain well-expanded and clear and there is no evidence of overt  CHF or focal infiltrate.     This report was finalized on 9/27/2020     Medication Review: done  Scheduled Meds:aspirin, 81 mg, Oral, Daily  atorvastatin, 40 mg, Oral, Nightly  cilostazol, 100 mg, Oral, BID  clopidogrel, 75 mg, Oral, Daily  furosemide, 20 mg, Oral, Every Other Day  losartan, 50 mg, Oral, Daily  metoprolol succinate XL, 50 mg, Oral, Q12H  oxybutynin XL, 5 mg, Oral, Daily  pantoprazole, 40 mg, Oral, QAM  potassium chloride, 10 mEq, Oral, Every Other Day  pramipexole, 0.125 mg, Oral, Nightly  Fluticasone-Umeclidin-Vilant, 1 puff, Oral, Daily      Continuous Infusions:   PRN Meds:.•  acetaminophen **OR** [DISCONTINUED] acetaminophen **OR** [DISCONTINUED] acetaminophen  •  albuterol sulfate HFA  •  ALPRAZolam  •  cetirizine  •  dextrose  •  dextrose  •  glucagon (human  recombinant)  •  guaiFENesin  •  influenza vaccine  •  ipratropium-albuterol  •  magnesium hydroxide  •  meclizine  •  melatonin  •  naloxone  •  ondansetron  •  sennosides-docusate  •  traMADol      Assessment/Plan       Immobility syndrome      Assessment & Plan  Etiologic Diagnosis and Comorbidities include:    Physical Debility/Immobility Syndrome    Status post repair of lateral LV aneurysm with pericardial patch and right percutaneous common femoral artery intra-aortic balloon pump placement on 9/17/2020, return to the operating room for wound exploration, washout, and rewiring on 9/18/2020    History of CAD s/p CABG  History STEMI w/ LV wall aneurysm s/p repair  Sternal/cardiac precautions    October 1-Patient complains of increased shortness of air with staff today.  Sats have continued to be 96-97.  She was more short of air today with ambulating to the bathroom with PT. Difficulty doing her swallowing exercises due to shortness of air.  She was assessed by pulmonology on September 27 for shortness of air.  Chest x-ray without any new concerns then and continued on Trelegy inhaler as well as PRN nebulizers/PRN rescue inhaler.  She does not describe any productive sputum.  No new chest pain.  She has had some sternotomy discomfort.  No edema on her legs.  Discussed will ask cardiology to see for follow-up assessment.  Will recheck weight.  Will defer studies to cardiology.      pmh of Breast Ca    COPD  On Trelegy inhaler as well as as needed nebulizer/rescue inhaler.  Continues on room air    HTN    HLD    Peripheral Vascular Disease    Right Foot Drop- chronic - AFO    DVT prophylaxis - SCDs    Anemia    Pain management - tramadol - home med. GIULIA reviewed.      Impairments Include:   Dysphagia, mobility, ambulation, adl’s, strength, endurance, respiratory status, swallowing    TEAM CONF - SEPT 29 - BED MIN. TRANSFERS MIN. GAIT 80 FEET CTG RW. TOILET TRANSFERS MOD ASSIST. SHOWER TRANSFERS MOD ASSIST. UBD  MIN. LBD MOD. BATH MOD ASSIST. TOILETING MOD ASSIST. SWALLOW - MECH SOFT, NO MIXED, NECTAR THICK LIQUIDS. LAST VFSS SILENT ASPIRATION ON THINS. REPEAT VFSS NEXT WEEK. CONTINENT BOWEL AND BLADDER. STERNAL INCISION HEALING. PRESSURE SORE TO BUTTOCKS WITH CREAM TO THAT. ALPRAZOLAM PRN AT NIGHT. REC THERAPY INVOLVED.  ELOS - 2 WEEKS     Medical necessity  This patient is a good candidate for acute inpatient rehabilitation for a stay of approximately 2 weeks.  The etiologic diagnosis and comorbidities as listed above will require 24hr availability and frequent interventions of a physician with training in rehabilitation medicine.  The patient’s care cannot be provided on a lower level secondary to need for rigorous acute rehab in order to have maximal improvement of function.  Once admitted the patient will undergo 3 hour of PT/OT/SLP a day for at least 5 days per week. A rehab program will be initiated working on  Dysphagia, strengthening, endurance, safety, dressing, transfers, ambulation, bathing, grooming, swallowing skills. Rehab nursing will be involved to monitor bowel and bladder function, skin integrity, diabetes monitoring/education, patient and family education, and therapy carryover.  A weekly team meeting will be coordinated to maximize the patient’s plan of care during hospitalization and at discharge.       The patient's functional status and clinical status is unchanged from preadmission assessment and the patient continues appropriate for acute inpatient rehabilitation.  Goal is for home with  Home health   therapies.  Barrier to discharge:  Impaired mobility  - work on  Strength, balance, ADLS, swallow  to overcome.      Winston Jones MD  10/01/20  13:18 EDT    Time:   During rounds, used appropriate personal protective equipment including mask and gloves.  Additional gown if indicated.  Mask used was standard procedure mask. Appropriate PPE was worn during the entire visit.  Hand hygiene was  completed before and after.

## 2020-10-01 NOTE — THERAPY TREATMENT NOTE
Inpatient Rehabilitation - Occupational Therapy Treatment Note    [facility]     Patient Name: Grace Beauchamp  : 1940  MRN: [id number]    Today's Date: 10/1/2020                 Admit Date: 2020         ICD-10-CM ICD-9-CM   1. Pericardial hematoma  I31.2 423.0   2. Ventricular aneurysm  I25.3 414.10   3. Gait abnormality  R26.9 781.2       Patient Active Problem List   Diagnosis   • History of breast cancer   • Stenosis of carotid artery   • Chronic obstructive pulmonary disease (CMS/HCC)   • Closed fracture of multiple ribs   • Cognitive disorder   • Erythromelalgia (CMS/[facility])   • Hyperlipidemia   • Hypertension   • Primary osteoarthritis involving multiple joints   • Osteoporosis   • Restless legs syndrome   • Cerebral aneurysm   • Temporary cerebral vascular dysfunction   • S/P CABG x 1   • Type 2 diabetes mellitus without complication, without long-term current use of insulin (CMS/[facility])   • S/P ascending aortic aneurysm repair   • 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[facility])   • Descending thoracic aortic aneurysm (CMS/[facility])   • Claudication of both lower extremities (CMS/[facility])   • 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[facility])   • Ventral hernia without obstruction or gangrene   • Breast cancer, stage 1, estrogen receptor positive (CMS/[facility])   • Arthritis of right hip   • Arthritis of right knee   • Chondrocalcinosis   • Chronic pain of right knee   • Degenerative disc disease, lumbar   • Gastroesophageal reflux disease   • Bilateral hearing loss   • Thoracic aortic aneurysm without rupture (CMS/[facility])   • Erythromelalgia (CMS/[facility])   • 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CMS/[facility])   • Thoracoabdominal aortic aneurysm, without rupture (CMS/[facility])   • 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/[facility])   • Aortic aneurysm, descending (CMS/[facility])   • Aortic aneurysm without rupture (CMS/[facility])   • CAD (coronary artery disease)   • S/P left heart catheterization by ventricular puncture   • NSTEMI (non-ST elevated myocardial infarction)  "(CMS/[facility])   • Pericardial hematoma   • History of ST elevation myocardial infarction (STEMI)   • Acute on chronic diastolic CHF (congestive heart failure) (CMS/[facility])   • Ventricular aneurysm   • Immobility syndrome       Past Medical History:   Diagnosis Date   • AAA (abdominal aortic aneurysm) (CMS/[facility])    • Acute bronchitis 12/2018   • 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[facility])    • Arthritis    • Bilateral carotid artery disease (CMS/[facility])    • Bilateral cataracts     S/p Extractions   • Breast cancer (CMS/[facility])     right breast [id number] (snm) pMX ER/NV pos, Her-2/neg, Ki-67, 31%, oncotype recurrence score 19, invasive lobular carcinoma   • CAD (coronary artery disease)     S/p CABG on 2/23/16 by Dr. 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[facility])    • Cataracts, bilateral    • Closed fracture of three ribs of right side    • Cognitive disorder    • COPD (chronic obstructive pulmonary disease) (CMS/[facility])    • Depression    • Descending aortic arch aneurysm (CMS/[facility])    • Diabetes mellitus (CMS/[facility])     \"BORDERLINE\"   • Erythermalgia (CMS/[facility])    • GERD (gastroesophageal reflux disease)    • Hyperlipidemia     Controlled w/Meds   • Hypertension     Controlled w/Meds   • Low back pain    • Moderate aortic valve insufficiency     S/p AVR on 02/23/16 by Dr. Ontiveros   • Nocturia    • Osteoarthritis    • Osteoporosis    • Otitis media     R Ear   • Prediabetes    • PVD (peripheral vascular disease) (CMS/[facility])    • RLS (restless legs syndrome)    • Saccular aneurysm    • Stroke (CMS/[facility])     Perioperatively w/L Hip Repl   • Thoracic ascending aortic aneurysm (CMS/[facility])     S/p Repair on 02/23/16 by Dr. Ontiveros   • TIA (transient ischemic attack)    • Urgency incontinence    • Venous insufficiency    • Ventral hernia        Past Surgical History:   Procedure Laterality Date   • AORTIC VALVE REPAIR/REPLACEMENT N/A 02/23/2016-[facility]    Ascending Replacement Using a 24MM Graft--Dr. Ontiveros   • APPENDECTOMY  1977   • BREAST BIOPSY Right 09/16/2012    Vacuum " Assisted Core Bx of R Breast   • CARDIAC CATHETERIZATION N/A 01/06/2016    Dr. Tres De Los Santos   • CARDIAC CATHETERIZATION N/A 4/22/2019    Procedure: Left Heart Cath;  Surgeon: Tres De Los Santos MD;  Location:  [facility];  Service: Cardiology   • CARDIAC CATHETERIZATION N/A 4/22/2019    Procedure: Coronary angiography;  Surgeon: Tres De Los Santos MD;  Location:  [facility];  Service: Cardiology   • CARDIAC CATHETERIZATION N/A 7/22/2020    Procedure: LEFT HEART CATH;  Surgeon: Antonia Scott MD;  Location:  [facility];  Service: Cardiovascular;  Laterality: N/A;   • CARDIAC CATHETERIZATION N/A 7/22/2020    Procedure: CORONARY ANGIOGRAPHY;  Surgeon: Antonia Scott MD;  Location:  [facility];  Service: Cardiovascular;  Laterality: N/A;   • CARDIAC CATHETERIZATION N/A 7/22/2020    Procedure: Left ventriculography;  Surgeon: Antonia Scott MD;  Location:  [facility];  Service: Cardiovascular;  Laterality: N/A;   • CARDIAC CATHETERIZATION N/A 7/22/2020    Procedure: Saphenous Vein Graft;  Surgeon: Antonia Scott MD;  Location:  [facility];  Service: Cardiovascular;  Laterality: N/A;   • CARDIAC SURGERY  02/2016    Dr. Ontiveros/Dr. De Los Santos   • CATARACT EXTRACTION Bilateral     [facility] [place] [facility]   • COLONOSCOPY N/A 10/2002    Dr. Negro   • CORONARY ARTERY BYPASS GRAFT  02/23/2016-[facility]    x1 w/L Vein Grafting--Dr. Ontiveros   • CORONARY ARTERY BYPASS GRAFT N/A 9/18/2020    Procedure: STERNAL EXPLORATION WITH CLOSURE AND WASHOUT, REMOVAL OF IABP, PRP;  Surgeon: Mart Ontiveros MD;  Location: [facility] MAIN OR;  Service: Cardiothoracic;  Laterality: N/A;   • CYST REMOVAL Right 01/25/2013    R Palm Excision of Retinacular Cyst--Dr. Karla Fish   • EPIDURAL BLOCK     • LAPAROSCOPIC CHOLECYSTECTOMY N/A 08/04/2004    Dr. JOEY Sheth   • MASTECTOMY Right 09/28/2012   • ORIF HIP FRACTURE Left 03/27/2005    w/ AMBI  compression Dr. Victor M noriega   • RECTOVAGINAL FISTULA REPAIR  1965   • THORACIC AORTIC ANEURYSM REPAIR N/A 7/23/2019    Procedure: ROSE, THORACOABDOMINAL AORTIC ANEURYSM REPAIR, VISCERAL VESSEL REPAIR, LARGE VENTRAL HERNIA REPAIR WITH MESH, CIRCULATORY ARREST, PRP;  Surgeon: Mart Ontiveros MD;  Location: [facility];  Service: Cardiothoracic   • TRANSESOPHAGEAL ECHOCARDIOGRAM (ROSE) N/A 9/18/2020    Procedure: TRANSESOPHAGEAL ECHOCARDIOGRAM WITH ANESTHESIA;  Surgeon: Mart Ontiveros MD;  Location: [facility] OR;  Service: Cardiothoracic;  Laterality: N/A;   • TUBAL ABDOMINAL LIGATION     • VENTRICULAR ANEURYSM REPAIR N/A 7/22/2020    Procedure: EMERGENT REOP STERNOTOMY, REPAIR OF LV RUPTURE, PRP, INTRAOP ROSE;  Surgeon: Mart Ontiveros MD;  Location: [facility];  Service: Cardiothoracic;  Laterality: N/A;   • VENTRICULAR ANEURYSM REPAIR N/A 9/17/2020    Procedure: ROSE, MIDLINE STERNOTOMY REOP  LEFT VENTRICULAR ANEURYSM REPAIR, IABP INSERTION, PRP;  Surgeon: Mart Ontiveros MD;  Location: [facility];  Service: Cardiothoracic;  Laterality: N/A;            IRF OT ASSESSMENT FLOWSHEET (last 12 hours)      IRF OT Evaluation and Treatment     Row Name 10/01/20 0930          OT Time and Intention    Document Type  daily treatment  -SM     Mode of Treatment  occupational therapy  -SM     Patient Effort  good  -SM     Row Name 10/01/20 0930          General Information    Patient/Family/Caregiver Comments/Observations  Pt resting in bed prior to morning treatment, no signs of distress but does c/o some soreness in chest.   -SM     Existing Precautions/Restrictions  fall;sternal;cardiac  -SM     Row Name 10/01/20 0930          Cognition/Psychosocial    Affect/Mental Status (Cognitive)  WNL  -SM     Orientation Status (Cognition)  oriented x 4  -SM     Follows Commands (Cognition)  follows one-step commands  -SM     Personal Safety Interventions  fall prevention program maintained;gait belt;nonskid  shoes/slippers when out of bed  -     Row Name 10/01/20 0930          Pain Scale: FACES Pre/Post-Treatment    Pain: FACES Scale, Pretreatment  2-->hurts little bit  -     Posttreatment Pain Rating  2-->hurts little bit  -     Pain Location - Orientation  incisional  -     Row Name 10/01/20 0930          Functional Mobility    Functional Mobility- Ind. Level  contact guard assist  -     Functional Mobility- Device  rolling walker  -     Functional Mobility-Distance (Feet)  20  -     Functional Mobility- Comment  from bed to bathroom with rwx.   -     Row Name 10/01/20 0930          Transfers    Bed-Chair [place] (Transfers)  minimum assist (75% patient effort)  -     Assistive Device (Bed-Chair Transfers)  wheelchair  -     Sit-Stand [place] (Transfers)  minimum assist (75% patient effort);verbal cues  -     Stand-Sit [place] (Transfers)  minimum assist (75% patient effort);verbal cues  -     Row Name 10/01/20 0930          Sit-Stand Transfer    Assistive Device (Sit-Stand Transfers)  walker, front-wheeled  -     Row Name 10/01/20 0930          Stand-Sit Transfer    Assistive Device (Stand-Sit Transfers)  walker, front-wheeled  -     Row Name 10/01/20 0930          Balance    Dynamic Sitting Balance  mild impairment;supported  -     Static Standing Balance  mild impairment;supported  -     Balance Interventions  standing;minimal challenge;dynamic reaching;occupation based/functional task  -     Comment, Balance  Pt engages in standing balance activity standing at counter placing clothes pins vertical on dowel, vc's for posture, no gross LOB noted. CGA-SBA for safety.   -     Row Name 10/01/20 0930          Motor Skills    Motor Skills  therapeutic exercise  -     Therapeutic Exercise  shoulder  -     Row Name 10/01/20 0930          Shoulder (Therapeutic Exercise)    Shoulder (Therapeutic Exercise)  strengthening exercise  -     Shoulder Strengthening  (Therapeutic Exercise)  bilateral;flexion;extension;sitting;1 lb free weight  -     Row Name 10/01/20 0930          Elbow/Forearm (Therapeutic Exercise)    Elbow/Forearm (Therapeutic Exercise)  strengthening exercise  -     Elbow/Forearm AROM (Therapeutic Exercise)  10 repetitions;3 sets;bilateral;flexion;extension;supination;pronation  -     Elbow/Forearm Strengthening (Therapeutic Exercise)  1 lb free weight;sitting  -     Row Name 10/01/20 0930          Wrist (Therapeutic Exercise)    Wrist (Therapeutic Exercise)  strengthening exercise  -     Wrist AROM (Therapeutic Exercise)  10 repetitions;3 sets  -     Wrist Strengthening (Therapeutic Exercise)  1 lb free weight;bilateral;flexion;extension  -     Row Name 10/01/20 0930          Bathing    [place] Level (Bathing)  bathing skills;minimum assist (75% patient effort)  -     Position (Bathing)  supported sitting  -     Set-up Assistance (Bathing)  adjust water temperature;obtain supplies  -     Comment (Bathing)  Sponge bath at sink. Assist to wash R foot and CGA for standing balance for washing razia area standing.   -     Row Name 10/01/20 0930          Upper Body Dressing    [place] (Upper Body Dressing)  upper body dressing skills;doff;don;pull over garment;set up assistance;supervision  -     Position (Upper Body Dressing)  supported sitting  -     Set-up Assistance (Upper Body Dressing)  obtain clothing  -     Row Name 10/01/20 0930          Lower Body Dressing    [place] Level (Lower Body Dressing)  doff;don;pants/bottoms;shoes/slippers;socks;moderate assist (50% patient effort)  -     Assistive Device Use (Lower Body Dressing)  orthosis  -     Position (Lower Body Dressing)  supported sitting  -     Set-up Assistance (Lower Body Dressing)  obtain clothing;orthosis application  -     Comment (Lower Body Dressing)  Pt does better with socks today, still requires assist to don R AFO and R shoe.   -      Row Name 10/01/20 0930          Grooming    [place] Level (Grooming)  grooming skills;hair care, combing/brushing;deodorant application;set up  -     Position (Grooming)  supported sitting  -     Row Name 10/01/20 0930          Positioning and Restraints    Pre-Treatment Position  in bed  -     Post Treatment Position  chair  -     In Wheelchair  with PT  -     Row Name 10/01/20 0930          Daily Progress Summary (OT)    Overall Progress Toward Functional Goals (OT)  progressing toward functional goals as expected  -       User Key  (r) = Recorded By, (t) = Taken By, (c) = Cosigned By    Initials Name Effective Dates     Sunni Matt, OT 04/02/20 -            Occupational Therapy Education                 Title: PT OT SLP Therapies (In Progress)     Topic: Occupational Therapy (In Progress)     Point: ADL training (Done)     Description:   Instruct learner(s) on proper safety adaptation and remediation techniques during self care or transfers.   Instruct in proper use of assistive devices.              Learning Progress Summary           Patient Acceptance, E, VU by  at 9/26/2020 1218    Comment: Instructed pt on improved techniques for safety with ADLs and transfers with sternal precautions.                   Point: Home exercise program (Not Started)     Description:   Instruct learner(s) on appropriate technique for monitoring, assisting and/or progressing therapeutic exercises/activities.              Learner Progress:  Not documented in this visit.          Point: Precautions (Done)     Description:   Instruct learner(s) on prescribed precautions during self-care and functional transfers.              Learning Progress Summary           Patient Acceptance, E, VU by  at 9/26/2020 1218    Comment: Instructed pt on improved techniques for safety with ADLs and transfers with sternal precautions.                   Point: Body mechanics (Not Started)     Description:   Instruct  learner(s) on proper positioning and spine alignment during self-care, functional mobility activities and/or exercises.              Learner Progress:  Not documented in this visit.                      User Key     Initials Effective Dates Name Provider Type Discipline     04/02/20 -  Sunni Matt OT Occupational Therapist OT                    OT Recommendation and Plan    Anticipated Discharge Disposition (OT): home with assist  Planned Therapy Interventions (OT): activity tolerance training, adaptive equipment training, BADL retraining, functional balance retraining, IADL retraining, neuromuscular control/coordination retraining, occupation/activity based interventions, patient/caregiver education/training, ROM/therapeutic exercise, strengthening exercise, transfer/mobility retraining       Daily Progress Summary (OT)  Overall Progress Toward Functional Goals (OT): progressing toward functional goals as expected            Time Calculation:     Time Calculation- OT     Row Name 10/01/20 1159             Time Calculation- OT    OT Start Time  0930  -      OT Stop Time  1030  -      OT Time Calculation (min)  60 min  -      Total Timed Code Minutes- OT  60 minute(s)  -      OT Received On  10/01/20  -        User Key  (r) = Recorded By, (t) = Taken By, (c) = Cosigned By    Initials Name Provider Type     Sunni Matt OT Occupational Therapist        Therapy Charges for Today     Code Description Service Date Service Provider Modifiers Qty    52209836314 HC OT SELF CARE/MGMT/TRAIN EA 15 MIN 10/1/2020 Sunni Matt OT GO 2    [id number]  OT THERAPEUTIC ACT EA 15 MIN 10/1/2020 Sunni Matt OT GO 1    [id number]  OT THER PROC EA 15 MIN 10/1/2020 Sunni Matt OT GO 1                   Sunni Matt OT  10/1/2020

## 2020-10-01 NOTE — THERAPY PROGRESS REPORT/RE-CERT
Inpatient Rehabilitation - Physical Therapy Progress Note       [facility]     Patient Name: Grace Beauchamp  : 1940  MRN: [id number]    Today's Date: 10/1/2020                    Admit Date: 2020      Visit Dx:     ICD-10-CM ICD-9-CM   1. Pericardial hematoma  I31.2 423.0   2. Ventricular aneurysm  I25.3 414.10   3. Gait abnormality  R26.9 781.2       Patient Active Problem List   Diagnosis   • History of breast cancer   • Stenosis of carotid artery   • Chronic obstructive pulmonary disease (CMS/[facility])   • Closed fracture of multiple ribs   • Cognitive disorder   • Erythromelalgia (CMS/[facility])   • Hyperlipidemia   • Hypertension   • Primary osteoarthritis involving multiple joints   • Osteoporosis   • Restless legs syndrome   • Cerebral aneurysm   • Temporary cerebral vascular dysfunction   • S/P CABG x 1   • Type 2 diabetes mellitus without complication, without long-term current use of insulin (CMS/[facility])   • S/P ascending aortic aneurysm repair   • 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[facility])   • Descending thoracic aortic aneurysm (CMS/[facility])   • Claudication of both lower extremities (CMS/[facility])   • 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[facility])   • Ventral hernia without obstruction or gangrene   • Breast cancer, stage 1, estrogen receptor positive (CMS/[facility])   • Arthritis of right hip   • Arthritis of right knee   • Chondrocalcinosis   • Chronic pain of right knee   • Degenerative disc disease, lumbar   • Gastroesophageal reflux disease   • Bilateral hearing loss   • Thoracic aortic aneurysm without rupture (CMS/[facility])   • Erythromelalgia (CMS/[facility])   • 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CMS/[facility])   • Thoracoabdominal aortic aneurysm, without rupture (CMS/[facility])   • 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/[facility])   • Aortic aneurysm, descending (CMS/[facility])   • Aortic aneurysm without rupture (CMS/[facility])   • CAD (coronary artery disease)   • S/P left heart catheterization by ventricular puncture   • NSTEMI (non-ST elevated myocardial  "infarction) (CMS/[facility])   • Pericardial hematoma   • History of ST elevation myocardial infarction (STEMI)   • Acute on chronic diastolic CHF (congestive heart failure) (CMS/[facility])   • Ventricular aneurysm   • Immobility syndrome       Past Medical History:   Diagnosis Date   • AAA (abdominal aortic aneurysm) (CMS/[facility])    • Acute bronchitis 12/2018   • 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[facility])    • Arthritis    • Bilateral carotid artery disease (CMS/[facility])    • Bilateral cataracts     S/p Extractions   • Breast cancer (CMS/[facility])     right breast [id number] (snm) pMX ER/CT pos, Her-2/neg, Ki-67, 31%, oncotype recurrence score 19, invasive lobular carcinoma   • CAD (coronary artery disease)     S/p CABG on 2/23/16 by Dr. 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[facility])    • Cataracts, bilateral    • Closed fracture of three ribs of right side    • Cognitive disorder    • COPD (chronic obstructive pulmonary disease) (CMS/[facility])    • Depression    • Descending aortic arch aneurysm (CMS/[facility])    • Diabetes mellitus (CMS/[facility])     \"BORDERLINE\"   • Erythermalgia (CMS/[facility])    • GERD (gastroesophageal reflux disease)    • Hyperlipidemia     Controlled w/Meds   • Hypertension     Controlled w/Meds   • Low back pain    • Moderate aortic valve insufficiency     S/p AVR on 02/23/16 by Dr. Ontiveros   • Nocturia    • Osteoarthritis    • Osteoporosis    • Otitis media     R Ear   • Prediabetes    • PVD (peripheral vascular disease) (CMS/[facility])    • RLS (restless legs syndrome)    • Saccular aneurysm    • Stroke (CMS/[facility])     Perioperatively w/L Hip Repl   • Thoracic ascending aortic aneurysm (CMS/[facility])     S/p Repair on 02/23/16 by Dr. Ontiveros   • TIA (transient ischemic attack)    • Urgency incontinence    • Venous insufficiency    • Ventral hernia        Past Surgical History:   Procedure Laterality Date   • AORTIC VALVE REPAIR/REPLACEMENT N/A 02/23/2016-[facility]    Ascending Replacement Using a 24MM Graft--Dr. Ontiveros   • APPENDECTOMY  1977   • BREAST BIOPSY Right 09/16/2012 "    Vacuum Assisted Core Bx of R Breast   • CARDIAC CATHETERIZATION N/A 01/06/2016    Dr. Tres De Los Santos   • CARDIAC CATHETERIZATION N/A 4/22/2019    Procedure: Left Heart Cath;  Surgeon: Tres De Los Santos MD;  Location:  [facility];  Service: Cardiology   • CARDIAC CATHETERIZATION N/A 4/22/2019    Procedure: Coronary angiography;  Surgeon: Tres De Los Santos MD;  Location:  [facility];  Service: Cardiology   • CARDIAC CATHETERIZATION N/A 7/22/2020    Procedure: LEFT HEART CATH;  Surgeon: Antonia Scott MD;  Location:  [facility];  Service: Cardiovascular;  Laterality: N/A;   • CARDIAC CATHETERIZATION N/A 7/22/2020    Procedure: CORONARY ANGIOGRAPHY;  Surgeon: Antonia Scott MD;  Location:  [facility];  Service: Cardiovascular;  Laterality: N/A;   • CARDIAC CATHETERIZATION N/A 7/22/2020    Procedure: Left ventriculography;  Surgeon: Antonia Scott MD;  Location:  [facility];  Service: Cardiovascular;  Laterality: N/A;   • CARDIAC CATHETERIZATION N/A 7/22/2020    Procedure: Saphenous Vein Graft;  Surgeon: Antonia Scott MD;  Location:  [facility];  Service: Cardiovascular;  Laterality: N/A;   • CARDIAC SURGERY  02/2016    Dr. Ontiveros/Dr. De Los Santos   • CATARACT EXTRACTION Bilateral     [facility]   • COLONOSCOPY N/A 10/2002    Dr. Negro   • CORONARY ARTERY BYPASS GRAFT  02/23/2016-[facility]    x1 w/L Vein Grafting--Dr. Ontiveros   • CORONARY ARTERY BYPASS GRAFT N/A 9/18/2020    Procedure: STERNAL EXPLORATION WITH CLOSURE AND WASHOUT, REMOVAL OF IABP, PRP;  Surgeon: Mart Ontiveros MD;  Location: [facility] MAIN OR;  Service: Cardiothoracic;  Laterality: N/A;   • CYST REMOVAL Right 01/25/2013    R Palm Excision of Retinacular Cyst--Dr. Karla Fish   • EPIDURAL BLOCK     • LAPAROSCOPIC CHOLECYSTECTOMY N/A 08/04/2004    Dr. JOEY Sheth   • MASTECTOMY Right 09/28/2012   • ORIF HIP FRACTURE Left 03/27/2005    w/  Dr. Victor M Marley   • RECTOVAGINAL FISTULA REPAIR  1965   • THORACIC AORTIC ANEURYSM REPAIR N/A 7/23/2019    Procedure: ROSE, THORACOABDOMINAL AORTIC ANEURYSM REPAIR, VISCERAL VESSEL REPAIR, LARGE VENTRAL HERNIA REPAIR WITH MESH, CIRCULATORY ARREST, PRP;  Surgeon: Mart Ontiveros MD;  Location: [facility] MAIN OR;  Service: Cardiothoracic   • TRANSESOPHAGEAL ECHOCARDIOGRAM (ROSE) N/A 9/18/2020    Procedure: TRANSESOPHAGEAL ECHOCARDIOGRAM WITH ANESTHESIA;  Surgeon: Mart Ontiveors MD;  Location: [facility] OR;  Service: Cardiothoracic;  Laterality: N/A;   • TUBAL ABDOMINAL LIGATION     • VENTRICULAR ANEURYSM REPAIR N/A 7/22/2020    Procedure: EMERGENT REOP STERNOTOMY, REPAIR OF LV RUPTURE, PRP, INTRAOP ROSE;  Surgeon: Mart Ontiveros MD;  Location: [facility] OR;  Service: Cardiothoracic;  Laterality: N/A;   • VENTRICULAR ANEURYSM REPAIR N/A 9/17/2020    Procedure: ROSE, MIDLINE STERNOTOMY REOP  LEFT VENTRICULAR ANEURYSM REPAIR, IABP INSERTION, PRP;  Surgeon: Mart Ontiveros MD;  Location: [facility] OR;  Service: Cardiothoracic;  Laterality: N/A;          PT ASSESSMENT (last 12 hours)      IRF PT Evaluation and Treatment     Row Name 10/01/20 0800          Weekly Progress Summary (PT)    Functional Goal Overall Progress (PT)  progressing toward functional goals as expected  -EE     Weekly Progress Summary (PT)  Pt has demonstrated steady progress with mobility this week. Demos improved strength and endurance, as evidenced by increased independence with transfers as well as tolerance of increased ambulation distance. Pt continues to be limited by gen weakness in B LEs as well as decreased endurance and mild balance impairment. Pt gradually tolerating more time in standing and more reps of LE ther ex. All long term functional goals remain ongoing at this time. Pt will continue to benefit from inpatient PT to address impairments and increase independence with functional mobility prior to DC  home.   -EE     Impairments Still Limiting Function (PT)  impaired balance;strength decreased;impaired functional activity tolerance  -EE       User Key  (r) = Recorded By, (t) = Taken By, (c) = Cosigned By    Initials Name Provider Type    EE Shruthi Roach, PT Physical Therapist        Wound 09/17/20 0810 Right anterior groin Incision (Active)   Dressing Appearance dried drainage 09/30/20 1953   Closure Adhesive closure strips 09/30/20 1953   Base clean;dry 09/30/20 1953   Drainage Amount none 09/30/20 1953   Dressing Care open to air 09/30/20 1953       Wound 09/17/20 0811 sternal Incision (Active)   Dressing Appearance dry;intact 09/30/20 1953   Closure Approximated 09/30/20 1953   Base dry;scab 09/30/20 1953   Periwound Temperature warm 09/30/20 1953   Drainage Amount none 09/30/20 1953   Dressing Care open to air 09/30/20 1953       Wound 09/25/20 Other (See comments) medial coccyx Pressure Injury (Active)   Dressing Appearance open to air 09/30/20 1953   Closure Open to air 09/30/20 1953   Base dry 09/30/20 1953   Drainage Amount none 09/30/20 1953     Physical Therapy Education                 Title: PT OT SLP Therapies (In Progress)     Topic: Physical Therapy (In Progress)     Point: Mobility training (Done)     Learning Progress Summary           Patient Acceptance, E,TB,D, VU,NR by EE at 9/30/2020 1115    Acceptance, E,TB, VU,NR by EE at 9/29/2020 1113    Acceptance, E,TB, NR,VU by EE at 9/28/2020 1145    Acceptance, E, VU,NR by JK at 9/26/2020 1158                   Point: Home exercise program (Done)     Learning Progress Summary           Patient Acceptance, E,TB,D, VU,NR by EE at 9/30/2020 1115    Acceptance, E,TB, VU,NR by EE at 9/29/2020 1113    Acceptance, E,TB, NR,VU by EE at 9/28/2020 1145                   Point: Body mechanics (Done)     Learning Progress Summary           Patient Acceptance, E,TB, VU,NR by EE at 9/29/2020 1113    Acceptance, E,TB, NR,VU by EE at 9/28/2020 1145                    Point: Precautions (Not Started)     Learner Progress:  Not documented in this visit.                      User Key     Initials Effective Dates Name Provider Type Discipline    EE 04/03/18 -  Shruthi Roach, PT Physical Therapist PT    JK 04/03/18 -  Araceli Jones PT Physical Therapist PT                PT Recommendation and Plan                          Time Calculation:     PT Charges     Row Name 10/01/20 0827             Time Calculation    PT Goal Re-Cert Due Date  10/08/20  -EE        User Key  (r) = Recorded By, (t) = Taken By, (c) = Cosigned By    Initials Name Provider Type    EE Shruthi Roach, PT Physical Therapist          Therapy Charges for Today     Code Description Service Date Service Provider Modifiers Qty    20906987576 HC GAIT TRAINING EA 15 MIN 9/30/2020 Shruthi Roach, PT GP 1    27621394708 HC PT THER PROC EA 15 MIN 9/30/2020 Shruthi Roach, PT GP 2    89480617095 HC PT THERAPEUTIC ACT EA 15 MIN 9/30/2020 Shruthi Roach, PT GP 1                   Shruthi Roach, PT  10/1/2020

## 2020-10-01 NOTE — PLAN OF CARE
Goal Outcome Evaluation:  Plan of Care Reviewed With: patient  Progress: improving  Outcome Summary: Pt has had some soa early in shift. No current c/o SOA in afternoon/evening shift. Cardio consult

## 2020-10-01 NOTE — SIGNIFICANT NOTE
10/01/20 1246   OTHER   Discipline speech language pathologist   Rehab Time/Intention   Session Not Performed unable to treat, medical status change;other (see comments)  (Pt reporting feeling too short of breath to participate in swallow therapy. Nurse and MD notified.)

## 2020-10-01 NOTE — PROGRESS NOTES
Inpatient Rehabilitation Plan of Care Note    Plan of Care  Care Plan Reviewed - Updates as Follows    Psychosocial    Performed Intervention(s)  verbalize needs and concerns      Safety    Performed Intervention(s)  bed/chair alarms  hourly rounding  items within reach      Sphincter Control    Performed Intervention(s)  hourly rounding  adequate fluid intake      Body Systems    Performed Intervention(s)  dressing changes or wound care per orders  WOCN  monitor for signs of infection    Signed by: Heri Lindquist RN

## 2020-10-01 NOTE — PROGRESS NOTES
Patient complains of increased shortness of air with staff today.  Difficulty doing her swallowing exercises due to shortness of air.  She was assessed by pulmonology on September 27 for shortness of air.  Chest x-ray without any new concerns then and continued on Trelegy inhaler as well as PRN nebulizers/PRN rescue inhaler.  Will ask cardiology to see for follow-up assessment    Winston Jones MD

## 2020-10-01 NOTE — PROGRESS NOTES
Inpatient Rehabilitation Plan of Care Note    Plan of Care  Care Plan Reviewed - No updates at this time.    Body Systems    [RN] Integumentary(Active)  Current Status(10/01/2020): pt has stage 2 pressure sore to bottom, chest  incision, and R groin site. pinkness to coccyx- refuses mepilex dsg. and STR to  R groin  Weekly Goal(10/08/2020): remain intact  Discharge Goal: no infection    Performed Intervention(s)  dressing changes or wound care per orders  WOCN  monitor for signs of infection      Psychosocial    [RN] Coping/Adjustment(Active)  Current Status(10/01/2020): pt is adjusting appropriately to hospital stay.  family supportive  Weekly Goal(10/08/2020): adjust WNL  Discharge Goal: adequate coping    Performed Intervention(s)  verbalize needs and concerns      Safety    [RN] Potential for Injury(Active)  Current Status(10/01/2020): pt alert and oriented. no safety concerns  Weekly Goal(10/08/2020): no falls  Discharge Goal: no falls    Performed Intervention(s)  bed/chair alarms  hourly rounding  items within reach      Sphincter Control    [RN] Bladder Management(Active)  Current Status(10/01/2020): 100% continent  Weekly Goal(10/08/2020): remain continent  Discharge Goal: continent 100%    [RN] Bowel Management(Active)  Current Status(10/01/2020): continent 100%  Weekly Goal(10/08/2020): remains continent  Discharge Goal: continent 100%    Performed Intervention(s)  hourly rounding  adequate fluid intake    Signed by: Kiya Viera RN

## 2020-10-01 NOTE — THERAPY TREATMENT NOTE
Inpatient Rehabilitation - Physical Therapy Treatment Note       [facility]     Patient Name: Grace Beauchamp  : 1940  MRN: [id number]    Today's Date: 10/1/2020                    Admit Date: 2020      Visit Dx:     ICD-10-CM ICD-9-CM   1. Pericardial hematoma  I31.2 423.0   2. Ventricular aneurysm  I25.3 414.10   3. Gait abnormality  R26.9 781.2       Patient Active Problem List   Diagnosis   • History of breast cancer   • Stenosis of carotid artery   • Chronic obstructive pulmonary disease (CMS/[facility])   • Closed fracture of multiple ribs   • Cognitive disorder   • Erythromelalgia (CMS/[facility])   • Hyperlipidemia   • Hypertension   • Primary osteoarthritis involving multiple joints   • Osteoporosis   • Restless legs syndrome   • Cerebral aneurysm   • Temporary cerebral vascular dysfunction   • S/P CABG x 1   • Type 2 diabetes mellitus without complication, without long-term current use of insulin (CMS/[facility])   • S/P ascending aortic aneurysm repair   • 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[facility])   • Descending thoracic aortic aneurysm (CMS/[facility])   • Claudication of both lower extremities (CMS/[facility])   • 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[facility])   • Ventral hernia without obstruction or gangrene   • Breast cancer, stage 1, estrogen receptor positive (CMS/[facility])   • Arthritis of right hip   • Arthritis of right knee   • Chondrocalcinosis   • Chronic pain of right knee   • Degenerative disc disease, lumbar   • Gastroesophageal reflux disease   • Bilateral hearing loss   • Thoracic aortic aneurysm without rupture (CMS/[facility])   • Erythromelalgia (CMS/[facility])   • 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CMS/[facility])   • Thoracoabdominal aortic aneurysm, without rupture (CMS/[facility])   • 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/[facility])   • Aortic aneurysm, descending (CMS/[facility])   • Aortic aneurysm without rupture (CMS/[facility])   • CAD (coronary artery disease)   • S/P left heart catheterization by ventricular puncture   • NSTEMI (non-ST elevated myocardial  "infarction) (CMS/[facility])   • Pericardial hematoma   • History of ST elevation myocardial infarction (STEMI)   • Acute on chronic diastolic CHF (congestive heart failure) (CMS/[facility])   • Ventricular aneurysm   • Immobility syndrome       Past Medical History:   Diagnosis Date   • AAA (abdominal aortic aneurysm) (CMS/[facility])    • Acute bronchitis 12/2018   • 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[facility])    • Arthritis    • Bilateral carotid artery disease (CMS/[facility])    • Bilateral cataracts     S/p Extractions   • Breast cancer (CMS/[facility])     right breast [id number] (snm) pMX ER/OK pos, Her-2/neg, Ki-67, 31%, oncotype recurrence score 19, invasive lobular carcinoma   • CAD (coronary artery disease)     S/p CABG on 2/23/16 by Dr. 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[facility])    • Cataracts, bilateral    • Closed fracture of three ribs of right side    • Cognitive disorder    • COPD (chronic obstructive pulmonary disease) (CMS/[facility])    • Depression    • Descending aortic arch aneurysm (CMS/[facility])    • Diabetes mellitus (CMS/[facility])     \"BORDERLINE\"   • Erythermalgia (CMS/[facility])    • GERD (gastroesophageal reflux disease)    • Hyperlipidemia     Controlled w/Meds   • Hypertension     Controlled w/Meds   • Low back pain    • Moderate aortic valve insufficiency     S/p AVR on 02/23/16 by Dr. Ontiveros   • Nocturia    • Osteoarthritis    • Osteoporosis    • Otitis media     R Ear   • Prediabetes    • PVD (peripheral vascular disease) (CMS/[facility])    • RLS (restless legs syndrome)    • Saccular aneurysm    • Stroke (CMS/[facility])     Perioperatively w/L Hip Repl   • Thoracic ascending aortic aneurysm (CMS/[facility])     S/p Repair on 02/23/16 by Dr. Ontiveros   • TIA (transient ischemic attack)    • Urgency incontinence    • Venous insufficiency    • Ventral hernia        Past Surgical History:   Procedure Laterality Date   • AORTIC VALVE REPAIR/REPLACEMENT N/A 02/23/2016-[facility]    Ascending Replacement Using a 24MM Graft--Dr. Ontiveros   • APPENDECTOMY  1977   • BREAST BIOPSY Right 09/16/2012 "    Vacuum Assisted Core Bx of R Breast   • CARDIAC CATHETERIZATION N/A 01/06/2016    Dr. Tres De Los Santos   • CARDIAC CATHETERIZATION N/A 4/22/2019    Procedure: Left Heart Cath;  Surgeon: Tres De Los Santos MD;  Location:  [facility];  Service: Cardiology   • CARDIAC CATHETERIZATION N/A 4/22/2019    Procedure: Coronary angiography;  Surgeon: Tres De Los Santos MD;  Location:  [facility];  Service: Cardiology   • CARDIAC CATHETERIZATION N/A 7/22/2020    Procedure: LEFT HEART CATH;  Surgeon: Antonia Scott MD;  Location:  [facility];  Service: Cardiovascular;  Laterality: N/A;   • CARDIAC CATHETERIZATION N/A 7/22/2020    Procedure: CORONARY ANGIOGRAPHY;  Surgeon: Antonia Scott MD;  Location:  [facility];  Service: Cardiovascular;  Laterality: N/A;   • CARDIAC CATHETERIZATION N/A 7/22/2020    Procedure: Left ventriculography;  Surgeon: Antonia Scott MD;  Location:  [facility];  Service: Cardiovascular;  Laterality: N/A;   • CARDIAC CATHETERIZATION N/A 7/22/2020    Procedure: Saphenous Vein Graft;  Surgeon: Antonia Scott MD;  Location:  [facility];  Service: Cardiovascular;  Laterality: N/A;   • CARDIAC SURGERY  02/2016    Dr. Ontiveros/Dr. De Los Santos   • CATARACT EXTRACTION Bilateral     [facility]   • COLONOSCOPY N/A 10/2002    Dr. Negro   • CORONARY ARTERY BYPASS GRAFT  02/23/2016-[facility]    x1 w/L Vein Grafting--Dr. Ontiveros   • CORONARY ARTERY BYPASS GRAFT N/A 9/18/2020    Procedure: STERNAL EXPLORATION WITH CLOSURE AND WASHOUT, REMOVAL OF IABP, PRP;  Surgeon: Mart Ontiveros MD;  Location: [facility] MAIN OR;  Service: Cardiothoracic;  Laterality: N/A;   • CYST REMOVAL Right 01/25/2013    R Palm Excision of Retinacular Cyst--Dr. Karla Fish   • EPIDURAL BLOCK     • LAPAROSCOPIC CHOLECYSTECTOMY N/A 08/04/2004    Dr. JOEY Sheth   • MASTECTOMY Right 09/28/2012   • ORIF HIP FRACTURE Left 03/27/2005    w/  Dr. Victor M Marley   • RECTOVAGINAL FISTULA REPAIR  1965   • THORACIC AORTIC ANEURYSM REPAIR N/A 7/23/2019    Procedure: ROSE, THORACOABDOMINAL AORTIC ANEURYSM REPAIR, VISCERAL VESSEL REPAIR, LARGE VENTRAL HERNIA REPAIR WITH MESH, CIRCULATORY ARREST, PRP;  Surgeon: Mart Ontiveros MD;  Location: [facility];  Service: Cardiothoracic   • TRANSESOPHAGEAL ECHOCARDIOGRAM (ROSE) N/A 9/18/2020    Procedure: TRANSESOPHAGEAL ECHOCARDIOGRAM WITH ANESTHESIA;  Surgeon: Mart Ontiveros MD;  Location: [facility] OR;  Service: Cardiothoracic;  Laterality: N/A;   • TUBAL ABDOMINAL LIGATION     • VENTRICULAR ANEURYSM REPAIR N/A 7/22/2020    Procedure: EMERGENT REOP STERNOTOMY, REPAIR OF LV RUPTURE, PRP, INTRAOP ROSE;  Surgeon: Mart Ontiveros MD;  Location: [facility];  Service: Cardiothoracic;  Laterality: N/A;   • VENTRICULAR ANEURYSM REPAIR N/A 9/17/2020    Procedure: ROSE, MIDLINE STERNOTOMY REOP  LEFT VENTRICULAR ANEURYSM REPAIR, IABP INSERTION, PRP;  Surgeon: Mart Ontiveros MD;  Location: [facility];  Service: Cardiothoracic;  Laterality: N/A;          PT ASSESSMENT (last 12 hours)      IRF PT Evaluation and Treatment     Row Name 10/01/20 1045          PT Time and Intention    Document Type  daily treatment  -EE     Mode of Treatment  physical therapy  -EE     Patient/Family/Caregiver Comments/Observations  (S) Pt sitting up in WC; agreeable to PT. Reports increased SOA during AM session; vitals stable throughout. Pt in bed in PM, again reporting worsening SOA. Pt ambulated to  with PT due to toileting needs but declined further exercise in PM due to SOA. Cardio consult pending. Will follow up as appropriate.   -EE     Row Name 10/01/20 1045          Cognition/Psychosocial    Affect/Mental Status (Cognitive)  WFL  -EE     Orientation Status (Cognition)  oriented x 4  -EE     Follows Commands (Cognition)  follows one-step commands  -EE     Personal Safety Interventions  fall  prevention program maintained;gait belt;muscle strengthening facilitated;nonskid shoes/slippers when out of bed;supervised activity  -EE     Row Name 10/01/20 1045          Pain Scale: Numbers Pre/Post-Treatment    Pretreatment Pain Rating  0/10 - no pain  -EE     Posttreatment Pain Rating  0/10 - no pain  -EE     Row Name 10/01/20 1045          Bed Mobility    Supine-Sit [place] (Bed Mobility)  contact guard;supervision;verbal cues  -EE     Sit-Supine [place] (Bed Mobility)  supervision  -EE     Assistive Device (Bed Mobility)  bed rails;head of bed elevated  -EE     Row Name 10/01/20 1045          Transfers    Sit-Stand [place] (Transfers)  minimum assist (75% patient effort);contact guard;verbal cues  -EE     Stand-Sit [place] (Transfers)  minimum assist (75% patient effort);contact guard;verbal cues  -EE     [place] Level (Toilet Transfer)  minimum assist (75% patient effort);contact guard;verbal cues  -EE     Assistive Device (Toilet Transfer)  commode;grab bars/safety frame;walker, front-wheeled  -EE     Row Name 10/01/20 1045          Sit-Stand Transfer    Assistive Device (Sit-Stand Transfers)  walker, front-wheeled  -EE     Row Name 10/01/20 1045          Stand-Sit Transfer    Assistive Device (Stand-Sit Transfers)  walker, front-wheeled  -EE     Row Name 10/01/20 1045          Toilet Transfer    Type (Toilet Transfer)  sit-stand;stand-sit  -EE     Row Name 10/01/20 1045          Gait/Stairs (Locomotion)    [place] Level (Gait)  contact guard;verbal cues  -EE     Assistive Device (Gait)  walker, front-wheeled  -EE     Distance in Feet (Gait)  80' x 1, 25' x 1  -EE     Pattern (Gait)  step-through  -EE     Deviations/Abnormal Patterns (Gait)  chris decreased;stride length decreased  -EE     Bilateral Gait Deviations  forward flexed posture  -EE     Right Sided Gait Deviations  heel strike decreased knee flexion during stance  -EE     Comment (Gait/Stairs)  R JULIANNA mendez. Pt  more SOA today but vitals stable throughout.   -EE     Row Name 10/01/20 1045          Safety Issues, Functional Mobility    Impairments Affecting Function (Mobility)  balance;endurance/activity tolerance;shortness of breath;strength  -EE     Row Name 10/01/20 1045          Hip (Therapeutic Exercise)    Hip Isometrics (Therapeutic Exercise)  sitting;bilateral;aDduction;10 repetitions;3 second hold  -EE     Hip Strengthening (Therapeutic Exercise)  sitting;bilateral;marching while seated;1 lb free weight;aBduction;resistance band;yellow;10 repetitions  -EE     Row Name 10/01/20 1045          Knee (Therapeutic Exercise)    Knee Strengthening (Therapeutic Exercise)  sitting;bilateral;LAQ (long arc quad);1 lb free weight;10 repetitions  -EE     Row Name 10/01/20 1045          Ankle (Therapeutic Exercise)    Ankle (Therapeutic Exercise)  AROM (active range of motion)  -EE     Ankle AROM (Therapeutic Exercise)  sitting;bilateral;dorsiflexion;plantarflexion;10 repetitions  -EE     Row Name 10/01/20 1045          Ankle Foot Orthosis Management    Type (Ankle/Foot Orthosis)  right;AFO (ankle foot orthosis)  -EE     Fabrication Comment (Ankle Foot Orthosis)  carbon fiber, anterior shelf  -EE     Therapeutic Indications (Ankle Foot Orthosis)  compensation for lost function  -EE     Wearing Schedule (Ankle Foot Orthosis)  wear with activity/work  -EE     Row Name 10/01/20 1045          Positioning and Restraints    Pre-Treatment Position  sitting in chair/recliner  -EE     Post Treatment Position  bed  -EE     In Bed  fowlers;call light within reach;encouraged to call for assist;exit alarm on;notified nsg  -EE     Row Name 10/01/20 0800          Weekly Progress Summary (PT)    Functional Goal Overall Progress (PT)  progressing toward functional goals as expected  -EE     Weekly Progress Summary (PT)  Pt has demonstrated steady progress with mobility this week. Demos improved strength and endurance, as evidenced by increased  independence with transfers as well as tolerance of increased ambulation distance. Pt continues to be limited by gen weakness in B LEs as well as decreased endurance and mild balance impairment. Pt gradually tolerating more time in standing and more reps of LE ther ex. All long term functional goals remain ongoing at this time. Pt will continue to benefit from inpatient PT to address impairments and increase independence with functional mobility prior to DC home.   -EE     Impairments Still Limiting Function (PT)  impaired balance;strength decreased;impaired functional activity tolerance  -EE       User Key  (r) = Recorded By, (t) = Taken By, (c) = Cosigned By    Initials Name Provider Type    EE Shruthi Roach, PT Physical Therapist        Wound 09/17/20 0810 Right anterior groin Incision (Active)   Dressing Appearance dry;intact 10/01/20 0900   Closure Adhesive closure strips 10/01/20 0900   Base clean;dry 10/01/20 0900   Drainage Amount none 10/01/20 0900   Dressing Care open to air 09/30/20 1953       Wound 09/17/20 0811 sternal Incision (Active)   Dressing Appearance dry;intact 09/30/20 1953   Closure Approximated 10/01/20 0900   Base dry;scab 10/01/20 0900   Periwound ecchymotic 10/01/20 0900   Periwound Temperature warm 10/01/20 0900   Drainage Amount none 10/01/20 0900   Dressing Care open to air 09/30/20 1953       Wound 09/25/20 Other (See comments) medial coccyx Pressure Injury (Active)   Dressing Appearance open to air 09/30/20 1953   Closure Open to air 09/30/20 1953   Base dry;clean;pink 10/01/20 0900   Periwound dry;pink 10/01/20 0900   Drainage Amount none 10/01/20 0900   Care, Wound cleansed with;soap and water;other (see comments) 10/01/20 0900     Physical Therapy Education                 Title: PT OT SLP Therapies (In Progress)     Topic: Physical Therapy (In Progress)     Point: Mobility training (Done)     Learning Progress Summary           Patient Acceptance, E,TB,D, VU,NR by EE at 9/30/2020  1115    Acceptance, E,TB, VU,NR by EE at 9/29/2020 1113    Acceptance, E,TB, NR,VU by EE at 9/28/2020 1145    Acceptance, E, VU,NR by JK at 9/26/2020 1158                   Point: Home exercise program (Done)     Learning Progress Summary           Patient Acceptance, E,TB,D, VU,NR by EE at 9/30/2020 1115    Acceptance, E,TB, VU,NR by EE at 9/29/2020 1113    Acceptance, E,TB, NR,VU by EE at 9/28/2020 1145                   Point: Body mechanics (Done)     Learning Progress Summary           Patient Acceptance, E,TB, VU,NR by EE at 9/29/2020 1113    Acceptance, E,TB, NR,VU by EE at 9/28/2020 1145                   Point: Precautions (Not Started)     Learner Progress:  Not documented in this visit.                      User Key     Initials Effective Dates Name Provider Type Discipline    EE 04/03/18 -  Shruthi Roach PT Physical Therapist PT    JK 04/03/18 -  Araceli Jones PT Physical Therapist PT                PT Recommendation and Plan                          Time Calculation:     PT Charges     Row Name 10/01/20 1326 10/01/20 1231 10/01/20 0827       Time Calculation    Start Time  1300  -EE  1030  -EE  --    Stop Time  1315  -EE  1100  -EE  --    Time Calculation (min)  15 min  -EE  30 min  -EE  --    PT Received On  10/01/20  -EE  10/01/20  -EE  --    PT - Next Appointment  10/02/20  -EE  10/01/20  -EE  --    PT Goal Re-Cert Due Date  --  --  10/08/20  -EE       Time Calculation- PT    Total Timed Code Minutes- PT  15 minute(s)  -EE  30 minute(s)  -EE  --      User Key  (r) = Recorded By, (t) = Taken By, (c) = Cosigned By    Initials Name Provider Type    Shruthi Subramanian PT Physical Therapist          Therapy Charges for Today     Code Description Service Date Service Provider Modifiers Qty    88641417044 HC GAIT TRAINING EA 15 MIN 9/30/2020 Shruthi Roach, PT GP 1    61477561295 HC PT THER PROC EA 15 MIN 9/30/2020 Shruthi Roach, PT GP 2    99505476738 HC PT THERAPEUTIC ACT EA 15 MIN 9/30/2020 Shruthi Roach,  PT GP 1    [id number]  GAIT TRAINING EA 15 MIN 10/1/2020 Shruthi Roahc, PT GP 1    56093319579 HC PT THER PROC EA 15 MIN 10/1/2020 Shruthi Roach, PT GP 1    [id number]  PT THERAPEUTIC ACT EA 15 MIN 10/1/2020 Shruthi Roach, PT GP 1               Patient was intermittently wearing a face mask during this therapy encounter. Therapist used appropriate personal protective equipment including eye protection, mask, and gloves.  Mask used was standard procedure mask. Appropriate PPE was worn during the entire therapy session. Hand hygiene was completed before and after therapy session. Patient is not in enhanced droplet precautions.       Shruthi Roach, PT  10/1/2020

## 2020-10-01 NOTE — THERAPY TREATMENT NOTE
Inpatient Rehabilitation - Speech Language Pathology Treatment Note    Psychiatric     Patient Name: Grace Beauchamp  : 1940  MRN: [id number]    Today's Date: 10/1/2020                   Admit Date: 2020       Visit Dx:      ICD-10-CM ICD-9-CM   1. Pericardial hematoma  I31.2 423.0   2. Ventricular aneurysm  I25.3 414.10   3. Gait abnormality  R26.9 781.2       Patient Active Problem List   Diagnosis   • History of breast cancer   • Stenosis of carotid artery   • Chronic obstructive pulmonary disease (CMS/[facility])   • Closed fracture of multiple ribs   • Cognitive disorder   • Erythromelalgia (CMS/[facility])   • Hyperlipidemia   • Hypertension   • Primary osteoarthritis involving multiple joints   • Osteoporosis   • Restless legs syndrome   • Cerebral aneurysm   • Temporary cerebral vascular dysfunction   • S/P CABG x 1   • Type 2 diabetes mellitus without complication, without long-term current use of insulin (CMS/[facility])   • S/P ascending aortic aneurysm repair   • 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[facility])   • Descending thoracic aortic aneurysm (CMS/[facility])   • Claudication of both lower extremities (CMS/[facility])   • 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[facility])   • Ventral hernia without obstruction or gangrene   • Breast cancer, stage 1, estrogen receptor positive (CMS/[facility])   • Arthritis of right hip   • Arthritis of right knee   • Chondrocalcinosis   • Chronic pain of right knee   • Degenerative disc disease, lumbar   • Gastroesophageal reflux disease   • Bilateral hearing loss   • Thoracic aortic aneurysm without rupture (CMS/[facility])   • Erythromelalgia (CMS/[facility])   • 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CMS/[facility])   • Thoracoabdominal aortic aneurysm, without rupture (CMS/[facility])   • 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/[facility])   • Aortic aneurysm, descending (CMS/[facility])   • Aortic aneurysm without rupture (CMS/[facility])   • CAD (coronary artery disease)   • S/P left heart catheterization by ventricular puncture   • NSTEMI (non-ST elevated  "myocardial infarction) (CMS/[facility])   • Pericardial hematoma   • History of ST elevation myocardial infarction (STEMI)   • Acute on chronic diastolic CHF (congestive heart failure) (CMS/[facility])   • Ventricular aneurysm   • Immobility syndrome       Past Medical History:   Diagnosis Date   • AAA (abdominal aortic aneurysm) (CMS/[facility])    • Acute bronchitis 12/2018   • 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[facility])    • Arthritis    • Bilateral carotid artery disease (CMS/[facility])    • Bilateral cataracts     S/p Extractions   • Breast cancer (CMS/[facility])     right breast [id number] (snm) pMX ER/MT pos, Her-2/neg, Ki-67, 31%, oncotype recurrence score 19, invasive lobular carcinoma   • CAD (coronary artery disease)     S/p CABG on 2/23/16 by Dr. 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[facility])    • Cataracts, bilateral    • Closed fracture of three ribs of right side    • Cognitive disorder    • COPD (chronic obstructive pulmonary disease) (CMS/[facility])    • Depression    • Descending aortic arch aneurysm (CMS/[facility])    • Diabetes mellitus (CMS/[facility])     \"BORDERLINE\"   • Erythermalgia (CMS/[facility])    • GERD (gastroesophageal reflux disease)    • Hyperlipidemia     Controlled w/Meds   • Hypertension     Controlled w/Meds   • Low back pain    • Moderate aortic valve insufficiency     S/p AVR on 02/23/16 by Dr. Ontiveros   • Nocturia    • Osteoarthritis    • Osteoporosis    • Otitis media     R Ear   • Prediabetes    • PVD (peripheral vascular disease) (CMS/[facility])    • RLS (restless legs syndrome)    • Saccular aneurysm    • Stroke (CMS/[facility])     Perioperatively w/L Hip Repl   • Thoracic ascending aortic aneurysm (CMS/[facility])     S/p Repair on 02/23/16 by Dr. Ontiveros   • TIA (transient ischemic attack)    • Urgency incontinence    • Venous insufficiency    • Ventral hernia        Past Surgical History:   Procedure Laterality Date   • AORTIC VALVE REPAIR/REPLACEMENT N/A 02/23/2016-[facility]    Ascending Replacement Using a 24MM Graft--Dr. Ontiveros   • APPENDECTOMY  1977   • BREAST BIOPSY Right " 09/16/2012    Vacuum Assisted Core Bx of R Breast   • CARDIAC CATHETERIZATION N/A 01/06/2016    Dr. Tres De Los Santos   • CARDIAC CATHETERIZATION N/A 4/22/2019    Procedure: Left Heart Cath;  Surgeon: Tres De Los Santos MD;  Location:  [facility];  Service: Cardiology   • CARDIAC CATHETERIZATION N/A 4/22/2019    Procedure: Coronary angiography;  Surgeon: Tres De Los Santos MD;  Location:  [facility];  Service: Cardiology   • CARDIAC CATHETERIZATION N/A 7/22/2020    Procedure: LEFT HEART CATH;  Surgeon: Antonia Scott MD;  Location:  [facility];  Service: Cardiovascular;  Laterality: N/A;   • CARDIAC CATHETERIZATION N/A 7/22/2020    Procedure: CORONARY ANGIOGRAPHY;  Surgeon: Antonia Scott MD;  Location:  [facility];  Service: Cardiovascular;  Laterality: N/A;   • CARDIAC CATHETERIZATION N/A 7/22/2020    Procedure: Left ventriculography;  Surgeon: Antonia Scott MD;  Location:  [facility];  Service: Cardiovascular;  Laterality: N/A;   • CARDIAC CATHETERIZATION N/A 7/22/2020    Procedure: Saphenous Vein Graft;  Surgeon: Antonia Scott MD;  Location:  [facility];  Service: Cardiovascular;  Laterality: N/A;   • CARDIAC SURGERY  02/2016    Dr. Ontiveros/Dr. De Los Santos   • CATARACT EXTRACTION Bilateral     [facility]   • COLONOSCOPY N/A 10/2002    Dr. Negro   • CORONARY ARTERY BYPASS GRAFT  02/23/2016-[facility]    x1 w/L Vein Grafting--Dr. Ontiveros   • CORONARY ARTERY BYPASS GRAFT N/A 9/18/2020    Procedure: STERNAL EXPLORATION WITH CLOSURE AND WASHOUT, REMOVAL OF IABP, PRP;  Surgeon: Mart Ontiveros MD;  Location: [facility] MAIN OR;  Service: Cardiothoracic;  Laterality: N/A;   • CYST REMOVAL Right 01/25/2013    R Palm Excision of Retinacular Cyst--Dr. Karla Fish   • EPIDURAL BLOCK     • LAPAROSCOPIC CHOLECYSTECTOMY N/A 08/04/2004    Dr. JOEY Sheth   • MASTECTOMY Right 09/28/2012   • ORIF HIP FRACTURE Left  03/27/2005    w/ AMBI compression screwDr. Victor M   • RECTOVAGINAL FISTULA REPAIR  1965   • THORACIC AORTIC ANEURYSM REPAIR N/A 7/23/2019    Procedure: ROSE, THORACOABDOMINAL AORTIC ANEURYSM REPAIR, VISCERAL VESSEL REPAIR, LARGE VENTRAL HERNIA REPAIR WITH MESH, CIRCULATORY ARREST, PRP;  Surgeon: Mart Ontiveros MD;  Location: [facility] OR;  Service: Cardiothoracic   • TRANSESOPHAGEAL ECHOCARDIOGRAM (ROSE) N/A 9/18/2020    Procedure: TRANSESOPHAGEAL ECHOCARDIOGRAM WITH ANESTHESIA;  Surgeon: Mart Ontiveros MD;  Location: [facility] MAIN OR;  Service: Cardiothoracic;  Laterality: N/A;   • TUBAL ABDOMINAL LIGATION     • VENTRICULAR ANEURYSM REPAIR N/A 7/22/2020    Procedure: EMERGENT REOP STERNOTOMY, REPAIR OF LV RUPTURE, PRP, INTRAOP ROSE;  Surgeon: Mart Ontiveros MD;  Location: [facility] MAIN OR;  Service: Cardiothoracic;  Laterality: N/A;   • VENTRICULAR ANEURYSM REPAIR N/A 9/17/2020    Procedure: ROSE, MIDLINE STERNOTOMY REOP  LEFT VENTRICULAR ANEURYSM REPAIR, IABP INSERTION, PRP;  Surgeon: Mart Ontiveros MD;  Location: [facility] OR;  Service: Cardiothoracic;  Laterality: N/A;   Patient was not wearing a face mask during this therapy encounter. Therapist used appropriate personal protective equipment including mask, eye protection and gloves.  Mask used was standard procedure mask. Appropriate PPE was worn during the entire therapy session. Hand hygiene was completed before and after therapy session. Patient is not in enhanced droplet precautions.                  SLP EVALUATION (last 72 hours)      SLP SLC Evaluation     Row Name 10/01/20 1100 09/30/20 1330 09/30/20 1230 09/29/20 1230 09/29/20 1100       Communication Assessment/Intervention    Document Type  therapy note (daily note)  -AL  therapy note (daily note)  -AL  therapy note (daily note)  -AL  therapy note (daily note)  -AL  therapy note (daily note)  -AL    Patient/Family/Caregiver Comments/Observations  Pt upright in chair. Reports  shortness of breath at end of PT session, but no complaints during ST session.  -AL  Pt upright in chair. Participated well.  -AL  Pt upright in bed.  -AL  Pt upright in chair. Appeared more fatigued during swallow exercises.  -AL  Pt upright in chair. Participated well.  -AL    Patient Effort  good  -AL  good  -AL  good  -AL  good  -AL  --       Pain Scale: Numbers Pre/Post-Treatment    Pretreatment Pain Rating  --  0/10 - no pain  -AL  0/10 - no pain  -AL  0/10 - no pain  -AL  0/10 - no pain  -AL    Posttreatment Pain Rating  --  0/10 - no pain  -AL  0/10 - no pain  -AL  0/10 - no pain  -AL  0/10 - no pain  -AL       Pain Scale: FACES Pre/Post-Treatment    Pain: FACES Scale, Pretreatment  0-->no hurt  -AL  --  --  --  --    Posttreatment Pain Rating  0-->no hurt  -AL  --  --  --  --      User Key  (r) = Recorded By, (t) = Taken By, (c) = Cosigned By    Initials Name Effective Dates    Vanessa Paredes, MS CCC-SLP 08/30/19 -              EDUCATION    The patient has been educated in the following areas:       Dysphagia (Swallowing Impairment).      SLP Recommendation and Plan                                                  SLP GOALS     Row Name 10/01/20 1200 09/30/20 1330 09/30/20 1230       Oral Nutrition/Hydration Goal 1 (SLP)    Barriers (Oral Nutrition/Hydration Goal 1, SLP)  No s/s of aspiration or penetration in 3/8 trials of water by cup; throat clear x4, cough x1.  -AL  No s/s of aspiration or penetration in 5/7 trials of water by tsp and 0/6 trials of water by cup. Exhibited throat clear in 2/7 trials of water by tsp and 6/6 trials of water by cup. Difficult to assess whether there is a habitual throat clear; pt often throat clears in anticipation of taking sip of water.  -AL  No s/s of aspiration or penetration observed in 3/11 trials of water by cup; throat clear x8.  -AL    Progress/Outcomes (Oral Nutrition/Hydration Goal 1, SLP)  goal ongoing  -AL  --  --       Lingual Strengthening Goal 1 (SLP)  "   Barriers (Lingual Strengthening Goal 1, SLP)  --  Hawk x30 with MOD cues.  -AL  --       Pharyngeal Strengthening Exercise Goal 1 (SLP)    Barriers (Pharyngeal Strengthening Goal 1, SLP)  Completed 20 repetitions of effortful swallow with MOD cues. Fatigue noted today.   -AL  Completed 20 repetitions of effortful swallow with MOD-MAX cues; increased fatigue noted in this session. Completed 10 repetitions of high-pitched /i/ with MIN cues.  -AL  Completed 20 repetitions of effortful swallow with MIN-MOD cues. Completed 3 repetitions of 1 minute CTAR with ball with MOD-MAX cues. Completed 5 repetitions of Laura with MOD-MAX cues.  -AL    Progress/Outcomes (Pharyngeal Strengthening Goal 1, SLP)  --  goal ongoing  -AL  --    Row Name 09/29/20 1230 09/29/20 1100          Oral Nutrition/Hydration Goal 1 (SLP)    Barriers (Oral Nutrition/Hydration Goal 1, SLP)  Pt exhibited no throat clear prior to initiating trials. Pt exhibited no s/s of aspiration or penetration in 2/5 trials of water by cup; throat clear x2, cough/throat clear x1.   -AL  Pt with intermitten throat clear at baseline. Exhibited no s/s of aspiration or penetration in 2/5 trials of water by cup; throat clear noted x3. One instance of increased \"scratchy\" vocal quality. Pt struggled to clear with throat clear.  -AL        Pharyngeal Strengthening Exercise Goal 1 (SLP)    Barriers (Pharyngeal Strengthening Goal 1, SLP)  Completed 3 sets of 1 minute chin tucks with CTAR ball with MOD cues, as well as 30 1 second chin tucks with MIN cues. Pt completed 4 repetitions of Laura with MOD-MAX cues and 4 repetitions of effortful swallow. Had more difficulty initiating swallow during exercises in afternoon session.  -AL  Completed 20 repetitions of effortful swallow with MOD cues. Reviewed Laura exercise with pt. Discussed CTAR exercise.  -AL     Progress/Outcomes (Pharyngeal Strengthening Goal 1, SLP)  goal ongoing  -AL  good progress toward goal  -AL     "   User Key  (r) = Recorded By, (t) = Taken By, (c) = Cosigned By    Initials Name Provider Type    Vanessa Paredes MS CCC-SLP Speech and Language Pathologist                      Time Calculation:       Time Calculation- SLP     Row Name 10/01/20 1223             Time Calculation- SLP    SLP Start Time  1100  -AL      SLP Stop Time  1130  -AL      SLP Time Calculation (min)  30 min  -AL        User Key  (r) = Recorded By, (t) = Taken By, (c) = Cosigned By    Initials Name Provider Type    Vanessa Paredes MS CCC-SLP Speech and Language Pathologist            Therapy Charges for Today     Code Description Service Date Service Provider Modifiers Qty    28173819546 HC ST TREATMENT SWALLOW 4 9/30/2020 Vanessa Leiva MS CCC-PATRICIA GN 1    [id number] HC ST TREATMENT SWALLOW 2 10/1/2020 Vanessa Leiva MS CCC-SLP GN 1                           MS AKHIL Velásquez  10/1/2020

## 2020-10-01 NOTE — PLAN OF CARE
Goal Outcome Evaluation:  Plan of Care Reviewed With: patient  Progress: improving   Patient is cooperative. Impulsive at times. Encouraged to use the call light for assistance. Alarm patent. Complaining of generalized pain. PRN tramadol given with positive result. Taking her medication whole in pudding without difficulty. Will continue to monitor.

## 2020-10-02 ENCOUNTER — APPOINTMENT (OUTPATIENT)
Dept: GENERAL RADIOLOGY | Facility: HOSPITAL | Age: 80
End: 2020-10-02

## 2020-10-02 ENCOUNTER — APPOINTMENT (OUTPATIENT)
Dept: CT IMAGING | Facility: HOSPITAL | Age: 80
End: 2020-10-02

## 2020-10-02 LAB
ANION GAP SERPL CALCULATED.3IONS-SCNC: 5 MMOL/L (ref 5–15)
BUN SERPL-MCNC: 19 MG/DL (ref 8–23)
BUN/CREAT SERPL: 27.9 (ref 7–25)
CALCIUM SPEC-SCNC: 9 MG/DL (ref 8.6–10.5)
CHLORIDE SERPL-SCNC: 105 MMOL/L (ref 98–107)
CO2 SERPL-SCNC: 30 MMOL/L (ref 22–29)
CREAT SERPL-MCNC: 0.68 MG/DL (ref 0.57–1)
DEPRECATED RDW RBC AUTO: 49.4 FL (ref 37–54)
ERYTHROCYTE [DISTWIDTH] IN BLOOD BY AUTOMATED COUNT: 15.5 % (ref 12.3–15.4)
GFR SERPL CREATININE-BSD FRML MDRD: 83 ML/MIN/1.73
GLUCOSE SERPL-MCNC: 91 MG/DL (ref 65–99)
HCT VFR BLD AUTO: 29.5 % (ref 34–46.6)
HGB BLD-MCNC: 9.6 G/DL (ref 12–15.9)
MCH RBC QN AUTO: 28.9 PG (ref 26.6–33)
MCHC RBC AUTO-ENTMCNC: 32.5 G/DL (ref 31.5–35.7)
MCV RBC AUTO: 88.9 FL (ref 79–97)
NT-PROBNP SERPL-MCNC: 4645 PG/ML (ref 0–1800)
PLATELET # BLD AUTO: 515 10*3/MM3 (ref 140–450)
PMV BLD AUTO: 9 FL (ref 6–12)
POTASSIUM SERPL-SCNC: 3.9 MMOL/L (ref 3.5–5.2)
RBC # BLD AUTO: 3.32 10*6/MM3 (ref 3.77–5.28)
SODIUM SERPL-SCNC: 140 MMOL/L (ref 136–145)
WBC # BLD AUTO: 4.99 10*3/MM3 (ref 3.4–10.8)

## 2020-10-02 PROCEDURE — 83880 ASSAY OF NATRIURETIC PEPTIDE: CPT | Performed by: NURSE PRACTITIONER

## 2020-10-02 PROCEDURE — 99233 SBSQ HOSP IP/OBS HIGH 50: CPT | Performed by: INTERNAL MEDICINE

## 2020-10-02 PROCEDURE — 85027 COMPLETE CBC AUTOMATED: CPT | Performed by: NURSE PRACTITIONER

## 2020-10-02 PROCEDURE — 94799 UNLISTED PULMONARY SVC/PX: CPT

## 2020-10-02 PROCEDURE — 97110 THERAPEUTIC EXERCISES: CPT

## 2020-10-02 PROCEDURE — 80048 BASIC METABOLIC PNL TOTAL CA: CPT | Performed by: NURSE PRACTITIONER

## 2020-10-02 PROCEDURE — 0 IOPAMIDOL PER 1 ML: Performed by: PHYSICAL MEDICINE & REHABILITATION

## 2020-10-02 PROCEDURE — 92526 ORAL FUNCTION THERAPY: CPT

## 2020-10-02 PROCEDURE — 71046 X-RAY EXAM CHEST 2 VIEWS: CPT

## 2020-10-02 PROCEDURE — 97530 THERAPEUTIC ACTIVITIES: CPT

## 2020-10-02 PROCEDURE — 71275 CT ANGIOGRAPHY CHEST: CPT

## 2020-10-02 RX ORDER — BUDESONIDE 0.5 MG/2ML
0.5 INHALANT ORAL
Status: DISCONTINUED | OUTPATIENT
Start: 2020-10-02 | End: 2020-10-09 | Stop reason: HOSPADM

## 2020-10-02 RX ORDER — IPRATROPIUM BROMIDE AND ALBUTEROL SULFATE 2.5; .5 MG/3ML; MG/3ML
3 SOLUTION RESPIRATORY (INHALATION)
Status: DISCONTINUED | OUTPATIENT
Start: 2020-10-02 | End: 2020-10-09

## 2020-10-02 RX ADMIN — CILOSTAZOL 100 MG: 100 TABLET ORAL at 20:58

## 2020-10-02 RX ADMIN — FLUTICASONE FUROATE, UMECLIDINIUM BROMIDE AND VILANTEROL TRIFENATATE 1 PUFF: 100; 62.5; 25 POWDER RESPIRATORY (INHALATION) at 08:43

## 2020-10-02 RX ADMIN — IOPAMIDOL 100 ML: 755 INJECTION, SOLUTION INTRAVENOUS at 14:00

## 2020-10-02 RX ADMIN — METOPROLOL SUCCINATE 50 MG: 50 TABLET, EXTENDED RELEASE ORAL at 20:58

## 2020-10-02 RX ADMIN — CLOPIDOGREL 75 MG: 75 TABLET, FILM COATED ORAL at 08:53

## 2020-10-02 RX ADMIN — FERROUS SULFATE TAB 325 MG (65 MG ELEMENTAL FE) 325 MG: 325 (65 FE) TAB at 08:53

## 2020-10-02 RX ADMIN — OXYBUTYNIN CHLORIDE 5 MG: 5 TABLET, EXTENDED RELEASE ORAL at 08:52

## 2020-10-02 RX ADMIN — Medication 1 TABLET: at 11:45

## 2020-10-02 RX ADMIN — CILOSTAZOL 100 MG: 100 TABLET ORAL at 08:53

## 2020-10-02 RX ADMIN — ALPRAZOLAM 0.25 MG: 0.25 TABLET ORAL at 21:12

## 2020-10-02 RX ADMIN — BUDESONIDE 0.5 MG: 0.5 INHALANT ORAL at 20:41

## 2020-10-02 RX ADMIN — PRAMIPEXOLE DIHYDROCHLORIDE 0.12 MG: 0.25 TABLET ORAL at 20:58

## 2020-10-02 RX ADMIN — LOSARTAN POTASSIUM 50 MG: 50 TABLET, FILM COATED ORAL at 08:52

## 2020-10-02 RX ADMIN — METOPROLOL SUCCINATE 50 MG: 50 TABLET, EXTENDED RELEASE ORAL at 08:52

## 2020-10-02 RX ADMIN — ATORVASTATIN CALCIUM 40 MG: 20 TABLET, FILM COATED ORAL at 20:58

## 2020-10-02 RX ADMIN — IPRATROPIUM BROMIDE AND ALBUTEROL SULFATE 3 ML: 2.5; .5 SOLUTION RESPIRATORY (INHALATION) at 20:41

## 2020-10-02 RX ADMIN — PANTOPRAZOLE SODIUM 40 MG: 40 TABLET, DELAYED RELEASE ORAL at 06:30

## 2020-10-02 RX ADMIN — ASPIRIN 81 MG: 81 TABLET, CHEWABLE ORAL at 08:53

## 2020-10-02 NOTE — PROGRESS NOTES
"Grace Beuachamp  1940 80 y.o.  [id number]      Patient Care Team:  Miller Castañeda MD as PCP - General (Internal Medicine)  Miller Castañeda MD as PCP - Claims Attributed  Mart Ontiveros MD as Surgeon (Cardiothoracic Surgery)  Tres De Los Santos MD as Consulting Physician (Cardiology)  Graham Mcconnell MD as Consulting Physician (Hematology and Oncology)  Payam Byrnes MD as Consulting Physician (Otolaryngology)  Jonathan Smith MD as Consulting Physician (Vascular Surgery)  Victor M Cabral MD as Surgeon (Orthopedic Surgery)  Yaya Burks MD as Consulting Physician (Pulmonary Disease)    CC: Aneurysm with emergent cardiac surgery, COPD    Interval History: She is short of breath and is frustrated that she is her breathing is not better      Objective   Vital Signs  Temp:  [97 °F (36.1 °C)-97.6 °F (36.4 °C)] 97.5 °F (36.4 °C)  Heart Rate:  [72-77] 73  Resp:  [18] 18  BP: (117-139)/(55-76) 139/76    Intake/Output Summary (Last 24 hours) at 10/2/2020 1032  Last data filed at 10/2/2020 0800  Gross per 24 hour   Intake 600 ml   Output --   Net 600 ml     Flowsheet Rows      First Filed Value   Admission Height  142.2 cm (56\") Documented at 09/14/2020 0927   Admission Weight  37.6 kg (83 lb) Documented at 09/14/2020 0927          Physical Exam:   General Appearance:    Alert,oriented, in no acute distress   Lungs:     Clear to auscultation,BS are equal    Heart:    Normal S1 and S2, RRR without murmur, gallop or rub   HEENT:    Sclerae are clear, no JVD or adenopathy   Abdomen:     Normal bowel sounds, soft nontender, nondistended, no HSM   Extremities:   Moves all extremities well, no edema, no cyanosis, no             Redness, no rash     Medication Review:      aspirin, 81 mg, Oral, Daily  atorvastatin, 40 mg, Oral, Nightly  cilostazol, 100 mg, Oral, BID  clopidogrel, 75 mg, Oral, Daily  ferrous sulfate, 325 mg, Oral, Daily With Breakfast  furosemide, 20 mg, Oral, Every Other Day  losartan, 50 mg, " Oral, Daily  metoprolol succinate XL, 50 mg, Oral, Q12H  multivitamin, 1 tablet, Oral, Daily  oxybutynin XL, 5 mg, Oral, Daily  pantoprazole, 40 mg, Oral, QAM  potassium chloride, 10 mEq, Oral, Every Other Day  pramipexole, 0.125 mg, Oral, Nightly  Fluticasone-Umeclidin-Vilant, 1 puff, Oral, Daily             I reviewed the patient's new clinical results.  I personally viewed and interpreted the patient's EKG/Telemetry data    Assessment/Plan  Active Hospital Problems    Diagnosis  POA   • Immobility syndrome [M62.3]  Yes      Resolved Hospital Problems   No resolved problems to display.     Difficult to know exactly why she is short of breath her proBNP is elevated.  Her chest x-ray does not look like florid heart failure.  She does have pulmonary hypertension that could raise her proBNP.  I she is at risk for pulmonary embolus.  I think a CT angiogram is a good idea and I went to get that.  I also think we need to have her pulmonary physicians get on her case and make sure we are optimizing her lung care.  I am not convinced that she has CHF and she does not want to take Lasix and potassium    Tres De Los Santos MD  10/02/20  10:32 EDT

## 2020-10-02 NOTE — PROGRESS NOTES
Inpatient Rehabilitation Plan of Care Note    Plan of Care  Care Plan Reviewed - No updates at this time.    Psychosocial    Performed Intervention(s)  verbalize needs and concerns      Safety    Performed Intervention(s)  bed/chair alarms  hourly rounding  items within reach      Sphincter Control    Performed Intervention(s)  hourly rounding  adequate fluid intake      Body Systems    Performed Intervention(s)  dressing changes or wound care per orders  WOCN  monitor for signs of infection    Signed by: Florence Ramirez RN

## 2020-10-02 NOTE — THERAPY TREATMENT NOTE
Inpatient Rehabilitation - [facility] Speech Language Pathology   Swallow Treatment Note/Discharge   [facility]     Patient Name: Grace Beauchamp  : 1940  MRN: [id number]  Today's Date: 10/2/2020               Admit Date: 2020    Visit Dx:      ICD-10-CM ICD-9-CM   1. Pericardial hematoma  I31.2 423.0   2. Ventricular aneurysm  I25.3 414.10   3. Gait abnormality  R26.9 781.2     Patient Active Problem List   Diagnosis   • History of breast cancer   • Stenosis of carotid artery   • Chronic obstructive pulmonary disease (CMS/HCC)   • Closed fracture of multiple ribs   • Cognitive disorder   • Erythromelalgia (CMS/HCC)   • Hyperlipidemia   • Hypertension   • Primary osteoarthritis involving multiple joints   • Osteoporosis   • Restless legs syndrome   • Cerebral aneurysm   • Temporary cerebral vascular dysfunction   • S/P CABG x 1   • Type 2 diabetes mellitus without complication, without long-term current use of insulin (CMS/HCC)   • S/P ascending aortic aneurysm repair   • 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HCC)   • Descending thoracic aortic aneurysm (CMS/HCC)   • Claudication of both lower extremities (CMS/HCC)   • 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HCC)   • Ventral hernia without obstruction or gangrene   • Breast cancer, stage 1, estrogen receptor positive (CMS/HCC)   • Arthritis of right hip   • Arthritis of right knee   • Chondrocalcinosis   • Chronic pain of right knee   • Degenerative disc disease, lumbar   • Gastroesophageal reflux disease   • Bilateral hearing loss   • Thoracic aortic aneurysm 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)   • Erythromelalgia (CMS/HCC)   • 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CMS/HCC)   • Thoracoabdominal aortic aneurysm, 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)   • 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)   • Aortic aneurysm, descending (CMS/HCC)   • Aortic aneurysm 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)   • CAD (coronary artery disease)   • S/P left heart catheterization by ventricular puncture   • NSTEMI (non-ST  elevated myocardial infarction) (CMS/HCC)   • Pericardial hematoma   • History of ST elevation myocardial infarction (STEMI)   • Acute on chronic diastolic CHF (congestive heart failure) (CMS/HCC)   • Ventricular aneurysm   • Immobility syndrome       Therapy Treatment    Evaluation/Coping         Vitals/Pain/Safety         Cognition/Communication         Oral Motor/Eating         Mobility/Basic Activities/Instrumental Activities/Motor/Modality                   ROM/MMT                   Sensory/Myotome/Dermatome/Edema               Posture/Balance/Special Tests/Exercise/Transportation/Sexual Function                   Orthotics/Residual Limb/Prosthetic Management              Outcome Summary     Pt seen in SLP office sitting upright in wheelchair. Pt reporting shortness of breath that is worsened by dysphagia tx. Pt with fair effort this date.     Patient was not wearing a face mask during this therapy encounter. Therapist used appropriate personal protective equipment including mask, eye protection and gloves.  Mask used was standard procedure mask. Appropriate PPE was worn during the entire therapy session. Hand hygiene was completed before and after therapy session. Patient is not in enhanced droplet precautions.           SLP GOALS     Row Name 10/02/20 1030 10/01/20 1200 09/30/20 1330       Oral Nutrition/Hydration Goal 1 (SLP)    Barriers (Oral Nutrition/Hydration Goal 1, SLP)  No s/s of aspiration or penetration in 5/10 trials of water by cup; throat clear x4, overt cough x1. Pt cued to utilize controlled swallow and effortful swallow during trials this date to improve timing/decrease premature spillage and improve laryngeal closure.   -ML  No s/s of aspiration or penetration in 3/8 trials of water by cup; throat clear x4, cough x1.  -AL  No s/s of aspiration or penetration in 5/7 trials of water by tsp and 0/6 trials of water by cup. Exhibited throat clear in 2/7 trials of water by tsp and 6/6 trials of  "water by cup. Difficult to assess whether there is a habitual throat clear; pt often throat clears in anticipation of taking sip of water.  -AL    Progress/Outcomes (Oral Nutrition/Hydration Goal 1, SLP)  goal ongoing  -ML  goal ongoing  -AL  --       Lingual Strengthening Goal 1 (SLP)    Barriers (Lingual Strengthening Goal 1, SLP)  Hawk x10. Pt initially exhibiting strong \"hawk\" on initial few but noted quick decrease of intensity and articulation due to shortness of breath per pt. Pt required breaks to completed.   -ML  --  Hawk x30 with MOD cues.  -AL    Progress/Outcomes (Lingual Strengthening Goal 1, SLP)  goal ongoing  -  --  --       Pharyngeal Strengthening Exercise Goal 1 (SLP)    Barriers (Pharyngeal Strengthening Goal 1, SLP)  Completed effortful swallow x20 during trials of thin liquids and between with min-mod cues over extended period of time. Pt reporting exercise made her throat hurt yesterday when completing without moisture. Pt required breaks due to shortness of breath.   -ML  Completed 20 repetitions of effortful swallow with MOD cues. Fatigue noted today.   -AL  Completed 20 repetitions of effortful swallow with MOD-MAX cues; increased fatigue noted in this session. Completed 10 repetitions of high-pitched /i/ with MIN cues.  -AL    Progress/Outcomes (Pharyngeal Strengthening Goal 1, SLP)  goal ongoing  -  --  goal ongoing  -AL    Row Name 09/30/20 1230             Oral Nutrition/Hydration Goal 1 (SLP)    Barriers (Oral Nutrition/Hydration Goal 1, SLP)  No s/s of aspiration or penetration observed in 3/11 trials of water by cup; throat clear x8.  -AL         Pharyngeal Strengthening Exercise Goal 1 (SLP)    Barriers (Pharyngeal Strengthening Goal 1, SLP)  Completed 20 repetitions of effortful swallow with MIN-MOD cues. Completed 3 repetitions of 1 minute CTAR with ball with MOD-MAX cues. Completed 5 repetitions of Laura with MOD-MAX cues.  -AL        User Key  (r) = Recorded By, (t) = " Taken By, (c) = Cosigned By    Initials Name Provider Type    Vangie Gonzalez MS CCC-SLP Speech and Language Pathologist    Vanessa Paredes, MS CCC-SLP Speech and Language Pathologist          EDUCATION  The patient has been educated in the following areas:   Dysphagia (Swallowing Impairment).    SLP Recommendation and Plan                                                    Time Calculation:   Time Calculation- SLP     Row Name 10/02/20 1148             Time Calculation- SLP    SLP Start Time  1100  -ML      SLP Stop Time  1130  -ML      SLP Time Calculation (min)  30 min  -ML      SLP Received On  10/02/20  -ML        User Key  (r) = Recorded By, (t) = Taken By, (c) = Cosigned By    Initials Name Provider Type    Vangie Gonzalez MS CCC-SLP Speech and Language Pathologist          Therapy Charges for Today     Code Description Service Date Service Provider Modifiers Qty    [id number]  ST TREATMENT SWALLOW 2 10/2/2020 Vangie Blevins MS CCC-SLP GN 1                    MS AKHIL Caceres  10/2/2020

## 2020-10-02 NOTE — PROGRESS NOTES
Inpatient Rehabilitation Plan of Care Note    Plan of Care  Care Plan Reviewed - No updates at this time.    Psychosocial    Performed Intervention(s)  verbalize needs and concerns      Safety    Performed Intervention(s)  bed/chair alarms  hourly rounding  items within reach      Sphincter Control    Performed Intervention(s)  hourly rounding  adequate fluid intake      Body Systems    Performed Intervention(s)  dressing changes or wound care per orders  WOCN  monitor for signs of infection    Signed by: Berenice Perry RN

## 2020-10-02 NOTE — PLAN OF CARE
Goal Outcome Evaluation:  Plan of Care Reviewed With: patient  Progress: improving  Outcome Summary: Mrs Beauchamp is alert, oriented. general weakness. she ambulates with walker with CGA of 1 and has AFO for RLE. She has been having intermittant c/o SOA. She was seen by cardiology and pulmonary today. CT chest was negative for PE. Breathing txs have been added.

## 2020-10-02 NOTE — THERAPY TREATMENT NOTE
Inpatient Rehabilitation - Physical Therapy Treatment Note       [facility]     Patient Name: Grace Beauchamp  : 1940  MRN: [id number]    Today's Date: 10/2/2020                    Admit Date: 2020      Visit Dx:     ICD-10-CM ICD-9-CM   1. Pericardial hematoma  I31.2 423.0   2. Ventricular aneurysm  I25.3 414.10   3. Gait abnormality  R26.9 781.2       Patient Active Problem List   Diagnosis   • History of breast cancer   • Stenosis of carotid artery   • Chronic obstructive pulmonary disease (CMS/[facility])   • Closed fracture of multiple ribs   • Cognitive disorder   • Erythromelalgia (CMS/[facility])   • Hyperlipidemia   • Hypertension   • Primary osteoarthritis involving multiple joints   • Osteoporosis   • Restless legs syndrome   • Cerebral aneurysm   • Temporary cerebral vascular dysfunction   • S/P CABG x 1   • Type 2 diabetes mellitus without complication, without long-term current use of insulin (CMS/[facility])   • S/P ascending aortic aneurysm repair   • 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[facility])   • Descending thoracic aortic aneurysm (CMS/[facility])   • Claudication of both lower extremities (CMS/[facility])   • 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[facility])   • Ventral hernia without obstruction or gangrene   • Breast cancer, stage 1, estrogen receptor positive (CMS/[facility])   • Arthritis of right hip   • Arthritis of right knee   • Chondrocalcinosis   • Chronic pain of right knee   • Degenerative disc disease, lumbar   • Gastroesophageal reflux disease   • Bilateral hearing loss   • Thoracic aortic aneurysm without rupture (CMS/[facility])   • Erythromelalgia (CMS/[facility])   • 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CMS/[facility])   • Thoracoabdominal aortic aneurysm, without rupture (CMS/[facility])   • 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/[facility])   • Aortic aneurysm, descending (CMS/[facility])   • Aortic aneurysm without rupture (CMS/[facility])   • CAD (coronary artery disease)   • S/P left heart catheterization by ventricular puncture   • NSTEMI (non-ST elevated myocardial  "infarction) (CMS/[facility])   • Pericardial hematoma   • History of ST elevation myocardial infarction (STEMI)   • Acute on chronic diastolic CHF (congestive heart failure) (CMS/[facility])   • Ventricular aneurysm   • Immobility syndrome       Past Medical History:   Diagnosis Date   • AAA (abdominal aortic aneurysm) (CMS/[facility])    • Acute bronchitis 12/2018   • 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[facility])    • Arthritis    • Bilateral carotid artery disease (CMS/[facility])    • Bilateral cataracts     S/p Extractions   • Breast cancer (CMS/[facility])     right breast [id number] (snm) pMX ER/WV pos, Her-2/neg, Ki-67, 31%, oncotype recurrence score 19, invasive lobular carcinoma   • CAD (coronary artery disease)     S/p CABG on 2/23/16 by Dr. 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[facility])    • Cataracts, bilateral    • Closed fracture of three ribs of right side    • Cognitive disorder    • COPD (chronic obstructive pulmonary disease) (CMS/[facility])    • Depression    • Descending aortic arch aneurysm (CMS/[facility])    • Diabetes mellitus (CMS/[facility])     \"BORDERLINE\"   • Erythermalgia (CMS/[facility])    • GERD (gastroesophageal reflux disease)    • Hyperlipidemia     Controlled w/Meds   • Hypertension     Controlled w/Meds   • Low back pain    • Moderate aortic valve insufficiency     S/p AVR on 02/23/16 by Dr. Ontiveros   • Nocturia    • Osteoarthritis    • Osteoporosis    • Otitis media     R Ear   • Prediabetes    • PVD (peripheral vascular disease) (CMS/[facility])    • RLS (restless legs syndrome)    • Saccular aneurysm    • Stroke (CMS/[facility])     Perioperatively w/L Hip Repl   • Thoracic ascending aortic aneurysm (CMS/[facility])     S/p Repair on 02/23/16 by Dr. Ontiveros   • TIA (transient ischemic attack)    • Urgency incontinence    • Venous insufficiency    • Ventral hernia        Past Surgical History:   Procedure Laterality Date   • AORTIC VALVE REPAIR/REPLACEMENT N/A 02/23/2016-[facility]    Ascending Replacement Using a 24MM Graft--Dr. Ontiveros   • APPENDECTOMY  1977   • BREAST BIOPSY Right 09/16/2012 "    Vacuum Assisted Core Bx of R Breast   • CARDIAC CATHETERIZATION N/A 01/06/2016    Dr. Tres De Los Santos   • CARDIAC CATHETERIZATION N/A 4/22/2019    Procedure: Left Heart Cath;  Surgeon: Tres De Los Santos MD;  Location:  [facility];  Service: Cardiology   • CARDIAC CATHETERIZATION N/A 4/22/2019    Procedure: Coronary angiography;  Surgeon: Tres De Los Santos MD;  Location:  [facility];  Service: Cardiology   • CARDIAC CATHETERIZATION N/A 7/22/2020    Procedure: LEFT HEART CATH;  Surgeon: Antonia Scott MD;  Location:  [facility];  Service: Cardiovascular;  Laterality: N/A;   • CARDIAC CATHETERIZATION N/A 7/22/2020    Procedure: CORONARY ANGIOGRAPHY;  Surgeon: Antonia Scott MD;  Location:  [facility];  Service: Cardiovascular;  Laterality: N/A;   • CARDIAC CATHETERIZATION N/A 7/22/2020    Procedure: Left ventriculography;  Surgeon: Antonia Scott MD;  Location:  [facility];  Service: Cardiovascular;  Laterality: N/A;   • CARDIAC CATHETERIZATION N/A 7/22/2020    Procedure: Saphenous Vein Graft;  Surgeon: Antonia Scott MD;  Location:  [facility];  Service: Cardiovascular;  Laterality: N/A;   • CARDIAC SURGERY  02/2016    Dr. Ontiveros/Dr. De Los Santos   • CATARACT EXTRACTION Bilateral     [facility]   • COLONOSCOPY N/A 10/2002    Dr. Negro   • CORONARY ARTERY BYPASS GRAFT  02/23/2016-[facility]    x1 w/L Vein Grafting--Dr. Ontiveros   • CORONARY ARTERY BYPASS GRAFT N/A 9/18/2020    Procedure: STERNAL EXPLORATION WITH CLOSURE AND WASHOUT, REMOVAL OF IABP, PRP;  Surgeon: Mart Ontiveros MD;  Location: [facility] OR;  Service: Cardiothoracic;  Laterality: N/A;   • CYST REMOVAL Right 01/25/2013    R Palm Excision of Retinacular Cyst--Dr. Karla Fish   • EPIDURAL BLOCK     • LAPAROSCOPIC CHOLECYSTECTOMY N/A 08/04/2004    Dr. JOEY Sheth   • MASTECTOMY Right 09/28/2012   • ORIF HIP FRACTURE Left 03/27/2005    w/  Dr. Victor M Marley   • RECTOVAGINAL FISTULA REPAIR  1965   • THORACIC AORTIC ANEURYSM REPAIR N/A 7/23/2019    Procedure: ROSE, THORACOABDOMINAL AORTIC ANEURYSM REPAIR, VISCERAL VESSEL REPAIR, LARGE VENTRAL HERNIA REPAIR WITH MESH, CIRCULATORY ARREST, PRP;  Surgeon: Mart Ontiveros MD;  Location: [facility] OR;  Service: Cardiothoracic   • TRANSESOPHAGEAL ECHOCARDIOGRAM (ROSE) N/A 9/18/2020    Procedure: TRANSESOPHAGEAL ECHOCARDIOGRAM WITH ANESTHESIA;  Surgeon: Mart Ontiveros MD;  Location: [facility] OR;  Service: Cardiothoracic;  Laterality: N/A;   • TUBAL ABDOMINAL LIGATION     • VENTRICULAR ANEURYSM REPAIR N/A 7/22/2020    Procedure: EMERGENT REOP STERNOTOMY, REPAIR OF LV RUPTURE, PRP, INTRAOP ROSE;  Surgeon: Mart Ontiveros MD;  Location: [facility] OR;  Service: Cardiothoracic;  Laterality: N/A;   • VENTRICULAR ANEURYSM REPAIR N/A 9/17/2020    Procedure: ROSE, MIDLINE STERNOTOMY REOP  LEFT VENTRICULAR ANEURYSM REPAIR, IABP INSERTION, PRP;  Surgeon: Mart Ontiveros MD;  Location: [facility];  Service: Cardiothoracic;  Laterality: N/A;          PT ASSESSMENT (last 12 hours)      IRF PT Evaluation and Treatment     Row Name 10/02/20 1043          PT Time and Intention    Document Type  daily treatment  -     Mode of Treatment  physical therapy  -     Patient/Family/Caregiver Comments/Observations  Pt arrived to PT from OT session.  -     Row Name 10/02/20 1043          Cognition/Psychosocial    Affect/Mental Status (Cognitive)  WFL  -     Orientation Status (Cognition)  oriented x 4  -     Follows Commands (Cognition)  follows one-step commands  -     Personal Safety Interventions  fall prevention program maintained;gait belt;nonskid shoes/slippers when out of bed;supervised activity  -     Row Name 10/02/20 1043          Pain Scale: Numbers Pre/Post-Treatment    Pretreatment Pain Rating  0/10 - no pain  -     Posttreatment Pain Rating  0/10 - no pain  -     Row  Name 10/02/20 1043          Bed Mobility    Supine-Sit [place] (Bed Mobility)  minimum assist (75% patient effort);verbal cues;set up  -     Sit-Supine [place] (Bed Mobility)  minimum assist (75% patient effort);verbal cues;set up  -     Assistive Device (Bed Mobility)  bed rails;head of bed elevated  -     Comment (Bed Mobility)  got pt up to stretcher for CT scan  -     Row Name 10/02/20 1043          Transfer Assessment/Treatment    Comment (Transfers)  dec balance post in standing and dressing in bathroom.  -     Row Name 10/02/20 1043          Transfers    Chair-Bed [place] (Transfers)  minimum assist (75% patient effort);verbal cues  -     Sit-Stand [place] (Transfers)  minimum assist (75% patient effort);contact guard;verbal cues  -     Stand-Sit [place] (Transfers)  minimum assist (75% patient effort);contact guard;verbal cues  -     [place] Level (Toilet Transfer)  minimum assist (75% patient effort);verbal cues  -     Assistive Device (Toilet Transfer)  commode;grab bars/safety frame;walker, front-wheeled  -     Row Name 10/02/20 1043          Chair-Bed Transfer    Assistive Device (Chair-Bed Transfers)  wheelchair  -     Row Name 10/02/20 1043          Sit-Stand Transfer    Assistive Device (Sit-Stand Transfers)  walker, front-wheeled;walker, 4-wheeled  -     Row Name 10/02/20 1043          Stand-Sit Transfer    Assistive Device (Stand-Sit Transfers)  walker, front-wheeled;walker, 4-wheeled  -     Row Name 10/02/20 1043          Toilet Transfer    Type (Toilet Transfer)  sit-stand;stand-sit  -     Row Name 10/02/20 1043          Gait/Stairs (Locomotion)    [place] Level (Gait)  contact guard;verbal cues VC to stay close to rwx  -     Assistive Device (Gait)  walker, front-wheeled;walker, 4-wheeled r AFO  -     Distance in Feet (Gait)  80 x 3 in am, 15x2, 90 in pm  -     Pattern (Gait)  step-through  -KP     Deviations/Abnormal Patterns  (Gait)  chris decreased;stride length decreased  -     Bilateral Gait Deviations  forward flexed posture  -     Right Sided Gait Deviations  heel strike decreased mild dec step length, knee flexed in stance.  -     Comment (Gait/Stairs)  Pt reports that she switches between rwx, rollator and no AD at home.  Trialed rollator today and pt demonstrated imprved walker positioning , otherwise gait pattern was the same.  Pt reports that she felt as safe with rollator or rwx.  Pt gait distance is limited by SOA.    -     Row Name 10/02/20 1043          Safety Issues, Functional Mobility    Safety Issues Affecting Function (Mobility)  awareness of need for assistance;insight into deficits/self-awareness  -     Impairments Affecting Function (Mobility)  balance;endurance/activity tolerance;shortness of breath;strength  -     Row Name 10/02/20 1043          Balance    Balance Interventions  standing;static;dynamic reaching  -     Comment, Balance  dec balance post in static standing  -     Row Name 10/02/20 1043          Hip (Therapeutic Exercise)    Hip Strengthening (Therapeutic Exercise)  bilateral;sitting;flexion;10 repetitions  -     Row Name 10/02/20 1043          Knee (Therapeutic Exercise)    Knee Strengthening (Therapeutic Exercise)  bilateral;LAQ (long arc quad);sitting;10 repetitions  -     Row Name 10/02/20 1043          Ankle (Therapeutic Exercise)    Ankle AROM (Therapeutic Exercise)  bilateral;dorsiflexion;sitting;10 repetitions  -     Row Name 10/02/20 1043          Orthotic Management    Orthosis Location  AFO (ankle foot orthosis)  -     Additional Documentation  Orthosis Location (Row)  -     Row Name 10/02/20 1043          Ankle Foot Orthosis Management    Type (Ankle/Foot Orthosis)  right;AFO (ankle foot orthosis)  -     Fabrication Comment (Ankle Foot Orthosis)  carbon fiber, ant shelf  -     Therapeutic Indications (Ankle Foot Orthosis)  compensation for lost function   -     Wearing Schedule (Ankle Foot Orthosis)  wear with activity/work  -     Row Name 10/02/20 1043          Positioning and Restraints    Pre-Treatment Position  sitting in chair/recliner  -     In Wheelchair  sitting;encouraged to call for assist;exit alarm on;with SLP  -     Row Name 10/02/20 1043          Vital Signs    Pre SpO2 (%)  98  -KP     O2 Delivery Pre Treatment  room air  -KP     Post SpO2 (%)  94 pt reorts feeling SOA  -KP     O2 Delivery Post Treatment  room air  -KP     Pre Patient Position  Sitting  -KP     Post Patient Position  Sitting  -KP       User Key  (r) = Recorded By, (t) = Taken By, (c) = Cosigned By    Initials Name Provider Type     Karena Sears, PT Physical Therapist        Wound 09/17/20 0810 Right anterior groin Incision (Active)   Dressing Appearance open to air 10/02/20 0800   Closure Adhesive closure strips 10/02/20 0800   Base dry;clean 10/02/20 0800   Periwound intact 10/02/20 0800   Drainage Amount none 10/02/20 0800   Care, Wound cleansed with;soap and water 10/02/20 0800   Dressing Care open to air 10/02/20 0800       Wound 09/17/20 0811 sternal Incision (Active)   Dressing Appearance dry;intact 10/02/20 0800   Closure Approximated 10/02/20 0800   Base dry;scab 10/02/20 0800   Periwound ecchymotic 10/02/20 0800   Periwound Temperature warm 10/01/20 2040   Drainage Amount none 10/01/20 2040   Care, Wound cleansed with;soap and water 10/02/20 0800   Dressing Care open to air 10/02/20 0800       Wound 09/25/20 Other (See comments) medial coccyx Pressure Injury (Active)   Dressing Appearance open to air 10/02/20 0800   Closure Open to air 10/01/20 2040   Base blanchable;pink;dry 10/02/20 0800   Periwound blanchable;pink 10/02/20 0800   Drainage Amount none 10/01/20 2040   Care, Wound cleansed with;soap and water 10/02/20 0800     Physical Therapy Education                 Title: PT OT SLP Therapies (In Progress)     Topic: Physical Therapy (In Progress)     Point:  Mobility training (Done)     Learning Progress Summary           Patient Acceptance, E,TB,D, VU,DU,NR by  at 10/2/2020 1042    Comment: walker techniques    Acceptance, E,TB,D, VU,NR by  at 9/30/2020 1115    Acceptance, E,TB, VU,NR by  at 9/29/2020 1113    Acceptance, E,TB, NR,VU by  at 9/28/2020 1145    Acceptance, E, VU,NR by  at 9/26/2020 1158                   Point: Home exercise program (Done)     Learning Progress Summary           Patient Acceptance, E,TB,D, VU,DU,NR by  at 10/2/2020 1042    Comment: walker techniques    Acceptance, E,TB,D, VU,NR by  at 9/30/2020 1115    Acceptance, E,TB, VU,NR by  at 9/29/2020 1113    Acceptance, E,TB, NR,VU by  at 9/28/2020 1145                   Point: Body mechanics (Done)     Learning Progress Summary           Patient Acceptance, E,TB, VU,NR by  at 9/29/2020 1113    Acceptance, E,TB, NR,VU by  at 9/28/2020 1145                   Point: Precautions (Not Started)     Learner Progress:  Not documented in this visit.                      User Key     Initials Effective Dates Name Provider Type Discipline     04/03/18 -  Shruthi Roach, PT Physical Therapist PT     04/03/18 -  Araceli Jones, PT Physical Therapist PT     04/03/18 -  Karena Sears, PT Physical Therapist PT                PT Recommendation and Plan                          Time Calculation:     PT Charges     Row Name 10/02/20 1520 10/02/20 1420 10/02/20 1044       Time Calculation    Start Time  1500  -KP  1330  -KP  1030  -KP    Stop Time  1515  -KP  1345  -KP  1100  -KP    Time Calculation (min)  15 min  -KP  15 min  -KP  30 min  -KP    PT Received On  --  --  10/02/20  -    PT - Next Appointment  --  --  10/03/20  -      User Key  (r) = Recorded By, (t) = Taken By, (c) = Cosigned By    Initials Name Provider Type     Karena Sears, PT Physical Therapist          Therapy Charges for Today     Code Description Service Date Service Provider Modifiers Qty    13923687599  HC PT THERAPEUTIC ACT EA 15 MIN 10/2/2020 Karena Sears, PT GP 2    09822539082 HC PT THER PROC EA 15 MIN 10/2/2020 Karena Sears, PT GP 2              Patient was wearing a face mask during this therapy encounter. Therapist used appropriate personal protective equipment including eye protection, mask, and gloves.  Mask used was standard procedure mask. Appropriate PPE was worn during the entire therapy session. Hand hygiene was completed before and after therapy session. Patient is not in enhanced droplet precautions.         Karena Sears, PT  10/2/2020

## 2020-10-02 NOTE — PLAN OF CARE
Goal Outcome Evaluation:  Plan of Care Reviewed With: patient  Progress: improving   Slept well. Meds whole with pudding one at a time & on the tip /front of spoon. No complaints so far tonight. Refused SCD's. Continent of B&B so far tonight.

## 2020-10-02 NOTE — THERAPY TREATMENT NOTE
Inpatient Rehabilitation - Occupational Therapy Treatment Note    [facility]     Patient Name: Grace Beauchamp  : 1940  MRN: [id number]    Today's Date: 10/2/2020                 Admit Date: 2020         ICD-10-CM ICD-9-CM   1. Pericardial hematoma  I31.2 423.0   2. Ventricular aneurysm  I25.3 414.10   3. Gait abnormality  R26.9 781.2       Patient Active Problem List   Diagnosis   • History of breast cancer   • Stenosis of carotid artery   • Chronic obstructive pulmonary disease (CMS/HCC)   • Closed fracture of multiple ribs   • Cognitive disorder   • Erythromelalgia (CMS/[facility])   • Hyperlipidemia   • Hypertension   • Primary osteoarthritis involving multiple joints   • Osteoporosis   • Restless legs syndrome   • Cerebral aneurysm   • Temporary cerebral vascular dysfunction   • S/P CABG x 1   • Type 2 diabetes mellitus without complication, without long-term current use of insulin (CMS/[facility])   • S/P ascending aortic aneurysm repair   • 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[facility])   • Descending thoracic aortic aneurysm (CMS/[facility])   • Claudication of both lower extremities (CMS/[facility])   • 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[facility])   • Ventral hernia without obstruction or gangrene   • Breast cancer, stage 1, estrogen receptor positive (CMS/[facility])   • Arthritis of right hip   • Arthritis of right knee   • Chondrocalcinosis   • Chronic pain of right knee   • Degenerative disc disease, lumbar   • Gastroesophageal reflux disease   • Bilateral hearing loss   • Thoracic aortic aneurysm without rupture (CMS/[facility])   • Erythromelalgia (CMS/[facility])   • 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CMS/[facility])   • Thoracoabdominal aortic aneurysm, without rupture (CMS/[facility])   • 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/[facility])   • Aortic aneurysm, descending (CMS/[facility])   • Aortic aneurysm without rupture (CMS/[facility])   • CAD (coronary artery disease)   • S/P left heart catheterization by ventricular puncture   • NSTEMI (non-ST elevated myocardial infarction)  "(CMS/[facility])   • Pericardial hematoma   • History of ST elevation myocardial infarction (STEMI)   • Acute on chronic diastolic CHF (congestive heart failure) (CMS/[facility])   • Ventricular aneurysm   • Immobility syndrome       Past Medical History:   Diagnosis Date   • AAA (abdominal aortic aneurysm) (CMS/[facility])    • Acute bronchitis 12/2018   • 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[facility])    • Arthritis    • Bilateral carotid artery disease (CMS/[facility])    • Bilateral cataracts     S/p Extractions   • Breast cancer (CMS/[facility])     right breast zY2nsHu (snm) pMX ER/NV pos, Her-2/neg, Ki-67, 31%, oncotype recurrence score 19, invasive lobular carcinoma   • CAD (coronary artery disease)     S/p CABG on 2/23/16 by Dr. 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[facility])    • Cataracts, bilateral    • Closed fracture of three ribs of right side    • Cognitive disorder    • COPD (chronic obstructive pulmonary disease) (CMS/[facility])    • Depression    • Descending aortic arch aneurysm (CMS/[facility])    • Diabetes mellitus (CMS/[facility])     \"BORDERLINE\"   • Erythermalgia (CMS/[facility])    • GERD (gastroesophageal reflux disease)    • Hyperlipidemia     Controlled w/Meds   • Hypertension     Controlled w/Meds   • Low back pain    • Moderate aortic valve insufficiency     S/p AVR on 02/23/16 by Dr. Ontiveros   • Nocturia    • Osteoarthritis    • Osteoporosis    • Otitis media     R Ear   • Prediabetes    • PVD (peripheral vascular disease) (CMS/[facility])    • RLS (restless legs syndrome)    • Saccular aneurysm    • Stroke (CMS/[facility])     Perioperatively w/L Hip Repl   • Thoracic ascending aortic aneurysm (CMS/[facility])     S/p Repair on 02/23/16 by Dr. Ontiveros   • TIA (transient ischemic attack)    • Urgency incontinence    • Venous insufficiency    • Ventral hernia        Past Surgical History:   Procedure Laterality Date   • AORTIC VALVE REPAIR/REPLACEMENT N/A 02/23/2016-[facility]    Ascending Replacement Using a 24MM Graft--Dr. Ontiveros   • APPENDECTOMY  1977   • BREAST BIOPSY Right 09/16/2012    Vacuum " Assisted Core Bx of R Breast   • CARDIAC CATHETERIZATION N/A 01/06/2016    Dr. Tres De Los Santos   • CARDIAC CATHETERIZATION N/A 4/22/2019    Procedure: Left Heart Cath;  Surgeon: Tres De Los Santos MD;  Location:  [facility];  Service: Cardiology   • CARDIAC CATHETERIZATION N/A 4/22/2019    Procedure: Coronary angiography;  Surgeon: Tres De Los Santos MD;  Location:  [facility];  Service: Cardiology   • CARDIAC CATHETERIZATION N/A 7/22/2020    Procedure: LEFT HEART CATH;  Surgeon: Antonia Scott MD;  Location:  [facility];  Service: Cardiovascular;  Laterality: N/A;   • CARDIAC CATHETERIZATION N/A 7/22/2020    Procedure: CORONARY ANGIOGRAPHY;  Surgeon: Antonia Scott MD;  Location:  [facility];  Service: Cardiovascular;  Laterality: N/A;   • CARDIAC CATHETERIZATION N/A 7/22/2020    Procedure: Left ventriculography;  Surgeon: Antonia Scott MD;  Location:  [facility];  Service: Cardiovascular;  Laterality: N/A;   • CARDIAC CATHETERIZATION N/A 7/22/2020    Procedure: Saphenous Vein Graft;  Surgeon: Antonia Scott MD;  Location:  [facility];  Service: Cardiovascular;  Laterality: N/A;   • CARDIAC SURGERY  02/2016    Dr. Ontiveros/Dr. De Los Santos   • CATARACT EXTRACTION Bilateral     [facility]   • COLONOSCOPY N/A 10/2002    Dr. Negro   • CORONARY ARTERY BYPASS GRAFT  02/23/2016-[facility]    x1 w/L Vein Grafting--Dr. Ontiveros   • CORONARY ARTERY BYPASS GRAFT N/A 9/18/2020    Procedure: STERNAL EXPLORATION WITH CLOSURE AND WASHOUT, REMOVAL OF IABP, PRP;  Surgeon: Mart Ontiveros MD;  Location: [facility] MAIN OR;  Service: Cardiothoracic;  Laterality: N/A;   • CYST REMOVAL Right 01/25/2013    R Palm Excision of Retinacular Cyst--Dr. Karla Fish   • EPIDURAL BLOCK     • LAPAROSCOPIC CHOLECYSTECTOMY N/A 08/04/2004    Dr. JOEY Sheth   • MASTECTOMY Right 09/28/2012   • ORIF HIP FRACTURE Left 03/27/2005    w/ AMBI  compression Dr. Victor M noriega   • RECTOVAGINAL FISTULA REPAIR  1965   • THORACIC AORTIC ANEURYSM REPAIR N/A 7/23/2019    Procedure: ROSE, THORACOABDOMINAL AORTIC ANEURYSM REPAIR, VISCERAL VESSEL REPAIR, LARGE VENTRAL HERNIA REPAIR WITH MESH, CIRCULATORY ARREST, PRP;  Surgeon: Mart Ontiveros MD;  Location: [facility];  Service: Cardiothoracic   • TRANSESOPHAGEAL ECHOCARDIOGRAM (ROSE) N/A 9/18/2020    Procedure: TRANSESOPHAGEAL ECHOCARDIOGRAM WITH ANESTHESIA;  Surgeon: Mart Ontiveros MD;  Location: [facility] OR;  Service: Cardiothoracic;  Laterality: N/A;   • TUBAL ABDOMINAL LIGATION     • VENTRICULAR ANEURYSM REPAIR N/A 7/22/2020    Procedure: EMERGENT REOP STERNOTOMY, REPAIR OF LV RUPTURE, PRP, INTRAOP ROSE;  Surgeon: Mart Ontiveros MD;  Location: [facility] OR;  Service: Cardiothoracic;  Laterality: N/A;   • VENTRICULAR ANEURYSM REPAIR N/A 9/17/2020    Procedure: ROSE, MIDLINE STERNOTOMY REOP  LEFT VENTRICULAR ANEURYSM REPAIR, IABP INSERTION, PRP;  Surgeon: Mart Ontiveros MD;  Location: [facility];  Service: Cardiothoracic;  Laterality: N/A;            IRF OT ASSESSMENT FLOWSHEET (last 12 hours)      IRF OT Evaluation and Treatment     Row Name 10/02/20 1000          OT Time and Intention    Document Type  daily treatment  -SM     Mode of Treatment  occupational therapy  -SM     Patient Effort  good  -SM     Row Name 10/02/20 1000          General Information    Patient/Family/Caregiver Comments/Observations  Pt resting in bed prior to both sessions, c/o sore throat due to swallowing ex with ST yesterday.   -SM     Existing Precautions/Restrictions  fall;sternal;cardiac  -SM     Row Name 10/02/20 1000          Cognition/Psychosocial    Affect/Mental Status (Cognitive)  agitated;anxious after CT scan, more relaxed   -SM     Orientation Status (Cognition)  oriented x 4  -SM     Follows Commands (Cognition)  follows one-step commands  -SM     Personal Safety Interventions  fall prevention  program maintained;gait belt;nonskid shoes/slippers when out of bed  -     Row Name 10/02/20 1000          Pain Scale: FACES Pre/Post-Treatment    Pain: FACES Scale, Pretreatment  2-->hurts little bit  -     Posttreatment Pain Rating  2-->hurts little bit  -     Pain Location - Orientation  -- throat  -     Pre/Posttreatment Pain Comment  RN notifed.   -     Row Name 10/02/20 1000          Bed Mobility    Supine-Sit [place] (Bed Mobility)  minimum assist (75% patient effort)  -     Sit-Supine [place] (Bed Mobility)  minimum assist (75% patient effort)  -     Assistive Device (Bed Mobility)  bed rails;head of bed elevated  -     Row Name 10/02/20 1000          Functional Mobility    Functional Mobility- Ind. Level  contact guard assist  -     Functional Mobility- Device  rolling walker  -     Functional Mobility-Distance (Feet)  20  -     Functional Mobility- Comment  from bed to bathroom for ADLs.   -     Row Name 10/02/20 1000          Transfers    Bed-Chair [place] (Transfers)  minimum assist (75% patient effort);verbal cues  -     Chair-Bed [place] (Transfers)  minimum assist (75% patient effort);verbal cues  -     Assistive Device (Bed-Chair Transfers)  wheelchair  -     Sit-Stand [place] (Transfers)  minimum assist (75% patient effort);verbal cues  -     Stand-Sit [place] (Transfers)  minimum assist (75% patient effort);verbal cues  -     [place] Level (Toilet Transfer)  minimum assist (75% patient effort)  -     Assistive Device (Toilet Transfer)  grab bars/safety frame;walker, front-wheeled  -[facility] Name 10/02/20 1000          Chair-Bed Transfer    Assistive Device (Chair-Bed Transfers)  wheelchair  -[facility] Name 10/02/20 1000          Sit-Stand Transfer    Assistive Device (Sit-Stand Transfers)  wheelchair;walker, front-wheeled  -     Row Name 10/02/20 1000          Stand-Sit Transfer    Assistive Device (Stand-Sit Transfers)   wheelchair;walker, front-wheeled  -[facility] Name 10/02/20 1000          Toilet Transfer    Type (Toilet Transfer)  sit-stand  -[facility] Name 10/02/20 1000          Balance    Dynamic Sitting Balance  mild impairment;unsupported  -     Static Standing Balance  mild impairment;unsupported  -     Balance Interventions  standing;UE activity with balance activity;moderate challenge  -     Comment, Balance  Pt worked on dynamic standing balance with bean bag toss with no AD, mild episodes of LOB, several throughout activity where pt would lose balance backwards. CGA-Min A for recovery.   -[facility] Name 10/02/20 1000          Shoulder (Therapeutic Exercise)    Shoulder (Therapeutic Exercise)  AROM (active range of motion)  -     Shoulder AROM (Therapeutic Exercise)  bilateral;flexion;extension;10 repetitions;3 sets Rest breaks taken between sets.   -SM     Row Name 10/02/20 1000          Elbow/Forearm (Therapeutic Exercise)    Elbow/Forearm Strengthening (Therapeutic Exercise)  bilateral;flexion;extension;supination;pronation;1 lb free weight;10 repetitions;3 sets  -SM     Row Name 10/02/20 1000          Bathing    [place] Level (Bathing)  bathing skills;contact guard assist  -     Position (Bathing)  supported sitting  -     Set-up Assistance (Bathing)  obtain supplies  -     Comment (Bathing)  sponge bath at sink per pt request, CGA for standing to wash razia area.   -SM     Row Name 10/02/20 1000          Upper Body Dressing    [place] Level (Upper Body Dressing)  doff;don;pull over garment;set up assistance  -     Position (Upper Body Dressing)  supported sitting  -     Set-up Assistance (Upper Body Dressing)  obtain clothing  -[facility] Name 10/02/20 1000          Lower Body Dressing    [place] Level (Lower Body Dressing)  doff;don;pants/bottoms;shoes/slippers;socks;underwear;minimum assist (75% patient effort)  -     Position (Lower Body Dressing)  supported sitting;supported  standing  -     Set-up Assistance (Lower Body Dressing)  obtain clothing  -     Comment (Lower Body Dressing)  Pt able to don R AFO with SBA/set up today. Requires assist with socks.   -     Row Name 10/02/20 1000          Grooming    [place] Level (Grooming)  grooming skills;set up  -     Position (Grooming)  supported sitting  -     Row Name 10/02/20 1000          Toileting    [place] Level (Toileting)  toileting skills;contact guard assist  -     Assistive Device Use (Toileting)  grab bar/safety frame  -     Position (Toileting)  supported sitting;supported standing  -     Row Name 10/02/20 1000          Positioning and Restraints    Pre-Treatment Position  in bed  -     Post Treatment Position  wheelchair  -     In Chair  with PT after AM and PM sessions.   -     Row Name 10/02/20 1000          Daily Progress Summary (OT)    Overall Progress Toward Functional Goals (OT)  progressing toward functional goals as expected  -       User Key  (r) = Recorded By, (t) = Taken By, (c) = Cosigned By    Initials Name Effective Dates     Sunni Matt, OT 04/02/20 -            Occupational Therapy Education                 Title: PT OT SLP Therapies (In Progress)     Topic: Occupational Therapy (In Progress)     Point: ADL training (Done)     Description:   Instruct learner(s) on proper safety adaptation and remediation techniques during self care or transfers.   Instruct in proper use of assistive devices.              Learning Progress Summary           Patient Acceptance, E, VU by  at 9/26/2020 9952    Comment: Instructed pt on improved techniques for safety with ADLs and transfers with sternal precautions.                   Point: Home exercise program (Not Started)     Description:   Instruct learner(s) on appropriate technique for monitoring, assisting and/or progressing therapeutic exercises/activities.              Learner Progress:  Not documented in this visit.           Point: Precautions (Done)     Description:   Instruct learner(s) on prescribed precautions during self-care and functional transfers.              Learning Progress Summary           Patient Acceptance, E, VU by  at 9/26/2020 1218    Comment: Instructed pt on improved techniques for safety with ADLs and transfers with sternal precautions.                   Point: Body mechanics (Not Started)     Description:   Instruct learner(s) on proper positioning and spine alignment during self-care, functional mobility activities and/or exercises.              Learner Progress:  Not documented in this visit.                      User Key     Initials Effective Dates Name Provider Type Discipline     04/02/20 -  Sunni Matt OT Occupational Therapist OT                    OT Recommendation and Plan    Anticipated Discharge Disposition (OT): home with assist  Planned Therapy Interventions (OT): activity tolerance training, adaptive equipment training, BADL retraining, functional balance retraining, IADL retraining, neuromuscular control/coordination retraining, occupation/activity based interventions, patient/caregiver education/training, ROM/therapeutic exercise, strengthening exercise, transfer/mobility retraining       Daily Progress Summary (OT)  Overall Progress Toward Functional Goals (OT): progressing toward functional goals as expected            Time Calculation:       Therapy Charges for Today     Code Description Service Date Service Provider Modifiers Qty    [id number]  OT SELF CARE/MGMT/TRAIN EA 15 MIN 10/1/2020 Sunni Matt OT GO 2    [id number]  OT THERAPEUTIC ACT EA 15 MIN 10/1/2020 Sunni Matt OT GO 1    [id number]  OT THER PROC EA 15 MIN 10/1/2020 Sunni Matt OT GO 1                   Sunni Matt OT  10/2/2020

## 2020-10-02 NOTE — SIGNIFICANT NOTE
10/02/20 1240   OTHER   Discipline speech language pathologist   Rehab Time/Intention   Session Not Performed patient/family declined treatment  (Attempted to see pt for Speech therapy/dysphagia tx, however, pt feeling short of breath and declining tx for the rest of the afternoon until CAT scan is done per pt.)

## 2020-10-02 NOTE — PROGRESS NOTES
"    Patient Name: Grace Beauchamp  :1940  80 y.o.      Patient Care Team:  Miller Castañeda MD as PCP - General (Internal Medicine)  Miller Castañeda MD as PCP - Claims Attributed  Mart Ontiveros MD as Surgeon (Cardiothoracic Surgery)  Tres De Los Santos MD as Consulting Physician (Cardiology)  Graham Mcconnell MD as Consulting Physician (Hematology and Oncology)  Payam yBrnes MD as Consulting Physician (Otolaryngology)  Jonathan Smith MD as Consulting Physician (Vascular Surgery)  Victor M Cabral MD as Surgeon (Orthopedic Surgery)  Yaya Burks MD as Consulting Physician (Pulmonary Disease)    Chief Complaint: shortness of breath    Interval History: patient reports shortness of breath with exertion. She feels like she can't take a deep breath. She is pulling her IS to a little less than 1000. She also feels short of breath when talking or working with speech therapy.       Objective   Vital Signs  Temp:  [97 °F (36.1 °C)-98.7 °F (37.1 °C)] 97.6 °F (36.4 °C)  Heart Rate:  [72-77] 72  Resp:  [18] 18  BP: ()/(55-64) 117/64    Intake/Output Summary (Last 24 hours) at 10/1/2020 2202  Last data filed at 10/1/2020 1700  Gross per 24 hour   Intake 360 ml   Output --   Net 360 ml     Flowsheet Rows      First Filed Value   Admission Height  142.2 cm (56\") Documented at 2020 0919   Admission Weight  39.9 kg (88 lb) Documented at 2020 1603          Physical Exam:   General Appearance:    Alert, cooperative, in no acute distress   Lungs:     Rales right base to auscultation.  Normal respiratory effort and rate.      Heart:    Regular rhythm and normal rate, normal S1 and S2, no murmurs, gallops or rubs.     Chest Wall:   midline incision no drainage   Abdomen:     Soft, nontender, positive bowel sounds.     Extremities:   no cyanosis, clubbing or edema.  No marked joint deformities.  Adequate musculoskeletal strength.       Results Review:    Results from last 7 days   Lab Units " 10/01/20  0559   SODIUM mmol/L 141   POTASSIUM mmol/L 3.9   CHLORIDE mmol/L 105   CO2 mmol/L 28.3   BUN mg/dL 18   CREATININE mg/dL 0.77   GLUCOSE mg/dL 89   CALCIUM mg/dL 8.6         Results from last 7 days   Lab Units 10/01/20  0559   WBC 10*3/mm3 4.74   HEMOGLOBIN g/dL 8.8*   HEMATOCRIT % 27.9*   PLATELETS 10*3/mm3 470*                           Medication Review:   aspirin, 81 mg, Oral, Daily  atorvastatin, 40 mg, Oral, Nightly  cilostazol, 100 mg, Oral, BID  clopidogrel, 75 mg, Oral, Daily  furosemide, 20 mg, Oral, Every Other Day  losartan, 50 mg, Oral, Daily  metoprolol succinate XL, 50 mg, Oral, Q12H  oxybutynin XL, 5 mg, Oral, Daily  pantoprazole, 40 mg, Oral, QAM  potassium chloride, 10 mEq, Oral, Every Other Day  pramipexole, 0.125 mg, Oral, Nightly  Fluticasone-Umeclidin-Vilant, 1 puff, Oral, Daily              Assessment/Plan      1. Large basal lateral wall LV pseudoaneurysm.  Now status post repair with pericardial patch on 9/17.  Returned to OR on 9/18 for wound exploration, washout and closure and removal of IABP.  2. Post op anemia.  Stable  3. Post op thrombocytopenia.  now on the high side.   4. History of contained lateral wall rupture following late presenting lateral myocardial infarction.  Status post repair on 7/22.  5. Thoracoabdominal aortic aneurysm status post repair in 7/2019  6. Throacic aortic aneurysm status post ascending aortic aneurysm repair in 2/2016  7 Bioprosthetic aortic valve replacement in 2/2016  8 Coronary artery disease with prior CABG x 1 in 2/2016  9. COPD -  No active wheezing    Asked to see again for shortness of breath - CXR 3 days ago was pretty unremarkable. Will repeat CXR and check BNP. She is also a little anemic (expected post op). Not eating much because she doesn't like modified diet. Add multivitamin and iron supplement. SOA most likely from atelectasis and encouraged frequent use of IS but will rule out other etiologies as above.     Rupinder Johnston,  FREDRICK  [facility]  10/01/20  22:02 EDT     wheezes

## 2020-10-02 NOTE — PROGRESS NOTES
"   LOS: 7 days   Patient Care Team:  Miller Castañeda MD as PCP - General (Internal Medicine)  Miller Castañeda MD as PCP - Claims Attributed  Mart Ontiveros MD as Surgeon (Cardiothoracic Surgery)  Tres De Los Santos MD as Consulting Physician (Cardiology)  Graham Mcconnell MD as Consulting Physician (Hematology and Oncology)  Payam Byrnes MD as Consulting Physician (Otolaryngology)  Jonathan Smith MD as Consulting Physician (Vascular Surgery)  Victor M Cabral MD as Surgeon (Orthopedic Surgery)  Yaya Burks MD as Consulting Physician (Pulmonary Disease)    Chief Complaint:   immobility syndrome    Subjective     History of Present Illness    Subjective     Patient complains of increased shortness of air/fatigue.  Physical therapy notes that she was much more fatigued with ambulating just to the bathroom in her room.  Does not describe any productive sputum.  Maintaining O2 saturation.  Patient reviewed with cardiology service and cardiothoracic service.    History taken from: patient    Objective     Vital Signs  Temp:  [97.5 °F (36.4 °C)-98.2 °F (36.8 °C)] 98.2 °F (36.8 °C)  Heart Rate:  [72-84] 83  Resp:  [18-20] 20  BP: (117-165)/(64-88) 155/88    Objective:  Vital signs: (most recent): Blood pressure 155/88, pulse 83, temperature 98.2 °F (36.8 °C), resp. rate 20, height 142.2 cm (56\"), weight 41.8 kg (92 lb 2.4 oz), SpO2 100 %.            Physical Exam     Gen:    calm; pleasant  HEENT: NCAT, EOMI; MMM,  Neck:   Supple, gauze on R neck  CV:      RRR, no m/r/g appreciated  Lungs: diminished breath sounds in the bases;      Abd:     (+) BS, soft, non-tender, non-distended  Ext:      no new edema  Skin:   cardiac incision healing well  Psych: appropriate mood and affect  Neuro:  Mental Status: AOx4, Speech: fluent without dysarthria or scanning, No gross cognitive deficits  Strength: R > L foot drop         Results Review:     I reviewed the patient's new clinical results.  Results from last 7 days "   Lab Units 10/02/20  0632 10/01/20  0559 09/28/20  0738   WBC 10*3/mm3 4.99 4.74 5.89   HEMOGLOBIN g/dL 9.6* 8.8* 9.6*   HEMATOCRIT % 29.5* 27.9* 30.4*   PLATELETS 10*3/mm3 515* 470* 432     Results from last 7 days   Lab Units 10/02/20  0632 10/01/20  0559 09/28/20  0738   SODIUM mmol/L 140 141 143   POTASSIUM mmol/L 3.9 3.9 3.6   CHLORIDE mmol/L 105 105 106   CO2 mmol/L 30.0* 28.3 26.9   BUN mg/dL 19 18 21   CREATININE mg/dL 0.68 0.77 0.67   CALCIUM mg/dL 9.0 8.6 8.7   GLUCOSE mg/dL 91 89 102*     CT of the chest angiogram on October 2, 2020  FINDINGS: There is no evidence of pulmonary embolism. Stable aneurysmal  dilatation of the descending thoracic aorta. Stable changes of thoracic  aortic aneurysm repair. Stable rounded hypodensity along the proximal  aspect of the aortic arch. Interval repair of defect along the left  ventricular wall and evacuation of adjacent hematoma. Small right  pleural effusion. Emphysema. 5 mm nodule in the anterior left lower lobe  on image 57 is larger. 7 mm nodule in the posterior right upper lobe is  smaller where it previously measured 9 mm. Limited imaging of the upper  abdomen demonstrates bilateral renal cysts bone windows shows stable  degenerative changes of the thoracic spine. Stable loss of height of the  inferior endplate of L1.        Medication Review: done  Scheduled Meds:aspirin, 81 mg, Oral, Daily  atorvastatin, 40 mg, Oral, Nightly  budesonide, 0.5 mg, Nebulization, BID - RT  cilostazol, 100 mg, Oral, BID  clopidogrel, 75 mg, Oral, Daily  ferrous sulfate, 325 mg, Oral, Daily With Breakfast  furosemide, 20 mg, Oral, Every Other Day  ipratropium-albuterol, 3 mL, Nebulization, 4x Daily - RT  losartan, 50 mg, Oral, Daily  metoprolol succinate XL, 50 mg, Oral, Q12H  multivitamin, 1 tablet, Oral, Daily  oxybutynin XL, 5 mg, Oral, Daily  pantoprazole, 40 mg, Oral, QAM  potassium chloride, 10 mEq, Oral, Every Other Day  pramipexole, 0.125 mg, Oral, Nightly      Continuous  Infusions:   PRN Meds:.•  acetaminophen **OR** [DISCONTINUED] acetaminophen **OR** [DISCONTINUED] acetaminophen  •  albuterol sulfate HFA  •  ALPRAZolam  •  cetirizine  •  dextrose  •  dextrose  •  glucagon (human recombinant)  •  guaiFENesin  •  influenza vaccine  •  ipratropium-albuterol  •  magnesium hydroxide  •  meclizine  •  melatonin  •  naloxone  •  ondansetron  •  sennosides-docusate  •  traMADol      Assessment/Plan       Immobility syndrome      Assessment & Plan  Etiologic Diagnosis and Comorbidities include:    Physical Debility/Immobility Syndrome    Status post repair of lateral LV aneurysm with pericardial patch and right percutaneous common femoral artery intra-aortic balloon pump placement on 9/17/2020, return to the operating room for wound exploration, washout, and rewiring on 9/18/2020    History of CAD s/p CABG  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  Physical therapy notes that she was much more fatigued with ambulating just to the bathroom in her room.  She ambulated 80 to 90 feet contact-guard assist in therapies.  Does not require nasal cannula oxygen.  Does not describe any productive sputum.    Patient reviewed with cardiology service and cardiothoracic service.  CT of the chest without pulmonary embolism.  Not felt to be in florid congestive heart failure.  Seen by the pulmonology service and Trelegy inhaler changed to nebulizer to try to optimize her pulmonary status with underlying COPD.      pmh of Breast Ca    COPD  Trelegy inhaler changed to nebulizers to try to optimize her pulmonary status continues on room air    HTN    HLD    Peripheral Vascular Disease    Right Foot Drop- chronic - AFO    DVT prophylaxis - SCDs    Anemia    Pain management - tramadol - home med. GIULIA reviewed.      Impairments Include:   Dysphagia, mobility, ambulation, adl’s, strength, endurance,  respiratory status, swallowing    TEAM CONF - SEPT 29 - BED MIN. TRANSFERS MIN. GAIT 80 FEET CTG RW. TOILET TRANSFERS MOD ASSIST. SHOWER TRANSFERS MOD ASSIST. UBD MIN. LBD MOD. BATH MOD ASSIST. TOILETING MOD ASSIST. SWALLOW - MECH SOFT, NO MIXED, NECTAR THICK LIQUIDS. LAST VFSS SILENT ASPIRATION ON THINS. REPEAT VFSS NEXT WEEK. CONTINENT BOWEL AND BLADDER. STERNAL INCISION HEALING. PRESSURE SORE TO BUTTOCKS WITH CREAM TO THAT. ALPRAZOLAM PRN AT NIGHT. REC THERAPY INVOLVED.  ELOS - 2 WEEKS     Medical necessity  This patient is a good candidate for acute inpatient rehabilitation for a stay of approximately 2 weeks.  The etiologic diagnosis and comorbidities as listed above will require 24hr availability and frequent interventions of a physician with training in rehabilitation medicine.  The patient’s care cannot be provided on a lower level secondary to need for rigorous acute rehab in order to have maximal improvement of function.  Once admitted the patient will undergo 3 hour of PT/OT/SLP a day for at least 5 days per week. A rehab program will be initiated working on  Dysphagia, strengthening, endurance, safety, dressing, transfers, ambulation, bathing, grooming, swallowing skills. Rehab nursing will be involved to monitor bowel and bladder function, skin integrity, diabetes monitoring/education, patient and family education, and therapy carryover.  A weekly team meeting will be coordinated to maximize the patient’s plan of care during hospitalization and at discharge.       The patient's functional status and clinical status is unchanged from preadmission assessment and the patient continues appropriate for acute inpatient rehabilitation.  Goal is for home with  Home health   therapies.  Barrier to discharge:  Impaired mobility  - work on  Strength, balance, ADLS, swallow  to overcome.      Winston Jones MD  10/02/20  19:28 EDT    Time:   During rounds, used appropriate personal protective equipment  including mask and gloves.  Additional gown if indicated.  Mask used was standard procedure mask. Appropriate PPE was worn during the entire visit.  Hand hygiene was completed before and after.

## 2020-10-03 PROCEDURE — 99232 SBSQ HOSP IP/OBS MODERATE 35: CPT | Performed by: NURSE PRACTITIONER

## 2020-10-03 PROCEDURE — 94799 UNLISTED PULMONARY SVC/PX: CPT

## 2020-10-03 PROCEDURE — 97110 THERAPEUTIC EXERCISES: CPT

## 2020-10-03 PROCEDURE — 92526 ORAL FUNCTION THERAPY: CPT

## 2020-10-03 RX ADMIN — CILOSTAZOL 100 MG: 100 TABLET ORAL at 10:51

## 2020-10-03 RX ADMIN — CLOPIDOGREL 75 MG: 75 TABLET, FILM COATED ORAL at 10:51

## 2020-10-03 RX ADMIN — IPRATROPIUM BROMIDE AND ALBUTEROL SULFATE 3 ML: 2.5; .5 SOLUTION RESPIRATORY (INHALATION) at 19:45

## 2020-10-03 RX ADMIN — CILOSTAZOL 100 MG: 100 TABLET ORAL at 20:00

## 2020-10-03 RX ADMIN — Medication 1 TABLET: at 10:50

## 2020-10-03 RX ADMIN — METOPROLOL SUCCINATE 50 MG: 50 TABLET, EXTENDED RELEASE ORAL at 19:59

## 2020-10-03 RX ADMIN — OXYBUTYNIN CHLORIDE 5 MG: 5 TABLET, EXTENDED RELEASE ORAL at 10:51

## 2020-10-03 RX ADMIN — LOSARTAN POTASSIUM 50 MG: 50 TABLET, FILM COATED ORAL at 10:50

## 2020-10-03 RX ADMIN — PANTOPRAZOLE SODIUM 40 MG: 40 TABLET, DELAYED RELEASE ORAL at 05:46

## 2020-10-03 RX ADMIN — ASPIRIN 81 MG: 81 TABLET, CHEWABLE ORAL at 10:51

## 2020-10-03 RX ADMIN — ALPRAZOLAM 0.25 MG: 0.25 TABLET ORAL at 20:01

## 2020-10-03 RX ADMIN — FERROUS SULFATE TAB 325 MG (65 MG ELEMENTAL FE) 325 MG: 325 (65 FE) TAB at 10:51

## 2020-10-03 RX ADMIN — METOPROLOL SUCCINATE 50 MG: 50 TABLET, EXTENDED RELEASE ORAL at 10:51

## 2020-10-03 RX ADMIN — BUDESONIDE 0.5 MG: 0.5 INHALANT ORAL at 19:45

## 2020-10-03 RX ADMIN — ATORVASTATIN CALCIUM 40 MG: 20 TABLET, FILM COATED ORAL at 19:59

## 2020-10-03 RX ADMIN — ALPRAZOLAM 0.25 MG: 0.25 TABLET ORAL at 14:46

## 2020-10-03 RX ADMIN — IPRATROPIUM BROMIDE AND ALBUTEROL SULFATE 3 ML: 2.5; .5 SOLUTION RESPIRATORY (INHALATION) at 06:54

## 2020-10-03 RX ADMIN — PRAMIPEXOLE DIHYDROCHLORIDE 0.12 MG: 0.25 TABLET ORAL at 20:00

## 2020-10-03 RX ADMIN — IPRATROPIUM BROMIDE AND ALBUTEROL SULFATE 3 ML: 2.5; .5 SOLUTION RESPIRATORY (INHALATION) at 13:58

## 2020-10-03 RX ADMIN — BUDESONIDE 0.5 MG: 0.5 INHALANT ORAL at 06:54

## 2020-10-03 NOTE — THERAPY TREATMENT NOTE
Inpatient Rehabilitation - Occupational Therapy Treatment Note    [facility]     Patient Name: Grace Beauchamp  : 1940  MRN: [id number]    Today's Date: 10/3/2020                 Admit Date: 2020         ICD-10-CM ICD-9-CM   1. Pericardial hematoma  I31.2 423.0   2. Ventricular aneurysm  I25.3 414.10   3. Gait abnormality  R26.9 781.2       Patient Active Problem List   Diagnosis   • History of breast cancer   • Stenosis of carotid artery   • Chronic obstructive pulmonary disease (CMS/HCC)   • Closed fracture of multiple ribs   • Cognitive disorder   • Erythromelalgia (CMS/[facility])   • Hyperlipidemia   • Hypertension   • Primary osteoarthritis involving multiple joints   • Osteoporosis   • Restless legs syndrome   • Cerebral aneurysm   • Temporary cerebral vascular dysfunction   • S/P CABG x 1   • Type 2 diabetes mellitus without complication, without long-term current use of insulin (CMS/[facility])   • S/P ascending aortic aneurysm repair   • 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[facility])   • Descending thoracic aortic aneurysm (CMS/[facility])   • Claudication of both lower extremities (CMS/[facility])   • 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[facility])   • Ventral hernia without obstruction or gangrene   • Breast cancer, stage 1, estrogen receptor positive (CMS/[facility])   • Arthritis of right hip   • Arthritis of right knee   • Chondrocalcinosis   • Chronic pain of right knee   • Degenerative disc disease, lumbar   • Gastroesophageal reflux disease   • Bilateral hearing loss   • Thoracic aortic aneurysm without rupture (CMS/[facility])   • Erythromelalgia (CMS/[facility])   • 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CMS/[facility])   • Thoracoabdominal aortic aneurysm, without rupture (CMS/[facility])   • 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/[facility])   • Aortic aneurysm, descending (CMS/[facility])   • Aortic aneurysm without rupture (CMS/[facility])   • CAD (coronary artery disease)   • S/P left heart catheterization by ventricular puncture   • NSTEMI (non-ST elevated myocardial infarction)  "(CMS/[facility])   • Pericardial hematoma   • History of ST elevation myocardial infarction (STEMI)   • Acute on chronic diastolic CHF (congestive heart failure) (CMS/[facility])   • Ventricular aneurysm   • Immobility syndrome       Past Medical History:   Diagnosis Date   • AAA (abdominal aortic aneurysm) (CMS/[facility])    • Acute bronchitis 12/2018   • 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[facility])    • Cataracts, bilateral    • Closed fracture of three ribs of right side    • Cognitive disorder    • COPD (chronic obstructive pulmonary disease) (CMS/[facility])    • Depression    • Descending aortic arch aneurysm (CMS/[facility])    • Diabetes mellitus (CMS/[facility])     \"BORDERLINE\"   • Erythermalgia (CMS/[facility])    • GERD (gastroesophageal reflux disease)    • Hyperlipidemia     Controlled w/Meds   • Hypertension     Controlled w/Meds   • Low back pain    • Moderate aortic valve insufficiency     S/p AVR on 02/23/16 by Dr. Ontiveros   • Nocturia    • Osteoarthritis    • Osteoporosis    • Otitis media     R Ear   • Prediabetes    • PVD (peripheral vascular disease) (CMS/[facility])    • RLS (restless legs syndrome)    • Saccular aneurysm    • Stroke (CMS/[facility])     Perioperatively w/L Hip Repl   • Thoracic ascending aortic aneurysm (CMS/[facility])     S/p Repair on 02/23/16 by Dr. Ontiveros   • TIA (transient ischemic attack)    • Urgency incontinence    • Venous insufficiency    • Ventral hernia        Past Surgical History:   Procedure Laterality Date   • AORTIC VALVE REPAIR/REPLACEMENT N/A 02/23/2016-[facility]    Ascending Replacement Using a 24MM Graft--Dr. Ontiveros   • APPENDECTOMY  1977   • BREAST BIOPSY Right 09/16/2012    Vacuum " Assisted Core Bx of R Breast   • CARDIAC CATHETERIZATION N/A 01/06/2016    Dr. Tres De Los Santos   • CARDIAC CATHETERIZATION N/A 4/22/2019    Procedure: Left Heart Cath;  Surgeon: Tres De Los Santos MD;  Location:  [facility];  Service: Cardiology   • CARDIAC CATHETERIZATION N/A 4/22/2019    Procedure: Coronary angiography;  Surgeon: Tres De Los Santos MD;  Location:  [facility];  Service: Cardiology   • CARDIAC CATHETERIZATION N/A 7/22/2020    Procedure: LEFT HEART CATH;  Surgeon: Antonia Scott MD;  Location:  [facility];  Service: Cardiovascular;  Laterality: N/A;   • CARDIAC CATHETERIZATION N/A 7/22/2020    Procedure: CORONARY ANGIOGRAPHY;  Surgeon: Antonia Scott MD;  Location:  [facility];  Service: Cardiovascular;  Laterality: N/A;   • CARDIAC CATHETERIZATION N/A 7/22/2020    Procedure: Left ventriculography;  Surgeon: Antonia Scott MD;  Location:  [facility];  Service: Cardiovascular;  Laterality: N/A;   • CARDIAC CATHETERIZATION N/A 7/22/2020    Procedure: Saphenous Vein Graft;  Surgeon: Antonia Scott MD;  Location:  [facility];  Service: Cardiovascular;  Laterality: N/A;   • CARDIAC SURGERY  02/2016    Dr. Ontiveros/Dr. De Los Santos   • CATARACT EXTRACTION Bilateral     [facility]   • COLONOSCOPY N/A 10/2002    Dr. Negro   • CORONARY ARTERY BYPASS GRAFT  02/23/2016-[facility]    x1 w/L Vein Grafting--Dr. Ontiveros   • CORONARY ARTERY BYPASS GRAFT N/A 9/18/2020    Procedure: STERNAL EXPLORATION WITH CLOSURE AND WASHOUT, REMOVAL OF IABP, PRP;  Surgeon: Mart Ontiveros MD;  Location: [facility] MA[facility] OR;  Service: Cardiothoracic;  Laterality: N/A;   • CYST REMOVAL Right 01/25/2013    R Palm Excision of Retinacular Cyst--Dr. Krala Fish   • EPIDURAL BLOCK     • LAPAROSCOPIC CHOLECYSTECTOMY N/A 08/04/2004    Dr. JOEY Sheth   • MASTECTOMY Right 09/28/2012   • ORIF HIP FRACTURE Left 03/27/2005    w/ AMBI  Dr. Victor M bai   • RECTOVAGINAL FISTULA REPAIR  1965   • THORACIC AORTIC ANEURYSM REPAIR N/A 7/23/2019    Procedure: ROSE, THORACOABDOMINAL AORTIC ANEURYSM REPAIR, VISCERAL VESSEL REPAIR, LARGE VENTRAL HERNIA REPAIR WITH MESH, CIRCULATORY ARREST, PRP;  Surgeon: Mart Ontiveros MD;  Location: [facility] OR;  Service: Cardiothoracic   • TRANSESOPHAGEAL ECHOCARDIOGRAM (ROSE) N/A 9/18/2020    Procedure: TRANSESOPHAGEAL ECHOCARDIOGRAM WITH ANESTHESIA;  Surgeon: Mart Ontiveros MD;  Location: [facility] OR;  Service: Cardiothoracic;  Laterality: N/A;   • TUBAL ABDOMINAL LIGATION     • VENTRICULAR ANEURYSM REPAIR N/A 7/22/2020    Procedure: EMERGENT REOP STERNOTOMY, REPAIR OF LV RUPTURE, PRP, INTRAOP ROSE;  Surgeon: Mart Ontiveros MD;  Location: [facility] OR;  Service: Cardiothoracic;  Laterality: N/A;   • VENTRICULAR ANEURYSM REPAIR N/A 9/17/2020    Procedure: ROSE, MIDLINE STERNOTOMY REOP  LEFT VENTRICULAR ANEURYSM REPAIR, IABP INSERTION, PRP;  Surgeon: Mart Ontiveros MD;  Location: [facility];  Service: Cardiothoracic;  Laterality: N/A;            IRF OT ASSESSMENT FLOWSHEET (last 12 hours)      IRF OT Evaluation and Treatment     Row Name 10/03/20 1030          OT Time and Intention    Document Type  daily treatment  -RD     Mode of Treatment  occupational therapy  -RD     Patient Effort  good  -RD     Symptoms Noted During/After Treatment  fatigue  -RD     Row Name 10/03/20 1030          General Information    Patient Profile Reviewed  yes  -RD     Patient/Family/Caregiver Comments/Observations  pt seated in w/c in AM session and supine in bed in PM session w/ no signs of acute distress; pt requests to complete exercises in bed in PM d/t fatigue and SOA  -RD     Existing Precautions/Restrictions  fall;cardiac;sternal no lifting greater than 10#  -RD     Row Name 10/03/20 1030          Cognition/Psychosocial    Orientation Status (Cognition)  oriented x 4  -RD     Follows Commands  (Cognition)  follows one-step commands;verbal cues/prompting required  -RD     Personal Safety Interventions  fall prevention program maintained;gait belt;muscle strengthening facilitated;nonskid shoes/slippers when out of bed  -RD     Row Name 10/03/20 1030          Pain Scale: Numbers Pre/Post-Treatment    Pretreatment Pain Rating  0/10 - no pain  -RD     Posttreatment Pain Rating  0/10 - no pain  -RD     Row Name 10/03/20 1030          Motor Skills    Therapeutic Exercise  shoulder;elbow/forearm;wrist;hand frequent rest breaks during thera ex d/t quick fatigue  -RD     Row Name 10/03/20 1030          Shoulder (Therapeutic Exercise)    Shoulder (Therapeutic Exercise)  AROM (active range of motion)  -RD     Shoulder AROM (Therapeutic Exercise)  bilateral;flexion;extension;10 repetitions;3 sets;supine  -     Row Name 10/03/20 1030          Elbow/Forearm (Therapeutic Exercise)    Elbow/Forearm (Therapeutic Exercise)  strengthening exercise  -RD     Elbow/Forearm Strengthening (Therapeutic Exercise)  bilateral;flexion;extension;supination;pronation;1 lb free weight;10 repetitions;3 sets;sitting  -     Row Name 10/03/20 1030          Wrist (Therapeutic Exercise)    Wrist (Therapeutic Exercise)  strengthening exercise  -RD     Wrist Strengthening (Therapeutic Exercise)  bilateral;flexion;extension;1 lb free weight;10 repetitions;3 sets  -     Row Name 10/03/20 1030          Hand (Therapeutic Exercise)    Hand (Therapeutic Exercise)  strengthening exercise  -RD     Hand Strengthening (Therapeutic Exercise)  bilateral; strengthening;hand gripper;10 repetitions;3 sets  -     Row Name 10/03/20 1030          Bathing    Comment (Bathing)  pt declines ADL this date stating she completed full sponge bath and dressing w/ NSG this AM before OT arrival  -RD     Row Name 10/03/20 1030          Positioning and Restraints    Pre-Treatment Position  sitting in chair/recliner in w/c at start of AM session; in bed at start  of PM session  -RD     Post Treatment Position  wheelchair  -RD     In Bed  fowlers;call light within reach;encouraged to call for assist;exit alarm on after PM session  -RD     In Wheelchair  sitting;exit alarm on;with PT after AM session  -RD       User Key  (r) = Recorded By, (t) = Taken By, (c) = Cosigned By    Initials Name Effective Dates    Carlie Mueller OT 10/14/19 -            Occupational Therapy Education                 Title: PT OT SLP Therapies (In Progress)     Topic: Occupational Therapy (In Progress)     Point: ADL training (Done)     Description:   Instruct learner(s) on proper safety adaptation and remediation techniques during self care or transfers.   Instruct in proper use of assistive devices.              Learning Progress Summary           Patient Acceptance, E, VU by  at 9/26/2020 1218    Comment: Instructed pt on improved techniques for safety with ADLs and transfers with sternal precautions.                   Point: Home exercise program (Not Started)     Description:   Instruct learner(s) on appropriate technique for monitoring, assisting and/or progressing therapeutic exercises/activities.              Learner Progress:  Not documented in this visit.          Point: Precautions (Done)     Description:   Instruct learner(s) on prescribed precautions during self-care and functional transfers.              Learning Progress Summary           Patient Acceptance, E, VU by  at 9/26/2020 1218    Comment: Instructed pt on improved techniques for safety with ADLs and transfers with sternal precautions.                   Point: Body mechanics (Not Started)     Description:   Instruct learner(s) on proper positioning and spine alignment during self-care, functional mobility activities and/or exercises.              Learner Progress:  Not documented in this visit.                      User Key     Initials Effective Dates Name Provider Type Discipline     04/02/20 -  Shaka  Sunni OT Occupational Therapist OT                    OT Recommendation and Plan                         Time Calculation:     Time Calculation- OT     Row Name 10/03/20 1343 10/03/20 1146          Time Calculation- OT    OT Start Time  1300  -RD  1030  -RD     OT Stop Time  1330  -RD  1100  -RD     OT Time Calculation (min)  30 min  -RD  30 min  -RD     OT Received On  10/03/20  -RD  10/03/20  -RD       User Key  (r) = Recorded By, (t) = Taken By, (c) = Cosigned By    Initials Name Provider Type    RD Carlie Segundo OT Occupational Therapist        Therapy Charges for Today     Code Description Service Date Service Provider Modifiers Qty    06447225715 HC OT THER PROC EA 15 MIN 10/3/2020 Carlie Segundo OT GO 2    27993000472 HC OT THER PROC EA 15 MIN 10/3/2020 Carlie Segundo OT GO 2                   Carlie Segundo OT  10/3/2020

## 2020-10-03 NOTE — THERAPY TREATMENT NOTE
Inpatient Rehabilitation - Physical Therapy Treatment Note       [facility]     Patient Name: Grace Beauchamp  : 1940  MRN: [id number]    Today's Date: 10/3/2020                    Admit Date: 2020      Visit Dx:     ICD-10-CM ICD-9-CM   1. Pericardial hematoma  I31.2 423.0   2. Ventricular aneurysm  I25.3 414.10   3. Gait abnormality  R26.9 781.2       Patient Active Problem List   Diagnosis   • History of breast cancer   • Stenosis of carotid artery   • Chronic obstructive pulmonary disease (CMS/[facility])   • Closed fracture of multiple ribs   • Cognitive disorder   • Erythromelalgia (CMS/[facility])   • Hyperlipidemia   • Hypertension   • Primary osteoarthritis involving multiple joints   • Osteoporosis   • Restless legs syndrome   • Cerebral aneurysm   • Temporary cerebral vascular dysfunction   • S/P CABG x 1   • Type 2 diabetes mellitus without complication, without long-term current use of insulin (CMS/[facility])   • S/P ascending aortic aneurysm repair   • 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[facility])   • Descending thoracic aortic aneurysm (CMS/[facility])   • Claudication of both lower extremities (CMS/[facility])   • 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[facility])   • Ventral hernia without obstruction or gangrene   • Breast cancer, stage 1, estrogen receptor positive (CMS/[facility])   • Arthritis of right hip   • Arthritis of right knee   • Chondrocalcinosis   • Chronic pain of right knee   • Degenerative disc disease, lumbar   • Gastroesophageal reflux disease   • Bilateral hearing loss   • Thoracic aortic aneurysm without rupture (CMS/[facility])   • Erythromelalgia (CMS/[facility])   • 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CMS/[facility])   • Thoracoabdominal aortic aneurysm, without rupture (CMS/[facility])   • 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/[facility])   • Aortic aneurysm, descending (CMS/[facility])   • Aortic aneurysm without rupture (CMS/[facility])   • CAD (coronary artery disease)   • S/P left heart catheterization by ventricular puncture   • NSTEMI (non-ST elevated myocardial  "infarction) (CMS/[facility])   • Pericardial hematoma   • History of ST elevation myocardial infarction (STEMI)   • Acute on chronic diastolic CHF (congestive heart failure) (CMS/[facility])   • Ventricular aneurysm   • Immobility syndrome       Past Medical History:   Diagnosis Date   • AAA (abdominal aortic aneurysm) (CMS/[facility])    • Acute bronchitis 12/2018   • 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[facility])    • Arthritis    • Bilateral carotid artery disease (CMS/[facility])    • Bilateral cataracts     S/p Extractions   • Breast cancer (CMS/[facility])     right breast xU5gfCs (snm) pMX ER/ND pos, Her-2/neg, Ki-67, 31%, oncotype recurrence score 19, invasive lobular carcinoma   • CAD (coronary artery disease)     S/p CABG on 2/23/16 by Dr. 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[facility])    • Cataracts, bilateral    • Closed fracture of three ribs of right side    • Cognitive disorder    • COPD (chronic obstructive pulmonary disease) (CMS/[facility])    • Depression    • Descending aortic arch aneurysm (CMS/[facility])    • Diabetes mellitus (CMS/[facility])     \"BORDERLINE\"   • Erythermalgia (CMS/[facility])    • GERD (gastroesophageal reflux disease)    • Hyperlipidemia     Controlled w/Meds   • Hypertension     Controlled w/Meds   • Low back pain    • Moderate aortic valve insufficiency     S/p AVR on 02/23/16 by Dr. Ontiveros   • Nocturia    • Osteoarthritis    • Osteoporosis    • Otitis media     R Ear   • Prediabetes    • PVD (peripheral vascular disease) (CMS/[facility])    • RLS (restless legs syndrome)    • Saccular aneurysm    • Stroke (CMS/[facility])     Perioperatively w/L Hip Repl   • Thoracic ascending aortic aneurysm (CMS/[facility])     S/p Repair on 02/23/16 by Dr. Ontiveros   • TIA (transient ischemic attack)    • Urgency incontinence    • Venous insufficiency    • Ventral hernia        Past Surgical History:   Procedure Laterality Date   • AORTIC VALVE REPAIR/REPLACEMENT N/A 02/23/2016-[facility]    Ascending Replacement Using a 24MM Graft--Dr. Ontiveros   • APPENDECTOMY  1977   • BREAST BIOPSY Right 09/16/2012 "    Vacuum Assisted Core Bx of R Breast   • CARDIAC CATHETERIZATION N/A 01/06/2016    Dr. Tres De Los Santos   • CARDIAC CATHETERIZATION N/A 4/22/2019    Procedure: Left Heart Cath;  Surgeon: Tres De Los Santos MD;  Location:  [facility];  Service: Cardiology   • CARDIAC CATHETERIZATION N/A 4/22/2019    Procedure: Coronary angiography;  Surgeon: Tres De Los Santos MD;  Location:  [facility];  Service: Cardiology   • CARDIAC CATHETERIZATION N/A 7/22/2020    Procedure: LEFT HEART CATH;  Surgeon: Antonia Scott MD;  Location:  [facility];  Service: Cardiovascular;  Laterality: N/A;   • CARDIAC CATHETERIZATION N/A 7/22/2020    Procedure: CORONARY ANGIOGRAPHY;  Surgeon: Antonia Scott MD;  Location:  [facility];  Service: Cardiovascular;  Laterality: N/A;   • CARDIAC CATHETERIZATION N/A 7/22/2020    Procedure: Left ventriculography;  Surgeon: Antonia Scott MD;  Location:  [facility];  Service: Cardiovascular;  Laterality: N/A;   • CARDIAC CATHETERIZATION N/A 7/22/2020    Procedure: Saphenous Vein Graft;  Surgeon: Antonia Scott MD;  Location:  [facility];  Service: Cardiovascular;  Laterality: N/A;   • CARDIAC SURGERY  02/2016    Dr. Ontiveros/Dr. De Los Santos   • CATARACT EXTRACTION Bilateral     [facility]   • COLONOSCOPY N/A 10/2002    Dr. Negro   • CORONARY ARTERY BYPASS GRAFT  02/23/2016-[facility]    x1 w/L Vein Grafting--Dr. Ontiveros   • CORONARY ARTERY BYPASS GRAFT N/A 9/18/2020    Procedure: STERNAL EXPLORATION WITH CLOSURE AND WASHOUT, REMOVAL OF IABP, PRP;  Surgeon: Mart Ontiveros MD;  Location: [facility] MAIN OR;  Service: Cardiothoracic;  Laterality: N/A;   • CYST REMOVAL Right 01/25/2013    R Palm Excision of Retinacular Cyst--Dr. Karla Fish   • EPIDURAL BLOCK     • LAPAROSCOPIC CHOLECYSTECTOMY N/A 08/04/2004    Dr. JOEY Sheth   • MASTECTOMY Right 09/28/2012   • ORIF HIP FRACTURE Left 03/27/2005    w/  Dr. Victor M Marley   • RECTOVAGINAL FISTULA REPAIR  1965   • THORACIC AORTIC ANEURYSM REPAIR N/A 7/23/2019    Procedure: ROSE, THORACOABDOMINAL AORTIC ANEURYSM REPAIR, VISCERAL VESSEL REPAIR, LARGE VENTRAL HERNIA REPAIR WITH MESH, CIRCULATORY ARREST, PRP;  Surgeon: Mart Ontiveros MD;  Location: [facility] OR;  Service: Cardiothoracic   • TRANSESOPHAGEAL ECHOCARDIOGRAM (ROSE) N/A 9/18/2020    Procedure: TRANSESOPHAGEAL ECHOCARDIOGRAM WITH ANESTHESIA;  Surgeon: Mart Ontiveros MD;  Location: [facility] OR;  Service: Cardiothoracic;  Laterality: N/A;   • TUBAL ABDOMINAL LIGATION     • VENTRICULAR ANEURYSM REPAIR N/A 7/22/2020    Procedure: EMERGENT REOP STERNOTOMY, REPAIR OF LV RUPTURE, PRP, INTRAOP ROSE;  Surgeon: Mart Ontiveros MD;  Location: [facility] OR;  Service: Cardiothoracic;  Laterality: N/A;   • VENTRICULAR ANEURYSM REPAIR N/A 9/17/2020    Procedure: ROSE, MIDLINE STERNOTOMY REOP  LEFT VENTRICULAR ANEURYSM REPAIR, IABP INSERTION, PRP;  Surgeon: Mart Ontiveros MD;  Location: [facility];  Service: Cardiothoracic;  Laterality: N/A;          PT ASSESSMENT (last 12 hours)      IRF PT Evaluation and Treatment     Row Name 10/03/20 1115          PT Time and Intention    Document Type  daily treatment  -LB     Mode of Treatment  physical therapy  -LB     Patient/Family/Caregiver Comments/Observations  supine in bed.   -LB     Row Name 10/03/20 1115          General Information    Existing Precautions/Restrictions  fall;cardiac;sternal;lifting  -LB     Row Name 10/03/20 1115          Pain Scale: Numbers Pre/Post-Treatment    Pretreatment Pain Rating  0/10 - no pain  -LB     Row Name 10/03/20 1115          Bed Mobility    Supine-[place] (Bed Mobility)  standby assist  -LB     Assistive Device (Bed Mobility)  bed rails;head of bed elevated  -LB     Row Name 10/03/20 1115          Transfers    Bed-[place] (Transfers)  minimum assist (75% patient effort)  stand/pivot  -LB     Sit-Stand [place] (Transfers)  minimum assist (75% patient effort)  -LB     Stand-Sit [place] (Transfers)  minimum assist (75% patient effort)  -LB     Row Name 10/03/20 1115          Sit-Stand Transfer    Assistive Device (Sit-Stand Transfers)  walker, front-wheeled  -LB     Row Name 10/03/20 1115          Stand-Sit Transfer    Assistive Device (Stand-Sit Transfers)  walker, front-wheeled  -LB     Row Name 10/03/20 1115          Gait/Stairs (Locomotion)    [place] Level (Gait)  contact guard;verbal cues  -LB     Assistive Device (Gait)  walker, front-wheeled;other (see comments) R AFO  -LB     Distance in Feet (Gait)  80' x 3 in AM and again in PM  -LB     Pattern (Gait)  step-through  -LB     Deviations/Abnormal Patterns (Gait)  chris decreased;stride length decreased  -LB     Bilateral Gait Deviations  forward flexed posture  -LB     Right Sided Gait Deviations  heel strike decreased  -LB     Row Name 10/03/20 1115          Hip (Therapeutic Exercise)    Hip (Therapeutic Exercise)  AROM (active range of motion)  -LB     Hip AROM (Therapeutic Exercise)  bilateral;flexion;sitting;10 repetitions  -LB     Row Name 10/03/20 1115          Knee (Therapeutic Exercise)    Knee (Therapeutic Exercise)  AROM (active range of motion)  -LB     Knee AROM (Therapeutic Exercise)  bilateral;sitting;LAQ (long arc quad)  -LB     Knee Isometrics (Therapeutic Exercise)  10 repetitions  -LB       User Key  (r) = Recorded By, (t) = Taken By, (c) = Cosigned By    Initials Name Provider Type    LB Tessy Rodriguez PTA Physical Therapy Assistant        Wound 09/17/20 0810 Right anterior groin Incision (Active)   Dressing Appearance open to air 10/02/20 2058   Closure Adhesive closure strips 10/02/20 2058   Base dry;clean 10/03/20 1042   Drainage Amount none 10/02/20 2058   Dressing Care open to air 10/03/20 1042       Wound 09/17/20 0811 sternal Incision (Active)   Dressing Appearance open to air  10/02/20 2058   Closure Approximated 10/03/20 1042   Base clean;dry 10/03/20 1042   Periwound ecchymotic 10/02/20 2058   Drainage Amount none 10/02/20 2058   Dressing Care open to air 10/03/20 1042       Wound 09/25/20 Other (See comments) medial coccyx Pressure Injury (Active)   Dressing Appearance open to air 10/02/20 2058   Base pink;blanchable 10/03/20 1426   Periwound blanchable;pink 10/03/20 1426   Drainage Amount none 10/03/20 1426   Care, Wound cleansed with;soap and water 10/03/20 1426     Physical Therapy Education                 Title: PT OT SLP Therapies (In Progress)     Topic: Physical Therapy (In Progress)     Point: Mobility training (In Progress)     Learning Progress Summary           Patient Acceptance, E, NR by  at 10/3/2020 1229    Acceptance, E,TB,D, VU,DU,NR by  at 10/2/2020 1042    Comment: walker techniques    Acceptance, E,TB,D, VU,NR by  at 9/30/2020 1115    Acceptance, E,TB, VU,NR by  at 9/29/2020 1113    Acceptance, E,TB, NR,VU by  at 9/28/2020 1145    Acceptance, E, VU,NR by DIANA at 9/26/2020 1158                   Point: Home exercise program (Done)     Learning Progress Summary           Patient Acceptance, E,TB,D, VU,DU,NR by  at 10/2/2020 1042    Comment: walker techniques    Acceptance, E,TB,D, VU,NR by  at 9/30/2020 1115    Acceptance, E,TB, VU,NR by  at 9/29/2020 1113    Acceptance, E,TB, NR,VU by  at 9/28/2020 1145                   Point: Body mechanics (Done)     Learning Progress Summary           Patient Acceptance, E,TB, VU,NR by  at 9/29/2020 1113    Acceptance, E,TB, NR,VU by  at 9/28/2020 1145                   Point: Precautions (Not Started)     Learner Progress:  Not documented in this visit.                      User Key     Initials Effective Dates Name Provider Type Discipline    LB 03/07/18 -  Tessy Rodriguez PTA Physical Therapy Assistant PT    EE 04/03/18 -  Shruthi Roach, PT Physical Therapist PT    JK 04/03/18 -  Araceli Jones PT  Physical Therapist PT     04/03/18 -  Karena Sears PT Physical Therapist PT                PT Recommendation and Plan      Patient was wearing a face mask during this therapy encounter. Therapist used appropriate personal protective equipment including eye protection, mask, and gloves.  Mask used was standard procedure mask. Appropriate PPE was worn during the entire therapy session. Hand hygiene was completed before and after therapy session. Patient is not in enhanced droplet precautions.                        Time Calculation:     PT Charges     Row Name 10/03/20 1228 10/03/20 1227          Time Calculation    Start Time  1100  -LB  0930  -LB     Stop Time  1130  -LB  1000  -LB     Time Calculation (min)  30 min  -LB  30 min  -LB       User Key  (r) = Recorded By, (t) = Taken By, (c) = Cosigned By    Initials Name Provider Type    Tessy Berg PTA Physical Therapy Assistant          Therapy Charges for Today     Code Description Service Date Service Provider Modifiers Qty    41203085180  PT THER PROC EA 15 MIN 10/3/2020 Tessy Rodriguez PTA GP 4    22149805149 HC PT THER SUPP EA 15 MIN 10/3/2020 Tessy Rodriguez PTA GP 2                   Tessy Rodriguez PTA  10/3/2020

## 2020-10-03 NOTE — PLAN OF CARE
Goal Outcome Evaluation:  Plan of Care Reviewed With: patient  Progress: improving   Slept well. Continent of B&B tonight. Up x1 assist with rolling walker to Bathroom. Meds whole 1 at a time, with pudding. Likes pill in the front /tip of the spoon. Last BM 9-30, per patient history. Denies discomfort. S/L to Lt. AC site, flushed. Xanax given for anxiety. No distress.

## 2020-10-03 NOTE — PLAN OF CARE
Goal Outcome Evaluation:  Plan of Care Reviewed With: patient  Progress: improving  Outcome Summary: Patient is alert and oriented pleasant quiet did make some conversation, complains of SOA with exertion although sats are good, started nebulizer treatments today, no complaints of pain this shift, did request a Xanax this afternoon and it seemed to help, poor appetite although did eat 100% of dinner, water protocol after oral care no unsafe behaviors.

## 2020-10-03 NOTE — PROGRESS NOTES
Inpatient Rehabilitation Plan of Care Note    Plan of Care  Care Plan Reviewed - No updates at this time.    Psychosocial    Performed Intervention(s)  verbalize needs and concerns      Safety    Performed Intervention(s)  bed/chair alarms  hourly rounding  items within reach      Sphincter Control    Performed Intervention(s)  hourly rounding  adequate fluid intake      Body Systems    Performed Intervention(s)  dressing changes or wound care per orders  WOCN    Signed by: Mara Anthony RN

## 2020-10-03 NOTE — PROGRESS NOTES
"    Patient Name: Grace Beauchamp  :1940  80 y.o.      Patient Care Team:  Miller Castañeda MD as PCP - General (Internal Medicine)  Miller Castañeda MD as PCP - Claims Attributed  Mart Ontiveros MD as Surgeon (Cardiothoracic Surgery)  Tres De Los Santos MD as Consulting Physician (Cardiology)  Graham Mcconnell MD as Consulting Physician (Hematology and Oncology)  Payam Byrnes MD as Consulting Physician (Otolaryngology)  Jonathan Smith MD as Consulting Physician (Vascular Surgery)  Victor M Cabral MD as Surgeon (Orthopedic Surgery)  Yaya Burks MD as Consulting Physician (Pulmonary Disease)    Chief Complaint: f/u aneurysm with emergent cardiac surgery    Interval History: CTA of chest no evidence of PE, small right pleural effusion. S/p repair of LV aneurysm. Stable aneursymal dilation of descending thoracic aortia s/p repair in aortic arch region. Positive emphysema and pulmonary nodules slightly increased. BP a little higher today. Breathing was better this morning but having some SHERIDAN with working with therapy. No chest pain, swelling or other complaints.        Objective   Vital Signs  Temp:  [97.3 °F (36.3 °C)-98.2 °F (36.8 °C)] 97.5 °F (36.4 °C)  Heart Rate:  [69-84] 75  Resp:  [18-20] 18  BP: (132-165)/(73-88) 160/88  No intake or output data in the 24 hours ending 10/03/20 1029  Flowsheet Rows      First Filed Value   Admission Height  142.2 cm (56\") Documented at 2020 0919   Admission Weight  39.9 kg (88 lb) Documented at 2020 1603          Physical Exam:   General Appearance:    Thin, Alert, cooperative, in no acute distress, sitting in wheelchair   Lungs:     Clear to auscultation.  Normal respiratory effort and rate.      Heart:    Regular rhythm and normal rate, normal S1 and S2, no murmurs, gallops or rubs.     Chest Wall:    Healing sternotomy incision   Abdomen:     Soft, nontender, positive bowel sounds.     Extremities:   MARTINEZ, right leg with shin brace.   "     Results Review:    Results from last 7 days   Lab Units 10/02/20  0632   SODIUM mmol/L 140   POTASSIUM mmol/L 3.9   CHLORIDE mmol/L 105   CO2 mmol/L 30.0*   BUN mg/dL 19   CREATININE mg/dL 0.68   GLUCOSE mg/dL 91   CALCIUM mg/dL 9.0         Results from last 7 days   Lab Units 10/02/20  0632   WBC 10*3/mm3 4.99   HEMOGLOBIN g/dL 9.6*   HEMATOCRIT % 29.5*   PLATELETS 10*3/mm3 515*                           Medication Review:   aspirin, 81 mg, Oral, Daily  atorvastatin, 40 mg, Oral, Nightly  budesonide, 0.5 mg, Nebulization, BID - RT  cilostazol, 100 mg, Oral, BID  clopidogrel, 75 mg, Oral, Daily  ferrous sulfate, 325 mg, Oral, Daily With Breakfast  furosemide, 20 mg, Oral, Every Other Day  ipratropium-albuterol, 3 mL, Nebulization, 4x Daily - RT  losartan, 50 mg, Oral, Daily  metoprolol succinate XL, 50 mg, Oral, Q12H  multivitamin, 1 tablet, Oral, Daily  oxybutynin XL, 5 mg, Oral, Daily  pantoprazole, 40 mg, Oral, QAM  potassium chloride, 10 mEq, Oral, Every Other Day  pramipexole, 0.125 mg, Oral, Nightly              Assessment/Plan     1. Dyspnea: With elevated proBNP 4645 yesterday. P. Dr. De Los Santos did not feel this was CHF related. Pulm consult noted. CTA negative PE but with mild right pleural effusion and emphysema/enlarging pulmonary nodule.  2. Ruptured myocardium with free wall repair July 2002 with urgent redo sternotomy Sept 2020 for LV lateral wall aneurysmal repair with pericardial patch.  3. CAD with hx of CABG 2012  4. AVR in 2016 with aortic aneursymal repair  5.Hx of COPD: Pulm consulted. Discussed switching inhaler therapy to nebulizer. Will defer.   6. HTN: BP high ealier but appears it had overall been ok or even low. Monitor.  7. Hx of pulmonary HTN.  8. Enlarging  Lower lung nodule: Pulm following and plans to repeat CT scan in 6 months.  9. Anemia: Stable yesterday.  10. Thin/frail:      Renal function stable yesterday. I offered to try low dose diuretic daily to see if that helps her  dyspnea though she does not look volume overloaded. She states it didn't help before and she does not want to retry diuretic at this time though she has it ordered every other day she declined yesterday. We will see how she does with changes made by pulmonary.       Thank you for allowing me to participate in this patient's care. Please call me with any questions/concerns.         FREDRICK Calzada  [facility]  10/03/20  10:29 EDT

## 2020-10-03 NOTE — THERAPY TREATMENT NOTE
Inpatient Rehabilitation - Speech Language Pathology Treatment Note    [facility]     Patient Name: Grace Beauchamp  : 1940  MRN: [id number]    Today's Date: 10/3/2020                   Admit Date: 2020       Visit Dx:      ICD-10-CM ICD-9-CM   1. Pericardial hematoma  I31.2 423.0   2. Ventricular aneurysm  I25.3 414.10   3. Gait abnormality  R26.9 781.2       Patient Active Problem List   Diagnosis   • History of breast cancer   • Stenosis of carotid artery   • Chronic obstructive pulmonary disease (CMS/[facility])   • Closed fracture of multiple ribs   • Cognitive disorder   • Erythromelalgia (CMS/[facility])   • Hyperlipidemia   • Hypertension   • Primary osteoarthritis involving multiple joints   • Osteoporosis   • Restless legs syndrome   • Cerebral aneurysm   • Temporary cerebral vascular dysfunction   • S/P CABG x 1   • Type 2 diabetes mellitus without complication, without long-term current use of insulin (CMS/[facility])   • S/P ascending aortic aneurysm repair   • 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[facility])   • Descending thoracic aortic aneurysm (CMS/[facility])   • Claudication of both lower extremities (CMS/[facility])   • 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[facility])   • Ventral hernia without obstruction or gangrene   • Breast cancer, stage 1, estrogen receptor positive (CMS/[facility])   • Arthritis of right hip   • Arthritis of right knee   • Chondrocalcinosis   • Chronic pain of right knee   • Degenerative disc disease, lumbar   • Gastroesophageal reflux disease   • Bilateral hearing loss   • Thoracic aortic aneurysm without rupture (CMS/[facility])   • Erythromelalgia (CMS/[facility])   • 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CMS/[facility])   • Thoracoabdominal aortic aneurysm, without rupture (CMS/[facility])   • 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/[facility])   • Aortic aneurysm, descending (CMS/[facility])   • Aortic aneurysm without rupture (CMS/[facility])   • CAD (coronary artery disease)   • S/P left heart catheterization by ventricular puncture   • NSTEMI (non-ST elevated  "myocardial infarction) (CMS/[facility])   • Pericardial hematoma   • History of ST elevation myocardial infarction (STEMI)   • Acute on chronic diastolic CHF (congestive heart failure) (CMS/[facility])   • Ventricular aneurysm   • Immobility syndrome       Past Medical History:   Diagnosis Date   • AAA (abdominal aortic aneurysm) (CMS/[facility])    • Acute bronchitis 12/2018   • 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[facility])    • Arthritis    • Bilateral carotid artery disease (CMS/[facility])    • Bilateral cataracts     S/p Extractions   • Breast cancer (CMS/[facility])     right breast [id number] (snm) pMX ER/RI pos, Her-2/neg, Ki-67, 31%, oncotype recurrence score 19, invasive lobular carcinoma   • CAD (coronary artery disease)     S/p CABG on 2/23/16 by Dr. 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[facility])    • Cataracts, bilateral    • Closed fracture of three ribs of right side    • Cognitive disorder    • COPD (chronic obstructive pulmonary disease) (CMS/[facility])    • Depression    • Descending aortic arch aneurysm (CMS/[facility])    • Diabetes mellitus (CMS/[facility])     \"BORDERLINE\"   • Erythermalgia (CMS/[facility])    • GERD (gastroesophageal reflux disease)    • Hyperlipidemia     Controlled w/Meds   • Hypertension     Controlled w/Meds   • Low back pain    • Moderate aortic valve insufficiency     S/p AVR on 02/23/16 by Dr. Ontiveros   • Nocturia    • Osteoarthritis    • Osteoporosis    • Otitis media     R Ear   • Prediabetes    • PVD (peripheral vascular disease) (CMS/[facility])    • RLS (restless legs syndrome)    • Saccular aneurysm    • Stroke (CMS/[facility])     Perioperatively w/L Hip Repl   • Thoracic ascending aortic aneurysm (CMS/[facility])     S/p Repair on 02/23/16 by Dr. Ontiveros   • TIA (transient ischemic attack)    • Urgency incontinence    • Venous insufficiency    • Ventral hernia        Past Surgical History:   Procedure Laterality Date   • AORTIC VALVE REPAIR/REPLACEMENT N/A 02/23/2016-[facility]    Ascending Replacement Using a 24MM Graft--Dr. Ontiveros   • APPENDECTOMY  1977   • BREAST BIOPSY Right " 09/16/2012    Vacuum Assisted Core Bx of R Breast   • CARDIAC CATHETERIZATION N/A 01/06/2016    Dr. Tres De Los Santos   • CARDIAC CATHETERIZATION N/A 4/22/2019    Procedure: Left Heart Cath;  Surgeon: Tres De Los Santos MD;  Location:  [facility];  Service: Cardiology   • CARDIAC CATHETERIZATION N/A 4/22/2019    Procedure: Coronary angiography;  Surgeon: Tres De Los Santos MD;  Location:  [facility];  Service: Cardiology   • CARDIAC CATHETERIZATION N/A 7/22/2020    Procedure: LEFT HEART CATH;  Surgeon: Antonia Scott MD;  Location:  [facility];  Service: Cardiovascular;  Laterality: N/A;   • CARDIAC CATHETERIZATION N/A 7/22/2020    Procedure: CORONARY ANGIOGRAPHY;  Surgeon: Antnoia Scott MD;  Location:  [facility];  Service: Cardiovascular;  Laterality: N/A;   • CARDIAC CATHETERIZATION N/A 7/22/2020    Procedure: Left ventriculography;  Surgeon: Antonia Scott MD;  Location:  [facility];  Service: Cardiovascular;  Laterality: N/A;   • CARDIAC CATHETERIZATION N/A 7/22/2020    Procedure: Saphenous Vein Graft;  Surgeon: Antonia Scott MD;  Location:  [facility];  Service: Cardiovascular;  Laterality: N/A;   • CARDIAC SURGERY  02/2016    Dr. Ontiveros/Dr. De Los Santos   • CATARACT EXTRACTION Bilateral     [facility]   • COLONOSCOPY N/A 10/2002    Dr. Negro   • CORONARY ARTERY BYPASS GRAFT  02/23/2016-[facility]    x1 w/L Vein Grafting--Dr. Ontiveros   • CORONARY ARTERY BYPASS GRAFT N/A 9/18/2020    Procedure: STERNAL EXPLORATION WITH CLOSURE AND WASHOUT, REMOVAL OF IABP, PRP;  Surgeon: Mart Ontiveros MD;  Location: [facility] MAIN OR;  Service: Cardiothoracic;  Laterality: N/A;   • CYST REMOVAL Right 01/25/2013    R Palm Excision of Retinacular Cyst--Dr. Karla Fish   • EPIDURAL BLOCK     • LAPAROSCOPIC CHOLECYSTECTOMY N/A 08/04/2004    Dr. JOEY Sheth   • MASTECTOMY Right 09/28/2012   • ORIF HIP FRACTURE Left  03/27/2005    w/ AMBI compression screw, Dr. Victor M Cabral   • RECTOVAGINAL FISTULA REPAIR  1965   • THORACIC AORTIC ANEURYSM REPAIR N/A 7/23/2019    Procedure: ROSE, THORACOABDOMINAL AORTIC ANEURYSM REPAIR, VISCERAL VESSEL REPAIR, LARGE VENTRAL HERNIA REPAIR WITH MESH, CIRCULATORY ARREST, PRP;  Surgeon: Mart Ontiveros MD;  Location: [facility] OR;  Service: Cardiothoracic   • TRANSESOPHAGEAL ECHOCARDIOGRAM (ROSE) N/A 9/18/2020    Procedure: TRANSESOPHAGEAL ECHOCARDIOGRAM WITH ANESTHESIA;  Surgeon: Mart Ontiveros MD;  Location: [facility] OR;  Service: Cardiothoracic;  Laterality: N/A;   • TUBAL ABDOMINAL LIGATION     • VENTRICULAR ANEURYSM REPAIR N/A 7/22/2020    Procedure: EMERGENT REOP STERNOTOMY, REPAIR OF LV RUPTURE, PRP, INTRAOP ROSE;  Surgeon: Mart Ontiveros MD;  Location: [facility] OR;  Service: Cardiothoracic;  Laterality: N/A;   • VENTRICULAR ANEURYSM REPAIR N/A 9/17/2020    Procedure: ROSE, MIDLINE STERNOTOMY REOP  LEFT VENTRICULAR ANEURYSM REPAIR, IABP INSERTION, PRP;  Surgeon: Mart Ontiveros MD;  Location: [facility] OR;  Service: Cardiothoracic;  Laterality: N/A;          SLP EVALUATION (last 72 hours)      SLP SLC Evaluation     Row Name 10/03/20 1400 10/01/20 1100                Communication Assessment/Intervention    Document Type  therapy note (daily note)  -TH  therapy note (daily note)  -AL       Subjective Information  complains of;fatigue  -TH  --       Patient/Family/Caregiver Comments/Observations  --  Pt upright in chair. Reports shortness of breath at end of PT session, but no complaints during ST session.  -AL       Patient Effort  good  -TH  good  -AL          Pain Scale: FACES Pre/Post-Treatment    Pain: FACES Scale, Pretreatment  --  0-->no hurt  -AL       Posttreatment Pain Rating  --  0-->no hurt  -AL         User Key  (r) = Recorded By, (t) = Taken By, (c) = Cosigned By    Initials Name Effective Dates    Vanessa Paredes, MS CCC-SLP 08/30/19 -     TH Hang  "Sierra 12/31/19 -              EDUCATION    The patient has been educated in the following areas:       Dysphagia (Swallowing Impairment) Oral Care/Hydration.      SLP Recommendation and Plan                                                  SLP GOALS     Row Name 10/03/20 1400 10/02/20 1030 10/01/20 1200       Oral Nutrition/Hydration Goal 1 (SLP)    Barriers (Oral Nutrition/Hydration Goal 1, SLP)  --  No s/s of aspiration or penetration in 5/10 trials of water by cup; throat clear x4, overt cough x1. Pt cued to utilize controlled swallow and effortful swallow during trials this date to improve timing/decrease premature spillage and improve laryngeal closure.   -ML  No s/s of aspiration or penetration in 3/8 trials of water by cup; throat clear x4, cough x1.  -AL    Progress/Outcomes (Oral Nutrition/Hydration Goal 1, SLP)  --  goal ongoing  -ML  goal ongoing  -AL       Lingual Strengthening Goal 1 (SLP)    Barriers (Lingual Strengthening Goal 1, SLP)  --  Hawk x10. Pt initially exhibiting strong \"hawk\" on initial few but noted quick decrease of intensity and articulation due to shortness of breath per pt. Pt required breaks to completed.   -ML  --    Progress/Outcomes (Lingual Strengthening Goal 1, SLP)  --  goal ongoing  -ML  --       Pharyngeal Strengthening Exercise Goal 1 (SLP)    Increase Closure at Entrance to Airway/Closure of Airway at Glottis  hard effortful swallow;chin tuck against resistance (CTAR);effortful pitch glide (falsetto + pharyngeal squeeze);falsetto;yuliana  -TH  --  --    Increase Squeeze/Positive Pressure Generation  hard effortful swallow;chin tuck against resistance (CTAR)  -TH  --  --    Increase Tongue Base Retraction  -- /k/ and /g/ sounds  -TH  --  --    [place]/Accuracy (Pharyngeal Strengthening Goal 1, SLP)  with minimal cues (75-90% accuracy)  -TH  --  --    Time Frame (Pharyngeal Strengthening Goal 1, SLP)  by discharge  -TH  --  --    Barriers (Pharyngeal Strengthening Goal 1, " SLP)  --  Completed effortful swallow x20 during trials of thin liquids and between with min-mod cues over extended period of time. Pt reporting exercise made her throat hurt yesterday when completing without moisture. Pt required breaks due to shortness of breath.   -ML  Completed 20 repetitions of effortful swallow with MOD cues. Fatigue noted today.   -AL    Progress/Outcomes (Pharyngeal Strengthening Goal 1, SLP)  goal ongoing  -TH  goal ongoing  -ML  --      User Key  (r) = Recorded By, (t) = Taken By, (c) = Cosigned By    Initials Name Provider Type     Vangie Blevins, MS CCC-SLP Speech and Language Pathologist    Vanessa Paredes, MS CCC-SLP Speech and Language Pathologist    Sierra Matthews Speech and Language Pathologist                      Time Calculation:       Time Calculation- SLP     Row Name 10/03/20 1359             Time Calculation- SLP    SLP Start Time  1230  -TH      SLP Stop Time  1300  -TH      SLP Time Calculation (min)  30 min  -TH        User Key  (r) = Recorded By, (t) = Taken By, (c) = Cosigned By    Initials Name Provider Type    Sierra Matthews Speech and Language Pathologist            Therapy Charges for Today     Code Description Service Date Service Provider Modifiers Qty    47876783348 HC ST TREATMENT SWALLOW 2 10/3/2020 Sierra Mauricio GN 1    [id number] HC ST TREATMENT SWALLOW 2 10/3/2020 Sierra Mauricio 1                           Sierra Mauricio  10/3/2020   and Inpatient Rehabilitation - [facility] Speech Language Pathology   Swallow Treatment Note/Discharge   [facility]     Patient Name: Grace Beauchamp  : 1940  MRN: [id number]  Today's Date: 10/3/2020               Admit Date: 2020    Visit Dx:      ICD-10-CM ICD-9-CM   1. Pericardial hematoma  I31.2 423.0   2. Ventricular aneurysm  I25.3 414.10   3. Gait abnormality  R26.9 781.2     Patient Active Problem List   Diagnosis   • History of breast cancer   • Stenosis of carotid artery   • Chronic  obstructive pulmonary disease (CMS/[facility])   • Closed fracture of multiple ribs   • Cognitive disorder   • Erythromelalgia (CMS/[facility])   • Hyperlipidemia   • Hypertension   • Primary osteoarthritis involving multiple joints   • Osteoporosis   • Restless legs syndrome   • Cerebral aneurysm   • Temporary cerebral vascular dysfunction   • S/P CABG x 1   • Type 2 diabetes mellitus without complication, without long-term current use of insulin (CMS/[facility])   • S/P ascending aortic aneurysm repair   • 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[facility])   • Descending thoracic aortic aneurysm (CMS/[facility])   • Claudication of both lower extremities (CMS/[facility])   • 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[facility])   • Ventral hernia without obstruction or gangrene   • Breast cancer, stage 1, estrogen receptor positive (CMS/[facility])   • Arthritis of right hip   • Arthritis of right knee   • Chondrocalcinosis   • Chronic pain of right knee   • Degenerative disc disease, lumbar   • Gastroesophageal reflux disease   • Bilateral hearing loss   • Thoracic aortic aneurysm without rupture (CMS/[facility])   • Erythromelalgia (CMS/[facility])   • 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CMS/[facility])   • Thoracoabdominal aortic aneurysm, without rupture (CMS/[facility])   • 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/[facility])   • Aortic aneurysm, descending (CMS/[facility])   • Aortic aneurysm without rupture (CMS/[facility])   • CAD (coronary artery disease)   • S/P left heart catheterization by ventricular puncture   • NSTEMI (non-ST elevated myocardial infarction) (CMS/[facility])   • Pericardial hematoma   • History of ST elevation myocardial infarction (STEMI)   • Acute on chronic diastolic CHF (congestive heart failure) (CMS/[facility])   • Ventricular aneurysm   • Immobility syndrome       Therapy Treatment    Evaluation/Coping         Vitals/Pain/Safety         Cognition/Communication         Oral Motor/Eating         Mobility/Basic Activities/Instrumental Activities/Motor/Modality                   ROM/MMT                "    Sensory/Myotome/Dermatome/Edema               Posture/Balance/Special Tests/Exercise/Transportation/Sexual Function                   Orthotics/Residual Limb/Prosthetic Management              Outcome Summary         SLP GOALS     Row Name 10/03/20 1400 10/02/20 1030 10/01/20 1200       Oral Nutrition/Hydration Goal 1 (SLP)    Barriers (Oral Nutrition/Hydration Goal 1, SLP)  --  No s/s of aspiration or penetration in 5/10 trials of water by cup; throat clear x4, overt cough x1. Pt cued to utilize controlled swallow and effortful swallow during trials this date to improve timing/decrease premature spillage and improve laryngeal closure.   -ML  No s/s of aspiration or penetration in 3/8 trials of water by cup; throat clear x4, cough x1.  -AL    Progress/Outcomes (Oral Nutrition/Hydration Goal 1, SLP)  --  goal ongoing  -ML  goal ongoing  -AL       Lingual Strengthening Goal 1 (SLP)    Barriers (Lingual Strengthening Goal 1, SLP)  --  Hawk x10. Pt initially exhibiting strong \"hawk\" on initial few but noted quick decrease of intensity and articulation due to shortness of breath per pt. Pt required breaks to completed.   -ML  --    Progress/Outcomes (Lingual Strengthening Goal 1, SLP)  --  goal ongoing  -ML  --       Pharyngeal Strengthening Exercise Goal 1 (SLP)    Increase Closure at Entrance to Airway/Closure of Airway at Glottis  hard effortful swallow;chin tuck against resistance (CTAR);effortful pitch glide (falsetto + pharyngeal squeeze);falsetto;yuliana  -TH  --  --    Increase Squeeze/Positive Pressure Generation  hard effortful swallow;chin tuck against resistance (CTAR)  -TH  --  --    Increase Tongue Base Retraction  -- /k/ and /g/ sounds  -TH  --  --    [place]/Accuracy (Pharyngeal Strengthening Goal 1, SLP)  with minimal cues (75-90% accuracy)  -TH  --  --    Time Frame (Pharyngeal Strengthening Goal 1, SLP)  by discharge  -TH  --  --    Barriers (Pharyngeal Strengthening Goal 1, SLP) Patient " continues to benefit from frequent breaks due to fatigue and SOB. However, patient reports she does not feel as short of breath today as previous day.  Completed effortful swallow x20 during trials of thin liquids and between with min-mod cues over extended period of time. Pt reporting exercise made her throat hurt yesterday when completing without moisture. Pt required breaks due to shortness of breath.   -ML  Completed 20 repetitions of effortful swallow with MOD cues. Fatigue noted today.   -AL    Progress/Outcomes (Pharyngeal Strengthening Goal 1, SLP)  goal ongoing  -TH  goal ongoing  -ML  --      User Key  (r) = Recorded By, (t) = Taken By, (c) = Cosigned By    Initials Name Provider Type    ML Vangie Blevins, MS CCC-SLP Speech and Language Pathologist    Vanessa Paredes, MS CCC-SLP Speech and Language Pathologist    Sierra Matthews Speech and Language Pathologist          EDUCATION  The patient has been educated in the following areas:   Dysphagia (Swallowing Impairment).    SLP Recommendation and Plan                                                    Time Calculation:   Time Calculation- SLP     Row Name 10/03/20 1359             Time Calculation- SLP    SLP Start Time  1230  -TH      SLP Stop Time  1300  -TH      SLP Time Calculation (min)  30 min  -TH        User Key  (r) = Recorded By, (t) = Taken By, (c) = Cosigned By    Initials Name Provider Type    Sierra Matthews Speech and Language Pathologist          Therapy Charges for Today     Code Description Service Date Service Provider Modifiers Qty    89936651762 HC ST TREATMENT SWALLOW 2 10/3/2020 Sierra Mauricio GN 1    [id number] HC ST TREATMENT SWALLOW 2 10/3/2020 Sierra Mauricio 1                    Sierra Mauricio  10/3/2020

## 2020-10-03 NOTE — PROGRESS NOTES
"   LOS: 8 days   Patient Care Team:  Miller Castañeda MD as PCP - General (Internal Medicine)  Miller Castañeda MD as PCP - Claims Attributed  Mart Ontiveros MD as Surgeon (Cardiothoracic Surgery)  Tres De Los Santos MD as Consulting Physician (Cardiology)  Graham Mcconnell MD as Consulting Physician (Hematology and Oncology)  Payam Byrnes MD as Consulting Physician (Otolaryngology)  Jonathan Smith MD as Consulting Physician (Vascular Surgery)  Victor M Cabral MD as Surgeon (Orthopedic Surgery)  Yaya Burks MD as Consulting Physician (Pulmonary Disease)    Chief Complaint:   immobility syndrome    Subjective     History of Present Illness    Subjective     Seen and examined, no acute events overnight. Feels OK, denies chest pain, shortness of breath, f/c. Slept well. Continues with dyspnea on exertion, denies cough, denies edema. Reports poor endurance    History taken from: patient    Objective     Vital Signs  Temp:  [97 °F (36.1 °C)-97.5 °F (36.4 °C)] 97 °F (36.1 °C)  Heart Rate:  [69-80] 80  Resp:  [18] 18  BP: (132-160)/(75-88) 158/75    Objective:  Vital signs: (most recent): Blood pressure 158/75, pulse 80, temperature 97 °F (36.1 °C), temperature source Oral, resp. rate 18, height 142.2 cm (56\"), weight 37.6 kg (82 lb 14.3 oz), SpO2 98 %.            Physical Exam     Gen:    calm; pleasant  HEENT: NCAT, EOMI; MMM,  Neck:   Supple, gauze on R neck  CV:      RRR, no m/r/g appreciated  Lungs: diminished breath sounds in the bases;      Abd:     (+) BS, soft, non-tender, non-distended  Ext:      no new edema  Skin:   cardiac incision healing well  Psych: appropriate mood and affect  Neuro:  Mental Status: AOx4, Speech: fluent without dysarthria or scanning, No gross cognitive deficits  Strength: R > L foot drop         Results Review:     I reviewed the patient's new clinical results.  Results from last 7 days   Lab Units 10/02/20  0632 10/01/20  0559 09/28/20  0738   WBC 10*3/mm3 4.99 4.74 5.89 "   HEMOGLOBIN g/dL 9.6* 8.8* 9.6*   HEMATOCRIT % 29.5* 27.9* 30.4*   PLATELETS 10*3/mm3 515* 470* 432     Results from last 7 days   Lab Units 10/02/20  0632 10/01/20  0559 09/28/20  0738   SODIUM mmol/L 140 141 143   POTASSIUM mmol/L 3.9 3.9 3.6   CHLORIDE mmol/L 105 105 106   CO2 mmol/L 30.0* 28.3 26.9   BUN mg/dL 19 18 21   CREATININE mg/dL 0.68 0.77 0.67   CALCIUM mg/dL 9.0 8.6 8.7   GLUCOSE mg/dL 91 89 102*     CT of the chest angiogram on October 2, 2020  FINDINGS: There is no evidence of pulmonary embolism. Stable aneurysmal  dilatation of the descending thoracic aorta. Stable changes of thoracic  aortic aneurysm repair. Stable rounded hypodensity along the proximal  aspect of the aortic arch. Interval repair of defect along the left  ventricular wall and evacuation of adjacent hematoma. Small right  pleural effusion. Emphysema. 5 mm nodule in the anterior left lower lobe  on image 57 is larger. 7 mm nodule in the posterior right upper lobe is  smaller where it previously measured 9 mm. Limited imaging of the upper  abdomen demonstrates bilateral renal cysts bone windows shows stable  degenerative changes of the thoracic spine. Stable loss of height of the  inferior endplate of L1.        Medication Review: done  Scheduled Meds:aspirin, 81 mg, Oral, Daily  atorvastatin, 40 mg, Oral, Nightly  budesonide, 0.5 mg, Nebulization, BID - RT  cilostazol, 100 mg, Oral, BID  clopidogrel, 75 mg, Oral, Daily  ferrous sulfate, 325 mg, Oral, Daily With Breakfast  furosemide, 20 mg, Oral, Every Other Day  ipratropium-albuterol, 3 mL, Nebulization, 4x Daily - RT  losartan, 50 mg, Oral, Daily  metoprolol succinate XL, 50 mg, Oral, Q12H  multivitamin, 1 tablet, Oral, Daily  oxybutynin XL, 5 mg, Oral, Daily  pantoprazole, 40 mg, Oral, QAM  potassium chloride, 10 mEq, Oral, Every Other Day  pramipexole, 0.125 mg, Oral, Nightly      Continuous Infusions:   PRN Meds:.•  acetaminophen **OR** [DISCONTINUED] acetaminophen **OR**  [DISCONTINUED] acetaminophen  •  albuterol sulfate HFA  •  ALPRAZolam  •  cetirizine  •  dextrose  •  dextrose  •  glucagon (human recombinant)  •  guaiFENesin  •  influenza vaccine  •  ipratropium-albuterol  •  magnesium hydroxide  •  meclizine  •  melatonin  •  naloxone  •  ondansetron  •  sennosides-docusate  •  traMADol      Assessment/Plan       Immobility syndrome      Assessment & Plan  Etiologic Diagnosis and Comorbidities include:    Physical Debility/Immobility Syndrome    Status post repair of lateral LV aneurysm with pericardial patch and right percutaneous common femoral artery intra-aortic balloon pump placement on 9/17/2020, return to the operating room for wound exploration, washout, and rewiring on 9/18/2020    History of CAD s/p CABG  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  Physical therapy notes that she was much more fatigued with ambulating just to the bathroom in her room.  She ambulated 80 to 90 feet contact-guard assist in therapies.  Does not require nasal cannula oxygen.  Does not describe any productive sputum.    Patient reviewed with cardiology service and cardiothoracic service.  CT of the chest without pulmonary embolism.  Not felt to be in florid congestive heart failure.  Seen by the pulmonology service and Trelegy inhaler changed to nebulizer to try to optimize her pulmonary status with underlying COPD.      pmh of Breast Ca    COPD  Trelegy inhaler changed to nebulizers to try to optimize her pulmonary status continues on room air    HTN    HLD    Peripheral Vascular Disease    Right Foot Drop- chronic - AFO    DVT prophylaxis - SCDs    Anemia    Pain management - tramadol - home med. GIULIA reviewed.      Impairments Include:   Dysphagia, mobility, ambulation, adl’s, strength, endurance, respiratory status, swallowing    TEAM CONF - SEPT 29 - BED MIN. TRANSFERS MIN. GAIT 80  FEET CTG RW. TOILET TRANSFERS MOD ASSIST. SHOWER TRANSFERS MOD ASSIST. UBD MIN. LBD MOD. BATH MOD ASSIST. TOILETING MOD ASSIST. SWALLOW - MECH SOFT, NO MIXED, NECTAR THICK LIQUIDS. LAST VFSS SILENT ASPIRATION ON THINS. REPEAT VFSS NEXT WEEK. CONTINENT BOWEL AND BLADDER. STERNAL INCISION HEALING. PRESSURE SORE TO BUTTOCKS WITH CREAM TO THAT. ALPRAZOLAM PRN AT NIGHT. REC THERAPY INVOLVED.  ELOS - 2 WEEKS     Medical necessity  This patient is a good candidate for acute inpatient rehabilitation for a stay of approximately 2 weeks.  The etiologic diagnosis and comorbidities as listed above will require 24hr availability and frequent interventions of a physician with training in rehabilitation medicine.  The patient’s care cannot be provided on a lower level secondary to need for rigorous acute rehab in order to have maximal improvement of function.  Once admitted the patient will undergo 3 hour of PT/OT/SLP a day for at least 5 days per week. A rehab program will be initiated working on  Dysphagia, strengthening, endurance, safety, dressing, transfers, ambulation, bathing, grooming, swallowing skills. Rehab nursing will be involved to monitor bowel and bladder function, skin integrity, diabetes monitoring/education, patient and family education, and therapy carryover.  A weekly team meeting will be coordinated to maximize the patient’s plan of care during hospitalization and at discharge.       The patient's functional status and clinical status is unchanged from preadmission assessment and the patient continues appropriate for acute inpatient rehabilitation.  Goal is for home with  Home health   therapies.  Barrier to discharge:  Impaired mobility  - work on  Strength, balance, ADLS, swallow  to overcome.     10/3  - Continue comprehensive inpatient rehabilitation program  - Cardiology following  - Continue plavix and statin  - Continue cassidy Shane MD  10/03/20  15:58 EDT    Time:   During rounds, used  appropriate personal protective equipment including mask and gloves.  Additional gown if indicated.  Mask used was standard procedure mask. Appropriate PPE was worn during the entire visit.  Hand hygiene was completed before and after.

## 2020-10-04 PROCEDURE — 94799 UNLISTED PULMONARY SVC/PX: CPT

## 2020-10-04 PROCEDURE — 99232 SBSQ HOSP IP/OBS MODERATE 35: CPT | Performed by: NURSE PRACTITIONER

## 2020-10-04 RX ORDER — ALUMINA, MAGNESIA, AND SIMETHICONE 2400; 2400; 240 MG/30ML; MG/30ML; MG/30ML
15 SUSPENSION ORAL EVERY 6 HOURS PRN
Status: DISCONTINUED | OUTPATIENT
Start: 2020-10-04 | End: 2020-10-09

## 2020-10-04 RX ADMIN — IPRATROPIUM BROMIDE AND ALBUTEROL SULFATE 3 ML: 2.5; .5 SOLUTION RESPIRATORY (INHALATION) at 06:53

## 2020-10-04 RX ADMIN — METOPROLOL SUCCINATE 50 MG: 50 TABLET, EXTENDED RELEASE ORAL at 09:15

## 2020-10-04 RX ADMIN — BUDESONIDE 0.5 MG: 0.5 INHALANT ORAL at 19:15

## 2020-10-04 RX ADMIN — ATORVASTATIN CALCIUM 40 MG: 20 TABLET, FILM COATED ORAL at 20:24

## 2020-10-04 RX ADMIN — METOPROLOL SUCCINATE 50 MG: 50 TABLET, EXTENDED RELEASE ORAL at 20:23

## 2020-10-04 RX ADMIN — ASPIRIN 81 MG: 81 TABLET, CHEWABLE ORAL at 09:15

## 2020-10-04 RX ADMIN — PRAMIPEXOLE DIHYDROCHLORIDE 0.12 MG: 0.25 TABLET ORAL at 20:23

## 2020-10-04 RX ADMIN — BUDESONIDE 0.5 MG: 0.5 INHALANT ORAL at 06:52

## 2020-10-04 RX ADMIN — CLOPIDOGREL 75 MG: 75 TABLET, FILM COATED ORAL at 09:15

## 2020-10-04 RX ADMIN — FERROUS SULFATE TAB 325 MG (65 MG ELEMENTAL FE) 325 MG: 325 (65 FE) TAB at 09:15

## 2020-10-04 RX ADMIN — ALPRAZOLAM 0.25 MG: 0.25 TABLET ORAL at 20:24

## 2020-10-04 RX ADMIN — IPRATROPIUM BROMIDE AND ALBUTEROL SULFATE 3 ML: 2.5; .5 SOLUTION RESPIRATORY (INHALATION) at 19:14

## 2020-10-04 RX ADMIN — ALUMINUM HYDROXIDE, MAGNESIUM HYDROXIDE, AND DIMETHICONE 15 ML: 400; 400; 40 SUSPENSION ORAL at 11:06

## 2020-10-04 RX ADMIN — CILOSTAZOL 100 MG: 100 TABLET ORAL at 09:15

## 2020-10-04 RX ADMIN — FUROSEMIDE 20 MG: 20 TABLET ORAL at 09:15

## 2020-10-04 RX ADMIN — Medication 1 TABLET: at 09:15

## 2020-10-04 RX ADMIN — POTASSIUM CHLORIDE 10 MEQ: 10 CAPSULE, COATED, EXTENDED RELEASE ORAL at 09:15

## 2020-10-04 RX ADMIN — LOSARTAN POTASSIUM 50 MG: 50 TABLET, FILM COATED ORAL at 09:15

## 2020-10-04 RX ADMIN — IPRATROPIUM BROMIDE AND ALBUTEROL SULFATE 3 ML: 2.5; .5 SOLUTION RESPIRATORY (INHALATION) at 10:12

## 2020-10-04 RX ADMIN — CILOSTAZOL 100 MG: 100 TABLET ORAL at 20:24

## 2020-10-04 RX ADMIN — OXYBUTYNIN CHLORIDE 5 MG: 5 TABLET, EXTENDED RELEASE ORAL at 09:15

## 2020-10-04 RX ADMIN — PANTOPRAZOLE SODIUM 40 MG: 40 TABLET, DELAYED RELEASE ORAL at 06:15

## 2020-10-04 NOTE — PLAN OF CARE
Goal Outcome Evaluation:  Plan of Care Reviewed With: patient  Progress: improving  Outcome Summary: Patient was tearful this afternoon tired of being in the hospital ready to go home upset with the food, daughter visited this afternoon and patient ate well at dinner and felt better.

## 2020-10-04 NOTE — PROGRESS NOTES
Inpatient Rehabilitation Plan of Care Note    Plan of Care  Care Plan Reviewed - No updates at this time.    Psychosocial    Performed Intervention(s)  verbalize needs and concerns      Safety    Performed Intervention(s)  bed/chair alarms  hourly rounding  items within reach      Sphincter Control    Performed Intervention(s)  hourly rounding  adequate fluid intake      Body Systems    Performed Intervention(s)  dressing changes or wound care per orders  WOCN  monitor for signs of infection    Signed by: Mara Anthony RN

## 2020-10-04 NOTE — PROGRESS NOTES
Inpatient Rehabilitation Plan of Care Note    Plan of Care  Care Plan Reviewed - No updates at this time.    Psychosocial    Performed Intervention(s)  verbalize needs and concerns      Safety    Performed Intervention(s)  bed/chair alarms  hourly rounding  items within reach      Sphincter Control    Performed Intervention(s)  hourly rounding  adequate fluid intake      Body Systems    Performed Intervention(s)  dressing changes or wound care per orders  WOCN  monitor for signs of infection    Signed by: Katrina Flannery RN

## 2020-10-04 NOTE — PLAN OF CARE
Goal Outcome Evaluation:  Plan of Care Reviewed With: patient  Progress: improving  Outcome Summary: Pt with no c/o pain. No c/o SOA. Sips on nectar thick liquids with HOB up. Meds whole with pudding.

## 2020-10-04 NOTE — PROGRESS NOTES
"   LOS: 9 days   Patient Care Team:  Miller Castañeda MD as PCP - General (Internal Medicine)  Miller Castañeda MD as PCP - Claims Attributed  Mart Ontiveros MD as Surgeon (Cardiothoracic Surgery)  Tres De Los Santos MD as Consulting Physician (Cardiology)  Graham Mcconnell MD as Consulting Physician (Hematology and Oncology)  Payam Byrnes MD as Consulting Physician (Otolaryngology)  Jonathan Smith MD as Consulting Physician (Vascular Surgery)  Victor M Cabral MD as Surgeon (Orthopedic Surgery)  Yaya Burks MD as Consulting Physician (Pulmonary Disease)    Chief Complaint:   immobility syndrome    Subjective     History of Present Illness    Subjective     Seen and examined, no acute events overnight. Feels OK, denies chest pain, shortness of breath, f/c. Slept well. Reports that breathing treatments are helping with dyspnea. Denies cough. Therapy going well    History taken from: patient    Objective     Vital Signs  Temp:  [97.9 °F (36.6 °C)-98.9 °F (37.2 °C)] 98.9 °F (37.2 °C)  Heart Rate:  [80-85] 82  Resp:  [18] 18  BP: (103-163)/(60-88) 163/88    Objective:  Vital signs: (most recent): Blood pressure 163/88, pulse 82, temperature 98.9 °F (37.2 °C), temperature source Oral, resp. rate 18, height 142.2 cm (56\"), weight 37.5 kg (82 lb 10.8 oz), SpO2 98 %.            Physical Exam     Gen:    calm; pleasant  HEENT: NCAT, EOMI; MMM,  Neck:   Supple, gauze on R neck  CV:      RRR, no m/r/g appreciated  Lungs: diminished breath sounds in the bases;      Abd:     (+) BS, soft, non-tender, non-distended  Ext:      no new edema  Skin:   cardiac incision healing well  Psych: appropriate mood and affect  Neuro:  Mental Status: AOx4, Speech: fluent without dysarthria or scanning, No gross cognitive deficits  Strength: R > L foot drop         Results Review:     I reviewed the patient's new clinical results.  Results from last 7 days   Lab Units 10/02/20  0632 10/01/20  0559 09/28/20  0738   WBC 10*3/mm3 4.99 " 4.74 5.89   HEMOGLOBIN g/dL 9.6* 8.8* 9.6*   HEMATOCRIT % 29.5* 27.9* 30.4*   PLATELETS 10*3/mm3 515* 470* 432     Results from last 7 days   Lab Units 10/02/20  0632 10/01/20  0559 09/28/20  0738   SODIUM mmol/L 140 141 143   POTASSIUM mmol/L 3.9 3.9 3.6   CHLORIDE mmol/L 105 105 106   CO2 mmol/L 30.0* 28.3 26.9   BUN mg/dL 19 18 21   CREATININE mg/dL 0.68 0.77 0.67   CALCIUM mg/dL 9.0 8.6 8.7   GLUCOSE mg/dL 91 89 102*     CT of the chest angiogram on October 2, 2020  FINDINGS: There is no evidence of pulmonary embolism. Stable aneurysmal  dilatation of the descending thoracic aorta. Stable changes of thoracic  aortic aneurysm repair. Stable rounded hypodensity along the proximal  aspect of the aortic arch. Interval repair of defect along the left  ventricular wall and evacuation of adjacent hematoma. Small right  pleural effusion. Emphysema. 5 mm nodule in the anterior left lower lobe  on image 57 is larger. 7 mm nodule in the posterior right upper lobe is  smaller where it previously measured 9 mm. Limited imaging of the upper  abdomen demonstrates bilateral renal cysts bone windows shows stable  degenerative changes of the thoracic spine. Stable loss of height of the  inferior endplate of L1.        Medication Review: done  Scheduled Meds:aspirin, 81 mg, Oral, Daily  atorvastatin, 40 mg, Oral, Nightly  budesonide, 0.5 mg, Nebulization, BID - RT  cilostazol, 100 mg, Oral, BID  clopidogrel, 75 mg, Oral, Daily  ferrous sulfate, 325 mg, Oral, Daily With Breakfast  furosemide, 20 mg, Oral, Every Other Day  ipratropium-albuterol, 3 mL, Nebulization, 4x Daily - RT  losartan, 50 mg, Oral, Daily  metoprolol succinate XL, 50 mg, Oral, Q12H  multivitamin, 1 tablet, Oral, Daily  oxybutynin XL, 5 mg, Oral, Daily  pantoprazole, 40 mg, Oral, QAM  potassium chloride, 10 mEq, Oral, Every Other Day  pramipexole, 0.125 mg, Oral, Nightly      Continuous Infusions:   PRN Meds:.•  acetaminophen **OR** [DISCONTINUED] acetaminophen  **OR** [DISCONTINUED] acetaminophen  •  albuterol sulfate HFA  •  ALPRAZolam  •  aluminum-magnesium hydroxide-simethicone  •  cetirizine  •  dextrose  •  dextrose  •  glucagon (human recombinant)  •  guaiFENesin  •  influenza vaccine  •  ipratropium-albuterol  •  magnesium hydroxide  •  meclizine  •  melatonin  •  naloxone  •  ondansetron  •  sennosides-docusate  •  traMADol      Assessment/Plan       Immobility syndrome      Assessment & Plan  Etiologic Diagnosis and Comorbidities include:    Physical Debility/Immobility Syndrome    Status post repair of lateral LV aneurysm with pericardial patch and right percutaneous common femoral artery intra-aortic balloon pump placement on 9/17/2020, return to the operating room for wound exploration, washout, and rewiring on 9/18/2020    History of CAD s/p CABG  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  Physical therapy notes that she was much more fatigued with ambulating just to the bathroom in her room.  She ambulated 80 to 90 feet contact-guard assist in therapies.  Does not require nasal cannula oxygen.  Does not describe any productive sputum.    Patient reviewed with cardiology service and cardiothoracic service.  CT of the chest without pulmonary embolism.  Not felt to be in florid congestive heart failure.  Seen by the pulmonology service and Trelegy inhaler changed to nebulizer to try to optimize her pulmonary status with underlying COPD.      pmh of Breast Ca    COPD  Trelegy inhaler changed to nebulizers to try to optimize her pulmonary status continues on room air    HTN    HLD    Peripheral Vascular Disease    Right Foot Drop- chronic - AFO    DVT prophylaxis - SCDs    Anemia    Pain management - tramadol - home med. GIULIA reviewed.      Impairments Include:   Dysphagia, mobility, ambulation, adl’s, strength, endurance, respiratory status,  swallowing    TEAM CONF - SEPT 29 - BED MIN. TRANSFERS MIN. GAIT 80 FEET CTG RW. TOILET TRANSFERS MOD ASSIST. SHOWER TRANSFERS MOD ASSIST. UBD MIN. LBD MOD. BATH MOD ASSIST. TOILETING MOD ASSIST. SWALLOW - MECH SOFT, NO MIXED, NECTAR THICK LIQUIDS. LAST VFSS SILENT ASPIRATION ON THINS. REPEAT VFSS NEXT WEEK. CONTINENT BOWEL AND BLADDER. STERNAL INCISION HEALING. PRESSURE SORE TO BUTTOCKS WITH CREAM TO THAT. ALPRAZOLAM PRN AT NIGHT. REC THERAPY INVOLVED.  ELOS - 2 WEEKS     Medical necessity  This patient is a good candidate for acute inpatient rehabilitation for a stay of approximately 2 weeks.  The etiologic diagnosis and comorbidities as listed above will require 24hr availability and frequent interventions of a physician with training in rehabilitation medicine.  The patient’s care cannot be provided on a lower level secondary to need for rigorous acute rehab in order to have maximal improvement of function.  Once admitted the patient will undergo 3 hour of PT/OT/SLP a day for at least 5 days per week. A rehab program will be initiated working on  Dysphagia, strengthening, endurance, safety, dressing, transfers, ambulation, bathing, grooming, swallowing skills. Rehab nursing will be involved to monitor bowel and bladder function, skin integrity, diabetes monitoring/education, patient and family education, and therapy carryover.  A weekly team meeting will be coordinated to maximize the patient’s plan of care during hospitalization and at discharge.       The patient's functional status and clinical status is unchanged from preadmission assessment and the patient continues appropriate for acute inpatient rehabilitation.  Goal is for home with  Home health   therapies.  Barrier to discharge:  Impaired mobility  - work on  Strength, balance, ADLS, swallow  to overcome.     10/3  - Continue comprehensive inpatient rehabilitation program  - Cardiology following  - Continue plavix and statin  - Continue  lasix    10/4  - Continue comprehensive inpatient rehabilitation program  - Cardiology following - adjusting lasix  - Continue metoprolol         Jonathan Shane MD  10/04/20  12:58 EDT    Time:   During rounds, used appropriate personal protective equipment including mask and gloves.  Additional gown if indicated.  Mask used was standard procedure mask. Appropriate PPE was worn during the entire visit.  Hand hygiene was completed before and after.

## 2020-10-04 NOTE — PROGRESS NOTES
"    Patient Name: Grace Beauchamp  :1940  80 y.o.      Patient Care Team:  Miller Castañeda MD as PCP - General (Internal Medicine)  Miller Castañeda MD as PCP - Claims Attributed  Mart Ontiveros MD as Surgeon (Cardiothoracic Surgery)  Tres De Los Santos MD as Consulting Physician (Cardiology)  Graham Mcconnell MD as Consulting Physician (Hematology and Oncology)  Payam Byrnes MD as Consulting Physician (Otolaryngology)  Jonathan Smith MD as Consulting Physician (Vascular Surgery)  Victor M Cabral MD as Surgeon (Orthopedic Surgery)  Yaya Burks MD as Consulting Physician (Pulmonary Disease)    Chief Complaint: f/u aneurysm with emergent cardiac surgery    Interval History:  BP spikes and trends down. No labs to review. She feels ok. State breathing is better and she was able to complete therapy yesterday. Resting comfortably in bed with no chest pain, dizziness, headache. palps.    Objective   Vital Signs  Temp:  [97 °F (36.1 °C)-98.9 °F (37.2 °C)] 98.9 °F (37.2 °C)  Heart Rate:  [79-85] 81  Resp:  [18] 18  BP: (103-163)/(60-88) 163/88    Intake/Output Summary (Last 24 hours) at 10/4/2020 0725  Last data filed at 10/3/2020 1800  Gross per 24 hour   Intake 360 ml   Output --   Net 360 ml     Flowsheet Rows      First Filed Value   Admission Height  142.2 cm (56\") Documented at 2020 0919   Admission Weight  39.9 kg (88 lb) Documented at 2020 1603          Physical Exam:   General Appearance:    Thin, Alert, cooperative, in no acute distress, laying flat comfortably in bed.   Lungs:     Clear to auscultation.  Normal respiratory effort and rate.      Heart:    Regular rhythm and normal rate, normal S1 and S2, no murmurs, gallops or rubs.     Chest Wall:    Healing sternotomy incision   Abdomen:     Soft, nontender, positive bowel sounds.     Extremities:   MARTINEZ, Palpable DP/radial pulses bilaterally.     Results Review:    Results from last 7 days   Lab Units 10/02/20  0632   SODIUM " mmol/L 140   POTASSIUM mmol/L 3.9   CHLORIDE mmol/L 105   CO2 mmol/L 30.0*   BUN mg/dL 19   CREATININE mg/dL 0.68   GLUCOSE mg/dL 91   CALCIUM mg/dL 9.0         Results from last 7 days   Lab Units 10/02/20  0632   WBC 10*3/mm3 4.99   HEMOGLOBIN g/dL 9.6*   HEMATOCRIT % 29.5*   PLATELETS 10*3/mm3 515*                           Medication Review:   aspirin, 81 mg, Oral, Daily  atorvastatin, 40 mg, Oral, Nightly  budesonide, 0.5 mg, Nebulization, BID - RT  cilostazol, 100 mg, Oral, BID  clopidogrel, 75 mg, Oral, Daily  ferrous sulfate, 325 mg, Oral, Daily With Breakfast  furosemide, 20 mg, Oral, Every Other Day  ipratropium-albuterol, 3 mL, Nebulization, 4x Daily - RT  losartan, 50 mg, Oral, Daily  metoprolol succinate XL, 50 mg, Oral, Q12H  multivitamin, 1 tablet, Oral, Daily  oxybutynin XL, 5 mg, Oral, Daily  pantoprazole, 40 mg, Oral, QAM  potassium chloride, 10 mEq, Oral, Every Other Day  pramipexole, 0.125 mg, Oral, Nightly              Assessment/Plan     1. Dyspnea: With elevated proBNP 4645 yesterday . Dr. De Los Santos did not feel this was CHF related. Pulm consult noted. CTA negative PE but with mild right pleural effusion and emphysema/enlarging pulmonary nodule. Improved with nebulizers. Will try her dose of every other day lasix today.  2. Ruptured myocardium with free wall repair July 2002 with urgent redo sternotomy Sept 2020 for LV lateral wall aneurysmal repair with pericardial patch.  3. CAD with hx of CABG 2012  4. AVR in 2016 with aortic aneursymal repair  5.Hx of COPD: Pulm consulted. Discussed switching inhaler therapy to nebulizer. Will defer.   6. HTN: BP high ealier but appears it had overall been ok or even low. Continue to Monitor .  7. Hx of pulmonary HTN.  8. Enlarging  Lower lung nodule: Pulm following and plans to repeat CT scan in 6 months.  9. Anemia: Stable 2 days ago.  10. Thin/frail      Has low dose diuretics ordered every other day but she declined 2 days ago. Renal function stable on  10/2. She reports she will try the diuretic dose today. I have d/w RN. She will notify us with continued BP spikes that do not downtrend.       Thank you for allowing me to participate in this patient's care. Please call me with any questions/concerns.         FREDRICK Calzada  [facility]  10/04/20  07:25 EDT

## 2020-10-05 LAB
ANION GAP SERPL CALCULATED.3IONS-SCNC: 9.7 MMOL/L (ref 5–15)
BASOPHILS # BLD AUTO: 0.04 10*3/MM3 (ref 0–0.2)
BASOPHILS NFR BLD AUTO: 1 % (ref 0–1.5)
BUN SERPL-MCNC: 15 MG/DL (ref 8–23)
BUN/CREAT SERPL: 27.3 (ref 7–25)
CALCIUM SPEC-SCNC: 9 MG/DL (ref 8.6–10.5)
CHLORIDE SERPL-SCNC: 107 MMOL/L (ref 98–107)
CO2 SERPL-SCNC: 26.3 MMOL/L (ref 22–29)
CREAT SERPL-MCNC: 0.55 MG/DL (ref 0.57–1)
DEPRECATED RDW RBC AUTO: 50.9 FL (ref 37–54)
EOSINOPHIL # BLD AUTO: 0.08 10*3/MM3 (ref 0–0.4)
EOSINOPHIL NFR BLD AUTO: 1.9 % (ref 0.3–6.2)
ERYTHROCYTE [DISTWIDTH] IN BLOOD BY AUTOMATED COUNT: 16.1 % (ref 12.3–15.4)
GFR SERPL CREATININE-BSD FRML MDRD: 106 ML/MIN/1.73
GLUCOSE SERPL-MCNC: 116 MG/DL (ref 65–99)
HCT VFR BLD AUTO: 30.8 % (ref 34–46.6)
HGB BLD-MCNC: 10.2 G/DL (ref 12–15.9)
IMM GRANULOCYTES # BLD AUTO: 0.01 10*3/MM3 (ref 0–0.05)
IMM GRANULOCYTES NFR BLD AUTO: 0.2 % (ref 0–0.5)
LYMPHOCYTES # BLD AUTO: 1.04 10*3/MM3 (ref 0.7–3.1)
LYMPHOCYTES NFR BLD AUTO: 24.8 % (ref 19.6–45.3)
MCH RBC QN AUTO: 29.1 PG (ref 26.6–33)
MCHC RBC AUTO-ENTMCNC: 33.1 G/DL (ref 31.5–35.7)
MCV RBC AUTO: 88 FL (ref 79–97)
MONOCYTES # BLD AUTO: 0.35 10*3/MM3 (ref 0.1–0.9)
MONOCYTES NFR BLD AUTO: 8.3 % (ref 5–12)
NEUTROPHILS NFR BLD AUTO: 2.68 10*3/MM3 (ref 1.7–7)
NEUTROPHILS NFR BLD AUTO: 63.8 % (ref 42.7–76)
NRBC BLD AUTO-RTO: 0 /100 WBC (ref 0–0.2)
PLATELET # BLD AUTO: 484 10*3/MM3 (ref 140–450)
PMV BLD AUTO: 8.7 FL (ref 6–12)
POTASSIUM SERPL-SCNC: 3.6 MMOL/L (ref 3.5–5.2)
RBC # BLD AUTO: 3.5 10*6/MM3 (ref 3.77–5.28)
SODIUM SERPL-SCNC: 143 MMOL/L (ref 136–145)
WBC # BLD AUTO: 4.2 10*3/MM3 (ref 3.4–10.8)

## 2020-10-05 PROCEDURE — 99232 SBSQ HOSP IP/OBS MODERATE 35: CPT | Performed by: NURSE PRACTITIONER

## 2020-10-05 PROCEDURE — 94799 UNLISTED PULMONARY SVC/PX: CPT

## 2020-10-05 PROCEDURE — 97116 GAIT TRAINING THERAPY: CPT | Performed by: PHYSICAL THERAPIST

## 2020-10-05 PROCEDURE — 92526 ORAL FUNCTION THERAPY: CPT

## 2020-10-05 PROCEDURE — 97110 THERAPEUTIC EXERCISES: CPT

## 2020-10-05 PROCEDURE — 97110 THERAPEUTIC EXERCISES: CPT | Performed by: PHYSICAL THERAPIST

## 2020-10-05 PROCEDURE — 80048 BASIC METABOLIC PNL TOTAL CA: CPT | Performed by: PHYSICAL MEDICINE & REHABILITATION

## 2020-10-05 PROCEDURE — 97530 THERAPEUTIC ACTIVITIES: CPT | Performed by: PHYSICAL THERAPIST

## 2020-10-05 PROCEDURE — 97535 SELF CARE MNGMENT TRAINING: CPT

## 2020-10-05 PROCEDURE — 85025 COMPLETE CBC W/AUTO DIFF WBC: CPT | Performed by: PHYSICAL MEDICINE & REHABILITATION

## 2020-10-05 RX ADMIN — Medication 10 MG: at 22:34

## 2020-10-05 RX ADMIN — CLOPIDOGREL 75 MG: 75 TABLET, FILM COATED ORAL at 09:17

## 2020-10-05 RX ADMIN — ALPRAZOLAM 0.25 MG: 0.25 TABLET ORAL at 20:27

## 2020-10-05 RX ADMIN — OXYBUTYNIN CHLORIDE 5 MG: 5 TABLET, EXTENDED RELEASE ORAL at 09:16

## 2020-10-05 RX ADMIN — IPRATROPIUM BROMIDE AND ALBUTEROL SULFATE 3 ML: 2.5; .5 SOLUTION RESPIRATORY (INHALATION) at 21:10

## 2020-10-05 RX ADMIN — CILOSTAZOL 100 MG: 100 TABLET ORAL at 20:26

## 2020-10-05 RX ADMIN — FERROUS SULFATE TAB 325 MG (65 MG ELEMENTAL FE) 325 MG: 325 (65 FE) TAB at 09:17

## 2020-10-05 RX ADMIN — ATORVASTATIN CALCIUM 40 MG: 20 TABLET, FILM COATED ORAL at 20:27

## 2020-10-05 RX ADMIN — IPRATROPIUM BROMIDE AND ALBUTEROL SULFATE 3 ML: 2.5; .5 SOLUTION RESPIRATORY (INHALATION) at 07:11

## 2020-10-05 RX ADMIN — PRAMIPEXOLE DIHYDROCHLORIDE 0.12 MG: 0.25 TABLET ORAL at 20:26

## 2020-10-05 RX ADMIN — BUDESONIDE 0.5 MG: 0.5 INHALANT ORAL at 21:11

## 2020-10-05 RX ADMIN — ASPIRIN 81 MG: 81 TABLET, CHEWABLE ORAL at 09:17

## 2020-10-05 RX ADMIN — METOPROLOL SUCCINATE 50 MG: 50 TABLET, EXTENDED RELEASE ORAL at 09:16

## 2020-10-05 RX ADMIN — CILOSTAZOL 100 MG: 100 TABLET ORAL at 09:16

## 2020-10-05 RX ADMIN — Medication 1 TABLET: at 09:16

## 2020-10-05 RX ADMIN — LOSARTAN POTASSIUM 50 MG: 50 TABLET, FILM COATED ORAL at 09:16

## 2020-10-05 RX ADMIN — METOPROLOL SUCCINATE 50 MG: 50 TABLET, EXTENDED RELEASE ORAL at 20:26

## 2020-10-05 RX ADMIN — BUDESONIDE 0.5 MG: 0.5 INHALANT ORAL at 07:12

## 2020-10-05 RX ADMIN — PANTOPRAZOLE SODIUM 40 MG: 40 TABLET, DELAYED RELEASE ORAL at 06:01

## 2020-10-05 NOTE — PROGRESS NOTES
Adult Nutrition  Assessment/PES    Patient Name:  Grace Beauchamp  YOB: 1940  MRN: [id number]  Admit Date:  9/25/2020    Assessment Date:  10/5/2020    Comments:  Pt eager to eat regular food - has not like the ground food, not eating great. Reviewed menu options and supplement flavors. She is drinking about 1/2 of the magic cup shakes sent Q meal. She says she will have VFSS mid-week to see if she can have thins. Will cont to follow. Wt 82 lb.     Reason for Assessment     Row Name 10/05/20 1451          Reason for Assessment    Reason For Assessment  follow-up protocol         Nutrition/Diet History     Row Name 10/05/20 1451          Nutrition/Diet History    Typical Food/Fluid Intake  Pt does not like, and hasn't eaten, much of the ground food. She says ST upgraded to regular texture today. Will have VFSS Wed to see about liquid upgrade. Drinking about 1/2 of shakes - says they're too big, rich.           Labs/Tests/Procedures/Meds     Row Name 10/05/20 1452          Labs/Procedures/Meds    Lab Results Reviewed  reviewed     Lab Results Comments  glu, cr,h/h        Diagnostic Tests/Procedures    Diagnostic Test/Procedure Reviewed  reviewed     Diagnostic Test/Procedures Comments  VFSS pending this week        Medications    Pertinent Medications Reviewed  reviewed             Nutrition Prescription Ordered     Row Name 10/05/20 1452          Nutrition Prescription PO    Current PO Diet  Regular     Fluid Consistency  Nectar/syrup thick     Common Modifiers  Cardiac;Consistent Carbohydrate         Evaluation of Received Nutrient/Fluid Intake     Row Name 10/05/20 1452          PO Evaluation    % PO Intake  % + 1/2 of the MC shakes               Problem/Interventions:          Intervention Goal     Row Name 10/05/20 1451          Intervention Goal    General  Maintain nutrition;Disease management/therapy     PO  Advance diet;Increase intake;PO intake (%)     PO Intake %  75 %     Weight   Appropriate weight gain         Nutrition Intervention     Row Name 10/05/20 1454          Nutrition Intervention    RD/Tech Action  Follow Tx progress;Care plan reviewd;Encourage intake;Interview for preference;Advise alternate selection;Other (comment);Adjusted supplement ordered her baked sweet potato with br sugar & cinn on side for dinner tonight; adjust shakes to chocolate, likes orange & van too           Education/Evaluation     Row Name 10/05/20 1454          Education    Education  Provided education regarding;Advised regarding habits/behavior     Advised Regarding Habits/Behavior  Food choices;Use supplement        Monitor/Evaluation    Monitor  Per protocol           Electronically signed by:  Marjorie Lowe RD  10/05/20 14:56 EDT

## 2020-10-05 NOTE — PROGRESS NOTES
Inpatient Rehabilitation Functional Measures Assessment and Plan of Care    Plan of Care  Updated Problems/Interventions  Swallow Function    [ST] Swallowing(Active)  Current Status(10/05/2020): Pt now tolerating regular with NTL. Pt exhibits  intermittent throat clear with thins by cup. Plan VFSS for 10/7/20.  Weekly Goal(10/13/2020): The patient will exhibit no clinical s/s of aspiration  on 80% of thin liquid, water trials.  Discharge Goal: The patient will tolerate the least restrictive diet, without  showing clinical s/s of aspiration.    Functional Measures  IVAN Eating:  Branch  [facility] Grooming: Branch  [facility] Bathing:  Branch  [facility] Upper Body Dressing:  Branch  [facility] Lower Body Dressing:  [facility] Toileting:  [facility] Bladder Management  Level of Assistance:  Flora  Frequency/Number of Accidents this Shift:  [facility] Bowel Management  Level of Assistance: Flora  Frequency/Number of Accidents this Shift: Branch    [facility] Bed/Chair/Wheelchair Transfer:  [facility] Toilet Transfer:  [facility] Tub/Shower Transfer:  Branch    Previously Documented Mode of Locomotion at Discharge: Field  IVAN Expected Mode of Locomotion at Discharge: [facility] Walk/Wheelchair:  [facility] Stairs:  [facility] Comprehension:  Branch  [facility] Expression:  Branch  [facility] Social Interaction:  [facility] Problem Solving:  [facility] Memory:  Flora    Therapy Mode Minutes  Occupational Therapy: Branch  Physical Therapy: Branch  Speech Language Pathology:  Branch    Signed by: Vanessa Leiva, SLP

## 2020-10-05 NOTE — THERAPY TREATMENT NOTE
Inpatient Rehabilitation - Speech Language Pathology Treatment Note    [facility]     Patient Name: Grace Beauchamp  : 1940  MRN: [id number]    Today's Date: 10/5/2020                   Admit Date: 2020       Visit Dx:      ICD-10-CM ICD-9-CM   1. Pericardial hematoma  I31.2 423.0   2. Ventricular aneurysm  I25.3 414.10   3. Gait abnormality  R26.9 781.2       Patient Active Problem List   Diagnosis   • History of breast cancer   • Stenosis of carotid artery   • Chronic obstructive pulmonary disease (CMS/formerly [facility])   • Closed fracture of multiple ribs   • Cognitive disorder   • Erythromelalgia (CMS/formerly [facility])   • Hyperlipidemia   • Hypertension   • Primary osteoarthritis involving multiple joints   • Osteoporosis   • Restless legs syndrome   • Cerebral aneurysm   • Temporary cerebral vascular dysfunction   • S/P CABG x 1   • Type 2 diabetes mellitus without complication, without long-term current use of insulin (CMS/formerly [facility])   • S/P ascending aortic aneurysm repair   • 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formerly [facility])   • Descending thoracic aortic aneurysm (CMS/formerly [facility])   • Claudication of both lower extremities (CMS/formerly [facility])   • 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formerly [facility])   • Ventral hernia without obstruction or gangrene   • Breast cancer, stage 1, estrogen receptor positive (CMS/formerly [facility])   • Arthritis of right hip   • Arthritis of right knee   • Chondrocalcinosis   • Chronic pain of right knee   • Degenerative disc disease, lumbar   • Gastroesophageal reflux disease   • Bilateral hearing loss   • Thoracic aortic aneurysm without rupture (CMS/formerly [facility])   • Erythromelalgia (CMS/formerly [facility])   • 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CMS/formerly [facility])   • Thoracoabdominal aortic aneurysm, without rupture (CMS/formerly [facility])   • 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/formerly [facility])   • Aortic aneurysm, descending (CMS/formerly [facility])   • Aortic aneurysm without rupture (CMS/formerly [facility])   • CAD (coronary artery disease)   • S/P left heart catheterization by ventricular puncture   • NSTEMI (non-ST elevated  "myocardial infarction) (CMS/[facility])   • Pericardial hematoma   • History of ST elevation myocardial infarction (STEMI)   • Acute on chronic diastolic CHF (congestive heart failure) (CMS/[facility])   • Ventricular aneurysm   • Immobility syndrome       Past Medical History:   Diagnosis Date   • AAA (abdominal aortic aneurysm) (CMS/[facility])    • Acute bronchitis 12/2018   • 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[facility])    • Arthritis    • Bilateral carotid artery disease (CMS/[facility])    • Bilateral cataracts     S/p Extractions   • Breast cancer (CMS/[facility])     right breast [id number] (snm) pMX ER/WA pos, Her-2/neg, Ki-67, 31%, oncotype recurrence score 19, invasive lobular carcinoma   • CAD (coronary artery disease)     S/p CABG on 2/23/16 by Dr. 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[facility])    • Cataracts, bilateral    • Closed fracture of three ribs of right side    • Cognitive disorder    • COPD (chronic obstructive pulmonary disease) (CMS/[facility])    • Depression    • Descending aortic arch aneurysm (CMS/[facility])    • Diabetes mellitus (CMS/[facility])     \"BORDERLINE\"   • Erythermalgia (CMS/[facility])    • GERD (gastroesophageal reflux disease)    • Hyperlipidemia     Controlled w/Meds   • Hypertension     Controlled w/Meds   • Low back pain    • Moderate aortic valve insufficiency     S/p AVR on 02/23/16 by Dr. Ontiveros   • Nocturia    • Osteoarthritis    • Osteoporosis    • Otitis media     R Ear   • Prediabetes    • PVD (peripheral vascular disease) (CMS/[facility])    • RLS (restless legs syndrome)    • Saccular aneurysm    • Stroke (CMS/[facility])     Perioperatively w/L Hip Repl   • Thoracic ascending aortic aneurysm (CMS/[facility])     S/p Repair on 02/23/16 by Dr. Ontiveros   • TIA (transient ischemic attack)    • Urgency incontinence    • Venous insufficiency    • Ventral hernia        Past Surgical History:   Procedure Laterality Date   • AORTIC VALVE REPAIR/REPLACEMENT N/A 02/23/2016-[facility]    Ascending Replacement Using a 24MM Graft--Dr. Ontiveros   • APPENDECTOMY  1977   • BREAST BIOPSY Right " 09/16/2012    Vacuum Assisted Core Bx of R Breast   • CARDIAC CATHETERIZATION N/A 01/06/2016    Dr. Tres De Los Santos   • CARDIAC CATHETERIZATION N/A 4/22/2019    Procedure: Left Heart Cath;  Surgeon: Tres De Los Santos MD;  Location:  [facility];  Service: Cardiology   • CARDIAC CATHETERIZATION N/A 4/22/2019    Procedure: Coronary angiography;  Surgeon: Tres De Los Santos MD;  Location:  [facility];  Service: Cardiology   • CARDIAC CATHETERIZATION N/A 7/22/2020    Procedure: LEFT HEART CATH;  Surgeon: Antonia Scott MD;  Location:  [facility];  Service: Cardiovascular;  Laterality: N/A;   • CARDIAC CATHETERIZATION N/A 7/22/2020    Procedure: CORONARY ANGIOGRAPHY;  Surgeon: Antonia Scott MD;  Location:  [facility];  Service: Cardiovascular;  Laterality: N/A;   • CARDIAC CATHETERIZATION N/A 7/22/2020    Procedure: Left ventriculography;  Surgeon: Antonia Scott MD;  Location:  [facility];  Service: Cardiovascular;  Laterality: N/A;   • CARDIAC CATHETERIZATION N/A 7/22/2020    Procedure: Saphenous Vein Graft;  Surgeon: Antonia Scott MD;  Location:  [facility];  Service: Cardiovascular;  Laterality: N/A;   • CARDIAC SURGERY  02/2016    Dr. Ontiveros/Dr. De Los Santos   • CATARACT EXTRACTION Bilateral     [facility]   • COLONOSCOPY N/A 10/2002    Dr. Negro   • CORONARY ARTERY BYPASS GRAFT  02/23/2016-[facility]    x1 w/L Vein Grafting--Dr. Ontiveros   • CORONARY ARTERY BYPASS GRAFT N/A 9/18/2020    Procedure: STERNAL EXPLORATION WITH CLOSURE AND WASHOUT, REMOVAL OF IABP, PRP;  Surgeon: Mart Ontiveros MD;  Location: [facility] MAIN OR;  Service: Cardiothoracic;  Laterality: N/A;   • CYST REMOVAL Right 01/25/2013    R Palm Excision of Retinacular Cyst--Dr. Karla Fish   • EPIDURAL BLOCK     • LAPAROSCOPIC CHOLECYSTECTOMY N/A 08/04/2004    Dr. JOEY Sheth   • MASTECTOMY Right 09/28/2012   • ORIF HIP FRACTURE Left  03/27/2005    w/ AMBI compression screwDr. Victor M   • RECTOVAGINAL FISTULA REPAIR  1965   • THORACIC AORTIC ANEURYSM REPAIR N/A 7/23/2019    Procedure: ROSE, THORACOABDOMINAL AORTIC ANEURYSM REPAIR, VISCERAL VESSEL REPAIR, LARGE VENTRAL HERNIA REPAIR WITH MESH, CIRCULATORY ARREST, PRP;  Surgeon: Mart Ontiveros MD;  Location: [facility] OR;  Service: Cardiothoracic   • TRANSESOPHAGEAL ECHOCARDIOGRAM (ROSE) N/A 9/18/2020    Procedure: TRANSESOPHAGEAL ECHOCARDIOGRAM WITH ANESTHESIA;  Surgeon: Mart Ontiveros MD;  Location: [facility] MAIN OR;  Service: Cardiothoracic;  Laterality: N/A;   • TUBAL ABDOMINAL LIGATION     • VENTRICULAR ANEURYSM REPAIR N/A 7/22/2020    Procedure: EMERGENT REOP STERNOTOMY, REPAIR OF LV RUPTURE, PRP, INTRAOP ROSE;  Surgeon: Mart Ontiveros MD;  Location: [facility] MAIN OR;  Service: Cardiothoracic;  Laterality: N/A;   • VENTRICULAR ANEURYSM REPAIR N/A 9/17/2020    Procedure: ROSE, MIDLINE STERNOTOMY REOP  LEFT VENTRICULAR ANEURYSM REPAIR, IABP INSERTION, PRP;  Surgeon: Mrat Ontiveros MD;  Location: [facility] OR;  Service: Cardiothoracic;  Laterality: N/A;     Patient was not wearing a face mask during this therapy encounter. Therapist used appropriate personal protective equipment including mask, eye protection and gloves.  Mask used was standard procedure mask. Appropriate PPE was worn during the entire therapy session. Hand hygiene was completed before and after therapy session. Patient is not in enhanced droplet precautions.                SLP EVALUATION (last 72 hours)      SLP SLC Evaluation     Row Name 10/05/20 1230 10/05/20 1100 10/03/20 1400             Communication Assessment/Intervention    Document Type  therapy note (daily note)  -AL  therapy note (daily note)  -AL  therapy note (daily note)  -TH      Subjective Information  --  --  complains of;fatigue  -TH      Patient/Family/Caregiver Comments/Observations  Pt participated well. Reports she has not been short of  "breath today.   -AL  Pt participated well.  -AL  --      Patient Effort  --  --  good  -         Pain Scale: Numbers Pre/Post-Treatment    Pretreatment Pain Rating  0/10 - no pain  -AL  0/10 - no pain  -AL  --      Posttreatment Pain Rating  0/10 - no pain  -AL  0/10 - no pain  -AL  --        User Key  (r) = Recorded By, (t) = Taken By, (c) = Cosigned By    Initials Name Effective Dates    Vanessa Paredes, MS CCC-[facility] 08/30/19 -      Sierra Mauricio 12/31/19 -              EDUCATION    The patient has been educated in the following areas:       Dysphagia (Swallowing Impairment).      SLP Recommendation and Plan                                                  SLP GOALS     Row Name 10/05/20 1230 10/05/20 1100 10/03/20 1400       Oral Nutrition/Hydration Goal 1 (SLP)    Barriers (Oral Nutrition/Hydration Goal 1, SLP)  Pt exhibited no overt s/s of aspiration or penetration in 2/4 trials of thins by cup; throat clear x2.  -AL  Re-assessed swallow function with trials of thins by cup, mixed, puree and mechanical soft. Pt exhibited no s/s of aspiration or penetration in 2/3 trials of thins by cup (throat clear x1), 3/3 trials of puree, 3/3 trials of regular, 1/4 trials of mixed (with juice) and 3/3 trials of mixed with juice drained. No difficulty with mastication observed. Recommend upgrade diet to regular with NTL. Provided education regarding avoiding mixed consistencies and draining juice from juicy fruits. Pt exhibited understanding and carry-over of recommendations.  -AL  --    Progress/Outcomes (Oral Nutrition/Hydration Goal 1, SLP)  goal ongoing  -AL  --  --       Lingual Strengthening Goal 1 (SLP)    Barriers (Lingual Strengthening Goal 1, SLP)  Completed \"Hawk\" exercise x20 with NO cues. Improved endurance noted today. Attempted Laura, but only able to complete x2.  -AL  --  --    Progress/Outcomes (Lingual Strengthening Goal 1, SLP)  goal ongoing  -AL  --  --       Pharyngeal Strengthening Exercise " Goal 1 (SLP)    Increase Closure at Entrance to Airway/Closure of Airway at Glottis  --  --  hard effortful swallow;chin tuck against resistance (CTAR);effortful pitch glide (falsetto + pharyngeal squeeze);falsetto;yuliana  -TH    Increase Squeeze/Positive Pressure Generation  --  --  hard effortful swallow;chin tuck against resistance (CTAR)  -TH    Increase Tongue Base Retraction  --  --  -- /k/ and /g/ sounds  -TH    [place]/Accuracy (Pharyngeal Strengthening Goal 1, SLP)  --  --  with minimal cues (75-90% accuracy)  -TH    Time Frame (Pharyngeal Strengthening Goal 1, SLP)  --  --  by discharge  -TH    Barriers (Pharyngeal Strengthening Goal 1, SLP)  Completed effortful swallow x20 with MIN Cues. Completed CTAR with ball x3 for 1 minute each with MIN cues.  -AL  Completed effortful swallow x10 with MIN cues.  -AL  --    Progress/Outcomes (Pharyngeal Strengthening Goal 1, SLP)  --  --  goal ongoing  -TH      User Key  (r) = Recorded By, (t) = Taken By, (c) = Cosigned By    Initials Name Provider Type    Vanessa Paredes MS CCC-SLP Speech and Language Pathologist     Sierra Mauricio Speech and Language Pathologist                      Time Calculation:       Time Calculation- SLP     Row Name 10/05/20 1421 10/05/20 1148          Time Calculation- SLP    SLP Start Time  1230  -AL  1100  -AL     SLP Stop Time  1300  -AL  1130  -AL     SLP Time Calculation (min)  30 min  -AL  30 min  -AL       User Key  (r) = Recorded By, (t) = Taken By, (c) = Cosigned By    Initials Name Provider Type    Vanessa Paredes MS CCC-SLP Speech and Language Pathologist            Therapy Charges for Today     Code Description Service Date Service Provider Modifiers Qty    [id number]  ST TREATMENT SWALLOW 4 10/5/2020 Vanessa Leiva MS CCC-SLP GN 1                           Vanessa Leiva MS CCC-PATRICIA  10/5/2020

## 2020-10-05 NOTE — THERAPY TREATMENT NOTE
Inpatient Rehabilitation - Physical Therapy Treatment Note       [facility]     Patient Name: Grace Beauchamp  : 1940  MRN: [id number]    Today's Date: 10/5/2020                    Admit Date: 2020      Visit Dx:     ICD-10-CM ICD-9-CM   1. Pericardial hematoma  I31.2 423.0   2. Ventricular aneurysm  I25.3 414.10   3. Gait abnormality  R26.9 781.2       Patient Active Problem List   Diagnosis   • History of breast cancer   • Stenosis of carotid artery   • Chronic obstructive pulmonary disease (CMS/[facility])   • Closed fracture of multiple ribs   • Cognitive disorder   • Erythromelalgia (CMS/[facility])   • Hyperlipidemia   • Hypertension   • Primary osteoarthritis involving multiple joints   • Osteoporosis   • Restless legs syndrome   • Cerebral aneurysm   • Temporary cerebral vascular dysfunction   • S/P CABG x 1   • Type 2 diabetes mellitus without complication, without long-term current use of insulin (CMS/[facility])   • S/P ascending aortic aneurysm repair   • 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[facility])   • Descending thoracic aortic aneurysm (CMS/[facility])   • Claudication of both lower extremities (CMS/[facility])   • 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[facility])   • Ventral hernia without obstruction or gangrene   • Breast cancer, stage 1, estrogen receptor positive (CMS/[facility])   • Arthritis of right hip   • Arthritis of right knee   • Chondrocalcinosis   • Chronic pain of right knee   • Degenerative disc disease, lumbar   • Gastroesophageal reflux disease   • Bilateral hearing loss   • Thoracic aortic aneurysm without rupture (CMS/[facility])   • Erythromelalgia (CMS/[facility])   • 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CMS/[facility])   • Thoracoabdominal aortic aneurysm, without rupture (CMS/[facility])   • 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/[facility])   • Aortic aneurysm, descending (CMS/[facility])   • Aortic aneurysm without rupture (CMS/[facility])   • CAD (coronary artery disease)   • S/P left heart catheterization by ventricular puncture   • NSTEMI (non-ST elevated myocardial  "infarction) (CMS/[facility])   • Pericardial hematoma   • History of ST elevation myocardial infarction (STEMI)   • Acute on chronic diastolic CHF (congestive heart failure) (CMS/[facility])   • Ventricular aneurysm   • Immobility syndrome       Past Medical History:   Diagnosis Date   • AAA (abdominal aortic aneurysm) (CMS/[facility])    • Acute bronchitis 12/2018   • 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[facility])    • Arthritis    • Bilateral carotid artery disease (CMS/[facility])    • Bilateral cataracts     S/p Extractions   • Breast cancer (CMS/[facility])     right breast [id number] (snm) pMX ER/AR pos, Her-2/neg, Ki-67, 31%, oncotype recurrence score 19, invasive lobular carcinoma   • CAD (coronary artery disease)     S/p CABG on 2/23/16 by Dr. 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[facility])    • Cataracts, bilateral    • Closed fracture of three ribs of right side    • Cognitive disorder    • COPD (chronic obstructive pulmonary disease) (CMS/[facility])    • Depression    • Descending aortic arch aneurysm (CMS/[facility])    • Diabetes mellitus (CMS/[facility])     \"BORDERLINE\"   • Erythermalgia (CMS/[facility])    • GERD (gastroesophageal reflux disease)    • Hyperlipidemia     Controlled w/Meds   • Hypertension     Controlled w/Meds   • Low back pain    • Moderate aortic valve insufficiency     S/p AVR on 02/23/16 by Dr. Ontiveros   • Nocturia    • Osteoarthritis    • Osteoporosis    • Otitis media     R Ear   • Prediabetes    • PVD (peripheral vascular disease) (CMS/[facility])    • RLS (restless legs syndrome)    • Saccular aneurysm    • Stroke (CMS/[facility])     Perioperatively w/L Hip Repl   • Thoracic ascending aortic aneurysm (CMS/[facility])     S/p Repair on 02/23/16 by Dr. Ontiveros   • TIA (transient ischemic attack)    • Urgency incontinence    • Venous insufficiency    • Ventral hernia        Past Surgical History:   Procedure Laterality Date   • AORTIC VALVE REPAIR/REPLACEMENT N/A 02/23/2016-[facility]    Ascending Replacement Using a 24MM Graft--Dr. Ontiveros   • APPENDECTOMY  1977   • BREAST BIOPSY Right 09/16/2012 "    Vacuum Assisted Core Bx of R Breast   • CARDIAC CATHETERIZATION N/A 01/06/2016    Dr. Tres De Los Santos   • CARDIAC CATHETERIZATION N/A 4/22/2019    Procedure: Left Heart Cath;  Surgeon: Tres De Los Santos MD;  Location:  [facility];  Service: Cardiology   • CARDIAC CATHETERIZATION N/A 4/22/2019    Procedure: Coronary angiography;  Surgeon: Tres De Los Santos MD;  Location:  [facility];  Service: Cardiology   • CARDIAC CATHETERIZATION N/A 7/22/2020    Procedure: LEFT HEART CATH;  Surgeon: Antonia Scott MD;  Location:  [facility];  Service: Cardiovascular;  Laterality: N/A;   • CARDIAC CATHETERIZATION N/A 7/22/2020    Procedure: CORONARY ANGIOGRAPHY;  Surgeon: Antonia Scott MD;  Location:  [facility];  Service: Cardiovascular;  Laterality: N/A;   • CARDIAC CATHETERIZATION N/A 7/22/2020    Procedure: Left ventriculography;  Surgeon: Antonia Scott MD;  Location:  [facility];  Service: Cardiovascular;  Laterality: N/A;   • CARDIAC CATHETERIZATION N/A 7/22/2020    Procedure: Saphenous Vein Graft;  Surgeon: Antonia Scott MD;  Location:  [facility];  Service: Cardiovascular;  Laterality: N/A;   • CARDIAC SURGERY  02/2016    Dr. Ontiveros/Dr. De Los Santos   • CATARACT EXTRACTION Bilateral     [facility]   • COLONOSCOPY N/A 10/2002    Dr. Negro   • CORONARY ARTERY BYPASS GRAFT  02/23/2016-[facility]    x1 w/L Vein Grafting--Dr. Ontiveros   • CORONARY ARTERY BYPASS GRAFT N/A 9/18/2020    Procedure: STERNAL EXPLORATION WITH CLOSURE AND WASHOUT, REMOVAL OF IABP, PRP;  Surgeon: Mart Ontiveros MD;  Location: [facility] OR;  Service: Cardiothoracic;  Laterality: N/A;   • CYST REMOVAL Right 01/25/2013    R Palm Excision of Retinacular Cyst--Dr. Karla Fish   • EPIDURAL BLOCK     • LAPAROSCOPIC CHOLECYSTECTOMY N/A 08/04/2004    Dr. JOEY Sheth   • MASTECTOMY Right 09/28/2012   • ORIF HIP FRACTURE Left 03/27/2005    w/  Dr. Victor M Marley   • RECTOVAGINAL FISTULA REPAIR  1965   • THORACIC AORTIC ANEURYSM REPAIR N/A 7/23/2019    Procedure: ROSE, THORACOABDOMINAL AORTIC ANEURYSM REPAIR, VISCERAL VESSEL REPAIR, LARGE VENTRAL HERNIA REPAIR WITH MESH, CIRCULATORY ARREST, PRP;  Surgeon: Mart Ontiveros MD;  Location: [facility] OR;  Service: Cardiothoracic   • TRANSESOPHAGEAL ECHOCARDIOGRAM (ROSE) N/A 9/18/2020    Procedure: TRANSESOPHAGEAL ECHOCARDIOGRAM WITH ANESTHESIA;  Surgeon: Mart Ontiveros MD;  Location: [facility] OR;  Service: Cardiothoracic;  Laterality: N/A;   • TUBAL ABDOMINAL LIGATION     • VENTRICULAR ANEURYSM REPAIR N/A 7/22/2020    Procedure: EMERGENT REOP STERNOTOMY, REPAIR OF LV RUPTURE, PRP, INTRAOP ROSE;  Surgeon: Mart Ontiveros MD;  Location: [facility] OR;  Service: Cardiothoracic;  Laterality: N/A;   • VENTRICULAR ANEURYSM REPAIR N/A 9/17/2020    Procedure: ROSE, MIDLINE STERNOTOMY REOP  LEFT VENTRICULAR ANEURYSM REPAIR, IABP INSERTION, PRP;  Surgeon: Mart Ontiveros MD;  Location: [facility];  Service: Cardiothoracic;  Laterality: N/A;          PT ASSESSMENT (last 12 hours)      IRF PT Evaluation and Treatment     Row Name 10/05/20 1055          PT Time and Intention    Document Type  daily treatment  -JK     Mode of Treatment  physical therapy  -JK     Patient/Family/Caregiver Comments/Observations  pt supine in bed  -JK     Row Name 10/05/20 1058          Cognition/Psychosocial    Affect/Mental Status (Cognitive)  WFL  -JK     Orientation Status (Cognition)  oriented x 4  -JK     Follows Commands (Cognition)  follows one-step commands  -JK     Personal Safety Interventions  fall prevention program maintained;gait belt  -JK     Row Name 10/05/20 1057          Pain Scale: Numbers Pre/Post-Treatment    Pretreatment Pain Rating  0/10 - no pain  -JK     Posttreatment Pain Rating  0/10 - no pain  -JK     Row Name 10/05/20 1059          Bed Mobility    Supine-Sit [place] (Bed  Mobility)  standby assist  -JK     Sit-Supine [place] (Bed Mobility)  standby assist  -JK     Assistive Device (Bed Mobility)  bed rails  -JK     Row Name 10/05/20 1055          Transfers    Bed-Chair [place] (Transfers)  contact guard;verbal cues  -JK     Chair-Bed [place] (Transfers)  contact guard;verbal cues  -JK     Assistive Device (Bed-Chair Transfers)  wheelchair  -JK     Sit-Stand [place] (Transfers)  contact guard;verbal cues  -JK     Stand-Sit [place] (Transfers)  contact guard;verbal cues  -JK     Row Name 10/05/20 1055          Chair-Bed Transfer    Assistive Device (Chair-Bed Transfers)  wheelchair  -JK     Row Name 10/05/20 1055          Sit-Stand Transfer    Assistive Device (Sit-Stand Transfers)  walker, 4-wheeled  -JK     Row Name 10/05/20 1055          Stand-Sit Transfer    Assistive Device (Stand-Sit Transfers)  walker, 4-wheeled  -JK     Row Name 10/05/20 1055          Gait/Stairs (Locomotion)    [place] Level (Gait)  contact guard;verbal cues;standby assist  -JK     Assistive Device (Gait)  walker, 4-wheeled;other (see comments) rollator; R AFO  -JK     Distance in Feet (Gait)  80' x 3  -JK     Pattern (Gait)  step-through  -JK     Deviations/Abnormal Patterns (Gait)  chris decreased;stride length decreased  -JK     Bilateral Gait Deviations  forward flexed posture  -JK     Right Sided Gait Deviations  heel strike decreased  -JK     [place] Level (Stairs)  minimum assist (75% patient effort);contact guard;verbal cues  -JK     Assistive Device (Stairs)  -- B rails  -JK     Handrail Location (Stairs)  both sides  -JK     Number of Steps (Stairs)  4  -JK     Ascending Technique (Stairs)  step-to-step  -JK     Descending Technique (Stairs)  step-to-step  -JK     Stairs, Safety Issues  sequencing ability decreased  -JK     Stairs, Impairments  strength decreased;impaired balance  -JK     Comment (Gait/Stairs)  VCs for sequencing on steps  -JK     Row Name  10/05/20 1055          Safety Issues, Functional Mobility    Impairments Affecting Function (Mobility)  balance;endurance/activity tolerance;shortness of breath;strength  -J     Row Name 10/05/20 1055          Hip (Therapeutic Exercise)    Hip Strengthening (Therapeutic Exercise)  bilateral;sitting;flexion;aBduction;aDduction;10 repetitions;2 sets  -     Row Name 10/05/20 1055          Knee (Therapeutic Exercise)    Knee Strengthening (Therapeutic Exercise)  bilateral;sitting;LAQ (long arc quad);10 repetitions;2 sets  -     Row Name 10/05/20 1055          Ankle (Therapeutic Exercise)    Ankle AROM (Therapeutic Exercise)  left;dorsiflexion;plantarflexion;10 repetitions;2 sets  -     Row Name 10/05/20 1055          Positioning and Restraints    Pre-Treatment Position  in bed  -JK     Post Treatment Position  bed  -JK     In Bed  supine;call light within reach;encouraged to call for assist;exit alarm on;side rails up x2  -K       User Key  (r) = Recorded By, (t) = Taken By, (c) = Cosigned By    Initials Name Provider Type    Araceli Berg, PT Physical Therapist        Wound 09/17/20 0810 Right anterior groin Incision (Active)   Dressing Appearance open to air 10/04/20 2028   Closure Adhesive closure strips 10/05/20 0920   Base dry;clean 10/05/20 0920   Drainage Amount none 10/05/20 0920   Care, Wound cleansed with 10/05/20 0920       Wound 09/17/20 0811 sternal Incision (Active)   Dressing Appearance open to air 10/04/20 2028   Closure Liquid skin adhesive 10/05/20 0920   Base dry;clean 10/05/20 0920   Periwound ecchymotic 10/05/20 0920   Drainage Amount none 10/05/20 0920       Wound 09/25/20 Other (See comments) medial coccyx Pressure Injury (Active)   Dressing Appearance open to air 10/05/20 0920   Base pink 10/05/20 0920   Care, Wound other (see comments) 10/05/20 0920     Physical Therapy Education                 Title: PT OT SLP Therapies (In Progress)     Topic: Physical Therapy (In Progress)      Point: Mobility training (Done)     Learning Progress Summary           Patient Acceptance, E, NR,VU by JK at 10/5/2020 1252    Acceptance, E, NR by LB at 10/3/2020 1229    Acceptance, E,TB,D, VU,DU,NR by  at 10/2/2020 1042    Comment: walker techniques    Acceptance, E,TB,D, VU,NR by EE at 9/30/2020 1115    Acceptance, E,TB, VU,NR by EE at 9/29/2020 1113    Acceptance, E,TB, NR,VU by EE at 9/28/2020 1145    Acceptance, E, VU,NR by JK at 9/26/2020 1158                   Point: Home exercise program (Done)     Learning Progress Summary           Patient Acceptance, E, NR,VU by JK at 10/5/2020 1252    Acceptance, E,TB,D, VU,DU,NR by  at 10/2/2020 1042    Comment: walker techniques    Acceptance, E,TB,D, VU,NR by EE at 9/30/2020 1115    Acceptance, E,TB, VU,NR by EE at 9/29/2020 1113    Acceptance, E,TB, NR,VU by EE at 9/28/2020 1145                   Point: Body mechanics (Done)     Learning Progress Summary           Patient Acceptance, E,TB, VU,NR by EE at 9/29/2020 1113    Acceptance, E,TB, NR,VU by EE at 9/28/2020 1145                   Point: Precautions (Not Started)     Learner Progress:  Not documented in this visit.                      User Key     Initials Effective Dates Name Provider Type Discipline    LB 03/07/18 -  Tessy Rodriguez, PTA Physical Therapy Assistant PT    EE 04/03/18 -  Shruthi Roach, PT Physical Therapist PT    JK 04/03/18 -  Araceli Jones, PT Physical Therapist PT     04/03/18 -  Karena Sears, PT Physical Therapist PT                PT Recommendation and Plan    Planned Therapy Interventions (PT): balance training, bed mobility training, gait training, home exercise program, neuromuscular re-education, patient/family education, orthotic fitting/training, stair training, strengthening, transfer training  Frequency of Treatment (PT): 60 minutes per session                     Time Calculation:     PT Charges     Row Name 10/05/20 1535 10/05/20 1252 10/05/20 1251       Time  Calculation    Start Time  1330  -JK  1030  -JK  0945  -JK    Stop Time  1400  -JK  1100  -JK  1000  -JK    Time Calculation (min)  30 min  -JK  30 min  -JK  15 min  -JK    PT Received On  --  --  10/05/20  -JK    PT - Next Appointment  --  --  10/06/20  -JK      User Key  (r) = Recorded By, (t) = Taken By, (c) = Cosigned By    Initials Name Provider Type    Araceli Berg PT Physical Therapist          Therapy Charges for Today     Code Description Service Date Service Provider Modifiers Qty    [id number]  GAIT TRAINING EA 15 MIN 10/5/2020 Araceli Jones, PT GP 2    [id number]  PT THERAPEUTIC ACT EA 15 MIN 10/5/2020 Araceli Jones PT GP 2    [id number]  PT THER PROC EA 15 MIN 10/5/2020 Araceli Jones, PT GP 1          Patient was wearing a face mask during this therapy encounter. Therapist used appropriate personal protective equipment including eye protection, mask, and gloves.  Mask used was standard procedure mask. Appropriate PPE was worn during the entire therapy session. Hand hygiene was completed before and after therapy session. Patient is not in enhanced droplet precautions.            Araceli Jones PT  10/5/2020

## 2020-10-05 NOTE — PROGRESS NOTES
Inpatient Rehabilitation Plan of Care Note    Plan of Care  Care Plan Reviewed - No updates at this time.    Psychosocial    Performed Intervention(s)  verbalize needs and concerns      Safety    Performed Intervention(s)  bed/chair alarms  hourly rounding  items within reach      Sphincter Control    Performed Intervention(s)  hourly rounding  adequate fluid intake      Body Systems    Performed Intervention(s)  dressing changes or wound care per orders  WOCN  monitor for signs of infection    Signed by: Marie Valdes RN

## 2020-10-05 NOTE — THERAPY PROGRESS REPORT/RE-CERT
Inpatient Rehabilitation - Occupational Therapy Progress Note    [facility]     Patient Name: Grace Beauchamp  : 1940  MRN: [id number]    Today's Date: 10/5/2020                 Admit Date: 2020         ICD-10-CM ICD-9-CM   1. Pericardial hematoma  I31.2 423.0   2. Ventricular aneurysm  I25.3 414.10   3. Gait abnormality  R26.9 781.2       Patient Active Problem List   Diagnosis   • History of breast cancer   • Stenosis of carotid artery   • Chronic obstructive pulmonary disease (CMS/HCC)   • Closed fracture of multiple ribs   • Cognitive disorder   • Erythromelalgia (CMS/[facility])   • Hyperlipidemia   • Hypertension   • Primary osteoarthritis involving multiple joints   • Osteoporosis   • Restless legs syndrome   • Cerebral aneurysm   • Temporary cerebral vascular dysfunction   • S/P CABG x 1   • Type 2 diabetes mellitus without complication, without long-term current use of insulin (CMS/[facility])   • S/P ascending aortic aneurysm repair   • 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[facility])   • Descending thoracic aortic aneurysm (CMS/[facility])   • Claudication of both lower extremities (CMS/[facility])   • 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[facility])   • Ventral hernia without obstruction or gangrene   • Breast cancer, stage 1, estrogen receptor positive (CMS/[facility])   • Arthritis of right hip   • Arthritis of right knee   • Chondrocalcinosis   • Chronic pain of right knee   • Degenerative disc disease, lumbar   • Gastroesophageal reflux disease   • Bilateral hearing loss   • Thoracic aortic aneurysm without rupture (CMS/[facility])   • Erythromelalgia (CMS/[facility])   • 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CMS/[facility])   • Thoracoabdominal aortic aneurysm, without rupture (CMS/[facility])   • 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/[facility])   • Aortic aneurysm, descending (CMS/[facility])   • Aortic aneurysm without rupture (CMS/[facility])   • CAD (coronary artery disease)   • S/P left heart catheterization by ventricular puncture   • NSTEMI (non-ST elevated myocardial infarction)  "(CMS/[facility])   • Pericardial hematoma   • History of ST elevation myocardial infarction (STEMI)   • Acute on chronic diastolic CHF (congestive heart failure) (CMS/[facility])   • Ventricular aneurysm   • Immobility syndrome       Past Medical History:   Diagnosis Date   • AAA (abdominal aortic aneurysm) (CMS/[facility])    • Acute bronchitis 12/2018   • 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[facility])    • Arthritis    • Bilateral carotid artery disease (CMS/[facility])    • Bilateral cataracts     S/p Extractions   • Breast cancer (CMS/[facility])     right breast [id number] (snm) pMX ER/SC pos, Her-2/neg, Ki-67, 31%, oncotype recurrence score 19, invasive lobular carcinoma   • CAD (coronary artery disease)     S/p CABG on 2/23/16 by Dr. 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[facility])    • Cataracts, bilateral    • Closed fracture of three ribs of right side    • Cognitive disorder    • COPD (chronic obstructive pulmonary disease) (CMS/[facility])    • Depression    • Descending aortic arch aneurysm (CMS/[facility])    • Diabetes mellitus (CMS/[facility])     \"BORDERLINE\"   • Erythermalgia (CMS/[facility])    • GERD (gastroesophageal reflux disease)    • Hyperlipidemia     Controlled w/Meds   • Hypertension     Controlled w/Meds   • Low back pain    • Moderate aortic valve insufficiency     S/p AVR on 02/23/16 by Dr. Ontiveros   • Nocturia    • Osteoarthritis    • Osteoporosis    • Otitis media     R Ear   • Prediabetes    • PVD (peripheral vascular disease) (CMS/[facility])    • RLS (restless legs syndrome)    • Saccular aneurysm    • Stroke (CMS/[facility])     Perioperatively w/L Hip Repl   • Thoracic ascending aortic aneurysm (CMS/[facility])     S/p Repair on 02/23/16 by Dr. Ontiveros   • TIA (transient ischemic attack)    • Urgency incontinence    • Venous insufficiency    • Ventral hernia        Past Surgical History:   Procedure Laterality Date   • AORTIC VALVE REPAIR/REPLACEMENT N/A 02/23/2016-[facility]    Ascending Replacement Using a 24MM Graft--Dr. Ontiveros   • APPENDECTOMY  1977   • BREAST BIOPSY Right 09/16/2012    Vacuum " Assisted Core Bx of R Breast   • CARDIAC CATHETERIZATION N/A 01/06/2016    Dr. Tres De Los Santos   • CARDIAC CATHETERIZATION N/A 4/22/2019    Procedure: Left Heart Cath;  Surgeon: Tres De Los Santos MD;  Location:  [facility];  Service: Cardiology   • CARDIAC CATHETERIZATION N/A 4/22/2019    Procedure: Coronary angiography;  Surgeon: Tres De Los Santos MD;  Location:  [facility];  Service: Cardiology   • CARDIAC CATHETERIZATION N/A 7/22/2020    Procedure: LEFT HEART CATH;  Surgeon: Antonia Scott MD;  Location:  [facility];  Service: Cardiovascular;  Laterality: N/A;   • CARDIAC CATHETERIZATION N/A 7/22/2020    Procedure: CORONARY ANGIOGRAPHY;  Surgeon: Antonia Scott MD;  Location:  [facility];  Service: Cardiovascular;  Laterality: N/A;   • CARDIAC CATHETERIZATION N/A 7/22/2020    Procedure: Left ventriculography;  Surgeon: Antonia Scott MD;  Location:  [facility];  Service: Cardiovascular;  Laterality: N/A;   • CARDIAC CATHETERIZATION N/A 7/22/2020    Procedure: Saphenous Vein Graft;  Surgeon: Antonia Scott MD;  Location:  [facility];  Service: Cardiovascular;  Laterality: N/A;   • CARDIAC SURGERY  02/2016    Dr. Ontiveros/Dr. De Los Santos   • CATARACT EXTRACTION Bilateral     [facility]   • COLONOSCOPY N/A 10/2002    Dr. Negro   • CORONARY ARTERY BYPASS GRAFT  02/23/2016-[facility]    x1 w/L Vein Grafting--Dr. Ontiveros   • CORONARY ARTERY BYPASS GRAFT N/A 9/18/2020    Procedure: STERNAL EXPLORATION WITH CLOSURE AND WASHOUT, REMOVAL OF IABP, PRP;  Surgeon: Mart Ontiveros MD;  Location: [facility] MAIN OR;  Service: Cardiothoracic;  Laterality: N/A;   • CYST REMOVAL Right 01/25/2013    R Palm Excision of Retinacular Cyst--Dr. Karla Fish   • EPIDURAL BLOCK     • LAPAROSCOPIC CHOLECYSTECTOMY N/A 08/04/2004    Dr. JOEY Sheth   • MASTECTOMY Right 09/28/2012   • ORIF HIP FRACTURE Left 03/27/2005    w/ AMBI  Dr. Victor M bai   • RECTOVAGINAL FISTULA REPAIR  1965   • THORACIC AORTIC ANEURYSM REPAIR N/A 7/23/2019    Procedure: ROSE, THORACOABDOMINAL AORTIC ANEURYSM REPAIR, VISCERAL VESSEL REPAIR, LARGE VENTRAL HERNIA REPAIR WITH MESH, CIRCULATORY ARREST, PRP;  Surgeon: Mart Ontiveros MD;  Location: [facility];  Service: Cardiothoracic   • TRANSESOPHAGEAL ECHOCARDIOGRAM (ROSE) N/A 9/18/2020    Procedure: TRANSESOPHAGEAL ECHOCARDIOGRAM WITH ANESTHESIA;  Surgeon: Mart Ontiveros MD;  Location: [facility] OR;  Service: Cardiothoracic;  Laterality: N/A;   • TUBAL ABDOMINAL LIGATION     • VENTRICULAR ANEURYSM REPAIR N/A 7/22/2020    Procedure: EMERGENT REOP STERNOTOMY, REPAIR OF LV RUPTURE, PRP, INTRAOP ROSE;  Surgeon: Mart Ontiveros MD;  Location: [facility] OR;  Service: Cardiothoracic;  Laterality: N/A;   • VENTRICULAR ANEURYSM REPAIR N/A 9/17/2020    Procedure: ROSE, MIDLINE STERNOTOMY REOP  LEFT VENTRICULAR ANEURYSM REPAIR, IABP INSERTION, PRP;  Surgeon: Mart Ontiveros MD;  Location: [facility];  Service: Cardiothoracic;  Laterality: N/A;            IRF OT ASSESSMENT FLOWSHEET (last 12 hours)      IRF OT Evaluation and Treatment     Row Name 10/05/20 9151          OT Time and Intention    Document Type  daily treatment  -SM     Mode of Treatment  occupational therapy  -SM     Patient Effort  good  -SM     Symptoms Noted During/After Treatment  fatigue  -SM     Row Name 10/05/20 9076          General Information    Patient/Family/Caregiver Comments/Observations  Pt resting in bed prior to AM session, no acute signs of distress.   -SM     Existing Precautions/Restrictions  fall;cardiac;sternal  -SM     Row Name 10/05/20 0099          Cognition/Psychosocial    Affect/Mental Status (Cognitive)  WFL  -SM     Orientation Status (Cognition)  oriented x 4  -SM     Follows Commands (Cognition)  follows one-step commands  -SM     Personal Safety Interventions  fall prevention program  maintained;gait belt;nonskid shoes/slippers when out of bed  -     Row Name 10/05/20 0830          Pain Scale: Numbers Pre/Post-Treatment    Pretreatment Pain Rating  0/10 - no pain  -     Posttreatment Pain Rating  0/10 - no pain  -     Row Name 10/05/20 0830          Bed Mobility    Supine-Sit [place] (Bed Mobility)  standby assist  -     Assistive Device (Bed Mobility)  bed rails;head of bed elevated  -     Row Name 10/05/20 0830          Functional Mobility    Functional Mobility- Ind. Level  contact guard assist  -     Functional Mobility- Device  rolling walker  -     Functional Mobility-Distance (Feet)  20  -SM     Functional Mobility- Comment  Pt used rwx from bed to shower for ADLs.   -     Row Name 10/05/20 0830          Transfers    Bed-Chair [place] (Transfers)  contact guard;verbal cues  -     Chair-Bed [place] (Transfers)  contact guard;verbal cues  -     Assistive Device (Bed-Chair Transfers)  wheelchair  -     Sit-Stand [place] (Transfers)  minimum assist (75% patient effort);contact guard pending on fatique level after ADL. CGA-MIN A  -     Stand-Sit [place] (Transfers)  minimum assist (75% patient effort);contact guard  -     [place] Level (Shower Transfer)  contact guard  -     Assistive Device (Shower Transfer)  grab bar, tub/shower;shower chair;walker, front-wheeled  -     Row Name 10/05/20 0830          Chair-Bed Transfer    Assistive Device (Chair-Bed Transfers)  wheelchair  -     Row Name 10/05/20 0830          Sit-Stand Transfer    Assistive Device (Sit-Stand Transfers)  walker, front-wheeled  -     Row Name 10/05/20 0830          Stand-Sit Transfer    Assistive Device (Stand-Sit Transfers)  walker, front-wheeled  -     Row Name 10/05/20 0830          Shower Transfer    Type (Shower Transfer)  sit-stand  -     Row Name 10/05/20 0830          Balance    Static Sitting Balance  WFL sitting on shower chair  -     Dynamic  Sitting Balance  WFL sitting on shower chair  -     Static Standing Balance  mild impairment  -     Balance Interventions  standing;occupation based/functional task  -     Comment, Balance  vc's for safety with standing balance in shower. Cues to maintain UE support with one hand on grab bars to reduce risk of posterior LOB standing.   -     Row Name 10/05/20 0830          Shoulder (Therapeutic Exercise)    Shoulder (Therapeutic Exercise)  strengthening exercise  -     Shoulder AROM (Therapeutic Exercise)  bilateral;flexion;extension;aBduction;aDduction;10 repetitions;3 repetitions  -     Row Name 10/05/20 0830          Elbow/Forearm (Therapeutic Exercise)    Elbow/Forearm (Therapeutic Exercise)  strengthening exercise  -     Elbow/Forearm Strengthening (Therapeutic Exercise)  bilateral;1 lb free weight;flexion;extension;supination;pronation;sitting  -     Row Name 10/05/20 0830          Wrist (Therapeutic Exercise)    Wrist (Therapeutic Exercise)  strengthening exercise  -     Wrist Strengthening (Therapeutic Exercise)  flexion;extension;bilateral;1 lb free weight;10 repetitions;3 sets  -     Row Name 10/05/20 0830          Bathing    [place] Level (Bathing)  bathing skills;contact guard assist  -     Assistive Device (Bathing)  shower chair  -     Position (Bathing)  supported sitting;supported standing  -     Set-up Assistance (Bathing)  obtain supplies;adjust water temperature  -     Row Name 10/05/20 0830          Upper Body Dressing    [place] Level (Upper Body Dressing)  doff;don;pull over garment;set up assistance;supervision  -     Position (Upper Body Dressing)  supported sitting  -     Set-up Assistance (Upper Body Dressing)  obtain clothing  -     Row Name 10/05/20 0830          Lower Body Dressing    [place] Level (Lower Body Dressing)  doff;don;pants/bottoms;shoes/slippers;socks;underwear;standby assist;supervision;contact guard assist  -     Assistive  Device Use (Lower Body Dressing)  orthosis  -SM     Position (Lower Body Dressing)  supported standing;supported sitting  -SM     Set-up Assistance (Lower Body Dressing)  obtain clothing  -SM     Comment (Lower Body Dressing)  CGA for standing balance.   -     Row Name 10/05/20 0830          Grooming    [place] Level (Grooming)  grooming skills;set up  -SM     Position (Grooming)  supported sitting  -SM     Comment (Grooming)  Pt left sink side with set up for grooming tasks as pt wanted to put on makeup. Aid notifed. Pt given call light to notify nursing when finished.   -     Row Name 10/05/20 0830          Orthotic Management    Orthosis Location  AFO (ankle foot orthosis)  -     Row Name 10/05/20 0830          Transfer Goal 1 (OT-IRF)    Activity/Assistive Device (Transfer Goal 1, OT-IRF)  toilet;shower chair with a back  -SM     [place] Level (Transfer Goal 1, OT-IRF)  contact guard assist  -SM     Time Frame (Transfer Goal 1, OT-IRF)  short-term goal (STG)  -SM     Progress/Outcomes (Transfer Goal 1, OT-IRF)  goal met  -     Row Name 10/05/20 0830          Transfer Goal 2 (OT-IRF)    Activity/Assistive Device (Transfer Goal 2, OT-IRF)  toilet;shower chair  -SM     [place] Level (Transfer Goal 2, OT-IRF)  supervision required  -SM     Time Frame (Transfer Goal 2, OT-IRF)  long-term goal (LTG)  -SM     Progress/Outcomes (Transfer Goal 2, OT-IRF)  goal ongoing  -     Row Name 10/05/20 0830          Bathing Goal 1 (OT-IRF)    Activity/Device (Bathing Goal 1, OT-IRF)  bathing skills, all  -SM     [place] Level (Bathing Goal 1, OT-IRF)  standby assist  -SM     Time Frame (Bathing Goal 1, OT-IRF)  short-term goal (STG)  -SM     Progress/Outcomes (Bathing Goal 1, OT-IRF)  goal met;goal revised this date  -     Row Name 10/05/20 0830          Bathing Goal 2 (OT-IRF)    Activity/Device (Bathing Goal 2, OT-IRF)  bathing skills, all  -SM     [place] Level (Bathing Goal 2, OT-IRF)   supervision required  -SM     Time Frame (Bathing Goal 2, OT-IRF)  long-term goal (LTG);by discharge  -SM     Progress/Outcomes (Bathing Goal 2, OT-IRF)  goal ongoing  -SM     Row Name 10/05/20 0830          UB Dressing Goal 1 (OT-IRF)    Activity/Device (UB Dressing Goal 1, OT-IRF)  upper body dressing  -SM     [place] (UB Dress Goal 1, OT-IRF)  supervision required  -SM     Time Frame (UB Dressing Goal 1, OT-IRF)  short-term goal (STG)  -SM     Progress/Outcomes (UB Dressing Goal 1, OT-IRF)  goal met;goal revised this date  -SM     Row Name 10/05/20 0830          UB Dressing Goal 2 (OT-IRF)    Activity/Device (UB Dressing Goal 2, OT-IRF)  upper body dressing  -SM     [place] (UB Dress Goal 2, OT-IRF)  supervision required  -SM     Time Frame (UB Dressing Goal 2, OT-IRF)  long-term goal (LTG)  -SM     Progress/Outcomes (UB Dressing Goal 2, OT-IRF)  goal ongoing  -SM     Row Name 10/05/20 0830          LB Dressing Goal 1 (OT-IRF)    Activity/Device (LB Dressing Goal 1, OT-IRF)  lower body dressing  -SM     [place] (LB Dressing Goal 1, OT-IRF)  contact guard assist  -SM     Time Frame (LB Dressing Goal 1, OT-IRF)  short-term goal (STG)  -SM     Progress/Outcomes (LB Dressing Goal 1, OT-IRF)  goal met;goal ongoing  -SM     Row Name 10/05/20 0830          LB Dressing Goal 2 (OT-IRF)    Activity/Device (LB Dressing Goal 2, OT-IRF)  lower body dressing  -SM     [place] (LB Dressing Goal 2, OT-IRF)  set-up required  -SM     Time Frame (LB Dressing Goal 2, OT-IRF)  long-term goal (LTG);by discharge  -SM     Progress/Outcomes (LB Dressing Goal 2, OT-IRF)  goal ongoing  -SM     Row Name 10/05/20 0830          Grooming Goal 1 (OT-IRF)    Activity/Device (Grooming Goal 1, OT-IRF)  grooming skills, all  -SM     [place] (Grooming Goal 1, OT-IRF)  set-up required  -SM     Progress/Outcomes (Grooming Goal 1, OT-IRF)  goal met  -SM     Row Name 10/05/20 0830          Grooming Goal 2 (OT-IRF)     Activity/Device (Grooming Goal 2, OT-IRF)  grooming skills, all  -SM     [place] (Grooming Goal 2, OT-IRF)  set-up required  -SM     Time Frame (Grooming Goal 2, OT-IRF)  long-term goal (LTG);by discharge  -SM     Progress/Outcomes (Grooming Goal 2, OT-IRF)  goal met  -SM     Row Name 10/05/20 0830          Toileting Goal 1 (OT-IRF)    Activity/Device (Toileting Goal 1, OT-IRF)  toileting skills, all  -SM     [place] Level (Toileting Goal 1, OT-IRF)  contact guard assist  -SM     Progress/Outcomes (Toileting Goal 1, OT-IRF)  goal met  -SM     Time Frame (Toileting Goal 1, OT-IRF)  short-term goal (STG)  -SM     Row Name 10/05/20 0830          Toileting Goal 2 (OT-IRF)    Activity/Device (Toileting Goal 2, OT-IRF)  toileting skills, all  -SM     [place] Level (Toileting Goal 2, OT-IRF)  supervision required  -SM     Progress/Outcomes (Toileting Goal 2, OT-IRF)  goal ongoing  -SM     Time Frame (Toileting Goal 2, OT-IRF)  long-term goal (LTG);by discharge  -SM     Row Name 10/05/20 0830          Strength Goal 1 (OT-IRF)    Strength Goal 1 (OT-IRF)  Pt to be min with BUE cardiac HEP for d/c  -SM     Time Frame (Strength Goal 1, OT-IRF)  short-term goal (STG)  -SM     Progress/Outcomes (Strength Goal 1, OT-IRF)  continuing progress toward goal  -SM     Row Name 10/05/20 0830          Strength Goal 2 (OT-IRF)    Strength Goal 2 (OT-IRF)  Pt to be SBA for BUE HEP for d/c home.   -SM     Time Frame (Strength Goal 2, OT-IRF)  long-term goal (LTG);by discharge  -SM     Progress/Outcomes (Strength Goal 2, OT-IRF)  goal ongoing  -SM     Row Name 10/05/20 0830          Positioning and Restraints    Pre-Treatment Position  in bed  -SM     Post Treatment Position  bed  -SM     In Bed  fowlers;exit alarm on;encouraged to call for assist;call light within reach PM session.  -SM     In Wheelchair  call light within reach;exit alarm on;encouraged to call for assist;sitting AM session.   -SM     Row Name 10/05/20 0800           Daily Progress Summary (OT)    Overall Progress Toward Functional Goals (OT)  progressing toward functional goals as expected  -       User Key  (r) = Recorded By, (t) = Taken By, (c) = Cosigned By    Initials Name Effective Dates    Sunni Womack OT 04/02/20 -            Occupational Therapy Education                 Title: PT OT SLP Therapies (In Progress)     Topic: Occupational Therapy (In Progress)     Point: ADL training (Done)     Description:   Instruct learner(s) on proper safety adaptation and remediation techniques during self care or transfers.   Instruct in proper use of assistive devices.              Learning Progress Summary           Patient Acceptance, E, VU by  at 9/26/2020 1218    Comment: Instructed pt on improved techniques for safety with ADLs and transfers with sternal precautions.                   Point: Home exercise program (Not Started)     Description:   Instruct learner(s) on appropriate technique for monitoring, assisting and/or progressing therapeutic exercises/activities.              Learner Progress:  Not documented in this visit.          Point: Precautions (Done)     Description:   Instruct learner(s) on prescribed precautions during self-care and functional transfers.              Learning Progress Summary           Patient Acceptance, E, VU by  at 9/26/2020 1218    Comment: Instructed pt on improved techniques for safety with ADLs and transfers with sternal precautions.                   Point: Body mechanics (Not Started)     Description:   Instruct learner(s) on proper positioning and spine alignment during self-care, functional mobility activities and/or exercises.              Learner Progress:  Not documented in this visit.                      User Key     Initials Effective Dates Name Provider Type Discipline     04/02/20 -  Sunni Matt OT Occupational Therapist OT                    OT Recommendation and Plan    Anticipated Discharge  Disposition (OT): home with assist  Planned Therapy Interventions (OT): activity tolerance training, adaptive equipment training, BADL retraining, functional balance retraining, IADL retraining, neuromuscular control/coordination retraining, occupation/activity based interventions, patient/caregiver education/training, ROM/therapeutic exercise, strengthening exercise, transfer/mobility retraining       Daily Progress Summary (OT)  Overall Progress Toward Functional Goals (OT): progressing toward functional goals as expected            Time Calculation:     Time Calculation- OT     Row Name 10/05/20 1525 10/05/20 1523          Time Calculation- OT    OT Start Time  1430  -SM  0830  -     OT Stop Time  1500  -SM  0900  -     OT Time Calculation (min)  30 min  -SM  30 min  -     Total Timed Code Minutes- OT  30 minute(s)  -SM  30 minute(s)  -SM     OT Received On  10/05/20  -SM  10/05/20  -SM       User Key  (r) = Recorded By, (t) = Taken By, (c) = Cosigned By    Initials Name Provider Type     Sunni Matt OT Occupational Therapist        Therapy Charges for Today     Code Description Service Date Service Provider Modifiers Qty    [id number]  OT SELF CARE/MGMT/TRAIN EA 15 MIN 10/5/2020 Sunni Matt OT GO 2    [id number]  OT THER PROC EA 15 MIN 10/5/2020 Sunni Matt OT GO 2                   Sunni Matt OT  10/5/2020

## 2020-10-05 NOTE — PROGRESS NOTES
Inpatient Rehabilitation Plan of Care Note    Plan of Care  Updated Problems/Interventions  Mobility    [PT] Walk(Active)  Current Status(10/05/2020): 80' CG/SBA, rwx or rollator  Weekly Goal(10/12/2020): SBA to BR, rwx  Discharge Goal: 120' SBA, rwx    [PT] Bed/Chair/Wheelchair(Active)  Current Status(10/05/2020): CGA  Weekly Goal(10/05/2020): Sup  Discharge Goal: Sup    [PT] Bed Mobility(Active)  Current Status(10/05/2020): MOd I  Weekly Goal(10/12/2020): Mod I  Discharge Goal: Mod I    [PT] Stairs(Active)  Current Status(10/05/2020): 4 steps, B rails, CGA & VCs  Weekly Goal(10/12/2020): PT only  Discharge Goal: 4, SBA, rails    Signed by: Araceli Jones, PT

## 2020-10-05 NOTE — PROGRESS NOTES
"   LOS: 10 days   Patient Care Team:  Miller Castañeda MD as PCP - General (Internal Medicine)  Miller Castañeda MD as PCP - Claims Attributed  Mart Ontiveros MD as Surgeon (Cardiothoracic Surgery)  Tres De Los Santos MD as Consulting Physician (Cardiology)  Graham Mcconnell MD as Consulting Physician (Hematology and Oncology)  Payam Byrnes MD as Consulting Physician (Otolaryngology)  Jonathan Smith MD as Consulting Physician (Vascular Surgery)  Victor M Cabral MD as Surgeon (Orthopedic Surgery)  Yaya Burks MD as Consulting Physician (Pulmonary Disease)    Chief Complaint:   immobility syndrome    Subjective     History of Present Illness    Subjective     Fatigue / shortness of air is improved.  Tolerating activities better.  Swallowing improved.  Advance diet.  Swallow study later this week.    History taken from: patient    Objective     Vital Signs  Temp:  [97.8 °F (36.6 °C)-98.5 °F (36.9 °C)] 98.5 °F (36.9 °C)  Heart Rate:  [79-88] 81  Resp:  [18-20] 18  BP: (108-129)/(56-72) 108/56    Objective:  Vital signs: (most recent): Blood pressure 108/56, pulse 81, temperature 98.5 °F (36.9 °C), temperature source Oral, resp. rate 18, height 142.2 cm (56\"), weight 37.5 kg (82 lb 10.8 oz), SpO2 95 %.            Physical Exam     Gen:    calm; pleasant  HEENT: NCAT, EOMI; MMM,  Neck:   Supple, gauze on R neck  CV:      RRR, no m/r/g appreciated  Lungs: diminished breath sounds in the bases;      Abd:     (+) BS, soft, non-tender, non-distended  Ext:      no new edema  Skin:   cardiac incision healing well  Psych: appropriate mood and affect  Neuro:  Mental Status: AOx4, Speech: fluent without dysarthria or scanning, No gross cognitive deficits  Strength: R > L foot drop         Results Review:     I reviewed the patient's new clinical results.  Results from last 7 days   Lab Units 10/05/20  0609 10/02/20  0632 10/01/20  0559   WBC 10*3/mm3 4.20 4.99 4.74   HEMOGLOBIN g/dL 10.2* 9.6* 8.8*   HEMATOCRIT % " 30.8* 29.5* 27.9*   PLATELETS 10*3/mm3 484* 515* 470*     Results from last 7 days   Lab Units 10/05/20  0609 10/02/20  0632 10/01/20  0559   SODIUM mmol/L 143 140 141   POTASSIUM mmol/L 3.6 3.9 3.9   CHLORIDE mmol/L 107 105 105   CO2 mmol/L 26.3 30.0* 28.3   BUN mg/dL 15 19 18   CREATININE mg/dL 0.55* 0.68 0.77   CALCIUM mg/dL 9.0 9.0 8.6   GLUCOSE mg/dL 116* 91 89     CT of the chest angiogram on October 2, 2020  FINDINGS: There is no evidence of pulmonary embolism. Stable aneurysmal  dilatation of the descending thoracic aorta. Stable changes of thoracic  aortic aneurysm repair. Stable rounded hypodensity along the proximal  aspect of the aortic arch. Interval repair of defect along the left  ventricular wall and evacuation of adjacent hematoma. Small right  pleural effusion. Emphysema. 5 mm nodule in the anterior left lower lobe  on image 57 is larger. 7 mm nodule in the posterior right upper lobe is  smaller where it previously measured 9 mm. Limited imaging of the upper  abdomen demonstrates bilateral renal cysts bone windows shows stable  degenerative changes of the thoracic spine. Stable loss of height of the  inferior endplate of L1.        Medication Review: done  Scheduled Meds:aspirin, 81 mg, Oral, Daily  atorvastatin, 40 mg, Oral, Nightly  budesonide, 0.5 mg, Nebulization, BID - RT  cilostazol, 100 mg, Oral, BID  clopidogrel, 75 mg, Oral, Daily  ferrous sulfate, 325 mg, Oral, Daily With Breakfast  furosemide, 20 mg, Oral, Every Other Day  ipratropium-albuterol, 3 mL, Nebulization, 4x Daily - RT  losartan, 50 mg, Oral, Daily  metoprolol succinate XL, 50 mg, Oral, Q12H  multivitamin, 1 tablet, Oral, Daily  oxybutynin XL, 5 mg, Oral, Daily  pantoprazole, 40 mg, Oral, QAM  pramipexole, 0.125 mg, Oral, Nightly      Continuous Infusions:   PRN Meds:.•  acetaminophen **OR** [DISCONTINUED] acetaminophen **OR** [DISCONTINUED] acetaminophen  •  albuterol sulfate HFA  •  ALPRAZolam  •  aluminum-magnesium  hydroxide-simethicone  •  cetirizine  •  dextrose  •  dextrose  •  glucagon (human recombinant)  •  guaiFENesin  •  influenza vaccine  •  ipratropium-albuterol  •  magnesium hydroxide  •  meclizine  •  melatonin  •  naloxone  •  ondansetron  •  sennosides-docusate  •  traMADol      Assessment/Plan       Immobility syndrome      Assessment & Plan  Etiologic Diagnosis and Comorbidities include:    Physical Debility/Immobility Syndrome    Dysphagia-October 5-improved.  Advance to regular solids/nectar thick liquid diet.  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  Physical therapy notes that she was much more fatigued with ambulating just to the bathroom in her room.  She ambulated 80 to 90 feet contact-guard assist in therapies.  Does not require nasal cannula oxygen.  Does not describe any productive sputum.    Patient reviewed with cardiology service and cardiothoracic service.  CT of the chest without pulmonary embolism.  Not felt to be in florid congestive heart failure.  Seen by the pulmonology service and Trelegy inhaler changed to nebulizer to try to optimize her pulmonary status with underlying COPD.  October 5-fatigue-shortness of air improved.  Anemia is improved.      pmh of Breast Ca    COPD  Trelegy inhaler changed to nebulizers to try to optimize her pulmonary status continues on room air    HTN    HLD    Peripheral Vascular Disease    Right Foot Drop- chronic - AFO    DVT prophylaxis - SCDs    Anemia    Pain management - tramadol - home med. GIULIA reviewed.      Impairments  Include:   Dysphagia, mobility, ambulation, adl’s, strength, endurance, respiratory status, swallowing    TEAM CONF - SEPT 29 - BED MIN. TRANSFERS MIN. GAIT 80 FEET CTG RW. TOILET TRANSFERS MOD ASSIST. SHOWER TRANSFERS MOD ASSIST. UBD MIN. LBD MOD. BATH MOD ASSIST. TOILETING MOD ASSIST. SWALLOW - MECH SOFT, NO MIXED, NECTAR THICK LIQUIDS. LAST VFSS SILENT ASPIRATION ON THINS. REPEAT VFSS NEXT WEEK. CONTINENT BOWEL AND BLADDER. STERNAL INCISION HEALING. PRESSURE SORE TO BUTTOCKS WITH CREAM TO THAT. ALPRAZOLAM PRN AT NIGHT. REC THERAPY INVOLVED.  ELOS - 2 WEEKS     Medical necessity  This patient is a good candidate for acute inpatient rehabilitation for a stay of approximately 2 weeks.  The etiologic diagnosis and comorbidities as listed above will require 24hr availability and frequent interventions of a physician with training in rehabilitation medicine.  The patient’s care cannot be provided on a lower level secondary to need for rigorous acute rehab in order to have maximal improvement of function.  Once admitted the patient will undergo 3 hour of PT/OT/SLP a day for at least 5 days per week. A rehab program will be initiated working on  Dysphagia, strengthening, endurance, safety, dressing, transfers, ambulation, bathing, grooming, swallowing skills. Rehab nursing will be involved to monitor bowel and bladder function, skin integrity, diabetes monitoring/education, patient and family education, and therapy carryover.  A weekly team meeting will be coordinated to maximize the patient’s plan of care during hospitalization and at discharge.       The patient's functional status and clinical status is unchanged from preadmission assessment and the patient continues appropriate for acute inpatient rehabilitation.  Goal is for home with  Home health   therapies.  Barrier to discharge:  Impaired mobility  - work on  Strength, balance, ADLS, swallow  to overcome.      Winston Jones MD  10/05/20  19:46  EDT    Time:   During rounds, used appropriate personal protective equipment including mask and gloves.  Additional gown if indicated.  Mask used was standard procedure mask. Appropriate PPE was worn during the entire visit.  Hand hygiene was completed before and after.

## 2020-10-05 NOTE — PROGRESS NOTES
Inpatient Rehabilitation Functional Measures Assessment and Plan of Care    Plan of Care  Updated Problems/Interventions  Mobility    [OT] Toilet Transfers(Active)  Current Status(10/05/2020): CGA  Weekly Goal(10/12/2020): SBA  Discharge Goal: supervision    [OT] Tub/Shower Transfers(Active)  Current Status(10/05/2020): Min/CGA  Weekly Goal(10/12/2020): SBA  Discharge Goal: supervision        Self Care    [OT] Bathing(Active)  Current Status(10/05/2020): MOD A  Weekly Goal(10/12/2020): MIN A  Discharge Goal: supervision    [OT] Dressing (Lower)(Active)  Current Status(09/28/2020): MOD A  Weekly Goal(10/12/2020): MIN A  Discharge Goal: setup/supervision    [OT] Dressing (Upper)(Active)  Current Status(10/05/2020): MIN A  Weekly Goal(10/12/2020): setup/supervision  Discharge Goal: supervision    [OT] Toileting(Active)  Current Status(10/05/2020): Min A/CGA  Weekly Goal(10/12/2020): SBA  Discharge Goal: Supervision    Functional Measures  IVAN Eating:  Branch  IVAN Grooming: Branch  IVAN Bathing:  Branch  IVAN Upper Body Dressing:  Branch  IVAN Lower Body Dressing:  Branch  IVAN Toileting:  Branch    IVAN Bladder Management  Level of Assistance:  Branch  Frequency/Number of Accidents this Shift:  Branch    IVAN Bowel Management  Level of Assistance: Branch  Frequency/Number of Accidents this Shift: Branch    IVAN Bed/Chair/Wheelchair Transfer:  Branch  IVAN Toilet Transfer:  Branch  IVAN Tub/Shower Transfer:  Branch    Previously Documented Mode of Locomotion at Discharge: Field  IVAN Expected Mode of Locomotion at Discharge: Branch  IVAN Walk/Wheelchair:  Branch  IVAN Stairs:  Branch    IVAN Comprehension:  Branch  IVAN Expression:  Branch  IVAN Social Interaction:  Branch  IVAN Problem Solving:  Branch  IVAN Memory:  Branch    Therapy Mode Minutes  Occupational Therapy: Branch  Physical Therapy: Branch  Speech Language Pathology:  Branch    Signed by: ABIGAIL Peña/JAY

## 2020-10-05 NOTE — PLAN OF CARE
Goal Outcome Evaluation:  Plan of Care Reviewed With: patient  Progress: improving   A&Ox4. Pleasant and cooperative. Verbalized some depression/anxiety and did take prn Xanax with nighttime medications. Would like to speak with MD regarding longterm medication for tx of depression and anxiety. Meds whole in pudding one at a time. Did c/o swollen neck glands and initially had a sore throat when she woke up yesterday morning but this did relieve itself. Sleeping fair this shift. No unsafe behavior.

## 2020-10-05 NOTE — PLAN OF CARE
Goal Outcome Evaluation:  Plan of Care Reviewed With: patient  Progress: improving  Outcome Summary: Pt has been up in the WC and has been particapting in therapies. No SOA or c/o pain.

## 2020-10-05 NOTE — PROGRESS NOTES
Case Management  Inpatient Rehabilitation Plan of Care and Discharge Plan Note    Rehabilitation Diagnosis:  Branch  Date of Onset:  Branch    Medical Summary:  Branch  Past Medical History: Branch    Plan of Care  Updated Problems/Interventions  Field    Expected Intensity:  Branch  Interdisciplinary Team:  [place]  Estimated Length of Stay/Anticipated Discharge Date: Branch  Anticipated Discharge Destination:  Anticipated discharge destination from inpatient rehabilitation is community  discharge with assistance. Pt lives with her daughter in a walkout reanch with 5  steps to enter. Pt will have 24 hour assistance from family at discharge.      Based on the patient's medical and functional status, their prognosis and  expected level of functional improvement is:  [place]    Signed by: BJ Velazquez

## 2020-10-05 NOTE — PROGRESS NOTES
Hospital Follow Up    LOS:  LOS: 10 days   Patient Name: Grace Beauchamp  Age/Sex: 80 y.o. female  : 1940  MRN: [id number]    Day of Service: 10/05/20   Length of Stay: 10  Encounter Provider: FREDRICK Mohan  Place of Service: [facility] CARDIOLOGY  Patient Care Team:  Miller Castañeda MD as PCP - General (Internal Medicine)  Miller Castañeda MD as PCP - Claims Attributed  Mart Ontiveros MD as Surgeon (Cardiothoracic Surgery)  Tres De Los Santos MD as Consulting Physician (Cardiology)  Graham Mcconnell MD as Consulting Physician (Hematology and Oncology)  Payam Byrnes MD as Consulting Physician (Otolaryngology)  Jonathan Smith MD as Consulting Physician (Vascular Surgery)  Victor M Cabral MD as Surgeon (Orthopedic Surgery)  Yaya Burks MD as Consulting Physician (Pulmonary Disease)    Subjective:     Chief Complaint: S/P LV aneurysm repair    Interval History: Some weakness but breathing better today     Objective:     Objective:  Temp:  [97.8 °F (36.6 °C)-98.4 °F (36.9 °C)] 98.4 °F (36.9 °C)  Heart Rate:  [78-88] 85  Resp:  [18-20] 18  BP: (112-139)/(63-72) 129/72     Intake/Output Summary (Last 24 hours) at 10/5/2020 1150  Last data filed at 10/5/2020 0900  Gross per 24 hour   Intake 120 ml   Output --   Net 120 ml     Body mass index is 18.53 kg/m².      10/02/20  0541 10/03/20  0619 10/04/20  0600   Weight: 41.8 kg (92 lb 2.4 oz) 37.6 kg (82 lb 14.3 oz) (zero bed with standard.) 37.5 kg (82 lb 10.8 oz)     Weight change:     Physical Exam:   General Appearance:    Awake alert and oriented in no acute distress. Thin and Frail   Color:  Skin:  Neuro:  HEENT:    Lungs:     Pink  Warm and dry.   No focal, motor or sensory deficits  Neck supple, pupils equal, round and reactive. No JVD, No Bruit  Clear to auscultation,respirations regular, even and                  unlabored    Heart:    Regular rate and rhythm, S1 and S2, no murmur, no gallop, no rub.  No edema, DP/PT pulses are 2+   Chest Wall:    No abnormalities observed   Abdomen:     Normal bowel sounds, no masses, no organomegaly, soft        non-tender, non-distended, no guarding, no ascites noted   Extremities:   Moves all extremities well, no edema, no cyanosis, no redness       Lab Review:   Results from last 7 days   Lab Units 10/05/20  0609 10/02/20  0632   SODIUM mmol/L 143 140   POTASSIUM mmol/L 3.6 3.9   CHLORIDE mmol/L 107 105   CO2 mmol/L 26.3 30.0*   BUN mg/dL 15 19   CREATININE mg/dL 0.55* 0.68   GLUCOSE mg/dL 116* 91   CALCIUM mg/dL 9.0 9.0         Results from last 7 days   Lab Units 10/05/20  0609 10/02/20  0632   WBC 10*3/mm3 4.20 4.99   HEMOGLOBIN g/dL 10.2* 9.6*   HEMATOCRIT % 30.8* 29.5*   PLATELETS 10*3/mm3 484* 515*                   Invalid input(s): LDLCALC  Results from last 7 days   Lab Units 10/02/20  0632   PROBNP pg/mL 4,645.0*         I reviewed the patient's new clinical results.  I personally viewed and interpreted the patient's EKG  Current Medications:   Scheduled Meds:aspirin, 81 mg, Oral, Daily  atorvastatin, 40 mg, Oral, Nightly  budesonide, 0.5 mg, Nebulization, BID - RT  cilostazol, 100 mg, Oral, BID  clopidogrel, 75 mg, Oral, Daily  ferrous sulfate, 325 mg, Oral, Daily With Breakfast  furosemide, 20 mg, Oral, Every Other Day  ipratropium-albuterol, 3 mL, Nebulization, 4x Daily - RT  losartan, 50 mg, Oral, Daily  metoprolol succinate XL, 50 mg, Oral, Q12H  multivitamin, 1 tablet, Oral, Daily  oxybutynin XL, 5 mg, Oral, Daily  pantoprazole, 40 mg, Oral, QAM  potassium chloride, 10 mEq, Oral, Every Other Day  pramipexole, 0.125 mg, Oral, Nightly      Continuous Infusions:     Allergies:  Allergies   Allergen Reactions   • Ramipril Other (See Comments)     Ace I  cough   • Morphine Itching   • Morphine And Related Itching   • Bactrim [Sulfamethoxazole-Trimethoprim] Itching and Rash       Assessment:       Immobility syndrome  1. Dyspnea ? etiology  2. Ruptured myocardium  with free wall repair July 2002 with urgent redo sternotomy Sept 2020 for LV lateral wall aneurysmal repair with pericardial patch.  3. CAD with hx of CABG 2012  4. AVR in 2016 with aortic aneursymal repair  5.Hx of COPD: Pulm consulted. Discussed switching inhaler therapy to nebulizer. Will defer.   6. HTN: BP high ealier but appears it had overall been ok or even low. Monitor.  7. Hx of pulmonary HTN.  8. Enlarging  Lower lung nodule: Pulm following and plans to repeat CT scan in 6 months.  9. Anemia: Stable yesterday.  10. Thin/frail:    Plan:         Does not appear to be in any heart failure. On small dose of lasix 20mg every other day. Does not want to take potassium supplement because hard to swallow and doesn't want liquid form either. Will dc potassium for now and see how she does.  FREDRICK Mohan  10/05/20  11:50 EDT  Electronically signed by FREDRICK Mohan, 10/05/20, 11:50 AM EDT.

## 2020-10-06 LAB
ANION GAP SERPL CALCULATED.3IONS-SCNC: 10 MMOL/L (ref 5–15)
BUN SERPL-MCNC: 15 MG/DL (ref 8–23)
BUN/CREAT SERPL: 25.9 (ref 7–25)
CALCIUM SPEC-SCNC: 9 MG/DL (ref 8.6–10.5)
CHLORIDE SERPL-SCNC: 106 MMOL/L (ref 98–107)
CO2 SERPL-SCNC: 27 MMOL/L (ref 22–29)
CREAT SERPL-MCNC: 0.58 MG/DL (ref 0.57–1)
GFR SERPL CREATININE-BSD FRML MDRD: 100 ML/MIN/1.73
GLUCOSE SERPL-MCNC: 117 MG/DL (ref 65–99)
POTASSIUM SERPL-SCNC: 3.8 MMOL/L (ref 3.5–5.2)
SODIUM SERPL-SCNC: 143 MMOL/L (ref 136–145)

## 2020-10-06 PROCEDURE — 80048 BASIC METABOLIC PNL TOTAL CA: CPT | Performed by: NURSE PRACTITIONER

## 2020-10-06 PROCEDURE — 97110 THERAPEUTIC EXERCISES: CPT

## 2020-10-06 PROCEDURE — 97530 THERAPEUTIC ACTIVITIES: CPT

## 2020-10-06 PROCEDURE — 94799 UNLISTED PULMONARY SVC/PX: CPT

## 2020-10-06 PROCEDURE — 99232 SBSQ HOSP IP/OBS MODERATE 35: CPT | Performed by: INTERNAL MEDICINE

## 2020-10-06 PROCEDURE — 92526 ORAL FUNCTION THERAPY: CPT

## 2020-10-06 PROCEDURE — 97535 SELF CARE MNGMENT TRAINING: CPT

## 2020-10-06 RX ORDER — MIRTAZAPINE 15 MG/1
7.5 TABLET, FILM COATED ORAL NIGHTLY
Status: DISCONTINUED | OUTPATIENT
Start: 2020-10-06 | End: 2020-10-09 | Stop reason: HOSPADM

## 2020-10-06 RX ADMIN — BUDESONIDE 0.5 MG: 0.5 INHALANT ORAL at 20:58

## 2020-10-06 RX ADMIN — METOPROLOL SUCCINATE 50 MG: 50 TABLET, EXTENDED RELEASE ORAL at 09:13

## 2020-10-06 RX ADMIN — OXYBUTYNIN CHLORIDE 5 MG: 5 TABLET, EXTENDED RELEASE ORAL at 09:13

## 2020-10-06 RX ADMIN — TRAMADOL HYDROCHLORIDE 50 MG: 50 TABLET, FILM COATED ORAL at 11:50

## 2020-10-06 RX ADMIN — PANTOPRAZOLE SODIUM 40 MG: 40 TABLET, DELAYED RELEASE ORAL at 06:57

## 2020-10-06 RX ADMIN — CLOPIDOGREL 75 MG: 75 TABLET, FILM COATED ORAL at 09:13

## 2020-10-06 RX ADMIN — METOPROLOL SUCCINATE 50 MG: 50 TABLET, EXTENDED RELEASE ORAL at 20:46

## 2020-10-06 RX ADMIN — IPRATROPIUM BROMIDE AND ALBUTEROL SULFATE 3 ML: 2.5; .5 SOLUTION RESPIRATORY (INHALATION) at 06:31

## 2020-10-06 RX ADMIN — ATORVASTATIN CALCIUM 40 MG: 20 TABLET, FILM COATED ORAL at 20:46

## 2020-10-06 RX ADMIN — CILOSTAZOL 100 MG: 100 TABLET ORAL at 20:46

## 2020-10-06 RX ADMIN — Medication 1 TABLET: at 09:13

## 2020-10-06 RX ADMIN — CILOSTAZOL 100 MG: 100 TABLET ORAL at 09:13

## 2020-10-06 RX ADMIN — MIRTAZAPINE 7.5 MG: 15 TABLET, FILM COATED ORAL at 20:46

## 2020-10-06 RX ADMIN — FERROUS SULFATE TAB 325 MG (65 MG ELEMENTAL FE) 325 MG: 325 (65 FE) TAB at 09:14

## 2020-10-06 RX ADMIN — PRAMIPEXOLE DIHYDROCHLORIDE 0.12 MG: 0.25 TABLET ORAL at 20:46

## 2020-10-06 RX ADMIN — IPRATROPIUM BROMIDE AND ALBUTEROL SULFATE 3 ML: 2.5; .5 SOLUTION RESPIRATORY (INHALATION) at 10:03

## 2020-10-06 RX ADMIN — LOSARTAN POTASSIUM 50 MG: 50 TABLET, FILM COATED ORAL at 09:13

## 2020-10-06 RX ADMIN — IPRATROPIUM BROMIDE AND ALBUTEROL SULFATE 3 ML: 2.5; .5 SOLUTION RESPIRATORY (INHALATION) at 20:58

## 2020-10-06 RX ADMIN — BUDESONIDE 0.5 MG: 0.5 INHALANT ORAL at 06:33

## 2020-10-06 NOTE — PROGRESS NOTES
Patient complains of anxiety/depression.  She been on Prozac in the past.  Complains more of anxiety presently.  Reviewed potential for medication side effects including effects of SSRI on QT interval or for serotonin syndrome as she takes tramadol.  At this point will start the patient on Remeron 7.5 mg p.o. nightly to help with sleep, depression/anxiety/appetite.

## 2020-10-06 NOTE — PROGRESS NOTES
"Grace Beauchamp  1940 80 y.o.  [id number]      Patient Care Team:  Miller Castañeda MD as PCP - General (Internal Medicine)  Miller Castañeda MD as PCP - Claims Attributed  Mart Ontiveros MD as Surgeon (Cardiothoracic Surgery)  Tres De Los Santos MD as Consulting Physician (Cardiology)  Graham Mcconnell MD as Consulting Physician (Hematology and Oncology)  Payam Byrnes MD as Consulting Physician (Otolaryngology)  Jonathan Smith MD as Consulting Physician (Vascular Surgery)  Victor M Cabral MD as Surgeon (Orthopedic Surgery)  Yaya Burks MD as Consulting Physician (Pulmonary Disease)    CC: Aneurysm with emergent cardiac surgery, COPD    Interval History: Said her breathing is improved since I saw her last and she is eating regular food so she is a little bit more happy she does not want to take her Lasix      Objective   Vital Signs  Temp:  [97.9 °F (36.6 °C)-98.7 °F (37.1 °C)] 98.7 °F (37.1 °C)  Heart Rate:  [70-84] 70  Resp:  [14-18] 14  BP: (108-147)/(56-81) 147/81    Intake/Output Summary (Last 24 hours) at 10/6/2020 1011  Last data filed at 10/6/2020 0800  Gross per 24 hour   Intake 450 ml   Output --   Net 450 ml     Flowsheet Rows      First Filed Value   Admission Height  142.2 cm (56\") Documented at 09/14/2020 0927   Admission Weight  37.6 kg (83 lb) Documented at 09/14/2020 0927          Physical Exam:   General Appearance:    Alert,oriented, in no acute distress   Lungs:     Clear to auscultation,BS are equal    Heart:    Normal S1 and S2, RRR without murmur, gallop or rub   HEENT:    Sclerae are clear, no JVD or adenopathy   Abdomen:     Normal bowel sounds, soft nontender, nondistended, no HSM   Extremities:   Moves all extremities well, no edema, no cyanosis, no             Redness, no rash     Medication Review:      atorvastatin, 40 mg, Oral, Nightly  budesonide, 0.5 mg, Nebulization, BID - RT  cilostazol, 100 mg, Oral, BID  clopidogrel, 75 mg, Oral, Daily  ferrous sulfate, 325 " mg, Oral, Daily With Breakfast  ipratropium-albuterol, 3 mL, Nebulization, 4x Daily - RT  losartan, 50 mg, Oral, Daily  metoprolol succinate XL, 50 mg, Oral, Q12H  multivitamin, 1 tablet, Oral, Daily  oxybutynin XL, 5 mg, Oral, Daily  pantoprazole, 40 mg, Oral, QAM  pramipexole, 0.125 mg, Oral, Nightly             I reviewed the patient's new clinical results.  I personally viewed and interpreted the patient's EKG/Telemetry data    Assessment/Plan  Active Hospital Problems    Diagnosis  POA   • Immobility syndrome [M62.3]  Yes      Resolved Hospital Problems   No resolved problems to display.     Clinically she seems euvolemic when we evaluated her she had a very small pleural effusion on the right I do not really think she has a lot of heart failure and she does want to take her Lasix and will hold it for right now I am also going to stop her aspirin we will keep her on Plavix and she is taking cilostazol.  She has pretty severe COPD she is extremely weak and frail so eating is very important for her at this point I do not see any other significant changes that we can make    Tres De Los Santos MD  10/06/20  10:11 EDT

## 2020-10-06 NOTE — THERAPY TREATMENT NOTE
Inpatient Rehabilitation - Physical Therapy Treatment Note       [facility]     Patient Name: Grace Beauchamp  : 1940  MRN: [id number]    Today's Date: 10/6/2020                    Admit Date: 2020      Visit Dx:     ICD-10-CM ICD-9-CM   1. Pericardial hematoma  I31.2 423.0   2. Ventricular aneurysm  I25.3 414.10   3. Gait abnormality  R26.9 781.2       Patient Active Problem List   Diagnosis   • History of breast cancer   • Stenosis of carotid artery   • Chronic obstructive pulmonary disease (CMS/[facility])   • Closed fracture of multiple ribs   • Cognitive disorder   • Erythromelalgia (CMS/[facility])   • Hyperlipidemia   • Hypertension   • Primary osteoarthritis involving multiple joints   • Osteoporosis   • Restless legs syndrome   • Cerebral aneurysm   • Temporary cerebral vascular dysfunction   • S/P CABG x 1   • Type 2 diabetes mellitus without complication, without long-term current use of insulin (CMS/[facility])   • S/P ascending aortic aneurysm repair   • 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[facility])   • Descending thoracic aortic aneurysm (CMS/[facility])   • Claudication of both lower extremities (CMS/[facility])   • 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[facility])   • Ventral hernia without obstruction or gangrene   • Breast cancer, stage 1, estrogen receptor positive (CMS/[facility])   • Arthritis of right hip   • Arthritis of right knee   • Chondrocalcinosis   • Chronic pain of right knee   • Degenerative disc disease, lumbar   • Gastroesophageal reflux disease   • Bilateral hearing loss   • Thoracic aortic aneurysm without rupture (CMS/[facility])   • Erythromelalgia (CMS/[facility])   • 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CMS/[facility])   • Thoracoabdominal aortic aneurysm, without rupture (CMS/[facility])   • 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/[facility])   • Aortic aneurysm, descending (CMS/[facility])   • Aortic aneurysm without rupture (CMS/[facility])   • CAD (coronary artery disease)   • S/P left heart catheterization by ventricular puncture   • NSTEMI (non-ST elevated myocardial  "infarction) (CMS/[facility])   • Pericardial hematoma   • History of ST elevation myocardial infarction (STEMI)   • Acute on chronic diastolic CHF (congestive heart failure) (CMS/[facility])   • Ventricular aneurysm   • Immobility syndrome       Past Medical History:   Diagnosis Date   • AAA (abdominal aortic aneurysm) (CMS/[facility])    • Acute bronchitis 12/2018   • 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[facility])    • Arthritis    • Bilateral carotid artery disease (CMS/[facility])    • Bilateral cataracts     S/p Extractions   • Breast cancer (CMS/[facility])     right breast [id number] (snm) pMX ER/SD pos, Her-2/neg, Ki-67, 31%, oncotype recurrence score 19, invasive lobular carcinoma   • CAD (coronary artery disease)     S/p CABG on 2/23/16 by Dr. 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[facility])    • Cataracts, bilateral    • Closed fracture of three ribs of right side    • Cognitive disorder    • COPD (chronic obstructive pulmonary disease) (CMS/[facility])    • Depression    • Descending aortic arch aneurysm (CMS/[facility])    • Diabetes mellitus (CMS/[facility])     \"BORDERLINE\"   • Erythermalgia (CMS/[facility])    • GERD (gastroesophageal reflux disease)    • Hyperlipidemia     Controlled w/Meds   • Hypertension     Controlled w/Meds   • Low back pain    • Moderate aortic valve insufficiency     S/p AVR on 02/23/16 by Dr. Ontiveros   • Nocturia    • Osteoarthritis    • Osteoporosis    • Otitis media     R Ear   • Prediabetes    • PVD (peripheral vascular disease) (CMS/[facility])    • RLS (restless legs syndrome)    • Saccular aneurysm    • Stroke (CMS/[facility])     Perioperatively w/L Hip Repl   • Thoracic ascending aortic aneurysm (CMS/[facility])     S/p Repair on 02/23/16 by Dr. Ontiveros   • TIA (transient ischemic attack)    • Urgency incontinence    • Venous insufficiency    • Ventral hernia        Past Surgical History:   Procedure Laterality Date   • AORTIC VALVE REPAIR/REPLACEMENT N/A 02/23/2016-[facility]    Ascending Replacement Using a 24MM Graft--Dr. Ontiveros   • APPENDECTOMY  1977   • BREAST BIOPSY Right 09/16/2012 "    Vacuum Assisted Core Bx of R Breast   • CARDIAC CATHETERIZATION N/A 01/06/2016    Dr. Tres De Los Santos   • CARDIAC CATHETERIZATION N/A 4/22/2019    Procedure: Left Heart Cath;  Surgeon: Tres De Los Santos MD;  Location:  [facility];  Service: Cardiology   • CARDIAC CATHETERIZATION N/A 4/22/2019    Procedure: Coronary angiography;  Surgeon: Tres De Los Santos MD;  Location:  [facility];  Service: Cardiology   • CARDIAC CATHETERIZATION N/A 7/22/2020    Procedure: LEFT HEART CATH;  Surgeon: Antonia Scott MD;  Location:  [facility];  Service: Cardiovascular;  Laterality: N/A;   • CARDIAC CATHETERIZATION N/A 7/22/2020    Procedure: CORONARY ANGIOGRAPHY;  Surgeon: Antonia Scott MD;  Location:  [facility];  Service: Cardiovascular;  Laterality: N/A;   • CARDIAC CATHETERIZATION N/A 7/22/2020    Procedure: Left ventriculography;  Surgeon: Antonia Scott MD;  Location:  [facility];  Service: Cardiovascular;  Laterality: N/A;   • CARDIAC CATHETERIZATION N/A 7/22/2020    Procedure: Saphenous Vein Graft;  Surgeon: Antonia Scott MD;  Location:  [facility];  Service: Cardiovascular;  Laterality: N/A;   • CARDIAC SURGERY  02/2016    Dr. Ontiveros/Dr. De Los Santos   • CATARACT EXTRACTION Bilateral     [facility]   • COLONOSCOPY N/A 10/2002    Dr. Negro   • CORONARY ARTERY BYPASS GRAFT  02/23/2016-[facility]    x1 w/L Vein Grafting--Dr. Ontiveros   • CORONARY ARTERY BYPASS GRAFT N/A 9/18/2020    Procedure: STERNAL EXPLORATION WITH CLOSURE AND WASHOUT, REMOVAL OF IABP, PRP;  Surgeon: Mart Ontiveros MD;  Location: [facility] MAIN OR;  Service: Cardiothoracic;  Laterality: N/A;   • CYST REMOVAL Right 01/25/2013    R Palm Excision of Retinacular Cyst--Dr. Karla Fish   • EPIDURAL BLOCK     • LAPAROSCOPIC CHOLECYSTECTOMY N/A 08/04/2004    Dr. JOEY Sheth   • MASTECTOMY Right 09/28/2012   • ORIF HIP FRACTURE Left 03/27/2005    w/  Dr. Victor M Marley   • RECTOVAGINAL FISTULA REPAIR  1965   • THORACIC AORTIC ANEURYSM REPAIR N/A 7/23/2019    Procedure: ROSE, THORACOABDOMINAL AORTIC ANEURYSM REPAIR, VISCERAL VESSEL REPAIR, LARGE VENTRAL HERNIA REPAIR WITH MESH, CIRCULATORY ARREST, PRP;  Surgeon: Mart Ontiveros MD;  Location: [facility] OR;  Service: Cardiothoracic   • TRANSESOPHAGEAL ECHOCARDIOGRAM (ROSE) N/A 9/18/2020    Procedure: TRANSESOPHAGEAL ECHOCARDIOGRAM WITH ANESTHESIA;  Surgeon: Mart Ontiveros MD;  Location: [facility] OR;  Service: Cardiothoracic;  Laterality: N/A;   • TUBAL ABDOMINAL LIGATION     • VENTRICULAR ANEURYSM REPAIR N/A 7/22/2020    Procedure: EMERGENT REOP STERNOTOMY, REPAIR OF LV RUPTURE, PRP, INTRAOP ROSE;  Surgeon: Mart Ontiveros MD;  Location: [facility] OR;  Service: Cardiothoracic;  Laterality: N/A;   • VENTRICULAR ANEURYSM REPAIR N/A 9/17/2020    Procedure: ROSE, MIDLINE STERNOTOMY REOP  LEFT VENTRICULAR ANEURYSM REPAIR, IABP INSERTION, PRP;  Surgeon: Mart Ontiveros MD;  Location: [facility];  Service: Cardiothoracic;  Laterality: N/A;          PT ASSESSMENT (last 12 hours)      IRF PT Evaluation and Treatment     Row Name 10/06/20 9834          PT Time and Intention    Document Type  daily treatment  -MS     Mode of Treatment  physical therapy  -MS     Patient/Family/Caregiver Comments/Observations  Up in wheelchair in rehab gym, no signs of acute distress noted, WC brakes on  -MS     Row Name 10/06/20 1680          Cognition/Psychosocial    Affect/Mental Status (Cognitive)  WFL  -MS     Orientation Status (Cognition)  oriented x 4  -MS     Follows Commands (Cognition)  increased processing time needed  -MS     Personal Safety Interventions  fall prevention program maintained;gait belt;nonskid shoes/slippers when out of bed  -MS     Comment, Cognition  Pleasant and agreeable- reported feeling well rested in PM session.  -MS     Row Name 10/06/20 1975          Pain Scale:  Numbers Pre/Post-Treatment    Pretreatment Pain Rating  0/10 - no pain  -MS     Row Name 10/06/20 1159          Transfer Assessment/Treatment    Comment (Transfers)  Multiple STS, good demo of reaching back from chair prior to sitting.  -MS     Row Name 10/06/20 1159          Transfers    Sit-Stand [place] (Transfers)  contact guard;verbal cues;nonverbal cues (demo/gesture)  -MS     Stand-Sit [place] (Transfers)  contact guard;verbal cues;nonverbal cues (demo/gesture)  -MS     Row Name 10/06/20 1159          Sit-Stand Transfer    Assistive Device (Sit-Stand Transfers)  walker, front-wheeled  -MS     Row Name 10/06/20 1159          Stand-Sit Transfer    Assistive Device (Stand-Sit Transfers)  walker, front-wheeled  -MS     Row Name 10/06/20 1159          Gait/Stairs (Locomotion)    [place] Level (Gait)  contact guard;verbal cues;nonverbal cues (demo/gesture)  -MS     Assistive Device (Gait)  walker, front-wheeled  -MS     Distance in Feet (Gait)  AM: 80' x 2, 60', PM: 100', 80' w 2 rest breaks  -MS     Pattern (Gait)  step-through  -MS     Deviations/Abnormal Patterns (Gait)  chris decreased;gait speed decreased  -MS     Bilateral Gait Deviations  forward flexed posture  -MS     Comment (Gait/Stairs)  Improvement noted in PM session, encouraged standing rest breaks, no overt LOB noted, energy conservation education  -MS     Row Name 10/06/20 1159          Balance    Balance Interventions  standing;dynamic reaching;weight shifting activity;supported lateral and bkwd steps to colored dots with hold x 4 trials  -MS     Row Name 10/06/20 1159          Knee (Therapeutic Exercise)    Knee (Therapeutic Exercise)  strengthening exercise  -MS     Knee AROM (Therapeutic Exercise)  extension  -MS     Knee Strengthening (Therapeutic Exercise)  LAQ (long arc quad);10 repetitions;2 sets;1 lb free weight;bilateral more difficult with R LE  -MS     Row Name 10/06/20 1159          Orthotic Management    Orthosis  Location  AFO (ankle foot orthosis)  -MS     Row Name 10/06/20 1159          Ankle Foot Orthosis Management    Type (Ankle/Foot Orthosis)  right;AFO (ankle foot orthosis)  -MS     Wearing Schedule (Ankle Foot Orthosis)  wear with activity/work  -MS     Row Name 10/06/20 1159          Positioning and Restraints    Pre-Treatment Position  in bed  -MS     Post Treatment Position  wheelchair  -MS     In Wheelchair  sitting notified OT patient is resting in rehab gym  -MS     Row Name 10/06/20 1159          Daily Progress Summary (PT)    Functional Goal Overall Progress (PT)  progressing toward functional goals as expected  -MS     Daily Progress Summary (PT)  Less rest breaks and improvement with activity tolerance noted. Plans home with daughter and reviewed equipment needs- no orders required at this time. Possible interest in  PT to address home safety. SOA reported with activity but vitals remained 93-97%.  -MS     Impairments Still Limiting Function (PT)  strength decreased;impaired balance;impaired functional activity tolerance  -MS       User Key  (r) = Recorded By, (t) = Taken By, (c) = Cosigned By    Initials Name Provider Type    MS Samantha Rai KIRK, PT Physical Therapist        Wound 09/17/20 0810 Right anterior groin Incision (Active)   Dressing Appearance open to air 10/06/20 0800   Closure Adhesive closure strips 10/06/20 0800   Base clean;dry 10/06/20 0800   Periwound intact 10/06/20 0800   Drainage Amount none 10/05/20 2025       Wound 09/17/20 0811 sternal Incision (Active)   Dressing Appearance open to air 10/06/20 0800   Closure Approximated 10/06/20 0800   Base clean;dry 10/06/20 0800   Periwound intact 10/06/20 0800   Drainage Amount none 10/05/20 2025   Dressing Care open to air 10/06/20 0800     Physical Therapy Education                 Title: PT OT SLP Therapies (In Progress)     Topic: Physical Therapy (In Progress)     Point: Mobility training (Done)     Learning Progress Summary            Patient Acceptance, E, NR,VU by JK at 10/5/2020 1252    Acceptance, E, NR by LB at 10/3/2020 1229    Acceptance, E,TB,D, VU,DU,NR by  at 10/2/2020 1042    Comment: walker techniques    Acceptance, E,TB,D, VU,NR by EE at 9/30/2020 1115    Acceptance, E,TB, VU,NR by EE at 9/29/2020 1113    Acceptance, E,TB, NR,VU by EE at 9/28/2020 1145    Acceptance, E, VU,NR by JK at 9/26/2020 1158                   Point: Home exercise program (Done)     Learning Progress Summary           Patient Acceptance, E, NR,VU by JK at 10/5/2020 1252    Acceptance, E,TB,D, VU,DU,NR by KP at 10/2/2020 1042    Comment: walker techniques    Acceptance, E,TB,D, VU,NR by EE at 9/30/2020 1115    Acceptance, E,TB, VU,NR by EE at 9/29/2020 1113    Acceptance, E,TB, NR,VU by EE at 9/28/2020 1145                   Point: Body mechanics (Done)     Learning Progress Summary           Patient Acceptance, E,TB, VU,NR by EE at 9/29/2020 1113    Acceptance, E,TB, NR,VU by EE at 9/28/2020 1145                   Point: Precautions (Not Started)     Learner Progress:  Not documented in this visit.                      User Key     Initials Effective Dates Name Provider Type Discipline    LB 03/07/18 -  Tsesy Rodriguez, PTA Physical Therapy Assistant PT    EE 04/03/18 -  Shruthi Roach, PT Physical Therapist PT    JK 04/03/18 -  Araceli Jones, PT Physical Therapist PT     04/03/18 -  Karena Sears, PT Physical Therapist PT                PT Recommendation and Plan          Daily Progress Summary (PT)  Functional Goal Overall Progress (PT): progressing toward functional goals as expected  Daily Progress Summary (PT): Less rest breaks and improvement with activity tolerance noted. Plans home with daughter and reviewed equipment needs- no orders required at this time. Possible interest in  PT to address home safety. SOA reported with activity but vitals remained 93-97%.  Impairments Still Limiting Function (PT): strength decreased, impaired balance,  impaired functional activity tolerance               Time Calculation:     PT Charges     Row Name 10/06/20 1514 10/06/20 1158          Time Calculation    Start Time  1330  -MS  1030  -MS     Stop Time  1400  -MS  1100  -MS     Time Calculation (min)  30 min  -MS  30 min  -MS     PT Received On  --  10/06/20  -MS     PT - Next Appointment  --  10/07/20  -MS       User Key  (r) = Recorded By, (t) = Taken By, (c) = Cosigned By    Initials Name Provider Type    Samantha Cardenas PT Physical Therapist          Therapy Charges for Today     Code Description Service Date Service Provider Modifiers Qty    84171763066  PT THER PROC EA 15 MIN 10/6/2020 Samantha Rai, PT GP 1    [id number]  PT THERAPEUTIC ACT EA 15 MIN 10/6/2020 Samantha Rai, PT GP 1    [id number]  PT THER PROC EA 15 MIN 10/6/2020 Samantha Rai, PT GP 2          Patient was wearing a face mask during this therapy encounter. Therapist used appropriate personal protective equipment including eye protection, mask, and gloves.  Mask used was standard procedure mask. Appropriate PPE was worn during the entire therapy session. Hand hygiene was completed before and after therapy session. Patient is not in enhanced droplet precautions.              Samantha Rai PT  10/6/2020

## 2020-10-06 NOTE — DISCHARGE PLACEMENT REQUEST
"Krupa Heck (80 y.o. Female)     Date of Birth Social Security Number Address Home Phone MRN    1940  [address]   [place] [address] [phone] [id number]    [employer] Marital Status          Zoroastrian        Admission Date Admission Type Admitting Provider Attending Provider Department, Room/Bed    9/25/20 Elective Winston Jones MD Gormley, John Michael, MD [facility], 4417/1    Discharge Date Discharge Disposition Discharge Destination                       Attending Provider: Winston Jones MD    Allergies: Ramipril, Morphine, Morphine And Related, Bactrim [Sulfamethoxazole-trimethoprim]    Isolation: None   Infection: None   Code Status: CPR    Ht: 142.2 cm (56\")   Wt: 38.3 kg (84 lb 7 oz)    Admission Cmt: None   Principal Problem: None                Active Insurance as of 9/25/2020     Primary Coverage     Payor Plan Insurance Group Employer/Plan Group    MEDICARE MEDICARE A & B      Payor Plan Address Payor Plan Phone Number Payor Plan Fax Number Effective Dates    [address] [phone]  7/1/2005 - None Entered    [facility] [id number]       Subscriber Name Subscriber Birth Date Member ID       KRUPA HECK 1940 [id number]           Secondary Coverage     Payor Plan Insurance Group Employer/Plan Group    ANTH BLUE CROSS [facility] SUPP [id number]     Payor Plan Address Payor Plan Phone Number Payor Plan Fax Number Effective Dates    [address]   12/1/2016 - None Entered    [facility] [place], [place] [address]       Subscriber Name Subscriber Birth Date Member ID       KRUPA HECK 1940 [id number]                 Emergency Contacts      (Rel.) Home Phone Work Phone Mobile Phone    Sanches,Maddy (Daughter) [phone] -- [phone]    COBY CAPUTO (Daughter) [phone] -- [phone]    BRENDA CEDILLO (Daughter) [phone] -- [phone]              "

## 2020-10-06 NOTE — PLAN OF CARE
Goal Outcome Evaluation:  Plan of Care Reviewed With: patient  Progress: improving   A&Ox4. Pleasant and cooperative. Denies pain. Did take Xanax and Melatonin at bedtime to help with anxiety and sleep. Pt wants to start SSRI and plans to discuss with provider. Meds whole in pudding one at a time. Does not want to be awakened for Protonix, wants time changed to give with breakfast. No unsafe behavior. Sleeping fair this shift.

## 2020-10-06 NOTE — PLAN OF CARE
Goal Outcome Evaluation:  Plan of Care Reviewed With: patient  Progress: improving  Outcome Summary: Mrs Beauchamp is alert, pleasant. She ambulates with walker assist of 1 person. No unsafe behavior. Meds whole with pudding. She is having VFSS 8am tomorrow.

## 2020-10-06 NOTE — PROGRESS NOTES
Weekly progress report and discharge date discussed with pt and by phone with daughter, Cecy. Pt is pleased to have a discharge date and is looking forward to going home.  Family conference planned for Thursday, 10/8.

## 2020-10-06 NOTE — PROGRESS NOTES
TEAM CONF - OCT 6 - BED MOD I. TRASNFERS CTG. GAIT 4 STEPS B RAILS. CTG. GAIT 80 FEET CTG-SBA, RW OR ROLLATOR. TOILET TRANSFERS CTG. SHOWER TRANSFERS CTG-MIN.  UBD SBA. LBD SBA BATH SBA. TOILETING CTG ASSIST IF STANDING. ON NEBULIZERS. ROOM AIR. IMPAIRED ENDURANCE.  SWALLOW - REG SOLIDS/ NTL. VFSS ON WED. CONTINENT BOWEL AND BLADDER. STAGE 1-2 BUTTOCK CLEFT AREA.   ELOS - GOAL TO DTR'S HOME WITH 5 STEPS TO ENTER.   ELOS: Friday OCT 9. [facility] AND OT AND NURSING.

## 2020-10-06 NOTE — THERAPY TREATMENT NOTE
Inpatient Rehabilitation - Occupational Therapy Treatment Note    [facility]     Patient Name: Grace Beauchamp  : 1940  MRN: [id number]    Today's Date: 10/6/2020                 Admit Date: 2020         ICD-10-CM ICD-9-CM   1. Pericardial hematoma  I31.2 423.0   2. Ventricular aneurysm  I25.3 414.10   3. Gait abnormality  R26.9 781.2       Patient Active Problem List   Diagnosis   • History of breast cancer   • Stenosis of carotid artery   • Chronic obstructive pulmonary disease (CMS/HCC)   • Closed fracture of multiple ribs   • Cognitive disorder   • Erythromelalgia (CMS/[facility])   • Hyperlipidemia   • Hypertension   • Primary osteoarthritis involving multiple joints   • Osteoporosis   • Restless legs syndrome   • Cerebral aneurysm   • Temporary cerebral vascular dysfunction   • S/P CABG x 1   • Type 2 diabetes mellitus without complication, without long-term current use of insulin (CMS/[facility])   • S/P ascending aortic aneurysm repair   • 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[facility])   • Descending thoracic aortic aneurysm (CMS/[facility])   • Claudication of both lower extremities (CMS/[facility])   • 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[facility])   • Ventral hernia without obstruction or gangrene   • Breast cancer, stage 1, estrogen receptor positive (CMS/[facility])   • Arthritis of right hip   • Arthritis of right knee   • Chondrocalcinosis   • Chronic pain of right knee   • Degenerative disc disease, lumbar   • Gastroesophageal reflux disease   • Bilateral hearing loss   • Thoracic aortic aneurysm without rupture (CMS/[facility])   • Erythromelalgia (CMS/[facility])   • 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CMS/[facility])   • Thoracoabdominal aortic aneurysm, without rupture (CMS/[facility])   • 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/[facility])   • Aortic aneurysm, descending (CMS/[facility])   • Aortic aneurysm without rupture (CMS/[facility])   • CAD (coronary artery disease)   • S/P left heart catheterization by ventricular puncture   • NSTEMI (non-ST elevated myocardial infarction)  "(CMS/[facility])   • Pericardial hematoma   • History of ST elevation myocardial infarction (STEMI)   • Acute on chronic diastolic CHF (congestive heart failure) (CMS/[facility])   • Ventricular aneurysm   • Immobility syndrome       Past Medical History:   Diagnosis Date   • AAA (abdominal aortic aneurysm) (CMS/[facility])    • Acute bronchitis 12/2018   • 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[facility])    • Cataracts, bilateral    • Closed fracture of three ribs of right side    • Cognitive disorder    • COPD (chronic obstructive pulmonary disease) (CMS/[facility])    • Depression    • Descending aortic arch aneurysm (CMS/[facility])    • Diabetes mellitus (CMS/[facility])     \"BORDERLINE\"   • Erythermalgia (CMS/[facility])    • GERD (gastroesophageal reflux disease)    • Hyperlipidemia     Controlled w/Meds   • Hypertension     Controlled w/Meds   • Low back pain    • Moderate aortic valve insufficiency     S/p AVR on 02/23/16 by Dr. Ontiveros   • Nocturia    • Osteoarthritis    • Osteoporosis    • Otitis media     R Ear   • Prediabetes    • PVD (peripheral vascular disease) (CMS/[facility])    • RLS (restless legs syndrome)    • Saccular aneurysm    • Stroke (CMS/[facility])     Perioperatively w/L Hip Repl   • Thoracic ascending aortic aneurysm (CMS/[facility])     S/p Repair on 02/23/16 by Dr. Ontiveros   • TIA (transient ischemic attack)    • Urgency incontinence    • Venous insufficiency    • Ventral hernia        Past Surgical History:   Procedure Laterality Date   • AORTIC VALVE REPAIR/REPLACEMENT N/A 02/23/2016-[facility]    Ascending Replacement Using a 24MM Graft--Dr. Ontiveros   • APPENDECTOMY  1977   • BREAST BIOPSY Right 09/16/2012    Vacuum " Assisted Core Bx of R Breast   • CARDIAC CATHETERIZATION N/A 01/06/2016    Dr. Tres De Los Santos   • CARDIAC CATHETERIZATION N/A 4/22/2019    Procedure: Left Heart Cath;  Surgeon: Tres De Los Santos MD;  Location:  [facility];  Service: Cardiology   • CARDIAC CATHETERIZATION N/A 4/22/2019    Procedure: Coronary angiography;  Surgeon: Tres De Los Santos MD;  Location:  [facility];  Service: Cardiology   • CARDIAC CATHETERIZATION N/A 7/22/2020    Procedure: LEFT HEART CATH;  Surgeon: Antonia Scott MD;  Location:  [facility];  Service: Cardiovascular;  Laterality: N/A;   • CARDIAC CATHETERIZATION N/A 7/22/2020    Procedure: CORONARY ANGIOGRAPHY;  Surgeon: Antonia Scott MD;  Location:  [facility];  Service: Cardiovascular;  Laterality: N/A;   • CARDIAC CATHETERIZATION N/A 7/22/2020    Procedure: Left ventriculography;  Surgeon: Antonia Scott MD;  Location:  [facility];  Service: Cardiovascular;  Laterality: N/A;   • CARDIAC CATHETERIZATION N/A 7/22/2020    Procedure: Saphenous Vein Graft;  Surgeon: Antonia Scott MD;  Location:  [facility];  Service: Cardiovascular;  Laterality: N/A;   • CARDIAC SURGERY  02/2016    Dr. Ontiveros/Dr. De Los Santos   • CATARACT EXTRACTION Bilateral     [facility]   • COLONOSCOPY N/A 10/2002    Dr. Negro   • CORONARY ARTERY BYPASS GRAFT  02/23/2016-[facility]    x1 w/L Vein Grafting--Dr. Ontiveros   • CORONARY ARTERY BYPASS GRAFT N/A 9/18/2020    Procedure: STERNAL EXPLORATION WITH CLOSURE AND WASHOUT, REMOVAL OF IABP, PRP;  Surgeon: Mart Ontiveros MD;  Location: [facility] MAIN OR;  Service: Cardiothoracic;  Laterality: N/A;   • CYST REMOVAL Right 01/25/2013    R Palm Excision of Retinacular Cyst--Dr. Karla Fish   • EPIDURAL BLOCK     • LAPAROSCOPIC CHOLECYSTECTOMY N/A 08/04/2004    Dr. JOEY Sheth   • MASTECTOMY Right 09/28/2012   • ORIF HIP FRACTURE Left 03/27/2005    w/ AMBI  compression Dr. Victor M noriega   • RECTOVAGINAL FISTULA REPAIR  1965   • THORACIC AORTIC ANEURYSM REPAIR N/A 7/23/2019    Procedure: ROSE, THORACOABDOMINAL AORTIC ANEURYSM REPAIR, VISCERAL VESSEL REPAIR, LARGE VENTRAL HERNIA REPAIR WITH MESH, CIRCULATORY ARREST, PRP;  Surgeon: Mart Ontiveros MD;  Location: [facility] OR;  Service: Cardiothoracic   • TRANSESOPHAGEAL ECHOCARDIOGRAM (ROSE) N/A 9/18/2020    Procedure: TRANSESOPHAGEAL ECHOCARDIOGRAM WITH ANESTHESIA;  Surgeon: Mart Ontiveros MD;  Location: [facility] OR;  Service: Cardiothoracic;  Laterality: N/A;   • TUBAL ABDOMINAL LIGATION     • VENTRICULAR ANEURYSM REPAIR N/A 7/22/2020    Procedure: EMERGENT REOP STERNOTOMY, REPAIR OF LV RUPTURE, PRP, INTRAOP ROSE;  Surgeon: Mart Ontiveros MD;  Location: [facility] OR;  Service: Cardiothoracic;  Laterality: N/A;   • VENTRICULAR ANEURYSM REPAIR N/A 9/17/2020    Procedure: ROSE, MIDLINE STERNOTOMY REOP  LEFT VENTRICULAR ANEURYSM REPAIR, IABP INSERTION, PRP;  Surgeon: Mart Ontiveros MD;  Location: [facility];  Service: Cardiothoracic;  Laterality: N/A;            IRF OT ASSESSMENT FLOWSHEET (last 12 hours)      IRF OT Evaluation and Treatment     Row Name 10/06/20 1000          OT Time and Intention    Document Type  daily treatment  -SM     Mode of Treatment  occupational therapy  -SM     Patient Effort  good  -SM     Symptoms Noted During/After Treatment  none  -SM     Row Name 10/06/20 1000          General Information    Patient/Family/Caregiver Comments/Observations  Pt resting in bed prior to AM session, seated in w/c prior to PM session, no acute signs of distress.   -SM     Existing Precautions/Restrictions  fall;cardiac;sternal  -SM     Row Name 10/06/20 1000          Cognition/Psychosocial    Affect/Mental Status (Cognitive)  WFL  -SM     Orientation Status (Cognition)  oriented x 4  -SM     Follows Commands (Cognition)  follows one-step commands  -SM     Personal Safety Interventions  fall  prevention program maintained;gait belt;nonskid shoes/slippers when out of bed  -     Row Name 10/06/20 1000          Pain Scale: Numbers Pre/Post-Treatment    Pretreatment Pain Rating  0/10 - no pain  -     Posttreatment Pain Rating  0/10 - no pain  -     Row Name 10/06/20 1000          Bed Mobility    Supine-Sit [place] (Bed Mobility)  supervision  -     Sit-Supine [place] (Bed Mobility)  supervision  -     Assistive Device (Bed Mobility)  bed rails  -     Row Name 10/06/20 1000          Transfers    Bed-Chair [place] (Transfers)  contact guard  -     Chair-Bed [place] (Transfers)  contact guard  -     Assistive Device (Bed-Chair Transfers)  wheelchair  -     Sit-Stand [place] (Transfers)  contact guard  -     Stand-Sit [place] (Transfers)  contact guard  -     Row Name 10/06/20 1000          Chair-Bed Transfer    Assistive Device (Chair-Bed Transfers)  wheelchair  -[facility] Name 10/06/20 1000          Sit-Stand Transfer    Assistive Device (Sit-Stand Transfers)  walker, front-wheeled  -     Row Name 10/06/20 1000          Stand-Sit Transfer    Assistive Device (Stand-Sit Transfers)  walker, front-wheeled  -[facility] Name 10/06/20 1000          Balance    Static Sitting Balance  WFL;supported;sitting in chair;unsupported  -     Dynamic Sitting Balance  WFL;supported  -     Static Standing Balance  mild impairment  -     Balance Interventions  standing;UE activity with balance activity;moderate challenge  -     Comment, Balance  Pt engages in standing balance ax unsupported completing balloon taping activity with BUE CGA-Min A for dynamic standing. Mild episodes of posterior LOB, with good recovery.   -     Row Name 10/06/20 1000          Shoulder (Therapeutic Exercise)    Shoulder Strengthening (Therapeutic Exercise)  bilateral;flexion;extension;1 lb free weight;3 sets;10 repetitions  -     Row Name 10/06/20 1000          Elbow/Forearm  (Therapeutic Exercise)    Elbow/Forearm Strengthening (Therapeutic Exercise)  bilateral;flexion;extension;supination;pronation;1 lb free weight;3 sets;10 repetitions  -     Row Name 10/06/20 1000          Wrist (Therapeutic Exercise)    Wrist Strengthening (Therapeutic Exercise)  bilateral;flexion;extension;1 lb free weight;3 repetitions;10 repetitions  -     Row Name 10/06/20 1000          Hand (Therapeutic Exercise)    Hand (Therapeutic Exercise)  strengthening exercise  -     Hand Strengthening (Therapeutic Exercise)  bilateral; strengthening;hand gripper;3 sets;10 repetitions  -     Row Name 10/06/20 1000          Bathing    Comment (Bathing)  Pt declines bathing today due to completeing shower yesterday.   -     Row Name 10/06/20 1000          Upper Body Dressing    [place] Level (Upper Body Dressing)  doff;don;pull over garment;set up assistance  -     Position (Upper Body Dressing)  supported sitting  -     Set-up Assistance (Upper Body Dressing)  obtain clothing  -     Row Name 10/06/20 1000          Lower Body Dressing    [place] Level (Lower Body Dressing)  doff;don;shoes/slippers;pants/bottoms;standby assist  -     Assistive Device Use (Lower Body Dressing)  orthosis  -     Position (Lower Body Dressing)  supported sitting;supported standing  -     Set-up Assistance (Lower Body Dressing)  obtain clothing  -     Row Name 10/06/20 1000          Grooming    [place] Level (Grooming)  grooming skills;set up  -     Position (Grooming)  supported sitting  -     Row Name 10/06/20 1000          Positioning and Restraints    Pre-Treatment Position  in bed  -     In Chair  with PT;with SLP after AM session with PT. after PM session with PT.   -     Row Name 10/06/20 1000          Daily Progress Summary (OT)    Overall Progress Toward Functional Goals (OT)  progressing toward functional goals as expected  -       User Key  (r) = Recorded By, (t) = Taken By, (c) =  Cosigned By    Initials Name Effective Dates     Sunni Matt OT 04/02/20 -            Occupational Therapy Education                 Title: PT OT SLP Therapies (In Progress)     Topic: Occupational Therapy (In Progress)     Point: ADL training (Done)     Description:   Instruct learner(s) on proper safety adaptation and remediation techniques during self care or transfers.   Instruct in proper use of assistive devices.              Learning Progress Summary           Patient Acceptance, E, VU by  at 9/26/2020 1218    Comment: Instructed pt on improved techniques for safety with ADLs and transfers with sternal precautions.                   Point: Home exercise program (Not Started)     Description:   Instruct learner(s) on appropriate technique for monitoring, assisting and/or progressing therapeutic exercises/activities.              Learner Progress:  Not documented in this visit.          Point: Precautions (Done)     Description:   Instruct learner(s) on prescribed precautions during self-care and functional transfers.              Learning Progress Summary           Patient Acceptance, E, VU by  at 9/26/2020 1218    Comment: Instructed pt on improved techniques for safety with ADLs and transfers with sternal precautions.                   Point: Body mechanics (Not Started)     Description:   Instruct learner(s) on proper positioning and spine alignment during self-care, functional mobility activities and/or exercises.              Learner Progress:  Not documented in this visit.                      User Key     Initials Effective Dates Name Provider Type Discipline     04/02/20 -  Sunni Matt OT Occupational Therapist OT                    OT Recommendation and Plan    Anticipated Discharge Disposition (OT): home with assist  Planned Therapy Interventions (OT): activity tolerance training, adaptive equipment training, BADL retraining, functional balance retraining, IADL retraining,  neuromuscular control/coordination retraining, occupation/activity based interventions, patient/caregiver education/training, ROM/therapeutic exercise, strengthening exercise, transfer/mobility retraining       Daily Progress Summary (OT)  Overall Progress Toward Functional Goals (OT): progressing toward functional goals as expected            Time Calculation:     Time Calculation- OT     Row Name 10/06/20 1556 10/06/20 1555          Time Calculation- OT    OT Start Time  1430  -SM  1000  -SM     OT Stop Time  1500  -SM  1030  -SM     OT Time Calculation (min)  30 min  -SM  30 min  -SM     Total Timed Code Minutes- OT  30 minute(s)  -SM  30 minute(s)  -SM     OT Received On  10/06/20  -SM  10/06/20  -SM       User Key  (r) = Recorded By, (t) = Taken By, (c) = Cosigned By    Initials Name Provider Type     Sunni Matt OT Occupational Therapist        Therapy Charges for Today     Code Description Service Date Service Provider Modifiers Qty    82776643639 HC OT SELF CARE/MGMT/TRAIN EA 15 MIN 10/5/2020 Sunni Matt OT GO 2    54690442144 HC OT THER PROC EA 15 MIN 10/5/2020 Sunni Matt OT GO 2    39849315060 HC OT SELF CARE/MGMT/TRAIN EA 15 MIN 10/6/2020 Sunni Matt OT GO 2    69950647174 HC OT THERAPEUTIC ACT EA 15 MIN 10/6/2020 Sunni Matt OT GO 1    22900186275 HC OT THER PROC EA 15 MIN 10/6/2020 Sunni Matt OT GO 1                   Sunni Matt OT  10/6/2020

## 2020-10-06 NOTE — PROGRESS NOTES
"Case Management  Inpatient Rehabilitation Team Conference    Conference Date/Time: 10/6/2020 7:54:26 AM    Team Conference Attendees:  Adam Miller, Pharmacist  Va Olguin, VICTORIANOW  Corinne So, PT  Vanessa Leiva, SLP  Va De Leon, CTRS  Marjorie Lowe RD, LD  Ayden Zamudio, RN  Erika Perry, RN   Sunni Matt, OT    Demographics            Age: 80Y            Gender: Female    Admission Date: 9/25/2020 4:01:00 PM  Rehabilitation Diagnosis:  Debility secondary to cardiac surgery, dysphagia  Past Medical History: DiagnosisDate  ?AAA (abdominal aortic aneurysm) (CMS/HCC)?  ?Acute hunispnxvv22/2018  ?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HCC)?  ?Arthritis?  ?Bilateral carotid artery disease (CMS/HCC)?  ?Bilateral cataracts?  ?S/p Extractions  ?Breast cancer (CMS/HCC)?  ?right breast fH8ysHw (snm) pMX ER/OR pos, Her-2/neg, Ki-67, 31%, oncotype  recurrence score 19, invasive lobular carcinoma  ?CAD (coronary artery disease)?  ?S/p CABG on 2/23/16 by Dr. Ontiveros  ?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HCC)?  ?Cataracts, bilateral?  ?Closed fracture of three ribs of right side?  ?Cognitive disorder?  ?COPD (chronic obstructive pulmonary disease) (CMS/HCC)?  ?Depression?  ?Descending aortic arch aneurysm (CMS/HCC)?  ?Diabetes mellitus (CMS/HCC)?  ?\"BORDERLINE\"  ?Erythermalgia (CMS/HCC)?  ?GERD (gastroesophageal reflux disease)?  ?Hyperlipidemia?  ?Controlled w/Meds  ?Hypertension?  ?Controlled w/Meds  ?Low back pain?  ?Moderate aortic valve insufficiency?  ?S/p AVR on 02/23/16 by Dr. Ontiveros  ?Nocturia?  ?Osteoarthritis?  ?Osteoporosis?  ?Otitis media?  ?R Ear  ?Prediabetes?  ?PVD (peripheral vascular disease) (CMS/HCC)?  ?RLS (restless legs syndrome)?  ?Saccular aneurysm?  ?Stroke (CMS/HCC)?  ?Perioperatively w/L Hip Repl  ?Thoracic ascending aortic aneurysm (CMS/HCC)?  ?S/p Repair on 02/23/16 by Dr. Ontiveros  ?TIA (transient ischemic attack)?  ?Urgency incontinence?  ?Venous insufficiency?  ?Ventral " hernia?      Surgical HistoryExpand by Default  Past Surgical History:  ProcedureLateralityDate  ?AORTIC VALVE REPAIR/REPLACEMENTN/A02/23/2016-[facility]  ?Ascending Replacement Using a 24MM Graft--Dr. Ontiveros  ?APPENDECTOMY?1977  ?BREAST XVRFNNUoykp59/16/2012  ?Vacuum Assisted Core Bx of R Breast  ?CARDIAC CATHETERIZATIONN/A01/06/2016  ?Dr. Tres De Los Santos  ?CARDIAC CATHETERIZATIONN/A4/22/2019  ?Procedure: Left Heart Cath;  Surgeon: Tres De Los Santos MD;  Location:  [facility]  CATH INVASIVE LOCATION;  Service: Cardiology  ?CARDIAC CATHETERIZATIONN/A4/22/2019  ?Procedure: Coronary angiography;  Surgeon: Tres De Los Santos MD;  Location:   [facility] CATH INVASIVE LOCATION;  Service: Cardiology  ?CARDIAC CATHETERIZATIONN/A7/22/2020  ?Procedure: LEFT HEART CATH;  Surgeon: Antonia Scott MD;  [facility];  Service: Cardiovascular;  Laterality: N/A;  ?CARDIAC CATHETERIZATIONN/A7/22/2020  ?Procedure: CORONARY ANGIOGRAPHY;  Surgeon: Antonia Scott MD;  [facility];  Service: Cardiovascular;  Laterality: N/A;  ?CARDIAC CATHETERIZATIONN/A7/22/2020  ?Procedure: Left ventriculography;  Surgeon: Antonia Soctt MD;  [facility];  Service: Cardiovascular;  Laterality: N/A;  ?CARDIAC CATHETERIZATIONN/A7/22/2020  ?Procedure: Saphenous Vein Graft;  Surgeon: Antonia Scott MD;  [facility];  Service: Cardiovascular;  Laterality: N/A;  ?CARDIAC SURGERY?02/2016  ?Dr. Ontiveros/Dr. De Los Santos  ?CATARACT EXTRACTIONBilateral?  ?[facility]  ?COLONOSCOPYN/A10/2002  ?Dr. Negro  ?CORONARY ARTERY BYPASS GRAFT?02/23/2016-[facility]  ?x1 w/L Vein Grafting--Dr. Ontiveros  ?CORONARY ARTERY BYPASS GRAFTN/A9/18/2020  ?Procedure: STERNAL EXPLORATION WITH CLOSURE AND WASHOUT, REMOVAL OF IABP, PRP;   Surgeon: Mart Ontiveros MD;  Location: [facility] MAIN OR;  Service:  Cardiothoracic;  Laterality: N/A;  ?CYST TEKTKYYZmpyg54/25/2013  ?R Palm Excision of Retinacular  Cyst--Dr. Karla Fish  ?EPIDURAL BLOCK??  ?LAPAROSCOPIC CHOLECYSTECTOMYN/A08/04/2004  ?Dr. JOEY Sheth  ?QZOVBAJTUQBkbun49/28/2012  ?ORIF HIP DOKQLTZQZhnp02/27/2005  ?w/ AMBI compression screw, Dr. Victor M Cabral  ?RECTOVAGINAL FISTULA REPAIR?1965  ?THORACIC AORTIC ANEURYSM REPAIRN/A7/23/2019  ?Procedure: ROSE, THORACOABDOMINAL AORTIC ANEURYSM REPAIR, VISCERAL VESSEL  REPAIR, LARGE VENTRAL HERNIA REPAIR WITH MESH, CIRCULATORY ARREST, PRP;  Surgeon: Mart Ontiveros MD;  [facility];  Ser      Plan of Care  Anticipated Discharge Date/Estimated Length of Stay: ELOS: 2 weeks  Anticipated Discharge Destination: Community discharge with assistance  Discharge Plan : Pt lives with her daughter in a walkout reanch with 5 steps to  enter. Pt will have 24 hour assistance from family at discharge.  Medical Necessity Expected Level Rationale: Supervision/SBA with   Intensity and Duration: an average of 3 hours/5 days per week  Medical Supervision and 24 Hour Rehab Nursing: x  Physical Therapy: x  PT Intensity/Duration: 1 hour/day, 5 days/week for approximately 10 days  Occupational Therapy: x  OT Intensity/Duration: 1 hour/day, 5 days/week for approximately 10 days  Speech and Language Therapy: x  SLP Intensity/Duration: 1 hour/day, 5 days/week for approximately 10 days  Social Work: x  Therapeutic Recreation: x  Updated (if changes indicated)    Anticipated Discharge Date/Estimated Length of Stay:   ELOS: DC 10/9    Based on the patient's medical and functional status, their prognosis and  expected level of functional improvement is: Supervision/SBA with       Interdisciplinary Problem/Goals/Status    All Rehab Problems:  Body Systems    [RN] Integumentary(Active)  Current Status(10/05/2020): pt has stage 2 pressure sore to bottom, chest  incision, and R groin site. pinkness to coccyx- refuses mepilex dsg. and STR to  R groin  Weekly Goal(10/13/2020): remain intact  Discharge Goal: no  infection        Mobility    [OT] Toilet Transfers(Active)  Current Status(10/05/2020): CGA  Weekly Goal(10/12/2020): SBA  Discharge Goal: supervision    [OT] Tub/Shower Transfers(Active)  Current Status(10/05/2020): Min/CGA  Weekly Goal(10/12/2020): SBA  Discharge Goal: supervision    [PT] Walk(Active)  Current Status(10/05/2020): 80' CG/SBA, rwx or rollator  Weekly Goal(10/12/2020): SBA to BR, rwx  Discharge Goal: 120' SBA, rwx    [PT] Bed/Chair/Wheelchair(Active)  Current Status(10/05/2020): CGA  Weekly Goal(10/05/2020): Sup  Discharge Goal: Sup    [PT] Bed Mobility(Active)  Current Status(10/05/2020): MOd I  Weekly Goal(10/12/2020): Mod I  Discharge Goal: Mod I    [PT] Stairs(Active)  Current Status(10/05/2020): 4 steps, B rails, CGA & VCs  Weekly Goal(10/12/2020): PT only  Discharge Goal: 4, SBA, rails        Psychosocial    [RN] Coping/Adjustment(Active)  Current Status(10/05/2020): pt is adjusting appropriately to hospital stay.  family supportive  Weekly Goal(10/13/2020): adjust WNL  Discharge Goal: adequate coping        Safety    [RN] Potential for Injury(Active)  Current Status(10/05/2020): pt alert and oriented. no safety concerns  Weekly Goal(10/13/2020): no falls  Discharge Goal: no falls        Self Care    [OT] Bathing(Active)  Current Status(10/05/2020): SBA  Weekly Goal(10/12/2020): SBA  Discharge Goal: supervision    [OT] Dressing (Lower)(Active)  Current Status(09/28/2020): SBA  Weekly Goal(10/12/2020): SBA  Discharge Goal: setup/supervision    [OT] Dressing (Upper)(Active)  Current Status(10/05/2020): SBA  Weekly Goal(10/12/2020): setup/supervision  Discharge Goal: supervision    [OT] Toileting(Active)  Current Status(10/05/2020): SBA/CGA  Weekly Goal(10/12/2020): SBA  Discharge Goal: Supervision        Sphincter Control    [RN] Bladder Management(Active)  Current Status(10/05/2020): 100% continent  Weekly Goal(10/13/2020): remain continent  Discharge Goal: continent 100%    [RN] Bowel  Management(Active)  Current Status(10/05/2020): continent 100%  Weekly Goal(10/13/2020): remains continent  Discharge Goal: continent 100%        Swallow Function    [ST] Swallowing(Active)  Current Status(10/05/2020): Pt now tolerating regular with NTL. Pt exhibits  intermittent throat clear with thins by cup. Plan VFSS for 10/7/20.  Weekly Goal(10/13/2020): The patient will exhibit no clinical s/s of aspiration  on 80% of thin liquid, water trials.  Discharge Goal: The patient will tolerate the least restrictive diet, without  showing clinical s/s of aspiration.        Comments: 9/29: poor endurance; anxious about SOB and HR at times; SLP  recommending waiting another week prior to repeat VFSS;    10/6: with LOB at times; repeat VFSS tomorrow;    Signed by: Adyen Zamudio RN    Physician CoSigned By: Winston Jones 10/06/2020 08:58:48

## 2020-10-06 NOTE — PROGRESS NOTES
"   LOS: 11 days   Patient Care Team:  Miller Castañeda MD as PCP - General (Internal Medicine)  Miller Castañeda MD as PCP - Claims Attributed  Mart Ontiveros MD as Surgeon (Cardiothoracic Surgery)  Tres De Los Santos MD as Consulting Physician (Cardiology)  Graham Mcconnell MD as Consulting Physician (Hematology and Oncology)  Payam Byrnes MD as Consulting Physician (Otolaryngology)  Jonathan Smith MD as Consulting Physician (Vascular Surgery)  Victor M Cabral MD as Surgeon (Orthopedic Surgery)  Yaya Burks MD as Consulting Physician (Pulmonary Disease)    Chief Complaint:   immobility syndrome    Subjective     History of Present Illness    Subjective     Fatigue / shortness of air is improved.  Tolerating activities better.  Swallow study tomorrow.  She does feel anxious at times.  Some of that may be related shortness of air.  Taking Xanax.  We discussed the option of possibly Remeron as opposed to an SSRI such as Prozac, to help with sleep, appetite, and depression/anxiety.    History taken from: patient    Objective     Vital Signs  Temp:  [98 °F (36.7 °C)-98.7 °F (37.1 °C)] 98 °F (36.7 °C)  Heart Rate:  [70-84] 82  Resp:  [14-18] 16  BP: (108-147)/(56-81) 135/72    Objective:  Vital signs: (most recent): Blood pressure 135/72, pulse 82, temperature 98 °F (36.7 °C), temperature source Oral, resp. rate 16, height 142.2 cm (56\"), weight 38.3 kg (84 lb 7 oz), SpO2 98 %.            Physical Exam     Gen:    calm; pleasant  HEENT: NCAT, EOMI; MMM,  Neck:   Supple, gauze on R neck  CV:      RRR, no m/r/g appreciated  Lungs: diminished breath sounds in the bases;      Abd:     (+) BS, soft, non-tender, non-distended  Ext:      no new edema  Skin:   cardiac incision healing well  Psych: appropriate mood and affect  Neuro:  Mental Status: AOx4, Speech: fluent without dysarthria or scanning, No gross cognitive deficits  Strength: R > L foot drop         Results Review:     I reviewed the patient's new " clinical results.  Results from last 7 days   Lab Units 10/05/20  0609 10/02/20  0632 10/01/20  0559   WBC 10*3/mm3 4.20 4.99 4.74   HEMOGLOBIN g/dL 10.2* 9.6* 8.8*   HEMATOCRIT % 30.8* 29.5* 27.9*   PLATELETS 10*3/mm3 484* 515* 470*     Results from last 7 days   Lab Units 10/06/20  0902 10/05/20  0609 10/02/20  0632   SODIUM mmol/L 143 143 140   POTASSIUM mmol/L 3.8 3.6 3.9   CHLORIDE mmol/L 106 107 105   CO2 mmol/L 27.0 26.3 30.0*   BUN mg/dL 15 15 19   CREATININE mg/dL 0.58 0.55* 0.68   CALCIUM mg/dL 9.0 9.0 9.0   GLUCOSE mg/dL 117* 116* 91     CT of the chest angiogram on October 2, 2020  FINDINGS: There is no evidence of pulmonary embolism. Stable aneurysmal  dilatation of the descending thoracic aorta. Stable changes of thoracic  aortic aneurysm repair. Stable rounded hypodensity along the proximal  aspect of the aortic arch. Interval repair of defect along the left  ventricular wall and evacuation of adjacent hematoma. Small right  pleural effusion. Emphysema. 5 mm nodule in the anterior left lower lobe  on image 57 is larger. 7 mm nodule in the posterior right upper lobe is  smaller where it previously measured 9 mm. Limited imaging of the upper  abdomen demonstrates bilateral renal cysts bone windows shows stable  degenerative changes of the thoracic spine. Stable loss of height of the  inferior endplate of L1.        Medication Review: done  Scheduled Meds:atorvastatin, 40 mg, Oral, Nightly  budesonide, 0.5 mg, Nebulization, BID - RT  cilostazol, 100 mg, Oral, BID  clopidogrel, 75 mg, Oral, Daily  ferrous sulfate, 325 mg, Oral, Daily With Breakfast  ipratropium-albuterol, 3 mL, Nebulization, 4x Daily - RT  losartan, 50 mg, Oral, Daily  metoprolol succinate XL, 50 mg, Oral, Q12H  mirtazapine, 7.5 mg, Oral, Nightly  multivitamin, 1 tablet, Oral, Daily  oxybutynin XL, 5 mg, Oral, Daily  pantoprazole, 40 mg, Oral, QAM  pramipexole, 0.125 mg, Oral, Nightly      Continuous Infusions:   PRN Meds:.•   acetaminophen **OR** [DISCONTINUED] acetaminophen **OR** [DISCONTINUED] acetaminophen  •  albuterol sulfate HFA  •  ALPRAZolam  •  aluminum-magnesium hydroxide-simethicone  •  cetirizine  •  dextrose  •  dextrose  •  glucagon (human recombinant)  •  guaiFENesin  •  influenza vaccine  •  ipratropium-albuterol  •  magnesium hydroxide  •  meclizine  •  melatonin  •  naloxone  •  ondansetron  •  sennosides-docusate  •  traMADol      Assessment/Plan       Immobility syndrome      Assessment & Plan  Etiologic Diagnosis and Comorbidities include:    Physical Debility/Immobility Syndrome    Dysphagia-October 5-improved.  Advance to regular solids/nectar thick liquid diet.  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  Physical therapy notes that she was much more fatigued with ambulating just to the bathroom in her room.  She ambulated 80 to 90 feet contact-guard assist in therapies.  Does not require nasal cannula oxygen.  Does not describe any productive sputum.    Patient reviewed with cardiology service and cardiothoracic service.  CT of the chest without pulmonary embolism.  Not felt to be in florid congestive heart failure.  Seen by the pulmonology service and Trelegy inhaler changed to nebulizer to try to optimize her pulmonary status with underlying COPD.  October 5-fatigue-shortness of air improved.  Anemia is improved.      pmh of Breast Ca    COPD  Trelegy inhaler changed to nebulizers to try to optimize her pulmonary status continues on room air    HTN    HLD    Peripheral Vascular  Disease    Right Foot Drop- chronic - AFO    DVT prophylaxis - SCDs    Anemia    Pain management - tramadol - home med. GIULIA reviewed.     Anxiety/depression-October 6-Patient complains of anxiety/depression.  She been on Prozac in the past.  Complains more of anxiety presently.    Has been taking alprazolam in the hospital but not at home and reviewed would not look at using that as an option after discharge/long-term.  Also reviewed potential for medication side effects including effects of SSRI on QT interval or for serotonin syndrome as she takes tramadol.  At this point will start the patient on Remeron 7.5 mg p.o. nightly to help with sleep, depression/anxiety/appetite, which is felt to have less of a potential for serotonin syndrome     Impairments Include:   Dysphagia, mobility, ambulation, adl’s, strength, endurance, respiratory status, swallowing    TEAM CONF - SEPT 29 - BED MIN. TRANSFERS MIN. GAIT 80 FEET CTG RW. TOILET TRANSFERS MOD ASSIST. SHOWER TRANSFERS MOD ASSIST. UBD MIN. LBD MOD. BATH MOD ASSIST. TOILETING MOD ASSIST. SWALLOW - MECH SOFT, NO MIXED, NECTAR THICK LIQUIDS. LAST VFSS SILENT ASPIRATION ON THINS. REPEAT VFSS NEXT WEEK. CONTINENT BOWEL AND BLADDER. STERNAL INCISION HEALING. PRESSURE SORE TO BUTTOCKS WITH CREAM TO THAT. ALPRAZOLAM PRN AT NIGHT. REC THERAPY INVOLVED.  ELOS - 2 WEEKS    TEAM CONF - OCT 6 - BED MOD I. TRASNFERS CTG. GAIT 4 STEPS B RAILS. CTG. GAIT 80 FEET CTG-SBA, RW OR ROLLATOR. TOILET TRANSFERS CTG. SHOWER TRANSFERS CTG-MIN.  UBD SBA. LBD SBA BATH SBA. TOILETING CTG ASSIST IF STANDING. ON NEBULIZERS. ROOM AIR. IMPAIRED ENDURANCE.  SWALLOW - REG SOLIDS/ NTL. VFSS ON WED. CONTINENT BOWEL AND BLADDER. STAGE 1-2 BUTTOCK CLEFT AREA.   ELOS - GOAL TO DTR'S HOME WITH 5 STEPS TO ENTER.   ELOS: Friday OCT 9. [facility]UR[facility].       Medical necessity  This patient is a good candidate for acute inpatient rehabilitation for a stay of approximately 2  weeks.  The etiologic diagnosis and comorbidities as listed above will require 24hr availability and frequent interventions of a physician with training in rehabilitation medicine.  The patient’s care cannot be provided on a lower level secondary to need for rigorous acute rehab in order to have maximal improvement of function.  Once admitted the patient will undergo 3 hour of PT/OT/SLP a day for at least 5 days per week. A rehab program will be initiated working on  Dysphagia, strengthening, endurance, safety, dressing, transfers, ambulation, bathing, grooming, swallowing skills. Rehab nursing will be involved to monitor bowel and bladder function, skin integrity, diabetes monitoring/education, patient and family education, and therapy carryover.  A weekly team meeting will be coordinated to maximize the patient’s plan of care during hospitalization and at discharge.       The patient's functional status and clinical status is unchanged from preadmission assessment and the patient continues appropriate for acute inpatient rehabilitation.  Goal is for home with  Home health   therapies.  Barrier to discharge:  Impaired mobility  - work on  Strength, balance, ADLS, swallow  to overcome.      Winston Jones MD  10/06/20  18:53 EDT    Time:   During rounds, used appropriate personal protective equipment including mask and gloves.  Additional gown if indicated.  Mask used was standard procedure mask. Appropriate PPE was worn during the entire visit.  Hand hygiene was completed before and after.

## 2020-10-06 NOTE — THERAPY TREATMENT NOTE
Inpatient Rehabilitation - Speech Language Pathology Treatment Note    [facility]     Patient Name: Grace Beauchamp  : 1940  MRN: [id number]    Today's Date: 10/6/2020                   Admit Date: 2020       Visit Dx:      ICD-10-CM ICD-9-CM   1. Pericardial hematoma  I31.2 423.0   2. Ventricular aneurysm  I25.3 414.10   3. Gait abnormality  R26.9 781.2       Patient Active Problem List   Diagnosis   • History of breast cancer   • Stenosis of carotid artery   • Chronic obstructive pulmonary disease (CMS/[facility])   • Closed fracture of multiple ribs   • Cognitive disorder   • Erythromelalgia (CMS/[facility])   • Hyperlipidemia   • Hypertension   • Primary osteoarthritis involving multiple joints   • Osteoporosis   • Restless legs syndrome   • Cerebral aneurysm   • Temporary cerebral vascular dysfunction   • S/P CABG x 1   • Type 2 diabetes mellitus without complication, without long-term current use of insulin (CMS/[facility])   • S/P ascending aortic aneurysm repair   • 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[facility])   • Descending thoracic aortic aneurysm (CMS/[facility])   • Claudication of both lower extremities (CMS/[facility])   • 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[facility])   • Ventral hernia without obstruction or gangrene   • Breast cancer, stage 1, estrogen receptor positive (CMS/[facility])   • Arthritis of right hip   • Arthritis of right knee   • Chondrocalcinosis   • Chronic pain of right knee   • Degenerative disc disease, lumbar   • Gastroesophageal reflux disease   • Bilateral hearing loss   • Thoracic aortic aneurysm without rupture (CMS/[facility])   • Erythromelalgia (CMS/[facility])   • 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CMS/[facility])   • Thoracoabdominal aortic aneurysm, without rupture (CMS/[facility])   • 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/[facility])   • Aortic aneurysm, descending (CMS/[facility])   • Aortic aneurysm without rupture (CMS/[facility])   • CAD (coronary artery disease)   • S/P left heart catheterization by ventricular puncture   • NSTEMI (non-ST elevated  "myocardial infarction) (CMS/[facility])   • Pericardial hematoma   • History of ST elevation myocardial infarction (STEMI)   • Acute on chronic diastolic CHF (congestive heart failure) (CMS/[facility])   • Ventricular aneurysm   • Immobility syndrome       Past Medical History:   Diagnosis Date   • AAA (abdominal aortic aneurysm) (CMS/[facility])    • Acute bronchitis 12/2018   • 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[facility])    • Cataracts, bilateral    • Closed fracture of three ribs of right side    • Cognitive disorder    • COPD (chronic obstructive pulmonary disease) (CMS/[facility])    • Depression    • Descending aortic arch aneurysm (CMS/[facility])    • Diabetes mellitus (CMS/[facility])     \"BORDERLINE\"   • Erythermalgia (CMS/[facility])    • GERD (gastroesophageal reflux disease)    • Hyperlipidemia     Controlled w/Meds   • Hypertension     Controlled w/Meds   • Low back pain    • Moderate aortic valve insufficiency     S/p AVR on 02/23/16 by Dr. Ontiveros   • Nocturia    • Osteoarthritis    • Osteoporosis    • Otitis media     R Ear   • Prediabetes    • PVD (peripheral vascular disease) (CMS/[facility])    • RLS (restless legs syndrome)    • Saccular aneurysm    • Stroke (CMS/[facility])     Perioperatively w/L Hip Repl   • Thoracic ascending aortic aneurysm (CMS/[facility])     S/p Repair on 02/23/16 by Dr. Ontiveros   • TIA (transient ischemic attack)    • Urgency incontinence    • Venous insufficiency    • Ventral hernia        Past Surgical History:   Procedure Laterality Date   • AORTIC VALVE REPAIR/REPLACEMENT N/A 02/23/2016-[facility]    Ascending Replacement Using a 24MM Graft--Dr. Ontiveros   • APPENDECTOMY  1977   • BREAST BIOPSY Right " 09/16/2012    Vacuum Assisted Core Bx of R Breast   • CARDIAC CATHETERIZATION N/A 01/06/2016    Dr. Tres De Los Santos   • CARDIAC CATHETERIZATION N/A 4/22/2019    Procedure: Left Heart Cath;  Surgeon: Tres De Los Santos MD;  Location:  [facility];  Service: Cardiology   • CARDIAC CATHETERIZATION N/A 4/22/2019    Procedure: Coronary angiography;  Surgeon: Tres De Los Santos MD;  Location:  [facility];  Service: Cardiology   • CARDIAC CATHETERIZATION N/A 7/22/2020    Procedure: LEFT HEART CATH;  Surgeon: Antonia Scott MD;  Location:  [facility];  Service: Cardiovascular;  Laterality: N/A;   • CARDIAC CATHETERIZATION N/A 7/22/2020    Procedure: CORONARY ANGIOGRAPHY;  Surgeon: Antonia Scott MD;  Location:  [facility];  Service: Cardiovascular;  Laterality: N/A;   • CARDIAC CATHETERIZATION N/A 7/22/2020    Procedure: Left ventriculography;  Surgeon: Antonia Scott MD;  Location:  [facility];  Service: Cardiovascular;  Laterality: N/A;   • CARDIAC CATHETERIZATION N/A 7/22/2020    Procedure: Saphenous Vein Graft;  Surgeon: Antonia Scott MD;  Location:  [facility];  Service: Cardiovascular;  Laterality: N/A;   • CARDIAC SURGERY  02/2016    Dr. Ontiveros/Dr. De Los Santos   • CATARACT EXTRACTION Bilateral     [facility]   • COLONOSCOPY N/A 10/2002    Dr. Negro   • CORONARY ARTERY BYPASS GRAFT  02/23/2016-[facility]    x1 w/L Vein Grafting--Dr. Ontiveros   • CORONARY ARTERY BYPASS GRAFT N/A 9/18/2020    Procedure: STERNAL EXPLORATION WITH CLOSURE AND WASHOUT, REMOVAL OF IABP, PRP;  Surgeon: Mart Ontiveros MD;  Location[facility] MAIN OR;  Service: Cardiothoracic;  Laterality: N/A;   • CYST REMOVAL Right 01/25/2013    R Palm Excision of Retinacular Cyst--Dr. Karla Fish   • EPIDURAL BLOCK     • LAPAROSCOPIC CHOLECYSTECTOMY N/A 08/04/2004    Dr. JOEY Sheth   • MASTECTOMY Right 09/28/2012   • ORIF HIP FRACTURE Left  03/27/2005    w/ AMBI compression screwDr. Victor M   • RECTOVAGINAL FISTULA REPAIR  1965   • THORACIC AORTIC ANEURYSM REPAIR N/A 7/23/2019    Procedure: ROSE, THORACOABDOMINAL AORTIC ANEURYSM REPAIR, VISCERAL VESSEL REPAIR, LARGE VENTRAL HERNIA REPAIR WITH MESH, CIRCULATORY ARREST, PRP;  Surgeon: Mart Ontiveros MD;  Location: [facility] OR;  Service: Cardiothoracic   • TRANSESOPHAGEAL ECHOCARDIOGRAM (ROSE) N/A 9/18/2020    Procedure: TRANSESOPHAGEAL ECHOCARDIOGRAM WITH ANESTHESIA;  Surgeon: Mart Ontiveros MD;  Location: [facility] MAIN OR;  Service: Cardiothoracic;  Laterality: N/A;   • TUBAL ABDOMINAL LIGATION     • VENTRICULAR ANEURYSM REPAIR N/A 7/22/2020    Procedure: EMERGENT REOP STERNOTOMY, REPAIR OF LV RUPTURE, PRP, INTRAOP ROSE;  Surgeon: Mart Ontiveros MD;  Location: [facility] MAIN OR;  Service: Cardiothoracic;  Laterality: N/A;   • VENTRICULAR ANEURYSM REPAIR N/A 9/17/2020    Procedure: ROSE, MIDLINE STERNOTOMY REOP  LEFT VENTRICULAR ANEURYSM REPAIR, IABP INSERTION, PRP;  Surgeon: Mart Ontiveros MD;  Location: [facility] OR;  Service: Cardiothoracic;  Laterality: N/A;     Patient was not wearing a face mask during this therapy encounter. Therapist used appropriate personal protective equipment including mask, eye protection and gloves.  Mask used was standard procedure mask. Appropriate PPE was worn during the entire therapy session. Hand hygiene was completed before and after therapy session. Patient is not in enhanced droplet precautions.                SLP EVALUATION (last 72 hours)      SLP SLC Evaluation     Row Name 10/06/20 1500 10/06/20 1100 10/05/20 1230 10/05/20 1100          Communication Assessment/Intervention    Document Type  therapy note (daily note)  -AL  therapy note (daily note)  -AL  therapy note (daily note)  -AL  therapy note (daily note)  -AL     Patient/Family/Caregiver Comments/Observations  Pt up in wheelchair.  -AL  Pt participated well.  -AL  Pt participated well.  "Reports she has not been short of breath today.   -AL  Pt participated well.  -AL        Pain Scale: Numbers Pre/Post-Treatment    Pretreatment Pain Rating  0/10 - no pain  -AL  0/10 - no pain  -AL  0/10 - no pain  -AL  0/10 - no pain  -AL     Posttreatment Pain Rating  --  0/10 - no pain  -AL  0/10 - no pain  -AL  0/10 - no pain  -AL       User Key  (r) = Recorded By, (t) = Taken By, (c) = Cosigned By    Initials Name Effective Dates    Vanessa Paredes, MS CCC-SLP 08/30/19 -              EDUCATION    The patient has been educated in the following areas:       Dysphagia (Swallowing Impairment).      SLP Recommendation and Plan                                                  SLP GOALS     Row Name 10/06/20 1500 10/06/20 1100 10/05/20 1230       Oral Nutrition/Hydration Goal 1 (SLP)    Barriers (Oral Nutrition/Hydration Goal 1, SLP)  Pt exhibited no overt s/s of aspiration or penetration in 6/7 trials of water by cup; throat clear x1.  -AL  Pt exhibited no overt s/s of aspiration or penetration in 8/9 trials of water by cup; delayed throat clear x1.  -AL  Pt exhibited no overt s/s of aspiration or penetration in 2/4 trials of thins by cup; throat clear x2.  -AL    Progress/Outcomes (Oral Nutrition/Hydration Goal 1, SLP)  --  good progress toward goal  -AL  goal ongoing  -AL       Lingual Strengthening Goal 1 (SLP)    Barriers (Lingual Strengthening Goal 1, SLP)  Completed 20 repetitions of effortful swallow with MIN cues. Completed 3 repetitions of CTAR for 1 minute each with MIN cues.  -AL  Completed \"Hawk\" exercise x20 with MIN cues.  -AL  Completed \"Hawk\" exercise x20 with NO cues. Improved endurance noted today. Attempted Laura, but only able to complete x2.  -AL    Progress/Outcomes (Lingual Strengthening Goal 1, SLP)  goal ongoing  -AL  goal ongoing  -AL  goal ongoing  -AL       Pharyngeal Strengthening Exercise Goal 1 (SLP)    Barriers (Pharyngeal Strengthening Goal 1, SLP)  --  Completed effortful " swallow x20 with MIN cues. Completed CTAR with ball x3 for 1 minute each and x30 for 1 second each with MIN cues.   -AL  Completed effortful swallow x20 with MIN Cues. Completed CTAR with ball x3 for 1 minute each with MIN cues.  -AL    Progress/Outcomes (Pharyngeal Strengthening Goal 1, SLP)  --  goal ongoing  -AL  --    Row Name 10/05/20 1100             Oral Nutrition/Hydration Goal 1 (SLP)    Barriers (Oral Nutrition/Hydration Goal 1, SLP)  Re-assessed swallow function with trials of thins by cup, mixed, puree and mechanical soft. Pt exhibited no s/s of aspiration or penetration in 2/3 trials of thins by cup (throat clear x1), 3/3 trials of puree, 3/3 trials of regular, 1/4 trials of mixed (with juice) and 3/3 trials of mixed with juice drained. No difficulty with mastication observed. Recommend upgrade diet to regular with NTL. Provided education regarding avoiding mixed consistencies and draining juice from juicy fruits. Pt exhibited understanding and carry-over of recommendations.  -AL         Pharyngeal Strengthening Exercise Goal 1 (SLP)    Barriers (Pharyngeal Strengthening Goal 1, SLP)  Completed effortful swallow x10 with MIN cues.  -AL        User Key  (r) = Recorded By, (t) = Taken By, (c) = Cosigned By    Initials Name Provider Type    Vanessa Paredes MS CCC-SLP Speech and Language Pathologist                      Time Calculation:       Time Calculation- SLP     Row Name 10/06/20 1548 10/06/20 1418          Time Calculation- SLP    SLP Start Time  1500  -AL  1100  -AL     SLP Stop Time  1530  -AL  1130  -AL     SLP Time Calculation (min)  30 min  -AL  30 min  -AL       User Key  (r) = Recorded By, (t) = Taken By, (c) = Cosigned By    Initials Name Provider Type    Vanessa Paredes MS CCC-SLP Speech and Language Pathologist            Therapy Charges for Today     Code Description Service Date Service Provider Modifiers Qty    [id number]  ST TREATMENT SWALLOW 4 10/5/2020 Vanessa Leiva  MS Samantha CCC-SLP GN 1    [id number]  ST TREATMENT SWALLOW 4 10/6/2020 Vanessa Leiva MS CCC-SLP GN 1                           Vanessa Leiva MS CCC-SLP  10/6/2020

## 2020-10-06 NOTE — PROGRESS NOTES
Inpatient Rehabilitation Plan of Care Note    Plan of Care  Care Plan Reviewed - Updates as Follows    Body Systems    [RN] Integumentary(Active)  Current Status(10/05/2020): pt has stage 2 pressure sore to bottom, chest  incision, and R groin site. pinkness to coccyx- refuses mepilex dsg. and STR to  R groin  Weekly Goal(10/13/2020): remain intact  Discharge Goal: no infection    Performed Intervention(s)  dressing changes or wound care per orders  WOCN  monitor for signs of infection      Psychosocial    [RN] Coping/Adjustment(Active)  Current Status(10/05/2020): pt is adjusting appropriately to hospital stay.  family supportive  Weekly Goal(10/13/2020): adjust WNL  Discharge Goal: adequate coping    Performed Intervention(s)  verbalize needs and concerns      Safety    [RN] Potential for Injury(Active)  Current Status(10/05/2020): pt alert and oriented. no safety concerns  Weekly Goal(10/13/2020): no falls  Discharge Goal: no falls    Performed Intervention(s)  bed/chair alarms  hourly rounding  items within reach      Sphincter Control    [RN] Bladder Management(Active)  Current Status(10/05/2020): 100% continent  Weekly Goal(10/13/2020): remain continent  Discharge Goal: continent 100%    [RN] Bowel Management(Active)  Current Status(10/05/2020): continent 100%  Weekly Goal(10/13/2020): remains continent  Discharge Goal: continent 100%    Performed Intervention(s)  hourly rounding  adequate fluid intake    Signed by: Marie Valdes RN

## 2020-10-07 ENCOUNTER — APPOINTMENT (OUTPATIENT)
Dept: GENERAL RADIOLOGY | Facility: HOSPITAL | Age: 80
End: 2020-10-07

## 2020-10-07 PROCEDURE — 94799 UNLISTED PULMONARY SVC/PX: CPT

## 2020-10-07 PROCEDURE — 97110 THERAPEUTIC EXERCISES: CPT

## 2020-10-07 PROCEDURE — 99231 SBSQ HOSP IP/OBS SF/LOW 25: CPT | Performed by: NURSE PRACTITIONER

## 2020-10-07 PROCEDURE — 74230 X-RAY XM SWLNG FUNCJ C+: CPT

## 2020-10-07 PROCEDURE — 97530 THERAPEUTIC ACTIVITIES: CPT

## 2020-10-07 PROCEDURE — 92611 MOTION FLUOROSCOPY/SWALLOW: CPT

## 2020-10-07 RX ADMIN — PRAMIPEXOLE DIHYDROCHLORIDE 0.12 MG: 0.25 TABLET ORAL at 20:59

## 2020-10-07 RX ADMIN — METOPROLOL SUCCINATE 50 MG: 50 TABLET, EXTENDED RELEASE ORAL at 09:35

## 2020-10-07 RX ADMIN — BUDESONIDE 0.5 MG: 0.5 INHALANT ORAL at 07:10

## 2020-10-07 RX ADMIN — Medication 1 TABLET: at 09:35

## 2020-10-07 RX ADMIN — BUDESONIDE 0.5 MG: 0.5 INHALANT ORAL at 20:24

## 2020-10-07 RX ADMIN — IPRATROPIUM BROMIDE AND ALBUTEROL SULFATE 3 ML: 2.5; .5 SOLUTION RESPIRATORY (INHALATION) at 15:36

## 2020-10-07 RX ADMIN — CLOPIDOGREL 75 MG: 75 TABLET, FILM COATED ORAL at 09:35

## 2020-10-07 RX ADMIN — OXYBUTYNIN CHLORIDE 5 MG: 5 TABLET, EXTENDED RELEASE ORAL at 09:35

## 2020-10-07 RX ADMIN — METOPROLOL SUCCINATE 50 MG: 50 TABLET, EXTENDED RELEASE ORAL at 20:58

## 2020-10-07 RX ADMIN — CILOSTAZOL 100 MG: 100 TABLET ORAL at 09:35

## 2020-10-07 RX ADMIN — PANTOPRAZOLE SODIUM 40 MG: 40 TABLET, DELAYED RELEASE ORAL at 06:19

## 2020-10-07 RX ADMIN — BARIUM SULFATE 55 ML: 0.81 POWDER, FOR SUSPENSION ORAL at 08:38

## 2020-10-07 RX ADMIN — LOSARTAN POTASSIUM 50 MG: 50 TABLET, FILM COATED ORAL at 09:35

## 2020-10-07 RX ADMIN — CILOSTAZOL 100 MG: 100 TABLET ORAL at 21:00

## 2020-10-07 RX ADMIN — BARIUM SULFATE 50 ML: 400 SUSPENSION ORAL at 08:38

## 2020-10-07 RX ADMIN — IPRATROPIUM BROMIDE AND ALBUTEROL SULFATE 3 ML: 2.5; .5 SOLUTION RESPIRATORY (INHALATION) at 07:10

## 2020-10-07 RX ADMIN — TRAMADOL HYDROCHLORIDE 50 MG: 50 TABLET, FILM COATED ORAL at 19:36

## 2020-10-07 RX ADMIN — IPRATROPIUM BROMIDE AND ALBUTEROL SULFATE 3 ML: 2.5; .5 SOLUTION RESPIRATORY (INHALATION) at 20:24

## 2020-10-07 RX ADMIN — BARIUM SULFATE 4 ML: 980 POWDER, FOR SUSPENSION ORAL at 08:38

## 2020-10-07 RX ADMIN — ATORVASTATIN CALCIUM 40 MG: 20 TABLET, FILM COATED ORAL at 20:57

## 2020-10-07 RX ADMIN — MIRTAZAPINE 7.5 MG: 15 TABLET, FILM COATED ORAL at 20:58

## 2020-10-07 RX ADMIN — FERROUS SULFATE TAB 325 MG (65 MG ELEMENTAL FE) 325 MG: 325 (65 FE) TAB at 09:35

## 2020-10-07 NOTE — THERAPY TREATMENT NOTE
Inpatient Rehabilitation - Occupational Therapy Treatment Note    [facility]     Patient Name: Grace Beauchamp  : 1940  MRN: [id number]    Today's Date: 10/7/2020                 Admit Date: 2020         ICD-10-CM ICD-9-CM   1. Pericardial hematoma  I31.2 423.0   2. Ventricular aneurysm  I25.3 414.10   3. Gait abnormality  R26.9 781.2       Patient Active Problem List   Diagnosis   • History of breast cancer   • Stenosis of carotid artery   • Chronic obstructive pulmonary disease (CMS/HCC)   • Closed fracture of multiple ribs   • Cognitive disorder   • Erythromelalgia (CMS/[facility])   • Hyperlipidemia   • Hypertension   • Primary osteoarthritis involving multiple joints   • Osteoporosis   • Restless legs syndrome   • Cerebral aneurysm   • Temporary cerebral vascular dysfunction   • S/P CABG x 1   • Type 2 diabetes mellitus without complication, without long-term current use of insulin (CMS/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)   • Descending thoracic aortic aneurysm (CMS/[facility])   • Claudication of both lower extremities (CMS/[facility])   • 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[facility])   • Ventral hernia without obstruction or gangrene   • Breast cancer, stage 1, estrogen receptor positive (CMS/[facility])   • Arthritis of right hip   • Arthritis of right knee   • Chondrocalcinosis   • Chronic pain of right knee   • Degenerative disc disease, lumbar   • Gastroesophageal reflux disease   • Bilateral hearing loss   • Thoracic aortic aneurysm without rupture (CMS/[facility])   • Erythromelalgia (CMS/[facility])   • 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CMS/[facility])   • Thoracoabdominal aortic aneurysm, without rupture (CMS/[facility])   • 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/[facility])   • Aortic aneurysm, descending (CMS/[facility])   • Aortic aneurysm without rupture (CMS/[facility])   • CAD (coronary artery disease)   • S/P left heart catheterization by ventricular puncture   • NSTEMI (non-ST elevated myocardial infarction)  "(CMS/[facility])   • Pericardial hematoma   • History of ST elevation myocardial infarction (STEMI)   • Acute on chronic diastolic CHF (congestive heart failure) (CMS/[facility])   • Ventricular aneurysm   • Immobility syndrome       Past Medical History:   Diagnosis Date   • AAA (abdominal aortic aneurysm) (CMS/[facility])    • Acute bronchitis 12/2018   • 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[facility])    • Arthritis    • Bilateral carotid artery disease (CMS/[facility])    • Bilateral cataracts     S/p Extractions   • Breast cancer (CMS/[facility])     right breast [id number] (snm) pMX ER/VT pos, Her-2/neg, Ki-67, 31%, oncotype recurrence score 19, invasive lobular carcinoma   • CAD (coronary artery disease)     S/p CABG on 2/23/16 by Dr. 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[facility])    • Cataracts, bilateral    • Closed fracture of three ribs of right side    • Cognitive disorder    • COPD (chronic obstructive pulmonary disease) (CMS/[facility])    • Depression    • Descending aortic arch aneurysm (CMS/[facility])    • Diabetes mellitus (CMS/[facility])     \"BORDERLINE\"   • Erythermalgia (CMS/[facility])    • GERD (gastroesophageal reflux disease)    • Hyperlipidemia     Controlled w/Meds   • Hypertension     Controlled w/Meds   • Low back pain    • Moderate aortic valve insufficiency     S/p AVR on 02/23/16 by Dr. Ontiveros   • Nocturia    • Osteoarthritis    • Osteoporosis    • Otitis media     R Ear   • Prediabetes    • PVD (peripheral vascular disease) (CMS/[facility])    • RLS (restless legs syndrome)    • Saccular aneurysm    • Stroke (CMS/[facility])     Perioperatively w/L Hip Repl   • Thoracic ascending aortic aneurysm (CMS/[facility])     S/p Repair on 02/23/16 by Dr. Ontiveros   • TIA (transient ischemic attack)    • Urgency incontinence    • Venous insufficiency    • Ventral hernia        Past Surgical History:   Procedure Laterality Date   • AORTIC VALVE REPAIR/REPLACEMENT N/A 02/23/2016-[facility]    Ascending Replacement Using a 24MM Graft--Dr. Ontiveros   • APPENDECTOMY  1977   • BREAST BIOPSY Right 09/16/2012    Vacuum " Assisted Core Bx of R Breast   • CARDIAC CATHETERIZATION N/A 01/06/2016    Dr. Tres De Los Santos   • CARDIAC CATHETERIZATION N/A 4/22/2019    Procedure: Left Heart Cath;  Surgeon: Tres De Los Santos MD;  Location:  [facility];  Service: Cardiology   • CARDIAC CATHETERIZATION N/A 4/22/2019    Procedure: Coronary angiography;  Surgeon: Tres De Los Santos MD;  Location:  [facility];  Service: Cardiology   • CARDIAC CATHETERIZATION N/A 7/22/2020    Procedure: LEFT HEART CATH;  Surgeon: Antonia Scott MD;  Location:  [facility];  Service: Cardiovascular;  Laterality: N/A;   • CARDIAC CATHETERIZATION N/A 7/22/2020    Procedure: CORONARY ANGIOGRAPHY;  Surgeon: Antonia Scott MD;  Location:  [facility];  Service: Cardiovascular;  Laterality: N/A;   • CARDIAC CATHETERIZATION N/A 7/22/2020    Procedure: Left ventriculography;  Surgeon: Antonia Scott MD;  Location:  [facility];  Service: Cardiovascular;  Laterality: N/A;   • CARDIAC CATHETERIZATION N/A 7/22/2020    Procedure: Saphenous Vein Graft;  Surgeon: Antonia Scott MD;  Location:  [facility];  Service: Cardiovascular;  Laterality: N/A;   • CARDIAC SURGERY  02/2016    Dr. Ontiveros/Dr. De Los Santos   • CATARACT EXTRACTION Bilateral     [facility]   • COLONOSCOPY N/A 10/2002    Dr. Negro   • CORONARY ARTERY BYPASS GRAFT  02/23/2016-[facility]    x1 w/L Vein Grafting--Dr. Ontiveros   • CORONARY ARTERY BYPASS GRAFT N/A 9/18/2020    Procedure: STERNAL EXPLORATION WITH CLOSURE AND WASHOUT, REMOVAL OF IABP, PRP;  Surgeon: Mart Ontiveros MD;  Location: [facility] MA[facility] OR;  Service: Cardiothoracic;  Laterality: N/A;   • CYST REMOVAL Right 01/25/2013    R Palm Excision of Retinacular Cyst--Dr. Karla Fish   • EPIDURAL BLOCK     • LAPAROSCOPIC CHOLECYSTECTOMY N/A 08/04/2004    Dr. JOEY Sheth   • MASTECTOMY Right 09/28/2012   • ORIF HIP FRACTURE Left 03/27/2005    w/ AMBI  compression Dr. Victor M noriega   • RECTOVAGINAL FISTULA REPAIR  1965   • THORACIC AORTIC ANEURYSM REPAIR N/A 7/23/2019    Procedure: ROSE, THORACOABDOMINAL AORTIC ANEURYSM REPAIR, VISCERAL VESSEL REPAIR, LARGE VENTRAL HERNIA REPAIR WITH MESH, CIRCULATORY ARREST, PRP;  Surgeon: Mart Ontiveros MD;  Location: [facility];  Service: Cardiothoracic   • TRANSESOPHAGEAL ECHOCARDIOGRAM (ROSE) N/A 9/18/2020    Procedure: TRANSESOPHAGEAL ECHOCARDIOGRAM WITH ANESTHESIA;  Surgeon: Mart Ontiveros MD;  Location: [facility] OR;  Service: Cardiothoracic;  Laterality: N/A;   • TUBAL ABDOMINAL LIGATION     • VENTRICULAR ANEURYSM REPAIR N/A 7/22/2020    Procedure: EMERGENT REOP STERNOTOMY, REPAIR OF LV RUPTURE, PRP, INTRAOP ROSE;  Surgeon: Mart Ontiveros MD;  Location: [facility];  Service: Cardiothoracic;  Laterality: N/A;   • VENTRICULAR ANEURYSM REPAIR N/A 9/17/2020    Procedure: ROSE, MIDLINE STERNOTOMY REOP  LEFT VENTRICULAR ANEURYSM REPAIR, IABP INSERTION, PRP;  Surgeon: Mart Ontiveros MD;  Location: [facility];  Service: Cardiothoracic;  Laterality: N/A;            IRF OT ASSESSMENT FLOWSHEET (last 12 hours)      IRF OT Evaluation and Treatment     Row Name 10/07/20 1100          OT Time and Intention    Document Type  daily treatment  -SM     Mode of Treatment  occupational therapy  -SM     Patient Effort  good  -SM     Row Name 10/07/20 1100          General Information    Patient/Family/Caregiver Comments/Observations  Pt up in w/c in therapy gym/room prior to both sessions, no signs of distress.   -SM     Existing Precautions/Restrictions  fall;cardiac;sternal  -SM     Row Name 10/07/20 1100          Cognition/Psychosocial    Affect/Mental Status (Cognitive)  WFL  -SM     Orientation Status (Cognition)  oriented x 4  -SM     Personal Safety Interventions  fall prevention program maintained;gait belt;nonskid shoes/slippers when out of bed  -SM     Comment, Cognitive Interventions  Pt engaged in  standing balance activity while completing 24 peice puzzle with extra time and min vc's for problem solving and rotation of pieces to complete puzzle.   -[facility] Name 10/07/20 1100          Pain Scale: Numbers Pre/Post-Treatment    Pretreatment Pain Rating  0/10 - no pain  -     Posttreatment Pain Rating  0/10 - no pain  -[facility] Name 10/07/20 1100          Transfers    Sit-Stand [place] (Transfers)  standby assist  -     Stand-Sit [place] (Transfers)  standby assist  -SM     Row Name 10/07/20 1100          Sit-Stand Transfer    Assistive Device (Sit-Stand Transfers)  cane, quad per PT, pt wishes to transition to cane use.   -[facility] Name 10/07/20 1100          Stand-Sit Transfer    Assistive Device (Stand-Sit Transfers)  cane, quad cane used for static standing activity.   -[facility] Name 10/07/20 1100          Balance    Static Sitting Balance  WFL;supported  -     Dynamic Sitting Balance  WFL;supported  -     Static Standing Balance  mild impairment  -     Balance Interventions  standing;UE activity with balance activity  -     Comment, Balance  Pt engaged in static standing balance completing cognitive activity table top with UE support when needed with quad cane close by. No episodes of LOB. Pt able to stand for 2 intervals of 5 minutes.   -[facility] Name 10/07/20 1100          Shoulder (Therapeutic Exercise)    Shoulder (Therapeutic Exercise)  strengthening exercise  -     Shoulder Strengthening (Therapeutic Exercise)  bilateral;flexion;extension;1 lb free weight;3 sets;10 repetitions below 90 degrees  -[facility] Name 10/07/20 1100          Elbow/Forearm (Therapeutic Exercise)    Elbow/Forearm (Therapeutic Exercise)  strengthening exercise  -     Elbow/Forearm Strengthening (Therapeutic Exercise)  bilateral;flexion;extension;supination;pronation;1 lb free weight;3 sets;10 repetitions  -SM     Row Name 10/07/20 1100          Wrist (Therapeutic Exercise)    Wrist  (Therapeutic Exercise)  strengthening exercise  -     Wrist Strengthening (Therapeutic Exercise)  bilateral;flexion;extension;1 lb free weight;10 repetitions;3 sets  -     Row Name 10/07/20 1100          Bathing    Comment (Bathing)  Pt declines all ADLs today, reports she completed this morning early with nursing before swallow test.   -     Row Name 10/07/20 1100          Positioning and Restraints    Pre-Treatment Position  sitting in chair/recliner  -     Post Treatment Position  wheelchair  -     In Wheelchair  sitting;call light within reach;encouraged to call for assist;exit alarm on;with family/caregiver both sessions.   -       User Key  (r) = Recorded By, (t) = Taken By, (c) = Cosigned By    Initials Name Effective Dates     Sunni Matt, OT 04/02/20 -            Occupational Therapy Education                 Title: PT OT SLP Therapies (In Progress)     Topic: Occupational Therapy (In Progress)     Point: ADL training (Done)     Description:   Instruct learner(s) on proper safety adaptation and remediation techniques during self care or transfers.   Instruct in proper use of assistive devices.              Learning Progress Summary           Patient Acceptance, E, VU by  at 9/26/2020 1218    Comment: Instructed pt on improved techniques for safety with ADLs and transfers with sternal precautions.                   Point: Home exercise program (Not Started)     Description:   Instruct learner(s) on appropriate technique for monitoring, assisting and/or progressing therapeutic exercises/activities.              Learner Progress:  Not documented in this visit.          Point: Precautions (Done)     Description:   Instruct learner(s) on prescribed precautions during self-care and functional transfers.              Learning Progress Summary           Patient Acceptance, E, VU by  at 9/26/2020 1218    Comment: Instructed pt on improved techniques for safety with ADLs and transfers with  sternal precautions.                   Point: Body mechanics (Not Started)     Description:   Instruct learner(s) on proper positioning and spine alignment during self-care, functional mobility activities and/or exercises.              Learner Progress:  Not documented in this visit.                      User Key     Initials Effective Dates Name Provider Type Discipline     04/02/20 -  Sunni Matt OT Occupational Therapist OT                    OT Recommendation and Plan    Anticipated Discharge Disposition (OT): home with assist  Planned Therapy Interventions (OT): activity tolerance training, adaptive equipment training, BADL retraining, functional balance retraining, IADL retraining, neuromuscular control/coordination retraining, occupation/activity based interventions, patient/caregiver education/training, ROM/therapeutic exercise, strengthening exercise, transfer/mobility retraining       Daily Progress Summary (OT)  Overall Progress Toward Functional Goals (OT): progressing toward functional goals as expected            Time Calculation:     Time Calculation- OT     Row Name 10/07/20 1556 10/07/20 1554          Time Calculation- OT    OT Start Time  1400  -SM  1100  -     OT Stop Time  1430  -SM  1130  -     OT Time Calculation (min)  30 min  -SM  30 min  -     Total Timed Code Minutes- OT  30 minute(s)  -SM  30 minute(s)  -SM     OT Received On  10/07/20  -  10/07/20  -       User Key  (r) = Recorded By, (t) = Taken By, (c) = Cosigned By    Initials Name Provider Type     Sunni Matt OT Occupational Therapist        Therapy Charges for Today     Code Description Service Date Service Provider Modifiers Qty    47967532527 HC OT SELF CARE/MGMT/TRAIN EA 15 MIN 10/6/2020 Sunni Matt OT GO 2    54090936257 HC OT THERAPEUTIC ACT EA 15 MIN 10/6/2020 Sunni Matt OT GO 1    03402774799 HC OT THER PROC EA 15 MIN 10/6/2020 Sunni Matt OT GO 1    [id number]  OT  THERAPEUTIC ACT EA 15 MIN 10/7/2020 Sunni Matt, OT GO 2    [id number]  OT THER PROC EA 15 MIN 10/7/2020 Sunni Matt OT GO 2                   Sunni Matt OT  10/7/2020   negative...

## 2020-10-07 NOTE — PLAN OF CARE
Problem: Rehabilitation (IRF) Plan of Care  Goal: Plan of Care Review  Outcome: Ongoing, Progressing  Flowsheets (Taken 10/7/2020 1029)  Progress: improving  Outcome Summary:   VFSS completed. No aspiration observed with any consistency. Trace, transient laryngeal penetration observed with thins by cup and inconsistently with thins by straw. Esophageal dysmotility and retrograde flow observed with solids and liquids in medial and distal esophagus. Benefited from alternating bites/sips to clear. Recommend change diet to regular with thins. Small bites/sips, slow rate, sit upright for meals and 30 minutes after meals. Meds one at a time with puree or thin liquid.  Plan of Care Reviewed With: patient

## 2020-10-07 NOTE — PROGRESS NOTES
"   LOS: 12 days   Patient Care Team:  Miller Castañeda MD as PCP - General (Internal Medicine)  Miller Castañeda MD as PCP - Claims Attributed  Mart Ontiveros MD as Surgeon (Cardiothoracic Surgery)  Tres De Los Santos MD as Consulting Physician (Cardiology)  Graham Mcconnell MD as Consulting Physician (Hematology and Oncology)  Payam Byrnes MD as Consulting Physician (Otolaryngology)  Jonathan Smith MD as Consulting Physician (Vascular Surgery)  Victor M Cabral MD as Surgeon (Orthopedic Surgery)  Yaya Burks MD as Consulting Physician (Pulmonary Disease)    Chief Complaint:   immobility syndrome    Subjective     History of Present Illness    Subjective   Slept better with Remeron.  Diet improved-advance to thin liquids  She notes that her breathing is better.  Discussed probably continue with nebulizers when she goes home given the improvement she shown on them but will get final direction from pulmonology service.    History taken from: patient    Objective     Vital Signs  Temp:  [97.1 °F (36.2 °C)-98.2 °F (36.8 °C)] 98.2 °F (36.8 °C)  Heart Rate:  [52-82] 78  Resp:  [16-18] 18  BP: (100-143)/(56-77) 129/57    Objective:  Vital signs: (most recent): Blood pressure 129/57, pulse 78, temperature 98.2 °F (36.8 °C), temperature source Oral, resp. rate 18, height 142.2 cm (56\"), weight 37.9 kg (83 lb 8.9 oz), SpO2 96 %.            Physical Exam     Gen:    calm; pleasant  HEENT: NCAT, EOMI; MMM,  Neck:   Supple, gauze on R neck  CV:      RRR, no m/r/g appreciated  Lungs: diminished breath sounds in the bases;      Abd:     (+) BS, soft, non-tender, non-distended  Ext:      no new edema  Skin:   cardiac incision healing well  Psych: appropriate mood and affect  Neuro:  Mental Status: AOx4, Speech: fluent without dysarthria or scanning, No gross cognitive deficits  Strength: R > L foot drop         Results Review:     I reviewed the patient's new clinical results.  Results from last 7 days   Lab Units " 10/05/20  0609 10/02/20  0632 10/01/20  0559   WBC 10*3/mm3 4.20 4.99 4.74   HEMOGLOBIN g/dL 10.2* 9.6* 8.8*   HEMATOCRIT % 30.8* 29.5* 27.9*   PLATELETS 10*3/mm3 484* 515* 470*     Results from last 7 days   Lab Units 10/06/20  0902 10/05/20  0609 10/02/20  0632   SODIUM mmol/L 143 143 140   POTASSIUM mmol/L 3.8 3.6 3.9   CHLORIDE mmol/L 106 107 105   CO2 mmol/L 27.0 26.3 30.0*   BUN mg/dL 15 15 19   CREATININE mg/dL 0.58 0.55* 0.68   CALCIUM mg/dL 9.0 9.0 9.0   GLUCOSE mg/dL 117* 116* 91     CT of the chest angiogram on October 2, 2020  FINDINGS: There is no evidence of pulmonary embolism. Stable aneurysmal  dilatation of the descending thoracic aorta. Stable changes of thoracic  aortic aneurysm repair. Stable rounded hypodensity along the proximal  aspect of the aortic arch. Interval repair of defect along the left  ventricular wall and evacuation of adjacent hematoma. Small right  pleural effusion. Emphysema. 5 mm nodule in the anterior left lower lobe  on image 57 is larger. 7 mm nodule in the posterior right upper lobe is  smaller where it previously measured 9 mm. Limited imaging of the upper  abdomen demonstrates bilateral renal cysts bone windows shows stable  degenerative changes of the thoracic spine. Stable loss of height of the  inferior endplate of L1.        Medication Review: done  Scheduled Meds:atorvastatin, 40 mg, Oral, Nightly  budesonide, 0.5 mg, Nebulization, BID - RT  cilostazol, 100 mg, Oral, BID  clopidogrel, 75 mg, Oral, Daily  ferrous sulfate, 325 mg, Oral, Daily With Breakfast  ipratropium-albuterol, 3 mL, Nebulization, 4x Daily - RT  losartan, 50 mg, Oral, Daily  metoprolol succinate XL, 50 mg, Oral, Q12H  mirtazapine, 7.5 mg, Oral, Nightly  multivitamin, 1 tablet, Oral, Daily  oxybutynin XL, 5 mg, Oral, Daily  pantoprazole, 40 mg, Oral, QAM  pramipexole, 0.125 mg, Oral, Nightly      Continuous Infusions:   PRN Meds:.•  acetaminophen **OR** [DISCONTINUED] acetaminophen **OR**  [DISCONTINUED] acetaminophen  •  albuterol sulfate HFA  •  ALPRAZolam  •  aluminum-magnesium hydroxide-simethicone  •  cetirizine  •  dextrose  •  dextrose  •  glucagon (human recombinant)  •  guaiFENesin  •  influenza vaccine  •  ipratropium-albuterol  •  magnesium hydroxide  •  meclizine  •  melatonin  •  naloxone  •  ondansetron  •  sennosides-docusate  •  traMADol      Assessment/Plan       Immobility syndrome      Assessment & Plan  Etiologic Diagnosis and Comorbidities include:    Physical Debility/Immobility Syndrome    Dysphagia-October 5-improved.  Advance to regular solids/nectar thick liquid diet.  Videofluoroscopic swallow study on Wednesday, October 7 October 7-VFSS completed. No aspiration observed with any consistency. Transient, shallow penetration observed with thin liquids. Recommend upgrade diet to regular with thins. Small bites/sips, slow rate, alternating bites/sip. Sit upright for meals and 30 minutes after meals.     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  Physical therapy notes that she was much more fatigued with ambulating just to the bathroom in her room.  She ambulated 80 to 90 feet contact-guard assist in therapies.  Does not require nasal cannula oxygen.  Does not describe any productive sputum.    Patient reviewed with cardiology service and cardiothoracic service.  CT of the chest without pulmonary embolism.  Not felt to be in florid congestive heart failure.  Seen by the pulmonology service and Trelegy inhaler changed to nebulizer to try to optimize her pulmonary status with underlying COPD.  October  5-fatigue-shortness of air improved.  Anemia is improved.      pmh of Breast Ca    COPD  Trelegy inhaler changed to nebulizers to try to optimize her pulmonary status continues on room air    HTN    HLD    Peripheral Vascular Disease    Right Foot Drop- chronic - AFO    DVT prophylaxis - SCDs    Anemia    Pain management - tramadol - home med. GIULIA reviewed.     Anxiety/depression-October 6-Patient complains of anxiety/depression.  She been on Prozac in the past.  Complains more of anxiety presently.    Has been taking alprazolam in the hospital but not at home and reviewed would not look at using that as an option after discharge/long-term.  Also reviewed potential for medication side effects including effects of SSRI on QT interval or for serotonin syndrome as she takes tramadol.  At this point will start the patient on Remeron 7.5 mg p.o. nightly to help with sleep, depression/anxiety/appetite, which is felt to have less of a potential for serotonin syndrome     Impairments Include:   Dysphagia, mobility, ambulation, adl’s, strength, endurance, respiratory status, swallowing    TEAM CONF - SEPT 29 - BED MIN. TRANSFERS MIN. GAIT 80 FEET CTG RW. TOILET TRANSFERS MOD ASSIST. SHOWER TRANSFERS MOD ASSIST. UBD MIN. LBD MOD. BATH MOD ASSIST. TOILETING MOD ASSIST. SWALLOW - MECH SOFT, NO MIXED, NECTAR THICK LIQUIDS. LAST VFSS SILENT ASPIRATION ON THINS. REPEAT VFSS NEXT WEEK. CONTINENT BOWEL AND BLADDER. STERNAL INCISION HEALING. PRESSURE SORE TO BUTTOCKS WITH CREAM TO THAT. ALPRAZOLAM PRN AT NIGHT. REC THERAPY INVOLVED.  ELOS - 2 WEEKS    TEAM CONF - OCT 6 - BED MOD I. TRASNFERS CTG. GAIT 4 STEPS B RAILS. CTG. GAIT 80 FEET CTG-SBA, RW OR ROLLATOR. TOILET TRANSFERS CTG. SHOWER TRANSFERS CTG-MIN.  UBD SBA. LBD SBA BATH SBA. TOILETING CTG ASSIST IF STANDING. ON NEBULIZERS. ROOM AIR. IMPAIRED ENDURANCE.  SWALLOW - REG SOLIDS/ NTL. VFSS ON WED. CONTINENT BOWEL AND BLADDER. STAGE 1-2 BUTTOCK CLEFT AREA.   ELOS - GOAL TO  DTR'S HOME WITH 5 STEPS TO ENTER.   ELOS: Friday OCT 9. [facility]URSING.       Medical necessity  This patient is a good candidate for acute inpatient rehabilitation for a stay of approximately 2 weeks.  The etiologic diagnosis and comorbidities as listed above will require 24hr availability and frequent interventions of a physician with training in rehabilitation medicine.  The patient’s care cannot be provided on a lower level secondary to need for rigorous acute rehab in order to have maximal improvement of function.  Once admitted the patient will undergo 3 hour of PT/OT/SLP a day for at least 5 days per week. A rehab program will be initiated working on  Dysphagia, strengthening, endurance, safety, dressing, transfers, ambulation, bathing, grooming, swallowing skills. Rehab nursing will be involved to monitor bowel and bladder function, skin integrity, diabetes monitoring/education, patient and family education, and therapy carryover.  A weekly team meeting will be coordinated to maximize the patient’s plan of care during hospitalization and at discharge.       The patient's functional status and clinical status is unchanged from preadmission assessment and the patient continues appropriate for acute inpatient rehabilitation.  Goal is for home with  Home health   therapies.  Barrier to discharge:  Impaired mobility  - work on  Strength, balance, ADLS, swallow  to overcome.      Winston Jones MD  10/07/20  18:57 EDT    Time:   During rounds, used appropriate personal protective equipment including mask and gloves.  Additional gown if indicated.  Mask used was standard procedure mask. Appropriate PPE was worn during the entire visit.  Hand hygiene was completed before and after.

## 2020-10-07 NOTE — PROGRESS NOTES
Inpatient Rehabilitation Plan of Care Note    Plan of Care  Care Plan Reviewed - No updates at this time.    Psychosocial    Performed Intervention(s)  verbalize needs and concerns      Safety    Performed Intervention(s)  bed/chair alarms  hourly rounding  items within reach      Sphincter Control    Performed Intervention(s)  hourly rounding  adequate fluid intake      Body Systems    Performed Intervention(s)  dressing changes or wound care per orders  WOCN  monitor for signs of infection    Signed by: Kiya Viera RN

## 2020-10-07 NOTE — PLAN OF CARE
Goal Outcome Evaluation:  Plan of Care Reviewed With: patient  Progress: improving   Patient is calm and cooperative. Alert and oriented x 4. Denied pain. Using the call light for assistance. Ambulating with walker and 1 person assist. Independent with bed mobility. Continent of B/B. Taking her medication whole in pudding without difficulty. No safety issues observed. Will continue to monitor.

## 2020-10-07 NOTE — PROGRESS NOTES
Hospital Follow Up    LOS:  LOS: 12 days   Patient Name: Grace Beauchamp  Age/Sex: 80 y.o. female  : 1940  MRN: [id number]    Day of Service: 10/07/20   Length of Stay: 12  Encounter Provider: FREDRICK Mohan  Place of Service: [facility] CARDIOLOGY  Patient Care Team:  Miller Castañeda MD as PCP - General (Internal Medicine)  Miller Castañeda MD as PCP - Claims Attributed  Mart Ontiveros MD as Surgeon (Cardiothoracic Surgery)  Tres De Los Santos MD as Consulting Physician (Cardiology)  Graham Mcconnell MD as Consulting Physician (Hematology and Oncology)  Payam Byrnes MD as Consulting Physician (Otolaryngology)  Jonathan Smith MD as Consulting Physician (Vascular Surgery)  Victor M Cabral MD as Surgeon (Orthopedic Surgery)  Yaya Burks MD as Consulting Physician (Pulmonary Disease)    Subjective:     Chief Complaint:  S/P LV aneurysm repair    Interval History: NO complaints. Wants to go home    Objective:     Objective:  Temp:  [97.1 °F (36.2 °C)-98 °F (36.7 °C)] 97.4 °F (36.3 °C)  Heart Rate:  [76-82] 76  Resp:  [16-18] 18  BP: (100-135)/(56-77) 119/56     Intake/Output Summary (Last 24 hours) at 10/7/2020 1036  Last data filed at 10/7/2020 0800  Gross per 24 hour   Intake 700 ml   Output --   Net 700 ml     Body mass index is 18.73 kg/m².      10/04/20  0600 10/06/20  0540 10/07/20  0608   Weight: 37.5 kg (82 lb 10.8 oz) 38.3 kg (84 lb 7 oz) 37.9 kg (83 lb 8.9 oz)     Weight change: -0.4 kg (-14.1 oz)    Physical Exam:   General Appearance:    Awake alert and oriented in no acute distress.   Color:  Skin:  Neuro:  HEENT:    Lungs:     Pink  Warm and dry  No focal, motor or sensory deficits  Neck supple, pupils equal, round and reactive. No JVD, No Bruit  Clear to auscultation,respirations regular, even and                  unlabored    Heart:    Regular rate and rhythm, S1 and S2, no murmur, no gallop, no rub. No edema, DP/PT pulses are 2+   Chest  Wall:    No abnormalities observed   Abdomen:     Normal bowel sounds, no masses, no organomegaly, soft        non-tender, non-distended, no guarding, no ascites noted   Extremities:   Moves all extremities well, no edema, no cyanosis, no redness       Lab Review:   Results from last 7 days   Lab Units 10/06/20  0902 10/05/20  0609   SODIUM mmol/L 143 143   POTASSIUM mmol/L 3.8 3.6   CHLORIDE mmol/L 106 107   CO2 mmol/L 27.0 26.3   BUN mg/dL 15 15   CREATININE mg/dL 0.58 0.55*   GLUCOSE mg/dL 117* 116*   CALCIUM mg/dL 9.0 9.0         Results from last 7 days   Lab Units 10/05/20  0609 10/02/20  0632   WBC 10*3/mm3 4.20 4.99   HEMOGLOBIN g/dL 10.2* 9.6*   HEMATOCRIT % 30.8* 29.5*   PLATELETS 10*3/mm3 484* 515*                   Invalid input(s): LDLCALC  Results from last 7 days   Lab Units 10/02/20  0632   PROBNP pg/mL 4,645.0*         I reviewed the patient's new clinical results.  I personally viewed and interpreted the patient's EKG  Current Medications:   Scheduled Meds:atorvastatin, 40 mg, Oral, Nightly  budesonide, 0.5 mg, Nebulization, BID - RT  cilostazol, 100 mg, Oral, BID  clopidogrel, 75 mg, Oral, Daily  ferrous sulfate, 325 mg, Oral, Daily With Breakfast  ipratropium-albuterol, 3 mL, Nebulization, 4x Daily - RT  losartan, 50 mg, Oral, Daily  metoprolol succinate XL, 50 mg, Oral, Q12H  mirtazapine, 7.5 mg, Oral, Nightly  multivitamin, 1 tablet, Oral, Daily  oxybutynin XL, 5 mg, Oral, Daily  pantoprazole, 40 mg, Oral, QAM  pramipexole, 0.125 mg, Oral, Nightly      Continuous Infusions:     Allergies:  Allergies   Allergen Reactions   • Ramipril Other (See Comments)     Ace I  cough   • Morphine Itching   • Morphine And Related Itching   • Bactrim [Sulfamethoxazole-Trimethoprim] Itching and Rash       Assessment:       Immobility syndrome    1. Dyspnea ? etiology  2. Ruptured myocardium with free wall repair July 2002 with urgent redo sternotomy Sept 2020 for LV lateral wall aneurysmal repair with  pericardial patch.  3. CAD with hx of CABG 2012  4. AVR in 2016 with aortic aneursymal repair  5.Hx of COPD: Pulm consulted. Discussed switching inhaler therapy to nebulizer. Will defer.   6. HTN: BP high ealier but appears it had overall been ok or even low. Monitor.  7. Hx of pulmonary HTN.  8. Enlarging  Lower lung nodule: Pulm following and plans to repeat CT scan in 6 months.  9. Anemia: Stable yesterday.  10. Thin/frail:    Plan:      Cardiac status overall is stable no complaints of chest discomfort or shortness of breath.  He really would like to not take Lasix altogether I told her that is fine hope to discharge on Friday to home.  Please call if needed further.    FREDRICK Mohan  10/07/20  10:36 EDT  Electronically signed by FREDRICK Mohan, 10/07/20, 10:36 AM EDT.

## 2020-10-07 NOTE — PROGRESS NOTES
Had discussion with patient today regarding d/c home on  Friday.  She would prefer to return to using MDI's (Trelegy & albuterol) once home.

## 2020-10-07 NOTE — PLAN OF CARE
Goal Outcome Evaluation:  Plan of Care Reviewed With: patient  Progress: improving  Outcome Summary: Patient alert and oriented, VFSS done today and patient did well with the recommendation to upgrade to thin liquids, patient is looking forward to DC home Friday daughter at bedside visiting patient this afternoon, no complaints of pain or distress or SOA this shift.

## 2020-10-07 NOTE — MBS/VFSS/FEES
Inpatient Rehabilitation - Speech Language Pathology   Swallow Re-Evaluation [facility]     Patient Name: Grace Beauchamp  : 1940  MRN: [id number]  Today's Date: 10/7/2020               Admit Date: 2020    Visit Dx:     ICD-10-CM ICD-9-CM   1. Pericardial hematoma  I31.2 423.0   2. Ventricular aneurysm  I25.3 414.10   3. Gait abnormality  R26.9 781.2     Patient Active Problem List   Diagnosis   • History of breast cancer   • Stenosis of carotid artery   • Chronic obstructive pulmonary disease (CMS/HCC)   • Closed fracture of multiple ribs   • Cognitive disorder   • Erythromelalgia (CMS/HCC)   • Hyperlipidemia   • Hypertension   • Primary osteoarthritis involving multiple joints   • Osteoporosis   • Restless legs syndrome   • Cerebral aneurysm   • Temporary cerebral vascular dysfunction   • S/P CABG x 1   • Type 2 diabetes mellitus without complication, without long-term current use of insulin (CMS/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)   • Descending thoracic aortic aneurysm (CMS/[facility])   • Claudication of both lower extremities (CMS/[facility])   • 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[facility])   • Ventral hernia without obstruction or gangrene   • Breast cancer, stage 1, estrogen receptor positive (CMS/[facility])   • Arthritis of right hip   • Arthritis of right knee   • Chondrocalcinosis   • Chronic pain of right knee   • Degenerative disc disease, lumbar   • Gastroesophageal reflux disease   • Bilateral hearing loss   • Thoracic aortic aneurysm 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)   • Erythromelalgia (CMS/[facility])   • 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CMS/[facility])   • Thoracoabdominal aortic aneurysm, 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)   • 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)   • Aortic aneurysm, descending (CMS/HCC)   • Aortic aneurysm 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)   • CAD (coronary artery disease)   • S/P left heart catheterization by ventricular puncture   • NSTEMI (non-ST elevated myocardial  "infarction) (CMS/[facility])   • Pericardial hematoma   • History of ST elevation myocardial infarction (STEMI)   • Acute on chronic diastolic CHF (congestive heart failure) (CMS/[facility])   • Ventricular aneurysm   • Immobility syndrome     Past Medical History:   Diagnosis Date   • AAA (abdominal aortic aneurysm) (CMS/[facility])    • Acute bronchitis 12/2018   • 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[facility])    • Arthritis    • Bilateral carotid artery disease (CMS/[facility])    • Bilateral cataracts     S/p Extractions   • Breast cancer (CMS/[facility])     right breast [id number] (snm) pMX ER/WY pos, Her-2/neg, Ki-67, 31%, oncotype recurrence score 19, invasive lobular carcinoma   • CAD (coronary artery disease)     S/p CABG on 2/23/16 by Dr. 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[facility])    • Cataracts, bilateral    • Closed fracture of three ribs of right side    • Cognitive disorder    • COPD (chronic obstructive pulmonary disease) (CMS/[facility])    • Depression    • Descending aortic arch aneurysm (CMS/[facility])    • Diabetes mellitus (CMS/[facility])     \"BORDERLINE\"   • Erythermalgia (CMS/[facility])    • GERD (gastroesophageal reflux disease)    • Hyperlipidemia     Controlled w/Meds   • Hypertension     Controlled w/Meds   • Low back pain    • Moderate aortic valve insufficiency     S/p AVR on 02/23/16 by Dr. Ontiveros   • Nocturia    • Osteoarthritis    • Osteoporosis    • Otitis media     R Ear   • Prediabetes    • PVD (peripheral vascular disease) (CMS/[facility])    • RLS (restless legs syndrome)    • Saccular aneurysm    • Stroke (CMS/[facility])     Perioperatively w/L Hip Repl   • Thoracic ascending aortic aneurysm (CMS/[facility])     S/p Repair on 02/23/16 by Dr. Ontiveros   • TIA (transient ischemic attack)    • Urgency incontinence    • Venous insufficiency    • Ventral hernia      Past Surgical History:   Procedure Laterality Date   • AORTIC VALVE REPAIR/REPLACEMENT N/A 02/23/2016-[facility]    Ascending Replacement Using a 24MM Graft--Dr. Ontiveros   • APPENDECTOMY  1977   • BREAST BIOPSY Right 09/16/2012    " Vacuum Assisted Core Bx of R Breast   • CARDIAC CATHETERIZATION N/A 01/06/2016    Dr. Tres De Los Santos   • CARDIAC CATHETERIZATION N/A 4/22/2019    Procedure: Left Heart Cath;  Surgeon: Tres De Los Santos MD;  Location:  [facility];  Service: Cardiology   • CARDIAC CATHETERIZATION N/A 4/22/2019    Procedure: Coronary angiography;  Surgeon: Tres De Los Santos MD;  Location:  [facility];  Service: Cardiology   • CARDIAC CATHETERIZATION N/A 7/22/2020    Procedure: LEFT HEART CATH;  Surgeon: Antonia Scott MD;  Location:  [facility];  Service: Cardiovascular;  Laterality: N/A;   • CARDIAC CATHETERIZATION N/A 7/22/2020    Procedure: CORONARY ANGIOGRAPHY;  Surgeon: Antonia Scott MD;  Location:  [facility];  Service: Cardiovascular;  Laterality: N/A;   • CARDIAC CATHETERIZATION N/A 7/22/2020    Procedure: Left ventriculography;  Surgeon: Antonia Scott MD;  Location:  [facility];  Service: Cardiovascular;  Laterality: N/A;   • CARDIAC CATHETERIZATION N/A 7/22/2020    Procedure: Saphenous Vein Graft;  Surgeon: Antonia Scott MD;  Location:  [facility];  Service: Cardiovascular;  Laterality: N/A;   • CARDIAC SURGERY  02/2016    Dr. Ontiveros/Dr. De Los Santos   • CATARACT EXTRACTION Bilateral     [facility]   • COLONOSCOPY N/A 10/2002    Dr. Negro   • CORONARY ARTERY BYPASS GRAFT  02/23/2016-[facility]    x1 w/L Vein Grafting--Dr. Ontiveros   • CORONARY ARTERY BYPASS GRAFT N/A 9/18/2020    Procedure: STERNAL EXPLORATION WITH CLOSURE AND WASHOUT, REMOVAL OF IABP, PRP;  Surgeon: Mart Ontiveros MD;  Location: [facility] MAIN OR;  Service: Cardiothoracic;  Laterality: N/A;   • CYST REMOVAL Right 01/25/2013    R Palm Excision of Retinacular Cyst--Dr. Karla Fish   • EPIDURAL BLOCK     • LAPAROSCOPIC CHOLECYSTECTOMY N/A 08/04/2004    Dr. JOEY Sheth   • MASTECTOMY Right 09/28/2012   • ORIF HIP FRACTURE Left 03/27/2005    w/ AMBI  compression screwDr. Victor M   • RECTOVAGINAL FISTULA REPAIR  1965   • THORACIC AORTIC ANEURYSM REPAIR N/A 7/23/2019    Procedure: ROSE, THORACOABDOMINAL AORTIC ANEURYSM REPAIR, VISCERAL VESSEL REPAIR, LARGE VENTRAL HERNIA REPAIR WITH MESH, CIRCULATORY ARREST, PRP;  Surgeon: Mart Ontiveros MD;  Location: [facility];  Service: Cardiothoracic   • TRANSESOPHAGEAL ECHOCARDIOGRAM (ROSE) N/A 9/18/2020    Procedure: TRANSESOPHAGEAL ECHOCARDIOGRAM WITH ANESTHESIA;  Surgeon: Mart Ontiveros MD;  Location: [facility] OR;  Service: Cardiothoracic;  Laterality: N/A;   • TUBAL ABDOMINAL LIGATION     • VENTRICULAR ANEURYSM REPAIR N/A 7/22/2020    Procedure: EMERGENT REOP STERNOTOMY, REPAIR OF LV RUPTURE, PRP, INTRAOP ROSE;  Surgeon: Mart Ontiveros MD;  Location: [facility] OR;  Service: Cardiothoracic;  Laterality: N/A;   • VENTRICULAR ANEURYSM REPAIR N/A 9/17/2020    Procedure: ROSE, MIDLINE STERNOTOMY REOP  LEFT VENTRICULAR ANEURYSM REPAIR, IABP INSERTION, PRP;  Surgeon: Mart Ontiveros MD;  Location: [facility];  Service: Cardiothoracic;  Laterality: N/A;        SWALLOW EVALUATION (last 72 hours)      SLP Adult Swallow Evaluation     Row Name 10/07/20 0820                   Rehab Evaluation    Document Type  evaluation  -AL        Patient Observations  alert;cooperative  -AL        Symptoms Noted During/After Treatment  none  -AL           General Information    Patient Profile Reviewed  yes  -AL        Pertinent History Of Current Problem  Pt admitted to rehab with debility secondary to cardiac surgery. Pt with history of L vocal fold paresis and CVA. Recent VFSS 9/22/20 revealed silent aspiration of thins by straw and penetration of thins by cup late in study when fatigued. Recommended puree with NTL.  -AL        Current Method of Nutrition  regular textures;nectar/syrup-thick liquids  -AL        Precautions/Limitations, Vision  WFL with corrective lenses  -AL        Precautions/Limitations, Hearing   WFL;for purposes of eval  -AL        Prior Level of Function-Communication  WFL  -AL        Prior Level of Function-Swallowing  no diet consistency restrictions  -AL        Plans/Goals Discussed with  patient;agreed upon  -AL        Barriers to Rehab  none identified  -AL        Patient's Goals for Discharge  return to regular diet  -AL           Pain Scale: Numbers Pre/Post-Treatment    Pretreatment Pain Rating  0/10 - no pain  -AL           Oral Motor Structure and Function    Dentition Assessment  upper dentures/partial in place;lower dentures/partial in place  -AL        Secretion Management  WNL/WFL  -AL           Oral Musculature and Cranial Nerve Assessment    Oral Motor General Assessment  WFL  -AL           MBS/VFSS    Utensils Used  spoon;cup;straw  -AL        Consistencies Trialed  regular textures;soft textures;mixed consistency;pureed;thin liquids  -AL           MBS/VFSS Interpretation    VFSS Summary  Pt presented with mild oropharyngeal dysphagia characterized by discoordination between oral and pharyngeal phases which led to penetration before the swallow with throat clear response with initial trial of thins by cup and transient, shallow penetration during the swallow with additional trials of thins by cup and inconsistently with thins by straw (1/3 trials). Premature spillage to the valleculae observed with regular and puree. Premature spillage to the pyriforms observed with thins by cup/straw and thin component of mixed. Use of anterior bolus hold and chin tuck did not eliminate shallow penetration with thins. Trace pyriform residue observed with liquids. Esophageal dysmotility and mild retrograde flow observed in the medial and distal esophagus with thins, puree and mixed; alternating bites/sips appeared to assist with clearing stasis.  -AL           SLP Communication to Radiology    Severity Level of Dysphagia  mild dysphagia  -AL        Consistencies Aspirated/Penetrated  penetrated;thin  liquids  -AL        Summary Statement  Mild oropharyngeal dysphagia characterized by discoordination between oral and pharyngeal phases, leading to transient penetration of thins by cup and straw. No aspiration observed with any consistency. Esophageal dysmotility observed in medial and distal esophagus with thins, puree and mixed consistency.  -AL           Recommendations    Therapy Frequency (Swallow)  5 days per week  -AL        SLP Diet Recommendation  regular textures;thin liquids  -AL        Recommended Diagnostics  -- Complete follow-up meal observation.  -AL        Recommended Precautions and Strategies  upright posture during/after eating;small bites of food and sips of liquid;alternate between small bites of food and sips of liquid;reflux precautions  -AL        Oral Care Recommendations  Oral Care before breakfast, after meals and PRN  -AL        SLP Rec. for Method of Medication Administration  meds whole;with pudding or applesauce;with thin liquids  -AL        Monitor for Signs of Aspiration  yes;notify SLP if any concerns  -AL        Anticipated Discharge Disposition (SLP)  home with assist  -AL          User Key  (r) = Recorded By, (t) = Taken By, (c) = Cosigned By    Initials Name Effective Dates    Vanessa Paredes, MS CCC-SLP 08/30/19 -           EDUCATION  The patient has been educated in the following areas:   Dysphagia (Swallowing Impairment).    SLP Recommendation and Plan     SLP Diet Recommendation: regular textures, thin liquids  Recommended Precautions and Strategies: upright posture during/after eating, small bites of food and sips of liquid, alternate between small bites of food and sips of liquid, reflux precautions  SLP Rec. for Method of Medication Administration: meds whole, with pudding or applesauce, with thin liquids     Monitor for Signs of Aspiration: yes, notify SLP if any concerns  Recommended Diagnostics: (Complete follow-up meal observation.)     Anticipated Discharge  "Disposition (SLP): home with assist     Therapy Frequency (Swallow): 5 days per week                            Plan of Care Reviewed With: patient  Progress: improving    SLP GOALS     Row Name 10/06/20 1500 10/06/20 1100 10/05/20 1230       Oral Nutrition/Hydration Goal 1 (SLP)    Barriers (Oral Nutrition/Hydration Goal 1, SLP)  Pt exhibited no overt s/s of aspiration or penetration in 6/7 trials of water by cup; throat clear x1.  -AL  Pt exhibited no overt s/s of aspiration or penetration in 8/9 trials of water by cup; delayed throat clear x1.  -AL  Pt exhibited no overt s/s of aspiration or penetration in 2/4 trials of thins by cup; throat clear x2.  -AL    Progress/Outcomes (Oral Nutrition/Hydration Goal 1, SLP)  --  good progress toward goal  -AL  goal ongoing  -AL       Lingual Strengthening Goal 1 (SLP)    Barriers (Lingual Strengthening Goal 1, SLP)  Completed 20 repetitions of effortful swallow with MIN cues. Completed 3 repetitions of CTAR for 1 minute each with MIN cues.  -AL  Completed \"Hawk\" exercise x20 with MIN cues.  -AL  Completed \"Hawk\" exercise x20 with NO cues. Improved endurance noted today. Attempted Laura, but only able to complete x2.  -AL    Progress/Outcomes (Lingual Strengthening Goal 1, SLP)  goal ongoing  -AL  goal ongoing  -AL  goal ongoing  -AL       Pharyngeal Strengthening Exercise Goal 1 (SLP)    Barriers (Pharyngeal Strengthening Goal 1, SLP)  --  Completed effortful swallow x20 with MIN cues. Completed CTAR with ball x3 for 1 minute each and x30 for 1 second each with MIN cues.   -AL  Completed effortful swallow x20 with MIN Cues. Completed CTAR with ball x3 for 1 minute each with MIN cues.  -AL    Progress/Outcomes (Pharyngeal Strengthening Goal 1, SLP)  --  goal ongoing  -AL  --    Row Name 10/05/20 1100             Oral Nutrition/Hydration Goal 1 (SLP)    Barriers (Oral Nutrition/Hydration Goal 1, SLP)  Re-assessed swallow function with trials of thins by cup, mixed, puree " and mechanical soft. Pt exhibited no s/s of aspiration or penetration in 2/3 trials of thins by cup (throat clear x1), 3/3 trials of puree, 3/3 trials of regular, 1/4 trials of mixed (with juice) and 3/3 trials of mixed with juice drained. No difficulty with mastication observed. Recommend upgrade diet to regular with NTL. Provided education regarding avoiding mixed consistencies and draining juice from juicy fruits. Pt exhibited understanding and carry-over of recommendations.  -AL         Pharyngeal Strengthening Exercise Goal 1 (SLP)    Barriers (Pharyngeal Strengthening Goal 1, SLP)  Completed effortful swallow x10 with MIN cues.  -AL        User Key  (r) = Recorded By, (t) = Taken By, (c) = Cosigned By    Initials Name Provider Type    Vanessa Paredes MS CCC-SLP Speech and Language Pathologist           SLP Outcome Measures (last 72 hours)      SLP Outcome Measures     Row Name 10/07/20 1000             SLP Outcome Measures    Outcome Measure Used?  Adult NOMS  -AL         Adult FCM Scores    FCM Chosen  Swallowing  -AL      Swallowing FCM Score  6  -AL        User Key  (r) = Recorded By, (t) = Taken By, (c) = Cosigned By    Initials Name Effective Dates    Vanessa Paredes MS CCC-PATRICIA 08/30/19 -            Time Calculation:   Time Calculation- SLP     Row Name 10/07/20 1055             Time Calculation- SLP    SLP Start Time  0820  -AL      SLP Stop Time  0920  -AL      SLP Time Calculation (min)  60 min  -AL        User Key  (r) = Recorded By, (t) = Taken By, (c) = Cosigned By    Initials Name Provider Type    Vanessa Paredes MS CCC-SLP Speech and Language Pathologist          Therapy Charges for Today     Code Description Service Date Service Provider Modifiers Qty    33738926598 HC ST TREATMENT SWALLOW 4 10/6/2020 Vanessa Leiva MS CCC-PATRICIA GN 1    [id number] HC ST MOTION FLUORO EVAL SWALLOW 4 10/7/2020 Vanessa Leiva MS CCC-PATRICIA GN 1               MS AKHIL Velásquez  10/7/2020

## 2020-10-07 NOTE — PLAN OF CARE
Goal Outcome Evaluation:  Plan of Care Reviewed With: patient  Progress: improving  Outcome Summary: Has been sleeping without c/o. No unsafe behavior noted. Meds whole with pudding.

## 2020-10-08 LAB
ANION GAP SERPL CALCULATED.3IONS-SCNC: 11.3 MMOL/L (ref 5–15)
BASOPHILS # BLD AUTO: 0.03 10*3/MM3 (ref 0–0.2)
BASOPHILS NFR BLD AUTO: 0.8 % (ref 0–1.5)
BH CV ECHO MEAS - BSA(HAYCOCK): 1.4 M^2
BH CV ECHO MEAS - BSA: 1.4 M^2
BH CV ECHO MEAS - BZI_BMI: 24.4 KILOGRAMS/M^2
BH CV ECHO MEAS - BZI_METRIC_HEIGHT: 142.2 CM
BH CV ECHO MEAS - BZI_METRIC_WEIGHT: 49.4 KG
BH CV ECHO MEAS - EDV(MOD-SP2): 43 ML
BH CV ECHO MEAS - EDV(MOD-SP4): 29 ML
BH CV ECHO MEAS - EF(MOD-BP): 59 %
BH CV ECHO MEAS - EF(MOD-SP2): 60.5 %
BH CV ECHO MEAS - EF(MOD-SP4): 58.6 %
BH CV ECHO MEAS - ESV(MOD-SP2): 17 ML
BH CV ECHO MEAS - ESV(MOD-SP4): 12 ML
BH CV ECHO MEAS - LAT PEAK E' VEL: 4.9 CM/SEC
BH CV ECHO MEAS - LV DIASTOLIC VOL/BSA (35-75): 21.1 ML/M^2
BH CV ECHO MEAS - LV SYSTOLIC VOL/BSA (12-30): 8.7 ML/M^2
BH CV ECHO MEAS - LVLD AP2: 5.9 CM
BH CV ECHO MEAS - LVLD AP4: 6.4 CM
BH CV ECHO MEAS - LVLS AP2: 5.2 CM
BH CV ECHO MEAS - LVLS AP4: 5.8 CM
BH CV ECHO MEAS - MED PEAK E' VEL: 4.7 CM/SEC
BH CV ECHO MEAS - MR MAX PG: 78.5 MMHG
BH CV ECHO MEAS - MR MAX VEL: 443 CM/SEC
BH CV ECHO MEAS - MV A MAX VEL: 130 CM/SEC
BH CV ECHO MEAS - MV E MAX VEL: 100 CM/SEC
BH CV ECHO MEAS - MV E/A: 0.77
BH CV ECHO MEAS - RAP SYSTOLE: 3 MMHG
BH CV ECHO MEAS - RVSP: 52 MMHG
BH CV ECHO MEAS - SI(MOD-SP2): 19 ML/M^2
BH CV ECHO MEAS - SI(MOD-SP4): 12.4 ML/M^2
BH CV ECHO MEAS - SV(MOD-SP2): 26 ML
BH CV ECHO MEAS - SV(MOD-SP4): 17 ML
BH CV ECHO MEAS - TAPSE (>1.6): 1.6 CM
BH CV ECHO MEAS - TR MAX VEL: 349 CM/SEC
BH CV ECHO MEASUREMENTS AVERAGE E/E' RATIO: 20.83
BH CV VAS BP LEFT ARM: NORMAL MMHG
BH CV XLRA - TDI S': 8.8 CM/SEC
BUN SERPL-MCNC: 14 MG/DL (ref 8–23)
BUN/CREAT SERPL: 21.5 (ref 7–25)
CALCIUM SPEC-SCNC: 9.1 MG/DL (ref 8.6–10.5)
CHLORIDE SERPL-SCNC: 105 MMOL/L (ref 98–107)
CO2 SERPL-SCNC: 26.7 MMOL/L (ref 22–29)
CREAT SERPL-MCNC: 0.65 MG/DL (ref 0.57–1)
DEPRECATED RDW RBC AUTO: 50.9 FL (ref 37–54)
EOSINOPHIL # BLD AUTO: 0.11 10*3/MM3 (ref 0–0.4)
EOSINOPHIL NFR BLD AUTO: 3.1 % (ref 0.3–6.2)
ERYTHROCYTE [DISTWIDTH] IN BLOOD BY AUTOMATED COUNT: 15.7 % (ref 12.3–15.4)
GFR SERPL CREATININE-BSD FRML MDRD: 88 ML/MIN/1.73
GLUCOSE SERPL-MCNC: 106 MG/DL (ref 65–99)
HCT VFR BLD AUTO: 32.8 % (ref 34–46.6)
HGB BLD-MCNC: 10.9 G/DL (ref 12–15.9)
IMM GRANULOCYTES # BLD AUTO: 0.01 10*3/MM3 (ref 0–0.05)
IMM GRANULOCYTES NFR BLD AUTO: 0.3 % (ref 0–0.5)
LYMPHOCYTES # BLD AUTO: 0.91 10*3/MM3 (ref 0.7–3.1)
LYMPHOCYTES NFR BLD AUTO: 25.6 % (ref 19.6–45.3)
MAXIMAL PREDICTED HEART RATE: 140 BPM
MCH RBC QN AUTO: 29.7 PG (ref 26.6–33)
MCHC RBC AUTO-ENTMCNC: 33.2 G/DL (ref 31.5–35.7)
MCV RBC AUTO: 89.4 FL (ref 79–97)
MONOCYTES # BLD AUTO: 0.25 10*3/MM3 (ref 0.1–0.9)
MONOCYTES NFR BLD AUTO: 7 % (ref 5–12)
NEUTROPHILS NFR BLD AUTO: 2.24 10*3/MM3 (ref 1.7–7)
NEUTROPHILS NFR BLD AUTO: 63.2 % (ref 42.7–76)
NRBC BLD AUTO-RTO: 0 /100 WBC (ref 0–0.2)
PLATELET # BLD AUTO: 389 10*3/MM3 (ref 140–450)
PMV BLD AUTO: 8.7 FL (ref 6–12)
POTASSIUM SERPL-SCNC: 3.6 MMOL/L (ref 3.5–5.2)
RBC # BLD AUTO: 3.67 10*6/MM3 (ref 3.77–5.28)
SODIUM SERPL-SCNC: 143 MMOL/L (ref 136–145)
STRESS TARGET HR: 119 BPM
WBC # BLD AUTO: 3.55 10*3/MM3 (ref 3.4–10.8)

## 2020-10-08 PROCEDURE — 85025 COMPLETE CBC W/AUTO DIFF WBC: CPT | Performed by: PHYSICAL MEDICINE & REHABILITATION

## 2020-10-08 PROCEDURE — 92526 ORAL FUNCTION THERAPY: CPT

## 2020-10-08 PROCEDURE — 97535 SELF CARE MNGMENT TRAINING: CPT

## 2020-10-08 PROCEDURE — 94799 UNLISTED PULMONARY SVC/PX: CPT

## 2020-10-08 PROCEDURE — 97110 THERAPEUTIC EXERCISES: CPT

## 2020-10-08 PROCEDURE — 80048 BASIC METABOLIC PNL TOTAL CA: CPT | Performed by: PHYSICAL MEDICINE & REHABILITATION

## 2020-10-08 PROCEDURE — 97530 THERAPEUTIC ACTIVITIES: CPT

## 2020-10-08 RX ADMIN — FERROUS SULFATE TAB 325 MG (65 MG ELEMENTAL FE) 325 MG: 325 (65 FE) TAB at 07:37

## 2020-10-08 RX ADMIN — CILOSTAZOL 100 MG: 100 TABLET ORAL at 07:37

## 2020-10-08 RX ADMIN — PANTOPRAZOLE SODIUM 40 MG: 40 TABLET, DELAYED RELEASE ORAL at 05:45

## 2020-10-08 RX ADMIN — BUDESONIDE 0.5 MG: 0.5 INHALANT ORAL at 07:16

## 2020-10-08 RX ADMIN — LOSARTAN POTASSIUM 50 MG: 50 TABLET, FILM COATED ORAL at 07:38

## 2020-10-08 RX ADMIN — BUDESONIDE 0.5 MG: 0.5 INHALANT ORAL at 20:36

## 2020-10-08 RX ADMIN — CILOSTAZOL 100 MG: 100 TABLET ORAL at 19:37

## 2020-10-08 RX ADMIN — TRAMADOL HYDROCHLORIDE 50 MG: 50 TABLET, FILM COATED ORAL at 14:31

## 2020-10-08 RX ADMIN — ATORVASTATIN CALCIUM 40 MG: 20 TABLET, FILM COATED ORAL at 19:36

## 2020-10-08 RX ADMIN — IPRATROPIUM BROMIDE AND ALBUTEROL SULFATE 3 ML: 2.5; .5 SOLUTION RESPIRATORY (INHALATION) at 20:36

## 2020-10-08 RX ADMIN — METOPROLOL SUCCINATE 50 MG: 50 TABLET, EXTENDED RELEASE ORAL at 19:37

## 2020-10-08 RX ADMIN — PRAMIPEXOLE DIHYDROCHLORIDE 0.12 MG: 0.25 TABLET ORAL at 19:38

## 2020-10-08 RX ADMIN — METOPROLOL SUCCINATE 50 MG: 50 TABLET, EXTENDED RELEASE ORAL at 07:38

## 2020-10-08 RX ADMIN — IPRATROPIUM BROMIDE AND ALBUTEROL SULFATE 3 ML: 2.5; .5 SOLUTION RESPIRATORY (INHALATION) at 07:16

## 2020-10-08 RX ADMIN — CLOPIDOGREL 75 MG: 75 TABLET, FILM COATED ORAL at 07:38

## 2020-10-08 RX ADMIN — Medication 1 TABLET: at 07:38

## 2020-10-08 RX ADMIN — OXYBUTYNIN CHLORIDE 5 MG: 5 TABLET, EXTENDED RELEASE ORAL at 07:37

## 2020-10-08 RX ADMIN — MIRTAZAPINE 7.5 MG: 15 TABLET, FILM COATED ORAL at 19:37

## 2020-10-08 NOTE — THERAPY DISCHARGE NOTE
Inpatient Rehabilitation - Occupational Therapy Discharge Summary  [facility]     Patient Name: Grace Beauchamp  : 1940  MRN: [id number]    Today's Date: 10/8/2020                 Admit Date: 2020        OT Recommendation and Plan    Visit Dx:    ICD-10-CM ICD-9-CM   1. Pericardial hematoma  I31.2 423.0   2. Ventricular aneurysm  I25.3 414.10   3. Gait abnormality  R26.9 781.2         Time Calculation- OT     Row Name 10/08/20 1149             Time Calculation- OT    OT Start Time  1000  -SM      OT Stop Time  1100  -      OT Time Calculation (min)  60 min  -SM      OT Received On  10/08/20  -        User Key  (r) = Recorded By, (t) = Taken By, (c) = Cosigned By    Initials Name Provider Type    Sunni Womack, OT Occupational Therapist            OT IRF GOALS     Row Name 10/08/20 1000 10/05/20 0830 20 1100       Transfer Goal 1 (OT-IRF)    Activity/Assistive Device (Transfer Goal 1, OT-IRF)  toilet;shower chair  -SM  toilet;shower chair with a back  -SM  toilet;shower chair with a back  -DN    [place] Level (Transfer Goal 1, OT-IRF)  contact guard assist  -SM  contact guard assist  -SM  contact guard assist  -DN    Time Frame (Transfer Goal 1, OT-IRF)  short-term goal (STG)  -SM  short-term goal (STG)  -SM  short-term goal (STG);1 week  -DN    Progress/Outcomes (Transfer Goal 1, OT-IRF)  goal met  -SM  goal met  -SM  goal revised this date;goal met  -DN       Transfer Goal 2 (OT-IRF)    Activity/Assistive Device (Transfer Goal 2, OT-IRF)  toilet;shower chair  -SM  toilet;shower chair  -SM  toilet;shower chair  -DN    [place] Level (Transfer Goal 2, OT-IRF)  supervision required  -SM  supervision required  -SM  supervision required  -DN    Time Frame (Transfer Goal 2, OT-IRF)  long-term goal (LTG)  -SM  long-term goal (LTG)  -SM  long-term goal (LTG);by discharge  -DN    Progress/Outcomes (Transfer Goal 2, OT-IRF)  goal met  -SM  goal ongoing  -SM  goal ongoing  -DN        Bathing Goal 1 (OT-IRF)    Activity/Device (Bathing Goal 1, OT-IRF)  bathing skills, all  -SM  bathing skills, all  -SM  bathing skills, all  -DN    [place] Level (Bathing Goal 1, OT-IRF)  standby assist  -SM  standby assist  -SM  contact guard assist  -DN    Time Frame (Bathing Goal 1, OT-IRF)  short-term goal (STG)  -SM  short-term goal (STG)  -SM  short-term goal (STG);1 week  -DN    Progress/Outcomes (Bathing Goal 1, OT-IRF)  goal met  -SM  goal met;goal revised this date  -SM  goal met;goal revised this date  -DN       Bathing Goal 2 (OT-IRF)    Activity/Device (Bathing Goal 2, OT-IRF)  bathing skills, all  -SM  bathing skills, all  -SM  bathing skills, all  -DN    [place] Level (Bathing Goal 2, OT-IRF)  supervision required  -SM  supervision required  -SM  supervision required  -DN    Time Frame (Bathing Goal 2, OT-IRF)  long-term goal (LTG)  -SM  long-term goal (LTG);by discharge  -SM  by discharge;long-term goal (LTG)  -DN    Progress/Outcomes (Bathing Goal 2, OT-IRF)  goal met  -SM  goal ongoing  -SM  goal ongoing  -DN       UB Dressing Goal 1 (OT-IRF)    Activity/Device (UB Dressing Goal 1, OT-IRF)  upper body dressing  -SM  upper body dressing  -SM  upper body dressing  -DN    [place] (UB Dress Goal 1, OT-IRF)  supervision required  -SM  supervision required  -SM  set-up required;supervision required  -DN    Time Frame (UB Dressing Goal 1, OT-IRF)  short-term goal (STG)  -SM  short-term goal (STG)  -SM  short-term goal (STG);1 week  -DN    Progress/Outcomes (UB Dressing Goal 1, OT-IRF)  goal met  -SM  goal met;goal revised this date  -SM  goal met  -DN       UB Dressing Goal 2 (OT-IRF)    Activity/Device (UB Dressing Goal 2, OT-IRF)  upper body dressing  -SM  upper body dressing  -SM  upper body dressing  -DN    [place] (UB Dress Goal 2, OT-IRF)  supervision required  -SM  supervision required  -SM  supervision required  -DN    Time Frame (UB Dressing Goal 2, OT-IRF)  long-term goal  (LTG)  -SM  long-term goal (LTG)  -SM  long-term goal (LTG);by discharge  -DN    Progress/Outcomes (UB Dressing Goal 2, OT-IRF)  goal met  -SM  goal ongoing  -SM  goal ongoing  -DN       LB Dressing Goal 1 (OT-IRF)    Activity/Device (LB Dressing Goal 1, OT-IRF)  lower body dressing  -SM  lower body dressing  -SM  lower body dressing  -DN    [place] (LB Dressing Goal 1, OT-IRF)  contact guard assist  -SM  contact guard assist  -SM  minimum assist (75% or more patient effort)  -DN    Time Frame (LB Dressing Goal 1, OT-IRF)  short-term goal (STG)  -SM  short-term goal (STG)  -SM  short-term goal (STG);1 week  -DN    Progress/Outcomes (LB Dressing Goal 1, OT-IRF)  goal met  -SM  goal met;goal ongoing  -SM  goal not met;goal ongoing  -DN       LB Dressing Goal 2 (OT-IRF)    Activity/Device (LB Dressing Goal 2, OT-IRF)  lower body dressing  -SM  lower body dressing  -SM  lower body dressing  -DN    [place] (LB Dressing Goal 2, OT-IRF)  set-up required  -SM  set-up required  -SM  set-up required;supervision required  -DN    Time Frame (LB Dressing Goal 2, OT-IRF)  long-term goal (LTG)  -SM  long-term goal (LTG);by discharge  -SM  long-term goal (LTG);by discharge  -DN    Progress/Outcomes (LB Dressing Goal 2, OT-IRF)  goal met  -SM  goal ongoing  -SM  goal ongoing  -DN       Grooming Goal 1 (OT-IRF)    Activity/Device (Grooming Goal 1, OT-IRF)  grooming skills, all  -SM  grooming skills, all  -SM  grooming skills, all  -DN    [place] (Grooming Goal 1, OT-IRF)  set-up required  -SM  set-up required  -SM  set-up required  -DN    Time Frame (Grooming Goal 1, OT-IRF)  short-term goal (STG)  -SM  --  short-term goal (STG);1 week  -DN    Progress/Outcomes (Grooming Goal 1, OT-IRF)  goal met  -SM  goal met  -SM  goal met;goal ongoing  -DN       Grooming Goal 2 (OT-IRF)    Activity/Device (Grooming Goal 2, OT-IRF)  grooming skills, all  -SM  grooming skills, all  -SM  grooming skills, all  -DN    [place]  (Grooming Goal 2, OT-IRF)  set-up required  -SM  set-up required  -SM  set-up required  -DN    Time Frame (Grooming Goal 2, OT-IRF)  long-term goal (LTG)  -SM  long-term goal (LTG);by discharge  -SM  long-term goal (LTG);by discharge  -DN    Progress/Outcomes (Grooming Goal 2, OT-IRF)  goal met  -SM  goal met  -SM  goal ongoing  -DN       Toileting Goal 1 (OT-IRF)    Activity/Device (Toileting Goal 1, OT-IRF)  toileting skills, all  -SM  toileting skills, all  -SM  toileting skills, all  -DN    [place] Level (Toileting Goal 1, OT-IRF)  contact guard assist  -SM  contact guard assist  -SM  contact guard assist  -DN    Progress/Outcomes (Toileting Goal 1, OT-IRF)  goal met  -SM  goal met  -SM  goal met;goal revised this date  -DN    Time Frame (Toileting Goal 1, OT-IRF)  short-term goal (STG)  -SM  short-term goal (STG)  -SM  short-term goal (STG);1 week  -DN       Toileting Goal 2 (OT-IRF)    Activity/Device (Toileting Goal 2, OT-IRF)  toileting skills, all  -SM  toileting skills, all  -SM  toileting skills, all  -DN    [place] Level (Toileting Goal 2, OT-IRF)  supervision required  -SM  supervision required  -SM  supervision required  -DN    Progress/Outcomes (Toileting Goal 2, OT-IRF)  goal met  -SM  goal ongoing  -SM  goal ongoing  -DN    Time Frame (Toileting Goal 2, OT-IRF)  long-term goal (LTG)  -SM  long-term goal (LTG);by discharge  -SM  --       Strength Goal 1 (OT-IRF)    Strength Goal 1 (OT-IRF)  Pt to be min A with cardiac HEP.   -SM  Pt to be min with BUE cardiac HEP for d/c  -SM  pt to be min with BUE cardiac HEP for d/c  -DN    Time Frame (Strength Goal 1, OT-IRF)  short-term goal (STG)  -SM  short-term goal (STG)  -SM  short-term goal (STG)  -DN    Progress/Outcomes (Strength Goal 1, OT-IRF)  goal met  -SM  continuing progress toward goal  -SM  continuing progress toward goal  -DN       Strength Goal 2 (OT-IRF)    Strength Goal 2 (OT-IRF)  Pt to be SBA for cardaic HEP by d/c.   -SM  Pt to be  SBA for BUE HEP for d/c home.   -SM  pt to be SBA with BUE HEP for d/c home  -DN    Time Frame (Strength Goal 2, OT-IRF)  long-term goal (LTG)  -SM  long-term goal (LTG);by discharge  -SM  long-term goal (LTG)  -DN    Progress/Outcomes (Strength Goal 2, OT-IRF)  goal met  -SM  goal ongoing  -SM  continuing progress toward goal  -DN    Row Name 09/26/20 0900             Transfer Goal 1 (OT-IRF)    Activity/Assistive Device (Transfer Goal 1, OT-IRF)  toilet;shower chair with a back  -SM      [place] Level (Transfer Goal 1, OT-IRF)  minimum assist (75% or more patient effort)  -SM      Time Frame (Transfer Goal 1, OT-IRF)  short-term goal (STG);1 week  -SM      Progress/Outcomes (Transfer Goal 1, OT-IRF)  continuing progress toward goal  -SM         Transfer Goal 2 (OT-IRF)    Activity/Assistive Device (Transfer Goal 2, OT-IRF)  toilet;shower chair  -SM      [place] Level (Transfer Goal 2, OT-IRF)  supervision required  -SM      Time Frame (Transfer Goal 2, OT-IRF)  long-term goal (LTG);by discharge  -SM      Progress/Outcomes (Transfer Goal 2, OT-IRF)  continuing progress toward goal  -SM         Bathing Goal 1 (OT-IRF)    Activity/Device (Bathing Goal 1, OT-IRF)  bathing skills, all  -SM      [place] Level (Bathing Goal 1, OT-IRF)  minimum assist (75% or more patient effort)  -SM      Time Frame (Bathing Goal 1, OT-IRF)  short-term goal (STG);1 week  -SM      Progress/Outcomes (Bathing Goal 1, OT-IRF)  continuing progress toward goal  -SM         Bathing Goal 2 (OT-IRF)    Activity/Device (Bathing Goal 2, OT-IRF)  bathing skills, all  -SM      [place] Level (Bathing Goal 2, OT-IRF)  supervision required  -SM      Time Frame (Bathing Goal 2, OT-IRF)  by discharge;long-term goal (LTG)  -SM      Progress/Outcomes (Bathing Goal 2, OT-IRF)  continuing progress toward goal  -SM         UB Dressing Goal 1 (OT-IRF)    Activity/Device (UB Dressing Goal 1, OT-IRF)  upper body dressing  -SM      [place]  (UB Dress Goal 1, OT-IRF)  set-up required;supervision required  -SM      Time Frame (UB Dressing Goal 1, OT-IRF)  short-term goal (STG);1 week  -SM      Progress/Outcomes (UB Dressing Goal 1, OT-IRF)  continuing progress toward goal  -SM         UB Dressing Goal 2 (OT-IRF)    Activity/Device (UB Dressing Goal 2, OT-IRF)  upper body dressing  -SM      [place] (UB Dress Goal 2, OT-IRF)  supervision required  -SM      Time Frame (UB Dressing Goal 2, OT-IRF)  long-term goal (LTG);by discharge  -SM      Progress/Outcomes (UB Dressing Goal 2, OT-IRF)  continuing progress toward goal  -SM         LB Dressing Goal 1 (OT-IRF)    Activity/Device (LB Dressing Goal 1, OT-IRF)  lower body dressing  -SM      [place] (LB Dressing Goal 1, OT-IRF)  minimum assist (75% or more patient effort)  -SM      Time Frame (LB Dressing Goal 1, OT-IRF)  short-term goal (STG);1 week  -SM      Progress/Outcomes (LB Dressing Goal 1, OT-IRF)  continuing progress toward goal  -SM         LB Dressing Goal 2 (OT-IRF)    Activity/Device (LB Dressing Goal 2, OT-IRF)  lower body dressing  -SM      [place] (LB Dressing Goal 2, OT-IRF)  set-up required;supervision required  -SM      Time Frame (LB Dressing Goal 2, OT-IRF)  long-term goal (LTG);by discharge  -SM      Progress/Outcomes (LB Dressing Goal 2, OT-IRF)  continuing progress toward goal  -SM         Grooming Goal 1 (OT-IRF)    Activity/Device (Grooming Goal 1, OT-IRF)  grooming skills, all  -SM      [place] (Grooming Goal 1, OT-IRF)  set-up required  -SM      Time Frame (Grooming Goal 1, OT-IRF)  short-term goal (STG);1 week  -SM      Progress/Outcomes (Grooming Goal 1, OT-IRF)  continuing progress toward goal  -SM         Grooming Goal 2 (OT-IRF)    Activity/Device (Grooming Goal 2, OT-IRF)  grooming skills, all  -SM      [place] (Grooming Goal 2, OT-IRF)  set-up required  -SM      Time Frame (Grooming Goal 2, OT-IRF)  long-term goal (LTG);by discharge  -SM       Progress/Outcomes (Grooming Goal 2, OT-IRF)  continuing progress toward goal  -SM         Toileting Goal 1 (OT-IRF)    Activity/Device (Toileting Goal 1, OT-IRF)  toileting skills, all  -SM      [place] Level (Toileting Goal 1, OT-IRF)  minimum assist (75% or more patient effort)  -SM      Progress/Outcomes (Toileting Goal 1, OT-IRF)  continuing progress toward goal  -SM      Time Frame (Toileting Goal 1, OT-IRF)  short-term goal (STG);1 week  -SM         Toileting Goal 2 (OT-IRF)    Activity/Device (Toileting Goal 2, OT-IRF)  toileting skills, all  -SM      [place] Level (Toileting Goal 2, OT-IRF)  supervision required  -SM      Progress/Outcomes (Toileting Goal 2, OT-IRF)  continuing progress toward goal  -SM      Time Frame (Toileting Goal 2, OT-IRF)  long-term goal (LTG);by discharge  -SM        User Key  (r) = Recorded By, (t) = Taken By, (c) = Cosigned By    Initials Name Provider Type    Pasha Jamison OT Occupational Therapist    Sunni Womack OT Occupational Therapist              Therapy Charges for Today     Code Description Service Date Service Provider Modifiers Qty    28944958488  OT THERAPEUTIC ACT EA 15 MIN 10/7/2020 Sunni Matt OT GO 2    [id number]  OT THER PROC EA 15 MIN 10/7/2020 Sunni Matt OT GO 2    [id number]  OT SELF CARE/MGMT/TRAIN EA 15 MIN 10/8/2020 Sunni Matt OT GO 4          OT Discharge Summary  Anticipated Discharge Disposition (OT): home with assist      Sunni Matt OT  10/8/2020

## 2020-10-08 NOTE — PROGRESS NOTES
Inpatient Rehabilitation Plan of Care Note    Plan of Care  Care Plan Reviewed - No updates at this time.    Body Systems    [RN] Integumentary(Active)  Current Status(10/08/2020): pt has stage 2 pressure sore to bottom, chest  incision, and R groin site. pinkness to coccyx- refuses mepilex dsg. and STR to  R groin  Weekly Goal(10/15/2020): remain intact  Discharge Goal: no infection    Performed Intervention(s)  dressing changes or wound care per orders  WOCN  monitor for signs of infection      Psychosocial    [RN] Coping/Adjustment(Active)  Current Status(10/08/2020): pt is adjusting appropriately to hospital stay.  family supportive  Weekly Goal(10/14/2020): adjust WNL  Discharge Goal: adequate coping    Performed Intervention(s)  verbalize needs and concerns      Safety    [RN] Potential for Injury(Active)  Current Status(10/08/2020): pt alert and oriented. no safety concerns  Weekly Goal(10/15/2020): no falls  Discharge Goal: no falls    Performed Intervention(s)  bed/chair alarms  hourly rounding  items within reach      Sphincter Control    [RN] Bladder Management(Active)  Current Status(10/08/2020): 100% continent  Weekly Goal(10/14/2020): remain continent  Discharge Goal: continent 100%    [RN] Bowel Management(Active)  Current Status(10/08/2020): continent 100%  Weekly Goal(10/15/2020): remains continent  Discharge Goal: continent 100%    Performed Intervention(s)  hourly rounding  adequate fluid intake    Signed by: Katrina Flannery RN

## 2020-10-08 NOTE — PROGRESS NOTES
Family conference held today with pt, pt's daughter, Cecy, PT,OT,ST,RN,SW and daughters Maddy and Estefany by phone.   Discussed pt's progress, current status and discharge plans.  PT reports pt completes bed mobility and transfers with SBA. She ambulates 250' with a rolling walker and SBA. She can ascend/descend 4 steps with CG. PT reports pt has good safety awareness.    In OT, pt completes commode transfers with SBA and shower transfers with SSBA-CG. OT notes pt has had no loss of balance in the last couple of days.  Pt is able to complete all bathing, dressing, grooming and toileting with SBA.   Pt's strength and endurance have improved and she is independent with her home exercise program.   Sternal precautions reviewed and pt is independent to follow these as well.    ST discussed results of repeat VFSS and diet upgrade to regular with thin liquids. ST explained that food and liquids move slowly through pt's esophagus and ST recommends pt alternate bites with sips of liquid when eating.    Nursing reviewed current medications and provided a list. Skin care discussed. Pt has very small open area on her coccyx and  RN recommends pt's skin be evaluated daily for breakdown. There are steri strips to surgical site in pt's groin and care of this area discussed. Chest incisions are well healed.   Follow up appointments with Dr Castañeda on 10/13, FREDRICK Mendosa on 10/28 and FREDRICK Duong on 12/8 reviewed. Dr De Los Santos and Dr Burks will be called for discharge instructions.     DME discussed. Pt has a rollator and a rolling walker. She also has a shower seat and grab bar.  Home health PT,OT and skilled nursing is recommended. Referral made to [facility] per pt request.    Pt to discharge tomorrow to her daughter, Cecy's home where she will have 24 hour assistance.   Pt and family are very happy with pt's progress and pt is looking forward to discharge tomorrow.

## 2020-10-08 NOTE — PLAN OF CARE
Goal Outcome Evaluation:  Plan of Care Reviewed With: patient  Progress: improving  Outcome Summary: pt alert and oriented. preparing for d/c. worked in therapies. family conference went well. meds whole in pudding. tramadol this afternoon for back pain. will continue to monitor.

## 2020-10-08 NOTE — PROGRESS NOTES
"   LOS: 13 days   Patient Care Team:  Miller Castañeda MD as PCP - General (Internal Medicine)  Miller Castañeda MD as PCP - Claims Attributed  Mart Ontiveros MD as Surgeon (Cardiothoracic Surgery)  Tres De Los Santos MD as Consulting Physician (Cardiology)  Graham Mcconnell MD as Consulting Physician (Hematology and Oncology)  Payam Byrnes MD as Consulting Physician (Otolaryngology)  Jonathan Smith MD as Consulting Physician (Vascular Surgery)  Victor M Cabral MD as Surgeon (Orthopedic Surgery)  Yaya Burks MD as Consulting Physician (Pulmonary Disease)    Chief Complaint:   immobility syndrome    Subjective     History of Present Illness    Subjective     Ms. Beauchamp continues to feel better and states that it feels odd not to have anything incredibly wrong at the moment. She is sleeping better and appetite slightly improved. We discussed that we will have her discharge home on nebulizers. She is agreeable and states she has a working machine at home. She is no longer taking xanax at night.     ROS: Denies sob/cp/constipation/msk pain.     History taken from: patient    Objective     Vital Signs  Temp:  [97.3 °F (36.3 °C)-98.4 °F (36.9 °C)] 97.6 °F (36.4 °C)  Heart Rate:  [78-92] 89  Resp:  [14-18] 14  BP: (122-151)/(58-86) 122/58    Objective:  Vital signs: (most recent): Blood pressure 122/58, pulse 89, temperature 97.6 °F (36.4 °C), temperature source Oral, resp. rate 14, height 142.2 cm (56\"), weight 38.1 kg (83 lb 15.9 oz), SpO2 94 %.            Physical Exam     Gen:    calm; pleasant  HEENT: NCAT, EOMI; MMM,  Neck:   Supple, gauze on R neck  CV:      RRR, no m/r/g appreciated  Lungs: diminished breath sounds in the bases;      Abd:     (+) BS, soft, non-tender, non-distended  Ext:      no new edema  Skin:   cardiac incision healing well  Psych: appropriate mood and affect  Neuro:  Mental Status: AOx4, Speech: fluent without dysarthria or scanning, No gross cognitive deficits  Strength: R > L " foot drop         Results Review:     I reviewed the patient's new clinical results.  Results from last 7 days   Lab Units 10/08/20  0800 10/05/20  0609 10/02/20  0632   WBC 10*3/mm3 3.55 4.20 4.99   HEMOGLOBIN g/dL 10.9* 10.2* 9.6*   HEMATOCRIT % 32.8* 30.8* 29.5*   PLATELETS 10*3/mm3 389 484* 515*     Results from last 7 days   Lab Units 10/08/20  0800 10/06/20  0902 10/05/20  0609   SODIUM mmol/L 143 143 143   POTASSIUM mmol/L 3.6 3.8 3.6   CHLORIDE mmol/L 105 106 107   CO2 mmol/L 26.7 27.0 26.3   BUN mg/dL 14 15 15   CREATININE mg/dL 0.65 0.58 0.55*   CALCIUM mg/dL 9.1 9.0 9.0   GLUCOSE mg/dL 106* 117* 116*     CT of the chest angiogram on October 2, 2020  FINDINGS: There is no evidence of pulmonary embolism. Stable aneurysmal  dilatation of the descending thoracic aorta. Stable changes of thoracic  aortic aneurysm repair. Stable rounded hypodensity along the proximal  aspect of the aortic arch. Interval repair of defect along the left  ventricular wall and evacuation of adjacent hematoma. Small right  pleural effusion. Emphysema. 5 mm nodule in the anterior left lower lobe  on image 57 is larger. 7 mm nodule in the posterior right upper lobe is  smaller where it previously measured 9 mm. Limited imaging of the upper  abdomen demonstrates bilateral renal cysts bone windows shows stable  degenerative changes of the thoracic spine. Stable loss of height of the  inferior endplate of L1.        Medication Review: done  Scheduled Meds:atorvastatin, 40 mg, Oral, Nightly  budesonide, 0.5 mg, Nebulization, BID - RT  cilostazol, 100 mg, Oral, BID  clopidogrel, 75 mg, Oral, Daily  ferrous sulfate, 325 mg, Oral, Daily With Breakfast  ipratropium-albuterol, 3 mL, Nebulization, 4x Daily - RT  losartan, 50 mg, Oral, Daily  metoprolol succinate XL, 50 mg, Oral, Q12H  mirtazapine, 7.5 mg, Oral, Nightly  multivitamin, 1 tablet, Oral, Daily  oxybutynin XL, 5 mg, Oral, Daily  pantoprazole, 40 mg, Oral, QAM  pramipexole, 0.125 mg,  Oral, Nightly      Continuous Infusions:   PRN Meds:.•  acetaminophen **OR** [DISCONTINUED] acetaminophen **OR** [DISCONTINUED] acetaminophen  •  albuterol sulfate HFA  •  aluminum-magnesium hydroxide-simethicone  •  cetirizine  •  dextrose  •  dextrose  •  glucagon (human recombinant)  •  guaiFENesin  •  influenza vaccine  •  ipratropium-albuterol  •  magnesium hydroxide  •  meclizine  •  melatonin  •  naloxone  •  ondansetron  •  sennosides-docusate  •  traMADol      Assessment/Plan       Immobility syndrome      Assessment & Plan  Etiologic Diagnosis and Comorbidities include:    Physical Debility/Immobility Syndrome    Dysphagia-October 5-improved.  Advance to regular solids/nectar thick liquid diet.  Videofluoroscopic swallow study on Wednesday, October 7 October 7-VFSS completed. No aspiration observed with any consistency. Transient, shallow penetration observed with thin liquids. Recommend upgrade diet to regular with thins. Small bites/sips, slow rate, alternating bites/sip. Sit upright for meals and 30 minutes after meals.     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  Physical therapy notes that she was much more fatigued with ambulating just to the bathroom in her room.  She ambulated 80 to 90 feet contact-guard assist in therapies.  Does not require nasal cannula oxygen.  Does not describe any productive sputum.    Patient reviewed with cardiology service and cardiothoracic service.  CT of the chest without pulmonary embolism.  Not felt to be in florid congestive heart failure.  Seen by the pulmonology service and Trelegy inhaler  changed to nebulizer to try to optimize her pulmonary status with underlying COPD.  October 5-fatigue-shortness of air improved.  Anemia is improved.      pmh of Breast Ca    COPD  Trelegy inhaler changed to nebulizers to try to optimize her pulmonary status continues on room air  Oct 8: Will continue nebulizers on discharge     HTN    HLD    Peripheral Vascular Disease    Right Foot Drop- chronic - AFO    DVT prophylaxis - SCDs    Anemia    Pain management - tramadol - home med. GIULIA reviewed.     Anxiety/depression-October 6-Patient complains of anxiety/depression.  She been on Prozac in the past.  Complains more of anxiety presently.    Has been taking alprazolam in the hospital but not at home and reviewed would not look at using that as an option after discharge/long-term.  Also reviewed potential for medication side effects including effects of SSRI on QT interval or for serotonin syndrome as she takes tramadol.  At this point will start the patient on Remeron 7.5 mg p.o. nightly to help with sleep, depression/anxiety/appetite, which is felt to have less of a potential for serotonin syndrome     Impairments Include:   Dysphagia, mobility, ambulation, adl’s, strength, endurance, respiratory status, swallowing    TEAM CONF - SEPT 29 - BED MIN. TRANSFERS MIN. GAIT 80 FEET CTG RW. TOILET TRANSFERS MOD ASSIST. SHOWER TRANSFERS MOD ASSIST. UBD MIN. LBD MOD. BATH MOD ASSIST. TOILETING MOD ASSIST. SWALLOW - MECH SOFT, NO MIXED, NECTAR THICK LIQUIDS. LAST VFSS SILENT ASPIRATION ON THINS. REPEAT VFSS NEXT WEEK. CONTINENT BOWEL AND BLADDER. STERNAL INCISION HEALING. PRESSURE SORE TO BUTTOCKS WITH CREAM TO THAT. ALPRAZOLAM PRN AT NIGHT. REC THERAPY INVOLVED.  ELOS - 2 WEEKS    TEAM CONF - OCT 6 - BED MOD I. TRASNFERS CTG. GAIT 4 STEPS B RAILS. CTG. GAIT 80 FEET CTG-SBA, RW OR ROLLATOR. TOILET TRANSFERS CTG. SHOWER TRANSFERS CTG-MIN.  UBD SBA. LBD SBA BATH SBA. TOILETING CTG ASSIST IF STANDING. ON NEBULIZERS. ROOM AIR.  IMPAIRED ENDURANCE.  SWALLOW - REG SOLIDS/ NTL. VFSS ON WED. CONTINENT BOWEL AND BLADDER. STAGE 1-2 BUTTOCK CLEFT AREA.   ELOS - GOAL TO DTR'S HOME WITH 5 STEPS TO ENTER.   ELOS: Friday OCT 9. [facility] NURSING.       Medical necessity  This patient is a good candidate for acute inpatient rehabilitation for a stay of approximately 2 weeks.  The etiologic diagnosis and comorbidities as listed above will require 24hr availability and frequent interventions of a physician with training in rehabilitation medicine.  The patient’s care cannot be provided on a lower level secondary to need for rigorous acute rehab in order to have maximal improvement of function.  Once admitted the patient will undergo 3 hour of PT/OT/SLP a day for at least 5 days per week. A rehab program will be initiated working on  Dysphagia, strengthening, endurance, safety, dressing, transfers, ambulation, bathing, grooming, swallowing skills. Rehab nursing will be involved to monitor bowel and bladder function, skin integrity, diabetes monitoring/education, patient and family education, and therapy carryover.  A weekly team meeting will be coordinated to maximize the patient’s plan of care during hospitalization and at discharge.       The patient's functional status and clinical status is unchanged from preadmission assessment and the patient continues appropriate for acute inpatient rehabilitation.  Goal is for home with  Home health   therapies.  Barrier to discharge:  Impaired mobility  - work on  Strength, balance, ADLS, swallow  to overcome.      Mara Bliss MD  10/08/20  14:10 EDT    Time:   During rounds, used appropriate personal protective equipment including mask and gloves.  Additional gown if indicated.  Mask used was standard procedure mask. Appropriate PPE was worn during the entire visit.  Hand hygiene was completed before and after.

## 2020-10-08 NOTE — PROGRESS NOTES
SECTION GG      Mobility Performance Discharge:     Roll Left and Right: Patient completed the activities by him/herself with no  assistance from a helper.   Sit to Lying: Patient completed the activities by him/herself with no  assistance from a helper.   Lying to Sitting on Side of Bed: Patient completed the activities by  him/herself with no assistance from a helper.   Sit to Stand: [place] provides verbal cues and/or touching/steadying and/or  contact guard assistance as patient completes activity. Assistance may be  provided throughout the activity or intermittently.   Chair/Bed to Chair Transfer: [place] provides verbal cues and/or  touching/steadying and/or contact guard assistance as patient completes  activity. Assistance may be provided throughout the activity or intermittently.   Car Transfer: [place] provides verbal cues and/or touching/steadying and/or  contact guard assistance as patient completes activity. Assistance may be  provided throughout the activity or intermittently.   Walk 10 Feet:   [place] provides verbal cues and/or touching/steadying and/or  contact guard assistance as patient completes activity. Assistance may be  provided throughout the activity or intermittently.  Walk 50 Feet with 2 Turns:   [place] provides verbal cues and/or  touching/steadying and/or contact guard assistance as patient completes  activity. Assistance may be provided throughout the activity or intermittently.  Walk 150 Feet:   [place] provides verbal cues and/or touching/steadying and/or  contact guard assistance as patient completes activity. Assistance may be  provided throughout the activity or intermittently.  Walking 10 Feet on Uneven Surfaces:   [place] does less than half the effort.  [place] lifts, holds or supports trunk or limbs but provides less than half the  effort.  1 Step Over Curb or Up/Down Stair:   [place] does less than half the effort.  [place] lifts, holds or supports trunk or limbs but provides less  than half the  effort.  4 Steps Up and Down, With/Without Rail:   [place] does less than half the effort.  [place] lifts, holds or supports trunk or limbs but provides less than half the  effort.  12 Steps Up and Down, With/Without Rail:   [place] does less than half the  effort. [place] lifts, holds or supports trunk or limbs but provides less than  half the effort.  Picking up an Object:   [place] provides verbal cues and/or touching/steadying  and/or contact guard assistance as patient completes activity. Assistance may be  provided throughout the activity or intermittently. Uses Wheelchair and/or  Scooter: No    Signed by: Samantha Rai, PT

## 2020-10-08 NOTE — THERAPY DISCHARGE NOTE
Inpatient Rehabilitation - [facility] Speech Language Pathology /Discharge  [place]     Patient Name: Grace Beauchamp  : 1940  MRN: [id number]  Today's Date: 10/8/2020         Admit Date: 2020    Visit Dx:     ICD-10-CM ICD-9-CM   1. Pericardial hematoma  I31.2 423.0   2. Ventricular aneurysm  I25.3 414.10   3. Gait abnormality  R26.9 781.2     Patient Active Problem List   Diagnosis   • History of breast cancer   • Stenosis of carotid artery   • Chronic obstructive pulmonary disease (CMS/HCC)   • Closed fracture of multiple ribs   • Cognitive disorder   • Erythromelalgia (CMS/[facility])   • Hyperlipidemia   • Hypertension   • Primary osteoarthritis involving multiple joints   • Osteoporosis   • Restless legs syndrome   • Cerebral aneurysm   • Temporary cerebral vascular dysfunction   • S/P CABG x 1   • Type 2 diabetes mellitus without complication, without long-term current use of insulin (CMS/[facility])   • S/P ascending aortic aneurysm repair   • 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[facility])   • Descending thoracic aortic aneurysm (CMS/[facility])   • Claudication of both lower extremities (CMS/[facility])   • 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[facility])   • Ventral hernia without obstruction or gangrene   • Breast cancer, stage 1, estrogen receptor positive (CMS/[facility])   • Arthritis of right hip   • Arthritis of right knee   • Chondrocalcinosis   • Chronic pain of right knee   • Degenerative disc disease, lumbar   • Gastroesophageal reflux disease   • Bilateral hearing loss   • Thoracic aortic aneurysm 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)   • Erythromelalgia (CMS/[facility])   • 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CMS/[facility])   • Thoracoabdominal aortic aneurysm, 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)   • 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)   • Aortic aneurysm, descending (CMS/[facility])   • Aortic aneurysm 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)   • CAD (coronary artery disease)   • S/P left heart catheterization by ventricular puncture   • NSTEMI (non-ST elevated myocardial infarction)  (CMS/[facility])   • Pericardial hematoma   • History of ST elevation myocardial infarction (STEMI)   • Acute on chronic diastolic CHF (congestive heart failure) (CMS/[facility])   • Ventricular aneurysm   • Immobility syndrome          SLP GOALS     Row Name 10/08/20 1130 10/08/20 0830 10/06/20 1500       Oral Nutrition/Hydration Goal 1 (SLP)    Barriers (Oral Nutrition/Hydration Goal 1, SLP)  Pt observed with lunch meal of regular with thins by cup and straw. Pt with throat clear x2 with thins by cup. Pt with light throat clear at baseline; appears to be habitual. Pt required initial MIN cue only for alternating bites/sips and NO cues for small bites/sips and slow rate. Recommend continue regular with thins.  -AL  Provided extensive education regarding results of VFSS from previous day. Watched VFSS with pt. Discussed discoordination of swallow and recommendation for small sips. Discussed esophageal dysmotility and reviewed recommendation for alternating bites/sips and reflux precautions (sit upright during meal and 30 minutes after meal). Pt asked whether she could continue EMST at home. Recommended she ask MD prior to use due to recent cardiac surgery. Plan to observe pt over lunch meal today to assess use of strategies.  -AL  Pt exhibited no overt s/s of aspiration or penetration in 6/7 trials of water by cup; throat clear x1.  -AL       Lingual Strengthening Goal 1 (SLP)    Barriers (Lingual Strengthening Goal 1, SLP)  --  --  Completed 20 repetitions of effortful swallow with MIN cues. Completed 3 repetitions of CTAR for 1 minute each with MIN cues.  -AL    Progress/Outcomes (Lingual Strengthening Goal 1, SLP)  --  --  goal ongoing  -AL    Row Name 10/06/20 1100             Oral Nutrition/Hydration Goal 1 (SLP)    Barriers (Oral Nutrition/Hydration Goal 1, SLP)  Pt exhibited no overt s/s of aspiration or penetration in 8/9 trials of water by cup; delayed throat clear x1.  -AL      Progress/Outcomes (Oral  "Nutrition/Hydration Goal 1, SLP)  good progress toward goal  -AL         Lingual Strengthening Goal 1 (SLP)    Barriers (Lingual Strengthening Goal 1, SLP)  Completed \"Hawk\" exercise x20 with MIN cues.  -AL      Progress/Outcomes (Lingual Strengthening Goal 1, SLP)  goal ongoing  -AL         Pharyngeal Strengthening Exercise Goal 1 (SLP)    Barriers (Pharyngeal Strengthening Goal 1, SLP)  Completed effortful swallow x20 with MIN cues. Completed CTAR with ball x3 for 1 minute each and x30 for 1 second each with MIN cues.   -AL      Progress/Outcomes (Pharyngeal Strengthening Goal 1, SLP)  goal ongoing  -AL        User Key  (r) = Recorded By, (t) = Taken By, (c) = Cosigned By    Initials Name Provider Type    Vanessa Paredes MS CCC-SLP Speech and Language Pathologist          EDUCATION  The patient has been educated in the following areas:   Dysphagia (Swallowing Impairment).    SLP Recommendation and Plan      Pt made good progress during inpatient stay toward swallowing goals. At discharge, pt was tolerating regular with thins. VFSS completed 10/7/20. Pt presented with mild oropharyngeal dysphagia characterized by discoordination between oral and pharyngeal phases which led to penetration before the swallow with throat clear response with initial trial of thins by cup and transient, shallow penetration during the swallow with additional trials of thins by cup and inconsistently with thins by straw (1/3 trials). Premature spillage to the valleculae observed with regular and puree. Premature spillage to the pyriforms observed with thins by cup/straw and thin component of mixed. Use of anterior bolus hold and chin tuck did not eliminate shallow penetration with thins. Trace pyriform residue observed with liquids. Esophageal dysmotility and mild retrograde flow observed in the medial and distal esophagus with thins, puree and mixed; alternating bites/sips appeared to assist with clearing stasis. Recommended " upgrade diet to regular with thins; small bites/sips, slow rate, alternate bites/sips. Meds with thin liquids or puree. Sit upright for meals and 30 minutes after meal. Observed pt with meal on 10/8/20, and pt was tolerating regular with thins and exhibited carry-over of strategies.     Caregiver Training: Pt and daughter attended Family Conference (with other daughters via phone) on 10/8/20. SLP provided education regarding results of VFSS and diet recommendations. Discussed reflux precautions and strategies of small bites/sips, slow rate and alternating bites/sips. Pt and daughter exhibited understanding. No further speech therapy recommended at this time.                    SLP Outcome Measures (last 72 hours)      SLP Outcome Measures     Row Name 10/07/20 1000             SLP Outcome Measures    Outcome Measure Used?  Adult NOMS  -AL         Adult FCM Scores    FCM Chosen  Swallowing  -AL      Swallowing FCM Score  6  -AL        User Key  (r) = Recorded By, (t) = Taken By, (c) = Cosigned By    Initials Name Effective Dates    Vanessa Paredes MS CCC-SLP 08/30/19 -             Time Calculation:   Time Calculation- SLP     Row Name 10/08/20 1211 10/08/20 1014          Time Calculation- SLP    SLP Start Time  1130  -AL  0830  -AL     SLP Stop Time  1200  -AL  0900  -AL     SLP Time Calculation (min)  30 min  -AL  30 min  -AL       User Key  (r) = Recorded By, (t) = Taken By, (c) = Cosigned By    Initials Name Provider Type    Vanessa Paredes MS CCC-SLP Speech and Language Pathologist          Therapy Charges for Today     Code Description Service Date Service Provider Modifiers Qty    88380553984 HC ST MOTION FLUORO EVAL SWALLOW 4 10/7/2020 Vanessa Leiva MS CCC-SLP GN 1    [id number] HC ST TREATMENT SWALLOW 4 10/8/2020 Vanessa Leiva MS CCC-SLP GN 1               SLP Discharge Summary  Anticipated Discharge Disposition (SLP): home with assist    MS AKHIL Velásquez  10/8/2020

## 2020-10-08 NOTE — THERAPY TREATMENT NOTE
Inpatient Rehabilitation - Occupational Therapy Treatment Note    [facility]     Patient Name: Grace Beauchamp  : 1940  MRN: [id number]    Today's Date: 10/8/2020                 Admit Date: 2020         ICD-10-CM ICD-9-CM   1. Pericardial hematoma  I31.2 423.0   2. Ventricular aneurysm  I25.3 414.10   3. Gait abnormality  R26.9 781.2       Patient Active Problem List   Diagnosis   • History of breast cancer   • Stenosis of carotid artery   • Chronic obstructive pulmonary disease (CMS/HCC)   • Closed fracture of multiple ribs   • Cognitive disorder   • Erythromelalgia (CMS/[facility])   • Hyperlipidemia   • Hypertension   • Primary osteoarthritis involving multiple joints   • Osteoporosis   • Restless legs syndrome   • Cerebral aneurysm   • Temporary cerebral vascular dysfunction   • S/P CABG x 1   • Type 2 diabetes mellitus without complication, without long-term current use of insulin (CMS/[facility])   • S/P ascending aortic aneurysm repair   • 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[facility])   • Descending thoracic aortic aneurysm (CMS/[facility])   • Claudication of both lower extremities (CMS/[facility])   • 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[facility])   • Ventral hernia without obstruction or gangrene   • Breast cancer, stage 1, estrogen receptor positive (CMS/[facility])   • Arthritis of right hip   • Arthritis of right knee   • Chondrocalcinosis   • Chronic pain of right knee   • Degenerative disc disease, lumbar   • Gastroesophageal reflux disease   • Bilateral hearing loss   • Thoracic aortic aneurysm without rupture (CMS/[facility])   • Erythromelalgia (CMS/[facility])   • 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CMS/[facility])   • Thoracoabdominal aortic aneurysm, without rupture (CMS/[facility])   • 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/[facility])   • Aortic aneurysm, descending (CMS/[facility])   • Aortic aneurysm without rupture (CMS/[facility])   • CAD (coronary artery disease)   • S/P left heart catheterization by ventricular puncture   • NSTEMI (non-ST elevated myocardial infarction)  "(CMS/[facility])   • Pericardial hematoma   • History of ST elevation myocardial infarction (STEMI)   • Acute on chronic diastolic CHF (congestive heart failure) (CMS/[facility])   • Ventricular aneurysm   • Immobility syndrome       Past Medical History:   Diagnosis Date   • AAA (abdominal aortic aneurysm) (CMS/[facility])    • Acute bronchitis 12/2018   • 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[facility])    • Arthritis    • Bilateral carotid artery disease (CMS/[facility])    • Bilateral cataracts     S/p Extractions   • Breast cancer (CMS/[facility])     right breast [id number] (snm) pMX ER/WA pos, Her-2/neg, Ki-67, 31%, oncotype recurrence score 19, invasive lobular carcinoma   • CAD (coronary artery disease)     S/p CABG on 2/23/16 by Dr. 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[facility])    • Cataracts, bilateral    • Closed fracture of three ribs of right side    • Cognitive disorder    • COPD (chronic obstructive pulmonary disease) (CMS/[facility])    • Depression    • Descending aortic arch aneurysm (CMS/[facility])    • Diabetes mellitus (CMS/[facility])     \"BORDERLINE\"   • Erythermalgia (CMS/[facility])    • GERD (gastroesophageal reflux disease)    • Hyperlipidemia     Controlled w/Meds   • Hypertension     Controlled w/Meds   • Low back pain    • Moderate aortic valve insufficiency     S/p AVR on 02/23/16 by Dr. Ontiveros   • Nocturia    • Osteoarthritis    • Osteoporosis    • Otitis media     R Ear   • Prediabetes    • PVD (peripheral vascular disease) (CMS/[facility])    • RLS (restless legs syndrome)    • Saccular aneurysm    • Stroke (CMS/[facility])     Perioperatively w/L Hip Repl   • Thoracic ascending aortic aneurysm (CMS/[facility])     S/p Repair on 02/23/16 by Dr. Ontiveros   • TIA (transient ischemic attack)    • Urgency incontinence    • Venous insufficiency    • Ventral hernia        Past Surgical History:   Procedure Laterality Date   • AORTIC VALVE REPAIR/REPLACEMENT N/A 02/23/2016-[facility]    Ascending Replacement Using a 24MM Graft--Dr. Ontiveros   • APPENDECTOMY  1977   • BREAST BIOPSY Right 09/16/2012    Vacuum " Assisted Core Bx of R Breast   • CARDIAC CATHETERIZATION N/A 01/06/2016    Dr. Tres De Los Santos   • CARDIAC CATHETERIZATION N/A 4/22/2019    Procedure: Left Heart Cath;  Surgeon: Tres De Los Santos MD;  Location:  [facility];  Service: Cardiology   • CARDIAC CATHETERIZATION N/A 4/22/2019    Procedure: Coronary angiography;  Surgeon: Tres De Los Santos MD;  Location:  [facility];  Service: Cardiology   • CARDIAC CATHETERIZATION N/A 7/22/2020    Procedure: LEFT HEART CATH;  Surgeon: Antonia Scott MD;  Location:  [facility];  Service: Cardiovascular;  Laterality: N/A;   • CARDIAC CATHETERIZATION N/A 7/22/2020    Procedure: CORONARY ANGIOGRAPHY;  Surgeon: Antonia Scott MD;  Location:  [facility];  Service: Cardiovascular;  Laterality: N/A;   • CARDIAC CATHETERIZATION N/A 7/22/2020    Procedure: Left ventriculography;  Surgeon: Antonia Scott MD;  Location:  [facility];  Service: Cardiovascular;  Laterality: N/A;   • CARDIAC CATHETERIZATION N/A 7/22/2020    Procedure: Saphenous Vein Graft;  Surgeon: Antonia Scott MD;  Location:  [facility];  Service: Cardiovascular;  Laterality: N/A;   • CARDIAC SURGERY  02/2016    Dr. Ontiveros/Dr. De Los Santos   • CATARACT EXTRACTION Bilateral     [facility]   • COLONOSCOPY N/A 10/2002    Dr. Negro   • CORONARY ARTERY BYPASS GRAFT  02/23/2016-[facility]    x1 w/L Vein Grafting--Dr. Ontiveros   • CORONARY ARTERY BYPASS GRAFT N/A 9/18/2020    Procedure: STERNAL EXPLORATION WITH CLOSURE AND WASHOUT, REMOVAL OF IABP, PRP;  Surgeon: Mart Ontiveros MD;  Location: [facility] MAIN OR;  Service: Cardiothoracic;  Laterality: N/A;   • CYST REMOVAL Right 01/25/2013    R Palm Excision of Retinacular Cyst--Dr. Karla Fish   • EPIDURAL BLOCK     • LAPAROSCOPIC CHOLECYSTECTOMY N/A 08/04/2004    Dr. JOEY Sheth   • MASTECTOMY Right 09/28/2012   • ORIF HIP FRACTURE Left 03/27/2005    w/ AMBI  Dr. Victor M bai   • RECTOVAGINAL FISTULA REPAIR  1965   • THORACIC AORTIC ANEURYSM REPAIR N/A 7/23/2019    Procedure: ROSE, THORACOABDOMINAL AORTIC ANEURYSM REPAIR, VISCERAL VESSEL REPAIR, LARGE VENTRAL HERNIA REPAIR WITH MESH, CIRCULATORY ARREST, PRP;  Surgeon: Mart Ontiveros MD;  Location: [facility] OR;  Service: Cardiothoracic   • TRANSESOPHAGEAL ECHOCARDIOGRAM (ROSE) N/A 9/18/2020    Procedure: TRANSESOPHAGEAL ECHOCARDIOGRAM WITH ANESTHESIA;  Surgeon: Mart Ontiveros MD;  Location: [facility] OR;  Service: Cardiothoracic;  Laterality: N/A;   • TUBAL ABDOMINAL LIGATION     • VENTRICULAR ANEURYSM REPAIR N/A 7/22/2020    Procedure: EMERGENT REOP STERNOTOMY, REPAIR OF LV RUPTURE, PRP, INTRAOP ROSE;  Surgeon: Mart Ontiveros MD;  Location: [facility] OR;  Service: Cardiothoracic;  Laterality: N/A;   • VENTRICULAR ANEURYSM REPAIR N/A 9/17/2020    Procedure: ROSE, MIDLINE STERNOTOMY REOP  LEFT VENTRICULAR ANEURYSM REPAIR, IABP INSERTION, PRP;  Surgeon: Mart Ontiveros MD;  Location: [facility];  Service: Cardiothoracic;  Laterality: N/A;            IRF OT ASSESSMENT FLOWSHEET (last 12 hours)      IRF OT Evaluation and Treatment     Row Name 10/08/20 1000          OT Time and Intention    Document Type  discharge evaluation  -SM     Mode of Treatment  occupational therapy  -SM     Patient Effort  good  -SM     Symptoms Noted During/After Treatment  none  -SM     Row Name 10/08/20 1000          General Information    Patient/Family/Caregiver Comments/Observations  Pt sitting EOB prior to OT arrival, no signs of distress.   -SM     Existing Precautions/Restrictions  cardiac;sternal;fall  -SM     Row Name 10/08/20 1000          Cognition/Psychosocial    Affect/Mental Status (Cognitive)  WFL  -SM     Orientation Status (Cognition)  oriented x 4  -SM     Follows Commands (Cognition)  WFL  -SM     Personal Safety Interventions  fall prevention program maintained;gait belt;nonskid  shoes/slippers when out of bed  -[facility] Name 10/08/20 1000          Pain Scale: Numbers Pre/Post-Treatment    Pretreatment Pain Rating  0/10 - no pain  -     Posttreatment Pain Rating  0/10 - no pain  -[facility] Name 10/08/20 1000          Hand  Strength Testing    Left Hand, Setting 2 (Dynamometer Testing)  30,31,32  -     Right Hand, Setting 2 (Dynamometer Testing)  20, 20, 24  -     Left Hand: Lateral (Key) Pinch Strength (Pinch Dynamometer Testing)  8, 7, 7  -     Right Hand: Lateral (Key) Pinch Strength (Pinch Dynamometer Testing)  3, 4, 4   -[facility] Name 10/08/20 1000          Bed Mobility    Supine-Sit Ryan (Bed Mobility)  modified independence  -     Sit-Supine Ryan (Bed Mobility)  modified independence  -[facility] Name 10/08/20 1000          Functional Mobility    Functional Mobility- Ind. Level  standby assist  -     Functional Mobility- Device  rolling walker  -     Functional Mobility-Distance (Feet)  25  -     Functional Mobility- Comment  Pt used rwx for ADLs to from shower and standing ADLs at  sink.   -[facility] Name 10/08/20 1000          Transfer Assessment/Treatment    Transfers  toilet transfer;shower transfer  -[facility] Name 10/08/20 1000          Transfers    Ryan Level (Toilet Transfer)  standby assist  -     Assistive Device (Toilet Transfer)  grab bars/safety frame  -     Ryan Level (Shower Transfer)  stand by assist  -     Assistive Device (Shower Transfer)  shower chair;grab bar, tub/shower  -[facility] Name 10/08/20 1000          Toilet Transfer    Type (Toilet Transfer)  sit-stand;stand-sit  -[facility] Name 10/08/20 1000          Shower Transfer    Type (Shower Transfer)  sit-stand;stand-sit  -[facility] Name 10/08/20 1000          Balance    Static Sitting Balance  WFL;supported  -     Dynamic Sitting Balance  WFL;supported  -     Static Standing Balance  mild impairment;supported  -     Balance Interventions   standing;occupation based/functional task  -     Comment, Balance  Pt stood during parts of bathing to wash and dry razia area, no LOB, SBA provided for safety.   -     Row Name 10/08/20 1000          Bathing    [place] Level (Bathing)  bathing skills;standby assist  -     Assistive Device (Bathing)  hand held shower spray hose;grab bar/tub rail;shower chair  -     Position (Bathing)  supported standing;supported sitting  -     Set-up Assistance (Bathing)  obtain supplies;adjust water temperature  -     Row Name 10/08/20 1000          Upper Body Dressing    [place] Level (Upper Body Dressing)  doff;don;pull over garment;front opening garment;supervision  -     Position (Upper Body Dressing)  edge of bed sitting  -     Comment (Upper Body Dressing)  Pt uses rwx to obtain clothing from closet and dresses EOB with NELSON/SBA  -     Row Name 10/08/20 1000          Lower Body Dressing    [place] Level (Lower Body Dressing)  doff;don;pants/bottoms;shoes/slippers;socks;underwear;standby assist;supervision  -     Assistive Device Use (Lower Body Dressing)  orthosis  -     Position (Lower Body Dressing)  edge of bed sitting  -     Row Name 10/08/20 1000          Grooming    [place] Level (Grooming)  grooming skills;supervision  -     Position (Grooming)  sink side;supported sitting;supported standing  -     Comment (Grooming)  Pt completed tasks standing at sink with rwx, w/c behind for pt to take seated break when needed. Pt able to stand for appox 5 minutes during task.   -     Row Name 10/08/20 1000          Toileting    [place] Level (Toileting)  toileting skills;adjust/manage clothing;perform perineal hygiene;supervision;set up assistance  -     Assistive Device Use (Toileting)  grab bar/safety frame  -     Position (Toileting)  supported sitting;supported standing  -     Row Name 10/08/20 1000          Transfer Goal 1 (OT-IRF)    Activity/Assistive Device  (Transfer Goal 1, OT-IRF)  toilet;shower chair  -SM     [place] Level (Transfer Goal 1, OT-IRF)  contact guard assist  -SM     Time Frame (Transfer Goal 1, OT-IRF)  short-term goal (STG)  -SM     Progress/Outcomes (Transfer Goal 1, OT-IRF)  goal met  -SM     Row Name 10/08/20 1000          Transfer Goal 2 (OT-IRF)    Activity/Assistive Device (Transfer Goal 2, OT-IRF)  toilet;shower chair  -SM     [place] Level (Transfer Goal 2, OT-IRF)  supervision required  -SM     Time Frame (Transfer Goal 2, OT-IRF)  long-term goal (LTG)  -SM     Progress/Outcomes (Transfer Goal 2, OT-IRF)  goal met  -SM     Row Name 10/08/20 1000          Bathing Goal 1 (OT-IRF)    Activity/Device (Bathing Goal 1, OT-IRF)  bathing skills, all  -SM     [place] Level (Bathing Goal 1, OT-IRF)  standby assist  -SM     Time Frame (Bathing Goal 1, OT-IRF)  short-term goal (STG)  -SM     Progress/Outcomes (Bathing Goal 1, OT-IRF)  goal met  -SM     Row Name 10/08/20 1000          Bathing Goal 2 (OT-IRF)    Activity/Device (Bathing Goal 2, OT-IRF)  bathing skills, all  -SM     [place] Level (Bathing Goal 2, OT-IRF)  supervision required  -SM     Time Frame (Bathing Goal 2, OT-IRF)  long-term goal (LTG)  -SM     Progress/Outcomes (Bathing Goal 2, OT-IRF)  goal met  -SM     Row Name 10/08/20 1000          UB Dressing Goal 1 (OT-IRF)    Activity/Device (UB Dressing Goal 1, OT-IRF)  upper body dressing  -SM     [place] (UB Dress Goal 1, OT-IRF)  supervision required  -SM     Time Frame (UB Dressing Goal 1, OT-IRF)  short-term goal (STG)  -SM     Progress/Outcomes (UB Dressing Goal 1, OT-IRF)  goal met  -SM     Row Name 10/08/20 1000          UB Dressing Goal 2 (OT-IRF)    Activity/Device (UB Dressing Goal 2, OT-IRF)  upper body dressing  -SM     [place] (UB Dress Goal 2, OT-IRF)  supervision required  -SM     Time Frame (UB Dressing Goal 2, OT-IRF)  long-term goal (LTG)  -SM     Progress/Outcomes (UB Dressing Goal 2, OT-IRF)   goal met  -SM     Row Name 10/08/20 1000          LB Dressing Goal 1 (OT-IRF)    Activity/Device (LB Dressing Goal 1, OT-IRF)  lower body dressing  -SM     [place] (LB Dressing Goal 1, OT-IRF)  contact guard assist  -SM     Time Frame (LB Dressing Goal 1, OT-IRF)  short-term goal (STG)  -SM     Progress/Outcomes (LB Dressing Goal 1, OT-IRF)  goal met  -SM     Row Name 10/08/20 1000          LB Dressing Goal 2 (OT-IRF)    Activity/Device (LB Dressing Goal 2, OT-IRF)  lower body dressing  -SM     [place] (LB Dressing Goal 2, OT-IRF)  set-up required  -SM     Time Frame (LB Dressing Goal 2, OT-IRF)  long-term goal (LTG)  -SM     Progress/Outcomes (LB Dressing Goal 2, OT-IRF)  goal met  -SM     Row Name 10/08/20 1000          Grooming Goal 1 (OT-IRF)    Activity/Device (Grooming Goal 1, OT-IRF)  grooming skills, all  -SM     [place] (Grooming Goal 1, OT-IRF)  set-up required  -SM     Time Frame (Grooming Goal 1, OT-IRF)  short-term goal (STG)  -SM     Progress/Outcomes (Grooming Goal 1, OT-IRF)  goal met  -SM     Row Name 10/08/20 1000          Grooming Goal 2 (OT-IRF)    Activity/Device (Grooming Goal 2, OT-IRF)  grooming skills, all  -SM     [place] (Grooming Goal 2, OT-IRF)  set-up required  -SM     Time Frame (Grooming Goal 2, OT-IRF)  long-term goal (LTG)  -SM     Progress/Outcomes (Grooming Goal 2, OT-IRF)  goal met  -SM     Row Name 10/08/20 1000          Toileting Goal 1 (OT-IRF)    Activity/Device (Toileting Goal 1, OT-IRF)  toileting skills, all  -SM     [place] Level (Toileting Goal 1, OT-IRF)  contact guard assist  -SM     Progress/Outcomes (Toileting Goal 1, OT-IRF)  goal met  -SM     Time Frame (Toileting Goal 1, OT-IRF)  short-term goal (STG)  -SM     Row Name 10/08/20 1000          Toileting Goal 2 (OT-IRF)    Activity/Device (Toileting Goal 2, OT-IRF)  toileting skills, all  -SM     [place] Level (Toileting Goal 2, OT-IRF)  supervision required  -SM     Progress/Outcomes  (Toileting Goal 2, OT-IRF)  goal met  -SM     Time Frame (Toileting Goal 2, OT-IRF)  long-term goal (LTG)  -SM     Row Name 10/08/20 1000          Strength Goal 1 (OT-IRF)    Strength Goal 1 (OT-IRF)  Pt to be min A with cardiac HEP.   -SM     Time Frame (Strength Goal 1, OT-IRF)  short-term goal (STG)  -SM     Progress/Outcomes (Strength Goal 1, OT-IRF)  goal met  -SM     Row Name 10/08/20 1000          Strength Goal 2 (OT-IRF)    Strength Goal 2 (OT-IRF)  Pt to be SBA for cardaic HEP by d/c.   -SM     Time Frame (Strength Goal 2, OT-IRF)  long-term goal (LTG)  -SM     Progress/Outcomes (Strength Goal 2, OT-IRF)  goal met  -       User Key  (r) = Recorded By, (t) = Taken By, (c) = Cosigned By    Initials Name Effective Dates     Sunni Matt, OT 04/02/20 -            Occupational Therapy Education                 Title: PT OT SLP Therapies (In Progress)     Topic: Occupational Therapy (In Progress)     Point: ADL training (Done)     Description:   Instruct learner(s) on proper safety adaptation and remediation techniques during self care or transfers.   Instruct in proper use of assistive devices.              Learning Progress Summary           Patient Acceptance, E, VU by  at 9/26/2020 1218    Comment: Instructed pt on improved techniques for safety with ADLs and transfers with sternal precautions.                   Point: Home exercise program (Not Started)     Description:   Instruct learner(s) on appropriate technique for monitoring, assisting and/or progressing therapeutic exercises/activities.              Learner Progress:  Not documented in this visit.          Point: Precautions (Done)     Description:   Instruct learner(s) on prescribed precautions during self-care and functional transfers.              Learning Progress Summary           Patient Acceptance, E, VU by  at 9/26/2020 1218    Comment: Instructed pt on improved techniques for safety with ADLs and transfers with sternal  precautions.                   Point: Body mechanics (Not Started)     Description:   Instruct learner(s) on proper positioning and spine alignment during self-care, functional mobility activities and/or exercises.              Learner Progress:  Not documented in this visit.                      User Key     Initials Effective Dates Name Provider Type Discipline     04/02/20 -  Sunni Matt OT Occupational Therapist OT                    OT Recommendation and Plan    Anticipated Discharge Disposition (OT): home with assist  Planned Therapy Interventions (OT): activity tolerance training, adaptive equipment training, BADL retraining, functional balance retraining, IADL retraining, neuromuscular control/coordination retraining, occupation/activity based interventions, patient/caregiver education/training, ROM/therapeutic exercise, strengthening exercise, transfer/mobility retraining       Daily Progress Summary (OT)  Overall Progress Toward Functional Goals (OT): progressing toward functional goals as expected            Time Calculation:     Time Calculation- OT     Row Name 10/08/20 1149             Time Calculation- OT    OT Start Time  1000  -      OT Stop Time  1100  -      OT Time Calculation (min)  60 min  -      OT Received On  10/08/20  -        User Key  (r) = Recorded By, (t) = Taken By, (c) = Cosigned By    Initials Name Provider Type     Sunni Matt OT Occupational Therapist        Therapy Charges for Today     Code Description Service Date Service Provider Modifiers Qty    67286380882 HC OT THERAPEUTIC ACT EA 15 MIN 10/7/2020 Sunni Matt OT GO 2    [id number]  OT THER PROC EA 15 MIN 10/7/2020 Sunni Matt OT GO 2    [id number]  OT SELF CARE/MGMT/TRAIN EA 15 MIN 10/8/2020 Sunni Matt OT GO 4                   Sunni Matt OT  10/8/2020

## 2020-10-08 NOTE — THERAPY DISCHARGE NOTE
Inpatient Rehabilitation - Physical Therapy Treatment Note/Discharge  [facility]     Patient Name: Grace Beauchamp  : 1940  MRN: [id number]  Today's Date: 10/8/2020                Admit Date: 2020    Visit Dx:    ICD-10-CM ICD-9-CM   1. Pericardial hematoma  I31.2 423.0   2. Ventricular aneurysm  I25.3 414.10   3. Gait abnormality  R26.9 781.2     Patient Active Problem List   Diagnosis   • History of breast cancer   • Stenosis of carotid artery   • Chronic obstructive pulmonary disease (CMS/HCC)   • Closed fracture of multiple ribs   • Cognitive disorder   • Erythromelalgia (CMS/[facility])   • Hyperlipidemia   • Hypertension   • Primary osteoarthritis involving multiple joints   • Osteoporosis   • Restless legs syndrome   • Cerebral aneurysm   • Temporary cerebral vascular dysfunction   • S/P CABG x 1   • Type 2 diabetes mellitus without complication, without long-term current use of insulin (CMS/[facility])   • S/P ascending aortic aneurysm repair   • 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[facility])   • Descending thoracic aortic aneurysm (CMS/[facility])   • Claudication of both lower extremities (CMS/[facility])   • 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[facility])   • Ventral hernia without obstruction or gangrene   • Breast cancer, stage 1, estrogen receptor positive (CMS/[facility])   • Arthritis of right hip   • Arthritis of right knee   • Chondrocalcinosis   • Chronic pain of right knee   • Degenerative disc disease, lumbar   • Gastroesophageal reflux disease   • Bilateral hearing loss   • Thoracic aortic aneurysm without rupture (CMS/[facility])   • Erythromelalgia (CMS/[facility])   • 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CMS/[facility])   • Thoracoabdominal aortic aneurysm, 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)   • 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/[facility])   • Aortic aneurysm, descending (CMS/[facility])   • Aortic aneurysm without rupture (CMS/[facility])   • CAD (coronary artery disease)   • S/P left heart catheterization by ventricular puncture   • NSTEMI (non-ST elevated myocardial  "infarction) (CMS/[facility])   • Pericardial hematoma   • History of ST elevation myocardial infarction (STEMI)   • Acute on chronic diastolic CHF (congestive heart failure) (CMS/[facility])   • Ventricular aneurysm   • Immobility syndrome     Past Medical History:   Diagnosis Date   • AAA (abdominal aortic aneurysm) (CMS/[facility])    • Acute bronchitis 12/2018   • 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[facility])    • Arthritis    • Bilateral carotid artery disease (CMS/[facility])    • Bilateral cataracts     S/p Extractions   • Breast cancer (CMS/[facility])     right breast [id number] (snm) pMX ER/VT pos, Her-2/neg, Ki-67, 31%, oncotype recurrence score 19, invasive lobular carcinoma   • CAD (coronary artery disease)     S/p CABG on 2/23/16 by Dr. 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[facility])    • Cataracts, bilateral    • Closed fracture of three ribs of right side    • Cognitive disorder    • COPD (chronic obstructive pulmonary disease) (CMS/[facility])    • Depression    • Descending aortic arch aneurysm (CMS/[facility])    • Diabetes mellitus (CMS/[facility])     \"BORDERLINE\"   • Erythermalgia (CMS/[facility])    • GERD (gastroesophageal reflux disease)    • Hyperlipidemia     Controlled w/Meds   • Hypertension     Controlled w/Meds   • Low back pain    • Moderate aortic valve insufficiency     S/p AVR on 02/23/16 by Dr. Ontiveros   • Nocturia    • Osteoarthritis    • Osteoporosis    • Otitis media     R Ear   • Prediabetes    • PVD (peripheral vascular disease) (CMS/[facility])    • RLS (restless legs syndrome)    • Saccular aneurysm    • Stroke (CMS/[facility])     Perioperatively w/L Hip Repl   • Thoracic ascending aortic aneurysm (CMS/[facility])     S/p Repair on 02/23/16 by Dr. Ontiveros   • TIA (transient ischemic attack)    • Urgency incontinence    • Venous insufficiency    • Ventral hernia      Past Surgical History:   Procedure Laterality Date   • AORTIC VALVE REPAIR/REPLACEMENT N/A 02/23/2016-[facility]    Ascending Replacement Using a 24MM Graft--Dr. Ontiveros   • APPENDECTOMY  1977   • BREAST BIOPSY Right 09/16/2012    " Vacuum Assisted Core Bx of R Breast   • CARDIAC CATHETERIZATION N/A 01/06/2016    Dr. Tres De Los Santos   • CARDIAC CATHETERIZATION N/A 4/22/2019    Procedure: Left Heart Cath;  Surgeon: Tres De Los Santos MD;  Location:  [facility];  Service: Cardiology   • CARDIAC CATHETERIZATION N/A 4/22/2019    Procedure: Coronary angiography;  Surgeon: Tres De Los Santos MD;  Location:  [facility];  Service: Cardiology   • CARDIAC CATHETERIZATION N/A 7/22/2020    Procedure: LEFT HEART CATH;  Surgeon: Antonia Scott MD;  Location:  [facility];  Service: Cardiovascular;  Laterality: N/A;   • CARDIAC CATHETERIZATION N/A 7/22/2020    Procedure: CORONARY ANGIOGRAPHY;  Surgeon: Antonia Scott MD;  Location:  [facility];  Service: Cardiovascular;  Laterality: N/A;   • CARDIAC CATHETERIZATION N/A 7/22/2020    Procedure: Left ventriculography;  Surgeon: Antonia Scott MD;  Location:  [facility];  Service: Cardiovascular;  Laterality: N/A;   • CARDIAC CATHETERIZATION N/A 7/22/2020    Procedure: Saphenous Vein Graft;  Surgeon: Antonia Scott MD;  Location:  [facility];  Service: Cardiovascular;  Laterality: N/A;   • CARDIAC SURGERY  02/2016    Dr. Ontiveros/Dr. De Los Santos   • CATARACT EXTRACTION Bilateral     [facility]   • COLONOSCOPY N/A 10/2002    Dr. Negro   • CORONARY ARTERY BYPASS GRAFT  02/23/2016-[facility]    x1 w/L Vein Grafting--Dr. Ontiveros   • CORONARY ARTERY BYPASS GRAFT N/A 9/18/2020    Procedure: STERNAL EXPLORATION WITH CLOSURE AND WASHOUT, REMOVAL OF IABP, PRP;  Surgeon: Mart Ontiveros MD;  Location: [facility] MAIN OR;  Service: Cardiothoracic;  Laterality: N/A;   • CYST REMOVAL Right 01/25/2013    R Palm Excision of Retinacular Cyst--Dr. Karla Fish   • EPIDURAL BLOCK     • LAPAROSCOPIC CHOLECYSTECTOMY N/A 08/04/2004    Dr. JOEY Sheth   • MASTECTOMY Right 09/28/2012   • ORIF HIP FRACTURE Left 03/27/2005    w/ AMBI  Dr. Victor M bai   • RECTOVAGINAL FISTULA REPAIR  1965   • THORACIC AORTIC ANEURYSM REPAIR N/A 7/23/2019    Procedure: ROSE, THORACOABDOMINAL AORTIC ANEURYSM REPAIR, VISCERAL VESSEL REPAIR, LARGE VENTRAL HERNIA REPAIR WITH MESH, CIRCULATORY ARREST, PRP;  Surgeon: Mart Ontiveros MD;  Location: [facility] OR;  Service: Cardiothoracic   • TRANSESOPHAGEAL ECHOCARDIOGRAM (ROSE) N/A 9/18/2020    Procedure: TRANSESOPHAGEAL ECHOCARDIOGRAM WITH ANESTHESIA;  Surgeon: Mart Ontiveros MD;  Location: [facility] MAIN OR;  Service: Cardiothoracic;  Laterality: N/A;   • TUBAL ABDOMINAL LIGATION     • VENTRICULAR ANEURYSM REPAIR N/A 7/22/2020    Procedure: EMERGENT REOP STERNOTOMY, REPAIR OF LV RUPTURE, PRP, INTRAOP ROSE;  Surgeon: Mart Ontiveros MD;  Location: [facility] OR;  Service: Cardiothoracic;  Laterality: N/A;   • VENTRICULAR ANEURYSM REPAIR N/A 9/17/2020    Procedure: ROSE, MIDLINE STERNOTOMY REOP  LEFT VENTRICULAR ANEURYSM REPAIR, IABP INSERTION, PRP;  Surgeon: Mart Ontiveros MD;  Location: [facility] OR;  Service: Cardiothoracic;  Laterality: N/A;          PT ASSESSMENT (last 12 hours)      IRF PT Evaluation and Treatment     Row Name 10/08/20 1819          PT Time and Intention    Document Type  discharge evaluation  -MS     Mode of Treatment  physical therapy  -MS     Patient/Family/Caregiver Comments/Observations  Supine in bed upon PT arrival, no exit alarm in AM session or PM session.  -MS     Row Name 10/08/20 1519          Pain Scale: Numbers Pre/Post-Treatment    Posttreatment Pain Rating  9/10  -MS     Pain Location - Orientation  lower  -MS     Pain Location  back  -MS     Pre/Posttreatment Pain Comment  BASILIO Liriano administered medicine during PT session.  -MS     Row Name 10/08/20 1317          Mobility    Advanced Gait Activity  rough/uneven surfaces;step over obstacle  -MS     Additional Documentation  Advanced Gait Activity (Row)  -MS     Row Name 10/08/20 1317          Bed  Mobility    Supine-Sit [place] (Bed Mobility)  modified independence  -MS     Sit-Supine [place] (Bed Mobility)  modified independence  -MS     Comment (Bed Mobility)  x 3, one trial without bed rails  -MS     Row Name 10/08/20 1319          Transfer Assessment/Treatment    Comment (Transfers)  Less cues for safety awareness today- good teach back for brake mechanics.  -MS     Row Name 10/08/20 1319          Transfers    Sit-Stand [place] (Transfers)  standby assist;verbal cues  -MS     Stand-Sit [place] (Transfers)  standby assist;verbal cues;nonverbal cues (demo/gesture)  -MS     Row Name 10/08/20 1319          Sit-Stand Transfer    Assistive Device (Sit-Stand Transfers)  walker, front-wheeled;walker, 4-wheeled  -MS     Row Name 10/08/20 1319          Stand-Sit Transfer    Assistive Device (Stand-Sit Transfers)  walker, front-wheeled;walker, 4-wheeled  -MS     Row Name 10/08/20 1319          Car Transfer    Type (Car Transfer)  stand pivot/stand step  -MS     [place] Level (Car Transfer)  standby assist;verbal cues;nonverbal cues (demo/gesture)  -MS     Assistive Device (Car Transfer)  walker, 4-wheeled  -MS     Row Name 10/08/20 1319          Gait/Stairs (Locomotion)    [place] Level (Gait)  standby assist;verbal cues  -MS     Assistive Device (Gait)  walker, front-wheeled;walker, 4-wheeled  -MS     Distance in Feet (Gait)  AM: 150', PM: 250' w one 20s rest break  -MS     Deviations/Abnormal Patterns (Gait)  chris decreased  -MS     Bilateral Gait Deviations  forward flexed posture  -MS     Number of Steps (Stairs)  4  -MS     Ascending Technique (Stairs)  step-to-step  -MS     Descending Technique (Stairs)  step-to-step  -MS     Row Name 10/08/20 1319          Rough/Uneven Surface Gait Skills (Mobility)    [place], Gait on Rough/Uneven Surface (Mobility)  contact guard;verbal cues;tactile cues  -MS     Assistive Device (Rough/Uneven Surface Gait)  walker, 4-wheeled  -MS      Distance in Feet (Rough/Uneven Surface Gait)  12  -MS     Row Name 10/08/20 1319          Step Over Obstacle (Mobility)    [place], Stepping Over Obstacles (Mobility)  contact guard;verbal cues;minimal assist, 75% or more patient effort assist with manuevering wheels for rollator  -MS     Assistive Device (Step Over Obstacle)  walker, 4-wheeled  -MS     Row Name 10/08/20 1319          Balance    Static Sitting Balance  WFL;supported  -MS     Dynamic Sitting Balance  WFL;supported  -MS     Static Standing Balance  mild impairment  -MS     Row Name 10/08/20 1319          Hip (Therapeutic Exercise)    Hip Strengthening (Therapeutic Exercise)  3 sets;10 repetitions HEP: marches, mini squats, ham curls, hip ABD, SLS,revlunge  -MS     Row Name 10/08/20 1319          Ankle Foot Orthosis Management    Type (Ankle/Foot Orthosis)  right;AFO (ankle foot orthosis)  -MS     Therapeutic Indications (Ankle Foot Orthosis)  compensation for lost function  -MS     Wearing Schedule (Ankle Foot Orthosis)  wear with activity/work  -MS     Orthosis Training (Ankle Foot Orthosis)  patient;donning/doffing orthosis;able to show back training  -MS     Row Name 10/08/20 1319          Bed Mobility Goal 1 (PT-IRF)    Progress/Outcomes (Bed Mobility Goal 1, PT-IRF)  goal met  -MS     Row Name 10/08/20 1319          Transfer Goal 1 (PT-IRF)    Progress/Outcomes (Transfer Goal 1, PT-IRF)  goal partially met  -MS     Row Name 10/08/20 1319          Transfer Goal 2 (PT-IRF)    Progress/Outcomes (Transfer Goal 2, PT-IRF)  goal met  -MS     Row Name 10/08/20 1319          Gait/Walking Locomotion Goal 1 (PT-IRF)    Progress/Outcomes (Gait/Walking Locomotion Goal 1, PT-IRF)  goal met  -MS     Row Name 10/08/20 1319          Stairs Goal 1 (PT-IRF)    Progress/Outcomes (Stairs Goal 1, PT-IRF)  goal partially met  -MS     Row Name 10/08/20 1319          Stairs Goal 2 (PT-IRF)    Progress/Outcomes (Stairs Goal 2, PT-IRF)  goal partially met  -MS      Row Name 10/08/20 1319          Problem Specific Goal 1 (PT-IRF)    Progress/Outcomes (Problem Specific Goal 1, PT-IRF)  goal met  -MS     Row Name 10/08/20 1319          Positioning and Restraints    Pre-Treatment Position  in bed  -MS     Post Treatment Position  bed  -MS     In Bed  notified nsg;fowlers;call light within reach;encouraged to call for assist no exit alarm upon PT arrival  -MS     Row Name 10/08/20 1319          Vital Signs    O2 Delivery Pre Treatment  room air  -MS     Row Name 10/08/20 1319          Therapy Plan Review/Discharge Plan (PT)    Expected Discharge Disposition (PT Eval)  home with caregiver;home with home health care  -MS     Row Name 10/08/20 1319          Discharge Summary (PT)    Outcomes Achieved/Progress Made Upon Discharge (PT)  goals partially achieved prior to discharge  -MS     Transfer to Another Level of Care or Facility (PT)  recommend therapy via home health  -MS       User Key  (r) = Recorded By, (t) = Taken By, (c) = Cosigned By    Initials Name Provider Type    Samantha Cardenas KIRK, PT Physical Therapist          Physical Therapy Education                 Title: PT OT SLP Therapies (In Progress)     Topic: Physical Therapy (Resolved)     Point: Mobility training (Resolved)     Learning Progress Summary           Patient Acceptance, E, VU,NR by MS at 10/7/2020 1439    Acceptance, E, NR,VU by JK at 10/5/2020 1252    Acceptance, E, NR by LB at 10/3/2020 1229    Acceptance, E,TB,D, VU,DU,NR by KP at 10/2/2020 1042    Comment: walker techniques    Acceptance, E,TB,D, VU,NR by EE at 9/30/2020 1115    Acceptance, E,TB, VU,NR by EE at 9/29/2020 1113    Acceptance, E,TB, NR,VU by EE at 9/28/2020 1145    Acceptance, E, VU,NR by JK at 9/26/2020 1158                   Point: Home exercise program (Resolved)     Learning Progress Summary           Patient Acceptance, E, NR,VU by JK at 10/5/2020 1252    Acceptance, E,TB,D, VU,DU,NR by  at 10/2/2020 1042    Comment: walker  techniques    Acceptance, E,TB,D, VU,NR by EE at 9/30/2020 1115    Acceptance, E,TB, VU,NR by EE at 9/29/2020 1113    Acceptance, E,TB, NR,VU by EE at 9/28/2020 1145                   Point: Body mechanics (Resolved)     Learning Progress Summary           Patient Acceptance, E, VU,NR by MS at 10/7/2020 1439    Acceptance, E,TB, VU,NR by EE at 9/29/2020 1113    Acceptance, E,TB, NR,VU by EE at 9/28/2020 1145                   Point: Precautions (Resolved)     Learning Progress Summary           Patient Acceptance, E, VU,NR by MS at 10/7/2020 1439                               User Key     Initials Effective Dates Name Provider Type Discipline    LB 03/07/18 -  Tessy Rodriguez, PTA Physical Therapy Assistant PT    EE 04/03/18 -  Shruthi Roach, PT Physical Therapist PT    JK 04/03/18 -  Araceli Jones, PT Physical Therapist PT     04/03/18 -  Karena Sears, PT Physical Therapist PT    MS 03/04/19 -  Samantha Rai, PT Physical Therapist PT                PT Recommendation and Plan        Daily Progress Summary (PT)  Functional Goal Overall Progress (PT): progressing toward functional goals as expected  Daily Progress Summary (PT): Improvement with functional tasks, heavy emphasis on safety awareness with AD and rollator. SOA with activity but continues to demo improvement. Plan to address car transfer tomorrow.  Impairments Still Limiting Function (PT): strength decreased, impaired balance, impaired functional activity tolerance         Time Calculation:   PT Charges     Row Name 10/08/20 1532 10/08/20 1508 10/08/20 1318       Time Calculation    Start Time  1315  -MS  1415  -MS  0900  -MS    Stop Time  1330  -MS  1430  -MS  0930  -MS    Time Calculation (min)  15 min  -MS  15 min  -MS  30 min  -MS    PT Received On  --  --  10/08/20  -MS      User Key  (r) = Recorded By, (t) = Taken By, (c) = Cosigned By    Initials Name Provider Type    Samantha Cardenas, PT Physical Therapist          Therapy  Charges for Today     Code Description Service Date Service Provider Modifiers Qty    [id number]  PT THER PROC EA 15 MIN 10/7/2020 Rai, Samantha BRADLEY, PT GP 2    [id number]  PT THERAPEUTIC ACT EA 15 MIN 10/7/2020 Rai, Samantha BRADLEY, PT GP 2    [id number]  PT THER PROC EA 15 MIN 10/8/2020 Rai, Samantha BRADLEY, PT GP 1    [id number]  PT THERAPEUTIC ACT EA 15 MIN 10/8/2020 Rai, Samantha BRADLEY, PT GP 1    [id number]  PT THER PROC EA 15 MIN 10/8/2020 Rai, Samantha BRADLEY, PT GP 2             Patient was intermittently wearing a face mask during this therapy encounter. Therapist used appropriate personal protective equipment including eye protection, mask, and gloves.  Mask used was standard procedure mask. Appropriate PPE was worn during the entire therapy session. Hand hygiene was completed before and after therapy session. Patient is not in enhanced droplet precautions.            Samantha BRADLEY Cecelia, PT  10/8/2020

## 2020-10-09 ENCOUNTER — TELEPHONE (OUTPATIENT)
Dept: INTERNAL MEDICINE | Age: 80
End: 2020-10-09

## 2020-10-09 VITALS
HEART RATE: 78 BPM | DIASTOLIC BLOOD PRESSURE: 74 MMHG | SYSTOLIC BLOOD PRESSURE: 128 MMHG | OXYGEN SATURATION: 96 % | TEMPERATURE: 98.1 F | WEIGHT: 81.13 LBS | BODY MASS INDEX: 18.25 KG/M2 | RESPIRATION RATE: 18 BRPM | HEIGHT: 56 IN

## 2020-10-09 PROCEDURE — G0008 ADMIN INFLUENZA VIRUS VAC: HCPCS | Performed by: PHYSICAL MEDICINE & REHABILITATION

## 2020-10-09 PROCEDURE — 94799 UNLISTED PULMONARY SVC/PX: CPT

## 2020-10-09 PROCEDURE — 90686 IIV4 VACC NO PRSV 0.5 ML IM: CPT | Performed by: PHYSICAL MEDICINE & REHABILITATION

## 2020-10-09 PROCEDURE — 25010000002 INFLUENZA VAC SPLIT QUAD 0.5 ML SUSPENSION PREFILLED SYRINGE: Performed by: PHYSICAL MEDICINE & REHABILITATION

## 2020-10-09 RX ORDER — IPRATROPIUM BROMIDE AND ALBUTEROL SULFATE 2.5; .5 MG/3ML; MG/3ML
3 SOLUTION RESPIRATORY (INHALATION)
Qty: 360 ML | Refills: 0 | Status: SHIPPED | OUTPATIENT
Start: 2020-10-09 | End: 2022-05-03 | Stop reason: HOSPADM

## 2020-10-09 RX ORDER — LANOLIN ALCOHOL/MO/W.PET/CERES
325 CREAM (GRAM) TOPICAL
Qty: 90 TABLET | Refills: 0 | Status: ON HOLD | OUTPATIENT
Start: 2020-10-10 | End: 2022-05-03 | Stop reason: SDUPTHER

## 2020-10-09 RX ORDER — LOSARTAN POTASSIUM 50 MG/1
50 TABLET ORAL DAILY
Qty: 30 TABLET | Refills: 1 | Status: SHIPPED | OUTPATIENT
Start: 2020-10-09 | End: 2020-11-23 | Stop reason: SDUPTHER

## 2020-10-09 RX ORDER — MIRTAZAPINE 7.5 MG/1
7.5 TABLET, FILM COATED ORAL NIGHTLY
Qty: 90 TABLET | Refills: 0 | Status: SHIPPED | OUTPATIENT
Start: 2020-10-09 | End: 2021-08-12

## 2020-10-09 RX ORDER — ATORVASTATIN CALCIUM 40 MG/1
40 TABLET, FILM COATED ORAL NIGHTLY
Qty: 30 TABLET | Refills: 1 | Status: SHIPPED | OUTPATIENT
Start: 2020-10-09 | End: 2020-11-23 | Stop reason: SDUPTHER

## 2020-10-09 RX ORDER — DIPHENOXYLATE HYDROCHLORIDE AND ATROPINE SULFATE 2.5; .025 MG/1; MG/1
1 TABLET ORAL DAILY
Qty: 90 TABLET | Refills: 0 | Status: SHIPPED | OUTPATIENT
Start: 2020-10-09

## 2020-10-09 RX ORDER — IPRATROPIUM BROMIDE AND ALBUTEROL SULFATE 2.5; .5 MG/3ML; MG/3ML
3 SOLUTION RESPIRATORY (INHALATION)
Status: DISCONTINUED | OUTPATIENT
Start: 2020-10-09 | End: 2020-10-09 | Stop reason: HOSPADM

## 2020-10-09 RX ORDER — BUDESONIDE 0.5 MG/2ML
0.5 INHALANT ORAL
Qty: 120 ML | Refills: 0 | Status: SHIPPED | OUTPATIENT
Start: 2020-10-09 | End: 2022-05-03 | Stop reason: HOSPADM

## 2020-10-09 RX ORDER — PANTOPRAZOLE SODIUM 40 MG/1
40 TABLET, DELAYED RELEASE ORAL EVERY MORNING
Qty: 90 TABLET | Refills: 0 | Status: SHIPPED | OUTPATIENT
Start: 2020-10-10 | End: 2020-12-15 | Stop reason: SDUPTHER

## 2020-10-09 RX ORDER — METOPROLOL SUCCINATE 50 MG/1
50 TABLET, EXTENDED RELEASE ORAL EVERY 12 HOURS SCHEDULED
Qty: 60 TABLET | Refills: 1 | Status: SHIPPED | OUTPATIENT
Start: 2020-10-09 | End: 2020-11-23 | Stop reason: SDUPTHER

## 2020-10-09 RX ADMIN — CLOPIDOGREL 75 MG: 75 TABLET, FILM COATED ORAL at 07:41

## 2020-10-09 RX ADMIN — LOSARTAN POTASSIUM 50 MG: 50 TABLET, FILM COATED ORAL at 07:42

## 2020-10-09 RX ADMIN — IPRATROPIUM BROMIDE AND ALBUTEROL SULFATE 3 ML: 2.5; .5 SOLUTION RESPIRATORY (INHALATION) at 07:19

## 2020-10-09 RX ADMIN — PANTOPRAZOLE SODIUM 40 MG: 40 TABLET, DELAYED RELEASE ORAL at 05:08

## 2020-10-09 RX ADMIN — METOPROLOL SUCCINATE 50 MG: 50 TABLET, EXTENDED RELEASE ORAL at 07:41

## 2020-10-09 RX ADMIN — BUDESONIDE 0.5 MG: 0.5 INHALANT ORAL at 07:18

## 2020-10-09 RX ADMIN — Medication 1 TABLET: at 07:41

## 2020-10-09 RX ADMIN — CILOSTAZOL 100 MG: 100 TABLET ORAL at 07:41

## 2020-10-09 RX ADMIN — TRAMADOL HYDROCHLORIDE 50 MG: 50 TABLET, FILM COATED ORAL at 07:41

## 2020-10-09 RX ADMIN — OXYBUTYNIN CHLORIDE 5 MG: 5 TABLET, EXTENDED RELEASE ORAL at 07:42

## 2020-10-09 RX ADMIN — INFLUENZA VIRUS VACCINE 0.5 ML: 15; 15; 15; 15 SUSPENSION INTRAMUSCULAR at 13:01

## 2020-10-09 RX ADMIN — FERROUS SULFATE TAB 325 MG (65 MG ELEMENTAL FE) 325 MG: 325 (65 FE) TAB at 07:41

## 2020-10-09 NOTE — PROGRESS NOTES
Inpatient Rehabilitation Plan of Care Note    Plan of Care  Care Plan Reviewed - Updates as Follows    Psychosocial    Performed Intervention(s)  verbalize needs and concerns      Safety    Performed Intervention(s)  bed/chair alarms  hourly rounding  items within reach      Sphincter Control    Performed Intervention(s)  Monitor intake, output, & BM's q shift  adequate fluid intake      Body Systems    Performed Intervention(s)  dressing changes or wound care per orders  WOCN  monitor for signs of infection    Signed by: Florence Ramirez RN

## 2020-10-09 NOTE — DISCHARGE SUMMARY
[facility] - REHABILITATION UNIT    KRUPA HECK  1940    Date of admission September 25, 2020  Date of discharge October 9, 2020    Chief Complaint:   immobility syndrome      History of Present Illness:  Ms. Heck is an 81yo F w/ significant cardiac pmh including CAD s/p CABG 2012, VHD s/p AVR 2016 w/ ascending aortic aneurysm repair, thoacoabdominal aortic aneurysm repair 2019, and an STEMI w/ myocardial rupture of the LV s/p redo sternotomy w/ ventricular wall/false anuerysm repair 7/2020 that presented to [facility], [place] ED 9/14 w/ intermittent SOA and SHERIDAN. SOA/SHERIDAN has been present for ~2 months but noted that her respiratory status was worsening over the last well. There was no associated chest pain, cough, fever, or BLE edema.     CTA performed upon arrival and showed a persistent defect within the L ventricular wall and hematoma. TTE then performed for further eval and showed a large anuerysm of the basal lateral wall and a smaller next to it. LV function was preserved.      Dr. Ontiveros performed an urgent redo sternotomy w/ lysis of adhesions, LV lateral wall aneurysm repair w/ pericardial patch, and R percutaneous common femoral artery intra-aortic ballon pump placement 9/17/20. On 9/18/20, Ms. Heck was taken back to the OR by Dr. Ontiveros for wound exploration, wash out, and rewiring and removal of the intra-aortic ballon pump.     Because she is stabilizing from a cardiac-respiratory perspective, but still having difficulty performing ADLs and ambulating safely, PM&R was consulted for evaluation for acute, inpatient rehab.     Today, she complained of generalized weakness, fatigue, and decreased endurance. She noted needing increased help w/ bed mobility, transfers, ambulation, and self-hygiene. She also reported to getting winded easily (even w/ talking). She also complained of R foot drop. She noted that her symptoms started ~1 year ago w/o any inciting event. She denied and  radiating/radicular back pain. Vascular surgery was consulted and no surgical intervention indicated at this time.     Functional History - previously independent w/ all ADLs; would occasionally ambulate w/ a walker or cane     Overview of PT/OT:  PT (9/24/2020)  Bed Mobility - Joan  Transfers - Joan  Gait - 65ft CGA w/ decreased stride length and forward flexed posture; AFO on RLE     OT (9/24/2020)  Bed Mobility - Joan  Transfers - Joan  Bathing - modA  Upper Body Dressing - Joan  Lower Body Dressing - modA  Grooming - set up  Toileting - modA     SLP  Mech soft, NTL diet     Now admit for acute inpt rehab     Hospital course:    Patient participated comprehensive inpatient rehabilitation program.  Her sternotomy incision healed.  She did have shortness of air with fatigue but maintain oxygen saturation level.  She was seen in follow-up by cardiology, cardiothoracic surgery, and pulmonology.  Her breathing treatments were adjusted with changing to nebulizers with improvement.  During her hospital stay the following areas were addressed-    Physical Debility/Immobility Syndrome     Dysphagia-October 5-improved.  Advance to regular solids/nectar thick liquid diet.  Videofluoroscopic swallow study on Wednesday, October 7 October 7-VFSS completed. No aspiration observed with any consistency. Transient, shallow penetration observed with thin liquids. Recommend upgrade diet to regular with thins. Small bites/sips, slow rate, alternating bites/sip. Sit upright for meals and 30 minutes after meals.      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   Physical therapy notes that she was much more fatigued with ambulating just to the bathroom in her room.  She ambulated 80 to 90 feet contact-guard assist in therapies.  Does not require nasal cannula oxygen.  Does not describe any productive sputum.    Patient reviewed with cardiology service and cardiothoracic service.  CT of the chest without pulmonary embolism.  Not felt to be in florid congestive heart failure.  Seen by the pulmonology service and Trelegy inhaler changed to nebulizer to try to optimize her pulmonary status with underlying COPD.  October 5-fatigue-shortness of air improved.  Anemia is improved.        pmh of Breast Ca     COPD  Trelegy inhaler changed to nebulizers to try to optimize her pulmonary status continues on room air  October 9: Patient much improved with change to the nebulizers and continue on those at discharge     HTN     HLD     Peripheral Vascular Disease     Right Foot Drop- chronic - AFO     DVT prophylaxis - SCDs     Anemia  October 8-hemoglobin steadily improved to 10.9 on October 8.     Pain management - tramadol - home med. GIULIA reviewed.      Anxiety/depression-October 6-Patient complains of anxiety/depression.  She been on Prozac in the past.  Complains more of anxiety presently.    Has been taking alprazolam in the hospital but not at home and reviewed would not look at using that as an option after discharge/long-term.  Also reviewed potential for medication side effects including effects of SSRI on QT interval or for serotonin syndrome as she takes tramadol.  At this point will start the patient on Remeron 7.5 mg p.o. nightly to help with sleep, depression/anxiety/appetite, which is felt to have less of a potential for serotonin syndrome     Impairments Include:   Dysphagia, mobility, ambulation, adl’s, strength, endurance, respiratory status, swallowing     TEAM CONF - SEPT 29 - BED MIN. TRANSFERS MIN. GAIT 80 FEET CTG RW. TOILET TRANSFERS MOD ASSIST. SHOWER  TRANSFERS MOD ASSIST. UBD MIN. LBD MOD. BATH MOD ASSIST. TOILETING MOD ASSIST. SWALLOW - MECH SOFT, NO MIXED, NECTAR THICK LIQUIDS. LAST VFSS SILENT ASPIRATION ON THINS. REPEAT VFSS NEXT WEEK. CONTINENT BOWEL AND BLADDER. STERNAL INCISION HEALING. PRESSURE SORE TO BUTTOCKS WITH CREAM TO THAT. ALPRAZOLAM PRN AT NIGHT. REC THERAPY INVOLVED.  ELOS - 2 WEEKS     TEAM CONF - OCT 6 - BED MOD I. TRASNFERS CTG. GAIT 4 STEPS B RAILS. CTG. GAIT 80 FEET CTG-SBA, RW OR ROLLATOR. TOILET TRANSFERS CTG. SHOWER TRANSFERS CTG-MIN.  UBD SBA. LBD SBA BATH SBA. TOILETING CTG ASSIST IF STANDING. ON NEBULIZERS. ROOM AIR. IMPAIRED ENDURANCE.  SWALLOW - REG SOLIDS/ NTL. VFSS ON WED. CONTINENT BOWEL AND BLADDER. STAGE 1-2 BUTTOCK CLEFT AREA.   ELOS - GOAL TO DTR'S HOME WITH 5 STEPS TO ENTER.   ELOS: Friday OCT 9. [facility] PT AND OT AND NURSING.     FAMILY CONF - OCT 8 -  Family conference held today with pt, pt's daughterCecy, PT,OT,ST,RN,SW and daughters Maddy and Estefany by phone.   Discussed pt's progress, current status and discharge plans.  PT reports pt completes bed mobility and transfers with SBA. She ambulates 250' with a rolling walker and SBA. She can ascend/descend 4 steps with CG. PT reports pt has good safety awareness.   In OT, pt completes commode transfers with SBA and shower transfers with SSBA-CG. OT notes pt has had no loss of balance in the last couple of days.  Pt is able to complete all bathing, dressing, grooming and toileting with SBA.   Pt's strength and endurance have improved and she is independent with her home exercise program.   Sternal precautions reviewed and pt is independent to follow these as well.   ST discussed results of repeat VFSS and diet upgrade to regular with thin liquids. ST explained that food and liquids move slowly through pt's esophagus and ST recommends pt alternate bites with sips of liquid when eating.   Nursing reviewed current medications and provided a list. Skin care  discussed. Pt has very small open area on her coccyx and  RN recommends pt's skin be evaluated daily for breakdown. There are steri strips to surgical site in pt's groin and care of this area discussed. Chest incisions are well healed.   Follow up appointments with Dr Castañeda on 10/13, FREDRICK Mendosa on 10/28 and FREDRICK Duong on 12/8 reviewed. Dr De Los Santos and Dr Burks will be called for discharge instructions.    DME discussed. Pt has a rollator and a rolling walker. She also has a shower seat and grab bar.  Home health PT,OT and skilled nursing is recommended. Referral made to [facility] per pt request.   Pt to discharge tomorrow to her daughter, Cecy's home where she will have 24 hour assistance.   Pt and family are very happy with pt's progress and pt is looking forward to discharge tomorrow.     October 9-achieved inpatient rotation goals.  Medically stable.  Medications follow-up reviewed.  Discharge home today..       Goal is for home with  Home health   therapies.  Barrier to discharge:  Impaired mobility  - worked on  Strength, balance, ADLS, swallow  to overcome.       Physical Exam     Gen:    calm; pleasant  HEENT: NCAT, EOMI; MMM,  Neck:   Supple, gauze on R neck  CV:      RRR, no m/r/g appreciated  Lungs: diminished breath sounds in the bases;       Abd:     (+) BS, soft, non-tender, non-distended  Ext:      no new edema  Skin:   cardiac incision healing well  Psych: appropriate mood and affect  Neuro:  Mental Status: AOx4, Speech: fluent without dysarthria or scanning, No gross cognitive deficits  Strength: R > L foot drop           Results Review:                I reviewed the patient's new clinical results.        Results from last 7 days   Lab Units 10/08/20  0800 10/05/20  0609   WBC 10*3/mm3 3.55 4.20   HEMOGLOBIN g/dL 10.9* 10.2*   HEMATOCRIT % 32.8* 30.8*   PLATELETS 10*3/mm3 389 484*             Results from last 7 days   Lab Units 10/08/20  0800 10/06/20  0902 10/05/20  0609   SODIUM mmol/L  143 143 143   POTASSIUM mmol/L 3.6 3.8 3.6   CHLORIDE mmol/L 105 106 107   CO2 mmol/L 26.7 27.0 26.3   BUN mg/dL 14 15 15   CREATININE mg/dL 0.65 0.58 0.55*   CALCIUM mg/dL 9.1 9.0 9.0   GLUCOSE mg/dL 106* 117* 116*      CT of the chest angiogram on October 2, 2020  FINDINGS: There is no evidence of pulmonary embolism. Stable aneurysmal  dilatation of the descending thoracic aorta. Stable changes of thoracic  aortic aneurysm repair. Stable rounded hypodensity along the proximal  aspect of the aortic arch. Interval repair of defect along the left  ventricular wall and evacuation of adjacent hematoma. Small right  pleural effusion. Emphysema. 5 mm nodule in the anterior left lower lobe  on image 57 is larger. 7 mm nodule in the posterior right upper lobe is  smaller where it previously measured 9 mm. Limited imaging of the upper  abdomen demonstrates bilateral renal cysts bone windows shows stable  degenerative changes of the thoracic spine. Stable loss of height of the  inferior endplate of L1.        Name Relationship Specialty Phone Fax Address Order              [facility] [phone] [address]  [place] [facility] [address]     Next Steps: Follow up      Instructions: [facility] will contact you to sechedule in home physical therapy, occupational therapy and skilled nursing visits.      Tres De Los Santos MD  Consulting Physician Cardiology [phone] [address] [facility] [address]  [place] [facility] [address]     Next Steps: Follow up in 1 month(s)      Instructions: appointment on november 11,2020 @ 2:20 pm      Winston Jones MD   Physical Medicine and Rehabilitation [phone] [address] [facility] [address]  [place] [facility] [address]     Next Steps: Follow up      Instructions: No scheduled follow up needed with Yaya John MD  Consulting Physician Pulmonary Disease [phone] [address]  [facility] [address]  [place] [address]     Next Steps: Follow up      Instructions: appointment on november 11,2020@ 3:30 pm      Miller Castañeda MD  PCP - General  PCP - [facility] Internal Medicine [phone] [address] [facility] [address]  [place] [address]     Next Steps: Follow up on 10/13/2020      Instructions: 3:15      Bonita Huynh APRN   Cardiology  Cardiothoracic Surgery [phone] [address] [facility] [address]  [place] [address]     Next Steps: Follow up on 10/28/2020      Instructions: 11:00       Carlie Hilton APRN   Nurse Practitioner  Cardiology [phone] [address] [facility] [address]  [place] [address]     Next Steps: Follow up on 12/8/2020      Instructions: 2:00 pm           Discharge Medications      New Medications      Instructions Start Date   atorvastatin 40 MG tablet  Commonly known as: LIPITOR   40 mg, Oral, Nightly      budesonide 0.5 MG/2ML nebulizer solution  Commonly known as: PULMICORT   0.5 mg, Nebulization, 2 Times Daily - RT      ferrous sulfate 325 (65 FE) MG EC tablet   325 mg, Oral, Daily With Breakfast   Start Date: October 10, 2020     ipratropium-albuterol 0.5-2.5 mg/3 ml nebulizer  Commonly known as: DUO-NEB   3 mL, Nebulization, 3 Times Daily - RT      metoprolol succinate XL 50 MG 24 hr tablet  Commonly known as: TOPROL-XL   50 mg, Oral, Every 12 Hours Scheduled      mirtazapine 7.5 MG tablet  Commonly known as: REMERON   7.5 mg, Oral, Nightly      multivitamin tablet   1 tablet, Oral, Daily      pantoprazole 40 MG EC tablet  Commonly known as: PROTONIX  Replaces: omeprazole 20 MG capsule   40 mg, Oral, Every Morning   Start Date: October 10, 2020        Changes to Medications      Instructions Start Date   losartan 50 MG tablet  Commonly known as: COZAAR  What changed:   · medication strength  · how much to take   50 mg, Oral, Daily         Continue These Medications      Instructions Start Date   albuterol sulfate HFA  108 (90 Base) MCG/ACT inhaler  Commonly known as: ProAir HFA   2 puffs, Inhalation, Every 6 Hours PRN      cetirizine 10 MG tablet  Commonly known as: zyrTEC   10 mg, Oral, As Needed      cilostazol 100 MG tablet  Commonly known as: PLETAL   100 mg, Oral, 2 Times Daily      clopidogrel 75 MG tablet  Commonly known as: PLAVIX   75 mg, Oral, Daily      meclizine 12.5 MG tablet  Commonly known as: ANTIVERT   12.5 mg, Oral, 3 Times Daily PRN      pramipexole 0.125 MG tablet  Commonly known as: MIRAPEX   TAKE ONE TABLET BY MOUTH EVERY NIGHT AT BEDTIME      sennosides-docusate 8.6-50 MG per tablet  Commonly known as: PERICOLACE   2 tablets, Oral, Daily PRN      Toviaz 4 MG tablet sustained-release 24 hour tablet  Generic drug: fesoterodine fumarate   4 mg, Oral, Every 24 Hours Scheduled      traMADol 50 MG tablet  Commonly known as: ULTRAM   50 mg, Oral, Every 6 Hours PRN         Stop These Medications    carvedilol 6.25 MG tablet  Commonly known as: COREG     Fluticasone-Umeclidin-Vilant 100-62.5-25 MCG/INH aerosol powder   Commonly known as: Trelegy Ellipta     furosemide 20 MG tablet  Commonly known as: LASIX     melatonin 5 MG tablet tablet     omeprazole 20 MG capsule  Commonly known as: priLOSEC  Replaced by: pantoprazole 40 MG EC tablet          [facility] PT AND OT AND NURSING.    Sternotomy precautions until 6 weeks post op    No driving. No alcohol.    Educated patient that now taking Pantoprazole rather than Omeprazole,  as omeprazole can lessen the effect of Plavix.         >30 on discharge

## 2020-10-09 NOTE — PLAN OF CARE
Goal Outcome Evaluation:  Plan of Care Reviewed With: patient  Progress: improving   Slept fair. Meds whole with pudding. No distress. Continent of B&B.  No B/P's or sticks to Rt. Arm. Refused SCD's. Plans for D/C today.

## 2020-10-09 NOTE — TELEPHONE ENCOUNTER
SHE IS GOING HOME FROM REHAB TODAY AND SHE NEEDS, PT, OT AND SKILLED NURSING FOR EVAL.HOME HEALTH.    PLEASE ADVISE  [phone] MIKE

## 2020-10-09 NOTE — DISCHARGE INSTRUCTIONS
[facility] PT AND OT AND NURSING.    Sternotomy precautions until 6 weeks post op    No driving. No alcohol.     Notify patient that now taking Pantoprazole rather than Omeprazole,  as also taking Plavix.

## 2020-10-09 NOTE — TELEPHONE ENCOUNTER
"Spoke with Nimisha and she was advised that Dr. Castañeda gave \"ok\" for orders as requested. Nimisha r/v orders and understanding. MM  "

## 2020-10-09 NOTE — PROGRESS NOTES
SECTION GG      Self Care Performance Discharge:   Oral Hygiene: [place] sets up or cleans up; patient completes activity. [place]  assists only prior to or following the activity.   Toileting Hygiene: : [place] provides verbal cues and/or touching/steadying  and/or contact guard assistance as patient completes activity.   Shower/Bathe Self: [place] provides verbal cues and/or touching/steadying and/or  contact guard assistance as patient completes activity.   Upper Body Dressing: [place] sets up or cleans up; patient completes activity.  [place] assists only prior to or following the activity.   Lower Body Dressing: [place] provides verbal cues and/or touching/steadying  and/or contact guard assistance as patient completes activity.   Putting On/Taking Off Footwear: [place] provides verbal cues and/or  touching/steadying and/or contact guard assistance as patient completes  activity.    Mobility Toilet Transfer Discharge: [place] provides verbal cues or  touching/steadying assistance as patient completes activity.    Signed by: Sunni Matt OT

## 2020-10-09 NOTE — PROGRESS NOTES
SECTION GG    Eating Performance Discharge: [place] sets up or cleans up; patient completes  activity. [place] assists only prior to or following the activity.    Signed by: Heri Lindquist RN

## 2020-10-09 NOTE — PROGRESS NOTES
"   LOS: 14 days   Patient Care Team:  Miller Castañeda MD as PCP - General (Internal Medicine)  Miller Castañeda MD as PCP - Claims Attributed  Mart Ontiveros MD as Surgeon (Cardiothoracic Surgery)  Tres De Los Santos MD as Consulting Physician (Cardiology)  Graham Mcconnell MD as Consulting Physician (Hematology and Oncology)  Payam Byrnes MD as Consulting Physician (Otolaryngology)  Jonathan Smith MD as Consulting Physician (Vascular Surgery)  Victor M Cabral MD as Surgeon (Orthopedic Surgery)  Yaya Burks MD as Consulting Physician (Pulmonary Disease)    Chief Complaint:   immobility syndrome    Subjective     History of Present Illness    Subjective     Doing well.  Ready for home.  .     History taken from: patient    Objective     Vital Signs  Temp:  [97.5 °F (36.4 °C)-98.1 °F (36.7 °C)] 98.1 °F (36.7 °C)  Heart Rate:  [77-89] 78  Resp:  [14-18] 18  BP: (122-138)/(58-80) 128/74    Objective:  Vital signs: (most recent): Blood pressure 128/74, pulse 78, temperature 98.1 °F (36.7 °C), temperature source Oral, resp. rate 18, height 142.2 cm (56\"), weight 36.8 kg (81 lb 2.1 oz), SpO2 96 %.            Physical Exam     Gen:    calm; pleasant  HEENT: NCAT, EOMI; MMM,  Neck:   Supple, gauze on R neck  CV:      RRR, no m/r/g appreciated  Lungs: diminished breath sounds in the bases;      Abd:     (+) BS, soft, non-tender, non-distended  Ext:      no new edema  Skin:   cardiac incision healing well  Psych: appropriate mood and affect  Neuro:  Mental Status: AOx4, Speech: fluent without dysarthria or scanning, No gross cognitive deficits  Strength: R > L foot drop         Results Review:     I reviewed the patient's new clinical results.  Results from last 7 days   Lab Units 10/08/20  0800 10/05/20  0609   WBC 10*3/mm3 3.55 4.20   HEMOGLOBIN g/dL 10.9* 10.2*   HEMATOCRIT % 32.8* 30.8*   PLATELETS 10*3/mm3 389 484*     Results from last 7 days   Lab Units 10/08/20  0800 10/06/20  0902 10/05/20  0609 "   SODIUM mmol/L 143 143 143   POTASSIUM mmol/L 3.6 3.8 3.6   CHLORIDE mmol/L 105 106 107   CO2 mmol/L 26.7 27.0 26.3   BUN mg/dL 14 15 15   CREATININE mg/dL 0.65 0.58 0.55*   CALCIUM mg/dL 9.1 9.0 9.0   GLUCOSE mg/dL 106* 117* 116*     CT of the chest angiogram on October 2, 2020  FINDINGS: There is no evidence of pulmonary embolism. Stable aneurysmal  dilatation of the descending thoracic aorta. Stable changes of thoracic  aortic aneurysm repair. Stable rounded hypodensity along the proximal  aspect of the aortic arch. Interval repair of defect along the left  ventricular wall and evacuation of adjacent hematoma. Small right  pleural effusion. Emphysema. 5 mm nodule in the anterior left lower lobe  on image 57 is larger. 7 mm nodule in the posterior right upper lobe is  smaller where it previously measured 9 mm. Limited imaging of the upper  abdomen demonstrates bilateral renal cysts bone windows shows stable  degenerative changes of the thoracic spine. Stable loss of height of the  inferior endplate of L1.        Medication Review: done  Scheduled Meds:atorvastatin, 40 mg, Oral, Nightly  budesonide, 0.5 mg, Nebulization, BID - RT  cilostazol, 100 mg, Oral, BID  clopidogrel, 75 mg, Oral, Daily  ferrous sulfate, 325 mg, Oral, Daily With Breakfast  ipratropium-albuterol, 3 mL, Nebulization, TID - RT  losartan, 50 mg, Oral, Daily  metoprolol succinate XL, 50 mg, Oral, Q12H  mirtazapine, 7.5 mg, Oral, Nightly  multivitamin, 1 tablet, Oral, Daily  oxybutynin XL, 5 mg, Oral, Daily  pantoprazole, 40 mg, Oral, QAM  pramipexole, 0.125 mg, Oral, Nightly      Continuous Infusions:   PRN Meds:.•  albuterol sulfate HFA  •  influenza vaccine  •  traMADol      Assessment/Plan       Immobility syndrome      Assessment & Plan  Etiologic Diagnosis and Comorbidities include:    Physical Debility/Immobility Syndrome    Dysphagia-October 5-improved.  Advance to regular solids/nectar thick liquid diet.  Videofluoroscopic swallow study  on Wednesday, October 7 October 7-VFSS completed. No aspiration observed with any consistency. Transient, shallow penetration observed with thin liquids. Recommend upgrade diet to regular with thins. Small bites/sips, slow rate, alternating bites/sip. Sit upright for meals and 30 minutes after meals.     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  Physical therapy notes that she was much more fatigued with ambulating just to the bathroom in her room.  She ambulated 80 to 90 feet contact-guard assist in therapies.  Does not require nasal cannula oxygen.  Does not describe any productive sputum.    Patient reviewed with cardiology service and cardiothoracic service.  CT of the chest without pulmonary embolism.  Not felt to be in florid congestive heart failure.  Seen by the pulmonology service and Trelegy inhaler changed to nebulizer to try to optimize her pulmonary status with underlying COPD.  October 5-fatigue-shortness of air improved.  Anemia is improved.      pmh of Breast Ca    COPD  Trelegy inhaler changed to nebulizers to try to optimize her pulmonary status continues on room air  Oct 8: Will continue nebulizers on discharge     HTN    HLD    Peripheral Vascular Disease    Right Foot Drop- chronic - AFO    DVT prophylaxis - SCDs    Anemia    Pain management - tramadol - home med. GIULIA reviewed.     Anxiety/depression-October 6-Patient complains of anxiety/depression.  She been on Prozac in the past.  Complains more of anxiety presently.    Has been taking alprazolam in the hospital but not at home and reviewed would not look at using  that as an option after discharge/long-term.  Also reviewed potential for medication side effects including effects of SSRI on QT interval or for serotonin syndrome as she takes tramadol.  At this point will start the patient on Remeron 7.5 mg p.o. nightly to help with sleep, depression/anxiety/appetite, which is felt to have less of a potential for serotonin syndrome     Impairments Include:   Dysphagia, mobility, ambulation, adl’s, strength, endurance, respiratory status, swallowing    TEAM CONF - SEPT 29 - BED MIN. TRANSFERS MIN. GAIT 80 FEET CTG RW. TOILET TRANSFERS MOD ASSIST. SHOWER TRANSFERS MOD ASSIST. UBD MIN. LBD MOD. BATH MOD ASSIST. TOILETING MOD ASSIST. SWALLOW - MECH SOFT, NO MIXED, NECTAR THICK LIQUIDS. LAST VFSS SILENT ASPIRATION ON THINS. REPEAT VFSS NEXT WEEK. CONTINENT BOWEL AND BLADDER. STERNAL INCISION HEALING. PRESSURE SORE TO BUTTOCKS WITH CREAM TO THAT. ALPRAZOLAM PRN AT NIGHT. REC THERAPY INVOLVED.  ELOS - 2 WEEKS    TEAM CONF - OCT 6 - BED MOD I. TRASNFERS CTG. GAIT 4 STEPS B RAILS. CTG. GAIT 80 FEET CTG-SBA, RW OR ROLLATOR. TOILET TRANSFERS CTG. SHOWER TRANSFERS CTG-MIN.  UBD SBA. LBD SBA BATH SBA. TOILETING CTG ASSIST IF STANDING. ON NEBULIZERS. ROOM AIR. IMPAIRED ENDURANCE.  SWALLOW - REG SOLIDS/ NTL. VFSS ON WED. CONTINENT BOWEL AND BLADDER. STAGE 1-2 BUTTOCK CLEFT AREA.   ELOS - GOAL TO DTR'S HOME WITH 5 STEPS TO ENTER.   ELOS: Friday OCT 9. [facility] AND OT AND NURSING.    FAMILY CONF - OCT 8 -  Family conference held today with pt, pt's daughter, Cecy, PT,OT,ST,RN,SW and daughters Maddy and Estefany by phone.   Discussed pt's progress, current status and discharge plans.  PT reports pt completes bed mobility and transfers with SBA. She ambulates 250' with a rolling walker and SBA. She can ascend/descend 4 steps with CG. PT reports pt has good safety awareness.   In OT, pt completes commode transfers with SBA and shower transfers with SSBA-CG. OT notes pt has had no loss  of balance in the last couple of days.  Pt is able to complete all bathing, dressing, grooming and toileting with SBA.   Pt's strength and endurance have improved and she is independent with her home exercise program.   Sternal precautions reviewed and pt is independent to follow these as well.   ST discussed results of repeat VFSS and diet upgrade to regular with thin liquids. ST explained that food and liquids move slowly through pt's esophagus and ST recommends pt alternate bites with sips of liquid when eating.   Nursing reviewed current medications and provided a list. Skin care discussed. Pt has very small open area on her coccyx and  RN recommends pt's skin be evaluated daily for breakdown. There are steri strips to surgical site in pt's groin and care of this area discussed. Chest incisions are well healed.   Follow up appointments with Dr Castañeda on 10/13, FREDRICK Mendosa on 10/28 and FREDRICK Duong on 12/8 reviewed. Dr De Los Santos and Dr Burks will be called for discharge instructions.    DME discussed. Pt has a rollator and a rolling walker. She also has a shower seat and grab bar.  Home health PT,OT and skilled nursing is recommended. Referral made to [facility], operated by [facility] per pt request.   Pt to discharge tomorrow to her daughter, Cecy's home where she will have 24 hour assistance.   Pt and family are very happy with pt's progress and pt is looking forward to discharge tomorrow.    October 9-achieved inpatient rotation goals.  Medically stable.  Medications follow-up reviewed.  Discharge home today..       Goal is for home with  Home health   therapies.  Barrier to discharge:  Impaired mobility  - worked on  Strength, balance, ADLS, swallow  to overcome.     >30 on discharge     Winston Jones MD  10/09/20  11:19 EDT    Time:   During rounds, used appropriate personal protective equipment including mask and gloves.  Additional gown if indicated.  Mask used was standard procedure mask. Appropriate PPE was worn  during the entire visit.  Hand hygiene was completed before and after.

## 2020-10-11 ENCOUNTER — READMISSION MANAGEMENT (OUTPATIENT)
Dept: CALL CENTER | Facility: HOSPITAL | Age: 80
End: 2020-10-11

## 2020-10-11 NOTE — OUTREACH NOTE
Prep Survey      Responses   [facility] patient discharged from?  Non-BH   Is LACE score < 7 ?  Non- Discharge   Eligibility  [facility]   Date of Admission  09/25/20   Date of Discharge  10/09/20   Does the patient have one of the following disease processes/diagnoses(primary or secondary)?  Other   Prep survey completed?  Yes          Alma Ward RN

## 2020-10-12 ENCOUNTER — TRANSITIONAL CARE MANAGEMENT TELEPHONE ENCOUNTER (OUTPATIENT)
Dept: CALL CENTER | Facility: HOSPITAL | Age: 80
End: 2020-10-12

## 2020-10-12 NOTE — OUTREACH NOTE
Call Center TCM Note      Responses   [facility] patient discharged from?  Non-   Does the patient have one of the following disease processes/diagnoses(primary or secondary)?  Other   TCM attempt successful?  Yes   Call start time  1445   Call end time  1450   Person spoke with today (if not patient) and relationship  Cecy-daughter    Meds reviewed with patient/caregiver?  Yes   Is the patient having any side effects they believe may be caused by any medication additions or changes?  No   Does the patient have all medications ordered at discharge?  Yes   Is the patient taking all medications as directed (includes completed medication regime)?  Yes   Does the patient have a primary care provider?   Yes   Does the patient have an appointment with their PCP within 7 days of discharge?  Yes   Comments regarding PCP  Hospital d/c f/u appt is on 10/20/20 at 10:15 am    Has the patient kept scheduled appointments due by today?  N/A   What is the Home health agency?   [facility]   Has home health visited the patient within 72 hours of discharge?  Yes   Home health comments  First visit was on 10/11/20   Psychosocial issues?  No   Did the patient receive a copy of their discharge instructions?  Yes   Nursing interventions  Reviewed instructions with patient   What is the patient's perception of their health status since discharge?  Improving [Pt is ambulating with a walker, she's eating well, she's getting stronger everyday, and she's sleeping well. ]   Is the patient/caregiver able to teach back signs and symptoms related to disease process for when to call PCP?  Yes   Is the patient/caregiver able to teach back signs and symptoms related to disease process for when to call 911?  Yes   Is the patient/caregiver able to teach back the hierarchy of who to call/visit for symptoms/problems? PCP, Specialist, Home health nurse, Urgent Care, ED, 911  Yes   If the patient is a current smoker, are they able to teach back resources  for cessation?  Not a smoker   TCM call completed?  Yes          Mercy Wen RN    10/12/2020, 14:50 EDT

## 2020-10-12 NOTE — OUTREACH NOTE
Call Center TCM Note      Responses   [facility] [place] [facility]?  Non-BH   Does the patient have one of the following disease processes/diagnoses(primary or secondary)?  Other   TCM attempt successful?  No   Unsuccessful attempts  Attempt 1 [Pt and all three daughters]          Mercy Wen RN    10/12/2020, 13:57 EDT

## 2020-10-13 NOTE — PROGRESS NOTES
PPS CMG Coordinator  Inpatient Rehabilitation Discharge    Mode of Locomotion: Walking.    Discharge Against Medical Advice:  No.  Discharge Information  Patient Discharged Alive:  Yes  Discharge Destination/Living Setting: Home with Home Health Services  Diagnosis for Interruption/Death: ICD    Impairment Group: 09 Cardiac    Comorbidities: ICD    Complications: ICD    QUALITY INDICATORS  Section J Health Conditions: Fall(s) Since Admission:  No    Section M. Skin Conditions Discharge:  Unhealed Pressure Ulcer(s) at Stage 1 or  Higher:  Yes.    . Current Number of Unhealed Pressure Ulcers    Number of Unhealed Stage 1 Pressure Injuries: 0  Number of Unhealed Stage 2: 1  Number of Unhealed Stage 2 at Admission: 1  Number of Unhealed Stage 3: 0  Number of Unhealed Stage 4: 0  Number of Unhealed Unstageable Due to Non-removable Dressing/Device: 0  Number of Unhealed Unstageable Due to Slough/Eschar: 0  Number of Unhealed Unstageable Injuries Presenting as Deep Tissue Injury: 0    Section N. Medication:  Medication Intervention: N/A - There were no potential clinically significant  medication issues identified since admission or patient is not taking any  medications.    Signed by: Ayden Zamudio RN

## 2020-10-14 ENCOUNTER — PATIENT OUTREACH (OUTPATIENT)
Dept: CASE MANAGEMENT | Facility: OTHER | Age: 80
End: 2020-10-14

## 2020-10-14 NOTE — PATIENT INSTRUCTIONS
[email or web address]    tab    Medicaid and Food Stamp Application - [phone]    [facility] [phone]

## 2020-10-14 NOTE — OUTREACH NOTE
Care Plan Note      Responses   Lifestyle Goals  Maintain blood pressure < 130/80, Medication management, Routine follow-up with doctor(s), Routine eye exam, Routine foot care, Self monitor blood pressure, Self monitor blood sugar, Fewer doctor appointments   Barriers  Disease education   Self Management  Educational materials provided, Medication Adherence, Home BP Monitoring, Home Glucose Monitoring   Suggested Appointments  Other (See Comment) [Keep appointment with Dr. Castañeda tomorrkirk. ]   Annual Wellness Visit:   Patient Will Schedule   AWV Materials  Send Materials   Specific Disease Process Teaching  COPD [Continue medications as prescribed. Review with Dr. Castañeda tomorrow. ]   Other Patient Education/Resources   Social, Personal Caregiver   Personal Caregiver Education Method  Verbal   Social Resources Education Method  Verbal   Does patient have depression diagnosis?  No   Advanced Directives:  Patient Has   Ed Visits past 12 months:  None   Hospitalizations past 12 months  2 or 3   Medication Adherence  Medications understood [daughter is setting up patients meds and ensuring she takes regularly. ]   Goal Progress  Making Progress Toward Goal(s)   Readiness Scale  6   Confidence Scale  6   Health Literacy  Good        The main concerns and/or symptoms the patient would like to address are: Spoke with patient and patient's daughter regarding patient's health and wellness. Patient is scheduled to follow up with Dr. Castañeda tomorrkirk. Patient's daughter is transporting her. Patient is currently on Pulmocort BID and Duo-nebs TID. Patient has been refusing the nebulizer treatments due to the medications making her nervous. Patient also started a cough yesterday (10/13). No fever at this time and sneezing is accompanied with the cough. Patient was also started on Remeron in the hospital for her mood. Daughter concerned with the medication changes, because patient is more confused than prior to surgery. Daughter has been  told the confusion might be remaining anesthesia but fears patient possibly stroked during surgery. Patient is also sleeping more than usual at this time. Patient is weak but is doing will with her mobility and her PT/OT therapy. Daughter also expressed concerns regarding possibly needing extra assistance with patient care, especially after the completion of home health. Patient is on a limited income and daughter has requested resources. .    Education/instruction provided by Care Coordinator: Introduced self, explained ACM RN role and provided contact information. Assessed patient's condition since discharge from hospital. Patient is weak and confused but doing well with PT/OT and Home Health. Patient will transition to her own home from her daughter's this weekend. Family will stay with patient. Patient's oldest daughter has recently returned from [place] and is finishing quarantine precautions. Patient's younger two daughters are managing her health care at this time. A request to communicate the concerns with patient's medication adjustments (nebulizers and Remeron) and patient's change in behavior (increased sleeping, increased confusion, and not wanting to complete nebulizers due to jitters from the medication) to Dr. Castañeda was make by the daughter. Daughter also requested resources for assistance with care for the patient and financial resources. Patient is on a limited income.     Follow Up Outreach Due: 2 weeks    Praveena Mayorga RN  Ambulatory     10/14/2020, 15:33 EDT

## 2020-10-15 ENCOUNTER — OFFICE VISIT (OUTPATIENT)
Dept: INTERNAL MEDICINE | Age: 80
End: 2020-10-15

## 2020-10-15 VITALS
HEIGHT: 56 IN | OXYGEN SATURATION: 100 % | SYSTOLIC BLOOD PRESSURE: 130 MMHG | BODY MASS INDEX: 19.44 KG/M2 | DIASTOLIC BLOOD PRESSURE: 80 MMHG | HEART RATE: 92 BPM | WEIGHT: 86.4 LBS | TEMPERATURE: 96.6 F

## 2020-10-15 DIAGNOSIS — I31.2 PERICARDIAL HEMATOMA: ICD-10-CM

## 2020-10-15 DIAGNOSIS — Z23 NEED FOR VACCINATION AGAINST STREPTOCOCCUS PNEUMONIAE: ICD-10-CM

## 2020-10-15 DIAGNOSIS — E55.9 VITAMIN D DEFICIENCY: ICD-10-CM

## 2020-10-15 DIAGNOSIS — I25.3 VENTRICULAR ANEURYSM: Primary | ICD-10-CM

## 2020-10-15 PROCEDURE — G0009 ADMIN PNEUMOCOCCAL VACCINE: HCPCS | Performed by: NURSE PRACTITIONER

## 2020-10-15 PROCEDURE — 90732 PPSV23 VACC 2 YRS+ SUBQ/IM: CPT | Performed by: NURSE PRACTITIONER

## 2020-10-15 PROCEDURE — 99495 TRANSJ CARE MGMT MOD F2F 14D: CPT | Performed by: NURSE PRACTITIONER

## 2020-10-15 RX ORDER — MULTIVIT-MIN/IRON/FOLIC ACID/K 18-600-40
2000 CAPSULE ORAL DAILY
Qty: 30 CAPSULE | COMMUNITY
Start: 2020-10-15

## 2020-10-15 NOTE — PROGRESS NOTES
Transitional Care Follow Up Visit  Subjective     Grace Beauchamp is a 80 y.o. female who presents for a transitional care management visit.    Within 48 business hours after discharge our office contacted her via telephone to coordinate her care and needs.      I reviewed and discussed the details of that call along with the discharge summary, hospital problems, inpatient lab results, inpatient diagnostic studies, and consultation reports with Grace.     Current outpatient and discharge medications have been reconciled for the patient.  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 female presented to the emergency department on 9/14/2020 with complaints of increasing shortness of breath, it was determined that she had a persistent defect in the left ventricular wall with hematoma by CT of the chest.  She ultimately underwent a urgent redo sternotomy with extensive lysis of adhesions, repair of  lateral wall LV aneurysm with pericardial patch, and right femoral artery intra-aortic balloon pump for acute RV failure, and chest closed only with skin by Dr. Ontiveros (see op report for full details).  She returned to the OR on 9/18/2020 where she underwent wound exploration, washout, and rewiring with removal of intra-aortic balloon pump from the right femoral artery and artery repair by Dr. Ontiveros.  Her recovery has been a slow but continued improvement.she had some postoperative dysphagia, she has now been increased to mechanical soft diet and she is tolerating and her intake is much improved.  She had some postoperative hyponatremia and elevated creatinine, renal was consulted and they have since signed off as her kidney function has returned to normal.  We have placed her on every other day Lasix with supplemental potassium.  She did have some bacteriuria that was successfully treated with IV Rocephin, her repeat cultures were clear.  She has chronic venous insufficiency and vascular disorder, we will resume her Pletal on transfer.  She is severely deconditioned from her multiple surgeries within the last few months, and she would benefit from inpatient acute rehab prior to returning home.    Course During Hospital Stay:  Ms. Beauchamp is an 81yo F w/ significant cardiac pmh including CAD s/p CABG 2012, VHD s/p AVR 2016 w/ ascending aortic aneurysm repair, thoacoabdominal aortic aneurysm repair 2019, and an STEMI w/ myocardial rupture of the LV s/p redo sternotomy w/ ventricular wall/false anuerysm repair 7/2020 that [facility] to [facility] ED 9/14 w/ intermittent SOA and SHERIDAN. SOA/SHERIDAN has been present for ~2 months but noted that her respiratory status was worsening over the last well. There was no associated chest pain, cough, fever, or BLE edema.     CTA performed upon arrival and showed a persistent defect within the L ventricular wall and hematoma. TTE then performed for further eval and showed a large anuerysm of  the basal lateral wall and a smaller next to it. LV function was preserved.      Dr. Ontiveros performed an urgent redo sternotomy w/ lysis of adhesions, LV lateral wall aneurysm repair w/ pericardial patch, and R percutaneous common femoral artery intra-aortic ballon pump placement 9/17/20. On 9/18/20, Ms. Beauchamp was taken back to the OR by Dr. Ontiveros for wound exploration, wash out, and rewiring and removal of the intra-aortic ballon pump.     Because she is stabilizing from a cardiac-respiratory perspective, but still having difficulty performing ADLs and ambulating safely, PM&R was consulted for evaluation for acute, inpatient rehab.     Today, she complained of generalized weakness, fatigue, and decreased endurance. She noted needing increased help w/ bed mobility, transfers, ambulation, and self-hygiene. She also reported to getting winded easily (even w/ talking). She also complained of R foot drop. She noted that her symptoms started ~1 year ago w/o any inciting event. She denied and radiating/radicular back pain. Vascular surgery was consulted and no surgical intervention indicated at this time.     She was discharged from hospital into Inpatient rehab on 9/25, discharged home 10/9/2020.   Staying with daughter currently.  Home PT and OT, skilled nursing twice weekly.   Daughter present with patient reports she continues to have SOA with exertion.  It appears that pulmonology changed her COPD maintenance medications while she was in the hospital from a single inhaler to multiple nebulized treatments during the day, which patient reports difficulty with maintaining this recommended schedule of 5 nebulizer treatments a day.     The following portions of the patient's history were reviewed and updated as appropriate: allergies, current medications, past family history, past medical history, past social history, past surgical history and problem list.    Review of Systems   Constitutional: Positive for fatigue.  Negative for activity change, appetite change and unexpected weight change.   HENT: Negative for tinnitus.    Eyes: Negative for visual disturbance.   Respiratory: Positive for shortness of breath. Negative for cough and chest tightness.    Cardiovascular: Negative for chest pain, palpitations and leg swelling.   Neurological: Positive for weakness. Negative for dizziness, light-headedness and headaches.       Objective   Physical Exam  Vitals signs and nursing note reviewed.   Constitutional:       General: She is not in acute distress.     Appearance: She is well-developed. She is not ill-appearing.   Cardiovascular:      Rate and Rhythm: Normal rate and regular rhythm.      Heart sounds: Normal heart sounds, S1 normal and S2 normal. No murmur.   Pulmonary:      Effort: Pulmonary effort is normal.      Breath sounds: Normal breath sounds. No decreased breath sounds, wheezing, rhonchi or rales.   Skin:     General: Skin is warm and dry.   Neurological:      Mental Status: She is alert and oriented to person, place, and time.   Psychiatric:         Speech: Speech normal.         Behavior: Behavior normal. Behavior is cooperative.         Thought Content: Thought content normal.         Judgment: Judgment normal.         Assessment/Plan   Problems Addressed this Visit        Cardiovascular and Mediastinum    Pericardial hematoma    Ventricular aneurysm - Primary      Other Visit Diagnoses     Vitamin D deficiency        Relevant Medications    Vitamin D, Cholecalciferol, 50 MCG (2000 UT) capsule    Need for vaccination against Streptococcus pneumoniae        Relevant Orders    Pneumococcal Polysaccharide Vaccine 23-Valent Greater Than or Equal To 3yo Subcutaneous / IM (Completed)      Diagnoses       Codes Comments    Ventricular aneurysm    -  Primary ICD-10-CM: I25.3  ICD-9-CM: 414.10     Pericardial hematoma     ICD-10-CM: I31.2  ICD-9-CM: 423.0     Vitamin D deficiency     ICD-10-CM: E55.9  ICD-9-CM: 268.9      Need for vaccination against Streptococcus pneumoniae     ICD-10-CM: Z23  ICD-9-CM: V03.82         1. Ventricular aneurysm  Patient slowly improving from rehabilitation standpoint. Still having some SOA but I expect this is likely recovery from extensive surgery and chronic COPD.  She has had some issues with being compliant with nebulizer regimen due to complexity, I advised her and her daughter to contact her pulmonologist to see if she can go back on her original prescription of Trelegy instead.  Continue home PT, OT.     Has appropriate follow-up appointment scheduled with both cardiothoracic surgery and cardiology.  Reviewed most recent labs in hospital, no further blood work needed today.     2. Pericardial hematoma      3. Vitamin D deficiency  Restart vitamin D 2000 units related to deficiency.    - Vitamin D, Cholecalciferol, 50 MCG (2000 UT) capsule; Take 2,000 Units by mouth Daily.  Dispense: 30 capsule    4. Need for vaccination against Streptococcus pneumoniae    - Pneumococcal Polysaccharide Vaccine 23-Valent Greater Than or Equal To 1yo Subcutaneous / IM    Current outpatient and discharge medications have been reconciled for the patient.  Reviewed by: Laura Jimenez, FREDRICK

## 2020-10-16 ENCOUNTER — TELEPHONE (OUTPATIENT)
Dept: INTERNAL MEDICINE | Age: 80
End: 2020-10-16

## 2020-10-16 NOTE — TELEPHONE ENCOUNTER
Asa with [facility] called requesting ccupational therapy 2x a week for 2 weeks. Please advise. Call back #[phone]

## 2020-10-20 ENCOUNTER — TELEPHONE (OUTPATIENT)
Dept: CARDIOLOGY | Facility: CLINIC | Age: 80
End: 2020-10-20

## 2020-10-20 ENCOUNTER — TELEPHONE (OUTPATIENT)
Dept: INTERNAL MEDICINE | Age: 80
End: 2020-10-20

## 2020-10-20 DIAGNOSIS — I25.10 CORONARY ARTERY DISEASE INVOLVING NATIVE CORONARY ARTERY OF NATIVE HEART WITHOUT ANGINA PECTORIS: Primary | ICD-10-CM

## 2020-10-20 NOTE — TELEPHONE ENCOUNTER
Has she been weighing herself?  Is she having any worsening shortness of breath or is it the same as usual?

## 2020-10-20 NOTE — TELEPHONE ENCOUNTER
Patients daughter Maddy calling stating home health had been out today and they told her to call us.   Patient has slight swelling in her feet and ankles  Some SOB from time to time but they have talked with the pulmonary doctor about that.    [phone]

## 2020-10-20 NOTE — TELEPHONE ENCOUNTER
"States she is getting scales  She said the SOB is no worse and the swelling is very slight  PT is there now and said she didn't think it was even enough to worry about.    I told her to weight her and watch for increase swelling and worse\"n  SOB      [phone]  "

## 2020-10-20 NOTE — PROGRESS NOTES
Per staff report.    SECTION GG    Self Care Performance:   Oral Hygiene: [place] does less than half the effort. [place] lifts, holds or  supports trunk or limbs but provides less than half the effort.    Self Care Discharge Goals: Branch    Mobility Toilet Transfer Performance: Branch    Mobility Toilet Transfer Discharge Goal: Branch    Signed by: Ayden Zamudio RN

## 2020-10-20 NOTE — TELEPHONE ENCOUNTER
Attempted to call Asa GATES regarding occupational therapy. Left voicemail with office telephone number.

## 2020-10-20 NOTE — TELEPHONE ENCOUNTER
----- Message from Melania Bojorquez sent at 10/20/2020 10:01 AM EDT -----  Asa with [facility] called returning your call regarding OT.

## 2020-10-21 ENCOUNTER — OFFICE VISIT (OUTPATIENT)
Dept: INTERNAL MEDICINE | Age: 80
End: 2020-10-21

## 2020-10-21 VITALS
HEIGHT: 56 IN | TEMPERATURE: 97.9 F | OXYGEN SATURATION: 98 % | HEART RATE: 88 BPM | BODY MASS INDEX: 19.8 KG/M2 | DIASTOLIC BLOOD PRESSURE: 78 MMHG | WEIGHT: 88 LBS | SYSTOLIC BLOOD PRESSURE: 112 MMHG

## 2020-10-21 DIAGNOSIS — R60.0 BILATERAL LOWER EXTREMITY EDEMA: ICD-10-CM

## 2020-10-21 DIAGNOSIS — R06.09 DYSPNEA ON EXERTION: ICD-10-CM

## 2020-10-21 DIAGNOSIS — Z82.49: Primary | ICD-10-CM

## 2020-10-21 DIAGNOSIS — N30.01 ACUTE CYSTITIS WITH HEMATURIA: Primary | ICD-10-CM

## 2020-10-21 DIAGNOSIS — R30.0 DYSURIA: ICD-10-CM

## 2020-10-21 LAB
BILIRUB BLD-MCNC: NEGATIVE MG/DL
CLARITY, POC: ABNORMAL
COLOR UR: YELLOW
GLUCOSE UR STRIP-MCNC: NEGATIVE MG/DL
KETONES UR QL: NEGATIVE
LEUKOCYTE EST, POC: ABNORMAL
NITRITE UR-MCNC: NEGATIVE MG/ML
PH UR: 7 [PH] (ref 5–8)
PROT UR STRIP-MCNC: ABNORMAL MG/DL
RBC # UR STRIP: ABNORMAL /UL
SP GR UR: 1.02 (ref 1–1.03)
UROBILINOGEN UR QL: NORMAL

## 2020-10-21 PROCEDURE — 81003 URINALYSIS AUTO W/O SCOPE: CPT | Performed by: NURSE PRACTITIONER

## 2020-10-21 PROCEDURE — 99213 OFFICE O/P EST LOW 20 MIN: CPT | Performed by: NURSE PRACTITIONER

## 2020-10-21 RX ORDER — CEPHALEXIN 500 MG/1
500 CAPSULE ORAL 2 TIMES DAILY
Qty: 14 CAPSULE | Refills: 0 | Status: SHIPPED | OUTPATIENT
Start: 2020-10-21 | End: 2020-10-28

## 2020-10-21 RX ORDER — FUROSEMIDE 20 MG/1
20 TABLET ORAL DAILY
Qty: 90 TABLET | Refills: 3 | Status: SHIPPED | OUTPATIENT
Start: 2020-10-21 | End: 2020-11-11

## 2020-10-21 NOTE — TELEPHONE ENCOUNTER
Called s/brett Castañeda and told her to have her resume lasix 20mg once daily and to have a BMP repeated in 1 week.

## 2020-10-21 NOTE — PROGRESS NOTES
"[facility] [place] INTERNAL MEDICINE  FREDRICK Lugo Beauchamp / 80 y.o. / female  10/21/2020      VITAL SIGNS     Visit Vitals  /78   Pulse 88 Comment: thready, irregular, manual count   Temp 97.9 °F (36.6 °C)   Ht 142.2 cm (55.98\")   Wt 39.9 kg (88 lb)   LMP  (LMP Unknown)   SpO2 98% Comment: RA   Breastfeeding No   BMI 19.74 kg/m²       BP Readings from Last 3 Encounters:   10/21/20 112/78   10/15/20 130/80   10/09/20 128/74     Wt Readings from Last 3 Encounters:   10/21/20 39.9 kg (88 lb)   10/15/20 39.2 kg (86 lb 6.4 oz)   10/09/20 36.8 kg (81 lb 2.1 oz)     Body mass index is 19.74 kg/m².      MEDICATIONS     Current Outpatient Medications   Medication Sig Dispense Refill   • albuterol sulfate HFA (ProAir HFA) 108 (90 Base) MCG/ACT inhaler Inhale 2 puffs Every 6 (Six) Hours As Needed for Wheezing or Shortness of Air. 3 inhaler 11   • atorvastatin (LIPITOR) 40 MG tablet Take 1 tablet by mouth Every Night. 30 tablet 1   • budesonide (PULMICORT) 0.5 MG/2ML nebulizer solution Take 2 mL by nebulization 2 (Two) Times a Day. 120 mL 0   • cetirizine (zyrTEC) 10 MG tablet Take 1 tablet by mouth As Needed for Allergies. 30 tablet 0   • cilostazol (PLETAL) 100 MG tablet Take 100 mg by mouth 2 (Two) Times a Day.     • clopidogrel (PLAVIX) 75 MG tablet Take 1 tablet by mouth Daily. 30 tablet 11   • ferrous sulfate 325 (65 FE) MG EC tablet Take 1 tablet by mouth Daily With Breakfast. 90 tablet 0   • ipratropium-albuterol (DUO-NEB) 0.5-2.5 mg/3 ml nebulizer Take 3 mL by nebulization 3 (Three) Times a Day. 360 mL 0   • losartan (COZAAR) 50 MG tablet Take 1 tablet by mouth Daily. 30 tablet 1   • meclizine (ANTIVERT) 12.5 MG tablet Take 1 tablet by mouth 3 (Three) Times a Day As Needed for Dizziness. 21 tablet 0   • metoprolol succinate XL (TOPROL-XL) 50 MG 24 hr tablet Take 1 tablet by mouth Every 12 (Twelve) Hours. 60 tablet 1   • mirtazapine (REMERON) 7.5 MG tablet Take 1 tablet by mouth Every Night. 90 tablet 0   • " multivitamin (THERAGRAN) tablet tablet Take 1 tablet by mouth Daily. 90 tablet 0   • pantoprazole (PROTONIX) 40 MG EC tablet Take 1 tablet by mouth Every Morning. 90 tablet 0   • pramipexole (MIRAPEX) 0.125 MG tablet TAKE ONE TABLET BY MOUTH EVERY NIGHT AT BEDTIME 90 tablet 0   • sennosides-docusate (PERICOLACE) 8.6-50 MG per tablet Take 2 tablets by mouth Daily As Needed for Constipation.     • TOVIAZ 4 MG tablet sustained-release 24 hour tablet Take 4 mg by mouth Daily.     • traMADol (ULTRAM) 50 MG tablet Take 50 mg by mouth Every 6 (Six) Hours As Needed for Moderate Pain .     • Vitamin D, Cholecalciferol, 50 MCG (2000 UT) capsule Take 2,000 Units by mouth Daily. 30 capsule    • cephalexin (Keflex) 500 MG capsule Take 1 capsule by mouth 2 (Two) Times a Day for 7 days. 14 capsule 0   • furosemide (LASIX) 20 MG tablet Take 1 tablet by mouth Daily. 90 tablet 3     No current facility-administered medications for this visit.        CHIEF COMPLAINT     Urinary Tract Infection, Shortness of Breath (weak), and Edema (BLE +1 noted in RLE)      ASSESSMENT & PLAN     Problem List Items Addressed This Visit     None      Visit Diagnoses     Acute cystitis with hematuria    -  Primary    Relevant Orders    Urinalysis With Microscopic - Urine, Clean Catch    Urine Culture - Urine, Urine, Clean Catch    Dysuria        Relevant Orders    POCT urinalysis dipstick, automated (Completed)    Urinalysis With Microscopic - Urine, Clean Catch    Urine Culture - Urine, Urine, Clean Catch    Bilateral lower extremity edema        Relevant Orders    Basic metabolic panel        Orders Placed This Encounter   Procedures   • Urine Culture - Urine, Urine, Clean Catch   • Basic metabolic panel   • POCT urinalysis dipstick, automated   • Urinalysis With Microscopic - Urine, Clean Catch     New Medications Ordered This Visit   Medications   • cephalexin (Keflex) 500 MG capsule     Sig: Take 1 capsule by mouth 2 (Two) Times a Day for 7 days.      Dispense:  14 capsule     Refill:  0       Summary/Discussion:  • Initiate Keflex 500mg BID for 7 days for UTI. Urine culture ordered today with history of recurrent UTI. Patient encouraged to follow-up with her urologist and call the office if symptoms do not improve.  • Last BMP 10/07. Ordered additional BMP today with symptoms of SOA and +1 bilateral pitting edema. Patient daughter advised to call cardiology for potential restart of lasix. She is instructed to proceed to emergency room if SOA, edema, or weight gain worsens.     13:30 patient notified by cardiology to restart lasix 20 mg with BMP recheck in one week.     Next Appointment with me: Visit date not found    Return if symptoms worsen or fail to improve.    _____________________________________________________________________________________    HISTORY OF PRESENT ILLNESS     Patient presents with chief complaints of dysuria, bilateral lower extremity edema, and SOA.     Dysuria  Patient has a significant history of recurrent UTI. Her daughter is with her and helps with extensive history. She is currently followed by urology. She was found to have a structural abnormality that is increasing the occurrence of UTI. Dysuria and frequency began this morning. She denies visible hematuria, flank pain, or fever.     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 She also has history of COPD. She is currently taking pulmicort and albuterol as needed. She is using her albuterol approx once daily. Three days ago her legs have begun to swell. Shortness of air is intermittent and with exertion. Her daughter takes her oxygen daily. She is around 95% on RA. She has gained two pounds in the last week. She is in no acute distress at present. She denies chest pain, wheezing, or heart palpitations.     Patient Care Team:  Miller Castañeda MD as PCP -  General (Internal Medicine)  Miller Castañeda MD as PCP - Claims Attributed  Mart Ontiveros MD as Surgeon (Cardiothoracic Surgery)  Tres De Los Santos MD as Consulting Physician (Cardiology)  Graham Mcconnell MD as Consulting Physician (Hematology and Oncology)  Payam Byrnes MD as Consulting Physician (Otolaryngology)  Jonathan Smith MD as Consulting Physician (Vascular Surgery)  Victor M Cabral MD as Surgeon (Orthopedic Surgery)  Yaya Burks MD as Consulting Physician (Pulmonary Disease)  Praveena Mayorga RN as Ambulatory  ([facility] [place])      REVIEW OF SYSTEMS     Review of Systems   Constitutional: Positive for fatigue. Negative for fever.   Respiratory: Positive for shortness of breath. Negative for cough, chest tightness and wheezing.    Cardiovascular: Positive for leg swelling. Negative for chest pain and palpitations.   Genitourinary: Positive for dysuria. Negative for decreased urine volume, difficulty urinating, flank pain, frequency and hematuria.   Musculoskeletal: Negative for back pain.   Neurological: Positive for weakness.     PHYSICAL EXAMINATION     Physical Exam  Constitutional:       General: She is not in acute distress.  Cardiovascular:      Rate and Rhythm: Normal rate and regular rhythm.      Pulses: Normal pulses.      Heart sounds: Normal heart sounds.   Pulmonary:      Effort: Pulmonary effort is normal.      Breath sounds: Normal breath sounds. No wheezing, rhonchi or rales.   Abdominal:      Tenderness: There is no right CVA tenderness or left CVA tenderness.   Musculoskeletal:      Right lower leg: Edema (+1) present.      Left lower leg: Edema (+1) present.   Skin:     Comments: Discoloration and cold to right ankle. Baseline with chronic venous insufficiency per pt and family   Neurological:      Mental Status: She is alert and oriented to person, place, and time.   Psychiatric:         Behavior: Behavior normal.         Thought Content: Thought  content normal.         Judgment: Judgment normal.       REVIEWED DATA     Labs:     Lab Results   Component Value Date     10/08/2020    K 3.6 10/08/2020    CALCIUM 9.1 10/08/2020    AST 17 09/17/2020    ALT 11 09/17/2020    BUN 14 10/08/2020    CREATININE 0.65 10/08/2020    CREATININE 0.58 10/06/2020    CREATININE 0.55 (L) 10/05/2020    EGFRIFNONA 88 10/08/2020    EGFRIFAFRI 74 06/25/2020       Lab Results   Component Value Date    HGBA1C 5.92 (H) 09/16/2020    HGBA1C 6.40 (H) 07/09/2019    HGBA1C 6.8 (H) 08/15/2018    GLU 91 06/25/2020     (H) 02/01/2019     (H) 08/15/2018    MICROALBUR 69.2 03/03/2017       Lab Results   Component Value Date     (H) 09/16/2020    LDL 53 07/09/2019    LDL 54 08/15/2018    HDL 48 09/16/2020    HDL 69 (H) 07/09/2019    HDL 70 08/15/2018    TRIG 154 (H) 09/16/2020    TRIG 56 07/09/2019    TRIG 99 08/15/2018    CHOLHDLRATIO 2.1 08/15/2018    CHOLHDLRATIO 3.51 03/03/2017       No results found for: TSH, FREET4    Lab Results   Component Value Date    WBC 3.55 10/08/2020    HGB 10.9 (L) 10/08/2020    HGB 10.2 (L) 10/05/2020    HGB 9.6 (L) 10/02/2020     10/08/2020       Lab Results   Component Value Date    PROTEIN Trace 08/21/2019    GLUCOSEU Negative 09/23/2020    BLOODU Negative 09/23/2020    NITRITEU Negative 09/23/2020    LEUKOCYTESUR Moderate (2+) (A) 10/21/2020       Imaging:         Medical Tests:         Summary of old records / correspondence / consultant report:         Request outside records:         ALLERGIES  Allergies   Allergen Reactions   • Ramipril Other (See Comments)     Ace I  cough   • Morphine Itching   • Morphine And Related Itching   • Bactrim [Sulfamethoxazole-Trimethoprim] Itching and Rash        PFSH:     The following portions of the patient's history were reviewed and updated as appropriate: Allergies / Current Medications / Past Medical History / Surgical History / Social History / Family History    PROBLEM LIST    Patient Active Problem List   Diagnosis   • History of breast cancer   • Stenosis of carotid artery   • Chronic obstructive pulmonary disease (CMS/[facility])   • Closed fracture of multiple ribs   • Cognitive disorder   • Erythromelalgia (CMS/[facility])   • Hyperlipidemia   • Hypertension   • Primary osteoarthritis involving multiple joints   • Osteoporosis   • Restless legs syndrome   • Cerebral aneurysm   • Temporary cerebral vascular dysfunction   • S/P CABG x 1   • Type 2 diabetes mellitus without complication, without long-term current use of insulin (CMS/[facility])   • S/P ascending aortic aneurysm repair   • 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[facility])   • Descending thoracic aortic aneurysm (CMS/[facility])   • Claudication of both lower extremities (CMS/[facility])   • 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[facility])   • Ventral hernia without obstruction or gangrene   • Breast cancer, stage 1, estrogen receptor positive (CMS/[facility])   • Arthritis of right hip   • Arthritis of right knee   • Chondrocalcinosis   • Chronic pain of right knee   • Degenerative disc disease, lumbar   • Gastroesophageal reflux disease   • Bilateral hearing loss   • Thoracic aortic aneurysm without rupture (CMS/[facility])   • Erythromelalgia (CMS/[facility])   • 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CMS/[facility])   • Thoracoabdominal aortic aneurysm, without rupture (CMS/[facility])   • 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/[facility])   • Aortic aneurysm, descending (CMS/[facility])   • Aortic aneurysm without rupture (CMS/[facility])   • CAD (coronary artery disease)   • S/P left heart catheterization by ventricular puncture   • NSTEMI (non-ST elevated myocardial infarction) (CMS/[facility])   • Pericardial hematoma   • History of ST elevation myocardial infarction (STEMI)   • Acute on chronic diastolic CHF (congestive heart failure) (CMS/[facility])   • Ventricular aneurysm   • Immobility syndrome       PAST MEDICAL HISTORY  Past Medical History:   Diagnosis Date   • AAA (abdominal aortic aneurysm) (CMS/[facility])    • Acute bronchitis 12/2018  "  • 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HCC)    • Arthritis    • Bilateral carotid artery disease (CMS/HCC)    • Bilateral cataracts     S/p Extractions   • Breast cancer (CMS/HCC)     right breast dH3saDa (snm) pMX ER/RI pos, Her-2/neg, Ki-67, 31%, oncotype recurrence score 19, invasive lobular carcinoma   • CAD (coronary artery disease)     S/p CABG on 2/23/16 by Dr. 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HCC)    • Cataracts, bilateral    • Closed fracture of three ribs of right side    • Cognitive disorder    • COPD (chronic obstructive pulmonary disease) (CMS/HCC)    • Depression    • Descending aortic arch aneurysm (CMS/HCC)    • Diabetes mellitus (CMS/HCC)     \"BORDERLINE\"   • Erythermalgia (CMS/HCC)    • GERD (gastroesophageal reflux disease)    • Hyperlipidemia     Controlled w/Meds   • Hypertension     Controlled w/Meds   • Low back pain    • Moderate aortic valve insufficiency     S/p AVR on 02/23/16 by Dr. Ontiveros   • Nocturia    • Osteoarthritis    • Osteoporosis    • Otitis media     R Ear   • Prediabetes    • PVD (peripheral vascular disease) (CMS/HCC)    • RLS (restless legs syndrome)    • Saccular aneurysm    • Stroke (CMS/[facility])     Perioperatively w/L Hip Repl   • Thoracic ascending aortic aneurysm (CMS/HCC)     S/p Repair on 02/23/16 by Dr. Ontiveros   • TIA (transient ischemic attack)    • Urgency incontinence    • Venous insufficiency    • Ventral hernia        SURGICAL HISTORY  Past Surgical History:   Procedure Laterality Date   • AORTIC VALVE REPAIR/REPLACEMENT N/A 02/23/2016-[facility]    Ascending Replacement Using a 24MM Graft--Dr. Ontiveros   • APPENDECTOMY  1977   • BREAST BIOPSY Right 09/16/2012    Vacuum Assisted Core Bx of R Breast   • CARDIAC CATHETERIZATION N/A 01/06/2016    Dr. Tres De Los Santos   • CARDIAC CATHETERIZATION N/A 4/22/2019    Procedure: Left Heart Cath;  Surgeon: Tres De Los Santos MD;  Location: [facility] CATH INVASIVE LOCATION;  Service: Cardiology   • CARDIAC CATHETERIZATION N/A 4/22/2019    Procedure: " Coronary angiography;  Surgeon: Tres De Los Santos MD;  Location: [facility] CATH INVASIVE LOCATION;  Service: Cardiology   • CARDIAC CATHETERIZATION N/A 7/22/2020    Procedure: LEFT HEART CATH;  Surgeon: Antonia Scott MD;  [facility];  Service: Cardiovascular;  Laterality: N/A;   • CARDIAC CATHETERIZATION N/A 7/22/2020    Procedure: CORONARY ANGIOGRAPHY;  Surgeon: Antonia Scott MD;  Location: [facility] CATH INVASIVE LOCATION;  Service: Cardiovascular;  Laterality: N/A;   • CARDIAC CATHETERIZATION N/A 7/22/2020    Procedure: Left ventriculography;  Surgeon: Antonia Scott MD;  [facility];  Service: Cardiovascular;  Laterality: N/A;   • CARDIAC CATHETERIZATION N/A 7/22/2020    Procedure: Saphenous Vein Graft;  Surgeon: Antonia Scott MD;  Location: [facility] CATH INVASIVE LOCATION;  Service: Cardiovascular;  Laterality: N/A;   • CARDIAC SURGERY  02/2016    Dr. Ontiveros/Dr. De Los Santos   • CATARACT EXTRACTION Bilateral     [facility]   • COLONOSCOPY N/A 10/2002    Dr. Negro   • CORONARY ARTERY BYPASS GRAFT  02/23/2016-[facility]    x1 w/L Vein Grafting--Dr. Ontiveros   • CORONARY ARTERY BYPASS GRAFT N/A 9/18/2020    Procedure: STERNAL EXPLORATION WITH CLOSURE AND WASHOUT, REMOVAL OF IABP, PRP;  Surgeon: Mart Ontiveros MD;  Location: [facility];  Service: Cardiothoracic;  Laterality: N/A;   • CYST REMOVAL Right 01/25/2013    R Palm Excision of Retinacular Cyst--Dr. Karla Fish   • EPIDURAL BLOCK     • LAPAROSCOPIC CHOLECYSTECTOMY N/A 08/04/2004    Dr. JOEY Sheth   • MASTECTOMY Right 09/28/2012   • ORIF HIP FRACTURE Left 03/27/2005    w/ AMBI compression screw, Dr. Victor M Cabral   • RECTOVAGINAL FISTULA REPAIR  1965   • THORACIC AORTIC ANEURYSM REPAIR N/A 7/23/2019    Procedure: ROSE, THORACOABDOMINAL AORTIC ANEURYSM REPAIR, VISCERAL VESSEL REPAIR, LARGE VENTRAL HERNIA REPAIR WITH MESH, CIRCULATORY ARREST, PRP;  Surgeon: Mart Ontiveros MD;  Location: [facility]  MAIN OR;  Service: Cardiothoracic   • TRANSESOPHAGEAL ECHOCARDIOGRAM (ROSE) N/A 2020    Procedure: TRANSESOPHAGEAL ECHOCARDIOGRAM WITH ANESTHESIA;  Surgeon: Mart Ontiveros MD;  Location: [facility] OR;  Service: Cardiothoracic;  Laterality: N/A;   • TUBAL ABDOMINAL LIGATION     • VENTRICULAR ANEURYSM REPAIR N/A 2020    Procedure: EMERGENT REOP STERNOTOMY, REPAIR OF LV RUPTURE, PRP, INTRAOP ROSE;  Surgeon: Mart Ontiveros MD;  Location: [facility] MAIN OR;  Service: Cardiothoracic;  Laterality: N/A;   • VENTRICULAR ANEURYSM REPAIR N/A 2020    Procedure: ROSE, MIDLINE STERNOTOMY REOP  LEFT VENTRICULAR ANEURYSM REPAIR, IABP INSERTION, PRP;  Surgeon: Mart Ontiveros MD;  Location: [facility] MAIN OR;  Service: Cardiothoracic;  Laterality: N/A;       SOCIAL HISTORY  Social History     Socioeconomic History   • Marital status:      Spouse name: Not on file   • Number of children: Not on file   • Years of education: Not on file   • Highest education level: Not on file   Tobacco Use   • Smoking status: Former Smoker     Types: Cigarettes     Quit date: 2012     Years since quittin.8   • Smokeless tobacco: Never Used   • Tobacco comment: CAFFEINE USE:2 CUPS COFFEE/ COKE DAILY   Substance and Sexual Activity   • Alcohol use: No   • Drug use: No   • Sexual activity: Defer     Birth control/protection: Tubal ligation       FAMILY HISTORY  Family History   Problem Relation Age of Onset   • Alzheimer's disease Mother    • Cancer Maternal Aunt    • Heart disease Father    • Heart failure Father    • Hypertension Father    • Diabetes Father    • Liver disease Sister    • Heart failure Brother    • Hypertension Brother    • Alcohol abuse Brother    • Diabetes Brother    • No Known Problems Maternal Grandmother    • No Known Problems Maternal Grandfather    • No Known Problems Paternal Grandmother    • No Known Problems Paternal Grandfather    • Diabetes Brother    • Brain cancer Brother    • Heart disease  Brother    • Diabetes Brother          Examiner was wearing KN95 mask, face shield and exam gloves during the entire duration of the visit. Patient was masked the entire time.   Minimum social distance of 6 ft maintained entire visit except if physical contact was necessary as documented.     **Dragon Disclaimer:   Much of this encounter note is an electronic transcription/translation of spoken language to printed text. The electronic translation of spoken language may permit erroneous, or at times, nonsensical words or phrases to be inadvertently transcribed. Although I have reviewed the note for such errors, some may still exist.

## 2020-10-21 NOTE — TELEPHONE ENCOUNTER
Patient daughter Maddy calling back. Went to PCP and she has an UTI  She also said that they did a BMP on her  Abdullahi the NP said might not be a good idea for her to be on some lasix  And to call us for that.  States abdullahi said she has a 1 +Edema  She has a 2lb wt gain in a week    [phone]

## 2020-10-23 ENCOUNTER — TELEPHONE (OUTPATIENT)
Dept: INTERNAL MEDICINE | Age: 80
End: 2020-10-23

## 2020-10-23 DIAGNOSIS — M15.9 PRIMARY OSTEOARTHRITIS INVOLVING MULTIPLE JOINTS: Primary | Chronic | ICD-10-CM

## 2020-10-23 LAB
APPEARANCE UR: ABNORMAL
BACTERIA #/AREA URNS HPF: ABNORMAL /HPF
BACTERIA UR CULT: NORMAL
BACTERIA UR CULT: NORMAL
BILIRUB UR QL STRIP: NEGATIVE
BUN SERPL-MCNC: 12 MG/DL (ref 8–23)
BUN/CREAT SERPL: 16.7 (ref 7–25)
CALCIUM SERPL-MCNC: 8.7 MG/DL (ref 8.6–10.5)
CASTS URNS MICRO: ABNORMAL
CHLORIDE SERPL-SCNC: 106 MMOL/L (ref 98–107)
CO2 SERPL-SCNC: 30.8 MMOL/L (ref 22–29)
COLOR UR: YELLOW
CREAT SERPL-MCNC: 0.72 MG/DL (ref 0.57–1)
EPI CELLS #/AREA URNS HPF: ABNORMAL /HPF
GLUCOSE SERPL-MCNC: 127 MG/DL (ref 65–99)
GLUCOSE UR QL: ABNORMAL
HGB UR QL STRIP: ABNORMAL
KETONES UR QL STRIP: NEGATIVE
LEUKOCYTE ESTERASE UR QL STRIP: ABNORMAL
NITRITE UR QL STRIP: NEGATIVE
PH UR STRIP: 6.5 [PH] (ref 5–8)
POTASSIUM SERPL-SCNC: 3.4 MMOL/L (ref 3.5–5.2)
PROT UR QL STRIP: ABNORMAL
RBC #/AREA URNS HPF: ABNORMAL /HPF
SODIUM SERPL-SCNC: 144 MMOL/L (ref 136–145)
SP GR UR: 1.01 (ref 1–1.03)
UROBILINOGEN UR STRIP-MCNC: ABNORMAL MG/DL
WBC #/AREA URNS HPF: ABNORMAL /HPF

## 2020-10-23 RX ORDER — CLOPIDOGREL BISULFATE 75 MG/1
75 TABLET ORAL DAILY
Qty: 30 TABLET | Refills: 11 | Status: CANCELLED | OUTPATIENT
Start: 2020-10-23

## 2020-10-23 RX ORDER — CLOPIDOGREL BISULFATE 75 MG/1
75 TABLET ORAL DAILY
Qty: 30 TABLET | Refills: 6 | Status: SHIPPED | OUTPATIENT
Start: 2020-10-23 | End: 2020-11-23 | Stop reason: SDUPTHER

## 2020-10-23 RX ORDER — CILOSTAZOL 100 MG/1
100 TABLET ORAL 2 TIMES DAILY
Qty: 180 TABLET | Refills: 2 | Status: SHIPPED | OUTPATIENT
Start: 2020-10-23 | End: 2022-04-18

## 2020-10-23 RX ORDER — CILOSTAZOL 100 MG/1
100 TABLET ORAL 2 TIMES DAILY
Qty: 60 TABLET | Refills: 11 | Status: CANCELLED | OUTPATIENT
Start: 2020-10-23

## 2020-10-23 NOTE — TELEPHONE ENCOUNTER
Maddy contacted and notified that Cardio will need to refill the Plavix and Pletal. Maddy verbalized understanding. LIBERTY

## 2020-10-23 NOTE — TELEPHONE ENCOUNTER
Maddy  called in and is requesting a refill of cilostazol (PLETAL) 100 MG tablet and clopidogrel (PLAVIX) 75 MG tablet    Patient has enough for another week     Sent to  [facility] [address] AT [address] - [phone]  - [phone] FX  [phone]        Patient call back [phone]

## 2020-10-26 DIAGNOSIS — R60.0 LOWER EXTREMITY EDEMA: Primary | ICD-10-CM

## 2020-10-26 RX ORDER — TRAMADOL HYDROCHLORIDE 50 MG/1
TABLET ORAL
Qty: 90 TABLET | Refills: 0 | Status: SHIPPED | OUTPATIENT
Start: 2020-10-26 | End: 2020-11-24 | Stop reason: SDUPTHER

## 2020-10-26 NOTE — TELEPHONE ENCOUNTER
She probably just needs to be seen.  I have an opening on Wednesday at 1:30.  See if she can come in then.

## 2020-10-26 NOTE — TELEPHONE ENCOUNTER
Patient calling back states she started back on the lasix last wed. States her feet are still  Swollen.   You had said for her to get labs done in a week  That will be this wed.  She states she has no other issues

## 2020-10-28 ENCOUNTER — TELEPHONE (OUTPATIENT)
Dept: CARDIOLOGY | Facility: CLINIC | Age: 80
End: 2020-10-28

## 2020-10-28 ENCOUNTER — OFFICE VISIT (OUTPATIENT)
Dept: CARDIOLOGY | Facility: CLINIC | Age: 80
End: 2020-10-28

## 2020-10-28 ENCOUNTER — HOSPITAL ENCOUNTER (OUTPATIENT)
Dept: CARDIOLOGY | Facility: HOSPITAL | Age: 80
Discharge: HOME | End: 2020-10-28
Admitting: NURSE PRACTITIONER

## 2020-10-28 VITALS
DIASTOLIC BLOOD PRESSURE: 62 MMHG | SYSTOLIC BLOOD PRESSURE: 119 MMHG | BODY MASS INDEX: 18.72 KG/M2 | WEIGHT: 83.2 LBS | HEART RATE: 87 BPM | HEIGHT: 56 IN

## 2020-10-28 DIAGNOSIS — I25.10 CORONARY ARTERY DISEASE INVOLVING NATIVE CORONARY ARTERY OF NATIVE HEART WITHOUT ANGINA PECTORIS: ICD-10-CM

## 2020-10-28 DIAGNOSIS — R06.02 SHORTNESS OF BREATH: ICD-10-CM

## 2020-10-28 DIAGNOSIS — R94.31 QT PROLONGATION: ICD-10-CM

## 2020-10-28 DIAGNOSIS — I31.2 PERICARDIAL HEMATOMA: Primary | ICD-10-CM

## 2020-10-28 DIAGNOSIS — R60.0 LOWER EXTREMITY EDEMA: ICD-10-CM

## 2020-10-28 DIAGNOSIS — I73.9 PVD (PERIPHERAL VASCULAR DISEASE) (HCC): ICD-10-CM

## 2020-10-28 LAB
ALBUMIN SERPL-MCNC: 4.1 G/DL (ref 3.5–5.2)
ALBUMIN/GLOB SERPL: 1.4 G/DL
ALP SERPL-CCNC: 86 U/L (ref 39–117)
ALT SERPL W P-5'-P-CCNC: 11 U/L (ref 1–33)
ANION GAP SERPL CALCULATED.3IONS-SCNC: 6.9 MMOL/L (ref 5–15)
AST SERPL-CCNC: 19 U/L (ref 1–32)
BILIRUB SERPL-MCNC: 1 MG/DL (ref 0–1.2)
BUN SERPL-MCNC: 15 MG/DL (ref 8–23)
BUN/CREAT SERPL: 24.6 (ref 7–25)
CALCIUM SPEC-SCNC: 9.3 MG/DL (ref 8.6–10.5)
CHLORIDE SERPL-SCNC: 101 MMOL/L (ref 98–107)
CO2 SERPL-SCNC: 32.1 MMOL/L (ref 22–29)
CREAT SERPL-MCNC: 0.61 MG/DL (ref 0.57–1)
GFR SERPL CREATININE-BSD FRML MDRD: 94 ML/MIN/1.73
GLOBULIN UR ELPH-MCNC: 3 GM/DL
GLUCOSE SERPL-MCNC: 107 MG/DL (ref 65–99)
NT-PROBNP SERPL-MCNC: 2806 PG/ML (ref 0–1800)
POTASSIUM SERPL-SCNC: 3.5 MMOL/L (ref 3.5–5.2)
PROT SERPL-MCNC: 7.1 G/DL (ref 6–8.5)
SODIUM SERPL-SCNC: 140 MMOL/L (ref 136–145)

## 2020-10-28 PROCEDURE — 93000 ELECTROCARDIOGRAM COMPLETE: CPT | Performed by: NURSE PRACTITIONER

## 2020-10-28 PROCEDURE — 99214 OFFICE O/P EST MOD 30 MIN: CPT | Performed by: NURSE PRACTITIONER

## 2020-10-28 PROCEDURE — 80053 COMPREHEN METABOLIC PANEL: CPT | Performed by: NURSE PRACTITIONER

## 2020-10-28 PROCEDURE — 36415 COLL VENOUS BLD VENIPUNCTURE: CPT

## 2020-10-28 PROCEDURE — 83880 ASSAY OF NATRIURETIC PEPTIDE: CPT | Performed by: NURSE PRACTITIONER

## 2020-10-28 NOTE — PROGRESS NOTES
Date of Office Visit: 10/28/2020  Encounter Provider: FREDRICK Springer  Place of Service: [facility]  Patient Name: Grace Beauchamp  :1940    Chief Complaint   Patient presents with   • Leg Swelling     Follow up   :     HPI: Grace Beauchamp is a 80 y.o. female, known to me, who presents today for follow-up.  Old records have been obtained and reviewed by me.  She is a patient of Dr. De Los Santos's with a past cardiac history significant for coronary artery disease and ascending aortic aneurysm.  In , she underwent ascending aortic replacement, total arch replacement, and a CABG x1.  In July of this year, she presented with an LV rupture due to an occlusion of the vein graft to the OM1.  She was taken urgently to the OR for a redo sternotomy with extensive lysis of adhesions and primary repair of the lateral wall of the left ventricle.  Postoperatively, she did remarkably well.  Due to her NSTEMI, she was started on Plavix.   On 2020, she presented with dyspnea and was found to have hematoma along the wall of the left ventricle.  She was taken back to the OR for another urgent redo sternotomy with extensive lysis of adhesions, repair of the lateral wall LV aneurysm with a pericardial patch, and placement of a balloon pump.  The following day, she was taken back to the OR for wound exploration, washout, and rewiring, and removal of the balloon pump.  Her recovery was slow.  She had some postoperative dysphagia and was eventually increased to mechanical soft diet.  She had some postoperative hyponatremia and elevated creatinine and renal was consulted.  Her kidney function returned to normal.  She was discharged to acute rehab where she developed increased shortness of breath.  CTA was negative for PE.  She was seen by pulmonary for underlying COPD.  Ultimately, she was not felt to be in any acute heart failure.  Her Lasix was discontinued along with her  aspirin.  On 10/9/2020, she was discharged home.   On 10/20/2020, the patient's daughter called the office to report swelling in her feet and ankles.  She denied any change in her breathing.  Ultimately, I had her resume the Lasix 20 mg daily.  On 10/26/2020, the daughter called the office to report that her feet were still swollen.  I advised her to come in for follow-up and I am seeing her today.     Evidently she had noticed her feet were swollen and she could barely see her ankles.  The Lasix has helped some.  She is now able to see her ankles at least.  Her feet are discolored and cold at baseline due to her severe PVD.  She has shortness of breath at baseline and actually reports it is better than when she was hospitalized.  She denies any PND or orthopnea.  She does have a sensation of feeling like her chest is tight all the time from all of her surgeries.  But she really denies any actual chest pain.  She was started on Remeron while in acute rehab for anxiety.  Her daughter reports that she has been very irritable and short.  Evidently she just finished antibiotics for a UTI.  She denies any palpitations, dizziness, or syncope.  She denies any bleeding difficulties or melena other than bruising.    Past Medical History:   Diagnosis Date   • AAA (abdominal aortic aneurysm) (CMS/[facility])    • Acute bronchitis 12/2018   • 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[facility])    • Arthritis    • Bilateral carotid artery disease (CMS/[facility])    • Bilateral cataracts     S/p Extractions   • Breast cancer (CMS/[facility])     right breast pC9yaPk (snm) pMX ER/AZ pos, Her-2/neg, Ki-67, 31%, oncotype recurrence score 19, invasive lobular carcinoma   • CAD (coronary artery disease)     S/p CABG on 2/23/16 by Dr. 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[facility])    • Cataracts, bilateral    • Closed fracture of three ribs of right side    • Cognitive disorder    • COPD (chronic obstructive pulmonary disease) (CMS/[facility])    • Depression    • Descending aortic arch  "aneurysm (CMS/[facility])    • Diabetes mellitus (CMS/[facility])     \"BORDERLINE\"   • Erythermalgia (CMS/[facility])    • GERD (gastroesophageal reflux disease)    • Hyperlipidemia     Controlled w/Meds   • Hypertension     Controlled w/Meds   • Low back pain    • Moderate aortic valve insufficiency     S/p AVR on 02/23/16 by Dr. Ontiveros   • Nocturia    • Osteoarthritis    • Osteoporosis    • Otitis media     R Ear   • Prediabetes    • PVD (peripheral vascular disease) (CMS/[facility])    • RLS (restless legs syndrome)    • Saccular aneurysm    • Stroke (CMS/[facility])     Perioperatively w/L Hip Repl   • Thoracic ascending aortic aneurysm (CMS/[facility])     S/p Repair on 02/23/16 by Dr. Ontiveros   • TIA (transient ischemic attack)    • Urgency incontinence    • Venous insufficiency    • Ventral hernia        Past Surgical History:   Procedure Laterality Date   • AORTIC VALVE REPAIR/REPLACEMENT N/A 02/23/2016-[facility]    Ascending Replacement Using a 24MM Graft--Dr. Ontiveros   • APPENDECTOMY  1977   • BREAST BIOPSY Right 09/16/2012    Vacuum Assisted Core Bx of R Breast   • CARDIAC CATHETERIZATION N/A 01/06/2016    Dr. Tres De Los Santos   • CARDIAC CATHETERIZATION N/A 4/22/2019    Procedure: Left Heart Cath;  Surgeon: Tres De Los Santos MD;  Location: [facility] CATH INVASIVE LOCATION;  Service: Cardiology   • CARDIAC CATHETERIZATION N/A 4/22/2019    Procedure: Coronary angiography;  Surgeon: Tres De Los Santos MD;  [facility];  Service: Cardiology   • CARDIAC CATHETERIZATION N/A 7/22/2020    Procedure: LEFT HEART CATH;  Surgeon: Antonia Scott MD;  Location: [facility] CATH INVASIVE LOCATION;  Service: Cardiovascular;  Laterality: N/A;   • CARDIAC CATHETERIZATION N/A 7/22/2020    Procedure: CORONARY ANGIOGRAPHY;  Surgeon: Antonia Scott MD;  Location: [facility] CATH INVASIVE LOCATION;  Service: Cardiovascular;  Laterality: N/A;   • CARDIAC CATHETERIZATION N/A 7/22/2020    Procedure: Left ventriculography;  Surgeon: Antonia Scott MD;  Location: [facility]" CATH INVASIVE LOCATION;  Service: Cardiovascular;  Laterality: N/A;   • CARDIAC CATHETERIZATION N/A 7/22/2020    Procedure: Saphenous Vein Graft;  Surgeon: Antonia Scott MD;  Location: [facility] CATH INVASIVE LOCATION;  Service: Cardiovascular;  Laterality: N/A;   • CARDIAC SURGERY  02/2016    Dr. Ontiveros/Dr. De Los Santos   • CATARACT EXTRACTION Bilateral     [facility]   • COLONOSCOPY N/A 10/2002    Dr. Negro   • CORONARY ARTERY BYPASS GRAFT  02/23/2016-[facility]    x1 w/L Vein Grafting--Dr. Ontiveros   • CORONARY ARTERY BYPASS GRAFT N/A 9/18/2020    Procedure: STERNAL EXPLORATION WITH CLOSURE AND WASHOUT, REMOVAL OF IABP, PRP;  Surgeon: Mart Ontiveros MD;  Location: [facility] MAIN OR;  Service: Cardiothoracic;  Laterality: N/A;   • CYST REMOVAL Right 01/25/2013    R Palm Excision of Retinacular Cyst--Dr. Karla Fish   • EPIDURAL BLOCK     • LAPAROSCOPIC CHOLECYSTECTOMY N/A 08/04/2004    Dr. JOEY Sheth   • MASTECTOMY Right 09/28/2012   • ORIF HIP FRACTURE Left 03/27/2005    w/ AMBI compression screw, Dr. Victor M Cabral   • RECTOVAGINAL FISTULA REPAIR  1965   • THORACIC AORTIC ANEURYSM REPAIR N/A 7/23/2019    Procedure: ROSE, THORACOABDOMINAL AORTIC ANEURYSM REPAIR, VISCERAL VESSEL REPAIR, LARGE VENTRAL HERNIA REPAIR WITH MESH, CIRCULATORY ARREST, PRP;  Surgeon: Mart Ontiveros MD;  Location: [facility] OR;  Service: Cardiothoracic   • TRANSESOPHAGEAL ECHOCARDIOGRAM (ROSE) N/A 9/18/2020    Procedure: TRANSESOPHAGEAL ECHOCARDIOGRAM WITH ANESTHESIA;  Surgeon: Mart Ontiveros MD;  Location: [facility] MAIN OR;  Service: Cardiothoracic;  Laterality: N/A;   • TUBAL ABDOMINAL LIGATION     • VENTRICULAR ANEURYSM REPAIR N/A 7/22/2020    Procedure: EMERGENT REOP STERNOTOMY, REPAIR OF LV RUPTURE, PRP, INTRAOP ROSE;  Surgeon: Mart Ontiveros MD;  Location: [facility] MAIN OR;  Service: Cardiothoracic;  Laterality: N/A;   • VENTRICULAR ANEURYSM REPAIR N/A 9/17/2020    Procedure: ROSE, MIDLINE STERNOTOMY REOP  LEFT VENTRICULAR  ANEURYSM REPAIR, IABP INSERTION, PRP;  Surgeon: Mart Ontiveros MD;  Location: [facility] OR;  Service: Cardiothoracic;  Laterality: N/A;       Social History     Socioeconomic History   • Marital status:      Spouse name: Not on file   • Number of children: Not on file   • Years of education: Not on file   • Highest education level: Not on file   Tobacco Use   • Smoking status: Former Smoker     Types: Cigarettes     Quit date: 2012     Years since quittin.9   • Smokeless tobacco: Never Used   • Tobacco comment: CAFFEINE USE:2 CUPS COFFEE/ COKE DAILY   Substance and Sexual Activity   • Alcohol use: No   • Drug use: No   • Sexual activity: Defer     Birth control/protection: Tubal ligation       Family History   Problem Relation Age of Onset   • Alzheimer's disease Mother    • Cancer Maternal Aunt    • Heart disease Father    • Heart failure Father    • Hypertension Father    • Diabetes Father    • Liver disease Sister    • Heart failure Brother    • Hypertension Brother    • Alcohol abuse Brother    • Diabetes Brother    • No Known Problems Maternal Grandmother    • No Known Problems Maternal Grandfather    • No Known Problems Paternal Grandmother    • No Known Problems Paternal Grandfather    • Diabetes Brother    • Brain cancer Brother    • Heart disease Brother    • Diabetes Brother        Review of Systems   Constitution: Negative for chills, fever and malaise/fatigue.   Cardiovascular: Positive for dyspnea on exertion and leg swelling (Bilateral ankle and feet). Negative for chest pain, near-syncope, orthopnea, palpitations, paroxysmal nocturnal dyspnea and syncope.   Respiratory: Negative for cough and shortness of breath.    Musculoskeletal: Negative for joint pain, joint swelling and myalgias.   Gastrointestinal: Negative for abdominal pain, diarrhea, melena, nausea and vomiting.   Genitourinary: Negative for frequency and hematuria.   Neurological: Negative for light-headedness, numbness,  paresthesias and seizures.   Psychiatric/Behavioral: The patient is nervous/anxious.    Allergic/Immunologic: Negative.    All other systems reviewed and are negative.      Allergies   Allergen Reactions   • Ramipril Other (See Comments)     Ace I  cough   • Morphine Itching   • Morphine And Related Itching   • Bactrim [Sulfamethoxazole-Trimethoprim] Itching and Rash         Current Outpatient Medications:   •  albuterol sulfate HFA (ProAir HFA) 108 (90 Base) MCG/ACT inhaler, Inhale 2 puffs Every 6 (Six) Hours As Needed for Wheezing or Shortness of Air., Disp: 3 inhaler, Rfl: 11  •  atorvastatin (LIPITOR) 40 MG tablet, Take 1 tablet by mouth Every Night., Disp: 30 tablet, Rfl: 1  •  budesonide (PULMICORT) 0.5 MG/2ML nebulizer solution, Take 2 mL by nebulization 2 (Two) Times a Day., Disp: 120 mL, Rfl: 0  •  cetirizine (zyrTEC) 10 MG tablet, Take 1 tablet by mouth As Needed for Allergies., Disp: 30 tablet, Rfl: 0  •  cilostazol (PLETAL) 100 MG tablet, Take 1 tablet by mouth 2 (Two) Times a Day., Disp: 180 tablet, Rfl: 2  •  clopidogrel (PLAVIX) 75 MG tablet, Take 1 tablet by mouth Daily., Disp: 30 tablet, Rfl: 6  •  ferrous sulfate 325 (65 FE) MG EC tablet, Take 1 tablet by mouth Daily With Breakfast., Disp: 90 tablet, Rfl: 0  •  furosemide (LASIX) 20 MG tablet, Take 1 tablet by mouth Daily., Disp: 90 tablet, Rfl: 3  •  ipratropium-albuterol (DUO-NEB) 0.5-2.5 mg/3 ml nebulizer, Take 3 mL by nebulization 3 (Three) Times a Day., Disp: 360 mL, Rfl: 0  •  losartan (COZAAR) 50 MG tablet, Take 1 tablet by mouth Daily., Disp: 30 tablet, Rfl: 1  •  meclizine (ANTIVERT) 12.5 MG tablet, Take 1 tablet by mouth 3 (Three) Times a Day As Needed for Dizziness., Disp: 21 tablet, Rfl: 0  •  metoprolol succinate XL (TOPROL-XL) 50 MG 24 hr tablet, Take 1 tablet by mouth Every 12 (Twelve) Hours., Disp: 60 tablet, Rfl: 1  •  mirtazapine (REMERON) 7.5 MG tablet, Take 1 tablet by mouth Every Night., Disp: 90 tablet, Rfl: 0  •  multivitamin  "(THERAGRAN) tablet tablet, Take 1 tablet by mouth Daily., Disp: 90 tablet, Rfl: 0  •  pantoprazole (PROTONIX) 40 MG EC tablet, Take 1 tablet by mouth Every Morning., Disp: 90 tablet, Rfl: 0  •  pramipexole (MIRAPEX) 0.125 MG tablet, TAKE ONE TABLET BY MOUTH EVERY NIGHT AT BEDTIME, Disp: 90 tablet, Rfl: 0  •  sennosides-docusate (PERICOLACE) 8.6-50 MG per tablet, Take 2 tablets by mouth Daily As Needed for Constipation., Disp: , Rfl:   •  TOVIAZ 4 MG tablet sustained-release 24 hour tablet, Take 4 mg by mouth Daily., Disp: , Rfl:   •  traMADol (ULTRAM) 50 MG tablet, TAKE ONE TABLET BY MOUTH EVERY 8 HOURS AS NEEDED FOR MODERATE OR SEVERE PAIN, Disp: 90 tablet, Rfl: 0  •  Vitamin D, Cholecalciferol, 50 MCG (2000 UT) capsule, Take 2,000 Units by mouth Daily., Disp: 30 capsule, Rfl:       Objective:     Vitals:    10/28/20 1330   BP: 119/62   BP Location: Left arm   Patient Position: Sitting   Pulse: 87   Weight: 37.7 kg (83 lb 3.2 oz)   Height: 142.2 cm (56\")     Body mass index is 18.65 kg/m².    PHYSICAL EXAM:    Constitutional:       General: Not in acute distress.     Appearance: Well-developed. Not diaphoretic.   Eyes:      Pupils: Pupils are equal, round, and reactive to light.   HENT:      Head: Normocephalic and atraumatic.   Neck:      Thyroid: No thyromegaly.      Vascular: No JVD.   Pulmonary:      Effort: Pulmonary effort is normal. No respiratory distress.      Breath sounds: Normal breath sounds.   Cardiovascular:      Normal rate. Regular rhythm.   Pulses:     Intact distal pulses.   Abdominal:      General: Bowel sounds are normal. There is no distension.      Palpations: Abdomen is soft. There is no hepatomegaly or splenomegaly.      Tenderness: There is no abdominal tenderness.   Musculoskeletal: Normal range of motion.   Skin:     General: Skin is warm and dry.      Findings: No erythema.      Comments: Midsternal incision well-healed. No erythema or edema.     Bilateral feet are purple and cool to " touch   Neurological:      Mental Status: Alert and oriented to person, place, and time.   Psychiatric:         Behavior: Behavior normal.         Judgment: Judgment normal.           ECG 12 Lead    Date/Time: 10/28/2020 1:41 PM  Performed by: Carlie Hilton APRN  Authorized by: Carlie Hilton APRN   Comparison: compared with previous ECG from 9/20/2020  Similar to previous ECG  Rhythm: sinus rhythm  Rate: normal  BPM: 87  Other findings: left atrial abnormality and prolonged QTc interval    Clinical impression: abnormal EKG  Comments: Indication: Edema              Assessment:       Diagnosis Plan   1. Pericardial hematoma     2. Lower extremity edema  ECG 12 Lead    proBNP   3. PVD (peripheral vascular disease) (CMS/HCC)     4. Shortness of breath  proBNP   5. QT prolongation       Orders Placed This Encounter   Procedures   • proBNP     Standing Status:   Future     Number of Occurrences:   1     Standing Expiration Date:   10/28/2021   • ECG 12 Lead     This order was created via procedure documentation          Plan:       Overall I think she is stable.  She denies any chest pain.  I think more than likely her lower extremity swelling is mostly due to venous insufficiency.  There are no signs of overt heart failure.  She denies any PND orthopnea.  I'm going to keep her on the Lasix and check a CMP and a proBNP.  She does have a prolonged QT interval likely due to the Remeron.  I have asked her to follow-up with her PCP as she needs to stop this medication.  Considering all she has been through the past couple of months, it is not surprising that she is dealing with some anxiety and depression.  She has an appointment with Dr. De Los Santos on 11/11/2020.  She will call me if she has any problems before then.      As always, it has been a pleasure to participate in your patient's care.      Sincerely,         FREDRICK Bryant

## 2020-10-28 NOTE — TELEPHONE ENCOUNTER
I spoke with her regarding her lab results.  Her kidney function is stable.  Her proBNP is elevated but is actually better than it has been previously.  She will stay on the Lasix.

## 2020-10-29 ENCOUNTER — TELEPHONE (OUTPATIENT)
Dept: INTERNAL MEDICINE | Age: 80
End: 2020-10-29

## 2020-10-29 ENCOUNTER — PATIENT OUTREACH (OUTPATIENT)
Dept: CASE MANAGEMENT | Facility: OTHER | Age: 80
End: 2020-10-29

## 2020-10-29 NOTE — TELEPHONE ENCOUNTER
Urine results discussed with patient. Culture indeterminate, but indicated UTI. She has just finished Keflex and feeling much better.     Discussion was held. She had EKG complete with prolonged QT prolongation. She is currently on remeron and the cardiology NP mentioned that she should discontinue medication.     Advised to start weaning off remeron. Take 1/2 of 7.5mg tablets for now. She would like information passed on to Dr. Castañeda her PCP.

## 2020-10-29 NOTE — OUTREACH NOTE
"Patient Outreach Note    Follow up outreach call. Introduced self, explained ACM RN role and provided contact information. Patient stated, \"I'm doing great. Thank you for calling.\" No further calls scheduled at this time.     Praveena Mayorga RN  Ambulatory     10/29/2020, 12:36 EDT      "

## 2020-10-29 NOTE — TELEPHONE ENCOUNTER
PATIENT WOULD LIKE A CALL BACK REGARDING MEDICATION GIVEN TO HER BY  DR SIMMS CARDIOLOGIST    CATE    WANTED HER TO DISCUSS WITH PCP        ALSO, NEVER RECEIVED LAB RESULTS OF URINALYSIS FROM 10/21     [phone]

## 2020-10-30 NOTE — TELEPHONE ENCOUNTER
She may wean off Remeron over 2 weeks. See if she thinks she may need to be on a different antidepressant at this time.

## 2020-11-04 ENCOUNTER — OFFICE VISIT (OUTPATIENT)
Dept: CARDIAC SURGERY | Facility: CLINIC | Age: 80
End: 2020-11-04

## 2020-11-04 VITALS
WEIGHT: 83 LBS | HEIGHT: 66 IN | BODY MASS INDEX: 13.34 KG/M2 | TEMPERATURE: 98.3 F | SYSTOLIC BLOOD PRESSURE: 132 MMHG | OXYGEN SATURATION: 96 % | RESPIRATION RATE: 20 BRPM | HEART RATE: 77 BPM | DIASTOLIC BLOOD PRESSURE: 68 MMHG

## 2020-11-04 DIAGNOSIS — I25.3 LEFT VENTRICULAR ANEURYSM: Primary | ICD-10-CM

## 2020-11-04 DIAGNOSIS — I71.23 DESCENDING THORACIC AORTIC ANEURYSM (HCC): ICD-10-CM

## 2020-11-04 DIAGNOSIS — I71.60 THORACOABDOMINAL AORTIC ANEURYSM (TAAA) WITHOUT RUPTURE (HCC): ICD-10-CM

## 2020-11-04 PROCEDURE — 99024 POSTOP FOLLOW-UP VISIT: CPT | Performed by: NURSE PRACTITIONER

## 2020-11-04 NOTE — PROGRESS NOTES
"CARDIOVASCULAR SURGERY FOLLOW-UP PROGRESS NOTE  Chief Complaint: Postop follow-up        HPI:   Dear Miller Reynolds MD and colleagues:    It was nice to see Grace Beauchamp in follow up 7 weeks after surgery.  As you know, she is a 80 y.o. female with history of thoracoabdominal aortic aneurysm repair, ascending aorta and arch repair, CABG, lateral wall rupture and arrest requiring repair, LV aneurysm versus false aneurysm with compression of the left ventricle who underwent urgent redo sternotomy, repair of lateral wall LV aneurysm with pericardial patch on 9/17/2020 with Dr. Ontiveros. She did well postoperatively and continues to do well with the exception of ongoing deconditioning as this is her second surgery in the last few months.  She went to acute rehab after surgery and she is now home and has completed her home physical therapy.  She comes in today complaining of ongoing deconditioning, we discussed potential outpatient physical therapy, she is going to discuss with her primary care physician as she was happy with an outpatient therapist he recommended in the past.  Her activity level has been fair.  She states that she is eating more and her weight is stable.  From a surgical standpoint, the sternal incision is well approximated without erythema, edema, or drainage.  The sternum is stable to palpation, and the patient denies any popping or clicking with deep inspiration or coughing.      Physical Exam:         /68 (BP Location: Right arm, Patient Position: Sitting, Cuff Size: Adult)   Pulse 77   Temp 98.3 °F (36.8 °C) (Oral)   Resp 20   Ht 167.6 cm (66\")   Wt 37.6 kg (83 lb)   LMP  (LMP Unknown)   SpO2 96%   BMI 13.40 kg/m²   Heart:  regular rate and rhythm, S1, S2 normal, no murmur, click, rub or gallop  Lungs:  clear to auscultation bilaterally  Extremities:  no edema  Incision(s):  mid chest healing well, right groin healing well, sternum stable    Assessment/Plan:     S/P LV aneurysm " repair with pericardial patch. Overall, she is doing well.    No significant post-op complications    Follow-up as scheduled with cardiology  Follow-up as scheduled with PCP  Return to clinic in 1 year(s) with CTA of the chest abdomen and pelvis prior to appointment with Dr. Ontiveros    Continue lifting restriction of 10 lbs until 6 weeks and 50 lbs until 12 weeks from the date of surgery, no excessive jarring motions or twisting motions until 12 weeks from the date of surgery    Return to clinic if any signs or symptoms of infection or sternal instability develop       Thank you for allowing me to participate in the care of your patient.    Regards,  FREDRICK España

## 2020-11-11 ENCOUNTER — OFFICE VISIT (OUTPATIENT)
Dept: CARDIOLOGY | Facility: CLINIC | Age: 80
End: 2020-11-11

## 2020-11-11 VITALS
BODY MASS INDEX: 18.81 KG/M2 | WEIGHT: 83.6 LBS | HEIGHT: 56 IN | SYSTOLIC BLOOD PRESSURE: 118 MMHG | HEART RATE: 75 BPM | DIASTOLIC BLOOD PRESSURE: 68 MMHG

## 2020-11-11 DIAGNOSIS — I25.10 CORONARY ARTERY DISEASE INVOLVING NATIVE CORONARY ARTERY OF NATIVE HEART WITHOUT ANGINA PECTORIS: ICD-10-CM

## 2020-11-11 DIAGNOSIS — I71.60 THORACOABDOMINAL AORTIC ANEURYSM (TAAA) WITHOUT RUPTURE (HCC): Primary | ICD-10-CM

## 2020-11-11 DIAGNOSIS — I21.4 NSTEMI (NON-ST ELEVATED MYOCARDIAL INFARCTION) (HCC): ICD-10-CM

## 2020-11-11 PROCEDURE — 93000 ELECTROCARDIOGRAM COMPLETE: CPT | Performed by: INTERNAL MEDICINE

## 2020-11-11 PROCEDURE — 99214 OFFICE O/P EST MOD 30 MIN: CPT | Performed by: INTERNAL MEDICINE

## 2020-11-11 NOTE — PROGRESS NOTES
"Date of Office Visit: 20  Encounter Provider: Tres De Los Santos MD  Place of Service: [facility]  Patient Name: Grace Beauchamp  :1940  [id number]    Chief Complaint   Patient presents with   • Pericardial hematoma   :     HPI: Grace Beauchamp is a 80 y.o. female  This is a patient with a history of thoracic aneurysm repair, a catheterization, which showed one-vessel disease in her obtuse marginal.  She ended up having a bypass at the time she had her aneurysm repaired.  In 2020 she had been feeling tired for a few days then had kind of a brief episode of chest pain but felt badly with a came to the hospital was found to have an LV rupture that was contained.  Cath showed that her vein graft to the OM1 had occluded.  She had to go in for an operative repair with Dr. Ontiveros.  She has a history of pretty severe COPD she is got pulmonary hypertension her weight is 83 pounds.  Her proBNP is high but she does not appear to be in heart failure.  No PND no orthopnea no edema and she did actually like to come off her Lasix.    Past Medical History:   Diagnosis Date   • AAA (abdominal aortic aneurysm) (CMS/HCC)    • Acute bronchitis 2018   • 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HCC)    • Arthritis    • Bilateral carotid artery disease (CMS/HCC)    • Bilateral cataracts     S/p Extractions   • Breast cancer (CMS/HCC)     right breast [id number] (snm) pMX ER/MT pos, Her-2/neg, Ki-67, 31%, oncotype recurrence score 19, invasive lobular carcinoma   • CAD (coronary artery disease)     S/p CABG on 16 by Dr. 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HCC)    • Cataracts, bilateral    • Closed fracture of three ribs of right side    • Cognitive disorder    • COPD (chronic obstructive pulmonary disease) (CMS/HCC)    • Depression    • Descending aortic arch aneurysm (CMS/HCC)    • Diabetes mellitus (CMS/HCC)     \"BORDERLINE\"   • Erythermalgia (CMS/HCC)    • GERD (gastroesophageal reflux " disease)    • Hyperlipidemia     Controlled w/Meds   • Hypertension     Controlled w/Meds   • Low back pain    • Moderate aortic valve insufficiency     S/p AVR on 02/23/16 by Dr. Ontiveros   • Nocturia    • Osteoarthritis    • Osteoporosis    • Otitis media     R Ear   • Prediabetes    • PVD (peripheral vascular disease) (CMS/HCC)    • RLS (restless legs syndrome)    • Saccular aneurysm    • Stroke (CMS/HCC)     Perioperatively w/L Hip Repl   • Thoracic ascending aortic aneurysm (CMS/HCC)     S/p Repair on 02/23/16 by Dr. Ontiveros   • TIA (transient ischemic attack)    • Urgency incontinence    • Venous insufficiency    • Ventral hernia        Past Surgical History:   Procedure Laterality Date   • AORTIC VALVE REPAIR/REPLACEMENT N/A 02/23/2016-[facility]    Ascending Replacement Using a 24MM Graft--Dr. Ontiveros   • APPENDECTOMY  1977   • BREAST BIOPSY Right 09/16/2012    Vacuum Assisted Core Bx of R Breast   • CARDIAC CATHETERIZATION N/A 01/06/2016    Dr. Tres De Los Santos   • CARDIAC CATHETERIZATION N/A 4/22/2019    Procedure: Left Heart Cath;  Surgeon: Tres De Los Santos MD;  Location:  [facility];  Service: Cardiology   • CARDIAC CATHETERIZATION N/A 4/22/2019    Procedure: Coronary angiography;  Surgeon: Tres De Los Santos MD;  Location:  [facility];  Service: Cardiology   • CARDIAC CATHETERIZATION N/A 7/22/2020    Procedure: LEFT HEART CATH;  Surgeon: Antonia Scott MD;  Location:  [facility];  Service: Cardiovascular;  Laterality: N/A;   • CARDIAC CATHETERIZATION N/A 7/22/2020    Procedure: CORONARY ANGIOGRAPHY;  Surgeon: Antonia Scott MD;  Location:  [facility];  Service: Cardiovascular;  Laterality: N/A;   • CARDIAC CATHETERIZATION N/A 7/22/2020    Procedure: Left ventriculography;  Surgeon: Antonia Scott MD;  Location:  [facility];  Service: Cardiovascular;  Laterality: N/A;   • CARDIAC CATHETERIZATION N/A 7/22/2020    Procedure: Saphenous  Vein Graft;  Surgeon: Antonia Scott MD;  [facility];  Service: Cardiovascular;  Laterality: N/A;   • CARDIAC SURGERY  02/2016    Dr. Ontiveros/Dr. De Los Santos   • CATARACT EXTRACTION Bilateral     [facility]   • COLONOSCOPY N/A 10/2002    Dr. Negro   • CORONARY ARTERY BYPASS GRAFT  02/23/2016-[facility]    x1 w/L Vein Grafting--Dr. Onitveros   • CORONARY ARTERY BYPASS GRAFT N/A 9/18/2020    Procedure: STERNAL EXPLORATION WITH CLOSURE AND WASHOUT, REMOVAL OF IABP, PRP;  Surgeon: Mart Ontiveros MD;  [facility];  Service: Cardiothoracic;  Laterality: N/A;   • CYST REMOVAL Right 01/25/2013    R Palm Excision of Retinacular Cyst--Dr. Karla Fish   • EPIDURAL BLOCK     • LAPAROSCOPIC CHOLECYSTECTOMY N/A 08/04/2004    Dr. JOEY Sheth   • MASTECTOMY Right 09/28/2012   • ORIF HIP FRACTURE Left 03/27/2005    w/ AMBI compression screw, Dr. Victor M Cabral   • RECTOVAGINAL FISTULA REPAIR  1965   • THORACIC AORTIC ANEURYSM REPAIR N/A 7/23/2019    Procedure: ROSE, THORACOABDOMINAL AORTIC ANEURYSM REPAIR, VISCERAL VESSEL REPAIR, LARGE VENTRAL HERNIA REPAIR WITH MESH, CIRCULATORY ARREST, PRP;  Surgeon: Mart Ontiveros MD;  Location: [facility] MAIN OR;  Service: Cardiothoracic   • TRANSESOPHAGEAL ECHOCARDIOGRAM (ROSE) N/A 9/18/2020    Procedure: TRANSESOPHAGEAL ECHOCARDIOGRAM WITH ANESTHESIA;  Surgeon: Mart Ontiveros MD;  [facility];  Service: Cardiothoracic;  Laterality: N/A;   • TUBAL ABDOMINAL LIGATION     • VENTRICULAR ANEURYSM REPAIR N/A 7/22/2020    Procedure: EMERGENT REOP STERNOTOMY, REPAIR OF LV RUPTURE, PRP, INTRAOP ROSE;  Surgeon: Mart Ontiveros MD;  [facility];  Service: Cardiothoracic;  Laterality: N/A;   • VENTRICULAR ANEURYSM REPAIR N/A 9/17/2020    Procedure: ROSE, MIDLINE STERNOTOMY REOP  LEFT VENTRICULAR ANEURYSM REPAIR, IABP INSERTION, PRP;  Surgeon: Mart Ontiveros MD;  [facility];  Service: Cardiothoracic;  Laterality:  N/A;       Social History     Socioeconomic History   • Marital status:      Spouse name: Not on file   • Number of children: Not on file   • Years of education: Not on file   • Highest education level: Not on file   Tobacco Use   • Smoking status: Former Smoker     Types: Cigarettes     Quit date: 2012     Years since quittin.9   • Smokeless tobacco: Never Used   • Tobacco comment: CAFFEINE USE:2 CUPS COFFEE/ COKE DAILY   Substance and Sexual Activity   • Alcohol use: No   • Drug use: No   • Sexual activity: Defer     Birth control/protection: Tubal ligation       Family History   Problem Relation Age of Onset   • Alzheimer's disease Mother    • Cancer Maternal Aunt    • Heart disease Father    • Heart failure Father    • Hypertension Father    • Diabetes Father    • Liver disease Sister    • Heart failure Brother    • Hypertension Brother    • Alcohol abuse Brother    • Diabetes Brother    • No Known Problems Maternal Grandmother    • No Known Problems Maternal Grandfather    • No Known Problems Paternal Grandmother    • No Known Problems Paternal Grandfather    • Diabetes Brother    • Brain cancer Brother    • Heart disease Brother    • Diabetes Brother        Review of Systems   Constitution: Negative for decreased appetite, fever, malaise/fatigue and weight loss.   HENT: Negative for nosebleeds.    Eyes: Negative for double vision.   Cardiovascular: Negative for chest pain, claudication, cyanosis, dyspnea on exertion, irregular heartbeat, leg swelling, near-syncope, orthopnea, palpitations, paroxysmal nocturnal dyspnea and syncope.   Respiratory: Negative for cough, hemoptysis and shortness of breath.    Hematologic/Lymphatic: Negative for bleeding problem.   Skin: Negative for rash.   Musculoskeletal: Negative for falls and myalgias.   Gastrointestinal: Negative for hematochezia, jaundice, melena, nausea and vomiting.   Genitourinary: Negative for hematuria.   Neurological: Negative for  dizziness and seizures.   Psychiatric/Behavioral: Negative for altered mental status and memory loss.       Allergies   Allergen Reactions   • Ramipril Other (See Comments)     Ace I  cough   • Morphine Itching   • Morphine And Related Itching   • Bactrim [Sulfamethoxazole-Trimethoprim] Itching and Rash         Current Outpatient Medications:   •  albuterol sulfate HFA (ProAir HFA) 108 (90 Base) MCG/ACT inhaler, Inhale 2 puffs Every 6 (Six) Hours As Needed for Wheezing or Shortness of Air., Disp: 3 inhaler, Rfl: 11  •  atorvastatin (LIPITOR) 40 MG tablet, Take 1 tablet by mouth Every Night., Disp: 30 tablet, Rfl: 1  •  cilostazol (PLETAL) 100 MG tablet, Take 1 tablet by mouth 2 (Two) Times a Day., Disp: 180 tablet, Rfl: 2  •  clopidogrel (PLAVIX) 75 MG tablet, Take 1 tablet by mouth Daily., Disp: 30 tablet, Rfl: 6  •  ferrous sulfate 325 (65 FE) MG EC tablet, Take 1 tablet by mouth Daily With Breakfast., Disp: 90 tablet, Rfl: 0  •  losartan (COZAAR) 50 MG tablet, Take 1 tablet by mouth Daily., Disp: 30 tablet, Rfl: 1  •  meclizine (ANTIVERT) 12.5 MG tablet, Take 1 tablet by mouth 3 (Three) Times a Day As Needed for Dizziness., Disp: 21 tablet, Rfl: 0  •  metoprolol succinate XL (TOPROL-XL) 50 MG 24 hr tablet, Take 1 tablet by mouth Every 12 (Twelve) Hours., Disp: 60 tablet, Rfl: 1  •  multivitamin (THERAGRAN) tablet tablet, Take 1 tablet by mouth Daily., Disp: 90 tablet, Rfl: 0  •  pantoprazole (PROTONIX) 40 MG EC tablet, Take 1 tablet by mouth Every Morning., Disp: 90 tablet, Rfl: 0  •  pramipexole (MIRAPEX) 0.125 MG tablet, TAKE ONE TABLET BY MOUTH EVERY NIGHT AT BEDTIME, Disp: 90 tablet, Rfl: 0  •  sennosides-docusate (PERICOLACE) 8.6-50 MG per tablet, Take 2 tablets by mouth Daily As Needed for Constipation., Disp: , Rfl:   •  TOVIAZ 4 MG tablet sustained-release 24 hour tablet, Take 4 mg by mouth Daily., Disp: , Rfl:   •  traMADol (ULTRAM) 50 MG tablet, TAKE ONE TABLET BY MOUTH EVERY 8 HOURS AS NEEDED FOR  "MODERATE OR SEVERE PAIN, Disp: 90 tablet, Rfl: 0  •  Vitamin D, Cholecalciferol, 50 MCG (2000 UT) capsule, Take 2,000 Units by mouth Daily., Disp: 30 capsule, Rfl:   •  budesonide (PULMICORT) 0.5 MG/2ML nebulizer solution, Take 2 mL by nebulization 2 (Two) Times a Day., Disp: 120 mL, Rfl: 0  •  cetirizine (zyrTEC) 10 MG tablet, Take 1 tablet by mouth As Needed for Allergies., Disp: 30 tablet, Rfl: 0  •  ipratropium-albuterol (DUO-NEB) 0.5-2.5 mg/3 ml nebulizer, Take 3 mL by nebulization 3 (Three) Times a Day., Disp: 360 mL, Rfl: 0  •  mirtazapine (REMERON) 7.5 MG tablet, Take 1 tablet by mouth Every Night., Disp: 90 tablet, Rfl: 0      Objective:     Vitals:    11/11/20 1416   BP: 118/68   Pulse: 75   Weight: 37.9 kg (83 lb 9.6 oz)   Height: 142.2 cm (56\")     Body mass index is 18.74 kg/m².    Physical Exam   Constitutional: She is oriented to person, place, and time. She appears well-developed and well-nourished.   Cachectic   HENT:   Head: Normocephalic.   Eyes: No scleral icterus.   Neck: No JVD present. No thyromegaly present.   Cardiovascular: Normal rate, regular rhythm and normal heart sounds. Exam reveals no gallop and no friction rub.   No murmur heard.  Pulmonary/Chest: Effort normal and breath sounds normal. She has no wheezes. She has no rales.   Abdominal: Soft. There is no hepatosplenomegaly. There is no abdominal tenderness.   Musculoskeletal: Normal range of motion.         General: No edema.   Lymphadenopathy:     She has no cervical adenopathy.   Neurological: She is alert and oriented to person, place, and time.   Skin: Skin is warm and dry. No rash noted.   Psychiatric: She has a normal mood and affect.         ECG 12 Lead    Date/Time: 11/11/2020 2:56 PM  Performed by: Tres De Los Santos MD  Authorized by: Tres De Los Santos MD   Comparison: compared with previous ECG   Similar to previous ECG  Rhythm: sinus rhythm  Conduction: left anterior fascicular block  Other findings: non-specific ST-T wave " changes    Clinical impression: abnormal EKG             Assessment:       Diagnosis Plan   1. 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)     2. NSTEMI (non-ST elevated myocardial infarction) (CMS/HCC)     3. Coronary artery disease involving native coronary artery of native heart without angina pectoris            Plan:       At this point I think she is doing well and when to stop her Lasix and see how she does off of it I will have her come back and see me in 6 months would like her to see Carlie in 3 if she is really not gaining weight we might want to try cryo Hepatite for her    As always, it has been a pleasure to participate in your patient's care.      Sincerely,       Tres De Los Santos MD

## 2020-11-23 RX ORDER — CLOPIDOGREL BISULFATE 75 MG/1
75 TABLET ORAL DAILY
Qty: 90 TABLET | Refills: 2 | Status: SHIPPED | OUTPATIENT
Start: 2020-11-23 | End: 2022-04-18

## 2020-11-23 RX ORDER — LOSARTAN POTASSIUM 50 MG/1
50 TABLET ORAL DAILY
Qty: 90 TABLET | Refills: 2 | Status: SHIPPED | OUTPATIENT
Start: 2020-11-23 | End: 2021-09-07

## 2020-11-23 RX ORDER — ATORVASTATIN CALCIUM 40 MG/1
40 TABLET, FILM COATED ORAL NIGHTLY
Qty: 90 TABLET | Refills: 2 | Status: SHIPPED | OUTPATIENT
Start: 2020-11-23

## 2020-11-23 RX ORDER — METOPROLOL SUCCINATE 50 MG/1
50 TABLET, EXTENDED RELEASE ORAL EVERY 12 HOURS SCHEDULED
Qty: 180 TABLET | Refills: 2 | Status: SHIPPED | OUTPATIENT
Start: 2020-11-23 | End: 2021-09-07

## 2020-11-24 DIAGNOSIS — M15.9 PRIMARY OSTEOARTHRITIS INVOLVING MULTIPLE JOINTS: Chronic | ICD-10-CM

## 2020-11-24 RX ORDER — TRAMADOL HYDROCHLORIDE 50 MG/1
50 TABLET ORAL EVERY 8 HOURS PRN
Qty: 90 TABLET | Refills: 0 | Status: SHIPPED | OUTPATIENT
Start: 2020-11-24 | End: 2021-03-15

## 2020-11-24 NOTE — TELEPHONE ENCOUNTER
Patient needs new prescription     Caller: juju     Relationship: self    Best call back number: [phone]      Medication needed:   Requested Prescriptions     Pending Prescriptions Disp Refills   • traMADol (ULTRAM) 50 MG tablet 90 tablet 0       When do you need the refill by: 11/30    What details did the patient provide when requesting the medication: 8 pills left  Does the patient have less than a 3 day supply:  [] Yes  [x] No    What is the patient's preferred pharmacy:        Pharmacy:  [facility] [address] [facility] [phone] [facility] [phone] [facility]

## 2020-12-15 ENCOUNTER — OFFICE VISIT (OUTPATIENT)
Dept: INTERNAL MEDICINE | Age: 80
End: 2020-12-15

## 2020-12-15 VITALS
HEIGHT: 56 IN | DIASTOLIC BLOOD PRESSURE: 80 MMHG | RESPIRATION RATE: 15 BRPM | WEIGHT: 86 LBS | SYSTOLIC BLOOD PRESSURE: 136 MMHG | HEART RATE: 85 BPM | TEMPERATURE: 97.5 F | OXYGEN SATURATION: 96 % | BODY MASS INDEX: 19.35 KG/M2

## 2020-12-15 DIAGNOSIS — S70.322A: Primary | ICD-10-CM

## 2020-12-15 DIAGNOSIS — K21.9 GASTROESOPHAGEAL REFLUX DISEASE, UNSPECIFIED WHETHER ESOPHAGITIS PRESENT: Chronic | ICD-10-CM

## 2020-12-15 PROCEDURE — 99213 OFFICE O/P EST LOW 20 MIN: CPT | Performed by: INTERNAL MEDICINE

## 2020-12-15 RX ORDER — PANTOPRAZOLE SODIUM 40 MG/1
40 TABLET, DELAYED RELEASE ORAL EVERY MORNING
Qty: 90 TABLET | Refills: 3 | Status: SHIPPED | OUTPATIENT
Start: 2020-12-15 | End: 2022-01-10

## 2020-12-15 NOTE — PROGRESS NOTES
[facility] [place] INTERNAL MEDICINE  ANETA CASTAÑEDA M.D.      Grace Beauchamp / 80 y.o. / female  12/15/2020      CC:  Main reason(s) for today's visit: Wound Check (left thigh)      HPI:     Blister of the left thigh (after rubbing skin through her pants). Was prescribed Cipro for UTI recently. No rash elsewhere. No fever or pain.   Needs refill for pantoprazole for GERD.       Patient Care Team:  Miller Castañeda MD as PCP - General (Internal Medicine)  Mart Ontiveros MD as Surgeon (Cardiothoracic Surgery)  Tres De Los Santos MD as Consulting Physician (Cardiology)  Graham Mcconnell MD as Consulting Physician (Hematology and Oncology)  Payam Byrnes MD as Consulting Physician (Otolaryngology)  Jonathan Smith MD as Consulting Physician (Vascular Surgery)  Victor M Cabral MD as Surgeon (Orthopedic Surgery)  Yaya Burks MD as Consulting Physician (Pulmonary Disease)    ASSESSMENT & PLAN:    1. Blister of left thigh, initial encounter    2. Gastroesophageal reflux disease, unspecified whether esophagitis present      No orders of the defined types were placed in this encounter.    New Medications Ordered This Visit   Medications   • pantoprazole (PROTONIX) 40 MG EC tablet     Sig: Take 1 tablet by mouth Every Morning.     Dispense:  90 tablet     Refill:  3        Summary/Discussion:  No evidence of cellulitis  Antibiotic ointment applied and dress with gauze  Call for worsening redness, pain, warmth, fever or drainage  Refilled pantoprazole for GERD       Next Appointment with me: Visit date not found    Return in about 4 months (around 4/15/2021) for Reassess chronic medical problems.    ____________________________________________________________________    MEDICATIONS  Current Outpatient Medications   Medication Sig Dispense Refill   • albuterol sulfate HFA (ProAir HFA) 108 (90 Base) MCG/ACT inhaler Inhale 2 puffs Every 6 (Six) Hours As Needed for Wheezing or Shortness of Air. 3 inhaler 11   • atorvastatin  (LIPITOR) 40 MG tablet Take 1 tablet by mouth Every Night. 90 tablet 2   • budesonide (PULMICORT) 0.5 MG/2ML nebulizer solution Take 2 mL by nebulization 2 (Two) Times a Day. 120 mL 0   • cetirizine (zyrTEC) 10 MG tablet Take 1 tablet by mouth As Needed for Allergies. 30 tablet 0   • cilostazol (PLETAL) 100 MG tablet Take 1 tablet by mouth 2 (Two) Times a Day. 180 tablet 2   • clopidogrel (PLAVIX) 75 MG tablet Take 1 tablet by mouth Daily. 90 tablet 2   • ipratropium-albuterol (DUO-NEB) 0.5-2.5 mg/3 ml nebulizer Take 3 mL by nebulization 3 (Three) Times a Day. 360 mL 0   • losartan (COZAAR) 50 MG tablet Take 1 tablet by mouth Daily. 90 tablet 2   • meclizine (ANTIVERT) 12.5 MG tablet Take 1 tablet by mouth 3 (Three) Times a Day As Needed for Dizziness. 21 tablet 0   • metoprolol succinate XL (TOPROL-XL) 50 MG 24 hr tablet Take 1 tablet by mouth Every 12 (Twelve) Hours. 180 tablet 2   • pantoprazole (PROTONIX) 40 MG EC tablet Take 1 tablet by mouth Every Morning. 90 tablet 3   • pramipexole (MIRAPEX) 0.125 MG tablet TAKE ONE TABLET BY MOUTH EVERY NIGHT AT BEDTIME 90 tablet 0   • sennosides-docusate (PERICOLACE) 8.6-50 MG per tablet Take 2 tablets by mouth Daily As Needed for Constipation.     • TOVIAZ 4 MG tablet sustained-release 24 hour tablet Take 4 mg by mouth Daily.     • traMADol (ULTRAM) 50 MG tablet Take 1 tablet by mouth Every 8 (Eight) Hours As Needed for Moderate Pain . 90 tablet 0   • Vitamin D, Cholecalciferol, 50 MCG (2000 UT) capsule Take 2,000 Units by mouth Daily. 30 capsule    • ferrous sulfate 325 (65 FE) MG EC tablet Take 1 tablet by mouth Daily With Breakfast. 90 tablet 0   • mirtazapine (REMERON) 7.5 MG tablet Take 1 tablet by mouth Every Night. 90 tablet 0   • multivitamin (THERAGRAN) tablet tablet Take 1 tablet by mouth Daily. 90 tablet 0     No current facility-administered medications for this visit.           ____________________________________________________________________      REVIEW  "OF SYSTEMS    Review of Systems  No fever or chills  Skin blister without pain, drainage     VITALS    Visit Vitals  /80 (BP Location: Left arm, Patient Position: Sitting, Cuff Size: Adult)   Pulse 85   Temp 97.5 °F (36.4 °C) (Temporal)   Resp 15   Ht 142.2 cm (55.98\")   Wt 39 kg (86 lb)   LMP  (LMP Unknown)   SpO2 96%   BMI 19.29 kg/m²       BP Readings from Last 3 Encounters:   12/15/20 136/80   11/11/20 118/68   11/04/20 132/68     Wt Readings from Last 3 Encounters:   12/15/20 39 kg (86 lb)   11/11/20 37.9 kg (83 lb 9.6 oz)   11/04/20 37.6 kg (83 lb)      Body mass index is 19.29 kg/m².    PHYSICAL EXAMINATION    Physical Exam  Constitutional:       General: She is not in acute distress.  Skin:                 REVIEWED DATA:    Labs:         Imaging:         Medical Tests:         Summary of old records / correspondence / consultant report:          Request outside records:         ALLERGIES  Allergies   Allergen Reactions   • Ramipril Other (See Comments)     Ace I  cough   • Morphine Itching   • Morphine And Related Itching   • Bactrim [Sulfamethoxazole-Trimethoprim] Itching and Rash        PFSH:     The following portions of the patient's history were reviewed and updated as appropriate: Allergies / Current Medications / Past Medical History / Surgical History / Social History / Family History    PROBLEM LIST   Patient Active Problem List   Diagnosis   • History of breast cancer   • Stenosis of carotid artery   • Chronic obstructive pulmonary disease (CMS/HCC)   • Closed fracture of multiple ribs   • Cognitive disorder   • Erythromelalgia (CMS/HCC)   • Hyperlipidemia   • Hypertension   • Primary osteoarthritis involving multiple joints   • Osteoporosis   • Restless legs syndrome   • Cerebral aneurysm   • Temporary cerebral vascular dysfunction   • S/P CABG x 1   • Type 2 diabetes mellitus without complication, without long-term current use of insulin (CMS/HCC)   • S/P ascending aortic aneurysm repair "   • 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HCC)   • Descending thoracic aortic aneurysm (CMS/HCC)   • Claudication of both lower extremities (CMS/HCC)   • 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HCC)   • Ventral hernia without obstruction or gangrene   • Breast cancer, stage 1, estrogen receptor positive (CMS/HCC)   • Arthritis of right hip   • Arthritis of right knee   • Chondrocalcinosis   • Chronic pain of right knee   • Degenerative disc disease, lumbar   • Gastroesophageal reflux disease   • Bilateral hearing loss   • Thoracic aortic aneurysm 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)   • PVD (peripheral vascular disease) (CMS/HCC)   • 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CMS/HCC)   • Thoracoabdominal aortic aneurysm, 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)   • 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)   • Aortic aneurysm, descending (CMS/HCC)   • Aortic aneurysm 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)   • CAD (coronary artery disease)   • S/P left heart catheterization by ventricular puncture   • NSTEMI (non-ST elevated myocardial infarction) (CMS/[facility])   • Pericardial hematoma   • History of ST elevation myocardial infarction (STEMI)   • Acute on chronic diastolic CHF (congestive heart failure) (CMS/HCC)   • Left ventricular aneurysm   • Immobility syndrome   • Lower extremity edema   • QT prolongation       PAST MEDICAL HISTORY  Past Medical History:   Diagnosis Date   • AAA (abdominal aortic aneurysm) (CMS/HCC)    • Acute bronchitis 12/2018   • 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HCC)    • Arthritis    • Bilateral carotid artery disease (CMS/HCC)    • Bilateral cataracts     S/p Extractions   • Breast cancer (CMS/[facility])     right breast dF1waEc (snm) pMX ER/VA pos, Her-2/neg, Ki-67, 31%, oncotype recurrence score 19, invasive lobular carcinoma   • CAD (coronary artery disease)     S/p CABG on 2/23/16 by Dr. 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[facility])    • Cataracts, bilateral    • Closed fracture of three ribs of right side    • Cognitive disorder    • COPD (chronic obstructive  "pulmonary disease) (CMS/[facility])    • Depression    • Descending aortic arch aneurysm (CMS/[facility])    • Diabetes mellitus (CMS/[facility])     \"BORDERLINE\"   • Erythermalgia (CMS/[facility])    • GERD (gastroesophageal reflux disease)    • Hyperlipidemia     Controlled w/Meds   • Hypertension     Controlled w/Meds   • Low back pain    • Moderate aortic valve insufficiency     S/p AVR on 02/23/16 by Dr. Ontiveros   • Nocturia    • Osteoarthritis    • Osteoporosis    • Otitis media     R Ear   • Prediabetes    • PVD (peripheral vascular disease) (CMS/[facility])    • RLS (restless legs syndrome)    • Saccular aneurysm    • Stroke (CMS/[facility])     Perioperatively w/L Hip Repl   • Thoracic ascending aortic aneurysm (CMS/[facility])     S/p Repair on 02/23/16 by Dr. Ontiveros   • TIA (transient ischemic attack)    • Urgency incontinence    • Venous insufficiency    • Ventral hernia        SURGICAL HISTORY  Past Surgical History:   Procedure Laterality Date   • AORTIC VALVE REPAIR/REPLACEMENT N/A 02/23/2016-[facility]    Ascending Replacement Using a 24MM Graft--Dr. Ontiveros   • APPENDECTOMY  1977   • BREAST BIOPSY Right 09/16/2012    Vacuum Assisted Core Bx of R Breast   • CARDIAC CATHETERIZATION N/A 01/06/2016    Dr. Tres De Los Santos   • CARDIAC CATHETERIZATION N/A 4/22/2019    Procedure: Left Heart Cath;  Surgeon: Tres De Los Santos MD;  Location:  [facility];  Service: Cardiology   • CARDIAC CATHETERIZATION N/A 4/22/2019    Procedure: Coronary angiography;  Surgeon: Tres De Los Santos MD;  Location:  [facility];  Service: Cardiology   • CARDIAC CATHETERIZATION N/A 7/22/2020    Procedure: LEFT HEART CATH;  Surgeon: Antonia Scott MD;  Location:  [facility];  Service: Cardiovascular;  Laterality: N/A;   • CARDIAC CATHETERIZATION N/A 7/22/2020    Procedure: CORONARY ANGIOGRAPHY;  Surgeon: Antonia Scott MD;  Location:  [facility];  Service: Cardiovascular;  Laterality: N/A;   • CARDIAC CATHETERIZATION N/A " 7/22/2020    Procedure: Left ventriculography;  Surgeon: Antonia Scott MD;  Location:  [facility];  Service: Cardiovascular;  Laterality: N/A;   • CARDIAC CATHETERIZATION N/A 7/22/2020    Procedure: Saphenous Vein Graft;  Surgeon: Antonia Scott MD;  Location:  [facility];  Service: Cardiovascular;  Laterality: N/A;   • CARDIAC SURGERY  02/2016    Dr. Ontiveros/Dr. De Los Santos   • CATARACT EXTRACTION Bilateral     [facility]   • COLONOSCOPY N/A 10/2002    Dr. Negro   • CORONARY ARTERY BYPASS GRAFT  02/23/2016-[facility]    x1 w/L Vein Grafting--Dr. Ontiveros   • CORONARY ARTERY BYPASS GRAFT N/A 9/18/2020    Procedure: STERNAL EXPLORATION WITH CLOSURE AND WASHOUT, REMOVAL OF IABP, PRP;  Surgeon: Mart Ontiveros MD;  [facility];  Service: Cardiothoracic;  Laterality: N/A;   • CYST REMOVAL Right 01/25/2013    R Palm Excision of Retinacular Cyst--Dr. Karla Fish   • EPIDURAL BLOCK     • LAPAROSCOPIC CHOLECYSTECTOMY N/A 08/04/2004    Dr. JOEY Sheth   • MASTECTOMY Right 09/28/2012   • ORIF HIP FRACTURE Left 03/27/2005    w/ AMBI compression screw, Dr. Victor M Cabral   • RECTOVAGINAL FISTULA REPAIR  1965   • THORACIC AORTIC ANEURYSM REPAIR N/A 7/23/2019    Procedure: ROSE, THORACOABDOMINAL AORTIC ANEURYSM REPAIR, VISCERAL VESSEL REPAIR, LARGE VENTRAL HERNIA REPAIR WITH MESH, CIRCULATORY ARREST, PRP;  Surgeon: Mart Ontiveros MD;  [facility];  Service: Cardiothoracic   • TRANSESOPHAGEAL ECHOCARDIOGRAM (ROSE) N/A 9/18/2020    Procedure: TRANSESOPHAGEAL ECHOCARDIOGRAM WITH ANESTHESIA;  Surgeon: Mart Ontiveros MD;  Location:  [facility];  Service: Cardiothoracic;  Laterality: N/A;   • TUBAL ABDOMINAL LIGATION     • VENTRICULAR ANEURYSM REPAIR N/A 7/22/2020    Procedure: EMERGENT REOP STERNOTOMY, REPAIR OF LV RUPTURE, PRP, INTRAOP ROSE;  Surgeon: Mart Ontiveros MD;  Location:  [facility];  Service: Cardiothoracic;  Laterality: N/A;   • VENTRICULAR  ANEURYSM REPAIR N/A 2020    Procedure: ROSE, MIDLINE STERNOTOMY REOP  LEFT VENTRICULAR ANEURYSM REPAIR, IABP INSERTION, PRP;  Surgeon: Mart Ontiveros MD;  Location: [facility];  Service: Cardiothoracic;  Laterality: N/A;       SOCIAL HISTORY  Social History     Socioeconomic History   • Marital status:      Spouse name: Not on file   • Number of children: Not on file   • Years of education: Not on file   • Highest education level: Not on file   Tobacco Use   • Smoking status: Former Smoker     Types: Cigarettes     Quit date: 2012     Years since quittin.0   • Smokeless tobacco: Never Used   • Tobacco comment: CAFFEINE USE:2 CUPS COFFEE/ COKE DAILY   Substance and Sexual Activity   • Alcohol use: No   • Drug use: No   • Sexual activity: Defer     Birth control/protection: Tubal ligation       FAMILY HISTORY  Family History   Problem Relation Age of Onset   • Alzheimer's disease Mother    • Cancer Maternal Aunt    • Heart disease Father    • Heart failure Father    • Hypertension Father    • Diabetes Father    • Liver disease Sister    • Heart failure Brother    • Hypertension Brother    • Alcohol abuse Brother    • Diabetes Brother    • No Known Problems Maternal Grandmother    • No Known Problems Maternal Grandfather    • No Known Problems Paternal Grandmother    • No Known Problems Paternal Grandfather    • Diabetes Brother    • Brain cancer Brother    • Heart disease Brother    • Diabetes Brother          **Dragon Disclaimer:   Much of this encounter note is an electronic transcription/translation of spoken language to printed text. The electronic translation of spoken language may permit erroneous, or at times, nonsensical words or phrases to be inadvertently transcribed. Although I have reviewed the note for such errors, some may still exist.     Template created by Bebo Castañeda MD

## 2021-03-02 DIAGNOSIS — Z23 IMMUNIZATION DUE: ICD-10-CM

## 2021-03-05 DIAGNOSIS — J44.9 CHRONIC OBSTRUCTIVE PULMONARY DISEASE, UNSPECIFIED COPD TYPE (HCC): ICD-10-CM

## 2021-03-05 NOTE — TELEPHONE ENCOUNTER
Caller: Grace Beauchamp    Relationship: Self    Best call back number: [phone]    Medication needed:   Requested Prescriptions     Pending Prescriptions Disp Refills   • albuterol sulfate HFA (ProAir HFA) 108 (90 Base) MCG/ACT inhaler       Sig: Inhale 2 puffs Every 6 (Six) Hours As Needed for Wheezing or Shortness of Air.       When do you need the refill by: ASAP    What details did the patient provide when requesting the medication: PATIENT IS COMPLETELY OUT    Does the patient have less than a 3 day supply:  [x] Yes  [] No    What is the patient's preferred pharmacy: [facility] [address] AT [facility] [address] - [phone] [facility] [phone] [facility]

## 2021-03-08 DIAGNOSIS — M15.9 PRIMARY OSTEOARTHRITIS INVOLVING MULTIPLE JOINTS: Chronic | ICD-10-CM

## 2021-03-08 RX ORDER — ALBUTEROL SULFATE 90 UG/1
2 AEROSOL, METERED RESPIRATORY (INHALATION) EVERY 6 HOURS PRN
Qty: 18 G | Refills: 3 | Status: SHIPPED | OUTPATIENT
Start: 2021-03-08 | End: 2022-03-14

## 2021-03-09 DIAGNOSIS — Z51.81 THERAPEUTIC DRUG MONITORING: Primary | ICD-10-CM

## 2021-03-15 RX ORDER — TRAMADOL HYDROCHLORIDE 50 MG/1
TABLET ORAL
Qty: 60 TABLET | Refills: 0 | Status: SHIPPED | OUTPATIENT
Start: 2021-03-15 | End: 2021-04-22

## 2021-03-22 ENCOUNTER — TELEPHONE (OUTPATIENT)
Dept: CARDIAC SURGERY | Facility: CLINIC | Age: 81
End: 2021-03-22

## 2021-03-22 NOTE — TELEPHONE ENCOUNTER
After speaking with Dr Ontiveros I called and let Mrs Sanches know from his perspective concerning her aneurysms and past surgeries it is fine for her to proceed with the procedure Dr Jimenez is recommending

## 2021-03-31 ENCOUNTER — TELEPHONE (OUTPATIENT)
Dept: CARDIOLOGY | Facility: CLINIC | Age: 81
End: 2021-03-31

## 2021-04-02 ENCOUNTER — OFFICE VISIT (OUTPATIENT)
Dept: CARDIOLOGY | Facility: CLINIC | Age: 81
End: 2021-04-02

## 2021-04-02 VITALS
BODY MASS INDEX: 21.59 KG/M2 | HEIGHT: 56 IN | DIASTOLIC BLOOD PRESSURE: 90 MMHG | SYSTOLIC BLOOD PRESSURE: 142 MMHG | HEART RATE: 64 BPM | WEIGHT: 96 LBS

## 2021-04-02 DIAGNOSIS — Z86.79 S/P ASCENDING AORTIC ANEURYSM REPAIR: ICD-10-CM

## 2021-04-02 DIAGNOSIS — Z98.890 S/P ASCENDING AORTIC ANEURYSM REPAIR: ICD-10-CM

## 2021-04-02 DIAGNOSIS — I71.60 THORACOABDOMINAL AORTIC ANEURYSM (TAAA) WITHOUT RUPTURE (HCC): ICD-10-CM

## 2021-04-02 DIAGNOSIS — Z95.1 S/P CABG X 1: ICD-10-CM

## 2021-04-02 DIAGNOSIS — I71.20 THORACIC AORTIC ANEURYSM WITHOUT RUPTURE (HCC): Primary | ICD-10-CM

## 2021-04-02 DIAGNOSIS — I73.9 PVD (PERIPHERAL VASCULAR DISEASE) (HCC): ICD-10-CM

## 2021-04-02 PROCEDURE — 99214 OFFICE O/P EST MOD 30 MIN: CPT | Performed by: INTERNAL MEDICINE

## 2021-04-02 PROCEDURE — 93000 ELECTROCARDIOGRAM COMPLETE: CPT | Performed by: INTERNAL MEDICINE

## 2021-04-02 NOTE — PROGRESS NOTES
Date of Office Visit: 21  Encounter Provider: Tres De Los Santos MD  Place of Service: [facility] CARDIOLOGY  Patient Name: Grace Beauchamp  :1940  [id number]    Chief Complaint   Patient presents with   • Coronary Artery Disease   :     HPI: Grace Beauchamp is a 80 y.o. female  This is a patient with a history of thoracic aneurysm repair, a catheterization, which showed one-vessel disease in her obtuse marginal.  She ended up having a bypass at the time she had her aneurysm repaired.  In 2020 she had been feeling tired for a few days then had kind of a brief episode of chest pain but felt badly with a came to the hospital was found to have an LV rupture that was contained.  Cath showed that her vein graft to the OM1 had occluded.  She had to go in for an operative repair with Dr. Ontiveros.  She has a history of pretty severe COPD she is got pulmonary hypertension.  She is here for follow-up since we saw her last in November she has had her vaccine never had Covid.  And she has gained about 13 pounds which is really great.  Nothing really has changed symptom wise she gets short of breath with a little bit of activity above normal and no PND no orthopnea no chest pain.  She is going to have a bladder stimulator put in and she has held off of her Plavix for weeks now    Past Medical History:   Diagnosis Date   • AAA (abdominal aortic aneurysm) (CMS/HCC)    • Acute bronchitis 2018   • 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HCC)    • Arthritis    • Bilateral carotid artery disease (CMS/HCC)    • Bilateral cataracts     S/p Extractions   • Breast cancer (CMS/HCC)     right breast [id number] (snm) pMX ER/AZ pos, Her-2/neg, Ki-67, 31%, oncotype recurrence score 19, invasive lobular carcinoma   • CAD (coronary artery disease)     S/p CABG on 16 by Dr. 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 Ontiveros   • Nocturia    • Osteoarthritis    • Osteoporosis    • Otitis media     R Ear   • Prediabetes    • PVD (peripheral vascular disease) (CMS/[facility])    • RLS (restless legs syndrome)    • Saccular aneurysm    • Stroke (CMS/[facility])     Perioperatively w/L Hip Repl   • Thoracic ascending aortic aneurysm (CMS/[facility])     S/p Repair on 02/23/16 by Dr. Ontiveros   • TIA (transient ischemic attack)    • Urgency incontinence    • Venous insufficiency    • Ventral hernia        Past Surgical History:   Procedure Laterality Date   • AORTIC VALVE REPAIR/REPLACEMENT N/A 02/23/2016-[facility]    Ascending Replacement Using a 24MM Graft--Dr. Ontiveros   • APPENDECTOMY  1977   • BREAST BIOPSY Right 09/16/2012    Vacuum Assisted Core Bx of R Breast   • CARDIAC CATHETERIZATION N/A 01/06/2016    Dr. Tres De Los Santos   • CARDIAC CATHETERIZATION N/A 4/22/2019    Procedure: Left Heart Cath;  Surgeon: Tres De Los Santos MD;  Location[facility];  Service: Cardiology   • CARDIAC CATHETERIZATION N/A 4/22/2019    Procedure: Coronary angiography;  Surgeon: Tres De Los Santos MD;  Location: [facility] CATH INVASIVE LOCATION;  Service: Cardiology   • CARDIAC CATHETERIZATION N/A 7/22/2020    Procedure: LEFT HEART CATH;  Surgeon: Antonia Scott MD;  Location:  [facility] CATH INVASIVE LOCATION;  Service: Cardiovascular;  Laterality: N/A;   • CARDIAC CATHETERIZATION N/A 7/22/2020    Procedure: CORONARY ANGIOGRAPHY;  Surgeon: Antonia Scott MD;  Location: [facility] CATH INVASIVE LOCATION;  Service: Cardiovascular;  Laterality: N/A;   • CARDIAC " CATHETERIZATION N/A 7/22/2020    Procedure: Left ventriculography;  Surgeon: Antonia Scott MD;  Location:  [facility] CATH INVASIVE LOCATION;  Service: Cardiovascular;  Laterality: N/A;   • CARDIAC CATHETERIZATION N/A 7/22/2020    Procedure: Saphenous Vein Graft;  Surgeon: Antonia Scott MD;  Location:  [facility] CATH INVASIVE LOCATION;  Service: Cardiovascular;  Laterality: N/A;   • CARDIAC SURGERY  02/2016    Dr. Ontiveros/Dr. De Los Santos   • CATARACT EXTRACTION Bilateral     [facility]   • COLONOSCOPY N/A 10/2002    Dr. Negro   • CORONARY ARTERY BYPASS GRAFT  02/23/2016-[facility]    x1 w/L Vein Grafting--Dr. Ontiveros   • CORONARY ARTERY BYPASS GRAFT N/A 9/18/2020    Procedure: STERNAL EXPLORATION WITH CLOSURE AND WASHOUT, REMOVAL OF IABP, PRP;  Surgeon: Mart Ontiveros MD;  [facility];  Service: Cardiothoracic;  Laterality: N/A;   • CYST REMOVAL Right 01/25/2013    R Palm Excision of Retinacular Cyst--Dr. Karla Fish   • EPIDURAL BLOCK     • LAPAROSCOPIC CHOLECYSTECTOMY N/A 08/04/2004    Dr. JOEY Sheth   • MASTECTOMY Right 09/28/2012   • ORIF HIP FRACTURE Left 03/27/2005    w/ AMBI compression screw, Dr. Victor M Cabral   • RECTOVAGINAL FISTULA REPAIR  1965   • THORACIC AORTIC ANEURYSM REPAIR N/A 7/23/2019    Procedure: ROSE, THORACOABDOMINAL AORTIC ANEURYSM REPAIR, VISCERAL VESSEL REPAIR, LARGE VENTRAL HERNIA REPAIR WITH MESH, CIRCULATORY ARREST, PRP;  Surgeon: Mart Ontiveros MD;  Location[facility] MA[facility];  Service: Cardiothoracic   • TRANSESOPHAGEAL ECHOCARDIOGRAM (ROSE) N/A 9/18/2020    Procedure: TRANSESOPHAGEAL ECHOCARDIOGRAM WITH ANESTHESIA;  Surgeon: Mart Ontiveros MD;  Location:  [facility] MAIN OR;  Service: Cardiothoracic;  Laterality: N/A;   • TUBAL ABDOMINAL LIGATION     • VENTRICULAR ANEURYSM REPAIR N/A 7/22/2020    Procedure: EMERGENT REOP STERNOTOMY, REPAIR OF LV RUPTURE, PRP, INTRAOP ROSE;  Surgeon: Mart Ontiveros MD;  Location:  [facility] MAIN OR;  Service: Cardiothoracic;  Laterality:  N/A;   • VENTRICULAR ANEURYSM REPAIR N/A 2020    Procedure: ROSE, MIDLINE STERNOTOMY REOP  LEFT VENTRICULAR ANEURYSM REPAIR, IABP INSERTION, PRP;  Surgeon: Mart Ontiveros MD;  Location: [facility];  Service: Cardiothoracic;  Laterality: N/A;       Social History     Socioeconomic History   • Marital status:      Spouse name: Not on file   • Number of children: Not on file   • Years of education: Not on file   • Highest education level: Not on file   Tobacco Use   • Smoking status: Former Smoker     Types: Cigarettes     Quit date: 2012     Years since quittin.3   • Smokeless tobacco: Never Used   • Tobacco comment: CAFFEINE USE:2 CUPS COFFEE/ COKE DAILY   Vaping Use   • Vaping Use: Never assessed   Substance and Sexual Activity   • Alcohol use: No   • Drug use: No   • Sexual activity: Defer     Birth control/protection: Tubal ligation       Family History   Problem Relation Age of Onset   • Alzheimer's disease Mother    • Cancer Maternal Aunt    • Heart disease Father    • Heart failure Father    • Hypertension Father    • Diabetes Father    • Liver disease Sister    • Heart failure Brother    • Hypertension Brother    • Alcohol abuse Brother    • Diabetes Brother    • No Known Problems Maternal Grandmother    • No Known Problems Maternal Grandfather    • No Known Problems Paternal Grandmother    • No Known Problems Paternal Grandfather    • Diabetes Brother    • Brain cancer Brother    • Heart disease Brother    • Diabetes Brother        Review of Systems   Constitutional: Negative for decreased appetite, fever, malaise/fatigue and weight loss.   HENT: Negative for nosebleeds.    Eyes: Negative for double vision.   Cardiovascular: Negative for chest pain, claudication, cyanosis, dyspnea on exertion, irregular heartbeat, leg swelling, near-syncope, orthopnea, palpitations, paroxysmal nocturnal dyspnea and syncope.   Respiratory: Negative for cough, hemoptysis and shortness of breath.     Hematologic/Lymphatic: Negative for bleeding problem.   Skin: Negative for rash.   Musculoskeletal: Negative for falls and myalgias.   Gastrointestinal: Negative for hematochezia, jaundice, melena, nausea and vomiting.   Genitourinary: Negative for hematuria.   Neurological: Negative for dizziness and seizures.   Psychiatric/Behavioral: Negative for altered mental status and memory loss.       Allergies   Allergen Reactions   • Ramipril Other (See Comments)     Ace I  cough   • Morphine Itching   • Morphine And Related Itching   • Bactrim [Sulfamethoxazole-Trimethoprim] Itching and Rash         Current Outpatient Medications:   •  albuterol sulfate HFA (ProAir HFA) 108 (90 Base) MCG/ACT inhaler, Inhale 2 puffs Every 6 (Six) Hours As Needed for Wheezing or Shortness of Air., Disp: 18 g, Rfl: 3  •  atorvastatin (LIPITOR) 40 MG tablet, Take 1 tablet by mouth Every Night., Disp: 90 tablet, Rfl: 2  •  budesonide (PULMICORT) 0.5 MG/2ML nebulizer solution, Take 2 mL by nebulization 2 (Two) Times a Day., Disp: 120 mL, Rfl: 0  •  cetirizine (zyrTEC) 10 MG tablet, Take 1 tablet by mouth As Needed for Allergies., Disp: 30 tablet, Rfl: 0  •  cilostazol (PLETAL) 100 MG tablet, Take 1 tablet by mouth 2 (Two) Times a Day., Disp: 180 tablet, Rfl: 2  •  clopidogrel (PLAVIX) 75 MG tablet, Take 1 tablet by mouth Daily., Disp: 90 tablet, Rfl: 2  •  ipratropium-albuterol (DUO-NEB) 0.5-2.5 mg/3 ml nebulizer, Take 3 mL by nebulization 3 (Three) Times a Day., Disp: 360 mL, Rfl: 0  •  losartan (COZAAR) 50 MG tablet, Take 1 tablet by mouth Daily., Disp: 90 tablet, Rfl: 2  •  meclizine (ANTIVERT) 12.5 MG tablet, Take 1 tablet by mouth 3 (Three) Times a Day As Needed for Dizziness., Disp: 21 tablet, Rfl: 0  •  metoprolol succinate XL (TOPROL-XL) 50 MG 24 hr tablet, Take 1 tablet by mouth Every 12 (Twelve) Hours., Disp: 180 tablet, Rfl: 2  •  pantoprazole (PROTONIX) 40 MG EC tablet, Take 1 tablet by mouth Every Morning., Disp: 90 tablet, Rfl:  "3  •  pramipexole (MIRAPEX) 0.125 MG tablet, TAKE ONE TABLET BY MOUTH EVERY NIGHT AT BEDTIME, Disp: 90 tablet, Rfl: 0  •  traMADol (ULTRAM) 50 MG tablet, TAKE ONE TABLET BY MOUTH EVERY 8 HOURS AS NEEDED FOR MODERATE OR SEVERE PAIN, Disp: 60 tablet, Rfl: 0  •  Vitamin D, Cholecalciferol, 50 MCG (2000 UT) capsule, Take 2,000 Units by mouth Daily., Disp: 30 capsule, Rfl:   •  ferrous sulfate 325 (65 FE) MG EC tablet, Take 1 tablet by mouth Daily With Breakfast., Disp: 90 tablet, Rfl: 0  •  mirtazapine (REMERON) 7.5 MG tablet, Take 1 tablet by mouth Every Night., Disp: 90 tablet, Rfl: 0  •  multivitamin (THERAGRAN) tablet tablet, Take 1 tablet by mouth Daily., Disp: 90 tablet, Rfl: 0  •  sennosides-docusate (PERICOLACE) 8.6-50 MG per tablet, Take 2 tablets by mouth Daily As Needed for Constipation., Disp: , Rfl:   •  TOVIAZ 4 MG tablet sustained-release 24 hour tablet, Take 4 mg by mouth Daily., Disp: , Rfl:       Objective:     Vitals:    04/02/21 1100   BP: 142/90   Pulse: 64   Weight: 43.5 kg (96 lb)   Height: 142.2 cm (56\")     Body mass index is 21.52 kg/m².    Physical Exam  Constitutional:       Appearance: She is well-developed.      Comments: Cachectic   HENT:      Head: Normocephalic.   Eyes:      General: No scleral icterus.  Neck:      Thyroid: No thyromegaly.      Vascular: No JVD.   Cardiovascular:      Rate and Rhythm: Normal rate and regular rhythm.      Heart sounds: Normal heart sounds. No murmur heard.   No friction rub. No gallop.    Pulmonary:      Effort: Pulmonary effort is normal.      Breath sounds: Normal breath sounds. No wheezing or rales.   Abdominal:      Palpations: Abdomen is soft.      Tenderness: There is no abdominal tenderness.   Musculoskeletal:         General: Normal range of motion.   Lymphadenopathy:      Cervical: No cervical adenopathy.   Skin:     General: Skin is warm and dry.      Findings: No rash.   Neurological:      Mental Status: She is alert and oriented to person, " place, and time.           ECG 12 Lead    Date/Time: 4/2/2021 11:44 AM  Performed by: Tres De Los Santos MD  Authorized by: Tres De Los Santos MD   Comparison: compared with previous ECG   Similar to previous ECG  Rhythm: sinus rhythm  Conduction: left anterior fascicular block    Clinical impression: abnormal EKG  Comments: Possible right ventricular hypertrophy no change             Assessment:       Diagnosis Plan   1. Thoracic aortic aneurysm 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)     2. PVD (peripheral vascular disease) (CMS/HCC)     3. S/P CABG x 1     4. 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)     5. S/P ascending aortic aneurysm repair            Plan:       In general I think she is doing pretty well I am not can make a lot of change I think she is an acceptable risk to get her bladder implant placed.  She really hates the clopidogrel and would like to stop it she got put on it for a TIA and she has PVD she had coronary disease but I think we could take it back and just have her take a baby aspirin a day and see how she does with that we will start that up after she gets her bladder implant and her teeth implants put in have her come back and see us in a year sooner if she has trouble    As always, it has been a pleasure to participate in your patient's care.      Sincerely,       Tres De Los Santos MD

## 2021-04-20 DIAGNOSIS — M15.9 PRIMARY OSTEOARTHRITIS INVOLVING MULTIPLE JOINTS: Chronic | ICD-10-CM

## 2021-04-21 NOTE — TELEPHONE ENCOUNTER
She would be better off of in office but if she cannot come in then do the telehealth (not telephone).

## 2021-04-21 NOTE — TELEPHONE ENCOUNTER
Pt do not want to come in think she doesn't has problem.   I explained to her this is required by law ,  pt asking if can be virtual visit. Advise

## 2021-04-22 RX ORDER — TRAMADOL HYDROCHLORIDE 50 MG/1
TABLET ORAL
Qty: 21 TABLET | Refills: 0 | Status: SHIPPED | OUTPATIENT
Start: 2021-04-22 | End: 2021-04-23 | Stop reason: SDUPTHER

## 2021-04-23 ENCOUNTER — TELEPHONE (OUTPATIENT)
Dept: INTERNAL MEDICINE | Age: 81
End: 2021-04-23

## 2021-04-23 ENCOUNTER — TELEMEDICINE (OUTPATIENT)
Dept: INTERNAL MEDICINE | Age: 81
End: 2021-04-23

## 2021-04-23 DIAGNOSIS — M15.9 PRIMARY OSTEOARTHRITIS INVOLVING MULTIPLE JOINTS: Chronic | ICD-10-CM

## 2021-04-23 PROCEDURE — 99213 OFFICE O/P EST LOW 20 MIN: CPT | Performed by: INTERNAL MEDICINE

## 2021-04-23 RX ORDER — TRAMADOL HYDROCHLORIDE 50 MG/1
50 TABLET ORAL EVERY 8 HOURS PRN
Qty: 90 TABLET | Refills: 1 | Status: SHIPPED | OUTPATIENT
Start: 2021-04-23 | End: 2021-08-12 | Stop reason: SDUPTHER

## 2021-04-23 NOTE — ASSESSMENT & PLAN NOTE
Telehealth     Continue tramadol 50 mg q8 PRN for moderate/severe pain related to arthritis.   Rx: #90 + 1 refill. Follow-up 3 months.    GIULIA reviewed. Oxycodone/apap 5 #10 was prescribed by her urologist for postprocedure pain.

## 2021-04-23 NOTE — PROGRESS NOTES
I N T E R N A L  M E D I C I N E  J U N O H  K I M,  M D      ENCOUNTER DATE:  04/23/2021    Grace Beauchamp / 80 y.o. / female      THIS WAS A MYCHART TELEHEALTH ENCOUNTER NECESSITATED BY CURRENT COVID-19 CRISIS.      You have chosen to receive care through a telehealth visit.  Do you consent to use a video/audio connection for your medical care today? Yes      CHIEF COMPLAINT / REASON FOR OFFICE VISIT     TELEHEALTH ENCOUNTER:  Arthritis / tramadol      ASSESSMENT & PLAN     Problem List Items Addressed This Visit        High    Primary osteoarthritis involving multiple joints (Chronic)    Overview     Lumbar spine, hips, knees.  On Tramadol         Current Assessment & Plan     Telehealth     Continue tramadol 50 mg q8 PRN for moderate/severe pain related to arthritis.   Rx: #90 + 1 refill. Follow-up 3 months.    GIULIA reviewed. Oxycodone/apap 5 #10 was prescribed by her urologist for postprocedure pain.          Relevant Medications    traMADol (ULTRAM) 50 MG tablet           No orders of the defined types were placed in this encounter.    New Medications Ordered This Visit   Medications   • traMADol (ULTRAM) 50 MG tablet     Sig: Take 1 tablet by mouth Every 8 (Eight) Hours As Needed for Moderate Pain  or Severe Pain  for up to 30 days.     Dispense:  90 tablet     Refill:  1       SUMMARY/DISCUSSION  •       Time spent: 17 minutes    Next Appointment with me: Visit date not found    Return in about 3 months (around 7/23/2021) for Reassess chronic medical problems.        HISTORY OF PRESENT ILLNESS     Chronic moderate severe pain of multiple sits including spine, hips, knees. Takes tramadol 3-4 a day for pain control without significant problems. Recently her urologist prescribed oxycodone/apap #10 for postprocedure pain but could not tolerate due to constipation.         REVIEWED DATA     Labs:     Most recent UDS reviewed 3/12/21      Imaging:           Medical Tests:           Summary of old records /  correspondence / consultant report:           Request outside records:           **Tru Disclaimer:   Much of this encounter note is an electronic transcription/translation of spoken language to printed text. The electronic translation of spoken language may permit erroneous, or at times, nonsensical words or phrases to be inadvertently transcribed. Although I have reviewed the note for such errors, some may still exist.     Template created by Bebo Castañeda MD

## 2021-05-24 DIAGNOSIS — G25.81 RESTLESS LEGS SYNDROME (RLS): ICD-10-CM

## 2021-05-24 NOTE — TELEPHONE ENCOUNTER
Caller: Grace Beauchamp    Relationship: Self    Best call back number: [phone]     Medication needed:   Requested Prescriptions     Pending Prescriptions Disp Refills   • pramipexole (MIRAPEX) 0.125 MG tablet 90 tablet 0     Sig: Take 1 tablet by mouth every night at bedtime.       When do you need the refill by:05/24/21    What additional details did the patient provide when requesting the medication: HAS LESS THAN 3 DAYS    Does the patient have less than a 3 day supply:  [x] Yes  [] No    What is the patient's preferred pharmacy: [facility] [id number] [place] [address] AT [facility] & [address] - [phone] [facility] [phone] [facility]

## 2021-05-25 RX ORDER — PRAMIPEXOLE DIHYDROCHLORIDE 0.12 MG/1
0.12 TABLET ORAL
Qty: 90 TABLET | Refills: 1 | Status: SHIPPED | OUTPATIENT
Start: 2021-05-25 | End: 2021-12-03

## 2021-05-28 ENCOUNTER — OFFICE VISIT (OUTPATIENT)
Dept: ORTHOPEDIC SURGERY | Facility: CLINIC | Age: 81
End: 2021-05-28

## 2021-05-28 VITALS — BODY MASS INDEX: 22.63 KG/M2 | HEIGHT: 56 IN | WEIGHT: 100.6 LBS | TEMPERATURE: 93.5 F

## 2021-05-28 DIAGNOSIS — R52 PAIN: Primary | ICD-10-CM

## 2021-05-28 DIAGNOSIS — M17.11 ARTHRITIS OF RIGHT KNEE: ICD-10-CM

## 2021-05-28 DIAGNOSIS — M17.0 PRIMARY OSTEOARTHRITIS OF BOTH KNEES: ICD-10-CM

## 2021-05-28 PROCEDURE — 73562 X-RAY EXAM OF KNEE 3: CPT | Performed by: ORTHOPAEDIC SURGERY

## 2021-05-28 PROCEDURE — 99214 OFFICE O/P EST MOD 30 MIN: CPT | Performed by: ORTHOPAEDIC SURGERY

## 2021-05-28 PROCEDURE — 20610 DRAIN/INJ JOINT/BURSA W/O US: CPT | Performed by: ORTHOPAEDIC SURGERY

## 2021-05-28 RX ORDER — METHYLPREDNISOLONE ACETATE 80 MG/ML
80 INJECTION, SUSPENSION INTRA-ARTICULAR; INTRALESIONAL; INTRAMUSCULAR; SOFT TISSUE
Status: COMPLETED | OUTPATIENT
Start: 2021-05-28 | End: 2021-05-28

## 2021-05-28 RX ORDER — CEPHALEXIN 250 MG/1
CAPSULE ORAL
COMMUNITY
Start: 2021-05-22 | End: 2021-11-19

## 2021-05-28 RX ADMIN — METHYLPREDNISOLONE ACETATE 80 MG: 80 INJECTION, SUSPENSION INTRA-ARTICULAR; INTRALESIONAL; INTRAMUSCULAR; SOFT TISSUE at 11:48

## 2021-05-28 NOTE — PROGRESS NOTES
Patient Name: Grace Beauchamp   YOB: 1940  Referring Primary Care Physician: Miller Castañeda MD  BMI: Body mass index is 22.55 kg/m².    Chief Complaint:    Chief Complaint   Patient presents with   • Right Knee - Follow-up, Pain        HPI:     Grace Beauchamp is a 80 y.o. female who presents today for evaluation of   Chief Complaint   Patient presents with   • Right Knee - Follow-up, Pain   . The patient reports her right knee is painful .She adds that she has had a gel injection in the past, and it worked well for her.       Subjective   Medications:   Home Medications:  Current Outpatient Medications on File Prior to Visit   Medication Sig   • albuterol sulfate HFA (ProAir HFA) 108 (90 Base) MCG/ACT inhaler Inhale 2 puffs Every 6 (Six) Hours As Needed for Wheezing or Shortness of Air.   • atorvastatin (LIPITOR) 40 MG tablet Take 1 tablet by mouth Every Night.   • budesonide (PULMICORT) 0.5 MG/2ML nebulizer solution Take 2 mL by nebulization 2 (Two) Times a Day.   • cephalexin (KEFLEX) 250 MG capsule    • cetirizine (zyrTEC) 10 MG tablet Take 1 tablet by mouth As Needed for Allergies.   • ipratropium-albuterol (DUO-NEB) 0.5-2.5 mg/3 ml nebulizer Take 3 mL by nebulization 3 (Three) Times a Day.   • losartan (COZAAR) 50 MG tablet Take 1 tablet by mouth Daily.   • metoprolol succinate XL (TOPROL-XL) 50 MG 24 hr tablet Take 1 tablet by mouth Every 12 (Twelve) Hours.   • mirtazapine (REMERON) 7.5 MG tablet Take 1 tablet by mouth Every Night.   • multivitamin (THERAGRAN) tablet tablet Take 1 tablet by mouth Daily.   • pantoprazole (PROTONIX) 40 MG EC tablet Take 1 tablet by mouth Every Morning.   • pramipexole (MIRAPEX) 0.125 MG tablet Take 1 tablet by mouth every night at bedtime.   • sennosides-docusate (PERICOLACE) 8.6-50 MG per tablet Take 2 tablets by mouth Daily As Needed for Constipation.   • TOVIAZ 4 MG tablet sustained-release 24 hour tablet Take 4 mg by mouth Daily.   • Treledebbie Jonesta  "100-62.5-25 MCG/INH inhaler INHALE ONE PUFF BY MOUTH DAILY   • Vitamin D, Cholecalciferol, 50 MCG (2000 UT) capsule Take 2,000 Units by mouth Daily.   • cilostazol (PLETAL) 100 MG tablet Take 1 tablet by mouth 2 (Two) Times a Day.   • clopidogrel (PLAVIX) 75 MG tablet Take 1 tablet by mouth Daily.   • ferrous sulfate 325 (65 FE) MG EC tablet Take 1 tablet by mouth Daily With Breakfast.   • meclizine (ANTIVERT) 12.5 MG tablet Take 1 tablet by mouth 3 (Three) Times a Day As Needed for Dizziness.   • traMADol (ULTRAM) 50 MG tablet Take 1 tablet by mouth Every 8 (Eight) Hours As Needed for Moderate Pain  or Severe Pain  for up to 30 days.     No current facility-administered medications on file prior to visit.     Current Medications:  Scheduled Meds:  Continuous Infusions:No current facility-administered medications for this visit.    PRN Meds:.    I have reviewed the patient's medical history in detail and updated the computerized patient record.  Review and summarization of old records includes:    Past Medical History:   Diagnosis Date   • AAA (abdominal aortic aneurysm) (CMS/HCC)    • Acute bronchitis 12/2018   • 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HCC)    • Arthritis    • Bilateral carotid artery disease (CMS/HCC)    • Bilateral cataracts     S/p Extractions   • Breast cancer (CMS/HCC)     right breast [id number] (snm) pMX ER/NM pos, Her-2/neg, Ki-67, 31%, oncotype recurrence score 19, invasive lobular carcinoma   • CAD (coronary artery disease)     S/p CABG on 2/23/16 by Dr. 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 Ontiveros   • Nocturia    • Osteoarthritis    • Osteoporosis    • Otitis media     R Ear   • Prediabetes    • PVD (peripheral vascular disease) (CMS/[facility])    • RLS (restless legs syndrome)    • Saccular aneurysm    • Stroke (CMS/[facility])     Perioperatively w/L Hip Repl   • Thoracic ascending aortic aneurysm (CMS/HCC)     S/p Repair on 02/23/16 by Dr. Ontiveros   • TIA (transient ischemic attack)    • Urgency incontinence    • Venous insufficiency    • Ventral hernia         Past Surgical History:   Procedure Laterality Date   • AORTIC VALVE REPAIR/REPLACEMENT N/A 02/23/2016-[facility]    Ascending Replacement Using a 24MM Graft--Dr. Ontiveros   • APPENDECTOMY  1977   • BREAST BIOPSY Right 09/16/2012    Vacuum Assisted Core Bx of R Breast   • CARDIAC CATHETERIZATION N/A 01/06/2016    Dr. Tres De Los Santos   • CARDIAC CATHETERIZATION N/A 4/22/2019    Procedure: Left Heart Cath;  Surgeon: Tres De Los Santos MD;  [facility];  Service: Cardiology   • CARDIAC CATHETERIZATION N/A 4/22/2019    Procedure: Coronary angiography;  Surgeon: Tres De Los Santos MD;  [facility];  Service: Cardiology   • CARDIAC CATHETERIZATION N/A 7/22/2020    Procedure: LEFT HEART CATH;  Surgeon: Antonia Scott MD;  [facility];  Service: Cardiovascular;  Laterality: N/A;   • CARDIAC CATHETERIZATION N/A 7/22/2020    Procedure: CORONARY ANGIOGRAPHY;  Surgeon: Antonia Scott MD;  [facility];  Service: Cardiovascular;  Laterality: N/A;   • CARDIAC CATHETERIZATION N/A 7/22/2020    Procedure: Left ventriculography;  Surgeon: Antonia Scott MD;  Location:  [facility] CATH INVASIVE LOCATION;  Service: Cardiovascular;  Laterality: N/A;   • CARDIAC CATHETERIZATION N/A 7/22/2020    Procedure: Saphenous Vein Graft;  Surgeon: Antonia Scott MD;  [facility];   Service: Cardiovascular;  Laterality: N/A;   • CARDIAC SURGERY  02/2016    Dr. Ontiveros/Dr. De Los Santos   • CATARACT EXTRACTION Bilateral     [facility]   • COLONOSCOPY N/A 10/2002    Dr. Negro   • CORONARY ARTERY BYPASS GRAFT  02/23/2016-[facility]    x1 w/L Vein Grafting--Dr. Ontiveros   • CORONARY ARTERY BYPASS GRAFT N/A 9/18/2020    Procedure: STERNAL EXPLORATION WITH CLOSURE AND WASHOUT, REMOVAL OF IABP, PRP;  Surgeon: Mart Ontiveros MD;  Location:  [facility] MAIN OR;  Service: Cardiothoracic;  Laterality: N/A;   • CYST REMOVAL Right 01/25/2013    R Palm Excision of Retinacular Cyst--Dr. Karla Fish   • EPIDURAL BLOCK     • LAPAROSCOPIC CHOLECYSTECTOMY N/A 08/04/2004    Dr. JOEY Sheth   • MASTECTOMY Right 09/28/2012   • ORIF HIP FRACTURE Left 03/27/2005    w/ AMBI compression screw, Dr. Victor M Cabral   • RECTOVAGINAL FISTULA REPAIR  1965   • THORACIC AORTIC ANEURYSM REPAIR N/A 7/23/2019    Procedure: ROSE, THORACOABDOMINAL AORTIC ANEURYSM REPAIR, VISCERAL VESSEL REPAIR, LARGE VENTRAL HERNIA REPAIR WITH MESH, CIRCULATORY ARREST, PRP;  Surgeon: Mart Ontiveros MD;  [facility];  Service: Cardiothoracic   • TRANSESOPHAGEAL ECHOCARDIOGRAM (ROSE) N/A 9/18/2020    Procedure: TRANSESOPHAGEAL ECHOCARDIOGRAM WITH ANESTHESIA;  Surgeon: Mart Ontiveros MD;  [facility];  Service: Cardiothoracic;  Laterality: N/A;   • TUBAL ABDOMINAL LIGATION     • VENTRICULAR ANEURYSM REPAIR N/A 7/22/2020    Procedure: EMERGENT REOP STERNOTOMY, REPAIR OF LV RUPTURE, PRP, INTRAOP ROSE;  Surgeon: Mart Ontiveros MD;  [facility];  Service: Cardiothoracic;  Laterality: N/A;   • VENTRICULAR ANEURYSM REPAIR N/A 9/17/2020    Procedure: ROSE, MIDLINE STERNOTOMY REOP  LEFT VENTRICULAR ANEURYSM REPAIR, IABP INSERTION, PRP;  Surgeon: Mart Ontiveros MD;  [facility];  Service: Cardiothoracic;  Laterality: N/A;        Social History     Occupational History   • Not on file   Tobacco Use    • Smoking status: Former Smoker     Types: Cigarettes     Quit date: 2012     Years since quittin.4   • Smokeless tobacco: Never Used   • Tobacco comment: CAFFEINE USE:2 CUPS COFFEE/ COKE DAILY   Vaping Use   • Vaping Use: Never assessed   Substance and Sexual Activity   • Alcohol use: No   • Drug use: No   • Sexual activity: Defer     Birth control/protection: Tubal ligation      Social History     Social History Narrative   • Not on file        Family History   Problem Relation Age of Onset   • Alzheimer's disease Mother    • Cancer Maternal Aunt    • Heart disease Father    • Heart failure Father    • Hypertension Father    • Diabetes Father    • Liver disease Sister    • Heart failure Brother    • Hypertension Brother    • Alcohol abuse Brother    • Diabetes Brother    • No Known Problems Maternal Grandmother    • No Known Problems Maternal Grandfather    • No Known Problems Paternal Grandmother    • No Known Problems Paternal Grandfather    • Diabetes Brother    • Brain cancer Brother    • Heart disease Brother    • Diabetes Brother        ROS: 14 point review of systems was performed and all other systems were reviewed and are negative except for documented findings in HPI and today's encounter.     Allergies:   Allergies   Allergen Reactions   • Ramipril Other (See Comments)     Ace I  cough   • Morphine Itching   • Morphine And Related Itching   • Bactrim [Sulfamethoxazole-Trimethoprim] Itching and Rash     Constitutional:  Denies fever, shaking or chills   Eyes:  Denies change in visual acuity   HENT:  Denies nasal congestion or sore throat   Respiratory:  Denies cough or shortness of breath   Cardiovascular:  Denies chest pain or severe LE edema   GI:  Denies abdominal pain, nausea, vomiting, bloody stools or diarrhea   Musculoskeletal:  Numbness, tingling, pain, or loss of motor function only as noted above in history of present illness.  : Denies painful urination or hematuria  Integument:   "Denies rash, lesion or ulceration   Neurologic:  Denies headache or focal weakness  Endocrine:  Denies lymphadenopathy  Psych:  Denies confusion or change in mental status   Hem:  Denies active bleeding    OBJECTIVE:  Physical Exam: 80 y.o. female  Wt Readings from Last 3 Encounters:   05/28/21 45.6 kg (100 lb 9.6 oz)   04/02/21 43.5 kg (96 lb)   12/15/20 39 kg (86 lb)     Ht Readings from Last 1 Encounters:   05/28/21 142.2 cm (56\")     Body mass index is 22.55 kg/m².  Vitals:    05/28/21 1058   Temp: 93.5 °F (34.2 °C)     Vital signs reviewed.     General Appearance:    Alert, cooperative, in no acute distress                  Eyes: conjunctiva clear  ENT: external ears and nose atraumatic  CV: no peripheral edema  Resp: normal respiratory effort  Skin: no rashes or wounds; normal turgor  Psych: mood and affect appropriate  Lymph: no nodes appreciated  Neuro: gross sensation intact  Vascular:  Palpable peripheral pulse in noted extremity  Musculoskeletal Extremities: Lamination today shows crepitations but synovitis pain swelling right knee with joint line tenderness    Radiology:   AP lateral 40 degree PA x-ray bilateral knees taken the office today with comparison views for pain shows arthritis specially in patellofemoral joint especially pressure on laterals    .  Large Joint Arthrocentesis: R knee  Date/Time: 5/28/2021 11:48 AM  Consent given by: patient  Site marked: site marked  Timeout: Immediately prior to procedure a time out was called to verify the correct patient, procedure, equipment, support staff and site/side marked as required   Supporting Documentation  Indications: pain and joint swelling   Procedure Details  Location: knee - R knee  Preparation: Patient was prepped and draped in the usual sterile fashion  Needle size: 22 G  Approach: anterolateral  Medications administered: 80 mg methylPREDNISolone acetate 80 MG/ML; 4 mL lidocaine (cardiac)  Patient tolerance: patient tolerated the procedure " well with no immediate complications                Assessment:     ICD-10-CM ICD-9-CM   1. Pain  R52 780.96   2. Primary osteoarthritis of both knees  M17.0 715.16   3. Arthritis of right knee  M17.11 716.96        MDM/Plan:   The diagnosis(es), natural history, pathophysiology and treatment for diagnosis(es) were discussed. Opportunity given and questions answered.  Biomechanics of pertinent body areas discussed.  When appropriate, the use of ambulatory aids discussed.    The patient received a cortisone injection in her right knee today. She is advised to allow 1 to 3 weeks to evaluate improvement. If after approximately 3 weeks the patient feels the injection was not effective, she is advised to call the office, and she can request a gel injection, and we will schedule for her to come in for a gel injection. The patient can use ice or heat, as well as any liniments to help control inflammation.       EXERCISES:  Advice on benefits of, and types of regular/moderate exercise pertaining to orthopedic diagnosis(es).  MEDICATIONS:  The risks, benefits, warnings,side effects and alternatives of medications discussed.  Inflammation/pain control; with cold, heat, elevation and/or liniments discussed as appropriate  Cortisone Injection. See procedure note.  MEDICAL RECORDS reviewed from other provider(s) for past and current medical history pertinent to this complaint.  We discussed her fitness for possibility surgery but with her cardiac history 3 aneurysms and vascular calcifications was on x-rays today do not feel like she would probably be a skeptical medical risk for this procedure and so we went over the nonoperative therapies and reinforced them she is using a wheeled walker and answer questions with she and her daughter  Scribed for Preston Randolph MD by Halina Beckett.  05/28/21   11:42 EDT    I have personally performed the services described in this document as scribed by the above individual, and it is both  accurate and complete.  Preston Randolph MD  5/28/2021  11:49 EDT

## 2021-06-28 ENCOUNTER — APPOINTMENT (OUTPATIENT)
Dept: WOMENS IMAGING | Facility: HOSPITAL | Age: 81
End: 2021-06-28

## 2021-06-28 PROCEDURE — 77067 SCR MAMMO BI INCL CAD: CPT | Performed by: RADIOLOGY

## 2021-06-28 PROCEDURE — 77063 BREAST TOMOSYNTHESIS BI: CPT | Performed by: RADIOLOGY

## 2021-08-12 ENCOUNTER — OFFICE VISIT (OUTPATIENT)
Dept: INTERNAL MEDICINE | Age: 81
End: 2021-08-12

## 2021-08-12 VITALS
HEIGHT: 56 IN | SYSTOLIC BLOOD PRESSURE: 136 MMHG | OXYGEN SATURATION: 96 % | BODY MASS INDEX: 22.5 KG/M2 | WEIGHT: 100 LBS | HEART RATE: 81 BPM | DIASTOLIC BLOOD PRESSURE: 88 MMHG | TEMPERATURE: 97.1 F

## 2021-08-12 DIAGNOSIS — E78.2 MIXED HYPERLIPIDEMIA: Primary | Chronic | ICD-10-CM

## 2021-08-12 DIAGNOSIS — E11.9 TYPE 2 DIABETES MELLITUS WITHOUT COMPLICATION, WITHOUT LONG-TERM CURRENT USE OF INSULIN (HCC): Chronic | ICD-10-CM

## 2021-08-12 DIAGNOSIS — I10 ESSENTIAL HYPERTENSION: Chronic | ICD-10-CM

## 2021-08-12 DIAGNOSIS — M15.9 PRIMARY OSTEOARTHRITIS INVOLVING MULTIPLE JOINTS: Chronic | ICD-10-CM

## 2021-08-12 DIAGNOSIS — D64.9 ANEMIA, UNSPECIFIED TYPE: ICD-10-CM

## 2021-08-12 DIAGNOSIS — K21.9 GASTROESOPHAGEAL REFLUX DISEASE, UNSPECIFIED WHETHER ESOPHAGITIS PRESENT: Chronic | ICD-10-CM

## 2021-08-12 DIAGNOSIS — Z51.81 THERAPEUTIC DRUG MONITORING: ICD-10-CM

## 2021-08-12 PROCEDURE — 99214 OFFICE O/P EST MOD 30 MIN: CPT | Performed by: NURSE PRACTITIONER

## 2021-08-12 RX ORDER — TRAMADOL HYDROCHLORIDE 50 MG/1
50 TABLET ORAL EVERY 8 HOURS PRN
Qty: 90 TABLET | Refills: 0 | Status: SHIPPED | OUTPATIENT
Start: 2021-08-12 | End: 2021-10-18

## 2021-08-12 NOTE — PROGRESS NOTES
I N T E R N A L  M E D I C I N E  NICHOLAS KRUEGER APRAUREA      ENCOUNTER DATE:  08/12/2021    Grace Beauchamp / 81 y.o. / female      CHIEF COMPLAINT / REASON FOR OFFICE VISIT     Follow-up (chronic medical care), Labs Only (f/u UTI, Drug compliance, other labs as needed), Hypertension, Hyperlipidemia, and Back Pain      ASSESSMENT & PLAN     1. Mixed hyperlipidemia  - Continue atorvastatin 40mg nightly   - Comprehensive Metabolic Panel  - Lipid Panel With / Chol / HDL Ratio  - TSH+Free T4  - Urinalysis With Culture If Indicated - Urine, Clean Catch    2. Essential hypertension  - Continue losartan 50mg daily   - Continue metoprolol succinate XL 50mg 24hr tablet daily   - Comprehensive Metabolic Panel  - TSH+Free T4  - Urinalysis With Culture If Indicated - Urine, Clean Catch    3. Type 2 diabetes mellitus without complication, without long-term current use of insulin (CMS/[facility])  - Comprehensive Metabolic Panel  - Hemoglobin A1c  - Lipid Panel With / Chol / HDL Ratio  - Urinalysis With Culture If Indicated - Urine, Clean Catch    4. Anemia, unspecified type  - Continue 325mg ferrous sulfate daily  - CBC & Differential    5. Primary osteoarthritis involving multiple joints  - Controlled substance contract signed   - GIULIA reviewed   - Compliance Drug Analysis, Ur - Urine, Clean Catch  - traMADol (ULTRAM) 50 MG tablet; Take 1 tablet by mouth Every 8 (Eight) Hours As Needed for Moderate Pain  or Severe Pain  for up to 30 days.  Dispense: 90 tablet; Refill: 0    6. Gastroesophageal reflux disease, unspecified whether esophagitis present  - Continue Protonix 40mg EC daily   - CBC & Differential  - Comprehensive Metabolic Panel    7. Therapeutic drug monitoring  - Compliance Drug Analysis, Ur - Urine, Clean Catch    Orders Placed This Encounter   Procedures   • Comprehensive Metabolic Panel   • Hemoglobin A1c   • Lipid Panel With / Chol / HDL Ratio   • TSH+Free T4   • Urinalysis With Culture If Indicated - Urine, Clean  "Catch   • Compliance Drug Analysis, Ur - Urine, Clean Catch   • CBC & Differential     New Medications Ordered This Visit   Medications   • Fluticasone-Umeclidin-Vilant (Trelegy Ellipta) 100-62.5-25 MCG/INH inhaler     Sig: Inhale 1 puff Daily.     Dispense:  90 each     Refill:  3     Order Specific Question:   Lot Number?     Answer:   [id number]     Order Specific Question:   Expiration Date?     Answer:   10/31/2021     Order Specific Question:   NDC     Answer:   9323-9352-23 [1]     Order Specific Question:   Quantity     Answer:   1   • traMADol (ULTRAM) 50 MG tablet     Sig: Take 1 tablet by mouth Every 8 (Eight) Hours As Needed for Moderate Pain  or Severe Pain  for up to 30 days.     Dispense:  90 tablet     Refill:  0       SUMMARY/DISCUSSION  • Follow-up in 6 months for annual medicare wellness along with chronic medical follow-up and urine drug compliance     Next Appointment with me: Visit date not found    Return for Medicare Wellness, Next scheduled follow up.      VITAL SIGNS     Visit Vitals  /88   Pulse 81   Temp 97.1 °F (36.2 °C) (Temporal)   Ht 142.2 cm (56\")   Wt 45.4 kg (100 lb)   LMP  (LMP Unknown)   SpO2 96%   BMI 22.42 kg/m²     Wt Readings from Last 3 Encounters:   08/12/21 45.4 kg (100 lb)   05/28/21 45.6 kg (100 lb 9.6 oz)   04/02/21 43.5 kg (96 lb)     Body mass index is 22.42 kg/m².      MEDICATIONS AT THE TIME OF OFFICE VISIT     Current Outpatient Medications on File Prior to Visit   Medication Sig   • albuterol sulfate HFA (ProAir HFA) 108 (90 Base) MCG/ACT inhaler Inhale 2 puffs Every 6 (Six) Hours As Needed for Wheezing or Shortness of Air.   • atorvastatin (LIPITOR) 40 MG tablet Take 1 tablet by mouth Every Night.   • budesonide (PULMICORT) 0.5 MG/2ML nebulizer solution Take 2 mL by nebulization 2 (Two) Times a Day.   • cephalexin (KEFLEX) 250 MG capsule    • cetirizine (zyrTEC) 10 MG tablet Take 1 tablet by mouth As Needed for Allergies.   • losartan (COZAAR) 50 MG tablet Take 1 " tablet by mouth Daily.   • meclizine (ANTIVERT) 12.5 MG tablet Take 1 tablet by mouth 3 (Three) Times a Day As Needed for Dizziness.   • metoprolol succinate XL (TOPROL-XL) 50 MG 24 hr tablet Take 1 tablet by mouth Every 12 (Twelve) Hours.   • pantoprazole (PROTONIX) 40 MG EC tablet Take 1 tablet by mouth Every Morning.   • pramipexole (MIRAPEX) 0.125 MG tablet Take 1 tablet by mouth every night at bedtime.   • Vitamin D, Cholecalciferol, 50 MCG (2000 UT) capsule Take 2,000 Units by mouth Daily.   • [DISCONTINUED] Trelegy Ellipta 100-62.5-25 MCG/INH inhaler INHALE ONE PUFF BY MOUTH DAILY   • cilostazol (PLETAL) 100 MG tablet Take 1 tablet by mouth 2 (Two) Times a Day.   • clopidogrel (PLAVIX) 75 MG tablet Take 1 tablet by mouth Daily.   • ferrous sulfate 325 (65 FE) MG EC tablet Take 1 tablet by mouth Daily With Breakfast.   • ipratropium-albuterol (DUO-NEB) 0.5-2.5 mg/3 ml nebulizer Take 3 mL by nebulization 3 (Three) Times a Day.   • multivitamin (THERAGRAN) tablet tablet Take 1 tablet by mouth Daily.   • sennosides-docusate (PERICOLACE) 8.6-50 MG per tablet Take 2 tablets by mouth Daily As Needed for Constipation.   • TOVIAZ 4 MG tablet sustained-release 24 hour tablet Take 4 mg by mouth Daily.   • [DISCONTINUED] mirtazapine (REMERON) 7.5 MG tablet Take 1 tablet by mouth Every Night.   • [DISCONTINUED] traMADol (ULTRAM) 50 MG tablet Take 1 tablet by mouth Every 8 (Eight) Hours As Needed for Moderate Pain  or Severe Pain  for up to 30 days.     No current facility-administered medications on file prior to visit.         HISTORY OF PRESENT ILLNESS     Hypertension: Well controlled with losartan 50mg and metoprolol 50mg 24hr tablet daily.     Hyperlipidemia: Well controlled with atorvastatin 40mg nightly. No myalgias with medication.     GERD: Well controlled with Protonix.      Chronic moderate severe pain of multiple sits including spine, hips, knees. Takes tramadol 3-4 a day for pain control without significant  problems.    Type II Diabetes: Hemoglobin A1c as high as 6.8, now in prediabetic range only.     Anemia: History, current supplementation with iron ferrous sulfate.     CT angio reviewed. Needed repeat CT lung in April due to 5mm lung nodule. Would like to get ordered from her pulmonologist who she will schedule with in the next few weeks.     REVIEW OF SYSTEMS     Constitutional neg except per HPI   Resp neg  CV neg    PHYSICAL EXAMINATION     Physical Exam  Constitutional  No distress  Cardiovascular Rate  normal . Rhythm: regular . Heart sounds:  normal  Pulmonary/Chest  Effort normal. Breath sounds:  normal  Musc tentative pace   Psychiatric  Alert. Judgment and thought content normal. Mood normal       REVIEWED DATA     Labs:   Lab Results   Component Value Date    WBC 5.33 04/01/2021    HGB 13.4 04/01/2021    HCT 42.6 04/01/2021    MCV 96.6 04/01/2021     04/01/2021     Lab Results   Component Value Date    HGBA1C 5.92 (H) 09/16/2020     Lab Results   Component Value Date    GLUCOSE 107 (H) 10/28/2020    BUN 15 10/28/2020    CREATININE 0.61 10/28/2020    EGFRIFNONA 94 10/28/2020    EGFRIFAFRI 94 10/21/2020    BCR 24.6 10/28/2020    K 3.5 10/28/2020    CO2 32.1 (H) 10/28/2020    CALCIUM 9.3 10/28/2020    PROTENTOTREF 6.4 08/15/2018    ALBUMIN 4.10 10/28/2020    LABIL2 1.5 08/15/2018    AST 19 10/28/2020    ALT 11 10/28/2020   No results found for: TSH, F3GZYZV, M4UMNEE, THYROIDAB   Lab Results   Component Value Date    CHOL 189 09/16/2020    CHLPL 144 08/15/2018    TRIG 154 (H) 09/16/2020    HDL 48 09/16/2020     (H) 09/16/2020     Imaging:           Medical Tests:             Summary of old records / correspondence / consultant report:           Request outside records:           *Examiner was wearing medical surgical mask, face shield and exam gloves during the entire duration of the visit. Patient was masked the entire time.   Minimum social distance of 6 ft maintained entire visit except if  physical contact was necessary as documented.     **Tru Disclaimer:   Much of this encounter note is an electronic transcription/translation of spoken language to printed text. The electronic translation of spoken language may permit erroneous, or at times, nonsensical words or phrases to be inadvertently transcribed. Although I have reviewed the note for such errors, some may still exist.

## 2021-08-16 ENCOUNTER — TELEPHONE (OUTPATIENT)
Dept: INTERNAL MEDICINE | Age: 81
End: 2021-08-16

## 2021-08-16 LAB
ALBUMIN SERPL-MCNC: 4.3 G/DL (ref 3.6–4.6)
ALBUMIN/GLOB SERPL: 1.6 {RATIO} (ref 1.2–2.2)
ALP SERPL-CCNC: 97 IU/L (ref 48–121)
ALT SERPL-CCNC: 11 IU/L (ref 0–32)
APPEARANCE UR: CLEAR
AST SERPL-CCNC: 19 IU/L (ref 0–40)
BACTERIA #/AREA URNS HPF: ABNORMAL /[HPF]
BACTERIA UR CULT: ABNORMAL
BASOPHILS # BLD AUTO: 0 X10E3/UL (ref 0–0.2)
BASOPHILS NFR BLD AUTO: 0 %
BILIRUB SERPL-MCNC: 0.7 MG/DL (ref 0–1.2)
BILIRUB UR QL STRIP: NEGATIVE
BUN SERPL-MCNC: 17 MG/DL (ref 8–27)
BUN/CREAT SERPL: 22 (ref 12–28)
CALCIUM SERPL-MCNC: 9.9 MG/DL (ref 8.7–10.3)
CASTS URNS QL MICRO: ABNORMAL /LPF
CHLORIDE SERPL-SCNC: 99 MMOL/L (ref 96–106)
CHOLEST SERPL-MCNC: 222 MG/DL (ref 100–199)
CHOLEST/HDLC SERPL: 3.8 RATIO (ref 0–4.4)
CO2 SERPL-SCNC: 26 MMOL/L (ref 20–29)
COLOR UR: YELLOW
CREAT SERPL-MCNC: 0.76 MG/DL (ref 0.57–1)
EOSINOPHIL # BLD AUTO: 0.1 X10E3/UL (ref 0–0.4)
EOSINOPHIL NFR BLD AUTO: 1 %
EPI CELLS #/AREA URNS HPF: ABNORMAL /HPF (ref 0–10)
ERYTHROCYTE [DISTWIDTH] IN BLOOD BY AUTOMATED COUNT: 12.6 % (ref 11.7–15.4)
GLOBULIN SER CALC-MCNC: 2.7 G/DL (ref 1.5–4.5)
GLUCOSE SERPL-MCNC: 124 MG/DL (ref 65–99)
GLUCOSE UR QL: NEGATIVE
HBA1C MFR BLD: 6.3 % (ref 4.8–5.6)
HCT VFR BLD AUTO: 42.9 % (ref 34–46.6)
HDLC SERPL-MCNC: 59 MG/DL
HGB BLD-MCNC: 14 G/DL (ref 11.1–15.9)
HGB UR QL STRIP: ABNORMAL
IMM GRANULOCYTES # BLD AUTO: 0 X10E3/UL (ref 0–0.1)
IMM GRANULOCYTES NFR BLD AUTO: 0 %
KETONES UR QL STRIP: NEGATIVE
LDLC SERPL CALC-MCNC: 133 MG/DL (ref 0–99)
LEUKOCYTE ESTERASE UR QL STRIP: ABNORMAL
LYMPHOCYTES # BLD AUTO: 1 X10E3/UL (ref 0.7–3.1)
LYMPHOCYTES NFR BLD AUTO: 13 %
MCH RBC QN AUTO: 31.3 PG (ref 26.6–33)
MCHC RBC AUTO-ENTMCNC: 32.6 G/DL (ref 31.5–35.7)
MCV RBC AUTO: 96 FL (ref 79–97)
MICRO URNS: ABNORMAL
MONOCYTES # BLD AUTO: 0.4 X10E3/UL (ref 0.1–0.9)
MONOCYTES NFR BLD AUTO: 5 %
NEUTROPHILS # BLD AUTO: 6.1 X10E3/UL (ref 1.4–7)
NEUTROPHILS NFR BLD AUTO: 81 %
NITRITE UR QL STRIP: POSITIVE
OTHER ANTIBIOTIC SUSC ISLT: ABNORMAL
PH UR STRIP: 6 [PH] (ref 5–7.5)
PLATELET # BLD AUTO: 310 X10E3/UL (ref 150–450)
POTASSIUM SERPL-SCNC: 4.3 MMOL/L (ref 3.5–5.2)
PROT SERPL-MCNC: 7 G/DL (ref 6–8.5)
PROT UR QL STRIP: ABNORMAL
RBC # BLD AUTO: 4.48 X10E6/UL (ref 3.77–5.28)
RBC #/AREA URNS HPF: ABNORMAL /HPF (ref 0–2)
SODIUM SERPL-SCNC: 142 MMOL/L (ref 134–144)
SP GR UR: 1.01 (ref 1–1.03)
T4 FREE SERPL-MCNC: 1.47 NG/DL (ref 0.82–1.77)
TRIGL SERPL-MCNC: 168 MG/DL (ref 0–149)
TSH SERPL DL<=0.005 MIU/L-ACNC: 1.83 UIU/ML (ref 0.45–4.5)
URINALYSIS REFLEX: ABNORMAL
UROBILINOGEN UR STRIP-MCNC: 0.2 MG/DL (ref 0.2–1)
VLDLC SERPL CALC-MCNC: 30 MG/DL (ref 5–40)
WBC # BLD AUTO: 7.6 X10E3/UL (ref 3.4–10.8)
WBC #/AREA URNS HPF: >30 /HPF (ref 0–5)

## 2021-08-16 RX ORDER — CIPROFLOXACIN 500 MG/1
500 TABLET, FILM COATED ORAL 2 TIMES DAILY
Qty: 6 TABLET | Refills: 0 | Status: SHIPPED | OUTPATIENT
Start: 2021-08-16 | End: 2021-08-19

## 2021-08-18 LAB — DRUGS UR: NORMAL

## 2021-09-07 ENCOUNTER — TELEPHONE (OUTPATIENT)
Dept: INTERNAL MEDICINE | Age: 81
End: 2021-09-07

## 2021-09-07 RX ORDER — METOPROLOL SUCCINATE 50 MG/1
TABLET, EXTENDED RELEASE ORAL
Qty: 180 TABLET | Refills: 2 | Status: SHIPPED | OUTPATIENT
Start: 2021-09-07 | End: 2022-06-16 | Stop reason: CLARIF

## 2021-09-07 RX ORDER — LOSARTAN POTASSIUM 50 MG/1
TABLET ORAL
Qty: 90 TABLET | Refills: 2 | Status: SHIPPED | OUTPATIENT
Start: 2021-09-07 | End: 2022-05-03 | Stop reason: HOSPADM

## 2021-09-07 NOTE — TELEPHONE ENCOUNTER
Caller: Grace Beauchamp    Relationship: Self    Best call back number: [phone]    Who are you requesting to speak with (clinical staff, provider,  specific staff member): NICHOLAS    What was the call regarding: PATIENT SAW NICHOLAS A FEW WEEKS AGO AND SHE RECOMMENDED PATIENT GOING ON NEW MEDS IF HER CONDITION DID NOT GET ANY BETTER. PATIENT WOULD LIKE TO DISCUSS THOSE MEDS WITH NICHOLAS.     Do you require a callback: YES

## 2021-09-29 ENCOUNTER — OFFICE VISIT (OUTPATIENT)
Dept: ORTHOPEDIC SURGERY | Facility: CLINIC | Age: 81
End: 2021-09-29

## 2021-09-29 VITALS — BODY MASS INDEX: 21.59 KG/M2 | HEIGHT: 56 IN | TEMPERATURE: 97.1 F | WEIGHT: 96 LBS

## 2021-09-29 DIAGNOSIS — M16.11 ARTHRITIS OF RIGHT HIP: ICD-10-CM

## 2021-09-29 DIAGNOSIS — M17.11 ARTHRITIS OF RIGHT KNEE: Primary | ICD-10-CM

## 2021-09-29 PROCEDURE — 99214 OFFICE O/P EST MOD 30 MIN: CPT | Performed by: ORTHOPAEDIC SURGERY

## 2021-09-29 PROCEDURE — 20610 DRAIN/INJ JOINT/BURSA W/O US: CPT | Performed by: ORTHOPAEDIC SURGERY

## 2021-09-29 RX ORDER — METHYLPREDNISOLONE ACETATE 80 MG/ML
80 INJECTION, SUSPENSION INTRA-ARTICULAR; INTRALESIONAL; INTRAMUSCULAR; SOFT TISSUE
Status: COMPLETED | OUTPATIENT
Start: 2021-09-29 | End: 2021-09-29

## 2021-09-29 RX ADMIN — METHYLPREDNISOLONE ACETATE 80 MG: 80 INJECTION, SUSPENSION INTRA-ARTICULAR; INTRALESIONAL; INTRAMUSCULAR; SOFT TISSUE at 11:27

## 2021-09-29 NOTE — PROGRESS NOTES
Large Joint Arthrocentesis: R knee  Date/Time: 9/29/2021 11:27 AM  Consent given by: patient  Site marked: site marked  Timeout: Immediately prior to procedure a time out was called to verify the correct patient, procedure, equipment, support staff and site/side marked as required   Supporting Documentation  Indications: pain   Procedure Details  Location: knee - R knee  Preparation: Patient was prepped and draped in the usual sterile fashion  Needle size: 22 G  Approach: anteromedial  Medications administered: 80 mg methylPREDNISolone acetate 80 MG/ML; 4 mL lidocaine (cardiac)  Patient tolerance: patient tolerated the procedure well with no immediate complications        Patient Name: Grace Beauchamp   YOB: 1940  Referring Primary Care Physician: Miller Castañeda MD  BMI: Body mass index is 21.52 kg/m².    Chief Complaint:    Chief Complaint   Patient presents with   • Right Knee - Pain, Follow-up        HPI:     Grace Beauchamp is a 81 y.o. female who presents today for evaluation of   Chief Complaint   Patient presents with   • Right Knee - Pain, Follow-up   .  Patient follows up today on her right knee where she has known arthritis.  It hurts her she is received injections in the past but states that they helped partially.  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 MACARIO in the past.  She did says she gets good relief from the knee injection but it does not last more than a month or 2.  She is using a walker and she is seen with her daughter      Subjective   Medications:   Home Medications:  Current Outpatient Medications on File Prior to Visit   Medication Sig   • albuterol sulfate HFA (ProAir HFA) 108 (90 Base) MCG/ACT inhaler Inhale 2 puffs Every 6 (Six) Hours As Needed for Wheezing or Shortness of Air.   • Coal Tar-Menthol 1.8-1.5 % shampoo  Apply 1 application topically 2 (Two) Times a Week. Do not get in eyes. Use twice weekly maximum.   • Fluticasone-Umeclidin-Vilant (Trelegy Ellipta) 100-62.5-25 MCG/INH inhaler Inhale 1 puff Daily.   • ipratropium-albuterol (DUO-NEB) 0.5-2.5 mg/3 ml nebulizer Take 3 mL by nebulization 3 (Three) Times a Day.   • losartan (COZAAR) 50 MG tablet TAKE ONE TABLET BY MOUTH DAILY   • meclizine (ANTIVERT) 12.5 MG tablet Take 1 tablet by mouth 3 (Three) Times a Day As Needed for Dizziness.   • metoprolol succinate XL (TOPROL-XL) 50 MG 24 hr tablet TAKE ONE TABLET BY MOUTH EVERY 12 HOURS   • pantoprazole (PROTONIX) 40 MG EC tablet Take 1 tablet by mouth Every Morning.   • pramipexole (MIRAPEX) 0.125 MG tablet Take 1 tablet by mouth every night at bedtime.   • TOVIAZ 4 MG tablet sustained-release 24 hour tablet Take 4 mg by mouth Daily.   • Vitamin D, Cholecalciferol, 50 MCG (2000 UT) capsule Take 2,000 Units by mouth Daily.   • atorvastatin (LIPITOR) 40 MG tablet Take 1 tablet by mouth Every Night.   • budesonide (PULMICORT) 0.5 MG/2ML nebulizer solution Take 2 mL by nebulization 2 (Two) Times a Day.   • cephalexin (KEFLEX) 250 MG capsule    • cetirizine (zyrTEC) 10 MG tablet Take 1 tablet by mouth As Needed for Allergies.   • cilostazol (PLETAL) 100 MG tablet Take 1 tablet by mouth 2 (Two) Times a Day.   • clopidogrel (PLAVIX) 75 MG tablet Take 1 tablet by mouth Daily.   • ferrous sulfate 325 (65 FE) MG EC tablet Take 1 tablet by mouth Daily With Breakfast.   • multivitamin (THERAGRAN) tablet tablet Take 1 tablet by mouth Daily.   • sennosides-docusate (PERICOLACE) 8.6-50 MG per tablet Take 2 tablets by mouth Daily As Needed for Constipation.   • traMADol (ULTRAM) 50 MG tablet Take 1 tablet by mouth Every 8 (Eight) Hours As Needed for Moderate Pain  or Severe Pain  for up to 30 days.     No current facility-administered medications on file prior to visit.     Current Medications:  Scheduled Meds:  Continuous Infusions:No  "current facility-administered medications for this visit.    PRN Meds:.    I have reviewed the patient's medical history in detail and updated the computerized patient record.  Review and summarization of old records includes:    Past Medical History:   Diagnosis Date   • AAA (abdominal aortic aneurysm) (CMS/[facility]ly [facility])    • Acute bronchitis 12/2018   • 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Formerly [facility])    • Arthritis    • Bilateral carotid artery disease (CMS/Formerly [facility])    • Bilateral cataracts     S/p Extractions   • Breast cancer (CMS/Formerly [facility])     right breast [id number] (snm) pMX ER/MO pos, Her-2/neg, Ki-67, 31%, oncotype recurrence score 19, invasive lobular carcinoma   • CAD (coronary artery disease)     S/p CABG on 2/23/16 by Dr. 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Formerly [facility])    • Cataracts, bilateral    • Closed fracture of three ribs of right side    • Cognitive disorder    • COPD (chronic obstructive pulmonary disease) (CMS/[facility]ly [facility])    • Depression    • Descending aortic arch aneurysm (CMS/[facility]ly [facility])    • Diabetes mellitus (CMS/[facility]ly [facility])     \"BORDERLINE\"   • Erythermalgia (CMS/[facility]ly [facility])    • GERD (gastroesophageal reflux disease)    • Hyperlipidemia     Controlled w/Meds   • Hypertension     Controlled w/Meds   • Low back pain    • Moderate aortic valve insufficiency     S/p AVR on 02/23/16 by Dr. Ontiveros   • Nocturia    • Osteoarthritis    • Osteoporosis    • Otitis media     R Ear   • Prediabetes    • PVD (peripheral vascular disease) (CMS/[facility])    • RLS (restless legs syndrome)    • Saccular aneurysm    • Stroke (CMS/[facility]ly [facility])     Perioperatively w/L Hip Repl   • Thoracic ascending aortic aneurysm (CMS/Formerly [facility])     S/p Repair on 02/23/16 by Dr. Ontiveros   • TIA (transient ischemic attack)    • Urgency incontinence    • Urinary tract infection    • Venous insufficiency    • Ventral hernia         Past Surgical History:   Procedure Laterality Date   • AORTIC VALVE REPAIR/REPLACEMENT N/A 02/23/2016-[facility]    Ascending Replacement Using a 24MM Graft--Dr. Ontiveros   • APPENDECTOMY  1977   • " BREAST BIOPSY Right 09/16/2012    Vacuum Assisted Core Bx of R Breast   • CARDIAC CATHETERIZATION N/A 01/06/2016    Dr. Tres De Los Santos   • CARDIAC CATHETERIZATION N/A 4/22/2019    Procedure: Left Heart Cath;  Surgeon: Tres De Los Santos MD;  Location:  [facility];  Service: Cardiology   • CARDIAC CATHETERIZATION N/A 4/22/2019    Procedure: Coronary angiography;  Surgeon: Tres De Los Santos MD;  Location:  [facility];  Service: Cardiology   • CARDIAC CATHETERIZATION N/A 7/22/2020    Procedure: LEFT HEART CATH;  Surgeon: Antonia Scott MD;  Location:  [facility];  Service: Cardiovascular;  Laterality: N/A;   • CARDIAC CATHETERIZATION N/A 7/22/2020    Procedure: CORONARY ANGIOGRAPHY;  Surgeon: Antonia Scott MD;  Location:  [facility];  Service: Cardiovascular;  Laterality: N/A;   • CARDIAC CATHETERIZATION N/A 7/22/2020    Procedure: Left ventriculography;  Surgeon: Antonia Scott MD;  Location:  [facility];  Service: Cardiovascular;  Laterality: N/A;   • CARDIAC CATHETERIZATION N/A 7/22/2020    Procedure: Saphenous Vein Graft;  Surgeon: Antonia Scott MD;  Location:  [facility];  Service: Cardiovascular;  Laterality: N/A;   • CARDIAC SURGERY  02/2016    Dr. Ontiveros/Dr. De Los Santos   • CATARACT EXTRACTION Bilateral     [facility]   • COLONOSCOPY N/A 10/2002    Dr. Negro   • CORONARY ARTERY BYPASS GRAFT  02/23/2016-[facility]    x1 w/L Vein Grafting--Dr. Ontiveros   • CORONARY ARTERY BYPASS GRAFT N/A 9/18/2020    Procedure: STERNAL EXPLORATION WITH CLOSURE AND WASHOUT, REMOVAL OF IABP, PRP;  Surgeon: Mart Ontiveros MD;  Location: [facility] MAIN OR;  Service: Cardiothoracic;  Laterality: N/A;   • CYST REMOVAL Right 01/25/2013    R Palm Excision of Retinacular Cyst--Dr. Karla Fish   • EPIDURAL BLOCK     • LAPAROSCOPIC CHOLECYSTECTOMY N/A 08/04/2004    Dr. JOEY Sheth   • MASTECTOMY Right 09/28/2012   • ORIF HIP  FRACTURE Left 2005    w/ AMBI compression screw, Dr. Victor M Cabral   • RECTOVAGINAL FISTULA REPAIR     • THORACIC AORTIC ANEURYSM REPAIR N/A 2019    Procedure: ROSE, THORACOABDOMINAL AORTIC ANEURYSM REPAIR, VISCERAL VESSEL REPAIR, LARGE VENTRAL HERNIA REPAIR WITH MESH, CIRCULATORY ARREST, PRP;  Surgeon: Mart Ontiveros MD;  Location: [facility] MAIN OR;  Service: Cardiothoracic   • TRANSESOPHAGEAL ECHOCARDIOGRAM (ROSE) N/A 2020    Procedure: TRANSESOPHAGEAL ECHOCARDIOGRAM WITH ANESTHESIA;  Surgeon: Mart Ontiveros MD;  Location: [facility] MAIN OR;  Service: Cardiothoracic;  Laterality: N/A;   • TUBAL ABDOMINAL LIGATION     • VENTRICULAR ANEURYSM REPAIR N/A 2020    Procedure: EMERGENT REOP STERNOTOMY, REPAIR OF LV RUPTURE, PRP, INTRAOP ROSE;  Surgeon: Mart Ontiveros MD;  Location: [facility] MAIN OR;  Service: Cardiothoracic;  Laterality: N/A;   • VENTRICULAR ANEURYSM REPAIR N/A 2020    Procedure: ROSE, MIDLINE STERNOTOMY REOP  LEFT VENTRICULAR ANEURYSM REPAIR, IABP INSERTION, PRP;  Surgeon: Mart Ontiveros MD;  Location: [facility] MAIN OR;  Service: Cardiothoracic;  Laterality: N/A;        Social History     Occupational History   • Not on file   Tobacco Use   • Smoking status: Former Smoker     Types: Cigarettes     Quit date: 2012     Years since quittin.8   • Smokeless tobacco: Never Used   • Tobacco comment: CAFFEINE USE:2 CUPS COFFEE/ COKE DAILY   Vaping Use   • Vaping Use: Never assessed   Substance and Sexual Activity   • Alcohol use: No   • Drug use: No   • Sexual activity: Defer     Birth control/protection: Tubal ligation      Social History     Social History Narrative   • Not on file        Family History   Problem Relation Age of Onset   • Alzheimer's disease Mother    • Cancer Maternal Aunt    • Heart disease Father    • Heart failure Father    • Hypertension Father    • Diabetes Father    • Liver disease Sister    • Heart failure Brother    • Hypertension Brother    •  "Alcohol abuse Brother    • Diabetes Brother    • No Known Problems Maternal Grandmother    • No Known Problems Maternal Grandfather    • No Known Problems Paternal Grandmother    • No Known Problems Paternal Grandfather    • Diabetes Brother    • Brain cancer Brother    • Heart disease Brother    • Diabetes Brother        ROS: 14 point review of systems was performed and all other systems were reviewed and are negative except for documented findings in HPI and today's encounter.     Allergies:   Allergies   Allergen Reactions   • Ramipril Other (See Comments)     Ace I  cough   • Morphine Itching   • Morphine And Related Itching   • Bactrim [Sulfamethoxazole-Trimethoprim] Itching and Rash   • Cipro [Ciprofloxacin Hcl] Rash     Constitutional:  Denies fever, shaking or chills   Eyes:  Denies change in visual acuity   HENT:  Denies nasal congestion or sore throat   Respiratory:  Denies cough or shortness of breath   Cardiovascular:  Denies chest pain or severe LE edema   GI:  Denies abdominal pain, nausea, vomiting, bloody stools or diarrhea   Musculoskeletal:  Numbness, tingling, pain, or loss of motor function only as noted above in history of present illness.  : Denies painful urination or hematuria  Integument:  Denies rash, lesion or ulceration   Neurologic:  Denies headache or focal weakness  Endocrine:  Denies lymphadenopathy  Psych:  Denies confusion or change in mental status   Hem:  Denies active bleeding    OBJECTIVE:  Physical Exam: 81 y.o. female  Wt Readings from Last 3 Encounters:   09/29/21 43.5 kg (96 lb)   08/12/21 45.4 kg (100 lb)   05/28/21 45.6 kg (100 lb 9.6 oz)     Ht Readings from Last 1 Encounters:   09/29/21 142.2 cm (56\")     Body mass index is 21.52 kg/m².  Vitals:    09/29/21 1122   Temp: 97.1 °F (36.2 °C)     Vital signs reviewed.     General Appearance:    Alert, cooperative, in no acute distress                  Eyes: conjunctiva clear  ENT: external ears and nose atraumatic  CV: no " peripheral edema  Resp: normal respiratory effort  Skin: no rashes or wounds; normal turgor  Psych: mood and affect appropriate  Lymph: no nodes appreciated  Neuro: gross sensation intact  Vascular:  Palpable peripheral pulse in noted extremity  Musculoskeletal Extremities: Exam today shows pleasant lady she has dropfoot on the right with a brace she has joint line tenderness and swelling crepitation her right knee she has a very stiff right hip but she is not sure of increases her pain picture or not especially with internal/external rotation she has moderately positive Stinchfield on that side.    Radiology:   AP lateral 40 degree PA x-ray bilateral knees taken in May viewed at this time for pain do show arthritis.  I looked through her chart with serial x-rays over several years and found hip x-rays show advanced arthritic change in her right hip as well with the cemented revision left total hip there is a conversion from a compression hip screw        Assessment:     ICD-10-CM ICD-9-CM   1. Arthritis of right knee  M17.11 716.96   2. Arthritis of right hip  M16.11 716.95        MDM/Plan:   The diagnosis(es), natural history, pathophysiology and treatment for diagnosis(es) were discussed. Opportunity given and questions answered.  Biomechanics of pertinent body areas discussed.  When appropriate, the use of ambulatory aids discussed.    Inflammation/pain control; with cold, heat, elevation and/or liniments discussed as appropriate  Cortisone Injection. See procedure note.  MEDICAL RECORDS reviewed from other provider(s) for past and current medical history pertinent to this complaint.  She has heart problems etc. and she really does not want having surgical solutions.  I injected her knee and she did have some numbing from it and told her daughter to be on the look out for the next few hours see if this provided significant numbing if she did not and she still having the pain up the leg of explained to her that I  feel her arthritis of her hip cause symptoms and we could send her for an injection there.  Apparently she had injection in the past it was quite painful and traumatic for her although I told her that that would be unusual but no way to guarantee it if it bothers her enough we can set her up for an injection in the right hip regardless when we see her back she will need to get x-rays of her right knee as well as her right hip    9/29/2021    Much of this encounter note is an electronic transcription/translation of spoken language to printed text. The electronic translation of spoken language may permit erroneous, or at times, nonsensical words or phrases to be inadvertently transcribed; Although I have reviewed the note for such errors, some may still exist

## 2021-10-15 DIAGNOSIS — M15.9 PRIMARY OSTEOARTHRITIS INVOLVING MULTIPLE JOINTS: Chronic | ICD-10-CM

## 2021-10-18 RX ORDER — TRAMADOL HYDROCHLORIDE 50 MG/1
TABLET ORAL
Qty: 90 TABLET | Refills: 0 | Status: ON HOLD | OUTPATIENT
Start: 2021-10-18 | End: 2022-05-03 | Stop reason: SDUPTHER

## 2021-10-27 NOTE — TELEPHONE ENCOUNTER
PATIENT NEEDS REFILL ON:Fluticasone-Umeclidin-Vilant (Trelegy Ellipta) 100-62.5-25 MCG/INH inhaler     PATIENT CAN BE REACHED ON: [phone]     PHARMACY [address] & ([place]) - [phone]  - [phone] FX  [phone]

## 2021-11-18 ENCOUNTER — TELEPHONE (OUTPATIENT)
Dept: INTERNAL MEDICINE | Age: 81
End: 2021-11-18

## 2021-11-18 DIAGNOSIS — R30.0 DYSURIA: Primary | ICD-10-CM

## 2021-11-18 PROCEDURE — 81003 URINALYSIS AUTO W/O SCOPE: CPT | Performed by: INTERNAL MEDICINE

## 2021-11-18 NOTE — TELEPHONE ENCOUNTER
Caller: Maddy Sanches    Relationship: Emergency Contact    Best call back number: [phone]    What orders are you requesting (i.e. lab or imaging): URINALYSIS    In what timeframe would the patient need to come in: TOMORROW AFTERNOON IF POSSIBLE    Where will you receive your lab/imaging services: IN-OFFICE    Additional notes: PATIENT IS EXPERIENCING BURNING SENSATION WHEN URINATING, SHE IS IRRITABLE, AND HAS OVERALL MALAISE.

## 2021-11-19 ENCOUNTER — CLINICAL SUPPORT (OUTPATIENT)
Dept: INTERNAL MEDICINE | Age: 81
End: 2021-11-19

## 2021-11-19 DIAGNOSIS — J06.9 ACUTE URI: Primary | ICD-10-CM

## 2021-11-19 LAB
BILIRUB BLD-MCNC: NEGATIVE MG/DL
CLARITY, POC: CLEAR
COLOR UR: ABNORMAL
EXPIRATION DATE: ABNORMAL
GLUCOSE UR STRIP-MCNC: NEGATIVE MG/DL
KETONES UR QL: NEGATIVE
LEUKOCYTE EST, POC: ABNORMAL
Lab: ABNORMAL
NITRITE UR-MCNC: POSITIVE MG/ML
PH UR: 6 [PH] (ref 5–8)
PROT UR STRIP-MCNC: ABNORMAL MG/DL
RBC # UR STRIP: ABNORMAL /UL
SP GR UR: 1.02 (ref 1–1.03)
UROBILINOGEN UR QL: NORMAL

## 2021-11-19 PROCEDURE — 99211 OFF/OP EST MAY X REQ PHY/QHP: CPT | Performed by: INTERNAL MEDICINE

## 2021-11-19 RX ORDER — CEPHALEXIN 250 MG/1
250 CAPSULE ORAL 2 TIMES DAILY
Qty: 6 CAPSULE | Refills: 0 | Status: SHIPPED | OUTPATIENT
Start: 2021-11-19 | End: 2021-11-22

## 2021-11-19 NOTE — TELEPHONE ENCOUNTER
Urine dipstick consistent with a likely UTI.   Sent in prescription for keflex 250 BID x 3 days.  Sent message to patient through [employer].   Will follow-up with culture/sensitivity on Monday.

## 2021-11-19 NOTE — TELEPHONE ENCOUNTER
Patient came in and left Urine Sample. Results are in chart for urine dip. Remaining urine will be sent out for culture.     Patient was stating that she have a prescription sent to her pharmacy [facility].     PLEASE ADVISE

## 2021-11-22 LAB
APPEARANCE UR: ABNORMAL
BACTERIA #/AREA URNS HPF: ABNORMAL /[HPF]
BACTERIA UR CULT: ABNORMAL
BACTERIA UR CULT: ABNORMAL
BILIRUB UR QL STRIP: NEGATIVE
CASTS URNS QL MICRO: ABNORMAL /LPF
COLOR UR: YELLOW
EPI CELLS #/AREA URNS HPF: ABNORMAL /HPF (ref 0–10)
GLUCOSE UR QL: NEGATIVE
HGB UR QL STRIP: ABNORMAL
KETONES UR QL STRIP: NEGATIVE
LEUKOCYTE ESTERASE UR QL STRIP: ABNORMAL
MICRO URNS: ABNORMAL
NITRITE UR QL STRIP: NEGATIVE
OTHER ANTIBIOTIC SUSC ISLT: ABNORMAL
PH UR STRIP: 6 [PH] (ref 5–7.5)
PROT UR QL STRIP: ABNORMAL
RBC #/AREA URNS HPF: ABNORMAL /HPF (ref 0–2)
SP GR UR: 1.02 (ref 1–1.03)
URINALYSIS REFLEX: ABNORMAL
UROBILINOGEN UR STRIP-MCNC: 0.2 MG/DL (ref 0.2–1)
WBC #/AREA URNS HPF: >30 /HPF (ref 0–5)

## 2021-11-23 ENCOUNTER — TELEPHONE (OUTPATIENT)
Dept: INTERNAL MEDICINE | Age: 81
End: 2021-11-23

## 2021-11-23 NOTE — TELEPHONE ENCOUNTER
Caller: Grace Beauchamp    Relationship: Self    Best call back number: [phone]     What is the best time to reach you:ANY    Who are you requesting to speak with (clinical staff, provider,  specific staff member): CLINICAL    What was the call regarding: PATIENT HAS AN UTI.  SHE WAS GIVEN A SHORT DOSAGE OF A MEDICATION.  IS THAT THE ONLY MEDICATION SHE IS GOING TO GET?  SHE BELIEVES SHE STILL AS A UTI AND NEEDS ADDITIONAL MEDICATION.  PLEASE ADVISE    Do you require a callback: YES

## 2021-11-23 NOTE — TELEPHONE ENCOUNTER
MyChart:    Grace, here are the result(s) of your test(s):     Urine culture shows growth of E coli. Should be susceptible to antibiotic prescribed. Let me know if you experience ongoing or worsening symptoms.     Please do not hesitate to contact me if you have questions.

## 2021-11-23 NOTE — TELEPHONE ENCOUNTER
SW patient she just finished the Keflex yesterday 1 pill BID for 3 days. Patient states she thinks that medication will not work and wants to know why she was only given 6 pills. I explained that's what Dr. Castañeda said should help with her infection, but if she is still having issues come Monday to give us a call and we will see about getting another urine on Monday. Patient agreed and verbalized an understanding.   Patient did not state she was having any current symptoms.

## 2021-12-03 ENCOUNTER — HOSPITAL ENCOUNTER (OUTPATIENT)
Facility: HOSPITAL | Age: 81
Setting detail: OBSERVATION
LOS: 2 days | Discharge: HOME | End: 2021-12-05
Attending: EMERGENCY MEDICINE | Admitting: INTERNAL MEDICINE

## 2021-12-03 DIAGNOSIS — G25.81 RESTLESS LEGS SYNDROME (RLS): ICD-10-CM

## 2021-12-03 DIAGNOSIS — N39.0 ACUTE UTI: Primary | ICD-10-CM

## 2021-12-03 DIAGNOSIS — R53.1 GENERALIZED WEAKNESS: ICD-10-CM

## 2021-12-03 PROBLEM — I50.32 CHRONIC DIASTOLIC CHF (CONGESTIVE HEART FAILURE) (HCC): Status: ACTIVE | Noted: 2020-09-14

## 2021-12-03 LAB
ALBUMIN SERPL-MCNC: 3.7 G/DL (ref 3.5–5.2)
ALBUMIN/GLOB SERPL: 1.2 G/DL
ALP SERPL-CCNC: 78 U/L (ref 39–117)
ALT SERPL W P-5'-P-CCNC: 9 U/L (ref 1–33)
ANION GAP SERPL CALCULATED.3IONS-SCNC: 9.4 MMOL/L (ref 5–15)
AST SERPL-CCNC: 10 U/L (ref 1–32)
BACTERIA UR QL AUTO: ABNORMAL /HPF
BASOPHILS # BLD AUTO: 0.02 10*3/MM3 (ref 0–0.2)
BASOPHILS NFR BLD AUTO: 0.3 % (ref 0–1.5)
BILIRUB SERPL-MCNC: 0.8 MG/DL (ref 0–1.2)
BILIRUB UR QL STRIP: NEGATIVE
BUN SERPL-MCNC: 20 MG/DL (ref 8–23)
BUN/CREAT SERPL: 26.7 (ref 7–25)
CALCIUM SPEC-SCNC: 9.2 MG/DL (ref 8.6–10.5)
CHLORIDE SERPL-SCNC: 103 MMOL/L (ref 98–107)
CLARITY UR: ABNORMAL
CO2 SERPL-SCNC: 28.6 MMOL/L (ref 22–29)
COLOR UR: ABNORMAL
CREAT SERPL-MCNC: 0.75 MG/DL (ref 0.57–1)
D-LACTATE SERPL-SCNC: 2.2 MMOL/L (ref 0.5–2)
DEPRECATED RDW RBC AUTO: 41.1 FL (ref 37–54)
EOSINOPHIL # BLD AUTO: 0.03 10*3/MM3 (ref 0–0.4)
EOSINOPHIL NFR BLD AUTO: 0.5 % (ref 0.3–6.2)
ERYTHROCYTE [DISTWIDTH] IN BLOOD BY AUTOMATED COUNT: 12.5 % (ref 12.3–15.4)
GFR SERPL CREATININE-BSD FRML MDRD: 74 ML/MIN/1.73
GLOBULIN UR ELPH-MCNC: 3.1 GM/DL
GLUCOSE SERPL-MCNC: 121 MG/DL (ref 65–99)
GLUCOSE UR STRIP-MCNC: NEGATIVE MG/DL
HCT VFR BLD AUTO: 38.8 % (ref 34–46.6)
HGB BLD-MCNC: 13.5 G/DL (ref 12–15.9)
HGB UR QL STRIP.AUTO: NEGATIVE
HOLD SPECIMEN: NORMAL
HOLD SPECIMEN: NORMAL
HYALINE CASTS UR QL AUTO: ABNORMAL /LPF
IMM GRANULOCYTES # BLD AUTO: 0.02 10*3/MM3 (ref 0–0.05)
IMM GRANULOCYTES NFR BLD AUTO: 0.3 % (ref 0–0.5)
KETONES UR QL STRIP: ABNORMAL
LEUKOCYTE ESTERASE UR QL STRIP.AUTO: ABNORMAL
LYMPHOCYTES # BLD AUTO: 0.83 10*3/MM3 (ref 0.7–3.1)
LYMPHOCYTES NFR BLD AUTO: 13.5 % (ref 19.6–45.3)
MAGNESIUM SERPL-MCNC: 1.8 MG/DL (ref 1.6–2.4)
MCH RBC QN AUTO: 31.8 PG (ref 26.6–33)
MCHC RBC AUTO-ENTMCNC: 34.8 G/DL (ref 31.5–35.7)
MCV RBC AUTO: 91.3 FL (ref 79–97)
MONOCYTES # BLD AUTO: 0.4 10*3/MM3 (ref 0.1–0.9)
MONOCYTES NFR BLD AUTO: 6.5 % (ref 5–12)
NEUTROPHILS NFR BLD AUTO: 4.85 10*3/MM3 (ref 1.7–7)
NEUTROPHILS NFR BLD AUTO: 78.9 % (ref 42.7–76)
NITRITE UR QL STRIP: POSITIVE
NRBC BLD AUTO-RTO: 0 /100 WBC (ref 0–0.2)
PH UR STRIP.AUTO: <=5 [PH] (ref 5–8)
PLATELET # BLD AUTO: 272 10*3/MM3 (ref 140–450)
PMV BLD AUTO: 8.7 FL (ref 6–12)
POTASSIUM SERPL-SCNC: 3.7 MMOL/L (ref 3.5–5.2)
PROCALCITONIN SERPL-MCNC: 0.05 NG/ML (ref 0–0.25)
PROT SERPL-MCNC: 6.8 G/DL (ref 6–8.5)
PROT UR QL STRIP: ABNORMAL
RBC # BLD AUTO: 4.25 10*6/MM3 (ref 3.77–5.28)
RBC # UR STRIP: ABNORMAL /HPF
REF LAB TEST METHOD: ABNORMAL
SARS-COV-2 RNA PNL SPEC NAA+PROBE: NOT DETECTED
SODIUM SERPL-SCNC: 141 MMOL/L (ref 136–145)
SP GR UR STRIP: >=1.03 (ref 1–1.03)
SQUAMOUS #/AREA URNS HPF: ABNORMAL /HPF
TROPONIN T SERPL-MCNC: <0.01 NG/ML (ref 0–0.03)
UROBILINOGEN UR QL STRIP: ABNORMAL
WBC # UR STRIP: ABNORMAL /HPF
WBC NRBC COR # BLD: 6.15 10*3/MM3 (ref 3.4–10.8)
WHOLE BLOOD HOLD SPECIMEN: NORMAL

## 2021-12-03 PROCEDURE — 83605 ASSAY OF LACTIC ACID: CPT | Performed by: EMERGENCY MEDICINE

## 2021-12-03 PROCEDURE — 81001 URINALYSIS AUTO W/SCOPE: CPT | Performed by: NURSE PRACTITIONER

## 2021-12-03 PROCEDURE — 87040 BLOOD CULTURE FOR BACTERIA: CPT | Performed by: EMERGENCY MEDICINE

## 2021-12-03 PROCEDURE — 87088 URINE BACTERIA CULTURE: CPT | Performed by: NURSE PRACTITIONER

## 2021-12-03 PROCEDURE — 84145 PROCALCITONIN (PCT): CPT | Performed by: EMERGENCY MEDICINE

## 2021-12-03 PROCEDURE — P9612 CATHETERIZE FOR URINE SPEC: HCPCS

## 2021-12-03 PROCEDURE — 87635 SARS-COV-2 COVID-19 AMP PRB: CPT | Performed by: EMERGENCY MEDICINE

## 2021-12-03 PROCEDURE — 99284 EMERGENCY DEPT VISIT MOD MDM: CPT

## 2021-12-03 PROCEDURE — 87186 SC STD MICRODIL/AGAR DIL: CPT | Performed by: NURSE PRACTITIONER

## 2021-12-03 PROCEDURE — 87086 URINE CULTURE/COLONY COUNT: CPT | Performed by: NURSE PRACTITIONER

## 2021-12-03 PROCEDURE — 84484 ASSAY OF TROPONIN QUANT: CPT | Performed by: EMERGENCY MEDICINE

## 2021-12-03 PROCEDURE — 96365 THER/PROPH/DIAG IV INF INIT: CPT

## 2021-12-03 PROCEDURE — 25010000002 CEFTRIAXONE PER 250 MG: Performed by: EMERGENCY MEDICINE

## 2021-12-03 PROCEDURE — 85025 COMPLETE CBC W/AUTO DIFF WBC: CPT | Performed by: NURSE PRACTITIONER

## 2021-12-03 PROCEDURE — 83735 ASSAY OF MAGNESIUM: CPT | Performed by: EMERGENCY MEDICINE

## 2021-12-03 PROCEDURE — C9803 HOPD COVID-19 SPEC COLLECT: HCPCS

## 2021-12-03 PROCEDURE — 80053 COMPREHEN METABOLIC PANEL: CPT | Performed by: NURSE PRACTITIONER

## 2021-12-03 RX ORDER — SODIUM CHLORIDE 0.9 % (FLUSH) 0.9 %
10 SYRINGE (ML) INJECTION AS NEEDED
Status: DISCONTINUED | OUTPATIENT
Start: 2021-12-03 | End: 2021-12-05 | Stop reason: HOSPADM

## 2021-12-03 RX ORDER — INSULIN LISPRO 100 [IU]/ML
0-7 INJECTION, SOLUTION INTRAVENOUS; SUBCUTANEOUS
Status: DISCONTINUED | OUTPATIENT
Start: 2021-12-04 | End: 2021-12-05 | Stop reason: HOSPADM

## 2021-12-03 RX ORDER — SODIUM CHLORIDE 9 MG/ML
125 INJECTION, SOLUTION INTRAVENOUS CONTINUOUS
Status: DISCONTINUED | OUTPATIENT
Start: 2021-12-03 | End: 2021-12-04

## 2021-12-03 RX ORDER — DEXTROSE MONOHYDRATE 25 G/50ML
25 INJECTION, SOLUTION INTRAVENOUS
Status: DISCONTINUED | OUTPATIENT
Start: 2021-12-03 | End: 2021-12-05 | Stop reason: HOSPADM

## 2021-12-03 RX ORDER — PRAMIPEXOLE DIHYDROCHLORIDE 0.12 MG/1
TABLET ORAL
Qty: 90 TABLET | Refills: 1 | Status: SHIPPED | OUTPATIENT
Start: 2021-12-03 | End: 2022-08-30 | Stop reason: SDUPTHER

## 2021-12-03 RX ORDER — SODIUM CHLORIDE 0.9 % (FLUSH) 0.9 %
10 SYRINGE (ML) INJECTION EVERY 12 HOURS SCHEDULED
Status: DISCONTINUED | OUTPATIENT
Start: 2021-12-03 | End: 2021-12-05 | Stop reason: HOSPADM

## 2021-12-03 RX ORDER — ONDANSETRON 2 MG/ML
4 INJECTION INTRAMUSCULAR; INTRAVENOUS EVERY 6 HOURS PRN
Status: DISCONTINUED | OUTPATIENT
Start: 2021-12-03 | End: 2021-12-05 | Stop reason: HOSPADM

## 2021-12-03 RX ORDER — NICOTINE POLACRILEX 4 MG
15 LOZENGE BUCCAL
Status: DISCONTINUED | OUTPATIENT
Start: 2021-12-03 | End: 2021-12-05 | Stop reason: HOSPADM

## 2021-12-03 RX ORDER — SODIUM CHLORIDE 9 MG/ML
100 INJECTION, SOLUTION INTRAVENOUS CONTINUOUS
Status: ACTIVE | OUTPATIENT
Start: 2021-12-03 | End: 2021-12-04

## 2021-12-03 RX ADMIN — SODIUM CHLORIDE 500 ML: 9 INJECTION, SOLUTION INTRAVENOUS at 19:31

## 2021-12-03 RX ADMIN — SODIUM CHLORIDE 125 ML/HR: 9 INJECTION, SOLUTION INTRAVENOUS at 21:08

## 2021-12-03 RX ADMIN — CEFTRIAXONE 1 G: 1 INJECTION, POWDER, FOR SOLUTION INTRAMUSCULAR; INTRAVENOUS at 21:12

## 2021-12-03 NOTE — ED PROVIDER NOTES
EMERGENCY DEPARTMENT ENCOUNTER    Room Number:  21/21  Date of encounter:  12/3/2021  PCP: Miller Castañeda MD  Historian: Patient and daughter      HPI:  Chief Complaint: Generalized weakness and urinary tract infection  A complete HPI/ROS/PMH/PSH/SH/FH are unobtainable due to: Not applicable  Context: Grace Beauchamp is a 81 y.o. female who presents to the ED c/o patient has had generalized weakness that is progressively getting worse over the past 2 to 3 weeks.  She was diagnosed with a urinary tract infection on November 19 by her primary care doctor Dr. Castañeda.  The urine culture grew out E. coli.  She was placed upon Keflex for 3 days.  No improvement of her generalized weakness and that has progressed.  She has had urinary frequency and urgency that has been persistent for several weeks.  She has a history of frequent urinary tract infections.  Earlier this year she had a bladder stimulator implied to try to decrease her infections.  [facility] they did a urinalysis and said that she has an infection and there is concerned that she is getting worse that she needs to come to the hospital and need IV antibiotics.  She denies any pain anywhere.  No chest pain, abdominal pain, shortness of breath, headache.  She denies any fevers or chills.        Previous Episodes: Yes similar symptoms when she has had UTIs before presents as frequency and urgency and generalized weakness  Current Symptoms: See above    MEDICAL HISTORY REVIEWED        PAST MEDICAL HISTORY  Active Ambulatory Problems     Diagnosis Date Noted   • History of breast cancer 01/29/2016   • Stenosis of carotid artery 01/29/2016   • Chronic obstructive pulmonary disease (HCC) 01/29/2016   • Closed fracture of multiple ribs 01/29/2016   • Cognitive disorder 01/29/2016   • Erythromelalgia (HCC) 01/29/2016   • Hyperlipidemia 01/29/2016   • Hypertension 01/29/2016   • Primary osteoarthritis involving multiple joints 01/29/2016   • Osteoporosis  01/29/2016   • Restless legs syndrome 01/29/2016   • Cerebral aneurysm 01/29/2016   • Temporary cerebral vascular dysfunction 01/29/2016   • S/P CABG x 1 04/22/2016   • Type 2 diabetes mellitus without complication, without long-term current use of insulin ([facility]) 04/27/2016   • S/P ascending aortic aneurysm repair 04/22/2017   • 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   • 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   • Claudication of both lower extremities ([facility]) 01/31/2018   • 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   • Ventral hernia without obstruction or gangrene 05/09/2018   • Breast cancer, stage 1, estrogen receptor positive ([facility]) 10/17/2012   • Arthritis of right hip 11/06/2018   • Arthritis of right knee 11/06/2018   • Chondrocalcinosis 11/06/2018   • Chronic pain of right knee 11/06/2018   • DDD (degenerative disc disease), lumbosacral 12/31/2013   • Gastroesophageal reflux disease 01/18/2019   • Bilateral hearing loss 01/18/2019   • Thoracic aortic aneurysm without rupture ([facility]) 04/16/2019   • PVD (peripheral vascular disease) ([facility]) 06/07/2019   • 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[facility]) 06/20/2019   • Thoracoabdominal aortic aneurysm, without rupture ([facility]) 06/26/2019   • 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 rupture ([facility]) 06/26/2019   • Aortic aneurysm, descending ([facility]) 07/19/2019   • Aortic aneurysm without rupture ([facility]) 07/22/2019   • CAD (coronary artery disease) 05/29/2020   • S/P left heart catheterization by ventricular puncture 07/23/2020   • Cerebral infarction ([facility]) 09/13/2013   • Pericardial hematoma 09/14/2020   • History of ST elevation myocardial infarction (STEMI) 09/14/2020   • Acute on chronic diastolic CHF (congestive heart failure) ([facility]) 09/14/2020   • Left ventricular aneurysm 09/14/2020   • Immobility syndrome 09/25/2020   • Lower extremity edema 10/28/2020   • QT prolongation 10/28/2020     Resolved Ambulatory Problems     Diagnosis Date Noted   • Abdominal aortic aneurysm ([facility])  01/29/2016   • Aneurysm of thoracic aorta (HCC) 01/29/2016   • Depression 01/29/2016   • Urinary tract infection 01/29/2016   • COPD exacerbation (HCC) 12/02/2018     Past Medical History:   Diagnosis Date   • AAA (abdominal aortic aneurysm) (HCC)    • Acute bronchitis 12/2018   • Arthritis    • Bilateral carotid artery disease (HCC)    • Bilateral cataracts    • Breast cancer (HCC)    • 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HCC)    • Cataracts, bilateral    • Closed fracture of three ribs of right side    • COPD (chronic obstructive pulmonary disease) (HCC)    • Descending aortic arch aneurysm (HCC)    • Diabetes mellitus (HCC)    • Erythermalgia (HCC)    • GERD (gastroesophageal reflux disease)    • Low back pain    • Moderate aortic valve insufficiency    • Nocturia    • Osteoarthritis    • Otitis media    • Prediabetes    • RLS (restless legs syndrome)    • Saccular aneurysm    • Stroke (HCC)    • Thoracic ascending aortic aneurysm (HCC)    • TIA (transient ischemic attack)    • Urgency incontinence    • Venous insufficiency    • Ventral hernia          PAST SURGICAL HISTORY  Past Surgical History:   Procedure Laterality Date   • AORTIC VALVE REPAIR/REPLACEMENT N/A 02/23/2016-[facility]    Ascending Replacement Using a 24MM Graft--Dr. Ontiveros   • APPENDECTOMY  1977   • BREAST BIOPSY Right 09/16/2012    Vacuum Assisted Core Bx of R Breast   • CARDIAC CATHETERIZATION N/A 01/06/2016    Dr. Tres De Los Santos   • CARDIAC CATHETERIZATION N/A 4/22/2019    Procedure: Left Heart Cath;  Surgeon: Tres De Los Santos MD;  Location:  [facility];  Service: Cardiology   • CARDIAC CATHETERIZATION N/A 4/22/2019    Procedure: Coronary angiography;  Surgeon: Tres De Los Santos MD;  Location:  [facility];  Service: Cardiology   • CARDIAC CATHETERIZATION N/A 7/22/2020    Procedure: LEFT HEART CATH;  Surgeon: Antonia Scott MD;  Location:  [facility];  Service: Cardiovascular;  Laterality: N/A;   • CARDIAC  CATHETERIZATION N/A 7/22/2020    Procedure: CORONARY ANGIOGRAPHY;  Surgeon: Antonia Scott MD;  Location:  [facility];  Service: Cardiovascular;  Laterality: N/A;   • CARDIAC CATHETERIZATION N/A 7/22/2020    Procedure: Left ventriculography;  Surgeon: Antonia Scott MD;  Location:  [facility];  Service: Cardiovascular;  Laterality: N/A;   • CARDIAC CATHETERIZATION N/A 7/22/2020    Procedure: Saphenous Vein Graft;  Surgeon: Antonia Scott MD;  Location:  [facility];  Service: Cardiovascular;  Laterality: N/A;   • CARDIAC SURGERY  02/2016    Dr. Ontiveros/Dr. De Los Santos   • CATARACT EXTRACTION Bilateral     [facility]   • COLONOSCOPY N/A 10/2002    Dr. Negro   • CORONARY ARTERY BYPASS GRAFT  02/23/2016-[facility]    x1 w/L Vein Grafting--Dr. Ontiveros   • CORONARY ARTERY BYPASS GRAFT N/A 9/18/2020    Procedure: STERNAL EXPLORATION WITH CLOSURE AND WASHOUT, REMOVAL OF IABP, PRP;  Surgeon: Mart Ontiveros MD;  Location: [facility] MAIN OR;  Service: Cardiothoracic;  Laterality: N/A;   • CYST REMOVAL Right 01/25/2013    R Palm Excision of Retinacular Cyst--Dr. Karla Fish   • EPIDURAL BLOCK     • LAPAROSCOPIC CHOLECYSTECTOMY N/A 08/04/2004    Dr. JOEY Sheth   • MASTECTOMY Right 09/28/2012   • ORIF HIP FRACTURE Left 03/27/2005    w/ AMBI compression screw, Dr. Victor M Cabral   • RECTOVAGINAL FISTULA REPAIR  1965   • THORACIC AORTIC ANEURYSM REPAIR N/A 7/23/2019    Procedure: ROSE, THORACOABDOMINAL AORTIC ANEURYSM REPAIR, VISCERAL VESSEL REPAIR, LARGE VENTRAL HERNIA REPAIR WITH MESH, CIRCULATORY ARREST, PRP;  Surgeon: Mart Ontiveros MD;  [facility];  Service: Cardiothoracic   • TRANSESOPHAGEAL ECHOCARDIOGRAM (ROSE) N/A 9/18/2020    Procedure: TRANSESOPHAGEAL ECHOCARDIOGRAM WITH ANESTHESIA;  Surgeon: Mart Ontiveros MD;  [facility];  Service: Cardiothoracic;  Laterality: N/A;   • TUBAL ABDOMINAL LIGATION     • VENTRICULAR ANEURYSM REPAIR  N/A 2020    Procedure: EMERGENT REOP STERNOTOMY, REPAIR OF LV RUPTURE, PRP, INTRAOP ROSE;  Surgeon: Mart Ontiveros MD;  Location: [facility];  Service: Cardiothoracic;  Laterality: N/A;   • VENTRICULAR ANEURYSM REPAIR N/A 2020    Procedure: ROSE, MIDLINE STERNOTOMY REOP  LEFT VENTRICULAR ANEURYSM REPAIR, IABP INSERTION, PRP;  Surgeon: Mart Ontiveros MD;  Location: [facility];  Service: Cardiothoracic;  Laterality: N/A;         FAMILY HISTORY  Family History   Problem Relation Age of Onset   • Alzheimer's disease Mother    • Cancer Maternal Aunt    • Heart disease Father    • Heart failure Father    • Hypertension Father    • Diabetes Father    • Liver disease Sister    • Heart failure Brother    • Hypertension Brother    • Alcohol abuse Brother    • Diabetes Brother    • No Known Problems Maternal Grandmother    • No Known Problems Maternal Grandfather    • No Known Problems Paternal Grandmother    • No Known Problems Paternal Grandfather    • Diabetes Brother    • Brain cancer Brother    • Heart disease Brother    • Diabetes Brother          SOCIAL HISTORY  Social History     Socioeconomic History   • Marital status:    Tobacco Use   • Smoking status: Former Smoker     Types: Cigarettes     Quit date: 2012     Years since quittin.0   • Smokeless tobacco: Never Used   • Tobacco comment: CAFFEINE USE:2 CUPS COFFEE/ COKE DAILY   Substance and Sexual Activity   • Alcohol use: No   • Drug use: No   • Sexual activity: Defer     Birth control/protection: Tubal ligation         ALLERGIES  Ramipril, Morphine, Morphine and related, Bactrim [sulfamethoxazole-trimethoprim], and Cipro [ciprofloxacin hcl]        REVIEW OF SYSTEMS  Review of Systems     All systems reviewed and negative except for those discussed in HPI.       PHYSICAL EXAM    I have reviewed the triage vital signs and nursing notes.    ED Triage Vitals [21 1433]   Temp Heart Rate Resp BP SpO2   98.9 °F (37.2 °C) 69 16  145/82 95 %      Temp src Heart Rate Source Patient Position BP Location FiO2 (%)   -- -- -- -- --       GENERAL: Elderly female that appears chronically ill and is very pleasant no acute distress.Vital signs on my initial evaluation have been reviewed and unremarkable  HENT: nares patent  Head/neck/ face are symmetric without gross deformity, signs of trauma, or swelling  EYES: no scleral icterus, no conjunctival pallor.  NECK: Supple, no meningismus  CV: regular rhythm, regular rate with intact distal pulses.  RESPIRATORY: normal effort and no respiratory distress.  ABDOMEN: soft and nontender.  Normal bowel sounds  MUSCULOSKELETAL: no deformity.  Intact distal pulses.  Patient has chronic venous insufficiency per the patient and daughter and has hyperemia and blueness and coolness to her feet dorsally and plantar bilaterally.  This is chronic per the patient and the daughter.  Intact distal pulses to upper and lower extremities bilaterally.  NEURO: alert and appropriate, moves all extremities, follows commands.  No focal motor or sensory changes.  SKIN: warm, dry    Vital signs and nursing notes reviewed.        LAB RESULTS  Recent Results (from the past 24 hour(s))   Comprehensive Metabolic Panel    Collection Time: 12/03/21  3:37 PM    Specimen: Blood   Result Value Ref Range    Glucose 121 (H) 65 - 99 mg/dL    BUN 20 8 - 23 mg/dL    Creatinine 0.75 0.57 - 1.00 mg/dL    Sodium 141 136 - 145 mmol/L    Potassium 3.7 3.5 - 5.2 mmol/L    Chloride 103 98 - 107 mmol/L    CO2 28.6 22.0 - 29.0 mmol/L    Calcium 9.2 8.6 - 10.5 mg/dL    Total Protein 6.8 6.0 - 8.5 g/dL    Albumin 3.70 3.50 - 5.20 g/dL    ALT (SGPT) 9 1 - 33 U/L    AST (SGOT) 10 1 - 32 U/L    Alkaline Phosphatase 78 39 - 117 U/L    Total Bilirubin 0.8 0.0 - 1.2 mg/dL    eGFR Non African Amer 74 >60 mL/min/1.73    Globulin 3.1 gm/dL    A/G Ratio 1.2 g/dL    BUN/Creatinine Ratio 26.7 (H) 7.0 - 25.0    Anion Gap 9.4 5.0 - 15.0 mmol/L   CBC Auto Differential     Collection Time: 12/03/21  3:37 PM    Specimen: Blood   Result Value Ref Range    WBC 6.15 3.40 - 10.80 10*3/mm3    RBC 4.25 3.77 - 5.28 10*6/mm3    Hemoglobin 13.5 12.0 - 15.9 g/dL    Hematocrit 38.8 34.0 - 46.6 %    MCV 91.3 79.0 - 97.0 fL    MCH 31.8 26.6 - 33.0 pg    MCHC 34.8 31.5 - 35.7 g/dL    RDW 12.5 12.3 - 15.4 %    RDW-SD 41.1 37.0 - 54.0 fl    MPV 8.7 6.0 - 12.0 fL    Platelets 272 140 - 450 10*3/mm3    Neutrophil % 78.9 (H) 42.7 - 76.0 %    Lymphocyte % 13.5 (L) 19.6 - 45.3 %    Monocyte % 6.5 5.0 - 12.0 %    Eosinophil % 0.5 0.3 - 6.2 %    Basophil % 0.3 0.0 - 1.5 %    Immature Grans % 0.3 0.0 - 0.5 %    Neutrophils, Absolute 4.85 1.70 - 7.00 10*3/mm3    Lymphocytes, Absolute 0.83 0.70 - 3.10 10*3/mm3    Monocytes, Absolute 0.40 0.10 - 0.90 10*3/mm3    Eosinophils, Absolute 0.03 0.00 - 0.40 10*3/mm3    Basophils, Absolute 0.02 0.00 - 0.20 10*3/mm3    Immature Grans, Absolute 0.02 0.00 - 0.05 10*3/mm3    nRBC 0.0 0.0 - 0.2 /100 WBC   Green Top (Gel)    Collection Time: 12/03/21  3:37 PM   Result Value Ref Range    Extra Tube Hold for add-ons.    Lavender Top    Collection Time: 12/03/21  3:37 PM   Result Value Ref Range    Extra Tube hold for add-on    Gold Top - SST    Collection Time: 12/03/21  3:37 PM   Result Value Ref Range    Extra Tube Hold for add-ons.    Troponin    Collection Time: 12/03/21  3:37 PM    Specimen: Blood   Result Value Ref Range    Troponin T <0.010 0.000 - 0.030 ng/mL   Magnesium    Collection Time: 12/03/21  3:37 PM    Specimen: Blood   Result Value Ref Range    Magnesium 1.8 1.6 - 2.4 mg/dL   Procalcitonin    Collection Time: 12/03/21  3:37 PM    Specimen: Blood   Result Value Ref Range    Procalcitonin 0.05 0.00 - 0.25 ng/mL   Urinalysis With Culture If Indicated - Urine, Catheter In/Out    Collection Time: 12/03/21  7:30 PM    Specimen: Urine, Catheter In/Out   Result Value Ref Range    Color, UA Dark Yellow (A) Yellow, Straw    Appearance, UA Cloudy (A) Clear    pH, UA  <=5.0 5.0 - 8.0    Specific Gravity, UA >=1.030 1.005 - 1.030    Glucose, UA Negative Negative    Ketones, UA Trace (A) Negative    Bilirubin, UA Negative Negative    Blood, UA Negative Negative    Protein, UA 30 mg/dL (1+) (A) Negative    Leuk Esterase, UA Small (1+) (A) Negative    Nitrite, UA Positive (A) Negative    Urobilinogen, UA 1.0 E.U./dL 0.2 - 1.0 E.U./dL   Urinalysis, Microscopic Only - Urine, Catheter In/Out    Collection Time: 12/03/21  7:30 PM    Specimen: Urine, Catheter In/Out   Result Value Ref Range    RBC, UA 0-2 None Seen, 0-2 /HPF    WBC, UA Too Numerous to Count (A) None Seen, 0-2 /HPF    Bacteria, UA 4+ (A) None Seen /HPF    Squamous Epithelial Cells, UA 0-2 None Seen, 0-2 /HPF    Hyaline Casts, UA 7-12 None Seen /LPF    Methodology Automated Microscopy    Lactic Acid, Plasma    Collection Time: 12/03/21  7:31 PM    Specimen: Blood   Result Value Ref Range    Lactate 2.2 (C) 0.5 - 2.0 mmol/L   COVID-19,[facility] IN-HOUSE CEPHEID/[facility]ED[facility] NP SWAB IN TRANSPORT MEDIA 8-12 HR TAT - Swab, Nasopharynx    Collection Time: 12/03/21  9:15 PM    Specimen: Nasopharynx; Swab   Result Value Ref Range    COVID19 Not Detected Not Detected - Ref. Range       Ordered the above labs and independently reviewed the results.        RADIOLOGY  No Radiology Exams Resulted Within Past 24 Hours    I ordered the above noted radiological studies. Reviewed by me and discussed with radiologist.  See dictation for official radiology interpretation.      PROCEDURES    Procedures      MEDICATIONS GIVEN IN ER    Medications   sodium chloride 0.9 % flush 10 mL (has no administration in time range)   sodium chloride 0.9 % infusion (125 mL/hr Intravenous New Bag 12/3/21 2108)   sodium chloride 0.9 % bolus 500 mL (0 mL Intravenous Stopped 12/3/21 2108)   cefTRIAXone (ROCEPHIN) 1 g in sodium chloride 0.9 % 100 mL IVPB-VTB (1 g Intravenous New Bag 12/3/21 2112)         PROGRESS, DATA ANALYSIS, CONSULTS, AND MEDICAL DECISION  MAKING    Informed patient and the daughter of the test that we will order.  All questions answered at this time.  We are currently under a pandemic from the COVID19 infection.  The patient presented to the emergency department by ambulance or personal vehicle. I followed the current protocols required by Infection Control at [facility] in my evaluation and treatment of the patient. The patient was wearing a face mask during my evaluation and throughout my encounter. During my whole encounter with this patient I used appropriate personal protective equipment.  This equipment consisted of eye protection, facemask, gown, and gloves.  I applied this equipment before entering the room.      All labs have been independently reviewed by me.  All radiology studies have been reviewed by me and discussed with radiologist dictating the report.   EKG's independently viewed and interpreted by me.  Discussion below represents my analysis of pertinent findings related to patient's condition, differential diagnosis, treatment plan and final disposition.      ED Course as of 12/03/21 2232   Fri Dec 03, 2021   1915 On 1119 she had E. coli and it was sensitive to ceftriaxone.  I reviewed the culture result and its susceptibilities in epic. [MM]   2049 Bacteria, UA(!): 4+ [MM]   2049 WBC, UA(!): Too Numerous to Count [MM]   2141 I have reevaluated the patient.  Informed the patient as well as the daughter in the room the results of the test.  Informed her of the results that she has a E. coli infection that grew out on the previous culture.  All questions answered.  She is hemodynamically stable at this time.  She is eating. [MM]   2142 I discussed the case with the midlevel provider Herb.  Dr. Mallory is the attending.  Informed her of the patient's initial presenting symptoms and results of the tests and my initial treatment plan. [MM]   2144 SpO2(!): 70 %  This is a false reading and is not accurate. [MM]       ED Course User Index  [MM] Sabas Taylor MD       AS OF 22:32 EST VITALS:    BP - 159/76  HR - 70  TEMP - 98.9 °F (37.2 °C)  02 SATS - 95%        DIAGNOSIS  Final diagnoses:   Acute UTI   Generalized weakness         DISPOSITION  I have reviewed the test results with my patient and explained the current treatment plan.  I answered all the patient's questions and concerns.  The patient is hemodynamically stable and is in no distress and is appropriate to be admitted to a medical/surgical floor bed at this time.             Sabas Tayolr MD  12/03/21 [id number]

## 2021-12-03 NOTE — ED TRIAGE NOTES
Pt comes into ER with family member in [place] went to urologist for UTI recheck has had UTI that is not improving after few weeks and generalized weakness that is progressing.     Pt arrives in triage with mask on. Triage staff wearing N95 masks and goggles.

## 2021-12-04 LAB
ANION GAP SERPL CALCULATED.3IONS-SCNC: 10.5 MMOL/L (ref 5–15)
BASOPHILS # BLD AUTO: 0.02 10*3/MM3 (ref 0–0.2)
BASOPHILS NFR BLD AUTO: 0.3 % (ref 0–1.5)
BUN SERPL-MCNC: 16 MG/DL (ref 8–23)
BUN/CREAT SERPL: 26.2 (ref 7–25)
CALCIUM SPEC-SCNC: 8.4 MG/DL (ref 8.6–10.5)
CHLORIDE SERPL-SCNC: 108 MMOL/L (ref 98–107)
CO2 SERPL-SCNC: 24.5 MMOL/L (ref 22–29)
CREAT SERPL-MCNC: 0.61 MG/DL (ref 0.57–1)
D-LACTATE SERPL-SCNC: 0.8 MMOL/L (ref 0.5–2)
DEPRECATED RDW RBC AUTO: 43.2 FL (ref 37–54)
EOSINOPHIL # BLD AUTO: 0.07 10*3/MM3 (ref 0–0.4)
EOSINOPHIL NFR BLD AUTO: 1.1 % (ref 0.3–6.2)
ERYTHROCYTE [DISTWIDTH] IN BLOOD BY AUTOMATED COUNT: 12.5 % (ref 12.3–15.4)
GFR SERPL CREATININE-BSD FRML MDRD: 94 ML/MIN/1.73
GLUCOSE BLDC GLUCOMTR-MCNC: 119 MG/DL (ref 70–130)
GLUCOSE BLDC GLUCOMTR-MCNC: 141 MG/DL (ref 70–130)
GLUCOSE BLDC GLUCOMTR-MCNC: 150 MG/DL (ref 70–130)
GLUCOSE BLDC GLUCOMTR-MCNC: 85 MG/DL (ref 70–130)
GLUCOSE BLDC GLUCOMTR-MCNC: 99 MG/DL (ref 70–130)
GLUCOSE SERPL-MCNC: 100 MG/DL (ref 65–99)
HCT VFR BLD AUTO: 37.9 % (ref 34–46.6)
HGB BLD-MCNC: 12.3 G/DL (ref 12–15.9)
IMM GRANULOCYTES # BLD AUTO: 0.03 10*3/MM3 (ref 0–0.05)
IMM GRANULOCYTES NFR BLD AUTO: 0.5 % (ref 0–0.5)
LYMPHOCYTES # BLD AUTO: 0.8 10*3/MM3 (ref 0.7–3.1)
LYMPHOCYTES NFR BLD AUTO: 12.3 % (ref 19.6–45.3)
MAGNESIUM SERPL-MCNC: 1.6 MG/DL (ref 1.6–2.4)
MCH RBC QN AUTO: 30.6 PG (ref 26.6–33)
MCHC RBC AUTO-ENTMCNC: 32.5 G/DL (ref 31.5–35.7)
MCV RBC AUTO: 94.3 FL (ref 79–97)
MONOCYTES # BLD AUTO: 0.53 10*3/MM3 (ref 0.1–0.9)
MONOCYTES NFR BLD AUTO: 8.2 % (ref 5–12)
NEUTROPHILS NFR BLD AUTO: 5.04 10*3/MM3 (ref 1.7–7)
NEUTROPHILS NFR BLD AUTO: 77.6 % (ref 42.7–76)
NRBC BLD AUTO-RTO: 0 /100 WBC (ref 0–0.2)
PLATELET # BLD AUTO: 272 10*3/MM3 (ref 140–450)
PMV BLD AUTO: 9.4 FL (ref 6–12)
POTASSIUM SERPL-SCNC: 3.4 MMOL/L (ref 3.5–5.2)
RBC # BLD AUTO: 4.02 10*6/MM3 (ref 3.77–5.28)
SODIUM SERPL-SCNC: 143 MMOL/L (ref 136–145)
WBC NRBC COR # BLD: 6.49 10*3/MM3 (ref 3.4–10.8)

## 2021-12-04 PROCEDURE — 97110 THERAPEUTIC EXERCISES: CPT

## 2021-12-04 PROCEDURE — 94799 UNLISTED PULMONARY SVC/PX: CPT

## 2021-12-04 PROCEDURE — 36415 COLL VENOUS BLD VENIPUNCTURE: CPT | Performed by: EMERGENCY MEDICINE

## 2021-12-04 PROCEDURE — 94640 AIRWAY INHALATION TREATMENT: CPT

## 2021-12-04 PROCEDURE — 63710000001 INSULIN LISPRO (HUMAN) PER 5 UNITS: Performed by: NURSE PRACTITIONER

## 2021-12-04 PROCEDURE — 96361 HYDRATE IV INFUSION ADD-ON: CPT

## 2021-12-04 PROCEDURE — 83735 ASSAY OF MAGNESIUM: CPT | Performed by: INTERNAL MEDICINE

## 2021-12-04 PROCEDURE — 94664 DEMO&/EVAL PT USE INHALER: CPT

## 2021-12-04 PROCEDURE — 82962 GLUCOSE BLOOD TEST: CPT

## 2021-12-04 PROCEDURE — 97161 PT EVAL LOW COMPLEX 20 MIN: CPT

## 2021-12-04 PROCEDURE — 83605 ASSAY OF LACTIC ACID: CPT | Performed by: EMERGENCY MEDICINE

## 2021-12-04 PROCEDURE — 80048 BASIC METABOLIC PNL TOTAL CA: CPT | Performed by: NURSE PRACTITIONER

## 2021-12-04 PROCEDURE — 85025 COMPLETE CBC W/AUTO DIFF WBC: CPT | Performed by: NURSE PRACTITIONER

## 2021-12-04 PROCEDURE — 25010000002 CEFTRIAXONE PER 250 MG: Performed by: INTERNAL MEDICINE

## 2021-12-04 RX ORDER — POTASSIUM CHLORIDE 750 MG/1
40 TABLET, FILM COATED, EXTENDED RELEASE ORAL AS NEEDED
Status: DISCONTINUED | OUTPATIENT
Start: 2021-12-04 | End: 2021-12-05 | Stop reason: HOSPADM

## 2021-12-04 RX ORDER — CETIRIZINE HYDROCHLORIDE 10 MG/1
10 TABLET ORAL DAILY PRN
Status: DISCONTINUED | OUTPATIENT
Start: 2021-12-04 | End: 2021-12-05 | Stop reason: HOSPADM

## 2021-12-04 RX ORDER — AMOXICILLIN 250 MG
2 CAPSULE ORAL DAILY PRN
Status: DISCONTINUED | OUTPATIENT
Start: 2021-12-04 | End: 2021-12-05 | Stop reason: HOSPADM

## 2021-12-04 RX ORDER — METOPROLOL SUCCINATE 50 MG/1
50 TABLET, EXTENDED RELEASE ORAL EVERY 12 HOURS
Status: DISCONTINUED | OUTPATIENT
Start: 2021-12-04 | End: 2021-12-05 | Stop reason: HOSPADM

## 2021-12-04 RX ORDER — MELATONIN
2000 DAILY
Status: DISCONTINUED | OUTPATIENT
Start: 2021-12-04 | End: 2021-12-05 | Stop reason: HOSPADM

## 2021-12-04 RX ORDER — MAGNESIUM SULFATE 1 G/100ML
1 INJECTION INTRAVENOUS AS NEEDED
Status: DISCONTINUED | OUTPATIENT
Start: 2021-12-04 | End: 2021-12-05 | Stop reason: HOSPADM

## 2021-12-04 RX ORDER — BUDESONIDE AND FORMOTEROL FUMARATE DIHYDRATE 160; 4.5 UG/1; UG/1
2 AEROSOL RESPIRATORY (INHALATION)
Status: DISCONTINUED | OUTPATIENT
Start: 2021-12-04 | End: 2021-12-05 | Stop reason: HOSPADM

## 2021-12-04 RX ORDER — ACETAMINOPHEN 325 MG/1
650 TABLET ORAL EVERY 6 HOURS PRN
Status: DISCONTINUED | OUTPATIENT
Start: 2021-12-04 | End: 2021-12-05 | Stop reason: HOSPADM

## 2021-12-04 RX ORDER — MAGNESIUM SULFATE HEPTAHYDRATE 40 MG/ML
4 INJECTION, SOLUTION INTRAVENOUS AS NEEDED
Status: DISCONTINUED | OUTPATIENT
Start: 2021-12-04 | End: 2021-12-05 | Stop reason: HOSPADM

## 2021-12-04 RX ORDER — PRAMIPEXOLE DIHYDROCHLORIDE 0.25 MG/1
0.12 TABLET ORAL NIGHTLY
Status: DISCONTINUED | OUTPATIENT
Start: 2021-12-04 | End: 2021-12-05 | Stop reason: HOSPADM

## 2021-12-04 RX ORDER — ASPIRIN 81 MG/1
81 TABLET ORAL DAILY
Status: DISCONTINUED | OUTPATIENT
Start: 2021-12-04 | End: 2021-12-05 | Stop reason: HOSPADM

## 2021-12-04 RX ORDER — MECLIZINE HCL 12.5 MG/1
12.5 TABLET ORAL 3 TIMES DAILY PRN
Status: DISCONTINUED | OUTPATIENT
Start: 2021-12-04 | End: 2021-12-05 | Stop reason: HOSPADM

## 2021-12-04 RX ORDER — UREA 10 %
3 LOTION (ML) TOPICAL NIGHTLY PRN
Status: DISCONTINUED | OUTPATIENT
Start: 2021-12-04 | End: 2021-12-05 | Stop reason: HOSPADM

## 2021-12-04 RX ORDER — PANTOPRAZOLE SODIUM 40 MG/1
40 TABLET, DELAYED RELEASE ORAL DAILY PRN
Status: DISCONTINUED | OUTPATIENT
Start: 2021-12-04 | End: 2021-12-05 | Stop reason: HOSPADM

## 2021-12-04 RX ORDER — POTASSIUM CHLORIDE 7.45 MG/ML
10 INJECTION INTRAVENOUS
Status: DISCONTINUED | OUTPATIENT
Start: 2021-12-04 | End: 2021-12-05 | Stop reason: HOSPADM

## 2021-12-04 RX ORDER — BUDESONIDE 0.5 MG/2ML
0.5 INHALANT ORAL 2 TIMES DAILY PRN
Status: DISCONTINUED | OUTPATIENT
Start: 2021-12-04 | End: 2021-12-05 | Stop reason: HOSPADM

## 2021-12-04 RX ORDER — LOSARTAN POTASSIUM 50 MG/1
50 TABLET ORAL DAILY
Status: DISCONTINUED | OUTPATIENT
Start: 2021-12-04 | End: 2021-12-05 | Stop reason: HOSPADM

## 2021-12-04 RX ORDER — TRAMADOL HYDROCHLORIDE 50 MG/1
50 TABLET ORAL EVERY 8 HOURS PRN
Status: DISCONTINUED | OUTPATIENT
Start: 2021-12-04 | End: 2021-12-05 | Stop reason: HOSPADM

## 2021-12-04 RX ORDER — IPRATROPIUM BROMIDE AND ALBUTEROL SULFATE 2.5; .5 MG/3ML; MG/3ML
3 SOLUTION RESPIRATORY (INHALATION)
Status: DISCONTINUED | OUTPATIENT
Start: 2021-12-04 | End: 2021-12-05 | Stop reason: HOSPADM

## 2021-12-04 RX ORDER — POTASSIUM CHLORIDE 1.5 G/1.77G
40 POWDER, FOR SOLUTION ORAL AS NEEDED
Status: DISCONTINUED | OUTPATIENT
Start: 2021-12-04 | End: 2021-12-05 | Stop reason: HOSPADM

## 2021-12-04 RX ORDER — ASPIRIN 81 MG/1
81 TABLET ORAL DAILY
COMMUNITY

## 2021-12-04 RX ORDER — MAGNESIUM SULFATE HEPTAHYDRATE 40 MG/ML
2 INJECTION, SOLUTION INTRAVENOUS AS NEEDED
Status: DISCONTINUED | OUTPATIENT
Start: 2021-12-04 | End: 2021-12-05 | Stop reason: HOSPADM

## 2021-12-04 RX ADMIN — CEFTRIAXONE 1 G: 1 INJECTION, POWDER, FOR SOLUTION INTRAMUSCULAR; INTRAVENOUS at 17:11

## 2021-12-04 RX ADMIN — METOPROLOL SUCCINATE 50 MG: 50 TABLET, EXTENDED RELEASE ORAL at 20:23

## 2021-12-04 RX ADMIN — SODIUM CHLORIDE, PRESERVATIVE FREE 10 ML: 5 INJECTION INTRAVENOUS at 20:23

## 2021-12-04 RX ADMIN — SODIUM CHLORIDE 100 ML/HR: 9 INJECTION, SOLUTION INTRAVENOUS at 00:06

## 2021-12-04 RX ADMIN — PRAMIPEXOLE DIHYDROCHLORIDE 0.12 MG: 0.25 TABLET ORAL at 20:23

## 2021-12-04 RX ADMIN — BUDESONIDE AND FORMOTEROL FUMARATE DIHYDRATE 2 PUFF: 160; 4.5 AEROSOL RESPIRATORY (INHALATION) at 19:10

## 2021-12-04 RX ADMIN — ACETAMINOPHEN 650 MG: 325 TABLET, FILM COATED ORAL at 06:06

## 2021-12-04 RX ADMIN — IPRATROPIUM BROMIDE 0.5 MG: 0.5 SOLUTION RESPIRATORY (INHALATION) at 08:08

## 2021-12-04 RX ADMIN — POTASSIUM CHLORIDE 40 MEQ: 750 TABLET, EXTENDED RELEASE ORAL at 09:19

## 2021-12-04 RX ADMIN — SODIUM CHLORIDE 125 ML/HR: 9 INJECTION, SOLUTION INTRAVENOUS at 03:25

## 2021-12-04 RX ADMIN — POTASSIUM CHLORIDE 40 MEQ: 750 TABLET, EXTENDED RELEASE ORAL at 17:10

## 2021-12-04 RX ADMIN — Medication 3 MG: at 22:14

## 2021-12-04 RX ADMIN — ASPIRIN 81 MG: 81 TABLET, COATED ORAL at 09:10

## 2021-12-04 RX ADMIN — IPRATROPIUM BROMIDE 0.5 MG: 0.5 SOLUTION RESPIRATORY (INHALATION) at 19:03

## 2021-12-04 RX ADMIN — Medication 2000 UNITS: at 17:11

## 2021-12-04 RX ADMIN — LOSARTAN POTASSIUM 50 MG: 50 TABLET, FILM COATED ORAL at 09:10

## 2021-12-04 RX ADMIN — METOPROLOL SUCCINATE 50 MG: 50 TABLET, EXTENDED RELEASE ORAL at 09:10

## 2021-12-04 RX ADMIN — INSULIN LISPRO 2 UNITS: 100 INJECTION, SOLUTION INTRAVENOUS; SUBCUTANEOUS at 17:10

## 2021-12-04 RX ADMIN — IPRATROPIUM BROMIDE 0.5 MG: 0.5 SOLUTION RESPIRATORY (INHALATION) at 12:02

## 2021-12-04 NOTE — H&P
Patient Name:  Grace Beauchamp  YOB: 1940  MRN:  [id number]  Admit Date:  12/3/2021  Patient Care Team:  Miller Castañeda MD as PCP - General (Internal Medicine)  Mart Ontiveros MD as Surgeon (Cardiothoracic Surgery)  Tres De Los Santos MD as Consulting Physician (Cardiology)  Graham Mcconnell MD as Consulting Physician (Hematology and Oncology)  Payam Byrnes MD as Consulting Physician (Otolaryngology)  Jonathan Smith MD as Consulting Physician (Vascular Surgery)  Victor M Cabral MD as Surgeon (Orthopedic Surgery)  Yaya Burks MD as Consulting Physician (Pulmonary Disease)      Subjective   History Present Illness     Chief Complaint   Patient presents with   • Weakness - Generalized     History of Present Illness   Ms. Beauchamp is a 81-year-old female with history of type 2 diabetes, hypertension, hyperlipidemia, PVD, CABG, restless leg syndrome, CHF who presents to the emergency room with generalized weakness and fatigue and recent UTI.  Patient states she was treated on November 19 for UTI, she was given Keflex twice daily for 3 days, she states she has gotten no better and is actually started to feel worse, had some significant generalized weakness and fatigue, not able to do normal things around the house with belt becoming severely fatigued.  She states she is has had some increased shortness of breath, but she does have COPD.  Patient states she has urinary tract infections about every 4 to 6 months, but has never been hospitalized, is normally treated as outpatient without any difficulty.  Any blood in her urine, no abdominal pain, she has had some increase frequency.  In the emergency room patient urinalysis was positive for nitrites, 1+ leukocytes, 0-2 red blood cells, too numerous to count white blood cells, 4+ bacteria, 0-2 squamous epithelial cells, urine culture is pending, patient was given Rocephin in the emergency room.  Urine culture done on 11/19 does show  "susceptibility to Rocephin.    Review of Systems   Constitutional: Positive for appetite change (decreased), chills and fatigue. Negative for fever.   HENT: Negative for nosebleeds and trouble swallowing.    Eyes: Negative for photophobia, redness and visual disturbance.   Respiratory: Negative for cough, chest tightness, shortness of breath and wheezing.    Cardiovascular: Negative for chest pain, palpitations and leg swelling.   Gastrointestinal: Negative for abdominal distention, abdominal pain, nausea and vomiting.   Endocrine: Negative.    Genitourinary: Positive for difficulty urinating and frequency.   Musculoskeletal: Negative for gait problem and joint swelling.   Skin: Negative.    Neurological: Positive for weakness. Negative for dizziness, seizures, speech difficulty, light-headedness and headaches.   Hematological: Negative.    Psychiatric/Behavioral: Negative for behavioral problems and confusion.        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[facility])    • Diabetes mellitus ([facility])     \"BORDERLINE\"   • Erythermalgia ([facility])    • GERD (gastroesophageal reflux disease)    • Hyperlipidemia     Controlled w/Meds   • Hypertension     Controlled w/Meds   • Low back pain    • Moderate aortic valve insufficiency     S/p AVR on 02/23/16 by Dr." Rama   • Nocturia    • Osteoarthritis    • Osteoporosis    • Otitis media     R Ear   • Prediabetes    • PVD (peripheral vascular disease) ([facility])    • RLS (restless legs syndrome)    • Saccular aneurysm    • Stroke ([facility])     Perioperatively w/L Hip Repl   • Thoracic ascending aortic aneurysm ([facility])     S/p Repair on 02/23/16 by Dr. Ontiveros   • TIA (transient ischemic attack)    • Urgency incontinence    • Urinary tract infection    • Venous insufficiency    • Ventral hernia      Past Surgical History:   Procedure Laterality Date   • AORTIC VALVE REPAIR/REPLACEMENT N/A 02/23/2016-[facility]    Ascending Replacement Using a 24MM Graft--Dr. Ontiveros   • APPENDECTOMY  1977   • BREAST BIOPSY Right 09/16/2012    Vacuum Assisted Core Bx of R Breast   • CARDIAC CATHETERIZATION N/A 01/06/2016    Dr. Tres De Los Santos   • CARDIAC CATHETERIZATION N/A 4/22/2019    Procedure: Left Heart Cath;  Surgeon: Tres De Los Santos MD;  Location:  [facility];  Service: Cardiology   • CARDIAC CATHETERIZATION N/A 4/22/2019    Procedure: Coronary angiography;  Surgeon: Tres De Los Santos MD;  Location:  [facility];  Service: Cardiology   • CARDIAC CATHETERIZATION N/A 7/22/2020    Procedure: LEFT HEART CATH;  Surgeon: Antonia Scott MD;  Location:  [facility];  Service: Cardiovascular;  Laterality: N/A;   • CARDIAC CATHETERIZATION N/A 7/22/2020    Procedure: CORONARY ANGIOGRAPHY;  Surgeon: Antonia Scott MD;  Location:  [facility];  Service: Cardiovascular;  Laterality: N/A;   • CARDIAC CATHETERIZATION N/A 7/22/2020    Procedure: Left ventriculography;  Surgeon: Antonia Scott MD;  Location:  [facility];  Service: Cardiovascular;  Laterality: N/A;   • CARDIAC CATHETERIZATION N/A 7/22/2020    Procedure: Saphenous Vein Graft;  Surgeon: Antonia Scott MD;  Location:  [facility];  Service: Cardiovascular;  Laterality: N/A;   • CARDIAC SURGERY  02/2016      Rama/Dr. De Los Santos   • CATARACT EXTRACTION Bilateral     [facility]   • COLONOSCOPY N/A 10/2002    Dr. Negro   • CORONARY ARTERY BYPASS GRAFT  02/23/2016-[facility]    x1 w/L Vein Grafting--Dr. Ontiveros   • CORONARY ARTERY BYPASS GRAFT N/A 9/18/2020    Procedure: STERNAL EXPLORATION WITH CLOSURE AND WASHOUT, REMOVAL OF IABP, PRP;  Surgeon: Mart Ontiveros MD;  Location: [facility] MAIN OR;  Service: Cardiothoracic;  Laterality: N/A;   • CYST REMOVAL Right 01/25/2013    R Palm Excision of Retinacular Cyst--Dr. Karla Fish   • EPIDURAL BLOCK     • LAPAROSCOPIC CHOLECYSTECTOMY N/A 08/04/2004    Dr. JOEY Sheth   • MASTECTOMY Right 09/28/2012   • ORIF HIP FRACTURE Left 03/27/2005    w/ AMBI compression screw, Dr. Victor M Cabral   • RECTOVAGINAL FISTULA REPAIR  1965   • THORACIC AORTIC ANEURYSM REPAIR N/A 7/23/2019    Procedure: ROSE, THORACOABDOMINAL AORTIC ANEURYSM REPAIR, VISCERAL VESSEL REPAIR, LARGE VENTRAL HERNIA REPAIR WITH MESH, CIRCULATORY ARREST, PRP;  Surgeon: Mart Ontiveros MD;  Location: [facility] MAIN OR;  Service: Cardiothoracic   • TRANSESOPHAGEAL ECHOCARDIOGRAM (ROSE) N/A 9/18/2020    Procedure: TRANSESOPHAGEAL ECHOCARDIOGRAM WITH ANESTHESIA;  Surgeon: Mart Ontiveros MD;  Location: [facility] MAIN OR;  Service: Cardiothoracic;  Laterality: N/A;   • TUBAL ABDOMINAL LIGATION     • VENTRICULAR ANEURYSM REPAIR N/A 7/22/2020    Procedure: EMERGENT REOP STERNOTOMY, REPAIR OF LV RUPTURE, PRP, INTRAOP ROSE;  Surgeon: Mart Ontiveros MD;  Location: [facility] MAIN OR;  Service: Cardiothoracic;  Laterality: N/A;   • VENTRICULAR ANEURYSM REPAIR N/A 9/17/2020    Procedure: ROSE, MIDLINE STERNOTOMY REOP  LEFT VENTRICULAR ANEURYSM REPAIR, IABP INSERTION, PRP;  Surgeon: Mart Ontiveros MD;  Location: [facility] MAIN OR;  Service: Cardiothoracic;  Laterality: N/A;     Family History   Problem Relation Age of Onset   • Alzheimer's disease Mother    • Cancer Maternal Aunt    • Heart disease Father    • Heart failure  Father    • Hypertension Father    • Diabetes Father    • Liver disease Sister    • Heart failure Brother    • Hypertension Brother    • Alcohol abuse Brother    • Diabetes Brother    • No Known Problems Maternal Grandmother    • No Known Problems Maternal Grandfather    • No Known Problems Paternal Grandmother    • No Known Problems Paternal Grandfather    • Diabetes Brother    • Brain cancer Brother    • Heart disease Brother    • Diabetes Brother      Social History     Tobacco Use   • Smoking status: Former Smoker     Types: Cigarettes     Quit date: 2012     Years since quittin.0   • Smokeless tobacco: Never Used   • Tobacco comment: CAFFEINE USE:2 CUPS COFFEE/ COKE DAILY   Vaping Use   • Vaping Use: Not on file   Substance Use Topics   • Alcohol use: No   • Drug use: No     No current facility-administered medications on file prior to encounter.     Current Outpatient Medications on File Prior to Encounter   Medication Sig Dispense Refill   • albuterol sulfate HFA (ProAir HFA) 108 (90 Base) MCG/ACT inhaler Inhale 2 puffs Every 6 (Six) Hours As Needed for Wheezing or Shortness of Air. 18 g 3   • atorvastatin (LIPITOR) 40 MG tablet Take 1 tablet by mouth Every Night. 90 tablet 2   • budesonide (PULMICORT) 0.5 MG/2ML nebulizer solution Take 2 mL by nebulization 2 (Two) Times a Day. 120 mL 0   • cetirizine (zyrTEC) 10 MG tablet Take 1 tablet by mouth As Needed for Allergies. 30 tablet 0   • cilostazol (PLETAL) 100 MG tablet Take 1 tablet by mouth 2 (Two) Times a Day. 180 tablet 2   • clopidogrel (PLAVIX) 75 MG tablet Take 1 tablet by mouth Daily. 90 tablet 2   • Coal Tar-Menthol 1.8-1.5 % shampoo Apply 1 application topically 2 (Two) Times a Week. Do not get in eyes. Use twice weekly maximum. 296 mL 0   • ferrous sulfate 325 (65 FE) MG EC tablet Take 1 tablet by mouth Daily With Breakfast. 90 tablet 0   • Fluticasone-Umeclidin-Vilant (Trelegy Ellipta) 100-62.5-25 MCG/INH inhaler Inhale 1 puff Daily. 90 each  3   • ipratropium-albuterol (DUO-NEB) 0.5-2.5 mg/3 ml nebulizer Take 3 mL by nebulization 3 (Three) Times a Day. 360 mL 0   • losartan (COZAAR) 50 MG tablet TAKE ONE TABLET BY MOUTH DAILY 90 tablet 2   • meclizine (ANTIVERT) 12.5 MG tablet Take 1 tablet by mouth 3 (Three) Times a Day As Needed for Dizziness. 21 tablet 0   • metoprolol succinate XL (TOPROL-XL) 50 MG 24 hr tablet TAKE ONE TABLET BY MOUTH EVERY 12 HOURS 180 tablet 2   • multivitamin (THERAGRAN) tablet tablet Take 1 tablet by mouth Daily. 90 tablet 0   • pantoprazole (PROTONIX) 40 MG EC tablet Take 1 tablet by mouth Every Morning. 90 tablet 3   • pramipexole (MIRAPEX) 0.125 MG tablet TAKE ONE TABLET BY MOUTH EVERY NIGHT AT BEDTIME 90 tablet 1   • sennosides-docusate (PERICOLACE) 8.6-50 MG per tablet Take 2 tablets by mouth Daily As Needed for Constipation.     • TOVIAZ 4 MG tablet sustained-release 24 hour tablet Take 4 mg by mouth Daily.     • traMADol (ULTRAM) 50 MG tablet TAKE ONE TABLET BY MOUTH EVERY 8 HOURS AS NEEDED FOR MODERATE OR SEVERE PAIN 90 tablet 0   • Trelegy Ellipta 100-62.5-25 MCG/INH inhaler INHALE ONE PUFF BY MOUTH DAILY 180 each 3   • Vitamin D, Cholecalciferol, 50 MCG (2000 UT) capsule Take 2,000 Units by mouth Daily. 30 capsule    • [DISCONTINUED] pramipexole (MIRAPEX) 0.125 MG tablet Take 1 tablet by mouth every night at bedtime. 90 tablet 1     Allergies   Allergen Reactions   • Ramipril Other (See Comments)     Ace I  cough   • Morphine Itching   • Morphine And Related Itching   • Bactrim [Sulfamethoxazole-Trimethoprim] Itching and Rash   • Cipro [Ciprofloxacin Hcl] Rash       Objective    Objective     Vital Signs  Temp:  [98.9 °F (37.2 °C)] 98.9 °F (37.2 °C)  Heart Rate:  [69-70] 70  Resp:  [16-18] 16  BP: (145-163)/(76-82) 159/76  SpO2:  [70 %-95 %] 95 %  on   ;   Device (Oxygen Therapy): room air  Body mass index is 21.97 kg/m².    Physical Exam  Vitals and nursing note reviewed.   Constitutional:       General: She is not  in acute distress.     Appearance: She is well-developed. She is ill-appearing.   HENT:      Head: Normocephalic.   Neck:      Vascular: No JVD.   Cardiovascular:      Rate and Rhythm: Normal rate and regular rhythm.      Heart sounds: Normal heart sounds.      Comments: Normal sinus rhythm on monitor heart rate 76 during my exam  Pulmonary:      Effort: Pulmonary effort is normal.      Breath sounds: Normal breath sounds.      Comments: Lung sounds clear, sats 94% on room air  Abdominal:      General: There is no distension.      Palpations: Abdomen is soft.      Tenderness: There is no abdominal tenderness.      Comments: No abdominal pain or flank pain   Musculoskeletal:         General: Normal range of motion.      Cervical back: Normal range of motion.      Right lower leg: No edema.      Left lower leg: No edema.   Skin:     General: Skin is warm and dry.      Capillary Refill: Capillary refill takes less than 2 seconds.   Neurological:      General: No focal deficit present.      Mental Status: She is alert and oriented to person, place, and time.   Psychiatric:         Attention and Perception: Attention normal.         Mood and Affect: Mood normal.         Behavior: Behavior normal.         Cognition and Memory: Cognition normal.         Judgment: Judgment normal.         Results Review:  I reviewed the patient's new clinical results.  I reviewed the patient's new imaging results and agree with the interpretation.  I reviewed the patient's other test results and agree with the interpretation  I personally viewed and interpreted the patient's EKG/Telemetry data  Discussed with ED provider.    Lab Results (last 24 hours)     Procedure Component Value Units Date/Time    CBC & Differential [[id number]]  (Abnormal) Collected: 12/03/21 1537    Specimen: Blood Updated: 12/03/21 1546    Narrative:      The following orders were created for panel order CBC & Differential.  Procedure                                Abnormality         Status                     ---------                               -----------         ------                     CBC Auto Differential[[id number]]        Abnormal            Final result                 Please view results for these tests on the individual orders.    Comprehensive Metabolic Panel [[id number]]  (Abnormal) Collected: 12/03/21 1537    Specimen: Blood Updated: 12/03/21 1625     Glucose 121 mg/dL      BUN 20 mg/dL      Creatinine 0.75 mg/dL      Sodium 141 mmol/L      Potassium 3.7 mmol/L      Chloride 103 mmol/L      CO2 28.6 mmol/L      Calcium 9.2 mg/dL      Total Protein 6.8 g/dL      Albumin 3.70 g/dL      ALT (SGPT) 9 U/L      AST (SGOT) 10 U/L      Alkaline Phosphatase 78 U/L      Total Bilirubin 0.8 mg/dL      eGFR Non African Amer 74 mL/min/1.73      Globulin 3.1 gm/dL      A/G Ratio 1.2 g/dL      BUN/Creatinine Ratio 26.7     Anion Gap 9.4 mmol/L     Narrative:      GFR Normal >60  Chronic Kidney Disease <60  Kidney Failure <15      CBC Auto Differential [[id number]]  (Abnormal) Collected: 12/03/21 1537    Specimen: Blood Updated: 12/03/21 1546     WBC 6.15 10*3/mm3      RBC 4.25 10*6/mm3      Hemoglobin 13.5 g/dL      Hematocrit 38.8 %      MCV 91.3 fL      MCH 31.8 pg      MCHC 34.8 g/dL      RDW 12.5 %      RDW-SD 41.1 fl      MPV 8.7 fL      Platelets 272 10*3/mm3      Neutrophil % 78.9 %      Lymphocyte % 13.5 %      Monocyte % 6.5 %      Eosinophil % 0.5 %      Basophil % 0.3 %      Immature Grans % 0.3 %      Neutrophils, Absolute 4.85 10*3/mm3      Lymphocytes, Absolute 0.83 10*3/mm3      Monocytes, Absolute 0.40 10*3/mm3      Eosinophils, Absolute 0.03 10*3/mm3      Basophils, Absolute 0.02 10*3/mm3      Immature Grans, Absolute 0.02 10*3/mm3      nRBC 0.0 /100 WBC     Troponin [[id number]]  (Normal) Collected: 12/03/21 1537    Specimen: Blood Updated: 12/03/21 2043     Troponin T <0.010 ng/mL     Narrative:      Troponin T Reference Range:  <= 0.03 ng/mL-   Negative  "for AMI  >0.03 ng/mL-     Abnormal for myocardial necrosis.  Clinicians would have to utilize clinical acumen, EKG, Troponin and serial changes to determine if it is an Acute Myocardial Infarction or myocardial injury due to an underlying chronic condition.       Results may be falsely decreased if patient taking Biotin.      Magnesium [[id number]]  (Normal) Collected: 12/03/21 1537    Specimen: Blood Updated: 12/03/21 2050     Magnesium 1.8 mg/dL     Procalcitonin [[id number]]  (Normal) Collected: 12/03/21 1537    Specimen: Blood Updated: 12/03/21 2043     Procalcitonin 0.05 ng/mL     Narrative:      As a Marker for Sepsis (Non-Neonates):     1. <0.5 ng/mL represents a low risk of severe sepsis and/or septic shock.  2. >2 ng/mL represents a high risk of severe sepsis and/or septic shock.    As a Marker for Lower Respiratory Tract Infections that require antibiotic therapy:  PCT on Admission     Antibiotic Therapy             6-12 Hrs later  >0.5                          Strongly Recommended            >0.25 - <0.5             Recommended  0.1 - 0.25                  Discouraged                       Remeasure/reassess PCT  <0.1                         Strongly Discouraged         Remeasure/reassess PCT      As 28 day mortality risk marker: \"Change in Procalcitonin Result\" (>80% or <=80%) if Day 0 (or Day 1) and Day 4 values are available. Refer to http://www.[employer]-pct-calculator.com/    Change in PCT <=80 %   A decrease of PCT levels below or equal to 80% defines a positive change in PCT test result representing a higher risk for 28-day all-cause mortality of patients diagnosed with severe sepsis or septic shock.    Change in PCT >80 %   A decrease of PCT levels of more than 80% defines a negative change in PCT result representing a lower risk for 28-day all-cause mortality of patients diagnosed with severe sepsis or septic shock.                Urinalysis With Culture If Indicated - Urine, Catheter In/Out " [[id number]]  (Abnormal) Collected: 12/03/21 1930    Specimen: Urine, Catheter In/Out Updated: 12/03/21 2038     Color, UA Dark Yellow     Appearance, UA Cloudy     pH, UA <=5.0     Specific Gravity, UA >=1.030     Glucose, UA Negative     Ketones, UA Trace     Bilirubin, UA Negative     Blood, UA Negative     Protein, UA 30 mg/dL (1+)     Leuk Esterase, UA Small (1+)     Nitrite, UA Positive     Urobilinogen, UA 1.0 E.U./dL    Urinalysis, Microscopic Only - Urine, Catheter In/Out [[id number]]  (Abnormal) Collected: 12/03/21 1930    Specimen: Urine, Catheter In/Out Updated: 12/03/21 2031     RBC, UA 0-2 /HPF      WBC, UA Too Numerous to Count /HPF      Bacteria, UA 4+ /HPF      Squamous Epithelial Cells, UA 0-2 /HPF      Hyaline Casts, UA 7-12 /LPF      Methodology Automated Microscopy    Urine Culture - Urine, Urine, Catheter In/Out [[id number]] Collected: 12/03/21 1930    Specimen: Urine, Catheter In/Out Updated: 12/03/21 2031    Lactic Acid, Plasma [[id number]]  (Abnormal) Collected: 12/03/21 1931    Specimen: Blood Updated: 12/03/21 2149     Lactate 2.2 mmol/L     Blood Culture - Blood, Arm, Right [[id number]] Collected: 12/03/21 1931    Specimen: Blood from Arm, Right Updated: 12/03/21 2032    Blood Culture - Blood, Arm, Left [[id number]] Collected: 12/03/21 1931    Specimen: Blood from Arm, Left Updated: 12/03/21 2032    COVID PRE-OP / PRE-PROCEDURE SCREENING ORDER (NO ISOLATION) - Swab, Nasopharynx [[id number]]  (Normal) Collected: 12/03/21 2115    Specimen: Swab from Nasopharynx Updated: 12/03/21 2144    Narrative:      The following orders were created for panel order COVID PRE-OP / PRE-PROCEDURE SCREENING ORDER (NO ISOLATION) - Swab, Nasopharynx.  Procedure                               Abnormality         Status                     ---------                               -----------         ------                     COVID-19,[facility] IN-HOUSE...[[id number]]  Normal              Final result                 Please  view results for these tests on the individual orders.    COVID-19,[facility] IN-HOUSE CEPHEID/DEDRA NP SWAB IN TRANSPORT MEDIA 8-12 HR TAT - Swab, Nasopharynx [[id number]]  (Normal) Collected: 12/03/21 2115    Specimen: Swab from Nasopharynx Updated: 12/03/21 2144     COVID19 Not Detected    Narrative:      Fact sheet for providers: https://www.fda.gov/media/043132/download    Fact sheet for patients: https://www.fda.gov/media/332252/download    Test performed by PCR.          Imaging Results (Last 24 Hours)     ** No results found for the last 24 hours. **          Results for orders placed during the hospital encounter of 09/14/20    Adult Transthoracic Echo Limited W/ Cont if Necessary Per Protocol    Interpretation Summary  · Calculated EF = 59%. Abnormal wall motion. See bull's-eye. small aneurysm noted basal anterolateral wall that is significantly improved compared to prior study.Left ventricular diastolic function is consistent with (grade II w/high LAP) pseudonormalization.  · Mildly reduced right ventricular systolic function noted.  · Left atrial cavity size is moderately dilated.  · Right atrial cavity size is moderately dilated.  · Mild to moderate mitral valve regurgitation is present with an eccentric jet directed posteriorly.  · Moderate tricuspid valve regurgitation is present. Estimated right ventricular systolic pressure from tricuspid regurgitation is moderately elevated (45-55 mmHg). Calculated right ventricular systolic pressure from tricuspid regurgitation is 52 mmHg. Moderate pulmonary hypertension is present.  · Estimated right ventricular systolic pressure from tricuspid regurgitation is moderately elevated (45-55 mmHg). Calculated right ventricular systolic pressure from tricuspid regurgitation is 52 mmHg.  · There is a prosthetic aortic valve present. No significant stenosis or regurgitation noted.      No orders to display        Assessment/Plan     Active Hospital Problems    Diagnosis  POA    • **Acute UTI [N39.0]  Yes   • Chronic diastolic CHF (congestive heart failure) ([facility]) [I50.32]  Yes   • PVD (peripheral vascular disease) ([facility]) [I73.9]  Yes   • Gastroesophageal reflux disease [K21.9]  Yes   • Type 2 diabetes mellitus without complication, without long-term current use of insulin ([facility]) [E11.9]  Yes     Diet controlled.      • S/P CABG x 1 [Z95.1]  Not Applicable   • Chronic obstructive pulmonary disease ([facility]) [J44.9]  Yes     Use Bevespi inhaler daily.      • Hyperlipidemia [E78.5]  Yes   • Hypertension [I10]  Yes     Stable. Continue Bystolic 10 mg qd.      • Restless legs syndrome [G25.81]  Yes     Ms. Beauchamp is a 81-year-old female with history of type 2 diabetes, hypertension, hyperlipidemia, PVD, CABG, restless leg syndrome, CHF who presents to the emergency room with generalized weakness and fatigue and recent UTI.     Acute UTI/elevated lactic acid  -Started on Rocephin in the emergency room, will continue  -Awaiting urine culture  -Blood cultures pending  -normal saline at 100 cc an hour overnight x1 L  -Recheck BMP, CBC, lactic acid in a.m.  -Check postvoid residual every shift    Type 2 diabetes  -Accu-Cheks before meals and at bedtime with correctional dose insulin  -Hold oral diabetic medications at this time    Hyperlipidemia/hypertension/restless leg syndrome  -Continue home medications, chronic conditions stable at this time    · I discussed the patient's findings and my recommendations with patient and ED provider.    VTE Prophylaxis - SCDs.  Code Status - Full code.       FREDRICK Tovar  [facility]  12/04/21  00:21 EST

## 2021-12-04 NOTE — PLAN OF CARE
Goal Outcome Evaluation:           Progress: no change  Outcome Summary: ivf, received rocephin in er, weak-uses walker/assist of staff, bed alarm, cont/incont urine, came from assist living at [facility], pvr q shift, both feet/ankles red-states she has venous stasis so feet get hot/cold and redness lessens when feet elevated, red scratch/scabs on upper right chest from scratching

## 2021-12-04 NOTE — PLAN OF CARE
Goal Outcome Evaluation:  Plan of Care Reviewed With: patient           Outcome Summary: Pt is an 81-year-old female with history of type 2 diabetes, hypertension, hyperlipidemia, PVD, CABG, restless leg syndrome, and CHF admitted with generalized weakness and fatigue and recent UTI. She lives at an [facility] and uses a rollator for mobility at baseline. She presents with BLE weakness and SOA during activity. Today she performed supine to sit with Mod I and STS and gait with RW CGA x 50 ft. Acute PT indicated to address functional deficits and maximize leveo of independence prior to discharge back to [facility].  ..Patient was intermittently wearing a face mask during this therapy encounter. Therapist used appropriate personal protective equipment including eye protection, mask, and gloves.  Mask used was standard procedure mask. Appropriate PPE was worn during the entire therapy session. Hand hygiene was completed before and after therapy session. Patient is not in enhanced droplet precautions.

## 2021-12-04 NOTE — THERAPY EVALUATION
Patient Name: Grace Beauchamp  : 1940    MRN: [id number]                              Today's Date: 2021       Admit Date: 12/3/2021    Visit Dx:     ICD-10-CM ICD-9-CM   1. Acute UTI  N39.0 599.0   2. Generalized weakness  R53.1 780.79     Patient Active Problem List   Diagnosis   • History of breast cancer   • Stenosis of carotid artery   • Chronic obstructive pulmonary disease (HCC)   • Closed fracture of multiple ribs   • Cognitive disorder   • Erythromelalgia ([facility])   • Hyperlipidemia   • Hypertension   • Primary osteoarthritis involving multiple joints   • Osteoporosis   • Restless legs syndrome   • Cerebral aneurysm   • Temporary cerebral vascular dysfunction   • S/P CABG x 1   • Type 2 diabetes mellitus without complication, without long-term current use of insulin ([facility])   • S/P ascending aortic aneurysm repair   • 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[facility])   • Descending thoracic aortic aneurysm ([facility])   • Claudication of both lower extremities ([facility])   • 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[facility])   • Ventral hernia without obstruction or gangrene   • Breast cancer, stage 1, estrogen receptor positive ([facility])   • Arthritis of right hip   • Arthritis of right knee   • Chondrocalcinosis   • Chronic pain of right knee   • DDD (degenerative disc disease), lumbosacral   • Gastroesophageal reflux disease   • Bilateral hearing loss   • Thoracic aortic aneurysm without rupture ([facility])   • PVD (peripheral vascular disease) ([facility])   • 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[facility])   • Thoracoabdominal aortic aneurysm, without rupture ([facility])   • 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 rupture ([facility])   • Aortic aneurysm, descending ([facility])   • Aortic aneurysm without rupture ([facility])   • CAD (coronary artery disease)   • S/P left heart catheterization by ventricular puncture   • Cerebral infarction ([facility])   • Pericardial hematoma   • History of ST elevation myocardial infarction (STEMI)   • Chronic diastolic CHF (congestive heart failure) ([facility])   • Left  "ventricular aneurysm   • Immobility syndrome   • Lower extremity edema   • QT prolongation   • Acute UTI     Past Medical History:   Diagnosis Date   • AAA (abdominal aortic aneurysm) ([facility])    • Acute bronchitis 12/2018   • 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HCC)    • Arthritis    • Bilateral carotid artery disease (HCC)    • Bilateral cataracts     S/p Extractions   • Breast cancer ([facility])     right breast [id number] (snm) pMX ER/MO pos, Her-2/neg, Ki-67, 31%, oncotype recurrence score 19, invasive lobular carcinoma   • CAD (coronary artery disease)     S/p CABG on 2/23/16 by Dr. 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 Ontiveros   • Nocturia    • Osteoarthritis    • Osteoporosis    • Otitis media     R Ear   • Prediabetes    • PVD (peripheral vascular disease) ([facility])    • RLS (restless legs syndrome)    • Saccular aneurysm    • Stroke ([facility])     Perioperatively w/L Hip Repl   • Thoracic ascending aortic aneurysm ([facility])     S/p Repair on 02/23/16 by Dr. Ontiveros   • TIA (transient ischemic attack)    • Urgency incontinence    • Urinary tract infection    • Venous insufficiency    • Ventral hernia      Past Surgical History:   Procedure Laterality Date   • AORTIC VALVE REPAIR/REPLACEMENT N/A 02/23/2016-[facility]    Ascending Replacement Using a 24MM Graft--Dr. Ontiveros   • APPENDECTOMY  1977   • BREAST BIOPSY Right 09/16/2012    Vacuum Assisted Core Bx of R Breast   • CARDIAC CATHETERIZATION N/A 01/06/2016    Dr. Tres De Los Santos   • CARDIAC CATHETERIZATION N/A " 4/22/2019    Procedure: Left Heart Cath;  Surgeon: Tres De Los Santos MD;  Location[facility]ASIVE LOC[facility];  Service: Cardiology   • CARDIAC CATHETERIZATION N/A 4/22/2019    Procedure: Coronary angiography;  Surgeon: Tres De Los Santos MD;  Location: [facility] CATH INVASIVE LOCATION;  Service: Cardiology   • CARDIAC CATHETERIZATION N/A 7/22/2020    Procedure: LEFT HEART CATH;  Surgeon: Antonia Scott MD;  Location: [facility] CATH INVASIVE LOCATION;  Service: Cardiovascular;  Laterality: N/A;   • CARDIAC CATHETERIZATION N/A 7/22/2020    Procedure: CORONARY ANGIOGRAPHY;  Surgeon: Antonia Scott MD;  Location: [facility] CATH [facility]VASIVE LOCATION;  Service: Cardiovascular;  Laterality: N/A;   • CARDIAC CATHETERIZATION N/A 7/22/2020    Procedure: Left ventriculography;  Surgeon: Antonia Scott MD;  Location: [facility] CATH INVASIVE LOCATION;  Service: Cardiovascular;  Laterality: N/A;   • CARDIAC CATHETERIZATION N/A 7/22/2020    Procedure: Saphenous Vein Graft;  Surgeon: Antonia Scott MD;  Location: [facility] CATH INVASIVE LOCATION;  Service: Cardiovascular;  Laterality: N/A;   • CARDIAC SURGERY  02/2016    Dr. Ontiveros/Dr. De Los Santos   • CATARACT EXTRACTION Bilateral     [facility]   • COLONOSCOPY N/A 10/2002    Dr. Negro   • CORONARY ARTERY BYPASS GRAFT  02/23/2016-[facility]    x1 w/L Vein Grafting--Dr. Ontiveros   • CORONARY ARTERY BYPASS GRAFT N/A 9/18/2020    Procedure: STERNAL EXPLORATION WITH CLOSURE AND WASHOUT, REMOVAL OF IABP, PRP;  Surgeon: Mart Ontiveros MD;  Location: [facility] OR;  Service: Cardiothoracic;  Laterality: N/A;   • CYST REMOVAL Right 01/25/2013    R Palm Excision of Retinacular Cyst--Dr. Karla Fish   • EPIDURAL BLOCK     • LAPAROSCOPIC CHOLECYSTECTOMY N/A 08/04/2004    Dr. JOEY Sheth   • MASTECTOMY Right 09/28/2012   • ORIF HIP FRACTURE Left 03/27/2005    w/ AMBI compression screw, Dr. Victor M Cabral   • RECTOVAGINAL FISTULA REPAIR  1965   • THORACIC AORTIC ANEURYSM REPAIR N/A 7/23/2019     Procedure: ROSE, THORACOABDOMINAL AORTIC ANEURYSM REPAIR, VISCERAL VESSEL REPAIR, LARGE VENTRAL HERNIA REPAIR WITH MESH, CIRCULATORY ARREST, PRP;  Surgeon: Mart Ontiveros MD;  Location: [facility] MAIN OR;  Service: Cardiothoracic   • TRANSESOPHAGEAL ECHOCARDIOGRAM (ROSE) N/A 9/18/2020    Procedure: TRANSESOPHAGEAL ECHOCARDIOGRAM WITH ANESTHESIA;  Surgeon: Mart Ontiveros MD;  Location: [facility] MAIN OR;  Service: Cardiothoracic;  Laterality: N/A;   • TUBAL ABDOMINAL LIGATION     • VENTRICULAR ANEURYSM REPAIR N/A 7/22/2020    Procedure: EMERGENT REOP STERNOTOMY, REPAIR OF LV RUPTURE, PRP, INTRAOP ROSE;  Surgeon: Mart Ontiveros MD;  Location: [facility] MAIN OR;  Service: Cardiothoracic;  Laterality: N/A;   • VENTRICULAR ANEURYSM REPAIR N/A 9/17/2020    Procedure: ROSE, MIDLINE STERNOTOMY REOP  LEFT VENTRICULAR ANEURYSM REPAIR, IABP INSERTION, PRP;  Surgeon: Mart Ontiveros MD;  Location: [facility] MAIN OR;  Service: Cardiothoracic;  Laterality: N/A;      General Information     Row Name 12/04/21 1633          Physical Therapy Time and Intention    Document Type evaluation  -MW     Mode of Treatment physical therapy  -MW     Row Name 12/04/21 1633          General Information    Patient Profile Reviewed yes  -MW     Prior Level of Function independent:; all household mobility  rollator  -MW     Existing Precautions/Restrictions fall  -MW     Barriers to Rehab none identified  -MW     Row Name 12/04/21 1633          Cognition    Orientation Status (Cognition) oriented x 4  -MW     Row Name 12/04/21 1633          Safety Issues, Functional Mobility    Impairments Affecting Function (Mobility) endurance/activity tolerance; strength  -MW     Comment, Safety Issues/Impairments (Mobility) Gait belt and nonslip socks used for safety  -MW           User Key  (r) = Recorded By, (t) = Taken By, (c) = Cosigned By    Initials Name Provider Type    MW Patricia Asher PT Physical Therapist               Mobility     Row Name 12/04/21 0714           Bed Mobility    Bed Mobility supine-sit  -MW     Supine-Sit [place] (Bed Mobility) modified independence  -     Assistive Device (Bed Mobility) bed rails  -     Row Name 12/04/21 1636          Sit-Stand Transfer    Sit-Stand [place] (Transfers) contact guard; verbal cues  -     Assistive Device (Sit-Stand Transfers) walker, front-wheeled  -MW     Row Name 12/04/21 1636          Gait/Stairs (Locomotion)    [place] Level (Gait) contact guard  -     Assistive Device (Gait) walker, front-wheeled  -     Distance in Feet (Gait) 50  -MW     Deviations/Abnormal Patterns (Gait) bilateral deviations; gait speed decreased; base of support, narrow  -MW     Bilateral Gait Deviations heel strike decreased  -MW     Right Sided Gait Deviations foot drop/toe drag  -     Comment (Gait/Stairs) Pt ambulates with R foot drop and adduction, wears brace but it's at home. SOA during activity and worse with talking. No LOB or unsteadiness noted.  -           User Key  (r) = Recorded By, (t) = Taken By, (c) = Cosigned By    Initials Name Provider Type    MW Patricia Asher, PT Physical Therapist               Obj/Interventions     Row Name 12/04/21 1638          Range of Motion Comprehensive    General Range of Motion bilateral lower extremity ROM WFL  -     Row Name 12/04/21 1638          Strength Comprehensive (MMT)    General Manual Muscle Testing (MMT) Assessment lower extremity strength deficits identified  -     Comment, General Manual Muscle Testing (MMT) Assessment R: jas DF 1/5, hip flex 2/5; L hip flex 3-/5, ankle DF 2+/5  -     Row Name 12/04/21 1638          Balance    Balance Assessment sitting static balance; standing static balance  -     Static Sitting Balance WFL; unsupported; sitting, edge of bed  -     Static Standing Balance WFL; supported; standing  -     Balance Interventions sitting; standing; sit to stand; supported; static; dynamic  -           User Key  (r) =  Recorded By, (t) = Taken By, (c) = Cosigned By    Initials Name Provider Type    Patricia Henderson PT Physical Therapist               Goals/Plan     Row Name 12/04/21 1645          Bed Mobility Goal 1 (PT)    Activity/Assistive Device (Bed Mobility Goal 1, PT) bed mobility activities, all  -MW     [place] Level/Cues Needed (Bed Mobility Goal 1, PT) modified independence  -MW     Time Frame (Bed Mobility Goal 1, PT) 1 week  -MW     Row Name 12/04/21 1645          Transfer Goal 1 (PT)    Activity/Assistive Device (Transfer Goal 1, PT) transfers, all; walker, rolling  -MW     [place] Level/Cues Needed (Transfer Goal 1, PT) supervision required  -MW     Time Frame (Transfer Goal 1, PT) 1 week  -MW     Row Name 12/04/21 1645          Gait Training Goal 1 (PT)    Activity/Assistive Device (Gait Training Goal 1, PT) gait (walking locomotion); assistive device use; walker, rolling  -MW     [place] Level (Gait Training Goal 1, PT) supervision required  -MW     Distance (Gait Training Goal 1, PT) 100  -MW     Time Frame (Gait Training Goal 1, PT) 1 week  -MW           User Key  (r) = Recorded By, (t) = Taken By, (c) = Cosigned By    Initials Name Provider Type    Patricia Henderson PT Physical Therapist               Clinical Impression     Row Name 12/04/21 1640          Pain    Additional Documentation Pain Scale: Numbers Pre/Post-Treatment (Group)  -MW     Row Name 12/04/21 1640          Pain Scale: Numbers Pre/Post-Treatment    Pretreatment Pain Rating 0/10 - no pain  -MW     Posttreatment Pain Rating 0/10 - no pain  -MW     Row Name 12/04/21 1640          Plan of Care Review    Plan of Care Reviewed With patient  -MW     Outcome Summary Pt is an 81-year-old female with history of type 2 diabetes, hypertension, hyperlipidemia, PVD, CABG, restless leg syndrome, and CHF admitted with generalized weakness and fatigue and recent UTI. She lives at an [facility] and uses a rollator for mobility at baseline. She  presents with BLE weakness and SOA during activity. Today she performed supine to sit with Mod I and STS and gait with RW CGA x 50 ft. Acute PT indicated to address functional deficits and maximize leveo of independence prior to discharge back to [facility].  -     Row Name 12/04/21 1640          Therapy Assessment/Plan (PT)    Rehab Potential (PT) good, to achieve stated therapy goals  -     Criteria for Skilled Interventions Met (PT) yes; skilled treatment is necessary; meets criteria  -     Row Name 12/04/21 1640          Vital Signs    O2 Delivery Pre Treatment room air  -MW     O2 Delivery Intra Treatment room air  -MW     O2 Delivery Post Treatment room air  -MW     Pre Patient Position Supine  -MW     Intra Patient Position Standing  -MW     Post Patient Position Sitting  -     Row Name 12/04/21 1640          Positioning and Restraints    Pre-Treatment Position in bed  -MW     Post Treatment Position chair  -MW     In Chair reclined; sitting; call light within reach; exit alarm on; encouraged to call for assist; RLE elevated; LLE elevated  -           User Key  (r) = Recorded By, (t) = Taken By, (c) = Cosigned By    Initials Name Provider Type    MW Patricia Asher, PT Physical Therapist               Outcome Measures     Row Name 12/04/21 1646          How much help from another person do you currently need...    Turning from your back to your side while in flat bed without using bedrails? 3  -MW     Moving from lying on back to sitting on the side of a flat bed without bedrails? 3  -MW     Moving to and from a bed to a chair (including a wheelchair)? 3  -MW     Standing up from a chair using your arms (e.g., wheelchair, bedside chair)? 3  -MW     Climbing 3-5 steps with a railing? 2  -MW     To walk in hospital room? 3  -MW     AM-PAC 6 Clicks Score (PT) 17  -MW     Row Name 12/04/21 1646          Functional Assessment    Outcome Measure Options AM-PAC 6 Clicks Basic Mobility (PT)  -           User Key   (r) = Recorded By, (t) = Taken By, (c) = Cosigned By    Initials Name Provider Type     Patricia Asher PT Physical Therapist                             Physical Therapy Education                 Title: PT OT SLP Therapies (In Progress)     Topic: Physical Therapy (In Progress)     Point: Mobility training (Done)     Learning Progress Summary           Patient Acceptance, E, VU,NR by  at 12/4/2021 1647                   Point: Home exercise program (Not Started)     Learner Progress:  Not documented in this visit.          Point: Body mechanics (Done)     Learning Progress Summary           Patient Acceptance, E, VU,NR by YOLY at 12/4/2021 1647                   Point: Precautions (Done)     Learning Progress Summary           Patient Acceptance, E, VU,NR by  at 12/4/2021 1647                               User Key     Initials Effective Dates Name Provider Type Discipline     06/16/21 -  Patricia Asher PT Physical Therapist PT              PT Recommendation and Plan  Planned Therapy Interventions (PT): balance training, bed mobility training, gait training, home exercise program, patient/family education, transfer training, strengthening  Plan of Care Reviewed With: patient  Outcome Summary: Pt is an 81-year-old female with history of type 2 diabetes, hypertension, hyperlipidemia, PVD, CABG, restless leg syndrome, and CHF admitted with generalized weakness and fatigue and recent UTI. She lives at an [facility] and uses a rollator for mobility at baseline. She presents with BLE weakness and SOA during activity. Today she performed supine to sit with Mod I and STS and gait with RW CGA x 50 ft. Acute PT indicated to address functional deficits and maximize leveo of independence prior to discharge back to [facility].     Time Calculation:    PT Charges     Row Name 12/04/21 1627             Time Calculation    Start Time 0404  -MW      Stop Time 0428  -MW      Time Calculation (min) 24 min  -MW      PT Received On 12/04/21   -MW      PT - Next Appointment 12/06/21  -MW      PT Goal Re-Cert Due Date 12/11/21  -MW              Time Calculation- PT    Total Timed Code Minutes- PT 23 minute(s)  -MW            User Key  (r) = Recorded By, (t) = Taken By, (c) = Cosigned By    Initials Name Provider Type    Patricia Henderson, PT Physical Therapist              Therapy Charges for Today     Code Description Service Date Service Provider Modifiers Qty    51527638070 HC PT EVAL LOW COMPLEXITY 2 12/4/2021 Patricia Asher, PT GP 1    24218975386 HC PT THER PROC EA 15 MIN 12/4/2021 Patricia Asher, PT GP 2          PT G-Codes  Outcome Measure Options: AM-PAC 6 Clicks Basic Mobility (PT)  AM-PAC 6 Clicks Score (PT): 17    Patricia Asher PT  12/4/2021

## 2021-12-04 NOTE — PLAN OF CARE
Goal Outcome Evaluation:  Plan of Care Reviewed With: patient        Progress: improving  Outcome Summary: No complaints today. Worked with PT. Falls precautions maintained. IVFs discontinued. IV abx continued. Bladder scan this AM with 0 mL PVR. Monitorng blood sugars via accuchecks, insulin given as ordered. Will continue to monitor.

## 2021-12-05 ENCOUNTER — READMISSION MANAGEMENT (OUTPATIENT)
Dept: CALL CENTER | Facility: HOSPITAL | Age: 81
End: 2021-12-05

## 2021-12-05 VITALS
WEIGHT: 100.1 LBS | DIASTOLIC BLOOD PRESSURE: 81 MMHG | RESPIRATION RATE: 14 BRPM | OXYGEN SATURATION: 94 % | TEMPERATURE: 97.8 F | BODY MASS INDEX: 22.52 KG/M2 | SYSTOLIC BLOOD PRESSURE: 152 MMHG | HEIGHT: 56 IN | HEART RATE: 79 BPM

## 2021-12-05 LAB
ANION GAP SERPL CALCULATED.3IONS-SCNC: 12.9 MMOL/L (ref 5–15)
BACTERIA SPEC AEROBE CULT: ABNORMAL
BUN SERPL-MCNC: 9 MG/DL (ref 8–23)
BUN/CREAT SERPL: 15.8 (ref 7–25)
CALCIUM SPEC-SCNC: 9 MG/DL (ref 8.6–10.5)
CHLORIDE SERPL-SCNC: 102 MMOL/L (ref 98–107)
CO2 SERPL-SCNC: 24.1 MMOL/L (ref 22–29)
CREAT SERPL-MCNC: 0.57 MG/DL (ref 0.57–1)
DEPRECATED RDW RBC AUTO: 43 FL (ref 37–54)
ERYTHROCYTE [DISTWIDTH] IN BLOOD BY AUTOMATED COUNT: 12.4 % (ref 12.3–15.4)
GFR SERPL CREATININE-BSD FRML MDRD: 102 ML/MIN/1.73
GLUCOSE BLDC GLUCOMTR-MCNC: 100 MG/DL (ref 70–130)
GLUCOSE BLDC GLUCOMTR-MCNC: 116 MG/DL (ref 70–130)
GLUCOSE SERPL-MCNC: 137 MG/DL (ref 65–99)
HCT VFR BLD AUTO: 40.2 % (ref 34–46.6)
HGB BLD-MCNC: 13.4 G/DL (ref 12–15.9)
MAGNESIUM SERPL-MCNC: 1.6 MG/DL (ref 1.6–2.4)
MCH RBC QN AUTO: 31.2 PG (ref 26.6–33)
MCHC RBC AUTO-ENTMCNC: 33.3 G/DL (ref 31.5–35.7)
MCV RBC AUTO: 93.7 FL (ref 79–97)
PLATELET # BLD AUTO: 267 10*3/MM3 (ref 140–450)
PMV BLD AUTO: 9.4 FL (ref 6–12)
POTASSIUM SERPL-SCNC: 4.2 MMOL/L (ref 3.5–5.2)
RBC # BLD AUTO: 4.29 10*6/MM3 (ref 3.77–5.28)
SODIUM SERPL-SCNC: 139 MMOL/L (ref 136–145)
WBC NRBC COR # BLD: 6.49 10*3/MM3 (ref 3.4–10.8)

## 2021-12-05 PROCEDURE — 80048 BASIC METABOLIC PNL TOTAL CA: CPT | Performed by: INTERNAL MEDICINE

## 2021-12-05 PROCEDURE — G0378 HOSPITAL OBSERVATION PER HR: HCPCS

## 2021-12-05 PROCEDURE — 83735 ASSAY OF MAGNESIUM: CPT | Performed by: INTERNAL MEDICINE

## 2021-12-05 PROCEDURE — 85027 COMPLETE CBC AUTOMATED: CPT | Performed by: INTERNAL MEDICINE

## 2021-12-05 PROCEDURE — 82962 GLUCOSE BLOOD TEST: CPT

## 2021-12-05 RX ORDER — CEFDINIR 300 MG/1
300 CAPSULE ORAL 2 TIMES DAILY
Qty: 14 CAPSULE | Refills: 0 | Status: SHIPPED | OUTPATIENT
Start: 2021-12-05 | End: 2021-12-12

## 2021-12-05 RX ADMIN — LOSARTAN POTASSIUM 50 MG: 50 TABLET, FILM COATED ORAL at 07:35

## 2021-12-05 RX ADMIN — ASPIRIN 81 MG: 81 TABLET, COATED ORAL at 07:35

## 2021-12-05 RX ADMIN — METOPROLOL SUCCINATE 50 MG: 50 TABLET, EXTENDED RELEASE ORAL at 07:34

## 2021-12-05 RX ADMIN — ACETAMINOPHEN 650 MG: 325 TABLET, FILM COATED ORAL at 07:34

## 2021-12-05 NOTE — NURSING NOTE
Discharge instructions provided. Patient to  antibiotic rx from her [facility] pharmacy. She is to return to her assisted living facility - daughter to provide transport. All questions and concerns addressed.

## 2021-12-05 NOTE — OUTREACH NOTE
Prep Survey      Responses   [facility] facility patient discharged from? [place]   Is LACE score < 7 ? No   Emergency Room discharge w/ pulse ox? No   Eligibility [facility]   Date of Admission 12/03/21   Date of Discharge 12/05/21   Discharge Disposition Home or Self Care   Discharge diagnosis Acute UTI, dehydration, Hx CHF, COPD, T2DM, PVD   Does the patient have one of the following disease processes/diagnoses(primary or secondary)? Other   Does the patient have Home health ordered? No   Is there a DME ordered? No   Prep survey completed? Yes          Marla Velazquez RN

## 2021-12-05 NOTE — DISCHARGE SUMMARY
Date of Admission: 12/3/2021  Date of Discharge:  12/5/2021  Primary Care Physician: Miller Castañeda MD     Discharge Diagnosis:  Active Hospital Problems    Diagnosis  POA   • **Acute UTI [N39.0]  Yes   • Chronic diastolic CHF (congestive heart failure) (Formerly [facility]) [I50.32]  Yes   • PVD (peripheral vascular disease) (Formerly [facility]) [I73.9]  Yes   • Gastroesophageal reflux disease [K21.9]  Yes   • Type 2 diabetes mellitus without complication, without long-term current use of insulin (Formerly [facility]) [E11.9]  Yes   • S/P CABG x 1 [Z95.1]  Not Applicable   • Chronic obstructive pulmonary disease (Formerly [facility]) [J44.9]  Yes   • Hyperlipidemia [E78.5]  Yes   • Hypertension [I10]  Yes   • Restless legs syndrome [G25.81]  Yes      Resolved Hospital Problems   No resolved problems to display.       Presenting Problem/History of Present Illness:  Acute UTI [N39.0]  Generalized weakness [R53.1]     Ms. Beauchamp is a 81-year-old female with history of type 2 diabetes, hypertension, hyperlipidemia, PVD, CABG, restless leg syndrome, CHF who presents to the emergency room with generalized weakness and fatigue and recent UTI.  Patient states she was treated on November 19 for UTI, she was given Keflex twice daily for 3 days, she states she has gotten no better and is actually started to feel worse, had some significant generalized weakness and fatigue, not able to do normal things around the house with belt becoming severely fatigued.  She states she is has had some increased shortness of breath, but she does have COPD.  Patient states she has urinary tract infections about every 4 to 6 months, but has never been hospitalized, is normally treated as outpatient without any difficulty.  Any blood in her urine, no abdominal pain, she has had some increase frequency.  In the emergency room patient urinalysis was positive for nitrites, 1+ leukocytes, 0-2 red blood cells, too numerous to count white blood cells, 4+ bacteria, 0-2 squamous epithelial cells, urine culture  is pending, patient was given Rocephin in the emergency room.  Urine culture done on 11/19 does show susceptibility to Rocephin.    Hospital Course:  The patient is a 81 y.o. female who presented with acute urinary tract infection and worsening weakness and dehydration despite oral antibiotics as an outpatient.  She was admitted found to have lactic acidosis and was given volume resuscitation.  She was transitioned to Rocephin for treatment of her UTI and urinalysis/culture was obtained.  This shows pansensitive E. coli.  She was on Keflex prior to admission and was taking it twice daily so may not have been on optimal dosing for urinary tract infection.  Since she had the positive culture and responded well to Rocephin I will transition to cefdinir to complete antibiotic course.  She is feeling much improved today and would like to discharge.    Exam Today:  General AA NAD  HEENT NCAT EOMI  CV RRR  Lungs CTA B  Abdomen ND NT  Extremity no cyanosis or edema  Neuro CN II through XII grossly intact  Psych normal mood and affect    Results:    Procedures Performed:         Consults:   Consults     Date and Time Order Name Status Description    12/3/2021  8:51 PM [facility] (on-call MD unless specified) Details             Discharge Disposition:  Home or Self Care    Discharge Medications:     Discharge Medications      New Medications      Instructions Start Date   cefdinir 300 MG capsule  Commonly known as: OMNICEF   300 mg, Oral, 2 Times Daily         Changes to Medications      Instructions Start Date   budesonide 0.5 MG/2ML nebulizer solution  Commonly known as: PULMICORT  What changed:   · when to take this  · reasons to take this   0.5 mg, Nebulization, 2 Times Daily - RT      cetirizine 10 MG tablet  Commonly known as: zyrTEC  What changed: when to take this   10 mg, Oral, As Needed      pantoprazole 40 MG EC tablet  Commonly known as: PROTONIX  What changed:   · when to take this  · reasons to take this   40 mg, Oral,  Every Morning      pramipexole 0.125 MG tablet  Commonly known as: MIRAPEX  What changed: when to take this   TAKE ONE TABLET BY MOUTH EVERY NIGHT AT BEDTIME      traMADol 50 MG tablet  Commonly known as: ULTRAM  What changed: See the new instructions.   TAKE ONE TABLET BY MOUTH EVERY 8 HOURS AS NEEDED FOR MODERATE OR SEVERE PAIN         Continue These Medications      Instructions Start Date   albuterol sulfate  (90 Base) MCG/ACT inhaler  Commonly known as: ProAir HFA   2 puffs, Inhalation, Every 6 Hours PRN      aspirin 81 MG EC tablet   81 mg, Oral, Daily      atorvastatin 40 MG tablet  Commonly known as: LIPITOR   40 mg, Oral, Nightly      cilostazol 100 MG tablet  Commonly known as: PLETAL   100 mg, Oral, 2 Times Daily      clopidogrel 75 MG tablet  Commonly known as: PLAVIX   75 mg, Oral, Daily      Coal Tar-Menthol 1.8-1.5 % shampoo   1 application, Apply externally, 2 Times Weekly, Do not get in eyes. Use twice weekly maximum.      ferrous sulfate 325 (65 FE) MG EC tablet   325 mg, Oral, Daily With Breakfast      ipratropium-albuterol 0.5-2.5 mg/3 ml nebulizer  Commonly known as: DUO-NEB   3 mL, Nebulization, 3 Times Daily - RT      losartan 50 MG tablet  Commonly known as: COZAAR   TAKE ONE TABLET BY MOUTH DAILY      meclizine 12.5 MG tablet  Commonly known as: ANTIVERT   12.5 mg, Oral, 3 Times Daily PRN      metoprolol succinate XL 50 MG 24 hr tablet  Commonly known as: TOPROL-XL   TAKE ONE TABLET BY MOUTH EVERY 12 HOURS      multivitamin tablet tablet  Generic drug: multivitamin   1 tablet, Oral, Daily      sennosides-docusate 8.6-50 MG per tablet  Commonly known as: PERICOLACE   2 tablets, Oral, Daily PRN      Toviaz 4 MG tablet sustained-release 24 hour tablet  Generic drug: fesoterodine fumarate   4 mg, Oral, Every 24 Hours Scheduled      Trelegy Ellipta 100-62.5-25 MCG/INH inhaler  Generic drug: Fluticasone-Umeclidin-Vilant   INHALE ONE PUFF BY MOUTH DAILY      Trelegy Ellipta 100-62.5-25  MCG/INH inhaler  Generic drug: Fluticasone-Umeclidin-Vilant   1 puff, Inhalation, Daily - RT      Vitamin D (Cholecalciferol) 50 MCG (2000 UT) capsule   2,000 Units, Oral, Daily             Discharge Diet:   Diet Instructions     Advance Diet As Tolerated            Activity at Discharge:   Activity Instructions     Activity as Tolerated            Follow-up Appointments:   Follow-up Information     Miller Castañeda MD Follow up.    Specialty: Internal Medicine  Contact information:  [address] [address]  [phone]                         Test Results Pending at Discharge:  Pending Labs     Order Current Status    Blood Culture - Blood, Arm, Left Preliminary result    Blood Culture - Blood, Arm, Right Preliminary result           Graham Rodarte MD  12/05/21  15:30 EST    Time Spent on Discharge Activities: >30 minutes    Dictated portions using Dragon dictation software.  During the entire encounter, I was wearing recommended PPE including face mask and eye protection. Hand sanitization was performed prior to entering room and upon exit.

## 2021-12-05 NOTE — NURSING NOTE
Patient from [facility]. Spoke with LEE Sen - patient does not need packet for discharge and RN does not need to call report to the facility. Daughter to provide transport.

## 2021-12-06 ENCOUNTER — TRANSITIONAL CARE MANAGEMENT TELEPHONE ENCOUNTER (OUTPATIENT)
Dept: CALL CENTER | Facility: HOSPITAL | Age: 81
End: 2021-12-06

## 2021-12-06 NOTE — PROGRESS NOTES
Case Management Discharge Note      Final Note: Discharged home to [facility].  Va Samayoa RN         Transportation Services  Private: Car    Final Discharge Disposition Code: 01 - home or self-care

## 2021-12-06 NOTE — OUTREACH NOTE
Call Center TCM Note      Responses   [facility] patient discharged from? [facility]   Does the patient have one of the following disease processes/diagnoses(primary or secondary)? Other   TCM attempt successful? Yes   Call start time 1034   Call end time 1043   Discharge diagnosis Acute UTI, dehydration, Hx CHF, COPD, T2DM, PVD   Is patient permission given to speak with other caregiver? Yes   List who call center can speak with daughters Cecy Castañeda, Estefany   Person spoke with today (if not patient) and relationship patient   Meds reviewed with patient/caregiver? Yes   Does the patient have all medications ordered at discharge? No   What is keeping the patient from filling the prescriptions? --  [Patient reports daughter picking up antibiotic from [employer] today. ]   Nursing Interventions No intervention needed   Is the patient taking all medications as directed (includes completed medication regime)? No   What is preventing the patient from taking all medications as directed? Other  [See above. ]   Does the patient have a primary care provider?  Yes   Does the patient have an appointment with their PCP within 7 days of discharge? No   Comments regarding PCP PCP Dr Miller Castañeda. Patient declines to schedule hospital follow up appt with PCP.    What is preventing the patient from scheduling follow up appointments within 7 days of discharge? --  [Patient reports that she will follow up with urology, will not be following up with Dr Castañeda. ]   Nursing Interventions Educated patient on importance of making appointment,  Advised patient to make appointment   Has the patient kept scheduled appointments due by today? N/A   Has home health visited the patient within 72 hours of discharge? N/A   Psychosocial issues? No   Did the patient receive a copy of their discharge instructions? Yes   Nursing interventions Reviewed instructions with patient   What is the patient's perception of their health status since discharge? Same    Is the patient/caregiver able to teach back the hierarchy of who to call/visit for symptoms/problems? PCP, Specialist, Home health nurse, Urgent Care, ED, 911 Yes   If the patient is a current smoker, are they able to teach back resources for cessation? Not a smoker   TCM call completed? Yes   Wrap up additional comments Denies further questions or needs today.           Cornine Peng RN    12/6/2021, 10:43 EST

## 2021-12-08 LAB
BACTERIA SPEC AEROBE CULT: NORMAL
BACTERIA SPEC AEROBE CULT: NORMAL

## 2021-12-16 ENCOUNTER — READMISSION MANAGEMENT (OUTPATIENT)
Dept: CALL CENTER | Facility: HOSPITAL | Age: 81
End: 2021-12-16

## 2021-12-16 NOTE — OUTREACH NOTE
Medical Week 2 Survey      Responses   [facility] patient discharged from? [place]   Does the patient have one of the following disease processes/diagnoses(primary or secondary)? Other   Week 2 attempt successful? Yes   Call start time 0835   Call end time 0838   Meds reviewed with patient/caregiver? Yes   Is the patient taking all medications as directed (includes completed medication regime)? Yes   Has the patient kept scheduled appointments due by today? N/A   Comments Sees Urology on Tuesday 12/21 and Pulmonary tomorrow 12/18   What is the patient's perception of their health status since discharge? Improving   Week 2 Call Completed? Yes   Graduated Yes   Is the patient interested in additional calls from an ambulatory ?  NOTE:  applies to high risk patients requiring additional follow-up. No   Did the patient feel the follow up calls were helpful during their recovery period? Yes   Was the number of calls appropriate? Yes   Graduated/Revoked comments PtPat wong is doingfine, no need for the calls, will seePulmonary tomorrow and Urology next week, no new issues or questions.           Valeria Ng RN

## 2021-12-29 ENCOUNTER — HOSPITAL ENCOUNTER (OUTPATIENT)
Dept: CT IMAGING | Facility: HOSPITAL | Age: 81
Discharge: HOME | End: 2021-12-29
Admitting: NURSE PRACTITIONER

## 2021-12-29 ENCOUNTER — OFFICE VISIT (OUTPATIENT)
Dept: ORTHOPEDIC SURGERY | Facility: CLINIC | Age: 81
End: 2021-12-29

## 2021-12-29 VITALS — BODY MASS INDEX: 22.5 KG/M2 | WEIGHT: 100 LBS | TEMPERATURE: 97.5 F | HEIGHT: 56 IN

## 2021-12-29 DIAGNOSIS — M16.11 ARTHRITIS OF RIGHT HIP: ICD-10-CM

## 2021-12-29 DIAGNOSIS — I25.3 LEFT VENTRICULAR ANEURYSM: ICD-10-CM

## 2021-12-29 DIAGNOSIS — M25.561 PAIN IN BOTH KNEES, UNSPECIFIED CHRONICITY: Primary | ICD-10-CM

## 2021-12-29 DIAGNOSIS — Z96.642 STATUS POST TOTAL REPLACEMENT OF LEFT HIP: ICD-10-CM

## 2021-12-29 DIAGNOSIS — I71.60 THORACOABDOMINAL AORTIC ANEURYSM (TAAA) WITHOUT RUPTURE: ICD-10-CM

## 2021-12-29 DIAGNOSIS — I71.23 DESCENDING THORACIC AORTIC ANEURYSM: ICD-10-CM

## 2021-12-29 DIAGNOSIS — M25.562 PAIN IN BOTH KNEES, UNSPECIFIED CHRONICITY: Primary | ICD-10-CM

## 2021-12-29 DIAGNOSIS — M17.11 ARTHRITIS OF RIGHT KNEE: ICD-10-CM

## 2021-12-29 LAB — CREAT BLDA-MCNC: 0.8 MG/DL (ref 0.6–1.3)

## 2021-12-29 PROCEDURE — 20610 DRAIN/INJ JOINT/BURSA W/O US: CPT | Performed by: ORTHOPAEDIC SURGERY

## 2021-12-29 PROCEDURE — 0 IOPAMIDOL PER 1 ML: Performed by: NURSE PRACTITIONER

## 2021-12-29 PROCEDURE — 74174 CTA ABD&PLVS W/CONTRAST: CPT

## 2021-12-29 PROCEDURE — 73562 X-RAY EXAM OF KNEE 3: CPT | Performed by: ORTHOPAEDIC SURGERY

## 2021-12-29 PROCEDURE — 71275 CT ANGIOGRAPHY CHEST: CPT

## 2021-12-29 PROCEDURE — 99214 OFFICE O/P EST MOD 30 MIN: CPT | Performed by: ORTHOPAEDIC SURGERY

## 2021-12-29 PROCEDURE — 82565 ASSAY OF CREATININE: CPT

## 2021-12-29 RX ADMIN — IOPAMIDOL 95 ML: 755 INJECTION, SOLUTION INTRAVENOUS at 15:06

## 2021-12-29 NOTE — PROGRESS NOTES
Patient Name: Grace Beauchamp   YOB: 1940  Referring Primary Care Physician: Miller Castañeda MD  BMI: Body mass index is 22.43 kg/m².    Chief Complaint:    Chief Complaint   Patient presents with   • Right Knee - Follow-up        HPI:     Grace Beauchamp is a 81 y.o. female who presents today for evaluation of   Chief Complaint   Patient presents with   • Right Knee - Follow-up   .  Patient is seen today complaining once again of right knee pain she is seen with her daughter.  She has medical conditions and is not currently considered to be a surgical candidate.  She had cortisone in her knee that really did not help much.  She want to know what else she could do      Subjective   Medications:   Home Medications:  Current Outpatient Medications on File Prior to Visit   Medication Sig   • albuterol sulfate HFA (ProAir HFA) 108 (90 Base) MCG/ACT inhaler Inhale 2 puffs Every 6 (Six) Hours As Needed for Wheezing or Shortness of Air.   • aspirin 81 MG EC tablet Take 81 mg by mouth Daily.   • atorvastatin (LIPITOR) 40 MG tablet Take 1 tablet by mouth Every Night.   • budesonide (PULMICORT) 0.5 MG/2ML nebulizer solution Take 2 mL by nebulization 2 (Two) Times a Day. (Patient taking differently: Take 0.5 mg by nebulization 2 (Two) Times a Day As Needed.)   • cetirizine (zyrTEC) 10 MG tablet Take 1 tablet by mouth As Needed for Allergies. (Patient taking differently: Take 10 mg by mouth Daily As Needed for Allergies.)   • cilostazol (PLETAL) 100 MG tablet Take 1 tablet by mouth 2 (Two) Times a Day.   • clopidogrel (PLAVIX) 75 MG tablet Take 1 tablet by mouth Daily.   • Coal Tar-Menthol 1.8-1.5 % shampoo Apply 1 application topically 2 (Two) Times a Week. Do not get in eyes. Use twice weekly maximum.   • ferrous sulfate 325 (65 FE) MG EC tablet Take 1 tablet by mouth Daily With Breakfast.   • Fluticasone-Umeclidin-Vilant (Trelegy Ellipta) 100-62.5-25 MCG/INH inhaler Inhale 1 puff Daily.   •  ipratropium-albuterol (DUO-NEB) 0.5-2.5 mg/3 ml nebulizer Take 3 mL by nebulization 3 (Three) Times a Day.   • losartan (COZAAR) 50 MG tablet TAKE ONE TABLET BY MOUTH DAILY (Patient taking differently: Take 50 mg by mouth Daily.)   • meclizine (ANTIVERT) 12.5 MG tablet Take 1 tablet by mouth 3 (Three) Times a Day As Needed for Dizziness.   • metoprolol succinate XL (TOPROL-XL) 50 MG 24 hr tablet TAKE ONE TABLET BY MOUTH EVERY 12 HOURS (Patient taking differently: Take 50 mg by mouth Every 12 (Twelve) Hours.)   • multivitamin (THERAGRAN) tablet tablet Take 1 tablet by mouth Daily.   • pantoprazole (PROTONIX) 40 MG EC tablet Take 1 tablet by mouth Every Morning. (Patient taking differently: Take 40 mg by mouth Daily As Needed.)   • pramipexole (MIRAPEX) 0.125 MG tablet TAKE ONE TABLET BY MOUTH EVERY NIGHT AT BEDTIME (Patient taking differently: Take 0.125 mg by mouth Every Night.)   • sennosides-docusate (PERICOLACE) 8.6-50 MG per tablet Take 2 tablets by mouth Daily As Needed for Constipation.   • TOVIAZ 4 MG tablet sustained-release 24 hour tablet Take 4 mg by mouth Daily.   • traMADol (ULTRAM) 50 MG tablet TAKE ONE TABLET BY MOUTH EVERY 8 HOURS AS NEEDED FOR MODERATE OR SEVERE PAIN (Patient taking differently: Take 50 mg by mouth Every 8 (Eight) Hours As Needed.)   • Trelegy Ellipta 100-62.5-25 MCG/INH inhaler INHALE ONE PUFF BY MOUTH DAILY   • Vitamin D, Cholecalciferol, 50 MCG (2000 UT) capsule Take 2,000 Units by mouth Daily.     No current facility-administered medications on file prior to visit.     Current Medications:  Scheduled Meds:  Continuous Infusions:No current facility-administered medications for this visit.    PRN Meds:.    I have reviewed the patient's medical history in detail and updated the computerized patient record.  Review and summarization of old records includes:    Past Medical History:   Diagnosis Date   • AAA (abdominal aortic aneurysm) (HCC)    • Acute bronchitis 12/2018   • Aortic  "arch aneurysm (HCC)    • Arthritis    • Bilateral carotid artery disease (HCC)    • Bilateral cataracts     S/p Extractions   • Breast cancer (Formerly [facility])     right breast [id number] (snm) pMX ER/ND pos, Her-2/neg, Ki-67, 31%, oncotype recurrence score 19, invasive lobular carcinoma   • CAD (coronary artery disease)     S/p CABG on 2/23/16 by Dr. 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[facility])    • Cataracts, bilateral    • Closed fracture of three ribs of right side    • Cognitive disorder    • COPD (chronic obstructive pulmonary disease) ([facility])    • Depression    • Descending aortic arch aneurysm (HCC)    • Diabetes mellitus (Formerly [facility])     \"BORDERLINE\"   • Erythermalgia (Formerly [facility])    • GERD (gastroesophageal reflux disease)    • Hyperlipidemia     Controlled w/Meds   • Hypertension     Controlled w/Meds   • Low back pain    • Moderate aortic valve insufficiency     S/p AVR on 02/23/16 by Dr. Ontiveros   • Nocturia    • Osteoarthritis    • Osteoporosis    • Otitis media     R Ear   • Prediabetes    • PVD (peripheral vascular disease) (Former[facility])    • RLS (restless legs syndrome)    • Saccular aneurysm    • Stroke (Former[facility])     Perioperatively w/L Hip Repl   • Thoracic ascending aortic aneurysm (Formerly [facility])     S/p Repair on 02/23/16 by Dr. Ontiveros   • TIA (transient ischemic attack)    • Urgency incontinence    • Urinary tract infection    • Venous insufficiency    • Ventral hernia         Past Surgical History:   Procedure Laterality Date   • AORTIC VALVE REPAIR/REPLACEMENT N/A 02/23/2016-[facility]    Ascending Replacement Using a 24MM Graft--Dr. Ontiveros   • APPENDECTOMY  1977   • BREAST BIOPSY Right 09/16/2012    Vacuum Assisted Core Bx of R Breast   • CARDIAC CATHETERIZATION N/A 01/06/2016    Dr. Tres De Los Santos   • CARDIAC CATHETERIZATION N/A 4/22/2019    Procedure: Left Heart Cath;  Surgeon: Tres De Los Santos MD;  Location: [facility] CATH INVASIVE LOCATION;  Service: Cardiology   • CARDIAC CATHETERIZATION N/A 4/22/2019    Procedure: Coronary angiography;  Surgeon: Filiberto" MD Tres;  Location:  [facility] CATH INVASIVE LOCATION;  Service: Cardiology   • CARDIAC CATHETERIZATION N/A 7/22/2020    Procedure: LEFT HEART CATH;  Surgeon: Antonia Scott MD;  Location:  [facility] CATH INVASIVE LOCATION;  Service: Cardiovascular;  Laterality: N/A;   • CARDIAC CATHETERIZATION N/A 7/22/2020    Procedure: CORONARY ANGIOGRAPHY;  Surgeon: Antonia Scott MD;  Location:  [facility] CATH INVASIVE LOCATION;  Service: Cardiovascular;  Laterality: N/A;   • CARDIAC CATHETERIZATION N/A 7/22/2020    Procedure: Left ventriculography;  Surgeon: Antonia Scott MD;  Location:  [facility] CATH INVASIVE LOCATION;  Service: Cardiovascular;  Laterality: N/A;   • CARDIAC CATHETERIZATION N/A 7/22/2020    Procedure: Saphenous Vein Graft;  Surgeon: Antonia Scott MD;  Location:  [facility] CATH INVASIVE LOCATION;  Service: Cardiovascular;  Laterality: N/A;   • CARDIAC SURGERY  02/2016    Dr. Ontiveros/Dr. De Los Santos   • CATARACT EXTRACTION Bilateral     [facility]   • COLONOSCOPY N/A 10/2002    Dr. Negro   • CORONARY ARTERY BYPASS GRAFT  02/23/2016-[facility]    x1 w/L Vein Grafting--Dr. Ontiveros   • CORONARY ARTERY BYPASS GRAFT N/A 9/18/2020    Procedure: STERNAL EXPLORATION WITH CLOSURE AND WASHOUT, REMOVAL OF IABP, PRP;  Surgeon: Mart Ontiverso MD;  Location: [facility] MAIN OR;  Service: Cardiothoracic;  Laterality: N/A;   • CYST REMOVAL Right 01/25/2013    R Palm Excision of Retinacular Cyst--Dr. Karla Fish   • EPIDURAL BLOCK     • LAPAROSCOPIC CHOLECYSTECTOMY N/A 08/04/2004    Dr. JOEY Sheth   • MASTECTOMY Right 09/28/2012   • ORIF HIP FRACTURE Left 03/27/2005    w/ AMBI compression screw, Dr. Victor M Cabral   • RECTOVAGINAL FISTULA REPAIR  1965   • THORACIC AORTIC ANEURYSM REPAIR N/A 7/23/2019    Procedure: ROSE, THORACOABDOMINAL AORTIC ANEURYSM REPAIR, VISCERAL VESSEL REPAIR, LARGE VENTRAL HERNIA REPAIR WITH MESH, CIRCULATORY ARREST, PRP;  Surgeon: Mart Ontiveros MD;  Location: [facility] MAIN OR;  Service: Cardiothoracic   •  TRANSESOPHAGEAL ECHOCARDIOGRAM (ROSE) N/A 2020    Procedure: TRANSESOPHAGEAL ECHOCARDIOGRAM WITH ANESTHESIA;  Surgeon: Mart Ontiveros MD;  Location: [facility];  Service: Cardiothoracic;  Laterality: N/A;   • TUBAL ABDOMINAL LIGATION     • VENTRICULAR ANEURYSM REPAIR N/A 2020    Procedure: EMERGENT REOP STERNOTOMY, REPAIR OF LV RUPTURE, PRP, INTRAOP ROSE;  Surgeon: Mart Ontiveros MD;  Location: [facility] MAIN OR;  Service: Cardiothoracic;  Laterality: N/A;   • VENTRICULAR ANEURYSM REPAIR N/A 2020    Procedure: ROSE, MIDLINE STERNOTOMY REOP  LEFT VENTRICULAR ANEURYSM REPAIR, IABP INSERTION, PRP;  Surgeon: Mart Ontiveros MD;  Location: [facility];  Service: Cardiothoracic;  Laterality: N/A;        Social History     Occupational History   • Not on file   Tobacco Use   • Smoking status: Former Smoker     Types: Cigarettes     Quit date: 2012     Years since quittin.0   • Smokeless tobacco: Never Used   • Tobacco comment: CAFFEINE USE:2 CUPS COFFEE/ COKE DAILY   Vaping Use   • Vaping Use: Not on file   Substance and Sexual Activity   • Alcohol use: No   • Drug use: No   • Sexual activity: Defer     Birth control/protection: Tubal ligation      Social History     Social History Narrative   • Not on file        Family History   Problem Relation Age of Onset   • Alzheimer's disease Mother    • Cancer Maternal Aunt    • Heart disease Father    • Heart failure Father    • Hypertension Father    • Diabetes Father    • Liver disease Sister    • Heart failure Brother    • Hypertension Brother    • Alcohol abuse Brother    • Diabetes Brother    • No Known Problems Maternal Grandmother    • No Known Problems Maternal Grandfather    • No Known Problems Paternal Grandmother    • No Known Problems Paternal Grandfather    • Diabetes Brother    • Brain cancer Brother    • Heart disease Brother    • Diabetes Brother        ROS: 14 point review of systems was performed and all other systems were reviewed  "and are negative except for documented findings in HPI and today's encounter.     Allergies:   Allergies   Allergen Reactions   • Ramipril Other (See Comments)     Ace I  cough   • Morphine Itching   • Morphine And Related Itching   • Bactrim [Sulfamethoxazole-Trimethoprim] Itching and Rash   • Cipro [Ciprofloxacin Hcl] Rash     Constitutional:  Denies fever, shaking or chills   Eyes:  Denies change in visual acuity   HENT:  Denies nasal congestion or sore throat   Respiratory:  Denies cough or shortness of breath   Cardiovascular:  Denies chest pain or severe LE edema   GI:  Denies abdominal pain, nausea, vomiting, bloody stools or diarrhea   Musculoskeletal:  Numbness, tingling, pain, or loss of motor function only as noted above in history of present illness.  : Denies painful urination or hematuria  Integument:  Denies rash, lesion or ulceration   Neurologic:  Denies headache or focal weakness  Endocrine:  Denies lymphadenopathy  Psych:  Denies confusion or change in mental status   Hem:  Denies active bleeding    OBJECTIVE:  Physical Exam: 81 y.o. female  Wt Readings from Last 3 Encounters:   12/29/21 45.4 kg (100 lb)   12/03/21 45.4 kg (100 lb 1.6 oz)   09/29/21 43.5 kg (96 lb)     Ht Readings from Last 1 Encounters:   12/29/21 142.2 cm (55.98\")     Body mass index is 22.43 kg/m².  Vitals:    12/29/21 1347   Temp: 97.5 °F (36.4 °C)     Vital signs reviewed.     General Appearance:    Alert, cooperative, in no acute distress                  Eyes: conjunctiva clear  ENT: external ears and nose atraumatic  CV: no peripheral edema  Resp: normal respiratory effort  Skin: no rashes or wounds; normal turgor  Psych: mood and affect appropriate  Lymph: no nodes appreciated  Neuro: gross sensation intact  Vascular:  Palpable peripheral pulse in noted extremity  Musculoskeletal Extremities: Her exam today shows that she is tender along her knee with some moderate swelling she has chronic skin changes in her legs she " has no internal rotation of her right hip and this does cause pain.  She has had a left total hip arthroplasty after she had had a compression hip screw for fracture.  She said Dr. Farley did this years ago.    Radiology:   AP lateral 40 degree PA x-ray bilateral knees taken the office today for pain with comparison view shows arthritis and relative osteopenia.  I reviewed her chart and also found she had old x-rays with comparison views of her hip and AP and lateral of the hips which show severe arthritic change of the right hip and a cemented left total hip arthroplasty that was present after she had her compression hip screw.        Assessment:     ICD-10-CM ICD-9-CM   1. Pain in both knees, unspecified chronicity  M25.561 719.46    M25.562    2. Arthritis of right knee  M17.11 716.96   3. Arthritis of right hip  M16.11 716.95   4. Status post total replacement of left hip  Z96.642 V43.64        MDM/Plan:   The diagnosis(es), natural history, pathophysiology and treatment for diagnosis(es) were discussed. Opportunity given and questions answered.  Biomechanics of pertinent body areas discussed.  When appropriate, the use of ambulatory aids discussed.    Inflammation/pain control; with cold, heat, elevation and/or liniments discussed as appropriate  MEDICAL RECORDS reviewed from other provider(s) for past and current medical history pertinent to this complaint.  Viscosupplementation.  See procedure note.  Recommended also assistive devices which she is using.  She is seen with her daughter.  I explained to her I thought a lot of her pain could also be coming out of her arthritic right hip especially based on today's exam I offered to send her for corticosteroid injection under fluoroscopy of her right hip but she says when she had her left hip fracture converted to a total hip years ago with one of the worst pain she ever had I talked to her and her daughter and said that that was not the usual report after 1 of  these and she is really not a surgical candidate and I think her hip could be giving a lot of her pain picture to her.  I want her to use the walker and to transfer slowly etc. discussed the knee and she wants to try viscosupplementation we injected her with viscosupplementation and see how that does.  She had the hip replacement by Dr. MACARIO in the past.  If they decide they want to try the injection of the hip on the right in the hospital they could call and have this put in.    12/29/2021    Dictated utilizing Dragon dictation        Large Joint Arthrocentesis: R knee  Date/Time: 12/29/2021 1:48 PM  Consent given by: patient  Site marked: site marked  Timeout: Immediately prior to procedure a time out was called to verify the correct patient, procedure, equipment, support staff and site/side marked as required   Supporting Documentation  Indications: pain   Procedure Details  Location: knee - R knee  Preparation: Patient was prepped and draped in the usual sterile fashion  Needle gauge: 21.  Approach: lateral  Medications administered: 88 mg Hyaluronan 88 MG/4ML; 1 mL lidocaine (cardiac)  Patient tolerance: patient tolerated the procedure well with no immediate complications

## 2022-01-01 NOTE — TELEPHONE ENCOUNTER
[facility] -  Nursery  Neonatology  Discharge Summary      Patient Name: Li Ceja  MRN: [id number]  Admission Date: 2022  Hospital Length of Stay: 2 days  Discharge Date and Time:  2022 8:13 AM  Attending Physician: Pramod Lopez MD   Discharging Provider: Pramod Lopez MD  Primary Care Provider: No primary care provider on file.    HPI:  This is a 38.3 week white female born vaginally. Maternal history of (+) UDS for THC  earlier in pregnancy.Infant transitioned to  nursery      * No surgery found *      Hospital Course:  No notes on file    Goals of Care Treatment Preferences:  Code Status: Full Code      Consults (From admission, onward)        Status Ordering Provider     Inpatient consult to Social Work  Once        Provider:  (Not yet assigned)    BRIAN Barragan          Significant Diagnostic Studies:     Pending Diagnostic Studies:     Procedure Component Value Units Date/Time    [place] metabolic screen (PKU) DAY 2 [[id number]] Collected: 12/10/22 1505    Order Status: Sent Lab Status: In process Updated: 12/10/22 1848    Specimen: Blood         Final Active Diagnoses:    Diagnosis Date Noted POA    PRINCIPAL PROBLEM:  Low birth weight in full term infant, 5682-7859 grams [P05.08] 2022 Yes      Problems Resolved During this Admission:      * Low birth weight in full term infant, 5308-9160 grams    This is a 38.3 week gestational female born vaginally with low birth weight.  MBT A(+) with a history of (+) UDS for cannoboids.  At risk for hypoglycemia due to LBW.  PLAN:  1.  Normal WB cares  2. Supplement with 22cal formula q 3 hours po due to LBW  3.  Glucose protocol.  4.  Obtain UDS    12/10 Infant is stable in crib, po feeding well, void and stool.  MBT A(+) no set up. UDS() (+) cannoboids.  PLAN:  1.  Normal WB cares  2.  SS consults    : Infant stable overnight. No issues. 4% below BW and TcB= 9.2. Feeding/voiding and stooling  PATIENT'S DAUGHTER KORIN,CALLED AND SAW IN [facility] LABS THAT PATIENT BELIEVE SHE MAY HAVE AN UTI. SHE WANTS TO KNOW WHAT SHE SHOULD DO. DOES SHE NEED AN ANTIBIOTIC     MARGIE [id number] [place] [address] AT [facility] [address] - [phone]  - [phone] FX  [phone]    KORIN'S CALL BACK NUMBER [phone]   well.    PLAN:  1. Discharge home today  2. Ad tristan feedings  3. Bili check in 2 days          Discharged Condition: good    Disposition:     Follow Up:    Patient Instructions:   No discharge procedures on file.  Medications:  Reconciled Home Medications:      Medication List      You have not been prescribed any medications.       Time spent on the discharge of patient: 30 minutes    Pramod Lopez MD  Neonatology  [facility] [place] Nurse

## 2022-01-10 DIAGNOSIS — K21.9 GASTROESOPHAGEAL REFLUX DISEASE, UNSPECIFIED WHETHER ESOPHAGITIS PRESENT: Chronic | ICD-10-CM

## 2022-01-10 RX ORDER — PANTOPRAZOLE SODIUM 40 MG/1
TABLET, DELAYED RELEASE ORAL
Qty: 90 TABLET | Refills: 3 | Status: SHIPPED | OUTPATIENT
Start: 2022-01-10 | End: 2022-01-31

## 2022-01-18 ENCOUNTER — OFFICE VISIT (OUTPATIENT)
Dept: CARDIAC SURGERY | Facility: CLINIC | Age: 82
End: 2022-01-18

## 2022-01-18 ENCOUNTER — OFFICE VISIT (OUTPATIENT)
Dept: INTERNAL MEDICINE | Age: 82
End: 2022-01-18

## 2022-01-18 VITALS
OXYGEN SATURATION: 96 % | DIASTOLIC BLOOD PRESSURE: 82 MMHG | WEIGHT: 100 LBS | HEART RATE: 63 BPM | HEIGHT: 65 IN | BODY MASS INDEX: 16.66 KG/M2 | RESPIRATION RATE: 20 BRPM | SYSTOLIC BLOOD PRESSURE: 147 MMHG | TEMPERATURE: 96.9 F

## 2022-01-18 VITALS
HEIGHT: 65 IN | DIASTOLIC BLOOD PRESSURE: 84 MMHG | WEIGHT: 98 LBS | SYSTOLIC BLOOD PRESSURE: 150 MMHG | TEMPERATURE: 97.1 F | BODY MASS INDEX: 16.33 KG/M2 | OXYGEN SATURATION: 97 % | HEART RATE: 78 BPM

## 2022-01-18 DIAGNOSIS — M15.9 PRIMARY OSTEOARTHRITIS INVOLVING MULTIPLE JOINTS: Chronic | ICD-10-CM

## 2022-01-18 DIAGNOSIS — I25.3 LEFT VENTRICULAR ANEURYSM: ICD-10-CM

## 2022-01-18 DIAGNOSIS — E11.9 TYPE 2 DIABETES MELLITUS WITHOUT COMPLICATION, WITHOUT LONG-TERM CURRENT USE OF INSULIN: Chronic | ICD-10-CM

## 2022-01-18 DIAGNOSIS — N39.0 RECURRENT UTI (URINARY TRACT INFECTION): Chronic | ICD-10-CM

## 2022-01-18 DIAGNOSIS — J43.9 PULMONARY EMPHYSEMA, UNSPECIFIED EMPHYSEMA TYPE: Chronic | ICD-10-CM

## 2022-01-18 DIAGNOSIS — Z86.79 S/P ASCENDING AORTIC ANEURYSM REPAIR: ICD-10-CM

## 2022-01-18 DIAGNOSIS — F32.A DEPRESSION, UNSPECIFIED DEPRESSION TYPE: Primary | ICD-10-CM

## 2022-01-18 DIAGNOSIS — I71.22 AORTIC ARCH ANEURYSM: ICD-10-CM

## 2022-01-18 DIAGNOSIS — I10 PRIMARY HYPERTENSION: Chronic | ICD-10-CM

## 2022-01-18 DIAGNOSIS — I71.20 THORACIC AORTIC ANEURYSM WITHOUT RUPTURE: ICD-10-CM

## 2022-01-18 DIAGNOSIS — G82.20 PARAPARESIS: ICD-10-CM

## 2022-01-18 DIAGNOSIS — I71.23 DESCENDING THORACIC AORTIC ANEURYSM: Chronic | ICD-10-CM

## 2022-01-18 DIAGNOSIS — Z98.890 S/P ASCENDING AORTIC ANEURYSM REPAIR: ICD-10-CM

## 2022-01-18 DIAGNOSIS — M62.3 IMMOBILITY SYNDROME: ICD-10-CM

## 2022-01-18 DIAGNOSIS — I65.29 STENOSIS OF CAROTID ARTERY, UNSPECIFIED LATERALITY: ICD-10-CM

## 2022-01-18 DIAGNOSIS — Z95.1 S/P CABG X 1: ICD-10-CM

## 2022-01-18 DIAGNOSIS — E78.2 MIXED HYPERLIPIDEMIA: Primary | Chronic | ICD-10-CM

## 2022-01-18 DIAGNOSIS — Z98.890: ICD-10-CM

## 2022-01-18 DIAGNOSIS — Z85.3 HISTORY OF BREAST CANCER: Chronic | ICD-10-CM

## 2022-01-18 DIAGNOSIS — I25.10 CORONARY ARTERY DISEASE INVOLVING NATIVE CORONARY ARTERY OF NATIVE HEART WITHOUT ANGINA PECTORIS: ICD-10-CM

## 2022-01-18 DIAGNOSIS — I73.9 CLAUDICATION OF BOTH LOWER EXTREMITIES: ICD-10-CM

## 2022-01-18 PROCEDURE — 99214 OFFICE O/P EST MOD 30 MIN: CPT | Performed by: INTERNAL MEDICINE

## 2022-01-18 PROCEDURE — 99213 OFFICE O/P EST LOW 20 MIN: CPT | Performed by: THORACIC SURGERY (CARDIOTHORACIC VASCULAR SURGERY)

## 2022-01-18 NOTE — PROGRESS NOTES
I N T E R N A L  M E D I C I N E  J U N O H  K I M,  M D      ENCOUNTER DATE:  01/18/2022    Grace Beauchamp / 81 y.o. / female      CHIEF COMPLAINT / REASON FOR OFFICE VISIT     Depression (scheduled originally for AWV) and Arthritis      ASSESSMENT & PLAN     Problem List Items Addressed This Visit        High    Depression - Primary (Chronic)    Overview     Patient stopped citalopram around April and is doing okay.          Current Assessment & Plan     Recurrent depression due to medical conditions.   Start sertraline 25 mg qd x 1 week, then 50 mg qd.   Follow-up in April.     Discussed diagnosis of depression, antidepressant medications including information on side effects, adverse effects and discussed need to monitor for worsening depression, anxiety/irritability, suicidal thoughts/ideations, or significant mood swings. She expressed understanding and agreed to follow above instructions.           Relevant Medications    sertraline (ZOLOFT) 50 MG tablet    Recurrent UTI (urinary tract infection) (Chronic)    Current Assessment & Plan     Advised to contact urologist in the future for acute UTI symptoms.   If antibiotic needed empirically cefdinir would probably be the best option.             Medium    Chronic obstructive pulmonary disease (HCC) (Chronic)    Current Assessment & Plan     Continue Trelegy and albuterol / Duoneb PRN          Relevant Medications    cetirizine (zyrTEC) 10 MG tablet    budesonide (PULMICORT) 0.5 MG/2ML nebulizer solution    ipratropium-albuterol (DUO-NEB) 0.5-2.5 mg/3 ml nebulizer    albuterol sulfate HFA (ProAir HFA) 108 (90 Base) MCG/ACT inhaler    Fluticasone-Umeclidin-Vilant (Trelegy Ellipta) 100-62.5-25 MCG/INH inhaler    Trelegy Ellipta 100-62.5-25 MCG/INH inhaler    Hypertension (Chronic)    Overview     Stable. Continue Bystolic 10 mg qd.          Relevant Medications    metoprolol succinate XL (TOPROL-XL) 50 MG 24 hr tablet    losartan (COZAAR) 50 MG tablet     "Type 2 diabetes mellitus without complication, without long-term current use of insulin (HCC) (Chronic)    Overview     Diet controlled.              No orders of the defined types were placed in this encounter.    New Medications Ordered This Visit   Medications   • sertraline (ZOLOFT) 50 MG tablet     Sig: Take 1 tablet by mouth Every Morning.     Dispense:  30 tablet     Refill:  2       SUMMARY/DISCUSSION  •       Next Appointment with me: Visit date not found    Return in about 3 months (around 4/18/2022) for Reassess chronic medical problems.      VITAL SIGNS     Visit Vitals  /84 (BP Location: Left arm)   Pulse 78   Temp 97.1 °F (36.2 °C)   Ht 165.1 cm (65\")   Wt 44.5 kg (98 lb)   LMP  (LMP Unknown)   SpO2 97%   BMI 16.31 kg/m²       BP Readings from Last 3 Encounters:   01/18/22 150/84   01/18/22 147/82   12/05/21 152/81     Wt Readings from Last 3 Encounters:   01/18/22 44.5 kg (98 lb)   01/18/22 45.4 kg (100 lb)   12/29/21 45.4 kg (100 lb)     Body mass index is 16.31 kg/m².      MEDICATIONS AT THE TIME OF OFFICE VISIT     Current Outpatient Medications on File Prior to Visit   Medication Sig   • albuterol sulfate HFA (ProAir HFA) 108 (90 Base) MCG/ACT inhaler Inhale 2 puffs Every 6 (Six) Hours As Needed for Wheezing or Shortness of Air.   • aspirin 81 MG EC tablet Take 81 mg by mouth Daily.   • budesonide (PULMICORT) 0.5 MG/2ML nebulizer solution Take 2 mL by nebulization 2 (Two) Times a Day. (Patient taking differently: Take 0.5 mg by nebulization 2 (Two) Times a Day As Needed.)   • cetirizine (zyrTEC) 10 MG tablet Take 1 tablet by mouth As Needed for Allergies. (Patient taking differently: Take 10 mg by mouth Daily As Needed for Allergies.)   • cilostazol (PLETAL) 100 MG tablet Take 1 tablet by mouth 2 (Two) Times a Day.   • Coal Tar-Menthol 1.8-1.5 % shampoo Apply 1 application topically 2 (Two) Times a Week. Do not get in eyes. Use twice weekly maximum.   • Fluticasone-Umeclidin-Vilant (Trelegy " Ellipta) 100-62.5-25 MCG/INH inhaler Inhale 1 puff Daily.   • losartan (COZAAR) 50 MG tablet TAKE ONE TABLET BY MOUTH DAILY (Patient taking differently: Take 50 mg by mouth Daily.)   • meclizine (ANTIVERT) 12.5 MG tablet Take 1 tablet by mouth 3 (Three) Times a Day As Needed for Dizziness.   • metoprolol succinate XL (TOPROL-XL) 50 MG 24 hr tablet TAKE ONE TABLET BY MOUTH EVERY 12 HOURS (Patient taking differently: Take 50 mg by mouth Every 12 (Twelve) Hours.)   • multivitamin (THERAGRAN) tablet tablet Take 1 tablet by mouth Daily.   • pantoprazole (PROTONIX) 40 MG EC tablet TAKE ONE TABLET BY MOUTH EVERY MORNING   • pramipexole (MIRAPEX) 0.125 MG tablet TAKE ONE TABLET BY MOUTH EVERY NIGHT AT BEDTIME (Patient taking differently: Take 0.125 mg by mouth Every Night.)   • sennosides-docusate (PERICOLACE) 8.6-50 MG per tablet Take 2 tablets by mouth Daily As Needed for Constipation.   • TOVIAZ 4 MG tablet sustained-release 24 hour tablet Take 4 mg by mouth Daily.   • traMADol (ULTRAM) 50 MG tablet TAKE ONE TABLET BY MOUTH EVERY 8 HOURS AS NEEDED FOR MODERATE OR SEVERE PAIN (Patient taking differently: Take 50 mg by mouth Every 8 (Eight) Hours As Needed.)   • Trelegy Ellipta 100-62.5-25 MCG/INH inhaler INHALE ONE PUFF BY MOUTH DAILY   • Vitamin D, Cholecalciferol, 50 MCG (2000 UT) capsule Take 2,000 Units by mouth Daily.   • atorvastatin (LIPITOR) 40 MG tablet Take 1 tablet by mouth Every Night.   • clopidogrel (PLAVIX) 75 MG tablet Take 1 tablet by mouth Daily.   • ferrous sulfate 325 (65 FE) MG EC tablet Take 1 tablet by mouth Daily With Breakfast.   • ipratropium-albuterol (DUO-NEB) 0.5-2.5 mg/3 ml nebulizer Take 3 mL by nebulization 3 (Three) Times a Day.     No current facility-administered medications on file prior to visit.          HISTORY OF PRESENT ILLNESS     Came in for AWV but has lot of active issues.   Takes Tylenol PRN for pain and tramadol for more severe pain, helps.   More depressed with medical  issues and phsysical limitations, interested in going back on medication. Previously was taking escitalopram. Hypertension remains controlled. Diabetes diet controlled.   COPD with increased shortness of breath responds to inhalers. On Trelegy and albuterol, duoneb.       Patient Care Team:  Miller Castañeda MD as PCP - General (Internal Medicine)  Mart Ontiveros MD as Surgeon (Cardiothoracic Surgery)  Tres De Los Santos MD as Consulting Physician (Cardiology)  Graham Mcconnell MD as Consulting Physician (Hematology and Oncology)  Payam Byrnes MD as Consulting Physician (Otolaryngology)  Jonathan Smith MD as Consulting Physician (Vascular Surgery)  Victor M Cabral MD as Surgeon (Orthopedic Surgery)  Yaya Burks MD as Consulting Physician (Pulmonary Disease)    REVIEW OF SYSTEMS     Review of Systems       PHYSICAL EXAMINATION     Physical Exam  No acute distress   Mild depression, normal thought and judgment  Cardiovascular: Normal rate, regular rhythm.  Lungs: diffuse exp wheezing (mild); no rales or crackles       REVIEWED DATA     Labs:     Lab Results   Component Value Date     12/05/2021    K 4.2 12/05/2021    CALCIUM 9.0 12/05/2021    AST 10 12/03/2021    ALT 9 12/03/2021    BUN 9 12/05/2021    CREATININE 0.80 12/29/2021    CREATININE 0.57 12/05/2021    CREATININE 0.61 12/04/2021    EGFRIFNONA 102 12/05/2021    EGFRIFAFRI 85 08/12/2021       Lab Results   Component Value Date    HGBA1C 6.3 (H) 08/12/2021    HGBA1C 5.92 (H) 09/16/2020    HGBA1C 6.40 (H) 07/09/2019       Lab Results   Component Value Date     (H) 08/12/2021     (H) 09/16/2020    HDL 59 08/12/2021    TRIG 168 (H) 08/12/2021       Lab Results   Component Value Date    TSH 1.830 08/12/2021    FREET4 1.47 08/12/2021       Lab Results   Component Value Date    WBC 6.49 12/05/2021    HGB 13.4 12/05/2021     12/05/2021       Lab Results   Component Value Date    MICROALBUR 69.2 03/03/2017           Imaging:            Medical Tests:           Summary of old records / correspondence / consultant report:           Request outside records:             *Examiner was wearing [id number] mask and eye protection during the entire duration of the visit. Patient was masked the entire time. Minimum social distance of 6 ft maintained entire visit except if physical contact was necessary as documented.     **Dragon Disclaimer: Much of this encounter note is an electronic transcription/translation of spoken language to printed text. The electronic translation of spoken language may permit erroneous, or at times, nonsensical words or phrases to be inadvertently transcribed. Although I have reviewed the note for such errors, some may still exist.       Template created by Bebo Castañeda MD

## 2022-01-19 PROBLEM — N39.0 RECURRENT UTI (URINARY TRACT INFECTION): Status: RESOLVED | Noted: 2021-12-03 | Resolved: 2022-01-19

## 2022-01-19 PROBLEM — N39.0 RECURRENT UTI (URINARY TRACT INFECTION): Chronic | Status: ACTIVE | Noted: 2021-12-03

## 2022-01-19 NOTE — ASSESSMENT & PLAN NOTE
Advised to contact urologist in the future for acute UTI symptoms.   If antibiotic needed empirically cefdinir would probably be the best option.

## 2022-01-19 NOTE — ASSESSMENT & PLAN NOTE
Recurrent depression due to medical conditions.   Start sertraline 25 mg qd x 1 week, then 50 mg qd.   Follow-up in April.     Discussed diagnosis of depression, antidepressant medications including information on side effects, adverse effects and discussed need to monitor for worsening depression, anxiety/irritability, suicidal thoughts/ideations, or significant mood swings. She expressed understanding and agreed to follow above instructions.

## 2022-01-20 NOTE — PROGRESS NOTES
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 She is a 81 y.o. 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  She has gone through a lot of operations and recovery and she is a very frail woman.  She is in good spirits though. She denies syncope, TIA, orthopnea, PND or chest pain.. Her last chest CT showed the ascending and descending thoracic aorta to be stable.  The area of maximal diameter 3.8 cm in the distal descending thoracic aorta.  The pulmonary emphysematous changes.  There are no focal aneurysms.  She complains of weakness in both legs and she has known paraparesis.  She has minimal ambulation, mainly transfer.  She uses a walker.    Patient Active Problem List   Diagnosis   • History of breast cancer   • Stenosis of carotid artery   • Chronic obstructive pulmonary disease (HCC)   • Closed fracture of multiple ribs   • Cognitive disorder   • Depression   • Erythromelalgia ([facility])   • Hyperlipidemia   • Hypertension   • Primary osteoarthritis involving multiple joints   • Osteoporosis   • Restless legs syndrome   • Cerebral aneurysm   • Temporary cerebral vascular dysfunction   • Urinary tract infection   • S/P CABG x 1   • Type 2 diabetes mellitus without complication, without long-term current use of insulin ([facility])   • S/P ascending aortic aneurysm repair   • 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[facility])   • Descending thoracic aortic aneurysm ([facility])   • Claudication of both lower extremities ([facility])   •  "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HCC)   • Ventral hernia without obstruction or gangrene   • Breast cancer, stage 1, estrogen receptor positive (HCC)   • Arthritis of right hip   • Arthritis of right knee   • Chondrocalcinosis   • Chronic pain of right knee   • DDD (degenerative disc disease), lumbosacral   • Gastroesophageal reflux disease   • Bilateral hearing loss   • Thoracic aortic aneurysm without rupture (HCC)   • PVD (peripheral vascular disease) ([facility])   • 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[facility])   • Thoracoabdominal aortic aneurysm, without rupture (HCC)   • 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 rupture (HCC)   • Aortic aneurysm, descending (HCC)   • Aortic aneurysm without rupture (HCC)   • CAD (coronary artery disease)   • S/P left heart catheterization by ventricular puncture   • Cerebral infarction ([facility])   • Pericardial hematoma   • History of ST elevation myocardial infarction (STEMI)   • Chronic diastolic CHF (congestive heart failure) ([facility])   • Left ventricular aneurysm   • Immobility syndrome   • Lower extremity edema   • QT prolongation   • Recurrent UTI (urinary tract infection)       Past Medical History:   Diagnosis Date   • AAA (abdominal aortic aneurysm) ([facility])    • Acute bronchitis 12/2018   • 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[facility])    • Cataracts, bilateral    • Closed fracture of three ribs of right side    • Cognitive disorder    • COPD (chronic obstructive pulmonary disease) ([facility])    • Depression    • Descending aortic arch aneurysm ([facility])    • Diabetes mellitus (HCC)     \"BORDERLINE\"   • Erythermalgia ([facility])    • GERD (gastroesophageal reflux disease)    • " Hyperlipidemia     Controlled w/Meds   • Hypertension     Controlled w/Meds   • Low back pain    • Moderate aortic valve insufficiency     S/p AVR on 02/23/16 by Dr. Ontiveros   • Nocturia    • Osteoarthritis    • Osteoporosis    • Otitis media     R Ear   • Prediabetes    • PVD (peripheral vascular disease) (HCC)    • RLS (restless legs syndrome)    • Saccular aneurysm    • Stroke (HCC)     Perioperatively w/L Hip Repl   • Thoracic ascending aortic aneurysm (HCC)     S/p Repair on 02/23/16 by Dr. Ontiveros   • TIA (transient ischemic attack)    • Urgency incontinence    • Urinary tract infection    • Venous insufficiency    • Ventral hernia        Past Surgical History:   Procedure Laterality Date   • AORTIC VALVE REPAIR/REPLACEMENT N/A 02/23/2016-[facility]    Ascending Replacement Using a 24MM Graft--Dr. Ontiveros   • APPENDECTOMY  1977   • BREAST BIOPSY Right 09/16/2012    Vacuum Assisted Core Bx of R Breast   • CARDIAC CATHETERIZATION N/A 01/06/2016    Dr. Tres De Los Santos   • CARDIAC CATHETERIZATION N/A 4/22/2019    Procedure: Left Heart Cath;  Surgeon: Tres De Los Santos MD;  [facility];  Service: Cardiology   • CARDIAC CATHETERIZATION N/A 4/22/2019    Procedure: Coronary angiography;  Surgeon: Tres De Los Santos MD;  Location:  [facility] CATH INVASIVE LOCATION;  Service: Cardiology   • CARDIAC CATHETERIZATION N/A 7/22/2020    Procedure: LEFT HEART CATH;  Surgeon: Antonia Scott MD;  Location:  [facility] CATH INVASIVE LOCATION;  Service: Cardiovascular;  Laterality: N/A;   • CARDIAC CATHETERIZATION N/A 7/22/2020    Procedure: CORONARY ANGIOGRAPHY;  Surgeon: Antonia Scott MD;  Location:  [facility] CATH INVASIVE LOCATION;  Service: Cardiovascular;  Laterality: N/A;   • CARDIAC CATHETERIZATION N/A 7/22/2020    Procedure: Left ventriculography;  Surgeon: Antonia Scott MD;  Location:  [facility] CATH INVASIVE LOCATION;  Service: Cardiovascular;  Laterality: N/A;   • CARDIAC CATHETERIZATION N/A 7/22/2020    Procedure:  Saphenous Vein Graft;  Surgeon: Antonia Scott MD;  Location: [facility] CATH INVASIVE LOCATION;  Service: Cardiovascular;  Laterality: N/A;   • CARDIAC SURGERY  02/2016    Dr. Ontiveros/Dr. De Los Santos   • CATARACT EXTRACTION Bilateral     [facility]   • COLONOSCOPY N/A 10/2002    Dr. Negro   • CORONARY ARTERY BYPASS GRAFT  02/23/2016-[facility]    x1 w/L Vein Grafting--Dr. Ontiveros   • CORONARY ARTERY BYPASS GRAFT N/A 9/18/2020    Procedure: STERNAL EXPLORATION WITH CLOSURE AND WASHOUT, REMOVAL OF IABP, PRP;  Surgeon: Mart Ontiveros MD;  [facility];  Service: Cardiothoracic;  Laterality: N/A;   • CYST REMOVAL Right 01/25/2013    R Palm Excision of Retinacular Cyst--Dr. Karla Fish   • EPIDURAL BLOCK     • LAPAROSCOPIC CHOLECYSTECTOMY N/A 08/04/2004    Dr. JOEY Sheth   • MASTECTOMY Right 09/28/2012   • ORIF HIP FRACTURE Left 03/27/2005    w/ AMBI compression screw, Dr. Victor M Cabral   • RECTOVAGINAL FISTULA REPAIR  1965   • THORACIC AORTIC ANEURYSM REPAIR N/A 7/23/2019    Procedure: ROSE, THORACOABDOMINAL AORTIC ANEURYSM REPAIR, VISCERAL VESSEL REPAIR, LARGE VENTRAL HERNIA REPAIR WITH MESH, CIRCULATORY ARREST, PRP;  Surgeon: Mart Ontiveros MD;  Location: [facility] MAIN OR;  Service: Cardiothoracic   • TRANSESOPHAGEAL ECHOCARDIOGRAM (ROSE) N/A 9/18/2020    Procedure: TRANSESOPHAGEAL ECHOCARDIOGRAM WITH ANESTHESIA;  Surgeon: Mart Ontiveros MD;  Location: [facility] MAIN OR;  Service: Cardiothoracic;  Laterality: N/A;   • TUBAL ABDOMINAL LIGATION     • VENTRICULAR ANEURYSM REPAIR N/A 7/22/2020    Procedure: EMERGENT REOP STERNOTOMY, REPAIR OF LV RUPTURE, PRP, INTRAOP ROSE;  Surgeon: Mart Ontiveros MD;  Location: [facility] MAIN OR;  Service: Cardiothoracic;  Laterality: N/A;   • VENTRICULAR ANEURYSM REPAIR N/A 9/17/2020    Procedure: ROSE, MIDLINE STERNOTOMY REOP  LEFT VENTRICULAR ANEURYSM REPAIR, IABP INSERTION, PRP;  Surgeon: Mart Ontiveros MD;  Location: [facility] MAIN OR;  Service: Cardiothoracic;   Laterality: N/A;       Allergies   Allergen Reactions   • Ramipril Other (See Comments)     Ace I  cough   • Morphine Itching   • Morphine And Related Itching   • Bactrim [Sulfamethoxazole-Trimethoprim] Itching and Rash   • Cipro [Ciprofloxacin Hcl] Rash         Current Outpatient Medications:   •  albuterol sulfate HFA (ProAir HFA) 108 (90 Base) MCG/ACT inhaler, Inhale 2 puffs Every 6 (Six) Hours As Needed for Wheezing or Shortness of Air., Disp: 18 g, Rfl: 3  •  aspirin 81 MG EC tablet, Take 81 mg by mouth Daily., Disp: , Rfl:   •  atorvastatin (LIPITOR) 40 MG tablet, Take 1 tablet by mouth Every Night., Disp: 90 tablet, Rfl: 2  •  budesonide (PULMICORT) 0.5 MG/2ML nebulizer solution, Take 2 mL by nebulization 2 (Two) Times a Day. (Patient taking differently: Take 0.5 mg by nebulization 2 (Two) Times a Day As Needed.), Disp: 120 mL, Rfl: 0  •  cetirizine (zyrTEC) 10 MG tablet, Take 1 tablet by mouth As Needed for Allergies. (Patient taking differently: Take 10 mg by mouth Daily As Needed for Allergies.), Disp: 30 tablet, Rfl: 0  •  cilostazol (PLETAL) 100 MG tablet, Take 1 tablet by mouth 2 (Two) Times a Day., Disp: 180 tablet, Rfl: 2  •  clopidogrel (PLAVIX) 75 MG tablet, Take 1 tablet by mouth Daily., Disp: 90 tablet, Rfl: 2  •  Coal Tar-Menthol 1.8-1.5 % shampoo, Apply 1 application topically 2 (Two) Times a Week. Do not get in eyes. Use twice weekly maximum., Disp: 296 mL, Rfl: 0  •  ferrous sulfate 325 (65 FE) MG EC tablet, Take 1 tablet by mouth Daily With Breakfast., Disp: 90 tablet, Rfl: 0  •  Fluticasone-Umeclidin-Vilant (Trelegy Ellipta) 100-62.5-25 MCG/INH inhaler, Inhale 1 puff Daily., Disp: 90 each, Rfl: 3  •  ipratropium-albuterol (DUO-NEB) 0.5-2.5 mg/3 ml nebulizer, Take 3 mL by nebulization 3 (Three) Times a Day., Disp: 360 mL, Rfl: 0  •  losartan (COZAAR) 50 MG tablet, TAKE ONE TABLET BY MOUTH DAILY (Patient taking differently: Take 50 mg by mouth Daily.), Disp: 90 tablet, Rfl: 2  •  meclizine  (ANTIVERT) 12.5 MG tablet, Take 1 tablet by mouth 3 (Three) Times a Day As Needed for Dizziness., Disp: 21 tablet, Rfl: 0  •  metoprolol succinate XL (TOPROL-XL) 50 MG 24 hr tablet, TAKE ONE TABLET BY MOUTH EVERY 12 HOURS (Patient taking differently: Take 50 mg by mouth Every 12 (Twelve) Hours.), Disp: 180 tablet, Rfl: 2  •  multivitamin (THERAGRAN) tablet tablet, Take 1 tablet by mouth Daily., Disp: 90 tablet, Rfl: 0  •  pantoprazole (PROTONIX) 40 MG EC tablet, TAKE ONE TABLET BY MOUTH EVERY MORNING, Disp: 90 tablet, Rfl: 3  •  pramipexole (MIRAPEX) 0.125 MG tablet, TAKE ONE TABLET BY MOUTH EVERY NIGHT AT BEDTIME (Patient taking differently: Take 0.125 mg by mouth Every Night.), Disp: 90 tablet, Rfl: 1  •  sennosides-docusate (PERICOLACE) 8.6-50 MG per tablet, Take 2 tablets by mouth Daily As Needed for Constipation., Disp: , Rfl:   •  TOVIAZ 4 MG tablet sustained-release 24 hour tablet, Take 4 mg by mouth Daily., Disp: , Rfl:   •  traMADol (ULTRAM) 50 MG tablet, TAKE ONE TABLET BY MOUTH EVERY 8 HOURS AS NEEDED FOR MODERATE OR SEVERE PAIN (Patient taking differently: Take 50 mg by mouth Every 8 (Eight) Hours As Needed.), Disp: 90 tablet, Rfl: 0  •  Trelegy Ellipta 100-62.5-25 MCG/INH inhaler, INHALE ONE PUFF BY MOUTH DAILY, Disp: 180 each, Rfl: 3  •  Vitamin D, Cholecalciferol, 50 MCG (2000 UT) capsule, Take 2,000 Units by mouth Daily., Disp: 30 capsule, Rfl:   •  sertraline (ZOLOFT) 50 MG tablet, Take 1 tablet by mouth Every Morning., Disp: 30 tablet, Rfl: 2    Social History     Socioeconomic History   • Marital status:    Tobacco Use   • Smoking status: Former Smoker     Types: Cigarettes     Quit date: 2012     Years since quittin.1   • Smokeless tobacco: Never Used   • Tobacco comment: CAFFEINE USE:2 CUPS COFFEE/ COKE DAILY   Substance and Sexual Activity   • Alcohol use: No   • Drug use: No   • Sexual activity: Defer     Birth control/protection: Tubal ligation       Family History   Problem  Relation Age of Onset   • Alzheimer's disease Mother    • Cancer Maternal Aunt    • Heart disease Father    • Heart failure Father    • Hypertension Father    • Diabetes Father    • Liver disease Sister    • Heart failure Brother    • Hypertension Brother    • Alcohol abuse Brother    • Diabetes Brother    • No Known Problems Maternal Grandmother    • No Known Problems Maternal Grandfather    • No Known Problems Paternal Grandmother    • No Known Problems Paternal Grandfather    • Diabetes Brother    • Brain cancer Brother    • Heart disease Brother    • Diabetes Brother      Review of Systems   Constitutional: Positive for fatigue.   Respiratory: Positive for shortness of breath.    Cardiovascular: Positive for leg swelling. Negative for chest pain.   Neurological: Positive for weakness.   Psychiatric/Behavioral: Positive for confusion and decreased concentration.   All other systems reviewed and are negative.      Physical Exam:    Vital Signs:  Weight: 45.4 kg (100 lb)   Body mass index is 16.64 kg/m².  Temp: 96.9 °F (36.1 °C)   Heart Rate: 63   BP: 147/82     Constitutional:       Appearance: Well-developed.   Eyes:      Conjunctiva/sclera: Conjunctivae normal.      Pupils: Pupils are equal, round, and reactive to light.   HENT:      Head: Normocephalic and atraumatic.      Nose: Nose normal.   Neck:      Thyroid: No thyromegaly.      Vascular: No JVD.      Lymphadenopathy: No cervical adenopathy.   Pulmonary:      Effort: Pulmonary effort is normal.      Breath sounds: Normal breath sounds. No rales.   Cardiovascular:      PMI at left midclavicular line. Normal rate. Regular rhythm.      Murmurs: There is no murmur.      No gallop. No click. No rub.   Pulses:     No decreased pulses.      Carotid: 1+ with bruit bilaterally.     Radial: 2+ bilaterally.     Popliteal: 2+ bilaterally.  Edema:     Peripheral edema absent.   Abdominal:      General: Bowel sounds are normal. There is no distension.      Palpations:  Abdomen is soft. There is no abdominal mass.      Tenderness: There is no abdominal tenderness.   Musculoskeletal: Normal range of motion.         General: No tenderness or deformity.      Cervical back: Normal range of motion and neck supple. Skin:     General: Skin is warm and dry.      Findings: No erythema or rash.   Neurological:      Mental Status: Alert and oriented to person, place, and time.      Deep Tendon Reflexes: Reflexes are normal and symmetric.   Psychiatric:         Behavior: Behavior normal.     Sternal and thoracic incisions are well-healed     Assessment:     Problems Addressed this Visit        Cardiac and Vasculature    Hyperlipidemia - Primary (Chronic)    Hypertension (Chronic)    Descending thoracic aortic aneurysm (HCC) (Chronic)    Stenosis of carotid artery    S/P CABG x 1    S/P ascending aortic aneurysm repair    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HCC)    Claudication of both lower extremities (HCC)    Thoracic aortic aneurysm without rupture (HCC)    CAD (coronary artery disease)    S/P left heart catheterization by ventricular puncture    Left ventricular aneurysm       Endocrine and Metabolic    Type 2 diabetes mellitus without complication, without long-term current use of insulin (HCC) (Chronic)       Hematology and Neoplasia    History of breast cancer (Chronic)       Musculoskeletal and Injuries    Primary osteoarthritis involving multiple joints (Chronic)    Immobility syndrome       Neuro    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HCC)       Pulmonary and Pneumonias    Chronic obstructive pulmonary disease (HCC) (Chronic)      Diagnoses       Codes Comments    Mixed hyperlipidemia    -  Primary ICD-10-CM: E78.2  ICD-9-CM: 272.2     Primary hypertension     ICD-10-CM: I10  ICD-9-CM: 401.9     Descending thoracic aortic aneurysm (HCC)     ICD-10-CM: I71.2  ICD-9-CM: 441.2     Stenosis of carotid artery, unspecified laterality     ICD-10-CM: I65.29  ICD-9-CM: 433.10     S/P CABG x 1     ICD-10-CM: Z95.1  ICD-9-CM:  V45.81     S/P ascending aortic aneurysm repair     ICD-10-CM: Z98.890, Z86.79  ICD-9-CM: V45.89     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HCC)     ICD-10-CM: I71.2  ICD-9-CM: 441.2     Claudication of both lower extremities (HCC)     ICD-10-CM: I73.9  ICD-9-CM: 443.9     Thoracic aortic aneurysm without rupture (HCC)     ICD-10-CM: I71.2  ICD-9-CM: 441.2     S/P left heart catheterization by ventricular puncture     ICD-10-CM: Z98.890  ICD-9-CM: V45.89     Coronary artery disease involving native coronary artery of native heart without angina pectoris     ICD-10-CM: I25.10  ICD-9-CM: 414.01     Left ventricular aneurysm     ICD-10-CM: I25.3  ICD-9-CM: 414.10     Type 2 diabetes mellitus without complication, without long-term current use of insulin (HCC)     ICD-10-CM: E11.9  ICD-9-CM: 250.00     History of breast cancer     ICD-10-CM: Z85.3  ICD-9-CM: V10.3     Primary osteoarthritis involving multiple joints     ICD-10-CM: M89.49  ICD-9-CM: 715.98     Immobility syndrome     ICD-10-CM: M62.3  ICD-9-CM: 728.3     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[facility])     ICD-10-CM: G82.20  ICD-9-CM: 344.1     Pulmonary emphysema, unspecified emphysema type ([facility])     ICD-10-CM: J43.9  ICD-9-CM: 492.8           Assessment/recommendation:     1. Ascending and thoracoabdominal aortic aneurysms status post repairs with residual paraparesis.  Overall stable.  The CT scan does not show any false aneurysm or aortic enlargement of concern.  Plan is to continue longitudinal follow-up and a chest CTA in 1 year.  2. She had a rupture LV aneurysm postinfarction that was repaired emergently.  No evidence of cardiac decompensation or congestive heart failure.  Plan for a 2D echocardiogram with next visit  3. Hypertension, overall well controlled.  Blood pressure slightly increased in this visit but she is anxious.  She understands importance of blood pressure control to decrease the DP DT and prevent aneurysmal expansion.    Thank you for allowing me to participate in her  care.    Regards,    Mart Ontiveros M.D.  I spent over 20 minutes in reviewing the records, examining the patient, reviewing and interpreting the chest CTA myself and discussing the findings and options with the patient and daughter, discussing the plan of follow-up and documenting in the electronic record

## 2022-01-31 DIAGNOSIS — K21.9 GASTROESOPHAGEAL REFLUX DISEASE, UNSPECIFIED WHETHER ESOPHAGITIS PRESENT: Chronic | ICD-10-CM

## 2022-01-31 RX ORDER — PANTOPRAZOLE SODIUM 40 MG/1
TABLET, DELAYED RELEASE ORAL
Qty: 90 TABLET | Refills: 3 | Status: SHIPPED | OUTPATIENT
Start: 2022-01-31 | End: 2022-06-29 | Stop reason: SDUPTHER

## 2022-03-12 DIAGNOSIS — J44.9 CHRONIC OBSTRUCTIVE PULMONARY DISEASE, UNSPECIFIED COPD TYPE: ICD-10-CM

## 2022-03-14 RX ORDER — ALBUTEROL SULFATE 90 UG/1
AEROSOL, METERED RESPIRATORY (INHALATION)
Qty: 8.5 G | Refills: 3 | Status: SHIPPED | OUTPATIENT
Start: 2022-03-14 | End: 2023-03-20

## 2022-04-18 ENCOUNTER — OFFICE VISIT (OUTPATIENT)
Dept: CARDIOLOGY | Facility: CLINIC | Age: 82
End: 2022-04-18

## 2022-04-18 ENCOUNTER — LAB (OUTPATIENT)
Dept: LAB | Facility: HOSPITAL | Age: 82
End: 2022-04-18

## 2022-04-18 ENCOUNTER — HOSPITAL ENCOUNTER (OUTPATIENT)
Dept: CARDIOLOGY | Facility: HOSPITAL | Age: 82
Discharge: HOME | End: 2022-04-18

## 2022-04-18 ENCOUNTER — HOSPITAL ENCOUNTER (OUTPATIENT)
Dept: CT IMAGING | Facility: HOSPITAL | Age: 82
Discharge: HOME | End: 2022-04-18

## 2022-04-18 VITALS — BODY MASS INDEX: 16.03 KG/M2 | WEIGHT: 96.2 LBS | HEIGHT: 65 IN

## 2022-04-18 DIAGNOSIS — Z86.79 S/P ASCENDING AORTIC ANEURYSM REPAIR: Primary | ICD-10-CM

## 2022-04-18 DIAGNOSIS — R09.02 HYPOXIA: ICD-10-CM

## 2022-04-18 DIAGNOSIS — R06.09 DYSPNEA ON EXERTION: ICD-10-CM

## 2022-04-18 DIAGNOSIS — R60.0 BILATERAL LOWER EXTREMITY EDEMA: ICD-10-CM

## 2022-04-18 DIAGNOSIS — R06.09 DYSPNEA ON EXERTION: Primary | ICD-10-CM

## 2022-04-18 DIAGNOSIS — I73.9 PVD (PERIPHERAL VASCULAR DISEASE): ICD-10-CM

## 2022-04-18 DIAGNOSIS — Z98.890 S/P ASCENDING AORTIC ANEURYSM REPAIR: Primary | ICD-10-CM

## 2022-04-18 DIAGNOSIS — I73.9 CLAUDICATION OF BOTH LOWER EXTREMITIES: ICD-10-CM

## 2022-04-18 DIAGNOSIS — I25.10 CORONARY ARTERY DISEASE INVOLVING NATIVE CORONARY ARTERY OF NATIVE HEART WITHOUT ANGINA PECTORIS: ICD-10-CM

## 2022-04-18 LAB
ALBUMIN SERPL-MCNC: 4.1 G/DL (ref 3.5–5.2)
ALBUMIN/GLOB SERPL: 1.6 G/DL
ALP SERPL-CCNC: 111 U/L (ref 39–117)
ALT SERPL W P-5'-P-CCNC: 33 U/L (ref 1–33)
ANION GAP SERPL CALCULATED.3IONS-SCNC: 11 MMOL/L (ref 5–15)
AST SERPL-CCNC: 22 U/L (ref 1–32)
BH CV LOWER VASCULAR LEFT COMMON FEMORAL AUGMENT: NORMAL
BH CV LOWER VASCULAR LEFT COMMON FEMORAL COMPETENT: NORMAL
BH CV LOWER VASCULAR LEFT COMMON FEMORAL COMPRESS: NORMAL
BH CV LOWER VASCULAR LEFT COMMON FEMORAL PHASIC: NORMAL
BH CV LOWER VASCULAR LEFT COMMON FEMORAL SPONT: NORMAL
BH CV LOWER VASCULAR LEFT DISTAL FEMORAL COMPRESS: NORMAL
BH CV LOWER VASCULAR LEFT GASTRONEMIUS COMPRESS: NORMAL
BH CV LOWER VASCULAR LEFT GREATER SAPH AK COMPRESS: NORMAL
BH CV LOWER VASCULAR LEFT GREATER SAPH BK COMPRESS: NORMAL
BH CV LOWER VASCULAR LEFT LESSER SAPH COMPRESS: NORMAL
BH CV LOWER VASCULAR LEFT MID FEMORAL AUGMENT: NORMAL
BH CV LOWER VASCULAR LEFT MID FEMORAL COMPETENT: NORMAL
BH CV LOWER VASCULAR LEFT MID FEMORAL COMPRESS: NORMAL
BH CV LOWER VASCULAR LEFT MID FEMORAL PHASIC: NORMAL
BH CV LOWER VASCULAR LEFT MID FEMORAL SPONT: NORMAL
BH CV LOWER VASCULAR LEFT PERONEAL COMPRESS: NORMAL
BH CV LOWER VASCULAR LEFT POPLITEAL AUGMENT: NORMAL
BH CV LOWER VASCULAR LEFT POPLITEAL COMPETENT: NORMAL
BH CV LOWER VASCULAR LEFT POPLITEAL COMPRESS: NORMAL
BH CV LOWER VASCULAR LEFT POPLITEAL PHASIC: NORMAL
BH CV LOWER VASCULAR LEFT POPLITEAL SPONT: NORMAL
BH CV LOWER VASCULAR LEFT POSTERIOR TIBIAL COMPRESS: NORMAL
BH CV LOWER VASCULAR LEFT PROFUNDA FEMORAL COMPRESS: NORMAL
BH CV LOWER VASCULAR LEFT PROXIMAL FEMORAL COMPRESS: NORMAL
BH CV LOWER VASCULAR LEFT SAPHENOFEMORAL JUNCTION COMPRESS: NORMAL
BH CV LOWER VASCULAR RIGHT COMMON FEMORAL AUGMENT: NORMAL
BH CV LOWER VASCULAR RIGHT COMMON FEMORAL COMPETENT: NORMAL
BH CV LOWER VASCULAR RIGHT COMMON FEMORAL COMPRESS: NORMAL
BH CV LOWER VASCULAR RIGHT COMMON FEMORAL PHASIC: NORMAL
BH CV LOWER VASCULAR RIGHT COMMON FEMORAL SPONT: NORMAL
BH CV LOWER VASCULAR RIGHT DISTAL FEMORAL COMPRESS: NORMAL
BH CV LOWER VASCULAR RIGHT GASTRONEMIUS COMPRESS: NORMAL
BH CV LOWER VASCULAR RIGHT GREATER SAPH AK COMPRESS: NORMAL
BH CV LOWER VASCULAR RIGHT GREATER SAPH BK COMPRESS: NORMAL
BH CV LOWER VASCULAR RIGHT LESSER SAPH COMPRESS: NORMAL
BH CV LOWER VASCULAR RIGHT MID FEMORAL AUGMENT: NORMAL
BH CV LOWER VASCULAR RIGHT MID FEMORAL COMPETENT: NORMAL
BH CV LOWER VASCULAR RIGHT MID FEMORAL COMPRESS: NORMAL
BH CV LOWER VASCULAR RIGHT MID FEMORAL PHASIC: NORMAL
BH CV LOWER VASCULAR RIGHT MID FEMORAL SPONT: NORMAL
BH CV LOWER VASCULAR RIGHT PERONEAL COMPRESS: NORMAL
BH CV LOWER VASCULAR RIGHT POPLITEAL AUGMENT: NORMAL
BH CV LOWER VASCULAR RIGHT POPLITEAL COMPETENT: NORMAL
BH CV LOWER VASCULAR RIGHT POPLITEAL COMPRESS: NORMAL
BH CV LOWER VASCULAR RIGHT POPLITEAL PHASIC: NORMAL
BH CV LOWER VASCULAR RIGHT POPLITEAL SPONT: NORMAL
BH CV LOWER VASCULAR RIGHT POSTERIOR TIBIAL COMPRESS: NORMAL
BH CV LOWER VASCULAR RIGHT PROFUNDA FEMORAL COMPRESS: NORMAL
BH CV LOWER VASCULAR RIGHT PROXIMAL FEMORAL COMPRESS: NORMAL
BH CV LOWER VASCULAR RIGHT SAPHENOFEMORAL JUNCTION COMPRESS: NORMAL
BILIRUB SERPL-MCNC: 0.9 MG/DL (ref 0–1.2)
BUN SERPL-MCNC: 17 MG/DL (ref 8–23)
BUN/CREAT SERPL: 17.7 (ref 7–25)
CALCIUM SPEC-SCNC: 9.2 MG/DL (ref 8.6–10.5)
CHLORIDE SERPL-SCNC: 104 MMOL/L (ref 98–107)
CO2 SERPL-SCNC: 29 MMOL/L (ref 22–29)
CREAT SERPL-MCNC: 0.96 MG/DL (ref 0.57–1)
D DIMER PPP FEU-MCNC: 2.55 MCGFEU/ML (ref 0–0.49)
DEPRECATED RDW RBC AUTO: 41.8 FL (ref 37–54)
EGFRCR SERPLBLD CKD-EPI 2021: 59.6 ML/MIN/1.73
ERYTHROCYTE [DISTWIDTH] IN BLOOD BY AUTOMATED COUNT: 12.3 % (ref 12.3–15.4)
GLOBULIN UR ELPH-MCNC: 2.5 GM/DL
GLUCOSE SERPL-MCNC: 136 MG/DL (ref 65–99)
HCT VFR BLD AUTO: 44.1 % (ref 34–46.6)
HGB BLD-MCNC: 14 G/DL (ref 12–15.9)
MAXIMAL PREDICTED HEART RATE: 139 BPM
MCH RBC QN AUTO: 29.2 PG (ref 26.6–33)
MCHC RBC AUTO-ENTMCNC: 31.7 G/DL (ref 31.5–35.7)
MCV RBC AUTO: 91.9 FL (ref 79–97)
NT-PROBNP SERPL-MCNC: ABNORMAL PG/ML (ref 0–1800)
PLATELET # BLD AUTO: 294 10*3/MM3 (ref 140–450)
PMV BLD AUTO: 9.2 FL (ref 6–12)
POTASSIUM SERPL-SCNC: 3.7 MMOL/L (ref 3.5–5.2)
PROT SERPL-MCNC: 6.6 G/DL (ref 6–8.5)
RBC # BLD AUTO: 4.8 10*6/MM3 (ref 3.77–5.28)
SODIUM SERPL-SCNC: 144 MMOL/L (ref 136–145)
STRESS TARGET HR: 118 BPM
TSH SERPL DL<=0.05 MIU/L-ACNC: 3.03 UIU/ML (ref 0.27–4.2)
WBC NRBC COR # BLD: 5.69 10*3/MM3 (ref 3.4–10.8)

## 2022-04-18 PROCEDURE — 36415 COLL VENOUS BLD VENIPUNCTURE: CPT

## 2022-04-18 PROCEDURE — 71275 CT ANGIOGRAPHY CHEST: CPT

## 2022-04-18 PROCEDURE — 84443 ASSAY THYROID STIM HORMONE: CPT

## 2022-04-18 PROCEDURE — 80053 COMPREHEN METABOLIC PANEL: CPT

## 2022-04-18 PROCEDURE — 93970 EXTREMITY STUDY: CPT

## 2022-04-18 PROCEDURE — 0 IOPAMIDOL PER 1 ML: Performed by: NURSE PRACTITIONER

## 2022-04-18 PROCEDURE — 85027 COMPLETE CBC AUTOMATED: CPT

## 2022-04-18 PROCEDURE — 85379 FIBRIN DEGRADATION QUANT: CPT

## 2022-04-18 PROCEDURE — 83880 ASSAY OF NATRIURETIC PEPTIDE: CPT

## 2022-04-18 PROCEDURE — 99215 OFFICE O/P EST HI 40 MIN: CPT | Performed by: NURSE PRACTITIONER

## 2022-04-18 RX ADMIN — IOPAMIDOL 100 ML: 755 INJECTION, SOLUTION INTRAVENOUS at 16:41

## 2022-04-18 NOTE — PROGRESS NOTES
Date of Office Visit: 2022  Encounter Provider: FREDRICK Alexander  Place of Service: [facility]  Patient Name: Grace Beauchamp  :1940    Chief Complaint   Patient presents with   • Coronary Artery Disease   :     HPI: Grace Beauchamp is a 81 y.o. female.  She is a patient of Dr. De Los Santos's whom we follow for the management of coronary artery disease and ascending aortic aneurysm.  In , she underwent an ascending aortic replacement, total arch replacement, and a CABG x 1.  In 2020, she presented with an LV rupture due to an occlusion of the vein graft to the OM1 and was taken urgently to the OR for a redo sternotomy.  Two months later, she presented with dyspnea and was found to have a hematoma along the wall of the left ventricle.  As such, she was taken back to the OR for another urgent redo sternotomy with extensive lysis of adhesions, repair of the lateral wall LV aneurysm with a pericardial patch, and placement of a balloon pump.  The following day, she was taken back to the OR for wound exploration, washout, rewiring, and removal of the balloon pump.  Her recovery was slow.  She had some postoperative dysphagia and was eventually increased to mechanical soft diet.  She had some postoperative hyponatremia and elevated creatinine and renal was consulted.  Her kidney function returned to normal.  She was discharged to acute rehab.   She was last seen in the office by Dr. De Los Santos in 2021 at which time she was doing relatively well.  She denied any symptoms of angina or heart failure.  She was off of the Plavix for upcoming bladder stimulator.  She voiced to Dr. De Los Santos that she really hated the Plavix and would like to stop it altogether.  Ultimately, Dr. De Los Santos felt that would be reasonable and that she could just take 81 mg aspirin daily.  She was advised to follow-up in 1 year.   She does not feel well.  She is reporting significant  "shortness of breath with very mild exertion.  Any activity requiring her to bend over at all makes her very short of breath.  She cannot tell me when exactly the symptoms started and says they have been \"going on forever\".  Although her daughter with her today does endorses things have been progressively getting worse.  She is however able to lie flat with these.  This seems to be the only time she feels well.  Upon arrival to her appointment today, her oxygen saturation was 79%.  She was placed on 2 L of oxygen and her saturations quickly came up to 100%.  Eventually, the oxygen was removed and she maintained saturations of 98% on room air.  She reports worsening lower extremity edema.  She has an appointment with pulmonary on Friday and with her PCP on Thursday.  She denies any chest pain, palpitations, dizziness, or syncope.    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 Ontiveros   • Nocturia    • Osteoarthritis    • Osteoporosis    • Otitis media     R Ear   • Prediabetes    • PVD " (peripheral vascular disease) ([facility])    • RLS (restless legs syndrome)    • Saccular aneurysm    • Stroke ([facility])     Perioperatively w/L Hip Repl   • Thoracic ascending aortic aneurysm ([facility])     S/p Repair on 02/23/16 by Dr. Ontiveros   • TIA (transient ischemic attack)    • Urgency incontinence    • Urinary tract infection    • Venous insufficiency    • Ventral hernia        Past Surgical History:   Procedure Laterality Date   • AORTIC VALVE REPAIR/REPLACEMENT N/A 02/23/2016-[facility]    Ascending Replacement Using a 24MM Graft--Dr. Ontiveros   • APPENDECTOMY  1977   • BREAST BIOPSY Right 09/16/2012    Vacuum Assisted Core Bx of R Breast   • CARDIAC CATHETERIZATION N/A 01/06/2016    Dr. Tres De Los Santos   • CARDIAC CATHETERIZATION N/A 4/22/2019    Procedure: Left Heart Cath;  Surgeon: Tres De Los Santos MD;  Location:  [facility];  Service: Cardiology   • CARDIAC CATHETERIZATION N/A 4/22/2019    Procedure: Coronary angiography;  Surgeon: Tres De Los Santos MD;  Location:  [facility];  Service: Cardiology   • CARDIAC CATHETERIZATION N/A 7/22/2020    Procedure: LEFT HEART CATH;  Surgeon: Antonia Scott MD;  Location:  [facility];  Service: Cardiovascular;  Laterality: N/A;   • CARDIAC CATHETERIZATION N/A 7/22/2020    Procedure: CORONARY ANGIOGRAPHY;  Surgeon: Antonia Scott MD;  Location:  [facility];  Service: Cardiovascular;  Laterality: N/A;   • CARDIAC CATHETERIZATION N/A 7/22/2020    Procedure: Left ventriculography;  Surgeon: Antonia Scott MD;  Location:  [facility];  Service: Cardiovascular;  Laterality: N/A;   • CARDIAC CATHETERIZATION N/A 7/22/2020    Procedure: Saphenous Vein Graft;  Surgeon: Antonia Scott MD;  Location:  [facility];  Service: Cardiovascular;  Laterality: N/A;   • CARDIAC SURGERY  02/2016    Dr. Ontiveros/Dr. De Los Santos   • CATARACT EXTRACTION Bilateral     [facility]   • COLONOSCOPY N/A 10/2002     Dr. Negro   • CORONARY ARTERY BYPASS GRAFT  2016-[facility]    x1 w/L Vein Grafting--Dr. Ontiveros   • CORONARY ARTERY BYPASS GRAFT N/A 2020    Procedure: STERNAL EXPLORATION WITH CLOSURE AND WASHOUT, REMOVAL OF IABP, PRP;  Surgeon: Mart Ontiveros MD;  Location:  [facility];  Service: Cardiothoracic;  Laterality: N/A;   • CYST REMOVAL Right 2013    R Palm Excision of Retinacular Cyst--Dr. Karla Fish   • EPIDURAL BLOCK     • LAPAROSCOPIC CHOLECYSTECTOMY N/A 2004    Dr. JOEY Sheth   • MASTECTOMY Right 2012   • ORIF HIP FRACTURE Left 2005    w/ AMBI compression screw, Dr. Victor M Cabral   • RECTOVAGINAL FISTULA REPAIR     • THORACIC AORTIC ANEURYSM REPAIR N/A 2019    Procedure: ROSE, THORACOABDOMINAL AORTIC ANEURYSM REPAIR, VISCERAL VESSEL REPAIR, LARGE VENTRAL HERNIA REPAIR WITH MESH, CIRCULATORY ARREST, PRP;  Surgeon: Mart Ontiveros MD;  Location: [facility] MAIN OR;  Service: Cardiothoracic   • TRANSESOPHAGEAL ECHOCARDIOGRAM (ROSE) N/A 2020    Procedure: TRANSESOPHAGEAL ECHOCARDIOGRAM WITH ANESTHESIA;  Surgeon: Mart Ontiveros MD;  Location[facility] MA[facility] OR;  Service: Cardiothoracic;  Laterality: N/A;   • TUBAL ABDOMINAL LIGATION     • VENTRICULAR ANEURYSM REPAIR N/A 2020    Procedure: EMERGENT REOP STERNOTOMY, REPAIR OF LV RUPTURE, PRP, INTRAOP ROSE;  Surgeon: Mart Ontiveros MD;  Location: [facility] MAIN OR;  Service: Cardiothoracic;  Laterality: N/A;   • VENTRICULAR ANEURYSM REPAIR N/A 2020    Procedure: ROSE, MIDLINE STERNOTOMY REOP  LEFT VENTRICULAR ANEURYSM REPAIR, IABP INSERTION, PRP;  Surgeon: Mart Ontiveros MD;  Location: [facility] MA[facility] OR;  Service: Cardiothoracic;  Laterality: N/A;       Social History     Socioeconomic History   • Marital status:    Tobacco Use   • Smoking status: Former Smoker     Types: Cigarettes     Quit date: 2012     Years since quittin.3   • Smokeless tobacco: Never Used   • Tobacco comment: CAFFEINE  USE:2 CUPS COFFEE/ COKE DAILY   Substance and Sexual Activity   • Alcohol use: No   • Drug use: No   • Sexual activity: Defer     Birth control/protection: Tubal ligation       Family History   Problem Relation Age of Onset   • Alzheimer's disease Mother    • Cancer Maternal Aunt    • Heart disease Father    • Heart failure Father    • Hypertension Father    • Diabetes Father    • Liver disease Sister    • Heart failure Brother    • Hypertension Brother    • Alcohol abuse Brother    • Diabetes Brother    • No Known Problems Maternal Grandmother    • No Known Problems Maternal Grandfather    • No Known Problems Paternal Grandmother    • No Known Problems Paternal Grandfather    • Diabetes Brother    • Brain cancer Brother    • Heart disease Brother    • Diabetes Brother        Review of Systems   Constitutional: Positive for diaphoresis and malaise/fatigue.   Cardiovascular: Positive for claudication, dyspnea on exertion, leg swelling and palpitations. Negative for chest pain, orthopnea, paroxysmal nocturnal dyspnea and syncope.   Respiratory: Negative.    Hematologic/Lymphatic: Negative for bleeding problem.   Musculoskeletal: Negative for falls.   Gastrointestinal: Negative for melena.   Neurological: Positive for weakness. Negative for dizziness and light-headedness.       Allergies   Allergen Reactions   • Ramipril Other (See Comments)     Ace I  cough   • Morphine Itching   • Morphine And Related Itching   • Bactrim [Sulfamethoxazole-Trimethoprim] Itching and Rash   • Cipro [Ciprofloxacin Hcl] Rash         Current Outpatient Medications:   •  albuterol sulfate  (90 Base) MCG/ACT inhaler, INHALE TWO PUFFS BY MOUTH EVERY 6 HOURS AS NEEDED FOR WHEEZING OR SHORTNESS OF AIR, Disp: 8.5 g, Rfl: 3  •  aspirin 81 MG EC tablet, Take 81 mg by mouth Daily., Disp: , Rfl:   •  atorvastatin (LIPITOR) 40 MG tablet, Take 1 tablet by mouth Every Night., Disp: 90 tablet, Rfl: 2  •  budesonide (PULMICORT) 0.5 MG/2ML  nebulizer solution, Take 2 mL by nebulization 2 (Two) Times a Day. (Patient taking differently: Take 0.5 mg by nebulization 2 (Two) Times a Day As Needed.), Disp: 120 mL, Rfl: 0  •  cetirizine (zyrTEC) 10 MG tablet, Take 1 tablet by mouth As Needed for Allergies. (Patient taking differently: Take 10 mg by mouth Daily As Needed for Allergies.), Disp: 30 tablet, Rfl: 0  •  Coal Tar-Menthol 1.8-1.5 % shampoo, Apply 1 application topically 2 (Two) Times a Week. Do not get in eyes. Use twice weekly maximum., Disp: 296 mL, Rfl: 0  •  ferrous sulfate 325 (65 FE) MG EC tablet, Take 1 tablet by mouth Daily With Breakfast., Disp: 90 tablet, Rfl: 0  •  Fluticasone-Umeclidin-Vilant (Trelegy Ellipta) 100-62.5-25 MCG/INH inhaler, Inhale 1 puff Daily., Disp: 90 each, Rfl: 3  •  ipratropium-albuterol (DUO-NEB) 0.5-2.5 mg/3 ml nebulizer, Take 3 mL by nebulization 3 (Three) Times a Day., Disp: 360 mL, Rfl: 0  •  losartan (COZAAR) 50 MG tablet, TAKE ONE TABLET BY MOUTH DAILY (Patient taking differently: Take 50 mg by mouth Daily.), Disp: 90 tablet, Rfl: 2  •  meclizine (ANTIVERT) 12.5 MG tablet, Take 1 tablet by mouth 3 (Three) Times a Day As Needed for Dizziness., Disp: 21 tablet, Rfl: 0  •  metoprolol succinate XL (TOPROL-XL) 50 MG 24 hr tablet, TAKE ONE TABLET BY MOUTH EVERY 12 HOURS (Patient taking differently: Take 50 mg by mouth Every 12 (Twelve) Hours.), Disp: 180 tablet, Rfl: 2  •  multivitamin (THERAGRAN) tablet tablet, Take 1 tablet by mouth Daily., Disp: 90 tablet, Rfl: 0  •  pantoprazole (PROTONIX) 40 MG EC tablet, TAKE ONE TABLET BY MOUTH EVERY MORNING, Disp: 90 tablet, Rfl: 3  •  pramipexole (MIRAPEX) 0.125 MG tablet, TAKE ONE TABLET BY MOUTH EVERY NIGHT AT BEDTIME (Patient taking differently: Take 0.125 mg by mouth Every Night.), Disp: 90 tablet, Rfl: 1  •  sennosides-docusate (PERICOLACE) 8.6-50 MG per tablet, Take 2 tablets by mouth Daily As Needed for Constipation., Disp: , Rfl:   •  sertraline (ZOLOFT) 50 MG tablet,  "Take 1 tablet by mouth Every Morning., Disp: 30 tablet, Rfl: 2  •  TOVIAZ 4 MG tablet sustained-release 24 hour tablet, Take 4 mg by mouth Daily., Disp: , Rfl:   •  traMADol (ULTRAM) 50 MG tablet, TAKE ONE TABLET BY MOUTH EVERY 8 HOURS AS NEEDED FOR MODERATE OR SEVERE PAIN (Patient taking differently: Take 50 mg by mouth Every 8 (Eight) Hours As Needed.), Disp: 90 tablet, Rfl: 0  •  Trelegy Ellipta 100-62.5-25 MCG/INH inhaler, INHALE ONE PUFF BY MOUTH DAILY, Disp: 180 each, Rfl: 3  •  Vitamin D, Cholecalciferol, 50 MCG (2000 UT) capsule, Take 2,000 Units by mouth Daily., Disp: 30 capsule, Rfl:       Objective:     Vitals:    04/18/22 1358   Weight: 43.6 kg (96 lb 3.2 oz)   Height: 165.1 cm (65\")     Body mass index is 16.01 kg/m².    PHYSICAL EXAM:    Neck:      Vascular: No JVD.   Pulmonary:      Effort: Pulmonary effort is normal.      Breath sounds: Decreased breath sounds present.   Cardiovascular:      Normal rate. Regular rhythm.      Murmurs: There is no murmur.      No gallop. No click. No rub.   Pulses:     Intact distal pulses.   Edema:     Peripheral edema present.        Procedures      Assessment:       Diagnosis Plan   1. S/P ascending aortic aneurysm repair     2. Coronary artery disease involving native coronary artery of native heart without angina pectoris     3. Dyspnea on exertion  CBC (No Diff)    Comprehensive Metabolic Panel    D-dimer, Quantitative    proBNP    TSH    Duplex Venous Lower Extremity - Bilateral CAR    Adult Transthoracic Echo Complete W/ Cont if Necessary Per Protocol   4. Hypoxia     5. PVD (peripheral vascular disease) ([facility])     6. Claudication of both lower extremities (HCC)     7. Bilateral lower extremity edema  TSH    Duplex Venous Lower Extremity - Bilateral CAR     Orders Placed This Encounter   Procedures   • CBC (No Diff)     Standing Status:   Future     Number of Occurrences:   1     Standing Expiration Date:   4/18/2023     Order Specific Question:   Release to " patient     Answer:   Immediate   • Comprehensive Metabolic Panel     Standing Status:   Future     Number of Occurrences:   1     Standing Expiration Date:   4/18/2023     Order Specific Question:   Release to patient     Answer:   Immediate   • D-dimer, Quantitative     Standing Status:   Future     Number of Occurrences:   1     Standing Expiration Date:   4/18/2023     Order Specific Question:   Release to patient     Answer:   Immediate   • proBNP     Standing Status:   Future     Number of Occurrences:   1     Standing Expiration Date:   4/18/2023     Order Specific Question:   Release to patient     Answer:   Immediate   • TSH     Standing Status:   Future     Number of Occurrences:   1     Standing Expiration Date:   4/18/2023     Order Specific Question:   Release to patient     Answer:   Immediate   • Adult Transthoracic Echo Complete W/ Cont if Necessary Per Protocol     Standing Status:   Future     Standing Expiration Date:   4/18/2023     Order Specific Question:   Reason for exam?     Answer:   Dyspnea          Plan:       1.  Ascending aortic aneurysm.  Status postrepair in 2016.      2.  Coronary artery disease.  Status post CABG x 1 with a SVG to OM1 in 2016.  In 2020, she presented with an LV rupture due to an occluded vein graft to the OM1 and underwent redo sternotomy.  2 months later she developed a hematoma along the wall of the left ventricle and was taken for a redo sternotomy with extensive lysis of adhesions and repair of the lateral wall LV aneurysm with a pericardial patch.       3.  Dyspnea on exertion/hypoxia.  Fortunately, the hypoxia resolved with supplemental oxygen.  She was maintaining adequate oxygenation room air prior to leaving.  I did recommend she consider evaluation in the ED.  However, she declined.  I am going to check labs today including a proBNP, D-dimer, CBC, CMP, and TSH.  If the D-dimer is elevated, we we will proceed with a CTA of the chest.  If the proBNP is  elevated, I will start her on Lasix.  Lastly, I am ordering an echocardiogram.  She has an appointment with pulmonary this Friday.      4.  PVD.  She has significant peripheral vascular disease and has previously followed with Dr. Smith.  She is was previously on Pletal which has been discontinued.  She tells me she would not be interested in any kind of surgical intervention.  Overall, her claudication seems stable.      5.  Lower extremity edema.  I think it would be reasonable to rule out a blood clot and I have ordered a lower extremity duplex.      Further recommendations will be made pending the results of her labs today.      I spent 53 minutes in total including but not limited to the 25 minutes spent in direct conversation with this patient.       As always, it has been a pleasure to participate in your patient's care.      Sincerely,         FREDRICK Bryant

## 2022-04-18 NOTE — PROGRESS NOTES
Today's preliminary report. Bilateral lower extremity venous Doppler is negative for DVT. Report called to FREDRICK Bryant who spoke with patient directly on the phone, prior to leaving. Patient understands 's instructions.

## 2022-04-18 NOTE — NURSING NOTE
Protective goggles and mask in place with all patient interactions today. Stat hold & call CT chest to r/o PE negative per Dr Francis. Results called to Columba ALCALA. Patient may discharge home. IV d/c'd intact. Patient ambulated to main entrance for discharge.

## 2022-04-19 ENCOUNTER — TELEPHONE (OUTPATIENT)
Dept: CARDIOLOGY | Facility: CLINIC | Age: 82
End: 2022-04-19

## 2022-04-19 ENCOUNTER — PATIENT MESSAGE (OUTPATIENT)
Dept: CARDIOLOGY | Facility: CLINIC | Age: 82
End: 2022-04-19

## 2022-04-19 DIAGNOSIS — R06.09 DYSPNEA ON EXERTION: Primary | ICD-10-CM

## 2022-04-19 RX ORDER — POTASSIUM CHLORIDE 750 MG/1
10 TABLET, FILM COATED, EXTENDED RELEASE ORAL DAILY
Qty: 30 TABLET | Refills: 3 | Status: SHIPPED | OUTPATIENT
Start: 2022-04-19 | End: 2023-04-03

## 2022-04-19 RX ORDER — FUROSEMIDE 40 MG/1
40 TABLET ORAL DAILY
Qty: 30 TABLET | Refills: 3 | Status: SHIPPED | OUTPATIENT
Start: 2022-04-19 | End: 2022-05-03 | Stop reason: HOSPADM

## 2022-04-19 NOTE — TELEPHONE ENCOUNTER
I left her a voicemail to call me.  Fortunately her CTA of the chest was negative for PE.  However, her proBNP was elevated and she needs to start Lasix.  She will also need a BMP in 1 week.

## 2022-04-22 ENCOUNTER — APPOINTMENT (OUTPATIENT)
Dept: CT IMAGING | Facility: HOSPITAL | Age: 82
End: 2022-04-22

## 2022-04-22 ENCOUNTER — HOSPITAL ENCOUNTER (INPATIENT)
Facility: HOSPITAL | Age: 82
LOS: 11 days | Discharge: NURSING, REHAB OR LONG-TERM CARE | End: 2022-05-03
Attending: EMERGENCY MEDICINE | Admitting: INTERNAL MEDICINE

## 2022-04-22 DIAGNOSIS — N39.0 ACUTE UTI: ICD-10-CM

## 2022-04-22 DIAGNOSIS — M15.9 PRIMARY OSTEOARTHRITIS INVOLVING MULTIPLE JOINTS: Chronic | ICD-10-CM

## 2022-04-22 DIAGNOSIS — R41.0 CONFUSION: Primary | ICD-10-CM

## 2022-04-22 DIAGNOSIS — E86.0 DEHYDRATION: ICD-10-CM

## 2022-04-22 PROBLEM — N17.9 AKI (ACUTE KIDNEY INJURY) (HCC): Status: ACTIVE | Noted: 2022-04-22

## 2022-04-22 PROBLEM — I95.9 HYPOTENSION: Status: ACTIVE | Noted: 2022-04-22

## 2022-04-22 PROBLEM — G93.41 METABOLIC ENCEPHALOPATHY: Status: ACTIVE | Noted: 2022-04-22

## 2022-04-22 PROBLEM — E87.6 HYPOKALEMIA: Status: ACTIVE | Noted: 2022-04-22

## 2022-04-22 LAB
ALBUMIN SERPL-MCNC: 4.6 G/DL (ref 3.5–5.2)
ALBUMIN/GLOB SERPL: 1.5 G/DL
ALP SERPL-CCNC: 107 U/L (ref 39–117)
ALT SERPL W P-5'-P-CCNC: 22 U/L (ref 1–33)
ANION GAP SERPL CALCULATED.3IONS-SCNC: 14.2 MMOL/L (ref 5–15)
AST SERPL-CCNC: 18 U/L (ref 1–32)
BACTERIA UR QL AUTO: ABNORMAL /HPF
BASOPHILS # BLD AUTO: 0.04 10*3/MM3 (ref 0–0.2)
BASOPHILS NFR BLD AUTO: 0.4 % (ref 0–1.5)
BILIRUB SERPL-MCNC: 1.8 MG/DL (ref 0–1.2)
BILIRUB UR QL STRIP: NEGATIVE
BUN SERPL-MCNC: 26 MG/DL (ref 8–23)
BUN/CREAT SERPL: 17 (ref 7–25)
CALCIUM SPEC-SCNC: 9.6 MG/DL (ref 8.6–10.5)
CHLORIDE SERPL-SCNC: 94 MMOL/L (ref 98–107)
CLARITY UR: ABNORMAL
CO2 SERPL-SCNC: 31.8 MMOL/L (ref 22–29)
COLOR UR: YELLOW
CREAT SERPL-MCNC: 1.53 MG/DL (ref 0.57–1)
D-LACTATE SERPL-SCNC: 1.1 MMOL/L (ref 0.5–2)
DEPRECATED RDW RBC AUTO: 40.7 FL (ref 37–54)
EGFRCR SERPLBLD CKD-EPI 2021: 34 ML/MIN/1.73
EOSINOPHIL # BLD AUTO: 0.03 10*3/MM3 (ref 0–0.4)
EOSINOPHIL NFR BLD AUTO: 0.3 % (ref 0.3–6.2)
ERYTHROCYTE [DISTWIDTH] IN BLOOD BY AUTOMATED COUNT: 12.4 % (ref 12.3–15.4)
GLOBULIN UR ELPH-MCNC: 3.1 GM/DL
GLUCOSE SERPL-MCNC: 184 MG/DL (ref 65–99)
GLUCOSE UR STRIP-MCNC: NEGATIVE MG/DL
HCT VFR BLD AUTO: 46.9 % (ref 34–46.6)
HGB BLD-MCNC: 15.1 G/DL (ref 12–15.9)
HGB UR QL STRIP.AUTO: ABNORMAL
HYALINE CASTS UR QL AUTO: ABNORMAL /LPF
IMM GRANULOCYTES # BLD AUTO: 0.03 10*3/MM3 (ref 0–0.05)
IMM GRANULOCYTES NFR BLD AUTO: 0.3 % (ref 0–0.5)
KETONES UR QL STRIP: NEGATIVE
LEUKOCYTE ESTERASE UR QL STRIP.AUTO: ABNORMAL
LYMPHOCYTES # BLD AUTO: 0.99 10*3/MM3 (ref 0.7–3.1)
LYMPHOCYTES NFR BLD AUTO: 8.9 % (ref 19.6–45.3)
MAGNESIUM SERPL-MCNC: 1.7 MG/DL (ref 1.6–2.4)
MCH RBC QN AUTO: 28.8 PG (ref 26.6–33)
MCHC RBC AUTO-ENTMCNC: 32.2 G/DL (ref 31.5–35.7)
MCV RBC AUTO: 89.5 FL (ref 79–97)
MONOCYTES # BLD AUTO: 0.62 10*3/MM3 (ref 0.1–0.9)
MONOCYTES NFR BLD AUTO: 5.6 % (ref 5–12)
NEUTROPHILS NFR BLD AUTO: 84.5 % (ref 42.7–76)
NEUTROPHILS NFR BLD AUTO: 9.4 10*3/MM3 (ref 1.7–7)
NITRITE UR QL STRIP: NEGATIVE
NRBC BLD AUTO-RTO: 0 /100 WBC (ref 0–0.2)
PH UR STRIP.AUTO: 5.5 [PH] (ref 5–8)
PLATELET # BLD AUTO: 323 10*3/MM3 (ref 140–450)
PMV BLD AUTO: 9.7 FL (ref 6–12)
POTASSIUM SERPL-SCNC: 3.4 MMOL/L (ref 3.5–5.2)
PROT SERPL-MCNC: 7.7 G/DL (ref 6–8.5)
PROT UR QL STRIP: ABNORMAL
RBC # BLD AUTO: 5.24 10*6/MM3 (ref 3.77–5.28)
RBC # UR STRIP: ABNORMAL /HPF
REF LAB TEST METHOD: ABNORMAL
SODIUM SERPL-SCNC: 140 MMOL/L (ref 136–145)
SP GR UR STRIP: 1.02 (ref 1–1.03)
SQUAMOUS #/AREA URNS HPF: ABNORMAL /HPF
UROBILINOGEN UR QL STRIP: ABNORMAL
WBC # UR STRIP: ABNORMAL /HPF
WBC NRBC COR # BLD: 11.11 10*3/MM3 (ref 3.4–10.8)

## 2022-04-22 PROCEDURE — 87086 URINE CULTURE/COLONY COUNT: CPT | Performed by: EMERGENCY MEDICINE

## 2022-04-22 PROCEDURE — U0005 INFEC AGEN DETEC AMPLI PROBE: HCPCS | Performed by: EMERGENCY MEDICINE

## 2022-04-22 PROCEDURE — 87040 BLOOD CULTURE FOR BACTERIA: CPT | Performed by: EMERGENCY MEDICINE

## 2022-04-22 PROCEDURE — 70450 CT HEAD/BRAIN W/O DYE: CPT

## 2022-04-22 PROCEDURE — 80053 COMPREHEN METABOLIC PANEL: CPT | Performed by: EMERGENCY MEDICINE

## 2022-04-22 PROCEDURE — 83605 ASSAY OF LACTIC ACID: CPT | Performed by: EMERGENCY MEDICINE

## 2022-04-22 PROCEDURE — 99284 EMERGENCY DEPT VISIT MOD MDM: CPT

## 2022-04-22 PROCEDURE — 87077 CULTURE AEROBIC IDENTIFY: CPT | Performed by: EMERGENCY MEDICINE

## 2022-04-22 PROCEDURE — 85025 COMPLETE CBC W/AUTO DIFF WBC: CPT | Performed by: EMERGENCY MEDICINE

## 2022-04-22 PROCEDURE — 25010000002 CEFTRIAXONE PER 250 MG: Performed by: EMERGENCY MEDICINE

## 2022-04-22 PROCEDURE — 81001 URINALYSIS AUTO W/SCOPE: CPT | Performed by: EMERGENCY MEDICINE

## 2022-04-22 PROCEDURE — 87186 SC STD MICRODIL/AGAR DIL: CPT | Performed by: EMERGENCY MEDICINE

## 2022-04-22 PROCEDURE — 83735 ASSAY OF MAGNESIUM: CPT | Performed by: EMERGENCY MEDICINE

## 2022-04-22 PROCEDURE — U0004 COV-19 TEST NON-CDC HGH THRU: HCPCS | Performed by: EMERGENCY MEDICINE

## 2022-04-22 PROCEDURE — 36415 COLL VENOUS BLD VENIPUNCTURE: CPT

## 2022-04-22 RX ORDER — ONDANSETRON 2 MG/ML
4 INJECTION INTRAMUSCULAR; INTRAVENOUS EVERY 6 HOURS PRN
Status: DISCONTINUED | OUTPATIENT
Start: 2022-04-22 | End: 2022-04-27 | Stop reason: SDUPTHER

## 2022-04-22 RX ORDER — ASPIRIN 81 MG/1
81 TABLET ORAL DAILY
Status: DISCONTINUED | OUTPATIENT
Start: 2022-04-23 | End: 2022-05-03 | Stop reason: HOSPADM

## 2022-04-22 RX ORDER — BUDESONIDE 0.5 MG/2ML
0.5 INHALANT ORAL
Status: DISCONTINUED | OUTPATIENT
Start: 2022-04-23 | End: 2022-04-25 | Stop reason: SDUPTHER

## 2022-04-22 RX ORDER — ACETAMINOPHEN 325 MG/1
650 TABLET ORAL EVERY 4 HOURS PRN
Status: DISCONTINUED | OUTPATIENT
Start: 2022-04-22 | End: 2022-05-03 | Stop reason: HOSPADM

## 2022-04-22 RX ORDER — POTASSIUM CHLORIDE 750 MG/1
40 TABLET, FILM COATED, EXTENDED RELEASE ORAL ONCE
Status: COMPLETED | OUTPATIENT
Start: 2022-04-23 | End: 2022-04-23

## 2022-04-22 RX ORDER — SODIUM CHLORIDE 9 MG/ML
50 INJECTION, SOLUTION INTRAVENOUS CONTINUOUS
Status: ACTIVE | OUTPATIENT
Start: 2022-04-23 | End: 2022-04-23

## 2022-04-22 RX ORDER — ALBUTEROL SULFATE 2.5 MG/3ML
2.5 SOLUTION RESPIRATORY (INHALATION) EVERY 6 HOURS PRN
Refills: 3 | Status: DISCONTINUED | OUTPATIENT
Start: 2022-04-22 | End: 2022-05-03 | Stop reason: HOSPADM

## 2022-04-22 RX ORDER — SODIUM CHLORIDE 0.9 % (FLUSH) 0.9 %
10 SYRINGE (ML) INJECTION AS NEEDED
Status: DISCONTINUED | OUTPATIENT
Start: 2022-04-22 | End: 2022-05-03 | Stop reason: HOSPADM

## 2022-04-22 RX ORDER — CETIRIZINE HYDROCHLORIDE 10 MG/1
10 TABLET ORAL DAILY PRN
Status: DISCONTINUED | OUTPATIENT
Start: 2022-04-23 | End: 2022-05-03 | Stop reason: HOSPADM

## 2022-04-22 RX ORDER — BUDESONIDE AND FORMOTEROL FUMARATE DIHYDRATE 160; 4.5 UG/1; UG/1
2 AEROSOL RESPIRATORY (INHALATION)
Status: DISCONTINUED | OUTPATIENT
Start: 2022-04-23 | End: 2022-05-03 | Stop reason: HOSPADM

## 2022-04-22 RX ORDER — IPRATROPIUM BROMIDE AND ALBUTEROL SULFATE 2.5; .5 MG/3ML; MG/3ML
3 SOLUTION RESPIRATORY (INHALATION)
Status: DISCONTINUED | OUTPATIENT
Start: 2022-04-23 | End: 2022-04-25

## 2022-04-22 RX ORDER — MELATONIN
2000 DAILY
Status: DISCONTINUED | OUTPATIENT
Start: 2022-04-23 | End: 2022-05-03 | Stop reason: HOSPADM

## 2022-04-22 RX ORDER — PANTOPRAZOLE SODIUM 40 MG/1
40 TABLET, DELAYED RELEASE ORAL EVERY MORNING
Status: DISCONTINUED | OUTPATIENT
Start: 2022-04-23 | End: 2022-05-03 | Stop reason: HOSPADM

## 2022-04-22 RX ORDER — TRAMADOL HYDROCHLORIDE 50 MG/1
50 TABLET ORAL EVERY 8 HOURS PRN
Status: DISCONTINUED | OUTPATIENT
Start: 2022-04-22 | End: 2022-05-03 | Stop reason: HOSPADM

## 2022-04-22 RX ORDER — PRAMIPEXOLE DIHYDROCHLORIDE 0.25 MG/1
0.12 TABLET ORAL NIGHTLY
Status: DISCONTINUED | OUTPATIENT
Start: 2022-04-23 | End: 2022-05-03 | Stop reason: HOSPADM

## 2022-04-22 RX ORDER — SODIUM CHLORIDE 0.9 % (FLUSH) 0.9 %
10 SYRINGE (ML) INJECTION EVERY 12 HOURS SCHEDULED
Status: DISCONTINUED | OUTPATIENT
Start: 2022-04-23 | End: 2022-05-03 | Stop reason: HOSPADM

## 2022-04-22 RX ORDER — METOPROLOL SUCCINATE 50 MG/1
50 TABLET, EXTENDED RELEASE ORAL 2 TIMES DAILY
Status: DISCONTINUED | OUTPATIENT
Start: 2022-04-23 | End: 2022-05-03 | Stop reason: HOSPADM

## 2022-04-22 RX ADMIN — SODIUM CHLORIDE 500 ML: 9 INJECTION, SOLUTION INTRAVENOUS at 18:12

## 2022-04-22 RX ADMIN — CEFTRIAXONE 2 G: 2 INJECTION, POWDER, FOR SOLUTION INTRAMUSCULAR; INTRAVENOUS at 20:53

## 2022-04-22 RX ADMIN — SODIUM CHLORIDE 500 ML: 9 INJECTION, SOLUTION INTRAVENOUS at 20:53

## 2022-04-23 LAB
ALBUMIN SERPL-MCNC: 3.4 G/DL (ref 3.5–5.2)
ALBUMIN/GLOB SERPL: 1.3 G/DL
ALP SERPL-CCNC: 81 U/L (ref 39–117)
ALT SERPL W P-5'-P-CCNC: 16 U/L (ref 1–33)
ANION GAP SERPL CALCULATED.3IONS-SCNC: 11 MMOL/L (ref 5–15)
AST SERPL-CCNC: 11 U/L (ref 1–32)
BASOPHILS # BLD AUTO: 0.03 10*3/MM3 (ref 0–0.2)
BASOPHILS NFR BLD AUTO: 0.4 % (ref 0–1.5)
BILIRUB SERPL-MCNC: 0.5 MG/DL (ref 0–1.2)
BUN SERPL-MCNC: 28 MG/DL (ref 8–23)
BUN/CREAT SERPL: 23.7 (ref 7–25)
CALCIUM SPEC-SCNC: 8.3 MG/DL (ref 8.6–10.5)
CHLORIDE SERPL-SCNC: 103 MMOL/L (ref 98–107)
CO2 SERPL-SCNC: 27 MMOL/L (ref 22–29)
CREAT SERPL-MCNC: 1.18 MG/DL (ref 0.57–1)
DEPRECATED RDW RBC AUTO: 41.4 FL (ref 37–54)
EGFRCR SERPLBLD CKD-EPI 2021: 46.5 ML/MIN/1.73
EOSINOPHIL # BLD AUTO: 0.06 10*3/MM3 (ref 0–0.4)
EOSINOPHIL NFR BLD AUTO: 0.8 % (ref 0.3–6.2)
ERYTHROCYTE [DISTWIDTH] IN BLOOD BY AUTOMATED COUNT: 12.7 % (ref 12.3–15.4)
GLOBULIN UR ELPH-MCNC: 2.6 GM/DL
GLUCOSE SERPL-MCNC: 145 MG/DL (ref 65–99)
HBA1C MFR BLD: 7.1 % (ref 4.8–5.6)
HCT VFR BLD AUTO: 38.6 % (ref 34–46.6)
HGB BLD-MCNC: 12.6 G/DL (ref 12–15.9)
IMM GRANULOCYTES # BLD AUTO: 0.03 10*3/MM3 (ref 0–0.05)
IMM GRANULOCYTES NFR BLD AUTO: 0.4 % (ref 0–0.5)
LYMPHOCYTES # BLD AUTO: 0.91 10*3/MM3 (ref 0.7–3.1)
LYMPHOCYTES NFR BLD AUTO: 12.1 % (ref 19.6–45.3)
MAGNESIUM SERPL-MCNC: 1.6 MG/DL (ref 1.6–2.4)
MCH RBC QN AUTO: 29.1 PG (ref 26.6–33)
MCHC RBC AUTO-ENTMCNC: 32.6 G/DL (ref 31.5–35.7)
MCV RBC AUTO: 89.1 FL (ref 79–97)
MONOCYTES # BLD AUTO: 0.49 10*3/MM3 (ref 0.1–0.9)
MONOCYTES NFR BLD AUTO: 6.5 % (ref 5–12)
NEUTROPHILS NFR BLD AUTO: 6.02 10*3/MM3 (ref 1.7–7)
NEUTROPHILS NFR BLD AUTO: 79.8 % (ref 42.7–76)
NRBC BLD AUTO-RTO: 0 /100 WBC (ref 0–0.2)
NT-PROBNP SERPL-MCNC: 2856 PG/ML (ref 0–1800)
PLATELET # BLD AUTO: 237 10*3/MM3 (ref 140–450)
PMV BLD AUTO: 9.6 FL (ref 6–12)
POTASSIUM SERPL-SCNC: 3.6 MMOL/L (ref 3.5–5.2)
PROT SERPL-MCNC: 6 G/DL (ref 6–8.5)
RBC # BLD AUTO: 4.33 10*6/MM3 (ref 3.77–5.28)
SARS-COV-2 ORF1AB RESP QL NAA+PROBE: NOT DETECTED
SODIUM SERPL-SCNC: 141 MMOL/L (ref 136–145)
WBC NRBC COR # BLD: 7.54 10*3/MM3 (ref 3.4–10.8)

## 2022-04-23 PROCEDURE — 94664 DEMO&/EVAL PT USE INHALER: CPT

## 2022-04-23 PROCEDURE — 80053 COMPREHEN METABOLIC PANEL: CPT | Performed by: HOSPITALIST

## 2022-04-23 PROCEDURE — 85025 COMPLETE CBC W/AUTO DIFF WBC: CPT | Performed by: HOSPITALIST

## 2022-04-23 PROCEDURE — 94640 AIRWAY INHALATION TREATMENT: CPT

## 2022-04-23 PROCEDURE — 83735 ASSAY OF MAGNESIUM: CPT | Performed by: HOSPITALIST

## 2022-04-23 PROCEDURE — 94799 UNLISTED PULMONARY SVC/PX: CPT

## 2022-04-23 PROCEDURE — 83880 ASSAY OF NATRIURETIC PEPTIDE: CPT | Performed by: HOSPITALIST

## 2022-04-23 PROCEDURE — 25010000002 CEFTRIAXONE PER 250 MG: Performed by: HOSPITALIST

## 2022-04-23 PROCEDURE — 83036 HEMOGLOBIN GLYCOSYLATED A1C: CPT | Performed by: HOSPITALIST

## 2022-04-23 PROCEDURE — 94761 N-INVAS EAR/PLS OXIMETRY MLT: CPT

## 2022-04-23 RX ADMIN — Medication 10 ML: at 09:09

## 2022-04-23 RX ADMIN — PANTOPRAZOLE SODIUM 40 MG: 40 TABLET, DELAYED RELEASE ORAL at 06:21

## 2022-04-23 RX ADMIN — ACETAMINOPHEN 650 MG: 325 TABLET ORAL at 18:17

## 2022-04-23 RX ADMIN — SODIUM CHLORIDE 50 ML/HR: 9 INJECTION, SOLUTION INTRAVENOUS at 01:12

## 2022-04-23 RX ADMIN — POTASSIUM CHLORIDE 40 MEQ: 750 TABLET, EXTENDED RELEASE ORAL at 01:15

## 2022-04-23 RX ADMIN — METOPROLOL SUCCINATE 50 MG: 50 TABLET, EXTENDED RELEASE ORAL at 09:09

## 2022-04-23 RX ADMIN — IPRATROPIUM BROMIDE AND ALBUTEROL SULFATE 3 ML: .5; 3 SOLUTION RESPIRATORY (INHALATION) at 14:53

## 2022-04-23 RX ADMIN — ASPIRIN 81 MG: 81 TABLET, COATED ORAL at 09:09

## 2022-04-23 RX ADMIN — CEFTRIAXONE 1 G: 1 INJECTION, POWDER, FOR SOLUTION INTRAMUSCULAR; INTRAVENOUS at 20:30

## 2022-04-23 RX ADMIN — TIOTROPIUM BROMIDE INHALATION SPRAY 1 PUFF: 3.12 SPRAY, METERED RESPIRATORY (INHALATION) at 08:13

## 2022-04-23 RX ADMIN — Medication 10 ML: at 01:15

## 2022-04-23 RX ADMIN — BUDESONIDE AND FORMOTEROL FUMARATE DIHYDRATE 2 PUFF: 160; 4.5 AEROSOL RESPIRATORY (INHALATION) at 08:14

## 2022-04-23 RX ADMIN — PRAMIPEXOLE DIHYDROCHLORIDE 0.12 MG: 0.25 TABLET ORAL at 01:10

## 2022-04-23 RX ADMIN — PRAMIPEXOLE DIHYDROCHLORIDE 0.12 MG: 0.25 TABLET ORAL at 20:30

## 2022-04-23 RX ADMIN — METOPROLOL SUCCINATE 50 MG: 50 TABLET, EXTENDED RELEASE ORAL at 20:30

## 2022-04-23 RX ADMIN — BUDESONIDE AND FORMOTEROL FUMARATE DIHYDRATE 2 PUFF: 160; 4.5 AEROSOL RESPIRATORY (INHALATION) at 20:12

## 2022-04-23 RX ADMIN — METOPROLOL SUCCINATE 50 MG: 50 TABLET, EXTENDED RELEASE ORAL at 01:10

## 2022-04-23 RX ADMIN — Medication 2000 UNITS: at 09:09

## 2022-04-23 RX ADMIN — Medication 10 ML: at 20:30

## 2022-04-24 ENCOUNTER — APPOINTMENT (OUTPATIENT)
Dept: CARDIOLOGY | Facility: HOSPITAL | Age: 82
End: 2022-04-24

## 2022-04-24 ENCOUNTER — APPOINTMENT (OUTPATIENT)
Dept: GENERAL RADIOLOGY | Facility: HOSPITAL | Age: 82
End: 2022-04-24

## 2022-04-24 LAB
ANION GAP SERPL CALCULATED.3IONS-SCNC: 11 MMOL/L (ref 5–15)
BASOPHILS # BLD AUTO: 0.02 10*3/MM3 (ref 0–0.2)
BASOPHILS NFR BLD AUTO: 0.3 % (ref 0–1.5)
BUN SERPL-MCNC: 23 MG/DL (ref 8–23)
BUN/CREAT SERPL: 23 (ref 7–25)
CALCIUM SPEC-SCNC: 8.3 MG/DL (ref 8.6–10.5)
CHLORIDE SERPL-SCNC: 103 MMOL/L (ref 98–107)
CO2 SERPL-SCNC: 25 MMOL/L (ref 22–29)
CREAT SERPL-MCNC: 1 MG/DL (ref 0.57–1)
DEPRECATED RDW RBC AUTO: 40.6 FL (ref 37–54)
EGFRCR SERPLBLD CKD-EPI 2021: 56.7 ML/MIN/1.73
EOSINOPHIL # BLD AUTO: 0.12 10*3/MM3 (ref 0–0.4)
EOSINOPHIL NFR BLD AUTO: 2 % (ref 0.3–6.2)
ERYTHROCYTE [DISTWIDTH] IN BLOOD BY AUTOMATED COUNT: 12.2 % (ref 12.3–15.4)
GLUCOSE SERPL-MCNC: 127 MG/DL (ref 65–99)
HCT VFR BLD AUTO: 37.7 % (ref 34–46.6)
HGB BLD-MCNC: 11.8 G/DL (ref 12–15.9)
IMM GRANULOCYTES # BLD AUTO: 0.01 10*3/MM3 (ref 0–0.05)
IMM GRANULOCYTES NFR BLD AUTO: 0.2 % (ref 0–0.5)
LYMPHOCYTES # BLD AUTO: 1.09 10*3/MM3 (ref 0.7–3.1)
LYMPHOCYTES NFR BLD AUTO: 17.9 % (ref 19.6–45.3)
MCH RBC QN AUTO: 28.6 PG (ref 26.6–33)
MCHC RBC AUTO-ENTMCNC: 31.3 G/DL (ref 31.5–35.7)
MCV RBC AUTO: 91.5 FL (ref 79–97)
MONOCYTES # BLD AUTO: 0.41 10*3/MM3 (ref 0.1–0.9)
MONOCYTES NFR BLD AUTO: 6.7 % (ref 5–12)
NEUTROPHILS NFR BLD AUTO: 4.43 10*3/MM3 (ref 1.7–7)
NEUTROPHILS NFR BLD AUTO: 72.9 % (ref 42.7–76)
NRBC BLD AUTO-RTO: 0 /100 WBC (ref 0–0.2)
PLATELET # BLD AUTO: 242 10*3/MM3 (ref 140–450)
PMV BLD AUTO: 9.3 FL (ref 6–12)
POTASSIUM SERPL-SCNC: 3.6 MMOL/L (ref 3.5–5.2)
RBC # BLD AUTO: 4.12 10*6/MM3 (ref 3.77–5.28)
SODIUM SERPL-SCNC: 139 MMOL/L (ref 136–145)
WBC NRBC COR # BLD: 6.08 10*3/MM3 (ref 3.4–10.8)

## 2022-04-24 PROCEDURE — 93306 TTE W/DOPPLER COMPLETE: CPT | Performed by: INTERNAL MEDICINE

## 2022-04-24 PROCEDURE — 94761 N-INVAS EAR/PLS OXIMETRY MLT: CPT

## 2022-04-24 PROCEDURE — 80048 BASIC METABOLIC PNL TOTAL CA: CPT | Performed by: HOSPITALIST

## 2022-04-24 PROCEDURE — 94664 DEMO&/EVAL PT USE INHALER: CPT

## 2022-04-24 PROCEDURE — 85025 COMPLETE CBC W/AUTO DIFF WBC: CPT | Performed by: HOSPITALIST

## 2022-04-24 PROCEDURE — 25010000002 CEFTRIAXONE PER 250 MG: Performed by: HOSPITALIST

## 2022-04-24 PROCEDURE — 94799 UNLISTED PULMONARY SVC/PX: CPT

## 2022-04-24 PROCEDURE — 93306 TTE W/DOPPLER COMPLETE: CPT

## 2022-04-24 PROCEDURE — 97161 PT EVAL LOW COMPLEX 20 MIN: CPT

## 2022-04-24 PROCEDURE — 94760 N-INVAS EAR/PLS OXIMETRY 1: CPT

## 2022-04-24 PROCEDURE — 71045 X-RAY EXAM CHEST 1 VIEW: CPT

## 2022-04-24 PROCEDURE — 97530 THERAPEUTIC ACTIVITIES: CPT

## 2022-04-24 RX ADMIN — CEFTRIAXONE 1 G: 1 INJECTION, POWDER, FOR SOLUTION INTRAMUSCULAR; INTRAVENOUS at 21:40

## 2022-04-24 RX ADMIN — Medication 10 ML: at 08:11

## 2022-04-24 RX ADMIN — BUDESONIDE AND FORMOTEROL FUMARATE DIHYDRATE 2 PUFF: 160; 4.5 AEROSOL RESPIRATORY (INHALATION) at 09:18

## 2022-04-24 RX ADMIN — METOPROLOL SUCCINATE 50 MG: 50 TABLET, EXTENDED RELEASE ORAL at 21:40

## 2022-04-24 RX ADMIN — PANTOPRAZOLE SODIUM 40 MG: 40 TABLET, DELAYED RELEASE ORAL at 06:56

## 2022-04-24 RX ADMIN — ASPIRIN 81 MG: 81 TABLET, COATED ORAL at 08:11

## 2022-04-24 RX ADMIN — PRAMIPEXOLE DIHYDROCHLORIDE 0.12 MG: 0.25 TABLET ORAL at 21:40

## 2022-04-24 RX ADMIN — Medication 2000 UNITS: at 08:11

## 2022-04-24 RX ADMIN — IPRATROPIUM BROMIDE AND ALBUTEROL SULFATE 3 ML: .5; 3 SOLUTION RESPIRATORY (INHALATION) at 13:45

## 2022-04-24 RX ADMIN — Medication 10 ML: at 21:40

## 2022-04-24 RX ADMIN — METOPROLOL SUCCINATE 50 MG: 50 TABLET, EXTENDED RELEASE ORAL at 06:59

## 2022-04-24 RX ADMIN — TIOTROPIUM BROMIDE INHALATION SPRAY 1 PUFF: 3.12 SPRAY, METERED RESPIRATORY (INHALATION) at 09:19

## 2022-04-24 RX ADMIN — ACETAMINOPHEN 650 MG: 325 TABLET ORAL at 18:03

## 2022-04-24 RX ADMIN — BUDESONIDE AND FORMOTEROL FUMARATE DIHYDRATE 2 PUFF: 160; 4.5 AEROSOL RESPIRATORY (INHALATION) at 20:51

## 2022-04-25 LAB
ANION GAP SERPL CALCULATED.3IONS-SCNC: 13 MMOL/L (ref 5–15)
AORTIC DIMENSIONLESS INDEX: 0.5 (DI)
BACTERIA SPEC AEROBE CULT: ABNORMAL
BASOPHILS # BLD AUTO: 0.04 10*3/MM3 (ref 0–0.2)
BASOPHILS NFR BLD AUTO: 0.9 % (ref 0–1.5)
BH CV ECHO MEAS - ACS: 1.34 CM
BH CV ECHO MEAS - AO MAX PG: 7.7 MMHG
BH CV ECHO MEAS - AO MEAN PG: 4.3 MMHG
BH CV ECHO MEAS - AO ROOT DIAM: 3.3 CM
BH CV ECHO MEAS - AO V2 MAX: 139.1 CM/SEC
BH CV ECHO MEAS - AO V2 VTI: 30.8 CM
BH CV ECHO MEAS - AVA(I,D): 1.36 CM2
BH CV ECHO MEAS - EDV(CUBED): 23.5 ML
BH CV ECHO MEAS - EDV(MOD-SP2): 50 ML
BH CV ECHO MEAS - EDV(MOD-SP4): 65 ML
BH CV ECHO MEAS - EF(MOD-BP): 48 %
BH CV ECHO MEAS - EF(MOD-SP2): 54 %
BH CV ECHO MEAS - EF(MOD-SP4): 46.2 %
BH CV ECHO MEAS - ESV(CUBED): 6.9 ML
BH CV ECHO MEAS - ESV(MOD-SP2): 23 ML
BH CV ECHO MEAS - ESV(MOD-SP4): 35 ML
BH CV ECHO MEAS - FS: 33.6 %
BH CV ECHO MEAS - IVS/LVPW: 0.94 CM
BH CV ECHO MEAS - IVSD: 1.44 CM
BH CV ECHO MEAS - LAT PEAK E' VEL: 3.7 CM/SEC
BH CV ECHO MEAS - LV DIASTOLIC VOL/BSA (35-75): 50.2 CM2
BH CV ECHO MEAS - LV MASS(C)D: 147.6 GRAMS
BH CV ECHO MEAS - LV MAX PG: 2.02 MMHG
BH CV ECHO MEAS - LV MEAN PG: 1.05 MMHG
BH CV ECHO MEAS - LV SYSTOLIC VOL/BSA (12-30): 27 CM2
BH CV ECHO MEAS - LV V1 MAX: 71.1 CM/SEC
BH CV ECHO MEAS - LV V1 VTI: 15.4 CM
BH CV ECHO MEAS - LVIDD: 2.9 CM
BH CV ECHO MEAS - LVIDS: 1.9 CM
BH CV ECHO MEAS - LVOT AREA: 2.7 CM2
BH CV ECHO MEAS - LVOT DIAM: 1.86 CM
BH CV ECHO MEAS - LVPWD: 1.54 CM
BH CV ECHO MEAS - MED PEAK E' VEL: 3.1 CM/SEC
BH CV ECHO MEAS - MR MAX PG: 42.9 MMHG
BH CV ECHO MEAS - MR MAX VEL: 327.4 CM/SEC
BH CV ECHO MEAS - MV A DUR: 0.13 SEC
BH CV ECHO MEAS - MV A MAX VEL: 100.3 CM/SEC
BH CV ECHO MEAS - MV DEC SLOPE: 238.5 CM/SEC2
BH CV ECHO MEAS - MV DEC TIME: 0.43 MSEC
BH CV ECHO MEAS - MV E MAX VEL: 91.3 CM/SEC
BH CV ECHO MEAS - MV E/A: 0.91
BH CV ECHO MEAS - MV MAX PG: 5.6 MMHG
BH CV ECHO MEAS - MV MEAN PG: 1.71 MMHG
BH CV ECHO MEAS - MV V2 VTI: 39.2 CM
BH CV ECHO MEAS - MVA(VTI): 1.07 CM2
BH CV ECHO MEAS - PA ACC TIME: 0.08 SEC
BH CV ECHO MEAS - PA PR(ACCEL): 42.8 MMHG
BH CV ECHO MEAS - PA V2 MAX: 90.1 CM/SEC
BH CV ECHO MEAS - PI END-D VEL: 144.4 CM/SEC
BH CV ECHO MEAS - PULM DIAS VEL: 34.1 CM/SEC
BH CV ECHO MEAS - PULM SYS VEL: 47.7 CM/SEC
BH CV ECHO MEAS - RAP SYSTOLE: 3 MMHG
BH CV ECHO MEAS - RV MAX PG: 2.25 MMHG
BH CV ECHO MEAS - RV V1 MAX: 75 CM/SEC
BH CV ECHO MEAS - RV V1 VTI: 16.1 CM
BH CV ECHO MEAS - RVSP: 78 MMHG
BH CV ECHO MEAS - SI(MOD-SP2): 20.9 ML/M2
BH CV ECHO MEAS - SI(MOD-SP4): 23.2 ML/M2
BH CV ECHO MEAS - SV(LVOT): 41.9 ML
BH CV ECHO MEAS - SV(MOD-SP2): 27 ML
BH CV ECHO MEAS - SV(MOD-SP4): 30 ML
BH CV ECHO MEAS - TAPSE (>1.6): 1.5 CM
BH CV ECHO MEAS - TR MAX PG: 75.5 MMHG
BH CV ECHO MEAS - TR MAX VEL: 434.4 CM/SEC
BH CV ECHO MEASUREMENTS AVERAGE E/E' RATIO: 26.85
BH CV XLRA - TDI S': 6 CM/SEC
BUN SERPL-MCNC: 17 MG/DL (ref 8–23)
BUN/CREAT SERPL: 24.6 (ref 7–25)
CALCIUM SPEC-SCNC: 8.8 MG/DL (ref 8.6–10.5)
CHLORIDE SERPL-SCNC: 101 MMOL/L (ref 98–107)
CO2 SERPL-SCNC: 26 MMOL/L (ref 22–29)
CREAT SERPL-MCNC: 0.69 MG/DL (ref 0.57–1)
DEPRECATED RDW RBC AUTO: 41.7 FL (ref 37–54)
EGFRCR SERPLBLD CKD-EPI 2021: 87.3 ML/MIN/1.73
EOSINOPHIL # BLD AUTO: 0.09 10*3/MM3 (ref 0–0.4)
EOSINOPHIL NFR BLD AUTO: 2 % (ref 0.3–6.2)
ERYTHROCYTE [DISTWIDTH] IN BLOOD BY AUTOMATED COUNT: 12.6 % (ref 12.3–15.4)
GLUCOSE SERPL-MCNC: 115 MG/DL (ref 65–99)
HCT VFR BLD AUTO: 40 % (ref 34–46.6)
HGB BLD-MCNC: 12.4 G/DL (ref 12–15.9)
IMM GRANULOCYTES # BLD AUTO: 0.02 10*3/MM3 (ref 0–0.05)
IMM GRANULOCYTES NFR BLD AUTO: 0.4 % (ref 0–0.5)
LYMPHOCYTES # BLD AUTO: 0.99 10*3/MM3 (ref 0.7–3.1)
LYMPHOCYTES NFR BLD AUTO: 21.6 % (ref 19.6–45.3)
MAXIMAL PREDICTED HEART RATE: 139 BPM
MCH RBC QN AUTO: 28.3 PG (ref 26.6–33)
MCHC RBC AUTO-ENTMCNC: 31 G/DL (ref 31.5–35.7)
MCV RBC AUTO: 91.3 FL (ref 79–97)
MONOCYTES # BLD AUTO: 0.34 10*3/MM3 (ref 0.1–0.9)
MONOCYTES NFR BLD AUTO: 7.4 % (ref 5–12)
NEUTROPHILS NFR BLD AUTO: 3.1 10*3/MM3 (ref 1.7–7)
NEUTROPHILS NFR BLD AUTO: 67.7 % (ref 42.7–76)
NRBC BLD AUTO-RTO: 0 /100 WBC (ref 0–0.2)
PLATELET # BLD AUTO: 248 10*3/MM3 (ref 140–450)
PMV BLD AUTO: 9.4 FL (ref 6–12)
POTASSIUM SERPL-SCNC: 3.7 MMOL/L (ref 3.5–5.2)
RBC # BLD AUTO: 4.38 10*6/MM3 (ref 3.77–5.28)
SINUS: 2.34 CM
SODIUM SERPL-SCNC: 140 MMOL/L (ref 136–145)
STJ: 1.88 CM
STRESS TARGET HR: 118 BPM
TROPONIN T SERPL-MCNC: <0.01 NG/ML (ref 0–0.03)
WBC NRBC COR # BLD: 4.58 10*3/MM3 (ref 3.4–10.8)

## 2022-04-25 PROCEDURE — 94664 DEMO&/EVAL PT USE INHALER: CPT

## 2022-04-25 PROCEDURE — 94761 N-INVAS EAR/PLS OXIMETRY MLT: CPT

## 2022-04-25 PROCEDURE — 94799 UNLISTED PULMONARY SVC/PX: CPT

## 2022-04-25 PROCEDURE — 85025 COMPLETE CBC W/AUTO DIFF WBC: CPT | Performed by: HOSPITALIST

## 2022-04-25 PROCEDURE — 25010000002 ERTAPENEM PER 500 MG: Performed by: HOSPITALIST

## 2022-04-25 PROCEDURE — 80048 BASIC METABOLIC PNL TOTAL CA: CPT | Performed by: HOSPITALIST

## 2022-04-25 PROCEDURE — 84484 ASSAY OF TROPONIN QUANT: CPT | Performed by: HOSPITALIST

## 2022-04-25 RX ORDER — ALBUTEROL SULFATE 2.5 MG/3ML
2.5 SOLUTION RESPIRATORY (INHALATION)
Status: DISCONTINUED | OUTPATIENT
Start: 2022-04-25 | End: 2022-05-03 | Stop reason: HOSPADM

## 2022-04-25 RX ADMIN — ASPIRIN 81 MG: 81 TABLET, COATED ORAL at 09:20

## 2022-04-25 RX ADMIN — ALBUTEROL SULFATE 2.5 MG: 2.5 SOLUTION RESPIRATORY (INHALATION) at 15:06

## 2022-04-25 RX ADMIN — BUDESONIDE AND FORMOTEROL FUMARATE DIHYDRATE 2 PUFF: 160; 4.5 AEROSOL RESPIRATORY (INHALATION) at 19:37

## 2022-04-25 RX ADMIN — Medication 2000 UNITS: at 09:20

## 2022-04-25 RX ADMIN — ERTAPENEM SODIUM 1 G: 1 INJECTION, POWDER, LYOPHILIZED, FOR SOLUTION INTRAMUSCULAR; INTRAVENOUS at 14:04

## 2022-04-25 RX ADMIN — TIOTROPIUM BROMIDE INHALATION SPRAY 1 PUFF: 3.12 SPRAY, METERED RESPIRATORY (INHALATION) at 09:08

## 2022-04-25 RX ADMIN — BUDESONIDE AND FORMOTEROL FUMARATE DIHYDRATE 2 PUFF: 160; 4.5 AEROSOL RESPIRATORY (INHALATION) at 09:11

## 2022-04-25 RX ADMIN — PANTOPRAZOLE SODIUM 40 MG: 40 TABLET, DELAYED RELEASE ORAL at 06:07

## 2022-04-25 RX ADMIN — METOPROLOL SUCCINATE 50 MG: 50 TABLET, EXTENDED RELEASE ORAL at 20:12

## 2022-04-25 RX ADMIN — Medication 10 ML: at 09:20

## 2022-04-25 RX ADMIN — METOPROLOL SUCCINATE 50 MG: 50 TABLET, EXTENDED RELEASE ORAL at 09:20

## 2022-04-25 RX ADMIN — Medication 10 ML: at 20:13

## 2022-04-25 RX ADMIN — PRAMIPEXOLE DIHYDROCHLORIDE 0.12 MG: 0.25 TABLET ORAL at 20:12

## 2022-04-26 PROBLEM — I42.9 CARDIOMYOPATHY (HCC): Status: ACTIVE | Noted: 2022-04-26

## 2022-04-26 PROBLEM — Z16.12 UTI DUE TO EXTENDED-SPECTRUM BETA LACTAMASE (ESBL) PRODUCING ESCHERICHIA COLI: Status: ACTIVE | Noted: 2022-04-26

## 2022-04-26 PROBLEM — B96.29 UTI DUE TO EXTENDED-SPECTRUM BETA LACTAMASE (ESBL) PRODUCING ESCHERICHIA COLI: Status: ACTIVE | Noted: 2022-04-26

## 2022-04-26 PROBLEM — N39.0 UTI DUE TO EXTENDED-SPECTRUM BETA LACTAMASE (ESBL) PRODUCING ESCHERICHIA COLI: Status: ACTIVE | Noted: 2022-04-26

## 2022-04-26 LAB
ANION GAP SERPL CALCULATED.3IONS-SCNC: 11.1 MMOL/L (ref 5–15)
BASOPHILS # BLD AUTO: 0.02 10*3/MM3 (ref 0–0.2)
BASOPHILS NFR BLD AUTO: 0.5 % (ref 0–1.5)
BUN SERPL-MCNC: 15 MG/DL (ref 8–23)
BUN/CREAT SERPL: 19.7 (ref 7–25)
CALCIUM SPEC-SCNC: 8.7 MG/DL (ref 8.6–10.5)
CHLORIDE SERPL-SCNC: 100 MMOL/L (ref 98–107)
CO2 SERPL-SCNC: 29.9 MMOL/L (ref 22–29)
CREAT SERPL-MCNC: 0.76 MG/DL (ref 0.57–1)
DEPRECATED RDW RBC AUTO: 39.7 FL (ref 37–54)
EGFRCR SERPLBLD CKD-EPI 2021: 78.8 ML/MIN/1.73
EOSINOPHIL # BLD AUTO: 0.1 10*3/MM3 (ref 0–0.4)
EOSINOPHIL NFR BLD AUTO: 2.3 % (ref 0.3–6.2)
ERYTHROCYTE [DISTWIDTH] IN BLOOD BY AUTOMATED COUNT: 12.5 % (ref 12.3–15.4)
GLUCOSE SERPL-MCNC: 130 MG/DL (ref 65–99)
HCT VFR BLD AUTO: 40 % (ref 34–46.6)
HGB BLD-MCNC: 13.4 G/DL (ref 12–15.9)
IMM GRANULOCYTES # BLD AUTO: 0.02 10*3/MM3 (ref 0–0.05)
IMM GRANULOCYTES NFR BLD AUTO: 0.5 % (ref 0–0.5)
LYMPHOCYTES # BLD AUTO: 0.87 10*3/MM3 (ref 0.7–3.1)
LYMPHOCYTES NFR BLD AUTO: 19.9 % (ref 19.6–45.3)
MCH RBC QN AUTO: 29.4 PG (ref 26.6–33)
MCHC RBC AUTO-ENTMCNC: 33.5 G/DL (ref 31.5–35.7)
MCV RBC AUTO: 87.7 FL (ref 79–97)
MONOCYTES # BLD AUTO: 0.34 10*3/MM3 (ref 0.1–0.9)
MONOCYTES NFR BLD AUTO: 7.8 % (ref 5–12)
NEUTROPHILS NFR BLD AUTO: 3.03 10*3/MM3 (ref 1.7–7)
NEUTROPHILS NFR BLD AUTO: 69 % (ref 42.7–76)
NRBC BLD AUTO-RTO: 0 /100 WBC (ref 0–0.2)
PLATELET # BLD AUTO: 268 10*3/MM3 (ref 140–450)
PMV BLD AUTO: 9 FL (ref 6–12)
POTASSIUM SERPL-SCNC: 3.9 MMOL/L (ref 3.5–5.2)
RBC # BLD AUTO: 4.56 10*6/MM3 (ref 3.77–5.28)
SODIUM SERPL-SCNC: 141 MMOL/L (ref 136–145)
TROPONIN T SERPL-MCNC: <0.01 NG/ML (ref 0–0.03)
WBC NRBC COR # BLD: 4.38 10*3/MM3 (ref 3.4–10.8)

## 2022-04-26 PROCEDURE — 99222 1ST HOSP IP/OBS MODERATE 55: CPT | Performed by: INTERNAL MEDICINE

## 2022-04-26 PROCEDURE — B2111ZZ FLUOROSCOPY OF MULTIPLE CORONARY ARTERIES USING LOW OSMOLAR CONTRAST: ICD-10-PCS | Performed by: INTERNAL MEDICINE

## 2022-04-26 PROCEDURE — 84484 ASSAY OF TROPONIN QUANT: CPT | Performed by: HOSPITALIST

## 2022-04-26 PROCEDURE — B2131ZZ FLUOROSCOPY OF MULTIPLE CORONARY ARTERY BYPASS GRAFTS USING LOW OSMOLAR CONTRAST: ICD-10-PCS | Performed by: INTERNAL MEDICINE

## 2022-04-26 PROCEDURE — 4A023N7 MEASUREMENT OF CARDIAC SAMPLING AND PRESSURE, LEFT HEART, PERCUTANEOUS APPROACH: ICD-10-PCS | Performed by: INTERNAL MEDICINE

## 2022-04-26 PROCEDURE — 93005 ELECTROCARDIOGRAM TRACING: CPT | Performed by: HOSPITALIST

## 2022-04-26 PROCEDURE — 94761 N-INVAS EAR/PLS OXIMETRY MLT: CPT

## 2022-04-26 PROCEDURE — 85025 COMPLETE CBC W/AUTO DIFF WBC: CPT | Performed by: HOSPITALIST

## 2022-04-26 PROCEDURE — 80048 BASIC METABOLIC PNL TOTAL CA: CPT | Performed by: HOSPITALIST

## 2022-04-26 PROCEDURE — 94664 DEMO&/EVAL PT USE INHALER: CPT

## 2022-04-26 PROCEDURE — 94799 UNLISTED PULMONARY SVC/PX: CPT

## 2022-04-26 PROCEDURE — 25010000002 ERTAPENEM PER 500 MG: Performed by: HOSPITALIST

## 2022-04-26 PROCEDURE — 25010000002 ONDANSETRON PER 1 MG: Performed by: HOSPITALIST

## 2022-04-26 PROCEDURE — B2151ZZ FLUOROSCOPY OF LEFT HEART USING LOW OSMOLAR CONTRAST: ICD-10-PCS | Performed by: INTERNAL MEDICINE

## 2022-04-26 RX ORDER — NITROGLYCERIN 0.4 MG/1
0.4 TABLET SUBLINGUAL
Status: DISCONTINUED | OUTPATIENT
Start: 2022-04-26 | End: 2022-05-03 | Stop reason: HOSPADM

## 2022-04-26 RX ORDER — LOSARTAN POTASSIUM 50 MG/1
50 TABLET ORAL
Status: DISCONTINUED | OUTPATIENT
Start: 2022-04-26 | End: 2022-04-30

## 2022-04-26 RX ORDER — CLONIDINE HYDROCHLORIDE 0.1 MG/1
0.1 TABLET ORAL ONCE
Status: COMPLETED | OUTPATIENT
Start: 2022-04-26 | End: 2022-04-26

## 2022-04-26 RX ADMIN — METOPROLOL SUCCINATE 50 MG: 50 TABLET, EXTENDED RELEASE ORAL at 08:23

## 2022-04-26 RX ADMIN — LOSARTAN POTASSIUM 50 MG: 50 TABLET, FILM COATED ORAL at 12:03

## 2022-04-26 RX ADMIN — ALBUTEROL SULFATE 2.5 MG: 2.5 SOLUTION RESPIRATORY (INHALATION) at 06:53

## 2022-04-26 RX ADMIN — Medication 2000 UNITS: at 08:23

## 2022-04-26 RX ADMIN — NITROGLYCERIN 0.4 MG: 0.4 TABLET SUBLINGUAL at 23:50

## 2022-04-26 RX ADMIN — NITROGLYCERIN 0.4 MG: 0.4 TABLET SUBLINGUAL at 23:55

## 2022-04-26 RX ADMIN — ACETAMINOPHEN 650 MG: 325 TABLET ORAL at 01:46

## 2022-04-26 RX ADMIN — TIOTROPIUM BROMIDE INHALATION SPRAY 1 PUFF: 3.12 SPRAY, METERED RESPIRATORY (INHALATION) at 08:43

## 2022-04-26 RX ADMIN — ERTAPENEM SODIUM 1 G: 1 INJECTION, POWDER, LYOPHILIZED, FOR SOLUTION INTRAMUSCULAR; INTRAVENOUS at 12:03

## 2022-04-26 RX ADMIN — METOPROLOL SUCCINATE 50 MG: 50 TABLET, EXTENDED RELEASE ORAL at 20:58

## 2022-04-26 RX ADMIN — CLONIDINE HYDROCHLORIDE 0.1 MG: 0.1 TABLET ORAL at 01:45

## 2022-04-26 RX ADMIN — BUDESONIDE AND FORMOTEROL FUMARATE DIHYDRATE 2 PUFF: 160; 4.5 AEROSOL RESPIRATORY (INHALATION) at 08:45

## 2022-04-26 RX ADMIN — BUDESONIDE AND FORMOTEROL FUMARATE DIHYDRATE 2 PUFF: 160; 4.5 AEROSOL RESPIRATORY (INHALATION) at 18:51

## 2022-04-26 RX ADMIN — PRAMIPEXOLE DIHYDROCHLORIDE 0.12 MG: 0.25 TABLET ORAL at 20:58

## 2022-04-26 RX ADMIN — PANTOPRAZOLE SODIUM 40 MG: 40 TABLET, DELAYED RELEASE ORAL at 06:00

## 2022-04-26 RX ADMIN — ASPIRIN 81 MG: 81 TABLET, COATED ORAL at 08:23

## 2022-04-26 RX ADMIN — ONDANSETRON 4 MG: 2 INJECTION INTRAMUSCULAR; INTRAVENOUS at 23:44

## 2022-04-26 RX ADMIN — Medication 10 ML: at 20:58

## 2022-04-26 RX ADMIN — ALBUTEROL SULFATE 2.5 MG: 2.5 SOLUTION RESPIRATORY (INHALATION) at 15:07

## 2022-04-27 ENCOUNTER — APPOINTMENT (OUTPATIENT)
Dept: GENERAL RADIOLOGY | Facility: HOSPITAL | Age: 82
End: 2022-04-27

## 2022-04-27 LAB
ALBUMIN SERPL-MCNC: 3.4 G/DL (ref 3.5–5.2)
ALBUMIN/GLOB SERPL: 1.4 G/DL
ALP SERPL-CCNC: 77 U/L (ref 39–117)
ALT SERPL W P-5'-P-CCNC: 9 U/L (ref 1–33)
ANION GAP SERPL CALCULATED.3IONS-SCNC: 13 MMOL/L (ref 5–15)
AST SERPL-CCNC: 10 U/L (ref 1–32)
BACTERIA SPEC AEROBE CULT: NORMAL
BACTERIA SPEC AEROBE CULT: NORMAL
BASOPHILS # BLD AUTO: 0.03 10*3/MM3 (ref 0–0.2)
BASOPHILS NFR BLD AUTO: 0.6 % (ref 0–1.5)
BILIRUB SERPL-MCNC: 0.3 MG/DL (ref 0–1.2)
BUN SERPL-MCNC: 18 MG/DL (ref 8–23)
BUN/CREAT SERPL: 21.7 (ref 7–25)
CALCIUM SPEC-SCNC: 8.9 MG/DL (ref 8.6–10.5)
CHLORIDE SERPL-SCNC: 100 MMOL/L (ref 98–107)
CO2 SERPL-SCNC: 28 MMOL/L (ref 22–29)
CREAT SERPL-MCNC: 0.83 MG/DL (ref 0.57–1)
DEPRECATED RDW RBC AUTO: 41.7 FL (ref 37–54)
EGFRCR SERPLBLD CKD-EPI 2021: 70.9 ML/MIN/1.73
EOSINOPHIL # BLD AUTO: 0.18 10*3/MM3 (ref 0–0.4)
EOSINOPHIL NFR BLD AUTO: 3.6 % (ref 0.3–6.2)
ERYTHROCYTE [DISTWIDTH] IN BLOOD BY AUTOMATED COUNT: 12.6 % (ref 12.3–15.4)
GLOBULIN UR ELPH-MCNC: 2.4 GM/DL
GLUCOSE SERPL-MCNC: 129 MG/DL (ref 65–99)
HCT VFR BLD AUTO: 40 % (ref 34–46.6)
HCT VFR BLDA CALC: 36 % (ref 38–51)
HGB BLD-MCNC: 12.7 G/DL (ref 12–15.9)
HGB BLDA-MCNC: 12.2 G/DL (ref 12–17)
IMM GRANULOCYTES # BLD AUTO: 0.02 10*3/MM3 (ref 0–0.05)
IMM GRANULOCYTES NFR BLD AUTO: 0.4 % (ref 0–0.5)
LYMPHOCYTES # BLD AUTO: 0.88 10*3/MM3 (ref 0.7–3.1)
LYMPHOCYTES NFR BLD AUTO: 17.7 % (ref 19.6–45.3)
MAGNESIUM SERPL-MCNC: 1.7 MG/DL (ref 1.6–2.4)
MCH RBC QN AUTO: 28.5 PG (ref 26.6–33)
MCHC RBC AUTO-ENTMCNC: 31.8 G/DL (ref 31.5–35.7)
MCV RBC AUTO: 89.9 FL (ref 79–97)
MONOCYTES # BLD AUTO: 0.36 10*3/MM3 (ref 0.1–0.9)
MONOCYTES NFR BLD AUTO: 7.2 % (ref 5–12)
NEUTROPHILS NFR BLD AUTO: 3.51 10*3/MM3 (ref 1.7–7)
NEUTROPHILS NFR BLD AUTO: 70.5 % (ref 42.7–76)
NRBC BLD AUTO-RTO: 0 /100 WBC (ref 0–0.2)
PLATELET # BLD AUTO: 263 10*3/MM3 (ref 140–450)
PMV BLD AUTO: 9.2 FL (ref 6–12)
POTASSIUM SERPL-SCNC: 4 MMOL/L (ref 3.5–5.2)
PROT SERPL-MCNC: 5.8 G/DL (ref 6–8.5)
RBC # BLD AUTO: 4.45 10*6/MM3 (ref 3.77–5.28)
SAO2 % BLDA: 64 % (ref 95–98)
SODIUM SERPL-SCNC: 141 MMOL/L (ref 136–145)
TROPONIN T SERPL-MCNC: 0.01 NG/ML (ref 0–0.03)
WBC NRBC COR # BLD: 4.98 10*3/MM3 (ref 3.4–10.8)

## 2022-04-27 PROCEDURE — 83735 ASSAY OF MAGNESIUM: CPT | Performed by: HOSPITALIST

## 2022-04-27 PROCEDURE — 93005 ELECTROCARDIOGRAM TRACING: CPT | Performed by: HOSPITALIST

## 2022-04-27 PROCEDURE — 85018 HEMOGLOBIN: CPT

## 2022-04-27 PROCEDURE — C1769 GUIDE WIRE: HCPCS | Performed by: INTERNAL MEDICINE

## 2022-04-27 PROCEDURE — 25010000002 HYDRALAZINE PER 20 MG: Performed by: INTERNAL MEDICINE

## 2022-04-27 PROCEDURE — 94799 UNLISTED PULMONARY SVC/PX: CPT

## 2022-04-27 PROCEDURE — C1894 INTRO/SHEATH, NON-LASER: HCPCS | Performed by: INTERNAL MEDICINE

## 2022-04-27 PROCEDURE — 25010000002 MIDAZOLAM PER 1 MG: Performed by: INTERNAL MEDICINE

## 2022-04-27 PROCEDURE — 84484 ASSAY OF TROPONIN QUANT: CPT | Performed by: INTERNAL MEDICINE

## 2022-04-27 PROCEDURE — 93457 R HRT ART/GRFT ANGIO: CPT | Performed by: INTERNAL MEDICINE

## 2022-04-27 PROCEDURE — 71045 X-RAY EXAM CHEST 1 VIEW: CPT

## 2022-04-27 PROCEDURE — 25010000002 FENTANYL CITRATE (PF) 50 MCG/ML SOLUTION: Performed by: INTERNAL MEDICINE

## 2022-04-27 PROCEDURE — 80053 COMPREHEN METABOLIC PANEL: CPT | Performed by: HOSPITALIST

## 2022-04-27 PROCEDURE — 85025 COMPLETE CBC W/AUTO DIFF WBC: CPT | Performed by: HOSPITALIST

## 2022-04-27 PROCEDURE — 0 IOPAMIDOL PER 1 ML: Performed by: INTERNAL MEDICINE

## 2022-04-27 PROCEDURE — 94664 DEMO&/EVAL PT USE INHALER: CPT

## 2022-04-27 PROCEDURE — C1751 CATH, INF, PER/CENT/MIDLINE: HCPCS | Performed by: INTERNAL MEDICINE

## 2022-04-27 PROCEDURE — 85014 HEMATOCRIT: CPT

## 2022-04-27 PROCEDURE — 25010000002 ERTAPENEM PER 500 MG: Performed by: INTERNAL MEDICINE

## 2022-04-27 PROCEDURE — 99153 MOD SED SAME PHYS/QHP EA: CPT | Performed by: INTERNAL MEDICINE

## 2022-04-27 PROCEDURE — 94761 N-INVAS EAR/PLS OXIMETRY MLT: CPT

## 2022-04-27 PROCEDURE — 94760 N-INVAS EAR/PLS OXIMETRY 1: CPT

## 2022-04-27 PROCEDURE — 99152 MOD SED SAME PHYS/QHP 5/>YRS: CPT | Performed by: INTERNAL MEDICINE

## 2022-04-27 PROCEDURE — 99232 SBSQ HOSP IP/OBS MODERATE 35: CPT | Performed by: INTERNAL MEDICINE

## 2022-04-27 RX ORDER — FENTANYL CITRATE 50 UG/ML
INJECTION, SOLUTION INTRAMUSCULAR; INTRAVENOUS AS NEEDED
Status: DISCONTINUED | OUTPATIENT
Start: 2022-04-27 | End: 2022-04-27 | Stop reason: HOSPADM

## 2022-04-27 RX ORDER — HYDRALAZINE HYDROCHLORIDE 20 MG/ML
INJECTION INTRAMUSCULAR; INTRAVENOUS AS NEEDED
Status: DISCONTINUED | OUTPATIENT
Start: 2022-04-27 | End: 2022-04-27 | Stop reason: HOSPADM

## 2022-04-27 RX ORDER — ISOSORBIDE MONONITRATE 30 MG/1
30 TABLET, EXTENDED RELEASE ORAL
Status: DISCONTINUED | OUTPATIENT
Start: 2022-04-27 | End: 2022-04-29

## 2022-04-27 RX ORDER — LIDOCAINE HYDROCHLORIDE 20 MG/ML
INJECTION, SOLUTION INFILTRATION; PERINEURAL AS NEEDED
Status: DISCONTINUED | OUTPATIENT
Start: 2022-04-27 | End: 2022-04-27 | Stop reason: HOSPADM

## 2022-04-27 RX ORDER — MIDAZOLAM HYDROCHLORIDE 1 MG/ML
INJECTION INTRAMUSCULAR; INTRAVENOUS AS NEEDED
Status: DISCONTINUED | OUTPATIENT
Start: 2022-04-27 | End: 2022-04-27 | Stop reason: HOSPADM

## 2022-04-27 RX ORDER — SODIUM CHLORIDE 9 MG/ML
INJECTION, SOLUTION INTRAVENOUS CONTINUOUS PRN
Status: COMPLETED | OUTPATIENT
Start: 2022-04-27 | End: 2022-04-27

## 2022-04-27 RX ORDER — ONDANSETRON 4 MG/1
4 TABLET, FILM COATED ORAL EVERY 6 HOURS PRN
Status: DISCONTINUED | OUTPATIENT
Start: 2022-04-27 | End: 2022-05-03 | Stop reason: HOSPADM

## 2022-04-27 RX ORDER — SODIUM CHLORIDE 9 MG/ML
50 INJECTION, SOLUTION INTRAVENOUS CONTINUOUS
Status: ACTIVE | OUTPATIENT
Start: 2022-04-27 | End: 2022-04-27

## 2022-04-27 RX ORDER — SODIUM CHLORIDE 9 MG/ML
250 INJECTION, SOLUTION INTRAVENOUS ONCE AS NEEDED
Status: DISCONTINUED | OUTPATIENT
Start: 2022-04-27 | End: 2022-05-03 | Stop reason: HOSPADM

## 2022-04-27 RX ORDER — CLONIDINE HYDROCHLORIDE 0.1 MG/1
0.1 TABLET ORAL ONCE AS NEEDED
Status: DISCONTINUED | OUTPATIENT
Start: 2022-04-27 | End: 2022-04-27

## 2022-04-27 RX ORDER — ONDANSETRON 2 MG/ML
4 INJECTION INTRAMUSCULAR; INTRAVENOUS EVERY 6 HOURS PRN
Status: DISCONTINUED | OUTPATIENT
Start: 2022-04-27 | End: 2022-05-03 | Stop reason: HOSPADM

## 2022-04-27 RX ADMIN — ISOSORBIDE MONONITRATE 30 MG: 30 TABLET ORAL at 16:08

## 2022-04-27 RX ADMIN — ERTAPENEM SODIUM 1 G: 1 INJECTION, POWDER, LYOPHILIZED, FOR SOLUTION INTRAMUSCULAR; INTRAVENOUS at 14:10

## 2022-04-27 RX ADMIN — NITROGLYCERIN 0.4 MG: 0.4 TABLET SUBLINGUAL at 09:46

## 2022-04-27 RX ADMIN — TRAMADOL HYDROCHLORIDE 50 MG: 50 TABLET, COATED ORAL at 04:50

## 2022-04-27 RX ADMIN — NITROGLYCERIN 0.4 MG: 0.4 TABLET SUBLINGUAL at 02:18

## 2022-04-27 RX ADMIN — NITROGLYCERIN 0.4 MG: 0.4 TABLET SUBLINGUAL at 09:52

## 2022-04-27 RX ADMIN — Medication 10 ML: at 23:06

## 2022-04-27 RX ADMIN — TRAMADOL HYDROCHLORIDE 50 MG: 50 TABLET, COATED ORAL at 23:05

## 2022-04-27 RX ADMIN — NITROGLYCERIN 0.4 MG: 0.4 TABLET SUBLINGUAL at 02:08

## 2022-04-27 RX ADMIN — METOPROLOL SUCCINATE 50 MG: 50 TABLET, EXTENDED RELEASE ORAL at 23:05

## 2022-04-27 RX ADMIN — METOPROLOL SUCCINATE 50 MG: 50 TABLET, EXTENDED RELEASE ORAL at 08:43

## 2022-04-27 RX ADMIN — NITROGLYCERIN 0.4 MG: 0.4 TABLET SUBLINGUAL at 00:17

## 2022-04-27 RX ADMIN — ALBUTEROL SULFATE 2.5 MG: 2.5 SOLUTION RESPIRATORY (INHALATION) at 07:08

## 2022-04-27 RX ADMIN — PRAMIPEXOLE DIHYDROCHLORIDE 0.12 MG: 0.25 TABLET ORAL at 23:05

## 2022-04-27 RX ADMIN — NITROGLYCERIN 0.4 MG: 0.4 TABLET SUBLINGUAL at 02:13

## 2022-04-27 RX ADMIN — LOSARTAN POTASSIUM 50 MG: 50 TABLET, FILM COATED ORAL at 08:43

## 2022-04-27 RX ADMIN — Medication 10 ML: at 08:44

## 2022-04-28 ENCOUNTER — APPOINTMENT (OUTPATIENT)
Dept: GENERAL RADIOLOGY | Facility: HOSPITAL | Age: 82
End: 2022-04-28

## 2022-04-28 PROBLEM — I50.23 ACUTE ON CHRONIC SYSTOLIC CHF (CONGESTIVE HEART FAILURE) (HCC): Status: ACTIVE | Noted: 2022-04-28

## 2022-04-28 PROBLEM — I25.5 ISCHEMIC CARDIOMYOPATHY: Status: ACTIVE | Noted: 2022-04-28

## 2022-04-28 LAB
ALBUMIN SERPL-MCNC: 3.1 G/DL (ref 3.5–5.2)
ALBUMIN/GLOB SERPL: 1.2 G/DL
ALP SERPL-CCNC: 71 U/L (ref 39–117)
ALT SERPL W P-5'-P-CCNC: 5 U/L (ref 1–33)
ANION GAP SERPL CALCULATED.3IONS-SCNC: 8.5 MMOL/L (ref 5–15)
AST SERPL-CCNC: 11 U/L (ref 1–32)
BILIRUB SERPL-MCNC: 0.7 MG/DL (ref 0–1.2)
BUN SERPL-MCNC: 14 MG/DL (ref 8–23)
BUN/CREAT SERPL: 22.6 (ref 7–25)
CALCIUM SPEC-SCNC: 8.8 MG/DL (ref 8.6–10.5)
CHLORIDE SERPL-SCNC: 100 MMOL/L (ref 98–107)
CO2 SERPL-SCNC: 29.5 MMOL/L (ref 22–29)
CREAT SERPL-MCNC: 0.62 MG/DL (ref 0.57–1)
DEPRECATED RDW RBC AUTO: 43 FL (ref 37–54)
EGFRCR SERPLBLD CKD-EPI 2021: 89.6 ML/MIN/1.73
ERYTHROCYTE [DISTWIDTH] IN BLOOD BY AUTOMATED COUNT: 12.8 % (ref 12.3–15.4)
GLOBULIN UR ELPH-MCNC: 2.5 GM/DL
GLUCOSE SERPL-MCNC: 83 MG/DL (ref 65–99)
HCT VFR BLD AUTO: 37.1 % (ref 34–46.6)
HCT VFR BLDA CALC: 36 % (ref 38–51)
HGB BLD-MCNC: 11.7 G/DL (ref 12–15.9)
HGB BLDA-MCNC: 12.2 G/DL (ref 12–17)
MAGNESIUM SERPL-MCNC: 1.8 MG/DL (ref 1.6–2.4)
MCH RBC QN AUTO: 28.9 PG (ref 26.6–33)
MCHC RBC AUTO-ENTMCNC: 31.5 G/DL (ref 31.5–35.7)
MCV RBC AUTO: 91.6 FL (ref 79–97)
NT-PROBNP SERPL-MCNC: 2580 PG/ML (ref 0–1800)
PLATELET # BLD AUTO: 272 10*3/MM3 (ref 140–450)
PMV BLD AUTO: 9.3 FL (ref 6–12)
POTASSIUM SERPL-SCNC: 3.8 MMOL/L (ref 3.5–5.2)
PROT SERPL-MCNC: 5.6 G/DL (ref 6–8.5)
RBC # BLD AUTO: 4.05 10*6/MM3 (ref 3.77–5.28)
SAO2 % BLDA: 96 % (ref 95–98)
SODIUM SERPL-SCNC: 138 MMOL/L (ref 136–145)
TROPONIN T SERPL-MCNC: 0.12 NG/ML (ref 0–0.03)
TROPONIN T SERPL-MCNC: 0.14 NG/ML (ref 0–0.03)
WBC NRBC COR # BLD: 5.93 10*3/MM3 (ref 3.4–10.8)

## 2022-04-28 PROCEDURE — 99232 SBSQ HOSP IP/OBS MODERATE 35: CPT | Performed by: NURSE PRACTITIONER

## 2022-04-28 PROCEDURE — 94761 N-INVAS EAR/PLS OXIMETRY MLT: CPT

## 2022-04-28 PROCEDURE — 94760 N-INVAS EAR/PLS OXIMETRY 1: CPT

## 2022-04-28 PROCEDURE — 25010000002 ERTAPENEM PER 500 MG: Performed by: INTERNAL MEDICINE

## 2022-04-28 PROCEDURE — 80053 COMPREHEN METABOLIC PANEL: CPT | Performed by: HOSPITALIST

## 2022-04-28 PROCEDURE — 71046 X-RAY EXAM CHEST 2 VIEWS: CPT

## 2022-04-28 PROCEDURE — 83880 ASSAY OF NATRIURETIC PEPTIDE: CPT | Performed by: HOSPITALIST

## 2022-04-28 PROCEDURE — 83735 ASSAY OF MAGNESIUM: CPT | Performed by: INTERNAL MEDICINE

## 2022-04-28 PROCEDURE — 84484 ASSAY OF TROPONIN QUANT: CPT | Performed by: NURSE PRACTITIONER

## 2022-04-28 PROCEDURE — 85027 COMPLETE CBC AUTOMATED: CPT | Performed by: HOSPITALIST

## 2022-04-28 PROCEDURE — 94664 DEMO&/EVAL PT USE INHALER: CPT

## 2022-04-28 PROCEDURE — 94799 UNLISTED PULMONARY SVC/PX: CPT

## 2022-04-28 RX ORDER — FUROSEMIDE 40 MG/1
40 TABLET ORAL DAILY
Status: DISCONTINUED | OUTPATIENT
Start: 2022-04-28 | End: 2022-04-30

## 2022-04-28 RX ADMIN — ERTAPENEM SODIUM 1 G: 1 INJECTION, POWDER, LYOPHILIZED, FOR SOLUTION INTRAMUSCULAR; INTRAVENOUS at 12:18

## 2022-04-28 RX ADMIN — METOPROLOL SUCCINATE 50 MG: 50 TABLET, EXTENDED RELEASE ORAL at 08:53

## 2022-04-28 RX ADMIN — FUROSEMIDE 40 MG: 40 TABLET ORAL at 18:30

## 2022-04-28 RX ADMIN — PRAMIPEXOLE DIHYDROCHLORIDE 0.12 MG: 0.25 TABLET ORAL at 23:08

## 2022-04-28 RX ADMIN — PANTOPRAZOLE SODIUM 40 MG: 40 TABLET, DELAYED RELEASE ORAL at 08:52

## 2022-04-28 RX ADMIN — BUDESONIDE AND FORMOTEROL FUMARATE DIHYDRATE 2 PUFF: 160; 4.5 AEROSOL RESPIRATORY (INHALATION) at 20:46

## 2022-04-28 RX ADMIN — TIOTROPIUM BROMIDE INHALATION SPRAY 1 PUFF: 3.12 SPRAY, METERED RESPIRATORY (INHALATION) at 11:29

## 2022-04-28 RX ADMIN — ASPIRIN 81 MG: 81 TABLET, COATED ORAL at 08:52

## 2022-04-28 RX ADMIN — Medication 2000 UNITS: at 08:53

## 2022-04-28 RX ADMIN — ALBUTEROL SULFATE 2.5 MG: 2.5 SOLUTION RESPIRATORY (INHALATION) at 15:18

## 2022-04-28 RX ADMIN — Medication 10 ML: at 23:08

## 2022-04-28 RX ADMIN — ISOSORBIDE MONONITRATE 30 MG: 30 TABLET ORAL at 08:53

## 2022-04-28 RX ADMIN — NITROGLYCERIN 1 INCH: 20 OINTMENT TOPICAL at 18:30

## 2022-04-28 RX ADMIN — METOPROLOL SUCCINATE 50 MG: 50 TABLET, EXTENDED RELEASE ORAL at 23:07

## 2022-04-28 RX ADMIN — Medication 10 ML: at 08:53

## 2022-04-28 RX ADMIN — BUDESONIDE AND FORMOTEROL FUMARATE DIHYDRATE 2 PUFF: 160; 4.5 AEROSOL RESPIRATORY (INHALATION) at 11:28

## 2022-04-28 RX ADMIN — NITROGLYCERIN 0.4 MG: 0.4 TABLET SUBLINGUAL at 03:52

## 2022-04-28 RX ADMIN — ACETAMINOPHEN 650 MG: 325 TABLET ORAL at 03:51

## 2022-04-28 RX ADMIN — LOSARTAN POTASSIUM 50 MG: 50 TABLET, FILM COATED ORAL at 08:53

## 2022-04-29 LAB
ALBUMIN SERPL-MCNC: 3.4 G/DL (ref 3.5–5.2)
ALBUMIN/GLOB SERPL: 1.2 G/DL
ALP SERPL-CCNC: 76 U/L (ref 39–117)
ALT SERPL W P-5'-P-CCNC: 6 U/L (ref 1–33)
ANION GAP SERPL CALCULATED.3IONS-SCNC: 8.3 MMOL/L (ref 5–15)
AST SERPL-CCNC: 13 U/L (ref 1–32)
BASOPHILS # BLD AUTO: 0.02 10*3/MM3 (ref 0–0.2)
BASOPHILS NFR BLD AUTO: 0.4 % (ref 0–1.5)
BILIRUB SERPL-MCNC: 0.6 MG/DL (ref 0–1.2)
BUN SERPL-MCNC: 15 MG/DL (ref 8–23)
BUN/CREAT SERPL: 20.5 (ref 7–25)
CALCIUM SPEC-SCNC: 9.1 MG/DL (ref 8.6–10.5)
CHLORIDE SERPL-SCNC: 95 MMOL/L (ref 98–107)
CO2 SERPL-SCNC: 33.7 MMOL/L (ref 22–29)
CREAT SERPL-MCNC: 0.73 MG/DL (ref 0.57–1)
DEPRECATED RDW RBC AUTO: 38.9 FL (ref 37–54)
EGFRCR SERPLBLD CKD-EPI 2021: 82.7 ML/MIN/1.73
EOSINOPHIL # BLD AUTO: 0.09 10*3/MM3 (ref 0–0.4)
EOSINOPHIL NFR BLD AUTO: 1.6 % (ref 0.3–6.2)
ERYTHROCYTE [DISTWIDTH] IN BLOOD BY AUTOMATED COUNT: 12.4 % (ref 12.3–15.4)
GLOBULIN UR ELPH-MCNC: 2.9 GM/DL
GLUCOSE SERPL-MCNC: 105 MG/DL (ref 65–99)
HCT VFR BLD AUTO: 38.5 % (ref 34–46.6)
HGB BLD-MCNC: 12.6 G/DL (ref 12–15.9)
IMM GRANULOCYTES # BLD AUTO: 0.01 10*3/MM3 (ref 0–0.05)
IMM GRANULOCYTES NFR BLD AUTO: 0.2 % (ref 0–0.5)
LYMPHOCYTES # BLD AUTO: 0.85 10*3/MM3 (ref 0.7–3.1)
LYMPHOCYTES NFR BLD AUTO: 15.6 % (ref 19.6–45.3)
MAGNESIUM SERPL-MCNC: 1.8 MG/DL (ref 1.6–2.4)
MCH RBC QN AUTO: 28.5 PG (ref 26.6–33)
MCHC RBC AUTO-ENTMCNC: 32.7 G/DL (ref 31.5–35.7)
MCV RBC AUTO: 87.1 FL (ref 79–97)
MONOCYTES # BLD AUTO: 0.37 10*3/MM3 (ref 0.1–0.9)
MONOCYTES NFR BLD AUTO: 6.8 % (ref 5–12)
NEUTROPHILS NFR BLD AUTO: 4.12 10*3/MM3 (ref 1.7–7)
NEUTROPHILS NFR BLD AUTO: 75.4 % (ref 42.7–76)
NRBC BLD AUTO-RTO: 0 /100 WBC (ref 0–0.2)
PLATELET # BLD AUTO: 290 10*3/MM3 (ref 140–450)
PMV BLD AUTO: 8.8 FL (ref 6–12)
POTASSIUM SERPL-SCNC: 3.5 MMOL/L (ref 3.5–5.2)
PROT SERPL-MCNC: 6.3 G/DL (ref 6–8.5)
RBC # BLD AUTO: 4.42 10*6/MM3 (ref 3.77–5.28)
SODIUM SERPL-SCNC: 137 MMOL/L (ref 136–145)
TROPONIN T SERPL-MCNC: 0.13 NG/ML (ref 0–0.03)
TROPONIN T SERPL-MCNC: 0.15 NG/ML (ref 0–0.03)
WBC NRBC COR # BLD: 5.46 10*3/MM3 (ref 3.4–10.8)

## 2022-04-29 PROCEDURE — 93005 ELECTROCARDIOGRAM TRACING: CPT | Performed by: HOSPITALIST

## 2022-04-29 PROCEDURE — 84484 ASSAY OF TROPONIN QUANT: CPT | Performed by: INTERNAL MEDICINE

## 2022-04-29 PROCEDURE — 94799 UNLISTED PULMONARY SVC/PX: CPT

## 2022-04-29 PROCEDURE — 99232 SBSQ HOSP IP/OBS MODERATE 35: CPT | Performed by: INTERNAL MEDICINE

## 2022-04-29 PROCEDURE — 25010000002 HYDROMORPHONE PER 4 MG: Performed by: NURSE PRACTITIONER

## 2022-04-29 PROCEDURE — 85025 COMPLETE CBC W/AUTO DIFF WBC: CPT | Performed by: HOSPITALIST

## 2022-04-29 PROCEDURE — 94664 DEMO&/EVAL PT USE INHALER: CPT

## 2022-04-29 PROCEDURE — 97530 THERAPEUTIC ACTIVITIES: CPT

## 2022-04-29 PROCEDURE — 94761 N-INVAS EAR/PLS OXIMETRY MLT: CPT

## 2022-04-29 PROCEDURE — 25010000002 ERTAPENEM PER 500 MG: Performed by: INTERNAL MEDICINE

## 2022-04-29 PROCEDURE — 80053 COMPREHEN METABOLIC PANEL: CPT | Performed by: HOSPITALIST

## 2022-04-29 PROCEDURE — 83735 ASSAY OF MAGNESIUM: CPT | Performed by: INTERNAL MEDICINE

## 2022-04-29 RX ORDER — ISOSORBIDE MONONITRATE 30 MG/1
30 TABLET, EXTENDED RELEASE ORAL 2 TIMES DAILY
Status: DISCONTINUED | OUTPATIENT
Start: 2022-04-29 | End: 2022-05-03 | Stop reason: HOSPADM

## 2022-04-29 RX ORDER — ISOSORBIDE MONONITRATE 60 MG/1
120 TABLET, EXTENDED RELEASE ORAL
Status: DISCONTINUED | OUTPATIENT
Start: 2022-04-30 | End: 2022-04-29

## 2022-04-29 RX ORDER — ATORVASTATIN CALCIUM 20 MG/1
40 TABLET, FILM COATED ORAL NIGHTLY
Status: DISCONTINUED | OUTPATIENT
Start: 2022-04-29 | End: 2022-05-03 | Stop reason: HOSPADM

## 2022-04-29 RX ORDER — HYDROMORPHONE HYDROCHLORIDE 1 MG/ML
0.5 INJECTION, SOLUTION INTRAMUSCULAR; INTRAVENOUS; SUBCUTANEOUS
Status: DISCONTINUED | OUTPATIENT
Start: 2022-04-29 | End: 2022-05-03 | Stop reason: HOSPADM

## 2022-04-29 RX ORDER — ISOSORBIDE MONONITRATE 30 MG/1
30 TABLET, EXTENDED RELEASE ORAL ONCE
Status: COMPLETED | OUTPATIENT
Start: 2022-04-29 | End: 2022-04-29

## 2022-04-29 RX ORDER — ISOSORBIDE MONONITRATE 60 MG/1
60 TABLET, EXTENDED RELEASE ORAL 2 TIMES DAILY
Status: DISCONTINUED | OUTPATIENT
Start: 2022-04-29 | End: 2022-04-29

## 2022-04-29 RX ADMIN — Medication 2000 UNITS: at 08:00

## 2022-04-29 RX ADMIN — ISOSORBIDE MONONITRATE 30 MG: 30 TABLET ORAL at 21:50

## 2022-04-29 RX ADMIN — ISOSORBIDE MONONITRATE 30 MG: 30 TABLET ORAL at 08:00

## 2022-04-29 RX ADMIN — ALBUTEROL SULFATE 2.5 MG: 2.5 SOLUTION RESPIRATORY (INHALATION) at 16:11

## 2022-04-29 RX ADMIN — LOSARTAN POTASSIUM 50 MG: 50 TABLET, FILM COATED ORAL at 08:00

## 2022-04-29 RX ADMIN — NITROGLYCERIN 0.4 MG: 0.4 TABLET SUBLINGUAL at 00:35

## 2022-04-29 RX ADMIN — PANTOPRAZOLE SODIUM 40 MG: 40 TABLET, DELAYED RELEASE ORAL at 08:00

## 2022-04-29 RX ADMIN — METOPROLOL SUCCINATE 50 MG: 50 TABLET, EXTENDED RELEASE ORAL at 08:00

## 2022-04-29 RX ADMIN — BUDESONIDE AND FORMOTEROL FUMARATE DIHYDRATE 2 PUFF: 160; 4.5 AEROSOL RESPIRATORY (INHALATION) at 08:56

## 2022-04-29 RX ADMIN — METOPROLOL SUCCINATE 50 MG: 50 TABLET, EXTENDED RELEASE ORAL at 21:50

## 2022-04-29 RX ADMIN — TRAMADOL HYDROCHLORIDE 50 MG: 50 TABLET, COATED ORAL at 00:33

## 2022-04-29 RX ADMIN — HYDROMORPHONE HYDROCHLORIDE 0.5 MG: 1 INJECTION, SOLUTION INTRAMUSCULAR; INTRAVENOUS; SUBCUTANEOUS at 02:22

## 2022-04-29 RX ADMIN — ISOSORBIDE MONONITRATE 30 MG: 30 TABLET ORAL at 10:48

## 2022-04-29 RX ADMIN — NITROGLYCERIN 0.4 MG: 0.4 TABLET SUBLINGUAL at 00:41

## 2022-04-29 RX ADMIN — TIOTROPIUM BROMIDE INHALATION SPRAY 1 PUFF: 3.12 SPRAY, METERED RESPIRATORY (INHALATION) at 08:56

## 2022-04-29 RX ADMIN — PRAMIPEXOLE DIHYDROCHLORIDE 0.12 MG: 0.25 TABLET ORAL at 21:50

## 2022-04-29 RX ADMIN — SODIUM CHLORIDE 250 ML: 9 INJECTION, SOLUTION INTRAVENOUS at 13:54

## 2022-04-29 RX ADMIN — ERTAPENEM SODIUM 1 G: 1 INJECTION, POWDER, LYOPHILIZED, FOR SOLUTION INTRAMUSCULAR; INTRAVENOUS at 12:22

## 2022-04-29 RX ADMIN — Medication 10 ML: at 08:00

## 2022-04-29 RX ADMIN — BUDESONIDE AND FORMOTEROL FUMARATE DIHYDRATE 2 PUFF: 160; 4.5 AEROSOL RESPIRATORY (INHALATION) at 20:20

## 2022-04-29 RX ADMIN — Medication 10 ML: at 21:53

## 2022-04-29 RX ADMIN — ACETAMINOPHEN 650 MG: 325 TABLET ORAL at 21:57

## 2022-04-29 RX ADMIN — ASPIRIN 81 MG: 81 TABLET, COATED ORAL at 08:00

## 2022-04-29 RX ADMIN — FUROSEMIDE 40 MG: 40 TABLET ORAL at 08:00

## 2022-04-29 RX ADMIN — ATORVASTATIN CALCIUM 40 MG: 20 TABLET, FILM COATED ORAL at 21:49

## 2022-04-30 ENCOUNTER — APPOINTMENT (OUTPATIENT)
Dept: GENERAL RADIOLOGY | Facility: HOSPITAL | Age: 82
End: 2022-04-30

## 2022-04-30 LAB
ALBUMIN SERPL-MCNC: 3.4 G/DL (ref 3.5–5.2)
ALBUMIN/GLOB SERPL: 1.2 G/DL
ALP SERPL-CCNC: 71 U/L (ref 39–117)
ALT SERPL W P-5'-P-CCNC: 11 U/L (ref 1–33)
ANION GAP SERPL CALCULATED.3IONS-SCNC: 8.7 MMOL/L (ref 5–15)
AST SERPL-CCNC: 16 U/L (ref 1–32)
BILIRUB SERPL-MCNC: 0.6 MG/DL (ref 0–1.2)
BUN SERPL-MCNC: 16 MG/DL (ref 8–23)
BUN/CREAT SERPL: 19.8 (ref 7–25)
CALCIUM SPEC-SCNC: 8.7 MG/DL (ref 8.6–10.5)
CHLORIDE SERPL-SCNC: 94 MMOL/L (ref 98–107)
CO2 SERPL-SCNC: 34.3 MMOL/L (ref 22–29)
CREAT SERPL-MCNC: 0.81 MG/DL (ref 0.57–1)
DEPRECATED RDW RBC AUTO: 39.7 FL (ref 37–54)
EGFRCR SERPLBLD CKD-EPI 2021: 73 ML/MIN/1.73
ERYTHROCYTE [DISTWIDTH] IN BLOOD BY AUTOMATED COUNT: 12.2 % (ref 12.3–15.4)
GLOBULIN UR ELPH-MCNC: 2.8 GM/DL
GLUCOSE SERPL-MCNC: 123 MG/DL (ref 65–99)
HCT VFR BLD AUTO: 36.1 % (ref 34–46.6)
HGB BLD-MCNC: 11.7 G/DL (ref 12–15.9)
MAGNESIUM SERPL-MCNC: 1.8 MG/DL (ref 1.6–2.4)
MCH RBC QN AUTO: 29 PG (ref 26.6–33)
MCHC RBC AUTO-ENTMCNC: 32.4 G/DL (ref 31.5–35.7)
MCV RBC AUTO: 89.6 FL (ref 79–97)
PLATELET # BLD AUTO: 290 10*3/MM3 (ref 140–450)
PMV BLD AUTO: 9.1 FL (ref 6–12)
POTASSIUM SERPL-SCNC: 3.5 MMOL/L (ref 3.5–5.2)
PROT SERPL-MCNC: 6.2 G/DL (ref 6–8.5)
RBC # BLD AUTO: 4.03 10*6/MM3 (ref 3.77–5.28)
SODIUM SERPL-SCNC: 137 MMOL/L (ref 136–145)
WBC NRBC COR # BLD: 5.13 10*3/MM3 (ref 3.4–10.8)

## 2022-04-30 PROCEDURE — 94799 UNLISTED PULMONARY SVC/PX: CPT

## 2022-04-30 PROCEDURE — 99232 SBSQ HOSP IP/OBS MODERATE 35: CPT | Performed by: INTERNAL MEDICINE

## 2022-04-30 PROCEDURE — 85027 COMPLETE CBC AUTOMATED: CPT | Performed by: HOSPITALIST

## 2022-04-30 PROCEDURE — 25010000002 ERTAPENEM PER 500 MG: Performed by: INTERNAL MEDICINE

## 2022-04-30 PROCEDURE — 83735 ASSAY OF MAGNESIUM: CPT | Performed by: INTERNAL MEDICINE

## 2022-04-30 PROCEDURE — 71046 X-RAY EXAM CHEST 2 VIEWS: CPT

## 2022-04-30 PROCEDURE — 80053 COMPREHEN METABOLIC PANEL: CPT | Performed by: HOSPITALIST

## 2022-04-30 RX ORDER — LOSARTAN POTASSIUM 25 MG/1
25 TABLET ORAL 2 TIMES DAILY
Status: DISCONTINUED | OUTPATIENT
Start: 2022-05-01 | End: 2022-05-03 | Stop reason: HOSPADM

## 2022-04-30 RX ADMIN — Medication 10 ML: at 09:20

## 2022-04-30 RX ADMIN — METOPROLOL SUCCINATE 50 MG: 50 TABLET, EXTENDED RELEASE ORAL at 09:19

## 2022-04-30 RX ADMIN — BUDESONIDE AND FORMOTEROL FUMARATE DIHYDRATE 2 PUFF: 160; 4.5 AEROSOL RESPIRATORY (INHALATION) at 08:47

## 2022-04-30 RX ADMIN — PANTOPRAZOLE SODIUM 40 MG: 40 TABLET, DELAYED RELEASE ORAL at 09:22

## 2022-04-30 RX ADMIN — ERTAPENEM SODIUM 1 G: 1 INJECTION, POWDER, LYOPHILIZED, FOR SOLUTION INTRAMUSCULAR; INTRAVENOUS at 11:37

## 2022-04-30 RX ADMIN — PRAMIPEXOLE DIHYDROCHLORIDE 0.12 MG: 0.25 TABLET ORAL at 20:44

## 2022-04-30 RX ADMIN — FUROSEMIDE 40 MG: 40 TABLET ORAL at 09:19

## 2022-04-30 RX ADMIN — Medication 10 ML: at 21:51

## 2022-04-30 RX ADMIN — ISOSORBIDE MONONITRATE 30 MG: 30 TABLET ORAL at 09:19

## 2022-04-30 RX ADMIN — BUDESONIDE AND FORMOTEROL FUMARATE DIHYDRATE 2 PUFF: 160; 4.5 AEROSOL RESPIRATORY (INHALATION) at 20:26

## 2022-04-30 RX ADMIN — Medication 2000 UNITS: at 09:19

## 2022-04-30 RX ADMIN — ALBUTEROL SULFATE 2.5 MG: 2.5 SOLUTION RESPIRATORY (INHALATION) at 15:48

## 2022-04-30 RX ADMIN — ISOSORBIDE MONONITRATE 30 MG: 30 TABLET ORAL at 20:45

## 2022-04-30 RX ADMIN — ASPIRIN 81 MG: 81 TABLET, COATED ORAL at 09:19

## 2022-04-30 RX ADMIN — METOPROLOL SUCCINATE 50 MG: 50 TABLET, EXTENDED RELEASE ORAL at 20:46

## 2022-04-30 RX ADMIN — LOSARTAN POTASSIUM 50 MG: 50 TABLET, FILM COATED ORAL at 09:19

## 2022-04-30 RX ADMIN — ATORVASTATIN CALCIUM 40 MG: 20 TABLET, FILM COATED ORAL at 20:44

## 2022-04-30 RX ADMIN — TIOTROPIUM BROMIDE INHALATION SPRAY 1 PUFF: 3.12 SPRAY, METERED RESPIRATORY (INHALATION) at 08:47

## 2022-05-01 LAB
ALBUMIN SERPL-MCNC: 3.6 G/DL (ref 3.5–5.2)
ALBUMIN/GLOB SERPL: 1.4 G/DL
ALP SERPL-CCNC: 70 U/L (ref 39–117)
ALT SERPL W P-5'-P-CCNC: 8 U/L (ref 1–33)
ANION GAP SERPL CALCULATED.3IONS-SCNC: 11 MMOL/L (ref 5–15)
ANION GAP SERPL CALCULATED.3IONS-SCNC: 9.8 MMOL/L (ref 5–15)
AST SERPL-CCNC: 11 U/L (ref 1–32)
BASOPHILS # BLD AUTO: 0.02 10*3/MM3 (ref 0–0.2)
BASOPHILS NFR BLD AUTO: 0.4 % (ref 0–1.5)
BILIRUB SERPL-MCNC: 0.5 MG/DL (ref 0–1.2)
BUN SERPL-MCNC: 17 MG/DL (ref 8–23)
BUN SERPL-MCNC: 18 MG/DL (ref 8–23)
BUN/CREAT SERPL: 21.2 (ref 7–25)
BUN/CREAT SERPL: 25.4 (ref 7–25)
CALCIUM SPEC-SCNC: 8.8 MG/DL (ref 8.6–10.5)
CALCIUM SPEC-SCNC: 8.9 MG/DL (ref 8.6–10.5)
CHLORIDE SERPL-SCNC: 94 MMOL/L (ref 98–107)
CHLORIDE SERPL-SCNC: 94 MMOL/L (ref 98–107)
CO2 SERPL-SCNC: 33 MMOL/L (ref 22–29)
CO2 SERPL-SCNC: 33.2 MMOL/L (ref 22–29)
CREAT SERPL-MCNC: 0.67 MG/DL (ref 0.57–1)
CREAT SERPL-MCNC: 0.85 MG/DL (ref 0.57–1)
DEPRECATED RDW RBC AUTO: 40.9 FL (ref 37–54)
EGFRCR SERPLBLD CKD-EPI 2021: 68.9 ML/MIN/1.73
EGFRCR SERPLBLD CKD-EPI 2021: 87.9 ML/MIN/1.73
EOSINOPHIL # BLD AUTO: 0.11 10*3/MM3 (ref 0–0.4)
EOSINOPHIL NFR BLD AUTO: 1.9 % (ref 0.3–6.2)
ERYTHROCYTE [DISTWIDTH] IN BLOOD BY AUTOMATED COUNT: 12.4 % (ref 12.3–15.4)
GLOBULIN UR ELPH-MCNC: 2.5 GM/DL
GLUCOSE SERPL-MCNC: 104 MG/DL (ref 65–99)
GLUCOSE SERPL-MCNC: 109 MG/DL (ref 65–99)
HCT VFR BLD AUTO: 37.6 % (ref 34–46.6)
HGB BLD-MCNC: 12.1 G/DL (ref 12–15.9)
IMM GRANULOCYTES # BLD AUTO: 0.02 10*3/MM3 (ref 0–0.05)
IMM GRANULOCYTES NFR BLD AUTO: 0.4 % (ref 0–0.5)
LYMPHOCYTES # BLD AUTO: 0.94 10*3/MM3 (ref 0.7–3.1)
LYMPHOCYTES NFR BLD AUTO: 16.6 % (ref 19.6–45.3)
MAGNESIUM SERPL-MCNC: 1.9 MG/DL (ref 1.6–2.4)
MCH RBC QN AUTO: 29 PG (ref 26.6–33)
MCHC RBC AUTO-ENTMCNC: 32.2 G/DL (ref 31.5–35.7)
MCV RBC AUTO: 90.2 FL (ref 79–97)
MONOCYTES # BLD AUTO: 0.44 10*3/MM3 (ref 0.1–0.9)
MONOCYTES NFR BLD AUTO: 7.8 % (ref 5–12)
NEUTROPHILS NFR BLD AUTO: 4.13 10*3/MM3 (ref 1.7–7)
NEUTROPHILS NFR BLD AUTO: 72.9 % (ref 42.7–76)
NRBC BLD AUTO-RTO: 0 /100 WBC (ref 0–0.2)
PLATELET # BLD AUTO: 305 10*3/MM3 (ref 140–450)
PMV BLD AUTO: 9 FL (ref 6–12)
POTASSIUM SERPL-SCNC: 3.3 MMOL/L (ref 3.5–5.2)
POTASSIUM SERPL-SCNC: 3.9 MMOL/L (ref 3.5–5.2)
PROT SERPL-MCNC: 6.1 G/DL (ref 6–8.5)
RBC # BLD AUTO: 4.17 10*6/MM3 (ref 3.77–5.28)
SODIUM SERPL-SCNC: 137 MMOL/L (ref 136–145)
SODIUM SERPL-SCNC: 138 MMOL/L (ref 136–145)
WBC NRBC COR # BLD: 5.66 10*3/MM3 (ref 3.4–10.8)

## 2022-05-01 PROCEDURE — 83735 ASSAY OF MAGNESIUM: CPT | Performed by: INTERNAL MEDICINE

## 2022-05-01 PROCEDURE — 94799 UNLISTED PULMONARY SVC/PX: CPT

## 2022-05-01 PROCEDURE — 94664 DEMO&/EVAL PT USE INHALER: CPT

## 2022-05-01 PROCEDURE — 94760 N-INVAS EAR/PLS OXIMETRY 1: CPT

## 2022-05-01 PROCEDURE — 85025 COMPLETE CBC W/AUTO DIFF WBC: CPT | Performed by: HOSPITALIST

## 2022-05-01 PROCEDURE — 25010000002 ERTAPENEM PER 500 MG: Performed by: INTERNAL MEDICINE

## 2022-05-01 PROCEDURE — 99232 SBSQ HOSP IP/OBS MODERATE 35: CPT | Performed by: INTERNAL MEDICINE

## 2022-05-01 PROCEDURE — 80053 COMPREHEN METABOLIC PANEL: CPT | Performed by: HOSPITALIST

## 2022-05-01 RX ORDER — POTASSIUM CHLORIDE 750 MG/1
40 TABLET, FILM COATED, EXTENDED RELEASE ORAL ONCE
Status: COMPLETED | OUTPATIENT
Start: 2022-05-01 | End: 2022-05-01

## 2022-05-01 RX ADMIN — LOSARTAN POTASSIUM 25 MG: 25 TABLET, FILM COATED ORAL at 21:27

## 2022-05-01 RX ADMIN — ALBUTEROL SULFATE 2.5 MG: 2.5 SOLUTION RESPIRATORY (INHALATION) at 15:19

## 2022-05-01 RX ADMIN — METOPROLOL SUCCINATE 50 MG: 50 TABLET, EXTENDED RELEASE ORAL at 08:35

## 2022-05-01 RX ADMIN — ISOSORBIDE MONONITRATE 30 MG: 30 TABLET ORAL at 21:28

## 2022-05-01 RX ADMIN — Medication 10 ML: at 21:28

## 2022-05-01 RX ADMIN — PRAMIPEXOLE DIHYDROCHLORIDE 0.12 MG: 0.25 TABLET ORAL at 21:28

## 2022-05-01 RX ADMIN — ASPIRIN 81 MG: 81 TABLET, COATED ORAL at 08:35

## 2022-05-01 RX ADMIN — Medication 10 ML: at 08:37

## 2022-05-01 RX ADMIN — POTASSIUM CHLORIDE 40 MEQ: 10 TABLET, EXTENDED RELEASE ORAL at 09:27

## 2022-05-01 RX ADMIN — ATORVASTATIN CALCIUM 40 MG: 20 TABLET, FILM COATED ORAL at 21:28

## 2022-05-01 RX ADMIN — TRAMADOL HYDROCHLORIDE 50 MG: 50 TABLET, COATED ORAL at 21:28

## 2022-05-01 RX ADMIN — Medication 2000 UNITS: at 08:35

## 2022-05-01 RX ADMIN — ISOSORBIDE MONONITRATE 30 MG: 30 TABLET ORAL at 08:35

## 2022-05-01 RX ADMIN — LOSARTAN POTASSIUM 25 MG: 25 TABLET, FILM COATED ORAL at 08:35

## 2022-05-01 RX ADMIN — BUDESONIDE AND FORMOTEROL FUMARATE DIHYDRATE 2 PUFF: 160; 4.5 AEROSOL RESPIRATORY (INHALATION) at 20:31

## 2022-05-01 RX ADMIN — ACETAMINOPHEN 650 MG: 325 TABLET ORAL at 18:28

## 2022-05-01 RX ADMIN — BUDESONIDE AND FORMOTEROL FUMARATE DIHYDRATE 2 PUFF: 160; 4.5 AEROSOL RESPIRATORY (INHALATION) at 08:17

## 2022-05-01 RX ADMIN — METOPROLOL SUCCINATE 50 MG: 50 TABLET, EXTENDED RELEASE ORAL at 21:28

## 2022-05-01 RX ADMIN — ERTAPENEM SODIUM 1 G: 1 INJECTION, POWDER, LYOPHILIZED, FOR SOLUTION INTRAMUSCULAR; INTRAVENOUS at 11:28

## 2022-05-01 RX ADMIN — TIOTROPIUM BROMIDE INHALATION SPRAY 1 PUFF: 3.12 SPRAY, METERED RESPIRATORY (INHALATION) at 08:18

## 2022-05-01 RX ADMIN — PANTOPRAZOLE SODIUM 40 MG: 40 TABLET, DELAYED RELEASE ORAL at 06:49

## 2022-05-01 NOTE — PROGRESS NOTES
Name: Grace Beauchamp ADMIT: 2022   : 1940  PCP: Miller Castañeda MD    MRN: [id number] LOS: 9 days   AGE/SEX: 81 y.o. female  ROOM: [facility]/     Subjective   Subjective   No longer having any CP. Denies SOA. Tolerating diet today. Voiding fine. Feels very weak still.    No V/D/F/C/NS/palp/HA/vis changes/LUTS/abd pain.    Review of Systems     as above    Objective   Objective   Vital Signs  Temp:  [98 °F (36.7 °C)-98.6 °F (37 °C)] 98.6 °F (37 °C)  Heart Rate:  [57-78] 61  Resp:  [16-20] 18  BP: ()/(40-75) 140/75  SpO2:  [91 %-98 %] 92 %  on  Flow (L/min):  [2] 2;   Device (Oxygen Therapy): nasal cannula  Body mass index is 20.42 kg/m².   I/O last 3 completed shifts:  In: 360 [P.O.:360]  Out: 450 [Urine:450]  No intake/output data recorded.    Physical Exam  Vitals and nursing note reviewed.   Constitutional:       General: She is not in acute distress.     Appearance: She is ill-appearing (chronically). She is not toxic-appearing or diaphoretic.   HENT:      Head: Normocephalic and atraumatic.      Right Ear: External ear normal.      Left Ear: External ear normal.      Nose: Nose normal.      Mouth/Throat:      Mouth: Mucous membranes are moist.      Pharynx: Oropharynx is clear.   Eyes:      General: No scleral icterus.        Right eye: No discharge.         Left eye: No discharge.      Extraocular Movements: Extraocular movements intact.      Conjunctiva/sclera: Conjunctivae normal.   Cardiovascular:      Rate and Rhythm: Normal rate and regular rhythm.      Pulses: Normal pulses.      Heart sounds: Normal heart sounds.      Comments: S/p right mastectomy  Pulmonary:      Effort: Pulmonary effort is normal. No respiratory distress.      Breath sounds: Normal breath sounds. No wheezing or rales.   Abdominal:      General: Bowel sounds are normal. There is no distension.      Palpations: Abdomen is soft.      Tenderness: There is no abdominal tenderness.   Musculoskeletal:         General: No  swelling or deformity. Normal range of motion.      Cervical back: Neck supple. No rigidity.   Lymphadenopathy:      Cervical: No cervical adenopathy.   Skin:     General: Skin is warm and dry.      Capillary Refill: Capillary refill takes less than 2 seconds.      Coloration: Skin is not jaundiced.      Findings: No rash.   Neurological:      General: No focal deficit present.      Mental Status: She is alert and oriented to person, place, and time. Mental status is at baseline.      Cranial Nerves: No cranial nerve deficit.      Coordination: Coordination normal.   Psychiatric:         Mood and Affect: Mood normal.         Behavior: Behavior normal.         Thought Content: Thought content normal.         Results Review     I reviewed the patient's new clinical results.  Results from last 7 days   Lab Units 05/01/22  0609 04/30/22  1031 04/29/22  0300 04/28/22  0333   WBC 10*3/mm3 5.66 5.13 5.46 5.93   HEMOGLOBIN g/dL 12.1 11.7* 12.6 11.7*   PLATELETS 10*3/mm3 305 290 290 272     Results from last 7 days   Lab Units 05/01/22  0609 04/30/22 1031 04/29/22 0300 04/28/22  0333   SODIUM mmol/L 138 137 137 138   POTASSIUM mmol/L 3.3* 3.5 3.5 3.8   CHLORIDE mmol/L 94* 94* 95* 100   CO2 mmol/L 33.0* 34.3* 33.7* 29.5*   BUN mg/dL 17 16 15 14   CREATININE mg/dL 0.67 0.81 0.73 0.62   GLUCOSE mg/dL 104* 123* 105* 83   EGFR mL/min/1.73 87.9 73.0 82.7 89.6     Results from last 7 days   Lab Units 05/01/22  0609 04/30/22  1031 04/29/22  0300 04/28/22  0333   ALBUMIN g/dL 3.60 3.40* 3.40* 3.10*   BILIRUBIN mg/dL 0.5 0.6 0.6 0.7   ALK PHOS U/L 70 71 76 71   AST (SGOT) U/L 11 16 13 11   ALT (SGPT) U/L 8 11 6 5     Results from last 7 days   Lab Units 05/01/22  0609 04/30/22  1031 04/29/22  0300 04/28/22  0333   CALCIUM mg/dL 8.9 8.7 9.1 8.8   ALBUMIN g/dL 3.60 3.40* 3.40* 3.10*   MAGNESIUM mg/dL 1.9 1.8 1.8 1.8         No results found for: HGBA1C, POCGLU    No radiology results for the last day  Scheduled Medications  albuterol,  2.5 mg, Nebulization, TID - RT  aspirin, 81 mg, Oral, Daily  atorvastatin, 40 mg, Oral, Nightly  budesonide-formoterol, 2 puff, Inhalation, BID - RT   And  tiotropium bromide monohydrate, 1 puff, Inhalation, Daily - RT  cholecalciferol, 2,000 Units, Oral, Daily  ertapenem, 1 g, Intravenous, Q24H  isosorbide mononitrate, 30 mg, Oral, BID  losartan, 25 mg, Oral, BID  metoprolol succinate XL, 50 mg, Oral, BID  pantoprazole, 40 mg, Oral, QAM  pramipexole, 0.125 mg, Oral, Nightly  sodium chloride, 10 mL, Intravenous, Q12H    Infusions   Diet  Diet Regular; Consistent Carbohydrate, Cardiac       Assessment/Plan     Active Hospital Problems    Diagnosis  POA   • **UTI due to extended-spectrum beta lactamase (ESBL) producing Escherichia coli [N39.0, B96.29, Z16.12]  Yes   • Ischemic cardiomyopathy [I25.5]  Yes   • Acute on chronic systolic CHF (congestive heart failure) ([facility]) [I50.23]  Yes   • Metabolic encephalopathy [G93.41]  Yes   • Hypokalemia [E87.6]  Yes   • LUH (acute kidney injury) ([facility]) [N17.9]  Yes   • Hypotension [I95.9]  Yes   • Chronic diastolic CHF (congestive heart failure) ([facility]) [I50.32]  Yes   • CAD (coronary artery disease) [I25.10]  Yes   • Type 2 diabetes mellitus without complication, without long-term current use of insulin ([facility]) [E11.9]  Yes   • History of breast cancer [Z85.3]  Not Applicable   • Chronic obstructive pulmonary disease ([facility]) [J44.9]  Yes   • Hypertension [I10]  Yes   • Restless legs syndrome [G25.81]  Yes      Resolved Hospital Problems   No resolved problems to display.       Very pleasant 80yo woman with h/o recurrent UTIs, who was sent to ER from urgent care with acute UTI and low BPs. In ER she was hypotensive and was noted to have LUH and hypokalemia.    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, her LAD and RCA had nonobstructive disease at that time)  S/p right and left heart cath 4/27, noted stable CAD, medical mgmt only per Card, Imdur added  Appreciate Card attention to pt  Continued to have chest pain  BNP and Trop elevated, CXR negative  Restarted Lasix, weight down  Card increased Imdur from 30mg QD to 60mg BID but pt became hypotensive, decreased to 30mg BID, BPs low again yesterday and Lasix held, BPs better so far today  Continue statin/ASA    Hypoxia  Persistent since admission  CTA neg for PE  Repeat CXR clear 4/28 and 4/30  Continue IS and attempts to wean  84% on RA  Will likely require home O2 for short time    LUH  Resolved with IVFs and resolution of hypotension initially  Watching Cr closely but so far remains stable    HTN, with episodes hypotension here  Defer to Card, was hypotensive on admission due to infection and possibly some over-diuresis  Continue Metoprolol and Losartan per Card, Imdur increased with some resultant hypotension, dose decreased and Losartan divided to BID  BPs better so far today    Hypokalemia  Replaced with single dose this AM, repeat BMP this afternoon, Mg++ level wnl    DM2  Not on meds at home  A1c 7.1  Sugars remain acceptable here, continue to monitor      · SCDs for DVT prophylaxis.  · Full code.  · Discussed with patient and dtr (on phone).  · PT recommends XANDER at dc, pt thinks she can go home, dtr agrees with me and PT that pt not strong enough to go home safely, CCP to speak with dtr about placement. Will need to see BPs stabilized prior to dc.      Winston Lujan MD  [facility]  05/01/22  09:51 EDT

## 2022-05-01 NOTE — SIGNIFICANT NOTE
Pt feeling weak and fatigued and dealing with low BP. Pt was assisted to restroom by CNA this PM ambulating ~15 ft x 2, bed<>toilet. Pt BP was taken at 13:31 with a reading of 70/52.

## 2022-05-01 NOTE — PROGRESS NOTES
[facility] Progress Note       Encounter Date:22  Patient:Grace Beauchamp  :1940  MRN:[id number]      Chief Complaint: Follow-up coronary artery disease and chest pain      Subjective:        Remains chest pain-free over the last 24 hours.  Blood pressure little soft but seems to be doing little bit better    Review of Systems:  Review of Systems   Constitutional: Positive for malaise/fatigue.   Cardiovascular: Negative for chest pain.   Neurological: Positive for weakness.       Medications:  Scheduled Meds:  albuterol, 2.5 mg, Nebulization, TID - RT  aspirin, 81 mg, Oral, Daily  atorvastatin, 40 mg, Oral, Nightly  budesonide-formoterol, 2 puff, Inhalation, BID - RT   And  tiotropium bromide monohydrate, 1 puff, Inhalation, Daily - RT  cholecalciferol, 2,000 Units, Oral, Daily  isosorbide mononitrate, 30 mg, Oral, BID  losartan, 25 mg, Oral, BID  metoprolol succinate XL, 50 mg, Oral, BID  pantoprazole, 40 mg, Oral, QAM  pramipexole, 0.125 mg, Oral, Nightly  sodium chloride, 10 mL, Intravenous, Q12H    Continuous Infusions:   PRN Meds:  •  acetaminophen  •  albuterol  •  atropine  •  cetirizine  •  HYDROmorphone  •  nitroglycerin  •  ondansetron **OR** ondansetron  •  sodium chloride  •  sodium chloride  •  traMADol         Objective:       Vitals:    22 1424 22 1519 22 1524 22 1854   BP: 106/58   157/79   BP Location: Left arm   Left arm   Patient Position: Lying   Lying   Pulse: 60 65 66 65   Resp:  18 18 20   Temp:    97.1 °F (36.2 °C)   TempSrc:    Oral   SpO2: 93% (!) 86% 98% 95%   Weight:       Height:               Physical Exam:  Constitutional:  Chronically ill-appearing, frail, no acute distress   HENT: Oropharynx clear and membrane moist  Eyes: Normal conjunctiva, no sclera icterus.  Neck: Supple, no carotid bruit bilaterally.  Cardiovascular: Regular rate and rhythm, No Murmur, No bilateral lower extremity edema.  Pulmonary: Normal respiratory  effort, normal lung sounds, no wheezing.  Abdominal: Soft, nontender, no hepatosplenomegaly, liver is non-pulsatile.  Neurological: Alert and orient x 3.   Skin: Warm, dry, no ecchymosis, no rash.  Psych: Appropriate mood and affect. Normal judgment and insight.           Lab Review:   Results from last 7 days   Lab Units 05/01/22  1302 05/01/22  0609 04/30/22  1031 04/29/22  0300 04/28/22  0333 04/27/22  0354 04/26/22  0153   SODIUM mmol/L 137 138 137 137 138 141 141   POTASSIUM mmol/L 3.9 3.3* 3.5 3.5 3.8 4.0 3.9   CHLORIDE mmol/L 94* 94* 94* 95* 100 100 100   CO2 mmol/L 33.2* 33.0* 34.3* 33.7* 29.5* 28.0 29.9*   BUN mg/dL 18 17 16 15 14 18 15   CREATININE mg/dL 0.85 0.67 0.81 0.73 0.62 0.83 0.76   GLUCOSE mg/dL 109* 104* 123* 105* 83 129* 130*   CALCIUM mg/dL 8.8 8.9 8.7 9.1 8.8 8.9 8.7   AST (SGOT) U/L  --  11 16 13 11 10  --    ALT (SGPT) U/L  --  8 11 6 5 9  --      Results from last 7 days   Lab Units 04/29/22  0300 04/29/22  0050 04/28/22  1915 04/28/22  1515 04/27/22  0031 04/26/22  0153 04/25/22  0440   TROPONIN T ng/mL 0.131* 0.149* 0.119* 0.138* 0.011 <0.010 <0.010     Results from last 7 days   Lab Units 05/01/22  0609 04/30/22  1031 04/29/22  0300 04/28/22  0333 04/27/22  1119 04/27/22  1113 04/27/22  0354 04/26/22  0153 04/25/22  0440   WBC 10*3/mm3 5.66 5.13 5.46 5.93  --   --  4.98 4.38 4.58   HEMOGLOBIN g/dL 12.1 11.7* 12.6 11.7*  --   --  12.7 13.4 12.4   HEMOGLOBIN, POC g/dL  --   --   --   --  12.2 12.2  --   --   --    HEMATOCRIT % 37.6 36.1 38.5 37.1  --   --  40.0 40.0 40.0   HEMATOCRIT POC %  --   --   --   --  36* 36*  --   --   --    PLATELETS 10*3/mm3 305 290 290 272  --   --  263 268 248         Results from last 7 days   Lab Units 05/01/22  0609 04/30/22  1031 04/29/22  0300 04/28/22  0333 04/27/22  0354   MAGNESIUM mg/dL 1.9 1.8 1.8 1.8 1.7           Invalid input(s): LDLCALC  Results from last 7 days   Lab Units 04/28/22  1515   PROBNP pg/mL 2,580.0*           Cardiac catheterization  4/27/2022 with images reviewed by myself:  · Left main artery: Large-caliber vessel with 0% stenosis.  The vessel bifurcates into left anterior descending and left circumflex arteries.  · Left anterior descending artery: Large caliber vessel with 50% proximal stenosis.  Proximal vessel gives rise to a large caliber first diagonal branch with 50 to 60% ostial stenosis.  Mid LAD remains a large caliber vessel with 20% diffuse stenosis.  The distal LAD remains a large caliber vessel and appears to wraparound the apex with no significant disease.  · Left circumflex artery: Moderate caliber vessel with chronic 100% occlusion of the mid vessel.  · Right coronary artery: Large caliber vessel with 50% proximal to mid stenosis.  The distal vessel remains a large caliber vessel with no significant disease and bifurcates into a moderate caliber posterior descending and posterior lateral branch both with 0% stenosis.  This a right dominant system.  · Saphenous vein graft to the OM1: 100% stenosis at the ostium.  · Right atrial pressure: 10/9, 7  · Right ventricular pressure: 77/0, 11  · Pulmonary artery pressure: 74/12, 34  · Pulmonary wedge pressure: 20/17, 16  · Massimo calculated cardiac output 2.0 L/min, cardiac index 1.56 L/min/m²    Echocardiogram 4/25/2022:  · The myocardium appears speckled and bright. Consider infiltrative cardiomyopathy if clinically appropriate  · Left ventricular wall thickness is consistent with moderate concentric hypertrophy  · There is severe hypokinesis of the anterolateral wall and inferoseptum. There is moderate hypokinesis of the inferolateral wall  · Left ventricular ejection fraction appears to be 36 - 40%. Left ventricular systolic function is moderately decreased.  · Left ventricular diastolic function is consistent with (grade Ia w/high LAP) impaired relaxation.  · The right ventricular cavity is mildly dilated. Moderately reduced right ventricular systolic function noted.  · The left  atrial cavity is moderately dilated.  · The aortic valve leaflets are thickened and moderately calcified (aortic sclerosis)  · Mild mitral valve regurgitation is present  · Moderate tricuspid valve regurgitation is present.  · Calculated right ventricular systolic pressure from tricuspid regurgitation is 78 mmHg.  · There is no evidence of pericardial effusion.       Assessment:          Diagnosis Plan   1. Confusion     2. Dehydration     3. Acute UTI            Plan:       Ms. Beauchamp is a 81 y.o. woman who follows with Dr. 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  Further evaluation this admission due to the ongoing chest pain included a echocardiogram and cardiac catheterization.  With regards to her coronary disease there is no real revascularization targets plans were to continue to medically manage her.  She has had a few other episodes of chest pain unfortunately her blood pressure is fairly labile.  With too much nitroglycerin she deftly has hypotension and actually required some volume and IV fluids on 4/29.  Over the last 24 hours blood pressure has remained consistently on the lower side but stable.  Will monitor on current regimen may need to back off on losartan a little bit more.    Coronary artery disease with stable angina:  · Will continue with low-dose Imdur  · Continue beta-blocker  · Continue aspirin  · Continue statin    Pulmonary hypertension:  · Likely complicating matters  · We will hold diuretics today given low blood pressure hopefully can restart tomorrow if blood pressure evens out    Chronic systolic congestive heart failure:  · Continue beta-blocker  · Will divide up losartan  in hopes of avoiding labile blood pressures  · Relatively euvolemic on exam and on recent heart catheterization okay to continue to hold diuretics until blood pressure evens out             Zbigniew Andrews MD  [facility]  05/01/22  19:15 EDT

## 2022-05-01 NOTE — PLAN OF CARE
Goal Outcome Evaluation:  Plan of Care Reviewed With: patient        Progress: improving  Outcome Evaluation: · Patient is alert and oriented x4, able to make all needs known to staff, admitted on 4/22, patient have a Hx of Coronary artery disease status post bypass surgery, ascending aortic aneurysm, peripheral vascular disease, pulmonary hypertension, recently Dx with UTI due to extended-spectrum beta lactamase (ESBL) presently on contact isolation. Patient with b/p stabilizes this shift, all b/p med administered as ordered. VSS. Patient diuretics has been on hold given to her low blood pressure to be restart  if blood pressure evens out, patient is resting in bed with call bell within easy reach, bed lower to floor, bed alarm on. Will continues to monitor.

## 2022-05-01 NOTE — PLAN OF CARE
Goal Outcome Evaluation:           Progress: improving  Outcome Evaluation: pt alert and oriented, although daughter at bedside says she gets confused at times. c/o headache around 1800, tylenol given. monitoring BP. IV abx continued. encouraged activity. pt up to the bathroom with x1 assist.

## 2022-05-02 PROBLEM — I27.20 PULMONARY HYPERTENSION (HCC): Status: ACTIVE | Noted: 2022-05-02

## 2022-05-02 PROBLEM — E87.6 HYPOKALEMIA: Status: RESOLVED | Noted: 2022-04-22 | Resolved: 2022-05-02

## 2022-05-02 LAB
ANION GAP SERPL CALCULATED.3IONS-SCNC: 9 MMOL/L (ref 5–15)
BUN SERPL-MCNC: 16 MG/DL (ref 8–23)
BUN/CREAT SERPL: 21.9 (ref 7–25)
CALCIUM SPEC-SCNC: 9.1 MG/DL (ref 8.6–10.5)
CHLORIDE SERPL-SCNC: 96 MMOL/L (ref 98–107)
CO2 SERPL-SCNC: 32 MMOL/L (ref 22–29)
CREAT SERPL-MCNC: 0.73 MG/DL (ref 0.57–1)
DEPRECATED RDW RBC AUTO: 40.1 FL (ref 37–54)
EGFRCR SERPLBLD CKD-EPI 2021: 82.7 ML/MIN/1.73
ERYTHROCYTE [DISTWIDTH] IN BLOOD BY AUTOMATED COUNT: 12.3 % (ref 12.3–15.4)
GLUCOSE SERPL-MCNC: 93 MG/DL (ref 65–99)
HCT VFR BLD AUTO: 36.7 % (ref 34–46.6)
HGB BLD-MCNC: 11.8 G/DL (ref 12–15.9)
MAGNESIUM SERPL-MCNC: 1.8 MG/DL (ref 1.6–2.4)
MCH RBC QN AUTO: 28.9 PG (ref 26.6–33)
MCHC RBC AUTO-ENTMCNC: 32.2 G/DL (ref 31.5–35.7)
MCV RBC AUTO: 90 FL (ref 79–97)
PLATELET # BLD AUTO: 266 10*3/MM3 (ref 140–450)
PMV BLD AUTO: 9.1 FL (ref 6–12)
POTASSIUM SERPL-SCNC: 4.2 MMOL/L (ref 3.5–5.2)
QT INTERVAL: 450 MS
QT INTERVAL: 497 MS
QT INTERVAL: 685 MS
RBC # BLD AUTO: 4.08 10*6/MM3 (ref 3.77–5.28)
SODIUM SERPL-SCNC: 137 MMOL/L (ref 136–145)
WBC NRBC COR # BLD: 5.18 10*3/MM3 (ref 3.4–10.8)

## 2022-05-02 PROCEDURE — 94799 UNLISTED PULMONARY SVC/PX: CPT

## 2022-05-02 PROCEDURE — 94664 DEMO&/EVAL PT USE INHALER: CPT

## 2022-05-02 PROCEDURE — 99232 SBSQ HOSP IP/OBS MODERATE 35: CPT | Performed by: NURSE PRACTITIONER

## 2022-05-02 PROCEDURE — 83735 ASSAY OF MAGNESIUM: CPT | Performed by: INTERNAL MEDICINE

## 2022-05-02 PROCEDURE — 85027 COMPLETE CBC AUTOMATED: CPT | Performed by: HOSPITALIST

## 2022-05-02 PROCEDURE — 80048 BASIC METABOLIC PNL TOTAL CA: CPT | Performed by: HOSPITALIST

## 2022-05-02 PROCEDURE — 97530 THERAPEUTIC ACTIVITIES: CPT

## 2022-05-02 RX ADMIN — BUDESONIDE AND FORMOTEROL FUMARATE DIHYDRATE 2 PUFF: 160; 4.5 AEROSOL RESPIRATORY (INHALATION) at 19:14

## 2022-05-02 RX ADMIN — ALBUTEROL SULFATE 2.5 MG: 2.5 SOLUTION RESPIRATORY (INHALATION) at 23:14

## 2022-05-02 RX ADMIN — TIOTROPIUM BROMIDE INHALATION SPRAY 1 PUFF: 3.12 SPRAY, METERED RESPIRATORY (INHALATION) at 08:15

## 2022-05-02 RX ADMIN — ISOSORBIDE MONONITRATE 30 MG: 30 TABLET ORAL at 08:58

## 2022-05-02 RX ADMIN — LOSARTAN POTASSIUM 25 MG: 25 TABLET, FILM COATED ORAL at 21:08

## 2022-05-02 RX ADMIN — ALBUTEROL SULFATE 2.5 MG: 2.5 SOLUTION RESPIRATORY (INHALATION) at 15:11

## 2022-05-02 RX ADMIN — METOPROLOL SUCCINATE 50 MG: 50 TABLET, EXTENDED RELEASE ORAL at 21:08

## 2022-05-02 RX ADMIN — ISOSORBIDE MONONITRATE 30 MG: 30 TABLET ORAL at 21:08

## 2022-05-02 RX ADMIN — Medication 2000 UNITS: at 08:57

## 2022-05-02 RX ADMIN — METOPROLOL SUCCINATE 50 MG: 50 TABLET, EXTENDED RELEASE ORAL at 08:58

## 2022-05-02 RX ADMIN — LOSARTAN POTASSIUM 25 MG: 25 TABLET, FILM COATED ORAL at 08:58

## 2022-05-02 RX ADMIN — ATORVASTATIN CALCIUM 40 MG: 20 TABLET, FILM COATED ORAL at 21:08

## 2022-05-02 RX ADMIN — Medication 10 ML: at 21:10

## 2022-05-02 RX ADMIN — BUDESONIDE AND FORMOTEROL FUMARATE DIHYDRATE 2 PUFF: 160; 4.5 AEROSOL RESPIRATORY (INHALATION) at 08:15

## 2022-05-02 RX ADMIN — PRAMIPEXOLE DIHYDROCHLORIDE 0.12 MG: 0.25 TABLET ORAL at 21:08

## 2022-05-02 RX ADMIN — PANTOPRAZOLE SODIUM 40 MG: 40 TABLET, DELAYED RELEASE ORAL at 06:08

## 2022-05-02 RX ADMIN — TRAMADOL HYDROCHLORIDE 50 MG: 50 TABLET, COATED ORAL at 21:08

## 2022-05-02 RX ADMIN — ASPIRIN 81 MG: 81 TABLET, COATED ORAL at 12:19

## 2022-05-02 RX ADMIN — Medication 10 ML: at 08:59

## 2022-05-02 NOTE — CONSULTS
"Adult Nutrition  Assessment/PES    Patient Name:  Grace Beauchamp  YOB: 1940  MRN: [id number]  Admit Date:  4/22/2022    Assessment Date:  5/2/2022    Comments:  LOS. Pt admit with UTI, heart failure, ischemic cardiomyopathy, low bp, LUH, low blood potassium, metabolic brain disorder, hx: T2DM, high bp, chronic airway obstruction, breast cancer. BMI: 20.2 (normal). Pt on regular; consistent carbohydrate, cardiac diet. 25-50% intake. Ordered Boost Glucose Control TID in vanilla. Continue to follow per protocol.      Reason for Assessment     Row Name 05/02/22 0942          Reason for Assessment    Reason For Assessment per organizational policy     Diagnosis other (see comments)  Pt admit with UTI, heart failure, ischemic cardiomyopathy, low bp, LUH, low blood potassium, metabolic brain disorder, hx: T2DM, high bp, chronic airway obstruction, breast cancer     Identified At Risk by Screening Criteria other (see comments)  LOS                Nutrition/Diet History     Row Name 05/02/22 0943          Nutrition/Diet History    Typical Intake (Food/Fluid/EN/PN) 25-50%.     Food Preferences Vanilla Boost Glucose Control     Factors Affecting Nutritional Intake appetite                Anthropometrics     Row Name 05/02/22 0944 05/02/22 0549       Anthropometrics    Weight -- 41 kg (90 lb 6.4 oz)    Height for Calculation 1.422 m (4' 7.98\") --    Weight for Calculation 41 kg (90 lb 6.2 oz) --    Additional Documentation --  BMI: 20.2 --               Labs/Tests/Procedures/Meds     Row Name 05/02/22 0943          Labs/Procedures/Meds    Lab Results Reviewed reviewed     Lab Results Comments Cl (low)            Diagnostic Tests/Procedures    Diagnostic Test/Procedure Reviewed reviewed     Diagnostic Test/Procedures Comments XR chest            Medications    Pertinent Medications Reviewed reviewed     Pertinent Medications Comments lipitor, vitamin D3, imdur, cozaar, metoprolol succinate, protonix, mirapex, " "sodium chloirde, prn: dilaudid, nitrostat, ultram                  Estimated/Assessed Needs - Anthropometrics     Row Name 05/02/22 0944 05/02/22 0549       Anthropometrics    Weight -- 41 kg (90 lb 6.4 oz)    Height for Calculation 1.422 m (4' 7.98\") --    Weight for Calculation 41 kg (90 lb 6.2 oz) --    Additional Documentation --  BMI: 20.2 --               Nutrition Prescription Ordered     Row Name 05/02/22 0945          Nutrition Prescription PO    Current PO Diet Regular     Common Modifiers Consistent Carbohydrate;Cardiac                Evaluation of Received Nutrient/Fluid Intake     Row Name 05/02/22 0945          PO Evaluation    % PO Intake 25-50%                     Problem/Interventions:   Problem 1     Row Name 05/02/22 0945          Nutrition Diagnoses Problem 1    Problem 1 Inadequate Nutrient Intake     Etiology (related to) Factors Affecting Nutrition     Appetite Poor     Signs/Symptoms (evidenced by) PO Intake     Percent (%) intake recorded 30 %     Over number of meals 5                      Intervention Goal     Row Name 05/02/22 0946          Intervention Goal    General Maintain nutrition;Improved nutrition related lab(s);Meet nutritional needs for age/condition;Disease management/therapy     PO Tolerate PO;Meet estimated needs;Increase intake     Weight Maintain weight                Nutrition Intervention     Row Name 05/02/22 0946          Nutrition Intervention    RD/Tech Action Care plan reviewd;Follow Tx progress;Recommend/ordered     Recommended/Ordered Supplement                Nutrition Prescription     Row Name 05/02/22 0946          Nutrition Prescription PO    PO Prescription Begin/change supplement     Supplement Boost Glucose Control     Supplement Frequency 3 times a day     New PO Prescription Ordered? Yes                Education/Evaluation     Row Name 05/02/22 0946          Education    Education Will Instruct as appropriate            Monitor/Evaluation    Monitor Per " protocol;I&O;PO intake;Supplement intake;Pertinent labs;Weight;Skin status;Symptoms                 Electronically signed by:  Margo Fox RD  05/02/22 09:47 EDT

## 2022-05-02 NOTE — THERAPY TREATMENT NOTE
Patient Name: Grace Beauchamp  : 1940    MRN: [id number]                              Today's Date: 2022       Admit Date: 2022    Visit Dx:     ICD-10-CM ICD-9-CM   1. Confusion  R41.0 298.9   2. Dehydration  E86.0 276.51   3. Acute UTI  N39.0 599.0     Patient Active Problem List   Diagnosis   • History of breast cancer   • Stenosis of carotid artery   • Chronic obstructive pulmonary disease (HCC)   • Closed fracture of multiple ribs   • Cognitive disorder   • Depression   • Erythromelalgia ([facility])   • Hyperlipidemia   • Hypertension   • Primary osteoarthritis involving multiple joints   • Osteoporosis   • Restless legs syndrome   • Cerebral aneurysm   • Temporary cerebral vascular dysfunction   • Urinary tract infection   • S/P CABG x 1   • Type 2 diabetes mellitus without complication, without long-term current use of insulin ([facility])   • S/P ascending aortic aneurysm repair   • 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[facility])   • Descending thoracic aortic aneurysm ([facility])   • Claudication of both lower extremities ([facility])   • 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[facility])   • Ventral hernia without obstruction or gangrene   • Breast cancer, stage 1, estrogen receptor positive ([facility])   • Arthritis of right hip   • Arthritis of right knee   • Chondrocalcinosis   • Chronic pain of right knee   • DDD (degenerative disc disease), lumbosacral   • Gastroesophageal reflux disease   • Bilateral hearing loss   • Thoracic aortic aneurysm without rupture ([facility])   • PVD (peripheral vascular disease) ([facility])   • 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[facility])   • Thoracoabdominal aortic aneurysm, without rupture ([facility])   • 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 rupture ([facility])   • Aortic aneurysm, descending ([facility])   • Aortic aneurysm without rupture ([facility])   • CAD (coronary artery disease)   • S/P left heart catheterization by ventricular puncture   • Cerebral infarction ([facility])   • Pericardial hematoma   • History of ST elevation myocardial infarction (STEMI)   • Chronic  "diastolic CHF (congestive heart failure) ([facility])   • Left ventricular aneurysm   • Immobility syndrome   • Lower extremity edema   • QT prolongation   • Recurrent UTI (urinary tract infection)   • Hypoxia   • Metabolic encephalopathy   • Hypokalemia   • LUH (acute kidney injury) ([facility])   • Hypotension   • UTI due to extended-spectrum beta lactamase (ESBL) producing Escherichia coli   • Ischemic cardiomyopathy   • Acute on chronic systolic CHF (congestive heart failure) ([facility])     Past Medical History:   Diagnosis Date   • AAA (abdominal aortic aneurysm) ([facility])    • Acute bronchitis 12/2018   • 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[facility])    • Cataracts, bilateral    • Closed fracture of three ribs of right side    • Cognitive disorder    • COPD (chronic obstructive pulmonary disease) ([facility])    • Depression    • Descending aortic arch aneurysm ([facility])    • Diabetes mellitus ([facility])     \"BORDERLINE\"   • Erythermalgia ([facility])    • GERD (gastroesophageal reflux disease)    • Hyperlipidemia     Controlled w/Meds   • Hypertension     Controlled w/Meds   • Low back pain    • Moderate aortic valve insufficiency     S/p AVR on 02/23/16 by Dr. Ontiveros   • Nocturia    • Osteoarthritis    • Osteoporosis    • Otitis media     R Ear   • Prediabetes    • PVD (peripheral vascular disease) ([facility])    • RLS (restless legs syndrome)    • Saccular aneurysm    • Stroke ([facility])     Perioperatively w/L Hip Repl   • Thoracic ascending aortic aneurysm ([facility])     S/p Repair on 02/23/16 by Dr. Ontiveros   • TIA (transient ischemic attack)    • Urgency incontinence    • Urinary tract infection    • Venous insufficiency    • Ventral " hernia      Past Surgical History:   Procedure Laterality Date   • AORTIC VALVE REPAIR/REPLACEMENT N/A 02/23/2016-[facility]    Ascending Replacement Using a 24MM Graft--Dr. Ontiveros   • APPENDECTOMY  1977   • BREAST BIOPSY Right 09/16/2012    Vacuum Assisted Core Bx of R Breast   • CARDIAC CATHETERIZATION N/A 01/06/2016    Dr. Tres De Los Santos   • CARDIAC CATHETERIZATION N/A 4/22/2019    Procedure: Left Heart Cath;  Surgeon: Tres De Los Santos MD;  [facility];  Service: Cardiology   • CARDIAC CATHETERIZATION N/A 4/22/2019    Procedure: Coronary angiography;  Surgeon: Tres De Los Santos MD;  Location:  [facility] CATH INVASIVE LOCATION;  Service: Cardiology   • CARDIAC CATHETERIZATION N/A 7/22/2020    Procedure: LEFT HEART CATH;  Surgeon: Antonia Scott MD;  [facility];  Service: Cardiovascular;  Laterality: N/A;   • CARDIAC CATHETERIZATION N/A 7/22/2020    Procedure: CORONARY ANGIOGRAPHY;  Surgeon: Antonia Scott MD;  [facility];  Service: Cardiovascular;  Laterality: N/A;   • CARDIAC CATHETERIZATION N/A 7/22/2020    Procedure: Left ventriculography;  Surgeon: Antonia Scott MD;  Location:  [facility] CATH INVASIVE LOCATION;  Service: Cardiovascular;  Laterality: N/A;   • CARDIAC CATHETERIZATION N/A 7/22/2020    Procedure: Saphenous Vein Graft;  Surgeon: Antonia Scott MD;  Location:  [facility] CATH INVASIVE LOCATION;  Service: Cardiovascular;  Laterality: N/A;   • CARDIAC CATHETERIZATION N/A 4/27/2022    Procedure: Right Heart Cath;  Surgeon: Lydia Mays MD;  [facility];  Service: Cardiology;  Laterality: N/A;   • CARDIAC CATHETERIZATION N/A 4/27/2022    Procedure: Coronary angiography;  Surgeon: Lydia Mays MD;  Location:  [facility] CATH INVASIVE LOCATION;  Service: Cardiology;  Laterality: N/A;   • CARDIAC CATHETERIZATION N/A 4/27/2022    Procedure: Left Heart Cath;  Surgeon: Lydia Mays MD;  [facility]  [facility];  Service: Cardiology;  Laterality: N/A;   • CARDIAC SURGERY  02/2016    Dr. Ontiveros/Dr. De Los Santos   • CATARACT EXTRACTION Bilateral     [facility]   • COLONOSCOPY N/A 10/2002    Dr. Negro   • CORONARY ARTERY BYPASS GRAFT  02/23/2016-[facility]    x1 w/L Vein Grafting--Dr. Ontiveros   • CORONARY ARTERY BYPASS GRAFT N/A 9/18/2020    Procedure: STERNAL EXPLORATION WITH CLOSURE AND WASHOUT, REMOVAL OF IABP, PRP;  Surgeon: Mart Ontiveros MD;  Location:  [facility] MAIN OR;  Service: Cardiothoracic;  Laterality: N/A;   • CYST REMOVAL Right 01/25/2013    R Palm Excision of Retinacular Cyst--Dr. Karla Fish   • EPIDURAL BLOCK     • LAPAROSCOPIC CHOLECYSTECTOMY N/A 08/04/2004    Dr. JOEY Sheth   • MASTECTOMY Right 09/28/2012   • ORIF HIP FRACTURE Left 03/27/2005    w/ AMBI compression screw, Dr. Victor M Cabral   • RECTOVAGINAL FISTULA REPAIR  1965   • THORACIC AORTIC ANEURYSM REPAIR N/A 7/23/2019    Procedure: ROSE, THORACOABDOMINAL AORTIC ANEURYSM REPAIR, VISCERAL VESSEL REPAIR, LARGE VENTRAL HERNIA REPAIR WITH MESH, CIRCULATORY ARREST, PRP;  Surgeon: Mart Ontiveros MD;  Location:  [facility];  Service: Cardiothoracic   • TRANSESOPHAGEAL ECHOCARDIOGRAM (ROSE) N/A 9/18/2020    Procedure: TRANSESOPHAGEAL ECHOCARDIOGRAM WITH ANESTHESIA;  Surgeon: Mart Ontiveros MD;  Location:  [facility];  Service: Cardiothoracic;  Laterality: N/A;   • TUBAL ABDOMINAL LIGATION     • VENTRICULAR ANEURYSM REPAIR N/A 7/22/2020    Procedure: EMERGENT REOP STERNOTOMY, REPAIR OF LV RUPTURE, PRP, INTRAOP ROSE;  Surgeon: Mart Ontiveros MD;  Location:  [facility];  Service: Cardiothoracic;  Laterality: N/A;   • VENTRICULAR ANEURYSM REPAIR N/A 9/17/2020    Procedure: ROSE, MIDLINE STERNOTOMY REOP  LEFT VENTRICULAR ANEURYSM REPAIR, IABP INSERTION, PRP;  Surgeon: Mart Ontiveros MD;  Location:  [facility];  Service: Cardiothoracic;  Laterality: N/A;      General Information     Row Name 05/02/22 4090           Physical Therapy Time and Intention    Document Type therapy note (daily note)  -AG     Mode of Treatment individual therapy;physical therapy  -AG     Row Name 05/02/22 1513          General Information    Patient Profile Reviewed yes  -AG     Existing Precautions/Restrictions fall  -AG           User Key  (r) = Recorded By, (t) = Taken By, (c) = Cosigned By    Initials Name Provider Type    AG Cassandra Gamez, PT Physical Therapist               Mobility     Row Name 05/02/22 1513          Bed Mobility    Bed Mobility supine-sit  -AG     Supine-Sit [place] (Bed Mobility) minimum assist (75% patient effort);verbal cues  -AG     Assistive Device (Bed Mobility) bed rails;head of bed elevated  -AG     Comment, (Bed Mobility) cuing on hand placement and sequencing with use of bedrail  -AG     Row Name 05/02/22 1513          Transfers    Comment, (Transfers) requires min A for FWW managment with turning, cuing on hand palcement for stand>sit  -AG     Row Name 05/02/22 1513          Bed-Chair Transfer    Bed-Chair [place] (Transfers) minimum assist (75% patient effort);verbal cues  -AG     Assistive Device (Bed-Chair Transfers) walker, front-wheeled  -AG     Row Name 05/02/22 1513          Sit-Stand Transfer    Sit-Stand [place] (Transfers) minimum assist (75% patient effort);verbal cues  -AG     Assistive Device (Sit-Stand Transfers) walker, front-wheeled  -AG     Row Name 05/02/22 1513          Gait/Stairs (Locomotion)    [place] Level (Gait) contact guard  -AG     Assistive Device (Gait) walker, front-wheeled  -AG     Distance in Feet (Gait) 30ftx1  -AG     Deviations/Abnormal Patterns (Gait) gait speed decreased;chris decreased;stride length decreased  -AG     Comment, (Gait/Stairs) cuing to increase L step length , upright posture and FWW managment  -AG           User Key  (r) = Recorded By, (t) = Taken By, (c) = Cosigned By    Initials Name Provider Type    AG Cassandra Gamez PT Physical  Therapist               Obj/Interventions    No documentation.                Goals/Plan    No documentation.                Clinical Impression     Row Name 05/02/22 1515          Plan of Care Review    Plan of Care Reviewed With patient  -AG     Outcome Evaluation Pt continues to be deconditioned with poor activity tolerance and questionable motiviation. Pt was unable to be seen over weekend due to hypotension however remains stable during todays session. Pt requires min A for all mobility tasks, able to ambualte 30ft however requires 3 standing rest breaks to complete due to reported UE fatigue. Pt requires encouragement for OOB mobility from family and nursing staff, PT continues to encourage up in chair for all meals and ambulate with nursing staff daily, however poor follow through from patient. Recommend continued acute skilled PT to increase overall strength, balance, endurnace, safety and maximize independence with least restrictive AD in order to decrease risk for falls and burden of care. Recommend SNF placement.  -AG     Row Name 05/02/22 1515          Therapy Assessment/Plan (PT)    Rehab Potential (PT) fair, will monitor progress closely  -AG     Criteria for Skilled Interventions Met (PT) yes;skilled treatment is necessary  -AG     Therapy Frequency (PT) 5 times/wk  -AG     Row Name 05/02/22 1515          Vital Signs    Pre Systolic BP Rehab 96  -AG     Pre Treatment Diastolic BP 46  -AG     Intra Systolic BP Rehab 106  -AG     Intra Treatment Diastolic BP 58  -AG     Post Systolic BP Rehab 110  -AG     Post Treatment Diastolic BP 59  -AG     Pre SpO2 (%) 89  -AG     O2 Delivery Pre Treatment supplemental O2  -AG     O2 Delivery Intra Treatment supplemental O2  -AG     O2 Delivery Post Treatment supplemental O2  -AG     Pre Patient Position Supine  -AG     Post Patient Position Sitting  -AG     Rest Breaks  3  -AG     Row Name 05/02/22 1515          Positioning and Restraints    Pre-Treatment Position  in bed  -AG     Post Treatment Position chair  -AG     In Chair notified nsg;call light within reach;encouraged to call for assist;exit alarm on  -AG           User Key  (r) = Recorded By, (t) = Taken By, (c) = Cosigned By    Initials Name Provider Type    Cassandra Ruiz PT Physical Therapist               Outcome Measures     Row Name 05/02/22 1517          How much help from another person do you currently need...    Turning from your back to your side while in flat bed without using bedrails? 3  -AG     Moving from lying on back to sitting on the side of a flat bed without bedrails? 3  -AG     Moving to and from a bed to a chair (including a wheelchair)? 3  -AG     Standing up from a chair using your arms (e.g., wheelchair, bedside chair)? 3  -AG     Climbing 3-5 steps with a railing? 1  -AG     To walk in hospital room? 3  -AG     AM-PAC 6 Clicks Score (PT) 16  -AG     Highest level of mobility 5 --> Static standing  -AG     Row Name 05/02/22 1517          Functional Assessment    Outcome Measure Options AM-PAC 6 Clicks Basic Mobility (PT)  -AG           User Key  (r) = Recorded By, (t) = Taken By, (c) = Cosigned By    Initials Name Provider Type    Cassandra Ruiz PT Physical Therapist                             Physical Therapy Education                 Title: PT OT SLP Therapies (In Progress)     Topic: Physical Therapy (In Progress)     Point: Mobility training (Done)     Learning Progress Summary           Patient Acceptance, E, VU,NR by  at 4/24/2022 1324    Comment: Educated and instructed pt on PLB                   Point: Home exercise program (Not Started)     Learner Progress:  Not documented in this visit.          Point: Body mechanics (Done)     Learning Progress Summary           Patient Acceptance, E, VU,NR by  at 4/24/2022 1324    Comment: Educated and instructed pt on PLB                   Point: Precautions (Done)     Learning Progress Summary           Patient Acceptance, E,  VU,NR by MAC at 4/24/2022 1324    Comment: Educated and instructed pt on PLB                               User Key     Initials Effective Dates Name Provider Type Discipline    MAC 06/16/21 -  Diana Motley, PT Physical Therapist PT              PT Recommendation and Plan     Plan of Care Reviewed With: patient  Outcome Evaluation: Pt continues to be deconditioned with poor activity tolerance and questionable motiviation. Pt was unable to be seen over weekend due to hypotension however remains stable during todays session. Pt requires min A for all mobility tasks, able to ambualte 30ft however requires 3 standing rest breaks to complete due to reported UE fatigue. Pt requires encouragement for OOB mobility from family and nursing staff, PT continues to encourage up in chair for all meals and ambulate with nursing staff daily, however poor follow through from patient. Recommend continued acute skilled PT to increase overall strength, balance, endurnace, safety and maximize independence with least restrictive AD in order to decrease risk for falls and burden of care. Recommend SNF placement.     Time Calculation:    PT Charges     Row Name 05/02/22 1521             Time Calculation    Start Time 1352  -AG      Stop Time 1415  -AG      Time Calculation (min) 23 min  -AG      PT Received On 05/02/22  -AG      PT - Next Appointment 05/03/22  -      PT Goal Re-Cert Due Date 05/06/22  -AG              Time Calculation- PT    Total Timed Code Minutes- PT 23 minute(s)  -AG              Timed Charges    50771 - PT Therapeutic Activity Minutes 23  -AG              Total Minutes    Timed Charges Total Minutes 23  -AG       Total Minutes 23  -AG            User Key  (r) = Recorded By, (t) = Taken By, (c) = Cosigned By    Initials Name Provider Type    Cassandra Ruiz, PT Physical Therapist              Therapy Charges for Today     Code Description Service Date Service Provider Modifiers Qty    [id number]  PT  THERAPEUTIC ACT EA 15 MIN 5/2/2022 Cassandra Gamez, PT GP 2          PT G-Codes  Outcome Measure Options: AM-PAC 6 Clicks Basic Mobility (PT)  AM-PAC 6 Clicks Score (PT): 16    Cassandra Gamez, PT  5/2/2022

## 2022-05-02 NOTE — PROGRESS NOTES
Hospital Follow Up    LOS:  LOS: 10 days   Patient Name: Grace Beauchamp  Age/Sex: 81 y.o. female  : 1940  MRN: [id number]    Day of Service: 22   Length of Stay: 10  Encounter Provider: FREDRICK Mohan  Place of Service: [facility] CARDIOLOGY  Patient Care Team:  Miller Castañeda MD as PCP - General (Internal Medicine)  Mart Ontiveros MD as Surgeon (Cardiothoracic Surgery)  Tres De Los Santos MD as Consulting Physician (Cardiology)  Graham Mcconnell MD as Consulting Physician (Hematology and Oncology)  Payam Byrnes MD as Consulting Physician (Otolaryngology)  Jonathan Smith MD as Consulting Physician (Vascular Surgery)  Victor M Cabral MD as Surgeon (Orthopedic Surgery)  Yaya Burks MD as Consulting Physician (Pulmonary Disease)    Subjective:     Chief Complaint: CHF, chest pain    Interval History: No complaints of chest pain today has been a little confused    Objective:     Objective:  Temp:  [95 °F (35 °C)-97.5 °F (36.4 °C)] 95 °F (35 °C)  Heart Rate:  [60-68] 64  Resp:  [18-20] 18  BP: (106-162)/(58-87) 162/87     Intake/Output Summary (Last 24 hours) at 2022 1330  Last data filed at 2022 1700  Gross per 24 hour   Intake 120 ml   Output --   Net 120 ml     Body mass index is 20.27 kg/m².      22  0621 22  0507 22  0549   Weight: 43.2 kg (95 lb 3.8 oz) 41.3 kg (91 lb 1.6 oz) 41 kg (90 lb 6.4 oz)     Weight change: -0.318 kg (-11.2 oz)    Physical Exam:   General Appearance:    Awake alert and oriented in no acute distress.   Color:  Skin:  Neuro:  HEENT:    Lungs:     Pink  Warm and dry  No focal, motor or sensory deficits  Neck supple, pupils equal, round and reactive. No JVD, No Bruit  Clear to auscultation,respirations regular, even and                  unlabored    Heart:    Regular rate and rhythm, S1 and S2, no murmur, no gallop, no rub. No edema, DP/PT pulses are 2+   Chest Wall:    No abnormalities  observed   Abdomen:     Normal bowel sounds, no masses, no organomegaly, soft        non-tender, non-distended, no guarding, no ascites noted   Extremities:   Moves all extremities well, no edema, no cyanosis, no redness       Lab Review:   Results from last 7 days   Lab Units 05/02/22  0422 05/01/22  1302 05/01/22  0609 04/30/22  1031   SODIUM mmol/L 137 137 138 137   POTASSIUM mmol/L 4.2 3.9 3.3* 3.5   CHLORIDE mmol/L 96* 94* 94* 94*   CO2 mmol/L 32.0* 33.2* 33.0* 34.3*   BUN mg/dL 16 18 17 16   CREATININE mg/dL 0.73 0.85 0.67 0.81   GLUCOSE mg/dL 93 109* 104* 123*   CALCIUM mg/dL 9.1 8.8 8.9 8.7   AST (SGOT) U/L  --   --  11 16   ALT (SGPT) U/L  --   --  8 11     Results from last 7 days   Lab Units 04/29/22  0300 04/29/22  0050 04/28/22  1915 04/28/22  1515 04/27/22  0031 04/26/22  0153   TROPONIN T ng/mL 0.131* 0.149* 0.119* 0.138* 0.011 <0.010     Results from last 7 days   Lab Units 05/02/22  0422 05/01/22  0609   WBC 10*3/mm3 5.18 5.66   HEMOGLOBIN g/dL 11.8* 12.1   HEMATOCRIT % 36.7 37.6   PLATELETS 10*3/mm3 266 305         Results from last 7 days   Lab Units 05/02/22  0422 05/01/22  0609   MAGNESIUM mg/dL 1.8 1.9           Invalid input(s): LDLCALC  Results from last 7 days   Lab Units 04/28/22  1515   PROBNP pg/mL 2,580.0*         I reviewed the patient's new clinical results.  I personally viewed and interpreted the patient's EKG  Current Medications:   Scheduled Meds:albuterol, 2.5 mg, Nebulization, TID - RT  aspirin, 81 mg, Oral, Daily  atorvastatin, 40 mg, Oral, Nightly  budesonide-formoterol, 2 puff, Inhalation, BID - RT   And  tiotropium bromide monohydrate, 1 puff, Inhalation, Daily - RT  cholecalciferol, 2,000 Units, Oral, Daily  isosorbide mononitrate, 30 mg, Oral, BID  losartan, 25 mg, Oral, BID  metoprolol succinate XL, 50 mg, Oral, BID  pantoprazole, 40 mg, Oral, QAM  pramipexole, 0.125 mg, Oral, Nightly  sodium chloride, 10 mL, Intravenous, Q12H      Continuous Infusions:      Allergies:  Allergies   Allergen Reactions   • Ramipril Other (See Comments)     Ace I  cough   • Morphine Itching   • Morphine And Related Itching   • Bactrim [Sulfamethoxazole-Trimethoprim] Itching and Rash   • Cipro [Ciprofloxacin Hcl] Rash       Assessment:       UTI due to extended-spectrum beta lactamase (ESBL) producing Escherichia coli    History of breast cancer    Chronic obstructive pulmonary disease ([facility])    Hypertension    Restless legs syndrome    Type 2 diabetes mellitus without complication, without long-term current use of insulin ([facility])    CAD (coronary artery disease)    Chronic diastolic CHF (congestive heart failure) ([facility])    Metabolic encephalopathy    Hypokalemia    LUH (acute kidney injury) ([facility])    Hypotension    Ischemic cardiomyopathy    Acute on chronic systolic CHF (congestive heart failure) ([facility])  1.  Coronary artery disease with stable angina, no revascularizing target  2.  Pulmonary hypertension  3.  Chronic systolic heart failure  4.  UTI  5.  Type 2 diabetes mellitus without long-term use of insulin  6.  Ischemic cardiomyopathy      Plan:   Sinus rhythm on monitor blood pressure better today but still could be a little dry we will reassess labs tomorrow probably restart a smaller dose of Lasix may be 20 mg daily.        FREDRICK Mohan  05/02/22  13:30 EDT  Electronically signed by FREDRICK Mohan, 05/02/22, 1:30 PM EDT.

## 2022-05-02 NOTE — CASE MANAGEMENT/SOCIAL WORK
Continued Stay Note  [facility]     Patient Name: Grace Beauchamp  MRN: [id number]  Today's Date: 5/2/2022    Admit Date: 4/22/2022     Discharge Plan     Row Name 05/02/22 1329       Plan    Plan Comments Per RN pt is no longer on Invan[facility],Spoke with Lionel/Kiara Jones may have a bed, Yamilet will call family. message to Spring with [employer]               Discharge Codes    No documentation.               Expected Discharge Date and Time     Expected Discharge Date Expected Discharge Time    Apr 30, 2022             BERKLEY Turner

## 2022-05-02 NOTE — PLAN OF CARE
Goal Outcome Evaluation:  Plan of Care Reviewed With: patient            No acute distress noted this shift, NO c/o chest pain, pt up to the bathroom with one assist, safety maintained, continue plan of care

## 2022-05-02 NOTE — DISCHARGE PLACEMENT REQUEST
"Krupa Heck (81 y.o. Female)             Date of Birth   1940    Social Security Number       Address   [address] [place] [address]    Home Phone   [phone]    MRN   [id number]       Amish   Episcopal    Marital Status                               Admission Date   4/22/22    Admission Type   Emergency    Admitting Provider   Winston Lujan MD    Attending Provider   Chioma Mchugh DO    Department, Room/Bed   [address], [address]1       Discharge Date       Discharge Disposition       Discharge Destination                               Attending Provider: Chioma Mchugh DO    Allergies: Ramipril, Morphine, Morphine And Related, Bactrim [Sulfamethoxazole-trimethoprim], Cipro [Ciprofloxacin Hcl]    Isolation: Contact   Infection: ESBL E coli (04/25/22)   Code Status: CPR   Advance Care Planning Activity    Ht: 142.2 cm (56\")   Wt: 41 kg (90 lb 6.4 oz)    Admission Cmt: None   Principal Problem: UTI due to extended-spectrum beta lactamase (ESBL) producing Escherichia coli [N39.0,B96.29,Z16.12]                 Active Insurance as of 4/22/2022     Primary Coverage     Payor Plan Insurance Group Employer/Plan Group    MEDICARE MEDICARE A & B      Payor Plan Address Payor Plan Phone Number Payor Plan Fax Number Effective Dates    [address] [phone]  7/1/2005 - None Entered    [facility] [address]       Subscriber Name Subscriber Birth Date Member ID       KRUPA HECK 1940 [id number]           Secondary Coverage     Payor Plan Insurance Group Employer/Plan Group    [facility] SUPP [id number]     Payor Plan Address Payor Plan Phone Number Payor Plan Fax Number Effective Dates    [address]   12/1/2016 - None Entered    [facility] [id number]       Subscriber Name Subscriber Birth Date Member ID       KRUPA HECK 1940 [id number]                 Emergency Contacts      (Rel.) " Home Phone Work Phone Mobile Phone    Maddy Sanches (Daughter) [phone] -- [phone]    COBY CAPUTO (Daughter) [phone] -- [phone]    BRENDA CEDILLO (Daughter) [phone] -- [phone]

## 2022-05-02 NOTE — PROGRESS NOTES
Name: Grace Beauchamp ADMIT: 2022   : 1940  PCP: Miller Castañeda MD    MRN: [id number] LOS: 10 days   AGE/SEX: 81 y.o. female  ROOM: [facility]/     Subjective   Subjective   Lying in bed, having difficulty readjusting herself.  She insists that she is able to take care of herself at home, but then tells me she is not sure she could get herself into the chair.    Review of Systems  Denies chest pain, shortness of breath, fevers, abdominal pain     Objective   Objective   Vital Signs  Temp:  [95 °F (35 °C)-97.5 °F (36.4 °C)] 96.4 °F (35.8 °C)  Heart Rate:  [57-77] 70  Resp:  [18-20] 18  BP: ()/(58-87) 98/58  SpO2:  [88 %-95 %] 93 %  on  Flow (L/min):  [2] 2;   Device (Oxygen Therapy): nasal cannula  Body mass index is 20.27 kg/m².  Physical Exam  Constitutional:       General: She is not in acute distress.     Appearance: She is not diaphoretic.      Comments: Frail   Cardiovascular:      Rate and Rhythm: Normal rate and regular rhythm.   Pulmonary:      Effort: Pulmonary effort is normal. No respiratory distress.      Breath sounds: No wheezing.   Abdominal:      Palpations: Abdomen is soft.      Tenderness: There is no abdominal tenderness. There is no guarding.   Musculoskeletal:      Right lower leg: No edema.      Left lower leg: No edema.   Neurological:      Mental Status: She is alert.   Psychiatric:         Mood and Affect: Mood normal.         Results Review     I reviewed the patient's new clinical results.  Results from last 7 days   Lab Units 22  0422 22  0609 22  1031 22  0300   WBC 10*3/mm3 5.18 5.66 5.13 5.46   HEMOGLOBIN g/dL 11.8* 12.1 11.7* 12.6   PLATELETS 10*3/mm3 266 305 290 290     Results from last 7 days   Lab Units 22  0422 22  1302 22  0609 22  1031   SODIUM mmol/L 137 137 138 137   POTASSIUM mmol/L 4.2 3.9 3.3* 3.5   CHLORIDE mmol/L 96* 94* 94* 94*   CO2 mmol/L 32.0* 33.2* 33.0* 34.3*   BUN mg/dL 16 18 17 16   CREATININE mg/dL  0.73 0.85 0.67 0.81   GLUCOSE mg/dL 93 109* 104* 123*   Estimated Creatinine Clearance: 39.1 mL/min (by C-G formula based on SCr of 0.73 mg/dL).  Results from last 7 days   Lab Units 05/01/22  0609 04/30/22  1031 04/29/22  0300 04/28/22  0333   ALBUMIN g/dL 3.60 3.40* 3.40* 3.10*   BILIRUBIN mg/dL 0.5 0.6 0.6 0.7   ALK PHOS U/L 70 71 76 71   AST (SGOT) U/L 11 16 13 11   ALT (SGPT) U/L 8 11 6 5     Results from last 7 days   Lab Units 05/02/22  0422 05/01/22  1302 05/01/22  0609 04/30/22  1031 04/29/22  0300 04/28/22  0333   CALCIUM mg/dL 9.1 8.8 8.9 8.7 9.1 8.8   ALBUMIN g/dL  --   --  3.60 3.40* 3.40* 3.10*   MAGNESIUM mg/dL 1.8  --  1.9 1.8 1.8 1.8       COVID19   Date Value Ref Range Status   04/22/2022 Not Detected Not Detected - Ref. Range Final   12/03/2021 Not Detected Not Detected - Ref. Range Final     No results found for: HGBA1C, POCGLU    XR Chest PA & Lateral  PA AND LATERAL CHEST     CLINICAL HISTORY: Hypoxia.     Compared to the previous chest dated 04/28/2022.        The lungs are well-expanded and appear free of infiltrates. There are no  pleural effusions. The pulmonary vasculature is within normal limits.  The heart is mildly enlarged. The thoracic aorta is mildly ectatic and  calcified. The right breast appears absent.     IMPRESSIONS: No evidence of acute disease within the chest.     This report was finalized on 4/30/2022 3:48 PM by Dr. Pasha Salcedo M.D.       I reviewed the patient's daily medications.  Scheduled Medications  albuterol, 2.5 mg, Nebulization, TID - RT  aspirin, 81 mg, Oral, Daily  atorvastatin, 40 mg, Oral, Nightly  budesonide-formoterol, 2 puff, Inhalation, BID - RT   And  tiotropium bromide monohydrate, 1 puff, Inhalation, Daily - RT  cholecalciferol, 2,000 Units, Oral, Daily  isosorbide mononitrate, 30 mg, Oral, BID  losartan, 25 mg, Oral, BID  metoprolol succinate XL, 50 mg, Oral, BID  pantoprazole, 40 mg, Oral, QAM  pramipexole, 0.125 mg, Oral, Nightly  sodium chloride,  10 mL, Intravenous, Q12H    Infusions   Diet  Diet Regular; Consistent Carbohydrate, Cardiac       Assessment/Plan     Active Hospital Problems    Diagnosis  POA   • **UTI due to extended-spectrum beta lactamase (ESBL) producing Escherichia coli [N39.0, B96.29, Z16.12]  Yes   • Pulmonary hypertension (HCC) [I27.20]  Yes   • Ischemic cardiomyopathy [I25.5]  Yes   • Acute on chronic systolic CHF (congestive heart failure) ([facility]) [I50.23]  Yes   • Metabolic encephalopathy [G93.41]  Yes   • LUH (acute kidney injury) ([facility]) [N17.9]  Yes   • Hypotension [I95.9]  Yes   • Chronic diastolic CHF (congestive heart failure) ([facility]) [I50.32]  Yes   • CAD (coronary artery disease) [I25.10]  Yes   • Type 2 diabetes mellitus without complication, without long-term current use of insulin ([facility]) [E11.9]  Yes   • History of breast cancer [Z85.3]  Not Applicable   • Chronic obstructive pulmonary disease (HCC) [J44.9]  Yes   • Hypertension [I10]  Yes   • Restless legs syndrome [G25.81]  Yes      Resolved Hospital Problems    Diagnosis Date Resolved POA   • Hypokalemia [E87.6] 05/02/2022 Yes       80yo female with h/o recurrent UTIs, who was sent to ER from urgent care with altered mental status and low blood pressure.  She was found to have ESBL E. coli UTI and LUH.  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  Still requiring 2 L oxygen.  Will order walking oximetry prior to discharge  -Encourage incentive spirometry and out of bed ambulation     LUH  Resolved with IVFs and resolution of hypotension     HTN, with episodes hypotension here  Defer to Card, was hypotensive on admission due to infection and possibly some over-diuresis  -Holding Lasix, continue metoprolol, Imdur and losartan    Metabolic encephalopathy  Resolved, due to UTI     DM2  Not on meds at home. A1c 7.1 which is acceptable for her age.  Continue diabetic diet    · SCDs for DVT prophylaxis.  · Full code.  · Discussed with patient, nursing staff and CCP.  Anticipate discharge to SNU facility once arrangements have been made.  Anticipate she will be medically ready for discharge tomorrow    Chioma Mchugh DO  [facility]  05/02/22  17:05 EDT    Patient was placed in face mask on first look.  I wore protective equipment throughout this patient encounter including a face mask, gloves and protective eyewear.  Hand hygiene was performed before donning protective equipment and after removal when leaving the room.

## 2022-05-02 NOTE — PLAN OF CARE
Goal Outcome Evaluation:  Plan of Care Reviewed With: patient           Outcome Evaluation: Pt continues to be deconditioned with poor activity tolerance and questionable motiviation. Pt was unable to be seen over weekend due to hypotension however remains stable during todays session. Pt requires min A for all mobility tasks, able to ambualte 30ft however requires 3 standing rest breaks to complete due to reported UE fatigue. Pt requires encouragement for OOB mobility from family and nursing staff, PT continues to encourage up in chair for all meals and ambulate with nursing staff daily, however poor follow through from patient. Recommend continued acute skilled PT to increase overall strength, balance, endurnace, safety and maximize independence with least restrictive AD in order to decrease risk for falls and burden of care. Recommend SNF placement.

## 2022-05-02 NOTE — CASE MANAGEMENT/SOCIAL WORK
Continued Stay Note  [facility]     Patient Name: Grace Baeuchamp  MRN: [id number]  Today's Date: 5/2/2022    Admit Date: 4/22/2022     Discharge Plan     Row Name 05/02/22 0959       Plan    Plan pt now agreeable to SNF; referrals pending    Patient/Family in Agreement with Plan yes    Plan Comments Spoke with pt an pt's daughter bedside. Pt is now agreeable to SNF. Her first choice is [facility] however confirmed with [facility] that they can't take pt on [facility]. Second choice is [facility], referral to [facility]. CCP to follow.               Discharge Codes    No documentation.               Expected Discharge Date and Time     Expected Discharge Date Expected Discharge Time    Apr 30, 2022             BERKLEY Turner

## 2022-05-03 VITALS
BODY MASS INDEX: 20.52 KG/M2 | DIASTOLIC BLOOD PRESSURE: 84 MMHG | WEIGHT: 91.2 LBS | OXYGEN SATURATION: 92 % | TEMPERATURE: 97.4 F | SYSTOLIC BLOOD PRESSURE: 168 MMHG | RESPIRATION RATE: 18 BRPM | HEART RATE: 61 BPM | HEIGHT: 56 IN

## 2022-05-03 LAB
ANION GAP SERPL CALCULATED.3IONS-SCNC: 11 MMOL/L (ref 5–15)
BASOPHILS # BLD AUTO: 0.02 10*3/MM3 (ref 0–0.2)
BASOPHILS NFR BLD AUTO: 0.4 % (ref 0–1.5)
BUN SERPL-MCNC: 18 MG/DL (ref 8–23)
BUN/CREAT SERPL: 23.4 (ref 7–25)
CALCIUM SPEC-SCNC: 8.8 MG/DL (ref 8.6–10.5)
CHLORIDE SERPL-SCNC: 99 MMOL/L (ref 98–107)
CO2 SERPL-SCNC: 30 MMOL/L (ref 22–29)
CREAT SERPL-MCNC: 0.77 MG/DL (ref 0.57–1)
DEPRECATED RDW RBC AUTO: 40.7 FL (ref 37–54)
EGFRCR SERPLBLD CKD-EPI 2021: 77.6 ML/MIN/1.73
EOSINOPHIL # BLD AUTO: 0.09 10*3/MM3 (ref 0–0.4)
EOSINOPHIL NFR BLD AUTO: 1.7 % (ref 0.3–6.2)
ERYTHROCYTE [DISTWIDTH] IN BLOOD BY AUTOMATED COUNT: 12.5 % (ref 12.3–15.4)
GLUCOSE SERPL-MCNC: 107 MG/DL (ref 65–99)
HCT VFR BLD AUTO: 34.7 % (ref 34–46.6)
HGB BLD-MCNC: 11.1 G/DL (ref 12–15.9)
IMM GRANULOCYTES # BLD AUTO: 0.02 10*3/MM3 (ref 0–0.05)
IMM GRANULOCYTES NFR BLD AUTO: 0.4 % (ref 0–0.5)
LYMPHOCYTES # BLD AUTO: 1.04 10*3/MM3 (ref 0.7–3.1)
LYMPHOCYTES NFR BLD AUTO: 19.4 % (ref 19.6–45.3)
MAGNESIUM SERPL-MCNC: 1.8 MG/DL (ref 1.6–2.4)
MCH RBC QN AUTO: 28.8 PG (ref 26.6–33)
MCHC RBC AUTO-ENTMCNC: 32 G/DL (ref 31.5–35.7)
MCV RBC AUTO: 89.9 FL (ref 79–97)
MONOCYTES # BLD AUTO: 0.31 10*3/MM3 (ref 0.1–0.9)
MONOCYTES NFR BLD AUTO: 5.8 % (ref 5–12)
NEUTROPHILS NFR BLD AUTO: 3.88 10*3/MM3 (ref 1.7–7)
NEUTROPHILS NFR BLD AUTO: 72.3 % (ref 42.7–76)
NRBC BLD AUTO-RTO: 0 /100 WBC (ref 0–0.2)
PHOSPHATE SERPL-MCNC: 3.4 MG/DL (ref 2.5–4.5)
PLATELET # BLD AUTO: 290 10*3/MM3 (ref 140–450)
PMV BLD AUTO: 8.7 FL (ref 6–12)
POTASSIUM SERPL-SCNC: 3.7 MMOL/L (ref 3.5–5.2)
RBC # BLD AUTO: 3.86 10*6/MM3 (ref 3.77–5.28)
SARS-COV-2 RNA RESP QL NAA+PROBE: NOT DETECTED
SODIUM SERPL-SCNC: 140 MMOL/L (ref 136–145)
WBC NRBC COR # BLD: 5.36 10*3/MM3 (ref 3.4–10.8)

## 2022-05-03 PROCEDURE — 94761 N-INVAS EAR/PLS OXIMETRY MLT: CPT

## 2022-05-03 PROCEDURE — 94799 UNLISTED PULMONARY SVC/PX: CPT

## 2022-05-03 PROCEDURE — 84100 ASSAY OF PHOSPHORUS: CPT | Performed by: STUDENT IN AN ORGANIZED HEALTH CARE EDUCATION/TRAINING PROGRAM

## 2022-05-03 PROCEDURE — 94618 PULMONARY STRESS TESTING: CPT

## 2022-05-03 PROCEDURE — 94664 DEMO&/EVAL PT USE INHALER: CPT

## 2022-05-03 PROCEDURE — U0005 INFEC AGEN DETEC AMPLI PROBE: HCPCS | Performed by: STUDENT IN AN ORGANIZED HEALTH CARE EDUCATION/TRAINING PROGRAM

## 2022-05-03 PROCEDURE — U0003 INFECTIOUS AGENT DETECTION BY NUCLEIC ACID (DNA OR RNA); SEVERE ACUTE RESPIRATORY SYNDROME CORONAVIRUS 2 (SARS-COV-2) (CORONAVIRUS DISEASE [COVID-19]), AMPLIFIED PROBE TECHNIQUE, MAKING USE OF HIGH THROUGHPUT TECHNOLOGIES AS DESCRIBED BY CMS-2020-01-R: HCPCS | Performed by: STUDENT IN AN ORGANIZED HEALTH CARE EDUCATION/TRAINING PROGRAM

## 2022-05-03 PROCEDURE — 99232 SBSQ HOSP IP/OBS MODERATE 35: CPT | Performed by: INTERNAL MEDICINE

## 2022-05-03 PROCEDURE — 83735 ASSAY OF MAGNESIUM: CPT | Performed by: STUDENT IN AN ORGANIZED HEALTH CARE EDUCATION/TRAINING PROGRAM

## 2022-05-03 PROCEDURE — 80048 BASIC METABOLIC PNL TOTAL CA: CPT | Performed by: STUDENT IN AN ORGANIZED HEALTH CARE EDUCATION/TRAINING PROGRAM

## 2022-05-03 PROCEDURE — 85025 COMPLETE CBC W/AUTO DIFF WBC: CPT | Performed by: STUDENT IN AN ORGANIZED HEALTH CARE EDUCATION/TRAINING PROGRAM

## 2022-05-03 RX ORDER — LANOLIN ALCOHOL/MO/W.PET/CERES
325 CREAM (GRAM) TOPICAL EVERY OTHER DAY
Qty: 30 TABLET | Refills: 0 | Status: SHIPPED | OUTPATIENT
Start: 2022-05-03 | End: 2023-02-15

## 2022-05-03 RX ORDER — FUROSEMIDE 20 MG/1
20 TABLET ORAL DAILY
Qty: 30 TABLET | Refills: 0 | Status: SHIPPED | OUTPATIENT
Start: 2022-05-03 | End: 2022-08-15 | Stop reason: SDUPTHER

## 2022-05-03 RX ORDER — LOSARTAN POTASSIUM 25 MG/1
25 TABLET ORAL 2 TIMES DAILY
Qty: 60 TABLET | Refills: 0 | Status: SHIPPED | OUTPATIENT
Start: 2022-05-03 | End: 2022-10-21

## 2022-05-03 RX ORDER — TRAMADOL HYDROCHLORIDE 50 MG/1
50 TABLET ORAL EVERY 12 HOURS
Qty: 6 TABLET | Refills: 0 | Status: SHIPPED | OUTPATIENT
Start: 2022-05-03 | End: 2022-06-16

## 2022-05-03 RX ORDER — ISOSORBIDE MONONITRATE 30 MG/1
30 TABLET, EXTENDED RELEASE ORAL 2 TIMES DAILY
Qty: 60 TABLET | Refills: 0 | Status: SHIPPED | OUTPATIENT
Start: 2022-05-03 | End: 2022-07-01 | Stop reason: SDUPTHER

## 2022-05-03 RX ORDER — FUROSEMIDE 20 MG/1
20 TABLET ORAL DAILY
Status: DISCONTINUED | OUTPATIENT
Start: 2022-05-03 | End: 2022-05-03 | Stop reason: HOSPADM

## 2022-05-03 RX ADMIN — Medication 10 ML: at 09:29

## 2022-05-03 RX ADMIN — BUDESONIDE AND FORMOTEROL FUMARATE DIHYDRATE 2 PUFF: 160; 4.5 AEROSOL RESPIRATORY (INHALATION) at 07:02

## 2022-05-03 RX ADMIN — METOPROLOL SUCCINATE 50 MG: 50 TABLET, EXTENDED RELEASE ORAL at 09:30

## 2022-05-03 RX ADMIN — ISOSORBIDE MONONITRATE 30 MG: 30 TABLET ORAL at 09:30

## 2022-05-03 RX ADMIN — TIOTROPIUM BROMIDE INHALATION SPRAY 1 PUFF: 3.12 SPRAY, METERED RESPIRATORY (INHALATION) at 10:54

## 2022-05-03 RX ADMIN — Medication 2000 UNITS: at 09:29

## 2022-05-03 RX ADMIN — PANTOPRAZOLE SODIUM 40 MG: 40 TABLET, DELAYED RELEASE ORAL at 09:29

## 2022-05-03 RX ADMIN — ASPIRIN 81 MG: 81 TABLET, COATED ORAL at 09:29

## 2022-05-03 RX ADMIN — ALBUTEROL SULFATE 2.5 MG: 2.5 SOLUTION RESPIRATORY (INHALATION) at 10:55

## 2022-05-03 RX ADMIN — LOSARTAN POTASSIUM 25 MG: 25 TABLET, FILM COATED ORAL at 09:29

## 2022-05-03 NOTE — PROGRESS NOTES
"Grace Beauchamp  1940 81 y.o.  [id number]      Patient Care Team:  Miller Castañeda MD as PCP - General (Internal Medicine)  Mart Ontiveros MD as Surgeon (Cardiothoracic Surgery)  Tres De Los Santos MD as Consulting Physician (Cardiology)  Graham Mcconnell MD as Consulting Physician (Hematology and Oncology)  Payam Byrnes MD as Consulting Physician (Otolaryngology)  Jonathan Smith MD as Consulting Physician (Vascular Surgery)  Victor M Cabral MD as Surgeon (Orthopedic Surgery)  Yaya Burks MD as Consulting Physician (Pulmonary Disease)    CC: Moderate cardiomyopathy EF 35-40 %, moderate obstructive coronary disease not amenable to revascularization, severe pulmonary hypertension, dementia    Interval History: No change      Objective   Vital Signs  Temp:  [95.6 °F (35.3 °C)-97.4 °F (36.3 °C)] 97.4 °F (36.3 °C)  Heart Rate:  [56-77] 61  Resp:  [18] 18  BP: ()/(51-84) 168/84    Intake/Output Summary (Last 24 hours) at 5/3/2022 1235  Last data filed at 5/3/2022 0842  Gross per 24 hour   Intake --   Output 200 ml   Net -200 ml     Flowsheet Rows    Flowsheet Row First Filed Value   Admission Height 142.2 cm (56\") Documented at 04/22/2022 2146   Admission Weight 41.6 kg (91 lb 11.2 oz) Documented at 04/22/2022 2146          Physical Exam:   General Appearance:    Alert,oriented, in no acute distress   Lungs:     Clear to auscultation,BS are equal    Heart:    Normal S1 and S2, RRR without murmur, gallop or rub   HEENT:    Sclerae are clear, no JVD or adenopathy   Abdomen:     Normal bowel sounds, soft nontender, nondistended, no HSM   Extremities:   Moves all extremities well, no edema, no cyanosis, no             Redness, no rash     Medication Review:      albuterol, 2.5 mg, Nebulization, TID - RT  aspirin, 81 mg, Oral, Daily  atorvastatin, 40 mg, Oral, Nightly  budesonide-formoterol, 2 puff, Inhalation, BID - RT   And  tiotropium bromide monohydrate, 1 puff, Inhalation, Daily - " RT  cholecalciferol, 2,000 Units, Oral, Daily  isosorbide mononitrate, 30 mg, Oral, BID  losartan, 25 mg, Oral, BID  metoprolol succinate XL, 50 mg, Oral, BID  pantoprazole, 40 mg, Oral, QAM  pramipexole, 0.125 mg, Oral, Nightly  sodium chloride, 10 mL, Intravenous, Q12H             I reviewed the patient's new clinical results.  I personally viewed and interpreted the patient's EKG/Telemetry data    Assessment/Plan  Active Hospital Problems    Diagnosis  POA   • **UTI due to extended-spectrum beta lactamase (ESBL) producing Escherichia coli [N39.0, B96.29, Z16.12]  Yes   • Pulmonary hypertension (HCC) [I27.20]  Yes   • Ischemic cardiomyopathy [I25.5]  Yes   • Acute on chronic systolic CHF (congestive heart failure) ([facility]) [I50.23]  Yes   • Metabolic encephalopathy [G93.41]  Yes   • LUH (acute kidney injury) ([facility]) [N17.9]  Yes   • Hypotension [I95.9]  Yes   • Chronic diastolic CHF (congestive heart failure) ([facility]) [I50.32]  Yes   • CAD (coronary artery disease) [I25.10]  Yes   • Type 2 diabetes mellitus without complication, without long-term current use of insulin (HCC) [E11.9]  Yes   • History of breast cancer [Z85.3]  Not Applicable   • Chronic obstructive pulmonary disease (HCC) [J44.9]  Yes   • Hypertension [I10]  Yes   • Restless legs syndrome [G25.81]  Yes      Resolved Hospital Problems    Diagnosis Date Resolved POA   • Hypokalemia [E87.6] 05/02/2022 Yes       Medical therapy only for her cardiomyopathy and her coronary disease in addition she has really severe pulmonary hypertension that really is not treatable.  I think her prognosis overall is very much guarded and probably medical therapy is the only real way to go with her.  We will see her as needed I think a low-dose of Lasix is probably a good option for her    Tres De Los Santos MD  05/03/22  12:35 EDT

## 2022-05-03 NOTE — PLAN OF CARE
"Goal Outcome Evaluation:  Plan of Care Reviewed With: patient            Pt with no physical distress, but has been awake all night, trying to get up and get dressed, and has obsessed about leaving and going home. This nurse and the aide and other nurses have been in the patient's room every 30-40 minutes all this shift to explain to the patient why she cannot leave now and to tell her that it is the middle of the night.We have told her numerous times that it is several hours until breakfast and she will not be leaving until then. She calls out and asks for food but when I go in the room and ask her what she would like she claims she doesn't want anything. I at one point handed her a bag of snacks she had from home, she ate part of a cookie and threw the bag in the floor, She says she has been a \"slave\" and a \"prisoner\"  here for too many days. She has been extremely confused and very difficult to reason with or to re-orient to her current situation. She has insisted all night that it is daytime and I am not being helpful. Pt has not c/o pain, Safety has been maintained. Pt finally let me bring her a Pepsi because she said we had nothing else she would eat.   Will continue to monitor and try to keep her oriented and safe until dayshift arrives, Continue plan of care  "

## 2022-05-03 NOTE — PLAN OF CARE
Goal Outcome Evaluation:  Plan of Care Reviewed With: patient, daughter        Progress: improving  Outcome Evaluation: Asymptomatic and no c/os. No falls or injury. BP came down to 99 syst after am meds but not symptomatic with activity.

## 2022-05-03 NOTE — DISCHARGE SUMMARY
Patient Name: Grace Beauchamp  : 1940  MRN: [id number]    Date of Admission: 2022  Date of Discharge:  5/3/2022  Primary Care Physician: Miller Castañeda MD      Chief Complaint:   Weakness - Generalized      Discharge Diagnoses     Active Hospital Problems    Diagnosis  POA   • **UTI due to extended-spectrum beta lactamase (ESBL) producing Escherichia coli [N39.0, B96.29, Z16.12]  Yes   • Pulmonary hypertension (HCC) [I27.20]  Yes   • Ischemic cardiomyopathy [I25.5]  Yes   • Acute on chronic systolic CHF (congestive heart failure) (HCC) [I50.23]  Yes   • Metabolic encephalopathy [G93.41]  Yes   • LUH (acute kidney injury) ([facility]) [N17.9]  Yes   • Hypotension [I95.9]  Yes   • Chronic diastolic CHF (congestive heart failure) ([facility]) [I50.32]  Yes   • CAD (coronary artery disease) [I25.10]  Yes   • Type 2 diabetes mellitus without complication, without long-term current use of insulin (HCC) [E11.9]  Yes   • History of breast cancer [Z85.3]  Not Applicable   • Chronic obstructive pulmonary disease (HCC) [J44.9]  Yes   • Hypertension [I10]  Yes   • Restless legs syndrome [G25.81]  Yes      Resolved Hospital Problems    Diagnosis Date Resolved POA   • Hypokalemia [E87.6] 2022 Yes        Hospital Course     Ms. Beauchamp is a 81year-old female with history of recurrent UTIs and implanted bladder stimulator device, who was sent from urgent care with altered mental status and low blood pressure.  She was found to have ESBL E. coli UTI and LUH.  Hospital course has been complicated by acute on chronic heart failure with newly decreased ejection fraction and pulmonary hypertension.     ESBL E.coli UTI, history of recurrent UTIs: She has completed treatment with 7 days of ertapenem.  Blood cultures have no growth to date.     Coronary artery disease, heart failure with decreased EF (36-40%) due to ischemic cardiomyopathy: She had right and left heart cath on , noted stable multivessel coronary artery  disease.  Cardiology recommends medical management.  She should continue aspirin, statin and beta-blocker.  Imdur was added.     Severe pulmonary hypertension: Seen on cardiac catheterization.  Still requiring 2 L oxygen.  Will order walking oximetry prior to discharge  -Encourage incentive spirometry and out of bed ambulation     LUH: Prerenal due to hypotension, infection and dehydration.  Resolved with IV fluids.     HTN, with episodes hypotension here  Defer to Card, was hypotensive on admission due to infection and possibly some over-diuresis  -Holding Lasix, continue metoprolol, Imdur and losartan     Metabolic encephalopathy: Due to UTI.  Resolved     Type 2 diabetes: Not on meds at home. A1c 7.1 which is acceptable for her age.  Continue diabetic diet       At the time of discharge patient was told to take all medications as prescribed, keep all follow-up appointments, and call their doctor or return to the hospital with any worsening or concerning symptoms.    Day of Discharge     Subjective:  Seen sitting up in chair with her daughter at bedside.  She is feeling much improved compared to admission, she is amenable to rehab.  No new complaints.    Review of Systems   Constitutional: Negative for chills and fever.   Respiratory: Negative for cough and shortness of breath.    Cardiovascular: Negative for chest pain and palpitations.   Gastrointestinal: Negative for nausea and vomiting.       Physical Exam:  Temp:  [95.6 °F (35.3 °C)-97.4 °F (36.3 °C)] 97.4 °F (36.3 °C)  Heart Rate:  [56-77] 61  Resp:  [18] 18  BP: ()/(51-84) 168/84  Body mass index is 20.45 kg/m².  Physical Exam  Constitutional:       General: She is not in acute distress.     Appearance: She is not diaphoretic.      Comments: Frail   Cardiovascular:      Rate and Rhythm: Normal rate and regular rhythm.   Pulmonary:      Effort: Pulmonary effort is normal. No respiratory distress.      Breath sounds: No wheezing.   Abdominal:       Palpations: Abdomen is soft.      Tenderness: There is no abdominal tenderness. There is no guarding.   Musculoskeletal:      Right lower leg: No edema.      Left lower leg: No edema.   Neurological:      Mental Status: She is alert.   Psychiatric:         Mood and Affect: Mood normal.         Consultants     Consult Orders (all) (From admission, onward)     Start     Ordered    04/30/22 1037  Inpatient Case Management  Consult  Once        Provider:  (Not yet assigned)    04/30/22 1037    04/25/22 1654  Inpatient Cardiology Consult  Once        Specialty:  Cardiology  Provider:  Tres De Los Santos MD    04/25/22 1654    04/22/22 2220  Inpatient Case Management  Consult  Once        Provider:  (Not yet assigned)    04/22/22 2219 04/22/22 1948  [facility] (on-call MD unless specified) Details  Once        Specialty:  Hospitalist  Provider:  (Not yet assigned)    04/22/22 1947              Procedures     Imaging Results (All)     Procedure Component Value Units Date/Time    XR Chest PA & Lateral [[id number]] Collected: 04/30/22 1546     Updated: 04/30/22 1551    Narrative:      PA AND LATERAL CHEST     CLINICAL HISTORY: Hypoxia.     Compared to the previous chest dated 04/28/2022.        The lungs are well-expanded and appear free of infiltrates. There are no  pleural effusions. The pulmonary vasculature is within normal limits.  The heart is mildly enlarged. The thoracic aorta is mildly ectatic and  calcified. The right breast appears absent.     IMPRESSIONS: No evidence of acute disease within the chest.     This report was finalized on 4/30/2022 3:48 PM by Dr. Pasha Salcedo M.D.       XR Chest PA & Lateral [[id number]] Collected: 04/28/22 1617     Updated: 04/28/22 1622    Narrative:      PA AND LATERAL CHEST     CLINICAL HISTORY: Chest pain. Hypoxia.     Compared to previous chest x-ray dated 04/24/2022.     The lungs are fairly well-expanded and appear free of infiltrates. There  are no  pleural effusions. The heart is mildly prominent and unchanged.  The thoracic aorta is ectatic and somewhat tortuous. Stigmata of  previous coronary artery bypass procedure are noted.     IMPRESSIONS: Mild cardiomegaly. No evidence of acute disease within the  chest.     This report was finalized on 4/28/2022 4:19 PM by Dr. Pasha Salcedo M.D.       XR Chest 1 View [[id number]] Collected: 04/27/22 0151     Updated: 04/27/22 0151    Narrative:        Patient: KRUPA HECK  Time Out: 01:50  Exam(s): FILM CXR 1 VIEW     EXAM:    XR Chest, 1 View    CLINICAL HISTORY:     Reason for exam: Chest pain and shortness of air.    TECHNIQUE:    Frontal view of the chest.    COMPARISON:    4 24 2022    Findings IMPRESSION:           Cardiomegaly.  Mild bibasilar opacities.  Mild vascular congestion.    Impression:          Electronically signed by Melissa Cruz MD on 04-27-22 at 0150    XR Chest 1 View [[id number]] Collected: 04/24/22 1155     Updated: 04/24/22 1225    Narrative:      CHEST SINGLE VIEW     HISTORY: Urinary tract infection.     COMPARISON: CT angiogram chest 04/18/2022, 2 view chest 10/02/2020.     FINDINGS: Heart size is enlarged. Sternotomy wires are present. The  thoracic aorta is tortuous and aneurysmal and aortic vascular  calcifications are present. There is diffuse pulmonary emphysema  associated with increased interstitial markings. No focal airspace  disease is demonstrated and there is no evidence for pleural effusion.  Bones are osteopenic. Surgical clips overlie the right axilla and left  costophrenic angle.       Impression:      Cardiomegaly. Pulmonary emphysema with chronic interstitial  disease. No evidence for active disease in the chest.     This report was finalized on 4/24/2022 12:22 PM by Dr. Simba Monterroso M.D.       CT Head Without Contrast [[id number]] Collected: 04/22/22 1918     Updated: 04/22/22 1922    Narrative:      CT HEAD WITHOUT CONTRAST     HISTORY: 81-year-old female  with weakness     TECHNIQUE:  Head CT includes axial imaging from the base of skull to the  vertex without intravenous contrast. Radiation dose reduction techniques  were utilized, including automated exposure control and exposure  modulation based on body size.     COMPARISON:CT angiogram head 02/26/2014.     FINDINGS: Within the left anterolateral frontal lobe there is an area of  encephalomalacia involving the cerebral cortex and the subcortical white  matter measuring approximately 2.7 x 3 x 2.2 cm. This is consistent with  a chronic infarction. Additionally, within the lateral aspect of the  left parietal lobe there is a focal area of encephalomalacia measuring  approximately 1.8 x 1 cm consistent with an old infarction. Within the  inferior left cerebellar hemisphere there is focal area of  encephalomalacia that measures approximately 2.1 x 1.1 cm. There are no  abnormal areas of increased attenuation intra-axially to suggest  hemorrhage. No extra-axial fluid collection is observed. There is no  evidence for cerebral edema or mass effect or shift of the midline  structures. There is mild-to-moderate chronic small vessel ischemic  white matter change and there is mild cerebral and cerebellar atrophy.  Intracranial atherosclerotic calcifications are present.       Impression:      Chronic appearing areas of encephalomalacia within the  anterolateral left frontal lobe, left parietal lobe, inferior left  cerebellar hemisphere. No evidence for intracranial hemorrhage or  definite acute intracranial abnormality. There is mild-to-moderate  chronic small vessel ischemic white matter change and atrophy. Further  evaluation could be performed with MRI if indicated..     This report was finalized on 4/22/2022 7:19 PM by Dr. Simba Monterroso M.D.             Pertinent Labs     Results from last 7 days   Lab Units 05/03/22  0333 05/02/22  0422 05/01/22  0609 04/30/22  1031   WBC 10*3/mm3 5.36 5.18 5.66 5.13   HEMOGLOBIN  g/dL 11.1* 11.8* 12.1 11.7*   PLATELETS 10*3/mm3 290 266 305 290     Results from last 7 days   Lab Units 05/03/22  0333 05/02/22  0422 05/01/22  1302 05/01/22  0609   SODIUM mmol/L 140 137 137 138   POTASSIUM mmol/L 3.7 4.2 3.9 3.3*   CHLORIDE mmol/L 99 96* 94* 94*   CO2 mmol/L 30.0* 32.0* 33.2* 33.0*   BUN mg/dL 18 16 18 17   CREATININE mg/dL 0.77 0.73 0.85 0.67   GLUCOSE mg/dL 107* 93 109* 104*   Estimated Creatinine Clearance: 37.4 mL/min (by C-G formula based on SCr of 0.77 mg/dL).  Results from last 7 days   Lab Units 05/01/22  0609 04/30/22  1031 04/29/22  0300 04/28/22  0333   ALBUMIN g/dL 3.60 3.40* 3.40* 3.10*   BILIRUBIN mg/dL 0.5 0.6 0.6 0.7   ALK PHOS U/L 70 71 76 71   AST (SGOT) U/L 11 16 13 11   ALT (SGPT) U/L 8 11 6 5     Results from last 7 days   Lab Units 05/03/22 0333 05/02/22 0422 05/01/22  1302 05/01/22  0609 04/30/22  1031 04/29/22  0300 04/28/22  0333   CALCIUM mg/dL 8.8 9.1 8.8 8.9 8.7 9.1 8.8   ALBUMIN g/dL  --   --   --  3.60 3.40* 3.40* 3.10*   MAGNESIUM mg/dL 1.8 1.8  --  1.9 1.8 1.8 1.8   PHOSPHORUS mg/dL 3.4  --   --   --   --   --   --        Results from last 7 days   Lab Units 04/29/22  0300 04/29/22  0050 04/28/22  1915 04/28/22  1515   TROPONIN T ng/mL 0.131* 0.149* 0.119* 0.138*   PROBNP pg/mL  --   --   --  2,580.0*           Invalid input(s): LDLCALC        Test Results Pending at Discharge       Discharge Details        Discharge Medications      New Medications      Instructions Start Date   isosorbide mononitrate 30 MG 24 hr tablet  Commonly known as: IMDUR   30 mg, Oral, 2 Times Daily         Changes to Medications      Instructions Start Date   cetirizine 10 MG tablet  Commonly known as: zyrTEC  What changed: when to take this   10 mg, Oral, As Needed      ferrous sulfate 325 (65 FE) MG EC tablet  What changed: when to take this   325 mg, Oral, Every Other Day      furosemide 20 MG tablet  Commonly known as: LASIX  What changed:   · medication strength  · how much to  take   20 mg, Oral, Daily      losartan 25 MG tablet  Commonly known as: COZAAR  What changed:   · medication strength  · how much to take  · when to take this   25 mg, Oral, 2 Times Daily      pramipexole 0.125 MG tablet  Commonly known as: MIRAPEX  What changed: when to take this   TAKE ONE TABLET BY MOUTH EVERY NIGHT AT BEDTIME      traMADol 50 MG tablet  Commonly known as: ULTRAM  What changed: See the new instructions.   50 mg, Oral, Every 12 Hours      Trelegy Ellipta 100-62.5-25 MCG/INH inhaler  Generic drug: Fluticasone-Umeclidin-Vilant  What changed: Another medication with the same name was removed. Continue taking this medication, and follow the directions you see here.   1 puff, Inhalation, Daily - RT         Continue These Medications      Instructions Start Date   albuterol sulfate  (90 Base) MCG/ACT inhaler  Commonly known as: PROVENTIL HFA;VENTOLIN HFA;PROAIR HFA   INHALE TWO PUFFS BY MOUTH EVERY 6 HOURS AS NEEDED FOR WHEEZING OR SHORTNESS OF AIR      aspirin 81 MG EC tablet   81 mg, Oral, Daily      atorvastatin 40 MG tablet  Commonly known as: LIPITOR   40 mg, Oral, Nightly      meclizine 12.5 MG tablet  Commonly known as: ANTIVERT   12.5 mg, Oral, 3 Times Daily PRN      metoprolol succinate XL 50 MG 24 hr tablet  Commonly known as: TOPROL-XL   TAKE ONE TABLET BY MOUTH EVERY 12 HOURS      multivitamin tablet tablet  Generic drug: multivitamin   1 tablet, Oral, Daily      pantoprazole 40 MG EC tablet  Commonly known as: PROTONIX   TAKE ONE TABLET BY MOUTH EVERY MORNING      potassium chloride 10 MEQ CR tablet   10 mEq, Oral, Daily      sennosides-docusate 8.6-50 MG per tablet  Commonly known as: PERICOLACE   2 tablets, Oral, Daily PRN      Toviaz 4 MG tablet sustained-release 24 hour tablet  Generic drug: fesoterodine fumarate   4 mg, Oral, Every 24 Hours Scheduled      Vitamin D (Cholecalciferol) 50 MCG (2000 UT) capsule   2,000 Units, Oral, Daily         Stop These Medications    budesonide  0.5 MG/2ML nebulizer solution  Commonly known as: PULMICORT     Coal Tar-Menthol 1.8-1.5 % shampoo     ipratropium-albuterol 0.5-2.5 mg/3 ml nebulizer  Commonly known as: DUO-NEB     sertraline 50 MG tablet  Commonly known as: ZOLOFT            Allergies   Allergen Reactions   • Ramipril Other (See Comments)     Ace I  cough   • Morphine Itching   • Morphine And Related Itching   • Bactrim [Sulfamethoxazole-Trimethoprim] Itching and Rash   • Cipro [Ciprofloxacin Hcl] Rash         Discharge Disposition:  Rehab Facility or Unit (DC - External)    Discharge Diet:  Diet Order   Procedures   • Diet Regular; Consistent Carbohydrate, Cardiac       Discharge Activity:   As tolerated    CODE STATUS:    Code Status and Medical Interventions:   Ordered at: 04/22/22 9649     Code Status (Patient has no pulse and is not breathing):    CPR (Attempt to Resuscitate)     Medical Interventions (Patient has pulse or is breathing):    Full Support       Future Appointments   Date Time Provider Department Center   5/6/2022  2:45 PM [facility]      Follow-up Information     Miller Castañeda MD. Schedule an appointment as soon as possible for a visit in 1 week(s).    Specialty: Internal Medicine  Contact information:  [address] [place] [address]  [phone]                         Time Spent on Discharge:  Greater than 30 minutes      DO [facility]  05/03/22  11:22 EDT

## 2022-05-03 NOTE — CASE MANAGEMENT/SOCIAL WORK
Continued Stay Note  [facility]     Patient Name: Grace Beauchamp  MRN: [id number]  Today's Date: 5/3/2022    Admit Date: 4/22/2022     Discharge Plan     Row Name 05/03/22 1220       Plan    Plan [facility] via daughter    Patient/Family in Agreement with Plan yes    Plan Comments CCP notified by Dr. Mchugh that patient is ready for discharge today. [facility] confirms patient has a bed today. COVID test pending. Daughter hCio confirms she is able to transport patient today to SNF. Larissa YIN RN/CCP               Discharge Codes    No documentation.               Expected Discharge Date and Time     Expected Discharge Date Expected Discharge Time    May 3, 2022             Sunni Brock

## 2022-05-03 NOTE — PROGRESS NOTES
Exercise Oximetry    Patient Name:Grace Beauchamp   MRN: [id number]   Date: 05/03/22             ROOM AIR BASELINE   SpO2% 92   Heart Rate 80   Blood Pressure      EXERCISE ON ROOM AIR SpO2% EXERCISE ON O2 @  LPM SpO2%   1 MINUTE 91 1 MINUTE    2 MINUTES 92 2 MINUTES    3 MINUTES 90 3 MINUTES    4 MINUTES 93 4 MINUTES    5 MINUTES 90 5 MINUTES    6 MINUTES 89 6 MINUTES               Distance Walked   Distance Walked   Dyspnea (Delia Scale)   Dyspnea (Delia Scale)   Fatigue (Delia Scale)   Fatigue (Delia Scale)   SpO2% Post Exercise   SpO2% Post Exercise   HR Post Exercise   HR Post Exercise   Time to Recovery   Time to Recovery     Comments: Pt. Walked in room for 6 mins on RA with walker and assistance. Pt. Unstable on feet. RN assisted. No distress noted.

## 2022-05-03 NOTE — CASE MANAGEMENT/SOCIAL WORK
Case Management Discharge Note      Final Note: d/c to [facility]    Provided Post Acute Provider List?: N/A  N/A Provider List Comment: Patient denies wanting to use HH  Provided Post Acute Provider Quality & Resource List?: Yes  Post Acute Provider Quality and Resource List: Home Health, Nursing Home  Delivered To: Support Person  Method of Delivery: In person    Selected Continued Care - Discharged on 5/3/2022 Admission date: 4/22/2022 - Discharge disposition: Rehab Facility or Unit (DC - External)    Destination    No services have been selected for the patient.              Durable Medical Equipment    No services have been selected for the patient.              Dialysis/Infusion    No services have been selected for the patient.              Home Medical Care    No services have been selected for the patient.              Therapy    No services have been selected for the patient.              Community Resources    No services have been selected for the patient.              Community & DME    No services have been selected for the patient.                  Transportation Services  Private: Car    Final Discharge Disposition Code: 03 - skilled nursing facility (SNF)

## 2022-05-10 ENCOUNTER — TELEPHONE (OUTPATIENT)
Dept: INTERNAL MEDICINE | Age: 82
End: 2022-05-10

## 2022-05-10 NOTE — TELEPHONE ENCOUNTER
Spoke with daughter said her mom was at hospital    For 10 days  They have been in rehab for 1 week now    Advised to schedule hospital f/u after getting out

## 2022-05-10 NOTE — TELEPHONE ENCOUNTER
Caller: Maddy Sanches    Relationship: Emergency Contact    Best call back number: [phone]    What is the best time to reach you: ANY    Who are you requesting to speak with (clinical staff, provider,  specific staff member):CLINICAL STAFF    Do you know the name of the person who called: DAUGHTER    What was the call regarding: PATIENT WAS IN THE HOSPITAL AND IS HOME NOW.  DAUGHTER WOULD LIKE TO TALK TO DR. COLLINS.      Do you require a callback: YES

## 2022-06-14 ENCOUNTER — READMISSION MANAGEMENT (OUTPATIENT)
Dept: CALL CENTER | Facility: HOSPITAL | Age: 82
End: 2022-06-14

## 2022-06-14 NOTE — OUTREACH NOTE
Prep Survey    Flowsheet Row Responses   [facility] facility patient discharged from? Non-BH   Is LACE score < 7 ? Non-BH Discharge   Emergency Room discharge w/ pulse ox? No   Eligibility [facility]   Date of Discharge 06/14/22   Discharge diagnosis Unavailable    Does the patient have one of the following disease processes/diagnoses(primary or secondary)? Other   Prep survey completed? Yes          KENDRICK OH - Registered Nurse

## 2022-06-15 ENCOUNTER — TRANSCRIBE ORDERS (OUTPATIENT)
Dept: HOME HEALTH SERVICES | Facility: HOME HEALTHCARE | Age: 82
End: 2022-06-15

## 2022-06-15 ENCOUNTER — TRANSITIONAL CARE MANAGEMENT TELEPHONE ENCOUNTER (OUTPATIENT)
Dept: CALL CENTER | Facility: HOSPITAL | Age: 82
End: 2022-06-15

## 2022-06-15 ENCOUNTER — HOME HEALTH ADMISSION (OUTPATIENT)
Dept: HOME HEALTH SERVICES | Facility: HOME HEALTHCARE | Age: 82
End: 2022-06-15

## 2022-06-15 DIAGNOSIS — Z16.12 URINARY TRACT INFECTION DUE TO EXTENDED-SPECTRUM BETA LACTAMASE (ESBL) PRODUCING ESCHERICHIA COLI: Primary | ICD-10-CM

## 2022-06-15 DIAGNOSIS — N39.0 URINARY TRACT INFECTION DUE TO EXTENDED-SPECTRUM BETA LACTAMASE (ESBL) PRODUCING ESCHERICHIA COLI: Primary | ICD-10-CM

## 2022-06-15 DIAGNOSIS — B96.29 URINARY TRACT INFECTION DUE TO EXTENDED-SPECTRUM BETA LACTAMASE (ESBL) PRODUCING ESCHERICHIA COLI: Primary | ICD-10-CM

## 2022-06-15 NOTE — OUTREACH NOTE
"Call Center TCM Note    Flowsheet Row Responses   [facility] patient discharged from? Non-   Does the patient have one of the following disease processes/diagnoses(primary or secondary)? Other   TCM attempt successful? Yes   Call start time 1632   Call end time 1636   Discharge diagnosis Unavailable    Is the patient taking all medications as directed (includes completed medication regime)? Yes   Does the patient have a primary care provider?  Yes   Does the patient have an appointment with their PCP within 7 days of discharge? Yes   Has the patient kept scheduled appointments due by today? N/A   What is the Home health agency?  [facility]   Has home health visited the patient within 72 hours of discharge? Yes   Home health comments will be there tomorrow   Psychosocial issues? No   What is the patient's perception of their health status since discharge? Improving   Is the patient/caregiver able to teach back signs and symptoms related to disease process for when to call PCP? Yes   Is the patient/caregiver able to teach back signs and symptoms related to disease process for when to call 911? Yes   Is the patient/caregiver able to teach back the hierarchy of who to call/visit for symptoms/problems? PCP, Specialist, Home health nurse, Urgent Care, ED, 911 Yes   If the patient is a current smoker, are they able to teach back resources for cessation? Not a smoker   Additional teach back comments States she is doing \"fine\" and has a follow up with PCP tomorrow.   TCM call completed? Yes   Wrap up additional comments Denies questions or need at this time.          Cecy العلي LPN    6/15/2022, 16:36 EDT      "

## 2022-06-16 ENCOUNTER — HOME CARE VISIT (OUTPATIENT)
Dept: HOME HEALTH SERVICES | Facility: HOME HEALTHCARE | Age: 82
End: 2022-06-16

## 2022-06-16 ENCOUNTER — OFFICE VISIT (OUTPATIENT)
Dept: INTERNAL MEDICINE | Age: 82
End: 2022-06-16

## 2022-06-16 VITALS
DIASTOLIC BLOOD PRESSURE: 70 MMHG | TEMPERATURE: 96.4 F | WEIGHT: 86 LBS | SYSTOLIC BLOOD PRESSURE: 112 MMHG | HEIGHT: 56 IN | HEART RATE: 73 BPM | BODY MASS INDEX: 19.35 KG/M2

## 2022-06-16 DIAGNOSIS — I10 PRIMARY HYPERTENSION: Primary | Chronic | ICD-10-CM

## 2022-06-16 DIAGNOSIS — E44.1 MILD PROTEIN-CALORIE MALNUTRITION: ICD-10-CM

## 2022-06-16 DIAGNOSIS — E78.2 MIXED HYPERLIPIDEMIA: Chronic | ICD-10-CM

## 2022-06-16 DIAGNOSIS — J43.9 PULMONARY EMPHYSEMA, UNSPECIFIED EMPHYSEMA TYPE: Chronic | ICD-10-CM

## 2022-06-16 DIAGNOSIS — N39.0 RECURRENT UTI (URINARY TRACT INFECTION): Chronic | ICD-10-CM

## 2022-06-16 DIAGNOSIS — I50.23 ACUTE ON CHRONIC SYSTOLIC CHF (CONGESTIVE HEART FAILURE): ICD-10-CM

## 2022-06-16 DIAGNOSIS — I50.32 CHRONIC DIASTOLIC CHF (CONGESTIVE HEART FAILURE): ICD-10-CM

## 2022-06-16 PROCEDURE — 99214 OFFICE O/P EST MOD 30 MIN: CPT | Performed by: NURSE PRACTITIONER

## 2022-06-16 PROCEDURE — G0299 HHS/HOSPICE OF RN EA 15 MIN: HCPCS

## 2022-06-16 RX ORDER — ESTRADIOL 0.1 MG/G
0.52 CREAM VAGINAL 2 TIMES WEEKLY
COMMUNITY
Start: 2022-01-28 | End: 2023-01-28

## 2022-06-16 RX ORDER — METOPROLOL SUCCINATE 50 MG/1
50 TABLET, EXTENDED RELEASE ORAL DAILY
COMMUNITY
End: 2022-10-21

## 2022-06-16 RX ORDER — SERTRALINE HYDROCHLORIDE 25 MG/1
25 TABLET, FILM COATED ORAL DAILY
COMMUNITY
Start: 2022-06-15 | End: 2022-07-14 | Stop reason: SDUPTHER

## 2022-06-16 NOTE — PROGRESS NOTES
"    I N T E R N A L  M E D I C I N E  Whitney Galarza, APRN       ENCOUNTER DATE:  06/16/2022    Grace Beauchamp / 81 y.o. / female        CC:   Hospital Follow Up Visit      VITALS    Visit Vitals  /70   Pulse 73   Temp 96.4 °F (35.8 °C) (Temporal)   Ht 142.2 cm (55.98\")   Wt 39 kg (86 lb)   LMP  (LMP Unknown)   BMI 19.29 kg/m²       BP Readings from Last 3 Encounters:   06/16/22 112/70   05/03/22 168/84   01/18/22 150/84     Wt Readings from Last 3 Encounters:   06/16/22 39 kg (86 lb)   05/02/22 41.4 kg (91 lb 3.2 oz)   04/18/22 43.6 kg (96 lb 3.2 oz)      Body mass index is 19.29 kg/m².    HPI:     Date of admission/discharge: 4.22.22-6.15.22  Hospital: [facility]  Principle Dx: UTI due to ESBL  Secondary Dx: Pulmonary Hypertension  History prior to hospitalization:  recurrent UTIs and implanted bladder stimulator device, altered mental status and low blood pressure   Evaluation/Treatment: Treated with Meropenem.   Course: Negative for dyruia, frequency, urgency today.     Patient Care Team:  Miller Castañeda MD as PCP - General (Internal Medicine)  Mart Ontiveros MD as Surgeon (Cardiothoracic Surgery)  Tres De Los Santos MD as Consulting Physician (Cardiology)  Graham Mcconnell MD as Consulting Physician (Hematology and Oncology)  Payam Byrnes MD as Consulting Physician (Otolaryngology)  Jonathan Smith MD as Consulting Physician (Vascular Surgery)  Victor M Cabral MD as Surgeon (Orthopedic Surgery)  Yaya Burks MD as Consulting Physician (Pulmonary Disease)  ____________________________________________________________________    ASSESSMENT & PLAN:    1. Primary hypertension    2. Chronic diastolic CHF (congestive heart failure) (HCC)    3. Recurrent UTI (urinary tract infection)    4. Mixed hyperlipidemia    5. Pulmonary emphysema, unspecified emphysema type (HCC)    6. Mild protein-calorie malnutrition (HCC)    7. Acute on chronic systolic CHF (congestive heart failure) " (HCC)      No orders of the defined types were placed in this encounter.      Summary/Discussion:  Patient seen today post hospitalization. All medications reviewed and verified. Patient states she is no longer taking Ferrous sulfate or Atorvastatin sec to side effects.   Patient states that she continues to loose weight. Encouraged small frequent meals high in protein, and supplements such as ensure/boost 19g protein twice daily but not as a meal replacement.   Discussion of UTI and prevention with increased po water to 32 ounces daily, daily cranberry juice/tablets, razia hygiene measures and changing pads every 3 hrs. Negative for dysuria, frequency, urgency today. Daughter present and verbalized understanding.   Will obtain Lipid panel and BMP next follow up visit.   Discussion of Vertigo and importance of staying hydrated. Has been treated for BPPV in the past   • Follow up with Dr Castañeda as scheduled  • Scheduled for [facility] to see at home today     Return in about 4 weeks (around 7/14/2022) for Recheck.    ____________________________________________________________________    REVIEW OF SYSTEMS    Review of Systems   Constitutional: Negative.  Negative for unexpected weight change.   HENT: Negative.    Eyes: Negative.    Respiratory: Negative.    Cardiovascular: Negative.    Gastrointestinal: Negative.    Endocrine: Negative.    Genitourinary: Negative.    Musculoskeletal: Negative.    Skin: Negative.    Allergic/Immunologic: Positive for environmental allergies.   Neurological: Positive for dizziness.        Occasional dizziness   Hematological: Negative.    Psychiatric/Behavioral: Negative.          PHYSICAL EXAMINATION    Physical Exam  HENT:      Head: Normocephalic.   Cardiovascular:      Rate and Rhythm: Normal rate and regular rhythm.      Pulses: Normal pulses.      Heart sounds: Normal heart sounds.   Pulmonary:      Effort: Pulmonary effort is normal.      Breath sounds: Normal breath  sounds.   Abdominal:      General: Abdomen is flat. Bowel sounds are normal.   Musculoskeletal:         General: Normal range of motion.   Skin:     General: Skin is warm and dry.      Findings: Bruising present.   Neurological:      Mental Status: She is alert and oriented to person, place, and time. Mental status is at baseline.   Psychiatric:         Mood and Affect: Mood normal.           REVIEWED DATA:    Labs:   Lab Results   Component Value Date     05/03/2022    K 3.7 05/03/2022    CALCIUM 8.8 05/03/2022    AST 11 05/01/2022    ALT 8 05/01/2022    BUN 18 05/03/2022    CREATININE 0.77 05/03/2022    CREATININE 0.73 05/02/2022    CREATININE 0.85 05/01/2022    EGFRIFNONA 102 12/05/2021    EGFRIFAFRI 85 08/12/2021       Lab Results   Component Value Date    WBC 5.36 05/03/2022    HGB 11.1 (L) 05/03/2022    HGB 11.8 (L) 05/02/2022    HGB 12.1 05/01/2022     05/03/2022       Lab Results   Component Value Date    PROTEIN 1+ (A) 11/19/2021    GLUCOSEU Negative 04/22/2022    BLOODU Large (3+) (A) 04/22/2022    NITRITEU Negative 04/22/2022    LEUKOCYTESUR Large (3+) (A) 04/22/2022       Imaging:   No results found.     Medical Tests:        Summary of old records / correspondence / consultant report:   DC summary re: issues addressed on HPI    Request outside records:       ALLERGIES  Allergies   Allergen Reactions   • Ramipril Other (See Comments)     Ace I  cough   • Morphine Itching   • Morphine And Related Itching   • Bactrim [Sulfamethoxazole-Trimethoprim] Itching and Rash   • Cipro [Ciprofloxacin Hcl] Rash        MEDICATIONS   Current Outpatient Medications   Medication Sig Dispense Refill   • albuterol sulfate  (90 Base) MCG/ACT inhaler INHALE TWO PUFFS BY MOUTH EVERY 6 HOURS AS NEEDED FOR WHEEZING OR SHORTNESS OF AIR 8.5 g 3   • aspirin 81 MG EC tablet Take 81 mg by mouth Daily.     • cetirizine (zyrTEC) 10 MG tablet Take 1 tablet by mouth As Needed for Allergies. (Patient taking  differently: Take 10 mg by mouth Daily As Needed for Allergies.) 30 tablet 0   • estradiol (ESTRACE) 0.1 MG/GM vaginal cream Insert 0.52 g into the vagina 2 (Two) Times a Week.     • ferrous sulfate 325 (65 FE) MG EC tablet Take 1 tablet by mouth Every Other Day. 30 tablet 0   • Fluticasone-Umeclidin-Vilant (Trelegy Ellipta) 100-62.5-25 MCG/INH inhaler Inhale 1 puff Daily. 90 each 3   • furosemide (LASIX) 20 MG tablet Take 1 tablet by mouth Daily. 30 tablet 0   • isosorbide mononitrate (IMDUR) 30 MG 24 hr tablet Take 1 tablet by mouth 2 (Two) Times a Day. 60 tablet 0   • Lactobacillus (FLORANEX PO) Take  by mouth. 1milloncell     • losartan (COZAAR) 25 MG tablet Take 1 tablet by mouth 2 (Two) Times a Day. 60 tablet 0   • meclizine (ANTIVERT) 12.5 MG tablet Take 1 tablet by mouth 3 (Three) Times a Day As Needed for Dizziness. 21 tablet 0   • metoprolol succinate XL (TOPROL-XL) 50 MG 24 hr tablet Take 50 mg by mouth Daily.     • pantoprazole (PROTONIX) 40 MG EC tablet TAKE ONE TABLET BY MOUTH EVERY MORNING 90 tablet 3   • potassium chloride 10 MEQ CR tablet Take 1 tablet by mouth Daily. 30 tablet 3   • pramipexole (MIRAPEX) 0.125 MG tablet TAKE ONE TABLET BY MOUTH EVERY NIGHT AT BEDTIME (Patient taking differently: Take 0.125 mg by mouth Every Night.) 90 tablet 1   • sennosides-docusate (PERICOLACE) 8.6-50 MG per tablet Take 2 tablets by mouth Daily As Needed for Constipation.     • Vitamin D, Cholecalciferol, 50 MCG (2000 UT) capsule Take 2,000 Units by mouth Daily. 30 capsule    • atorvastatin (LIPITOR) 40 MG tablet Take 1 tablet by mouth Every Night. 90 tablet 2   • multivitamin (THERAGRAN) tablet tablet Take 1 tablet by mouth Daily. 90 tablet 0   • sertraline (ZOLOFT) 25 MG tablet      • TOVIAZ 4 MG tablet sustained-release 24 hour tablet Take 4 mg by mouth Daily.       No current facility-administered medications for this visit.       Current outpatient and discharge medications have been reconciled for the  patient.  Reviewed by: FREDRICK Stacy         Examiner was wearing KN95 mask and exam gloves during the entire duration of the visit. Patient was masked the entire time.   Minimum social distance of 6 ft maintained entire visit except if physical contact was necessary as documented.     **Dragon Disclaimer:   Much of this encounter note is an electronic transcription/translation of spoken language to printed text. The electronic translation of spoken language may permit erroneous, or at times, nonsensical words or phrases to be inadvertently transcribed. Although I have reviewed the note for such errors, some may still exist.       Template created by FREDRICK Tate

## 2022-06-17 ENCOUNTER — HOME CARE VISIT (OUTPATIENT)
Dept: HOME HEALTH SERVICES | Facility: HOME HEALTHCARE | Age: 82
End: 2022-06-17

## 2022-06-17 PROCEDURE — G0153 HHCP-SVS OF S/L PATH,EA 15MN: HCPCS

## 2022-06-17 PROCEDURE — G0151 HHCP-SERV OF PT,EA 15 MIN: HCPCS

## 2022-06-19 ENCOUNTER — HOME CARE VISIT (OUTPATIENT)
Dept: HOME HEALTH SERVICES | Facility: HOME HEALTHCARE | Age: 82
End: 2022-06-19

## 2022-06-19 VITALS
RESPIRATION RATE: 18 BRPM | TEMPERATURE: 98.7 F | OXYGEN SATURATION: 95 % | DIASTOLIC BLOOD PRESSURE: 80 MMHG | HEART RATE: 76 BPM | SYSTOLIC BLOOD PRESSURE: 140 MMHG

## 2022-06-19 VITALS
HEART RATE: 73 BPM | SYSTOLIC BLOOD PRESSURE: 100 MMHG | RESPIRATION RATE: 20 BRPM | DIASTOLIC BLOOD PRESSURE: 54 MMHG | OXYGEN SATURATION: 92 % | TEMPERATURE: 98.3 F

## 2022-06-20 VITALS
OXYGEN SATURATION: 95 % | SYSTOLIC BLOOD PRESSURE: 140 MMHG | TEMPERATURE: 98 F | DIASTOLIC BLOOD PRESSURE: 80 MMHG | HEART RATE: 76 BPM

## 2022-06-20 NOTE — HOME HEALTH
SOC 6/16  DIAG:  CHF, COPD , PULMONARY HTN, CARDIOMYOPATHY,  WITH HX OF  HYPOTENSION, CAD, DM2,  BREAST CA, COPD , HTN, AND RESTLESS LEGS  Patient is an 80 yo female lives in her apt in an assisted living facility. She is up with a walker , A&O x 2, at visit,She is still having the affects of her metabolic encephalopathy, from uti. She has completed her course of antibiotics.  Per family she was not acting right , altered mental status, hyptension, taken to ER diag with E-coli UTI.  She is now home, family is assisting with care. She is a diabetic but is not on any oral meds and does not check her bg level.  she is soa at rest, on her oxygen 2L and sats 92.   Nursing rev medications on d/c instructions all are present . She is taking as directed. They have ordered PT, OT, ST as well.

## 2022-06-20 NOTE — CASE COMMUNICATION
SOC 6/16   DIAG: CHF, COPD , PULMONARY HTN, CARDIOMYOPATHY, WITH HX OF HYPOTENSION, CAD, DM2, BREAST CA, COPD , HTN, AND RESTLESS LEGS   Patient is an 82 yo female lives in her apt in an assisted living facility. She is up with a walker , A&O x 2, at visit,She is still having the affects of her metabolic encephalopathy, from uti. She has completed her course of antibiotics. Per family she was not acting right , altered mental status, hy ptension, taken to ER diag with E-coli UTI. She is now home, family is assisting with care. She is a diabetic but is not on any oral meds and does not check her bg level. she is soa at rest, on her oxygen 2L and sats 92.   Nursing rev medications on d/c instructions all are present . She is taking as directed. They have ordered PT, OT, ST as well.

## 2022-06-20 NOTE — HOME HEALTH
"Patient went to the hospital for UTI (with encephalopathy) and went to [facility] rehab for several weeks.  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 She lives in an assisted living facility and uses a rollator walker at baseline, stating that her balance has \"been an issue\" for awhile. She presents today on continous 2L O2.   She has a wet sounding cough.    Assessment:  Patient has lowered endurance with O2 still dropping below 90% with activity.  She has continued cognitive deficits from the last UTI, but was mostly appropriate with conversation and directions today.  Her goal was to be more consistent with her walking tolerance and to feel better with her ADL tolerance.  She continues to deny the need for OT.  PT will work on better gait tolerance as well as pulmonary hygeine.     Plan for next visit  1.  Review and progress pulmonary hygeine.   2.  Progress LE strengthening activities.   3.  Montior O2 sats and make report of abnormalities."

## 2022-06-21 ENCOUNTER — HOME CARE VISIT (OUTPATIENT)
Dept: HOME HEALTH SERVICES | Facility: HOME HEALTHCARE | Age: 82
End: 2022-06-21

## 2022-06-21 PROCEDURE — G0151 HHCP-SERV OF PT,EA 15 MIN: HCPCS

## 2022-06-22 ENCOUNTER — HOME CARE VISIT (OUTPATIENT)
Dept: HOME HEALTH SERVICES | Facility: HOME HEALTHCARE | Age: 82
End: 2022-06-22

## 2022-06-22 VITALS
HEART RATE: 68 BPM | RESPIRATION RATE: 18 BRPM | TEMPERATURE: 96 F | SYSTOLIC BLOOD PRESSURE: 140 MMHG | DIASTOLIC BLOOD PRESSURE: 86 MMHG | OXYGEN SATURATION: 100 %

## 2022-06-22 VITALS
SYSTOLIC BLOOD PRESSURE: 104 MMHG | OXYGEN SATURATION: 92 % | DIASTOLIC BLOOD PRESSURE: 66 MMHG | HEART RATE: 72 BPM | TEMPERATURE: 98.2 F

## 2022-06-22 PROCEDURE — G0153 HHCP-SVS OF S/L PATH,EA 15MN: HCPCS

## 2022-06-22 PROCEDURE — G0299 HHS/HOSPICE OF RN EA 15 MIN: HCPCS

## 2022-06-22 NOTE — HOME HEALTH
Arrived today with patient's daughter present.  Dtg reports that the facility staff found that her O2 line may not have been hooked up to the concentrator correctly, thus not given her oxygen. The humidifier needs and adaptor ([employer] is suppose to bring it over) and therefore her O2 line was put directly onto the concentrator.  Her O2 was working when I checked it today.  Patient still coughing.  Her lungs remained dimished but no crackles heard.      Plan for next visit  1.  Progress gait and HEP per tolerance.    2.  Continue pulmonary hygiene measures.

## 2022-06-23 ENCOUNTER — HOME CARE VISIT (OUTPATIENT)
Dept: HOME HEALTH SERVICES | Facility: HOME HEALTHCARE | Age: 82
End: 2022-06-23

## 2022-06-23 VITALS
TEMPERATURE: 97.7 F | DIASTOLIC BLOOD PRESSURE: 81 MMHG | HEART RATE: 66 BPM | RESPIRATION RATE: 18 BRPM | SYSTOLIC BLOOD PRESSURE: 155 MMHG

## 2022-06-23 PROCEDURE — G0151 HHCP-SERV OF PT,EA 15 MIN: HCPCS

## 2022-06-24 ENCOUNTER — HOME CARE VISIT (OUTPATIENT)
Dept: HOME HEALTH SERVICES | Facility: HOME HEALTHCARE | Age: 82
End: 2022-06-24

## 2022-06-24 ENCOUNTER — TELEPHONE (OUTPATIENT)
Dept: INTERNAL MEDICINE | Age: 82
End: 2022-06-24

## 2022-06-24 NOTE — HOME HEALTH
Spent most of my time reviewing medications in patient planner.  She has assisstance filling this planner, and after much time reviewing it does appear it is filled correctly but there are many discrepancies between our chart, most recent MD visit, pill bottle and discharge list from rehab, I am going to call PCP to try and clarify this.  CP assessment WNL.

## 2022-06-25 VITALS
HEART RATE: 69 BPM | OXYGEN SATURATION: 99 % | DIASTOLIC BLOOD PRESSURE: 62 MMHG | TEMPERATURE: 98.2 F | SYSTOLIC BLOOD PRESSURE: 100 MMHG

## 2022-06-25 NOTE — HOME HEALTH
She feels her Oxygen is better since it was fixed.  She did not have any pain today.  She still is presenting with her wet cough.      Plan for next visit  1. Continue teaching for use of portable tank to access her hallway and dining room.   2.  Work on gait toward the dining room.   3.  Progress HEP as tolerarted (maybe some standing exercises).

## 2022-06-28 ENCOUNTER — HOME CARE VISIT (OUTPATIENT)
Dept: HOME HEALTH SERVICES | Facility: HOME HEALTHCARE | Age: 82
End: 2022-06-28

## 2022-06-28 PROCEDURE — G0151 HHCP-SERV OF PT,EA 15 MIN: HCPCS

## 2022-06-29 ENCOUNTER — HOME CARE VISIT (OUTPATIENT)
Dept: HOME HEALTH SERVICES | Facility: HOME HEALTHCARE | Age: 82
End: 2022-06-29

## 2022-06-29 VITALS
TEMPERATURE: 99 F | HEART RATE: 71 BPM | DIASTOLIC BLOOD PRESSURE: 80 MMHG | OXYGEN SATURATION: 97 % | SYSTOLIC BLOOD PRESSURE: 120 MMHG

## 2022-06-29 VITALS
HEART RATE: 70 BPM | SYSTOLIC BLOOD PRESSURE: 100 MMHG | DIASTOLIC BLOOD PRESSURE: 60 MMHG | TEMPERATURE: 96.8 F | RESPIRATION RATE: 18 BRPM | OXYGEN SATURATION: 95 %

## 2022-06-29 DIAGNOSIS — K21.9 GASTROESOPHAGEAL REFLUX DISEASE, UNSPECIFIED WHETHER ESOPHAGITIS PRESENT: Chronic | ICD-10-CM

## 2022-06-29 PROCEDURE — G0299 HHS/HOSPICE OF RN EA 15 MIN: HCPCS

## 2022-06-29 PROCEDURE — G0153 HHCP-SVS OF S/L PATH,EA 15MN: HCPCS

## 2022-06-29 RX ORDER — PANTOPRAZOLE SODIUM 40 MG/1
40 TABLET, DELAYED RELEASE ORAL EVERY MORNING
Qty: 90 TABLET | Refills: 3 | Status: SHIPPED | OUTPATIENT
Start: 2022-06-29 | End: 2022-10-14

## 2022-06-29 RX ORDER — ISOSORBIDE MONONITRATE 30 MG/1
30 TABLET, EXTENDED RELEASE ORAL 2 TIMES DAILY
Qty: 180 TABLET | Refills: 3 | OUTPATIENT
Start: 2022-06-29

## 2022-06-29 NOTE — HOME HEALTH
Patient still with a cough.  She reports that she has not been doing the exercises as much as she should.  She states water was in her O2 line again (her daughter may have put too much water in the humidifier tank).  Patient was sitting on the couch wearing her AFO when I arrived.      Plan for next visit.   1.  Continue to progress gait as tolerated.    2.  Reinforce/progress HEP as tolerated.

## 2022-06-30 ENCOUNTER — HOME CARE VISIT (OUTPATIENT)
Dept: HOME HEALTH SERVICES | Facility: HOME HEALTHCARE | Age: 82
End: 2022-06-30

## 2022-06-30 VITALS
HEART RATE: 73 BPM | OXYGEN SATURATION: 99 % | SYSTOLIC BLOOD PRESSURE: 115 MMHG | RESPIRATION RATE: 18 BRPM | TEMPERATURE: 97.7 F | DIASTOLIC BLOOD PRESSURE: 64 MMHG

## 2022-06-30 PROCEDURE — G0151 HHCP-SERV OF PT,EA 15 MIN: HCPCS

## 2022-07-01 ENCOUNTER — HOME CARE VISIT (OUTPATIENT)
Dept: HOME HEALTH SERVICES | Facility: HOME HEALTHCARE | Age: 82
End: 2022-07-01

## 2022-07-01 VITALS
HEART RATE: 69 BPM | DIASTOLIC BLOOD PRESSURE: 64 MMHG | SYSTOLIC BLOOD PRESSURE: 102 MMHG | OXYGEN SATURATION: 94 % | RESPIRATION RATE: 18 BRPM | TEMPERATURE: 96 F

## 2022-07-01 VITALS
SYSTOLIC BLOOD PRESSURE: 106 MMHG | TEMPERATURE: 98.7 F | OXYGEN SATURATION: 99 % | HEART RATE: 64 BPM | DIASTOLIC BLOOD PRESSURE: 64 MMHG

## 2022-07-01 PROCEDURE — G0299 HHS/HOSPICE OF RN EA 15 MIN: HCPCS

## 2022-07-01 RX ORDER — ISOSORBIDE MONONITRATE 30 MG/1
30 TABLET, EXTENDED RELEASE ORAL 2 TIMES DAILY
Qty: 180 TABLET | Refills: 0 | Status: SHIPPED | OUTPATIENT
Start: 2022-07-01 | End: 2022-07-14 | Stop reason: SDUPTHER

## 2022-07-01 NOTE — HOME HEALTH
She had a dentist appointment yesterday.  She was able to get there with just one portable tank on conserve.  Her lung sounds were better today with no rubbing sounds heard.  She is not coughing as much.      Plan for next visit  1.  Continue progression of gait distance and standing HEP as tolerated.

## 2022-07-01 NOTE — TELEPHONE ENCOUNTER
The patient is requesting refill on Isosorbide Mon 30mg BID that was given in the hospital. Is this still needed?

## 2022-07-01 NOTE — HOME HEALTH
Informed patient/daughter of med changes per conversation with MD office.  Losartan 25mg 1x day, Metoprolol 50mg 1x day, pramipexole 0.125 1 tablet nightly. Call out to cardiology for clarification on potassium (1x day or every other day?).  Oxygen holding well at 2L.  Instructed patient to weigh daily to monitor extrcellular fluid status.  CP assessment WNL.

## 2022-07-02 NOTE — HOME HEALTH
Still awaiting call back from cardiology office on frequency of potassium, patient currently taking every other day.  Assessed lung sounds and they are within patient normal limits.  Filled humidifier on oxygen concentrator, patient currently on 2L and sats normal.  No reports of issues breathing, no reported weight gain.  Patient attempting to weigh more frequently, continue CHF education.

## 2022-07-05 ENCOUNTER — HOME CARE VISIT (OUTPATIENT)
Dept: HOME HEALTH SERVICES | Facility: HOME HEALTHCARE | Age: 82
End: 2022-07-05

## 2022-07-05 PROCEDURE — G0151 HHCP-SERV OF PT,EA 15 MIN: HCPCS

## 2022-07-06 ENCOUNTER — HOME CARE VISIT (OUTPATIENT)
Dept: HOME HEALTH SERVICES | Facility: HOME HEALTHCARE | Age: 82
End: 2022-07-06

## 2022-07-07 VITALS
HEART RATE: 81 BPM | DIASTOLIC BLOOD PRESSURE: 78 MMHG | TEMPERATURE: 98.6 F | SYSTOLIC BLOOD PRESSURE: 136 MMHG | OXYGEN SATURATION: 94 %

## 2022-07-07 NOTE — CASE COMMUNICATION
Patient missed a skilled nurse visit from [facility] on 7.6.22     Reason: Nurse called patient twice and left voice mail. No return phone call from patient .       For your records only.   As per home health protocol, MD must be notified of missed/cancelled visits; therefore the prescribed frequency was not met.

## 2022-07-07 NOTE — HOME HEALTH
Patient's goal is not to walk down to the dining room anymore.  She may start going down for dinner if she is pushed in the wheelchair.  She states she is doing ok overall, just feels tired a lot. She met all the set goals and agreed to discharge today, 1 visit earlier than planned.

## 2022-07-08 ENCOUNTER — HOME CARE VISIT (OUTPATIENT)
Dept: HOME HEALTH SERVICES | Facility: HOME HEALTHCARE | Age: 82
End: 2022-07-08

## 2022-07-08 PROCEDURE — G0180 MD CERTIFICATION HHA PATIENT: HCPCS | Performed by: INTERNAL MEDICINE

## 2022-07-13 ENCOUNTER — HOME CARE VISIT (OUTPATIENT)
Dept: HOME HEALTH SERVICES | Facility: HOME HEALTHCARE | Age: 82
End: 2022-07-13

## 2022-07-13 ENCOUNTER — TELEPHONE (OUTPATIENT)
Dept: CARDIOLOGY | Facility: CLINIC | Age: 82
End: 2022-07-13

## 2022-07-13 VITALS
HEART RATE: 66 BPM | SYSTOLIC BLOOD PRESSURE: 132 MMHG | TEMPERATURE: 97 F | OXYGEN SATURATION: 98 % | DIASTOLIC BLOOD PRESSURE: 72 MMHG | RESPIRATION RATE: 18 BRPM

## 2022-07-13 PROCEDURE — G0299 HHS/HOSPICE OF RN EA 15 MIN: HCPCS

## 2022-07-13 NOTE — TELEPHONE ENCOUNTER
Nurse with [facility]  called regarding this patient. He would like to clarify the Potassium dose. Apparently the nursing home has her taking 10 MEQ every other day, we prescribed it as 10 MEQ daily. Please advise       # [phone]

## 2022-07-14 ENCOUNTER — OFFICE VISIT (OUTPATIENT)
Dept: INTERNAL MEDICINE | Age: 82
End: 2022-07-14

## 2022-07-14 ENCOUNTER — OFFICE VISIT (OUTPATIENT)
Dept: CARDIOLOGY | Facility: CLINIC | Age: 82
End: 2022-07-14

## 2022-07-14 VITALS
SYSTOLIC BLOOD PRESSURE: 114 MMHG | HEIGHT: 55 IN | DIASTOLIC BLOOD PRESSURE: 64 MMHG | HEART RATE: 78 BPM | WEIGHT: 85.4 LBS | BODY MASS INDEX: 19.77 KG/M2

## 2022-07-14 VITALS
DIASTOLIC BLOOD PRESSURE: 62 MMHG | HEART RATE: 67 BPM | OXYGEN SATURATION: 92 % | HEIGHT: 56 IN | SYSTOLIC BLOOD PRESSURE: 118 MMHG | BODY MASS INDEX: 19.29 KG/M2 | TEMPERATURE: 96.2 F

## 2022-07-14 DIAGNOSIS — I25.810 CORONARY ARTERY DISEASE INVOLVING CORONARY BYPASS GRAFT OF NATIVE HEART WITHOUT ANGINA PECTORIS: Primary | ICD-10-CM

## 2022-07-14 DIAGNOSIS — R35.0 FREQUENCY OF URINATION: ICD-10-CM

## 2022-07-14 DIAGNOSIS — I25.5 ISCHEMIC CARDIOMYOPATHY: ICD-10-CM

## 2022-07-14 DIAGNOSIS — I50.23 ACUTE ON CHRONIC SYSTOLIC CHF (CONGESTIVE HEART FAILURE): ICD-10-CM

## 2022-07-14 DIAGNOSIS — Z95.1 S/P CABG X 1: ICD-10-CM

## 2022-07-14 DIAGNOSIS — N39.0 RECURRENT UTI (URINARY TRACT INFECTION): Chronic | ICD-10-CM

## 2022-07-14 DIAGNOSIS — M62.3 IMMOBILITY SYNDROME: ICD-10-CM

## 2022-07-14 DIAGNOSIS — I10 PRIMARY HYPERTENSION: Primary | ICD-10-CM

## 2022-07-14 DIAGNOSIS — E78.2 MIXED HYPERLIPIDEMIA: ICD-10-CM

## 2022-07-14 DIAGNOSIS — E11.9 TYPE 2 DIABETES MELLITUS WITHOUT COMPLICATION, WITHOUT LONG-TERM CURRENT USE OF INSULIN: ICD-10-CM

## 2022-07-14 DIAGNOSIS — E44.1 MILD PROTEIN-CALORIE MALNUTRITION: ICD-10-CM

## 2022-07-14 PROCEDURE — 99214 OFFICE O/P EST MOD 30 MIN: CPT | Performed by: NURSE PRACTITIONER

## 2022-07-14 PROCEDURE — 93000 ELECTROCARDIOGRAM COMPLETE: CPT | Performed by: NURSE PRACTITIONER

## 2022-07-14 RX ORDER — SERTRALINE HYDROCHLORIDE 25 MG/1
25 TABLET, FILM COATED ORAL DAILY
Qty: 90 TABLET | Refills: 0 | Status: SHIPPED | OUTPATIENT
Start: 2022-07-14 | End: 2022-10-14

## 2022-07-14 RX ORDER — ISOSORBIDE MONONITRATE 30 MG/1
30 TABLET, EXTENDED RELEASE ORAL 2 TIMES DAILY
Qty: 180 TABLET | Refills: 2 | Status: SHIPPED | OUTPATIENT
Start: 2022-07-14 | End: 2023-03-08 | Stop reason: SDUPTHER

## 2022-07-14 NOTE — PROGRESS NOTES
I N T E R N A L  M E D I C I N E  Whitney Galarza, APRN    ENCOUNTER DATE:  07/14/2022    Grace Beauchamp / 81 y.o. / female      CHIEF COMPLAINT / REASON FOR OFFICE VISIT     Follow up Hypertension      ASSESSMENT & PLAN     Diagnoses and all orders for this visit:    1. Primary hypertension (Primary)  Stable. Continue Bystolic 10 mg qd.   Continue ASA 81mg daily  Continue Isosorbide mononitrate 30 mb twice daily  Continue Losartan 25 mg daily   Continue Metoprolol succinate XL 50mg daily  Orders:  -     Comprehensive Metabolic Panel    2. Mixed hyperlipidemia       - Continue Atorvastatin 40mg daily  -     Lipid Panel With / Chol / HDL Ratio    3. Type 2 diabetes mellitus without complication, without long-term current use of insulin ([facility])  Diet controlled.   Orders:  -     Hemoglobin A1c  -     Microalbumin / Creatinine Urine Ratio - Urine, Clean Catch    4. Frequency of urination/Recurrent UTI (urinary tract infection)  -     Urinalysis With Culture If Indicated -  -Continue Cranberry tablets daily  -Encouraged at least 32 ounces of water daily     5. Acute on chronic systolic CHF (congestive heart failure) (HCC)       - Continue Furosemide 20 mg daily       - Continue KCL 10 meq daily    6. Mild protein-calorie malnutrition (HCC)       - Continue Vitamin D 2000 units daily        - Continue MVI daily     7. Immobility syndrome       - Home health therapy recently discharged patient     Other orders  -     sertraline (ZOLOFT) 25 MG tablet; Take 1 tablet by mouth Daily for 90 days.  Dispense: 90 tablet; Refill: 0       SUMMARY/DISCUSSION  • Patient seen by Cardiology this morning, notes reviewed, no changes noted, follow up as scheduled  • Continue to wear oxygen during the day- O2 sats > 90% consistently now.   • I spent 25 min in direct care of this patient on this date of service. This time includes times spent by me in the following activities: Preparing for the visit, obtaining and/or reviewing a  "separately obtained history, performing a medically appropriate examination and/or evaluation, reviewing medical records, reviewing tests, ordering medications, tests, or procedures, counseling and educating the patient, documenting information in the medical record and reviewing office note/correspondence from other providers.     Return in about 3 months (around 10/14/2022) for Next scheduled follow up.      VITAL SIGNS     Visit Vitals  /62   Pulse 67   Temp 96.2 °F (35.7 °C) (Temporal)   Ht 142.2 cm (55.98\")   LMP  (LMP Unknown)   SpO2 92%   BMI 19.29 kg/m²           BP Readings from Last 3 Encounters:   07/14/22 114/64   07/14/22 118/62   07/13/22 132/72     Wt Readings from Last 3 Encounters:   07/14/22 38.7 kg (85 lb 6.4 oz)   06/16/22 39 kg (86 lb)   05/02/22 41.4 kg (91 lb 3.2 oz)     Body mass index is 19.29 kg/m².    Blood pressure readings recorded on patient flowsheet:  No flowsheet data found.          MEDICATIONS AT THE TIME OF OFFICE VISIT     Current Outpatient Medications on File Prior to Visit   Medication Sig Dispense Refill   • albuterol sulfate  (90 Base) MCG/ACT inhaler INHALE TWO PUFFS BY MOUTH EVERY 6 HOURS AS NEEDED FOR WHEEZING OR SHORTNESS OF AIR 8.5 g 3   • aspirin 81 MG EC tablet Take 81 mg by mouth Daily.     • atorvastatin (LIPITOR) 40 MG tablet Take 1 tablet by mouth Every Night. 90 tablet 2   • cetirizine (zyrTEC) 10 MG tablet Take 1 tablet by mouth As Needed for Allergies. (Patient taking differently: Take 10 mg by mouth Daily As Needed for Allergies.) 30 tablet 0   • Cranberry 1000 MG capsule Take  by mouth.     • estradiol (ESTRACE) 0.1 MG/GM vaginal cream Insert 0.52 g into the vagina 2 (Two) Times a Week.     • ferrous sulfate 325 (65 FE) MG EC tablet Take 1 tablet by mouth Every Other Day. 30 tablet 0   • Fluticasone-Umeclidin-Vilant (Trelegy Ellipta) 100-62.5-25 MCG/INH inhaler Inhale 1 puff Daily. 90 each 3   • furosemide (LASIX) 20 MG tablet Take 1 tablet by " mouth Daily. 30 tablet 0   • Lactobacillus (FLORANEX PO) Take  by mouth. 1milloncell     • losartan (COZAAR) 25 MG tablet Take 1 tablet by mouth 2 (Two) Times a Day. (Patient taking differently: Take 25 mg by mouth Daily.) 60 tablet 0   • meclizine (ANTIVERT) 12.5 MG tablet Take 1 tablet by mouth 3 (Three) Times a Day As Needed for Dizziness. 21 tablet 0   • metoprolol succinate XL (TOPROL-XL) 50 MG 24 hr tablet Take 50 mg by mouth Daily.     • multivitamin (THERAGRAN) tablet tablet Take 1 tablet by mouth Daily. 90 tablet 0   • pantoprazole (PROTONIX) 40 MG EC tablet Take 1 tablet by mouth Every Morning. 90 tablet 3   • potassium chloride 10 MEQ CR tablet Take 1 tablet by mouth Daily. 30 tablet 3   • pramipexole (MIRAPEX) 0.125 MG tablet TAKE ONE TABLET BY MOUTH EVERY NIGHT AT BEDTIME 90 tablet 1   • sennosides-docusate (PERICOLACE) 8.6-50 MG per tablet Take 2 tablets by mouth Daily As Needed for Constipation.     • TOVIAZ 4 MG tablet sustained-release 24 hour tablet Take 4 mg by mouth Daily.     • Vitamin D, Cholecalciferol, 50 MCG (2000 UT) capsule Take 2,000 Units by mouth Daily. 30 capsule    • [DISCONTINUED] isosorbide mononitrate (IMDUR) 30 MG 24 hr tablet Take 1 tablet by mouth 2 (Two) Times a Day. 180 tablet 0   • [DISCONTINUED] sertraline (ZOLOFT) 25 MG tablet Take 25 mg by mouth Daily.       No current facility-administered medications on file prior to visit.        HISTORY OF PRESENT ILLNESS     81 year old female being seen today for follow up visit. She is feeling well overall.  She has been wearing continuous supplemental oxygen since rehabilitation at [facility].  She denies any further chest pain.  She denies any worsening shortness of breath.  She denies any palpitations, edema, or syncope.  She does have occasional vertigo.  Blood pressure is currently controlled. No current concerns voiced. Accompanied by daughter today.        Patient Care Team:  Miller Castañeda MD as PCP - General (Internal  Medicine)  Mart Ontiveros MD as Surgeon (Cardiothoracic Surgery)  Tres De Los Santos MD as Consulting Physician (Cardiology)  Graham Mcconnell MD as Consulting Physician (Hematology and Oncology)  Payam Byrnes MD as Consulting Physician (Otolaryngology)  Jonathan Smith MD as Consulting Physician (Vascular Surgery)  Victor M Cabral MD as Surgeon (Orthopedic Surgery)  Yaya Burks MD as Consulting Physician (Pulmonary Disease)    REVIEW OF SYSTEMS     Review of Systems   Constitutional: Negative.    HENT: Negative.    Respiratory: Negative.    Cardiovascular: Negative.    Gastrointestinal: Negative.    Skin:        Hx PVD with discoloration of feet and legs   Neurological: Negative.    Psychiatric/Behavioral: Negative.           PHYSICAL EXAMINATION     Physical Exam  Cardiovascular:      Rate and Rhythm: Normal rate and regular rhythm.      Pulses: Normal pulses.      Heart sounds: Normal heart sounds.   Pulmonary:      Effort: Pulmonary effort is normal.      Breath sounds: Normal breath sounds.   Abdominal:      General: Bowel sounds are normal.      Palpations: Abdomen is soft.   Musculoskeletal:         General: No swelling or tenderness.   Skin:     General: Skin is dry.      Comments: Avila lower extremities with discoloration, cool to the touch, no edema noted.    Neurological:      Mental Status: She is alert. Mental status is at baseline.   Psychiatric:         Mood and Affect: Mood normal.           REVIEWED DATA     Labs:     Lab Results   Component Value Date     05/27/2022    K 4.6 05/27/2022    CALCIUM 8.9 05/27/2022    AST 11 05/01/2022    ALT 8 05/01/2022    BUN 23 05/27/2022    CREATININE 0.97 05/27/2022    CREATININE 0.77 05/03/2022    CREATININE 0.73 05/02/2022    EGFRIFNONA 102 12/05/2021    EGFRIFAFRI 85 08/12/2021       Lab Results   Component Value Date    HGBA1C 7.10 (H) 04/23/2022    HGBA1C 6.3 (H) 08/12/2021    HGBA1C 5.92 (H) 09/16/2020       Lab Results   Component  Value Date     (H) 08/12/2021     (H) 09/16/2020    LDL 53 07/09/2019    HDL 59 08/12/2021    HDL 48 09/16/2020    TRIG 168 (H) 08/12/2021    TRIG 154 (H) 09/16/2020       Lab Results   Component Value Date    TSH 3.030 04/18/2022    TSH 1.830 08/12/2021    FREET4 1.47 08/12/2021       Lab Results   Component Value Date    WBC 5.01 05/27/2022    HGB 12.2 05/27/2022     05/27/2022       No results found for: MALBCRERATIO       Imaging:           Medical Tests:           Summary of old records / correspondence / consultant report:           Request outside records:             *Examiner was wearing KN95 mask and eye protection during the entire duration of the visit. Patient was masked the entire time. Minimum social distance of 6 ft maintained entire visit except if physical contact was necessary as documented.       Template created by FREDRICK Tate

## 2022-07-14 NOTE — PROGRESS NOTES
Date of Office Visit: 2022  Encounter Provider: FREDRICK Alexander  Place of Service: [facility] CARDIOLOGY  Patient Name: Grace Beauchamp  :1940    Chief Complaint   Patient presents with   • Coronary Artery Disease   :     HPI: Grace Beauchamp is a 81 y.o. female.  She is a patient of Dr. De Los Santos's whom we follow for the management of coronary artery disease and ascending aortic aneurysm.  In , she underwent an ascending aortic replacement, total arch replacement, and a CABG x 1.  In 2020, she presented with an LV rupture due to an occlusion of the vein graft to the OM1 and was taken urgently to the OR for a redo sternotomy.  Two months later, she presented with dyspnea and was found to have a hematoma along the wall of the left ventricle.  As such, she was taken back to the OR for another urgent redo sternotomy with extensive lysis of adhesions, repair of the lateral wall LV aneurysm with a pericardial patch, and placement of a balloon pump.  The following day, she was taken back to the OR for wound exploration, washout, rewiring, and removal of the balloon pump.  Her recovery was slow.  She had some postoperative dysphagia and was eventually increased to mechanical soft diet.  She had some postoperative hyponatremia and elevated creatinine and renal was consulted.  Her kidney function returned to normal.    I last saw her in the office in April at which time she did not feel well.  She was reporting significant shortness of breath with mild exertion.  Initially it was recommended she go to the emergency room; however, she declined.  CTA of the chest was negative for PE.  She was started on Lasix and scheduled for an echocardiogram.   Reportedly following the initiation of Lasix, she developed weakness, confusion, increased urine output, hypotension, hypokalemia, and acute kidney injury.   On , she was initially evaluated in urgent care and diagnosed  "with any UTI.  She was also noted to be hypotensive and was ultimately sent to the emergency room.   She was treated with IV fluids in the emergency room with improvement.  She was started on antibiotics for UTI.  Echocardiogram demonstrated newly decreased LV systolic function with an EF of 36 to 40%, severe hypokinesis of the anterolateral wall and inferoseptum and moderate hypokinesis of the inferolateral wall, moderately reduced RV systolic function, mild MR, and moderate TR.  She was seen by cardiology in consultation.  Subsequently, she underwent a right and left cardiac catheterization demonstrating stable coronary artery disease and severe pulmonary hypertension.  Medical management was recommended, and she was started on isosorbide.  On 5/3, she was stable for discharge.  She is here today for follow-up.   She is feeling well overall.  She has been wearing continuous supplemental oxygen since rehabilitation at [facility].  She denies any further chest pain.  She denies any worsening shortness of breath.  She denies any palpitations, edema, or syncope.  She does have occasional vertigo.      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 Ontiveros   • Nocturia    • Osteoarthritis    • Osteoporosis    • Otitis media     R Ear   • Prediabetes    • PVD (peripheral vascular disease) (HCC)    • RLS (restless legs syndrome)    • Saccular aneurysm    • Stroke (HCC)     Perioperatively w/L Hip Repl   • Thoracic ascending aortic aneurysm (HCC)     S/p Repair on 02/23/16 by Dr. Ontiveros   • TIA (transient ischemic attack)    • Urgency incontinence    • Urinary tract infection    • Venous insufficiency    • Ventral hernia        Past Surgical History:   Procedure Laterality Date   • AORTIC VALVE REPAIR/REPLACEMENT N/A 02/23/2016-[facility]    Ascending Replacement Using a 24MM Graft--Dr. Ontiveros   • APPENDECTOMY  1977   • BREAST BIOPSY Right 09/16/2012    Vacuum Assisted Core Bx of R Breast   • CARDIAC CATHETERIZATION N/A 01/06/2016    Dr. Tres De Los Santos   • CARDIAC CATHETERIZATION N/A 4/22/2019    Procedure: Left Heart Cath;  Surgeon: Tres De Los Santos MD;  [facility];  Service: Cardiology   • CARDIAC CATHETERIZATION N/A 4/22/2019    Procedure: Coronary angiography;  Surgeon: Tres De Los Santos MD;  Location:  [facility] CATH INVASIVE LOCATION;  Service: Cardiology   • CARDIAC CATHETERIZATION N/A 7/22/2020    Procedure: LEFT HEART CATH;  Surgeon: Antonia Scott MD;  Location:  [facility] CATH INVASIVE LOCATION;  Service: Cardiovascular;  Laterality: N/A;   • CARDIAC CATHETERIZATION N/A 7/22/2020    Procedure: CORONARY ANGIOGRAPHY;  Surgeon: Antonia Scott MD;  Location:  [facility] CATH INVASIVE LOCATION;  Service: Cardiovascular;  Laterality: N/A;   • CARDIAC CATHETERIZATION N/A 7/22/2020    Procedure: Left ventriculography;  Surgeon: Antonia Scott MD;  Location:  [facility] CATH INVASIVE LOCATION;  Service: Cardiovascular;  Laterality: N/A;   • CARDIAC CATHETERIZATION N/A 7/22/2020    Procedure:  Saphenous Vein Graft;  Surgeon: Antonia Scott MD;  Location:  [facility] CATH INVASIVE LOCATION;  Service: Cardiovascular;  Laterality: N/A;   • CARDIAC CATHETERIZATION N/A 4/27/2022    Procedure: Right Heart Cath;  Surgeon: Lydia Mays MD;  Location:  [facility] CATH INVASIVE LOCATION;  Service: Cardiology;  Laterality: N/A;   • CARDIAC CATHETERIZATION N/A 4/27/2022    Procedure: Coronary angiography;  Surgeon: Lydia Mays MD;  Location:  [facility] CATH INVASIVE LOCATION;  Service: Cardiology;  Laterality: N/A;   • CARDIAC CATHETERIZATION N/A 4/27/2022    Procedure: Left Heart Cath;  Surgeon: Lydia Mays MD;  Location:  [facility] CATH INVASIVE LOCATION;  Service: Cardiology;  Laterality: N/A;   • CARDIAC SURGERY  02/2016    Dr. Ontiveros/Dr. De Los Santos   • CATARACT EXTRACTION Bilateral     [facility]   • COLONOSCOPY N/A 10/2002    Dr. Negro   • CORONARY ARTERY BYPASS GRAFT  02/23/2016-[facility]    x1 w/L Vein Grafting--Dr. Ontiveros   • CORONARY ARTERY BYPASS GRAFT N/A 9/18/2020    Procedure: STERNAL EXPLORATION WITH CLOSURE AND WASHOUT, REMOVAL OF IABP, PRP;  Surgeon: Mart Ontiveros MD;  Location: [facility] MAIN OR;  Service: Cardiothoracic;  Laterality: N/A;   • CYST REMOVAL Right 01/25/2013    R Palm Excision of Retinacular Cyst--Dr. Karla Fish   • EPIDURAL BLOCK     • LAPAROSCOPIC CHOLECYSTECTOMY N/A 08/04/2004    Dr. JOEY Sheth   • MASTECTOMY Right 09/28/2012   • ORIF HIP FRACTURE Left 03/27/2005    w/ AMBI compression screw, Dr. Victor M Cabral   • RECTOVAGINAL FISTULA REPAIR  1965   • THORACIC AORTIC ANEURYSM REPAIR N/A 7/23/2019    Procedure: ROSE, THORACOABDOMINAL AORTIC ANEURYSM REPAIR, VISCERAL VESSEL REPAIR, LARGE VENTRAL HERNIA REPAIR WITH MESH, CIRCULATORY ARREST, PRP;  Surgeon: Mart Ontiveros MD;  Location: [facility] MAIN OR;  Service: Cardiothoracic   • TRANSESOPHAGEAL ECHOCARDIOGRAM (ROSE) N/A 9/18/2020    Procedure: TRANSESOPHAGEAL ECHOCARDIOGRAM WITH ANESTHESIA;  Surgeon: Mart Ontiveros MD;   Location: [facility] MAIN OR;  Service: Cardiothoracic;  Laterality: N/A;   • TUBAL ABDOMINAL LIGATION     • VENTRICULAR ANEURYSM REPAIR N/A 2020    Procedure: EMERGENT REOP STERNOTOMY, REPAIR OF LV RUPTURE, PRP, INTRAOP ROSE;  Surgeon: Mart Ontiveros MD;  Location: [facility] MAIN OR;  Service: Cardiothoracic;  Laterality: N/A;   • VENTRICULAR ANEURYSM REPAIR N/A 2020    Procedure: ROSE, MIDLINE STERNOTOMY REOP  LEFT VENTRICULAR ANEURYSM REPAIR, IABP INSERTION, PRP;  Surgeon: Mart Ontiveros MD;  Location: [facility] MAIN OR;  Service: Cardiothoracic;  Laterality: N/A;       Social History     Socioeconomic History   • Marital status:    Tobacco Use   • Smoking status: Former Smoker     Types: Cigarettes     Quit date: 2012     Years since quittin.6   • Smokeless tobacco: Never Used   • Tobacco comment: CAFFEINE USE:2 CUPS COFFEE/ COKE DAILY   Substance and Sexual Activity   • Alcohol use: No   • Drug use: No   • Sexual activity: Defer     Birth control/protection: Tubal ligation       Family History   Problem Relation Age of Onset   • Alzheimer's disease Mother    • Cancer Maternal Aunt    • Heart disease Father    • Heart failure Father    • Hypertension Father    • Diabetes Father    • Liver disease Sister    • Heart failure Brother    • Hypertension Brother    • Alcohol abuse Brother    • Diabetes Brother    • No Known Problems Maternal Grandmother    • No Known Problems Maternal Grandfather    • No Known Problems Paternal Grandmother    • No Known Problems Paternal Grandfather    • Diabetes Brother    • Brain cancer Brother    • Heart disease Brother    • Diabetes Brother        Review of Systems   Constitutional: Negative.   Cardiovascular: Positive for dyspnea on exertion. Negative for chest pain, leg swelling, orthopnea, paroxysmal nocturnal dyspnea and syncope.   Respiratory: Negative.    Hematologic/Lymphatic: Negative for bleeding problem.   Musculoskeletal: Negative for falls.    Gastrointestinal: Negative for melena.   Neurological: Positive for vertigo. Negative for dizziness and light-headedness.       Allergies   Allergen Reactions   • Ramipril Other (See Comments)     Ace I  cough   • Morphine Itching   • Morphine And Related Itching   • Bactrim [Sulfamethoxazole-Trimethoprim] Itching and Rash   • Cipro [Ciprofloxacin Hcl] Rash         Current Outpatient Medications:   •  albuterol sulfate  (90 Base) MCG/ACT inhaler, INHALE TWO PUFFS BY MOUTH EVERY 6 HOURS AS NEEDED FOR WHEEZING OR SHORTNESS OF AIR, Disp: 8.5 g, Rfl: 3  •  aspirin 81 MG EC tablet, Take 81 mg by mouth Daily., Disp: , Rfl:   •  atorvastatin (LIPITOR) 40 MG tablet, Take 1 tablet by mouth Every Night., Disp: 90 tablet, Rfl: 2  •  cetirizine (zyrTEC) 10 MG tablet, Take 1 tablet by mouth As Needed for Allergies. (Patient taking differently: Take 10 mg by mouth Daily As Needed for Allergies.), Disp: 30 tablet, Rfl: 0  •  Cranberry 1000 MG capsule, Take  by mouth., Disp: , Rfl:   •  estradiol (ESTRACE) 0.1 MG/GM vaginal cream, Insert 0.52 g into the vagina 2 (Two) Times a Week., Disp: , Rfl:   •  ferrous sulfate 325 (65 FE) MG EC tablet, Take 1 tablet by mouth Every Other Day., Disp: 30 tablet, Rfl: 0  •  Fluticasone-Umeclidin-Vilant (Trelegy Ellipta) 100-62.5-25 MCG/INH inhaler, Inhale 1 puff Daily., Disp: 90 each, Rfl: 3  •  furosemide (LASIX) 20 MG tablet, Take 1 tablet by mouth Daily., Disp: 30 tablet, Rfl: 0  •  isosorbide mononitrate (IMDUR) 30 MG 24 hr tablet, Take 1 tablet by mouth 2 (Two) Times a Day., Disp: 180 tablet, Rfl: 2  •  Lactobacillus (FLORANEX PO), Take  by mouth. 1milloncell, Disp: , Rfl:   •  losartan (COZAAR) 25 MG tablet, Take 1 tablet by mouth 2 (Two) Times a Day. (Patient taking differently: Take 25 mg by mouth Daily.), Disp: 60 tablet, Rfl: 0  •  meclizine (ANTIVERT) 12.5 MG tablet, Take 1 tablet by mouth 3 (Three) Times a Day As Needed for Dizziness., Disp: 21 tablet, Rfl: 0  •  metoprolol  "succinate XL (TOPROL-XL) 50 MG 24 hr tablet, Take 50 mg by mouth Daily., Disp: , Rfl:   •  multivitamin (THERAGRAN) tablet tablet, Take 1 tablet by mouth Daily., Disp: 90 tablet, Rfl: 0  •  pantoprazole (PROTONIX) 40 MG EC tablet, Take 1 tablet by mouth Every Morning., Disp: 90 tablet, Rfl: 3  •  potassium chloride 10 MEQ CR tablet, Take 1 tablet by mouth Daily., Disp: 30 tablet, Rfl: 3  •  pramipexole (MIRAPEX) 0.125 MG tablet, TAKE ONE TABLET BY MOUTH EVERY NIGHT AT BEDTIME, Disp: 90 tablet, Rfl: 1  •  sennosides-docusate (PERICOLACE) 8.6-50 MG per tablet, Take 2 tablets by mouth Daily As Needed for Constipation., Disp: , Rfl:   •  sertraline (ZOLOFT) 25 MG tablet, Take 1 tablet by mouth Daily for 90 days., Disp: 90 tablet, Rfl: 0  •  TOVIAZ 4 MG tablet sustained-release 24 hour tablet, Take 4 mg by mouth Daily., Disp: , Rfl:   •  Vitamin D, Cholecalciferol, 50 MCG (2000 UT) capsule, Take 2,000 Units by mouth Daily., Disp: 30 capsule, Rfl:       Objective:     Vitals:    07/14/22 1405   BP: 114/64   Pulse: 78   Weight: 38.7 kg (85 lb 6.4 oz)   Height: 139.7 cm (55\")     Body mass index is 19.85 kg/m².    PHYSICAL EXAM:    Neck:      Vascular: No JVD.   Pulmonary:      Effort: Pulmonary effort is normal.      Breath sounds: Normal breath sounds.   Cardiovascular:      Normal rate. Regular rhythm.      Murmurs: There is no murmur.      No gallop. No click. No rub.   Pulses:     Intact distal pulses.           ECG 12 Lead    Date/Time: 7/14/2022 2:23 PM  Performed by: Carlie Hilton APRN  Authorized by: Carlie Hilton APRN   Comparison: compared with previous ECG from 4/29/2022  Similar to previous ECG  Rate: normal  BPM: 80                Assessment:       Diagnosis Plan   1. Coronary artery disease involving coronary bypass graft of native heart without angina pectoris  ECG 12 Lead   2. S/P CABG x 1     3. Ischemic cardiomyopathy       Orders Placed This Encounter   Procedures   • ECG 12 Lead     This " order was created via procedure documentation     Order Specific Question:   Release to patient     Answer:   Immediate          Plan:       1.  Coronary artery disease.  History of CABG x 1.  Recent cardiac catheterization demonstrated stable coronary disease for which medical therapy was recommended.  She was started on isosorbide.  Continue aspirin, statin, and metoprolol.      2.  Ischemic cardiomyopathy.  EF 36 to 40%.  She appears euvolemic on exam and denies any symptoms of heart failure.  She is on guideline directed medical therapy including Toprol and losartan.  She seems to be tolerating the current dose of Lasix.  She had repeat labs today which are pending.      Overall I think she is stable from a cardiac standpoint.  I am not making any changes today, and she will follow-up with Dr. De Los Santos in 3 months.      As always, it has been a pleasure to participate in your patient's care.      Sincerely,         FREDRICK Bryant

## 2022-07-19 LAB
ALBUMIN SERPL-MCNC: 3.8 G/DL (ref 3.6–4.6)
ALBUMIN/CREAT UR: 41 MG/G CREAT (ref 0–29)
ALBUMIN/GLOB SERPL: 1.2 {RATIO} (ref 1.2–2.2)
ALP SERPL-CCNC: 83 IU/L (ref 44–121)
ALT SERPL-CCNC: 8 IU/L (ref 0–32)
APPEARANCE UR: CLEAR
AST SERPL-CCNC: 19 IU/L (ref 0–40)
BACTERIA #/AREA URNS HPF: ABNORMAL /[HPF]
BACTERIA UR CULT: ABNORMAL
BACTERIA UR CULT: ABNORMAL
BILIRUB SERPL-MCNC: 0.7 MG/DL (ref 0–1.2)
BILIRUB UR QL STRIP: NEGATIVE
BUN SERPL-MCNC: 16 MG/DL (ref 8–27)
BUN/CREAT SERPL: 22 (ref 12–28)
CALCIUM SERPL-MCNC: 9.4 MG/DL (ref 8.7–10.3)
CASTS URNS QL MICRO: ABNORMAL /LPF
CHLORIDE SERPL-SCNC: 97 MMOL/L (ref 96–106)
CHOLEST SERPL-MCNC: 220 MG/DL (ref 100–199)
CHOLEST/HDLC SERPL: 3.3 RATIO (ref 0–4.4)
CO2 SERPL-SCNC: 32 MMOL/L (ref 20–29)
COLOR UR: YELLOW
CREAT SERPL-MCNC: 0.72 MG/DL (ref 0.57–1)
CREAT UR-MCNC: 18.8 MG/DL
EGFRCR SERPLBLD CKD-EPI 2021: 84 ML/MIN/1.73
EPI CELLS #/AREA URNS HPF: ABNORMAL /HPF (ref 0–10)
GLOBULIN SER CALC-MCNC: 3.1 G/DL (ref 1.5–4.5)
GLUCOSE SERPL-MCNC: 118 MG/DL (ref 65–99)
GLUCOSE UR QL STRIP: NEGATIVE
HBA1C MFR BLD: 6.3 % (ref 4.8–5.6)
HDLC SERPL-MCNC: 66 MG/DL
HGB UR QL STRIP: NEGATIVE
KETONES UR QL STRIP: NEGATIVE
LDLC SERPL CALC-MCNC: 133 MG/DL (ref 0–99)
LEUKOCYTE ESTERASE UR QL STRIP: ABNORMAL
MICRO URNS: ABNORMAL
MICROALBUMIN UR-MCNC: 7.8 UG/ML
NITRITE UR QL STRIP: POSITIVE
OTHER ANTIBIOTIC SUSC ISLT: ABNORMAL
PH UR STRIP: 6 [PH] (ref 5–7.5)
POTASSIUM SERPL-SCNC: 3.7 MMOL/L (ref 3.5–5.2)
PROT SERPL-MCNC: 6.9 G/DL (ref 6–8.5)
PROT UR QL STRIP: NEGATIVE
RBC #/AREA URNS HPF: ABNORMAL /HPF (ref 0–2)
SODIUM SERPL-SCNC: 141 MMOL/L (ref 134–144)
SP GR UR STRIP: 1.01 (ref 1–1.03)
TRIGL SERPL-MCNC: 119 MG/DL (ref 0–149)
URINALYSIS REFLEX: ABNORMAL
UROBILINOGEN UR STRIP-MCNC: 0.2 MG/DL (ref 0.2–1)
VLDLC SERPL CALC-MCNC: 21 MG/DL (ref 5–40)
WBC #/AREA URNS HPF: ABNORMAL /HPF (ref 0–5)

## 2022-07-20 RX ORDER — NITROFURANTOIN 25; 75 MG/1; MG/1
100 CAPSULE ORAL 2 TIMES DAILY
Qty: 14 CAPSULE | Refills: 0 | Status: SHIPPED | OUTPATIENT
Start: 2022-07-20 | End: 2022-07-27

## 2022-08-03 ENCOUNTER — TELEPHONE (OUTPATIENT)
Dept: INTERNAL MEDICINE | Age: 82
End: 2022-08-03

## 2022-08-03 NOTE — TELEPHONE ENCOUNTER
Caller: Grace Beauchamp    Relationship: Self    Best call back number: [phone]     What is the best time to reach you: ANY TIME     Who are you requesting to speak with (clinical staff, provider,  specific staff member): KATI    What was the call regarding: PATIENT STATES THAT SHE WAS DIAGNOSED WITH A UTI 2 WEEKS BY [facility] AND FINISHED THE ANTIBIOTIC 3 DAYS AGO.  PATIENT IS CALLING TO SEE IF SHE CAN COME INTO THE OFFICE TO HAVE HER URINE CHECKED TO CONFIRM THE UTI HAS CLEARED UP.      Do you require a callback: YES

## 2022-08-04 DIAGNOSIS — N39.0 RECURRENT UTI: Primary | ICD-10-CM

## 2022-08-12 RX ORDER — NITROFURANTOIN 25; 75 MG/1; MG/1
100 CAPSULE ORAL 2 TIMES DAILY
Qty: 14 CAPSULE | Refills: 0 | Status: SHIPPED | OUTPATIENT
Start: 2022-08-12 | End: 2022-08-19

## 2022-08-15 RX ORDER — FUROSEMIDE 20 MG/1
20 TABLET ORAL DAILY
Qty: 90 TABLET | Refills: 0 | Status: SHIPPED | OUTPATIENT
Start: 2022-08-15 | End: 2023-03-08 | Stop reason: SDUPTHER

## 2022-08-30 DIAGNOSIS — G25.81 RESTLESS LEGS SYNDROME (RLS): ICD-10-CM

## 2022-08-30 DIAGNOSIS — N39.0 URINARY TRACT INFECTION WITHOUT HEMATURIA, SITE UNSPECIFIED: Primary | ICD-10-CM

## 2022-08-30 RX ORDER — PRAMIPEXOLE DIHYDROCHLORIDE 0.12 MG/1
0.12 TABLET ORAL
Qty: 90 TABLET | Refills: 0 | Status: SHIPPED | OUTPATIENT
Start: 2022-08-30 | End: 2022-11-21

## 2022-09-12 RX ORDER — NITROFURANTOIN 25; 75 MG/1; MG/1
100 CAPSULE ORAL 2 TIMES DAILY
Qty: 10 CAPSULE | Refills: 0 | Status: SHIPPED | OUTPATIENT
Start: 2022-09-12 | End: 2022-09-17

## 2022-10-14 ENCOUNTER — OFFICE VISIT (OUTPATIENT)
Dept: INTERNAL MEDICINE | Age: 82
End: 2022-10-14

## 2022-10-14 VITALS
WEIGHT: 88.2 LBS | BODY MASS INDEX: 20.41 KG/M2 | DIASTOLIC BLOOD PRESSURE: 72 MMHG | SYSTOLIC BLOOD PRESSURE: 114 MMHG | OXYGEN SATURATION: 96 % | HEIGHT: 55 IN | HEART RATE: 51 BPM

## 2022-10-14 DIAGNOSIS — Z00.00 ENCOUNTER FOR SUBSEQUENT ANNUAL WELLNESS VISIT IN MEDICARE PATIENT: ICD-10-CM

## 2022-10-14 DIAGNOSIS — J43.9 PULMONARY EMPHYSEMA, UNSPECIFIED EMPHYSEMA TYPE: Chronic | ICD-10-CM

## 2022-10-14 DIAGNOSIS — F32.A DEPRESSION, UNSPECIFIED DEPRESSION TYPE: Chronic | ICD-10-CM

## 2022-10-14 DIAGNOSIS — E44.1 MILD PROTEIN-CALORIE MALNUTRITION: ICD-10-CM

## 2022-10-14 DIAGNOSIS — I50.23 ACUTE ON CHRONIC SYSTOLIC CHF (CONGESTIVE HEART FAILURE): Primary | ICD-10-CM

## 2022-10-14 PROCEDURE — 1159F MED LIST DOCD IN RCRD: CPT | Performed by: NURSE PRACTITIONER

## 2022-10-14 PROCEDURE — 1170F FXNL STATUS ASSESSED: CPT | Performed by: NURSE PRACTITIONER

## 2022-10-14 PROCEDURE — 99214 OFFICE O/P EST MOD 30 MIN: CPT | Performed by: NURSE PRACTITIONER

## 2022-10-14 PROCEDURE — G0439 PPPS, SUBSEQ VISIT: HCPCS | Performed by: NURSE PRACTITIONER

## 2022-10-14 RX ORDER — PANTOPRAZOLE SODIUM 40 MG/1
40 TABLET, DELAYED RELEASE ORAL DAILY
COMMUNITY
End: 2023-02-15

## 2022-10-14 RX ORDER — CHLORHEXIDINE GLUCONATE 0.12 MG/ML
RINSE ORAL
COMMUNITY
Start: 2022-07-18 | End: 2022-10-14 | Stop reason: SDUPTHER

## 2022-10-14 RX ORDER — CHLORHEXIDINE GLUCONATE 0.12 MG/ML
15 RINSE ORAL 2 TIMES DAILY
COMMUNITY
Start: 2022-07-18

## 2022-10-14 RX ORDER — CLOBETASOL PROPIONATE 0.5 MG/G
1 OINTMENT TOPICAL 2 TIMES DAILY
COMMUNITY
Start: 2022-10-10

## 2022-10-14 NOTE — ASSESSMENT & PLAN NOTE
"Patient's depression is recurrent and is moderate without psychosis. Their depression is currently active and the condition is unchanged. This will be reassessed at the next regular appointment. F/U as described:increased her sertraline to 50 mg daily as only has been taking 25 mg daily. States her \"happy pill\" has not been helping.  "

## 2022-10-14 NOTE — PROGRESS NOTES
I N T E R N A L  M E D I C I N E  Whitney Galarza, APRN      ENCOUNTER DATE:  10/14/2022    Grace Beauchamp / 82 y.o. / female        MEDICARE ANNUAL WELLNESS VISIT       Chief Complaint: Follow-up       Patient's general assessment of her health since a year ago:     - Compared to one year ago, she feels her physical health is slightly worse.    - Compared to one year ago, she feels her mental health is slightly worse.      HPI for other active medical problems:             HISTORY       Recent Hospitalizations:    Recent hospitalization?: none    If YES, location, date, and diagnoses:     · Location:   · Date:   · Principle Discharge Dx:   · Secondary Dx:       Patient Care Team:    Patient Care Team:  Miller Castañeda MD as PCP - General (Internal Medicine)  Mart Ontiveros MD as Surgeon (Cardiothoracic Surgery)  Tres De Los Santos MD as Consulting Physician (Cardiology)  Graham Mcconnell MD as Consulting Physician (Hematology and Oncology)  Payam Byrnes MD as Consulting Physician (Otolaryngology)  Jonathan Smith MD as Consulting Physician (Vascular Surgery)  Victor M Cabral MD as Surgeon (Orthopedic Surgery)  Yaya Burks MD as Consulting Physician (Pulmonary Disease)      Allergies:  Ramipril, Morphine, Morphine and related, Bactrim [sulfamethoxazole-trimethoprim], and Cipro [ciprofloxacin hcl]    Medications:  Current Outpatient Medications on File Prior to Visit   Medication Sig Dispense Refill   • albuterol sulfate  (90 Base) MCG/ACT inhaler INHALE TWO PUFFS BY MOUTH EVERY 6 HOURS AS NEEDED FOR WHEEZING OR SHORTNESS OF AIR 8.5 g 3   • aspirin 81 MG EC tablet Take 81 mg by mouth Daily.     • cetirizine (zyrTEC) 10 MG tablet Take 1 tablet by mouth As Needed for Allergies. (Patient taking differently: Take 1 tablet by mouth Daily As Needed for Allergies.) 30 tablet 0   • chlorhexidine (PERIDEX) 0.12 % solution      • clobetasol (TEMOVATE) 0.05 % ointment      • Cranberry 1000 MG capsule  Take  by mouth.     • Fluticasone-Umeclidin-Vilant (Trelegy Ellipta) 100-62.5-25 MCG/INH inhaler Inhale 1 puff Daily. 90 each 0   • furosemide (LASIX) 20 MG tablet Take 1 tablet by mouth Daily. 90 tablet 0   • isosorbide mononitrate (IMDUR) 30 MG 24 hr tablet Take 1 tablet by mouth 2 (Two) Times a Day. 180 tablet 2   • losartan (COZAAR) 25 MG tablet Take 1 tablet by mouth 2 (Two) Times a Day. (Patient taking differently: Take 1 tablet by mouth Daily.) 60 tablet 0   • meclizine (ANTIVERT) 12.5 MG tablet Take 1 tablet by mouth 3 (Three) Times a Day As Needed for Dizziness. 21 tablet 0   • metoprolol succinate XL (TOPROL-XL) 50 MG 24 hr tablet Take 50 mg by mouth Daily.     • pantoprazole (PROTONIX) 40 MG EC tablet Take 1 tablet by mouth Daily. In the morning 30 minutes before meals     • potassium chloride 10 MEQ CR tablet Take 1 tablet by mouth Daily. 30 tablet 3   • pramipexole (MIRAPEX) 0.125 MG tablet Take 1 tablet by mouth every night at bedtime for 90 days. 90 tablet 0   • sennosides-docusate (PERICOLACE) 8.6-50 MG per tablet Take 2 tablets by mouth Daily As Needed for Constipation.     • Vitamin D, Cholecalciferol, 50 MCG (2000 UT) capsule Take 2,000 Units by mouth Daily. 30 capsule    • atorvastatin (LIPITOR) 40 MG tablet Take 1 tablet by mouth Every Night. 90 tablet 2   • estradiol (ESTRACE) 0.1 MG/GM vaginal cream Insert 0.52 g into the vagina 2 (Two) Times a Week.     • ferrous sulfate 325 (65 FE) MG EC tablet Take 1 tablet by mouth Every Other Day. 30 tablet 0   • Lactobacillus (FLORANEX PO) Take  by mouth. 1milloncell     • multivitamin (THERAGRAN) tablet tablet Take 1 tablet by mouth Daily. 90 tablet 0   • mupirocin (BACTROBAN) 2 % ointment      • TOVIAZ 4 MG tablet sustained-release 24 hour tablet Take 4 mg by mouth Daily.       No current facility-administered medications on file prior to visit.        PFSH:     The following portions of the patient's history were reviewed and updated as appropriate:  Allergies / Current Medications / Past Medical History / Surgical History / Social History / Family History    Problem List:  Patient Active Problem List   Diagnosis   • History of breast cancer   • Stenosis of carotid artery   • Chronic obstructive pulmonary disease ([facility])   • Closed fracture of multiple ribs   • Cognitive disorder   • Depression   • Erythromelalgia ([facility])   • Hyperlipidemia   • Hypertension   • Primary osteoarthritis involving multiple joints   • Osteoporosis   • Restless legs syndrome   • Cerebral aneurysm   • Temporary cerebral vascular dysfunction   • Urinary tract infection   • S/P CABG x 1   • Type 2 diabetes mellitus without complication, without long-term current use of insulin ([facility])   • S/P ascending aortic aneurysm repair   • Aortic arch aneurysm   • Descending thoracic aortic aneurysm   • Claudication of both lower extremities ([facility])   • Thoracoabdominal aortic aneurysm   • Ventral hernia without obstruction or gangrene   • Breast cancer, stage 1, estrogen receptor positive ([facility])   • Arthritis of right hip   • Arthritis of right knee   • Chondrocalcinosis   • Chronic pain of right knee   • DDD (degenerative disc disease), lumbosacral   • Gastroesophageal reflux disease   • Bilateral hearing loss   • Thoracic aortic aneurysm without rupture   • PVD (peripheral vascular disease) ([facility])   • 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[facility])   • Thoracoabdominal aortic aneurysm, without rupture   • 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 rupture   • Aortic aneurysm, descending ([facility])   • Aortic aneurysm without rupture ([facility])   • CAD (coronary artery disease)   • S/P left heart catheterization by ventricular puncture   • Cerebral infarction ([facility])   • Pericardial hematoma   • History of ST elevation myocardial infarction (STEMI)   • Chronic diastolic CHF (congestive heart failure) ([facility])   • Left ventricular aneurysm   • Immobility syndrome   • Lower extremity edema   • QT prolongation   • Recurrent UTI (urinary tract  "infection)   • Hypoxia   • Metabolic encephalopathy   • LUH (acute kidney injury) ([facility])   • Hypotension   • UTI due to extended-spectrum beta lactamase (ESBL) producing Escherichia coli   • Ischemic cardiomyopathy   • Acute on chronic systolic CHF (congestive heart failure) ([facility])   • Pulmonary hypertension ([facility])   • Mild protein-calorie malnutrition ([facility])   • Encounter for subsequent annual wellness visit in Medicare patient       Past Medical History:  Past Medical History:   Diagnosis Date   • AAA (abdominal aortic aneurysm)    • Acute bronchitis 12/2018   • Aortic arch aneurysm    • Arthritis    • Bilateral carotid artery disease ([facility])    • Bilateral cataracts     S/p Extractions   • Breast cancer ([facility])     right breast [id number] (snm) pMX ER/WV pos, Her-2/neg, Ki-67, 31%, oncotype recurrence score 19, invasive lobular carcinoma   • CAD (coronary artery disease)     S/p CABG on 2/23/16 by Dr. 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 Ontiveros   • Nocturia    • Osteoarthritis    • Osteoporosis    • Otitis media     R Ear   • Prediabetes    • PVD (peripheral vascular disease) ([facility])    • RLS (restless legs syndrome)    • Saccular aneurysm    • Stroke ([facility])     Perioperatively w/L Hip Repl   • Thoracic ascending aortic aneurysm     S/p Repair on 02/23/16 by Dr. Ontiveros   • TIA (transient ischemic attack)    • Urgency incontinence    • Urinary tract infection    • Venous insufficiency    • Ventral hernia        Past Surgical History:  Past " Surgical History:   Procedure Laterality Date   • AORTIC VALVE REPAIR/REPLACEMENT N/A 02/23/2016-[facility]    Ascending Replacement Using a 24MM Graft--Dr. Ontiveros   • APPENDECTOMY  1977   • BREAST BIOPSY Right 09/16/2012    Vacuum Assisted Core Bx of R Breast   • CARDIAC CATHETERIZATION N/A 01/06/2016    Dr. Tres De Los Santos   • CARDIAC CATHETERIZATION N/A 4/22/2019    Procedure: Left Heart Cath;  Surgeon: Tres De Los Santos MD;  Location: [facility] C[facility]AS[facility] LOC[facility];  Service: Cardiology   • CARDIAC CATHETERIZATION N/A 4/22/2019    Procedure: Coronary angiography;  Surgeon: Tres De Los Santos MD;  Location:  [facility] CATH INVASIVE LOCATION;  Service: Cardiology   • CARDIAC CATHETERIZATION N/A 7/22/2020    Procedure: LEFT HEART CATH;  Surgeon: Antonia Scott MD;  Location: [facility] C[facility]AS[facility] LOC[facility];  Service: Cardiovascular;  Laterality: N/A;   • CARDIAC CATHETERIZATION N/A 7/22/2020    Procedure: CORONARY ANGIOGRAPHY;  Surgeon: Antonia Scott MD;  Location: [facility] CATH [facility]ASIVE LOC[facility];  Service: Cardiovascular;  Laterality: N/A;   • CARDIAC CATHETERIZATION N/A 7/22/2020    Procedure: Left ventriculography;  Surgeon: Antonia Scott MD;  Location:  [facility] CATH INVASIVE LOCATION;  Service: Cardiovascular;  Laterality: N/A;   • CARDIAC CATHETERIZATION N/A 7/22/2020    Procedure: Saphenous Vein Graft;  Surgeon: Antonia Scott MD;  Location: [facility] C[facility]ASIVE LOC[facility];  Service: Cardiovascular;  Laterality: N/A;   • CARDIAC CATHETERIZATION N/A 4/27/2022    Procedure: Right Heart Cath;  Surgeon: Lydia Mays MD;  Location: [facility] CATH [facility]VASIVE LOCATION;  Service: Cardiology;  Laterality: N/A;   • CARDIAC CATHETERIZATION N/A 4/27/2022    Procedure: Coronary angiography;  Surgeon: Lydia Mays MD;  Location:  [facility] CATH INVASIVE LOCATION;  Service: Cardiology;  Laterality: N/A;   • CARDIAC CATHETERIZATION N/A 4/27/2022    Procedure: Left Heart Cath;  Surgeon: Lydia Mays MD;  Location: [facility] CATH [facility]VASIVE LOCATION;   Service: Cardiology;  Laterality: N/A;   • CARDIAC SURGERY  02/2016    Dr. Ontiveros/Dr. De Los Santos   • CATARACT EXTRACTION Bilateral     [facility]   • COLONOSCOPY N/A 10/2002    Dr. Negro   • CORONARY ARTERY BYPASS GRAFT  02/23/2016-[facility]    x1 w/L Vein Grafting--Dr. Ontiveros   • CORONARY ARTERY BYPASS GRAFT N/A 9/18/2020    Procedure: STERNAL EXPLORATION WITH CLOSURE AND WASHOUT, REMOVAL OF IABP, PRP;  Surgeon: Mart Ontiveros MD;  Location:  [facility] MAIN OR;  Service: Cardiothoracic;  Laterality: N/A;   • CYST REMOVAL Right 01/25/2013    R Palm Excision of Retinacular Cyst--Dr. Karla Fish   • EPIDURAL BLOCK     • LAPAROSCOPIC CHOLECYSTECTOMY N/A 08/04/2004    Dr. JOEY Sheth   • MASTECTOMY Right 09/28/2012   • ORIF HIP FRACTURE Left 03/27/2005    w/ AMBI compression screw, Dr. Victor M Cabral   • RECTOVAGINAL FISTULA REPAIR  1965   • THORACIC AORTIC ANEURYSM REPAIR N/A 7/23/2019    Procedure: ROSE, THORACOABDOMINAL AORTIC ANEURYSM REPAIR, VISCERAL VESSEL REPAIR, LARGE VENTRAL HERNIA REPAIR WITH MESH, CIRCULATORY ARREST, PRP;  Surgeon: Mart Ontiveros MD;  [facility];  Service: Cardiothoracic   • TRANSESOPHAGEAL ECHOCARDIOGRAM (ROSE) N/A 9/18/2020    Procedure: TRANSESOPHAGEAL ECHOCARDIOGRAM WITH ANESTHESIA;  Surgeon: Mart Ontiveros MD;  Location:  [facility] MAIN OR;  Service: Cardiothoracic;  Laterality: N/A;   • TUBAL ABDOMINAL LIGATION     • VENTRICULAR ANEURYSM REPAIR N/A 7/22/2020    Procedure: EMERGENT REOP STERNOTOMY, REPAIR OF LV RUPTURE, PRP, INTRAOP ROSE;  Surgeon: Mart Ontiveros MD;  Location:  [facility] MAIN OR;  Service: Cardiothoracic;  Laterality: N/A;   • VENTRICULAR ANEURYSM REPAIR N/A 9/17/2020    Procedure: ROSE, MIDLINE STERNOTOMY REOP  LEFT VENTRICULAR ANEURYSM REPAIR, IABP INSERTION, PRP;  Surgeon: Mart Ontiveros MD;  Location:  [facility] MAIN OR;  Service: Cardiothoracic;  Laterality: N/A;       Social History:  Social History     Socioeconomic History   • Marital status:  "   Tobacco Use   • Smoking status: Former     Types: Cigarettes     Quit date: 2012     Years since quittin.8   • Smokeless tobacco: Never   • Tobacco comments:     CAFFEINE USE:2 CUPS COFFEE/ COKE DAILY   Substance and Sexual Activity   • Alcohol use: No   • Drug use: No   • Sexual activity: Defer     Birth control/protection: Tubal ligation       Family History:  Family History   Problem Relation Age of Onset   • Alzheimer's disease Mother    • Cancer Maternal Aunt    • Heart disease Father    • Heart failure Father    • Hypertension Father    • Diabetes Father    • Liver disease Sister    • Heart failure Brother    • Hypertension Brother    • Alcohol abuse Brother    • Diabetes Brother    • No Known Problems Maternal Grandmother    • No Known Problems Maternal Grandfather    • No Known Problems Paternal Grandmother    • No Known Problems Paternal Grandfather    • Diabetes Brother    • Brain cancer Brother    • Heart disease Brother    • Diabetes Brother          PATIENT ASSESSMENT     Vitals:  Vitals:    10/14/22 1300   BP: 114/72   BP Location: Left arm   Patient Position: Sitting   Cuff Size: Small Adult   Pulse: 51   SpO2: 96%   Weight: 40 kg (88 lb 3.2 oz)   Height: 139.7 cm (55\")       BP Readings from Last 3 Encounters:   10/14/22 114/72   22 114/64   22 118/62     Wt Readings from Last 3 Encounters:   10/14/22 40 kg (88 lb 3.2 oz)   22 38.7 kg (85 lb 6.4 oz)   22 39 kg (86 lb)      Body mass index is 20.5 kg/m².    Blood pressure readings recorded on patient flowsheet:  No flowsheet data found.         Review of Systems:    Review of Systems   Constitutional: Positive for appetite change. Negative for fever.   HENT: Negative.    Eyes: Negative.         Wears glasses   Respiratory: Negative.    Cardiovascular: Negative.         Peripheral discoloration, no edema noted   Gastrointestinal: Negative.  Negative for abdominal distention.   Genitourinary: Negative.  "   Musculoskeletal: Negative.    Skin: Negative.    Neurological: Positive for weakness. Negative for dizziness and headaches.   Psychiatric/Behavioral: Negative.        Physical Exam:    Physical Exam  HENT:      Head: Normocephalic.      Right Ear: Tympanic membrane normal.      Left Ear: Tympanic membrane normal.      Nose: Nose normal.      Mouth/Throat:      Mouth: Mucous membranes are moist.      Pharynx: Oropharynx is clear.   Eyes:      Pupils: Pupils are equal, round, and reactive to light.   Cardiovascular:      Rate and Rhythm: Regular rhythm. Bradycardia present.      Pulses: Normal pulses.      Heart sounds: Normal heart sounds.   Pulmonary:      Effort: Pulmonary effort is normal.      Breath sounds: Normal breath sounds.   Abdominal:      General: Bowel sounds are normal.      Palpations: Abdomen is soft.   Musculoskeletal:         General: Normal range of motion.   Skin:     General: Skin is warm and dry.   Neurological:      Mental Status: She is alert. Mental status is at baseline.         Reviewed Data:    Labs:   Lab Results   Component Value Date     07/14/2022    K 3.7 07/14/2022    CALCIUM 9.4 07/14/2022    AST 19 07/14/2022    ALT 8 07/14/2022    BUN 16 07/14/2022    CREATININE 0.72 07/14/2022    CREATININE 0.97 05/27/2022    CREATININE 0.77 05/03/2022    EGFRIFNONA 102 12/05/2021    EGFRIFAFRI 85 08/12/2021       Lab Results   Component Value Date    HGBA1C 6.3 (H) 07/14/2022    HGBA1C 7.10 (H) 04/23/2022    HGBA1C 6.3 (H) 08/12/2021    MICROALBUR 7.8 07/14/2022       Lab Results   Component Value Date     (H) 07/14/2022     (H) 08/12/2021     (H) 09/16/2020    HDL 66 07/14/2022    TRIG 119 07/14/2022    CHOLHDLRATIO 3.3 07/14/2022       Lab Results   Component Value Date    TSH 3.030 04/18/2022    FREET4 1.47 08/12/2021          Lab Results   Component Value Date    WBC 5.01 05/27/2022    HGB 12.2 05/27/2022    HGB 11.1 (L) 05/03/2022    HGB 11.8 (L) 05/02/2022      05/27/2022                 Imaging:          Medical Tests:          Screening for Glaucoma:  Previous screening for glaucoma?: Yes      Hearing Loss Screen:  Finger Rub Hearing Test (right ear): passed  Finger Rub Hearing Test (left ear): passed      Urinary Incontinence Screen:  Episodes of urinary incontinence? : Yes      Depression Screen:  PHQ-2/PHQ-9 Depression Screening 1/18/2022   Retired PHQ-9 Total Score 1   Retired Total Score 1        PHQ-2: N/A (Has diagnosis of depression and is on treatment)    PHQ-9: 1-4 (Minimal Depression)       FUNCTIONAL, FALL RISK, & COGNITIVE SCREENING (Components below):    DATA:    Functional & Cognitive Status 1/18/2022   Do you have difficulty preparing food and eating? No   Do you have difficulty bathing yourself, getting dressed or grooming yourself? No   Do you have difficulty using the toilet? No   Do you have difficulty moving around from place to place? Yes   Do you have trouble with steps or getting out of a bed or a chair? Yes   Current Diet Well Balanced Diet   Dental Exam Up to date   Eye Exam Up to date   Exercise (times per week) 0 times per week   Current Exercises Include No Regular Exercise   Do you need help using the phone?  No   Are you deaf or do you have serious difficulty hearing?  Yes   Do you need help with transportation? Yes   Do you need help shopping? Yes   Do you need help preparing meals?  Yes   Do you need help with housework?  Yes   Do you need help with laundry? Yes   Do you need help taking your medications? No   Do you need help managing money? No   Do you ever drive or ride in a car without wearing a seat belt? No   Do you have difficulty concentrating, remembering or making decisions? Yes         A) Assessment of Functional Ability:  (Assessment of ability to perform ADL's (showering/bathing, using toilet, dressing, feeding self, moving self around) and IADL's (use telephone, shop, prepare food, housekeep, do laundry, transport  independently, take medications independently, and handle finances)    Degree of functional impairment: MILD (based on assessment noted above)      B) Assessment of Fall Risk:  Fall Risk Assessment was completed, and patient is at moderate risk for falls.       Need for further evaluation of gait, strength, and balance? : Will need close follow-up.     Timed Up and Go (TUG):   (>= 12 seconds indicates high risk for falling)    Observable abnormalities included: slow cautious pace  using assistive device properly (walker)       C. Assessment of Cognitive Function:    Mini-Cog Test:     1) Registration (3 objects): Yes   2) Number of objects recalled: 3   3) Clock Draw: Passed? : Yes       Further evaluation required? : No        COUNSELING       A. Identification of Health Risk Factors:    Risk factors include: increased fall risk, inadequate nutrition, depression / other psychiatric problems and incontinence      B. Age-Appropriate Screening Schedule:  (Refer to the list below for future screening recommendations based on patient's age, sex and/or medical conditions. Orders for these recommended tests are listed in the plan section. The patient has been provided with a written plan)    Health Maintenance Topics  Health Maintenance   Topic Date Due   • DIABETIC EYE EXAM  04/19/2018   • DXA SCAN  06/25/2022   • HEMOGLOBIN A1C  01/14/2023   • MAMMOGRAM  06/28/2023   • LIPID PANEL  07/14/2023   • URINE MICROALBUMIN  07/14/2023   • TDAP/TD VACCINES (2 - Td or Tdap) 03/24/2027   • INFLUENZA VACCINE  Completed   • ZOSTER VACCINE  Completed       Health Maintenance Topics Due or Over-Due  Health Maintenance Due   Topic Date Due   • DIABETIC EYE EXAM  04/19/2018   • DXA SCAN  06/25/2022   • COVID-19 Vaccine (5 - Booster for Pfizer series) 09/08/2022         C. Advanced Care Planning:    Advance Care Planning   ACP discussion was held with the patient during this visit. Patient has an advance directive in EMR which is still  evelia EDUARDO Patient Self-Management and Personalized Health Advice:    She has been provided with personalized counseling/information (including brochures/handouts) about:     -- optimizing diet/nutrition plans, improving exercise / conditioning, reducing risk for cardiac/vascular events with pre-existing disease, fall prevention and managing depression/anxiety      She has been recommended for the following preventative services which has been performed today, will be ordered today or ordered/performed on upcoming follow-up visit:     -- NUTRITION counseling provided, FALL RISK assessment / plan of care completed, URINARY incontinence assessment done      E. Miscellaneous Items:    -Aspirin use counseling: Start ASA 81 mg daily     -Discussed BMI with her. The BMI is below average; BMI management plan will be discussed on follow-up visit    -Reviewed use of high risk medication in the elderly: YES    -Reviewed for potential of harmful drug interactions in the elderly: YES        WRAP UP       Assessment & Plan:    1) SUBSEQUENT MEDICARE ANNUAL WELLNESS VISIT    2) OTHER MEDICAL CONDITIONS ADDRESSED TODAY:            Problem List Items Addressed This Visit        Cardiac and Vasculature    Acute on chronic systolic CHF (congestive heart failure) (HCC) - Primary    Relevant Medications    metoprolol succinate XL (TOPROL-XL) 50 MG 24 hr tablet    isosorbide mononitrate (IMDUR) 30 MG 24 hr tablet       Endocrine and Metabolic    Mild protein-calorie malnutrition (HCC)    Current Assessment & Plan     Weight stable but down a total of 20 lbs per patient with no appetite. Encouraged small frequent meals and Boost high protein twice daily. Medication regimen discussed with patient and daughter and encouraged not to take all medications at once as causing nausea and loss of appetite            Mental Health    Depression (Chronic)    Overview     Patient stopped citalopram around April and is doing okay.           "Current Assessment & Plan     Patient's depression is recurrent and is moderate without psychosis. Their depression is currently active and the condition is unchanged. This will be reassessed at the next regular appointment. F/U as described:increased her sertraline to 50 mg daily as only has been taking 25 mg daily. States her \"happy pill\" has not been helping.         Relevant Medications    sertraline (Zoloft) 50 MG tablet       Pulmonary and Pneumonias    Chronic obstructive pulmonary disease (HCC) (Chronic)    Relevant Medications    cetirizine (zyrTEC) 10 MG tablet    albuterol sulfate  (90 Base) MCG/ACT inhaler    Fluticasone-Umeclidin-Vilant (Trelegy Ellipta) 100-62.5-25 MCG/INH inhaler       Other    Encounter for subsequent annual wellness visit in Medicare patient               No orders of the defined types were placed in this encounter.      Discussion / Summary:  Discussion of CHF and use of Furosemide- patient continues to loose weight, states down a total of 20 lbs,she has no peripheral edema/abdominal swelling, has mild dehydration. Instructed patient and daughter to take Furosemide/KCL when needed for swelling or wt gain of 3 lbs in a week, decrease sodium intake. Patient has an upcoming appt with Cardiology.   Instructed to change Zoloft to mornings as to minimize nausea, and take Pantoprazole in the morning 30 minutes prior to breakfast. Patient has been taking all of her medications at the same time causing her to have nausea and then she does not want to eat.       Medications as of TODAY:              Current Outpatient Medications   Medication Sig Dispense Refill   • albuterol sulfate  (90 Base) MCG/ACT inhaler INHALE TWO PUFFS BY MOUTH EVERY 6 HOURS AS NEEDED FOR WHEEZING OR SHORTNESS OF AIR 8.5 g 3   • aspirin 81 MG EC tablet Take 81 mg by mouth Daily.     • cetirizine (zyrTEC) 10 MG tablet Take 1 tablet by mouth As Needed for Allergies. (Patient taking differently: Take 1 " tablet by mouth Daily As Needed for Allergies.) 30 tablet 0   • chlorhexidine (PERIDEX) 0.12 % solution      • clobetasol (TEMOVATE) 0.05 % ointment      • Cranberry 1000 MG capsule Take  by mouth.     • Fluticasone-Umeclidin-Vilant (Trelegy Ellipta) 100-62.5-25 MCG/INH inhaler Inhale 1 puff Daily. 90 each 0   • furosemide (LASIX) 20 MG tablet Take 1 tablet by mouth Daily. 90 tablet 0   • isosorbide mononitrate (IMDUR) 30 MG 24 hr tablet Take 1 tablet by mouth 2 (Two) Times a Day. 180 tablet 2   • losartan (COZAAR) 25 MG tablet Take 1 tablet by mouth 2 (Two) Times a Day. (Patient taking differently: Take 1 tablet by mouth Daily.) 60 tablet 0   • meclizine (ANTIVERT) 12.5 MG tablet Take 1 tablet by mouth 3 (Three) Times a Day As Needed for Dizziness. 21 tablet 0   • metoprolol succinate XL (TOPROL-XL) 50 MG 24 hr tablet Take 50 mg by mouth Daily.     • pantoprazole (PROTONIX) 40 MG EC tablet Take 1 tablet by mouth Daily. In the morning 30 minutes before meals     • potassium chloride 10 MEQ CR tablet Take 1 tablet by mouth Daily. 30 tablet 3   • pramipexole (MIRAPEX) 0.125 MG tablet Take 1 tablet by mouth every night at bedtime for 90 days. 90 tablet 0   • sennosides-docusate (PERICOLACE) 8.6-50 MG per tablet Take 2 tablets by mouth Daily As Needed for Constipation.     • Vitamin D, Cholecalciferol, 50 MCG (2000 UT) capsule Take 2,000 Units by mouth Daily. 30 capsule    • atorvastatin (LIPITOR) 40 MG tablet Take 1 tablet by mouth Every Night. 90 tablet 2   • estradiol (ESTRACE) 0.1 MG/GM vaginal cream Insert 0.52 g into the vagina 2 (Two) Times a Week.     • ferrous sulfate 325 (65 FE) MG EC tablet Take 1 tablet by mouth Every Other Day. 30 tablet 0   • Lactobacillus (FLORANEX PO) Take  by mouth. 1milloncell     • multivitamin (THERAGRAN) tablet tablet Take 1 tablet by mouth Daily. 90 tablet 0   • mupirocin (BACTROBAN) 2 % ointment      • sertraline (Zoloft) 50 MG tablet Take 1 tablet by mouth Daily for 30 days. 30  tablet 0   • TOVIAZ 4 MG tablet sustained-release 24 hour tablet Take 4 mg by mouth Daily.       No current facility-administered medications for this visit.         FOLLOW-UP:            Return in about 4 months (around 2/14/2023) for Next scheduled follow up.                 Future Appointments   Date Time Provider Department Center   10/21/2022  2:40 PM Tres De Los Santos MD [facility]   2/15/2023  2:30 PM Miller Castañeda MD [facility]           After Visit Summary (AVS) including the Personalized Prevention  Plan Services (PPPS) was either printed and given to the patient at check-out today and/or sent to [facility] for review.       FREDRICK Tate

## 2022-10-14 NOTE — ASSESSMENT & PLAN NOTE
Weight stable but down a total of 20 lbs per patient with no appetite. Encouraged small frequent meals and Boost high protein twice daily. Medication regimen discussed with patient and daughter and encouraged not to take all medications at once as causing nausea and loss of appetite

## 2022-10-20 PROBLEM — Z00.00 ENCOUNTER FOR SUBSEQUENT ANNUAL WELLNESS VISIT IN MEDICARE PATIENT: Status: ACTIVE | Noted: 2022-10-20

## 2022-10-21 ENCOUNTER — OFFICE VISIT (OUTPATIENT)
Dept: CARDIOLOGY | Facility: CLINIC | Age: 82
End: 2022-10-21

## 2022-10-21 VITALS
SYSTOLIC BLOOD PRESSURE: 108 MMHG | HEIGHT: 55 IN | DIASTOLIC BLOOD PRESSURE: 70 MMHG | BODY MASS INDEX: 20.6 KG/M2 | WEIGHT: 89 LBS

## 2022-10-21 DIAGNOSIS — I25.810 CORONARY ARTERY DISEASE INVOLVING CORONARY BYPASS GRAFT OF NATIVE HEART WITHOUT ANGINA PECTORIS: ICD-10-CM

## 2022-10-21 DIAGNOSIS — I25.5 ISCHEMIC CARDIOMYOPATHY: ICD-10-CM

## 2022-10-21 DIAGNOSIS — I73.9 CLAUDICATION OF BOTH LOWER EXTREMITIES: ICD-10-CM

## 2022-10-21 DIAGNOSIS — Z95.1 S/P CABG X 1: Primary | ICD-10-CM

## 2022-10-21 DIAGNOSIS — I71.60 THORACOABDOMINAL AORTIC ANEURYSM (TAAA) WITHOUT RUPTURE, UNSPECIFIED PART: ICD-10-CM

## 2022-10-21 PROCEDURE — 99214 OFFICE O/P EST MOD 30 MIN: CPT | Performed by: INTERNAL MEDICINE

## 2022-10-21 RX ORDER — METOPROLOL SUCCINATE 50 MG/1
50 TABLET, EXTENDED RELEASE ORAL DAILY
Qty: 90 TABLET | Refills: 3 | Status: SHIPPED | OUTPATIENT
Start: 2022-10-21 | End: 2022-10-28 | Stop reason: SDUPTHER

## 2022-10-21 RX ORDER — TRAMADOL HYDROCHLORIDE 50 MG/1
50 TABLET ORAL EVERY 6 HOURS PRN
COMMUNITY
End: 2023-04-04 | Stop reason: SDUPTHER

## 2022-10-21 RX ORDER — LOSARTAN POTASSIUM 25 MG/1
25 TABLET ORAL DAILY
Qty: 90 TABLET | Refills: 3 | Status: SHIPPED | OUTPATIENT
Start: 2022-10-21 | End: 2022-10-28 | Stop reason: SDUPTHER

## 2022-10-21 NOTE — PROGRESS NOTES
"Date of Office Visit: 10/21/22  Encounter Provider: Tres De Los Santos MD  Place of Service: [facility] CARDIOLOGY  Patient Name: Grace Beauchamp  :1940  [id number]    Chief Complaint   Patient presents with   • Coronary Artery Disease   :     HPI: Grace Beauchamp is a 82 y.o. female  This is a patient with a history of thoracic aneurysm repair, a catheterization, which showed one-vessel disease in her obtuse marginal.  She ended up having a bypass at the time she had her aneurysm repaired.  In 2020 she had been feeling tired for a few days then had kind of a brief episode of chest pain but felt badly with a came to the hospital was found to have an LV rupture that was contained.  Cath showed that her vein graft to the OM1 had occluded.  She had to go in for an operative repair with Dr. Ontiveros.  She has a history of pretty severe COPD she is got pulmonary hypertension.      I think that she is here for follow-up and she is doing well no chest pain breathing is stable she is living at an assisted living her weight is way down.  No PND orthopnea edema she has had a little purple discoloration of her feet all the time is unchanged    Past Medical History:   Diagnosis Date   • AAA (abdominal aortic aneurysm)    • Acute bronchitis 2018   • Aortic arch aneurysm    • Arthritis    • Bilateral carotid artery disease (HCC)    • Bilateral cataracts     S/p Extractions   • Breast cancer ([facility])     right breast pW4mnYo (snm) pMX ER/OK pos, Her-2/neg, Ki-67, 31%, oncotype recurrence score 19, invasive lobular carcinoma   • CAD (coronary artery disease)     S/p CABG on 16 by Dr. 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[facility])    • Cataracts, bilateral    • Closed fracture of three ribs of right side    • Cognitive disorder    • COPD (chronic obstructive pulmonary disease) ([facility])    • Depression    • Descending aortic arch aneurysm    • Diabetes mellitus (HCC)     \"BORDERLINE\"   • Erythermalgia " ([facility])    • GERD (gastroesophageal reflux disease)    • Hyperlipidemia     Controlled w/Meds   • Hypertension     Controlled w/Meds   • Low back pain    • Moderate aortic valve insufficiency     S/p AVR on 02/23/16 by Dr. Ontiveros   • Nocturia    • Osteoarthritis    • Osteoporosis    • Otitis media     R Ear   • Prediabetes    • PVD (peripheral vascular disease) ([facility])    • RLS (restless legs syndrome)    • Saccular aneurysm    • Stroke ([facility])     Perioperatively w/L Hip Repl   • Thoracic ascending aortic aneurysm     S/p Repair on 02/23/16 by Dr. Ontiveros   • TIA (transient ischemic attack)    • Urgency incontinence    • Urinary tract infection    • Venous insufficiency    • Ventral hernia        Past Surgical History:   Procedure Laterality Date   • AORTIC VALVE REPAIR/REPLACEMENT N/A 02/23/2016-[facility]    Ascending Replacement Using a 24MM Graft--Dr. Ontiveros   • APPENDECTOMY  1977   • BREAST BIOPSY Right 09/16/2012    Vacuum Assisted Core Bx of R Breast   • CARDIAC CATHETERIZATION N/A 01/06/2016    Dr. Tres De Los Santos   • CARDIAC CATHETERIZATION N/A 4/22/2019    Procedure: Left Heart Cath;  Surgeon: Tres De Los Santos MD;  Location:  [facility] CATH [facility]VASIVE [facility];  Service: Cardiology   • CARDIAC CATHETERIZATION N/A 4/22/2019    Procedure: Coronary angiography;  Surgeon: Tres De Los Santos MD;  Location:  [facility]VAS[facility];  Service: Cardiology   • CARDIAC CATHETERIZATION N/A 7/22/2020    Procedure: LEFT HEART CATH;  Surgeon: Antonia Scott MD;  Location:  [facility] C[facility]VASIVE LOCATION;  Service: Cardiovascular;  Laterality: N/A;   • CARDIAC CATHETERIZATION N/A 7/22/2020    Procedure: CORONARY ANGIOGRAPHY;  Surgeon: Antonia Scott MD;  Location:  [facility] CATH INVASIVE LOCATION;  Service: Cardiovascular;  Laterality: N/A;   • CARDIAC CATHETERIZATION N/A 7/22/2020    Procedure: Left ventriculography;  Surgeon: Antonia Scott MD;  Location:  [facility] CATH [facility]VASIVE LOCATION;  Service: Cardiovascular;  Laterality: N/A;   • CARDIAC  CATHETERIZATION N/A 7/22/2020    Procedure: Saphenous Vein Graft;  Surgeon: Antonia Scott MD;  Location:  [facility] CATH INVASIVE LOCATION;  Service: Cardiovascular;  Laterality: N/A;   • CARDIAC CATHETERIZATION N/A 4/27/2022    Procedure: Right Heart Cath;  Surgeon: Lydia Mays MD;  Location:  [facility] C[facility]VASIVE LOC[facility];  Service: Cardiology;  Laterality: N/A;   • CARDIAC CATHETERIZATION N/A 4/27/2022    Procedure: Coronary angiography;  Surgeon: Lydia Mays MD;  Location:  [facility] CATH INVASIVE LOCATION;  Service: Cardiology;  Laterality: N/A;   • CARDIAC CATHETERIZATION N/A 4/27/2022    Procedure: Left Heart Cath;  Surgeon: Lydia Mays MD;  Location:  [facility] C[facility]VASIVE [facility];  Service: Cardiology;  Laterality: N/A;   • CARDIAC SURGERY  02/2016    Dr. Ontiveros/Dr. De Los Santos   • CATARACT EXTRACTION Bilateral     [facility]   • COLONOSCOPY N/A 10/2002    Dr. Negro   • CORONARY ARTERY BYPASS GRAFT  02/23/2016-[facility]    x1 w/L Vein Grafting--Dr. Ontiveros   • CORONARY ARTERY BYPASS GRAFT N/A 9/18/2020    Procedure: STERNAL EXPLORATION WITH CLOSURE AND WASHOUT, REMOVAL OF IABP, PRP;  Surgeon: Mart Ontiveros MD;  Location: [facility] MAIN OR;  Service: Cardiothoracic;  Laterality: N/A;   • CYST REMOVAL Right 01/25/2013    R Palm Excision of Retinacular Cyst--Dr. Karla Fish   • EPIDURAL BLOCK     • LAPAROSCOPIC CHOLECYSTECTOMY N/A 08/04/2004    Dr. JOEY Sheth   • MASTECTOMY Right 09/28/2012   • ORIF HIP FRACTURE Left 03/27/2005    w/ AMBI compression screw, Dr. Victor M Cabral   • RECTOVAGINAL FISTULA REPAIR  1965   • THORACIC AORTIC ANEURYSM REPAIR N/A 7/23/2019    Procedure: ROSE, THORACOABDOMINAL AORTIC ANEURYSM REPAIR, VISCERAL VESSEL REPAIR, LARGE VENTRAL HERNIA REPAIR WITH MESH, CIRCULATORY ARREST, PRP;  Surgeon: Mart Ontiveros MD;  Location: [facility] MAIN OR;  Service: Cardiothoracic   • TRANSESOPHAGEAL ECHOCARDIOGRAM (ROSE) N/A 9/18/2020    Procedure: TRANSESOPHAGEAL ECHOCARDIOGRAM WITH  ANESTHESIA;  Surgeon: Mart Ontiveros MD;  Location: [facility] MAIN OR;  Service: Cardiothoracic;  Laterality: N/A;   • TUBAL ABDOMINAL LIGATION     • VENTRICULAR ANEURYSM REPAIR N/A 2020    Procedure: EMERGENT REOP STERNOTOMY, REPAIR OF LV RUPTURE, PRP, INTRAOP ROSE;  Surgeon: Mart Ontiveros MD;  Location: [facility] MAIN OR;  Service: Cardiothoracic;  Laterality: N/A;   • VENTRICULAR ANEURYSM REPAIR N/A 2020    Procedure: ROSE, MIDLINE STERNOTOMY REOP  LEFT VENTRICULAR ANEURYSM REPAIR, IABP INSERTION, PRP;  Surgeon: Mart Ontiveros MD;  Location: [facility] MAIN OR;  Service: Cardiothoracic;  Laterality: N/A;       Social History     Socioeconomic History   • Marital status:    Tobacco Use   • Smoking status: Former     Types: Cigarettes     Quit date: 2012     Years since quittin.8   • Smokeless tobacco: Never   • Tobacco comments:     CAFFEINE USE:2 CUPS COFFEE/ COKE DAILY   Substance and Sexual Activity   • Alcohol use: No   • Drug use: No   • Sexual activity: Defer     Birth control/protection: Tubal ligation       Family History   Problem Relation Age of Onset   • Alzheimer's disease Mother    • Cancer Maternal Aunt    • Heart disease Father    • Heart failure Father    • Hypertension Father    • Diabetes Father    • Liver disease Sister    • Heart failure Brother    • Hypertension Brother    • Alcohol abuse Brother    • Diabetes Brother    • No Known Problems Maternal Grandmother    • No Known Problems Maternal Grandfather    • No Known Problems Paternal Grandmother    • No Known Problems Paternal Grandfather    • Diabetes Brother    • Brain cancer Brother    • Heart disease Brother    • Diabetes Brother        Review of Systems   Constitutional: Negative for decreased appetite, fever, malaise/fatigue and weight loss.   HENT: Negative for nosebleeds.    Eyes: Negative for double vision.   Cardiovascular: Negative for chest pain, claudication, cyanosis, dyspnea on exertion, irregular  heartbeat, leg swelling, near-syncope, orthopnea, palpitations, paroxysmal nocturnal dyspnea and syncope.   Respiratory: Negative for cough, hemoptysis and shortness of breath.    Hematologic/Lymphatic: Negative for bleeding problem.   Skin: Negative for rash.   Musculoskeletal: Negative for falls and myalgias.   Gastrointestinal: Negative for hematochezia, jaundice, melena, nausea and vomiting.   Genitourinary: Negative for hematuria.   Neurological: Negative for dizziness and seizures.   Psychiatric/Behavioral: Negative for altered mental status and memory loss.       Allergies   Allergen Reactions   • Ramipril Other (See Comments)     Ace I  cough   • Morphine Itching   • Morphine And Related Itching   • Bactrim [Sulfamethoxazole-Trimethoprim] Itching and Rash   • Cipro [Ciprofloxacin Hcl] Rash         Current Outpatient Medications:   •  albuterol sulfate  (90 Base) MCG/ACT inhaler, INHALE TWO PUFFS BY MOUTH EVERY 6 HOURS AS NEEDED FOR WHEEZING OR SHORTNESS OF AIR, Disp: 8.5 g, Rfl: 3  •  aspirin 81 MG EC tablet, Take 81 mg by mouth Daily., Disp: , Rfl:   •  cetirizine (zyrTEC) 10 MG tablet, Take 1 tablet by mouth As Needed for Allergies. (Patient taking differently: Take 1 tablet by mouth Daily As Needed for Allergies.), Disp: 30 tablet, Rfl: 0  •  chlorhexidine (PERIDEX) 0.12 % solution, , Disp: , Rfl:   •  clobetasol (TEMOVATE) 0.05 % ointment, , Disp: , Rfl:   •  Cranberry 1000 MG capsule, Take  by mouth., Disp: , Rfl:   •  Fluticasone-Umeclidin-Vilant (Trelegy Ellipta) 100-62.5-25 MCG/INH inhaler, Inhale 1 puff Daily., Disp: 90 each, Rfl: 0  •  isosorbide mononitrate (IMDUR) 30 MG 24 hr tablet, Take 1 tablet by mouth 2 (Two) Times a Day., Disp: 180 tablet, Rfl: 2  •  losartan (COZAAR) 25 MG tablet, Take 1 tablet by mouth 2 (Two) Times a Day. (Patient taking differently: Take 1 tablet by mouth Daily.), Disp: 60 tablet, Rfl: 0  •  meclizine (ANTIVERT) 12.5 MG tablet, Take 1 tablet by mouth 3 (Three)  "Times a Day As Needed for Dizziness., Disp: 21 tablet, Rfl: 0  •  metoprolol succinate XL (TOPROL-XL) 50 MG 24 hr tablet, Take 50 mg by mouth Daily., Disp: , Rfl:   •  pantoprazole (PROTONIX) 40 MG EC tablet, Take 1 tablet by mouth Daily. In the morning 30 minutes before meals, Disp: , Rfl:   •  pramipexole (MIRAPEX) 0.125 MG tablet, Take 1 tablet by mouth every night at bedtime for 90 days., Disp: 90 tablet, Rfl: 0  •  sertraline (Zoloft) 50 MG tablet, Take 1 tablet by mouth Daily for 30 days., Disp: 30 tablet, Rfl: 0  •  traMADol (ULTRAM) 50 MG tablet, Take 1 tablet by mouth Every 6 (Six) Hours As Needed for Moderate Pain., Disp: , Rfl:   •  Vitamin D, Cholecalciferol, 50 MCG (2000 UT) capsule, Take 2,000 Units by mouth Daily., Disp: 30 capsule, Rfl:   •  atorvastatin (LIPITOR) 40 MG tablet, Take 1 tablet by mouth Every Night., Disp: 90 tablet, Rfl: 2  •  estradiol (ESTRACE) 0.1 MG/GM vaginal cream, Insert 0.52 g into the vagina 2 (Two) Times a Week., Disp: , Rfl:   •  ferrous sulfate 325 (65 FE) MG EC tablet, Take 1 tablet by mouth Every Other Day., Disp: 30 tablet, Rfl: 0  •  furosemide (LASIX) 20 MG tablet, Take 1 tablet by mouth Daily., Disp: 90 tablet, Rfl: 0  •  Lactobacillus (FLORANEX PO), Take  by mouth. 1milloncell, Disp: , Rfl:   •  multivitamin (THERAGRAN) tablet tablet, Take 1 tablet by mouth Daily., Disp: 90 tablet, Rfl: 0  •  mupirocin (BACTROBAN) 2 % ointment, , Disp: , Rfl:   •  potassium chloride 10 MEQ CR tablet, Take 1 tablet by mouth Daily., Disp: 30 tablet, Rfl: 3  •  sennosides-docusate (PERICOLACE) 8.6-50 MG per tablet, Take 2 tablets by mouth Daily As Needed for Constipation., Disp: , Rfl:   •  TOVIAZ 4 MG tablet sustained-release 24 hour tablet, Take 4 mg by mouth Daily., Disp: , Rfl:       Objective:     Vitals:    10/21/22 1451   BP: 108/70   Weight: 40.4 kg (89 lb)   Height: 139.7 cm (55\")     Body mass index is 20.69 kg/m².    Physical Exam  Constitutional:       Appearance: She is " well-developed.      Comments: Cachectic   HENT:      Head: Normocephalic.   Eyes:      General: No scleral icterus.  Neck:      Thyroid: No thyromegaly.      Vascular: No JVD.   Cardiovascular:      Rate and Rhythm: Normal rate and regular rhythm.      Heart sounds: Normal heart sounds. No murmur heard.    No friction rub. No gallop.   Pulmonary:      Effort: Pulmonary effort is normal.      Breath sounds: Normal breath sounds. No wheezing or rales.   Abdominal:      Palpations: Abdomen is soft.      Tenderness: There is no abdominal tenderness.   Musculoskeletal:         General: Normal range of motion.   Lymphadenopathy:      Cervical: No cervical adenopathy.   Skin:     General: Skin is warm and dry.      Findings: No rash.   Neurological:      Mental Status: She is alert and oriented to person, place, and time.         Procedures     No ECG performed     Assessment:       Diagnosis Plan   1. S/P CABG x 1        2. 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 rupture, unspecified part        3. Claudication of both lower extremities (HCC)        4. Coronary artery disease involving coronary bypass graft of native heart without angina pectoris        5. Ischemic cardiomyopathy               Plan:       I think she is doing relatively well she has an ischemic myopathy but no real room to add more medicine she is class I symptoms for angina and for heart failure I am going to just have her come back and see us in a year I did recommend she try to gain some weight maybe eat some Ensure I will have her come see Carlie in a year and see me in 2    As always, it has been a pleasure to participate in your patient's care.      Sincerely,       Tres De Los Santos MD

## 2022-10-28 RX ORDER — LOSARTAN POTASSIUM 25 MG/1
25 TABLET ORAL DAILY
Qty: 90 TABLET | Refills: 3 | Status: SHIPPED | OUTPATIENT
Start: 2022-10-28

## 2022-10-28 RX ORDER — METOPROLOL SUCCINATE 50 MG/1
50 TABLET, EXTENDED RELEASE ORAL DAILY
Qty: 90 TABLET | Refills: 3 | Status: SHIPPED | OUTPATIENT
Start: 2022-10-28

## 2022-11-21 DIAGNOSIS — G25.81 RESTLESS LEGS SYNDROME (RLS): ICD-10-CM

## 2022-11-21 RX ORDER — PRAMIPEXOLE DIHYDROCHLORIDE 0.12 MG/1
TABLET ORAL
Qty: 90 TABLET | Refills: 0 | Status: SHIPPED | OUTPATIENT
Start: 2022-11-21 | End: 2023-02-20 | Stop reason: SDUPTHER

## 2022-12-06 ENCOUNTER — OFFICE VISIT (OUTPATIENT)
Dept: INTERNAL MEDICINE | Age: 82
End: 2022-12-06

## 2022-12-06 VITALS
DIASTOLIC BLOOD PRESSURE: 84 MMHG | OXYGEN SATURATION: 92 % | HEART RATE: 79 BPM | TEMPERATURE: 98 F | WEIGHT: 89.2 LBS | BODY MASS INDEX: 20.64 KG/M2 | SYSTOLIC BLOOD PRESSURE: 136 MMHG | HEIGHT: 55 IN

## 2022-12-06 DIAGNOSIS — R63.4 WEIGHT LOSS, UNINTENTIONAL: ICD-10-CM

## 2022-12-06 DIAGNOSIS — N30.01 ACUTE CYSTITIS WITH HEMATURIA: ICD-10-CM

## 2022-12-06 DIAGNOSIS — J02.0 STREP SORE THROAT: Primary | ICD-10-CM

## 2022-12-06 LAB
BILIRUB BLD-MCNC: NEGATIVE MG/DL
CLARITY, POC: CLEAR
COLOR UR: YELLOW
EXPIRATION DATE: ABNORMAL
EXPIRATION DATE: ABNORMAL
GLUCOSE UR STRIP-MCNC: NEGATIVE MG/DL
INTERNAL CONTROL: ABNORMAL
KETONES UR QL: NEGATIVE
LEUKOCYTE EST, POC: NEGATIVE
Lab: ABNORMAL
Lab: ABNORMAL
NITRITE UR-MCNC: NEGATIVE MG/ML
PH UR: 5.5 [PH] (ref 5–8)
PROT UR STRIP-MCNC: ABNORMAL MG/DL
RBC # UR STRIP: ABNORMAL /UL
S PYO AG THROAT QL: POSITIVE
SP GR UR: 1.03 (ref 1–1.03)
UROBILINOGEN UR QL: ABNORMAL

## 2022-12-06 PROCEDURE — 99213 OFFICE O/P EST LOW 20 MIN: CPT | Performed by: NURSE PRACTITIONER

## 2022-12-06 PROCEDURE — 81003 URINALYSIS AUTO W/O SCOPE: CPT | Performed by: NURSE PRACTITIONER

## 2022-12-06 PROCEDURE — 87880 STREP A ASSAY W/OPTIC: CPT | Performed by: NURSE PRACTITIONER

## 2022-12-06 RX ORDER — CEFDINIR 300 MG/1
300 CAPSULE ORAL 2 TIMES DAILY
Qty: 6 CAPSULE | Refills: 0 | Status: SHIPPED | OUTPATIENT
Start: 2022-12-06 | End: 2022-12-09

## 2022-12-06 RX ORDER — CEFDINIR 300 MG/1
CAPSULE ORAL
COMMUNITY
Start: 2022-11-28 | End: 2023-02-15

## 2022-12-06 NOTE — PROGRESS NOTES
I N T E R N A L  M E D I C I N E  Whitney Galarza, APRN    ENCOUNTER DATE:  12/06/2022    Grace Beauchamp / 82 y.o. / female      CHIEF COMPLAINT / REASON FOR OFFICE VISIT     Sore Throat and Urinary Tract Infection      ASSESSMENT & PLAN     Diagnoses and all orders for this visit:    1. Strep sore throat (Primary)  -     POCT rapid strep A- positive in the office today    2. Acute cystitis with hematuria  -     POCT urinalysis dipstick, automated- improved     Other orders  -     cefdinir (OMNICEF) 300 MG capsule; Take 1 capsule by mouth 2 (Two) Times a Day for 3 days.  Dispense: 6 capsule; Refill: 0    SUMMARY/DISCUSSION  • Discussion of Strep Throat and POCT faint positive in the office today as patient currently on Cefdinir since last Monday for UTI. Extended antibiotic for a total of 10 days. Instructed patient and daughter on continued antibiotics and po water at least 32 ounces daily- encouraged tea since patient does not like water. Instructed patient to continue chloraseptic spray, tylenol as needed, and mucinex DM as directed for cough and throat discomfort  • Referral to Dietician as patient down 10 lbs since 1/2022  • Follow up with Dr Castañeda as scheduled   • I spent 25 min in direct care of this patient on this date of service. This time includes times spent by me in the following activities: Preparing for the visit, obtaining and/or reviewing a separately obtained history, performing a medically appropriate examination and/or evaluation, reviewing medical records, reviewing tests, ordering medications, tests, or procedures, counseling and educating the patient, documenting information in the medical record and reviewing office note/correspondence from other providers.     Return in about 2 months (around 2/15/2023) for Next scheduled follow up.      VITAL SIGNS     Visit Vitals  /84 (BP Location: Left arm, Patient Position: Sitting, Cuff Size: Small Adult)   Pulse 79   Temp 98 °F (36.7 °C)  "(Temporal)   Ht 139.7 cm (55\")   Wt 40.5 kg (89 lb 3.2 oz)   LMP  (LMP Unknown)   SpO2 92%   BMI 20.73 kg/m²           BP Readings from Last 3 Encounters:   12/06/22 136/84   10/21/22 108/70   10/14/22 114/72     Wt Readings from Last 3 Encounters:   12/06/22 40.5 kg (89 lb 3.2 oz)   10/21/22 40.4 kg (89 lb)   10/14/22 40 kg (88 lb 3.2 oz)     Body mass index is 20.73 kg/m².    Blood pressure readings recorded on patient flowsheet:  No flowsheet data found.          MEDICATIONS AT THE TIME OF OFFICE VISIT     Current Outpatient Medications on File Prior to Visit   Medication Sig Dispense Refill   • albuterol sulfate  (90 Base) MCG/ACT inhaler INHALE TWO PUFFS BY MOUTH EVERY 6 HOURS AS NEEDED FOR WHEEZING OR SHORTNESS OF AIR 8.5 g 3   • aspirin 81 MG EC tablet Take 81 mg by mouth Daily.     • cefdinir (OMNICEF) 300 MG capsule      • cetirizine (zyrTEC) 10 MG tablet Take 1 tablet by mouth As Needed for Allergies. (Patient taking differently: Take 10 mg by mouth Daily As Needed for Allergies.) 30 tablet 0   • chlorhexidine (PERIDEX) 0.12 % solution      • clobetasol (TEMOVATE) 0.05 % ointment      • Cranberry 1000 MG capsule Take  by mouth.     • Fluticasone-Umeclidin-Vilant (Trelegy Ellipta) 100-62.5-25 MCG/INH inhaler Inhale 1 puff Daily. 90 each 0   • hydrocortisone 2.5 % ointment      • isosorbide mononitrate (IMDUR) 30 MG 24 hr tablet Take 1 tablet by mouth 2 (Two) Times a Day. 180 tablet 2   • Lactobacillus (FLORANEX PO) Take  by mouth. 1milloncell     • losartan (COZAAR) 25 MG tablet Take 1 tablet by mouth Daily. 90 tablet 3   • meclizine (ANTIVERT) 12.5 MG tablet Take 1 tablet by mouth 3 (Three) Times a Day As Needed for Dizziness. 21 tablet 0   • metoprolol succinate XL (TOPROL-XL) 50 MG 24 hr tablet Take 1 tablet by mouth Daily. 90 tablet 3   • mupirocin (BACTROBAN) 2 % ointment      • pantoprazole (PROTONIX) 40 MG EC tablet Take 1 tablet by mouth Daily. In the morning 30 minutes before meals     • " pramipexole (MIRAPEX) 0.125 MG tablet TAKE ONE TABLET BY MOUTH EVERY NIGHT AT BEDTIME 90 tablet 0   • sennosides-docusate (PERICOLACE) 8.6-50 MG per tablet Take 2 tablets by mouth Daily As Needed for Constipation.     • sertraline (ZOLOFT) 50 MG tablet TAKE ONE TABLET BY MOUTH DAILY 30 tablet 0   • traMADol (ULTRAM) 50 MG tablet Take 1 tablet by mouth Every 6 (Six) Hours As Needed for Moderate Pain.     • Vitamin D, Cholecalciferol, 50 MCG (2000 UT) capsule Take 2,000 Units by mouth Daily. 30 capsule    • atorvastatin (LIPITOR) 40 MG tablet Take 1 tablet by mouth Every Night. 90 tablet 2   • estradiol (ESTRACE) 0.1 MG/GM vaginal cream Insert 0.52 g into the vagina 2 (Two) Times a Week.     • ferrous sulfate 325 (65 FE) MG EC tablet Take 1 tablet by mouth Every Other Day. 30 tablet 0   • furosemide (LASIX) 20 MG tablet Take 1 tablet by mouth Daily. 90 tablet 0   • multivitamin (THERAGRAN) tablet tablet Take 1 tablet by mouth Daily. 90 tablet 0   • potassium chloride 10 MEQ CR tablet Take 1 tablet by mouth Daily. 30 tablet 3   • TOVIAZ 4 MG tablet sustained-release 24 hour tablet Take 4 mg by mouth Daily.       No current facility-administered medications on file prior to visit.        HISTORY OF PRESENT ILLNESS     82 year old female being seen today for complaint of UTI and sore throat. Patient recently seen at [facility] on 11/28/22 and placed on Omnicef for 7 days for UTI. Patient almost finished with antibiotics and UA in the office negative today. Patient states does not drink any fluids and encouraged at least 4 glasses of tea daily since she states she does not like water. Patient weight stable but remains underweight. Patient's daughter states she is pushing ensure/boost bid. Patient states she has had a sore throat as well and has been using chloraseptic spray with improvement- patient also on Omnicef since last Monday for UTI.       Patient Care Team:  Miller Castañeda MD as PCP - General (Internal  Medicine)  Mart Ontiveros MD as Surgeon (Cardiothoracic Surgery)  Tres De Los Santos MD as Consulting Physician (Cardiology)  Graham Mcconnell MD as Consulting Physician (Hematology and Oncology)  Payam Byrnes MD as Consulting Physician (Otolaryngology)  Jonathan Smith MD as Consulting Physician (Vascular Surgery)  Victor M Cabral MD as Surgeon (Orthopedic Surgery)  Yaya Burks MD as Consulting Physician (Pulmonary Disease)    REVIEW OF SYSTEMS     Review of Systems   Constitutional: Positive for appetite change. Negative for fatigue and fever.   HENT: Positive for sore throat.    Respiratory: Positive for cough. Negative for chest tightness, shortness of breath and wheezing.    Cardiovascular: Negative for chest pain and palpitations.   Gastrointestinal: Negative for abdominal pain, constipation, diarrhea, nausea and vomiting.   Genitourinary: Positive for frequency and urgency.   Musculoskeletal: Positive for arthralgias.   Skin: Negative.    Neurological: Negative for dizziness and headaches.   Psychiatric/Behavioral: Negative.           PHYSICAL EXAMINATION     Physical Exam  Constitutional:       Comments: Under weight   Cardiovascular:      Rate and Rhythm: Normal rate and regular rhythm.      Pulses: Normal pulses.      Heart sounds: Normal heart sounds.   Pulmonary:      Effort: Pulmonary effort is normal. No respiratory distress.      Breath sounds: Normal breath sounds. No wheezing, rhonchi or rales.   Chest:      Chest wall: No tenderness.   Abdominal:      General: Bowel sounds are normal. There is no distension.      Tenderness: There is no abdominal tenderness. There is no right CVA tenderness, left CVA tenderness, guarding or rebound.   Musculoskeletal:         General: Normal range of motion.   Skin:     General: Skin is warm and dry.   Neurological:      Mental Status: She is alert. Mental status is at baseline.             REVIEWED DATA     Labs:     Lab Results   Component  Value Date     07/14/2022    K 3.7 07/14/2022    CALCIUM 9.4 07/14/2022    AST 19 07/14/2022    ALT 8 07/14/2022    BUN 16 07/14/2022    CREATININE 0.72 07/14/2022    CREATININE 0.97 05/27/2022    CREATININE 0.77 05/03/2022    EGFRIFNONA 102 12/05/2021    EGFRIFAFRI 85 08/12/2021       Lab Results   Component Value Date    HGBA1C 6.3 (H) 07/14/2022    HGBA1C 7.10 (H) 04/23/2022    HGBA1C 6.3 (H) 08/12/2021       Lab Results   Component Value Date     (H) 07/14/2022     (H) 08/12/2021     (H) 09/16/2020    HDL 66 07/14/2022    HDL 59 08/12/2021    TRIG 119 07/14/2022    TRIG 168 (H) 08/12/2021       Lab Results   Component Value Date    TSH 3.030 04/18/2022    TSH 1.830 08/12/2021    FREET4 1.47 08/12/2021       Lab Results   Component Value Date    WBC 5.01 05/27/2022    HGB 12.2 05/27/2022     05/27/2022       Lab Results   Component Value Date    MALBCRERATIO 41 (H) 07/14/2022        Brief Urine Lab Results  (Last result in the past 365 days)      Color   Clarity   Blood   Leuk Est   Nitrite   Protein   CREAT   Urine HCG        12/06/22 1206 Yellow   Clear   Trace   Negative   Negative   30 mg/dL                   Imaging:           Medical Tests:           Summary of old records / correspondence / consultant report:           Request outside records:           FREDRICK Tate

## 2022-12-10 ENCOUNTER — HOSPITAL ENCOUNTER (EMERGENCY)
Facility: HOSPITAL | Age: 82
Discharge: HOME | End: 2022-12-10
Attending: EMERGENCY MEDICINE | Admitting: EMERGENCY MEDICINE

## 2022-12-10 ENCOUNTER — APPOINTMENT (OUTPATIENT)
Dept: CT IMAGING | Facility: HOSPITAL | Age: 82
End: 2022-12-10

## 2022-12-10 ENCOUNTER — APPOINTMENT (OUTPATIENT)
Dept: GENERAL RADIOLOGY | Facility: HOSPITAL | Age: 82
End: 2022-12-10

## 2022-12-10 VITALS
DIASTOLIC BLOOD PRESSURE: 85 MMHG | SYSTOLIC BLOOD PRESSURE: 181 MMHG | WEIGHT: 92.8 LBS | OXYGEN SATURATION: 99 % | HEIGHT: 56 IN | HEART RATE: 58 BPM | TEMPERATURE: 98.8 F | RESPIRATION RATE: 18 BRPM | BODY MASS INDEX: 20.87 KG/M2
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
## 2022-12-10 DIAGNOSIS — J12.89 PNEUMONIA DUE TO HUMAN CORONAVIRUS: ICD-10-CM

## 2022-12-10 DIAGNOSIS — S09.90XA CLOSED HEAD INJURY, INITIAL ENCOUNTER: ICD-10-CM

## 2022-12-10 DIAGNOSIS — B97.29 PNEUMONIA DUE TO HUMAN CORONAVIRUS: ICD-10-CM

## 2022-12-10 LAB
ALBUMIN SERPL-MCNC: 4 G/DL (ref 3.5–5.2)
ALBUMIN/GLOB SERPL: 1.5 G/DL
ALP SERPL-CCNC: 87 U/L (ref 39–117)
ALT SERPL W P-5'-P-CCNC: 18 U/L (ref 1–33)
ANION GAP SERPL CALCULATED.3IONS-SCNC: 10.7 MMOL/L (ref 5–15)
AST SERPL-CCNC: 25 U/L (ref 1–32)
B PARAPERT DNA SPEC QL NAA+PROBE: NOT DETECTED
B PERT DNA SPEC QL NAA+PROBE: NOT DETECTED
BACTERIA UR QL AUTO: NORMAL /HPF
BASOPHILS # BLD AUTO: 0.01 10*3/MM3 (ref 0–0.2)
BASOPHILS NFR BLD AUTO: 0.2 % (ref 0–1.5)
BILIRUB SERPL-MCNC: 0.9 MG/DL (ref 0–1.2)
BILIRUB UR QL STRIP: NEGATIVE
BUN SERPL-MCNC: 14 MG/DL (ref 8–23)
BUN/CREAT SERPL: 19.2 (ref 7–25)
C PNEUM DNA NPH QL NAA+NON-PROBE: NOT DETECTED
CALCIUM SPEC-SCNC: 9.1 MG/DL (ref 8.6–10.5)
CHLORIDE SERPL-SCNC: 101 MMOL/L (ref 98–107)
CLARITY UR: CLEAR
CO2 SERPL-SCNC: 29.3 MMOL/L (ref 22–29)
COLOR UR: YELLOW
CREAT SERPL-MCNC: 0.73 MG/DL (ref 0.57–1)
D-LACTATE SERPL-SCNC: 1.8 MMOL/L (ref 0.5–2)
DEPRECATED RDW RBC AUTO: 43.7 FL (ref 37–54)
EGFRCR SERPLBLD CKD-EPI 2021: 82.2 ML/MIN/1.73
EOSINOPHIL # BLD AUTO: 0.01 10*3/MM3 (ref 0–0.4)
EOSINOPHIL NFR BLD AUTO: 0.2 % (ref 0.3–6.2)
ERYTHROCYTE [DISTWIDTH] IN BLOOD BY AUTOMATED COUNT: 13 % (ref 12.3–15.4)
FLUAV SUBTYP SPEC NAA+PROBE: NOT DETECTED
FLUBV RNA ISLT QL NAA+PROBE: NOT DETECTED
GLOBULIN UR ELPH-MCNC: 2.7 GM/DL
GLUCOSE SERPL-MCNC: 167 MG/DL (ref 65–99)
GLUCOSE UR STRIP-MCNC: ABNORMAL MG/DL
HADV DNA SPEC NAA+PROBE: NOT DETECTED
HCOV 229E RNA SPEC QL NAA+PROBE: NOT DETECTED
HCOV HKU1 RNA SPEC QL NAA+PROBE: DETECTED
HCOV NL63 RNA SPEC QL NAA+PROBE: NOT DETECTED
HCOV OC43 RNA SPEC QL NAA+PROBE: NOT DETECTED
HCT VFR BLD AUTO: 41.3 % (ref 34–46.6)
HGB BLD-MCNC: 13.3 G/DL (ref 12–15.9)
HGB UR QL STRIP.AUTO: ABNORMAL
HMPV RNA NPH QL NAA+NON-PROBE: NOT DETECTED
HPIV1 RNA ISLT QL NAA+PROBE: NOT DETECTED
HPIV2 RNA SPEC QL NAA+PROBE: NOT DETECTED
HPIV3 RNA NPH QL NAA+PROBE: NOT DETECTED
HPIV4 P GENE NPH QL NAA+PROBE: NOT DETECTED
HYALINE CASTS UR QL AUTO: NORMAL /LPF
IMM GRANULOCYTES # BLD AUTO: 0.01 10*3/MM3 (ref 0–0.05)
IMM GRANULOCYTES NFR BLD AUTO: 0.2 % (ref 0–0.5)
KETONES UR QL STRIP: NEGATIVE
LEUKOCYTE ESTERASE UR QL STRIP.AUTO: NEGATIVE
LYMPHOCYTES # BLD AUTO: 0.52 10*3/MM3 (ref 0.7–3.1)
LYMPHOCYTES NFR BLD AUTO: 8.6 % (ref 19.6–45.3)
M PNEUMO IGG SER IA-ACNC: NOT DETECTED
MCH RBC QN AUTO: 29.4 PG (ref 26.6–33)
MCHC RBC AUTO-ENTMCNC: 32.2 G/DL (ref 31.5–35.7)
MCV RBC AUTO: 91.2 FL (ref 79–97)
MONOCYTES # BLD AUTO: 0.28 10*3/MM3 (ref 0.1–0.9)
MONOCYTES NFR BLD AUTO: 4.7 % (ref 5–12)
NEUTROPHILS NFR BLD AUTO: 5.19 10*3/MM3 (ref 1.7–7)
NEUTROPHILS NFR BLD AUTO: 86.1 % (ref 42.7–76)
NITRITE UR QL STRIP: NEGATIVE
NRBC BLD AUTO-RTO: 0 /100 WBC (ref 0–0.2)
PH UR STRIP.AUTO: 6 [PH] (ref 5–8)
PLATELET # BLD AUTO: 226 10*3/MM3 (ref 140–450)
PMV BLD AUTO: 8.9 FL (ref 6–12)
POTASSIUM SERPL-SCNC: 3.5 MMOL/L (ref 3.5–5.2)
PROCALCITONIN SERPL-MCNC: <0.02 NG/ML (ref 0–0.25)
PROT SERPL-MCNC: 6.7 G/DL (ref 6–8.5)
PROT UR QL STRIP: ABNORMAL
RBC # BLD AUTO: 4.53 10*6/MM3 (ref 3.77–5.28)
RBC # UR STRIP: NORMAL /HPF
REF LAB TEST METHOD: NORMAL
RHINOVIRUS RNA SPEC NAA+PROBE: NOT DETECTED
RSV RNA NPH QL NAA+NON-PROBE: NOT DETECTED
SARS-COV-2 RNA NPH QL NAA+NON-PROBE: NOT DETECTED
SODIUM SERPL-SCNC: 141 MMOL/L (ref 136–145)
SP GR UR STRIP: 1.02 (ref 1–1.03)
SQUAMOUS #/AREA URNS HPF: NORMAL /HPF
TROPONIN T SERPL-MCNC: <0.01 NG/ML (ref 0–0.03)
UROBILINOGEN UR QL STRIP: ABNORMAL
WBC # UR STRIP: NORMAL /HPF
WBC NRBC COR # BLD: 6.02 10*3/MM3 (ref 3.4–10.8)

## 2022-12-10 PROCEDURE — 72125 CT NECK SPINE W/O DYE: CPT

## 2022-12-10 PROCEDURE — 70450 CT HEAD/BRAIN W/O DYE: CPT

## 2022-12-10 PROCEDURE — 83605 ASSAY OF LACTIC ACID: CPT | Performed by: EMERGENCY MEDICINE

## 2022-12-10 PROCEDURE — 81001 URINALYSIS AUTO W/SCOPE: CPT | Performed by: EMERGENCY MEDICINE

## 2022-12-10 PROCEDURE — 80053 COMPREHEN METABOLIC PANEL: CPT | Performed by: EMERGENCY MEDICINE

## 2022-12-10 PROCEDURE — 85025 COMPLETE CBC W/AUTO DIFF WBC: CPT | Performed by: EMERGENCY MEDICINE

## 2022-12-10 PROCEDURE — 84484 ASSAY OF TROPONIN QUANT: CPT | Performed by: EMERGENCY MEDICINE

## 2022-12-10 PROCEDURE — 99284 EMERGENCY DEPT VISIT MOD MDM: CPT

## 2022-12-10 PROCEDURE — 84145 PROCALCITONIN (PCT): CPT | Performed by: EMERGENCY MEDICINE

## 2022-12-10 PROCEDURE — 0202U NFCT DS 22 TRGT SARS-COV-2: CPT | Performed by: EMERGENCY MEDICINE

## 2022-12-10 PROCEDURE — P9612 CATHETERIZE FOR URINE SPEC: HCPCS

## 2022-12-10 PROCEDURE — 71045 X-RAY EXAM CHEST 1 VIEW: CPT

## 2022-12-10 RX ORDER — SODIUM CHLORIDE 0.9 % (FLUSH) 0.9 %
10 SYRINGE (ML) INJECTION AS NEEDED
Status: DISCONTINUED | OUTPATIENT
Start: 2022-12-10 | End: 2022-12-10 | Stop reason: HOSPADM

## 2022-12-10 NOTE — ED TRIAGE NOTES
Pt fell this am and hit her head.  She isn't sure what happened.  She woke on the floor.  No blood thinners. She has been having dizzy spells per daughter.  She just finished abx for strep throat.  She has cough    Patient was placed in face mask during first look triage.  Patient was wearing a face mask throughout encounter.  I wore personal protective equipment throughout the encounter.  Hand hygiene was performed before and after patient encounter.

## 2022-12-10 NOTE — ED PROVIDER NOTES
EMERGENCY DEPARTMENT ENCOUNTER    Room Number:  29/29  Date of encounter:  12/15/2022  PCP: Miller Castañeda MD  Patient Care Team:  Miller Castañeda MD as PCP - General (Internal Medicine)  Mart Ontiveros MD as Surgeon (Cardiothoracic Surgery)  Tres De Los Santos MD as Consulting Physician (Cardiology)  Graham Mcconnell MD as Consulting Physician (Hematology and Oncology)  Payam Byrnes MD as Consulting Physician (Otolaryngology)  Jonathan Smith MD as Consulting Physician (Vascular Surgery)  Victor M Cabral MD as Surgeon (Orthopedic Surgery)  Yaya Burks MD as Consulting Physician (Pulmonary Disease)   Historian: Patient, family    HPI:  Chief Complaint: Fall  A complete HPI/ROS/PMH/PSH/SH/FH are unobtainable due to: No memory of the event    Context: Grace Beauchamp is a 82 y.o. female who presents to the ED c/o having a fall this morning.  She woke up on the ground.  She states she stood up and got very dizzy and then sat down and then does not remember falling.  Family reports that she recently has been treated for strep throat as well as a UTI.  She has been getting dizzy recently.  She has meclizine for her dizziness.  She is not on blood thinners.  She states that she is supposed be on oxygen at home.  She states that her oxygen levels been running around 90% but she has not been using her oxygen.  She reports pain to her left head.  Family reports she has not been eating or drinking very well.  She has been coughing.  She denies any chest pain or fever.  She has not had any treatment for her symptoms.  Nothing makes it worse or better.  She denies pain other than the pain in her head.    Prior record review: Primary care note dated 12/6/2022 with sore throat and acute cystitis.  Started on cefdinir.    PAST MEDICAL HISTORY  Active Ambulatory Problems     Diagnosis Date Noted   • History of breast cancer 01/29/2016   • Stenosis of carotid artery 01/29/2016   • Chronic obstructive pulmonary  disease ([facility]) 01/29/2016   • Closed fracture of multiple ribs 01/29/2016   • Cognitive disorder 01/29/2016   • Depression 01/29/2016   • Erythromelalgia ([facility]) 01/29/2016   • Hyperlipidemia 01/29/2016   • Hypertension 01/29/2016   • Primary osteoarthritis involving multiple joints 01/29/2016   • Osteoporosis 01/29/2016   • Restless legs syndrome 01/29/2016   • Cerebral aneurysm 01/29/2016   • Temporary cerebral vascular dysfunction 01/29/2016   • Urinary tract infection 01/29/2016   • S/P CABG x 1 04/22/2016   • Type 2 diabetes mellitus without complication, without long-term current use of insulin ([facility]) 04/27/2016   • S/P ascending aortic aneurysm repair 04/22/2017   • 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   • Descending thoracic aortic aneurysm 04/22/2017   • Claudication of both lower extremities ([facility]) 01/31/2018   • Thoracoabdominal aortic aneurysm 05/09/2018   • Ventral hernia without obstruction or gangrene 05/09/2018   • Breast cancer, stage 1, estrogen receptor positive ([facility]) 10/17/2012   • Arthritis of right hip 11/06/2018   • Arthritis of right knee 11/06/2018   • Chondrocalcinosis 11/06/2018   • Chronic pain of right knee 11/06/2018   • DDD (degenerative disc disease), lumbosacral 12/31/2013   • Gastroesophageal reflux disease 01/18/2019   • Bilateral hearing loss 01/18/2019   • Thoracic aortic aneurysm without rupture 04/16/2019   • PVD (peripheral vascular disease) ([facility]) 06/07/2019   • 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[facility]) 06/20/2019   • Thoracoabdominal aortic aneurysm, without rupture 06/26/2019   • 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 rupture 06/26/2019   • Aortic aneurysm, descending ([facility]) 07/19/2019   • Aortic aneurysm without rupture ([facility]) 07/22/2019   • CAD (coronary artery disease) 05/29/2020   • S/P left heart catheterization by ventricular puncture 07/23/2020   • Cerebral infarction ([facility]) 09/13/2013   • Pericardial hematoma 09/14/2020   • History of ST elevation myocardial infarction (STEMI) 09/14/2020   •  Chronic diastolic CHF (congestive heart failure) ([facility]) 09/14/2020   • Left ventricular aneurysm 09/14/2020   • Immobility syndrome 09/25/2020   • Lower extremity edema 10/28/2020   • QT prolongation 10/28/2020   • Recurrent UTI (urinary tract infection) 12/03/2021   • Hypoxia 04/18/2022   • Metabolic encephalopathy 04/22/2022   • LUH (acute kidney injury) ([facility]) 04/22/2022   • Hypotension 04/22/2022   • UTI due to extended-spectrum beta lactamase (ESBL) producing Escherichia coli 04/26/2022   • Ischemic cardiomyopathy 04/28/2022   • Acute on chronic systolic CHF (congestive heart failure) ([facility]) 04/28/2022   • Pulmonary hypertension ([facility]) 05/02/2022   • Mild protein-calorie malnutrition ([facility]) 06/16/2022   • Encounter for subsequent annual wellness visit in Medicare patient 10/20/2022   • Strep sore throat 12/06/2022     Resolved Ambulatory Problems     Diagnosis Date Noted   • Abdominal aortic aneurysm 01/29/2016   • Aneurysm of thoracic aorta 01/29/2016   • COPD exacerbation ([facility]) 12/02/2018   • Hypokalemia 04/22/2022     Past Medical History:   Diagnosis Date   • AAA (abdominal aortic aneurysm)    • Acute bronchitis 12/2018   • Arthritis    • Bilateral carotid artery disease ([facility])    • Bilateral cataracts    • Breast cancer ([facility])    • 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[facility])    • Cataracts, bilateral    • Closed fracture of three ribs of right side    • COPD (chronic obstructive pulmonary disease) ([facility])    • Descending aortic arch aneurysm    • Diabetes mellitus ([facility])    • Erythermalgia ([facility])    • GERD (gastroesophageal reflux disease)    • Low back pain    • Moderate aortic valve insufficiency    • Nocturia    • Osteoarthritis    • Otitis media    • Prediabetes    • RLS (restless legs syndrome)    • Saccular aneurysm    • Stroke ([facility])    • Thoracic ascending aortic aneurysm    • TIA (transient ischemic attack)    • Urgency incontinence    • Venous insufficiency    • Ventral hernia        The patient has a COVID HM Topic on their  chart, and they are fully vaccinated.    PAST SURGICAL HISTORY  Past Surgical History:   Procedure Laterality Date   • AORTIC VALVE REPAIR/REPLACEMENT N/A 02/23/2016-[facility]    Ascending Replacement Using a 24MM Graft--Dr. Ontiveros   • APPENDECTOMY  1977   • BREAST BIOPSY Right 09/16/2012    Vacuum Assisted Core Bx of R Breast   • CARDIAC CATHETERIZATION N/A 01/06/2016    Dr. Tres De Los Santos   • CARDIAC CATHETERIZATION N/A 4/22/2019    Procedure: Left Heart Cath;  Surgeon: Tres De Los Santos MD;  Location:  [facility] CATH INVASIVE LOCATION;  Service: Cardiology   • CARDIAC CATHETERIZATION N/A 4/22/2019    Procedure: Coronary angiography;  Surgeon: Tres De Los Santos MD;  Location:  [facility] CATH INVASIVE LOCATION;  Service: Cardiology   • CARDIAC CATHETERIZATION N/A 7/22/2020    Procedure: LEFT HEART CATH;  Surgeon: Antonia Scott MD;  Location:  [facility] CATH INVASIVE LOCATION;  Service: Cardiovascular;  Laterality: N/A;   • CARDIAC CATHETERIZATION N/A 7/22/2020    Procedure: CORONARY ANGIOGRAPHY;  Surgeon: Antonia Scott MD;  [facility];  Service: Cardiovascular;  Laterality: N/A;   • CARDIAC CATHETERIZATION N/A 7/22/2020    Procedure: Left ventriculography;  Surgeon: Antonia Scott MD;  Location:  [facility] CATH INVASIVE LOCATION;  Service: Cardiovascular;  Laterality: N/A;   • CARDIAC CATHETERIZATION N/A 7/22/2020    Procedure: Saphenous Vein Graft;  Surgeon: Antonia Scott MD;  Location:  [facility] CATH INVASIVE LOCATION;  Service: Cardiovascular;  Laterality: N/A;   • CARDIAC CATHETERIZATION N/A 4/27/2022    Procedure: Right Heart Cath;  Surgeon: Lydia Mays MD;  Location:  [facility] CATH INVASIVE LOCATION;  Service: Cardiology;  Laterality: N/A;   • CARDIAC CATHETERIZATION N/A 4/27/2022    Procedure: Coronary angiography;  Surgeon: Lydia Mays MD;  [facility];  Service: Cardiology;  Laterality: N/A;   • CARDIAC CATHETERIZATION N/A 4/27/2022    Procedure: Left Heart Cath;   Surgeon: Lydia Mays MD;  Location: [facility] CATH INVASIVE LOCATION;  Service: Cardiology;  Laterality: N/A;   • CARDIAC SURGERY  02/2016    Dr. Ontiveros/Dr. De Los Santos   • CATARACT EXTRACTION Bilateral     [facility]   • COLONOSCOPY N/A 10/2002    Dr. Negro   • CORONARY ARTERY BYPASS GRAFT  02/23/2016-[facility]    x1 w/L Vein Grafting--Dr. Ontiveros   • CORONARY ARTERY BYPASS GRAFT N/A 9/18/2020    Procedure: STERNAL EXPLORATION WITH CLOSURE AND WASHOUT, REMOVAL OF IABP, PRP;  Surgeon: Mart Ontiveros MD;  [facility];  Service: Cardiothoracic;  Laterality: N/A;   • CYST REMOVAL Right 01/25/2013    R Palm Excision of Retinacular Cyst--Dr. Karla Fish   • EPIDURAL BLOCK     • LAPAROSCOPIC CHOLECYSTECTOMY N/A 08/04/2004    Dr. JOEY Sheth   • MASTECTOMY Right 09/28/2012   • ORIF HIP FRACTURE Left 03/27/2005    w/ AMBI compression screw, Dr. Victor M Cabral   • RECTOVAGINAL FISTULA REPAIR  1965   • THORACIC AORTIC ANEURYSM REPAIR N/A 7/23/2019    Procedure: ROSE, THORACOABDOMINAL AORTIC ANEURYSM REPAIR, VISCERAL VESSEL REPAIR, LARGE VENTRAL HERNIA REPAIR WITH MESH, CIRCULATORY ARREST, PRP;  Surgeon: Mart Ontiveros MD;  Location: [facility] MAIN OR;  Service: Cardiothoracic   • TRANSESOPHAGEAL ECHOCARDIOGRAM (ROSE) N/A 9/18/2020    Procedure: TRANSESOPHAGEAL ECHOCARDIOGRAM WITH ANESTHESIA;  Surgeon: Mart Ontiveros MD;  [facility];  Service: Cardiothoracic;  Laterality: N/A;   • TUBAL ABDOMINAL LIGATION     • VENTRICULAR ANEURYSM REPAIR N/A 7/22/2020    Procedure: EMERGENT REOP STERNOTOMY, REPAIR OF LV RUPTURE, PRP, INTRAOP ROSE;  Surgeon: Mart nOtiveros MD;  [facility];  Service: Cardiothoracic;  Laterality: N/A;   • VENTRICULAR ANEURYSM REPAIR N/A 9/17/2020    Procedure: ROSE, MIDLINE STERNOTOMY REOP  LEFT VENTRICULAR ANEURYSM REPAIR, IABP INSERTION, PRP;  Surgeon: Mart Ontiveros MD;  [facility];  Service: Cardiothoracic;  Laterality: N/A;          FAMILY HISTORY  Family History   Problem Relation Age of Onset   • Alzheimer's disease Mother    • Cancer Maternal Aunt    • Heart disease Father    • Heart failure Father    • Hypertension Father    • Diabetes Father    • Liver disease Sister    • Heart failure Brother    • Hypertension Brother    • Alcohol abuse Brother    • Diabetes Brother    • No Known Problems Maternal Grandmother    • No Known Problems Maternal Grandfather    • No Known Problems Paternal Grandmother    • No Known Problems Paternal Grandfather    • Diabetes Brother    • Brain cancer Brother    • Heart disease Brother    • Diabetes Brother          SOCIAL HISTORY  Social History     Socioeconomic History   • Marital status:    Tobacco Use   • Smoking status: Former     Packs/day: 1.00     Years: 40.00     Pack years: 40.00     Types: Cigarettes     Quit date: 12/1/2012     Years since quitting: 10.0   • Smokeless tobacco: Never   • Tobacco comments:     CAFFEINE USE:2 CUPS COFFEE/ COKE DAILY   Substance and Sexual Activity   • Alcohol use: No   • Drug use: No   • Sexual activity: Not Currently     Partners: Male     Birth control/protection: Post-menopausal         ALLERGIES  Ramipril, Morphine, Morphine and related, Bactrim [sulfamethoxazole-trimethoprim], and Cipro [ciprofloxacin hcl]        REVIEW OF SYSTEMS  Review of Systems   No chest pain, positive shortness of breath, positive cough, no fever, no chills, no headache, no nausea, no vomiting  All systems reviewed and negative except for those discussed in HPI.       PHYSICAL EXAM    I have reviewed the triage vital signs and nursing notes.    ED Triage Vitals [12/10/22 1140]   Temp Heart Rate Resp BP SpO2   98.8 °F (37.1 °C) 72 16 -- 91 %      Temp src Heart Rate Source Patient Position BP Location FiO2 (%)   Tympanic Monitor -- -- --       Physical Exam  GENERAL: Awake, alert, no acute distress  SKIN: Warm, dry  HENT: Normocephalic, abrasion to the left forehead  EYES: no  scleral icterus  CV: regular rhythm, regular rate  RESPIRATORY: normal effort, lungs clear  ABDOMEN: soft, nontender, nondistended  MUSCULOSKELETAL: no deformity, no tenderness to the hips or shoulders.  No tenderness to the cervical, thoracic, or lumbar spines.  NEURO: alert, moves all extremities, follows commands          LAB RESULTS  No results found for this or any previous visit (from the past 24 hour(s)).    Ordered the above labs and independently reviewed the results.        RADIOLOGY  No Radiology Exams Resulted Within Past 24 Hours    I ordered the above noted radiological studies. Reviewed by me and discussed with radiologist.  See dictation for official radiology interpretation.      PROCEDURES    Procedures      MEDICATIONS GIVEN IN ER    Medications - No data to display      PROGRESS, DATA ANALYSIS, CONSULTS, AND MEDICAL DECISION MAKING    All labs have been independently reviewed by me.  All radiology studies have been reviewed by me and discussed with radiologist dictating the report.   EKG's independently viewed and interpreted by me.  Discussion below represents my analysis of pertinent findings related to patient's condition, differential diagnosis, treatment plan and final disposition.    Differential diagnosis includes but is not limited to pneumonia, viral syndrome, COVID, influenza, dehydration, syncope, non-STEMI, STEMI, unstable angina, acute aortic syndrome, pneumothorax, UTI.    ED Course as of 12/15/22 2216   Sat Dec 10, 2022   1318 EKG          EKG time: 1310  Rhythm/Rate: Not diagnostic due to electrical interference from her bladder stimulator  P waves and ID: Not visualized due to electrical interference  QRS, axis: Not determined due to electrical interference  ST and T waves: Not determined due to electrical interference    Interpreted Contemporaneously by me, independently viewed  Not significantly changed compared to prior 4/29/2022   [TR]   1428 Speaking with radiologist by  phone.  CT head and cervical spine showed no acute fracture or injury. [TR]   6206 I reviewed work-up and findings with the patient and family at the bedside.  Answered all questions.  She is positive for coronavirus although not COVID-19.  Her procalcitonin and lactic acid are normal.  Her EKG is nondiagnostic although her troponin is negative.  Her urinalysis shows no evidence of infection.  Her oxygen saturations are 100% on 1-1/2 L nasal cannula oxygen.  She is normally on 2-1/2.  It is unclear whether she had a syncopal episode this morning or whether she had a mechanical fall, head injury, loss of memory.  I offered the patient and family admission for syncope work-up although the patient adamantly declines.  She wants to go home.  Based on my work-up here I do not have any recent to suspect syncope over a mechanical fall and she has negative emergency department testing.  I did encourage the patient and family to return immediately if she has any further episodes.  They are agreeable. [TR]      ED Course User Index  [TR] Clay Beard MD           PPE: The patient wore a mask and I wore an N95 mask throughout the entire patient encounter.       AS OF 22:16 EST VITALS:    BP - (!) 181/85  HR - 58  TEMP - 98.8 °F (37.1 °C) (Tympanic)  O2 SATS - 99%        DIAGNOSIS  Final diagnoses:   Fall, initial encounter   Closed head injury, initial encounter   Pneumonia due to human coronavirus         DISPOSITION  ED Disposition     ED Disposition   Discharge    Condition   Stable    Comment   --                Note Disclaimer: At [facility], we believe that sharing information builds trust and better relationships. You are receiving this note because you recently visited [facility]. It is possible you will see health information before a provider has talked with you about it. This kind of information can be easy to misunderstand. To help you fully understand what it means for your health, we urge you to  discuss this note with your provider.       Clay Beard MD  12/10/22 1436       Clay Beard MD  12/15/22 0437

## 2022-12-10 NOTE — DISCHARGE INSTRUCTIONS
Return here immediately for any further episodes of passing out or falls.  Take Tylenol for any fever over 100.4.  Drink plenty of fluids to stay hydrated.  Return immediately for any chest pain or shortness of breath.  Use your oxygen as prescribed.

## 2023-01-09 DIAGNOSIS — Z86.79 S/P ASCENDING AORTIC ANEURYSM REPAIR: Primary | ICD-10-CM

## 2023-01-09 DIAGNOSIS — Z98.890 S/P ASCENDING AORTIC ANEURYSM REPAIR: Primary | ICD-10-CM

## 2023-02-15 ENCOUNTER — OFFICE VISIT (OUTPATIENT)
Dept: INTERNAL MEDICINE | Age: 83
End: 2023-02-15
Payer: MEDICARE

## 2023-02-15 VITALS
HEART RATE: 73 BPM | OXYGEN SATURATION: 96 % | DIASTOLIC BLOOD PRESSURE: 80 MMHG | SYSTOLIC BLOOD PRESSURE: 132 MMHG | WEIGHT: 87.2 LBS | BODY MASS INDEX: 19.61 KG/M2 | HEIGHT: 56 IN | TEMPERATURE: 97.1 F

## 2023-02-15 DIAGNOSIS — F32.A DEPRESSION, UNSPECIFIED DEPRESSION TYPE: Chronic | ICD-10-CM

## 2023-02-15 DIAGNOSIS — R13.10 ODYNOPHAGIA: Primary | ICD-10-CM

## 2023-02-15 DIAGNOSIS — R82.90 ABNORMAL URINALYSIS: ICD-10-CM

## 2023-02-15 DIAGNOSIS — K21.9 GASTROESOPHAGEAL REFLUX DISEASE, UNSPECIFIED WHETHER ESOPHAGITIS PRESENT: Chronic | ICD-10-CM

## 2023-02-15 DIAGNOSIS — E11.9 TYPE 2 DIABETES MELLITUS WITHOUT COMPLICATION, WITHOUT LONG-TERM CURRENT USE OF INSULIN: Chronic | ICD-10-CM

## 2023-02-15 DIAGNOSIS — N32.89 BLADDER SPASM: ICD-10-CM

## 2023-02-15 LAB
BILIRUB BLD-MCNC: ABNORMAL MG/DL
CLARITY, POC: CLEAR
COLOR UR: YELLOW
EXPIRATION DATE: ABNORMAL
GLUCOSE UR STRIP-MCNC: NEGATIVE MG/DL
KETONES UR QL: NEGATIVE
LEUKOCYTE EST, POC: ABNORMAL
Lab: ABNORMAL
NITRITE UR-MCNC: POSITIVE MG/ML
PH UR: 6 [PH] (ref 5–8)
PROT UR STRIP-MCNC: ABNORMAL MG/DL
RBC # UR STRIP: ABNORMAL /UL
SP GR UR: ABNORMAL
UROBILINOGEN UR QL: NORMAL

## 2023-02-15 PROCEDURE — 81003 URINALYSIS AUTO W/O SCOPE: CPT | Performed by: INTERNAL MEDICINE

## 2023-02-15 PROCEDURE — 99214 OFFICE O/P EST MOD 30 MIN: CPT | Performed by: INTERNAL MEDICINE

## 2023-02-15 RX ORDER — ESOMEPRAZOLE MAGNESIUM 40 MG/1
40 CAPSULE, DELAYED RELEASE ORAL
Qty: 30 CAPSULE | Refills: 3 | Status: SHIPPED | OUTPATIENT
Start: 2023-02-15 | End: 2023-04-03

## 2023-02-15 RX ORDER — MIRTAZAPINE 15 MG/1
15 TABLET, FILM COATED ORAL NIGHTLY
Qty: 30 TABLET | Refills: 2 | Status: SHIPPED | OUTPATIENT
Start: 2023-02-15 | End: 2023-03-14 | Stop reason: SDUPTHER

## 2023-02-15 NOTE — PROGRESS NOTES
I N T E R N A L  M E D I C I N E    J U N O H  K I M,  M D      ENCOUNTER DATE:  02/15/2023    Grace Beauchamp / 82 y.o. / female    CHIEF COMPLAINT / REASON FOR OFFICE VISIT     Hyperlipidemia, Hypertension, Diabetes, Urinary Tract Infection (Pressure, fatigue), Heartburn, and Nausea      ASSESSMENT & PLAN     Problem List Items Addressed This Visit        High    Depression (Chronic)    Overview     Patient stopped citalopram around April and is doing okay.          Relevant Medications    mirtazapine (Remeron) 15 MG tablet       Medium    Type 2 diabetes mellitus without complication, without long-term current use of insulin (HCC) (Chronic)    Overview     Diet controlled.             Low    Gastroesophageal reflux disease (Chronic)    Relevant Medications    esomeprazole (nexIUM) 40 MG capsule    Other Relevant Orders    FL upper gi single contrast w kub   Other Visit Diagnoses     Odynophagia    -  Primary    Relevant Medications    esomeprazole (nexIUM) 40 MG capsule    Other Relevant Orders    FL upper gi single contrast w kub    Bladder spasm        Relevant Orders    POCT urinalysis dipstick, automated (Completed)    Abnormal urinalysis        Relevant Orders    Urine Culture - , Urine, Clean Catch (Completed)        Orders Placed This Encounter   Procedures   • Urine Culture - , Urine, Clean Catch   • FL upper gi single contrast w kub   • POCT urinalysis dipstick, automated     New Medications Ordered This Visit   Medications   • esomeprazole (nexIUM) 40 MG capsule     Sig: Take 1 capsule by mouth Every Morning Before Breakfast.     Dispense:  30 capsule     Refill:  3   • mirtazapine (Remeron) 15 MG tablet     Sig: Take 1 tablet by mouth Every Night.     Dispense:  30 tablet     Refill:  2       SUMMARY/DISCUSSION  • No specific symptoms of UTI. Will await urine culture findings.       Next Appointment with me: Visit date not found    Return in about 2 months (around 4/15/2023) for Reassess today's  "problem(s).      VITAL SIGNS     Vitals:    02/15/23 1433   BP: 132/80   Cuff Size: Adult   Pulse: 73   Temp: 97.1 °F (36.2 °C)   TempSrc: Temporal   SpO2: 96%   Weight: 39.6 kg (87 lb 3.2 oz)   Height: 142.2 cm (56\")       BP Readings from Last 3 Encounters:   02/15/23 132/80   12/10/22 (!) 181/85   12/06/22 136/84     Wt Readings from Last 3 Encounters:   02/15/23 39.6 kg (87 lb 3.2 oz)   12/10/22 42.1 kg (92 lb 12.8 oz)   12/06/22 40.5 kg (89 lb 3.2 oz)     Body mass index is 19.55 kg/m².    Blood pressure readings recorded on patient flowsheet:  No flowsheet data found.       MEDICATIONS AT THE TIME OF OFFICE VISIT     Current Outpatient Medications on File Prior to Visit   Medication Sig   • aspirin 81 MG EC tablet Take 81 mg by mouth Daily.   • chlorhexidine (PERIDEX) 0.12 % solution    • clobetasol (TEMOVATE) 0.05 % ointment    • Fluticasone-Umeclidin-Vilant (Trelegy Ellipta) 100-62.5-25 MCG/INH inhaler Inhale 1 puff Daily.   • hydrocortisone 2.5 % ointment    • isosorbide mononitrate (IMDUR) 30 MG 24 hr tablet Take 1 tablet by mouth 2 (Two) Times a Day.   • losartan (COZAAR) 25 MG tablet Take 1 tablet by mouth Daily.   • meclizine (ANTIVERT) 12.5 MG tablet Take 1 tablet by mouth 3 (Three) Times a Day As Needed for Dizziness.   • metoprolol succinate XL (TOPROL-XL) 50 MG 24 hr tablet Take 1 tablet by mouth Daily.   • mupirocin (BACTROBAN) 2 % ointment    • pramipexole (MIRAPEX) 0.125 MG tablet TAKE ONE TABLET BY MOUTH EVERY NIGHT AT BEDTIME   • sennosides-docusate (PERICOLACE) 8.6-50 MG per tablet Take 2 tablets by mouth Daily As Needed for Constipation.   • traMADol (ULTRAM) 50 MG tablet Take 1 tablet by mouth Every 6 (Six) Hours As Needed for Moderate Pain.   • Vitamin D, Cholecalciferol, 50 MCG (2000 UT) capsule Take 2,000 Units by mouth Daily.   • albuterol sulfate  (90 Base) MCG/ACT inhaler INHALE TWO PUFFS BY MOUTH EVERY 6 HOURS AS NEEDED FOR WHEEZING OR SHORTNESS OF AIR   • atorvastatin " (LIPITOR) 40 MG tablet Take 1 tablet by mouth Every Night.   • cetirizine (zyrTEC) 10 MG tablet Take 1 tablet by mouth As Needed for Allergies. (Patient taking differently: Take 10 mg by mouth Daily As Needed for Allergies.)   • Cranberry 1000 MG capsule Take  by mouth.   • furosemide (LASIX) 20 MG tablet Take 1 tablet by mouth Daily.   • Lactobacillus (FLORANEX PO) Take  by mouth. 1milloncell   • multivitamin (THERAGRAN) tablet tablet Take 1 tablet by mouth Daily.   • potassium chloride 10 MEQ CR tablet Take 1 tablet by mouth Daily.   • TOVIAZ 4 MG tablet sustained-release 24 hour tablet Take 4 mg by mouth Daily.     No current facility-administered medications on file prior to visit.          HISTORY OF PRESENT ILLNESS     The patient is accompanied by an adult female today.    The patient reports indigestion, and a lack of appetite for the last 1 to 2 months. The female adds the patient has experienced these issues intermittently over the years. She states that she can swallow; however, it is painful. Solid foods are more difficult for her to swallow; however, they do not feel as if they get stuck when she is swallowing.  She wakes up in the morning with nausea; however, by the evening it has resolved, and she is able to swallow without pain. She denies having heartburn symptoms for a long period of time. She has not seen a gastroenterologist. She takes Protonix daily; however, she does not believe it is helpful. She is drinking 1 to 2 Boost daily. She has a small hernia.  Her brother has a history of throat cancer. She notes regular bowel movements without hematochezia. She notes having aspiration 2 times during tests after surgery. The patient has not had a colonoscopy or esophagogastroduodenoscopy in many years.    She discontinued smoking in 2012 and does not drink alcohol. The patient feels as though Zoloft 50 mg is not improving her depression. The female is concerned that the patient has a urinary tract  infection. The patient denies any urinary burning or frequency.    She is taking Losartan 25 mg, metoprolol, tramadol as needed, Zyrtec as needed, Isosorbide, and aspirin. She is no longer taking potassium, Lasix, iron, Lipitor, or a bladder medication.    Patient Care Team:  Miller Castañeda MD as PCP - General (Internal Medicine)  Mart Ontiveros MD as Surgeon (Cardiothoracic Surgery)  Tres De Los Snatos MD as Consulting Physician (Cardiology)  Graham Mcconnell MD as Consulting Physician (Hematology and Oncology)  Payam Byrnes MD as Consulting Physician (Otolaryngology)  Jonathan Smith MD as Consulting Physician (Vascular Surgery)  Victor M Cabral MD as Surgeon (Orthopedic Surgery)  Yaya Burks MD as Consulting Physician (Pulmonary Disease)    REVIEW OF SYSTEMS     Review of Systems       PHYSICAL EXAMINATION     Physical Exam    General: No acute distress  Psych: Normal thought and judgment   Cardiovascular Rate: normal. Rhythm: regular. Heart sounds: normal   Pulm/Chest: Effort normal, breath sounds normal.      REVIEWED DATA     Labs:     Lab Results   Component Value Date     12/10/2022    K 3.5 12/10/2022    CALCIUM 9.1 12/10/2022    AST 25 12/10/2022    ALT 18 12/10/2022    BUN 14 12/10/2022    CREATININE 0.73 12/10/2022    CREATININE 0.72 07/14/2022    CREATININE 0.97 05/27/2022    EGFRIFNONA 102 12/05/2021    EGFRIFAFRI 85 08/12/2021       Lab Results   Component Value Date    HGBA1C 6.3 (H) 07/14/2022    HGBA1C 7.10 (H) 04/23/2022    HGBA1C 6.3 (H) 08/12/2021       Lab Results   Component Value Date     (H) 07/14/2022     (H) 08/12/2021     (H) 09/16/2020    HDL 66 07/14/2022    HDL 59 08/12/2021    TRIG 119 07/14/2022    TRIG 168 (H) 08/12/2021       Lab Results   Component Value Date    TSH 3.030 04/18/2022    TSH 1.830 08/12/2021    FREET4 1.47 08/12/2021       Lab Results   Component Value Date    WBC 6.02 12/10/2022    HGB 13.3 12/10/2022      12/10/2022       Lab Results   Component Value Date    NEELTIO 41 (H) 07/14/2022        Lab Results   Component Value Date    [id number] 05/21/2019        Imaging:           Medical Tests:           Summary of old records / correspondence / consultant report:           Request outside records:             *Examiner was wearing KN95 mask and eye protection during the entire duration of the visit. Patient was masked the entire time. Minimum social distance of 6 ft maintained entire visit except if physical contact was necessary as documented.       Template created by Bebo Castañeda MD     Transcribed from ambient dictation for Miller Castañeda MD by Kendra Roche.  02/15/23   15:33 EST    Patient or patient representative verbalized consent to the visit recording.  I have personally performed the services described in this document as transcribed by the above individual, and it is both accurate and complete.  Miller Castañeda MD  2/19/2023  08:54 EST

## 2023-02-17 ENCOUNTER — TELEPHONE (OUTPATIENT)
Dept: INTERNAL MEDICINE | Age: 83
End: 2023-02-17
Payer: MEDICARE

## 2023-02-17 DIAGNOSIS — N30.90 CYSTITIS: Primary | ICD-10-CM

## 2023-02-17 RX ORDER — NITROFURANTOIN 25; 75 MG/1; MG/1
100 CAPSULE ORAL 2 TIMES DAILY
Qty: 10 CAPSULE | Refills: 0 | Status: SHIPPED | OUTPATIENT
Start: 2023-02-17 | End: 2023-02-22

## 2023-02-17 NOTE — TELEPHONE ENCOUNTER
Daughter advised Urine Cx still pending.  Daughter states pt is getting worse with the pressure, burning, and confusion. Daughter would like something called in at this point and change if needed when Cx returns.   POCT is resulted for reference. LIBERTY

## 2023-02-17 NOTE — TELEPHONE ENCOUNTER
VERIFIED WITH KARINA, DAUGHTER PT DOES NOT HAVE ALLERGY TO MACROBID.  DAUGHTER VERBALIZED UNDERSTANDING OF APRN DICTATION. MM

## 2023-02-17 NOTE — TELEPHONE ENCOUNTER
Caller: Maddy Sanches    Relationship: Emergency Contact    Best call back number: [phone]    What is the best time to reach you: ANY     Who are you requesting to speak with (clinical staff, provider,  specific staff member): CLINICAL STAFF     What was the call regarding: WOULD LIKE URINE CULTURE RESULTS. PATIENT IS STARTING TO HAVE SYMPTOMS-PRESSURE, BURNING, SOME CONFUSION.     Do you require a callback: YES

## 2023-02-17 NOTE — TELEPHONE ENCOUNTER
Please call pt.     Prescription sent for Macrobid.  Please verify no allergy to Macrobid.  If pt develops any worsening confusion, fever, chills, abdominal or back pain, diminished urine production, blood in urine, recommend that she be assessed in the ER.

## 2023-02-18 LAB
BACTERIA UR CULT: ABNORMAL
BACTERIA UR CULT: ABNORMAL
OTHER ANTIBIOTIC SUSC ISLT: ABNORMAL

## 2023-02-19 NOTE — PROGRESS NOTES
Call patient/daughter:     Urine culture grew E. Coli bacteria which should be susceptible to nitrofurantoin that was prescribed. Call for persistent/worsening symptoms.       Please do not hesitate to contact me if you have questions.

## 2023-02-20 DIAGNOSIS — G25.81 RESTLESS LEGS SYNDROME (RLS): ICD-10-CM

## 2023-02-20 RX ORDER — PRAMIPEXOLE DIHYDROCHLORIDE 0.12 MG/1
0.12 TABLET ORAL
Qty: 90 TABLET | Refills: 0 | Status: SHIPPED | OUTPATIENT
Start: 2023-02-20 | End: 2023-04-03

## 2023-02-20 NOTE — TELEPHONE ENCOUNTER
Caller: MyronMaddy    Relationship: Emergency Contact    Best call back number: [phone]    Requested Prescriptions:   Requested Prescriptions     Pending Prescriptions Disp Refills   • pramipexole (MIRAPEX) 0.125 MG tablet 90 tablet 0     Sig: Take 1 tablet by mouth every night at bedtime.        Pharmacy where request should be sent: [facility]/PHARMACY #[id number] [facility] [address] AT [facility] - [phone] [facility] [phone]      Additional details provided by patient: PATIENT'S DAUGHTER STATES THAT SHE RECENTLY CHANGED PHARMACIES, AD NEEDS PRESCRIPTION SENT TO [facility].    Does the patient have less than a 3 day supply:  [x] Yes  [] No    Would you like a call back once the refill request has been completed: [] Yes [x] No    If the office needs to give you a call back, can they leave a voicemail: [] Yes [x] No    Tammie Bowen Rep   02/20/23 11:06 EST

## 2023-02-21 ENCOUNTER — HOSPITAL ENCOUNTER (OUTPATIENT)
Dept: CARDIOLOGY | Facility: HOSPITAL | Age: 83
Discharge: HOME | End: 2023-02-21
Payer: MEDICARE

## 2023-02-21 ENCOUNTER — HOSPITAL ENCOUNTER (OUTPATIENT)
Dept: CT IMAGING | Facility: HOSPITAL | Age: 83
Discharge: HOME | End: 2023-02-21
Payer: MEDICARE

## 2023-02-21 VITALS
DIASTOLIC BLOOD PRESSURE: 68 MMHG | HEIGHT: 56 IN | HEART RATE: 80 BPM | WEIGHT: 88.18 LBS | SYSTOLIC BLOOD PRESSURE: 138 MMHG | BODY MASS INDEX: 19.84 KG/M2

## 2023-02-21 DIAGNOSIS — Z86.79 S/P ASCENDING AORTIC ANEURYSM REPAIR: ICD-10-CM

## 2023-02-21 DIAGNOSIS — Z98.890 S/P ASCENDING AORTIC ANEURYSM REPAIR: ICD-10-CM

## 2023-02-21 LAB
AORTIC DIMENSIONLESS INDEX: 0.5 (DI)
ASCENDING AORTA: 2.4 CM
BH CV ECHO MEAS - ACS: 1.25 CM
BH CV ECHO MEAS - AO MAX PG: 8.8 MMHG
BH CV ECHO MEAS - AO MEAN PG: 4.7 MMHG
BH CV ECHO MEAS - AO ROOT DIAM: 2.7 CM
BH CV ECHO MEAS - AO V2 MAX: 148.7 CM/SEC
BH CV ECHO MEAS - AO V2 VTI: 31.4 CM
BH CV ECHO MEAS - AVA(I,D): 1.22 CM2
BH CV ECHO MEAS - EDV(CUBED): 85.3 ML
BH CV ECHO MEAS - EDV(MOD-SP2): 75 ML
BH CV ECHO MEAS - EDV(MOD-SP4): 87 ML
BH CV ECHO MEAS - EF(MOD-BP): 46.3 %
BH CV ECHO MEAS - EF(MOD-SP2): 48 %
BH CV ECHO MEAS - EF(MOD-SP4): 47.1 %
BH CV ECHO MEAS - ESV(CUBED): 49.5 ML
BH CV ECHO MEAS - ESV(MOD-SP2): 39 ML
BH CV ECHO MEAS - ESV(MOD-SP4): 46 ML
BH CV ECHO MEAS - FS: 16.6 %
BH CV ECHO MEAS - IVS/LVPW: 1.01 CM
BH CV ECHO MEAS - IVSD: 1.03 CM
BH CV ECHO MEAS - LAT PEAK E' VEL: 3.7 CM/SEC
BH CV ECHO MEAS - LV MASS(C)D: 152.5 GRAMS
BH CV ECHO MEAS - LV MAX PG: 2.5 MMHG
BH CV ECHO MEAS - LV MEAN PG: 1.31 MMHG
BH CV ECHO MEAS - LV V1 MAX: 79.1 CM/SEC
BH CV ECHO MEAS - LV V1 VTI: 15.5 CM
BH CV ECHO MEAS - LVIDD: 4.4 CM
BH CV ECHO MEAS - LVIDS: 3.7 CM
BH CV ECHO MEAS - LVOT AREA: 2.46 CM2
BH CV ECHO MEAS - LVOT DIAM: 1.77 CM
BH CV ECHO MEAS - LVPWD: 1.02 CM
BH CV ECHO MEAS - MED PEAK E' VEL: 4.4 CM/SEC
BH CV ECHO MEAS - MR MAX PG: 93.8 MMHG
BH CV ECHO MEAS - MR MAX VEL: 484.3 CM/SEC
BH CV ECHO MEAS - MV A DUR: 0.2 SEC
BH CV ECHO MEAS - MV A MAX VEL: 115.4 CM/SEC
BH CV ECHO MEAS - MV DEC SLOPE: 278 CM/SEC2
BH CV ECHO MEAS - MV DEC TIME: 0.28 MSEC
BH CV ECHO MEAS - MV E MAX VEL: 69.8 CM/SEC
BH CV ECHO MEAS - MV E/A: 0.6
BH CV ECHO MEAS - MV MAX PG: 6.3 MMHG
BH CV ECHO MEAS - MV MEAN PG: 2.46 MMHG
BH CV ECHO MEAS - MV P1/2T: 80.3 MSEC
BH CV ECHO MEAS - MV V2 VTI: 27.5 CM
BH CV ECHO MEAS - MVA(P1/2T): 2.7 CM2
BH CV ECHO MEAS - MVA(VTI): 1.39 CM2
BH CV ECHO MEAS - PA ACC TIME: 0.06 SEC
BH CV ECHO MEAS - PA PR(ACCEL): 51.7 MMHG
BH CV ECHO MEAS - PA V2 MAX: 76.9 CM/SEC
BH CV ECHO MEAS - PI END-D VEL: 182.5 CM/SEC
BH CV ECHO MEAS - PULM A REVS DUR: 0.12 SEC
BH CV ECHO MEAS - PULM A REVS VEL: 53.4 CM/SEC
BH CV ECHO MEAS - PULM DIAS VEL: 28.6 CM/SEC
BH CV ECHO MEAS - PULM S/D: 1.27
BH CV ECHO MEAS - PULM SYS VEL: 36.4 CM/SEC
BH CV ECHO MEAS - QP/QS: 0.57
BH CV ECHO MEAS - RAP SYSTOLE: 15 MMHG
BH CV ECHO MEAS - RV MAX PG: 0.76 MMHG
BH CV ECHO MEAS - RV V1 MAX: 43.7 CM/SEC
BH CV ECHO MEAS - RV V1 VTI: 8 CM
BH CV ECHO MEAS - RVOT DIAM: 1.87 CM
BH CV ECHO MEAS - RVSP: 105 MMHG
BH CV ECHO MEAS - SV(LVOT): 38.1 ML
BH CV ECHO MEAS - SV(MOD-SP2): 36 ML
BH CV ECHO MEAS - SV(MOD-SP4): 41 ML
BH CV ECHO MEAS - SV(RVOT): 21.9 ML
BH CV ECHO MEAS - TAPSE (>1.6): 1.15 CM
BH CV ECHO MEAS - TR MAX PG: 90.5 MMHG
BH CV ECHO MEAS - TR MAX VEL: 475.6 CM/SEC
BH CV ECHO MEASUREMENTS AVERAGE E/E' RATIO: 17.23
BH CV XLRA - RV BASE: 3.3 CM
BH CV XLRA - RV LENGTH: 6.6 CM
BH CV XLRA - RV MID: 3.5 CM
BH CV XLRA - TDI S': 6.4 CM/SEC
CREAT BLDA-MCNC: 1 MG/DL (ref 0.6–1.3)
LEFT ATRIUM VOLUME INDEX: 32.1 ML/M2
MAXIMAL PREDICTED HEART RATE: 138 BPM
SINUS: 2.6 CM
STJ: 1.88 CM
STRESS TARGET HR: 117 BPM

## 2023-02-21 PROCEDURE — 82565 ASSAY OF CREATININE: CPT

## 2023-02-21 PROCEDURE — 25010000002 PERFLUTREN (DEFINITY) 8.476 MG IN SODIUM CHLORIDE (PF) 0.9 % 10 ML INJECTION: Performed by: THORACIC SURGERY (CARDIOTHORACIC VASCULAR SURGERY)

## 2023-02-21 PROCEDURE — 71275 CT ANGIOGRAPHY CHEST: CPT

## 2023-02-21 PROCEDURE — 93306 TTE W/DOPPLER COMPLETE: CPT

## 2023-02-21 PROCEDURE — 74174 CTA ABD&PLVS W/CONTRAST: CPT

## 2023-02-21 PROCEDURE — 0 IOPAMIDOL PER 1 ML: Performed by: THORACIC SURGERY (CARDIOTHORACIC VASCULAR SURGERY)

## 2023-02-21 PROCEDURE — 93306 TTE W/DOPPLER COMPLETE: CPT | Performed by: INTERNAL MEDICINE

## 2023-02-21 RX ADMIN — SODIUM CHLORIDE 2 ML: 9 INJECTION INTRAMUSCULAR; INTRAVENOUS; SUBCUTANEOUS at 14:27

## 2023-02-21 RX ADMIN — IOPAMIDOL 95 ML: 755 INJECTION, SOLUTION INTRAVENOUS at 15:01

## 2023-02-28 ENCOUNTER — TELEPHONE (OUTPATIENT)
Dept: INTERNAL MEDICINE | Age: 83
End: 2023-02-28
Payer: MEDICARE

## 2023-02-28 NOTE — TELEPHONE ENCOUNTER
Caller: Maddy Sanches    Relationship: Emergency Contact    Best call back number: [phone]      What was the call regarding: KORIN STATES THE PATIENT IS HAVING SWELLING IN HER ANKLES AND SHE WOULD LIKE TO SPEAK TO SOMEONE ABOUT THE PATIENT POSSIBLY STARTING THE (LASIX)  AGAIN. KORIN WOULD LIKE FOR SOMEONE TO GIVE HER A CALL TO DISCUSS    PLEASE CALL AND ADVISE     Do you require a callback: YES

## 2023-02-28 NOTE — TELEPHONE ENCOUNTER
Patient daughter calling wanting to know if her mother should go back on Lasix. Has swelling in her ankles,  and is SOB, wonder's if she has  some fluid re tension.  Dr. Castañeda is the one that did stop the lasix.   Will need a prescription if she is to go back on lasix  See also note below to Dr. Castañeda      [phone]

## 2023-02-28 NOTE — TELEPHONE ENCOUNTER
She has not been taking furosemide since around October apparently based on recommendation from on of the APRN in my office. She is having increased swelling and some mild shortness of breath. I advised her to resume furosemide 20 mg daily for now. Looks like cardiologist wants her in for evaluation. I advised her to follow-up as instructed by cardiologist. Daughter expressed understanding.

## 2023-03-01 ENCOUNTER — OFFICE VISIT (OUTPATIENT)
Dept: CARDIOLOGY | Facility: CLINIC | Age: 83
End: 2023-03-01
Payer: MEDICARE

## 2023-03-01 VITALS
DIASTOLIC BLOOD PRESSURE: 80 MMHG | BODY MASS INDEX: 20.07 KG/M2 | WEIGHT: 89.2 LBS | HEART RATE: 76 BPM | SYSTOLIC BLOOD PRESSURE: 110 MMHG | HEIGHT: 56 IN

## 2023-03-01 DIAGNOSIS — I71.60 THORACOABDOMINAL AORTIC ANEURYSM (TAAA) WITHOUT RUPTURE, UNSPECIFIED PART: ICD-10-CM

## 2023-03-01 DIAGNOSIS — Z95.1 S/P CABG X 1: Primary | ICD-10-CM

## 2023-03-01 DIAGNOSIS — I73.9 CLAUDICATION OF BOTH LOWER EXTREMITIES: ICD-10-CM

## 2023-03-01 PROCEDURE — 99214 OFFICE O/P EST MOD 30 MIN: CPT | Performed by: INTERNAL MEDICINE

## 2023-03-01 PROCEDURE — 93000 ELECTROCARDIOGRAM COMPLETE: CPT | Performed by: INTERNAL MEDICINE

## 2023-03-01 NOTE — PROGRESS NOTES
Date of Office Visit: 23  Encounter Provider: Tres De Los Santos MD  Place of Service: [facility]  Patient Name: Grace Beauchamp  :1940  [id number]    Chief Complaint   Patient presents with   • Shortness of Breath   :     HPI: Grace Beauchamp is a 82 y.o. female  This is a patient with a history of thoracic aneurysm repair, a catheterization, which showed one-vessel disease in her obtuse marginal.  She ended up having a bypass at the time she had her aneurysm repaired.  In 2020 she had been feeling tired for a few days then had kind of a brief episode of chest pain but felt badly with a came to the hospital was found to have an LV rupture that was contained.  Cath showed that her vein graft to the OM1 had occluded.  She had to go in for an operative repair with Dr. Ontiveros.  She has a history of pretty severe COPD.  In 2022 we brought her in for a cath and she had severe pulmonary hypertension with PA pressures in the 70s and a wedge pressure of 16.  She also had a CT angiogram done which showed pretty severe emphysema and in the past she had FEV1's less than 30%    She called complaining of swelling in her feet and needing something done and I asked her to come in and get seen today.  She now wears oxygen sometimes at nighttime.  She has had a lot of purpleish discoloration of her feet all the time and she has had just here recently some swelling in her feet she does not have PND she does not have orthopnea she has not had a change in her weight but it is quite small.  She has not had any fevers chills sweats or cough      Past Medical History:   Diagnosis Date   • AAA (abdominal aortic aneurysm)    • Acute bronchitis 2018   • Aortic arch aneurysm    • Arthritis    • Bilateral carotid artery disease (HCC)    • Bilateral cataracts     S/p Extractions   • Breast cancer (HCC)     right breast mZ2ioQd (snm) pMX ER/AL pos, Her-2/neg, Ki-67, 31%, oncotype  "recurrence score 19, invasive lobular carcinoma   • CAD (coronary artery disease)     S/p CABG on 2/23/16 by Dr. 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 Ontiveros   • Nocturia    • Osteoarthritis    • Osteoporosis    • Otitis media     R Ear   • Prediabetes    • PVD (peripheral vascular disease) ([facility])    • RLS (restless legs syndrome)    • Saccular aneurysm    • Stroke ([facility])     Perioperatively w/L Hip Repl   • Thoracic ascending aortic aneurysm     S/p Repair on 02/23/16 by Dr. Ontiveros   • TIA (transient ischemic attack)    • Urgency incontinence    • Urinary tract infection    • Venous insufficiency    • Ventral hernia        Past Surgical History:   Procedure Laterality Date   • AORTIC VALVE REPAIR/REPLACEMENT N/A 02/23/2016-[facility]    Ascending Replacement Using a 24MM Graft--Dr. Ontiveros   • APPENDECTOMY  1977   • BREAST BIOPSY Right 09/16/2012    Vacuum Assisted Core Bx of R Breast   • CARDIAC CATHETERIZATION N/A 01/06/2016    Dr. Tres De Los Santos   • CARDIAC CATHETERIZATION N/A 4/22/2019    Procedure: Left Heart Cath;  Surgeon: Tres De Los Santos MD;  Location: [facility] CATH INVASIVE LOCATION;  Service: Cardiology   • CARDIAC CATHETERIZATION N/A 4/22/2019    Procedure: Coronary angiography;  Surgeon: Tres De Los Santos MD;  Location:  [facility] CATH INVASIVE LOCATION;  Service: Cardiology   • CARDIAC CATHETERIZATION N/A 7/22/2020    Procedure: LEFT HEART CATH;  Surgeon: Antonia Scott MD;  Location: [facility] CATH INVASIVE LOCATION;  Service: " Cardiovascular;  Laterality: N/A;   • CARDIAC CATHETERIZATION N/A 7/22/2020    Procedure: CORONARY ANGIOGRAPHY;  Surgeon: Antonia cSott MD;  Location:  [facility] CATH INVASIVE LOCATION;  Service: Cardiovascular;  Laterality: N/A;   • CARDIAC CATHETERIZATION N/A 7/22/2020    Procedure: Left ventriculography;  Surgeon: Antonia Scott MD;  Location:  [facility] CATH INVASIVE LOCATION;  Service: Cardiovascular;  Laterality: N/A;   • CARDIAC CATHETERIZATION N/A 7/22/2020    Procedure: Saphenous Vein Graft;  Surgeon: Antonia Scott MD;  Location:  [facility] CATH INVASIVE LOCATION;  Service: Cardiovascular;  Laterality: N/A;   • CARDIAC CATHETERIZATION N/A 4/27/2022    Procedure: Right Heart Cath;  Surgeon: Lydia Mays MD;  Location:  [facility] CATH INVASIVE LOCATION;  Service: Cardiology;  Laterality: N/A;   • CARDIAC CATHETERIZATION N/A 4/27/2022    Procedure: Coronary angiography;  Surgeon: Lydia Mays MD;  Location:  [facility] CATH INVASIVE LOCATION;  Service: Cardiology;  Laterality: N/A;   • CARDIAC CATHETERIZATION N/A 4/27/2022    Procedure: Left Heart Cath;  Surgeon: Lydia Mays MD;  Location:  [facility] CATH INVASIVE LOCATION;  Service: Cardiology;  Laterality: N/A;   • CARDIAC SURGERY  02/2016    Dr. Ontiveros/Dr. De Los Santos   • CATARACT EXTRACTION Bilateral     [facility]   • COLONOSCOPY N/A 10/2002    Dr. Negro   • CORONARY ARTERY BYPASS GRAFT  02/23/2016-[facility]    x1 w/L Vein Grafting--Dr. Ontiveros   • CORONARY ARTERY BYPASS GRAFT N/A 9/18/2020    Procedure: STERNAL EXPLORATION WITH CLOSURE AND WASHOUT, REMOVAL OF IABP, PRP;  Surgeon: Mart Ontiveros MD;  Location: [facility] MAIN OR;  Service: Cardiothoracic;  Laterality: N/A;   • CYST REMOVAL Right 01/25/2013    R Palm Excision of Retinacular Cyst--Dr. Karla Fish   • EPIDURAL BLOCK     • LAPAROSCOPIC CHOLECYSTECTOMY N/A 08/04/2004    Dr. JOEY Sheth   • MASTECTOMY Right 09/28/2012   • ORIF HIP FRACTURE Left 03/27/2005    w/ AMBI compression screwDr. Dow  Misha   • RECTOVAGINAL FISTULA REPAIR  1965   • THORACIC AORTIC ANEURYSM REPAIR N/A 7/23/2019    Procedure: ROSE, THORACOABDOMINAL AORTIC ANEURYSM REPAIR, VISCERAL VESSEL REPAIR, LARGE VENTRAL HERNIA REPAIR WITH MESH, CIRCULATORY ARREST, PRP;  Surgeon: Mart Ontiveros MD;  Location: [facility] OR;  Service: Cardiothoracic   • TRANSESOPHAGEAL ECHOCARDIOGRAM (ROSE) N/A 9/18/2020    Procedure: TRANSESOPHAGEAL ECHOCARDIOGRAM WITH ANESTHESIA;  Surgeon: Mart Ontiveros MD;  Location: [facility] OR;  Service: Cardiothoracic;  Laterality: N/A;   • TUBAL ABDOMINAL LIGATION     • VENTRICULAR ANEURYSM REPAIR N/A 7/22/2020    Procedure: EMERGENT REOP STERNOTOMY, REPAIR OF LV RUPTURE, PRP, INTRAOP ROSE;  Surgeon: Mart Ontiveros MD;  Location: [facility] OR;  Service: Cardiothoracic;  Laterality: N/A;   • VENTRICULAR ANEURYSM REPAIR N/A 9/17/2020    Procedure: ROSE, MIDLINE STERNOTOMY REOP  LEFT VENTRICULAR ANEURYSM REPAIR, IABP INSERTION, PRP;  Surgeon: Mart Ontiveros MD;  Location: [facility];  Service: Cardiothoracic;  Laterality: N/A;       Social History     Socioeconomic History   • Marital status:    Tobacco Use   • Smoking status: Former     Packs/day: 1.00     Years: 40.00     Pack years: 40.00     Types: Cigarettes     Quit date: 12/1/2012     Years since quitting: 10.2   • Smokeless tobacco: Never   • Tobacco comments:     CAFFEINE USE:2 CUPS COFFEE/ COKE DAILY   Substance and Sexual Activity   • Alcohol use: No   • Drug use: No   • Sexual activity: Not Currently     Partners: Male     Birth control/protection: Post-menopausal       Family History   Problem Relation Age of Onset   • Alzheimer's disease Mother    • Cancer Maternal Aunt    • Heart disease Father    • Heart failure Father    • Hypertension Father    • Diabetes Father    • Liver disease Sister    • Heart failure Brother    • Hypertension Brother    • Alcohol abuse Brother    • Diabetes Brother    • No Known Problems Maternal  Grandmother    • No Known Problems Maternal Grandfather    • No Known Problems Paternal Grandmother    • No Known Problems Paternal Grandfather    • Diabetes Brother    • Brain cancer Brother    • Heart disease Brother    • Diabetes Brother        Review of Systems   Constitutional: Negative for decreased appetite, fever, malaise/fatigue and weight loss.   HENT: Negative for nosebleeds.    Eyes: Negative for double vision.   Cardiovascular: Negative for chest pain, claudication, cyanosis, dyspnea on exertion, irregular heartbeat, leg swelling, near-syncope, orthopnea, palpitations, paroxysmal nocturnal dyspnea and syncope.   Respiratory: Negative for cough, hemoptysis and shortness of breath.    Hematologic/Lymphatic: Negative for bleeding problem.   Skin: Negative for rash.   Musculoskeletal: Negative for falls and myalgias.   Gastrointestinal: Negative for hematochezia, jaundice, melena, nausea and vomiting.   Genitourinary: Negative for hematuria.   Neurological: Negative for dizziness and seizures.   Psychiatric/Behavioral: Negative for altered mental status and memory loss.       Allergies   Allergen Reactions   • Ramipril Other (See Comments)     Ace I  cough   • Morphine Itching   • Morphine And Related Itching   • Bactrim [Sulfamethoxazole-Trimethoprim] Itching and Rash   • Cipro [Ciprofloxacin Hcl] Rash         Current Outpatient Medications:   •  albuterol sulfate  (90 Base) MCG/ACT inhaler, INHALE TWO PUFFS BY MOUTH EVERY 6 HOURS AS NEEDED FOR WHEEZING OR SHORTNESS OF AIR, Disp: 8.5 g, Rfl: 3  •  aspirin 81 MG EC tablet, Take 1 tablet by mouth Daily., Disp: , Rfl:   •  cetirizine (zyrTEC) 10 MG tablet, Take 1 tablet by mouth As Needed for Allergies. (Patient taking differently: Take 1 tablet by mouth Daily As Needed for Allergies.), Disp: 30 tablet, Rfl: 0  •  chlorhexidine (PERIDEX) 0.12 % solution, , Disp: , Rfl:   •  clobetasol (TEMOVATE) 0.05 % ointment, , Disp: , Rfl:   •  esomeprazole  (nexIUM) 40 MG capsule, Take 1 capsule by mouth Every Morning Before Breakfast., Disp: 30 capsule, Rfl: 3  •  Fluticasone-Umeclidin-Vilant (Trelegy Ellipta) 100-62.5-25 MCG/INH inhaler, Inhale 1 puff Daily., Disp: 90 each, Rfl: 0  •  furosemide (LASIX) 20 MG tablet, Take 1 tablet by mouth Daily., Disp: 90 tablet, Rfl: 0  •  hydrocortisone 2.5 % ointment, , Disp: , Rfl:   •  isosorbide mononitrate (IMDUR) 30 MG 24 hr tablet, Take 1 tablet by mouth 2 (Two) Times a Day., Disp: 180 tablet, Rfl: 2  •  losartan (COZAAR) 25 MG tablet, Take 1 tablet by mouth Daily., Disp: 90 tablet, Rfl: 3  •  meclizine (ANTIVERT) 12.5 MG tablet, Take 1 tablet by mouth 3 (Three) Times a Day As Needed for Dizziness., Disp: 21 tablet, Rfl: 0  •  metoprolol succinate XL (TOPROL-XL) 50 MG 24 hr tablet, Take 1 tablet by mouth Daily., Disp: 90 tablet, Rfl: 3  •  mirtazapine (Remeron) 15 MG tablet, Take 1 tablet by mouth Every Night., Disp: 30 tablet, Rfl: 2  •  pramipexole (MIRAPEX) 0.125 MG tablet, Take 1 tablet by mouth every night at bedtime., Disp: 90 tablet, Rfl: 0  •  sennosides-docusate (PERICOLACE) 8.6-50 MG per tablet, Take 2 tablets by mouth Daily As Needed for Constipation., Disp: , Rfl:   •  traMADol (ULTRAM) 50 MG tablet, Take 1 tablet by mouth Every 6 (Six) Hours As Needed for Moderate Pain., Disp: , Rfl:   •  Vitamin D, Cholecalciferol, 50 MCG (2000 UT) capsule, Take 2,000 Units by mouth Daily., Disp: 30 capsule, Rfl:   •  atorvastatin (LIPITOR) 40 MG tablet, Take 1 tablet by mouth Every Night., Disp: 90 tablet, Rfl: 2  •  Cranberry 1000 MG capsule, Take  by mouth., Disp: , Rfl:   •  Lactobacillus (FLORANEX PO), Take  by mouth. 1milloncell, Disp: , Rfl:   •  multivitamin (THERAGRAN) tablet tablet, Take 1 tablet by mouth Daily., Disp: 90 tablet, Rfl: 0  •  mupirocin (BACTROBAN) 2 % ointment, , Disp: , Rfl:   •  potassium chloride 10 MEQ CR tablet, Take 1 tablet by mouth Daily., Disp: 30 tablet, Rfl: 3  •  TOVIAZ 4 MG tablet  "sustained-release 24 hour tablet, Take 1 tablet by mouth Daily., Disp: , Rfl:       Objective:     Vitals:    03/01/23 1526   BP: 110/80   Pulse: 76   Weight: 40.5 kg (89 lb 3.2 oz)   Height: 142.2 cm (56\")     Body mass index is 20 kg/m².    Physical Exam  Constitutional:       Appearance: She is well-developed.      Comments: Cachectic   HENT:      Head: Normocephalic.   Eyes:      General: No scleral icterus.  Neck:      Thyroid: No thyromegaly.      Vascular: No JVD.   Cardiovascular:      Rate and Rhythm: Normal rate and regular rhythm.      Heart sounds: Normal heart sounds. No murmur heard.    No friction rub. No gallop.   Pulmonary:      Effort: Pulmonary effort is normal.      Breath sounds: Normal breath sounds. No wheezing or rales.   Abdominal:      Palpations: Abdomen is soft.      Tenderness: There is no abdominal tenderness.   Musculoskeletal:         General: Normal range of motion.      Right lower leg: Edema present.      Left lower leg: Edema present.   Lymphadenopathy:      Cervical: No cervical adenopathy.   Skin:     General: Skin is warm and dry.      Findings: Erythema present. No rash.   Neurological:      Mental Status: She is alert and oriented to person, place, and time.           ECG 12 Lead    Date/Time: 3/1/2023 3:55 PM  Performed by: Tres De Los Santos MD  Authorized by: Tres De Los Santos MD   Comparison: compared with previous ECG   Similar to previous ECG  Rhythm: sinus rhythm  Conduction: left anterior fascicular block  Other findings: non-specific ST-T wave changes    Clinical impression: abnormal EKG  Comments: [facility]             No ECG performed     Assessment:       Diagnosis Plan   1. S/P CABG x 1        2. 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 rupture, unspecified part        3. Claudication of both lower extremities (HCC)               Plan:       I think that this lady is potentially in really big trouble.  She has severe pulmonary hypertension now with pulmonary " pressures almost equal to her systemic pressure.  I think the erythema in her feet is probably congestion and right heart failure.  We can have her take a little bit of diuretic but she is going to get into renal issues because actually her kidneys probably think she is dry.  I spoke with Yaya Londono he is not really sure that there is a role for vasodilators she has pretty severe COPD I recommended she get on her oxygen he is going to get her in this week but we may be at a point where we need to be thinking about palliative care for her.    As always, it has been a pleasure to participate in your patient's care.      Sincerely,       Tres De Los Santos MD

## 2023-03-08 RX ORDER — ISOSORBIDE MONONITRATE 30 MG/1
30 TABLET, EXTENDED RELEASE ORAL 2 TIMES DAILY
Qty: 180 TABLET | Refills: 2 | Status: SHIPPED | OUTPATIENT
Start: 2023-03-08

## 2023-03-08 RX ORDER — FUROSEMIDE 20 MG/1
20 TABLET ORAL DAILY
Qty: 90 TABLET | Refills: 2 | Status: SHIPPED | OUTPATIENT
Start: 2023-03-08

## 2023-03-13 ENCOUNTER — APPOINTMENT (OUTPATIENT)
Dept: GENERAL RADIOLOGY | Facility: HOSPITAL | Age: 83
End: 2023-03-13
Payer: MEDICARE

## 2023-03-13 ENCOUNTER — HOME HEALTH ADMISSION (OUTPATIENT)
Dept: HOME HEALTH SERVICES | Facility: HOME HEALTHCARE | Age: 83
End: 2023-03-13
Payer: MEDICARE

## 2023-03-13 ENCOUNTER — HOSPITAL ENCOUNTER (EMERGENCY)
Facility: HOSPITAL | Age: 83
Discharge: HOME | End: 2023-03-13
Attending: EMERGENCY MEDICINE | Admitting: EMERGENCY MEDICINE
Payer: MEDICARE

## 2023-03-13 ENCOUNTER — TRANSCRIBE ORDERS (OUTPATIENT)
Dept: HOME HEALTH SERVICES | Facility: HOME HEALTHCARE | Age: 83
End: 2023-03-13
Payer: MEDICARE

## 2023-03-13 VITALS
SYSTOLIC BLOOD PRESSURE: 113 MMHG | OXYGEN SATURATION: 93 % | DIASTOLIC BLOOD PRESSURE: 52 MMHG | RESPIRATION RATE: 18 BRPM | WEIGHT: 89 LBS | HEART RATE: 83 BPM | TEMPERATURE: 97.7 F | HEIGHT: 56 IN | BODY MASS INDEX: 20.02 KG/M2

## 2023-03-13 DIAGNOSIS — S39.011A STRAIN OF MUSCLE OF RIGHT GROIN REGION: ICD-10-CM

## 2023-03-13 DIAGNOSIS — S76.011A HIP STRAIN, RIGHT, INITIAL ENCOUNTER: Primary | ICD-10-CM

## 2023-03-13 PROCEDURE — 73502 X-RAY EXAM HIP UNI 2-3 VIEWS: CPT

## 2023-03-13 PROCEDURE — 99283 EMERGENCY DEPT VISIT LOW MDM: CPT

## 2023-03-13 NOTE — CASE MANAGEMENT/SOCIAL WORK
"Discharge Planning Assessment  [facility]     Patient Name: Grace Beauchamp  MRN: [id number]  Today's Date: 3/13/2023    Admit Date: 3/13/2023        Discharge Needs Assessment    No documentation.                Discharge Plan     Row Name 03/13/23 1315       Plan    Plan Comments Entered room, introduced self and explained role w/PPE in place on self, patient and daughter; Verified information on facesheet; Patient lives in [facility] living- has assistance w/meals, laundry, cleaning and bathing; She is retired and ambulates w/assist of walker and is on home oxygen through [place] and has a Life Alert necklace; Patient has previously gone to [facility] for rehab and used [facility] for home health. Verified PCP listed as  and RX are filled at [facility] on [facility]. Pt presents to the ER with c/o groin pain after strain yesterday- patient is now having difficulty with mobility and her and family have concerns about d/c needs; Provided RTR, discussed options including rehab, home health, home w/sitters, etc. At this time the staff plans to perform a \"road test\" to see if patient is mobile enough to go home w/daughter for now and home health to follow at daughters home;    ER provider advised road test went well enough for patient to go home w/daughter. Provider will place referral to home health- Pt and family requests [facility].     Referral sent to [facility]- spoke w/staff for referral. Corinne Encarnacion RN                Continued Care and Services - Admitted Since 3/13/2023    Coordination has not been started for this encounter.          Demographic Summary    No documentation.                Functional Status    No documentation.                Psychosocial    No documentation.                Abuse/Neglect    No documentation.                Legal    No documentation.                Substance Abuse    No documentation.                Patient Forms    No documentation.                   Corinne Encarnacion RN    "

## 2023-03-13 NOTE — DISCHARGE INSTRUCTIONS
Although you are being discharged from the ED today, I encourage you to return for worsening symptoms. Things can, and do, change such that treatment at home with medication may not be adequate. Specifically I recommend returning for severe or intractable pain, inability to ambulate with assistance and home health, numbness, or any other worsening or alarming symptoms.     Rest.  Alternate heat and ice as needed.  Take tramadol you have at home as needed.  Follow up with Ortho this week for further evaluation and management Call to make appt.  Referral to home health has been placed.  Follow up with primary care provider for further management and to have blood pressure rechecked.  Take your medications as prescribed.

## 2023-03-13 NOTE — ED PROVIDER NOTES
EMERGENCY DEPARTMENT ENCOUNTER    Room Number:  T02/02  Date seen:  3/13/2023  PCP: Miller Castañeda MD  Discussed/ obtained information from independent historians: patient and daughter      HPI:  Chief Complaint: right hip pain  Context: Grace Beauchamp is a 82 y.o. female who presents to the ED c/o right hip and groin pain since yesterday.  Patient states she was sitting on the floor attempting to adjust her oxygen cords.  She states when she attempted to stand up from kneeling, she felt a lot of pain in her right hip and groin.  She states since then it has been very difficult to move her right leg and bear weight.  While she has had a hip replacement on the left hip, she has not had any surgery on her right hip.  Dr. Galicia did her left hip replacement.  She typically walks with a walker on her own.  She currently stays at [facility] assisted living.  Patient states she deals with a lot of neuropathy in both of her legs does not have a lot of strength as it is in either of her legs.  She denies any other injury.  She denies falling.      External (non-ED) record review: Patient followed up with her cardiologist on 3/1/2023 regarding severe pulmonary hypertension and likely right heart failure.  She was given a little diuretic and he was working on getting her follow-up with Y[facility] Londono although also mentioned potential palliative care for her      PAST MEDICAL HISTORY  Active Ambulatory Problems     Diagnosis Date Noted   • History of breast cancer 01/29/2016   • Stenosis of carotid artery 01/29/2016   • Chronic obstructive pulmonary disease (HCC) 01/29/2016   • Closed fracture of multiple ribs 01/29/2016   • Cognitive disorder 01/29/2016   • Depression 01/29/2016   • Erythromelalgia (HCC) 01/29/2016   • Hyperlipidemia 01/29/2016   • Hypertension 01/29/2016   • Primary osteoarthritis involving multiple joints 01/29/2016   • Osteoporosis 01/29/2016   • Restless legs syndrome 01/29/2016   • Cerebral aneurysm  01/29/2016   • Temporary cerebral vascular dysfunction 01/29/2016   • Urinary tract infection 01/29/2016   • S/P CABG x 1 04/22/2016   • Type 2 diabetes mellitus without complication, without long-term current use of insulin (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) 10/17/2012   • Arthritis of right hip 11/06/2018   • Arthritis of right knee 11/06/2018   • Chondrocalcinosis 11/06/2018   • Chronic pain of right knee 11/06/2018   • DDD (degenerative disc disease), lumbosacral 12/31/2013   • Gastroesophageal reflux disease 01/18/2019   • Bilateral hearing loss 01/18/2019   • Thoracic aortic aneurysm without rupture 04/16/2019   • PVD (peripheral vascular disease) ([facility]) 06/07/2019   • 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[facility]) 06/20/2019   • Thoracoabdominal aortic aneurysm, without rupture 06/26/2019   • 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 rupture 06/26/2019   • Aortic aneurysm, descending ([facility]) 07/19/2019   • Aortic aneurysm without rupture ([facility]) 07/22/2019   • CAD (coronary artery disease) 05/29/2020   • S/P left heart catheterization by ventricular puncture 07/23/2020   • Cerebral infarction ([facility]) 09/13/2013   • Pericardial hematoma 09/14/2020   • History of ST elevation myocardial infarction (STEMI) 09/14/2020   • Chronic diastolic CHF (congestive heart failure) ([facility]) 09/14/2020   • Left ventricular aneurysm 09/14/2020   • Immobility syndrome 09/25/2020   • Lower extremity edema 10/28/2020   • QT prolongation 10/28/2020   • Recurrent UTI (urinary tract infection) 12/03/2021   • Hypoxia 04/18/2022   • Metabolic encephalopathy 04/22/2022   • LUH (acute kidney injury) ([facility]) 04/22/2022   •  Hypotension 04/22/2022   • UTI due to extended-spectrum beta lactamase (ESBL) producing Escherichia coli 04/26/2022   • Ischemic cardiomyopathy 04/28/2022   • Acute on chronic systolic CHF (congestive heart failure) ([facility]) 04/28/2022   • Pulmonary hypertension ([facility]) 05/02/2022   • Mild protein-calorie malnutrition ([facility]) 06/16/2022   • Encounter for subsequent annual wellness visit in Medicare patient 10/20/2022   • Strep sore throat 12/06/2022     Resolved Ambulatory Problems     Diagnosis Date Noted   • Abdominal aortic aneurysm 01/29/2016   • Aneurysm of thoracic aorta 01/29/2016   • COPD exacerbation ([facility]) 12/02/2018   • Hypokalemia 04/22/2022     Past Medical History:   Diagnosis Date   • AAA (abdominal aortic aneurysm)    • Acute bronchitis 12/2018   • Arthritis    • Bilateral carotid artery disease ([facility])    • Bilateral cataracts    • Breast cancer ([facility])    • 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[facility])    • Cataracts, bilateral    • Closed fracture of three ribs of right side    • COPD (chronic obstructive pulmonary disease) ([facility])    • Descending aortic arch aneurysm    • Diabetes mellitus ([facility])    • Erythermalgia ([facility])    • GERD (gastroesophageal reflux disease)    • Low back pain    • Moderate aortic valve insufficiency    • Nocturia    • Osteoarthritis    • Otitis media    • Prediabetes    • RLS (restless legs syndrome)    • Saccular aneurysm    • Stroke ([facility])    • Thoracic ascending aortic aneurysm    • TIA (transient ischemic attack)    • Urgency incontinence    • Venous insufficiency    • Ventral hernia          PAST SURGICAL HISTORY  Past Surgical History:   Procedure Laterality Date   • AORTIC VALVE REPAIR/REPLACEMENT N/A 02/23/2016-[facility]    Ascending Replacement Using a 24MM Graft--Dr. Ontiveros   • APPENDECTOMY  1977   • BREAST BIOPSY Right 09/16/2012    Vacuum Assisted Core Bx of R Breast   • CARDIAC CATHETERIZATION N/A 01/06/2016    Dr. Tres De Los Santos   • CARDIAC CATHETERIZATION N/A 4/22/2019    Procedure: Left Heart Cath;   Surgeon: Tres De Los Santos MD;  Location:  [facility];  Service: Cardiology   • CARDIAC CATHETERIZATION N/A 4/22/2019    Procedure: Coronary angiography;  Surgeon: Tres De Los Santos MD;  Location:  [facility];  Service: Cardiology   • CARDIAC CATHETERIZATION N/A 7/22/2020    Procedure: LEFT HEART CATH;  Surgeon: Antonia Scott MD;  Location:  [facility];  Service: Cardiovascular;  Laterality: N/A;   • CARDIAC CATHETERIZATION N/A 7/22/2020    Procedure: CORONARY ANGIOGRAPHY;  Surgeon: Antonia Scott MD;  Location:  [facility];  Service: Cardiovascular;  Laterality: N/A;   • CARDIAC CATHETERIZATION N/A 7/22/2020    Procedure: Left ventriculography;  Surgeon: Antonia Scott MD;  Location:  [facility];  Service: Cardiovascular;  Laterality: N/A;   • CARDIAC CATHETERIZATION N/A 7/22/2020    Procedure: Saphenous Vein Graft;  Surgeon: Antonia Scott MD;  Location:  [facility];  Service: Cardiovascular;  Laterality: N/A;   • CARDIAC CATHETERIZATION N/A 4/27/2022    Procedure: Right Heart Cath;  Surgeon: Lydia Mays MD;  Location:  [facility];  Service: Cardiology;  Laterality: N/A;   • CARDIAC CATHETERIZATION N/A 4/27/2022    Procedure: Coronary angiography;  Surgeon: Lydia Mays MD;  Location:  [facility];  Service: Cardiology;  Laterality: N/A;   • CARDIAC CATHETERIZATION N/A 4/27/2022    Procedure: Left Heart Cath;  Surgeon: Lydia Mays MD;  Location:  [facility];  Service: Cardiology;  Laterality: N/A;   • CARDIAC SURGERY  02/2016    Dr. Ontiveros/Dr. De Los Santos   • CATARACT EXTRACTION Bilateral     [facility]   • COLONOSCOPY N/A 10/2002    Dr. Negro   • CORONARY ARTERY BYPASS GRAFT  02/23/2016-[facility]    x1 w/L Vein Grafting--Dr. Ontiveros   • CORONARY ARTERY BYPASS GRAFT N/A 9/18/2020    Procedure: STERNAL EXPLORATION WITH CLOSURE AND WASHOUT,  REMOVAL OF IABP, PRP;  Surgeon: Mart Ontiveros MD;  Location: [facility] MAIN OR;  Service: Cardiothoracic;  Laterality: N/A;   • CYST REMOVAL Right 01/25/2013    R Palm Excision of Retinacular Cyst--Dr. Karla Fish   • EPIDURAL BLOCK     • LAPAROSCOPIC CHOLECYSTECTOMY N/A 08/04/2004    Dr. JOEY Sheth   • MASTECTOMY Right 09/28/2012   • ORIF HIP FRACTURE Left 03/27/2005    w/ AMBI compression screw, Dr. Victor M Cabral   • RECTOVAGINAL FISTULA REPAIR  1965   • THORACIC AORTIC ANEURYSM REPAIR N/A 7/23/2019    Procedure: ROSE, THORACOABDOMINAL AORTIC ANEURYSM REPAIR, VISCERAL VESSEL REPAIR, LARGE VENTRAL HERNIA REPAIR WITH MESH, CIRCULATORY ARREST, PRP;  Surgeon: Mart Ontiveros MD;  Location: [facility] OR;  Service: Cardiothoracic   • TRANSESOPHAGEAL ECHOCARDIOGRAM (ROSE) N/A 9/18/2020    Procedure: TRANSESOPHAGEAL ECHOCARDIOGRAM WITH ANESTHESIA;  Surgeon: Mart Ontiveros MD;  Location: [facility] MAIN OR;  Service: Cardiothoracic;  Laterality: N/A;   • TUBAL ABDOMINAL LIGATION     • VENTRICULAR ANEURYSM REPAIR N/A 7/22/2020    Procedure: EMERGENT REOP STERNOTOMY, REPAIR OF LV RUPTURE, PRP, INTRAOP ROSE;  Surgeon: Mart Ontiveros MD;  Location: [facility] OR;  Service: Cardiothoracic;  Laterality: N/A;   • VENTRICULAR ANEURYSM REPAIR N/A 9/17/2020    Procedure: ROSE, MIDLINE STERNOTOMY REOP  LEFT VENTRICULAR ANEURYSM REPAIR, IABP INSERTION, PRP;  Surgeon: Mart Ontiveros MD;  Location: [facility] MAIN OR;  Service: Cardiothoracic;  Laterality: N/A;         FAMILY HISTORY  Family History   Problem Relation Age of Onset   • Alzheimer's disease Mother    • Cancer Maternal Aunt    • Heart disease Father    • Heart failure Father    • Hypertension Father    • Diabetes Father    • Liver disease Sister    • Heart failure Brother    • Hypertension Brother    • Alcohol abuse Brother    • Diabetes Brother    • No Known Problems Maternal Grandmother    • No Known Problems Maternal Grandfather    • No Known Problems  Paternal Grandmother    • No Known Problems Paternal Grandfather    • Diabetes Brother    • Brain cancer Brother    • Heart disease Brother    • Diabetes Brother          SOCIAL HISTORY  Social History     Socioeconomic History   • Marital status:    Tobacco Use   • Smoking status: Former     Packs/day: 1.00     Years: 40.00     Pack years: 40.00     Types: Cigarettes     Quit date: 12/1/2012     Years since quitting: 10.2   • Smokeless tobacco: Never   • Tobacco comments:     CAFFEINE USE:2 CUPS COFFEE/ COKE DAILY   Substance and Sexual Activity   • Alcohol use: No   • Drug use: No   • Sexual activity: Not Currently     Partners: Male     Birth control/protection: Post-menopausal         ALLERGIES  Ramipril, Morphine, Morphine and related, Bactrim [sulfamethoxazole-trimethoprim], and Cipro [ciprofloxacin hcl]        REVIEW OF SYSTEMS  Review of Systems   Constitutional: Negative for chills and fever.   Respiratory: Negative for cough and shortness of breath.    Cardiovascular: Negative for chest pain and leg swelling.   Gastrointestinal: Negative for abdominal pain, nausea and vomiting.   Genitourinary: Negative for flank pain.   Musculoskeletal: Positive for arthralgias. Negative for back pain and neck pain.   Skin: Negative for wound.   Neurological: Negative for syncope and numbness.   Psychiatric/Behavioral: Negative for confusion.          PHYSICAL EXAM  ED Triage Vitals   Temp Heart Rate Resp BP SpO2   03/13/23 1057 03/13/23 1057 03/13/23 1057 03/13/23 1057 03/13/23 1057   97.7 °F (36.5 °C) 62 16 152/78 90 %      Temp src Heart Rate Source Patient Position BP Location FiO2 (%)   -- 03/13/23 1133 03/13/23 1133 03/13/23 1133 --    Monitor Lying Right arm        Physical Exam      GENERAL: no acute distress  HENT: normocephalic, atraumatic  EYES: no scleral icterus  CV: regular rhythm, normal rate.  No murmurs, rubs, or gallops.  RESPIRATORY: Nasal cannula O2 in place, normal effort.  No  rhonchi  ABDOMEN: nondistended.  No tenderness, rigidity, or guarding.  MUSCULOSKELETAL: no deformity.  No external rotation or bulging of the right hip.  Tenderness in the right groin with no obvious mass or lymphadenopathy.  Pain with internal and external rotation of the right hip.  Palpable femoral pulse.  No tenderness of right knee.  No abdominal tenderness.  No midline or paraspinal L-spine tenderness  NEURO: alert, moves all extremities, follows commands  PSYCH:  calm, cooperative  SKIN: warm, dry    Vital signs and nursing notes reviewed.      RADIOLOGY  XR Hip With or Without Pelvis 2 - 3 View Right    Result Date: 3/13/2023  XR HIP W OR WO PELVIS 2-3 VIEW RIGHT-  INDICATIONS: Pain  TECHNIQUE: Frontal views of the pelvis, 2 views of the right hip  COMPARISON: None available  FINDINGS:  No acute fracture, erosion, or dislocation is identified. Advanced degenerative changes of the right hip are noted, with loss of the joint space, marginal spurring, remodeling/degenerative subcortical collapse on both sides of the joint space. Left hip arthroplasty hardware appears intact. The bones are diffusely osteopenic. Degenerative changes are seen at the symphysis pubis and partly included lumbar spine. Follow-up/further evaluation can be obtained as indications persist.       As described.  This report was finalized on 3/13/2023 12:14 PM by Dr. Jeison Salomon M.D.        Ordered the above noted radiological studies. Reviewed by me in PACS.            MEDICATIONS GIVEN IN ER  Medications - No data to display        MEDICAL DECISION MAKING, PROGRESS, and CONSULTS    All labs have been independently reviewed by me.  All radiology studies have been reviewed by me and I have also reviewed the radiology report.   EKG's independently viewed and interpreted by me.  Discussion below represents my analysis of pertinent findings related to patient's condition, differential diagnosis, treatment plan and final  disposition.      Additional sources:    - Chronic or social conditions impacting care: Patient requires ambulation with a walker and has multiple medical problems (thoracoabdominal aneurysm, pulmonary hypertension, chronic need for O2 supplementation)    - Shared decision making: Between patient's family, CCP, and myself      Orders placed during this visit:  Orders Placed This Encounter   Procedures   • XR Hip With or Without Pelvis 2 - 3 View Right   • Ambulatory Referral to Home Health (Hospital)   • Ambulate patient         Additional orders considered but not ordered:  CT hip/pelvis.  Patient was able to bear weight and ambulate with walker.    Differential diagnosis:  Hip strain, groin strain, hip fracture, pelvic fracture      Independent interpretation of labs, radiology studies, and discussions with consultants:  ED Course as of 03/13/23 1439   Mon Mar 13, 2023   1222 I viewed and independently interpreted the patient's hip x-ray.  My finding is no acute fracture. []   1224 We will have patient evaluated by CCP given her current living conditions at assisted living that does not have capability of higher level of care. []   1231 LEE Sen will evaluate the patient. [AH]   1333 Patient was able to ambulate with assistance with walker.  LEE Sen has evaluated and daughter has agreed to take the patient home with home health for assistance.  She will otherwise follow-up with Ortho outpatient-Dr. Galicia replaced her left hip so she will likely follow-up with them.  Patient does have tramadol at home and I would recommend continuing this as needed for pain until follow-up with Ortho.  Patient and daughter were instructed to return if she becomes unable to ambulate or pain is severe. []      ED Course User Index  [AH] Yamilet Hoyt PA             Patient was wearing a face mask when I entered the room and they continued to wear a mask throughout their stay in the ED.  I wore PPE, including  gloves,  face mask with shield or face mask with goggles whenever I was in the room with patient.     DIAGNOSIS  Final diagnoses:   Hip strain, right, initial encounter   Strain of muscle of right groin region           Follow Up:  Miller Castañeda MD  [address]  [address]  [place] [address]  [phone]    Schedule an appointment as soon as possible for a visit       Isaac Galicia MD  [address]  [place] [address]  [phone]    Schedule an appointment as soon as possible for a visit   this week      RX:     Medication List      Changed    cetirizine 10 MG tablet  Commonly known as: zyrTEC  Take 1 tablet by mouth As Needed for Allergies.  What changed: when to take this            Latest Documented Vital Signs:  As of 14:39 EDT  BP- 113/52 HR- 83 Temp- 97.7 °F (36.5 °C) O2 sat- 93%              --    Please note that portions of this were completed with a voice recognition program.       Note Disclaimer: At [facility], we believe that sharing information builds trust and better relationships. You are receiving this note because you are receiving care at [facility] or recently visited. It is possible you will see health information before a provider has talked with you about it. This kind of information can be easy to misunderstand. To help you fully understand what it means for your health, we urge you to discuss this note with your provider.             Yamilet Hoyt PA  03/13/23 [id number]

## 2023-03-13 NOTE — DISCHARGE PLACEMENT REQUEST
"Krupa Heck (82 y.o. Female)     Date of Birth   1940    Social Security Number       Address   [address] [place] [address]    Home Phone   [phone]    MRN   [id number]       Pentecostal   Amish    Marital Status                               Admission Date   3/13/23    Admission Type   Emergency    Admitting Provider       Attending Provider   Tres Kirby MD    Department, Room/Bed   [facility] Emergency Department, T02/02       Discharge Date       Discharge Disposition       Discharge Destination                               Attending Provider: Tres Kirby MD    Allergies: Ramipril, Morphine, Morphine And Related, Bactrim [Sulfamethoxazole-trimethoprim], Cipro [Ciprofloxacin Hcl]    Isolation: None   Infection: ESBL E coli (04/25/22)   Code Status: Prior    Ht: 142.2 cm (56\")   Wt: 40.4 kg (89 lb)    Admission Cmt: None   Principal Problem: None                Active Insurance as of 3/13/2023     Primary Coverage     Payor Plan Insurance Group Employer/Plan Group    MEDICARE MEDICARE A & B      Payor Plan Address Payor Plan Phone Number Payor Plan Fax Number Effective Dates    [address] [phone]  7/1/2005 - None Entered    [facility] [id number]       Subscriber Name Subscriber Birth Date Member ID       KRUPA HECK 1940 [id number]           Secondary Coverage     Payor Plan Insurance Group Employer/Plan Group    [facility] SUPP [id number]     Payor Plan Address Payor Plan Phone Number Payor Plan Fax Number Effective Dates    [address]   12/1/2016 - None Entered    [facility] [id number]       Subscriber Name Subscriber Birth Date Member ID       KRUPA HECK 1940 [id number]                 Emergency Contacts      (Rel.) Home Phone Work Phone Mobile Phone    Sanches,Maddy (Daughter) [phone] -- [phone]    COBY CAPUTO (Daughter) [phone] -- [phone]    " BRENDA CEDILLO (Daughter) [phone] -- [phone]

## 2023-03-13 NOTE — CASE MANAGEMENT/SOCIAL WORK
"Discharge Planning Assessment  [facility]     Patient Name: Grace Beauchamp  MRN: [id number]  Today's Date: 3/13/2023    Admit Date: 3/13/2023        Discharge Needs Assessment    No documentation.                Discharge Plan     Row Name 03/13/23 1315       Plan    Plan Comments Entered room, introduced self and explained role w/PPE in place on self, patient and daughter; Verified information on facesheet; Patient lives in [facility] living- has assistance w/meals, laundry, cleaning and bathing; She is retired and ambulates w/assist of walker and is on home oxygen through [employer] and has a Life Alert necklace; Patient has previously gone to [facility] for rehab and used [facility] for home health. Verified PCP listed as  and RX are filled at [facility]. Pt presents to the ER with c/o groin pain after strain yesterday- patient is now having difficulty with mobility and her and family have concerns about d/c needs; Provided RTR, discussed options including rehab, home health, home w/sitters, etc. At this time the staff plans to perform a \"road test\" to see if patient is mobile enough to go home w/daughter for now and home health to follow at daughters home;              Continued Care and Services - Admitted Since 3/13/2023    Coordination has not been started for this encounter.          Demographic Summary    No documentation.                Functional Status    No documentation.                Psychosocial    No documentation.                Abuse/Neglect    No documentation.                Legal    No documentation.                Substance Abuse    No documentation.                Patient Forms    No documentation.                   Corinne Encarnacion RN    "

## 2023-03-13 NOTE — ED PROVIDER NOTES
MD ATTESTATION NOTE    The DELIA and I have discussed this patient's history, physical exam, and treatment plan.  I have reviewed the documentation and personally had a face to face interaction with the patient. I affirm the documentation and agree with the treatment and plan.  The attached note describes my personal findings.      I provided a substantive portion of the care of the patient.  I personally performed the physical exam in its entirety, and below are my findings.  For this patient encounter, the patient wore surgical mask, I wore full protective PPE including N95 and eye protection.      Brief HPI: Patient complains of right groin pain.  Patient was kneeling on the floor yesterday and the pain began as she stood up.  Pain was initially mild but was worse when she woke up this morning.  She did not fall.  Denies new numbness/tingling/weakness in her right leg.  She usually ambulates with a walker.    PHYSICAL EXAM  ED Triage Vitals   Temp Heart Rate Resp BP SpO2   03/13/23 1057 03/13/23 1057 03/13/23 1057 03/13/23 1057 03/13/23 1057   97.7 °F (36.5 °C) 62 16 152/78 90 %      Temp src Heart Rate Source Patient Position BP Location FiO2 (%)   -- 03/13/23 1133 03/13/23 1133 03/13/23 1133 --    Monitor Lying Right arm          GENERAL: Awake, alert, oriented x3.  Well-developed, well-nourished female.  Resting comfortably in no acute distress  HENT: nares patent  EYES: no scleral icterus  CV: regular rhythm, normal rate  RESPIRATORY: normal effort, clear to auscultation bilaterally  ABDOMEN: soft, nontender   MUSCULOSKELETAL: There is mild tenderness over the anterior right pubis.  There is pain with external rotation of the right hip.  Full range of motion in all extremities.  No obvious deformities.  NEURO: Normal strength and light touch sensation in both lower extremities  PSYCH:  calm, cooperative  SKIN: warm, dry    Vital signs and nursing notes reviewed.        Plan: Obtain x-rays of the right  hip/pelvis.    X-rays are negative acute.    ED Course as of 03/13/23 1435   Mon Mar 13, 2023   1222 I viewed and independently interpreted the patient's hip x-ray.  My finding is no acute fracture. []   1224 We will have patient evaluated by CCP given her current living conditions at assisted living that does not have capability of higher level of care. []   1231 LEE Sen will evaluate the patient. []   1333 Patient was able to ambulate with assistance with walker.  LEE Sen has evaluated and daughter has agreed to take the patient home with home health for assistance.  She will otherwise follow-up with Ortho outpatient-Dr. Galicia replaced her left hip so she will likely follow-up with them.  Patient does have tramadol at home and I would recommend continuing this as needed for pain until follow-up with Ortho.  Patient and daughter were instructed to return if she becomes unable to ambulate or pain is severe. []      ED Course User Index  [] Yamilet Hoyt PA Holland, William D, MD  03/13/23 1435

## 2023-03-13 NOTE — ED TRIAGE NOTES
Patient to ER via ems from home  Reports R groin pain  Was on the ground yesterday fixing her oxygen cords and had to strain to get up    Patient wears 2L at all times  Patient was able to walk with EMS  Patient wearing mask this Rn in PPE

## 2023-03-14 ENCOUNTER — TELEPHONE (OUTPATIENT)
Dept: INTERNAL MEDICINE | Age: 83
End: 2023-03-14

## 2023-03-14 DIAGNOSIS — F32.A DEPRESSION, UNSPECIFIED DEPRESSION TYPE: Chronic | ICD-10-CM

## 2023-03-14 NOTE — TELEPHONE ENCOUNTER
Caller: [facility]    Relationship: [facility]    Best call back number: [phone]    What orders are you requesting (i.e. lab or imaging): VERBAL ORDERS FOR PHYSICAL THERAPY EVALUATION

## 2023-03-15 ENCOUNTER — HOME CARE VISIT (OUTPATIENT)
Dept: HOME HEALTH SERVICES | Facility: HOME HEALTHCARE | Age: 83
End: 2023-03-15
Payer: MEDICARE

## 2023-03-15 VITALS
OXYGEN SATURATION: 99 % | HEART RATE: 88 BPM | DIASTOLIC BLOOD PRESSURE: 68 MMHG | TEMPERATURE: 98.1 F | SYSTOLIC BLOOD PRESSURE: 116 MMHG

## 2023-03-15 PROCEDURE — G0151 HHCP-SERV OF PT,EA 15 MIN: HCPCS

## 2023-03-15 RX ORDER — MIRTAZAPINE 15 MG/1
15 TABLET, FILM COATED ORAL NIGHTLY
Qty: 90 TABLET | Refills: 1 | Status: SHIPPED | OUTPATIENT
Start: 2023-03-15

## 2023-03-15 NOTE — HOME HEALTH
"Patient is an 82 year old female with referral to home health following emergency room visit after kneeling on ground to fix her oxygen tubing over the weekend and straining to get back up.  Patient reports pulling muscles/pain along the entire right side of her body.  Patient is mostly complaining of right groin pain that inhibited her from weight bearing and walking but patient has been able to bear \"some weight\" since initial injury.  X-rays reveal no acute fractures of pelvis and hip and she is referred to orthopedic surgeon but appointment has not yet been set.  Patient also noted to have extensive past medical history but recent diagnosis of pulmonary hypertension and exacerbation of heart failure.  Patient is on 2 liters of oxygen continuously.  She lives at an assisted living facility along the first floor but is currently staying with her daughter and son in law due to an inability to be safely left alone in her apartment with recent injury.  Patient is very anxious to return home but cannot do so until the facility sees she could exit the builiding in case of an emergency/fire independently.  Patient is complaining of a lot of anxiety and difficulty breathing within the last one year but worsening since emergency room visit.  She feels a little better today and slept well last night.  Family and patient goal is to \"Get walking better, safely and to return home.  Get strength back so I can do things.\"    Daughter is reporting some recent confusion and patient did have difficulty recalling items but was oriented to time, person, place and date.    Prior level of function/living status: Patient lived on first floor assisted living facility, utilized rollator but rarely left apartment as most meals were brought to her.  When patient does have to go to dining room she walks approximately 10 to 15 feet but must rest or someone in the facility will take her in a wheelchair.  Patient takes a wheelchair to all " medical appointments.  Daughter sets medication planner weekly.  Patient has supportive family with daughter who is [place]A out of the country for the next 5 weeks.    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    Plan for Next Visit:  -Begin home exercise program for upper/lower body strength/lung function  -Resume gait training with apprporiate assistive device, posture and pursed lip breathing for increased tolerance  -Assess if patient is safe to return to assisted living facility or still needs constant supervision

## 2023-03-17 ENCOUNTER — HOME CARE VISIT (OUTPATIENT)
Dept: HOME HEALTH SERVICES | Facility: HOME HEALTHCARE | Age: 83
End: 2023-03-17
Payer: MEDICARE

## 2023-03-17 VITALS
DIASTOLIC BLOOD PRESSURE: 58 MMHG | TEMPERATURE: 96.6 F | OXYGEN SATURATION: 94 % | SYSTOLIC BLOOD PRESSURE: 112 MMHG | HEART RATE: 74 BPM

## 2023-03-17 DIAGNOSIS — J44.9 CHRONIC OBSTRUCTIVE PULMONARY DISEASE, UNSPECIFIED COPD TYPE: ICD-10-CM

## 2023-03-17 PROCEDURE — G0151 HHCP-SERV OF PT,EA 15 MIN: HCPCS

## 2023-03-17 NOTE — HOME HEALTH
"Subjective: \"I can't breathe very well because I just went to the bathroom.\"    Falls- none    Medication changes- none    Assessment: Patient did well with new seated exercises and ambulation for ~15 feet with O2 on 2 liters. O2 sat dropped to 82% post ambulation and recovered to 91% after several minutes.    Communication: Report to Charity Acosta, PT    Plan For Next Visit:  gait training  therapeutic exercises  fall risk education  transfer training  pain management"

## 2023-03-20 ENCOUNTER — PATIENT OUTREACH (OUTPATIENT)
Dept: CASE MANAGEMENT | Facility: OTHER | Age: 83
End: 2023-03-20
Payer: MEDICARE

## 2023-03-20 RX ORDER — ALBUTEROL SULFATE 90 UG/1
AEROSOL, METERED RESPIRATORY (INHALATION)
Qty: 18 G | Refills: 3 | Status: SHIPPED | OUTPATIENT
Start: 2023-03-20

## 2023-03-20 NOTE — OUTREACH NOTE
AMBULATORY CASE MANAGEMENT NOTE    Name and Relationship of Patient/Support Person: Reid Beauchampice - Self    Adult Patient Profile  Questions/Answers    Flowsheet Row Most Recent Value   How Well Do You Speak English? well   Source of Information patient   Limitations on Visitors/Phone Calls none   Hearing Difficulty or Deaf yes   Wear Glasses or Blind yes   Concentrating, Remembering or Making Decisions Difficulty no   Walking or Climbing Stairs Difficulty yes  [current with [facility]]        Patient Outreach    Outgoing call to the patient.  Introduced self and explained role and purpose of outreach.  Discussed [facility] program and services.  She prefers enrollment and verbalized understanding that there is no charges for services and that she may opt out at any time.  She is current with [facility] and prefers an outreach in about 4 weeks and have placed on pause while active with HH.  Outreach in 4 weeks is scheduled.     Gerda BUNCH  Ambulatory Case Management    3/20/2023, 13:21 EDT  
No

## 2023-03-21 ENCOUNTER — HOME CARE VISIT (OUTPATIENT)
Dept: HOME HEALTH SERVICES | Facility: HOME HEALTHCARE | Age: 83
End: 2023-03-21
Payer: MEDICARE

## 2023-03-21 VITALS
OXYGEN SATURATION: 90 % | DIASTOLIC BLOOD PRESSURE: 68 MMHG | SYSTOLIC BLOOD PRESSURE: 106 MMHG | HEART RATE: 65 BPM | TEMPERATURE: 96.7 F

## 2023-03-21 PROCEDURE — G0151 HHCP-SERV OF PT,EA 15 MIN: HCPCS

## 2023-03-21 NOTE — HOME HEALTH
"Patient asleep on her couch with one leg elevated upon arrival.  Took several times of saying her name to arouse.  Patient states she forgot about PT appointment today as she is in a lot of pain. \"I can't really move it hurts.\"  States she took Tylenol approximately an hour ago but took Tramadol the last two days and felt better than today.  Patient states she has tried working on the exercises from last PT session but has not been able to do them today.  Her pain complaints are in the right thigh but more \"In the tesfaye, on both sides.\"  She grabs her glutes.  Patient states she is getting around when she has to.    Lunch observed untouched on coffee table, multiple items around her couch.  Patient's oxygen at 2 liters but never above 90% upon multiple assessments today and as low as 85%.     PT left patient upright on couch eating her lunch.  Heating pad placed on medium heat at right hip/thigh and above her blanket with PT showing her how to control the pad.       Plan for Next Visit:  -Continue to monitor oxygen saturations, may contact MD if no improvement  -Address home safety in her assisted living  -Revisit exercises as able  -Address pain management, follow up with orthopedist"

## 2023-03-22 ENCOUNTER — TELEPHONE (OUTPATIENT)
Dept: INTERNAL MEDICINE | Age: 83
End: 2023-03-22

## 2023-03-22 NOTE — TELEPHONE ENCOUNTER
Caller: NGUYENBRENDA    Relationship: Emergency Contact    Best call back number: [phone]    What orders are you requesting (i.e. lab or imaging): URINALYSIS    In what timeframe would the patient need to come in: ASAP    Where will you receive your lab/imaging services: IN OFFICE     Additional notes: PLEASE REACH OUT TO DAUGHTER AND LET HER KNOW IF PATIENT NEEDS TO BE SEEN FIRST.     DAUGHTER STATES THAT SHE IS ACTING OUT OF SORTS AND THAT MOST OF THE TIME SHE DOES THAT SHE HAS A UTI.

## 2023-03-23 ENCOUNTER — HOME CARE VISIT (OUTPATIENT)
Dept: HOME HEALTH SERVICES | Facility: HOME HEALTHCARE | Age: 83
End: 2023-03-23
Payer: MEDICARE

## 2023-03-23 ENCOUNTER — HOSPITAL ENCOUNTER (OUTPATIENT)
Dept: GENERAL RADIOLOGY | Facility: HOSPITAL | Age: 83
Discharge: HOME | End: 2023-03-23
Admitting: INTERNAL MEDICINE
Payer: MEDICARE

## 2023-03-23 VITALS
TEMPERATURE: 97.7 F | OXYGEN SATURATION: 90 % | DIASTOLIC BLOOD PRESSURE: 78 MMHG | HEART RATE: 70 BPM | SYSTOLIC BLOOD PRESSURE: 118 MMHG

## 2023-03-23 PROCEDURE — 63710000001 BARIUM SULFATE 96 % RECONSTITUTED SUSPENSION: Performed by: INTERNAL MEDICINE

## 2023-03-23 PROCEDURE — A9270 NON-COVERED ITEM OR SERVICE: HCPCS | Performed by: INTERNAL MEDICINE

## 2023-03-23 PROCEDURE — 74240 X-RAY XM UPR GI TRC 1CNTRST: CPT

## 2023-03-23 PROCEDURE — G0151 HHCP-SERV OF PT,EA 15 MIN: HCPCS

## 2023-03-23 RX ADMIN — BARIUM SULFATE 183 ML: 960 POWDER, FOR SUSPENSION ORAL at 11:42

## 2023-03-23 NOTE — TELEPHONE ENCOUNTER
spoke with janay daughter jeannie soliz    Said pt has muscle  strain and went to ER 3/13    Pt was to be seen with home health     Pt has [facility] for PT     She is currently staying with her daughter 5-6 day now     Stating she is cranky, change in behavior and disoriented sometimes, weak    Pt wear dipper and padding. She also has problem with incontinence(pt has devise for the incontinence problem)     Pt hygiene not good ,refuse to shower     Daughter trying to keep her clean .      Think it is related to UTI , they will try to contact her Urology too

## 2023-03-23 NOTE — TELEPHONE ENCOUNTER
Hub staff attempted to follow warm transfer process and was unsuccessful     Caller: BRENDA CEDILLO    Relationship to patient: Emergency Contact    Best call back number: [phone] OK TO LEAVE A VOICE MAIL IS PERSONAL VOICE MAIL     Patient is needing: CALLING BACK DR. COLLINS BACK TO SEE IF CAN GET UA

## 2023-03-24 ENCOUNTER — CLINICAL SUPPORT (OUTPATIENT)
Dept: INTERNAL MEDICINE | Age: 83
End: 2023-03-24
Payer: MEDICARE

## 2023-03-24 DIAGNOSIS — N30.90 CYSTITIS: Primary | ICD-10-CM

## 2023-03-24 LAB
BILIRUB BLD-MCNC: ABNORMAL MG/DL
CLARITY, POC: ABNORMAL
COLOR UR: ABNORMAL
EXPIRATION DATE: ABNORMAL
GLUCOSE UR STRIP-MCNC: NEGATIVE MG/DL
KETONES UR QL: NEGATIVE
LEUKOCYTE EST, POC: ABNORMAL
Lab: ABNORMAL
NITRITE UR-MCNC: NEGATIVE MG/ML
PH UR: 5.5 [PH] (ref 5–8)
PROT UR STRIP-MCNC: ABNORMAL MG/DL
RBC # UR STRIP: ABNORMAL /UL
SP GR UR: 1.02 (ref 1–1.03)
UROBILINOGEN UR QL: NORMAL

## 2023-03-24 PROCEDURE — 81003 URINALYSIS AUTO W/O SCOPE: CPT | Performed by: INTERNAL MEDICINE

## 2023-03-24 RX ORDER — NITROFURANTOIN 25; 75 MG/1; MG/1
100 CAPSULE ORAL 2 TIMES DAILY
Qty: 14 CAPSULE | Refills: 0 | Status: SHIPPED | OUTPATIENT
Start: 2023-03-24 | End: 2023-03-31

## 2023-03-24 NOTE — HOME HEALTH
"Patient greets PT upon arrival.  She is semi supine on the couch but stating she is in a lot of pain.  She rates it at a 7.5/10 stating she has taken no pain medication today and she has been up and mobile since leaving for her MD appointment this morning.  Patient states she had some \"cleaning up\" to do.  She states she tries to do the exercises but she just hurts all the time.  Transitioning to standing is the \"worst\" and she feels a little better after she gets up and moves around but then has to sit due to the all over body pain that she states worsens even with coughing.  \"I don't know what you're going to do with me.\"      Patient does have some issues recalling words throughout session but oriented to date and time.      Patient reports she was wheeled to the dining room last night for supper.  She states she did not take her portable oxygen tank and when she returned to her room her oxygen was at 90%.  Patient also reports waking up sweating over night and not feeling well and the workers at her facility assisted her with her oxygen which was \"kinked\" and she noted a tear in a tubing piece which they had extras for and replaced."

## 2023-03-28 ENCOUNTER — HOME CARE VISIT (OUTPATIENT)
Dept: HOME HEALTH SERVICES | Facility: HOME HEALTHCARE | Age: 83
End: 2023-03-28
Payer: MEDICARE

## 2023-03-28 ENCOUNTER — TELEPHONE (OUTPATIENT)
Dept: INTERNAL MEDICINE | Age: 83
End: 2023-03-28
Payer: MEDICARE

## 2023-03-28 VITALS
TEMPERATURE: 97.7 F | SYSTOLIC BLOOD PRESSURE: 98 MMHG | OXYGEN SATURATION: 97 % | HEART RATE: 86 BPM | DIASTOLIC BLOOD PRESSURE: 62 MMHG

## 2023-03-28 LAB
APPEARANCE UR: ABNORMAL
BACTERIA #/AREA URNS HPF: ABNORMAL /[HPF]
BACTERIA UR CULT: ABNORMAL
BACTERIA UR CULT: ABNORMAL
BILIRUB UR QL STRIP: NEGATIVE
CASTS URNS MICRO: ABNORMAL
CASTS URNS QL MICRO: PRESENT /LPF
COLOR UR: YELLOW
EPI CELLS #/AREA URNS HPF: ABNORMAL /HPF (ref 0–10)
GLUCOSE UR QL STRIP: NEGATIVE
HGB UR QL STRIP: ABNORMAL
KETONES UR QL STRIP: NEGATIVE
LEUKOCYTE ESTERASE UR QL STRIP: ABNORMAL
MICRO URNS: ABNORMAL
NITRITE UR QL STRIP: NEGATIVE
OTHER ANTIBIOTIC SUSC ISLT: ABNORMAL
PH UR STRIP: 5 [PH] (ref 5–7.5)
PROT UR QL STRIP: ABNORMAL
RBC #/AREA URNS HPF: ABNORMAL /HPF (ref 0–2)
SP GR UR STRIP: 1.02 (ref 1–1.03)
URINALYSIS REFLEX: ABNORMAL
UROBILINOGEN UR STRIP-MCNC: 1 MG/DL (ref 0.2–1)
WBC #/AREA URNS HPF: >30 /HPF (ref 0–5)

## 2023-03-28 PROCEDURE — G0151 HHCP-SERV OF PT,EA 15 MIN: HCPCS

## 2023-03-28 NOTE — TELEPHONE ENCOUNTER
Caller: Grace Beauchamp    Relationship: Self    Best call back number: [phone]    What was the call regarding: PATIENT STATES THE STOMACH MEDICATION SHE WAS PRESCRIBED FOR THE STOMACH ISSUES IF NOT WORKING AND SHE WOULD LIKE TO SPEAK TO DR COLLINS ABOUT STARTING A NEW MEDICATION.    PATIENT ALSO WOULD LIKE FOR SOMEONE TO GIVE HER A CALL WITH HER STOMACH SCAN RESULTS THAT SHE HAD DONE AT THE [facility] LAST WEEK    PLEASE CALL AND ADVISE

## 2023-03-28 NOTE — HOME HEALTH
"Patient is sitting upright and greets therapist warmly.  She states she is \"some better\" but not \"all the way better.\"  Patient states she is still using heat and ice but likes the ice better.  She is taking two Tramadol a day.  Daughter informed PT that patient was tested for UTI and did have one, they are awaiting results of the culture but she was placed on an antibiotic but daughter unsure the name and will have to retrieve for PT to place in record later.  Patient feels she can get anywhere around that she needs to but has not been leaving her room except to go to MD appointments.  Daughter has placed a wheelchair cushion in patient's recliner so she has been using it more, however, she feels the cushion is too firm and wants to try a water filled cushion.  When questioned if she has been doing her exercises, she states she has and then shows PT the written instructions and demonstrates 50% of them and asks questions about technique.    At end of session, patient noted she had soiled herself.  PT assisted patient in removing of pants, adult diaper and pad and retrieved clean items and assisted in donning.  Therefore, extended time with session today.    Plan for Next Visit:  -Review exercises and assess for compliance/technique  -Address safety with oxygen tubing navigation, continuing to keep pathways clear  -Work on improving posture with ambulation and maintaing the walker closer to frame of body"

## 2023-03-29 RX ORDER — FLUCONAZOLE 150 MG/1
150 TABLET ORAL ONCE
Qty: 1 TABLET | Refills: 0 | Status: SHIPPED | OUTPATIENT
Start: 2023-03-29 | End: 2023-03-29

## 2023-03-29 NOTE — TELEPHONE ENCOUNTER
1. Spoke with pt was saying it feel uncomfortable to after she eat , after couple gaging feel better     Pt was taking tums   Was asking if she is should take esomeprazole . I informed her yes every morning before breakfast . Said if that can help her     notified her take it if still having problem call our office. Expressed understanding     Result was given to her when she came to leave urine sample last week       2. She is taking (macrobid) for UTI  She is having yeast infection . Asking if you can send her rx or recommend somthing

## 2023-03-29 NOTE — PROGRESS NOTES
CALL PATIENT with results:    Urine culture grew E. Coli. See how she is responding to the antibiotic.

## 2023-03-31 ENCOUNTER — HOME CARE VISIT (OUTPATIENT)
Dept: HOME HEALTH SERVICES | Facility: HOME HEALTHCARE | Age: 83
End: 2023-03-31
Payer: MEDICARE

## 2023-03-31 DIAGNOSIS — K21.9 GASTROESOPHAGEAL REFLUX DISEASE, UNSPECIFIED WHETHER ESOPHAGITIS PRESENT: Chronic | ICD-10-CM

## 2023-03-31 DIAGNOSIS — R13.10 ODYNOPHAGIA: ICD-10-CM

## 2023-03-31 DIAGNOSIS — G25.81 RESTLESS LEGS SYNDROME (RLS): ICD-10-CM

## 2023-04-01 RX ORDER — FLUCONAZOLE 150 MG/1
150 TABLET ORAL ONCE
Qty: 1 TABLET | Refills: 0 | Status: SHIPPED | OUTPATIENT
Start: 2023-04-01 | End: 2023-04-01

## 2023-04-03 ENCOUNTER — TELEPHONE (OUTPATIENT)
Dept: INTERNAL MEDICINE | Age: 83
End: 2023-04-03
Payer: MEDICARE

## 2023-04-03 RX ORDER — POTASSIUM CHLORIDE 750 MG/1
TABLET, FILM COATED, EXTENDED RELEASE ORAL
Qty: 30 TABLET | Refills: 3 | Status: SHIPPED | OUTPATIENT
Start: 2023-04-03

## 2023-04-03 RX ORDER — PRAMIPEXOLE DIHYDROCHLORIDE 0.12 MG/1
TABLET ORAL
Qty: 90 TABLET | Refills: 1 | Status: SHIPPED | OUTPATIENT
Start: 2023-04-03

## 2023-04-03 RX ORDER — ESOMEPRAZOLE MAGNESIUM 40 MG/1
CAPSULE, DELAYED RELEASE ORAL
Qty: 90 CAPSULE | Refills: 3 | Status: SHIPPED | OUTPATIENT
Start: 2023-04-03

## 2023-04-03 NOTE — TELEPHONE ENCOUNTER
Patient left a message on answering service harrison:    Please refill my tramadol medication for 90 days.    Please prescribe a medication for a fever blister on my lip.

## 2023-04-03 NOTE — TELEPHONE ENCOUNTER
Spoke with daughter(edwar) she is out of town. She will call her mom(pt) and will call us back to clarify

## 2023-04-04 ENCOUNTER — HOME CARE VISIT (OUTPATIENT)
Dept: HOME HEALTH SERVICES | Facility: HOME HEALTHCARE | Age: 83
End: 2023-04-04
Payer: MEDICARE

## 2023-04-04 ENCOUNTER — TELEPHONE (OUTPATIENT)
Dept: INTERNAL MEDICINE | Age: 83
End: 2023-04-04
Payer: MEDICARE

## 2023-04-04 VITALS
TEMPERATURE: 97.4 F | OXYGEN SATURATION: 94 % | HEART RATE: 72 BPM | DIASTOLIC BLOOD PRESSURE: 60 MMHG | SYSTOLIC BLOOD PRESSURE: 104 MMHG

## 2023-04-04 DIAGNOSIS — M15.9 PRIMARY OSTEOARTHRITIS INVOLVING MULTIPLE JOINTS: Primary | Chronic | ICD-10-CM

## 2023-04-04 PROCEDURE — G0151 HHCP-SERV OF PT,EA 15 MIN: HCPCS

## 2023-04-04 NOTE — TELEPHONE ENCOUNTER
Caller: Grace Beauchamp    Relationship: Self    Best call back number: [phone]    Do you require a callback: YES-PATIENT HAS 1 WEEK LEFT AND IS WANTING STATUS UPDATE OF TRAMADOL REFILL.     SHE WANTED MEDICATION FOR BLISTER ON HIS LIP. SHE IS REQUESTING UPDATE ON THAT AS WELL.

## 2023-04-04 NOTE — TELEPHONE ENCOUNTER
uds 8/12/21  Contract 8/12/21    She get fever blister always x 1 week  Asking for RX     To [facility]

## 2023-04-04 NOTE — TELEPHONE ENCOUNTER
Caller: [facility]YS[facility]ERAPY HOME HEALTH    Relationship:     Best call back number: [phone]    Who are you requesting to speak with (clinical staff, provider,  specific staff member):CLINICAL     What was the call regarding: HOME HEALTH IS NEEDING VERBAL ORDERS FOR 1 TIME A WEEK FOR 3 WEEKS.    Do you require a callback:YES

## 2023-04-05 RX ORDER — TRAMADOL HYDROCHLORIDE 50 MG/1
50 TABLET ORAL EVERY 6 HOURS PRN
Qty: 90 TABLET | Refills: 0 | Status: SHIPPED | OUTPATIENT
Start: 2023-04-05

## 2023-04-05 RX ORDER — VALACYCLOVIR HYDROCHLORIDE 1 G/1
TABLET, FILM COATED ORAL
Qty: 4 TABLET | Refills: 0 | Status: SHIPPED | OUTPATIENT
Start: 2023-04-05

## 2023-04-05 NOTE — HOME HEALTH
"Patient and family would like to continue working with PT.  Patient states \"I'm better but I have more work to do.\"  She requests to walk outside since it is \"such a pretty day.\"  Daughter reports patient has been more energetic since lung specialist appointment in [place] last week.  Specialist did not change medications but advised patient turning her oxygen up to \"3, 4, 5\" liters as she is up and mobile.  Patient has been keeping her oxygen consistently on 3 liters and feels it has been very helpful so she is trying to get out of her room for either a meal or socialization once per day.  Patient states her hip pain is easing but still noticeable when transitioning from getting up and down and bringing her right leg forward.    After session, PT has decided to extend sessions once per week for 3 weeks.  Call to MD Castañeda, patient's PCP, to obtain verbal orders to cotinue to treat patient with education on exercises/home safety and gait training/energy conservation.    Plan for Next Visit:  -Review exercise program making advancements in standing as tolerated  -Address energy conservation with ambulation, encourage adherence to use of oxygen  -Continue to monitor oxygen saturations at rest and with activity"

## 2023-04-06 ENCOUNTER — TELEPHONE (OUTPATIENT)
Dept: PEDIATRICS | Facility: OTHER | Age: 83
End: 2023-04-06

## 2023-04-06 ENCOUNTER — TELEPHONE (OUTPATIENT)
Dept: INTERNAL MEDICINE | Age: 83
End: 2023-04-06
Payer: MEDICARE

## 2023-04-06 NOTE — TELEPHONE ENCOUNTER
Caller: Grace Beauchamp    Relationship: Self    Best call back number:[phone]  What is the best time to reach you: ANYTIME   Who are you requesting to speak with (clinical staff, provider,  specific staff member):CLINICAL STAFF  Do you know the name of the person who called: SELF     What was the call regarding: PATIENT CALLED AND STATED SHE NEEDS A PRE- AUTH ON HER TRAMADOL.PLEASE CALL  Do you require a callback: YES

## 2023-04-06 NOTE — TELEPHONE ENCOUNTER
Pa submitted and approved . Pharmacy informed   Bin [id number]  [facility]  [facility] [id number]

## 2023-04-11 ENCOUNTER — OFFICE VISIT (OUTPATIENT)
Dept: CARDIAC SURGERY | Facility: CLINIC | Age: 83
End: 2023-04-11
Payer: MEDICARE

## 2023-04-11 VITALS
HEART RATE: 71 BPM | DIASTOLIC BLOOD PRESSURE: 70 MMHG | TEMPERATURE: 96.6 F | BODY MASS INDEX: 18 KG/M2 | SYSTOLIC BLOOD PRESSURE: 119 MMHG | RESPIRATION RATE: 20 BRPM | WEIGHT: 80 LBS | HEIGHT: 56 IN | OXYGEN SATURATION: 91 %

## 2023-04-11 DIAGNOSIS — I50.23 ACUTE ON CHRONIC SYSTOLIC CHF (CONGESTIVE HEART FAILURE): ICD-10-CM

## 2023-04-11 DIAGNOSIS — Z17.0 MALIGNANT NEOPLASM OF BREAST, STAGE 1, ESTROGEN RECEPTOR POSITIVE, UNSPECIFIED LATERALITY: ICD-10-CM

## 2023-04-11 DIAGNOSIS — Z98.890 S/P ASCENDING AORTIC ANEURYSM REPAIR: ICD-10-CM

## 2023-04-11 DIAGNOSIS — Z86.79 S/P ASCENDING AORTIC ANEURYSM REPAIR: ICD-10-CM

## 2023-04-11 DIAGNOSIS — I71.23 ANEURYSM OF DESCENDING THORACIC AORTA WITHOUT RUPTURE: Primary | Chronic | ICD-10-CM

## 2023-04-11 DIAGNOSIS — I71.62 PARAVISCERAL ABDOMINAL AORTIC ANEURYSM (AAA) WITHOUT RUPTURE: ICD-10-CM

## 2023-04-11 DIAGNOSIS — C50.919 MALIGNANT NEOPLASM OF BREAST, STAGE 1, ESTROGEN RECEPTOR POSITIVE, UNSPECIFIED LATERALITY: ICD-10-CM

## 2023-04-11 DIAGNOSIS — Z95.1 S/P CABG X 1: ICD-10-CM

## 2023-04-11 NOTE — LETTER
April 13, 2023     Miller Castañeda MD  [address]  [facility] [address]  [facility] [address]    Patient: Grace Beauchamp   YOB: 1940   Date of Visit: 4/11/2023       Dear Dr. Maddy MD:    Thank you for referring Grace Beauchamp to me for evaluation. Below are the relevant portions of my assessment and plan of care.    If you have questions, please do not hesitate to call me. I look forward to following Grace along with you.         Sincerely,        Mart Ontiveros MD        CC: No Recipients    Mart Ontiveros MD  04/13/23 0724  Sign when Signing Visit  4/13/2023    Seen on 4/11/2023    Chief Complaint:     Follow-up evaluation thoracoabdominal aortic aneurysm    History of Present Illness:       Dear Colleagues,  It was nice to see Grace Beauchamp in follow up evaluation for her thoracoabdominal aortic aneurysm and previous heart surgeries. She is a 82 y.o. female with multiple medical problems including thoracoabdominal aortic aneurysm repair, CABG in 2 operations who have seen for the last many years.  She denies chest or upper back pain. She has worsening dyspnea, she is on home oxygen.  She has been diagnosed severe pulm hypertension which could be multifactorial.  She has seen a specialist in that regard.  She is bounded to the wheelchair due to her frailty and also paralysis of the left lower extremity. Her last chest CT showed no significant changes from the previous CT scan.  There are no aneurysmal enlargement or false aneurysm from the multiple operations.  Overall she has a decline physically by her mental status and cognitive function are intact    Patient Active Problem List   Diagnosis   • History of breast cancer   • Stenosis of carotid artery   • Chronic obstructive pulmonary disease   • Closed fracture of multiple ribs   • Cognitive disorder   • Depression   • Erythromelalgia   • Hyperlipidemia   • Hypertension   • Primary osteoarthritis involving multiple joints   • Osteoporosis   •  Restless legs syndrome   • Cerebral aneurysm   • Temporary cerebral vascular dysfunction   • Urinary tract infection   • S/P CABG x 1   • Type 2 diabetes mellitus without complication, without long-term current use of insulin   • S/P ascending aortic aneurysm repair   • Aortic arch aneurysm   • Descending thoracic aortic aneurysm   • Claudication of both lower extremities ([facility])   • Thoracoabdominal aortic aneurysm   • Ventral hernia without obstruction or gangrene   • Breast cancer, stage 1, estrogen receptor positive   • Arthritis of right hip   • Arthritis of right knee   • Chondrocalcinosis   • Chronic pain of right knee   • DDD (degenerative disc disease), lumbosacral   • Gastroesophageal reflux disease   • Bilateral hearing loss   • Thoracic aortic aneurysm without rupture   • PVD (peripheral vascular disease) ([facility])   • Paraparesis   • Thoracoabdominal aortic aneurysm, without rupture   • 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 rupture   • Aortic aneurysm, descending   • Aortic aneurysm without rupture   • CAD (coronary artery disease)   • S/P left heart catheterization by ventricular puncture   • Cerebral infarction   • Pericardial hematoma   • History of ST elevation myocardial infarction (STEMI)   • Chronic diastolic CHF (congestive heart failure)   • Left ventricular aneurysm   • Immobility syndrome   • Lower extremity edema   • QT prolongation   • Recurrent UTI (urinary tract infection)   • Hypoxia   • Metabolic encephalopathy   • LUH (acute kidney injury)   • Hypotension   • UTI due to extended-spectrum beta lactamase (ESBL) producing Escherichia coli   • Ischemic cardiomyopathy   • Acute on chronic systolic CHF (congestive heart failure)   • Pulmonary hypertension   • Mild protein-calorie malnutrition   • Encounter for subsequent annual wellness visit in Medicare patient   • Strep sore throat       Past Medical History:   Diagnosis Date   • AAA (abdominal aortic aneurysm)    • Acute bronchitis  "12/2018   • Aortic arch aneurysm    • Arthritis    • Bilateral carotid artery disease    • Bilateral cataracts     S/p Extractions   • Breast cancer     right breast [id number] (snm) pMX ER/VA pos, Her-2/neg, Ki-67, 31%, oncotype recurrence score 19, invasive lobular carcinoma   • CAD (coronary artery disease)     S/p CABG on 2/23/16 by Dr. Ontiveros   • Cancer of subglottis    • Cataracts, bilateral    • Closed fracture of three ribs of right side    • Cognitive disorder    • COPD (chronic obstructive pulmonary disease)    • Depression    • Descending aortic arch aneurysm    • Diabetes mellitus     \"BORDERLINE\"   • Erythermalgia    • GERD (gastroesophageal reflux disease)    • Hyperlipidemia     Controlled w/Meds   • Hypertension     Controlled w/Meds   • Low back pain    • Moderate aortic valve insufficiency     S/p AVR on 02/23/16 by Dr. Ontiveros   • Nocturia    • Osteoarthritis    • Osteoporosis    • Otitis media     R Ear   • Prediabetes    • PVD (peripheral vascular disease)    • RLS (restless legs syndrome)    • Saccular aneurysm    • Stroke     Perioperatively w/L Hip Repl   • Thoracic ascending aortic aneurysm     S/p Repair on 02/23/16 by Dr. Ontiveros   • TIA (transient ischemic attack)    • Urgency incontinence    • Urinary tract infection    • Venous insufficiency    • Ventral hernia        Past Surgical History:   Procedure Laterality Date   • AORTIC VALVE REPAIR/REPLACEMENT N/A 02/23/2016-[facility]    Ascending Replacement Using a 24MM Graft--Dr. Ontiveros   • APPENDECTOMY  1977   • BREAST BIOPSY Right 09/16/2012    Vacuum Assisted Core Bx of R Breast   • CARDIAC CATHETERIZATION N/A 01/06/2016    Dr. Tres De Los Santos   • CARDIAC CATHETERIZATION N/A 4/22/2019    Procedure: Left Heart Cath;  Surgeon: Tres De Los Santos MD;  Location: [facility] CATH INVASIVE LOCATION;  Service: Cardiology   • CARDIAC CATHETERIZATION N/A 4/22/2019    Procedure: Coronary angiography;  Surgeon: Tres De Los Santos MD;  Location: [facility] CATH INVASIVE " LOCATION;  Service: Cardiology   • CARDIAC CATHETERIZATION N/A 7/22/2020    Procedure: LEFT HEART CATH;  Surgeon: Antonia Scott MD;  Location:  [facility] CATH INVASIVE LOCATION;  Service: Cardiovascular;  Laterality: N/A;   • CARDIAC CATHETERIZATION N/A 7/22/2020    Procedure: CORONARY ANGIOGRAPHY;  Surgeon: Antonia Scott MD;  Location:  [facility] CATH INVASIVE LOCATION;  Service: Cardiovascular;  Laterality: N/A;   • CARDIAC CATHETERIZATION N/A 7/22/2020    Procedure: Left ventriculography;  Surgeon: Antonia Scott MD;  Location:  [facility] CATH INVASIVE LOCATION;  Service: Cardiovascular;  Laterality: N/A;   • CARDIAC CATHETERIZATION N/A 7/22/2020    Procedure: Saphenous Vein Graft;  Surgeon: Antonia Scott MD;  Location:  [facility] CATH INVASIVE LOCATION;  Service: Cardiovascular;  Laterality: N/A;   • CARDIAC CATHETERIZATION N/A 4/27/2022    Procedure: Right Heart Cath;  Surgeon: Lydia Mays MD;  Location:  [facility] CATH INVASIVE LOCATION;  Service: Cardiology;  Laterality: N/A;   • CARDIAC CATHETERIZATION N/A 4/27/2022    Procedure: Coronary angiography;  Surgeon: Lydia Mays MD;  Location:  [facility] CATH INVASIVE LOCATION;  Service: Cardiology;  Laterality: N/A;   • CARDIAC CATHETERIZATION N/A 4/27/2022    Procedure: Left Heart Cath;  Surgeon: Lydia Mays MD;  Location:  [facility] CATH INVASIVE LOCATION;  Service: Cardiology;  Laterality: N/A;   • CARDIAC SURGERY  02/2016    Dr. Ontiveros/Dr. De Los Santos   • CATARACT EXTRACTION Bilateral     [facility]   • COLONOSCOPY N/A 10/2002    Dr. Negro   • CORONARY ARTERY BYPASS GRAFT  02/23/2016-[facility]    x1 w/L Vein Grafting--Dr. Ontiveros   • CORONARY ARTERY BYPASS GRAFT N/A 9/18/2020    Procedure: STERNAL EXPLORATION WITH CLOSURE AND WASHOUT, REMOVAL OF IABP, PRP;  Surgeon: Mart Ontiveros MD;  Location: [facility] MAIN OR;  Service: Cardiothoracic;  Laterality: N/A;   • CYST REMOVAL Right 01/25/2013    R Palm Excision of Retinacular Cyst--Dr. Karla Fish   • EPIDURAL BLOCK      • LAPAROSCOPIC CHOLECYSTECTOMY N/A 08/04/2004    Dr. JOEY Sheth   • MASTECTOMY Right 09/28/2012   • ORIF HIP FRACTURE Left 03/27/2005    w/ AMBI compression screw, Dr. Victor M Cabral   • RECTOVAGINAL FISTULA REPAIR  1965   • THORACIC AORTIC ANEURYSM REPAIR N/A 7/23/2019    Procedure: ROSE, THORACOABDOMINAL AORTIC ANEURYSM REPAIR, VISCERAL VESSEL REPAIR, LARGE VENTRAL HERNIA REPAIR WITH MESH, CIRCULATORY ARREST, PRP;  Surgeon: Mart Ontiveros MD;  Location: [facility] MAIN OR;  Service: Cardiothoracic   • TRANSESOPHAGEAL ECHOCARDIOGRAM (ROSE) N/A 9/18/2020    Procedure: TRANSESOPHAGEAL ECHOCARDIOGRAM WITH ANESTHESIA;  Surgeon: Mart Ontiveros MD;  Location: [facility] MAIN OR;  Service: Cardiothoracic;  Laterality: N/A;   • TUBAL ABDOMINAL LIGATION     • VENTRICULAR ANEURYSM REPAIR N/A 7/22/2020    Procedure: EMERGENT REOP STERNOTOMY, REPAIR OF LV RUPTURE, PRP, INTRAOP ROSE;  Surgeon: Mart Ontiveros MD;  Location: [facility] MAIN OR;  Service: Cardiothoracic;  Laterality: N/A;   • VENTRICULAR ANEURYSM REPAIR N/A 9/17/2020    Procedure: ROSE, MIDLINE STERNOTOMY REOP  LEFT VENTRICULAR ANEURYSM REPAIR, IABP INSERTION, PRP;  Surgeon: Mart Ontiveros MD;  Location: [facility] MAIN OR;  Service: Cardiothoracic;  Laterality: N/A;       Allergies   Allergen Reactions   • Ramipril Other (See Comments)     Ace I  cough   • Morphine Itching   • Morphine And Related Itching   • Bactrim [Sulfamethoxazole-Trimethoprim] Itching and Rash   • Cipro [Ciprofloxacin Hcl] Rash         Current Outpatient Medications:   •  albuterol sulfate  (90 Base) MCG/ACT inhaler, INHALE TWO PUFFS BY MOUTH EVERY 6 HOURS AS NEEDED FOR SHORTNESS OF AIR OR WHEEZING., Disp: 18 g, Rfl: 3  •  aspirin 81 MG EC tablet, Take 1 tablet by mouth Daily., Disp: , Rfl:   •  atorvastatin (LIPITOR) 40 MG tablet, Take 1 tablet by mouth Every Night., Disp: 90 tablet, Rfl: 2  •  cetirizine (zyrTEC) 10 MG tablet, Take 1 tablet by mouth As Needed for Allergies.  (Patient taking differently: Take 1 tablet by mouth Daily As Needed for Allergies.), Disp: 30 tablet, Rfl: 0  •  chlorhexidine (PERIDEX) 0.12 % solution, Apply 15 mL to the mouth or throat 2 (Two) Times a Day., Disp: , Rfl:   •  clobetasol (TEMOVATE) 0.05 % ointment, Apply 1 application topically to the appropriate area as directed 2 (Two) Times a Day., Disp: , Rfl:   •  Cranberry 1000 MG capsule, Take  by mouth., Disp: , Rfl:   •  esomeprazole (nexIUM) 40 MG capsule, TAKE 1 CAPSULE BY MOUTH EVERY DAY IN THE MORNING BEFORE BREAKFAST, Disp: 90 capsule, Rfl: 3  •  Fluticasone-Umeclidin-Vilant (Trelegy Ellipta) 100-62.5-25 MCG/INH inhaler, Inhale 1 puff Daily., Disp: 90 each, Rfl: 0  •  furosemide (LASIX) 20 MG tablet, Take 1 tablet by mouth Daily., Disp: 90 tablet, Rfl: 2  •  hydrocortisone 2.5 % ointment, Apply 1 application topically to the appropriate area as directed Daily As Needed., Disp: , Rfl:   •  isosorbide mononitrate (IMDUR) 30 MG 24 hr tablet, Take 1 tablet by mouth 2 (Two) Times a Day., Disp: 180 tablet, Rfl: 2  •  Lactobacillus (FLORANEX PO), Take  by mouth. 1milloncell, Disp: , Rfl:   •  losartan (COZAAR) 25 MG tablet, Take 1 tablet by mouth Daily., Disp: 90 tablet, Rfl: 3  •  meclizine (ANTIVERT) 12.5 MG tablet, Take 1 tablet by mouth 3 (Three) Times a Day As Needed for Dizziness., Disp: 21 tablet, Rfl: 0  •  metoprolol succinate XL (TOPROL-XL) 50 MG 24 hr tablet, Take 1 tablet by mouth Daily., Disp: 90 tablet, Rfl: 3  •  mirtazapine (Remeron) 15 MG tablet, Take 1 tablet by mouth Every Night., Disp: 90 tablet, Rfl: 1  •  multivitamin (THERAGRAN) tablet tablet, Take 1 tablet by mouth Daily., Disp: 90 tablet, Rfl: 0  •  mupirocin (BACTROBAN) 2 % ointment, Apply 1 application topically to the appropriate area as directed Daily As Needed (skin rash)., Disp: , Rfl:   •  O2 (OXYGEN), Inhale 2 L/min Continuous. Patient is using 3 liters continous at this time, Disp: , Rfl:   •  potassium chloride 10 MEQ CR  tablet, TAKE 1 TABLET BY MOUTH EVERY DAY, Disp: 30 tablet, Rfl: 3  •  pramipexole (MIRAPEX) 0.125 MG tablet, TAKE 1 TABLET BY MOUTH EVERYDAY AT BEDTIME, Disp: 90 tablet, Rfl: 1  •  sennosides-docusate (PERICOLACE) 8.6-50 MG per tablet, Take 2 tablets by mouth Daily As Needed for Constipation., Disp: , Rfl:   •  TOVIAZ 4 MG tablet sustained-release 24 hour tablet, Take 1 tablet by mouth Daily., Disp: , Rfl:   •  traMADol (ULTRAM) 50 MG tablet, Take 1 tablet by mouth Every 6 (Six) Hours As Needed for Moderate Pain., Disp: 90 tablet, Rfl: 0  •  valACYclovir (Valtrex) 1000 MG tablet, Take 2 tablets every 12 hours for 1 day., Disp: 4 tablet, Rfl: 0  •  Vitamin D, Cholecalciferol, 50 MCG (2000 UT) capsule, Take 2,000 Units by mouth Daily., Disp: 30 capsule, Rfl:     Social History     Socioeconomic History   • Marital status:    Tobacco Use   • Smoking status: Former     Packs/day: 1.00     Years: 40.00     Pack years: 40.00     Types: Cigarettes     Quit date: 12/1/2012     Years since quitting: 10.3   • Smokeless tobacco: Never   • Tobacco comments:     CAFFEINE USE:2 CUPS COFFEE/ COKE DAILY   Substance and Sexual Activity   • Alcohol use: No   • Drug use: No   • Sexual activity: Not Currently     Partners: Male     Birth control/protection: Post-menopausal       Family History   Problem Relation Age of Onset   • Alzheimer's disease Mother    • Cancer Maternal Aunt    • Heart disease Father    • Heart failure Father    • Hypertension Father    • Diabetes Father    • Liver disease Sister    • Heart failure Brother    • Hypertension Brother    • Alcohol abuse Brother    • Diabetes Brother    • No Known Problems Maternal Grandmother    • No Known Problems Maternal Grandfather    • No Known Problems Paternal Grandmother    • No Known Problems Paternal Grandfather    • Diabetes Brother    • Brain cancer Brother    • Heart disease Brother    • Diabetes Brother      Review of Systems  As HPI, otherwise  noncontributory    Physical Exam:    Vital Signs:  Weight: 36.3 kg (80 lb)   Body mass index is 17.94 kg/m².  Temp: 96.6 °F (35.9 °C)   Heart Rate: 71   BP: 119/70     Constitutional:       Appearance: Well-developed.   Eyes:      Conjunctiva/sclera: Conjunctivae normal.      Pupils: Pupils are equal, round, and reactive to light.   HENT:      Head: Normocephalic and atraumatic.      Nose: Nose normal.   Neck:      Thyroid: No thyromegaly.      Vascular: No JVD.      Lymphadenopathy: No cervical adenopathy.   Pulmonary:      Effort: Pulmonary effort is normal.      Breath sounds: Normal breath sounds. No rales.   Cardiovascular:      PMI at left midclavicular line. Normal rate. Regular rhythm.      Murmurs: There is a grade 2/6 low frequency midsystolic murmur.      No gallop.   Pulses:     Decreased pulses.   Edema:     Peripheral edema absent.   Abdominal:      General: Bowel sounds are normal. There is no distension.      Palpations: Abdomen is soft. There is no abdominal mass.      Tenderness: There is no abdominal tenderness.   Musculoskeletal: Normal range of motion.         General: No tenderness or deformity.      Cervical back: Normal range of motion and neck supple. Skin:     General: Skin is warm and dry.      Findings: No erythema or rash.   Neurological:      Mental Status: Alert and oriented to person, place, and time.      Deep Tendon Reflexes: Reflexes are normal and symmetric.   Psychiatric:         Behavior: Behavior normal.     She has multiple incisions including a sternotomy which is well-healed without motion, thoracoabdominal incision with 2 areas of herniation, small, no incarceration  There is no motion of the right lower extremity     Assessment:     Problems Addressed this Visit        Cardiac and Vasculature    Descending thoracic aortic aneurysm - Primary (Chronic)    S/P CABG x 1    S/P ascending aortic aneurysm repair    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 rupture    Acute  on chronic systolic CHF (congestive heart failure)       Hematology and Neoplasia    Breast cancer, stage 1, estrogen receptor positive   Diagnoses       Codes Comments    Aneurysm of descending thoracic aorta without rupture    -  Primary ICD-10-CM: I71.23  ICD-9-CM: 441.2     S/P CABG x 1     ICD-10-CM: Z95.1  ICD-9-CM: V45.81     S/P ascending aortic aneurysm repair     ICD-10-CM: Z98.890, Z86.79  ICD-9-CM: V45.89     Paravisceral abdominal aortic aneurysm (AAA) without rupture     ICD-10-CM: I71.62  ICD-9-CM: 441.4     Acute on chronic systolic CHF (congestive heart failure)     ICD-10-CM: I50.23  ICD-9-CM: 428.23, 428.0     Malignant neoplasm of breast, stage 1, estrogen receptor positive, unspecified laterality     ICD-10-CM: C50.919, Z17.0  ICD-9-CM: 174.9, V86.0           Assessment/recommendation:     Thoracoabdominal aortic aneurysmal disease status post multiple surgeries.  No residual disease.  She has sequela of paraparesis mainly in the right lower extremity.  She is wheelchair-bound at this point.  She is living in assisted living.  I discussed with the patient my recommendation of discontinuation of CT scans at this point.  We will see her in my office in 6 to 12 months.  Even if there is recurrence of aneurysm disease, she is not a candidate for any type of surgery  Progressive dyspnea and hypoxemia.  Progressive pulm hypertension.  Being treated by the pulmonary service  Dyspnea and congestive heart failure, she has been treated with diuretics.  She will follow-up with the cardiology service    Thank you for allowing me to participate in her care.    Regards,    Mart Ontiveros M.D.    I spent over 30 minutes before, during and after the office visit reviewing the records, examining the patient, reviewing interpreting the chest CTA myself, discussing the plan of follow-up regarding her aneurysmal disease, and spent time in documenting in the electronic record

## 2023-04-12 ENCOUNTER — TELEPHONE (OUTPATIENT)
Dept: CARDIOLOGY | Facility: CLINIC | Age: 83
End: 2023-04-12
Payer: MEDICARE

## 2023-04-12 ENCOUNTER — HOME CARE VISIT (OUTPATIENT)
Dept: HOME HEALTH SERVICES | Facility: HOME HEALTHCARE | Age: 83
End: 2023-04-12
Payer: MEDICARE

## 2023-04-12 VITALS
HEART RATE: 104 BPM | DIASTOLIC BLOOD PRESSURE: 76 MMHG | OXYGEN SATURATION: 92 % | TEMPERATURE: 97.6 F | SYSTOLIC BLOOD PRESSURE: 126 MMHG

## 2023-04-12 PROCEDURE — G0151 HHCP-SERV OF PT,EA 15 MIN: HCPCS

## 2023-04-12 NOTE — HOME HEALTH
"Patient greets therapist warmly.  She has a best friend visiting.  States last week she went to the dining room for entertainment and dinner every day.  She reports her pain at 6/10 today just in the hips but she has not taken any medication today.  \"I try only to take it if I really need it.\"  PT advised pain levels 6/10 or higher may warrant medication need.  Patient agrees.  She went to her cardiologist yesterday and had a good check up.  He has suggested she contact pulmonary MD regarding the fluid in her feet as Lasix does not appear to be improving her symptoms.  Patient denies other medication changes.   She wants to take a walk.    Plan for Next Visit:  -Continue with gait training with energy conservation techniques, advising use of oxygen for improved safety and tolerance  -Review standing exercises and advance with hamstring curls if able  -Perform reassessment"

## 2023-04-12 NOTE — TELEPHONE ENCOUNTER
Dr. De Los Santos, you saw this patient on 3/1. At that time she had edema in her feet. She was started on Lasix 20mg daily. She then saw Dr. Ontiveros yesterday and he instructed her to call our office because the edema is not going down. Her BP and HR have been stable. Below are her vitals from Dr. Ontiveros's office yesterday. /70, HR 71, O2 91%, Weight 80lbs. She said she does not feel more SOA than normal. Below are her meds:    Imdur 30mg BID  Lasix 20mg daily  Losartan 25mg daily  Metoprolol 50mg daily    I see potassium is on her list as well, but she said she has not been taking it. She said you instructed her to hold it at her LOV.     Laura Connell RN  Triage [facility]

## 2023-04-13 NOTE — PROGRESS NOTES
4/13/2023    Seen on 4/11/2023    Chief Complaint:     Follow-up evaluation thoracoabdominal aortic aneurysm    History of Present Illness:       Dear Colleagues,  It was nice to see Grace Beauchamp in follow up evaluation for her thoracoabdominal aortic aneurysm and previous heart surgeries. She is a 82 y.o. female with multiple medical problems including thoracoabdominal aortic aneurysm repair, CABG in 2 operations who have seen for the last many years.  She denies chest or upper back pain. She has worsening dyspnea, she is on home oxygen.  She has been diagnosed severe pulm hypertension which could be multifactorial.  She has seen a specialist in that regard.  She is bounded to the wheelchair due to her frailty and also paralysis of the left lower extremity. Her last chest CT showed no significant changes from the previous CT scan.  There are no aneurysmal enlargement or false aneurysm from the multiple operations.  Overall she has a decline physically by her mental status and cognitive function are intact    Patient Active Problem List   Diagnosis   • History of breast cancer   • Stenosis of carotid artery   • Chronic obstructive pulmonary disease   • Closed fracture of multiple ribs   • Cognitive disorder   • Depression   • Erythromelalgia   • Hyperlipidemia   • Hypertension   • Primary osteoarthritis involving multiple joints   • Osteoporosis   • Restless legs syndrome   • Cerebral aneurysm   • Temporary cerebral vascular dysfunction   • Urinary tract infection   • S/P CABG x 1   • Type 2 diabetes mellitus without complication, without long-term current use of insulin   • S/P ascending aortic aneurysm repair   • Aortic arch aneurysm   • Descending thoracic aortic aneurysm   • Claudication of both lower extremities (HCC)   • Thoracoabdominal aortic aneurysm   • Ventral hernia without obstruction or gangrene   • Breast cancer, stage 1, estrogen receptor positive   • Arthritis of right hip   • Arthritis of right  knee   • Chondrocalcinosis   • Chronic pain of right knee   • DDD (degenerative disc disease), lumbosacral   • Gastroesophageal reflux disease   • Bilateral hearing loss   • Thoracic aortic aneurysm without rupture   • PVD (peripheral vascular disease) ([facility])   • Paraparesis   • Thoracoabdominal aortic aneurysm, without rupture   • 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 rupture   • Aortic aneurysm, descending   • Aortic aneurysm without rupture   • CAD (coronary artery disease)   • S/P left heart catheterization by ventricular puncture   • Cerebral infarction   • Pericardial hematoma   • History of ST elevation myocardial infarction (STEMI)   • Chronic diastolic CHF (congestive heart failure)   • Left ventricular aneurysm   • Immobility syndrome   • Lower extremity edema   • QT prolongation   • Recurrent UTI (urinary tract infection)   • Hypoxia   • Metabolic encephalopathy   • LUH (acute kidney injury)   • Hypotension   • UTI due to extended-spectrum beta lactamase (ESBL) producing Escherichia coli   • Ischemic cardiomyopathy   • Acute on chronic systolic CHF (congestive heart failure)   • Pulmonary hypertension   • Mild protein-calorie malnutrition   • Encounter for subsequent annual wellness visit in Medicare patient   • Strep sore throat       Past Medical History:   Diagnosis Date   • AAA (abdominal aortic aneurysm)    • Acute bronchitis 12/2018   • Aortic arch aneurysm    • Arthritis    • Bilateral carotid artery disease    • Bilateral cataracts     S/p Extractions   • Breast cancer     right breast [id number] (snm) pMX ER/DE pos, Her-2/neg, Ki-67, 31%, oncotype recurrence score 19, invasive lobular carcinoma   • CAD (coronary artery disease)     S/p CABG on 2/23/16 by Dr. Ontiveros   • Cancer of subglottis    • Cataracts, bilateral    • Closed fracture of three ribs of right side    • Cognitive disorder    • COPD (chronic obstructive pulmonary disease)    • Depression    • Descending aortic arch aneurysm   "  • Diabetes mellitus     \"BORDERLINE\"   • Erythermalgia    • GERD (gastroesophageal reflux disease)    • Hyperlipidemia     Controlled w/Meds   • Hypertension     Controlled w/Meds   • Low back pain    • Moderate aortic valve insufficiency     S/p AVR on 02/23/16 by Dr. Ontiveros   • Nocturia    • Osteoarthritis    • Osteoporosis    • Otitis media     R Ear   • Prediabetes    • PVD (peripheral vascular disease)    • RLS (restless legs syndrome)    • Saccular aneurysm    • Stroke     Perioperatively w/L Hip Repl   • Thoracic ascending aortic aneurysm     S/p Repair on 02/23/16 by Dr. Ontiveros   • TIA (transient ischemic attack)    • Urgency incontinence    • Urinary tract infection    • Venous insufficiency    • Ventral hernia        Past Surgical History:   Procedure Laterality Date   • AORTIC VALVE REPAIR/REPLACEMENT N/A 02/23/2016-[facility]    Ascending Replacement Using a 24MM Graft--Dr. Ontiveros   • APPENDECTOMY  1977   • BREAST BIOPSY Right 09/16/2012    Vacuum Assisted Core Bx of R Breast   • CARDIAC CATHETERIZATION N/A 01/06/2016    Dr. Tres De Los Santos   • CARDIAC CATHETERIZATION N/A 4/22/2019    Procedure: Left Heart Cath;  Surgeon: Tres De Los Santos MD;  Location: [facility] CATH INVASIVE LOCATION;  Service: Cardiology   • CARDIAC CATHETERIZATION N/A 4/22/2019    Procedure: Coronary angiography;  Surgeon: Tres De Los Santos MD;  Location: [facility] CATH INVASIVE LOCATION;  Service: Cardiology   • CARDIAC CATHETERIZATION N/A 7/22/2020    Procedure: LEFT HEART CATH;  Surgeon: Antonia Scott MD;  Location: [facility] CATH INVASIVE LOCATION;  Service: Cardiovascular;  Laterality: N/A;   • CARDIAC CATHETERIZATION N/A 7/22/2020    Procedure: CORONARY ANGIOGRAPHY;  Surgeon: Antonia Scott MD;  Location:  [facility] CATH INVASIVE LOCATION;  Service: Cardiovascular;  Laterality: N/A;   • CARDIAC CATHETERIZATION N/A 7/22/2020    Procedure: Left ventriculography;  Surgeon: Antonia Scott MD;  Location: [facility] CATH INVASIVE LOCATION;  Service: " Cardiovascular;  Laterality: N/A;   • CARDIAC CATHETERIZATION N/A 7/22/2020    Procedure: Saphenous Vein Graft;  Surgeon: Antonia Scott MD;  Location:  [facility] CATH INVASIVE LOCATION;  Service: Cardiovascular;  Laterality: N/A;   • CARDIAC CATHETERIZATION N/A 4/27/2022    Procedure: Right Heart Cath;  Surgeon: Lydia Mays MD;  Location:  [facility] CATH INVASIVE LOCATION;  Service: Cardiology;  Laterality: N/A;   • CARDIAC CATHETERIZATION N/A 4/27/2022    Procedure: Coronary angiography;  Surgeon: Lydia Mays MD;  Location:  [facility] CATH INVASIVE LOCATION;  Service: Cardiology;  Laterality: N/A;   • CARDIAC CATHETERIZATION N/A 4/27/2022    Procedure: Left Heart Cath;  Surgeon: Lydia Mays MD;  Location:  [facility] CATH INVASIVE LOCATION;  Service: Cardiology;  Laterality: N/A;   • CARDIAC SURGERY  02/2016    Dr. Ontiveros/Dr. De Los Santos   • CATARACT EXTRACTION Bilateral     [facility]   • COLONOSCOPY N/A 10/2002    Dr. Negro   • CORONARY ARTERY BYPASS GRAFT  02/23/2016-[facility]    x1 w/L Vein Grafting--Dr. Ontiveros   • CORONARY ARTERY BYPASS GRAFT N/A 9/18/2020    Procedure: STERNAL EXPLORATION WITH CLOSURE AND WASHOUT, REMOVAL OF IABP, PRP;  Surgeon: Mart Ontiveros MD;  Location: [facility] OR;  Service: Cardiothoracic;  Laterality: N/A;   • CYST REMOVAL Right 01/25/2013    R Palm Excision of Retinacular Cyst--Dr. Karla Fish   • EPIDURAL BLOCK     • LAPAROSCOPIC CHOLECYSTECTOMY N/A 08/04/2004    Dr. JOEY Sheth   • MASTECTOMY Right 09/28/2012   • ORIF HIP FRACTURE Left 03/27/2005    w/ AMBI compression screw, Dr. Victor M Cabral   • RECTOVAGINAL FISTULA REPAIR  1965   • THORACIC AORTIC ANEURYSM REPAIR N/A 7/23/2019    Procedure: ROSE, THORACOABDOMINAL AORTIC ANEURYSM REPAIR, VISCERAL VESSEL REPAIR, LARGE VENTRAL HERNIA REPAIR WITH MESH, CIRCULATORY ARREST, PRP;  Surgeon: Mart Ontiveros MD;  Location: [facility] MAIN OR;  Service: Cardiothoracic   • TRANSESOPHAGEAL ECHOCARDIOGRAM (ROSE) N/A 9/18/2020     Procedure: TRANSESOPHAGEAL ECHOCARDIOGRAM WITH ANESTHESIA;  Surgeon: Mart Ontiveros MD;  Location: [facility] MAIN OR;  Service: Cardiothoracic;  Laterality: N/A;   • TUBAL ABDOMINAL LIGATION     • VENTRICULAR ANEURYSM REPAIR N/A 7/22/2020    Procedure: EMERGENT REOP STERNOTOMY, REPAIR OF LV RUPTURE, PRP, INTRAOP ROSE;  Surgeon: Mart Ontiveros MD;  Location: [facility] MAIN OR;  Service: Cardiothoracic;  Laterality: N/A;   • VENTRICULAR ANEURYSM REPAIR N/A 9/17/2020    Procedure: ROSE, MIDLINE STERNOTOMY REOP  LEFT VENTRICULAR ANEURYSM REPAIR, IABP INSERTION, PRP;  Surgeon: Mart Ontiveros MD;  Location: [facility] MAIN OR;  Service: Cardiothoracic;  Laterality: N/A;       Allergies   Allergen Reactions   • Ramipril Other (See Comments)     Ace I  cough   • Morphine Itching   • Morphine And Related Itching   • Bactrim [Sulfamethoxazole-Trimethoprim] Itching and Rash   • Cipro [Ciprofloxacin Hcl] Rash         Current Outpatient Medications:   •  albuterol sulfate  (90 Base) MCG/ACT inhaler, INHALE TWO PUFFS BY MOUTH EVERY 6 HOURS AS NEEDED FOR SHORTNESS OF AIR OR WHEEZING., Disp: 18 g, Rfl: 3  •  aspirin 81 MG EC tablet, Take 1 tablet by mouth Daily., Disp: , Rfl:   •  atorvastatin (LIPITOR) 40 MG tablet, Take 1 tablet by mouth Every Night., Disp: 90 tablet, Rfl: 2  •  cetirizine (zyrTEC) 10 MG tablet, Take 1 tablet by mouth As Needed for Allergies. (Patient taking differently: Take 1 tablet by mouth Daily As Needed for Allergies.), Disp: 30 tablet, Rfl: 0  •  chlorhexidine (PERIDEX) 0.12 % solution, Apply 15 mL to the mouth or throat 2 (Two) Times a Day., Disp: , Rfl:   •  clobetasol (TEMOVATE) 0.05 % ointment, Apply 1 application topically to the appropriate area as directed 2 (Two) Times a Day., Disp: , Rfl:   •  Cranberry 1000 MG capsule, Take  by mouth., Disp: , Rfl:   •  esomeprazole (nexIUM) 40 MG capsule, TAKE 1 CAPSULE BY MOUTH EVERY DAY IN THE MORNING BEFORE BREAKFAST, Disp: 90 capsule, Rfl: 3  •   Fluticasone-Umeclidin-Vilant (Trelegy Ellipta) 100-62.5-25 MCG/INH inhaler, Inhale 1 puff Daily., Disp: 90 each, Rfl: 0  •  furosemide (LASIX) 20 MG tablet, Take 1 tablet by mouth Daily., Disp: 90 tablet, Rfl: 2  •  hydrocortisone 2.5 % ointment, Apply 1 application topically to the appropriate area as directed Daily As Needed., Disp: , Rfl:   •  isosorbide mononitrate (IMDUR) 30 MG 24 hr tablet, Take 1 tablet by mouth 2 (Two) Times a Day., Disp: 180 tablet, Rfl: 2  •  Lactobacillus (FLORANEX PO), Take  by mouth. 1milloncell, Disp: , Rfl:   •  losartan (COZAAR) 25 MG tablet, Take 1 tablet by mouth Daily., Disp: 90 tablet, Rfl: 3  •  meclizine (ANTIVERT) 12.5 MG tablet, Take 1 tablet by mouth 3 (Three) Times a Day As Needed for Dizziness., Disp: 21 tablet, Rfl: 0  •  metoprolol succinate XL (TOPROL-XL) 50 MG 24 hr tablet, Take 1 tablet by mouth Daily., Disp: 90 tablet, Rfl: 3  •  mirtazapine (Remeron) 15 MG tablet, Take 1 tablet by mouth Every Night., Disp: 90 tablet, Rfl: 1  •  multivitamin (THERAGRAN) tablet tablet, Take 1 tablet by mouth Daily., Disp: 90 tablet, Rfl: 0  •  mupirocin (BACTROBAN) 2 % ointment, Apply 1 application topically to the appropriate area as directed Daily As Needed (skin rash)., Disp: , Rfl:   •  O2 (OXYGEN), Inhale 2 L/min Continuous. Patient is using 3 liters continous at this time, Disp: , Rfl:   •  potassium chloride 10 MEQ CR tablet, TAKE 1 TABLET BY MOUTH EVERY DAY, Disp: 30 tablet, Rfl: 3  •  pramipexole (MIRAPEX) 0.125 MG tablet, TAKE 1 TABLET BY MOUTH EVERYDAY AT BEDTIME, Disp: 90 tablet, Rfl: 1  •  sennosides-docusate (PERICOLACE) 8.6-50 MG per tablet, Take 2 tablets by mouth Daily As Needed for Constipation., Disp: , Rfl:   •  TOVIAZ 4 MG tablet sustained-release 24 hour tablet, Take 1 tablet by mouth Daily., Disp: , Rfl:   •  traMADol (ULTRAM) 50 MG tablet, Take 1 tablet by mouth Every 6 (Six) Hours As Needed for Moderate Pain., Disp: 90 tablet, Rfl: 0  •  valACYclovir (Valtrex)  1000 MG tablet, Take 2 tablets every 12 hours for 1 day., Disp: 4 tablet, Rfl: 0  •  Vitamin D, Cholecalciferol, 50 MCG (2000 UT) capsule, Take 2,000 Units by mouth Daily., Disp: 30 capsule, Rfl:     Social History     Socioeconomic History   • Marital status:    Tobacco Use   • Smoking status: Former     Packs/day: 1.00     Years: 40.00     Pack years: 40.00     Types: Cigarettes     Quit date: 12/1/2012     Years since quitting: 10.3   • Smokeless tobacco: Never   • Tobacco comments:     CAFFEINE USE:2 CUPS COFFEE/ COKE DAILY   Substance and Sexual Activity   • Alcohol use: No   • Drug use: No   • Sexual activity: Not Currently     Partners: Male     Birth control/protection: Post-menopausal       Family History   Problem Relation Age of Onset   • Alzheimer's disease Mother    • Cancer Maternal Aunt    • Heart disease Father    • Heart failure Father    • Hypertension Father    • Diabetes Father    • Liver disease Sister    • Heart failure Brother    • Hypertension Brother    • Alcohol abuse Brother    • Diabetes Brother    • No Known Problems Maternal Grandmother    • No Known Problems Maternal Grandfather    • No Known Problems Paternal Grandmother    • No Known Problems Paternal Grandfather    • Diabetes Brother    • Brain cancer Brother    • Heart disease Brother    • Diabetes Brother      Review of Systems  As HPI, otherwise noncontributory    Physical Exam:    Vital Signs:  Weight: 36.3 kg (80 lb)   Body mass index is 17.94 kg/m².  Temp: 96.6 °F (35.9 °C)   Heart Rate: 71   BP: 119/70     Constitutional:       Appearance: Well-developed.   Eyes:      Conjunctiva/sclera: Conjunctivae normal.      Pupils: Pupils are equal, round, and reactive to light.   HENT:      Head: Normocephalic and atraumatic.      Nose: Nose normal.   Neck:      Thyroid: No thyromegaly.      Vascular: No JVD.      Lymphadenopathy: No cervical adenopathy.   Pulmonary:      Effort: Pulmonary effort is normal.      Breath sounds:  Normal breath sounds. No rales.   Cardiovascular:      PMI at left midclavicular line. Normal rate. Regular rhythm.      Murmurs: There is a grade 2/6 low frequency midsystolic murmur.      No gallop.   Pulses:     Decreased pulses.   Edema:     Peripheral edema absent.   Abdominal:      General: Bowel sounds are normal. There is no distension.      Palpations: Abdomen is soft. There is no abdominal mass.      Tenderness: There is no abdominal tenderness.   Musculoskeletal: Normal range of motion.         General: No tenderness or deformity.      Cervical back: Normal range of motion and neck supple. Skin:     General: Skin is warm and dry.      Findings: No erythema or rash.   Neurological:      Mental Status: Alert and oriented to person, place, and time.      Deep Tendon Reflexes: Reflexes are normal and symmetric.   Psychiatric:         Behavior: Behavior normal.     She has multiple incisions including a sternotomy which is well-healed without motion, thoracoabdominal incision with 2 areas of herniation, small, no incarceration  There is no motion of the right lower extremity     Assessment:     Problems Addressed this Visit        Cardiac and Vasculature    Descending thoracic aortic aneurysm - Primary (Chronic)    S/P CABG x 1    S/P ascending aortic aneurysm repair    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 rupture    Acute on chronic systolic CHF (congestive heart failure)       Hematology and Neoplasia    Breast cancer, stage 1, estrogen receptor positive   Diagnoses       Codes Comments    Aneurysm of descending thoracic aorta without rupture    -  Primary ICD-10-CM: I71.23  ICD-9-CM: 441.2     S/P CABG x 1     ICD-10-CM: Z95.1  ICD-9-CM: V45.81     S/P ascending aortic aneurysm repair     ICD-10-CM: Z98.890, Z86.79  ICD-9-CM: V45.89     Paravisceral abdominal aortic aneurysm (AAA) without rupture     ICD-10-CM: I71.62  ICD-9-CM: 441.4     Acute on chronic systolic CHF (congestive heart failure)      ICD-10-CM: I50.23  ICD-9-CM: 428.23, 428.0     Malignant neoplasm of breast, stage 1, estrogen receptor positive, unspecified laterality     ICD-10-CM: C50.919, Z17.0  ICD-9-CM: 174.9, V86.0           Assessment/recommendation:     1. Thoracoabdominal aortic aneurysmal disease status post multiple surgeries.  No residual disease.  She has sequela of paraparesis mainly in the right lower extremity.  She is wheelchair-bound at this point.  She is living in assisted living.  I discussed with the patient my recommendation of discontinuation of CT scans at this point.  We will see her in my office in 6 to 12 months.  Even if there is recurrence of aneurysm disease, she is not a candidate for any type of surgery  2. Progressive dyspnea and hypoxemia.  Progressive pulm hypertension.  Being treated by the pulmonary service  3. Dyspnea and congestive heart failure, she has been treated with diuretics.  She will follow-up with the cardiology service    Thank you for allowing me to participate in her care.    Regards,    Mart Ontiveros M.D.    I spent over 30 minutes before, during and after the office visit reviewing the records, examining the patient, reviewing interpreting the chest CTA myself, discussing the plan of follow-up regarding her aneurysmal disease, and spent time in documenting in the electronic record

## 2023-04-14 DIAGNOSIS — R60.0 BILATERAL LEG EDEMA: Primary | ICD-10-CM

## 2023-04-14 DIAGNOSIS — R06.02 SHORTNESS OF BREATH: ICD-10-CM

## 2023-04-18 ENCOUNTER — HOME CARE VISIT (OUTPATIENT)
Dept: HOME HEALTH SERVICES | Facility: HOME HEALTHCARE | Age: 83
End: 2023-04-18
Payer: MEDICARE

## 2023-04-18 VITALS
OXYGEN SATURATION: 98 % | TEMPERATURE: 97.6 F | SYSTOLIC BLOOD PRESSURE: 118 MMHG | DIASTOLIC BLOOD PRESSURE: 64 MMHG | HEART RATE: 87 BPM

## 2023-04-18 PROCEDURE — G0151 HHCP-SERV OF PT,EA 15 MIN: HCPCS

## 2023-04-18 NOTE — HOME HEALTH
Patient greets therapist while siting in recliner but oxygen is just below patient's nose.  Patient is complaining of dryness in her nose but she also reports she had to bump her oxygen up to 4 liters and then 4.5 liters last night.  Patient states she has not been sleeping well but feels it is due to not taking her Melatonin because she could not find the bottle so her daughter is bringing her a new bottle.  Patient states she has been working on her exercises, reports her hip pain is minimal but present with walking and with transitioning from sit to stand.  She does feel she is doing better and states she walked to a chair in the hallway on her own yesterday because no one was around. Patient utilized her standard walker.  She feels she has not felt good enough to attend dinners in the dining room or social events at the assisted living facility and she complains of rib pain/soreness from increased coughing over night and in the last several days.      Nurse at assisted living facility did discuss with PT that patient had asked for her oxygen to be increased to 4.5 liters and nurse would not do it but patient did it on her own.  Oxygen was at 90 to 91% with this nursing assessment yesterday.  Patient's daughter present upon PT presentation and searching for humidifier for concentrator.  PT assisted in the placement/use of humidifier and son-in-law placed it on for patient during session.      Oxygen saturations were from 90 to 96% at rest on 4.5 liters to 75% without oxygen (on room air) following ambulation.    Family is meeting with [facility] later this week and therefore goals will be amended to continue with addition of distance in gait goal as well as attempting to adhere to patient use of oxygen at all times.  Patient is likely close to her plateau in progress secondary to her lung condition but would benefit from at least one more session to re-educate on home exercise program as well as use of oxygen with  ambulation to conserve energy/condition body and for safety.    Plan for Next Visit:  -Discuss possible plans to pursue [facility] with family  -Potential discharge if patient plateau sought or patient has gone with [facility] services  -Review walking program, importance of use of oxygen at all times due to her condition and specifically with exertion  -Review home exercises to continue

## 2023-04-25 ENCOUNTER — HOME CARE VISIT (OUTPATIENT)
Dept: HOME HEALTH SERVICES | Facility: HOME HEALTHCARE | Age: 83
End: 2023-04-25
Payer: MEDICARE

## 2023-04-25 NOTE — Clinical Note
Patient has been discharged from home health services as she was accepted into [facility] 4/21/2023.

## 2023-04-25 NOTE — HOME HEALTH
Patient's daughter, Cecy, notified PT that she has been accepted into [facility] care as of 4/21/2023.

## 2025-03-06 NOTE — CONSULTS
Group: [facility]         CONSULT NOTE    Patient Identification:    Grace Beauchamp  80 y.o.  female  1940  [id number]            Patient Care Team:  Miller Castañeda MD as PCP - General (Internal Medicine)  Miller Castañeda MD as PCP - Claims Attributed  Mart Ontiveros MD as Surgeon (Cardiothoracic Surgery)  Tres De Los Santos MD as Consulting Physician (Cardiology)  Graham Mcconnell MD as Consulting Physician (Hematology and Oncology)  Payam Byrnes MD as Consulting Physician (Otolaryngology)  Jonathan Smith MD as Consulting Physician (Vascular Surgery)  Victor M Cabral MD as Surgeon (Orthopedic Surgery)  Yaya Burks MD as Consulting Physician (Pulmonary Disease)    Requesting physician:     Reason for Consultation: SOA, COPD    CC: SOA    History of Present Illness: Patient is a pleasant 80-year-old elderly lean white female with a past medical history significant for CAD status post CABG in 2012, valvular heart disease status post AVR in 2016 along with aortic aneurysmal repair, ruptured myocardium requiring ventricular free wall repair in July 2020, readmitted to the hospital in September and needed urgent redo sternotomy along with left ventricular lateral wall aneurysmal repair with pericardial patch and made significant recovery and was discharged to [facility] acute rehab for further management.  Patient does carry a diagnosis of COPD and was previously on inhaler and last saw Dr. Yaya Londono in 2018.  Patient was evaluated previously for concerns regarding her COPD and was restarted on her home inhaler and rescue inhaler as well as nebulizer as needed and asked to continue with acute rehab and no acute issues noted.    We have been asked to reevaluate the patient given concern for poor energy and inability to participate in aggressive physical therapy.  Cardiology has evaluated the patient and felt no obvious cardiac issues.  Patient is noted to be euvolemic and  [FreeTextEntry1] : History of lung cancer.  Followed by oncologist (Jose) Atrial fibrillation.  Stable.  Followed by cardiologist (Laurel).  Continue Eliquis 5 mg twice daily.  Continue metoprolol 25 mg daily. Osteoporosis.  IV reclast in 6/2025.    DEXA in 12/25 Hypertension.  Continue losartan 25 mg daily. Hyperlipidemia.  Continue Crestor 5 mg daily.   Triglyceride, HDL, and LDL results from last week were reviewed with patient. CBC results from last week were reviewed with patient. Hemoglobin A1c result from last week was reviewed with patient. GERD.  Continue Protonix 20 mg once a day as needed.  EGD. Screening colonoscopy RX mammo (MSR) COVID-19  booster vaccine is recommended once a year in fall. Patient understands Flu vaccine is once a year between month of October to November. Follow-up in 4 months "patient denies any exertional chest pain or palpitations.  Does not have any orthopnea PND or pedal edema.  Does not have exertional wheezing.  She states that she uses rescue inhaler with little response but this does not last.  She is compliant with her inhaler.  Not using nebulizer much.  No new fevers or chills    I have reviewed the H&P as well as the notes from the other consultants taking care of the patient this admission as well as previous hospital notes (if any available) from our group physicians and summarized above      Objective     Review of Systems:  Constitutional: No fever, chills, weight loss or loss of appetite.   ENMT: No sinus congestion, post nasal drip, epistaxis, sore throat  Cardiovascular: No palpitation or legs swelling.    Respiratory: No hemoptysis, orthopnea, PND.  Gastrointestinal: No constipation, diarrhea or abdominal pain   Neurology: No headache, focal weakness, numbness or dizziness.   Musculoskeletal: No joint stiffness or swelling.   Psychiatry: No agitation or behavioral changes  Lymphatic: No swollen neck glands.  Integumentary: No rash.    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 Ontiveros   • Nocturia    • Osteoarthritis    • Osteoporosis    • Otitis media     R Ear   • Prediabetes    • PVD (peripheral vascular disease) (CMS/[facility])    • RLS (restless legs syndrome)    • Saccular aneurysm    • Stroke (CMS/[facility])     Perioperatively w/L Hip Repl   • Thoracic ascending aortic aneurysm (CMS/[facility])     S/p Repair on 02/23/16 by Dr. Ontiveros   • TIA (transient ischemic attack)    • Urgency incontinence    • Venous insufficiency    • Ventral hernia        Past Surgical History:  Past Surgical History:   Procedure Laterality Date   • AORTIC VALVE REPAIR/REPLACEMENT N/A 02/23/2016-[facility]    Ascending Replacement Using a 24MM Graft--Dr. Ontiveros   • APPENDECTOMY  1977   • BREAST BIOPSY Right 09/16/2012    Vacuum Assisted Core Bx of R Breast   • CARDIAC CATHETERIZATION N/A 01/06/2016    Dr. Tres De Los Santos   • CARDIAC CATHETERIZATION N/A 4/22/2019    Procedure: Left Heart Cath;  Surgeon: Tres De Los Santos MD;  [facility];  Service: Cardiology   • CARDIAC CATHETERIZATION N/A 4/22/2019    Procedure: Coronary angiography;  Surgeon: Tres De Los Santos MD;  [facility];  Service: Cardiology   • CARDIAC CATHETERIZATION N/A 7/22/2020    Procedure: LEFT HEART CATH;  Surgeon: Antonia Scott MD;  Location:  [facility] CATH INVASIVE LOCATION;  Service: Cardiovascular;  Laterality: N/A;   • CARDIAC CATHETERIZATION N/A 7/22/2020    Procedure: CORONARY ANGIOGRAPHY;  Surgeon: Antonia Scott MD;  Location:  [facility] CATH INVASIVE LOCATION;  Service: Cardiovascular;  Laterality: N/A;   • CARDIAC CATHETERIZATION N/A 7/22/2020    Procedure: Left ventriculography;  Surgeon: Antonia Scott MD;  Location:  [facility] CATH INVASIVE LOCATION;  Service: Cardiovascular;  Laterality: N/A;   • CARDIAC  CATHETERIZATION N/A 7/22/2020    Procedure: Saphenous Vein Graft;  Surgeon: Antonia Scott MD;  Location: [facility] CATH INVASIVE LOCATION;  Service: Cardiovascular;  Laterality: N/A;   • CARDIAC SURGERY  02/2016    Dr. Ontiveros/Dr. De Los Santos   • CATARACT EXTRACTION Bilateral     [facility]   • COLONOSCOPY N/A 10/2002    Dr. Negro   • CORONARY ARTERY BYPASS GRAFT  02/23/2016-[facility]    x1 w/L Vein Grafting--Dr. Ontiveros   • CORONARY ARTERY BYPASS GRAFT N/A 9/18/2020    Procedure: STERNAL EXPLORATION WITH CLOSURE AND WASHOUT, REMOVAL OF IABP, PRP;  Surgeon: Mart Ontiveros MD;  Location: [facility] MAIN OR;  Service: Cardiothoracic;  Laterality: N/A;   • CYST REMOVAL Right 01/25/2013    R Palm Excision of Retinacular Cyst--Dr. Karla Fish   • EPIDURAL BLOCK     • LAPAROSCOPIC CHOLECYSTECTOMY N/A 08/04/2004    Dr. JOEY Sheth   • MASTECTOMY Right 09/28/2012   • ORIF HIP FRACTURE Left 03/27/2005    w/ AMBI compression screw, Dr. Victor M Cabral   • RECTOVAGINAL FISTULA REPAIR  1965   • THORACIC AORTIC ANEURYSM REPAIR N/A 7/23/2019    Procedure: ROSE, THORACOABDOMINAL AORTIC ANEURYSM REPAIR, VISCERAL VESSEL REPAIR, LARGE VENTRAL HERNIA REPAIR WITH MESH, CIRCULATORY ARREST, PRP;  Surgeon: Mart Ontiveros MD;  Location: [facility] OR;  Service: Cardiothoracic   • TRANSESOPHAGEAL ECHOCARDIOGRAM (ROSE) N/A 9/18/2020    Procedure: TRANSESOPHAGEAL ECHOCARDIOGRAM WITH ANESTHESIA;  Surgeon: Mart Ontiveros MD;  Location: [facility] MAIN OR;  Service: Cardiothoracic;  Laterality: N/A;   • TUBAL ABDOMINAL LIGATION     • VENTRICULAR ANEURYSM REPAIR N/A 7/22/2020    Procedure: EMERGENT REOP STERNOTOMY, REPAIR OF LV RUPTURE, PRP, INTRAOP ROSE;  Surgeon: Mart Ontiveros MD;  Location: [facility] MAIN OR;  Service: Cardiothoracic;  Laterality: N/A;   • VENTRICULAR ANEURYSM REPAIR N/A 9/17/2020    Procedure: ROSE, MIDLINE STERNOTOMY REOP  LEFT VENTRICULAR ANEURYSM REPAIR, IABP INSERTION, PRP;  Surgeon: Mart Ontiveros MD;  Location:  [facility] MAIN OR;  Service: Cardiothoracic;  Laterality: N/A;        Home Meds:  Medications Prior to Admission   Medication Sig Dispense Refill Last Dose   • carvedilol (COREG) 6.25 MG tablet Take 6.25 mg by mouth 2 (Two) Times a Day With Meals.      • furosemide (LASIX) 20 MG tablet Take 20 mg by mouth 2 (Two) Times a Day.      • losartan (COZAAR) 50 MG tablet Take 50 mg by mouth Daily.      • albuterol sulfate HFA (ProAir HFA) 108 (90 Base) MCG/ACT inhaler Inhale 2 puffs Every 6 (Six) Hours As Needed for Wheezing or Shortness of Air. 3 inhaler 11    • cetirizine (zyrTEC) 10 MG tablet Take 1 tablet by mouth As Needed for Allergies. 30 tablet 0    • cilostazol (PLETAL) 100 MG tablet Take 100 mg by mouth 2 (Two) Times a Day.      • clopidogrel (PLAVIX) 75 MG tablet Take 1 tablet by mouth Daily. 30 tablet 11    • Fluticasone-Umeclidin-Vilant (Trelegy Ellipta) 100-62.5-25 MCG/INH aerosol powder  Inhale 1 puff Daily. 90 each 3    • meclizine (ANTIVERT) 12.5 MG tablet Take 1 tablet by mouth 3 (Three) Times a Day As Needed for Dizziness. 21 tablet 0    • melatonin 5 MG tablet tablet Take 2 tablets by mouth At Night As Needed (sleep). 30 tablet 0    • omeprazole (priLOSEC) 20 MG capsule Take 1 capsule by mouth Daily. 90 capsule 3    • pramipexole (MIRAPEX) 0.125 MG tablet TAKE ONE TABLET BY MOUTH EVERY NIGHT AT BEDTIME 90 tablet 0    • sennosides-docusate (PERICOLACE) 8.6-50 MG per tablet Take 2 tablets by mouth Daily As Needed for Constipation.      • TOVIAZ 4 MG tablet sustained-release 24 hour tablet Take 4 mg by mouth Daily.      • traMADol (ULTRAM) 50 MG tablet Take 50 mg by mouth Every 6 (Six) Hours As Needed for Moderate Pain .          Allergies:  Allergies   Allergen Reactions   • Ramipril Other (See Comments)     Ace I  cough   • Morphine Itching   • Morphine And Related Itching   • Bactrim [Sulfamethoxazole-Trimethoprim] Itching and Rash       Social History:   Social History     Socioeconomic History   • Marital  "status:      Spouse name: Not on file   • Number of children: Not on file   • Years of education: Not on file   • Highest education level: Not on file   Tobacco Use   • Smoking status: Former Smoker     Types: Cigarettes     Quit date: 2012     Years since quittin.8   • Smokeless tobacco: Never Used   • Tobacco comment: CAFFEINE USE:2 CUPS COFFEE/ COKE DAILY   Substance and Sexual Activity   • Alcohol use: No   • Drug use: No   • Sexual activity: Defer     Birth control/protection: Tubal ligation       Family History:  Family History   Problem Relation Age of Onset   • Alzheimer's disease Mother    • Cancer Maternal Aunt    • Heart disease Father    • Heart failure Father    • Hypertension Father    • Diabetes Father    • Liver disease Sister    • Heart failure Brother    • Hypertension Brother    • Alcohol abuse Brother    • Diabetes Brother    • No Known Problems Maternal Grandmother    • No Known Problems Maternal Grandfather    • No Known Problems Paternal Grandmother    • No Known Problems Paternal Grandfather    • Diabetes Brother    • Brain cancer Brother    • Heart disease Brother    • Diabetes Brother        Physical Exam:  /88 (BP Location: Left arm, Patient Position: Lying)   Pulse 83   Temp 98.2 °F (36.8 °C)   Resp 20   Ht 142.2 cm (56\")   Wt 41.8 kg (92 lb 2.4 oz)   LMP  (LMP Unknown)   SpO2 100%   BMI 20.66 kg/m²  Body mass index is 20.66 kg/m². 100% 41.8 kg (92 lb 2.4 oz)    Physical Exam     Constitutional: Elderly lean pt in bed, No acute respiratory distress, No accessory muscle use  Head: - NCAT  Eyes: No pallor, Anicteric conjunctiva, EOMI.  ENMT:  Mallampati 2, no oral thrush. Moist MM.   NECK: Trachea midline, No thyromegaly, no palpable cervical LNpathy  Heart: RRR, no murmur. No pedal edema.  Sternotomy wound +  Lungs: HEATHER +, Distant BS+. No wheezes.  Occasional basilar crackles heard    Abdomen: Soft. No tenderness, guarding or rigidity. No palpable " masses  Extremities: Extremities warm and well perfused. No cyanosis/ clubbing  Neuro: Conscious, answers appropriately, no gross focal neuro deficits  Psych: Mood and affect - unhappy     PPE recommended per [facility] infectious disease Isolation protocol for the current clinical scenario(as mentioned below) was followed.     LABS:  Lab Results   Component Value Date    CALCIUM 9.0 10/02/2020    PHOS 3.5 09/25/2020     Results from last 7 days   Lab Units 10/02/20  0632 10/01/20  0559 09/28/20  0738   SODIUM mmol/L 140 141 143   POTASSIUM mmol/L 3.9 3.9 3.6   CHLORIDE mmol/L 105 105 106   CO2 mmol/L 30.0* 28.3 26.9   BUN mg/dL 19 18 21   CREATININE mg/dL 0.68 0.77 0.67   GLUCOSE mg/dL 91 89 102*   CALCIUM mg/dL 9.0 8.6 8.7   WBC 10*3/mm3 4.99 4.74 5.89   HEMOGLOBIN g/dL 9.6* 8.8* 9.6*   PLATELETS 10*3/mm3 515* 470* 432   PROBNP pg/mL 4,645.0*  --   --      Lab Results   Component Value Date    TROPONINT 0.084 (C) 09/14/2020                             Imaging: I personally visualized the images of chest scans/ x-rays performed within last 3 days and summarized below.    Assessment   Persistent dyspnea on exertion  COPD not in exacerbation  5 mm anterior left lower lobe lung nodule-will need outpatient follow-up CAT scan in 6-month  S/p left ventricular aneurysm repair 9/20  Pulmonary hypertension- WHO group 2+3  Chronic diastolic dysfunction  CAD status post CABG  VHD status post AVR in 2016  Thoracic aortic aneurysmal repair    RECOMMENDATIONS:  Work-up done so far reviewed and patient stable from a respiratory standpoint.  CT angiogram ordered by cardiology reviewed personally and shows chronic COPD/emphysema changes with minimal basilar atelectasis along with postop cardiac changes  Will switch Trelegy inhaler to nebulized medications to see if that will help with COPD.  I have explained the reasoning to the patient.  She understands and is willing to try.  This might also help with some  atelectasis.  Will defer to cardiology regarding further cardiac work-up including echocardiogram.  She will need follow-up CAT scan in 6 months for enlarging lower lobe lung nodule     F/u with Andrew in 1 months post dc    Thank you for letting me participate in the care of this pleasant patient.  I have discussed my findings and recommendations with patient.     Maxime Rivera MD  10/2/2020  17:29 EDT

## 2025-05-13 NOTE — PROGRESS NOTES
Inpatient Rehabilitation Plan of Care Note    Plan of Care  Care Plan Reviewed - No updates at this time.    Body Systems    [RN] Integumentary(Active)  Current Status(10/06/2020): pt has stage 2 pressure sore to bottom, chest  incision, and R groin site. pinkness to coccyx- refuses mepilex dsg. and STR to  R groin  Weekly Goal(10/13/2020): remain intact  Discharge Goal: no infection    Performed Intervention(s)  dressing changes or wound care per orders  WOCN  monitor for signs of infection      Psychosocial    [RN] Coping/Adjustment(Active)  Current Status(10/06/2020): pt is adjusting appropriately to hospital stay.  family supportive  Weekly Goal(10/13/2020): adjust WNL  Discharge Goal: adequate coping    Performed Intervention(s)  verbalize needs and concerns      Safety    [RN] Potential for Injury(Active)  Current Status(10/06/2020): pt alert and oriented. no safety concerns  Weekly Goal(10/13/2020): no falls  Discharge Goal: no falls    Performed Intervention(s)  bed/chair alarms  hourly rounding  items within reach      Sphincter Control    [RN] Bladder Management(Active)  Current Status(10/06/2020): 100% continent  Weekly Goal(10/13/2020): remain continent  Discharge Goal: continent 100%    [RN] Bowel Management(Active)  Current Status(10/06/2020): continent 100%  Weekly Goal(10/13/2020): remains continent  Discharge Goal: continent 100%    Performed Intervention(s)  hourly rounding  adequate fluid intake    Signed by: Mary Mckeon RN     Patient : Angie Cameron Age: 33 year old Sex: female   MRN: [id number] Encounter Date: 2025  ED Bed#:     History of Present Illness     Chief Complaint   Patient presents with    Abdominal Pain    Vaginal Bleeding     Pt having abd pain and vaginal bleeding that began today. Pt is 10 weeks pregnant. OB contacted and said nurse said to be seen in ED     HPI  33-year-old -0-1-1 with no other past medical history who presents to the ED for evaluation of vaginal spotting and abdominal pain during pregnancy.  LMP 3/13/2025.  She has been following with her primary care physician but has not yet seen OB/GYN.  She has been taking iron supplements and prenatal vitamin, but stopped taking iron supplements due to constipation and abdominal discomfort.  Today she was having more intense left lower quadrant abdominal pain and small amount of vaginal bleeding with spotting.  She denies any dysuria but has noticed vaginal discharge recently.  Denies any nausea today but has been prescribed Zofran which has helped over the last few weeks as needed.  She denies any chest pain or shortness of breath.  No recent fevers or cough.  No leg swelling.  She denies any prior history of hypertension or preeclampsia but has been told that she has high blood pressure on prior visits.  Not currently on medications.    No Known Allergies  No current facility-administered medications for this encounter.     Current Outpatient Medications   Medication Sig    metroNIDAZOLE (FLAGYL) 500 MG tablet Take 1 tablet by mouth in the morning and 1 tablet in the evening. Do all this for 7 days.     No past medical history on file.  No past surgical history on file.  No family history on file.       Review of Systems   Review of Systems  As documented in HPI.     Physical Exam     ED Triage Vitals   ED Triage Vitals Group      Temp 25 1626 98 °F (36.7 °C)      Heart Rate 25 1626 (!) 52      Resp 25 1626 18      BP 25  1626 (!) 145/88      SpO2 05/12/25 1626 99 %      EtCO2 mmHg --       Height 05/12/25 1633 5' 7\" (1.702 m)      Weight 05/12/25 1633 183 lb 3.2 oz (83.1 kg)      Weight Scale Used --       BMI (Calculated) 05/12/25 1633 28.69      IBW/kg (Calculated) 05/12/25 1633 61.6     Physical Exam  Vitals and nursing note reviewed. Exam conducted with a chaperone present.   Constitutional:       General: She is not in acute distress.     Appearance: Normal appearance.   HENT:      Head: Normocephalic.      Nose: Nose normal.      Mouth/Throat:      Mouth: Mucous membranes are moist.      Neck: Normal range of motion and neck supple.   Eyes:      Extraocular Movements: Extraocular movements intact.      Conjunctiva/sclera: Conjunctivae normal.   Cardiovascular:      Rate and Rhythm: Regular rhythm.      Pulses: Normal pulses.      Heart sounds: Normal heart sounds.   Pulmonary:      Effort: Pulmonary effort is normal.      Breath sounds: Normal breath sounds.   Abdominal:      General: There is no distension.      Palpations: Abdomen is soft.      Tenderness: There is no abdominal tenderness.   Genitourinary:     Comments: Pelvic exam, small amount of blood noted, brown, no active bleeding or bright red blood, small amount of clear white discharge, cervix normal; female chaperone present.   Musculoskeletal:         General: Normal range of motion.   Skin:     General: Skin is warm and dry.      Capillary Refill: Capillary refill takes less than 2 seconds.   Neurological:      General: No focal deficit present.      Mental Status: She is alert. Mental status is at baseline.      Cranial Nerves: No cranial nerve deficit, dysarthria or facial asymmetry.      Sensory: Sensation is intact.      Motor: Motor function is intact. No seizure activity.   Psychiatric:         Mood and Affect: Affect normal.         Behavior: Behavior normal.         Procedures   Procedures    Lab Results     Results for orders placed or performed during  the hospital encounter of 05/13/25   Urinalysis & Reflex Microscopy With Culture If Indicated    Specimen: Urine clean catch   Result Value Ref Range    COLOR, URINALYSIS Straw     APPEARANCE, URINALYSIS Clear     GLUCOSE, URINALYSIS Negative Negative mg/dL    BILIRUBIN, URINALYSIS Negative Negative    KETONES, URINALYSIS Negative Negative mg/dL    SPECIFIC GRAVITY, URINALYSIS 1.028 1.005 - 1.030    OCCULT BLOOD, URINALYSIS Negative Negative    PH, URINALYSIS 6.5 5.0 - 7.0    PROTEIN, URINALYSIS Negative Negative mg/dL    UROBILINOGEN, URINALYSIS 0.2 0.2, 1.0 mg/dL    NITRITE, URINALYSIS Negative Negative    LEUKOCYTE ESTERASE, URINALYSIS Negative Negative   Beta HCG Quantitative Pregnancy    Specimen: Blood, Venous   Result Value Ref Range    HCG, Quantitative 119,693 (H) <=4 mUnits/mL   CBC with Automated Differential (performable only)    Specimen: Blood, Venous   Result Value Ref Range    WBC 12.6 (H) 4.2 - 11.0 K/mcL    RBC 5.20 4.00 - 5.20 mil/mcL    HGB 10.5 (L) 12.0 - 15.5 g/dL    HCT 32.5 (L) 36.0 - 46.5 %    MCV 62.5 (L) 78.0 - 100.0 fl    MCH 20.2 (L) 26.0 - 34.0 pg    MCHC 32.3 32.0 - 36.5 g/dL    RDW-CV 15.3 (H) 11.0 - 15.0 %    RDW-SD 32.4 (L) 39.0 - 50.0 fL     140 - 450 K/mcL    NRBC 0 <=0 /100 WBC    Neutrophil, Percent 67 %    Lymphocytes, Percent 22 %    Mono, Percent 10 %    Eosinophils, Percent 0 %    Basophils, Percent 0 %    Immature Granulocytes 1 %    Absolute Neutrophils 8.5 (H) 1.8 - 7.7 K/mcL    Absolute Lymphocytes 2.7 1.0 - 4.8 K/mcL    Absolute Monocytes 1.2 (H) 0.3 - 0.9 K/mcL    Absolute Eosinophils  0.0 0.0 - 0.5 K/mcL    Absolute Basophils 0.1 0.0 - 0.3 K/mcL    Absolute Immature Granulocytes 0.1 0.0 - 0.2 K/mcL   WET MOUNT    Specimen: Vagina; Swab   Result Value Ref Range    CLUE CELLS, WET MOUNT Present (A) None Seen    TRICHOMONAS, WET MOUNT None Seen None Seen    YEAST, WET MOUNT None Seen None Seen   RH IMMUNE GLOBULIN WORKUP ED    Specimen: Blood, Venous   Result  Value Ref Range    ABO/RH(D) A Rh Positive    HCG POC    Specimen: Urine   Result Value Ref Range    HCG, URINE - POINT OF CARE Positive (A) Negative       Radiology Results     Imaging Results              US OB LESS THAN 14 WEEKS AND TRANSVAGINAL 1 FETUS (Final result)  Result time 05/12/25 18:38:41      Final result                   Impression:    1.   There is a single intrauterine pregnancy with cardiac activity at 8  weeks 5 days EGA based on current measurements, EDC 12/17/2025.    Note: There is always a small risk that an abnormal fetus may appear normal  on ultrasound. This result is not a guarantee of a satisfactory outcome.    FOR PHYSICIAN USE ONLY - Please note that this report was generated using  voice recognition software.  If you require clarification or feel that  there has been an error in this report please contact me through  [employer].  Thank you very much for allowing me to participate in the  care of your patient.        Electronically Signed by: CONSTANCE JACKSON M.D.   Signed on: 5/12/2025 6:38 PM   Workstation ID: [id number]               Narrative:    EXAM: US OB LESS THAN 14 WEEKS AND TRANSVAGINAL 1 FETUS    CLINICAL INDICATION: 33 years-old Female, Vaginal Bleeding in Pregnancy    COMPARISON: None.    FINDINGS:    There is a single intrauterine pregnancy at 8 weeks 5 days EGA based on  current measurements, EDC 12/17/2025.    Amniotic fluid volume:  Normal.  Placenta location: Uncertain.    Fetal structural survey: Not performed at this time.    CRL:        21.4 mm  8 wks 5 d  Current Measurement Summary:  8 wks 5 d      Fetal cardiac activity:  Present; 183 BPM    Maternal uterus and adnexa:  The anteverted uterus measures 13.9 x 7.4 x 7.2 cm. No myometrial lesions  are seen. The cervix is closed.    The ovaries have an unremarkable sonographic appearance. The right ovary  measures 2.5 x 2.0 x 1.9 cm. The left ovary measures 2.6 x 1.9 x 1.6 cm.                                       ED Medications     ED Medication Orders (From admission, onward)      None            ED Course     Vitals:    25 1626 25 1633 25 0306   BP: (!) 145/88  105/44   BP Location:   LUE - Left upper extremity   Patient Position:   Supine   Pulse: (!) 52  79   Resp: 18  17   Temp: 98 °F (36.7 °C)  97.8 °F (36.6 °C)   TempSrc: Oral  Oral   SpO2: 99%  99%   Weight:  83.1 kg (183 lb 3.2 oz)    Height:  5' 7\" (1.702 m)    LMP: 2025     Independent Review Completed in ED course    Consults              ED Consults done in the ED course    Medical Decision Making                MDM:   33-year-old female G3,  presenting with abdominal pain and vaginal bleeding during pregnancy.  LMP reportedly 2025, hypertensive 145/88 otherwise reassuring vitals.  No known prior history of hypertension.  Patient denies prior preeclampsia but given this early in pregnancy would be more suggestive of pre-existing essential hypertension other than preeclampsia.    hCG 120,000, WBC 12.6, hemoglobin 10.5, UA nonstressed with UTI, Rh+ no indication for RhoGAM.    Pelvic ultrasound showing single live IUP with EGA 8 weeks 5 days, fetal heart tones 183 bpm.    Exam benign, possibly constipation or gas pain, no significant tenderness on exam and pain is already improving after passing flatus.    Pelvic exam, small amount of blood noted, brown, no active bleeding or bright red blood, small amount of clear white discharge, cervix normal    WM +clue cells, will treat for BV given pregnant and symptomatic.   Recommend OB follow up and return if any worsening symptoms.       Does the Patient have sepsis: NO     Updates/Reassessments/Review of ED workup:   ED Course as of 25 0853   e May 13, 2025   0312 BP: 105/44 [EN]   0343 Clue Cells(!): Present  Will send Rx for Flagyl [EN]      ED Course User Index  [EN] Yoni Boudreaux MD         ED Diagnosis & Disposition     1. BV (bacterial vaginosis)        2.  First trimester bleeding (CMD)        3. Abdominal pain during pregnancy in first trimester (CMD)        4. Elevated blood pressure reading          Discharge 5/13/2025  3:43 AM  Angie Cameron discharge to home/self care.         Yoni Boudreaux MD  05/13/25 0837

## (undated) DEVICE — 28 FR RIGHT ANGLE – SOFT PVC CATHETER: Brand: PVC THORACIC CATHETERS

## (undated) DEVICE — ELECTRD BLD EZ CLN STD 6.5IN

## (undated) DEVICE — Device

## (undated) DEVICE — TEMP PACING WIRE: Brand: MYO/WIRE

## (undated) DEVICE — TOWEL,OR,DSP,ST,BLUE,STD,4/PK,20PK/CS: Brand: MEDLINE

## (undated) DEVICE — CATH LUM MONTR DBL 6FR 110CM

## (undated) DEVICE — BNDG ELAS ELITE V/CLOSE 4IN 5YD LF STRL

## (undated) DEVICE — DRP SLUSH MACH FOR STND ALONE OM-ORS-321

## (undated) DEVICE — CLAMP INSERT: Brand: STEALTH® CLAMP INSERT

## (undated) DEVICE — CATH DIAG IMPULSE FL3.5 5F 100CM

## (undated) DEVICE — GLV SURG BIOGEL LTX PF 7 1/2

## (undated) DEVICE — SPNG DISECTOR KTNER XRAY COTN 1/4X9/16IN PK/5

## (undated) DEVICE — TBG INSUFFLATION LUER LOCK: Brand: MEDLINE INDUSTRIES, INC.

## (undated) DEVICE — TBG ART PRESS 24 IN

## (undated) DEVICE — PK PERFUS CUST W/CARDIOPLEGIA

## (undated) DEVICE — DRSNG SURESITE WNDW 2.38X2.75

## (undated) DEVICE — ST TOURNI COMPL A/ 7IN

## (undated) DEVICE — SOL ISO/ALC RUB 70PCT 4OZ

## (undated) DEVICE — BNDG ELAS ELITE V/CLOSE 6IN 5YD LF STRL

## (undated) DEVICE — 8 FOOT DISPOSABLE EXTENSION CABLE WITH SAFE CONNECT / ALLIGATOR CLIP

## (undated) DEVICE — SEALANT HEMO SURG COSEAL PREMIX 8ML

## (undated) DEVICE — HEMOCONCENTRATOR PERFUS LPS06

## (undated) DEVICE — GLIDESHEATH BASIC HYDROPHILIC COATED INTRODUCER SHEATH: Brand: GLIDESHEATH

## (undated) DEVICE — SOL NACL 0.9PCT 1000ML

## (undated) DEVICE — KT INSRT VENI BIOMEDICUS NXTGEN W/GW .038IN 180CM 18G

## (undated) DEVICE — ST. SORBAVIEW ULTIMATE IJ SYSTEM A,C: Brand: CENTURION

## (undated) DEVICE — REDUCING CONN CANN/PUMP 3/8X1/4

## (undated) DEVICE — Device: Brand: LEVEL 1

## (undated) DEVICE — CVR PROB 96IN LF STRL

## (undated) DEVICE — CATH VENT MIV RADL PIG ST TIP 4F 110CM

## (undated) DEVICE — SKIN PREP TRAY W/CHG: Brand: MEDLINE INDUSTRIES, INC.

## (undated) DEVICE — INTRO SHEATH ART/FEM ENGAGE .035 5F12CM

## (undated) DEVICE — SPNG LAP 18X18IN LF STRL PK/5

## (undated) DEVICE — DRP SLUSH WARMR MACH RECTG 66X44IN

## (undated) DEVICE — SOL IRR H2O BTL 1000ML STRL

## (undated) DEVICE — PREP SOL POVIDONE/IODINE BT 4OZ

## (undated) DEVICE — FR5 INFINITI MULTIPAC: Brand: INFINITI

## (undated) DEVICE — GLV SURG BIOGEL LTX PF 8 1/2

## (undated) DEVICE — VESSEL LOOPS X-RAY DETECTABLE: Brand: DEROYAL

## (undated) DEVICE — GW EMR FIX EXCHG J STD .035 3MM 260CM

## (undated) DEVICE — OASIS DRAIN, SINGLE, INLINE & ATS COMPATIBLE: Brand: OASIS

## (undated) DEVICE — 28 FR STRAIGHT – SOFT PVC CATHETER: Brand: PVC THORACIC CATHETERS

## (undated) DEVICE — SOL IRR NACL 0.9PCT BT 1000ML

## (undated) DEVICE — ST PERFUS M/

## (undated) DEVICE — TB SXN DLP RIGD MACROSUCKER 6F 3IN

## (undated) DEVICE — MARKR SKIN W/RULR AND LBL

## (undated) DEVICE — SOL BETADINE 4OZ

## (undated) DEVICE — PK HEART OPN 40

## (undated) DEVICE — NDL PERC 1PRT THNWALL W/BASEPLT 18G 7CM

## (undated) DEVICE — INTRO SHEATH ART/FEM ENGAGE .035 6F12CM

## (undated) DEVICE — JACKSON-PRATT 100CC BULB RESERVOIR: Brand: CARDINAL HEALTH

## (undated) DEVICE — GAUZE,SPONGE,4"X4",16PLY,XRAY,STRL,LF: Brand: MEDLINE

## (undated) DEVICE — PK ATS CUST W CARDIOTOMY RESEVOIR

## (undated) DEVICE — PROXIMATE RH ROTATING HEAD SKIN STAPLERS (35 WIDE) CONTAINS 35 STAINLESS STEEL STAPLES: Brand: PROXIMATE

## (undated) DEVICE — PK CATH CARD 40

## (undated) DEVICE — CANN FEM VEN BM 1PC 25F 30IN W PERC

## (undated) DEVICE — SYS PERFUS SEP PLATLT W TIPS CUST

## (undated) DEVICE — OASIS DRAIN, DUAL, IN-LINE, ATS COMPATIBLE: Brand: OASIS

## (undated) DEVICE — BIOPATCH™ ANTIMICROBIAL DRESSING WITH CHLORHEXIDINE GLUCONATE IS A HYDROPHILLIC POLYURETHANE ABSORPTIVE FOAM WITH CHLORHEXIDINE GLUCONATE (CHG) WHICH INHIBITS BACTERIAL GROWTH UNDER THE DRESSING. THE DRESSING IS INTENDED TO BE USED TO ABSORB EXUDATE, COVER A WOUND CAUSED BY VASCULAR AND NONVASCULAR PERCUTANEOUS MEDICAL DEVICES DURING SURGERY, AS WELL AS REDUCE LOCAL INFECTION AND COLONIZATION OF MICROORGANISMS.: Brand: BIOPATCH

## (undated) DEVICE — DRN WND CH RND FUL/FLUT NO/TROC 3/8IN 28F

## (undated) DEVICE — SPNG GZ WOVN 4X4IN 12PLY 10/BX STRL

## (undated) DEVICE — AIRLIFE™ HCH HYGROSCOPIC CONDENSER HUMIDFIER: Brand: AIRLIFE™

## (undated) DEVICE — SENSR CERBRL O2 PK/2

## (undated) DEVICE — CATH VENT MIV RADL PIG ST TIP 5F 110CM

## (undated) DEVICE — IRRIGATOR BULB ASEPTO 60CC STRL

## (undated) DEVICE — CANN AORT ROOT DLP VNT 14G 7F

## (undated) DEVICE — SOL IRR RNG BTL 1000ML

## (undated) DEVICE — DRSNG WND BORDR/ADHS NONADHR/GZ LF 4X14IN STRL

## (undated) DEVICE — CANN AORT ROOT DLP VNT/8IN 14G 7F

## (undated) DEVICE — CONN TBG Y 5 IN 1 LF STRL

## (undated) DEVICE — CATH F5 INF PIG145 110CM 6SH: Brand: INFINITI

## (undated) DEVICE — GW HITORQUE/BAL MID/WT J W/HCOAT .014 3X190CM

## (undated) DEVICE — TBG ART PRESS 60 IN

## (undated) DEVICE — ROTATING SURGICAL PUNCHES, 1 PER POUCH: Brand: A&E MEDICAL / ROTATING SURGICAL PUNCHES

## (undated) DEVICE — TP UMB COTN 1/8X36 U12T

## (undated) DEVICE — GLIDESHEATH SLENDER STAINLESS STEEL KIT: Brand: GLIDESHEATH SLENDER

## (undated) DEVICE — SUT PROLN 4/0 BB D/A 36IN 8581H

## (undated) DEVICE — CORONARY ARTERY BYPASS GRAFT MARKERS, STAINLESS STEEL, DISTAL, WITHOUT HOLDER: Brand: ANASTOMARK CORONARY ARTERY BYPASS GRAFT MARKERS, STAINLESS STEEL, DISTAL

## (undated) DEVICE — CONN STR 1/2INX3/8IN

## (undated) DEVICE — CONN STR 3/8 IN STRL

## (undated) DEVICE — CANN ART EOPA 3D NV W/CONN 20F

## (undated) DEVICE — SUPPLIED AS A PAIR FOR USE IN JAWS OF RESUABLE CLAMPS.: Brand: INTRACK® INSERTS

## (undated) DEVICE — PK PERFUS W/TB CUST ADLT

## (undated) DEVICE — CATH DIAG CARD PERFORMA JL3.5 BT 4F100CM

## (undated) DEVICE — CANN RETRGR STYLET RSCP 15F

## (undated) DEVICE — KT MANIFLD CARDIAC

## (undated) DEVICE — CONTAINER,SPECIMEN,OR STERILE,4OZ: Brand: MEDLINE

## (undated) DEVICE — CANN ART EOPA DIL J TP NV W/CONN 18F

## (undated) DEVICE — SUT ETHIBOND 2/0 CV V5  30IN PXX52

## (undated) DEVICE — 32 FR RIGHT ANGLE – SOFT PVC CATHETER: Brand: PVC THORACIC CATHETERS

## (undated) DEVICE — CATH DIAG CARD PERFORM JR4.0 BT 4F100CM

## (undated) DEVICE — OPTIFOAM GENTLE SA, POSTOP, 4X12: Brand: MEDLINE

## (undated) DEVICE — BALN IAB SENSATION PLS F/O 40CC 7.5F 15CM SYS

## (undated) DEVICE — BND PRESS RADL COMFRT 14IN STRL

## (undated) DEVICE — HI-TORQUE BALANCE MIDDLEWEIGHT GUIDE WIRE .014 STRAIGHT TIP 3.0 CM X 190 CM: Brand: HI-TORQUE BALANCE MIDDLEWEIGHT

## (undated) DEVICE — DRSNG SURESITE WNDW 4X4.5

## (undated) DEVICE — SCANLAN® SUTURE BOOT™ INSTRUMENT JAW COVERS - ORIGINAL YELLOW, STANDARD PKG (5 PAIR/CARTRIDGE, 1 CARTRIDGE/PKG): Brand: SCANLAN® SUTURE BOOT™ INSTRUMENT JAW COVERS

## (undated) DEVICE — Y-TYPE BLOOD/SOLUTION SET WITH STANDARD BLOOD FILTER, 170 TO 260 MICRON FILTER, LUER ACTIVATED VALVE, MALE LUER LOCK ADAPTER WITH RETRACTABLE COLLAR: Brand: CLEARLINK

## (undated) DEVICE — GW INQWIRE FC PTFE J/3MM .021 180

## (undated) DEVICE — CATH DIAG IMPULSE FR4 5F 100CM

## (undated) DEVICE — CATH ART FEM ST 18G 10.8CM

## (undated) DEVICE — BLOWER/MISTER AXIOUS OPCAB W/TBG

## (undated) DEVICE — TR BAND RADIAL ARTERY COMPRESSION DEVICE: Brand: TR BAND

## (undated) DEVICE — STPLR SKIN VISISTAT WD 35CT

## (undated) DEVICE — ELECTRD BLD EZ CLN STD 2.5IN

## (undated) DEVICE — APPL CHLORAPREP W/TINT 10.5ML PERC STRL

## (undated) DEVICE — SUT MNCRYL 3/0 SH 27 IN Y416H

## (undated) DEVICE — CANN VESL FREE FLO 2MM